# Patient Record
Sex: MALE | Race: WHITE | NOT HISPANIC OR LATINO | Employment: FULL TIME | URBAN - METROPOLITAN AREA
[De-identification: names, ages, dates, MRNs, and addresses within clinical notes are randomized per-mention and may not be internally consistent; named-entity substitution may affect disease eponyms.]

---

## 2023-02-14 PROBLEM — H93.19 TINNITUS: Status: ACTIVE | Noted: 2023-02-14

## 2023-02-14 PROBLEM — F32.A ANXIETY AND DEPRESSION: Status: ACTIVE | Noted: 2023-02-14

## 2023-02-14 PROBLEM — N41.0 ACUTE BACTERIAL PROSTATITIS: Status: ACTIVE | Noted: 2023-02-14

## 2023-02-14 PROBLEM — K57.30 DIVERTICULOSIS OF LARGE INTESTINE WITHOUT HEMORRHAGE: Status: ACTIVE | Noted: 2023-02-14

## 2023-02-14 PROBLEM — N40.0 ENLARGED PROSTATE: Status: ACTIVE | Noted: 2023-02-14

## 2023-02-14 PROBLEM — H53.2 DIPLOPIA: Status: ACTIVE | Noted: 2023-02-14

## 2023-02-14 PROBLEM — J06.9 VIRAL URI WITH COUGH: Status: ACTIVE | Noted: 2023-02-14

## 2023-02-14 PROBLEM — I49.9 IRREGULAR HEARTBEAT: Status: ACTIVE | Noted: 2023-02-14

## 2023-02-14 PROBLEM — F41.9 ANXIETY AND DEPRESSION: Status: ACTIVE | Noted: 2023-02-14

## 2023-02-14 PROBLEM — R33.9 URINARY RETENTION: Status: ACTIVE | Noted: 2023-02-14

## 2023-02-14 PROBLEM — F17.210 CIGARETTE NICOTINE DEPENDENCE WITHOUT COMPLICATION: Status: ACTIVE | Noted: 2023-02-14

## 2023-02-14 PROBLEM — M54.12 CERVICAL RADICULOPATHY: Status: ACTIVE | Noted: 2023-02-14

## 2023-02-14 PROBLEM — R11.2 NAUSEA AND/OR VOMITING: Status: ACTIVE | Noted: 2023-02-14

## 2023-02-14 PROBLEM — R39.15 BENIGN PROSTATIC HYPERPLASIA (BPH) WITH URINARY URGENCY: Status: ACTIVE | Noted: 2023-02-14

## 2023-02-14 PROBLEM — R73.03 PREDIABETES: Status: ACTIVE | Noted: 2023-02-14

## 2023-02-14 PROBLEM — R53.83 FATIGUE: Status: ACTIVE | Noted: 2023-02-14

## 2023-02-14 PROBLEM — M79.10 MYALGIA: Status: ACTIVE | Noted: 2023-02-14

## 2023-02-14 PROBLEM — R50.9 FEVER AND CHILLS: Status: ACTIVE | Noted: 2023-02-14

## 2023-02-14 PROBLEM — K64.9 HEMORRHOIDS: Status: ACTIVE | Noted: 2023-02-14

## 2023-02-14 PROBLEM — R51.9 HEADACHE: Status: ACTIVE | Noted: 2023-02-14

## 2023-02-14 PROBLEM — N40.1 BENIGN PROSTATIC HYPERPLASIA (BPH) WITH URINARY URGENCY: Status: ACTIVE | Noted: 2023-02-14

## 2023-02-14 PROBLEM — R79.89 ABNORMAL CBC MEASUREMENT: Status: ACTIVE | Noted: 2023-02-14

## 2023-02-14 PROBLEM — I10 BENIGN ESSENTIAL HTN: Status: ACTIVE | Noted: 2023-02-14

## 2023-02-14 PROBLEM — R05.9 COUGH: Status: ACTIVE | Noted: 2023-02-14

## 2023-02-14 PROBLEM — R39.9 UTI SYMPTOMS: Status: ACTIVE | Noted: 2023-02-14

## 2023-02-14 PROBLEM — R10.9 ABDOMINAL PAIN OF UNKNOWN ETIOLOGY: Status: ACTIVE | Noted: 2023-02-14

## 2023-02-14 PROBLEM — D12.6 ADENOMATOUS POLYP OF COLON: Status: ACTIVE | Noted: 2023-02-14

## 2023-02-14 RX ORDER — IBUPROFEN 200 MG
TABLET ORAL
COMMUNITY
Start: 2022-04-06 | End: 2024-01-03 | Stop reason: ALTCHOICE

## 2023-02-14 RX ORDER — AMLODIPINE BESYLATE 10 MG/1
1 TABLET ORAL DAILY
COMMUNITY
Start: 2022-04-06 | End: 2024-02-08 | Stop reason: WASHOUT

## 2023-09-14 PROBLEM — F41.9 ANXIETY: Status: ACTIVE | Noted: 2023-09-14

## 2023-09-14 PROBLEM — S20.219A CONTUSION OF CHEST: Status: ACTIVE | Noted: 2023-09-14

## 2023-10-02 ENCOUNTER — APPOINTMENT (OUTPATIENT)
Dept: PRIMARY CARE | Facility: CLINIC | Age: 58
End: 2023-10-02
Payer: COMMERCIAL

## 2024-01-03 ENCOUNTER — OFFICE VISIT (OUTPATIENT)
Dept: PRIMARY CARE | Facility: CLINIC | Age: 59
End: 2024-01-03
Payer: COMMERCIAL

## 2024-01-03 VITALS
DIASTOLIC BLOOD PRESSURE: 80 MMHG | WEIGHT: 197 LBS | HEART RATE: 86 BPM | OXYGEN SATURATION: 96 % | HEIGHT: 68 IN | BODY MASS INDEX: 29.86 KG/M2 | SYSTOLIC BLOOD PRESSURE: 142 MMHG

## 2024-01-03 DIAGNOSIS — R73.03 PREDIABETES: ICD-10-CM

## 2024-01-03 DIAGNOSIS — R91.8 MULTIPLE LUNG NODULES: ICD-10-CM

## 2024-01-03 DIAGNOSIS — Z13.220 SCREENING FOR LIPID DISORDERS: ICD-10-CM

## 2024-01-03 DIAGNOSIS — Z72.0 TOBACCO USE: ICD-10-CM

## 2024-01-03 DIAGNOSIS — Z76.89 ENCOUNTER TO ESTABLISH CARE: Primary | ICD-10-CM

## 2024-01-03 DIAGNOSIS — Z11.59 ENCOUNTER FOR HCV SCREENING TEST FOR LOW RISK PATIENT: ICD-10-CM

## 2024-01-03 DIAGNOSIS — E55.9 VITAMIN D DEFICIENCY: ICD-10-CM

## 2024-01-03 DIAGNOSIS — Z13.21 ENCOUNTER FOR VITAMIN DEFICIENCY SCREENING: ICD-10-CM

## 2024-01-03 DIAGNOSIS — F10.10 EXCESSIVE DRINKING OF ALCOHOL: ICD-10-CM

## 2024-01-03 DIAGNOSIS — Z13.6 SCREENING FOR HEART DISEASE: ICD-10-CM

## 2024-01-03 DIAGNOSIS — I10 PRIMARY HYPERTENSION: ICD-10-CM

## 2024-01-03 PROBLEM — R53.83 FATIGUE: Status: RESOLVED | Noted: 2023-02-14 | Resolved: 2024-01-03

## 2024-01-03 PROBLEM — M79.10 MYALGIA: Status: RESOLVED | Noted: 2023-02-14 | Resolved: 2024-01-03

## 2024-01-03 PROBLEM — H53.2 DIPLOPIA: Status: RESOLVED | Noted: 2023-02-14 | Resolved: 2024-01-03

## 2024-01-03 PROBLEM — F41.9 ANXIETY: Status: RESOLVED | Noted: 2023-09-14 | Resolved: 2024-01-03

## 2024-01-03 PROBLEM — R33.9 URINARY RETENTION: Status: RESOLVED | Noted: 2023-02-14 | Resolved: 2024-01-03

## 2024-01-03 PROBLEM — R51.9 HEADACHE: Status: RESOLVED | Noted: 2023-02-14 | Resolved: 2024-01-03

## 2024-01-03 PROBLEM — J06.9 VIRAL URI WITH COUGH: Status: RESOLVED | Noted: 2023-02-14 | Resolved: 2024-01-03

## 2024-01-03 PROBLEM — R50.9 FEVER AND CHILLS: Status: RESOLVED | Noted: 2023-02-14 | Resolved: 2024-01-03

## 2024-01-03 PROBLEM — R11.2 NAUSEA AND/OR VOMITING: Status: RESOLVED | Noted: 2023-02-14 | Resolved: 2024-01-03

## 2024-01-03 PROBLEM — R79.89 ABNORMAL CBC MEASUREMENT: Status: RESOLVED | Noted: 2023-02-14 | Resolved: 2024-01-03

## 2024-01-03 PROBLEM — I49.9 IRREGULAR HEARTBEAT: Status: RESOLVED | Noted: 2023-02-14 | Resolved: 2024-01-03

## 2024-01-03 PROBLEM — R39.9 UTI SYMPTOMS: Status: RESOLVED | Noted: 2023-02-14 | Resolved: 2024-01-03

## 2024-01-03 PROBLEM — N40.0 ENLARGED PROSTATE: Status: RESOLVED | Noted: 2023-02-14 | Resolved: 2024-01-03

## 2024-01-03 PROBLEM — N41.0 ACUTE BACTERIAL PROSTATITIS: Status: RESOLVED | Noted: 2023-02-14 | Resolved: 2024-01-03

## 2024-01-03 PROBLEM — R05.9 COUGH: Status: RESOLVED | Noted: 2023-02-14 | Resolved: 2024-01-03

## 2024-01-03 PROBLEM — S20.219A CONTUSION OF CHEST: Status: RESOLVED | Noted: 2023-09-14 | Resolved: 2024-01-03

## 2024-01-03 PROBLEM — R10.9 ABDOMINAL PAIN OF UNKNOWN ETIOLOGY: Status: RESOLVED | Noted: 2023-02-14 | Resolved: 2024-01-03

## 2024-01-03 PROCEDURE — 90715 TDAP VACCINE 7 YRS/> IM: CPT

## 2024-01-03 PROCEDURE — 99203 OFFICE O/P NEW LOW 30 MIN: CPT

## 2024-01-03 PROCEDURE — 3077F SYST BP >= 140 MM HG: CPT

## 2024-01-03 PROCEDURE — 99386 PREV VISIT NEW AGE 40-64: CPT

## 2024-01-03 PROCEDURE — 3079F DIAST BP 80-89 MM HG: CPT

## 2024-01-03 RX ORDER — LISINOPRIL 10 MG/1
10 TABLET ORAL DAILY
Qty: 30 TABLET | Refills: 1 | Status: SHIPPED | OUTPATIENT
Start: 2024-01-03 | End: 2024-03-04

## 2024-01-03 ASSESSMENT — ENCOUNTER SYMPTOMS
NAUSEA: 0
PALPITATIONS: 0
UNEXPECTED WEIGHT CHANGE: 1
DYSURIA: 0
COUGH: 0
LIGHT-HEADEDNESS: 0
DIARRHEA: 0
BLOOD IN STOOL: 0
NERVOUS/ANXIOUS: 0
ARTHRALGIAS: 0
DIAPHORESIS: 0
SORE THROAT: 0
FATIGUE: 1
SHORTNESS OF BREATH: 0
WHEEZING: 0
ADENOPATHY: 0
MYALGIAS: 0
FEVER: 0
VOMITING: 0
HEMATURIA: 0
DIFFICULTY URINATING: 0
DYSPHORIC MOOD: 0

## 2024-01-03 ASSESSMENT — PATIENT HEALTH QUESTIONNAIRE - PHQ9
SUM OF ALL RESPONSES TO PHQ9 QUESTIONS 1 AND 2: 0
1. LITTLE INTEREST OR PLEASURE IN DOING THINGS: NOT AT ALL
2. FEELING DOWN, DEPRESSED OR HOPELESS: NOT AT ALL

## 2024-01-03 ASSESSMENT — PAIN SCALES - GENERAL: PAINLEVEL: 0-NO PAIN

## 2024-01-03 NOTE — PROGRESS NOTES
Subjective   Patient ID: Bereket Em is a 58 y.o. male who presents for Establish Care (Pt is here to establish care no concerns and physical /la).    Hx of HTN, Pre-DM, tobacco use, hx of other substance abuse in remission.     Other providers: none    HTN: uncontrolled. On 10 mg Amlodipine. No medication side effects. Home BP were high and re-started his old prescription of amlodipine about 9 months, not 140/80's. Was smoking 1PPD, recently down to 1/2 PPD. Also drinks 3-4 beers a night, recently decreased from higher amount. Denies chest pain, heart palpitations, SOB, dizziness, headaches, changes of vision, syncope, leg swelling.      Pre-DM: no medication. Due for labs.     Working on quitting smoking himself. Has tried Chantix in th past. Does not what medication assistance right now.     *Extensive family history of heart attacks    HM: Colon screening 5/18/22, follow-up 5 years. PSA discussed and declined, no Fhx. Lung screening >30 pack year history. Vaccinations Tdap will be due in 9 months, wants today, flu UTD, Shingrix would like to hold off.          Review of Systems   Constitutional:  Positive for fatigue and unexpected weight change. Negative for diaphoresis and fever.   HENT:  Negative for congestion, hearing loss and sore throat.    Eyes:  Negative for visual disturbance.   Respiratory:  Negative for cough, shortness of breath and wheezing.    Cardiovascular:  Negative for chest pain, palpitations and leg swelling.   Gastrointestinal:  Negative for blood in stool, diarrhea, nausea and vomiting.   Genitourinary:  Negative for difficulty urinating, dysuria and hematuria.   Musculoskeletal:  Negative for arthralgias and myalgias.   Skin:  Negative for rash.   Allergic/Immunologic: Negative for immunocompromised state.   Neurological:  Negative for syncope and light-headedness.   Hematological:  Negative for adenopathy.   Psychiatric/Behavioral:  Negative for dysphoric mood and suicidal ideas.  "The patient is not nervous/anxious.        Objective   /80   Pulse 86   Ht 1.727 m (5' 8\")   Wt 89.4 kg (197 lb)   SpO2 96%   BMI 29.95 kg/m²     Physical Exam  Constitutional:       General: He is not in acute distress.     Appearance: Normal appearance.   HENT:      Right Ear: Tympanic membrane and ear canal normal.      Left Ear: Tympanic membrane and ear canal normal.      Nose: Nose normal.      Mouth/Throat:      Mouth: Mucous membranes are moist.      Pharynx: Oropharynx is clear. No oropharyngeal exudate or posterior oropharyngeal erythema.   Eyes:      Extraocular Movements: Extraocular movements intact.      Conjunctiva/sclera: Conjunctivae normal.      Pupils: Pupils are equal, round, and reactive to light.   Cardiovascular:      Rate and Rhythm: Normal rate and regular rhythm.      Pulses: Normal pulses.           Posterior tibial pulses are 2+ on the right side and 2+ on the left side.      Heart sounds: No murmur heard.     No gallop.   Pulmonary:      Effort: Pulmonary effort is normal.      Breath sounds: No wheezing, rhonchi or rales.   Abdominal:      General: Bowel sounds are normal. There is distension.      Palpations: Abdomen is soft. There is no hepatomegaly, splenomegaly or mass.      Tenderness: There is no abdominal tenderness. There is no guarding.   Musculoskeletal:         General: Normal range of motion.   Lymphadenopathy:      Cervical: No cervical adenopathy.   Skin:     General: Skin is warm.      Findings: No rash.   Neurological:      General: No focal deficit present.      Mental Status: He is alert.   Psychiatric:         Mood and Affect: Mood normal.         Thought Content: Thought content normal.         Assessment/Plan   Diagnoses and all orders for this visit:  Encounter to establish care  Primary hypertension  -     Comprehensive Metabolic Panel; Future  -     lisinopril 10 mg tablet; Take 1 tablet (10 mg) by mouth once daily.  Prediabetes  -     Comprehensive " Metabolic Panel; Future  -     CBC and Auto Differential; Future  -     Insulin, random; Future  -     Hemoglobin A1c; Future  Multiple lung nodules  Screening for heart disease  -     CT cardiac scoring wo IV contrast; Future  Excessive drinking of alcohol  -     Vitamin B12; Future  -     Folate; Future  -     Vitamin B1, Whole Blood; Future  -     CBC and Auto Differential; Future  Encounter for HCV screening test for low risk patient  -     Hepatitis C Antibody; Future  Encounter for vitamin deficiency screening  -     Vitamin B12; Future  -     Folate; Future  -     Vitamin B1, Whole Blood; Future  Screening for lipid disorders  -     Lipid Panel; Future  Vitamin D deficiency  -     Vitamin D 25-Hydroxy,Total (for eval of Vitamin D levels); Future  Tobacco use  Other orders  -     Tdap vaccine, age 7 years and older  (BOOSTRIX)

## 2024-01-26 ENCOUNTER — LAB (OUTPATIENT)
Dept: LAB | Facility: LAB | Age: 59
End: 2024-01-26
Payer: COMMERCIAL

## 2024-01-26 DIAGNOSIS — Z13.220 SCREENING FOR LIPID DISORDERS: ICD-10-CM

## 2024-01-26 DIAGNOSIS — Z11.59 ENCOUNTER FOR HCV SCREENING TEST FOR LOW RISK PATIENT: ICD-10-CM

## 2024-01-26 DIAGNOSIS — E55.9 VITAMIN D DEFICIENCY: ICD-10-CM

## 2024-01-26 DIAGNOSIS — F10.10 EXCESSIVE DRINKING OF ALCOHOL: ICD-10-CM

## 2024-01-26 DIAGNOSIS — I10 PRIMARY HYPERTENSION: ICD-10-CM

## 2024-01-26 DIAGNOSIS — R73.03 PREDIABETES: ICD-10-CM

## 2024-01-26 DIAGNOSIS — Z13.21 ENCOUNTER FOR VITAMIN DEFICIENCY SCREENING: ICD-10-CM

## 2024-01-26 LAB
25(OH)D3 SERPL-MCNC: 47 NG/ML (ref 31–100)
ALBUMIN SERPL-MCNC: 4.5 G/DL (ref 3.5–5)
ALP BLD-CCNC: 69 U/L (ref 35–125)
ALT SERPL-CCNC: 21 U/L (ref 5–40)
ANION GAP SERPL CALC-SCNC: 15 MMOL/L
AST SERPL-CCNC: 16 U/L (ref 5–40)
BASOPHILS # BLD AUTO: 0.05 X10*3/UL (ref 0–0.1)
BASOPHILS NFR BLD AUTO: 0.6 %
BILIRUB SERPL-MCNC: 0.3 MG/DL (ref 0.1–1.2)
BUN SERPL-MCNC: 17 MG/DL (ref 8–25)
CALCIUM SERPL-MCNC: 9.8 MG/DL (ref 8.5–10.4)
CHLORIDE SERPL-SCNC: 101 MMOL/L (ref 97–107)
CHOLEST SERPL-MCNC: 188 MG/DL (ref 133–200)
CHOLEST/HDLC SERPL: 2.6 {RATIO}
CO2 SERPL-SCNC: 25 MMOL/L (ref 24–31)
CREAT SERPL-MCNC: 0.8 MG/DL (ref 0.4–1.6)
EGFRCR SERPLBLD CKD-EPI 2021: >90 ML/MIN/1.73M*2
EOSINOPHIL # BLD AUTO: 0.22 X10*3/UL (ref 0–0.7)
EOSINOPHIL NFR BLD AUTO: 2.7 %
ERYTHROCYTE [DISTWIDTH] IN BLOOD BY AUTOMATED COUNT: 13.7 % (ref 11.5–14.5)
EST. AVERAGE GLUCOSE BLD GHB EST-MCNC: 111 MG/DL
FOLATE SERPL-MCNC: 12.8 NG/ML (ref 4.2–19.9)
GLUCOSE SERPL-MCNC: 113 MG/DL (ref 65–99)
HBA1C MFR BLD: 5.5 %
HCT VFR BLD AUTO: 46.6 % (ref 41–52)
HCV AB SER QL: NONREACTIVE
HDLC SERPL-MCNC: 71 MG/DL
HGB BLD-MCNC: 14.7 G/DL (ref 13.5–17.5)
IMM GRANULOCYTES # BLD AUTO: 0.02 X10*3/UL (ref 0–0.7)
IMM GRANULOCYTES NFR BLD AUTO: 0.2 % (ref 0–0.9)
INSULIN SERPL-ACNC: 12 UIU/ML (ref 3–25)
LDLC SERPL CALC-MCNC: 108 MG/DL (ref 65–130)
LYMPHOCYTES # BLD AUTO: 2.23 X10*3/UL (ref 1.2–4.8)
LYMPHOCYTES NFR BLD AUTO: 27.5 %
MCH RBC QN AUTO: 27.3 PG (ref 26–34)
MCHC RBC AUTO-ENTMCNC: 31.5 G/DL (ref 32–36)
MCV RBC AUTO: 87 FL (ref 80–100)
MONOCYTES # BLD AUTO: 0.63 X10*3/UL (ref 0.1–1)
MONOCYTES NFR BLD AUTO: 7.8 %
NEUTROPHILS # BLD AUTO: 4.95 X10*3/UL (ref 1.2–7.7)
NEUTROPHILS NFR BLD AUTO: 61.2 %
NRBC BLD-RTO: 0 /100 WBCS (ref 0–0)
PLATELET # BLD AUTO: 131 X10*3/UL (ref 150–450)
POTASSIUM SERPL-SCNC: 4.9 MMOL/L (ref 3.4–5.1)
PROT SERPL-MCNC: 6.9 G/DL (ref 5.9–7.9)
RBC # BLD AUTO: 5.39 X10*6/UL (ref 4.5–5.9)
SODIUM SERPL-SCNC: 141 MMOL/L (ref 133–145)
TRIGL SERPL-MCNC: 47 MG/DL (ref 40–150)
VIT B12 SERPL-MCNC: 871 PG/ML (ref 211–946)
WBC # BLD AUTO: 8.1 X10*3/UL (ref 4.4–11.3)

## 2024-01-26 PROCEDURE — 80061 LIPID PANEL: CPT

## 2024-01-26 PROCEDURE — 36415 COLL VENOUS BLD VENIPUNCTURE: CPT

## 2024-01-26 PROCEDURE — 85025 COMPLETE CBC W/AUTO DIFF WBC: CPT

## 2024-01-26 PROCEDURE — 82607 VITAMIN B-12: CPT

## 2024-01-26 PROCEDURE — 82306 VITAMIN D 25 HYDROXY: CPT

## 2024-01-26 PROCEDURE — 82746 ASSAY OF FOLIC ACID SERUM: CPT

## 2024-01-26 PROCEDURE — 80053 COMPREHEN METABOLIC PANEL: CPT

## 2024-01-26 PROCEDURE — 83525 ASSAY OF INSULIN: CPT

## 2024-01-26 PROCEDURE — 83036 HEMOGLOBIN GLYCOSYLATED A1C: CPT

## 2024-01-26 PROCEDURE — 86803 HEPATITIS C AB TEST: CPT

## 2024-01-26 PROCEDURE — 84425 ASSAY OF VITAMIN B-1: CPT

## 2024-01-31 LAB — VIT B1 PYROPHOSHATE BLD-SCNC: 139 NMOL/L (ref 70–180)

## 2024-02-08 ENCOUNTER — OFFICE VISIT (OUTPATIENT)
Dept: PRIMARY CARE | Facility: CLINIC | Age: 59
End: 2024-02-08
Payer: COMMERCIAL

## 2024-02-08 VITALS
HEART RATE: 58 BPM | BODY MASS INDEX: 29.7 KG/M2 | DIASTOLIC BLOOD PRESSURE: 82 MMHG | OXYGEN SATURATION: 96 % | HEIGHT: 68 IN | WEIGHT: 196 LBS | SYSTOLIC BLOOD PRESSURE: 130 MMHG

## 2024-02-08 DIAGNOSIS — I10 BENIGN ESSENTIAL HTN: Primary | ICD-10-CM

## 2024-02-08 PROBLEM — R73.03 PREDIABETES: Status: RESOLVED | Noted: 2023-02-14 | Resolved: 2024-02-08

## 2024-02-08 PROCEDURE — 99213 OFFICE O/P EST LOW 20 MIN: CPT

## 2024-02-08 PROCEDURE — 3075F SYST BP GE 130 - 139MM HG: CPT

## 2024-02-08 PROCEDURE — 3079F DIAST BP 80-89 MM HG: CPT

## 2024-02-08 ASSESSMENT — ENCOUNTER SYMPTOMS
LIGHT-HEADEDNESS: 0
DIZZINESS: 0
SHORTNESS OF BREATH: 0
PALPITATIONS: 0
FATIGUE: 0

## 2024-02-08 ASSESSMENT — PATIENT HEALTH QUESTIONNAIRE - PHQ9
SUM OF ALL RESPONSES TO PHQ9 QUESTIONS 1 AND 2: 0
2. FEELING DOWN, DEPRESSED OR HOPELESS: NOT AT ALL
1. LITTLE INTEREST OR PLEASURE IN DOING THINGS: NOT AT ALL

## 2024-02-08 ASSESSMENT — PAIN SCALES - GENERAL: PAINLEVEL: 0-NO PAIN

## 2024-02-08 NOTE — PROGRESS NOTES
"Subjective   Patient ID: Bereket Em is a 58 y.o. male who presents for Hypertension (Follow up /la).    Hx of HTN, tobacco use, hx of other substance abuse in remission, elevated glucose (last A1c 5.5 1/2024)    Other providers: none    HTN: controlled. Last visit started 10 mg Lisinopril. Also on 10 mg Amlodipine, but patient stopped taking when started on new medication. No medication side effects. Home BP not checking. Was smoking 1PPD, recently down to less than 1/2 PPD. Also drinks 3-4 beers a night, recently decreased from higher amount, still working on decreasing. Also has changed diet dramatically, eating more veggies and meat and low to no carbs. Per patient lost 4 lbs at his home scale in the last month. Denies chest pain, heart palpitations, SOB, dizziness, headaches, changes of vision, syncope, leg swelling.      Working on quitting smoking himself. Has tried Chantix in the past. Does not what medication assistance right now.     *Extensive family history of heart attacks-- CT calcium ordered         Review of Systems   Constitutional:  Negative for fatigue.   Eyes:  Negative for visual disturbance.   Respiratory:  Negative for shortness of breath.    Cardiovascular:  Negative for chest pain, palpitations and leg swelling.   Neurological:  Negative for dizziness, syncope and light-headedness.       Objective   /82   Pulse 58   Ht 1.727 m (5' 8\")   Wt 88.9 kg (196 lb)   SpO2 96%   BMI 29.80 kg/m²     Physical Exam  Constitutional:       General: He is not in acute distress.     Appearance: Normal appearance.   Cardiovascular:      Rate and Rhythm: Normal rate and regular rhythm.      Heart sounds: No murmur heard.  Pulmonary:      Effort: Pulmonary effort is normal.      Breath sounds: Normal breath sounds. No wheezing, rhonchi or rales.   Musculoskeletal:      Right lower leg: No edema.      Left lower leg: No edema.   Skin:     General: Skin is warm and dry.      Findings: No rash. "   Neurological:      Mental Status: He is alert.   Psychiatric:         Mood and Affect: Mood and affect normal.         Assessment/Plan   Diagnoses and all orders for this visit:  Benign essential HTN  -     Basic metabolic panel; Future  -Continue only lisinopril. Stop Amlodipine. Continue lifestyle changes and follow-up in 3 months.

## 2024-03-04 DIAGNOSIS — I10 PRIMARY HYPERTENSION: ICD-10-CM

## 2024-03-04 RX ORDER — LISINOPRIL 10 MG/1
10 TABLET ORAL DAILY
Qty: 90 TABLET | Refills: 1 | Status: SHIPPED | OUTPATIENT
Start: 2024-03-04 | End: 2024-05-09 | Stop reason: SDUPTHER

## 2024-04-01 ENCOUNTER — HOSPITAL ENCOUNTER (OUTPATIENT)
Dept: RADIOLOGY | Facility: CLINIC | Age: 59
End: 2024-04-01
Payer: COMMERCIAL

## 2024-04-29 ENCOUNTER — TELEPHONE (OUTPATIENT)
Dept: PRIMARY CARE | Facility: CLINIC | Age: 59
End: 2024-04-29
Payer: COMMERCIAL

## 2024-04-29 NOTE — TELEPHONE ENCOUNTER
Patient called the office stating he pinched a nerve in his back and has pain doing down leg/arm. Patient stated it has been going on for the last week but this past weekend pain was unbearable. Patient has tried ice and heat and otc tylenol and it is not better. Please advice patient. Bereket can be reached at [phone].

## 2024-04-30 ENCOUNTER — OFFICE VISIT (OUTPATIENT)
Dept: PRIMARY CARE | Facility: CLINIC | Age: 59
End: 2024-04-30
Payer: COMMERCIAL

## 2024-04-30 ENCOUNTER — HOSPITAL ENCOUNTER (OUTPATIENT)
Dept: RADIOLOGY | Facility: CLINIC | Age: 59
Discharge: HOME | End: 2024-04-30
Payer: COMMERCIAL

## 2024-04-30 VITALS
WEIGHT: 195 LBS | OXYGEN SATURATION: 97 % | BODY MASS INDEX: 29.55 KG/M2 | HEIGHT: 68 IN | SYSTOLIC BLOOD PRESSURE: 156 MMHG | HEART RATE: 70 BPM | DIASTOLIC BLOOD PRESSURE: 84 MMHG

## 2024-04-30 DIAGNOSIS — M54.12 CERVICAL RADICULOPATHY: ICD-10-CM

## 2024-04-30 DIAGNOSIS — M54.12 CERVICAL RADICULOPATHY: Primary | ICD-10-CM

## 2024-04-30 PROCEDURE — 72050 X-RAY EXAM NECK SPINE 4/5VWS: CPT

## 2024-04-30 PROCEDURE — 99214 OFFICE O/P EST MOD 30 MIN: CPT | Performed by: PHYSICIAN ASSISTANT

## 2024-04-30 PROCEDURE — 3079F DIAST BP 80-89 MM HG: CPT | Performed by: PHYSICIAN ASSISTANT

## 2024-04-30 PROCEDURE — 72050 X-RAY EXAM NECK SPINE 4/5VWS: CPT | Performed by: RADIOLOGY

## 2024-04-30 PROCEDURE — 3077F SYST BP >= 140 MM HG: CPT | Performed by: PHYSICIAN ASSISTANT

## 2024-04-30 RX ORDER — CYCLOBENZAPRINE HCL 10 MG
10 TABLET ORAL 3 TIMES DAILY PRN
Qty: 30 TABLET | Refills: 0 | Status: ON HOLD | OUTPATIENT
Start: 2024-04-30 | End: 2024-06-29

## 2024-04-30 RX ORDER — PREDNISONE 10 MG/1
TABLET ORAL DAILY
Qty: 45 TABLET | Refills: 0 | Status: SHIPPED | OUTPATIENT
Start: 2024-04-30 | End: 2024-05-15

## 2024-04-30 ASSESSMENT — ENCOUNTER SYMPTOMS
NUMBNESS: 1
WEAKNESS: 0
NECK STIFFNESS: 1
NECK PAIN: 1

## 2024-04-30 ASSESSMENT — PAIN SCALES - GENERAL: PAINLEVEL: 8

## 2024-04-30 NOTE — PATIENT INSTRUCTIONS
Recommend course of prednisone and muscle relaxer at bedtime.   PT eval in 1-2 weeks if not improving. MRI and consult with pain medicine if persistent or worse.   Stretching exercises advised as well.

## 2024-04-30 NOTE — PROGRESS NOTES
"Subjective   Patient ID: Bereket Em is a 58 y.o. male who presents for Shoulder Pain (Patient c/o right shoulder pain x1 week//bp ).    59 y/o R handed male presents for c/o R shoulder pain for about a week or so. Pain has increased over the last few days with c/o unbearable pain going down arm into fingers. Started after doing a day of significant yardwork - has backpack blower he was using and was building a trellis including overhead activity. Does have posterior neck pain and numbness, occ headaches as well.   Has a hx of rotator cuff tearing and surgery with Dr. Moreno in 2016 and does feel similar though today has increased pain in his neck.   Has trouble sleeping and getting comfortable in spite of NSAIDs, ice.   Numbness goes into 2nd and 3rd digits.          Review of Systems   Musculoskeletal:  Positive for neck pain and neck stiffness.   Neurological:  Positive for numbness. Negative for weakness.   All other systems reviewed and are negative.      Objective   /84 (BP Location: Left arm, Patient Position: Sitting)   Pulse 70   Ht 1.727 m (5' 8\")   Wt 88.5 kg (195 lb)   SpO2 97%   BMI 29.65 kg/m²     Physical Exam  Constitutional:       General: He is not in acute distress.     Appearance: Normal appearance.   Neck:        Comments: Point tenderness  Musculoskeletal:      Right shoulder: Normal. No swelling, deformity, effusion or tenderness. Normal range of motion. Normal strength. Normal pulse.      Cervical back: Tenderness present. Pain with movement and muscular tenderness present. Decreased range of motion.      Comments: Supraspinatus 5/5; infraspinatus 5/5 with sl discomfort; subscapularis 5/5   Neurological:      Mental Status: He is alert.      Sensory: Sensation is intact.      Deep Tendon Reflexes:      Reflex Scores:       Tricep reflexes are 2+ on the right side.       Bicep reflexes are 2+ on the right side.       Brachioradialis reflexes are 1+ on the right " side.        Assessment/Plan   Diagnoses and all orders for this visit:  Cervical radiculopathy  -     predniSONE (Deltasone) 10 mg tablet; Take 5 tablets (50 mg) by mouth once daily for 3 days, THEN 4 tablets (40 mg) once daily for 3 days, THEN 3 tablets (30 mg) once daily for 3 days, THEN 2 tablets (20 mg) once daily for 3 days, THEN 1 tablet (10 mg) once daily for 3 days.  -     XR cervical spine complete 4-5 views; Future  -     cyclobenzaprine (Flexeril) 10 mg tablet; Take 1 tablet (10 mg) by mouth 3 times a day as needed for muscle spasms.  -     Referral to Physical Therapy; Future

## 2024-05-01 ENCOUNTER — TELEPHONE (OUTPATIENT)
Dept: PRIMARY CARE | Facility: CLINIC | Age: 59
End: 2024-05-01
Payer: COMMERCIAL

## 2024-05-02 NOTE — TELEPHONE ENCOUNTER
The report is not back yet however there is straightening of the spine likely as a result of spasm; there are some moderate to severe degenerative changes as well a bit higher than the level of his current symptoms but there does appear to be narrowing at the level of his symptoms as well. I would recommend continuing the prednisone and muscle relaxer, along with a course of physical therapy. Is he having any relief of pain yet?

## 2024-05-02 NOTE — TELEPHONE ENCOUNTER
Spoke with patient who verbally understands. He is going to see [place] on 5/9 and go from there.

## 2024-05-09 ENCOUNTER — OFFICE VISIT (OUTPATIENT)
Dept: PRIMARY CARE | Facility: CLINIC | Age: 59
End: 2024-05-09
Payer: COMMERCIAL

## 2024-05-09 VITALS
HEIGHT: 68 IN | OXYGEN SATURATION: 99 % | DIASTOLIC BLOOD PRESSURE: 90 MMHG | HEART RATE: 106 BPM | WEIGHT: 198 LBS | BODY MASS INDEX: 30.01 KG/M2 | SYSTOLIC BLOOD PRESSURE: 166 MMHG

## 2024-05-09 DIAGNOSIS — M54.12 CERVICAL RADICULOPATHY: Primary | ICD-10-CM

## 2024-05-09 DIAGNOSIS — I10 PRIMARY HYPERTENSION: ICD-10-CM

## 2024-05-09 PROCEDURE — 3080F DIAST BP >= 90 MM HG: CPT

## 2024-05-09 PROCEDURE — 99213 OFFICE O/P EST LOW 20 MIN: CPT

## 2024-05-09 PROCEDURE — 3077F SYST BP >= 140 MM HG: CPT

## 2024-05-09 RX ORDER — LISINOPRIL 10 MG/1
10 TABLET ORAL DAILY
Qty: 90 TABLET | Refills: 1 | Status: ON HOLD | OUTPATIENT
Start: 2024-05-09

## 2024-05-09 RX ORDER — GABAPENTIN 100 MG/1
100 CAPSULE ORAL 2 TIMES DAILY
Qty: 60 CAPSULE | Refills: 0 | Status: ON HOLD | OUTPATIENT
Start: 2024-05-09 | End: 2024-06-08

## 2024-05-09 ASSESSMENT — PATIENT HEALTH QUESTIONNAIRE - PHQ9
1. LITTLE INTEREST OR PLEASURE IN DOING THINGS: NOT AT ALL
SUM OF ALL RESPONSES TO PHQ9 QUESTIONS 1 AND 2: 0
2. FEELING DOWN, DEPRESSED OR HOPELESS: NOT AT ALL

## 2024-05-09 ASSESSMENT — PAIN SCALES - GENERAL: PAINLEVEL: 8

## 2024-05-09 NOTE — PROGRESS NOTES
"Subjective   Patient ID: Bereket Em is a 58 y.o. male who presents for Follow-up (Following up on bp and pinched nerve on shoulder).    Hx of HTN, tobacco use, hx of other substance abuse in remission, elevated glucose (last A1c 5.5 1/2024)    Other providers: none    Cervical radiculopathy follow-Up. Seen by other provider and placed on Prednisone taper and muscle relaxer. Patient states steroid did not help at all, and muscle relaxer only helps him sleep. Was referred to PT but patient was unaware of referral and has not scheduled appointment. Pain is currently \"8\" and of right posterior shoulder neck and radiates down into right arm. Started after doing a day of significant yardwork - has backpack blower he was using and was building a trellis including overhead activity. Does have posterior neck pain and numbness, occ headaches as well. Has trouble sleeping and getting comfortable in spite of NSAIDs, ice. Numbness goes into 2nd and 3rd digits. Has a hx of rotator cuff tearing and surgery with Dr. Moreno in 2016 and does feel similar though today has increased pain in his neck.     HTN: elevated but patient is in great amount of pain from shoulder. Currently on 10 mg Lisinopril. Was on 10 mg Amlodipine, but patient stopped taking when started on new medication (and remained previously controlled on only lisinopril). No medication side effects. Home BP not checking. Was smoking 1PPD, recently down to less than 1/2 PPD. Also drinks 3-4 beers a night, recently decreased from higher amount, still working on decreasing. Also has changed diet dramatically, eating more veggies and meat and low to no carbs. Per patient lost 4 lbs at his home scale in the last month. Denies chest pain, heart palpitations, SOB, dizziness, headaches, changes of vision, syncope, leg swelling.      Working on quitting smoking himself. Has tried Chantix in the past. Does not what medication assistance right now.     *Extensive family " "history of heart attacks-- CT calcium ordered         Review of Systems    Objective   /90   Pulse 106   Ht 1.727 m (5' 8\")   Wt 89.8 kg (198 lb)   SpO2 99%   BMI 30.11 kg/m²   Repeat BP was 140/90    Physical Exam  Constitutional:       General: He is not in acute distress.     Appearance: Normal appearance.   Cardiovascular:      Rate and Rhythm: Normal rate and regular rhythm.      Heart sounds: No murmur heard.  Pulmonary:      Effort: Pulmonary effort is normal.      Breath sounds: Normal breath sounds. No wheezing, rhonchi or rales.   Musculoskeletal:      Right shoulder: Normal. No swelling, deformity, effusion or tenderness. Normal range of motion. Normal strength. Normal pulse.      Cervical back: Tenderness present. Pain with movement present. Decreased range of motion.      Right lower leg: No edema.      Left lower leg: No edema.   Skin:     General: Skin is warm and dry.      Findings: No rash.   Neurological:      Mental Status: He is alert.      Sensory: Sensation is intact.   Psychiatric:         Mood and Affect: Mood and affect normal.         Assessment/Plan   Diagnoses and all orders for this visit:  Cervical radiculopathy  -     gabapentin (Neurontin) 100 mg capsule; Take 1 capsule (100 mg) by mouth 2 times a day.  Primary hypertension  -     lisinopril 10 mg tablet; Take 1 tablet (10 mg) by mouth once daily.  Re-printed PT referral for patient, will hold of on HTN med changes due to pain today. Once shoulder resolves patient should schedule his 3 month HTN follow-up. If not resolving with PT, can consider MRI.        "

## 2024-05-22 NOTE — PROGRESS NOTES
Physical Therapy Evaluation/Treatment    Patient Name: Bereket Em  MRN: [id number]  Encounter Date: 6/10/2024       Visit Number:  1/*** (including evaluation)  Planned total visits: ***  Visit Authorized/insurance considerations:  ***  Progress Report due visit #***    Current Problem:  Problem List Items Addressed This Visit    None      Surgery:  {NA:72603}    Surgery Date:  {NA:40184}    Date:  *** *** weeks post surgery    Subjective:  Subjective     SUBJECTIVE:  Main complaint:  ***  Onset Date:   ***  Date of Injury:  ***    Patient reported hx of injury:   ***    Previous Medical Treatment:    ***    Relevant PMH:  ***    Red flags:  {YES wildcard/NO:60}  ***    Red flags:  {basRedFlags:20189}  ***    Imaging:  {BASDiagnostics:91015}  ***    XR cervical spine complete 4-5 views    Result Date: 5/2/2024  Interpreted By:  Volodymyr Mcneal, STUDY: XR CERVICAL SPINE COMPLETE 4-5 VIEWS   INDICATION: Signs/Symptoms:C7 radiculopathy.   COMPARISON: None   ACCESSION NUMBER(S): [id number]   ORDERING CLINICIAN: HUANG JOHNSON   FINDINGS: Mild-to-moderate cervical degenerative change greatest with disc disease at C4-5 where there is bilateral neural foraminal narrowing.   Alignment normal. Prevertebral soft tissues normal.       Multilevel cervical degenerative change greatest C4-5.   Signed by: Volodymyr Mcneal 5/2/2024 7:36 AM Dictation workstation:   [id number]      Previous Therapy Treatments:    {Previous PT services:35964}  Successful:  {yes/no:80419}  ***  ***    Pt stated Goal:  ***    General:       Precautions:       Pain:       Home Living:       Home type: {dkhousetype:92606}  Stairs: {yes,no:03867}  Lives with: {dkliveswith:86887}    Vocation:    {BASVocation:25282}  Job/Job tasks:  ***    Prior Function Per Pt/Caregiver Report:       OBJECTIVE:  ***    Posture:       {BASPOSTURE:10697}       ROM and Strength:    Cervical:    {BASstrengthROMwnlwflseebelow:33545}     AROM STRENGTH   Cervical     Flexion  ***  *** ***  ***   Extension     /////////////////////////////////////////////////////////////////////////////////////    R L R L   Rotation *** *** *** ***   Sidebend *** *** *** ***     Shoulder:    {BASstrengthROMwnlwflseebelow:30862}     AROM STRENGTH   Shoulder R L R L   Flexion *** *** ***/5 ***/5   Extension *** *** ***/5 ***/5   Abduction *** *** ***/5 ***/5   Adduction *** *** ***/5 ***/5   Internal  Rotation *** *** ***/5 ***/5   External Rotation *** *** ***/5 ***/5     Elbow:    {BASstrengthROMwnlwflseebelow:92115}     AROM STRENGTH   Elbow R L R L   Flexion *** *** ***/5 ***/5   Extension *** *** ***/5 ***/5   Supination *** *** ***/5 ***/5   Pronation *** *** ***/5 ***/5     Wrist:    {BASstrengthROMwnlwflseebelow:03453}     AROM STRENGTH   Wrist R L R L   Flexion *** *** ***/5 ***/5   Extension *** *** ***/5 ***/5   Ulnar deviation *** *** ***/5 ***/5   Radial deviation *** *** ***/5 ***/5   Pronation *** *** ***/5 ***/5   Supination *** *** ***/5 ***/5        Flexibility:     {BASstrengthROMwnlwflseebLarned State Hospital:64581}      R  L   Pectoralis *** ***        Special Tests:     Cervical    Compression {POSITIVE/NEGATIVE:90389}   Distraction  {POSITIVE/NEGATIVE:08459}   Repeated Flexion in Sitting {POSITIVE/NEGATIVE:94738}   Repeated Extension in Sitting {POSITIVE/NEGATIVE:84993}   Sharp-Terry Test {POSITIVE/NEGATIVE:43396}   Alar-Odontoid Integrity Test {POSITIVE/NEGATIVE:94802}       Shoulder    Speed's Right Left   Supraspinatus {POSITIVE/NEGATIVE:90935} {POSITIVE/NEGATIVE:80589}   Anterior Apprehension {POSITIVE/NEGATIVE:93016} {POSITIVE/NEGATIVE:65971}   Load and Shift {POSITIVE/NEGATIVE:31596} {POSITIVE/NEGATIVE:49954}   Drop Arm {POSITIVE/NEGATIVE:71904} {POSITIVE/NEGATIVE:97907}   Impingement {POSITIVE/NEGATIVE:68806} {POSITIVE/NEGATIVE:59856}   Apley Scratch {POSITIVE/NEGATIVE:93872} {POSITIVE/NEGATIVE:71339}   Sulcus Sign {POSITIVE/NEGATIVE:24509} {POSITIVE/NEGATIVE:49213}   Comment ***      Elbow     Right Left   Medial Epicondylitis {POSITIVE/NEGATIVE:87197} {POSITIVE/NEGATIVE:30359}   Lateral Epicondylitis {POSITIVE/NEGATIVE:41501} {POSITIVE/NEGATIVE:51622}          Palpation:     Palpation:       Other:       Outcome Measures:  {PT Outcome Measures:26482}     Assessment       Pt is a 58 y.o. male who presents with impairments of ***.  Pt would benefit from skilled physical therapy to improve flexibility, ROM, strength to reduce pain and improve the patient's current level of functioning including routine exercise program.  Pt would also benefit from proper body mechanics and ergonomic training to better manage the patient's symptoms and reduce exacerbation.      Pt's recovery time and progress/outcomes will likely be impacted by the patient;s history of *** and ***.    Plan       OP EDUCATION:         Pt ed:  anatomy of *** and how it impacts current symptoms, initiated [id number] (sit and or sleep with towel roll), sit tall and stand tall posturing for ADLs, IADLs and performing activities at home; rationale for PT POC with respect to exercises improved ROM, strength, posture and reducing pain; how each modality will address soft tissue restrictions/issues, pain and ROM.  Pt educated in how to determine pain patterns and worsening pain versus muscle soreness. Pt instructed to stop any exercises (or activity) that worsens her pain and we'll discuss next visit.    Transfer training to decrease back pain when standing:  sit tall, scoot forward, push upwards with arms while maintaining tall posture (decrease forward flexion when standing)    HEP to be completed daily, exercises include:  ***    Goals:      Treatments this date:  {PT Treatments:91179}    Perform HEP as instructed and according to handouts.  Monitor pain levels, stop any exercises that increases pain level and report to therapist next visit.       Billed Treatment Times:  {Treatement times:57418}      Therapeutic  modalities this date:       Billed Treatment Times:  {Treatement times:45347}    Therapeutic Activities:  {Activity:92036}    Billed Treatment Times:  {Treatement times:17795}    Manual:    ***  Billed Treatment Times:  {Treatement times:55576}    Balance/NMRE:     ***    Billed Treatment Times:  {Treatement times:29180}    Plan for next visit:  ***    1.  Improve Cx AROM by ** deg at deficits   2.  Improve shoulder AROM by ** deg at deficits  3.  Improve shoulder strength by ½ mm grade at deficits  4.  Improve sitting posture with correct alignment of Cx region and shoulders  5.  Pain:  ** to **  6.  Functional Outcome Measure:  ** (** %)

## 2024-06-03 ENCOUNTER — OFFICE VISIT (OUTPATIENT)
Dept: PRIMARY CARE | Facility: CLINIC | Age: 59
End: 2024-06-03
Payer: COMMERCIAL

## 2024-06-03 VITALS
HEIGHT: 68 IN | SYSTOLIC BLOOD PRESSURE: 130 MMHG | WEIGHT: 197 LBS | OXYGEN SATURATION: 99 % | DIASTOLIC BLOOD PRESSURE: 78 MMHG | BODY MASS INDEX: 29.86 KG/M2 | HEART RATE: 112 BPM

## 2024-06-03 DIAGNOSIS — K57.92 DIVERTICULITIS: Primary | ICD-10-CM

## 2024-06-03 PROCEDURE — 3078F DIAST BP <80 MM HG: CPT

## 2024-06-03 PROCEDURE — 99213 OFFICE O/P EST LOW 20 MIN: CPT

## 2024-06-03 PROCEDURE — 3075F SYST BP GE 130 - 139MM HG: CPT

## 2024-06-03 RX ORDER — AMOXICILLIN AND CLAVULANATE POTASSIUM 875; 125 MG/1; MG/1
875 TABLET, FILM COATED ORAL 2 TIMES DAILY
Qty: 10 TABLET | Refills: 0 | Status: ON HOLD | OUTPATIENT
Start: 2024-06-03 | End: 2024-06-08

## 2024-06-03 ASSESSMENT — ENCOUNTER SYMPTOMS
ABDOMINAL PAIN: 1
VOMITING: 0
NAUSEA: 0
CHILLS: 0
FATIGUE: 1
FEVER: 0
ABDOMINAL DISTENTION: 1
CONSTIPATION: 0
WEAKNESS: 1
DIARRHEA: 1
BLOOD IN STOOL: 0
APPETITE CHANGE: 1

## 2024-06-03 ASSESSMENT — PATIENT HEALTH QUESTIONNAIRE - PHQ9
2. FEELING DOWN, DEPRESSED OR HOPELESS: NOT AT ALL
SUM OF ALL RESPONSES TO PHQ9 QUESTIONS 1 AND 2: 0
1. LITTLE INTEREST OR PLEASURE IN DOING THINGS: NOT AT ALL

## 2024-06-03 ASSESSMENT — PAIN SCALES - GENERAL: PAINLEVEL: 7

## 2024-06-03 NOTE — LETTER
Jocy 3, 2024     Patient: Bereket Em   YOB: 1965   Date of Visit: 6/3/2024       To Whom It May Concern:    Bereket Em was seen in my clinic on 6/3/2024 at 10:30 am. Please excuse Bereket for his absence from work on 06/03/2024 - 06/05/2024 due to acute illness.     If you have any questions or concerns, please don't hesitate to call.        Sincerely,         Candy Howell PA-C

## 2024-06-03 NOTE — LETTER
Jocy 3, 2024     Patient: Bereket Em   YOB: 1965   Date of Visit: 6/3/2024       To Whom It May Concern:    Bereket Em was seen in my clinic on 6/3/2024 at 10:30 am for Diverticulitis. Please excuse Bereket for his absence from work on this day to make the appointment.    If you have any questions or concerns, please don't hesitate to call.         Sincerely,         Candy Howell PA-C        CC: No Recipients

## 2024-06-03 NOTE — PROGRESS NOTES
"Subjective   Patient ID: Bereket Em is a 58 y.o. male who presents for Abdominal Pain (Pt c/o abd pain since Saturday /la).    Hx of HTN, tobacco use, hx of other substance abuse in remission, elevated glucose (last A1c 5.5 1/2024)    Abdominal pain x 3 days. Pain located in LLQ and radiated across lower abdomen. Started after drinking coffee in the morning. +change in stool (more diarrhea), appetite decrease, abdominal distension. Patient had colonoscopy 5/2022 which showed diverticulosis. Patient has never been hospitlized for diverticulitis. Denies fever, chills, nausea, vomiting, blood in stool.          Review of Systems   Constitutional:  Positive for appetite change and fatigue. Negative for chills and fever.   Gastrointestinal:  Positive for abdominal distention, abdominal pain and diarrhea. Negative for blood in stool, constipation, nausea and vomiting.   Neurological:  Positive for weakness.       Objective   /78   Pulse (!) 112   Ht 1.727 m (5' 8\")   Wt 89.4 kg (197 lb)   SpO2 99%   BMI 29.95 kg/m²     Physical Exam  Constitutional:       Appearance: Normal appearance.   Cardiovascular:      Rate and Rhythm: Normal rate and regular rhythm.      Heart sounds: No murmur heard.  Pulmonary:      Effort: Pulmonary effort is normal.      Breath sounds: Normal breath sounds. No wheezing, rhonchi or rales.   Abdominal:      General: Abdomen is protuberant. Bowel sounds are normal.      Palpations: Abdomen is soft. There is no hepatomegaly, splenomegaly or mass.      Tenderness: There is abdominal tenderness in the right lower quadrant, suprapubic area and left lower quadrant. There is guarding. There is no rebound. Negative signs include Elizondo's sign.      Comments: TTP most significant of LLQ   Skin:     General: Skin is warm and dry.      Findings: No rash.   Neurological:      Mental Status: He is alert.   Psychiatric:         Mood and Affect: Mood and affect normal.         Assessment/Plan "   Diagnoses and all orders for this visit:  Diverticulitis  -     amoxicillin-pot clavulanate (Augmentin) 875-125 mg tablet; Take 1 tablet (875 mg) by mouth 2 times a day for 5 days.  Educated on bowel rest with liquid diet, rest from work. Call if not improving. Go to ER if fever or increase of abdominal pain occurs.

## 2024-06-03 NOTE — LETTER
Jocy 3, 2024     Patient: Bereket Em   YOB: 1965   Date of Visit: 6/3/2024       To Whom It May Concern:    Bereket mE was seen in my clinic on 6/3/2024 at 10:30 am. Please excuse Bereket for his absence from work on 06/03/2024- 06/05/2024 for acute diverticulitis     If you have any questions or concerns, please don't hesitate to call.         Sincerely,         Candy Howell PA-C        CC: No Recipients

## 2024-06-05 ENCOUNTER — TELEPHONE (OUTPATIENT)
Dept: PRIMARY CARE | Facility: CLINIC | Age: 59
End: 2024-06-05
Payer: COMMERCIAL

## 2024-06-05 ENCOUNTER — APPOINTMENT (OUTPATIENT)
Dept: RADIOLOGY | Facility: HOSPITAL | Age: 59
End: 2024-06-05
Payer: COMMERCIAL

## 2024-06-05 ENCOUNTER — HOSPITAL ENCOUNTER (EMERGENCY)
Facility: HOSPITAL | Age: 59
Discharge: OTHER INSTITUTION | End: 2024-06-05
Attending: EMERGENCY MEDICINE
Payer: COMMERCIAL

## 2024-06-05 ENCOUNTER — HOSPITAL ENCOUNTER (INPATIENT)
Facility: HOSPITAL | Age: 59
End: 2024-06-05
Attending: SURGERY | Admitting: SURGERY
Payer: COMMERCIAL

## 2024-06-05 VITALS
SYSTOLIC BLOOD PRESSURE: 149 MMHG | HEART RATE: 82 BPM | WEIGHT: 193.12 LBS | DIASTOLIC BLOOD PRESSURE: 89 MMHG | HEIGHT: 68 IN | TEMPERATURE: 98.1 F | BODY MASS INDEX: 29.27 KG/M2 | OXYGEN SATURATION: 95 % | RESPIRATION RATE: 16 BRPM

## 2024-06-05 DIAGNOSIS — Z98.890 POST-OPERATIVE STATE: ICD-10-CM

## 2024-06-05 DIAGNOSIS — K56.609 SMALL BOWEL OBSTRUCTION (MULTI): Primary | ICD-10-CM

## 2024-06-05 DIAGNOSIS — I10 BENIGN ESSENTIAL HTN: ICD-10-CM

## 2024-06-05 DIAGNOSIS — K57.20 DIVERTICULITIS OF LARGE INTESTINE WITH ABSCESS WITHOUT BLEEDING: ICD-10-CM

## 2024-06-05 DIAGNOSIS — E43 SEVERE MALNUTRITION (MULTI): ICD-10-CM

## 2024-06-05 LAB
ALBUMIN SERPL-MCNC: 3.9 G/DL (ref 3.5–5)
ALP BLD-CCNC: 112 U/L (ref 35–125)
ALT SERPL-CCNC: 25 U/L (ref 5–40)
ANION GAP SERPL CALC-SCNC: 14 MMOL/L
APPEARANCE UR: ABNORMAL
AST SERPL-CCNC: 19 U/L (ref 5–40)
BACTERIA #/AREA URNS AUTO: ABNORMAL /HPF
BASOPHILS # BLD AUTO: 0.04 X10*3/UL (ref 0–0.1)
BASOPHILS NFR BLD AUTO: 0.2 %
BILIRUB SERPL-MCNC: 0.4 MG/DL (ref 0.1–1.2)
BILIRUB UR STRIP.AUTO-MCNC: NEGATIVE MG/DL
BUN SERPL-MCNC: 17 MG/DL (ref 8–25)
CALCIUM SERPL-MCNC: 9.8 MG/DL (ref 8.5–10.4)
CHLORIDE SERPL-SCNC: 85 MMOL/L (ref 97–107)
CO2 SERPL-SCNC: 28 MMOL/L (ref 24–31)
COLOR UR: YELLOW
CREAT SERPL-MCNC: 0.7 MG/DL (ref 0.4–1.6)
EGFRCR SERPLBLD CKD-EPI 2021: >90 ML/MIN/1.73M*2
EOSINOPHIL # BLD AUTO: 0.03 X10*3/UL (ref 0–0.7)
EOSINOPHIL NFR BLD AUTO: 0.2 %
ERYTHROCYTE [DISTWIDTH] IN BLOOD BY AUTOMATED COUNT: 12.9 % (ref 11.5–14.5)
GLUCOSE SERPL-MCNC: 126 MG/DL (ref 65–99)
GLUCOSE UR STRIP.AUTO-MCNC: NORMAL MG/DL
HCT VFR BLD AUTO: 46.9 % (ref 41–52)
HGB BLD-MCNC: 15.7 G/DL (ref 13.5–17.5)
IMM GRANULOCYTES # BLD AUTO: 0.1 X10*3/UL (ref 0–0.7)
IMM GRANULOCYTES NFR BLD AUTO: 0.5 % (ref 0–0.9)
KETONES UR STRIP.AUTO-MCNC: ABNORMAL MG/DL
LEUKOCYTE ESTERASE UR QL STRIP.AUTO: ABNORMAL
LIPASE SERPL-CCNC: 36 U/L (ref 16–63)
LYMPHOCYTES # BLD AUTO: 1.18 X10*3/UL (ref 1.2–4.8)
LYMPHOCYTES NFR BLD AUTO: 6.2 %
MCH RBC QN AUTO: 27.3 PG (ref 26–34)
MCHC RBC AUTO-ENTMCNC: 33.5 G/DL (ref 32–36)
MCV RBC AUTO: 81 FL (ref 80–100)
MONOCYTES # BLD AUTO: 1.53 X10*3/UL (ref 0.1–1)
MONOCYTES NFR BLD AUTO: 8 %
MUCOUS THREADS #/AREA URNS AUTO: ABNORMAL /LPF
NEUTROPHILS # BLD AUTO: 16.15 X10*3/UL (ref 1.2–7.7)
NEUTROPHILS NFR BLD AUTO: 84.9 %
NITRITE UR QL STRIP.AUTO: NEGATIVE
NRBC BLD-RTO: 0 /100 WBCS (ref 0–0)
PH UR STRIP.AUTO: 6 [PH]
PLATELET # BLD AUTO: 418 X10*3/UL (ref 150–450)
POTASSIUM SERPL-SCNC: 4.3 MMOL/L (ref 3.4–5.1)
PROT SERPL-MCNC: 8.1 G/DL (ref 5.9–7.9)
PROT UR STRIP.AUTO-MCNC: ABNORMAL MG/DL
RBC # BLD AUTO: 5.76 X10*6/UL (ref 4.5–5.9)
RBC # UR STRIP.AUTO: ABNORMAL /UL
RBC #/AREA URNS AUTO: ABNORMAL /HPF
SODIUM SERPL-SCNC: 127 MMOL/L (ref 133–145)
SP GR UR STRIP.AUTO: 1.04
UROBILINOGEN UR STRIP.AUTO-MCNC: ABNORMAL MG/DL
WBC # BLD AUTO: 19 X10*3/UL (ref 4.4–11.3)
WBC #/AREA URNS AUTO: ABNORMAL /HPF

## 2024-06-05 PROCEDURE — 85610 PROTHROMBIN TIME: CPT

## 2024-06-05 PROCEDURE — 74177 CT ABD & PELVIS W/CONTRAST: CPT

## 2024-06-05 PROCEDURE — 85730 THROMBOPLASTIN TIME PARTIAL: CPT

## 2024-06-05 PROCEDURE — 96375 TX/PRO/DX INJ NEW DRUG ADDON: CPT

## 2024-06-05 PROCEDURE — 99285 EMERGENCY DEPT VISIT HI MDM: CPT

## 2024-06-05 PROCEDURE — 85025 COMPLETE CBC W/AUTO DIFF WBC: CPT | Performed by: PHYSICIAN ASSISTANT

## 2024-06-05 PROCEDURE — 81001 URINALYSIS AUTO W/SCOPE: CPT | Performed by: PHYSICIAN ASSISTANT

## 2024-06-05 PROCEDURE — 2500000004 HC RX 250 GENERAL PHARMACY W/ HCPCS (ALT 636 FOR OP/ED): Performed by: PHYSICIAN ASSISTANT

## 2024-06-05 PROCEDURE — 99291 CRITICAL CARE FIRST HOUR: CPT | Mod: 25

## 2024-06-05 PROCEDURE — 2500000004 HC RX 250 GENERAL PHARMACY W/ HCPCS (ALT 636 FOR OP/ED): Performed by: EMERGENCY MEDICINE

## 2024-06-05 PROCEDURE — 2550000001 HC RX 255 CONTRASTS: Performed by: EMERGENCY MEDICINE

## 2024-06-05 PROCEDURE — 86901 BLOOD TYPING SEROLOGIC RH(D): CPT

## 2024-06-05 PROCEDURE — 83690 ASSAY OF LIPASE: CPT | Performed by: PHYSICIAN ASSISTANT

## 2024-06-05 PROCEDURE — 1210000001 HC SEMI-PRIVATE ROOM DAILY

## 2024-06-05 PROCEDURE — 80053 COMPREHEN METABOLIC PANEL: CPT | Performed by: PHYSICIAN ASSISTANT

## 2024-06-05 PROCEDURE — 83735 ASSAY OF MAGNESIUM: CPT

## 2024-06-05 PROCEDURE — 80048 BASIC METABOLIC PNL TOTAL CA: CPT

## 2024-06-05 PROCEDURE — 96361 HYDRATE IV INFUSION ADD-ON: CPT

## 2024-06-05 PROCEDURE — 96365 THER/PROPH/DIAG IV INF INIT: CPT | Mod: 59

## 2024-06-05 PROCEDURE — 36415 COLL VENOUS BLD VENIPUNCTURE: CPT | Performed by: PHYSICIAN ASSISTANT

## 2024-06-05 PROCEDURE — 99285 EMERGENCY DEPT VISIT HI MDM: CPT | Performed by: STUDENT IN AN ORGANIZED HEALTH CARE EDUCATION/TRAINING PROGRAM

## 2024-06-05 PROCEDURE — 36415 COLL VENOUS BLD VENIPUNCTURE: CPT

## 2024-06-05 PROCEDURE — 85027 COMPLETE CBC AUTOMATED: CPT

## 2024-06-05 PROCEDURE — 74177 CT ABD & PELVIS W/CONTRAST: CPT | Performed by: STUDENT IN AN ORGANIZED HEALTH CARE EDUCATION/TRAINING PROGRAM

## 2024-06-05 PROCEDURE — 87086 URINE CULTURE/COLONY COUNT: CPT | Mod: TRILAB,WESLAB | Performed by: PHYSICIAN ASSISTANT

## 2024-06-05 PROCEDURE — 99222 1ST HOSP IP/OBS MODERATE 55: CPT | Performed by: SURGERY

## 2024-06-05 RX ORDER — HYDROMORPHONE HYDROCHLORIDE 1 MG/ML
0.4 INJECTION, SOLUTION INTRAMUSCULAR; INTRAVENOUS; SUBCUTANEOUS EVERY 4 HOURS PRN
Status: DISCONTINUED | OUTPATIENT
Start: 2024-06-05 | End: 2024-06-07

## 2024-06-05 RX ORDER — ONDANSETRON HYDROCHLORIDE 2 MG/ML
4 INJECTION, SOLUTION INTRAVENOUS EVERY 8 HOURS PRN
Status: ACTIVE | OUTPATIENT
Start: 2024-06-05

## 2024-06-05 RX ORDER — SODIUM CHLORIDE 9 MG/ML
100 INJECTION, SOLUTION INTRAVENOUS CONTINUOUS
Status: DISCONTINUED | OUTPATIENT
Start: 2024-06-05 | End: 2024-06-07

## 2024-06-05 RX ORDER — KETOROLAC TROMETHAMINE 30 MG/ML
15 INJECTION, SOLUTION INTRAMUSCULAR; INTRAVENOUS ONCE
Status: COMPLETED | OUTPATIENT
Start: 2024-06-05 | End: 2024-06-05

## 2024-06-05 RX ORDER — ONDANSETRON HYDROCHLORIDE 2 MG/ML
4 INJECTION, SOLUTION INTRAVENOUS ONCE
Status: COMPLETED | OUTPATIENT
Start: 2024-06-05 | End: 2024-06-05

## 2024-06-05 RX ORDER — ACETAMINOPHEN 10 MG/ML
1000 INJECTION, SOLUTION INTRAVENOUS EVERY 6 HOURS SCHEDULED
Status: COMPLETED | OUTPATIENT
Start: 2024-06-06 | End: 2024-06-06

## 2024-06-05 RX ORDER — ENOXAPARIN SODIUM 100 MG/ML
40 INJECTION SUBCUTANEOUS EVERY 24 HOURS
Status: DISCONTINUED | OUTPATIENT
Start: 2024-06-05 | End: 2024-06-07

## 2024-06-05 RX ORDER — MORPHINE SULFATE 4 MG/ML
4 INJECTION, SOLUTION INTRAMUSCULAR; INTRAVENOUS ONCE
Status: COMPLETED | OUTPATIENT
Start: 2024-06-05 | End: 2024-06-05

## 2024-06-05 RX ADMIN — IOHEXOL 75 ML: 350 INJECTION, SOLUTION INTRAVENOUS at 17:42

## 2024-06-05 RX ADMIN — PIPERACILLIN SODIUM AND TAZOBACTAM SODIUM 3.38 G: 3; .375 INJECTION, SOLUTION INTRAVENOUS at 18:56

## 2024-06-05 RX ADMIN — ONDANSETRON 4 MG: 2 INJECTION INTRAMUSCULAR; INTRAVENOUS at 16:36

## 2024-06-05 RX ADMIN — SODIUM CHLORIDE 1000 ML: 900 INJECTION, SOLUTION INTRAVENOUS at 16:35

## 2024-06-05 RX ADMIN — KETOROLAC TROMETHAMINE 15 MG: 30 INJECTION, SOLUTION INTRAMUSCULAR at 16:36

## 2024-06-05 RX ADMIN — MORPHINE SULFATE 4 MG: 4 INJECTION, SOLUTION INTRAMUSCULAR; INTRAVENOUS at 16:38

## 2024-06-05 SDOH — HEALTH STABILITY: MENTAL HEALTH: HAVE YOU ACTUALLY HAD ANY THOUGHTS OF KILLING YOURSELF?: NO

## 2024-06-05 SDOH — HEALTH STABILITY: MENTAL HEALTH: SUICIDE ASSESSMENT: ADULT (C-SSRS)

## 2024-06-05 SDOH — HEALTH STABILITY: MENTAL HEALTH: HAVE YOU WISHED YOU WERE DEAD OR WISHED YOU COULD GO TO SLEEP AND NOT WAKE UP?: NO

## 2024-06-05 SDOH — HEALTH STABILITY: MENTAL HEALTH: HAVE YOU EVER DONE ANYTHING, STARTED TO DO ANYTHING, OR PREPARED TO DO ANYTHING TO END YOUR LIFE?: NO

## 2024-06-05 ASSESSMENT — PAIN SCALES - GENERAL
PAINLEVEL_OUTOF10: 5 - MODERATE PAIN
PAINLEVEL_OUTOF10: 2
PAINLEVEL_OUTOF10: 5 - MODERATE PAIN
PAINLEVEL_OUTOF10: 6

## 2024-06-05 ASSESSMENT — COLUMBIA-SUICIDE SEVERITY RATING SCALE - C-SSRS
2. HAVE YOU ACTUALLY HAD ANY THOUGHTS OF KILLING YOURSELF?: NO
1. IN THE PAST MONTH, HAVE YOU WISHED YOU WERE DEAD OR WISHED YOU COULD GO TO SLEEP AND NOT WAKE UP?: NO
6. HAVE YOU EVER DONE ANYTHING, STARTED TO DO ANYTHING, OR PREPARED TO DO ANYTHING TO END YOUR LIFE?: NO
6. HAVE YOU EVER DONE ANYTHING, STARTED TO DO ANYTHING, OR PREPARED TO DO ANYTHING TO END YOUR LIFE?: NO
1. IN THE PAST MONTH, HAVE YOU WISHED YOU WERE DEAD OR WISHED YOU COULD GO TO SLEEP AND NOT WAKE UP?: NO
2. HAVE YOU ACTUALLY HAD ANY THOUGHTS OF KILLING YOURSELF?: NO

## 2024-06-05 ASSESSMENT — PAIN - FUNCTIONAL ASSESSMENT
PAIN_FUNCTIONAL_ASSESSMENT: 0-10

## 2024-06-05 ASSESSMENT — LIFESTYLE VARIABLES
TOTAL SCORE: 1
EVER FELT BAD OR GUILTY ABOUT YOUR DRINKING: NO
HAVE YOU EVER FELT YOU SHOULD CUT DOWN ON YOUR DRINKING: YES
HAVE PEOPLE ANNOYED YOU BY CRITICIZING YOUR DRINKING: NO
EVER HAD A DRINK FIRST THING IN THE MORNING TO STEADY YOUR NERVES TO GET RID OF A HANGOVER: NO

## 2024-06-05 NOTE — TELEPHONE ENCOUNTER
If abdominal pain worsening even with antibiotics, should go to ER so they can get a CT of abdomen today. If I order the CT scan outpatient, may be 1-2 weeks before he can have done.

## 2024-06-05 NOTE — PROGRESS NOTES
Attestation/Supervisory note for TEO Phillips      The patient is a 58-year-old male presenting to the emergency department for evaluation of left lower quadrant abdominal pain that has been worsening over the past several days.  He states that he does know that he has a history of diverticular disease from previous colonoscopies but he has never had diverticulitis or pain.  He states that the pain has been a dull aching pain.  It is gradually worsening.  He states that he did contact his doctor and he was started on Augmentin 2 days ago but he still has pain.  He denies any nausea, vomiting or diarrhea.  No urinary complaints.  No fever or chills.  No headache or visual changes.  No chest pain or shortness of breath.  All pertinent positives and negatives are recorded above.  All other systems reviewed and otherwise negative.  Vital signs with hypertension but otherwise within normal limits.  Physical exam with a well-nourished well-developed male in no acute distress.  HEENT exam with dry mucous membranes but otherwise within normal limits.  He has no evidence of airway compromise or respiratory distress.  Abdominal exam with left lower quadrant tenderness to palpation.  No rebound or guarding.  No palpable masses.  No flank pain with percussion or palpation.  He has no gross motor, neurologic or vascular deficits on exam.  He is able to walk and stand without difficulty.      IV fluids, IV Toradol, IV morphine and IV Zofran ordered      Diagnostic labs with leukocytosis, ketonuria, evidence of a possible urinary tract infection, and electrolyte imbalance but otherwise unremarkable.      CT abdomen pelvis w IV contrast   Final Result   High-grade mid small bowel obstruction with transition point in the   left lower quadrant associated with a loculated gas-containing fluid   collection measuring 8.5 x 4.5 x 5.3 cm consistent with micro   perforation. There is surrounding inflammatory change but no gross   free air  and no evidence of pneumatosis intestinalis. Recommend   surgical consultation.        Diffuse disproportionate thickening of the urinary bladder that may   be related to urinary retention or current or recent cystitis. Please   correlate clinically.        Colonic diverticulosis without evidence of acute diverticulitis.        MACRO:   Jose Guadalupe Mariscal discussed the significance and urgency of this   critical finding by FRANKIE WALKER with  FABIAN DUMONT on 6/5/2024 at   6:26 pm.  (**-RCF-**) Findings:  See findings.        Signed by: Jose Guadalupe Mariscal 6/5/2024 6:26 PM   Dictation workstation:   [id number]           The patient does not have any evidence of hemodynamic instability.  He does not have any evidence of sepsis.  Diagnostic labs with mild leukocytosis and evidence of urinary tract infection but otherwise unremarkable.  CT abdomen pelvis does show janine diverticulosis without evidence of acute diverticulitis.  There is evidence of a high-grade mid small bowel obstruction with transition point in the left lower quadrant associated with loculated gas containing fluid collection measuring 8.5 x 4.5 x 5.3 centimeters consistent with microperforation.  There is no surgeon available at this facility at this time.  IV antibiotics were ordered and general surgery at [facility] was contacted.  They agreed to set the patient for transfer and higher level of care with surgical consultation/possible intervention.      Impression/diagnosis:  1.  Left lower quadrant abdominal pain  2.  Leukocytosis, unspecified  3.  Small bowel obstruction  4.  Perforated viscus  5.  Diverticulosis without evidence of diverticulitis      Critical care time of 16 minutes billed for management of bowel perforation with initiation of IV antibiotics, monitoring the patient's telemetry, consultation with general surgery, consultation with the patient regarding his results and arrangement for transfer..  This time excludes time for  billable procedures.      critical care time billed for by me is non concurrent with time billed for by TEO Phillips      I personally saw the patient and made/approve the management plan and take responsibility for the patient management.      I independently interpreted the following study (S)diagnostic labs      I personally discussed the patient's management with the patient      I reviewed the results of the diagnostic labs and diagnostic imaging.  Formal radiology read was completed by the radiologist.      Citlaly Hansen MD

## 2024-06-05 NOTE — TELEPHONE ENCOUNTER
Pt called stating he is not feeling any better since Monday appt, sx worsening. Pt states he is having night sweats and acid reflux and abd pain but nothing severe. Pt wants to know if he should go to the ER or see a specialist or what he should do next

## 2024-06-05 NOTE — ED PROVIDER NOTES
HPI   Chief Complaint   Patient presents with    Abdominal Pain     Pt complains lower abd pain starting Saturday.  Was seen by his doctor on Monday.  Dx with diverticulitis.  Has been taking antibiotics.  States pain is getting worse.  Has n/v and diarrhea.        HPI     Patient is a 58-year-old male presenting for evaluation of abdominal pain since Saturday.  Patient states he has been diagnosed with diverticular disease as on colonoscopy but has never had diverticulitis.  Patient was seen by his doctor on Monday and was diagnosed with diverticulitis.  He was prescribed Augmentin that he has been taking twice per day.  Patient states the pain is getting worse.  He is admitting to nausea with vomiting and diarrhea.  He denies fevers or chills.  No dysuria or hematuria.  No flank pain.  No chest pain or shortness of breath.               Mamadou Coma Scale Score: 15                     Patient History   Past Medical History:   Diagnosis Date    Acute pharyngitis, unspecified     Sore throat    Acute upper respiratory infection, unspecified 02/13/2017    Acute URI    Alcohol abuse, in remission 09/12/2018    History of alcohol abuse    Elevated blood-pressure reading, without diagnosis of hypertension 02/23/2015    Elevated blood pressure reading without diagnosis of hypertension    Encounter for immunization 10/10/2014    Need for Tdap vaccination    Encounter for screening for malignant neoplasm of colon 01/04/2017    Encounter for colonoscopy due to history of adenomatous colonic polyps    Encounter for screening for malignant neoplasm of colon 11/02/2016    Encounter for screening colonoscopy    Encounter for screening for malignant neoplasm of colon 11/02/2016    Encounter for screening colonoscopy    Encounter for screening for malignant neoplasm of prostate 09/12/2018    Prostate cancer screening    Essential (primary) hypertension 03/14/2019    Elevated blood pressure reading with diagnosis of hypertension     Impingement syndrome of right shoulder 06/06/2016    Impingement syndrome of right shoulder    Incomplete rotator cuff tear or rupture of right shoulder, not specified as traumatic 09/12/2018    Partial nontraumatic tear of right rotator cuff    Noninfective gastroenteritis and colitis, unspecified 01/23/2018    Acute gastroenteritis    Other general symptoms and signs 11/02/2016    Flu-like symptoms    Other malaise 11/02/2016    Malaise and fatigue    Pain in left ankle and joints of left foot 10/10/2017    Left ankle pain    Pain in left foot 09/12/2017    Left foot pain    Pain in right shoulder 04/11/2016    Right shoulder pain    Pain in thoracic spine 09/12/2018    Thoracic back pain    Pain, unspecified 02/09/2017    Body aches    Personal history of colonic polyps 01/04/2017    History of colonic polyps    Personal history of other diseases of male genital organs 03/15/2019    History of acute prostatitis    Personal history of other diseases of the respiratory system 04/07/2020    History of acute sinusitis    Personal history of other specified conditions 03/14/2019    History of flank pain    Personal history of other specified conditions 10/10/2014    History of paresthesia    Strain of muscle, fascia and tendon of other parts of biceps, right arm, initial encounter 03/11/2016    Rupture of biceps tendon, right, initial encounter     Past Surgical History:   Procedure Laterality Date    SHOULDER SURGERY  06/27/2017    Shoulder Surgery     Family History   Problem Relation Name Age of Onset    Other (CARDIAC DISORDER) Mother      Hypertension Mother      Other (CARDIAC DISORDR) Father      Hypertension Father      Hypertension Sister      Heart attack Brother      Hypertension Brother       Social History     Tobacco Use    Smoking status: Every Day     Types: Cigarettes     Passive exposure: Current    Smokeless tobacco: Never   Vaping Use    Vaping status: Never Used   Substance Use Topics     Alcohol use: Yes    Drug use: Never       Physical Exam   ED Triage Vitals [06/05/24 1617]   Temperature Heart Rate Respirations BP   36.7 °C (98.1 °F) 100 16 (!) 172/96      Pulse Ox Temp src Heart Rate Source Patient Position   97 % -- -- --      BP Location FiO2 (%)     -- --       Physical Exam  Vitals and nursing note reviewed.   Constitutional:       Appearance: Normal appearance.   HENT:      Head: Normocephalic and atraumatic.   Eyes:      Extraocular Movements: Extraocular movements intact.      Conjunctiva/sclera: Conjunctivae normal.      Pupils: Pupils are equal, round, and reactive to light.   Cardiovascular:      Rate and Rhythm: Normal rate and regular rhythm.   Pulmonary:      Effort: Pulmonary effort is normal.      Breath sounds: Normal breath sounds.   Abdominal:      General: Abdomen is flat. Bowel sounds are normal.      Palpations: Abdomen is soft.      Comments: Tenderness to palpation of the left lower quadrant of the abdomen.   Musculoskeletal:      Cervical back: Normal range of motion and neck supple.   Neurological:      Mental Status: He is alert.         ED Course & MDM   Diagnoses as of 06/05/24 1953   Small bowel obstruction (Multi)       Medical Decision Making    Parts of this chart have been completed using voice recognition software. Please excuse any errors of transcription. Despite the medical decision making time stamp above-my medical decision making has taken place during the patient's entire visit. My thought process and reason for plan has been formulated from the time that I saw the patient until the time of disposition and is not specific to one specific moment during their visit and furthermore my MDM encompasses this entire chart and not only this text box.      HPI: Detailed above.    Exam: A medically appropriate exam performed, outlined above, given the known history and presentation.    History obtained from: Patient    Social Determinants of Health considered  during this visit: From home    EKG interpreted by my attending physician, reviewed by myself.    Labs Reviewed   CBC WITH AUTO DIFFERENTIAL - Abnormal       Result Value    WBC 19.0 (*)     nRBC 0.0      RBC 5.76      Hemoglobin 15.7      Hematocrit 46.9      MCV 81      MCH 27.3      MCHC 33.5      RDW 12.9      Platelets 418      Neutrophils % 84.9      Immature Granulocytes %, Automated 0.5      Lymphocytes % 6.2      Monocytes % 8.0      Eosinophils % 0.2      Basophils % 0.2      Neutrophils Absolute 16.15 (*)     Immature Granulocytes Absolute, Automated 0.10      Lymphocytes Absolute 1.18 (*)     Monocytes Absolute 1.53 (*)     Eosinophils Absolute 0.03      Basophils Absolute 0.04     COMPREHENSIVE METABOLIC PANEL - Abnormal    Glucose 126 (*)     Sodium 127 (*)     Potassium 4.3      Chloride 85 (*)     Bicarbonate 28      Urea Nitrogen 17      Creatinine 0.70      eGFR >90      Calcium 9.8      Albumin 3.9      Alkaline Phosphatase 112      Total Protein 8.1 (*)     AST 19      Bilirubin, Total 0.4      ALT 25      Anion Gap 14     URINALYSIS WITH REFLEX CULTURE AND MICROSCOPIC - Abnormal    Color, Urine Yellow      Appearance, Urine Turbid (*)     Specific Gravity, Urine 1.037 (*)     pH, Urine 6.0      Protein, Urine 300 (3+) (*)     Glucose, Urine Normal      Blood, Urine 0.1 (1+) (*)     Ketones, Urine OVER (4+) (*)     Bilirubin, Urine NEGATIVE      Urobilinogen, Urine 4 (2+) (*)     Nitrite, Urine NEGATIVE      Leukocyte Esterase, Urine 25 Marcelo/µL (*)    MICROSCOPIC ONLY, URINE - Abnormal    WBC, Urine 11-20 (*)     RBC, Urine 6-10 (*)     Bacteria, Urine 1+ (*)     Mucus, Urine 4+     LIPASE - Normal    Lipase 36     URINE CULTURE   URINALYSIS WITH REFLEX CULTURE AND MICROSCOPIC    Narrative:     The following orders were created for panel order Urinalysis with Reflex Culture and Microscopic.  Procedure                               Abnormality         Status                     ---------                                -----------         ------                     Urinalysis with Reflex C...[[id number]]  Abnormal            Final result               Extra Urine Gray Tube[[id number]]                                                         Please view results for these tests on the individual orders.   EXTRA URINE GRAY TUBE     CT abdomen pelvis w IV contrast   Final Result   High-grade mid small bowel obstruction with transition point in the   left lower quadrant associated with a loculated gas-containing fluid   collection measuring 8.5 x 4.5 x 5.3 cm consistent with micro   perforation. There is surrounding inflammatory change but no gross   free air and no evidence of pneumatosis intestinalis. Recommend   surgical consultation.        Diffuse disproportionate thickening of the urinary bladder that may   be related to urinary retention or current or recent cystitis. Please   correlate clinically.        Colonic diverticulosis without evidence of acute diverticulitis.        MACRO:   Jose Guadalupe Mariscal discussed the significance and urgency of this   critical finding by FRANKIE WALKER with  FABIAN DUMONT on 6/5/2024 at   6:26 pm.  (**-RCF-**) Findings:  See findings.        Signed by: Jose Guadalupe Mariscal 6/5/2024 6:26 PM   Dictation workstation:   [id number]        Medications   ondansetron (Zofran) injection 4 mg (4 mg intravenous Given 6/5/24 1636)   ketorolac (Toradol) injection 15 mg (15 mg intravenous Given 6/5/24 1636)   morphine injection 4 mg (4 mg intravenous Given 6/5/24 1638)   sodium chloride 0.9 % bolus 1,000 mL (0 mL intravenous Stopped 6/5/24 1901)   iohexol (OMNIPaque) 350 mg iodine/mL solution 75 mL (75 mL intravenous Given 6/5/24 1742)   piperacillin-tazobactam-dextrose (Zosyn) IV 3.375 g (3.375 g intravenous New Bag 6/5/24 1856)     Differential diagnoses considered for this visit include: Viral illness versus gastritis versus diverticulitis    Considerations/further MDM:    Patient is a  58-year-old male presenting for concerns of diverticulitis.  Vital signs appreciate hypertension with a pressure 172/96.  No hypoxia or tachycardia.  Physical examination demonstrated a well-appearing well-nourished gentleman who appears uncomfortable with tenderness to the left lower quadrant of the abdomen on palpation.  CBC appreciated leukocytosis with white blood cell count of 19.  Urinalysis was positive for bacteria.  CT of the abdomen pelvis with IV contrast identified High-grade mid small bowel obstruction with transition point in the left lower quadrant associated with a loculated gas-containing fluid collection measuring 8.5 x 4.5 x 5.3 cm consistent with micro perforation. There is surrounding inflammatory change but no gross free air and no evidence of pneumatosis intestinalis.  I spoke to Dr. Pretty and general surgery at [facility] the transfer center, who accepted the patient for transfer and wanted an ED to ED transfer.  I then spoke to Dr. Parikh in the emergency department, who accepted the patient for transfer.  I discussed this all with the patient, who is comfortable being transferred downtown.    30 minutes of critical care time independently billed by myself for this encounter.  This includes rapid evaluation of the patient on arrival, ordering of diagnostic testing, reevaluation of the patient, interpretation of diagnostic testing, discussion with consultants, and transfer of the patient downtown to a higher level of care.    Patient was seen in conjunction with attending physician Dr. Citlaly Hansen   Patient's history, physical exam, diagnostic studies, and treatment plan were discussed thoroughly.  Procedure  Procedures     Maren Phillips PA-C  06/05/24 1953

## 2024-06-06 ENCOUNTER — APPOINTMENT (OUTPATIENT)
Dept: CARDIOLOGY | Facility: HOSPITAL | Age: 59
DRG: 329 | End: 2024-06-06
Payer: COMMERCIAL

## 2024-06-06 ENCOUNTER — APPOINTMENT (OUTPATIENT)
Dept: RADIOLOGY | Facility: HOSPITAL | Age: 59
End: 2024-06-06
Payer: COMMERCIAL

## 2024-06-06 PROBLEM — K57.20 DIVERTICULITIS OF LARGE INTESTINE WITH ABSCESS WITHOUT BLEEDING: Status: ACTIVE | Noted: 2024-06-05

## 2024-06-06 LAB
ABO GROUP (TYPE) IN BLOOD: NORMAL
ANION GAP SERPL CALC-SCNC: 15 MMOL/L (ref 10–20)
ANION GAP SERPL CALC-SCNC: 18 MMOL/L (ref 10–20)
ANTIBODY SCREEN: NORMAL
AORTIC VALVE MEAN GRADIENT: 7 MMHG
AORTIC VALVE PEAK VELOCITY: 2.16 M/S
APTT PPP: 25 SECONDS (ref 27–38)
AV PEAK GRADIENT: 18.7 MMHG
AVA (PEAK VEL): 2.68 CM2
AVA (VTI): 2.85 CM2
BUN SERPL-MCNC: 12 MG/DL (ref 6–23)
BUN SERPL-MCNC: 13 MG/DL (ref 6–23)
CALCIUM SERPL-MCNC: 8.5 MG/DL (ref 8.6–10.6)
CALCIUM SERPL-MCNC: 9.3 MG/DL (ref 8.6–10.6)
CHLORIDE SERPL-SCNC: 91 MMOL/L (ref 98–107)
CHLORIDE SERPL-SCNC: 96 MMOL/L (ref 98–107)
CO2 SERPL-SCNC: 26 MMOL/L (ref 21–32)
CO2 SERPL-SCNC: 27 MMOL/L (ref 21–32)
CREAT SERPL-MCNC: 0.59 MG/DL (ref 0.5–1.3)
CREAT SERPL-MCNC: 0.65 MG/DL (ref 0.5–1.3)
EGFRCR SERPLBLD CKD-EPI 2021: >90 ML/MIN/1.73M*2
EGFRCR SERPLBLD CKD-EPI 2021: >90 ML/MIN/1.73M*2
EJECTION FRACTION APICAL 4 CHAMBER: 73.5
ERYTHROCYTE [DISTWIDTH] IN BLOOD BY AUTOMATED COUNT: 12.6 % (ref 11.5–14.5)
ERYTHROCYTE [DISTWIDTH] IN BLOOD BY AUTOMATED COUNT: 12.8 % (ref 11.5–14.5)
GLUCOSE SERPL-MCNC: 103 MG/DL (ref 74–99)
GLUCOSE SERPL-MCNC: 114 MG/DL (ref 74–99)
HCT VFR BLD AUTO: 37.8 % (ref 41–52)
HCT VFR BLD AUTO: 40.1 % (ref 41–52)
HGB BLD-MCNC: 13 G/DL (ref 13.5–17.5)
HGB BLD-MCNC: 14.2 G/DL (ref 13.5–17.5)
INR PPP: 1 (ref 0.9–1.1)
LEFT ATRIUM VOLUME AREA LENGTH INDEX BSA: 22.7 ML/M2
LEFT VENTRICLE INTERNAL DIMENSION DIASTOLE: 4.3 CM (ref 3.5–6)
LEFT VENTRICULAR OUTFLOW TRACT DIAMETER: 2.1 CM
LV EJECTION FRACTION BIPLANE: 70 %
MAGNESIUM SERPL-MCNC: 1.86 MG/DL (ref 1.6–2.4)
MAGNESIUM SERPL-MCNC: 1.92 MG/DL (ref 1.6–2.4)
MCH RBC QN AUTO: 27.7 PG (ref 26–34)
MCH RBC QN AUTO: 28.5 PG (ref 26–34)
MCHC RBC AUTO-ENTMCNC: 34.4 G/DL (ref 32–36)
MCHC RBC AUTO-ENTMCNC: 35.4 G/DL (ref 32–36)
MCV RBC AUTO: 80 FL (ref 80–100)
MCV RBC AUTO: 81 FL (ref 80–100)
NRBC BLD-RTO: 0 /100 WBCS (ref 0–0)
NRBC BLD-RTO: 0 /100 WBCS (ref 0–0)
PLATELET # BLD AUTO: 319 X10*3/UL (ref 150–450)
PLATELET # BLD AUTO: 341 X10*3/UL (ref 150–450)
POTASSIUM SERPL-SCNC: 3.5 MMOL/L (ref 3.5–5.3)
POTASSIUM SERPL-SCNC: 3.5 MMOL/L (ref 3.5–5.3)
PROTHROMBIN TIME: 11.6 SECONDS (ref 9.8–12.8)
RBC # BLD AUTO: 4.7 X10*6/UL (ref 4.5–5.9)
RBC # BLD AUTO: 4.98 X10*6/UL (ref 4.5–5.9)
RH FACTOR (ANTIGEN D): NORMAL
SODIUM SERPL-SCNC: 132 MMOL/L (ref 136–145)
SODIUM SERPL-SCNC: 133 MMOL/L (ref 136–145)
WBC # BLD AUTO: 15.7 X10*3/UL (ref 4.4–11.3)
WBC # BLD AUTO: 16.1 X10*3/UL (ref 4.4–11.3)

## 2024-06-06 PROCEDURE — 83735 ASSAY OF MAGNESIUM: CPT

## 2024-06-06 PROCEDURE — 71045 X-RAY EXAM CHEST 1 VIEW: CPT | Performed by: RADIOLOGY

## 2024-06-06 PROCEDURE — 93308 TTE F-UP OR LMTD: CPT | Performed by: INTERNAL MEDICINE

## 2024-06-06 PROCEDURE — 74018 RADEX ABDOMEN 1 VIEW: CPT | Performed by: RADIOLOGY

## 2024-06-06 PROCEDURE — 80048 BASIC METABOLIC PNL TOTAL CA: CPT

## 2024-06-06 PROCEDURE — 85027 COMPLETE CBC AUTOMATED: CPT

## 2024-06-06 PROCEDURE — 71045 X-RAY EXAM CHEST 1 VIEW: CPT

## 2024-06-06 PROCEDURE — 93308 TTE F-UP OR LMTD: CPT

## 2024-06-06 PROCEDURE — 93325 DOPPLER ECHO COLOR FLOW MAPG: CPT | Performed by: INTERNAL MEDICINE

## 2024-06-06 PROCEDURE — 74018 RADEX ABDOMEN 1 VIEW: CPT

## 2024-06-06 PROCEDURE — 93321 DOPPLER ECHO F-UP/LMTD STD: CPT | Performed by: INTERNAL MEDICINE

## 2024-06-06 PROCEDURE — 2500000004 HC RX 250 GENERAL PHARMACY W/ HCPCS (ALT 636 FOR OP/ED)

## 2024-06-06 PROCEDURE — 1100000001 HC PRIVATE ROOM DAILY

## 2024-06-06 PROCEDURE — 36415 COLL VENOUS BLD VENIPUNCTURE: CPT

## 2024-06-06 RX ORDER — PREDNISONE 10 MG/1
10 TABLET ORAL DAILY
COMMUNITY
End: 2024-06-06 | Stop reason: ALTCHOICE

## 2024-06-06 RX ADMIN — SODIUM CHLORIDE 100 ML/HR: 9 INJECTION, SOLUTION INTRAVENOUS at 12:54

## 2024-06-06 RX ADMIN — SODIUM CHLORIDE 1000 ML: 9 INJECTION, SOLUTION INTRAVENOUS at 02:14

## 2024-06-06 RX ADMIN — ENOXAPARIN SODIUM 40 MG: 40 INJECTION, SOLUTION SUBCUTANEOUS at 23:30

## 2024-06-06 RX ADMIN — SODIUM CHLORIDE 100 ML/HR: 9 INJECTION, SOLUTION INTRAVENOUS at 00:00

## 2024-06-06 RX ADMIN — ENOXAPARIN SODIUM 40 MG: 40 INJECTION, SOLUTION SUBCUTANEOUS at 00:00

## 2024-06-06 RX ADMIN — SODIUM CHLORIDE 1000 ML: 9 INJECTION, SOLUTION INTRAVENOUS at 00:35

## 2024-06-06 RX ADMIN — PERFLUTREN 1 ML OF DILUTION: 6.52 INJECTION, SUSPENSION INTRAVENOUS at 07:58

## 2024-06-06 RX ADMIN — PIPERACILLIN SODIUM AND TAZOBACTAM SODIUM 3.38 G: 3; .375 INJECTION, SOLUTION INTRAVENOUS at 00:01

## 2024-06-06 RX ADMIN — ACETAMINOPHEN 1000 MG: 10 INJECTION INTRAVENOUS at 19:12

## 2024-06-06 RX ADMIN — ACETAMINOPHEN 1000 MG: 10 INJECTION INTRAVENOUS at 05:08

## 2024-06-06 RX ADMIN — ACETAMINOPHEN 1000 MG: 10 INJECTION INTRAVENOUS at 11:54

## 2024-06-06 RX ADMIN — PIPERACILLIN SODIUM AND TAZOBACTAM SODIUM 3.38 G: 3; .375 INJECTION, SOLUTION INTRAVENOUS at 06:13

## 2024-06-06 RX ADMIN — PIPERACILLIN SODIUM AND TAZOBACTAM SODIUM 3.38 G: 3; .375 INJECTION, SOLUTION INTRAVENOUS at 12:54

## 2024-06-06 RX ADMIN — PIPERACILLIN SODIUM AND TAZOBACTAM SODIUM 3.38 G: 3; .375 INJECTION, SOLUTION INTRAVENOUS at 19:38

## 2024-06-06 RX ADMIN — ACETAMINOPHEN 1000 MG: 10 INJECTION INTRAVENOUS at 00:55

## 2024-06-06 SDOH — SOCIAL STABILITY: SOCIAL INSECURITY: ARE YOU OR HAVE YOU BEEN THREATENED OR ABUSED PHYSICALLY, EMOTIONALLY, OR SEXUALLY BY ANYONE?: NO

## 2024-06-06 SDOH — SOCIAL STABILITY: SOCIAL INSECURITY: ABUSE: ADULT

## 2024-06-06 SDOH — SOCIAL STABILITY: SOCIAL INSECURITY: HAS ANYONE EVER THREATENED TO HURT YOUR FAMILY OR YOUR PETS?: NO

## 2024-06-06 SDOH — SOCIAL STABILITY: SOCIAL INSECURITY: DOES ANYONE TRY TO KEEP YOU FROM HAVING/CONTACTING OTHER FRIENDS OR DOING THINGS OUTSIDE YOUR HOME?: NO

## 2024-06-06 SDOH — SOCIAL STABILITY: SOCIAL INSECURITY: DO YOU FEEL UNSAFE GOING BACK TO THE PLACE WHERE YOU ARE LIVING?: NO

## 2024-06-06 SDOH — SOCIAL STABILITY: SOCIAL INSECURITY: HAVE YOU HAD THOUGHTS OF HARMING ANYONE ELSE?: YES

## 2024-06-06 SDOH — SOCIAL STABILITY: SOCIAL INSECURITY: HAVE YOU HAD ANY THOUGHTS OF HARMING ANYONE ELSE?: NO

## 2024-06-06 SDOH — SOCIAL STABILITY: SOCIAL INSECURITY: ARE THERE ANY APPARENT SIGNS OF INJURIES/BEHAVIORS THAT COULD BE RELATED TO ABUSE/NEGLECT?: NO

## 2024-06-06 SDOH — SOCIAL STABILITY: SOCIAL INSECURITY: WERE YOU ABLE TO COMPLETE ALL THE BEHAVIORAL HEALTH SCREENINGS?: YES

## 2024-06-06 SDOH — SOCIAL STABILITY: SOCIAL INSECURITY: DO YOU FEEL ANYONE HAS EXPLOITED OR TAKEN ADVANTAGE OF YOU FINANCIALLY OR OF YOUR PERSONAL PROPERTY?: NO

## 2024-06-06 ASSESSMENT — COGNITIVE AND FUNCTIONAL STATUS - GENERAL
MOBILITY SCORE: 24
DAILY ACTIVITIY SCORE: 24
MOBILITY SCORE: 24
PATIENT BASELINE BEDBOUND: NO
DAILY ACTIVITIY SCORE: 24

## 2024-06-06 ASSESSMENT — ACTIVITIES OF DAILY LIVING (ADL)
JUDGMENT_ADEQUATE_SAFELY_COMPLETE_DAILY_ACTIVITIES: YES
GROOMING: INDEPENDENT
LACK_OF_TRANSPORTATION: PATIENT DECLINED
HEARING - RIGHT EAR: FUNCTIONAL
HEARING - LEFT EAR: FUNCTIONAL
ADEQUATE_TO_COMPLETE_ADL: YES
BATHING: INDEPENDENT
WALKS IN HOME: INDEPENDENT
TOILETING: INDEPENDENT
FEEDING YOURSELF: INDEPENDENT
DRESSING YOURSELF: INDEPENDENT
PATIENT'S MEMORY ADEQUATE TO SAFELY COMPLETE DAILY ACTIVITIES?: YES

## 2024-06-06 ASSESSMENT — PATIENT HEALTH QUESTIONNAIRE - PHQ9
1. LITTLE INTEREST OR PLEASURE IN DOING THINGS: NOT AT ALL
2. FEELING DOWN, DEPRESSED OR HOPELESS: NOT AT ALL
SUM OF ALL RESPONSES TO PHQ9 QUESTIONS 1 & 2: 0

## 2024-06-06 ASSESSMENT — PAIN SCALES - GENERAL
PAINLEVEL_OUTOF10: 0 - NO PAIN
PAINLEVEL_OUTOF10: 4
PAINLEVEL_OUTOF10: 5 - MODERATE PAIN
PAINLEVEL_OUTOF10: 4

## 2024-06-06 ASSESSMENT — LIFESTYLE VARIABLES
AUDIT-C TOTAL SCORE: 0
SKIP TO QUESTIONS 9-10: 1
HOW MANY STANDARD DRINKS CONTAINING ALCOHOL DO YOU HAVE ON A TYPICAL DAY: PATIENT DOES NOT DRINK
PRESCIPTION_ABUSE_PAST_12_MONTHS: NO
HOW OFTEN DO YOU HAVE 6 OR MORE DRINKS ON ONE OCCASION: NEVER
HOW OFTEN DO YOU HAVE A DRINK CONTAINING ALCOHOL: NEVER
SUBSTANCE_ABUSE_PAST_12_MONTHS: NO
AUDIT-C TOTAL SCORE: 0

## 2024-06-06 ASSESSMENT — PAIN - FUNCTIONAL ASSESSMENT
PAIN_FUNCTIONAL_ASSESSMENT: 0-10

## 2024-06-06 ASSESSMENT — PAIN DESCRIPTION - LOCATION: LOCATION: ABDOMEN

## 2024-06-06 NOTE — ED PROVIDER NOTES
HPI   Chief Complaint   Patient presents with    GI Problem       HPI  Patient is a 58-year-old male with history of diverticulosis and hypertension who was recently diagnosed with diverticulitis by his PCP transfer for further workup.  Patient states that he was seen by his PCP approximately 3 days prior to presentation and was diagnosed with diverticulitis.  He was started on Augmentin.  This diagnosis was made after he had lower abdominal pain, nausea, and decreased p.o. intake.  He decided to present to the emergency department after symptoms did not improve and a CAT scan was done showing  high-grade obstruction with microperforation.  [place] [facility] [place]  He states that his last bowel movement was yesterday and he has had decreased passing gas today.  He did have 2 episodes of vomiting the morning of presentation to the ED as well.                    Newell Coma Scale Score: 15                     Patient History   Past Medical History:   Diagnosis Date    Acute pharyngitis, unspecified     Sore throat    Acute upper respiratory infection, unspecified 02/13/2017    Acute URI    Alcohol abuse, in remission 09/12/2018    History of alcohol abuse    Elevated blood-pressure reading, without diagnosis of hypertension 02/23/2015    Elevated blood pressure reading without diagnosis of hypertension    Encounter for immunization 10/10/2014    Need for Tdap vaccination    Encounter for screening for malignant neoplasm of colon 01/04/2017    Encounter for colonoscopy due to history of adenomatous colonic polyps    Encounter for screening for malignant neoplasm of colon 11/02/2016    Encounter for screening colonoscopy    Encounter for screening for malignant neoplasm of colon 11/02/2016    Encounter for screening colonoscopy    Encounter for screening for malignant neoplasm of prostate 09/12/2018    Prostate cancer screening    Essential (primary) hypertension 03/14/2019    Elevated blood  pressure reading with diagnosis of hypertension    Impingement syndrome of right shoulder 06/06/2016    Impingement syndrome of right shoulder    Incomplete rotator cuff tear or rupture of right shoulder, not specified as traumatic 09/12/2018    Partial nontraumatic tear of right rotator cuff    Noninfective gastroenteritis and colitis, unspecified 01/23/2018    Acute gastroenteritis    Other general symptoms and signs 11/02/2016    Flu-like symptoms    Other malaise 11/02/2016    Malaise and fatigue    Pain in left ankle and joints of left foot 10/10/2017    Left ankle pain    Pain in left foot 09/12/2017    Left foot pain    Pain in right shoulder 04/11/2016    Right shoulder pain    Pain in thoracic spine 09/12/2018    Thoracic back pain    Pain, unspecified 02/09/2017    Body aches    Personal history of colonic polyps 01/04/2017    History of colonic polyps    Personal history of other diseases of male genital organs 03/15/2019    History of acute prostatitis    Personal history of other diseases of the respiratory system 04/07/2020    History of acute sinusitis    Personal history of other specified conditions 03/14/2019    History of flank pain    Personal history of other specified conditions 10/10/2014    History of paresthesia    Strain of muscle, fascia and tendon of other parts of biceps, right arm, initial encounter 03/11/2016    Rupture of biceps tendon, right, initial encounter     Past Surgical History:   Procedure Laterality Date    SHOULDER SURGERY  06/27/2017    Shoulder Surgery     Family History   Problem Relation Name Age of Onset    Other (CARDIAC DISORDER) Mother      Hypertension Mother      Other (CARDIAC DISORDR) Father      Hypertension Father      Hypertension Sister      Heart attack Brother      Hypertension Brother       Social History     Tobacco Use    Smoking status: Every Day     Types: Cigarettes     Passive exposure: Current    Smokeless tobacco: Never   Vaping Use    Vaping  status: Never Used   Substance Use Topics    Alcohol use: Yes    Drug use: Never       Physical Exam   ED Triage Vitals [06/05/24 2246]   Temperature Heart Rate Respirations BP   36.7 °C (98 °F) 91 16 136/78      Pulse Ox Temp src Heart Rate Source Patient Position   97 % -- -- --      BP Location FiO2 (%)     -- --       Physical Exam  Constitutional:       General: He is not in acute distress.     Appearance: He is not ill-appearing or toxic-appearing.   HENT:      Nose: No congestion or rhinorrhea.      Mouth/Throat:      Mouth: Mucous membranes are moist.   Eyes:      General: No scleral icterus.     Extraocular Movements: Extraocular movements intact.      Pupils: Pupils are equal, round, and reactive to light.   Cardiovascular:      Rate and Rhythm: Normal rate and regular rhythm.      Heart sounds: No murmur heard.     No friction rub. No gallop.   Pulmonary:      Effort: Pulmonary effort is normal. No respiratory distress.      Breath sounds: Normal breath sounds. No stridor. No wheezing, rhonchi or rales.   Abdominal:      General: Bowel sounds are normal. There is distension.      Palpations: Abdomen is soft. There is no mass.      Tenderness: There is abdominal tenderness. There is no guarding or rebound.   Musculoskeletal:         General: No swelling or tenderness. Normal range of motion.      Cervical back: Normal range of motion.      Right lower leg: No edema.      Left lower leg: No edema.   Skin:     General: Skin is warm.      Capillary Refill: Capillary refill takes less than 2 seconds.      Coloration: Skin is not jaundiced.      Findings: No bruising or rash.   Neurological:      General: No focal deficit present.      Mental Status: He is oriented to person, place, and time. Mental status is at baseline.      Cranial Nerves: No cranial nerve deficit.         ED Course & MDM   Diagnoses as of 06/06/24 0112   Small bowel obstruction (Multi)       Medical Decision Making  Patient is a 58-year-old  male with history of diverticulosis and hypertension who presents to the emergency department with complicated diverticulitis.  ACS was made aware of the patient on arrival to the emergency department.  Elected to start Zosyn as well as NG decompression.  Patient was admitted to the [facility] surgery for further workup and management.    Procedure  Procedures     Saida Borrero MD  Resident  06/06/24 0143

## 2024-06-06 NOTE — PROGRESS NOTES
Pharmacy Admission Order Reconciliation Review    Bereket Em is a 58 y.o. male admitted for Small bowel obstruction (Multi). Pharmacy reviewed the patient's unreconciled admission medications.    Prior to admission medications that were reviewed and acted on by the pharmacist include:  augmentin  These medications have been reconciled.     Any other unreconcilied medications have been addressed and will be ordered or held by the patient's medical team. Medications addressed by the pharmacist may be added or changed by the patient's medical team at any time.    Hilda Conway, PharmD  Transitions of Care Pharmacist  [facility]  Please reach out via Secure Chat for questions

## 2024-06-06 NOTE — H&P
[facility]  ACUTE CARE SURGERY - HISTORY AND PHYSICAL / CONSULT    Patient Name: Bereket Em  MRN: [id number]  Admit Date:   : 1965  AGE: 58 y.o.   GENDER: male  ==============================================================================  TODAY'S ASSESSMENT AND PLAN OF CARE:  This is a 57 y/o M with hx of diverticulosis who was transferred from [facility] for management of a high grade SBO with an associated perforation. The patient presented to his local ED today with LLQ abdominal pain that started 4 days prior. He was evaluated by his PCP 3 days ago and started on Augmentin for presumed diverticulitis, but states that pain has worsened since. Additionally, he c/o N/V/D. He also feels as though he has been voiding less frequently during this time. Denies fever, chills, chest pain, SOB, dysuria.     On arrival to [facility], vitals are WNL. Patient states that he last passed gas 3 days ago. Has not been able to tolerate a diet. Endorses several episodes of dark brown emesis. Still endorsing LLQ pain, stable from prior.    This patient's clinical presentation and imaging are concerning for perforated diverticulitis with resulting SBO. He is HDS and non-peritonitic on exam.     Plan:  - Admit to ACS  - NPO, mIVF with NS  - 2L NS  - IV Zosyn  - NGT decompression  - CBC, RFP, Mg, coags, T&S  - Echo, CXR  - Serial abd exams  - Eventual OR during admission    Seen and discussed with Dr. Pretty.    Sofie Kelsey MD  PGY-2 General Surgery  ACS [id number]  ==============================================================================  CHIEF COMPLAINT/REASON FOR CONSULT:  High grade SBO, perforated diverticulitis    PAST MEDICAL HISTORY:   PMH: HTN, diverticulosis, alcohol use disorder ?    PSH:   Past Surgical History:   Procedure Laterality Date    SHOULDER SURGERY  2017    Shoulder Surgery     FH:   Family History   Problem Relation Name Age of Onset    Other (CARDIAC DISORDER) Mother       Hypertension Mother      Other (CARDIAC DISORDR) Father      Hypertension Father      Hypertension Sister      Heart attack Brother      Hypertension Brother       SOCIAL HISTORY:    Smoking:    Social History     Tobacco Use   Smoking Status Every Day    Types: Cigarettes    Passive exposure: Current   Smokeless Tobacco Never       Alcohol:    Social History     Substance and Sexual Activity   Alcohol Use Yes       Drug use: Denies    MEDICATIONS:   Prior to Admission medications    Medication Sig Start Date End Date Taking? Authorizing Provider   amoxicillin-pot clavulanate (Augmentin) 875-125 mg tablet Take 1 tablet (875 mg) by mouth 2 times a day for 5 days. 6/3/24 6/8/24  Candy Howell PA-C   cyclobenzaprine (Flexeril) 10 mg tablet Take 1 tablet (10 mg) by mouth 3 times a day as needed for muscle spasms. 4/30/24 6/29/24  Benjamín Garnett PA-C   gabapentin (Neurontin) 100 mg capsule Take 1 capsule (100 mg) by mouth 2 times a day. 5/9/24 6/8/24  Candy Howell PA-C   lisinopril 10 mg tablet Take 1 tablet (10 mg) by mouth once daily. 5/9/24   Candy Howell PA-C     ALLERGIES:   No Known Allergies    REVIEW OF SYSTEMS: All systems reviewed and otherwise negative.    PHYSICAL EXAM:  General: NAD, resting comfortably  HEENT: NCAT  Resp: nonlabored breathing on RA  CV: RRR  Abd: soft, markedly distended, TTP in LLQ without rebound or guarding  MSK: moves all extremities  Ext: no LE edema  Psych: appropriate mood and behavior    IMAGING SUMMARY:   CT A/P 6/5:  High-grade mid small bowel obstruction with transition point in the  left lower quadrant associated with a loculated gas-containing fluid  collection measuring 8.5 x 4.5 x 5.3 cm consistent with micro  perforation. There is surrounding inflammatory change but no gross  free air and no evidence of pneumatosis intestinalis. Recommend  surgical consultation.      Diffuse disproportionate thickening of the urinary bladder that may  be related to urinary  retention or current or recent cystitis. Please  correlate clinically.      Colonic diverticulosis without evidence of acute diverticulitis.    Last colonoscopy: 2022, notable for diverticulosis    LABS:  Results from last 7 days   Lab Units 06/05/24  1631   WBC AUTO x10*3/uL 19.0*   HEMOGLOBIN g/dL 15.7   HEMATOCRIT % 46.9   PLATELETS AUTO x10*3/uL 418   NEUTROS PCT AUTO % 84.9   LYMPHS PCT AUTO % 6.2   MONOS PCT AUTO % 8.0   EOS PCT AUTO % 0.2         Results from last 7 days   Lab Units 06/05/24  1631   SODIUM mmol/L 127*   POTASSIUM mmol/L 4.3   CHLORIDE mmol/L 85*   CO2 mmol/L 28   BUN mg/dL 17   CREATININE mg/dL 0.70   CALCIUM mg/dL 9.8   PROTEIN TOTAL g/dL 8.1*   BILIRUBIN TOTAL mg/dL 0.4   ALK PHOS U/L 112   ALT U/L 25   AST U/L 19   GLUCOSE mg/dL 126*     Results from last 7 days   Lab Units 06/05/24  1631   BILIRUBIN TOTAL mg/dL 0.4         I have reviewed all laboratory and imaging results ordered/pertinent for this encounter.

## 2024-06-06 NOTE — SIGNIFICANT EVENT
Serial abdominal exams    0300  Patient evaluated, states he feels more comfortable with NGT in place. Abd more soft, relatively less distended, and remains nontender to palpation. NGT retracted from 68 to 60 cm at the nares. Brown output in cannister.    Vitals:    06/06/24 0039   BP: 151/83   Pulse: 87   Resp: 16   Temp:    SpO2: 95%     No changes to plan at this time.    Sofie Kelsey MD  PGY-2 General Surgery  ACS [id number]

## 2024-06-06 NOTE — PROGRESS NOTES
Pharmacy Medication History Review    Bereket Em is a 58 y.o. male admitted for Small bowel obstruction (Multi). Pharmacy reviewed the patient's [id number] medications and allergies for accuracy.    The list below reflects the updated PTA list. Comments regarding how patient may be taking medications differently can be found in the Admit Orders Activity  Prior to Admission Medications   Prescriptions Last Dose Informant   amoxicillin-pot clavulanate (Augmentin) 875-125 mg tablet 6/5/2024 at patient states that they are on day 3 of this therapy Self   Sig: Take 1 tablet (875 mg) by mouth 2 times a day for 5 days.   cyclobenzaprine (Flexeril) 10 mg tablet Not Taking Self   Sig: Take 1 tablet (10 mg) by mouth 3 times a day as needed for muscle spasms.   Patient not taking: Reported on 6/6/2024   gabapentin (Neurontin) 100 mg capsule Not Taking Self   Sig: Take 1 capsule (100 mg) by mouth 2 times a day.   Patient not taking: Reported on 6/6/2024   lisinopril 10 mg tablet 6/5/2024 Self   Sig: Take 1 tablet (10 mg) by mouth once daily.   predniSONE (Deltasone) 10 mg tablet Past Month at patient states that this therapy is complete Self   Sig: Take 1 tablet (10 mg) by mouth once daily.      Facility-Administered Medications: None        The list below reflects the updated allergy list. Please review each documented allergy for additional clarification and justification.  Allergies  Reviewed by Israel Conley on 6/6/2024   No Known Allergies         Patient declines M2B at discharge. Pharmacy has been updated to n/a.    Sources used to complete the med history include   Allergy list sure script ( none )  Allergy list epic ( none )  Oarrs ( yes )  Epic dispense history  Patient interview     Below are additional concerns with the patient's PTA list.  Patient is a good historian patient knows medication name dose and frequency   Patient states that he is on day 3 of 5 of the medication augmentin 875-125 mg    Patient recently completed the medication therapy prednisone 10 mg ds 15 qty 45    Oarrs   5/9/2024 gabapentin 100 mg qty 60 ds 30    Israel Conley [facility]  Transitions of Care Pharmacy Technician  [facility]  Please reach out via [employer] Secure Chat for questions, or if no response call [phone] or vocInfobionics “MedRec”

## 2024-06-06 NOTE — CONSULTS
"  Wound Care Consult     Visit Date: 6/6/2024      Patient Name: Bereket Em         MRN: [id number]           YOB: 1965     Reason for Consult: Preoperative education     Wound History: Patient scheduled for surgery tomorrow with ACS for a possible ostomy     Pertinent Labs:   Albumin   Date Value Ref Range Status   06/05/2024 3.9 3.5 - 5.0 g/dL Final     ALBUMIN (MG/L) IN URINE   Date Value Ref Range Status   05/06/2021 <7.0 Not Established mg/L Final       Marked L side of abdomen for possible colostomy.    Marked with black permanent marker on flat part of abd visible while standing, sitting and bending at the waist.  Reviewed basic GI function and basic ostomy care.    Showed sample pouch.  Gave and reviewed booklet \"Preparing for Ostomy Surgery\".  Patient reaction: Patient attentive and listened to the education.  He was concerned about how he would wear his pants and if the pouch would smell.  Reassured him that some goals of preoperative marking include placing a suresh in a place visible to patient, away from belt line and to have better pouching success to avoid leaks (and odors).  Patient asked many other appropriate questions and stated understanding of education.  Plan: will follow if stoma placed.     Brittaney Morgan, MSN, RN, CWCN, COCN  06/06/24 5:17 PM          "

## 2024-06-06 NOTE — ED TRIAGE NOTES
Pt a [facility] transfer for a small bowel obstruction with micro perforation. Lower abd pain started 6/1. Pt endorses 5/10 pain in lower abdomen. Pain gets worse when palpated.

## 2024-06-06 NOTE — PROGRESS NOTES
Pharmacy Medication History Review    Bereket Em is a 58 y.o. male admitted for Small bowel obstruction (Multi). Pharmacy reviewed the patient's [id number] medications and allergies for accuracy.    The list below reflects the updated PTA list. Comments regarding how patient may be taking medications differently can be found in the Admit Orders Activity  Prior to Admission Medications   Prescriptions Last Dose Informant   amoxicillin-pot clavulanate (Augmentin) 875-125 mg tablet 6/5/2024 at patient states that they are on day 3 of this therapy Self   Sig: Take 1 tablet (875 mg) by mouth 2 times a day for 5 days.   cyclobenzaprine (Flexeril) 10 mg tablet Not Taking Self   Sig: Take 1 tablet (10 mg) by mouth 3 times a day as needed for muscle spasms.   Patient not taking: Reported on 6/6/2024   gabapentin (Neurontin) 100 mg capsule Not Taking Self   Sig: Take 1 capsule (100 mg) by mouth 2 times a day.   Patient not taking: Reported on 6/6/2024   lisinopril 10 mg tablet 6/5/2024 Self   Sig: Take 1 tablet (10 mg) by mouth once daily.   predniSONE (Deltasone) 10 mg tablet Past Month at patient states that this therapy is complete Self   Sig: Take 1 tablet (10 mg) by mouth once daily.      Facility-Administered Medications: None        The list below reflects the updated allergy list. Please review each documented allergy for additional clarification and justification.  Allergies  Reviewed by Israel Conley on 6/6/2024   No Known Allergies         Patient declines M2B at discharge. Pharmacy has been updated to n/a.    Sources used to complete the med history include   Allergy list sure script ( none )  Allergy list epic ( none )  Oarrs ( yes )  Epic dispense history  Patient interview     Below are additional concerns with the patient's PTA list.  Patient is a good historian patient knows medication name dose and frequency   Patient states that he is on day 3 of 5 of the medication augmentin 875-125 mg    Patient recently completed the medication therapy prednisone 10 mg ds 15 qty 45    Oarrs   5/9/2024 gabapentin 100 mg qty 60 ds 30    Israel Conley [facility]  Transitions of Care Pharmacy Technician  [facility]  Please reach out via [employer] Secure Chat for questions, or if no response call [phone] or vocTeikon “MedRec”

## 2024-06-07 ENCOUNTER — ANESTHESIA (OUTPATIENT)
Dept: OPERATING ROOM | Facility: HOSPITAL | Age: 59
End: 2024-06-07
Payer: COMMERCIAL

## 2024-06-07 ENCOUNTER — ANESTHESIA EVENT (OUTPATIENT)
Dept: OPERATING ROOM | Facility: HOSPITAL | Age: 59
End: 2024-06-07
Payer: COMMERCIAL

## 2024-06-07 LAB
ABO GROUP (TYPE) IN BLOOD: NORMAL
ALBUMIN SERPL BCP-MCNC: 3.2 G/DL (ref 3.4–5)
ANION GAP SERPL CALC-SCNC: 17 MMOL/L (ref 10–20)
BACTERIA UR CULT: NO GROWTH
BUN SERPL-MCNC: 8 MG/DL (ref 6–23)
CALCIUM SERPL-MCNC: 8.8 MG/DL (ref 8.6–10.6)
CHLORIDE SERPL-SCNC: 97 MMOL/L (ref 98–107)
CO2 SERPL-SCNC: 29 MMOL/L (ref 21–32)
CREAT SERPL-MCNC: 0.57 MG/DL (ref 0.5–1.3)
EGFRCR SERPLBLD CKD-EPI 2021: >90 ML/MIN/1.73M*2
ERYTHROCYTE [DISTWIDTH] IN BLOOD BY AUTOMATED COUNT: 13.2 % (ref 11.5–14.5)
GLUCOSE SERPL-MCNC: 81 MG/DL (ref 74–99)
HCT VFR BLD AUTO: 39 % (ref 41–52)
HGB BLD-MCNC: 12.9 G/DL (ref 13.5–17.5)
MAGNESIUM SERPL-MCNC: 2.09 MG/DL (ref 1.6–2.4)
MCH RBC QN AUTO: 27.9 PG (ref 26–34)
MCHC RBC AUTO-ENTMCNC: 33.1 G/DL (ref 32–36)
MCV RBC AUTO: 84 FL (ref 80–100)
NRBC BLD-RTO: 0 /100 WBCS (ref 0–0)
PHOSPHATE SERPL-MCNC: 3.5 MG/DL (ref 2.5–4.9)
PLATELET # BLD AUTO: 335 X10*3/UL (ref 150–450)
POTASSIUM SERPL-SCNC: 3.6 MMOL/L (ref 3.5–5.3)
RBC # BLD AUTO: 4.63 X10*6/UL (ref 4.5–5.9)
RH FACTOR (ANTIGEN D): NORMAL
SODIUM SERPL-SCNC: 139 MMOL/L (ref 136–145)
WBC # BLD AUTO: 13.6 X10*3/UL (ref 4.4–11.3)

## 2024-06-07 PROCEDURE — 80069 RENAL FUNCTION PANEL: CPT

## 2024-06-07 PROCEDURE — 2720000007 HC OR 272 NO HCPCS: Performed by: SURGERY

## 2024-06-07 PROCEDURE — 3700000002 HC GENERAL ANESTHESIA TIME - EACH INCREMENTAL 1 MINUTE: Performed by: SURGERY

## 2024-06-07 PROCEDURE — 44141 PARTIAL REMOVAL OF COLON: CPT | Performed by: SURGERY

## 2024-06-07 PROCEDURE — 2500000004 HC RX 250 GENERAL PHARMACY W/ HCPCS (ALT 636 FOR OP/ED)

## 2024-06-07 PROCEDURE — 2500000005 HC RX 250 GENERAL PHARMACY W/O HCPCS

## 2024-06-07 PROCEDURE — 2500000005 HC RX 250 GENERAL PHARMACY W/O HCPCS: Performed by: ANESTHESIOLOGY

## 2024-06-07 PROCEDURE — 88307 TISSUE EXAM BY PATHOLOGIST: CPT | Performed by: STUDENT IN AN ORGANIZED HEALTH CARE EDUCATION/TRAINING PROGRAM

## 2024-06-07 PROCEDURE — 3600000003 HC OR TIME - INITIAL BASE CHARGE - PROCEDURE LEVEL THREE: Performed by: SURGERY

## 2024-06-07 PROCEDURE — 1100000001 HC PRIVATE ROOM DAILY

## 2024-06-07 PROCEDURE — 2500000004 HC RX 250 GENERAL PHARMACY W/ HCPCS (ALT 636 FOR OP/ED): Performed by: SURGERY

## 2024-06-07 PROCEDURE — 36415 COLL VENOUS BLD VENIPUNCTURE: CPT | Performed by: SURGERY

## 2024-06-07 PROCEDURE — A4217 STERILE WATER/SALINE, 500 ML: HCPCS | Performed by: SURGERY

## 2024-06-07 PROCEDURE — 83735 ASSAY OF MAGNESIUM: CPT

## 2024-06-07 PROCEDURE — 2500000004 HC RX 250 GENERAL PHARMACY W/ HCPCS (ALT 636 FOR OP/ED): Performed by: ANESTHESIOLOGY

## 2024-06-07 PROCEDURE — 3600000008 HC OR TIME - EACH INCREMENTAL 1 MINUTE - PROCEDURE LEVEL THREE: Performed by: SURGERY

## 2024-06-07 PROCEDURE — 0D1L0Z4 BYPASS TRANSVERSE COLON TO CUTANEOUS, OPEN APPROACH: ICD-10-PCS | Performed by: SURGERY

## 2024-06-07 PROCEDURE — 3700000001 HC GENERAL ANESTHESIA TIME - INITIAL BASE CHARGE: Performed by: SURGERY

## 2024-06-07 PROCEDURE — 7100000001 HC RECOVERY ROOM TIME - INITIAL BASE CHARGE: Performed by: SURGERY

## 2024-06-07 PROCEDURE — P9045 ALBUMIN (HUMAN), 5%, 250 ML: HCPCS | Mod: JZ

## 2024-06-07 PROCEDURE — 36415 COLL VENOUS BLD VENIPUNCTURE: CPT

## 2024-06-07 PROCEDURE — 88307 TISSUE EXAM BY PATHOLOGIST: CPT | Mod: TC,SUR,PARLAB | Performed by: SURGERY

## 2024-06-07 PROCEDURE — 0DTN0ZZ RESECTION OF SIGMOID COLON, OPEN APPROACH: ICD-10-PCS | Performed by: SURGERY

## 2024-06-07 PROCEDURE — 85027 COMPLETE CBC AUTOMATED: CPT

## 2024-06-07 PROCEDURE — 7100000002 HC RECOVERY ROOM TIME - EACH INCREMENTAL 1 MINUTE: Performed by: SURGERY

## 2024-06-07 PROCEDURE — C9113 INJ PANTOPRAZOLE SODIUM, VIA: HCPCS

## 2024-06-07 RX ORDER — LABETALOL HYDROCHLORIDE 5 MG/ML
5 INJECTION, SOLUTION INTRAVENOUS ONCE AS NEEDED
Status: DISCONTINUED | OUTPATIENT
Start: 2024-06-07 | End: 2024-06-07 | Stop reason: HOSPADM

## 2024-06-07 RX ORDER — PROPOFOL 10 MG/ML
INJECTION, EMULSION INTRAVENOUS AS NEEDED
Status: DISCONTINUED | OUTPATIENT
Start: 2024-06-07 | End: 2024-06-07

## 2024-06-07 RX ORDER — ACETAMINOPHEN 325 MG/1
650 TABLET ORAL ONCE
Status: DISCONTINUED | OUTPATIENT
Start: 2024-06-07 | End: 2024-06-07 | Stop reason: HOSPADM

## 2024-06-07 RX ORDER — HYDROMORPHONE HYDROCHLORIDE 1 MG/ML
0.2 INJECTION, SOLUTION INTRAMUSCULAR; INTRAVENOUS; SUBCUTANEOUS EVERY 5 MIN PRN
Status: DISCONTINUED | OUTPATIENT
Start: 2024-06-07 | End: 2024-06-07 | Stop reason: HOSPADM

## 2024-06-07 RX ORDER — NALOXONE HYDROCHLORIDE 0.4 MG/ML
0.2 INJECTION, SOLUTION INTRAMUSCULAR; INTRAVENOUS; SUBCUTANEOUS AS NEEDED
Status: DISCONTINUED | OUTPATIENT
Start: 2024-06-07 | End: 2024-06-18

## 2024-06-07 RX ORDER — SODIUM CHLORIDE, SODIUM LACTATE, POTASSIUM CHLORIDE, CALCIUM CHLORIDE 600; 310; 30; 20 MG/100ML; MG/100ML; MG/100ML; MG/100ML
50 INJECTION, SOLUTION INTRAVENOUS CONTINUOUS
Status: DISCONTINUED | OUTPATIENT
Start: 2024-06-07 | End: 2024-06-16

## 2024-06-07 RX ORDER — ROCURONIUM BROMIDE 10 MG/ML
INJECTION, SOLUTION INTRAVENOUS AS NEEDED
Status: DISCONTINUED | OUTPATIENT
Start: 2024-06-07 | End: 2024-06-07

## 2024-06-07 RX ORDER — ACETAMINOPHEN 10 MG/ML
1000 INJECTION, SOLUTION INTRAVENOUS EVERY 6 HOURS SCHEDULED
Status: COMPLETED | OUTPATIENT
Start: 2024-06-07 | End: 2024-06-08

## 2024-06-07 RX ORDER — ENOXAPARIN SODIUM 100 MG/ML
40 INJECTION SUBCUTANEOUS EVERY 24 HOURS
Status: DISPENSED | OUTPATIENT
Start: 2024-06-07

## 2024-06-07 RX ORDER — SODIUM CHLORIDE, SODIUM LACTATE, POTASSIUM CHLORIDE, CALCIUM CHLORIDE 600; 310; 30; 20 MG/100ML; MG/100ML; MG/100ML; MG/100ML
100 INJECTION, SOLUTION INTRAVENOUS CONTINUOUS
Status: DISCONTINUED | OUTPATIENT
Start: 2024-06-07 | End: 2024-06-07 | Stop reason: HOSPADM

## 2024-06-07 RX ORDER — LIDOCAINE HCL/PF 100 MG/5ML
SYRINGE (ML) INTRAVENOUS AS NEEDED
Status: DISCONTINUED | OUTPATIENT
Start: 2024-06-07 | End: 2024-06-07

## 2024-06-07 RX ORDER — FENTANYL CITRATE 50 UG/ML
INJECTION, SOLUTION INTRAMUSCULAR; INTRAVENOUS AS NEEDED
Status: DISCONTINUED | OUTPATIENT
Start: 2024-06-07 | End: 2024-06-07

## 2024-06-07 RX ORDER — ONDANSETRON HYDROCHLORIDE 2 MG/ML
INJECTION, SOLUTION INTRAVENOUS AS NEEDED
Status: DISCONTINUED | OUTPATIENT
Start: 2024-06-07 | End: 2024-06-07

## 2024-06-07 RX ORDER — MIDAZOLAM HYDROCHLORIDE 1 MG/ML
1 INJECTION INTRAMUSCULAR; INTRAVENOUS ONCE AS NEEDED
Status: DISCONTINUED | OUTPATIENT
Start: 2024-06-07 | End: 2024-06-07 | Stop reason: HOSPADM

## 2024-06-07 RX ORDER — ACETAMINOPHEN 10 MG/ML
1000 INJECTION, SOLUTION INTRAVENOUS ONCE
Status: CANCELLED | OUTPATIENT
Start: 2024-06-07

## 2024-06-07 RX ORDER — MEPERIDINE HYDROCHLORIDE 25 MG/ML
12.5 INJECTION INTRAMUSCULAR; INTRAVENOUS; SUBCUTANEOUS EVERY 10 MIN PRN
Status: DISCONTINUED | OUTPATIENT
Start: 2024-06-07 | End: 2024-06-07 | Stop reason: HOSPADM

## 2024-06-07 RX ORDER — MIDAZOLAM HYDROCHLORIDE 1 MG/ML
INJECTION INTRAMUSCULAR; INTRAVENOUS AS NEEDED
Status: DISCONTINUED | OUTPATIENT
Start: 2024-06-07 | End: 2024-06-07

## 2024-06-07 RX ORDER — OXYCODONE HYDROCHLORIDE 5 MG/1
5 TABLET ORAL EVERY 4 HOURS PRN
Status: DISCONTINUED | OUTPATIENT
Start: 2024-06-07 | End: 2024-06-07 | Stop reason: HOSPADM

## 2024-06-07 RX ORDER — HYDROMORPHONE HCL/0.9% NACL/PF 15 MG/30ML
PATIENT CONTROLLED ANALGESIA SYRINGE INTRAVENOUS CONTINUOUS
Status: DISCONTINUED | OUTPATIENT
Start: 2024-06-07 | End: 2024-06-11

## 2024-06-07 RX ORDER — HYDROMORPHONE HYDROCHLORIDE 1 MG/ML
INJECTION, SOLUTION INTRAMUSCULAR; INTRAVENOUS; SUBCUTANEOUS AS NEEDED
Status: DISCONTINUED | OUTPATIENT
Start: 2024-06-07 | End: 2024-06-07

## 2024-06-07 RX ORDER — ALBUTEROL SULFATE 0.83 MG/ML
2.5 SOLUTION RESPIRATORY (INHALATION) ONCE AS NEEDED
Status: DISCONTINUED | OUTPATIENT
Start: 2024-06-07 | End: 2024-06-07 | Stop reason: HOSPADM

## 2024-06-07 RX ORDER — LIDOCAINE HYDROCHLORIDE 10 MG/ML
0.1 INJECTION INFILTRATION; PERINEURAL ONCE
Status: DISCONTINUED | OUTPATIENT
Start: 2024-06-07 | End: 2024-06-07 | Stop reason: HOSPADM

## 2024-06-07 RX ORDER — HYDROMORPHONE HYDROCHLORIDE 1 MG/ML
0.5 INJECTION, SOLUTION INTRAMUSCULAR; INTRAVENOUS; SUBCUTANEOUS EVERY 5 MIN PRN
Status: DISCONTINUED | OUTPATIENT
Start: 2024-06-07 | End: 2024-06-07 | Stop reason: HOSPADM

## 2024-06-07 RX ORDER — SUCCINYLCHOLINE CHLORIDE 20 MG/ML
INJECTION INTRAMUSCULAR; INTRAVENOUS AS NEEDED
Status: DISCONTINUED | OUTPATIENT
Start: 2024-06-07 | End: 2024-06-07

## 2024-06-07 RX ORDER — HEPARIN SODIUM 5000 [USP'U]/ML
INJECTION, SOLUTION INTRAVENOUS; SUBCUTANEOUS AS NEEDED
Status: DISCONTINUED | OUTPATIENT
Start: 2024-06-07 | End: 2024-06-07

## 2024-06-07 RX ORDER — LABETALOL HYDROCHLORIDE 5 MG/ML
INJECTION, SOLUTION INTRAVENOUS AS NEEDED
Status: DISCONTINUED | OUTPATIENT
Start: 2024-06-07 | End: 2024-06-07

## 2024-06-07 RX ORDER — PANTOPRAZOLE SODIUM 40 MG/10ML
40 INJECTION, POWDER, LYOPHILIZED, FOR SOLUTION INTRAVENOUS 2 TIMES DAILY
Status: DISCONTINUED | OUTPATIENT
Start: 2024-06-07 | End: 2024-06-23

## 2024-06-07 RX ORDER — ALBUMIN HUMAN 50 G/1000ML
SOLUTION INTRAVENOUS AS NEEDED
Status: DISCONTINUED | OUTPATIENT
Start: 2024-06-07 | End: 2024-06-07

## 2024-06-07 RX ORDER — ONDANSETRON HYDROCHLORIDE 2 MG/ML
4 INJECTION, SOLUTION INTRAVENOUS ONCE AS NEEDED
Status: DISCONTINUED | OUTPATIENT
Start: 2024-06-07 | End: 2024-06-07 | Stop reason: HOSPADM

## 2024-06-07 RX ORDER — SODIUM CHLORIDE 0.9 G/100ML
IRRIGANT IRRIGATION AS NEEDED
Status: DISCONTINUED | OUTPATIENT
Start: 2024-06-07 | End: 2024-06-07 | Stop reason: HOSPADM

## 2024-06-07 RX ADMIN — HYDROMORPHONE HYDROCHLORIDE 0.5 MG: 1 INJECTION, SOLUTION INTRAMUSCULAR; INTRAVENOUS; SUBCUTANEOUS at 13:01

## 2024-06-07 RX ADMIN — ONDANSETRON 4 MG: 2 INJECTION INTRAMUSCULAR; INTRAVENOUS at 12:20

## 2024-06-07 RX ADMIN — PIPERACILLIN SODIUM AND TAZOBACTAM SODIUM 3.38 G: 3; .375 INJECTION, SOLUTION INTRAVENOUS at 18:44

## 2024-06-07 RX ADMIN — SODIUM CHLORIDE, SODIUM LACTATE, POTASSIUM CHLORIDE, AND CALCIUM CHLORIDE: 600; 310; 30; 20 INJECTION, SOLUTION INTRAVENOUS at 07:45

## 2024-06-07 RX ADMIN — ROCURONIUM BROMIDE 15 MG: 10 INJECTION INTRAVENOUS at 10:32

## 2024-06-07 RX ADMIN — ROCURONIUM BROMIDE 20 MG: 10 INJECTION INTRAVENOUS at 09:41

## 2024-06-07 RX ADMIN — ALBUMIN (HUMAN) 250 ML: 12.5 SOLUTION INTRAVENOUS at 12:02

## 2024-06-07 RX ADMIN — HYDROMORPHONE HYDROCHLORIDE 0.5 MG: 1 INJECTION, SOLUTION INTRAMUSCULAR; INTRAVENOUS; SUBCUTANEOUS at 12:53

## 2024-06-07 RX ADMIN — PROPOFOL 50 MG: 10 INJECTION, EMULSION INTRAVENOUS at 10:00

## 2024-06-07 RX ADMIN — HYDROMORPHONE HYDROCHLORIDE 0.5 MG: 1 INJECTION, SOLUTION INTRAMUSCULAR; INTRAVENOUS; SUBCUTANEOUS at 13:07

## 2024-06-07 RX ADMIN — LIDOCAINE HYDROCHLORIDE 50 MG: 20 INJECTION INTRAVENOUS at 09:09

## 2024-06-07 RX ADMIN — HYDROMORPHONE HYDROCHLORIDE 0.4 MG: 1 INJECTION, SOLUTION INTRAMUSCULAR; INTRAVENOUS; SUBCUTANEOUS at 01:52

## 2024-06-07 RX ADMIN — PANTOPRAZOLE SODIUM 40 MG: 40 INJECTION, POWDER, FOR SOLUTION INTRAVENOUS at 20:23

## 2024-06-07 RX ADMIN — FENTANYL CITRATE 50 MCG: 50 INJECTION, SOLUTION INTRAMUSCULAR; INTRAVENOUS at 09:09

## 2024-06-07 RX ADMIN — ROCURONIUM BROMIDE 5 MG: 10 INJECTION INTRAVENOUS at 12:20

## 2024-06-07 RX ADMIN — SUCCINYLCHOLINE CHLORIDE 140 MG: 20 INJECTION, SOLUTION INTRAMUSCULAR; INTRAVENOUS at 09:09

## 2024-06-07 RX ADMIN — LABETALOL HYDROCHLORIDE 5 MG: 5 INJECTION INTRAVENOUS at 11:34

## 2024-06-07 RX ADMIN — HYDROMORPHONE HYDROCHLORIDE 1 MG: 1 INJECTION, SOLUTION INTRAMUSCULAR; INTRAVENOUS; SUBCUTANEOUS at 09:45

## 2024-06-07 RX ADMIN — PROPOFOL 150 MG: 10 INJECTION, EMULSION INTRAVENOUS at 09:09

## 2024-06-07 RX ADMIN — FENTANYL CITRATE 50 MCG: 50 INJECTION, SOLUTION INTRAMUSCULAR; INTRAVENOUS at 09:35

## 2024-06-07 RX ADMIN — HYDROMORPHONE HYDROCHLORIDE 0.5 MG: 1 INJECTION, SOLUTION INTRAMUSCULAR; INTRAVENOUS; SUBCUTANEOUS at 13:20

## 2024-06-07 RX ADMIN — HYDROMORPHONE HYDROCHLORIDE: 10 INJECTION, SOLUTION INTRAMUSCULAR; INTRAVENOUS; SUBCUTANEOUS at 17:06

## 2024-06-07 RX ADMIN — HEPARIN SODIUM 5000 UNITS: 5000 INJECTION INTRAVENOUS; SUBCUTANEOUS at 09:18

## 2024-06-07 RX ADMIN — ROCURONIUM BROMIDE 40 MG: 10 INJECTION INTRAVENOUS at 09:15

## 2024-06-07 RX ADMIN — MIDAZOLAM HYDROCHLORIDE 2 MG: 1 INJECTION, SOLUTION INTRAMUSCULAR; INTRAVENOUS at 08:59

## 2024-06-07 RX ADMIN — ENOXAPARIN SODIUM 40 MG: 100 INJECTION SUBCUTANEOUS at 20:22

## 2024-06-07 RX ADMIN — DEXAMETHASONE SODIUM PHOSPHATE 4 MG: 4 INJECTION INTRA-ARTICULAR; INTRALESIONAL; INTRAMUSCULAR; INTRAVENOUS; SOFT TISSUE at 09:30

## 2024-06-07 RX ADMIN — PIPERACILLIN SODIUM AND TAZOBACTAM SODIUM 3.38 G: 3; .375 INJECTION, SOLUTION INTRAVENOUS at 08:50

## 2024-06-07 RX ADMIN — ROCURONIUM BROMIDE 5 MG: 10 INJECTION INTRAVENOUS at 09:09

## 2024-06-07 RX ADMIN — SODIUM CHLORIDE, POTASSIUM CHLORIDE, SODIUM LACTATE AND CALCIUM CHLORIDE 75 ML/HR: 600; 310; 30; 20 INJECTION, SOLUTION INTRAVENOUS at 17:05

## 2024-06-07 RX ADMIN — ACETAMINOPHEN 1000 MG: 10 INJECTION INTRAVENOUS at 18:18

## 2024-06-07 RX ADMIN — PIPERACILLIN SODIUM AND TAZOBACTAM SODIUM 3.38 G: 3; .375 INJECTION, SOLUTION INTRAVENOUS at 02:07

## 2024-06-07 RX ADMIN — SUGAMMADEX 200 MG: 100 INJECTION, SOLUTION INTRAVENOUS at 12:37

## 2024-06-07 RX ADMIN — SODIUM CHLORIDE, SODIUM LACTATE, POTASSIUM CHLORIDE, AND CALCIUM CHLORIDE: 600; 310; 30; 20 INJECTION, SOLUTION INTRAVENOUS at 12:15

## 2024-06-07 RX ADMIN — Medication 4 L/MIN: at 13:00

## 2024-06-07 RX ADMIN — HYDROMORPHONE HYDROCHLORIDE 1 MG: 1 INJECTION, SOLUTION INTRAMUSCULAR; INTRAVENOUS; SUBCUTANEOUS at 09:30

## 2024-06-07 RX ADMIN — HYDROMORPHONE HYDROCHLORIDE 0.5 MG: 1 INJECTION, SOLUTION INTRAMUSCULAR; INTRAVENOUS; SUBCUTANEOUS at 13:32

## 2024-06-07 RX ADMIN — ROCURONIUM BROMIDE 10 MG: 10 INJECTION INTRAVENOUS at 11:05

## 2024-06-07 RX ADMIN — HYDROMORPHONE HYDROCHLORIDE 0.5 MG: 1 INJECTION, SOLUTION INTRAMUSCULAR; INTRAVENOUS; SUBCUTANEOUS at 12:48

## 2024-06-07 SDOH — HEALTH STABILITY: MENTAL HEALTH: CURRENT SMOKER: 1

## 2024-06-07 ASSESSMENT — COGNITIVE AND FUNCTIONAL STATUS - GENERAL
HELP NEEDED FOR BATHING: A LITTLE
CLIMB 3 TO 5 STEPS WITH RAILING: A LITTLE
DAILY ACTIVITIY SCORE: 22
MOBILITY SCORE: 23
DRESSING REGULAR LOWER BODY CLOTHING: A LITTLE

## 2024-06-07 ASSESSMENT — PAIN SCALES - GENERAL
PAINLEVEL_OUTOF10: 5 - MODERATE PAIN
PAINLEVEL_OUTOF10: 7
PAINLEVEL_OUTOF10: 0 - NO PAIN
PAINLEVEL_OUTOF10: 7
PAINLEVEL_OUTOF10: 4
PAINLEVEL_OUTOF10: 6
PAINLEVEL_OUTOF10: 2
PAINLEVEL_OUTOF10: 7
PAINLEVEL_OUTOF10: 7
PAINLEVEL_OUTOF10: 5 - MODERATE PAIN

## 2024-06-07 ASSESSMENT — PAIN - FUNCTIONAL ASSESSMENT
PAIN_FUNCTIONAL_ASSESSMENT: 0-10

## 2024-06-07 ASSESSMENT — COLUMBIA-SUICIDE SEVERITY RATING SCALE - C-SSRS
2. HAVE YOU ACTUALLY HAD ANY THOUGHTS OF KILLING YOURSELF?: NO
1. IN THE PAST MONTH, HAVE YOU WISHED YOU WERE DEAD OR WISHED YOU COULD GO TO SLEEP AND NOT WAKE UP?: NO
6. HAVE YOU EVER DONE ANYTHING, STARTED TO DO ANYTHING, OR PREPARED TO DO ANYTHING TO END YOUR LIFE?: NO

## 2024-06-07 NOTE — ANESTHESIA PROCEDURE NOTES
Airway  Date/Time: 6/7/2024 9:11 AM  Urgency: elective    Airway not difficult    Staffing  Performed: CRNA   Authorized by: Bijan Caldwell MD    Performed by: MINERVA Medellin-ALEISHA  Patient location during procedure: OR    Indications and Patient Condition  Indications for airway management: anesthesia  Spontaneous Ventilation: absent  Sedation level: deep  Preoxygenated: yes  Patient position: sniffing  MILS not maintained throughout  Mask difficulty assessment: 0 - not attempted  Planned trial extubation    Final Airway Details  Final airway type: endotracheal airway      Successful airway: ETT  Cuffed: yes   Successful intubation technique: direct laryngoscopy  Facilitating devices/methods: intubating stylet and cricoid pressure  Endotracheal tube insertion site: oral  Blade: Jenni  Blade size: #4  ETT size (mm): 7.5  Cormack-Lehane Classification: grade I - full view of glottis  Placement verified by: chest auscultation and capnometry   Number of attempts at approach: 1  Ventilation between attempts: none    Additional Comments  Rapid sequence induction  No emesis

## 2024-06-07 NOTE — ANESTHESIA POSTPROCEDURE EVALUATION
Patient: Bereket Em    Procedure Summary       Date: 06/07/24 Room / Location: [facility] OR 11 / [facility] OR    Anesthesia Start: 0900 Anesthesia Stop: 1256    Procedure: Resection Sigmoid Colon AND COLOSTOMY, EXPLORATION LAPAROTOMY (Abdomen) Diagnosis:       Diverticulitis of large intestine with abscess without bleeding      (Diverticulitis of large intestine with abscess without bleeding [K57.20])    Surgeons: Jose Guadalupe Gomes MD Responsible Provider: MINERVA Medellin-ALEISHA    Anesthesia Type: general ASA Status: 3 - Emergent            Anesthesia Type: general    Vitals Value Taken Time   /91 06/07/24 1431   Temp 36 °C (96.8 °F) 06/07/24 1430   Pulse 92 06/07/24 1435   Resp 16 06/07/24 1255   SpO2 97 % 06/07/24 1434   Vitals shown include unfiled device data.    Anesthesia Post Evaluation    Patient participation: complete - patient participated  Level of consciousness: awake and alert  Pain management: adequate  Airway patency: patent  Cardiovascular status: acceptable  Respiratory status: acceptable  Hydration status: acceptable  Postoperative Nausea and Vomiting: none    No notable events documented.

## 2024-06-07 NOTE — ANESTHESIA PROCEDURE NOTES
Peripheral IV  Date/Time: 6/7/2024 9:23 AM  Inserted by: MINERVA Medellin-ALEISHA    Placement  Needle size: 16 G  Laterality: right  Location: hand  Local anesthetic: none  Site prep: chlorhexidine  Technique: anatomical landmarks  Attempts: 1

## 2024-06-07 NOTE — ANESTHESIA PREPROCEDURE EVALUATION
Patient: Bereket Em    Procedure Information       Anesthesia Start Date/Time: 06/07/24 0900    Procedure: Resection Sigmoid Colon    Location: [facility] OR 11 / [facility]    Surgeons: Jose Guadalupe Gomes MD            Patient: Bereket Em    Procedure Information       Anesthesia Start Date/Time: 06/07/24 0900    Procedure: Resection Sigmoid Colon    [facility] / [facility]    Surgeons: Jose Guadalupe Gomes MD          Relevant Problems   Cardiac   (+) Benign essential HTN      Neuro   (+) Anxiety and depression   (+) Cervical radiculopathy  -pt denies neuropathies with neck extension     Patient Active Problem List   Diagnosis    Adenomatous polyp of colon    Anxiety and depression    Benign essential HTN    Benign prostatic hyperplasia (BPH) with urinary urgency    Cervical radiculopathy    Cigarette nicotine dependence without complication    Diverticulosis of large intestine without hemorrhage    Hemorrhoids    Tinnitus    Small bowel obstruction (Multi)    Diverticulitis of large intestine with abscess without bleeding        Clinical information reviewed:   Tobacco  Allergies  Meds   Med Hx  Surg Hx   Fam Hx  Soc Hx        NPO/Void Status  Date of Last Solid: 06/07/24  Time of Last Solid: 0000           Past Medical History:   Diagnosis Date    Acute pharyngitis, unspecified     Sore throat    Acute upper respiratory infection, unspecified 02/13/2017    Acute URI    Alcohol abuse, in remission 09/12/2018    History of alcohol abuse    Elevated blood-pressure reading, without diagnosis of hypertension 02/23/2015    Elevated blood pressure reading without diagnosis of hypertension    Encounter for immunization 10/10/2014    Need for Tdap vaccination    Encounter for screening for malignant neoplasm of colon 01/04/2017    Encounter for colonoscopy due to history of adenomatous colonic polyps    Encounter for screening for malignant neoplasm of colon 11/02/2016     Encounter for screening colonoscopy    Encounter for screening for malignant neoplasm of colon 11/02/2016    Encounter for screening colonoscopy    Encounter for screening for malignant neoplasm of prostate 09/12/2018    Prostate cancer screening    Essential (primary) hypertension 03/14/2019    Elevated blood pressure reading with diagnosis of hypertension    Impingement syndrome of right shoulder 06/06/2016    Impingement syndrome of right shoulder    Incomplete rotator cuff tear or rupture of right shoulder, not specified as traumatic 09/12/2018    Partial nontraumatic tear of right rotator cuff    Noninfective gastroenteritis and colitis, unspecified 01/23/2018    Acute gastroenteritis    Other general symptoms and signs 11/02/2016    Flu-like symptoms    Other malaise 11/02/2016    Malaise and fatigue    Pain in left ankle and joints of left foot 10/10/2017    Left ankle pain    Pain in left foot 09/12/2017    Left foot pain    Pain in right shoulder 04/11/2016    Right shoulder pain    Pain in thoracic spine 09/12/2018    Thoracic back pain    Pain, unspecified 02/09/2017    Body aches    Personal history of colonic polyps 01/04/2017    History of colonic polyps    Personal history of other diseases of male genital organs 03/15/2019    History of acute prostatitis    Personal history of other diseases of the respiratory system 04/07/2020    History of acute sinusitis    Personal history of other specified conditions 03/14/2019    History of flank pain    Personal history of other specified conditions 10/10/2014    History of paresthesia    Strain of muscle, fascia and tendon of other parts of biceps, right arm, initial encounter 03/11/2016    Rupture of biceps tendon, right, initial encounter      Past Surgical History:   Procedure Laterality Date    SHOULDER SURGERY  06/27/2017    Shoulder Surgery     Social History     Tobacco Use    Smoking status: Every Day     Types: Cigarettes     Passive exposure:  "Current    Smokeless tobacco: Never   Vaping Use    Vaping status: Never Used   Substance Use Topics    Alcohol use: Yes    Drug use: Never      Current Outpatient Medications   Medication Instructions    amoxicillin-pot clavulanate (Augmentin) 875-125 mg tablet 875 mg, oral, 2 times daily    cyclobenzaprine (FLEXERIL) 10 mg, oral, 3 times daily PRN    gabapentin (NEURONTIN) 100 mg, oral, 2 times daily    lisinopril 10 mg, oral, Daily      No Known Allergies     Chemistry    Lab Results   Component Value Date/Time     06/07/2024 0548    K 3.6 06/07/2024 0548    CL 97 (L) 06/07/2024 0548    CO2 29 06/07/2024 0548    BUN 8 06/07/2024 0548    CREATININE 0.57 06/07/2024 0548    Lab Results   Component Value Date/Time    CALCIUM 8.8 06/07/2024 0548    ALKPHOS 112 06/05/2024 1631    AST 19 06/05/2024 1631    ALT 25 06/05/2024 1631    BILITOT 0.4 06/05/2024 1631          Lab Results   Component Value Date/Time    WBC 13.6 (H) 06/07/2024 0548    HGB 12.9 (L) 06/07/2024 0548    HCT 39.0 (L) 06/07/2024 0548     06/07/2024 0548     Lab Results   Component Value Date/Time    PROTIME 11.6 06/05/2024 2339    INR 1.0 06/05/2024 2339     No results found for this or any previous visit (from the past 4464 hour(s)).  No results found for this or any previous visit from the past 1095 days.       Visit Vitals  /79   Pulse 93   Temp 36 °C (96.8 °F) (Temporal)   Resp 16   Ht 1.727 m (5' 8\")   Wt 88.5 kg (195 lb)   SpO2 93%   BMI 29.65 kg/m²   Smoking Status Every Day   BSA 2.06 m²        Anesthesia Evaluation      Airway   Mallampati: II  TM distance: >3 FB  Neck ROM: full  Dental      Pulmonary     breath sounds clear to auscultation  Cardiovascular     Rhythm: regular  Rate: normal    Neuro/Psych      GI/Hepatic/Renal      Endo/Other    Abdominal   (+) obese    Abdomen: tender.                      Physical Exam    Airway  Mallampati: II  TM distance: >3 FB  Neck ROM: full     Cardiovascular   Rhythm: regular  Rate: " normal     Dental    Pulmonary   Breath sounds clear to auscultation     Abdominal   (+) obese  Abdomen: tender              Anesthesia Plan    History of general anesthesia?: yes  History of complications of general anesthesia?: no    ASA 3 - emergent     general     The patient is a current smoker.  Patient did not smoke on day of procedure.    intravenous induction   Postoperative administration of opioids is intended.  Trial extubation is planned.  Anesthetic plan and risks discussed with patient.  Use of blood products discussed with patient who consented to blood products.    Plan discussed with attending and CRNA.       Plan general endotracheal anesthesia with peripheral IV placement and ASA standard noninvasive monitors.  The possibility of blood product transfusion was also described in detail.  Risks, benefits, alternatives of this plan were described in detail to the patient, who indicated understanding and agreed to proceed.    Bijan Caldwell MD

## 2024-06-07 NOTE — OP NOTE
[facility]  ACUTE CARE SURGERY - OPERATIVE REPORT    Patient Name: Bereket Em  MRN: [id number]  : 1965  AGE: 58 y.o.   GENDER: male      ==============================================================================  OR Dictation    DATE OF SURGERY: 2024, 5:02 PM    Preoperative diagnosis: Perforated diverticulitis    Postoperative diagnosis: Perforated sigmoid diverticulitis    Procedure: Exploratory laparotomy, sigmoidectomy, end colostomy    Surgeon: Jose Guadalupe Gomes MD    Assistant: Amanda Zamora    Anesthesia: General    Estimated Blood Loss: 50mL    Blood Replacement: 0           Urine output: Please see anesthesia record           Specimens: #1 sigmoid colon    Wound Class: 4    Brief History: 58-year-old male presents with perforated diverticulitis.  The patient has a 5 x 5 cm mesenteric abscess with an air-fluid level.  The patient has been consented for sigmoid resection and colostomy.    Procedure Details:   The patient was brought to the operating room and placed supine. After induction of general anesthesia, the patient was prepped and draped in the usual fashion.  Timeout was performed to confirm correct surgical site and patient.      Midline laparotomy was performed anterior fascia was entered with electrocautery.  Finger sweep was performed and the incision was opened in its entirety.  Bookwalter retractor was set up.  Small intestine was run from the ligament of Treitz to the ileocecal valve.  There were 2 loops of small intestine which were densely adhered to the sigmoid colon had an area of perforation.  After freeing these loops we developed feculent peritonitis of the peritoneal cavity in the pelvis.  This was cleared mechanically and with irrigation.  The cecum, appendix, ascending, transverse colon, descending colon were grossly normal.  Sigmoid colon had diffuse diverticular disease with a segment of diverticulitis with perforation and stool  contamination.    We mobilized the descending colon off of the retroperitoneum.  We mobilized the sigmoid colon from the pelvic sidewall.  We dissected the sigmoid and rectum from the pelvis.  We identified the left ureter and the retroperitoneum.  We mobilized the splenic flexure and transverse colon.  We identified a site of transection of the descending colon where diverticular disease ended.  We transected the descending colon with a single 75 PEDRO blue load.  Mesocolon was transected with LigaSure device.  We skeletonized the rectal mesentery and transected the rectum with a single 60 mm TA blue load.  Specimen was passed off the field as sigmoid colon.  Hemostasis was achieved.  The staple line was tagged with 2-0 Prolene sutures at the corners of the staple line.  The abdomen was copiously irrigated.  Hemostasis was achieved.    Due to feculent peritonitis, alcohol use, concern for cirrhosis we elected to perform an end colostomy.  We further mobilized the descending colon splenic flexure transverse colon to facilitate an end colostomy in the left upper quadrant.  We identified a site to matured our ostomy grasping the skin and dermis with an Allis and transecting a 1 inch [place].  Subcutaneous fat was cored to the anterior abdominal fascia.  Fascia was incised in a cruciate fashion.  Rectus was split along its fibers.  Posterior fascia was incised with a cruciate incision.  This was dilated to 4 fingers.  The end colostomy was delivered at this location with little tension on the mesentery.    We closed the abdomen with #1 looped PDS x 2 skin was left intentionally open and packed with Kerlix due to the contaminated nature of the case.  The ostomy was matured with 3-0 Vicryl sutures in a Milagros fashion.    Condition: stable    Attending Attestation: I was present and scrubbed for the entire procedure.    Jose Guadalupe Gomes MD

## 2024-06-07 NOTE — ANESTHESIA PROCEDURE NOTES
Peripheral IV  Date/Time: 6/7/2024 7:45 AM  Inserted by: MINERVA Medellin-CRNA    Placement  Needle size: 18 G  Laterality: left  Location: hand  Local anesthetic: none  Site prep: alcohol  Technique: anatomical landmarks  Attempts: 1

## 2024-06-07 NOTE — PROGRESS NOTES
[facility]  ACUTE CARE SURGERY - PROGRESS NOTE    Patient Name: Bereket Em  MRN: [id number]  Admit Date: 605  : 1965  AGE: 58 y.o.   GENDER: male  ==============================================================================  TODAY'S ASSESSMENT AND PLAN OF CARE:  This is a 59 y/o M with hx of diverticulosis who was transferred from [facility] for management of a high grade SBO with an associated perforation. The patient presented to his local ED today with LLQ abdominal pain that started 4 days prior. He was evaluated by his PCP 3 days ago and started on Augmentin for presumed diverticulitis, but states that pain has worsened since.  Upon presentation here to the ED, he was found to have contained perforation of the sigmoid from diverticulitis.  He has a abscess that is currently walled off by small bowel adjacent to the sigmoid colon.  He is nontoxic and clinically has a benign abdomen, however appropriately tender as expected.  We discussed with him that he would require surgical operation with removal of his colon and likely an ostomy, whether that be diverting loop ileostomy after anastomosis, or simply giving him a Soliman's procedure with end colostomy.  He is understanding of the need for operation and is agreeable.  We are to tentatively plan for operation tomorrow, to give him a day with further NG decompression to improve his distention, as well as continue resuscitation and follow-up an echo to make sure he is well-suited for operation.    -N.p.o., NG tube to low intermittent suction  -Maintenance fluids at 75  -Posted for the OR tomorrow for open sigmoid resection  -Ostomy team for ostomy marking  -Continue IV antibiotics  -Regular floor  -Subcu heparin prophylaxis    ==============================================================================  CHIEF COMPLAINT / EVENTS LAST 24HRS / HPI:  No issues overnigh     MEDICAL HISTORY / ROS:   Admission history and ROS  "reviewed. Pertinent changes as follows:      PHYSICAL EXAM:  Heart Rate:  [72-91]   Temp:  [36.3 °C (97.3 °F)-36.7 °C (98 °F)]   Resp:  [16-22]   BP: (136-159)/(78-99)   Height:  [172.7 cm (5' 8\")]   Weight:  [88.5 kg (195 lb)]   SpO2:  [92 %-97 %]     Neurological: Awake, alert, conversive  CV: regular rate  Respiratory/Thorax: even, unlabored  Genitourinary: voiding  Gastrointestinal: soft, NT,  distended, NGT with bilious output   Skin: warm, dry  Musculoskeletal: TORRES  Eyes: non-icteric  Extremities: no edema   Psychological: appropriate mood/affect      IMAGING SUMMARY:  (summary of new imaging findings, not a copy of dictation)      LABS:  Results from last 7 days   Lab Units 06/06/24 0430 06/05/24 2339 06/05/24  1631   WBC AUTO x10*3/uL 15.7* 16.1* 19.0*   HEMOGLOBIN g/dL 13.0* 14.2 15.7   HEMATOCRIT % 37.8* 40.1* 46.9   PLATELETS AUTO x10*3/uL 341 319 418   NEUTROS PCT AUTO %  --   --  84.9   LYMPHS PCT AUTO %  --   --  6.2   MONOS PCT AUTO %  --   --  8.0   EOS PCT AUTO %  --   --  0.2     Results from last 7 days   Lab Units 06/05/24  2339   APTT seconds 25*   INR  1.0     Results from last 7 days   Lab Units 06/06/24 0430 06/05/24 2339 06/05/24  1631   SODIUM mmol/L 133* 132* 127*   POTASSIUM mmol/L 3.5 3.5 4.3   CHLORIDE mmol/L 96* 91* 85*   CO2 mmol/L 26 27 28   BUN mg/dL 12 13 17   CREATININE mg/dL 0.59 0.65 0.70   CALCIUM mg/dL 8.5* 9.3 9.8   PROTEIN TOTAL g/dL  --   --  8.1*   BILIRUBIN TOTAL mg/dL  --   --  0.4   ALK PHOS U/L  --   --  112   ALT U/L  --   --  25   AST U/L  --   --  19   GLUCOSE mg/dL 114* 103* 126*     Results from last 7 days   Lab Units 06/05/24  1631   BILIRUBIN TOTAL mg/dL 0.4           I have reviewed all medications, laboratory results, and imaging pertinent for today's encounter.    "

## 2024-06-07 NOTE — BRIEF OP NOTE
Date: 2024  OR Location: [facility] OR    Name: Bereket Em, : 1965, Age: 58 y.o., MRN: [id number], Sex: male    Diagnosis  Pre-op Diagnosis     * Diverticulitis of large intestine with abscess without bleeding [K57.20] Post-op Diagnosis     * Diverticulitis of large intestine with abscess without bleeding [K57.20]     Procedures  Exploratory laparotomy, sigmoid resection, creation of end colostomy      Surgeons      * Jose Guadalupe Gomes - Primary    Resident/Fellow/Other Assistant:  Surgeons and Role:     * Yon Zamora MD - Resident - Assisting  Rika Patel MD - Resident - Assisting    Procedure Summary  Anesthesia: General  ASA: III  Anesthesia Staff: Anesthesiologist: Mahnaz Ye MD; Bijan Caldwell MD  CRNA: MINERVA [place]-CRNA  Estimated Blood Loss: 50mL  Intra-op Medications:   Administrations occurring from 0815 to 1115 on 24:   Medication Name Total Dose   sodium chloride 0.9 % irrigation solution 3,000 mL   piperacillin-tazobactam-dextrose (Zosyn) IV 3.375 g 50 mL              Anesthesia Record               Intraprocedure I/O Totals          Intake    LR bolus 1400.00 mL    albumin human bottle 5% 250.00 mL    Total Intake 1650 mL       Output    Urine 175 mL    Est. Blood Loss 50 mL    NG/OG Tube Output 550 mL    Total Output 775 mL       Net    Net Volume 875 mL          Specimen:   ID Type Source Tests Collected by Time   1 : SIGMOID COLON Tissue COLON - SIGMOID RESECTION SURGICAL PATHOLOGY EXAM Jose Guadalupe Gomes MD 2024 1112        Staff:   Circulator: Kurt Kaye Person: Ashley Abbott Circulator: Jeremiah      Findings: feculent peritonitis; thickened, adherent sigmoid colon with area of small small bowel x2 with serosal erosion    Complications:  None; patient tolerated the procedure well.     Disposition: PACU - hemodynamically stable.  Condition: stable  Specimens Collected:   ID Type Source Tests Collected by Time   1 : SIGMOID COLON Tissue  COLON - SIGMOID RESECTION SURGICAL PATHOLOGY EXAM Jose Guadalupe Gomes MD 6/7/2024 1112       Rika Patel MD  [facility]  [id number]      Attending Attestation:

## 2024-06-08 LAB
ALBUMIN SERPL BCP-MCNC: 3.1 G/DL (ref 3.4–5)
ANION GAP SERPL CALC-SCNC: 15 MMOL/L (ref 10–20)
BUN SERPL-MCNC: 11 MG/DL (ref 6–23)
CALCIUM SERPL-MCNC: 8.5 MG/DL (ref 8.6–10.6)
CHLORIDE SERPL-SCNC: 100 MMOL/L (ref 98–107)
CO2 SERPL-SCNC: 29 MMOL/L (ref 21–32)
CREAT SERPL-MCNC: 0.76 MG/DL (ref 0.5–1.3)
EGFRCR SERPLBLD CKD-EPI 2021: >90 ML/MIN/1.73M*2
ERYTHROCYTE [DISTWIDTH] IN BLOOD BY AUTOMATED COUNT: 13.5 % (ref 11.5–14.5)
GLUCOSE SERPL-MCNC: 133 MG/DL (ref 74–99)
HCT VFR BLD AUTO: 40.1 % (ref 41–52)
HGB BLD-MCNC: 13.2 G/DL (ref 13.5–17.5)
MAGNESIUM SERPL-MCNC: 2.04 MG/DL (ref 1.6–2.4)
MCH RBC QN AUTO: 28.3 PG (ref 26–34)
MCHC RBC AUTO-ENTMCNC: 32.9 G/DL (ref 32–36)
MCV RBC AUTO: 86 FL (ref 80–100)
NRBC BLD-RTO: 0 /100 WBCS (ref 0–0)
PHOSPHATE SERPL-MCNC: 4 MG/DL (ref 2.5–4.9)
PLATELET # BLD AUTO: 427 X10*3/UL (ref 150–450)
POTASSIUM SERPL-SCNC: 3.7 MMOL/L (ref 3.5–5.3)
RBC # BLD AUTO: 4.67 X10*6/UL (ref 4.5–5.9)
SODIUM SERPL-SCNC: 140 MMOL/L (ref 136–145)
WBC # BLD AUTO: 14.1 X10*3/UL (ref 4.4–11.3)

## 2024-06-08 PROCEDURE — 85027 COMPLETE CBC AUTOMATED: CPT | Performed by: PHYSICIAN ASSISTANT

## 2024-06-08 PROCEDURE — 2500000004 HC RX 250 GENERAL PHARMACY W/ HCPCS (ALT 636 FOR OP/ED)

## 2024-06-08 PROCEDURE — 1100000001 HC PRIVATE ROOM DAILY

## 2024-06-08 PROCEDURE — 80069 RENAL FUNCTION PANEL: CPT | Performed by: PHYSICIAN ASSISTANT

## 2024-06-08 PROCEDURE — 36415 COLL VENOUS BLD VENIPUNCTURE: CPT | Performed by: PHYSICIAN ASSISTANT

## 2024-06-08 PROCEDURE — 83735 ASSAY OF MAGNESIUM: CPT | Performed by: PHYSICIAN ASSISTANT

## 2024-06-08 PROCEDURE — C9113 INJ PANTOPRAZOLE SODIUM, VIA: HCPCS

## 2024-06-08 RX ORDER — ACETAMINOPHEN 10 MG/ML
1000 INJECTION, SOLUTION INTRAVENOUS EVERY 6 HOURS SCHEDULED
Status: CANCELLED | OUTPATIENT
Start: 2024-06-08 | End: 2024-06-09

## 2024-06-08 RX ORDER — POTASSIUM CHLORIDE 14.9 MG/ML
20 INJECTION INTRAVENOUS ONCE
Status: COMPLETED | OUTPATIENT
Start: 2024-06-08 | End: 2024-06-08

## 2024-06-08 RX ORDER — ACETAMINOPHEN 10 MG/ML
1000 INJECTION, SOLUTION INTRAVENOUS EVERY 6 HOURS SCHEDULED
Status: DISPENSED | OUTPATIENT
Start: 2024-06-09 | End: 2024-06-09

## 2024-06-08 RX ADMIN — ENOXAPARIN SODIUM 40 MG: 100 INJECTION SUBCUTANEOUS at 21:30

## 2024-06-08 RX ADMIN — HYDROMORPHONE HYDROCHLORIDE: 10 INJECTION, SOLUTION INTRAMUSCULAR; INTRAVENOUS; SUBCUTANEOUS at 23:06

## 2024-06-08 RX ADMIN — ACETAMINOPHEN 1000 MG: 10 INJECTION INTRAVENOUS at 06:47

## 2024-06-08 RX ADMIN — PANTOPRAZOLE SODIUM 40 MG: 40 INJECTION, POWDER, FOR SOLUTION INTRAVENOUS at 08:24

## 2024-06-08 RX ADMIN — PANTOPRAZOLE SODIUM 40 MG: 40 INJECTION, POWDER, FOR SOLUTION INTRAVENOUS at 21:30

## 2024-06-08 RX ADMIN — ACETAMINOPHEN 1000 MG: 10 INJECTION INTRAVENOUS at 01:09

## 2024-06-08 RX ADMIN — PIPERACILLIN SODIUM AND TAZOBACTAM SODIUM 3.38 G: 3; .375 INJECTION, SOLUTION INTRAVENOUS at 21:32

## 2024-06-08 RX ADMIN — PIPERACILLIN SODIUM AND TAZOBACTAM SODIUM 3.38 G: 3; .375 INJECTION, SOLUTION INTRAVENOUS at 13:44

## 2024-06-08 RX ADMIN — POTASSIUM CHLORIDE 20 MEQ: 14.9 INJECTION, SOLUTION INTRAVENOUS at 12:43

## 2024-06-08 RX ADMIN — PIPERACILLIN SODIUM AND TAZOBACTAM SODIUM 3.38 G: 3; .375 INJECTION, SOLUTION INTRAVENOUS at 08:24

## 2024-06-08 RX ADMIN — ACETAMINOPHEN 1000 MG: 10 INJECTION INTRAVENOUS at 12:42

## 2024-06-08 ASSESSMENT — PAIN - FUNCTIONAL ASSESSMENT
PAIN_FUNCTIONAL_ASSESSMENT: 0-10

## 2024-06-08 ASSESSMENT — PAIN SCALES - GENERAL
PAINLEVEL_OUTOF10: 6
PAINLEVEL_OUTOF10: 5 - MODERATE PAIN
PAINLEVEL_OUTOF10: 4
PAINLEVEL_OUTOF10: 0 - NO PAIN
PAINLEVEL_OUTOF10: 5 - MODERATE PAIN
PAINLEVEL_OUTOF10: 5 - MODERATE PAIN

## 2024-06-08 ASSESSMENT — COGNITIVE AND FUNCTIONAL STATUS - GENERAL
MOBILITY SCORE: 24
DAILY ACTIVITIY SCORE: 24

## 2024-06-08 ASSESSMENT — PAIN DESCRIPTION - LOCATION: LOCATION: ABDOMEN

## 2024-06-08 NOTE — CARE PLAN
The patient's goals for the shift include      The clinical goals for the shift include PT TO REMAIN HDS

## 2024-06-08 NOTE — PROGRESS NOTES
[facility]  ACUTE CARE SURGERY - PROGRESS NOTE    Patient Name: Bereket Em  MRN: [id number]  Admit Date: 605  : 1965  AGE: 58 y.o.   GENDER: male  ==============================================================================  TODAY'S ASSESSMENT AND PLAN OF CARE:  59yo M who presented with perforated diverticulitis. 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 He underwent ex lap, sigmoidectomy, and end colostomy on . Recovering appropriately postoperatively.    Plan:    Neuro:  - PCA, scheduled tylenol for postop pain  - holding home gabapentin and flexeril while NPO    CV:  - monitor vitals  - holding home lisinopril    Pulm:  - encourage IS    GI:  - NPO  - maintain NGT to LIWS  - PPI    :  - maintain colvin today, patient requesting to keep until NGT removed  - mIVF    ID:  - continue zosyn, stop date     Ppx: SCDs, LVX, OOB    Dispo: continue care on RNF    Seen and discussed with Dr. Eliseo Chaparro MD  Acute Care Surgery [id number]    ==============================================================================  CHIEF COMPLAINT / EVENTS LAST 24HRS / HPI:  NAEON. Pain controlled with PCA. Not yet having function from stoma.    MEDICAL HISTORY / ROS:   Admission history and ROS reviewed. Pertinent changes as follows:  None    PHYSICAL EXAM:  Heart Rate:  []   Temp:  [35.6 °C (96.1 °F)-36.7 °C (98.1 °F)]   Resp:  [16-18]   BP: (114-145)/(63-83)   SpO2:  [92 %-99 %]     Physical Exam  Gen: resting comfortably, NAD  HEENT: normocephalic, NGT in nare with bilious output  CV: RR  Pulm: nonlabored on RA  Abd: soft, distended, appropriately tender, skin left open and midline packed with WTD kerlix, stoma pink and edematous with bowel sweat  : colvin draining clear yellow urine  MSK: TORRES  Skin: warm and dry    LABS:  Results for orders placed or performed during the hospital encounter of 24 (from the past 24  hour(s))   Magnesium   Result Value Ref Range    Magnesium 2.04 1.60 - 2.40 mg/dL   Renal Function Panel   Result Value Ref Range    Glucose 133 (H) 74 - 99 mg/dL    Sodium 140 136 - 145 mmol/L    Potassium 3.7 3.5 - 5.3 mmol/L    Chloride 100 98 - 107 mmol/L    Bicarbonate 29 21 - 32 mmol/L    Anion Gap 15 10 - 20 mmol/L    Urea Nitrogen 11 6 - 23 mg/dL    Creatinine 0.76 0.50 - 1.30 mg/dL    eGFR >90 >60 mL/min/1.73m*2    Calcium 8.5 (L) 8.6 - 10.6 mg/dL    Phosphorus 4.0 2.5 - 4.9 mg/dL    Albumin 3.1 (L) 3.4 - 5.0 g/dL   CBC   Result Value Ref Range    WBC 14.1 (H) 4.4 - 11.3 x10*3/uL    nRBC 0.0 0.0 - 0.0 /100 WBCs    RBC 4.67 4.50 - 5.90 x10*6/uL    Hemoglobin 13.2 (L) 13.5 - 17.5 g/dL    Hematocrit 40.1 (L) 41.0 - 52.0 %    MCV 86 80 - 100 fL    MCH 28.3 26.0 - 34.0 pg    MCHC 32.9 32.0 - 36.0 g/dL    RDW 13.5 11.5 - 14.5 %    Platelets 427 150 - 450 x10*3/uL         IMAGING SUMMARY:  No new imaging      I have reviewed all laboratory and imaging results ordered/pertinent for today's encounter.

## 2024-06-09 LAB
ALBUMIN SERPL BCP-MCNC: 3 G/DL (ref 3.4–5)
ANION GAP SERPL CALC-SCNC: 14 MMOL/L (ref 10–20)
BUN SERPL-MCNC: 9 MG/DL (ref 6–23)
CALCIUM SERPL-MCNC: 8.7 MG/DL (ref 8.6–10.6)
CHLORIDE SERPL-SCNC: 101 MMOL/L (ref 98–107)
CO2 SERPL-SCNC: 30 MMOL/L (ref 21–32)
CREAT SERPL-MCNC: 0.63 MG/DL (ref 0.5–1.3)
EGFRCR SERPLBLD CKD-EPI 2021: >90 ML/MIN/1.73M*2
ERYTHROCYTE [DISTWIDTH] IN BLOOD BY AUTOMATED COUNT: 13.6 % (ref 11.5–14.5)
GLUCOSE SERPL-MCNC: 94 MG/DL (ref 74–99)
HCT VFR BLD AUTO: 37 % (ref 41–52)
HGB BLD-MCNC: 11.6 G/DL (ref 13.5–17.5)
MAGNESIUM SERPL-MCNC: 2.02 MG/DL (ref 1.6–2.4)
MCH RBC QN AUTO: 27.6 PG (ref 26–34)
MCHC RBC AUTO-ENTMCNC: 31.4 G/DL (ref 32–36)
MCV RBC AUTO: 88 FL (ref 80–100)
NRBC BLD-RTO: 0 /100 WBCS (ref 0–0)
PHOSPHATE SERPL-MCNC: 2.5 MG/DL (ref 2.5–4.9)
PLATELET # BLD AUTO: 431 X10*3/UL (ref 150–450)
POTASSIUM SERPL-SCNC: 3.7 MMOL/L (ref 3.5–5.3)
RBC # BLD AUTO: 4.2 X10*6/UL (ref 4.5–5.9)
SODIUM SERPL-SCNC: 141 MMOL/L (ref 136–145)
WBC # BLD AUTO: 16.6 X10*3/UL (ref 4.4–11.3)

## 2024-06-09 PROCEDURE — 2500000004 HC RX 250 GENERAL PHARMACY W/ HCPCS (ALT 636 FOR OP/ED)

## 2024-06-09 PROCEDURE — 1100000001 HC PRIVATE ROOM DAILY

## 2024-06-09 PROCEDURE — C9113 INJ PANTOPRAZOLE SODIUM, VIA: HCPCS

## 2024-06-09 PROCEDURE — 2500000004 HC RX 250 GENERAL PHARMACY W/ HCPCS (ALT 636 FOR OP/ED): Performed by: STUDENT IN AN ORGANIZED HEALTH CARE EDUCATION/TRAINING PROGRAM

## 2024-06-09 PROCEDURE — 36415 COLL VENOUS BLD VENIPUNCTURE: CPT | Mod: PARLAB

## 2024-06-09 PROCEDURE — 80069 RENAL FUNCTION PANEL: CPT | Mod: PARLAB

## 2024-06-09 PROCEDURE — 83735 ASSAY OF MAGNESIUM: CPT | Mod: PARLAB

## 2024-06-09 PROCEDURE — 84100 ASSAY OF PHOSPHORUS: CPT | Mod: PARLAB

## 2024-06-09 PROCEDURE — 85027 COMPLETE CBC AUTOMATED: CPT | Mod: PARLAB

## 2024-06-09 RX ORDER — POTASSIUM CHLORIDE 14.9 MG/ML
20 INJECTION INTRAVENOUS ONCE
Status: COMPLETED | OUTPATIENT
Start: 2024-06-09 | End: 2024-06-09

## 2024-06-09 RX ADMIN — ENOXAPARIN SODIUM 40 MG: 100 INJECTION SUBCUTANEOUS at 20:33

## 2024-06-09 RX ADMIN — PANTOPRAZOLE SODIUM 40 MG: 40 INJECTION, POWDER, FOR SOLUTION INTRAVENOUS at 20:43

## 2024-06-09 RX ADMIN — PIPERACILLIN SODIUM AND TAZOBACTAM SODIUM 3.38 G: 3; .375 INJECTION, SOLUTION INTRAVENOUS at 03:30

## 2024-06-09 RX ADMIN — PIPERACILLIN SODIUM AND TAZOBACTAM SODIUM 3.38 G: 3; .375 INJECTION, SOLUTION INTRAVENOUS at 20:32

## 2024-06-09 RX ADMIN — ACETAMINOPHEN 1000 MG: 1000 INJECTION INTRAVENOUS at 06:37

## 2024-06-09 RX ADMIN — POTASSIUM CHLORIDE 20 MEQ: 14.9 INJECTION, SOLUTION INTRAVENOUS at 15:02

## 2024-06-09 RX ADMIN — PIPERACILLIN SODIUM AND TAZOBACTAM SODIUM 3.38 G: 3; .375 INJECTION, SOLUTION INTRAVENOUS at 17:27

## 2024-06-09 RX ADMIN — PANTOPRAZOLE SODIUM 40 MG: 40 INJECTION, POWDER, FOR SOLUTION INTRAVENOUS at 09:13

## 2024-06-09 RX ADMIN — ACETAMINOPHEN 1000 MG: 1000 INJECTION INTRAVENOUS at 00:07

## 2024-06-09 RX ADMIN — ACETAMINOPHEN 1000 MG: 1000 INJECTION INTRAVENOUS at 17:28

## 2024-06-09 RX ADMIN — PIPERACILLIN SODIUM AND TAZOBACTAM SODIUM 3.38 G: 3; .375 INJECTION, SOLUTION INTRAVENOUS at 09:13

## 2024-06-09 ASSESSMENT — PAIN DESCRIPTION - DESCRIPTORS: DESCRIPTORS: ACHING;DISCOMFORT

## 2024-06-09 ASSESSMENT — COGNITIVE AND FUNCTIONAL STATUS - GENERAL
DRESSING REGULAR UPPER BODY CLOTHING: A LITTLE
DRESSING REGULAR LOWER BODY CLOTHING: A LITTLE
HELP NEEDED FOR BATHING: A LITTLE
DAILY ACTIVITIY SCORE: 21
HELP NEEDED FOR BATHING: A LITTLE
DAILY ACTIVITIY SCORE: 21
MOBILITY SCORE: 24
DRESSING REGULAR UPPER BODY CLOTHING: A LITTLE
MOBILITY SCORE: 24
DRESSING REGULAR LOWER BODY CLOTHING: A LITTLE

## 2024-06-09 ASSESSMENT — PAIN - FUNCTIONAL ASSESSMENT
PAIN_FUNCTIONAL_ASSESSMENT: 0-10

## 2024-06-09 ASSESSMENT — PAIN DESCRIPTION - LOCATION
LOCATION: ABDOMEN

## 2024-06-09 ASSESSMENT — PAIN SCALES - GENERAL
PAINLEVEL_OUTOF10: 3
PAINLEVEL_OUTOF10: 4
PAINLEVEL_OUTOF10: 5 - MODERATE PAIN
PAINLEVEL_OUTOF10: 5 - MODERATE PAIN
PAINLEVEL_OUTOF10: 3
PAINLEVEL_OUTOF10: 4
PAINLEVEL_OUTOF10: 0 - NO PAIN

## 2024-06-09 NOTE — CONSULTS
Wound/Ostomy Care Consult     Visit Date: 6/9/2024      Patient Name: Bereket Em         MRN: [id number]           YOB: 1965     Reason for Consult: new end colostomy     Ostomy History: Pt underwent ex lap, sigmoidectomy, and end colostomy on 6/7 for perforated sigmoid diverticulitis    Assessment/Intervention: Pt seen resting in bed with brother, sister-in-law, and live-in girlfriend at bedside. NGT and PCA present. LUQ stoma appears pink, moist and budded through intact 2-piece Melonie appliance from OR. ~20 minutes spent with family reviewing surgery had, reason for stoma creation, stoma A&P, basics of pouching, showering and obtaining supplies. Family very supportive and eager to learn. Folder of new ostomy literature and bag of starter supplies provided. Appliance left in place with plans made for pouch change and lesson tomorrow.      Wound/Ostomy Team Plan: Plan to follow closely for new ostomy care and teaching until discharge. Next lesson scheduled for tomorrow (Monday, 6/10) @ 1100.      Dalia Leos RN, CWON  6/9/2024  5:13 PM

## 2024-06-09 NOTE — PROGRESS NOTES
[facility]  ACUTE CARE SURGERY - PROGRESS NOTE    Patient Name: Bereket Em  MRN: [id number]  Admit Date: 605  : 1965  AGE: 58 y.o.   GENDER: male  ==============================================================================  TODAY'S ASSESSMENT AND PLAN OF CARE:  57yo M who presented with perforated diverticulitis. 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 He underwent ex lap, sigmoidectomy, and end colostomy on . Recovering appropriately postoperatively. Contniues to have bilious output and no bowel function in colostomy.     Plan:    Neuro:  - PCA, scheduled tylenol for postop pain  - holding home gabapentin and flexeril while NPO    CV:  - monitor vitals  - holding home lisinopril    Pulm:  - encourage IS    GI:  - NPO  - maintain NGT to LIWS  - PPI    :  - Colvin removed   - mIVF    ID:  - continue zosyn, stop date     Ppx: SCDs, LVX, OOB    Dispo: continue care on RNF    Seen and discussed with Dr. Eliseo Zamora MD  Acute Care Surgery [id number]    ==============================================================================  CHIEF COMPLAINT / EVENTS LAST 24HRS / HPI:  NAEON. Pain controlled with PCA. Not yet having function from stoma.    MEDICAL HISTORY / ROS:   Admission history and ROS reviewed. Pertinent changes as follows:  None    PHYSICAL EXAM:  Heart Rate:  []   Temp:  [36 °C (96.8 °F)-37.2 °C (99 °F)]   Resp:  [16-18]   BP: (112-172)/(71-85)   SpO2:  [90 %-93 %]     Physical Exam  Gen: resting comfortably, NAD  HEENT: normocephalic, NGT in nare with bilious output  CV: RR  Pulm: nonlabored on RA  Abd: soft, distended, appropriately tender, skin left open and midline packed with WTD kerlix, stoma pink and edematous with bowel sweat  : colvin draining clear yellow urine  MSK: TORRES  Skin: warm and dry    LABS:  Results for orders placed or performed during the hospital encounter of 24 (from the past  24 hour(s))   Renal Function Panel   Result Value Ref Range    Glucose 94 74 - 99 mg/dL    Sodium 141 136 - 145 mmol/L    Potassium 3.7 3.5 - 5.3 mmol/L    Chloride 101 98 - 107 mmol/L    Bicarbonate 30 21 - 32 mmol/L    Anion Gap 14 10 - 20 mmol/L    Urea Nitrogen 9 6 - 23 mg/dL    Creatinine 0.63 0.50 - 1.30 mg/dL    eGFR >90 >60 mL/min/1.73m*2    Calcium 8.7 8.6 - 10.6 mg/dL    Phosphorus 2.5 2.5 - 4.9 mg/dL    Albumin 3.0 (L) 3.4 - 5.0 g/dL   CBC   Result Value Ref Range    WBC 16.6 (H) 4.4 - 11.3 x10*3/uL    nRBC 0.0 0.0 - 0.0 /100 WBCs    RBC 4.20 (L) 4.50 - 5.90 x10*6/uL    Hemoglobin 11.6 (L) 13.5 - 17.5 g/dL    Hematocrit 37.0 (L) 41.0 - 52.0 %    MCV 88 80 - 100 fL    MCH 27.6 26.0 - 34.0 pg    MCHC 31.4 (L) 32.0 - 36.0 g/dL    RDW 13.6 11.5 - 14.5 %    Platelets 431 150 - 450 x10*3/uL   Magnesium   Result Value Ref Range    Magnesium 2.02 1.60 - 2.40 mg/dL         IMAGING SUMMARY:  No new imaging      I have reviewed all laboratory and imaging results ordered/pertinent for today's encounter.

## 2024-06-09 NOTE — CARE PLAN
The patient's goals for the shift include      The clinical goals for the shift include pt pain will be well managed by PCA use

## 2024-06-10 ENCOUNTER — APPOINTMENT (OUTPATIENT)
Dept: PHYSICAL THERAPY | Facility: CLINIC | Age: 59
End: 2024-06-10

## 2024-06-10 LAB
ALBUMIN SERPL BCP-MCNC: 2.8 G/DL (ref 3.4–5)
ANION GAP SERPL CALC-SCNC: 18 MMOL/L (ref 10–20)
BUN SERPL-MCNC: 8 MG/DL (ref 6–23)
CALCIUM SERPL-MCNC: 8.5 MG/DL (ref 8.6–10.6)
CHLORIDE SERPL-SCNC: 99 MMOL/L (ref 98–107)
CO2 SERPL-SCNC: 26 MMOL/L (ref 21–32)
CREAT SERPL-MCNC: 0.56 MG/DL (ref 0.5–1.3)
EGFRCR SERPLBLD CKD-EPI 2021: >90 ML/MIN/1.73M*2
ERYTHROCYTE [DISTWIDTH] IN BLOOD BY AUTOMATED COUNT: 13.2 % (ref 11.5–14.5)
GLUCOSE SERPL-MCNC: 90 MG/DL (ref 74–99)
HCT VFR BLD AUTO: 34.2 % (ref 41–52)
HGB BLD-MCNC: 11 G/DL (ref 13.5–17.5)
MAGNESIUM SERPL-MCNC: 1.94 MG/DL (ref 1.6–2.4)
MCH RBC QN AUTO: 26.9 PG (ref 26–34)
MCHC RBC AUTO-ENTMCNC: 32.2 G/DL (ref 32–36)
MCV RBC AUTO: 84 FL (ref 80–100)
NRBC BLD-RTO: 0 /100 WBCS (ref 0–0)
PHOSPHATE SERPL-MCNC: 3.1 MG/DL (ref 2.5–4.9)
PLATELET # BLD AUTO: 488 X10*3/UL (ref 150–450)
POTASSIUM SERPL-SCNC: 3.6 MMOL/L (ref 3.5–5.3)
RBC # BLD AUTO: 4.09 X10*6/UL (ref 4.5–5.9)
SODIUM SERPL-SCNC: 139 MMOL/L (ref 136–145)
WBC # BLD AUTO: 16.2 X10*3/UL (ref 4.4–11.3)

## 2024-06-10 PROCEDURE — 36415 COLL VENOUS BLD VENIPUNCTURE: CPT

## 2024-06-10 PROCEDURE — 97165 OT EVAL LOW COMPLEX 30 MIN: CPT | Mod: GO

## 2024-06-10 PROCEDURE — 85027 COMPLETE CBC AUTOMATED: CPT

## 2024-06-10 PROCEDURE — 83735 ASSAY OF MAGNESIUM: CPT

## 2024-06-10 PROCEDURE — 2500000004 HC RX 250 GENERAL PHARMACY W/ HCPCS (ALT 636 FOR OP/ED)

## 2024-06-10 PROCEDURE — 1100000001 HC PRIVATE ROOM DAILY

## 2024-06-10 PROCEDURE — 84100 ASSAY OF PHOSPHORUS: CPT

## 2024-06-10 PROCEDURE — C9113 INJ PANTOPRAZOLE SODIUM, VIA: HCPCS

## 2024-06-10 PROCEDURE — 97161 PT EVAL LOW COMPLEX 20 MIN: CPT | Mod: GP

## 2024-06-10 RX ORDER — DIPHENHYDRAMINE HYDROCHLORIDE 50 MG/ML
10 INJECTION INTRAMUSCULAR; INTRAVENOUS ONCE
Status: COMPLETED | OUTPATIENT
Start: 2024-06-10 | End: 2024-06-10

## 2024-06-10 RX ORDER — POTASSIUM CHLORIDE 14.9 MG/ML
20 INJECTION INTRAVENOUS
Status: DISPENSED | OUTPATIENT
Start: 2024-06-10 | End: 2024-06-10

## 2024-06-10 RX ADMIN — PIPERACILLIN SODIUM AND TAZOBACTAM SODIUM 3.38 G: 3; .375 INJECTION, SOLUTION INTRAVENOUS at 09:19

## 2024-06-10 RX ADMIN — ENOXAPARIN SODIUM 40 MG: 100 INJECTION SUBCUTANEOUS at 21:48

## 2024-06-10 RX ADMIN — PANTOPRAZOLE SODIUM 40 MG: 40 INJECTION, POWDER, FOR SOLUTION INTRAVENOUS at 09:19

## 2024-06-10 RX ADMIN — DIPHENHYDRAMINE HYDROCHLORIDE 10 MG: 50 INJECTION, SOLUTION INTRAMUSCULAR; INTRAVENOUS at 22:13

## 2024-06-10 RX ADMIN — PIPERACILLIN SODIUM AND TAZOBACTAM SODIUM 3.38 G: 3; .375 INJECTION, SOLUTION INTRAVENOUS at 15:43

## 2024-06-10 RX ADMIN — PIPERACILLIN SODIUM AND TAZOBACTAM SODIUM 3.38 G: 3; .375 INJECTION, SOLUTION INTRAVENOUS at 02:31

## 2024-06-10 RX ADMIN — PIPERACILLIN SODIUM AND TAZOBACTAM SODIUM 3.38 G: 3; .375 INJECTION, SOLUTION INTRAVENOUS at 21:48

## 2024-06-10 RX ADMIN — PANTOPRAZOLE SODIUM 40 MG: 40 INJECTION, POWDER, FOR SOLUTION INTRAVENOUS at 21:49

## 2024-06-10 RX ADMIN — SODIUM CHLORIDE, POTASSIUM CHLORIDE, SODIUM LACTATE AND CALCIUM CHLORIDE 75 ML/HR: 600; 310; 30; 20 INJECTION, SOLUTION INTRAVENOUS at 21:48

## 2024-06-10 RX ADMIN — POTASSIUM CHLORIDE 20 MEQ: 14.9 INJECTION, SOLUTION INTRAVENOUS at 14:04

## 2024-06-10 ASSESSMENT — COGNITIVE AND FUNCTIONAL STATUS - GENERAL
HELP NEEDED FOR BATHING: A LITTLE
HELP NEEDED FOR BATHING: A LITTLE
EATING MEALS: A LITTLE
MOVING TO AND FROM BED TO CHAIR: A LITTLE
MOBILITY SCORE: 18
EATING MEALS: A LITTLE
PERSONAL GROOMING: A LITTLE
STANDING UP FROM CHAIR USING ARMS: A LITTLE
TOILETING: A LITTLE
DAILY ACTIVITIY SCORE: 20
DAILY ACTIVITIY SCORE: 18
CLIMB 3 TO 5 STEPS WITH RAILING: A LITTLE
TURNING FROM BACK TO SIDE WHILE IN FLAT BAD: A LITTLE
DRESSING REGULAR UPPER BODY CLOTHING: A LITTLE
DRESSING REGULAR LOWER BODY CLOTHING: A LITTLE
STANDING UP FROM CHAIR USING ARMS: A LITTLE
MOBILITY SCORE: 19
DRESSING REGULAR UPPER BODY CLOTHING: A LITTLE
MOVING FROM LYING ON BACK TO SITTING ON SIDE OF FLAT BED WITH BEDRAILS: A LITTLE
PERSONAL GROOMING: A LITTLE
PERSONAL GROOMING: A LITTLE
MOVING TO AND FROM BED TO CHAIR: A LITTLE
STANDING UP FROM CHAIR USING ARMS: A LITTLE
MOBILITY SCORE: 19
TOILETING: A LITTLE
MOVING TO AND FROM BED TO CHAIR: A LITTLE
WALKING IN HOSPITAL ROOM: A LITTLE
DRESSING REGULAR LOWER BODY CLOTHING: A LITTLE
DRESSING REGULAR UPPER BODY CLOTHING: A LITTLE
WALKING IN HOSPITAL ROOM: A LITTLE
CLIMB 3 TO 5 STEPS WITH RAILING: A LOT
CLIMB 3 TO 5 STEPS WITH RAILING: A LOT
WALKING IN HOSPITAL ROOM: A LITTLE
DAILY ACTIVITIY SCORE: 18
DRESSING REGULAR LOWER BODY CLOTHING: A LITTLE
HELP NEEDED FOR BATHING: A LITTLE

## 2024-06-10 ASSESSMENT — PAIN SCALES - PAIN ASSESSMENT IN ADVANCED DEMENTIA (PAINAD)
CONSOLABILITY: NO NEED TO CONSOLE
FACIALEXPRESSION: SMILING OR INEXPRESSIVE
BODYLANGUAGE: RELAXED
BREATHING: NORMAL
TOTALSCORE: 0
CONSOLABILITY: NO NEED TO CONSOLE
TOTALSCORE: 0
FACIALEXPRESSION: SMILING OR INEXPRESSIVE
BREATHING: NORMAL
BODYLANGUAGE: RELAXED

## 2024-06-10 ASSESSMENT — PAIN SCALES - WONG BAKER
WONGBAKER_NUMERICALRESPONSE: NO HURT

## 2024-06-10 ASSESSMENT — PAIN SCALES - GENERAL
PAINLEVEL_OUTOF10: 0 - NO PAIN
PAINLEVEL_OUTOF10: 5 - MODERATE PAIN
PAINLEVEL_OUTOF10: 4
PAINLEVEL_OUTOF10: 0 - NO PAIN

## 2024-06-10 ASSESSMENT — ACTIVITIES OF DAILY LIVING (ADL): ADL_ASSISTANCE: INDEPENDENT

## 2024-06-10 ASSESSMENT — PAIN INTENSITY VAS: VAS_PAIN_BASICVITALS_IP: 0

## 2024-06-10 ASSESSMENT — PAIN - FUNCTIONAL ASSESSMENT
PAIN_FUNCTIONAL_ASSESSMENT: 0-10

## 2024-06-10 NOTE — PROGRESS NOTES
Physical Therapy    Physical Therapy Evaluation    Patient Name: Bereket Em  MRN: [id number]  Today's Date: 6/10/2024   Time Calculation  Start Time: 1130  Stop Time: 1142  Time Calculation (min): 12 min    Assessment/Plan   PT Assessment  PT Assessment Results: Decreased strength, Decreased endurance, Decreased mobility, Impaired balance, Pain  Rehab Prognosis: Good  Evaluation/Treatment Tolerance: Patient tolerated treatment well  Strengths: Ability to acquire knowledge, Attitude of self, Capable of completing ADLs semi/independent, Housing layout, Premorbid level of function, Support of Caregivers, Support of pets  Assessment Comment: Pt demonstrates deficits in strength, balance, functional mobility and endurance secndary to bedrest and surgical procedure. Today's session performance indicates positive prognosis to return home. Pt also has strong support system if needed. Will benefit from continued therapy during and after hospital course.  End of Session Patient Position: Bed, 2 rail up  IP OR SWING BED PT PLAN  Inpatient or Swing Bed: Inpatient  PT Plan  Treatment/Interventions: Bed mobility, Transfer training, Gait training, Stair training, Balance training, Strengthening, Endurance training, Therapeutic exercise, Therapeutic activity  PT Plan: Skilled PT  PT Frequency: 3 times per week  PT Discharge Recommendations: Low intensity level of continued care  Equipment Recommended upon Discharge: Wheeled walker  PT Recommended Transfer Status: Stand by assist, Contact guard  PT - OK to Discharge: Yes      Subjective   General Visit Information:  General  Reason for Referral: 57yo M who presented with perforated diverticulitis. 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 He underwent ex lap, sigmoidectomy, and end colostomy on 6/7.  Past Medical History Relevant to Rehab: diverticulitis dianosed 6/3. No other known medical hx to report.  Family/Caregiver Present: Yes (wife + 3 other  adults present)  Patient Position Received: Bed, 2 rail up  General Comment: Pt presents with pleasant disposition and willingness to participate in PT. Reports getting out of bed once today to use restroom.  Home Living:  Home Living  Type of Home: House  Lives With: Spouse (wife + dog (piña retriever))  Home Adaptive Equipment: None  Home Layout: Multi-level (3 level. pt reports they only use 1st floor and it contains their bedroom and bathroom.)  Home Access: Stairs to enter with rails (2-3 steps to enter home in all 3 home entrance locations. 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.)  Precautions:  Precautions  Medical Precautions: Abdominal precautions, Fall precautions  Post-Surgical Precautions: Abdominal surgery precautions    Objective   Pain:  Pain Assessment  Pain Assessment: 0-10  Pain Score: 4  Pain Type: Surgical pain  Pain Location: Abdomen  Cognition:  Cognition  Orientation Level: Oriented X4    General Assessments:    Activity Tolerance  Endurance: Tolerates 10 - 20 min exercise with multiple rests    Strength  Strength Comments: WFL  Strength  Strength Comments: WFL    Coordination  Movements are Fluid and Coordinated: Yes    Postural Control  Postural Control: Impaired  Posture Comment: flexed posture    Static Sitting Balance  Static Sitting-Balance Support: Feet supported, Bilateral upper extremity supported  Static Sitting-Level of Assistance: Contact guard  Static Sitting-Comment/Number of Minutes: 2 minutes. Pt dizzy initially upon sitting EOB, but improved.    Static Standing Balance  Static Standing-Balance Support:  Bilateral upper extremity supported  Static Standing-Level of Assistance: Contact guard  Static Standing-Comment/Number of Minutes: <1 minute  Functional Assessments:    Bed Mobility  Bed Mobility: Yes  Bed Mobility 1  Bed Mobility 1: Log roll (to right)  Level of Assistance 1: Close supervision  Bed Mobility Comments 1: pt did very well adhering to abdominal precautions and log roll    Transfers  Transfer: Yes  Transfer 1  Transfer From 1: Sit to, Stand to  Transfer to 1: Stand, Sit  Transfer Device 1: Walker  Transfer Level of Assistance 1: Contact guard  Trials/Comments 1: x1    Ambulation/Gait Training  Ambulation/Gait Training Performed: Yes  Ambulation/Gait Training 1  Surface 1: Level tile  Device 1: Rolling walker  Assistance 1: Contact guard  Quality of Gait 1: Narrow base of support, Diminished heel strike, Decreased step length, Antalgic, Forward flexed posture  Comments/Distance (ft) 1: 40 ft out into hallway and back to bedside    Stairs  Stairs: No    Outcome Measures:  [facility] Basic Mobility  Turning from your back to your side while in a flat bed without using bedrails: None  Moving from lying on your back to sitting on the side of a flat bed without using bedrails: None  Moving to and from bed to chair (including a wheelchair): A little  Standing up from a chair using your arms (e.g. wheelchair or bedside chair): A little  To walk in hospital room: A little  Climbing 3-5 steps with railing: A lot  Basic Mobility - Total Score: 19    Encounter Problems       Encounter Problems (Active)       Mobility       STG - Patient will ambulate       Start:  06/10/24            STG - Patient will ascend and descend four to six stairs       Start:  06/10/24               PT Transfers       STG - Patient will transfer sit to and from stand       Start:  06/10/24               Pain - Adult              Education Documentation  Precautions, taught by MARIELA BellPT at 6/10/2024 12:10 PM.  Learner:  Patient  Readiness: Acceptance  Method: Explanation  Response: Verbalizes Understanding    Body Mechanics, taught by RAKEL Bell at 6/10/2024 12:10 PM.  Learner: Patient  Readiness: Acceptance  Method: Explanation  Response: Verbalizes Understanding    Mobility Training, taught by RAKEL Bell at 6/10/2024 12:10 PM.  Learner: Patient  Readiness: Acceptance  Method: Explanation  Response: Verbalizes Understanding    Education Comments  No comments found.

## 2024-06-10 NOTE — PROGRESS NOTES
Bereket Em is a 58 y.o. male on day 5 of admission presenting with Small bowel obstruction (Multi).      DC Plannin/10:   Went in and met with the pt, confirmed demographics.   Pt lives at home with significant other and dog. No DME.    [facility] Care choice for home going needs. [facility].   Pt needs: PT/OT/RN.   Pt will DC with Wound Vac.     :  Ordered Vac through [employer].     :   Pt not medically ready.   Trying PO diet.     7/3:   Pt not medically ready.     :  Pt not medically ready.     :   Pt not medically ready.     7/10:  [facility] can not accept pt, they are at capacity for TPN pts.   [facility] referrals sent for home care.   TPN: [facility]  HC: [facility]    :   Pt ready for DC.  [facility] confirmed they will be delivering TPN later today for tonight's dose.   [facility] was updated.     PCP: Candy Howell    ADOD:     This TCC will continue to follow for home going needs and safe DC plan.         RUSH ANDERSON

## 2024-06-10 NOTE — PROGRESS NOTES
[facility]  ACUTE CARE SURGERY - PROGRESS NOTE    Patient Name: Bereket Em  MRN: [id number]  Admit Date: 605  : 1965  AGE: 58 y.o.   GENDER: male  ==============================================================================  TODAY'S ASSESSMENT AND PLAN OF CARE:  57yo M who presented with perforated diverticulitis. 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 He underwent ex lap, sigmoidectomy, and end colostomy on . Recovering appropriately postoperatively. Contniues to have bilious output and no bowel function in colostomy.     Plan:  Neuro:  - PCA, scheduled tylenol for postop pain  - holding home gabapentin and flexeril while NPO    CV:  - monitor vitals  - holding home lisinopril    Pulm:  - encourage IS    GI:  - NPO  - maintain NGT to LIWS  - PPI    :  - Basilio removed, passed TOV  - mIVF    ID:  - continue zosyn, stop date     Ppx: SCDs, LVX, OOB    Dispo: continue care on RNF      Rika Patel MD  Acute Care Surgery [id number]    ==============================================================================  CHIEF COMPLAINT / EVENTS LAST 24HRS / HPI:  NAEON. Pain controlled with PCA. Not yet having function from stoma.     MEDICAL HISTORY / ROS:   Admission history and ROS reviewed. Pertinent changes as follows:  None    PHYSICAL EXAM:  Heart Rate:  [78-89]   Temp:  [36.1 °C (97 °F)-36.5 °C (97.7 °F)]   Resp:  [16-18]   BP: (118-146)/(64-82)   SpO2:  [93 %-95 %]     Physical Exam  Gen: resting comfortably, NAD  HEENT: normocephalic, NGT in nare with bilious output  CV: RR  Pulm: nonlabored on RA  Abd: soft, distended, appropriately tender, skin left open and midline packed with WTD kerlix, stoma pink and edematous with bowel sweat  : voiding  MSK: TORRES  Skin: warm and dry    LABS:  Results for orders placed or performed during the hospital encounter of 24 (from the past 24 hour(s))   Renal Function Panel   Result Value  Ref Range    Glucose 90 74 - 99 mg/dL    Sodium 139 136 - 145 mmol/L    Potassium 3.6 3.5 - 5.3 mmol/L    Chloride 99 98 - 107 mmol/L    Bicarbonate 26 21 - 32 mmol/L    Anion Gap 18 10 - 20 mmol/L    Urea Nitrogen 8 6 - 23 mg/dL    Creatinine 0.56 0.50 - 1.30 mg/dL    eGFR >90 >60 mL/min/1.73m*2    Calcium 8.5 (L) 8.6 - 10.6 mg/dL    Phosphorus 3.1 2.5 - 4.9 mg/dL    Albumin 2.8 (L) 3.4 - 5.0 g/dL   CBC   Result Value Ref Range    WBC 16.2 (H) 4.4 - 11.3 x10*3/uL    nRBC 0.0 0.0 - 0.0 /100 WBCs    RBC 4.09 (L) 4.50 - 5.90 x10*6/uL    Hemoglobin 11.0 (L) 13.5 - 17.5 g/dL    Hematocrit 34.2 (L) 41.0 - 52.0 %    MCV 84 80 - 100 fL    MCH 26.9 26.0 - 34.0 pg    MCHC 32.2 32.0 - 36.0 g/dL    RDW 13.2 11.5 - 14.5 %    Platelets 488 (H) 150 - 450 x10*3/uL   Magnesium   Result Value Ref Range    Magnesium 1.94 1.60 - 2.40 mg/dL         IMAGING SUMMARY:  No new imaging      I have reviewed all laboratory and imaging results ordered/pertinent for today's encounter.

## 2024-06-10 NOTE — PROGRESS NOTES
Occupational Therapy    Evaluation    Patient Name: Bereket Em  MRN: [id number]  Today's Date: 6/10/2024  Room: [facility]  Time Calculation  Start Time: 1223  Stop Time: 1236  Time Calculation (min): 13 min    Assessment  IP OT Assessment  OT Assessment: Pt presents with decreased balance and activity tolerance impairing occupational performance of ADLs and functional mobility  End of Session Communication: Bedside nurse  End of Session Patient Position: Up in chair, Alarm off, not on at start of session  Plan:  Treatment Interventions: ADL retraining, Functional transfer training  OT Frequency: 2 times per week  OT Discharge Recommendations: Low intensity level of continued care  Equipment Recommended upon Discharge: Wheeled walker (tub bench, grab bars by shower and toilet)  OT Recommended Transfer Status: Assistive equipment (Comment), Assist of 1  OT - OK to Discharge: Yes (indicates completed eval and discharge recommendation)    Subjective   Current Problem:  1. Small bowel obstruction (Multi)        2. Benign essential HTN  Transthoracic Echo (TTE) Limited    Transthoracic Echo (TTE) Limited      3. Diverticulitis of large intestine with abscess without bleeding  Case Request Operating Room: Resection Sigmoid Colon    Case Request Operating Room: Resection Sigmoid Colon    Surgical Pathology Exam    Surgical Pathology Exam        General:  Reason for Referral: 57yo M who presented with perforated diverticulitis. 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 He underwent ex lap, sigmoidectomy, and end colostomy on 6/7.  Past Medical History Relevant to Rehab: diverticulitis dianosed 6/3. No other known medical hx to report.  Prior to Session Communication: Bedside nurse  Patient Position Received: Bed, 2 rail up, Alarm off, not on at start of session  Family/Caregiver Present: Yes  Caregiver Feedback: spouse, sister, and brother present  General Comment: pleasant and cooperative,  agreeable to OT   Precautions:  Hearing/Visual Limitations: WFL  Medical Precautions: Fall precautions  Post-Surgical Precautions: Abdominal surgery precautions    Pain:  Pain Assessment  Pain Assessment: 0-10  Pain Score: 5 - Moderate pain  Pain Type: Surgical pain  Pain Location: Abdomen  Lines/Tubes/Drains:  NG/OG/Feeding Tube Right nostril (Active)   Number of days: 4       Colostomy LLQ (Active)   Number of days: 3         Objective   Cognition:  Overall Cognitive Status: Within Functional Limits  Orientation Level: Oriented X4  Safety/Judgement: Within Functional Limits           Home Living:  Type of Home: House  Lives With: Spouse  Home Adaptive Equipment: None  Home Layout: Multi-level, Able to live on main level with bedroom/bathroom, Full bath main level  Home Access: Stairs to enter with rails  Entrance Stairs-Rails: Right  Entrance Stairs-Number of Steps: 3  Bathroom Shower/Tub: Tub/shower unit  Bathroom Toilet: Standard  Bathroom Equipment: None  Home Living Comments: +dog   Prior Function:  Level of [place]: Independent with ADLs and functional transfers  ADL Assistance: Independent  Homemaking Assistance: Independent  Ambulatory Assistance: Independent  Hand Dominance: Right  IADL History:  Current License: Yes  Mode of Transportation: Car  Occupation: Full time employment  Type of Occupation: computers  ADL:  Eating Deficit:  (setup only anticipated)  Grooming Deficit:  (SBA standing anticipated 2/2 ROM/balance)  Bathing Deficit:  (CGA at least seated 2/2 clothing management and functional mobiltiy)  UE Dressing Deficit:  (Pt doffed/donned gown SBA  with setup, skilled assist with line management seated in chair)  LE Dressing Deficit:  (CGA at least seated 2/2 ROM/balance)  Toileting Deficit:  (CGA at least 2/2 clothing management and functional mobiltiy)  Activity Tolerance:  Endurance: Tolerates 30 min exercise with multiple rests    Bed Mobility/Transfers: Bed Mobility  Bed Mobility:  Yes  Bed Mobility 1  Bed Mobility 1: Supine to sitting, Log roll  Level of Assistance 1: Close supervision  Bed Mobility Comments 1: HOB slightly elevated    Functional Mobility  Functional Mobility Performed: Yes  Functional Mobility 1  Comments 1: Pt performed functional mobility from bed to chair SBA with no AE, no LOB     Transfers  Transfer: Yes  Transfer 1  Transfer From 1: Bed to  Transfer to 1: Stand  Technique 1: Sit to stand  Transfer Level of Assistance 1: Contact guard  Trials/Comments 1: 1x  Transfers 2  Transfer From 2: Stand to  Transfer to 2: Chair with arms  Technique 2: Stand to sit  Transfer Level of Assistance 2: Close supervision  Trials/Comments 2: 1x    Vision: Vision - Basic Assessment  Current Vision: No visual deficits    Sensation:  Light Touch: No apparent deficits    Hand Function:  Hand Function  Gross Grasp: Functional  Coordination: Functional  Extremities: RUE   RUE : Within Functional Limits, LUE   LUE: Within Functional Limits, RLE   RLE : Within Functional Limits, and LLE   LLE : Within Functional Limits    Outcome Measures: [facility] Daily Activity  Putting on and taking off regular lower body clothing: A little  Bathing (including washing, rinsing, drying): A little  Putting on and taking off regular upper body clothing: A little  Toileting, which includes using toilet, bedpan or urinal: A little  Taking care of personal grooming such as brushing teeth: A little  Eating Meals: A little  Daily Activity - Total Score: 18         ,     OT Adult Other Outcome Measures  4AT: 4AT-    Education Documentation  Precautions, taught by Lydia Santacruz OT at 6/10/2024  3:05 PM.  Learner: Patient  Readiness: Acceptance  Method: Explanation  Response: Verbalizes Understanding    ADL Training, taught by Lydia Santacruz OT at 6/10/2024  3:05 PM.  Learner: Patient  Readiness: Acceptance  Method: Explanation  Response: Verbalizes Understanding    Goals:     Encounter Problems       Encounter Problems  (Active)       ADLs       Patient with complete lower body dressing with modified independent level of assistance donning and doffing all LE clothes  with PRN adaptive equipment  (Progressing)       Start:  06/10/24    Expected End:  06/24/24            Patient will complete toileting including hygiene clothing management/hygiene with modified independent level of assistance and DME prn. (Progressing)       Start:  06/10/24    Expected End:  06/24/24               BALANCE       Pt will maintain dynamic standing balance >8 minutes during ADL task with modified independent level of assistance in order to demonstrate decreased risk of falling and improved postural control. (Progressing)       Start:  06/10/24    Expected End:  06/24/24               MOBILITY       Patient will perform Functional mobility mod  Household distances/Community Distances with modified independent level of assistance and least restrictive device in order to improve safety and functional mobility. (Progressing)       Start:  06/10/24    Expected End:  06/24/24               TRANSFERS       Patient will perform bed mobility independent level of assistance in order to improve safety and independence with mobility (Progressing)       Start:  06/10/24    Expected End:  06/24/24            Patient will complete functional transfers with least restrictive device with modified independent level of assistance. (Progressing)       Start:  06/10/24    Expected End:  06/24/24                 06/10/24 at 3:06 PM   Lydia Santacruz OT   Rehab Office: [phone]

## 2024-06-10 NOTE — PROGRESS NOTES
Pt seen this AM for colostomy appliance change and comprehensive lesson with family. Stoma pink and budded. Bowel sweat only in pouch. Note to follow.    Dalia Leos RN, CWON  Wound/Ostomy Nurse

## 2024-06-11 ENCOUNTER — APPOINTMENT (OUTPATIENT)
Dept: RADIOLOGY | Facility: HOSPITAL | Age: 59
DRG: 329 | End: 2024-06-11
Payer: COMMERCIAL

## 2024-06-11 ENCOUNTER — APPOINTMENT (OUTPATIENT)
Dept: RADIOLOGY | Facility: HOSPITAL | Age: 59
End: 2024-06-11
Payer: COMMERCIAL

## 2024-06-11 LAB
ALBUMIN SERPL BCP-MCNC: 3.1 G/DL (ref 3.4–5)
ANION GAP SERPL CALC-SCNC: 17 MMOL/L (ref 10–20)
BUN SERPL-MCNC: 9 MG/DL (ref 6–23)
CALCIUM SERPL-MCNC: 8.4 MG/DL (ref 8.6–10.6)
CHLORIDE SERPL-SCNC: 97 MMOL/L (ref 98–107)
CO2 SERPL-SCNC: 26 MMOL/L (ref 21–32)
CREAT SERPL-MCNC: 0.57 MG/DL (ref 0.5–1.3)
EGFRCR SERPLBLD CKD-EPI 2021: >90 ML/MIN/1.73M*2
ERYTHROCYTE [DISTWIDTH] IN BLOOD BY AUTOMATED COUNT: 13.7 % (ref 11.5–14.5)
GLUCOSE SERPL-MCNC: 94 MG/DL (ref 74–99)
HCT VFR BLD AUTO: 33.2 % (ref 41–52)
HGB BLD-MCNC: 10.9 G/DL (ref 13.5–17.5)
MAGNESIUM SERPL-MCNC: 1.92 MG/DL (ref 1.6–2.4)
MCH RBC QN AUTO: 27.5 PG (ref 26–34)
MCHC RBC AUTO-ENTMCNC: 32.8 G/DL (ref 32–36)
MCV RBC AUTO: 84 FL (ref 80–100)
NRBC BLD-RTO: 0 /100 WBCS (ref 0–0)
PHOSPHATE SERPL-MCNC: 3.3 MG/DL (ref 2.5–4.9)
PLATELET # BLD AUTO: 510 X10*3/UL (ref 150–450)
POTASSIUM SERPL-SCNC: 3.7 MMOL/L (ref 3.5–5.3)
RBC # BLD AUTO: 3.96 X10*6/UL (ref 4.5–5.9)
SODIUM SERPL-SCNC: 136 MMOL/L (ref 136–145)
WBC # BLD AUTO: 15.6 X10*3/UL (ref 4.4–11.3)

## 2024-06-11 PROCEDURE — 3E0436Z INTRODUCTION OF NUTRITIONAL SUBSTANCE INTO CENTRAL VEIN, PERCUTANEOUS APPROACH: ICD-10-PCS | Performed by: SURGERY

## 2024-06-11 PROCEDURE — C9113 INJ PANTOPRAZOLE SODIUM, VIA: HCPCS

## 2024-06-11 PROCEDURE — 2500000004 HC RX 250 GENERAL PHARMACY W/ HCPCS (ALT 636 FOR OP/ED)

## 2024-06-11 PROCEDURE — 2500000001 HC RX 250 WO HCPCS SELF ADMINISTERED DRUGS (ALT 637 FOR MEDICARE OP)

## 2024-06-11 PROCEDURE — 74018 RADEX ABDOMEN 1 VIEW: CPT

## 2024-06-11 PROCEDURE — 02HV33Z INSERTION OF INFUSION DEVICE INTO SUPERIOR VENA CAVA, PERCUTANEOUS APPROACH: ICD-10-PCS | Performed by: SURGERY

## 2024-06-11 PROCEDURE — 74018 RADEX ABDOMEN 1 VIEW: CPT | Performed by: RADIOLOGY

## 2024-06-11 PROCEDURE — 80069 RENAL FUNCTION PANEL: CPT

## 2024-06-11 PROCEDURE — 83735 ASSAY OF MAGNESIUM: CPT

## 2024-06-11 PROCEDURE — 36415 COLL VENOUS BLD VENIPUNCTURE: CPT

## 2024-06-11 PROCEDURE — 85027 COMPLETE CBC AUTOMATED: CPT

## 2024-06-11 PROCEDURE — 1100000001 HC PRIVATE ROOM DAILY

## 2024-06-11 RX ORDER — HYDROMORPHONE HYDROCHLORIDE 1 MG/ML
0.2 INJECTION, SOLUTION INTRAMUSCULAR; INTRAVENOUS; SUBCUTANEOUS EVERY 4 HOURS PRN
Status: DISCONTINUED | OUTPATIENT
Start: 2024-06-11 | End: 2024-06-12

## 2024-06-11 RX ORDER — POTASSIUM CHLORIDE 14.9 MG/ML
20 INJECTION INTRAVENOUS ONCE
Status: COMPLETED | OUTPATIENT
Start: 2024-06-11 | End: 2024-06-11

## 2024-06-11 RX ORDER — OXYCODONE HYDROCHLORIDE 5 MG/1
5 TABLET ORAL EVERY 4 HOURS PRN
Status: DISCONTINUED | OUTPATIENT
Start: 2024-06-11 | End: 2024-06-12

## 2024-06-11 RX ORDER — OXYCODONE HYDROCHLORIDE 5 MG/1
10 TABLET ORAL EVERY 4 HOURS PRN
Status: DISCONTINUED | OUTPATIENT
Start: 2024-06-11 | End: 2024-06-12

## 2024-06-11 RX ORDER — ACETAMINOPHEN 325 MG/1
650 TABLET ORAL EVERY 6 HOURS
Status: DISCONTINUED | OUTPATIENT
Start: 2024-06-11 | End: 2024-06-12

## 2024-06-11 RX ADMIN — ACETAMINOPHEN 650 MG: 325 TABLET ORAL at 09:23

## 2024-06-11 RX ADMIN — PIPERACILLIN SODIUM AND TAZOBACTAM SODIUM 3.38 G: 3; .375 INJECTION, SOLUTION INTRAVENOUS at 03:58

## 2024-06-11 RX ADMIN — OXYCODONE HYDROCHLORIDE 10 MG: 5 TABLET ORAL at 21:08

## 2024-06-11 RX ADMIN — PANTOPRAZOLE SODIUM 40 MG: 40 INJECTION, POWDER, FOR SOLUTION INTRAVENOUS at 21:09

## 2024-06-11 RX ADMIN — ENOXAPARIN SODIUM 40 MG: 100 INJECTION SUBCUTANEOUS at 21:09

## 2024-06-11 RX ADMIN — PANTOPRAZOLE SODIUM 40 MG: 40 INJECTION, POWDER, FOR SOLUTION INTRAVENOUS at 09:23

## 2024-06-11 RX ADMIN — ACETAMINOPHEN 650 MG: 325 TABLET ORAL at 13:33

## 2024-06-11 RX ADMIN — POTASSIUM CHLORIDE 20 MEQ: 14.9 INJECTION, SOLUTION INTRAVENOUS at 09:23

## 2024-06-11 RX ADMIN — ACETAMINOPHEN 650 MG: 325 TABLET ORAL at 21:09

## 2024-06-11 ASSESSMENT — PAIN - FUNCTIONAL ASSESSMENT
PAIN_FUNCTIONAL_ASSESSMENT: 0-10
PAIN_FUNCTIONAL_ASSESSMENT: 0-10

## 2024-06-11 ASSESSMENT — PAIN SCALES - GENERAL
PAINLEVEL_OUTOF10: 8
PAINLEVEL_OUTOF10: 4
PAINLEVEL_OUTOF10: 4

## 2024-06-11 ASSESSMENT — PAIN SCALES - PAIN ASSESSMENT IN ADVANCED DEMENTIA (PAINAD): TOTALSCORE: MEDICATION (SEE MAR)

## 2024-06-11 NOTE — CONSULTS
Wound Care Consult     Visit Date: 6/11/2024      Patient Name: Bereket Em         MRN: [id number]           YOB: 1965     Reason for Consult: VAC placement to midline ABD     Wound History: s/p ex lap and end colostomy creation for perforated sigmoid diverticulitis     Wound Assessment:  Wound 06/07/24 Incision Abdomen Medial;Upper (Active)   Wound Image   06/11/24 1033   Site Assessment Pink;Yellow;Granulation 06/11/24 1033   Usha-Wound Assessment Edema 06/10/24 2200   Wound Length (cm) 26 cm 06/11/24 1033   Wound Width (cm) 5 cm 06/11/24 1033   Wound Surface Area (cm^2) 130 cm^2 06/11/24 1033   Wound Depth (cm) 4 cm 06/11/24 1033   Wound Volume (cm^3) 520 cm^3 06/11/24 1033   State of Healing Early/partial granulation 06/11/24 1033   Margins Attached edges;Well-defined edges 06/11/24 1033   Closure None 06/11/24 1033   Drainage Description Serosanguineous 06/11/24 1033   Drainage Amount Small 06/11/24 1033   Dressing Vacuum dressing 06/11/24 1033   Dressing Changed New 06/11/24 1033   Dressing Status Clean;Dry 06/11/24 1033     Wound Team Summary Assessment: Pt resting in bed with no visitors present. MD intubated colostomy stoma. Midline wound clean, pink, granulating; intact fascia. Cleaned, prepped and filled with black granufoam. Dressing sealed with transparent film, and therapy resumed at -125mmHg. Pt tolerated well. New 57mm (red) 2-piece flat applianced placed to pink, edematous, intubated end colostomy stoma (putting out bowel sweat and mucous only).      Wound Team Plan: Wound Care to return on Friday, 6/14 for VAC dressing change. Will also continue to follow closely for new colostomy care and teaching.      Dalia Leos RN, CWON  6/11/2024  10:35 AM

## 2024-06-11 NOTE — PROGRESS NOTES
[facility]  ACUTE CARE SURGERY - PROGRESS NOTE    Patient Name: Bereket Em  MRN: [id number]  Admit Date: 605  : 1965  AGE: 58 y.o.   GENDER: male  ==============================================================================  TODAY'S ASSESSMENT AND PLAN OF CARE:  57yo M who presented with perforated diverticulitis. 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 He underwent ex lap, sigmoidectomy, and end colostomy on . Recovering appropriately postoperatively. Continues to have bilious output and no bowel function in colostomy.    Plan:  - dc PCA, switch to PO  - confirm NGT placement, KUB ordered  - half fluids  - wound vac application today  - wound team consult    Neuro:  - dc PCA, switch to stephan tylenol, PRN oxy 5/10, dilaudid for breakthrough  - holding home gabapentin and flexeril while NPO    CV:  - monitor vitals  - holding home lisinopril    Pulm:  - encourage IS    GI:  - NPO  - maintain NGT to LIWS  - PPI  - KUB this morning to confirm NGT placement    :  - Basilio removed, passed TOV  - mIVF reduce to 50    ID:  - dc zosyn today  - leukocytosis 15.5 from 16.2    Ppx: SCDs, LVX, OOB    Dispo: continue care on RNF. ADOD end of week    Discussed with Dr. Case.     Bridget Vitale MD  Acute Care Surgery [id number]    ==============================================================================  CHIEF COMPLAINT / EVENTS LAST 24HRS / HPI:  NAEON. Pain well controlled. NGT not in place, refastened. Abdominal wound well appearing    MEDICAL HISTORY / ROS:   Admission history and ROS reviewed. Pertinent changes as follows:  None    PHYSICAL EXAM:  Heart Rate:  [56-74]   Temp:  [36.1 °C (97 °F)-36.5 °C (97.7 °F)]   Resp:  [16-18]   BP: (128-158)/(73-90)   SpO2:  [92 %-97 %]     Physical Exam  Gen: resting comfortably, NAD  HEENT: normocephalic, NGT not appropriately fastened  CV: RR  Pulm: nonlabored on RA  Abd: soft, distended, appropriately  tender, skin left open and midline packed with WTD kerlix, stoma pink and edematous with bowel sweat  : voiding, no colvin  MSK: BRIAN  Skin: warm and dry    LABS:  Results for orders placed or performed during the hospital encounter of 06/05/24 (from the past 24 hour(s))   Renal Function Panel   Result Value Ref Range    Glucose 94 74 - 99 mg/dL    Sodium 136 136 - 145 mmol/L    Potassium 3.7 3.5 - 5.3 mmol/L    Chloride 97 (L) 98 - 107 mmol/L    Bicarbonate 26 21 - 32 mmol/L    Anion Gap 17 10 - 20 mmol/L    Urea Nitrogen 9 6 - 23 mg/dL    Creatinine 0.57 0.50 - 1.30 mg/dL    eGFR >90 >60 mL/min/1.73m*2    Calcium 8.4 (L) 8.6 - 10.6 mg/dL    Phosphorus 3.3 2.5 - 4.9 mg/dL    Albumin 3.1 (L) 3.4 - 5.0 g/dL   CBC   Result Value Ref Range    WBC 15.6 (H) 4.4 - 11.3 x10*3/uL    nRBC 0.0 0.0 - 0.0 /100 WBCs    RBC 3.96 (L) 4.50 - 5.90 x10*6/uL    Hemoglobin 10.9 (L) 13.5 - 17.5 g/dL    Hematocrit 33.2 (L) 41.0 - 52.0 %    MCV 84 80 - 100 fL    MCH 27.5 26.0 - 34.0 pg    MCHC 32.8 32.0 - 36.0 g/dL    RDW 13.7 11.5 - 14.5 %    Platelets 510 (H) 150 - 450 x10*3/uL   Magnesium   Result Value Ref Range    Magnesium 1.92 1.60 - 2.40 mg/dL         IMAGING SUMMARY:  No new imaging      I have reviewed all laboratory and imaging results ordered/pertinent for today's encounter.

## 2024-06-11 NOTE — CARE PLAN
The patient's goals for the shift include      The clinical goals for the shift include patient will sit in chair during shift

## 2024-06-11 NOTE — CARE PLAN
The patient's goals for the shift include      The clinical goals for the shift include patient will remain Hemodynamically stable    Over the shift, the patient did make progress toward the following goals.  Problem: Pain  Goal: Takes deep breaths with improved pain control throughout the shift  Outcome: Progressing  Goal: Turns in bed with improved pain control throughout the shift  Outcome: Progressing  Goal: Walks with improved pain control throughout the shift  Outcome: Progressing  Goal: Performs ADL's with improved pain control throughout shift  Outcome: Progressing  Goal: Participates in PT with improved pain control throughout the shift  Outcome: Progressing  Goal: Free from opioid side effects throughout the shift  Outcome: Progressing  Goal: Free from acute confusion related to pain meds throughout the shift  Outcome: Progressing     Problem: Safety - Adult  Goal: Free from fall injury  Outcome: Progressing

## 2024-06-12 VITALS
SYSTOLIC BLOOD PRESSURE: 149 MMHG | WEIGHT: 195 LBS | HEART RATE: 69 BPM | BODY MASS INDEX: 29.55 KG/M2 | HEIGHT: 68 IN | OXYGEN SATURATION: 96 % | RESPIRATION RATE: 18 BRPM | DIASTOLIC BLOOD PRESSURE: 79 MMHG | TEMPERATURE: 98.1 F

## 2024-06-12 LAB
ALBUMIN SERPL BCP-MCNC: 2.8 G/DL (ref 3.4–5)
ANION GAP SERPL CALC-SCNC: 20 MMOL/L (ref 10–20)
BUN SERPL-MCNC: 7 MG/DL (ref 6–23)
CALCIUM SERPL-MCNC: 8.3 MG/DL (ref 8.6–10.6)
CHLORIDE SERPL-SCNC: 99 MMOL/L (ref 98–107)
CO2 SERPL-SCNC: 25 MMOL/L (ref 21–32)
CREAT SERPL-MCNC: 0.46 MG/DL (ref 0.5–1.3)
EGFRCR SERPLBLD CKD-EPI 2021: >90 ML/MIN/1.73M*2
ERYTHROCYTE [DISTWIDTH] IN BLOOD BY AUTOMATED COUNT: 13.4 % (ref 11.5–14.5)
GLUCOSE SERPL-MCNC: 95 MG/DL (ref 74–99)
HCT VFR BLD AUTO: 35.7 % (ref 41–52)
HGB BLD-MCNC: 11.4 G/DL (ref 13.5–17.5)
MAGNESIUM SERPL-MCNC: 1.92 MG/DL (ref 1.6–2.4)
MCH RBC QN AUTO: 27.3 PG (ref 26–34)
MCHC RBC AUTO-ENTMCNC: 31.9 G/DL (ref 32–36)
MCV RBC AUTO: 86 FL (ref 80–100)
NRBC BLD-RTO: 0 /100 WBCS (ref 0–0)
PHOSPHATE SERPL-MCNC: 3.3 MG/DL (ref 2.5–4.9)
PLATELET # BLD AUTO: 529 X10*3/UL (ref 150–450)
POTASSIUM SERPL-SCNC: 4 MMOL/L (ref 3.5–5.3)
PREALB SERPL-MCNC: 13.1 MG/DL (ref 18–40)
RBC # BLD AUTO: 4.17 X10*6/UL (ref 4.5–5.9)
SODIUM SERPL-SCNC: 140 MMOL/L (ref 136–145)
WBC # BLD AUTO: 17 X10*3/UL (ref 4.4–11.3)

## 2024-06-12 PROCEDURE — 1100000001 HC PRIVATE ROOM DAILY

## 2024-06-12 PROCEDURE — 36415 COLL VENOUS BLD VENIPUNCTURE: CPT

## 2024-06-12 PROCEDURE — 84134 ASSAY OF PREALBUMIN: CPT

## 2024-06-12 PROCEDURE — 2500000004 HC RX 250 GENERAL PHARMACY W/ HCPCS (ALT 636 FOR OP/ED)

## 2024-06-12 PROCEDURE — 2500000001 HC RX 250 WO HCPCS SELF ADMINISTERED DRUGS (ALT 637 FOR MEDICARE OP)

## 2024-06-12 PROCEDURE — C9113 INJ PANTOPRAZOLE SODIUM, VIA: HCPCS

## 2024-06-12 PROCEDURE — 80069 RENAL FUNCTION PANEL: CPT

## 2024-06-12 PROCEDURE — 85027 COMPLETE CBC AUTOMATED: CPT

## 2024-06-12 PROCEDURE — 83735 ASSAY OF MAGNESIUM: CPT

## 2024-06-12 RX ORDER — ACETAMINOPHEN 500 MG
5 TABLET ORAL NIGHTLY PRN
Status: DISCONTINUED | OUTPATIENT
Start: 2024-06-12 | End: 2024-06-18

## 2024-06-12 RX ORDER — ACETAMINOPHEN 10 MG/ML
1000 INJECTION, SOLUTION INTRAVENOUS EVERY 6 HOURS SCHEDULED
Status: DISCONTINUED | OUTPATIENT
Start: 2024-06-12 | End: 2024-06-12

## 2024-06-12 RX ORDER — HYDROMORPHONE HYDROCHLORIDE 1 MG/ML
0.4 INJECTION, SOLUTION INTRAMUSCULAR; INTRAVENOUS; SUBCUTANEOUS
Status: DISCONTINUED | OUTPATIENT
Start: 2024-06-12 | End: 2024-06-18

## 2024-06-12 RX ORDER — HYDROMORPHONE HYDROCHLORIDE 1 MG/ML
0.2 INJECTION, SOLUTION INTRAMUSCULAR; INTRAVENOUS; SUBCUTANEOUS
Status: DISCONTINUED | OUTPATIENT
Start: 2024-06-12 | End: 2024-06-18

## 2024-06-12 RX ORDER — ACETAMINOPHEN 10 MG/ML
1000 INJECTION, SOLUTION INTRAVENOUS EVERY 6 HOURS SCHEDULED
Status: DISCONTINUED | OUTPATIENT
Start: 2024-06-12 | End: 2024-06-15

## 2024-06-12 RX ADMIN — PANTOPRAZOLE SODIUM 40 MG: 40 INJECTION, POWDER, FOR SOLUTION INTRAVENOUS at 20:02

## 2024-06-12 RX ADMIN — ENOXAPARIN SODIUM 40 MG: 100 INJECTION SUBCUTANEOUS at 20:02

## 2024-06-12 RX ADMIN — HYDROMORPHONE HYDROCHLORIDE 0.2 MG: 1 INJECTION, SOLUTION INTRAMUSCULAR; INTRAVENOUS; SUBCUTANEOUS at 23:27

## 2024-06-12 RX ADMIN — ACETAMINOPHEN 1000 MG: 10 INJECTION INTRAVENOUS at 20:02

## 2024-06-12 RX ADMIN — ACETAMINOPHEN 1000 MG: 10 INJECTION INTRAVENOUS at 15:52

## 2024-06-12 RX ADMIN — PANTOPRAZOLE SODIUM 40 MG: 40 INJECTION, POWDER, FOR SOLUTION INTRAVENOUS at 08:22

## 2024-06-12 RX ADMIN — ACETAMINOPHEN 650 MG: 325 TABLET ORAL at 08:22

## 2024-06-12 RX ADMIN — Medication 5 MG: at 00:48

## 2024-06-12 RX ADMIN — SODIUM CHLORIDE, POTASSIUM CHLORIDE, SODIUM LACTATE AND CALCIUM CHLORIDE 50 ML/HR: 600; 310; 30; 20 INJECTION, SOLUTION INTRAVENOUS at 02:58

## 2024-06-12 RX ADMIN — HYDROMORPHONE HYDROCHLORIDE 0.2 MG: 1 INJECTION, SOLUTION INTRAMUSCULAR; INTRAVENOUS; SUBCUTANEOUS at 12:45

## 2024-06-12 ASSESSMENT — PAIN DESCRIPTION - ORIENTATION
ORIENTATION: MID
ORIENTATION: MID

## 2024-06-12 ASSESSMENT — COGNITIVE AND FUNCTIONAL STATUS - GENERAL
DAILY ACTIVITIY SCORE: 18
STANDING UP FROM CHAIR USING ARMS: A LITTLE
CLIMB 3 TO 5 STEPS WITH RAILING: A LITTLE
HELP NEEDED FOR BATHING: A LITTLE
CLIMB 3 TO 5 STEPS WITH RAILING: A LOT
MOVING TO AND FROM BED TO CHAIR: A LITTLE
EATING MEALS: A LITTLE
PERSONAL GROOMING: A LITTLE
WALKING IN HOSPITAL ROOM: A LITTLE
DAILY ACTIVITIY SCORE: 24
TOILETING: A LITTLE
STANDING UP FROM CHAIR USING ARMS: A LITTLE
WALKING IN HOSPITAL ROOM: A LITTLE
MOBILITY SCORE: 19
MOBILITY SCORE: 23
HELP NEEDED FOR BATHING: A LITTLE
MOVING TO AND FROM BED TO CHAIR: A LITTLE
CLIMB 3 TO 5 STEPS WITH RAILING: A LITTLE
DRESSING REGULAR LOWER BODY CLOTHING: A LITTLE
MOBILITY SCORE: 20
DRESSING REGULAR UPPER BODY CLOTHING: A LITTLE
TOILETING: A LITTLE
CLIMB 3 TO 5 STEPS WITH RAILING: A LITTLE
DAILY ACTIVITIY SCORE: 18
DRESSING REGULAR UPPER BODY CLOTHING: A LITTLE
PERSONAL GROOMING: A LITTLE
DRESSING REGULAR LOWER BODY CLOTHING: A LITTLE
EATING MEALS: A LITTLE

## 2024-06-12 ASSESSMENT — PAIN - FUNCTIONAL ASSESSMENT
PAIN_FUNCTIONAL_ASSESSMENT: 0-10

## 2024-06-12 ASSESSMENT — PAIN SCALES - PAIN ASSESSMENT IN ADVANCED DEMENTIA (PAINAD): TOTALSCORE: MEDICATION (SEE MAR)

## 2024-06-12 ASSESSMENT — PAIN SCALES - GENERAL
PAINLEVEL_OUTOF10: 7
PAINLEVEL_OUTOF10: 7
PAINLEVEL_OUTOF10: 6
PAINLEVEL_OUTOF10: 4
PAINLEVEL_OUTOF10: 4
PAINLEVEL_OUTOF10: 0 - NO PAIN

## 2024-06-12 ASSESSMENT — PAIN DESCRIPTION - LOCATION
LOCATION: ABDOMEN
LOCATION: ABDOMEN

## 2024-06-12 ASSESSMENT — PAIN DESCRIPTION - DESCRIPTORS: DESCRIPTORS: ACHING

## 2024-06-12 NOTE — CONSULTS
Nutrition Initial Assessment:   Nutrition Assessment    Reason for Assessment: TPN recommendations    Patient is a 58 y.o. male presenting with perforated diverticulitis.     6/7: ex lap, sigmoidectomy, and end colostomy    Small bowel obstruction: NGT in place ; plan for PICC and TPN today pt NPO x7 days    Past Medical History:   Diagnosis Date    Acute pharyngitis, unspecified     Sore throat    Acute upper respiratory infection, unspecified 02/13/2017    Acute URI    Alcohol abuse, in remission 09/12/2018    History of alcohol abuse    Elevated blood-pressure reading, without diagnosis of hypertension 02/23/2015    Elevated blood pressure reading without diagnosis of hypertension    Encounter for immunization 10/10/2014    Need for Tdap vaccination    Encounter for screening for malignant neoplasm of colon 01/04/2017    Encounter for colonoscopy due to history of adenomatous colonic polyps    Encounter for screening for malignant neoplasm of colon 11/02/2016    Encounter for screening colonoscopy    Encounter for screening for malignant neoplasm of colon 11/02/2016    Encounter for screening colonoscopy    Encounter for screening for malignant neoplasm of prostate 09/12/2018    Prostate cancer screening    Essential (primary) hypertension 03/14/2019    Elevated blood pressure reading with diagnosis of hypertension    Impingement syndrome of right shoulder 06/06/2016    Impingement syndrome of right shoulder    Incomplete rotator cuff tear or rupture of right shoulder, not specified as traumatic 09/12/2018    Partial nontraumatic tear of right rotator cuff    Noninfective gastroenteritis and colitis, unspecified 01/23/2018    Acute gastroenteritis    Other general symptoms and signs 11/02/2016    Flu-like symptoms    Other malaise 11/02/2016    Malaise and fatigue    Pain in left ankle and joints of left foot 10/10/2017    Left ankle pain    Pain in left foot 09/12/2017    Left foot pain    Pain in right shoulder  "04/11/2016    Right shoulder pain    Pain in thoracic spine 09/12/2018    Thoracic back pain    Pain, unspecified 02/09/2017    Body aches    Personal history of colonic polyps 01/04/2017    History of colonic polyps    Personal history of other diseases of male genital organs 03/15/2019    History of acute prostatitis    Personal history of other diseases of the respiratory system 04/07/2020    History of acute sinusitis    Personal history of other specified conditions 03/14/2019    History of flank pain    Personal history of other specified conditions 10/10/2014    History of paresthesia    Strain of muscle, fascia and tendon of other parts of biceps, right arm, initial encounter 03/11/2016    Rupture of biceps tendon, right, initial encounter     Past Surgical History:   Procedure Laterality Date    SHOULDER SURGERY  06/27/2017    Shoulder Surgery           Nutrition History:  Energy Intake: Poor < 50 % (x 7 days)  Food and Nutrient History: Met with patient this morning. Pt reports that PTA pt was eating well 3x a day (chicken, rice veggies). 2-3 days PTA pt was unable to eat. Pt reports that since admission has been NPO x 7 days. Discussed the pt with TPN. Pt denies questions  Vitamin/Herbal Supplement Use: Vitamin D, C, Zinc  Food Allergies/Intolerances:  None  GI Symptoms: None  Oral Problems:  Reports sore throat d/t NGT       Anthropometrics:  Height: 172.7 cm (5' 8\")   Weight: 88.5 kg (195 lb)   BMI (Calculated): 29.66  IBW/kg (Dietitian Calculated): 69.9 kg  Percent of IBW: 126 %       Weight History:   Wt Readings from Last 7 Encounters:   06/05/24 88.5 kg (195 lb)   06/03/24 89.4 kg (197 lb)   05/09/24 89.8 kg (198 lb)   04/30/24 88.5 kg (195 lb)   02/08/24 88.9 kg (196 lb)   01/03/24 89.4 kg (197 lb)     Weight Change %:  Weight History / % Weight Change: Pt reports he is unsure of wt loss but suspects wt loss since admission. Per chart review pt has been around 88-89kg for the past 6 " months  Significant Weight Loss: No    Nutrition Focused Physical Exam Findings:  defer: per pt request, pt wrapped in blanket and requested to wait till follow up      Nutrition Significant Labs:  TPN/PPN Labs:   Results from last 7 days   Lab Units 06/12/24  0556 06/11/24  0556 06/10/24  0537 06/09/24  0504 06/05/24  2339 06/05/24  1631   GLUCOSE mg/dL 95 94 90 94   < > 126*   POTASSIUM mmol/L 4.0 3.7 3.6 3.7   < > 4.3   PHOSPHORUS mg/dL 3.3 3.3 3.1 2.5   < >  --    MAGNESIUM mg/dL 1.92 1.92 1.94 2.02   < >  --    SODIUM mmol/L 140 136 139 141   < > 127*   CHLORIDE mmol/L 99 97* 99 101   < > 85*   ALT U/L  --   --   --   --   --  25   AST U/L  --   --   --   --   --  19   ALK PHOS U/L  --   --   --   --   --  112   BILIRUBIN TOTAL mg/dL  --   --   --   --   --  0.4    < > = values in this interval not displayed.        Nutrition Specific Medications:  Scheduled medications  acetaminophen, 1,000 mg, intravenous, q6h CIERRA  enoxaparin, 40 mg, subcutaneous, q24h  pantoprazole, 40 mg, intravenous, BID      I/O:    ; Stool Appearance: Unable to assess (06/12/24 0900)    Dietary Orders (From admission, onward)       Start     Ordered    06/07/24 1631  NPO Diet Except: Ice chips; Effective now  Diet effective now        Question:  Except:  Answer:  Ice chips    06/07/24 1630                     Estimated Needs:   Total Energy Estimated Needs (kCal):  (2548-7677)  Method for Estimating Needs: IBW x 32-35  Total Protein Estimated Needs (g):  (90+)  Method for Estimating Needs: IBW x 1.3+  Total Fluid Estimated Needs (mL):  (per team)           Nutrition Diagnosis   Malnutrition Diagnosis  Patient has Malnutrition Diagnosis: No    Nutrition Diagnosis  Patient has Nutrition Diagnosis: Yes  Diagnosis Status (1): New  Nutrition Diagnosis 1: Increased nutrient needs  Related to (1): increased metabolic demand  As Evidenced by (1): s/p ex lap, sigmoidectomy and end colostomy  Additional Nutrition Diagnosis: Diagnosis 2  Diagnosis  Status (2): New  Nutrition Diagnosis 2: Altered GI function  Related to (2): small bowel obstruction  As Evidenced by (2): NPO x 7 days need for TPN       Nutrition Interventions/Recommendations         Nutrition Prescription:    Please obtain updated weight    Prior to starting TPN, check baseline TG, RFP, & Mg daily - replete electrolytes as needed.    Recommend 100 mg IV/PO thiamin x 7-10 days d/t patient being at risk for refeeding syndrome.    When central access is obtained, recommend the following continuous TPN regimen:  Day 1: Initiate Standard TPN 5% Amino Acids, 15% Dextrose @ 40mL/hr  Day 2: If BS <180 mg/dL and no major shifts in lytes occur, increase to 65mL/hr  Day 3: If BS <180 mg/dL and no major shifts in lytes occur, increase to GOAL of 83mL/hr    Include MVI + trace elements    Provide 20% SMOF lipids @ 21 mL/hr x 12 hours overnight (250 mL total)         TPN monitoring:      - daily weights      - RFP + Mg daily; replete lytes PRN      - LFTs + TGs weekly      - accuchecks q6h    TPN + lipids to provide daily: 1992mL volume (just AA/Dex), 2242 mL volume (AA/Dex & lipids) 1916 kcals, 100 g protein, 299 g dextrose, 26% kcal from fat (meeting 87% kcal & 100% protein needs)        Nutrition Interventions:   Interventions: Parenteral nutrition/ IV fluids    Nutrition Education:   Discussed with pt about TPN d/t NPO x 7 days. Pt agreeable and denies questions.       Nutrition Monitoring and Evaluation   Food/Nutrient Related History Monitoring  Monitoring and Evaluation Plan: Energy intake, Enteral and parenteral nutrition intake  Energy Intake: Estimated energy intake  Criteria: >75% of estimated energy needs  Enteral and Parenteral Nutrition Intake: Parenteral nutrition intake  Criteria: initiate TPN    Body Composition/Growth/Weight History  Monitoring and Evaluation Plan: Weight    Biochemical Data, Medical Tests and Procedures  Monitoring and Evaluation Plan: Electrolyte/renal panel,  Glucose/endocrine profile  Electrolyte and Renal Panel: Sodium, Potassium, Phosphorus, Magnesium  Criteria: WNL  Glucose/Endocrine Profile: Glucose, casual  Criteria: WNL    Time Spent/Follow-up Reminder:   Time Spent (min): 60 minutes  Last Date of Nutrition Visit: 06/12/24  Nutrition Follow-Up Needed?: Dietitian to reassess per policy  Follow up Comment: TPN cont

## 2024-06-12 NOTE — CARE PLAN
The patient's goals for the shift include      The clinical goals for the shift include patient will ambulate in lugo with nurse Q4 today,

## 2024-06-12 NOTE — PROGRESS NOTES
[facility]  ACUTE CARE SURGERY - PROGRESS NOTE    Patient Name: Bereket Em  MRN: [id number]  Admit Date: 605  : 1965  AGE: 58 y.o.   GENDER: male  ==============================================================================  TODAY'S ASSESSMENT AND PLAN OF CARE:  59yo M who presented with perforated diverticulitis. 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 He underwent ex lap, sigmoidectomy, and end colostomy on .     Vac applied yesterday to MLI. Continues to have bilious output from NGT, though lighter in color today, and no bowel function in colostomy.    Plan:  Neuro:  - IV Tylenol, Dilaudid 0.2/0.4 mg q3h prn  - holding home gabapentin and flexeril while NPO    CV:  - monitor vitals  - holding home lisinopril    Pulm:  - encourage IS    GI:  - NPO  - maintain NGT to LIWS  - PPI    :  - Colvin removed, passed TOV  - LR 50 mL/hr    ID:  - Zosyn until     Ppx: SCDs, LVX, OOB    Dispo: continue care on RNF. ADOD early next week    Discussed with Dr. Case.     Rika Patel MD  Acute Care Surgery [id number]    ==============================================================================  CHIEF COMPLAINT / EVENTS LAST 24HRS / HPI:  NAEON. Pain well controlled. NGT output high but lighter bilious in color. No nausea or vomiting. Abdominal wound vac in place    MEDICAL HISTORY / ROS:   Admission history and ROS reviewed. Pertinent changes as follows:  None    PHYSICAL EXAM:  Heart Rate:  [54-69]   Temp:  [35.8 °C (96.4 °F)-36.7 °C (98.1 °F)]   Resp:  [18]   BP: (127-186)/(62-92)   SpO2:  [94 %-97 %]     Physical Exam  Gen: resting comfortably, NAD  HEENT: normocephalic, NGT not appropriately fastened  CV: RR  Pulm: nonlabored on RA  Abd: soft, distended, appropriately tender, skin left open and midline with wound vac in place stoma pink and edematous with bowel sweat  : voiding, no colvin  MSK: TORRES  Skin: warm and  dry    LABS:  Results for orders placed or performed during the hospital encounter of 06/05/24 (from the past 24 hour(s))   Renal Function Panel   Result Value Ref Range    Glucose 95 74 - 99 mg/dL    Sodium 140 136 - 145 mmol/L    Potassium 4.0 3.5 - 5.3 mmol/L    Chloride 99 98 - 107 mmol/L    Bicarbonate 25 21 - 32 mmol/L    Anion Gap 20 10 - 20 mmol/L    Urea Nitrogen 7 6 - 23 mg/dL    Creatinine 0.46 (L) 0.50 - 1.30 mg/dL    eGFR >90 >60 mL/min/1.73m*2    Calcium 8.3 (L) 8.6 - 10.6 mg/dL    Phosphorus 3.3 2.5 - 4.9 mg/dL    Albumin 2.8 (L) 3.4 - 5.0 g/dL   CBC   Result Value Ref Range    WBC 17.0 (H) 4.4 - 11.3 x10*3/uL    nRBC 0.0 0.0 - 0.0 /100 WBCs    RBC 4.17 (L) 4.50 - 5.90 x10*6/uL    Hemoglobin 11.4 (L) 13.5 - 17.5 g/dL    Hematocrit 35.7 (L) 41.0 - 52.0 %    MCV 86 80 - 100 fL    MCH 27.3 26.0 - 34.0 pg    MCHC 31.9 (L) 32.0 - 36.0 g/dL    RDW 13.4 11.5 - 14.5 %    Platelets 529 (H) 150 - 450 x10*3/uL   Magnesium   Result Value Ref Range    Magnesium 1.92 1.60 - 2.40 mg/dL   Prealbumin   Result Value Ref Range    Prealbumin 13.1 (L) 18.0 - 40.0 mg/dL         IMAGING SUMMARY:  No new imaging      I have reviewed all laboratory and imaging results ordered/pertinent for today's encounter.

## 2024-06-13 ENCOUNTER — APPOINTMENT (OUTPATIENT)
Dept: RADIOLOGY | Facility: HOSPITAL | Age: 59
DRG: 329 | End: 2024-06-13
Payer: COMMERCIAL

## 2024-06-13 LAB
ALBUMIN SERPL BCP-MCNC: 2.9 G/DL (ref 3.4–5)
ANION GAP SERPL CALC-SCNC: 17 MMOL/L (ref 10–20)
BUN SERPL-MCNC: 7 MG/DL (ref 6–23)
CALCIUM SERPL-MCNC: 8.5 MG/DL (ref 8.6–10.6)
CHLORIDE SERPL-SCNC: 98 MMOL/L (ref 98–107)
CO2 SERPL-SCNC: 28 MMOL/L (ref 21–32)
CREAT SERPL-MCNC: 0.55 MG/DL (ref 0.5–1.3)
EGFRCR SERPLBLD CKD-EPI 2021: >90 ML/MIN/1.73M*2
ERYTHROCYTE [DISTWIDTH] IN BLOOD BY AUTOMATED COUNT: 13.4 % (ref 11.5–14.5)
GLUCOSE SERPL-MCNC: 76 MG/DL (ref 74–99)
HCT VFR BLD AUTO: 35 % (ref 41–52)
HGB BLD-MCNC: 11.4 G/DL (ref 13.5–17.5)
MAGNESIUM SERPL-MCNC: 1.88 MG/DL (ref 1.6–2.4)
MCH RBC QN AUTO: 27.3 PG (ref 26–34)
MCHC RBC AUTO-ENTMCNC: 32.6 G/DL (ref 32–36)
MCV RBC AUTO: 84 FL (ref 80–100)
NRBC BLD-RTO: 0 /100 WBCS (ref 0–0)
PHOSPHATE SERPL-MCNC: 3.2 MG/DL (ref 2.5–4.9)
PLATELET # BLD AUTO: 490 X10*3/UL (ref 150–450)
POTASSIUM SERPL-SCNC: 3.3 MMOL/L (ref 3.5–5.3)
RBC # BLD AUTO: 4.18 X10*6/UL (ref 4.5–5.9)
SODIUM SERPL-SCNC: 140 MMOL/L (ref 136–145)
WBC # BLD AUTO: 14.5 X10*3/UL (ref 4.4–11.3)

## 2024-06-13 PROCEDURE — 1100000001 HC PRIVATE ROOM DAILY

## 2024-06-13 PROCEDURE — 87081 CULTURE SCREEN ONLY: CPT

## 2024-06-13 PROCEDURE — 83735 ASSAY OF MAGNESIUM: CPT

## 2024-06-13 PROCEDURE — 85027 COMPLETE CBC AUTOMATED: CPT

## 2024-06-13 PROCEDURE — 2500000001 HC RX 250 WO HCPCS SELF ADMINISTERED DRUGS (ALT 637 FOR MEDICARE OP)

## 2024-06-13 PROCEDURE — 74177 CT ABD & PELVIS W/CONTRAST: CPT | Performed by: RADIOLOGY

## 2024-06-13 PROCEDURE — 36569 INSJ PICC 5 YR+ W/O IMAGING: CPT

## 2024-06-13 PROCEDURE — C1751 CATH, INF, PER/CENT/MIDLINE: HCPCS

## 2024-06-13 PROCEDURE — 2780000003 HC OR 278 NO HCPCS

## 2024-06-13 PROCEDURE — 2550000001 HC RX 255 CONTRASTS: Performed by: SURGERY

## 2024-06-13 PROCEDURE — 36415 COLL VENOUS BLD VENIPUNCTURE: CPT

## 2024-06-13 PROCEDURE — 2500000004 HC RX 250 GENERAL PHARMACY W/ HCPCS (ALT 636 FOR OP/ED)

## 2024-06-13 PROCEDURE — 74177 CT ABD & PELVIS W/CONTRAST: CPT

## 2024-06-13 PROCEDURE — 84100 ASSAY OF PHOSPHORUS: CPT

## 2024-06-13 PROCEDURE — C9113 INJ PANTOPRAZOLE SODIUM, VIA: HCPCS

## 2024-06-13 PROCEDURE — A9698 NON-RAD CONTRAST MATERIALNOC: HCPCS

## 2024-06-13 PROCEDURE — 2550000001 HC RX 255 CONTRASTS

## 2024-06-13 RX ORDER — POTASSIUM CHLORIDE 14.9 MG/ML
20 INJECTION INTRAVENOUS
Status: COMPLETED | OUTPATIENT
Start: 2024-06-13 | End: 2024-06-13

## 2024-06-13 RX ORDER — LIDOCAINE HYDROCHLORIDE 10 MG/ML
5 INJECTION INFILTRATION; PERINEURAL ONCE
Status: DISCONTINUED | OUTPATIENT
Start: 2024-06-13 | End: 2024-06-15

## 2024-06-13 RX ADMIN — ACETAMINOPHEN 1000 MG: 10 INJECTION INTRAVENOUS at 08:14

## 2024-06-13 RX ADMIN — POTASSIUM CHLORIDE 20 MEQ: 14.9 INJECTION, SOLUTION INTRAVENOUS at 11:12

## 2024-06-13 RX ADMIN — IOHEXOL 600 ML: 12 SOLUTION ORAL at 11:11

## 2024-06-13 RX ADMIN — ACETAMINOPHEN 1000 MG: 10 INJECTION INTRAVENOUS at 01:30

## 2024-06-13 RX ADMIN — Medication 5 MG: at 22:20

## 2024-06-13 RX ADMIN — ACETAMINOPHEN 1000 MG: 10 INJECTION INTRAVENOUS at 20:40

## 2024-06-13 RX ADMIN — POTASSIUM CHLORIDE 20 MEQ: 14.9 INJECTION, SOLUTION INTRAVENOUS at 09:40

## 2024-06-13 RX ADMIN — ACETAMINOPHEN 1000 MG: 10 INJECTION INTRAVENOUS at 15:01

## 2024-06-13 RX ADMIN — IOHEXOL 90 ML: 350 INJECTION, SOLUTION INTRAVENOUS at 12:19

## 2024-06-13 RX ADMIN — PANTOPRAZOLE SODIUM 40 MG: 40 INJECTION, POWDER, FOR SOLUTION INTRAVENOUS at 20:41

## 2024-06-13 RX ADMIN — ENOXAPARIN SODIUM 40 MG: 100 INJECTION SUBCUTANEOUS at 20:41

## 2024-06-13 RX ADMIN — PANTOPRAZOLE SODIUM 40 MG: 40 INJECTION, POWDER, FOR SOLUTION INTRAVENOUS at 08:14

## 2024-06-13 ASSESSMENT — COGNITIVE AND FUNCTIONAL STATUS - GENERAL
CLIMB 3 TO 5 STEPS WITH RAILING: A LITTLE
STANDING UP FROM CHAIR USING ARMS: A LITTLE
DRESSING REGULAR LOWER BODY CLOTHING: A LITTLE
CLIMB 3 TO 5 STEPS WITH RAILING: A LITTLE
MOBILITY SCORE: 19
CLIMB 3 TO 5 STEPS WITH RAILING: A LITTLE
DAILY ACTIVITIY SCORE: 24
CLIMB 3 TO 5 STEPS WITH RAILING: A LITTLE
MOVING TO AND FROM BED TO CHAIR: A LITTLE
HELP NEEDED FOR BATHING: A LITTLE
CLIMB 3 TO 5 STEPS WITH RAILING: A LITTLE
CLIMB 3 TO 5 STEPS WITH RAILING: A LOT
CLIMB 3 TO 5 STEPS WITH RAILING: A LITTLE
MOBILITY SCORE: 24
DAILY ACTIVITIY SCORE: 22
WALKING IN HOSPITAL ROOM: A LITTLE
CLIMB 3 TO 5 STEPS WITH RAILING: A LITTLE

## 2024-06-13 ASSESSMENT — PAIN SCALES - GENERAL
PAINLEVEL_OUTOF10: 4
PAINLEVEL_OUTOF10: 2
PAINLEVEL_OUTOF10: 4
PAINLEVEL_OUTOF10: 4

## 2024-06-13 ASSESSMENT — PAIN - FUNCTIONAL ASSESSMENT
PAIN_FUNCTIONAL_ASSESSMENT: 0-10
PAIN_FUNCTIONAL_ASSESSMENT: 0-10

## 2024-06-13 ASSESSMENT — PAIN DESCRIPTION - DESCRIPTORS: DESCRIPTORS: ACHING

## 2024-06-13 ASSESSMENT — PAIN DESCRIPTION - LOCATION: LOCATION: ABDOMEN

## 2024-06-13 NOTE — PROGRESS NOTES
[facility]  ACUTE CARE SURGERY - PROGRESS NOTE    Patient Name: Bereket Em  MRN: [id number]  Admit Date: 605  : 1965  AGE: 58 y.o.   GENDER: male  ==============================================================================  TODAY'S ASSESSMENT AND PLAN OF CARE:  59yo M who presented with perforated diverticulitis. 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 He underwent ex lap, sigmoidectomy, and end colostomy on .     Vac applied  to MLI. NGT output more gastric this morning, reassuring quantity and quality. No bowel function yet in colostomy. WBC 14.5 from 17.    Plan:  - CT A/P with IV and PO contrast ordered  - PICC line  - TPN order  - potassium repletion    Neuro:  - IV Tylenol, Dilaudid 0.2/0.4 mg q3h prn  - holding home gabapentin and flexeril while NPO    CV:  - monitor vitals  - holding home lisinopril    Pulm:  - encourage IS    GI:  - NPO  - maintain NGT to LIWS  - PPI  - PICC line for TPN starting today, nutrition consulted. Appreciate their recs.   - prealbumin 13.1    :  - Basilio removed, passed TOV  - LR 50 mL/hr    ID:  - Zosyn until  (dc'd)  - WBC 14 from 17    Ppx: SCDs, LVX, OOB    Dispo: continue care on RNF. Not medically ready    Discussed with Dr. Case.     Bridget Vitale MD  Acute Care Surgery [id number]    ==============================================================================  CHIEF COMPLAINT / EVENTS LAST 24HRS / HPI:  NAEON. Pain well controlled. NGT output decreasing and lighter in color. No abdominal bloating or pain out of proportion. No nausea. Bowel sweat in ostomy bag.     MEDICAL HISTORY / ROS:   Admission history and ROS reviewed. Pertinent changes as follows:  None    PHYSICAL EXAM:  Heart Rate:  [56-72]   Temp:  [35.9 °C (96.6 °F)-36.2 °C (97.2 °F)]   Resp:  [18]   BP: (133-160)/(76-94)   SpO2:  [93 %-96 %]     Gen: resting comfortably, NAD  HEENT: normocephalic, NGT in place with  gastric thin output  CV: RR  Pulm: nonlabored on RA  Abd: soft, distended, appropriately tender, skin left open and midline with wound vac in place stoma pink with bowel sweat  : voiding, no colvin  MSK: TORRES  Skin: warm and dry    LABS:  Results for orders placed or performed during the hospital encounter of 06/05/24 (from the past 24 hour(s))   Renal Function Panel   Result Value Ref Range    Glucose 76 74 - 99 mg/dL    Sodium 140 136 - 145 mmol/L    Potassium 3.3 (L) 3.5 - 5.3 mmol/L    Chloride 98 98 - 107 mmol/L    Bicarbonate 28 21 - 32 mmol/L    Anion Gap 17 10 - 20 mmol/L    Urea Nitrogen 7 6 - 23 mg/dL    Creatinine 0.55 0.50 - 1.30 mg/dL    eGFR >90 >60 mL/min/1.73m*2    Calcium 8.5 (L) 8.6 - 10.6 mg/dL    Phosphorus 3.2 2.5 - 4.9 mg/dL    Albumin 2.9 (L) 3.4 - 5.0 g/dL   CBC   Result Value Ref Range    WBC 14.5 (H) 4.4 - 11.3 x10*3/uL    nRBC 0.0 0.0 - 0.0 /100 WBCs    RBC 4.18 (L) 4.50 - 5.90 x10*6/uL    Hemoglobin 11.4 (L) 13.5 - 17.5 g/dL    Hematocrit 35.0 (L) 41.0 - 52.0 %    MCV 84 80 - 100 fL    MCH 27.3 26.0 - 34.0 pg    MCHC 32.6 32.0 - 36.0 g/dL    RDW 13.4 11.5 - 14.5 %    Platelets 490 (H) 150 - 450 x10*3/uL   Magnesium   Result Value Ref Range    Magnesium 1.88 1.60 - 2.40 mg/dL       IMAGING SUMMARY:  No new imaging      I have reviewed all laboratory and imaging results ordered/pertinent for today's encounter.

## 2024-06-13 NOTE — CARE PLAN
Problem: Pain  Goal: Takes deep breaths with improved pain control throughout the shift  Outcome: Progressing  Goal: Turns in bed with improved pain control throughout the shift  Outcome: Progressing  Goal: Walks with improved pain control throughout the shift  Outcome: Progressing  Goal: Performs ADL's with improved pain control throughout shift  Outcome: Progressing  Goal: Participates in PT with improved pain control throughout the shift  Outcome: Progressing  Goal: Free from opioid side effects throughout the shift  Outcome: Progressing  Goal: Free from acute confusion related to pain meds throughout the shift  Outcome: Progressing     Problem: Pain - Adult  Goal: Verbalizes/displays adequate comfort level or baseline comfort level  Outcome: Progressing     Problem: Safety - Adult  Goal: Free from fall injury  Outcome: Progressing     Problem: Discharge Planning  Goal: Discharge to home or other facility with appropriate resources  Outcome: Progressing     Problem: Chronic Conditions and Co-morbidities  Goal: Patient's chronic conditions and co-morbidity symptoms are monitored and maintained or improved  Outcome: Progressing   The patient's goals for the shift include      The clinical goals for the shift include patient will ambulate in lugo with nurse Q4 today,

## 2024-06-13 NOTE — CARE PLAN
The patient's goals for the shift include  safety    The clinical goals for the shift include Patient will ambulate the halls

## 2024-06-13 NOTE — CARE PLAN
The patient's goals for the shift include  to ambulate    The clinical goals for the shift include safety. Pt will remain free of falls        Problem: Pain  Goal: Takes deep breaths with improved pain control throughout the shift  Outcome: Progressing  Goal: Turns in bed with improved pain control throughout the shift  Outcome: Progressing  Goal: Walks with improved pain control throughout the shift  Outcome: Progressing  Goal: Performs ADL's with improved pain control throughout shift  Outcome: Progressing  Goal: Participates in PT with improved pain control throughout the shift  Outcome: Progressing  Goal: Free from opioid side effects throughout the shift  Outcome: Progressing  Goal: Free from acute confusion related to pain meds throughout the shift  Outcome: Progressing     Problem: Pain - Adult  Goal: Verbalizes/displays adequate comfort level or baseline comfort level  Outcome: Progressing     Problem: Safety - Adult  Goal: Free from fall injury  Outcome: Progressing     Problem: Discharge Planning  Goal: Discharge to home or other facility with appropriate resources  Outcome: Progressing     Problem: Chronic Conditions and Co-morbidities  Goal: Patient's chronic conditions and co-morbidity symptoms are monitored and maintained or improved  Outcome: Progressing

## 2024-06-14 LAB
ALBUMIN SERPL BCP-MCNC: 2.9 G/DL (ref 3.4–5)
ANION GAP SERPL CALC-SCNC: 17 MMOL/L (ref 10–20)
BUN SERPL-MCNC: 5 MG/DL (ref 6–23)
CALCIUM SERPL-MCNC: 8.4 MG/DL (ref 8.6–10.6)
CHLORIDE SERPL-SCNC: 100 MMOL/L (ref 98–107)
CO2 SERPL-SCNC: 26 MMOL/L (ref 21–32)
CREAT SERPL-MCNC: 0.5 MG/DL (ref 0.5–1.3)
EGFRCR SERPLBLD CKD-EPI 2021: >90 ML/MIN/1.73M*2
ERYTHROCYTE [DISTWIDTH] IN BLOOD BY AUTOMATED COUNT: 13.4 % (ref 11.5–14.5)
GLUCOSE SERPL-MCNC: 73 MG/DL (ref 74–99)
HCT VFR BLD AUTO: 35.2 % (ref 41–52)
HGB BLD-MCNC: 11.3 G/DL (ref 13.5–17.5)
LABORATORY COMMENT REPORT: NORMAL
MAGNESIUM SERPL-MCNC: 1.92 MG/DL (ref 1.6–2.4)
MCH RBC QN AUTO: 27.4 PG (ref 26–34)
MCHC RBC AUTO-ENTMCNC: 32.1 G/DL (ref 32–36)
MCV RBC AUTO: 85 FL (ref 80–100)
NRBC BLD-RTO: 0 /100 WBCS (ref 0–0)
PATH REPORT.FINAL DX SPEC: NORMAL
PATH REPORT.GROSS SPEC: NORMAL
PATH REPORT.TOTAL CANCER: NORMAL
PHOSPHATE SERPL-MCNC: 3.4 MG/DL (ref 2.5–4.9)
PLATELET # BLD AUTO: 533 X10*3/UL (ref 150–450)
POTASSIUM SERPL-SCNC: 4 MMOL/L (ref 3.5–5.3)
RBC # BLD AUTO: 4.12 X10*6/UL (ref 4.5–5.9)
SODIUM SERPL-SCNC: 139 MMOL/L (ref 136–145)
STAPHYLOCOCCUS SPEC CULT: NORMAL
WBC # BLD AUTO: 18.4 X10*3/UL (ref 4.4–11.3)

## 2024-06-14 PROCEDURE — 2580000001 HC RX 258 IV SOLUTIONS

## 2024-06-14 PROCEDURE — 0JB80ZZ EXCISION OF ABDOMEN SUBCUTANEOUS TISSUE AND FASCIA, OPEN APPROACH: ICD-10-PCS | Performed by: SURGERY

## 2024-06-14 PROCEDURE — 83735 ASSAY OF MAGNESIUM: CPT

## 2024-06-14 PROCEDURE — 85027 COMPLETE CBC AUTOMATED: CPT

## 2024-06-14 PROCEDURE — 2500000004 HC RX 250 GENERAL PHARMACY W/ HCPCS (ALT 636 FOR OP/ED)

## 2024-06-14 PROCEDURE — C9113 INJ PANTOPRAZOLE SODIUM, VIA: HCPCS

## 2024-06-14 PROCEDURE — 1100000001 HC PRIVATE ROOM DAILY

## 2024-06-14 PROCEDURE — 2500000005 HC RX 250 GENERAL PHARMACY W/O HCPCS

## 2024-06-14 PROCEDURE — 80069 RENAL FUNCTION PANEL: CPT

## 2024-06-14 PROCEDURE — 36415 COLL VENOUS BLD VENIPUNCTURE: CPT

## 2024-06-14 PROCEDURE — 0WUF0JZ SUPPLEMENT ABDOMINAL WALL WITH SYNTHETIC SUBSTITUTE, OPEN APPROACH: ICD-10-PCS | Performed by: SURGERY

## 2024-06-14 RX ADMIN — ACETAMINOPHEN 1000 MG: 10 INJECTION INTRAVENOUS at 02:36

## 2024-06-14 RX ADMIN — SMOFLIPID 50 G: 6; 6; 5; 3 INJECTION, EMULSION INTRAVENOUS at 20:01

## 2024-06-14 RX ADMIN — PANTOPRAZOLE SODIUM 40 MG: 40 INJECTION, POWDER, FOR SOLUTION INTRAVENOUS at 20:07

## 2024-06-14 RX ADMIN — PANTOPRAZOLE SODIUM 40 MG: 40 INJECTION, POWDER, FOR SOLUTION INTRAVENOUS at 09:18

## 2024-06-14 RX ADMIN — ACETAMINOPHEN 1000 MG: 10 INJECTION INTRAVENOUS at 08:30

## 2024-06-14 RX ADMIN — ACETAMINOPHEN 1000 MG: 10 INJECTION INTRAVENOUS at 15:44

## 2024-06-14 RX ADMIN — SODIUM CHLORIDE, POTASSIUM CHLORIDE, SODIUM LACTATE AND CALCIUM CHLORIDE 50 ML/HR: 600; 310; 30; 20 INJECTION, SOLUTION INTRAVENOUS at 06:49

## 2024-06-14 RX ADMIN — HYDROMORPHONE HYDROCHLORIDE 0.4 MG: 1 INJECTION, SOLUTION INTRAMUSCULAR; INTRAVENOUS; SUBCUTANEOUS at 12:32

## 2024-06-14 RX ADMIN — ASCORBIC ACID, VITAMIN A PALMITATE, CHOLECALCIFEROL, THIAMINE HYDROCHLORIDE, RIBOFLAVIN-5 PHOSPHATE SODIUM, PYRIDOXINE HYDROCHLORIDE, NIACINAMIDE, DEXPANTHENOL, ALPHA-TOCOPHEROL ACETATE, VITAMIN K1, FOLIC ACID, BIOTIN, CYANOCOBALAMIN: 200; 3300; 200; 6; 3.6; 6; 40; 15; 10; 150; 600; 60; 5 INJECTION, SOLUTION INTRAVENOUS at 19:51

## 2024-06-14 RX ADMIN — ENOXAPARIN SODIUM 40 MG: 100 INJECTION SUBCUTANEOUS at 20:07

## 2024-06-14 ASSESSMENT — COGNITIVE AND FUNCTIONAL STATUS - GENERAL
WALKING IN HOSPITAL ROOM: A LITTLE
DRESSING REGULAR UPPER BODY CLOTHING: A LITTLE
DAILY ACTIVITIY SCORE: 24
CLIMB 3 TO 5 STEPS WITH RAILING: A LOT
TOILETING: A LITTLE
STANDING UP FROM CHAIR USING ARMS: A LITTLE
MOBILITY SCORE: 24
DAILY ACTIVITIY SCORE: 19
DRESSING REGULAR LOWER BODY CLOTHING: A LITTLE
HELP NEEDED FOR BATHING: A LITTLE
MOBILITY SCORE: 20
PERSONAL GROOMING: A LITTLE

## 2024-06-14 ASSESSMENT — PAIN - FUNCTIONAL ASSESSMENT: PAIN_FUNCTIONAL_ASSESSMENT: 0-10

## 2024-06-14 ASSESSMENT — PAIN SCALES - GENERAL
PAINLEVEL_OUTOF10: 0 - NO PAIN
PAINLEVEL_OUTOF10: 6
PAINLEVEL_OUTOF10: 3
PAINLEVEL_OUTOF10: 4

## 2024-06-14 NOTE — CONSULTS
Wound Care Consult     Visit Date: 6/14/2024      Patient Name: Bereket Em         MRN: [id number]           YOB: 1965     Reason for Consult: Wound vac dressing change         Wound History:  57yo M who presented with perforated diverticulitis. 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 He underwent ex lap, sigmoidectomy, and end colostomy on 6/7. Vac applied 6/11 to I.      Wound Assessment:  Wound 06/07/24 Incision Abdomen Medial;Upper (Active)   Wound Image   06/14/24 1239   Site Assessment Bleeding;Granulation;Red;Pink 06/14/24 1239   Usha-Wound Assessment Clean;Dry;Intact 06/14/24 1239   Shape Linear 06/14/24 1239   Wound Length (cm) 26 cm 06/14/24 1239   Wound Width (cm) 5 cm 06/14/24 1239   Wound Surface Area (cm^2) 130 cm^2 06/14/24 1239   Wound Depth (cm) 5 cm 06/14/24 1239   Wound Volume (cm^3) 650 cm^3 06/14/24 1239   Wound Healing % -25 06/14/24 1239   State of Healing Early/partial granulation 06/14/24 1239   Margins Well-defined edges 06/14/24 1239   Closure Sutures 06/14/24 1239   Sutures/Staple Line Approximated 06/14/24 1239   Drainage Description Serosanguineous 06/14/24 1239   Drainage Amount Small 06/14/24 1239   Dressing Kerlix/rolled gauze;Moist to moist;ABD 06/14/24 1239   Dressing Changed New 06/14/24 1239   Dressing Status Clean;Dry 06/14/24 1239      06/14/24 1239   Colostomy LLQ   Placement Date/Time: 06/07/24 1329   Location: LLQ   Stomal Appliance 2 piece;Clean;Changed   Site/Stoma Assessment Clean;Intact;Red   Stoma Size (cm)   (4.4)   Peristomal Assessment Clean;Intact   Treatment Pouch change;Site care   Drainage Characteristics Brown       Wound Team Summary Assessment: The wound care team came to the bedside to replace the wound vac dressing.  After removing the dressing, the wound has red granulating tissue however the base of the wound was weaken with fascial suture visible. The ACS team was notified and picture sent via secure  chat.  Advise to keep the wound vac off and pack the wound with moist to moist kerlix and ABD pads.  The patient colostomy pouch was also changed at this time.  The patient observed the pouch change and knows that he will need to be hands on during the next visit.     Ostomy type: colostomy       size: 1 3/4 oval      color: red and moist       protruding: budded   Umer: none  Functioning: loose brown stool   Mucocutaneous junction: intact   Peristomal skin: clean and intact   Pouchin piece [place] RED flat wafer and lock n roll pouch   Plan: assess stoma/pouching     Wound Team Plan:   Recommendations: BID  Cleanse the wound with normal saline   Lightly pack the wound with normal saline moisten kerlix   Cover with an ABD pad and paper tape      Luis A Gonzalez RN CWON  2024  6:46 PM

## 2024-06-14 NOTE — CARE PLAN
The patient's goals for the shift include  safety    The clinical goals for the shift include patient will remain HDS during shift        Problem: Pain  Goal: Takes deep breaths with improved pain control throughout the shift  Outcome: Progressing  Goal: Turns in bed with improved pain control throughout the shift  Outcome: Progressing  Goal: Walks with improved pain control throughout the shift  Outcome: Progressing  Goal: Performs ADL's with improved pain control throughout shift  Outcome: Progressing  Goal: Participates in PT with improved pain control throughout the shift  Outcome: Progressing  Goal: Free from opioid side effects throughout the shift  Outcome: Progressing  Goal: Free from acute confusion related to pain meds throughout the shift  Outcome: Progressing     Problem: Pain - Adult  Goal: Verbalizes/displays adequate comfort level or baseline comfort level  Outcome: Progressing     Problem: Safety - Adult  Goal: Free from fall injury  Outcome: Progressing     Problem: Discharge Planning  Goal: Discharge to home or other facility with appropriate resources  Outcome: Progressing

## 2024-06-14 NOTE — PROGRESS NOTES
Transitional Care Coordination Progress Note:  PLAN PER MEDICAL TEAM: Not medically ready. PICC line placed for TPN though goal is to not have TPN at discharge.     PAYOR: [employer] Alycia    DISPO: Home with home care. Will have wound vac. Patient needs updated wound vac order- medical team notified. ADOD next week.    SUPPORT/CONTACT: Significant other, Chaya, [phone]    Melissa Huitron RN, TCC

## 2024-06-14 NOTE — PROGRESS NOTES
[facility]  ACUTE CARE SURGERY - PROGRESS NOTE    Patient Name: Bereket Em  MRN: [id number]  Admit Date: 605  : 1965  AGE: 58 y.o.   GENDER: male  ==============================================================================  TODAY'S ASSESSMENT AND PLAN OF CARE:  59yo M who presented with perforated diverticulitis. 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 He underwent ex lap, sigmoidectomy, and end colostomy on . Vac applied  to MLI.     NGT with gastric contents, 1400 over 24h. No nausea, no emesis. Some gas in ostomy bag and small amount output, liquid. WBC 18 from 14 today.     Plan:  - PICC line  - TPN     Neuro:  - IV Tylenol, Dilaudid 0.2/0.4 mg q3h prn  - holding home gabapentin and flexeril while NPO    CV:  - monitor vitals  - holding home lisinopril    Pulm:  - encourage IS    GI:  - NPO  - maintain NGT to LIWS  - PPI  - PICC line for TPN starting today, nutrition consulted. Appreciate their recs.   - prealbumin 13.1    :  - Basilio removed, passed TOV  - LR 50 mL/hr    ID:  - Zosyn until  (dc'd)  - WBC 18 from 14    Ppx: SCDs, LVX, OOB    Dispo: continue care on RNF. Not medically ready    Discussed with Dr. Case.     Bridget Vitale MD  Acute Care Surgery [id number]    ==============================================================================  CHIEF COMPLAINT / EVENTS LAST 24HRS / HPI:  NAEON. No new pain. No nausea or vomiting. Gastric contents in NGT. Air in ostomy bag.     MEDICAL HISTORY / ROS:   Admission history and ROS reviewed. Pertinent changes as follows:  None    PHYSICAL EXAM:  Heart Rate:  [53-79]   Temp:  [35.7 °C (96.3 °F)-36.3 °C (97.3 °F)]   Resp:  [18]   BP: (137-154)/(77-98)   SpO2:  [95 %-96 %]     Gen: resting comfortably, NAD  HEENT: normocephalic, NGT in place with gastric thin output  CV: RR  Pulm: nonlabored on RA  Abd: soft, distended, appropriately tender, skin left open and midline with  wound vac in place stoma pink with bowel sweat, small amount of liquid output and air in bag.  : voiding, no colvin  MSK: BRIAN  Skin: warm and dry    LABS:  Results for orders placed or performed during the hospital encounter of 06/05/24 (from the past 24 hour(s))   Renal Function Panel   Result Value Ref Range    Glucose 73 (L) 74 - 99 mg/dL    Sodium 139 136 - 145 mmol/L    Potassium 4.0 3.5 - 5.3 mmol/L    Chloride 100 98 - 107 mmol/L    Bicarbonate 26 21 - 32 mmol/L    Anion Gap 17 10 - 20 mmol/L    Urea Nitrogen 5 (L) 6 - 23 mg/dL    Creatinine 0.50 0.50 - 1.30 mg/dL    eGFR >90 >60 mL/min/1.73m*2    Calcium 8.4 (L) 8.6 - 10.6 mg/dL    Phosphorus 3.4 2.5 - 4.9 mg/dL    Albumin 2.9 (L) 3.4 - 5.0 g/dL   CBC   Result Value Ref Range    WBC 18.4 (H) 4.4 - 11.3 x10*3/uL    nRBC 0.0 0.0 - 0.0 /100 WBCs    RBC 4.12 (L) 4.50 - 5.90 x10*6/uL    Hemoglobin 11.3 (L) 13.5 - 17.5 g/dL    Hematocrit 35.2 (L) 41.0 - 52.0 %    MCV 85 80 - 100 fL    MCH 27.4 26.0 - 34.0 pg    MCHC 32.1 32.0 - 36.0 g/dL    RDW 13.4 11.5 - 14.5 %    Platelets 533 (H) 150 - 450 x10*3/uL   Magnesium   Result Value Ref Range    Magnesium 1.92 1.60 - 2.40 mg/dL       IMAGING SUMMARY:  No new imaging      I have reviewed all laboratory and imaging results ordered/pertinent for today's encounter.

## 2024-06-14 NOTE — POST-PROCEDURE NOTE
Pre-Procedure Checklist:  Emergent Line Insertion: No  Type of Line to be Placed: PICC  Consent Obtained: Yes  Emergency Medication Necessary: No  Patient Identified with 2 Independent Identifiers: Yes  Review of Allergies, Anticoagulation, Relevant Labs, ECG/Telemetry: Yes  Risks/Benefits/Alternatives Discussed with Patient/POA/Legal Representative: Yes  Stop Sign on Door: Yes  Time Out Performed: Yes  Catheter Exchange: No    Positioning Checklist:  All People, Including Patient, in the Room with Cap and Mask: Yes  Fluoroscopy Used to Identify Vessel and Guide Insertion: No   Sterile Cover Used: Yes  Full Barrier Precautions Followed (Mask, Cap, Gown, Gloves): Yes  Hands Washed: Yes  Monitors Attached with Sound Alarms On: No  Full Body Sterile Drape (Head-to-Toe) Used to Cover Patient: Yes  Trendelenburg Position (For IJ and Subclavian): No  CHG Skin Prep Used and Allowed to Air Dry to Skin Procedure: Yes    Procedure Checklist:  Blood Aspirated From All Lumens, All Ports Subsequently Flushed: Yes  Catheter Caps Placed on All Lumens; Lumens Clamped: Yes  Maintain Guidewire Control Throughout, Ensuring Guidewire Removal: Yes  Maintain Sterile Field Throughout Insertion: Yes  Catheter Secured: Yes  Confirmatory Test of Venous Placement: Non-Pulsatile Blood    Post Procedure Checklist:  Date and Time Written on Dressing: Yes  Sharp and Wire Count and Safe Disposal of all Sharps/Wires: Yes  Sterile Dressing Applied Per Protocol: Yes  X-ray Ordered or ECG Image: Yes    PICC Insertion Details:  Size (Fr): 4  Lumen Type: Dual   Catheter to Vein Ratio Less Than 50%: Yes  Total Length (cm): 45  External Length (cm): 0  Orientation: Right  Location: cephalic  Site Prep: Chlorohexidine; Usual sterile procedure followed  Local Anesthetic: Injectable/Subcutaneous  Indication: TPN, Hemodynamic Monitoring   Insertion Team Members in the Room: NurseKeiko LPN  Initial Extremity Circumference (cm): 31  Insertion Attempts:  1  Patient Tolerance: Tolerated Well, Age Appropriate  Comfort Measures: Subcutaneous anesthetic; Verbal  Procedure Location: Bedside  Safety Measures: Patient specific safety measures addressed with bedsideRN  Estimated Blood Loss (mL): 0  Vessel Fully Compressible Proximally and Distally to Insertion Site: Yes  Brisk Blood Return Obtained and Line Draws Easily: Yes  Tip Location: Cavo Atrial Junction  Line Confirmation: ECG  Lot #: [id number]  : Bard  PICC Line Exp Date: 08/31/2025  Securement: Stat Lock  Post Procedure Checklist: Handoff with RN; Obtain all new IV tubing prior to use; Bed at lowest level and wheels locked; Line discharge information at bedside.  Additional Details: Line was inserted using Modified Seldinger's Technique.   Placed by: Kasandra Claros RN

## 2024-06-15 ENCOUNTER — ANESTHESIA (OUTPATIENT)
Dept: OPERATING ROOM | Facility: HOSPITAL | Age: 59
End: 2024-06-15
Payer: COMMERCIAL

## 2024-06-15 ENCOUNTER — ANESTHESIA EVENT (OUTPATIENT)
Dept: OPERATING ROOM | Facility: HOSPITAL | Age: 59
End: 2024-06-15
Payer: COMMERCIAL

## 2024-06-15 PROBLEM — D64.9 ANEMIA: Status: ACTIVE | Noted: 2024-06-15

## 2024-06-15 LAB
ABO GROUP (TYPE) IN BLOOD: NORMAL
ALBUMIN SERPL BCP-MCNC: 2.8 G/DL (ref 3.4–5)
ANION GAP SERPL CALC-SCNC: 18 MMOL/L (ref 10–20)
ANTIBODY SCREEN: NORMAL
BASOPHILS # BLD AUTO: 0.04 X10*3/UL (ref 0–0.1)
BASOPHILS NFR BLD AUTO: 0.2 %
BUN SERPL-MCNC: 5 MG/DL (ref 6–23)
CALCIUM SERPL-MCNC: 8.1 MG/DL (ref 8.6–10.6)
CHLORIDE SERPL-SCNC: 98 MMOL/L (ref 98–107)
CO2 SERPL-SCNC: 24 MMOL/L (ref 21–32)
CREAT SERPL-MCNC: 0.39 MG/DL (ref 0.5–1.3)
EGFRCR SERPLBLD CKD-EPI 2021: >90 ML/MIN/1.73M*2
EOSINOPHIL # BLD AUTO: 0.17 X10*3/UL (ref 0–0.7)
EOSINOPHIL NFR BLD AUTO: 1 %
ERYTHROCYTE [DISTWIDTH] IN BLOOD BY AUTOMATED COUNT: 13.4 % (ref 11.5–14.5)
GLUCOSE SERPL-MCNC: 133 MG/DL (ref 74–99)
HCT VFR BLD AUTO: 36.7 % (ref 41–52)
HGB BLD-MCNC: 12.2 G/DL (ref 13.5–17.5)
IMM GRANULOCYTES # BLD AUTO: 0.27 X10*3/UL (ref 0–0.7)
IMM GRANULOCYTES NFR BLD AUTO: 1.6 % (ref 0–0.9)
LYMPHOCYTES # BLD AUTO: 1.29 X10*3/UL (ref 1.2–4.8)
LYMPHOCYTES NFR BLD AUTO: 7.6 %
MAGNESIUM SERPL-MCNC: 1.75 MG/DL (ref 1.6–2.4)
MCH RBC QN AUTO: 27.3 PG (ref 26–34)
MCHC RBC AUTO-ENTMCNC: 33.2 G/DL (ref 32–36)
MCV RBC AUTO: 82 FL (ref 80–100)
MONOCYTES # BLD AUTO: 1.06 X10*3/UL (ref 0.1–1)
MONOCYTES NFR BLD AUTO: 6.2 %
NEUTROPHILS # BLD AUTO: 14.16 X10*3/UL (ref 1.2–7.7)
NEUTROPHILS NFR BLD AUTO: 83.4 %
NRBC BLD-RTO: 0 /100 WBCS (ref 0–0)
PHOSPHATE SERPL-MCNC: 3.4 MG/DL (ref 2.5–4.9)
PLATELET # BLD AUTO: 548 X10*3/UL (ref 150–450)
POTASSIUM SERPL-SCNC: 3.4 MMOL/L (ref 3.5–5.3)
RBC # BLD AUTO: 4.47 X10*6/UL (ref 4.5–5.9)
RH FACTOR (ANTIGEN D): NORMAL
SODIUM SERPL-SCNC: 137 MMOL/L (ref 136–145)
WBC # BLD AUTO: 17 X10*3/UL (ref 4.4–11.3)

## 2024-06-15 PROCEDURE — 2580000001 HC RX 258 IV SOLUTIONS: Performed by: STUDENT IN AN ORGANIZED HEALTH CARE EDUCATION/TRAINING PROGRAM

## 2024-06-15 PROCEDURE — C9113 INJ PANTOPRAZOLE SODIUM, VIA: HCPCS | Performed by: STUDENT IN AN ORGANIZED HEALTH CARE EDUCATION/TRAINING PROGRAM

## 2024-06-15 PROCEDURE — 2720000007 HC OR 272 NO HCPCS: Performed by: SURGERY

## 2024-06-15 PROCEDURE — 85025 COMPLETE CBC W/AUTO DIFF WBC: CPT

## 2024-06-15 PROCEDURE — 3700000002 HC GENERAL ANESTHESIA TIME - EACH INCREMENTAL 1 MINUTE: Performed by: SURGERY

## 2024-06-15 PROCEDURE — 2500000004 HC RX 250 GENERAL PHARMACY W/ HCPCS (ALT 636 FOR OP/ED)

## 2024-06-15 PROCEDURE — 2500000005 HC RX 250 GENERAL PHARMACY W/O HCPCS: Performed by: STUDENT IN AN ORGANIZED HEALTH CARE EDUCATION/TRAINING PROGRAM

## 2024-06-15 PROCEDURE — 2500000004 HC RX 250 GENERAL PHARMACY W/ HCPCS (ALT 636 FOR OP/ED): Performed by: STUDENT IN AN ORGANIZED HEALTH CARE EDUCATION/TRAINING PROGRAM

## 2024-06-15 PROCEDURE — 3600000003 HC OR TIME - INITIAL BASE CHARGE - PROCEDURE LEVEL THREE: Performed by: SURGERY

## 2024-06-15 PROCEDURE — 3600000008 HC OR TIME - EACH INCREMENTAL 1 MINUTE - PROCEDURE LEVEL THREE: Performed by: SURGERY

## 2024-06-15 PROCEDURE — 1100000001 HC PRIVATE ROOM DAILY

## 2024-06-15 PROCEDURE — 2500000005 HC RX 250 GENERAL PHARMACY W/O HCPCS

## 2024-06-15 PROCEDURE — 7100000002 HC RECOVERY ROOM TIME - EACH INCREMENTAL 1 MINUTE: Performed by: SURGERY

## 2024-06-15 PROCEDURE — 7100000001 HC RECOVERY ROOM TIME - INITIAL BASE CHARGE: Performed by: SURGERY

## 2024-06-15 PROCEDURE — 86901 BLOOD TYPING SEROLOGIC RH(D): CPT | Performed by: ANESTHESIOLOGY

## 2024-06-15 PROCEDURE — 83735 ASSAY OF MAGNESIUM: CPT

## 2024-06-15 PROCEDURE — A4217 STERILE WATER/SALINE, 500 ML: HCPCS | Performed by: SURGERY

## 2024-06-15 PROCEDURE — 3700000001 HC GENERAL ANESTHESIA TIME - INITIAL BASE CHARGE: Performed by: SURGERY

## 2024-06-15 PROCEDURE — 2500000004 HC RX 250 GENERAL PHARMACY W/ HCPCS (ALT 636 FOR OP/ED): Performed by: SURGERY

## 2024-06-15 PROCEDURE — 80069 RENAL FUNCTION PANEL: CPT

## 2024-06-15 RX ORDER — PHENYLEPHRINE HYDROCHLORIDE 10 MG/ML
INJECTION INTRAVENOUS AS NEEDED
Status: DISCONTINUED | OUTPATIENT
Start: 2024-06-15 | End: 2024-06-15

## 2024-06-15 RX ORDER — SODIUM CHLORIDE, SODIUM LACTATE, POTASSIUM CHLORIDE, CALCIUM CHLORIDE 600; 310; 30; 20 MG/100ML; MG/100ML; MG/100ML; MG/100ML
100 INJECTION, SOLUTION INTRAVENOUS CONTINUOUS
Status: DISCONTINUED | OUTPATIENT
Start: 2024-06-15 | End: 2024-06-15 | Stop reason: HOSPADM

## 2024-06-15 RX ORDER — SUCCINYLCHOLINE CHLORIDE 20 MG/ML
INJECTION INTRAMUSCULAR; INTRAVENOUS AS NEEDED
Status: DISCONTINUED | OUTPATIENT
Start: 2024-06-15 | End: 2024-06-15

## 2024-06-15 RX ORDER — ONDANSETRON HYDROCHLORIDE 2 MG/ML
4 INJECTION, SOLUTION INTRAVENOUS ONCE AS NEEDED
Status: DISCONTINUED | OUTPATIENT
Start: 2024-06-15 | End: 2024-06-15

## 2024-06-15 RX ORDER — PROPOFOL 10 MG/ML
INJECTION, EMULSION INTRAVENOUS AS NEEDED
Status: DISCONTINUED | OUTPATIENT
Start: 2024-06-15 | End: 2024-06-15

## 2024-06-15 RX ORDER — SODIUM CHLORIDE, SODIUM LACTATE, POTASSIUM CHLORIDE, CALCIUM CHLORIDE 600; 310; 30; 20 MG/100ML; MG/100ML; MG/100ML; MG/100ML
INJECTION, SOLUTION INTRAVENOUS CONTINUOUS PRN
Status: DISCONTINUED | OUTPATIENT
Start: 2024-06-15 | End: 2024-06-15

## 2024-06-15 RX ORDER — ROCURONIUM BROMIDE 10 MG/ML
INJECTION, SOLUTION INTRAVENOUS AS NEEDED
Status: DISCONTINUED | OUTPATIENT
Start: 2024-06-15 | End: 2024-06-15

## 2024-06-15 RX ORDER — ACETAMINOPHEN 10 MG/ML
1000 INJECTION, SOLUTION INTRAVENOUS EVERY 6 HOURS SCHEDULED
Status: COMPLETED | OUTPATIENT
Start: 2024-06-15 | End: 2024-06-16

## 2024-06-15 RX ORDER — LABETALOL HYDROCHLORIDE 5 MG/ML
5 INJECTION, SOLUTION INTRAVENOUS ONCE AS NEEDED
Status: DISCONTINUED | OUTPATIENT
Start: 2024-06-15 | End: 2024-06-15

## 2024-06-15 RX ORDER — MAGNESIUM SULFATE HEPTAHYDRATE 40 MG/ML
2 INJECTION, SOLUTION INTRAVENOUS ONCE
OUTPATIENT
Start: 2024-06-15 | End: 2024-06-15

## 2024-06-15 RX ORDER — HYDROMORPHONE HYDROCHLORIDE 1 MG/ML
0.2 INJECTION, SOLUTION INTRAMUSCULAR; INTRAVENOUS; SUBCUTANEOUS EVERY 5 MIN PRN
Status: DISCONTINUED | OUTPATIENT
Start: 2024-06-15 | End: 2024-06-15

## 2024-06-15 RX ORDER — FENTANYL CITRATE 50 UG/ML
INJECTION, SOLUTION INTRAMUSCULAR; INTRAVENOUS AS NEEDED
Status: DISCONTINUED | OUTPATIENT
Start: 2024-06-15 | End: 2024-06-15

## 2024-06-15 RX ORDER — HEPARIN SODIUM 5000 [USP'U]/ML
INJECTION, SOLUTION INTRAVENOUS; SUBCUTANEOUS AS NEEDED
Status: DISCONTINUED | OUTPATIENT
Start: 2024-06-15 | End: 2024-06-15

## 2024-06-15 RX ORDER — MIDAZOLAM HYDROCHLORIDE 1 MG/ML
INJECTION, SOLUTION INTRAMUSCULAR; INTRAVENOUS AS NEEDED
Status: DISCONTINUED | OUTPATIENT
Start: 2024-06-15 | End: 2024-06-15

## 2024-06-15 RX ORDER — OXYCODONE HYDROCHLORIDE 5 MG/1
10 TABLET ORAL EVERY 4 HOURS PRN
Status: DISCONTINUED | OUTPATIENT
Start: 2024-06-15 | End: 2024-06-15

## 2024-06-15 RX ORDER — ACETAMINOPHEN 325 MG/1
650 TABLET ORAL EVERY 4 HOURS PRN
Status: DISCONTINUED | OUTPATIENT
Start: 2024-06-15 | End: 2024-06-15

## 2024-06-15 RX ORDER — LIDOCAINE HYDROCHLORIDE 10 MG/ML
0.1 INJECTION INFILTRATION; PERINEURAL ONCE
Status: DISCONTINUED | OUTPATIENT
Start: 2024-06-15 | End: 2024-06-15

## 2024-06-15 RX ORDER — HYDRALAZINE HYDROCHLORIDE 20 MG/ML
5 INJECTION INTRAMUSCULAR; INTRAVENOUS EVERY 30 MIN PRN
Status: DISCONTINUED | OUTPATIENT
Start: 2024-06-15 | End: 2024-06-15

## 2024-06-15 RX ORDER — HYDROMORPHONE HYDROCHLORIDE 1 MG/ML
0.4 INJECTION, SOLUTION INTRAMUSCULAR; INTRAVENOUS; SUBCUTANEOUS EVERY 5 MIN PRN
Status: DISCONTINUED | OUTPATIENT
Start: 2024-06-15 | End: 2024-06-15

## 2024-06-15 RX ORDER — ONDANSETRON HYDROCHLORIDE 2 MG/ML
INJECTION, SOLUTION INTRAVENOUS AS NEEDED
Status: DISCONTINUED | OUTPATIENT
Start: 2024-06-15 | End: 2024-06-15

## 2024-06-15 RX ORDER — HYDROMORPHONE HYDROCHLORIDE 1 MG/ML
INJECTION, SOLUTION INTRAMUSCULAR; INTRAVENOUS; SUBCUTANEOUS AS NEEDED
Status: DISCONTINUED | OUTPATIENT
Start: 2024-06-15 | End: 2024-06-15

## 2024-06-15 RX ORDER — ALBUTEROL SULFATE 0.83 MG/ML
2.5 SOLUTION RESPIRATORY (INHALATION) ONCE AS NEEDED
Status: DISCONTINUED | OUTPATIENT
Start: 2024-06-15 | End: 2024-06-15

## 2024-06-15 RX ORDER — POTASSIUM CHLORIDE 14.9 MG/ML
20 INJECTION INTRAVENOUS
OUTPATIENT
Start: 2024-06-15 | End: 2024-06-15

## 2024-06-15 RX ORDER — OXYCODONE HYDROCHLORIDE 5 MG/1
5 TABLET ORAL EVERY 4 HOURS PRN
Status: DISCONTINUED | OUTPATIENT
Start: 2024-06-15 | End: 2024-06-15

## 2024-06-15 RX ORDER — SODIUM CHLORIDE 0.9 G/100ML
IRRIGANT IRRIGATION AS NEEDED
Status: DISCONTINUED | OUTPATIENT
Start: 2024-06-15 | End: 2024-06-15 | Stop reason: HOSPADM

## 2024-06-15 RX ORDER — LIDOCAINE HCL/PF 100 MG/5ML
SYRINGE (ML) INTRAVENOUS AS NEEDED
Status: DISCONTINUED | OUTPATIENT
Start: 2024-06-15 | End: 2024-06-15

## 2024-06-15 RX ORDER — NALOXONE HYDROCHLORIDE 0.4 MG/ML
0.2 INJECTION, SOLUTION INTRAMUSCULAR; INTRAVENOUS; SUBCUTANEOUS AS NEEDED
Status: ACTIVE | OUTPATIENT
Start: 2024-06-15

## 2024-06-15 RX ORDER — HYDROMORPHONE HCL/0.9% NACL/PF 15 MG/30ML
PATIENT CONTROLLED ANALGESIA SYRINGE INTRAVENOUS CONTINUOUS
Status: DISCONTINUED | OUTPATIENT
Start: 2024-06-15 | End: 2024-06-20

## 2024-06-15 RX ADMIN — ACETAMINOPHEN 1000 MG: 10 INJECTION INTRAVENOUS at 17:46

## 2024-06-15 RX ADMIN — HYDROMORPHONE HYDROCHLORIDE: 10 INJECTION, SOLUTION INTRAMUSCULAR; INTRAVENOUS; SUBCUTANEOUS at 11:47

## 2024-06-15 RX ADMIN — ACETAMINOPHEN 1000 MG: 10 INJECTION INTRAVENOUS at 03:19

## 2024-06-15 RX ADMIN — HYDROMORPHONE HYDROCHLORIDE 0.4 MG: 1 INJECTION, SOLUTION INTRAMUSCULAR; INTRAVENOUS; SUBCUTANEOUS at 09:59

## 2024-06-15 RX ADMIN — HYDROMORPHONE HYDROCHLORIDE 0.4 MG: 1 INJECTION, SOLUTION INTRAMUSCULAR; INTRAVENOUS; SUBCUTANEOUS at 10:15

## 2024-06-15 RX ADMIN — PANTOPRAZOLE SODIUM 40 MG: 40 INJECTION, POWDER, FOR SOLUTION INTRAVENOUS at 11:51

## 2024-06-15 RX ADMIN — HYDROMORPHONE HYDROCHLORIDE 0.2 MG: 1 INJECTION, SOLUTION INTRAMUSCULAR; INTRAVENOUS; SUBCUTANEOUS at 10:27

## 2024-06-15 RX ADMIN — HYDROMORPHONE HYDROCHLORIDE 0.4 MG: 1 INJECTION, SOLUTION INTRAMUSCULAR; INTRAVENOUS; SUBCUTANEOUS at 17:46

## 2024-06-15 RX ADMIN — HYDROMORPHONE HYDROCHLORIDE 0.4 MG: 1 INJECTION, SOLUTION INTRAMUSCULAR; INTRAVENOUS; SUBCUTANEOUS at 10:22

## 2024-06-15 RX ADMIN — HYDROMORPHONE HYDROCHLORIDE 0.4 MG: 1 INJECTION, SOLUTION INTRAMUSCULAR; INTRAVENOUS; SUBCUTANEOUS at 21:32

## 2024-06-15 RX ADMIN — HYDROMORPHONE HYDROCHLORIDE 0.4 MG: 1 INJECTION, SOLUTION INTRAMUSCULAR; INTRAVENOUS; SUBCUTANEOUS at 09:51

## 2024-06-15 RX ADMIN — HYDROMORPHONE HYDROCHLORIDE 0.4 MG: 1 INJECTION, SOLUTION INTRAMUSCULAR; INTRAVENOUS; SUBCUTANEOUS at 10:09

## 2024-06-15 RX ADMIN — HYDROMORPHONE HYDROCHLORIDE 0.4 MG: 1 INJECTION, SOLUTION INTRAMUSCULAR; INTRAVENOUS; SUBCUTANEOUS at 09:36

## 2024-06-15 RX ADMIN — ASCORBIC ACID, VITAMIN A PALMITATE, CHOLECALCIFEROL, THIAMINE HYDROCHLORIDE, RIBOFLAVIN-5 PHOSPHATE SODIUM, PYRIDOXINE HYDROCHLORIDE, NIACINAMIDE, DEXPANTHENOL, ALPHA-TOCOPHEROL ACETATE, VITAMIN K1, FOLIC ACID, BIOTIN, CYANOCOBALAMIN: 200; 3300; 200; 6; 3.6; 6; 40; 15; 10; 150; 600; 60; 5 INJECTION, SOLUTION INTRAVENOUS at 20:40

## 2024-06-15 RX ADMIN — HYDROMORPHONE HYDROCHLORIDE 0.4 MG: 1 INJECTION, SOLUTION INTRAMUSCULAR; INTRAVENOUS; SUBCUTANEOUS at 09:42

## 2024-06-15 RX ADMIN — ENOXAPARIN SODIUM 40 MG: 100 INJECTION SUBCUTANEOUS at 21:32

## 2024-06-15 RX ADMIN — PANTOPRAZOLE SODIUM 40 MG: 40 INJECTION, POWDER, FOR SOLUTION INTRAVENOUS at 20:40

## 2024-06-15 RX ADMIN — ACETAMINOPHEN 1000 MG: 10 INJECTION INTRAVENOUS at 11:56

## 2024-06-15 RX ADMIN — SMOFLIPID 50 G: 6; 6; 5; 3 INJECTION, EMULSION INTRAVENOUS at 20:40

## 2024-06-15 SDOH — HEALTH STABILITY: MENTAL HEALTH: CURRENT SMOKER: 1

## 2024-06-15 ASSESSMENT — PAIN SCALES - GENERAL
PAINLEVEL_OUTOF10: 7
PAINLEVEL_OUTOF10: 8
PAINLEVEL_OUTOF10: 10 - WORST POSSIBLE PAIN
PAINLEVEL_OUTOF10: 8
PAIN_LEVEL: 3
PAINLEVEL_OUTOF10: 4
PAINLEVEL_OUTOF10: 5 - MODERATE PAIN
PAINLEVEL_OUTOF10: 8
PAINLEVEL_OUTOF10: 7
PAINLEVEL_OUTOF10: 10 - WORST POSSIBLE PAIN
PAINLEVEL_OUTOF10: 7
PAINLEVEL_OUTOF10: 7
PAINLEVEL_OUTOF10: 10 - WORST POSSIBLE PAIN
PAINLEVEL_OUTOF10: 8
PAINLEVEL_OUTOF10: 0 - NO PAIN
PAINLEVEL_OUTOF10: 10 - WORST POSSIBLE PAIN

## 2024-06-15 ASSESSMENT — COGNITIVE AND FUNCTIONAL STATUS - GENERAL
WALKING IN HOSPITAL ROOM: A LITTLE
MOVING FROM LYING ON BACK TO SITTING ON SIDE OF FLAT BED WITH BEDRAILS: A LITTLE
DAILY ACTIVITIY SCORE: 19
MOBILITY SCORE: 18
HELP NEEDED FOR BATHING: A LITTLE
STANDING UP FROM CHAIR USING ARMS: A LITTLE
WALKING IN HOSPITAL ROOM: A LITTLE
DRESSING REGULAR LOWER BODY CLOTHING: A LITTLE
TURNING FROM BACK TO SIDE WHILE IN FLAT BAD: A LITTLE
STANDING UP FROM CHAIR USING ARMS: A LITTLE
DAILY ACTIVITIY SCORE: 21
DRESSING REGULAR UPPER BODY CLOTHING: A LITTLE
MOVING TO AND FROM BED TO CHAIR: A LITTLE
PERSONAL GROOMING: A LITTLE
CLIMB 3 TO 5 STEPS WITH RAILING: A LOT
DRESSING REGULAR LOWER BODY CLOTHING: A LITTLE
CLIMB 3 TO 5 STEPS WITH RAILING: A LITTLE
TOILETING: A LITTLE
HELP NEEDED FOR BATHING: A LITTLE
DRESSING REGULAR UPPER BODY CLOTHING: A LITTLE
MOBILITY SCORE: 20

## 2024-06-15 ASSESSMENT — PAIN DESCRIPTION - DESCRIPTORS: DESCRIPTORS: SORE;SHARP

## 2024-06-15 ASSESSMENT — PAIN DESCRIPTION - LOCATION
LOCATION: ABDOMEN

## 2024-06-15 ASSESSMENT — PAIN DESCRIPTION - ORIENTATION
ORIENTATION: MID

## 2024-06-15 NOTE — NURSING NOTE
Patient transferred into room 9062 from PACU in stable condition. Vital signs obtained and stable.

## 2024-06-15 NOTE — ANESTHESIA PREPROCEDURE EVALUATION
Patient: Bereket Em    Procedure Information       Date/Time: 06/15/24 0745    Procedure: Exploration Laparotomy, abdominal washout, and fascial closure (Abdomen)    Location: [facility] OR 11 / [facility]    Surgeons: Sly Case MD            Relevant Problems   Anesthesia  Past Surgical History:  06/27/2017: SHOULDER SURGERY      Comment:  Shoulder Surgery    S/P Exploratory laparotomy, sigmoid resection, creation of end colostomy 6.7.24 secondary to perforated diverticulitis             Cardiac  TTE 6.5.24:    CONCLUSIONS:   1. Left ventricular systolic function is normal with a 70% estimated ejection fraction.   2. Poorly visualized anatomical structures due to suboptimal image quality.     EKG 2.11.23:    Sinus bradycardia  Septal infarct , age undetermined  Abnormal ECG  No previous ECGs available  Confirmed by DINH WESTBROOK, JARRELL CASTRO ([id number]) on 11/4/2014 6:48:17 AM        (+) Benign essential HTN      Pulmonary  Social History    Tobacco Use      Smoking status: Every Day        Types: Cigarettes        Passive exposure: Current      Smokeless tobacco: Never          Neuro   (+) Anxiety and depression   (+) Cervical radiculopathy      Hematology   (+) Anemia       Clinical information reviewed:   Tobacco  Allergies  Meds   Med Hx  Surg Hx   Fam Hx  Soc Hx      Past Medical History:   Diagnosis Date    Acute pharyngitis, unspecified     Sore throat    Acute upper respiratory infection, unspecified 02/13/2017    Acute URI    Alcohol abuse, in remission 09/12/2018    History of alcohol abuse    Elevated blood-pressure reading, without diagnosis of hypertension 02/23/2015    Elevated blood pressure reading without diagnosis of hypertension    Encounter for immunization 10/10/2014    Need for Tdap vaccination    Encounter for screening for malignant neoplasm of colon 01/04/2017    Encounter for colonoscopy due to history of adenomatous colonic polyps    Encounter for screening for  malignant neoplasm of colon 11/02/2016    Encounter for screening colonoscopy    Encounter for screening for malignant neoplasm of colon 11/02/2016    Encounter for screening colonoscopy    Encounter for screening for malignant neoplasm of prostate 09/12/2018    Prostate cancer screening    Essential (primary) hypertension 03/14/2019    Elevated blood pressure reading with diagnosis of hypertension    Impingement syndrome of right shoulder 06/06/2016    Impingement syndrome of right shoulder    Incomplete rotator cuff tear or rupture of right shoulder, not specified as traumatic 09/12/2018    Partial nontraumatic tear of right rotator cuff    Noninfective gastroenteritis and colitis, unspecified 01/23/2018    Acute gastroenteritis    Other general symptoms and signs 11/02/2016    Flu-like symptoms    Other malaise 11/02/2016    Malaise and fatigue    Pain in left ankle and joints of left foot 10/10/2017    Left ankle pain    Pain in left foot 09/12/2017    Left foot pain    Pain in right shoulder 04/11/2016    Right shoulder pain    Pain in thoracic spine 09/12/2018    Thoracic back pain    Pain, unspecified 02/09/2017    Body aches    Personal history of colonic polyps 01/04/2017    History of colonic polyps    Personal history of other diseases of male genital organs 03/15/2019    History of acute prostatitis    Personal history of other diseases of the respiratory system 04/07/2020    History of acute sinusitis    Personal history of other specified conditions 03/14/2019    History of flank pain    Personal history of other specified conditions 10/10/2014    History of paresthesia    Strain of muscle, fascia and tendon of other parts of biceps, right arm, initial encounter 03/11/2016    Rupture of biceps tendon, right, initial encounter       NPO Detail:  NPO/Void Status  Date of Last Liquid: 06/14/24  Time of Last Liquid: 0000  Date of Last Solid: 06/14/24  Time of Last Solid: 0000  Time of Last Void: 0430          Physical Exam    Airway  Mallampati: I  TM distance: >3 FB  Neck ROM: full     Cardiovascular   Rhythm: regular  Rate: normal     Dental    Pulmonary - normal exam  Breath sounds clear to auscultation     Abdominal            Anesthesia Plan    History of general anesthesia?: yes  History of complications of general anesthesia?: no    ASA 4     general     The patient is a current smoker.    Anesthetic plan and risks discussed with patient.  Use of blood products discussed with patient who consented to blood products.    Plan discussed with resident.

## 2024-06-15 NOTE — NURSING NOTE
Pt is off unit to OR. NAD noted at present. Report is given to OR nurse. All questions are answered. All care is relinquished at this time.

## 2024-06-15 NOTE — BRIEF OP NOTE
Date: 2024 - 6/15/2024  OR Location: [facility] OR    Name: Bereket Em, : 1965, Age: 58 y.o., MRN: [id number], Sex: male    Diagnosis  Pre-op Diagnosis     * Diverticulitis of large intestine with abscess without bleeding [K57.20] Post-op Diagnosis     * Diverticulitis of large intestine with abscess without bleeding [K57.20]     Procedures  Exploration Laparotomy, abdominal washout, fascial debridement, enmas closure  48199 - SC EXPLORATORY LAPAROTOMY CELIOTOMY W/WO BIOPSY SPX  Abdominal wound fascial dehiscence    Surgeons      * Sly Case - Primary    Resident/Fellow/Other Assistant:  Surgeons and Role:     * Yon Zamora MD - Resident - Assisting    Procedure Summary  Anesthesia: General  ASA: IV  Anesthesia Staff: Anesthesiologist: Preston Ferrer MD  Anesthesia Resident: Quinten Whitley MD  Estimated Blood Loss: 10mL  Intra-op Medications:   Administrations occurring from 0745 to 1030 on 06/15/24:   Medication Name Total Dose   sodium chloride 0.9 % irrigation solution 6,000 mL   acetaminophen (Ofirmev) injection 1,000 mg Cannot be calculated   pantoprazole (ProtoNix) injection 40 mg Cannot be calculated              Anesthesia Record               Intraprocedure I/O Totals          Intake    Propofol Drip 0.00 mL    The total shown is the total volume documented since Anesthesia Start was filed.    Total Intake 0 mL          Specimen: No specimens collected     Staff:   Scrub Person: Britney  Scrub Person: Nick  Circulator: Yady          Findings: necrotic fascia wound edges, no intraabdominal abscess or concerning fluid. Fascial retention sutures brought together.     Complications:  None; patient tolerated the procedure well.     Disposition: PACU - hemodynamically stable.  Condition: stable  Specimens Collected: No specimens collected  Attending Attestation:     Sly Case  Phone Number: [phone]

## 2024-06-15 NOTE — SIGNIFICANT EVENT
Postoperative Check    S:   POD 0 from Exploration Laparotomy, abdominal washout, fascial debridement, enmas closure. Patient reports having abdominal pain but believes it is well controlled. Denies nausea or vomiting. No output of gas or stool from stoma. Denies fevers, chills, chest pain, or SOB. Has urinated since OR.    O:   Visit Vitals  BP (!) 165/106 (BP Location: Right arm, Patient Position: Lying)   Pulse 81   Temp 35.3 °C (95.5 °F) (Temporal)   Resp 18      Constitutional: no acute distress  Cardiac: RRR  Pulmonary: Unlabored respirations    Abdomen: distended but compressible, appropriately tender to palpation, midline incision covered with island dressing with moderate strikethrough mostly on inferior portion, end colostomy pink and well perfused with bowel sweat in bag but no gas or stool; NGT in place to LIWS with scant lightly sanguinous output   GI: Voiding    A/P:  Patient deferred additional pain meds at this time. Keep abdominal binder in place. AROBF. Keep NGT to LIWS.     Rika Patel MD  [facility]  [id number]

## 2024-06-15 NOTE — CARE PLAN
Problem: Pain  Goal: Takes deep breaths with improved pain control throughout the shift  Outcome: Progressing  Goal: Turns in bed with improved pain control throughout the shift  Outcome: Progressing  Goal: Walks with improved pain control throughout the shift  Outcome: Progressing  Goal: Performs ADL's with improved pain control throughout shift  Outcome: Progressing  Goal: Participates in PT with improved pain control throughout the shift  Outcome: Progressing  Goal: Free from opioid side effects throughout the shift  Outcome: Progressing  Goal: Free from acute confusion related to pain meds throughout the shift  Outcome: Progressing     Problem: Pain - Adult  Goal: Verbalizes/displays adequate comfort level or baseline comfort level  Outcome: Progressing     Problem: Safety - Adult  Goal: Free from fall injury  Outcome: Progressing     Problem: Discharge Planning  Goal: Discharge to home or other facility with appropriate resources  Outcome: Progressing     Problem: Chronic Conditions and Co-morbidities  Goal: Patient's chronic conditions and co-morbidity symptoms are monitored and maintained or improved  Outcome: Progressing   The patient's goals for the shift include      The clinical goals for the shift include safety

## 2024-06-15 NOTE — ANESTHESIA POSTPROCEDURE EVALUATION
Patient: Bereket Em    Procedure Summary       Date: 06/15/24 Room / Location: [facility] OR 11 / [facility] OR    Anesthesia Start: 0744 Anesthesia Stop:     Procedure: Exploration Laparotomy, abdominal washout, fascial debridement, enmas closure (Abdomen) Diagnosis:       Diverticulitis of large intestine with abscess without bleeding      (Diverticulitis of large intestine with abscess without bleeding [K57.20])    Surgeons: Sly Case MD Responsible Provider: Preston Ferrer MD    Anesthesia Type: general ASA Status: 4            Anesthesia Type: general    Vitals Value Taken Time   /87 06/15/24 0940   Temp - 06/15/24 0940   Pulse 86 06/15/24 0935   Resp 10 06/15/24 0935   SpO2 100 % 06/15/24 0935   Vitals shown include unfiled device data.    Anesthesia Post Evaluation    Patient location during evaluation: PACU  Patient participation: complete - patient participated  Level of consciousness: awake and alert  Pain score: 3  Pain management: adequate  Airway patency: patent  Cardiovascular status: acceptable and hemodynamically stable  Respiratory status: acceptable  Hydration status: acceptable  Postoperative Nausea and Vomiting: none        No notable events documented.

## 2024-06-15 NOTE — CARE PLAN
The patient's goals for the shift include      The clinical goals for the shift include pain control      Problem: Pain  Goal: Takes deep breaths with improved pain control throughout the shift  Outcome: Progressing  Goal: Turns in bed with improved pain control throughout the shift  Outcome: Progressing  Goal: Walks with improved pain control throughout the shift  Outcome: Progressing  Goal: Performs ADL's with improved pain control throughout shift  Outcome: Progressing  Goal: Participates in PT with improved pain control throughout the shift  Outcome: Progressing  Goal: Free from opioid side effects throughout the shift  Outcome: Progressing  Goal: Free from acute confusion related to pain meds throughout the shift  Outcome: Progressing     Problem: Pain - Adult  Goal: Verbalizes/displays adequate comfort level or baseline comfort level  Outcome: Progressing     Problem: Safety - Adult  Goal: Free from fall injury  Outcome: Progressing     Problem: Discharge Planning  Goal: Discharge to home or other facility with appropriate resources  Outcome: Progressing     Problem: Chronic Conditions and Co-morbidities  Goal: Patient's chronic conditions and co-morbidity symptoms are monitored and maintained or improved  Outcome: Progressing

## 2024-06-15 NOTE — ANESTHESIA PROCEDURE NOTES
Airway  Date/Time: 6/15/2024 8:07 AM  Urgency: elective    Airway not difficult    Staffing  Performed: resident   Authorized by: Preston Ferrer MD    Performed by: Quinten Whitley MD  Patient location during procedure: OR    Indications and Patient Condition  Indications for airway management: anesthesia and airway protection  Spontaneous Ventilation: absent  Sedation level: deep  Preoxygenated: yes  Patient position: sniffing  Mask difficulty assessment: 0 - not attempted (RSI)  Planned trial extubation    Final Airway Details  Final airway type: endotracheal airway      Successful airway: ETT  Cuffed: yes   Successful intubation technique: direct laryngoscopy  Facilitating devices/methods: cricoid pressure and intubating stylet  Endotracheal tube insertion site: oral  Blade: Jenni  Blade size: #4  ETT size (mm): 7.5  Cormack-Lehane Classification: grade IIb - view of arytenoids or posterior of glottis only  Placement verified by: chest auscultation and capnometry   Measured from: lips  ETT to lips (cm): 24  Number of attempts at approach: 1

## 2024-06-16 LAB
ALBUMIN SERPL BCP-MCNC: 2.8 G/DL (ref 3.4–5)
ANION GAP SERPL CALC-SCNC: 11 MMOL/L (ref 10–20)
BASOPHILS # BLD AUTO: 0.05 X10*3/UL (ref 0–0.1)
BASOPHILS NFR BLD AUTO: 0.2 %
BUN SERPL-MCNC: 7 MG/DL (ref 6–23)
CALCIUM SERPL-MCNC: 8.4 MG/DL (ref 8.6–10.6)
CHLORIDE SERPL-SCNC: 98 MMOL/L (ref 98–107)
CO2 SERPL-SCNC: 32 MMOL/L (ref 21–32)
CREAT SERPL-MCNC: 0.41 MG/DL (ref 0.5–1.3)
EGFRCR SERPLBLD CKD-EPI 2021: >90 ML/MIN/1.73M*2
EOSINOPHIL # BLD AUTO: 0.09 X10*3/UL (ref 0–0.7)
EOSINOPHIL NFR BLD AUTO: 0.4 %
ERYTHROCYTE [DISTWIDTH] IN BLOOD BY AUTOMATED COUNT: 13.5 % (ref 11.5–14.5)
GLUCOSE SERPL-MCNC: 175 MG/DL (ref 74–99)
HCT VFR BLD AUTO: 38.2 % (ref 41–52)
HGB BLD-MCNC: 12.4 G/DL (ref 13.5–17.5)
IMM GRANULOCYTES # BLD AUTO: 0.13 X10*3/UL (ref 0–0.7)
IMM GRANULOCYTES NFR BLD AUTO: 0.6 % (ref 0–0.9)
LYMPHOCYTES # BLD AUTO: 1.17 X10*3/UL (ref 1.2–4.8)
LYMPHOCYTES NFR BLD AUTO: 5.8 %
MAGNESIUM SERPL-MCNC: 1.62 MG/DL (ref 1.6–2.4)
MCH RBC QN AUTO: 27.1 PG (ref 26–34)
MCHC RBC AUTO-ENTMCNC: 32.5 G/DL (ref 32–36)
MCV RBC AUTO: 84 FL (ref 80–100)
MONOCYTES # BLD AUTO: 1.43 X10*3/UL (ref 0.1–1)
MONOCYTES NFR BLD AUTO: 7 %
NEUTROPHILS # BLD AUTO: 17.42 X10*3/UL (ref 1.2–7.7)
NEUTROPHILS NFR BLD AUTO: 86 %
NRBC BLD-RTO: 0 /100 WBCS (ref 0–0)
PHOSPHATE SERPL-MCNC: 2.6 MG/DL (ref 2.5–4.9)
PLATELET # BLD AUTO: 653 X10*3/UL (ref 150–450)
POTASSIUM SERPL-SCNC: 3.3 MMOL/L (ref 3.5–5.3)
RBC # BLD AUTO: 4.57 X10*6/UL (ref 4.5–5.9)
SODIUM SERPL-SCNC: 138 MMOL/L (ref 136–145)
WBC # BLD AUTO: 20.3 X10*3/UL (ref 4.4–11.3)

## 2024-06-16 PROCEDURE — 1100000001 HC PRIVATE ROOM DAILY

## 2024-06-16 PROCEDURE — 2500000001 HC RX 250 WO HCPCS SELF ADMINISTERED DRUGS (ALT 637 FOR MEDICARE OP): Performed by: STUDENT IN AN ORGANIZED HEALTH CARE EDUCATION/TRAINING PROGRAM

## 2024-06-16 PROCEDURE — 85025 COMPLETE CBC W/AUTO DIFF WBC: CPT

## 2024-06-16 PROCEDURE — 83735 ASSAY OF MAGNESIUM: CPT

## 2024-06-16 PROCEDURE — 2580000001 HC RX 258 IV SOLUTIONS

## 2024-06-16 PROCEDURE — 2500000004 HC RX 250 GENERAL PHARMACY W/ HCPCS (ALT 636 FOR OP/ED)

## 2024-06-16 PROCEDURE — 80069 RENAL FUNCTION PANEL: CPT | Performed by: STUDENT IN AN ORGANIZED HEALTH CARE EDUCATION/TRAINING PROGRAM

## 2024-06-16 PROCEDURE — 2500000004 HC RX 250 GENERAL PHARMACY W/ HCPCS (ALT 636 FOR OP/ED): Performed by: STUDENT IN AN ORGANIZED HEALTH CARE EDUCATION/TRAINING PROGRAM

## 2024-06-16 PROCEDURE — C9113 INJ PANTOPRAZOLE SODIUM, VIA: HCPCS | Performed by: STUDENT IN AN ORGANIZED HEALTH CARE EDUCATION/TRAINING PROGRAM

## 2024-06-16 PROCEDURE — 2500000005 HC RX 250 GENERAL PHARMACY W/O HCPCS

## 2024-06-16 RX ORDER — POTASSIUM CHLORIDE 14.9 MG/ML
20 INJECTION INTRAVENOUS ONCE
Status: COMPLETED | OUTPATIENT
Start: 2024-06-16 | End: 2024-06-16

## 2024-06-16 RX ORDER — ACETAMINOPHEN 10 MG/ML
1000 INJECTION, SOLUTION INTRAVENOUS EVERY 6 HOURS SCHEDULED
Status: DISCONTINUED | OUTPATIENT
Start: 2024-06-16 | End: 2024-06-18

## 2024-06-16 RX ORDER — POTASSIUM CHLORIDE 29.8 MG/ML
40 INJECTION INTRAVENOUS ONCE
Status: COMPLETED | OUTPATIENT
Start: 2024-06-16 | End: 2024-06-16

## 2024-06-16 RX ORDER — MAGNESIUM SULFATE HEPTAHYDRATE 40 MG/ML
4 INJECTION, SOLUTION INTRAVENOUS ONCE
Status: COMPLETED | OUTPATIENT
Start: 2024-06-16 | End: 2024-06-16

## 2024-06-16 RX ADMIN — MAGNESIUM SULFATE HEPTAHYDRATE 4 G: 40 INJECTION, SOLUTION INTRAVENOUS at 09:27

## 2024-06-16 RX ADMIN — PANTOPRAZOLE SODIUM 40 MG: 40 INJECTION, POWDER, FOR SOLUTION INTRAVENOUS at 09:26

## 2024-06-16 RX ADMIN — ASCORBIC ACID, VITAMIN A PALMITATE, CHOLECALCIFEROL, THIAMINE HYDROCHLORIDE, RIBOFLAVIN-5 PHOSPHATE SODIUM, PYRIDOXINE HYDROCHLORIDE, NIACINAMIDE, DEXPANTHENOL, ALPHA-TOCOPHEROL ACETATE, VITAMIN K1, FOLIC ACID, BIOTIN, CYANOCOBALAMIN: 200; 3300; 200; 6; 3.6; 6; 40; 15; 10; 150; 600; 60; 5 INJECTION, SOLUTION INTRAVENOUS at 21:04

## 2024-06-16 RX ADMIN — POTASSIUM CHLORIDE 20 MEQ: 14.9 INJECTION, SOLUTION INTRAVENOUS at 15:58

## 2024-06-16 RX ADMIN — HYDROMORPHONE HYDROCHLORIDE 0.4 MG: 1 INJECTION, SOLUTION INTRAMUSCULAR; INTRAVENOUS; SUBCUTANEOUS at 04:24

## 2024-06-16 RX ADMIN — SMOFLIPID 50 G: 6; 6; 5; 3 INJECTION, EMULSION INTRAVENOUS at 20:59

## 2024-06-16 RX ADMIN — ACETAMINOPHEN 1000 MG: 10 INJECTION INTRAVENOUS at 17:31

## 2024-06-16 RX ADMIN — POTASSIUM CHLORIDE 40 MEQ: 29.8 INJECTION, SOLUTION INTRAVENOUS at 09:27

## 2024-06-16 RX ADMIN — ENOXAPARIN SODIUM 40 MG: 100 INJECTION SUBCUTANEOUS at 20:58

## 2024-06-16 RX ADMIN — ACETAMINOPHEN 1000 MG: 10 INJECTION INTRAVENOUS at 00:13

## 2024-06-16 RX ADMIN — ACETAMINOPHEN 1000 MG: 10 INJECTION INTRAVENOUS at 11:42

## 2024-06-16 RX ADMIN — ACETAMINOPHEN 1000 MG: 10 INJECTION INTRAVENOUS at 06:13

## 2024-06-16 RX ADMIN — Medication 5 MG: at 20:58

## 2024-06-16 RX ADMIN — PANTOPRAZOLE SODIUM 40 MG: 40 INJECTION, POWDER, FOR SOLUTION INTRAVENOUS at 20:59

## 2024-06-16 ASSESSMENT — PAIN SCALES - GENERAL
PAINLEVEL_OUTOF10: 8
PAINLEVEL_OUTOF10: 8
PAINLEVEL_OUTOF10: 6
PAINLEVEL_OUTOF10: 7
PAINLEVEL_OUTOF10: 8

## 2024-06-16 ASSESSMENT — PAIN - FUNCTIONAL ASSESSMENT: PAIN_FUNCTIONAL_ASSESSMENT: 0-10

## 2024-06-16 ASSESSMENT — COGNITIVE AND FUNCTIONAL STATUS - GENERAL
DAILY ACTIVITIY SCORE: 21
MOVING TO AND FROM BED TO CHAIR: A LITTLE
STANDING UP FROM CHAIR USING ARMS: A LITTLE
HELP NEEDED FOR BATHING: A LITTLE
MOBILITY SCORE: 18
DRESSING REGULAR UPPER BODY CLOTHING: A LITTLE
WALKING IN HOSPITAL ROOM: A LITTLE
MOVING FROM LYING ON BACK TO SITTING ON SIDE OF FLAT BED WITH BEDRAILS: A LITTLE
CLIMB 3 TO 5 STEPS WITH RAILING: A LITTLE
DRESSING REGULAR LOWER BODY CLOTHING: A LITTLE
TURNING FROM BACK TO SIDE WHILE IN FLAT BAD: A LITTLE

## 2024-06-16 ASSESSMENT — PAIN DESCRIPTION - LOCATION
LOCATION: ABDOMEN
LOCATION: ABDOMEN

## 2024-06-16 ASSESSMENT — PAIN DESCRIPTION - ORIENTATION: ORIENTATION: MID

## 2024-06-16 ASSESSMENT — PAIN DESCRIPTION - DESCRIPTORS: DESCRIPTORS: DISCOMFORT;SHARP

## 2024-06-16 NOTE — SIGNIFICANT EVENT
06/16/24 0840   Onset Documentation   Rapid Response Initiated By Radar auto page   [place] [facility] [id number]   Pager Time [phone]   Arrival Time 0840   Event End Time 0845   Level II Called No   Primary Reason for Call Radar auto page     Rapid Response Note    Radar auto-page received for a radar score of 6 with the following vital signs: 35.9, 99, 17, 123/75, 92%.  Vital signs were confirmed and reviewed with primary RN.  He is at his current baseline.  There are no indications for interventions by Rapid Response at this time.  RN to contact Rapid Response with any future concerns or signs of clinical decompensation.

## 2024-06-17 ENCOUNTER — ANESTHESIA EVENT (OUTPATIENT)
Dept: OPERATING ROOM | Facility: HOSPITAL | Age: 59
End: 2024-06-17
Payer: COMMERCIAL

## 2024-06-17 LAB
ALBUMIN SERPL BCP-MCNC: 3 G/DL (ref 3.4–5)
ANION GAP SERPL CALC-SCNC: 12 MMOL/L (ref 10–20)
BASOPHILS # BLD AUTO: 0.04 X10*3/UL (ref 0–0.1)
BASOPHILS NFR BLD AUTO: 0.3 %
BUN SERPL-MCNC: 9 MG/DL (ref 6–23)
CALCIUM SERPL-MCNC: 8.5 MG/DL (ref 8.6–10.6)
CHLORIDE SERPL-SCNC: 94 MMOL/L (ref 98–107)
CO2 SERPL-SCNC: 32 MMOL/L (ref 21–32)
CREAT SERPL-MCNC: 0.41 MG/DL (ref 0.5–1.3)
EGFRCR SERPLBLD CKD-EPI 2021: >90 ML/MIN/1.73M*2
EOSINOPHIL # BLD AUTO: 0.11 X10*3/UL (ref 0–0.7)
EOSINOPHIL NFR BLD AUTO: 0.8 %
ERYTHROCYTE [DISTWIDTH] IN BLOOD BY AUTOMATED COUNT: 13.3 % (ref 11.5–14.5)
GLUCOSE BLD MANUAL STRIP-MCNC: 135 MG/DL (ref 74–99)
GLUCOSE BLD MANUAL STRIP-MCNC: 146 MG/DL (ref 74–99)
GLUCOSE BLD MANUAL STRIP-MCNC: 152 MG/DL (ref 74–99)
GLUCOSE SERPL-MCNC: 137 MG/DL (ref 74–99)
HCT VFR BLD AUTO: 34.3 % (ref 41–52)
HGB BLD-MCNC: 11.5 G/DL (ref 13.5–17.5)
IMM GRANULOCYTES # BLD AUTO: 0.06 X10*3/UL (ref 0–0.7)
IMM GRANULOCYTES NFR BLD AUTO: 0.4 % (ref 0–0.9)
LYMPHOCYTES # BLD AUTO: 1.33 X10*3/UL (ref 1.2–4.8)
LYMPHOCYTES NFR BLD AUTO: 9.1 %
MAGNESIUM SERPL-MCNC: 2.03 MG/DL (ref 1.6–2.4)
MCH RBC QN AUTO: 27.5 PG (ref 26–34)
MCHC RBC AUTO-ENTMCNC: 33.5 G/DL (ref 32–36)
MCV RBC AUTO: 82 FL (ref 80–100)
MONOCYTES # BLD AUTO: 1.38 X10*3/UL (ref 0.1–1)
MONOCYTES NFR BLD AUTO: 9.5 %
NEUTROPHILS # BLD AUTO: 11.66 X10*3/UL (ref 1.2–7.7)
NEUTROPHILS NFR BLD AUTO: 79.9 %
NRBC BLD-RTO: 0 /100 WBCS (ref 0–0)
PHOSPHATE SERPL-MCNC: 3.2 MG/DL (ref 2.5–4.9)
PLATELET # BLD AUTO: 580 X10*3/UL (ref 150–450)
POTASSIUM SERPL-SCNC: 3.6 MMOL/L (ref 3.5–5.3)
RBC # BLD AUTO: 4.18 X10*6/UL (ref 4.5–5.9)
SODIUM SERPL-SCNC: 134 MMOL/L (ref 136–145)
WBC # BLD AUTO: 14.6 X10*3/UL (ref 4.4–11.3)

## 2024-06-17 PROCEDURE — 2500000004 HC RX 250 GENERAL PHARMACY W/ HCPCS (ALT 636 FOR OP/ED): Performed by: STUDENT IN AN ORGANIZED HEALTH CARE EDUCATION/TRAINING PROGRAM

## 2024-06-17 PROCEDURE — 1100000001 HC PRIVATE ROOM DAILY

## 2024-06-17 PROCEDURE — 2580000001 HC RX 258 IV SOLUTIONS

## 2024-06-17 PROCEDURE — 2500000004 HC RX 250 GENERAL PHARMACY W/ HCPCS (ALT 636 FOR OP/ED)

## 2024-06-17 PROCEDURE — 82947 ASSAY GLUCOSE BLOOD QUANT: CPT

## 2024-06-17 PROCEDURE — C9113 INJ PANTOPRAZOLE SODIUM, VIA: HCPCS | Performed by: STUDENT IN AN ORGANIZED HEALTH CARE EDUCATION/TRAINING PROGRAM

## 2024-06-17 PROCEDURE — 2500000005 HC RX 250 GENERAL PHARMACY W/O HCPCS

## 2024-06-17 PROCEDURE — 85025 COMPLETE CBC W/AUTO DIFF WBC: CPT

## 2024-06-17 PROCEDURE — 80069 RENAL FUNCTION PANEL: CPT | Performed by: STUDENT IN AN ORGANIZED HEALTH CARE EDUCATION/TRAINING PROGRAM

## 2024-06-17 PROCEDURE — 99024 POSTOP FOLLOW-UP VISIT: CPT | Performed by: SURGERY

## 2024-06-17 PROCEDURE — 83735 ASSAY OF MAGNESIUM: CPT

## 2024-06-17 RX ORDER — POTASSIUM CHLORIDE 14.9 MG/ML
20 INJECTION INTRAVENOUS
Status: COMPLETED | OUTPATIENT
Start: 2024-06-17 | End: 2024-06-17

## 2024-06-17 RX ORDER — DEXTROSE 50 % IN WATER (D50W) INTRAVENOUS SYRINGE
25
Status: DISCONTINUED | OUTPATIENT
Start: 2024-06-17 | End: 2024-06-23

## 2024-06-17 RX ORDER — THIAMINE HYDROCHLORIDE 100 MG/ML
100 INJECTION, SOLUTION INTRAMUSCULAR; INTRAVENOUS DAILY
Status: DISPENSED | OUTPATIENT
Start: 2024-06-17

## 2024-06-17 RX ORDER — INSULIN LISPRO 100 [IU]/ML
0-5 INJECTION, SOLUTION INTRAVENOUS; SUBCUTANEOUS
Status: DISCONTINUED | OUTPATIENT
Start: 2024-06-17 | End: 2024-06-23

## 2024-06-17 RX ORDER — DEXTROSE 50 % IN WATER (D50W) INTRAVENOUS SYRINGE
12.5
Status: DISCONTINUED | OUTPATIENT
Start: 2024-06-17 | End: 2024-06-23

## 2024-06-17 RX ADMIN — ACETAMINOPHEN 1000 MG: 10 INJECTION INTRAVENOUS at 00:40

## 2024-06-17 RX ADMIN — ENOXAPARIN SODIUM 40 MG: 100 INJECTION SUBCUTANEOUS at 20:03

## 2024-06-17 RX ADMIN — SMOFLIPID 50 G: 6; 6; 5; 3 INJECTION, EMULSION INTRAVENOUS at 20:03

## 2024-06-17 RX ADMIN — PANTOPRAZOLE SODIUM 40 MG: 40 INJECTION, POWDER, FOR SOLUTION INTRAVENOUS at 20:03

## 2024-06-17 RX ADMIN — ASCORBIC ACID, VITAMIN A PALMITATE, CHOLECALCIFEROL, THIAMINE HYDROCHLORIDE, RIBOFLAVIN-5 PHOSPHATE SODIUM, PYRIDOXINE HYDROCHLORIDE, NIACINAMIDE, DEXPANTHENOL, ALPHA-TOCOPHEROL ACETATE, VITAMIN K1, FOLIC ACID, BIOTIN, CYANOCOBALAMIN: 200; 3300; 200; 6; 3.6; 6; 40; 15; 10; 150; 600; 60; 5 INJECTION, SOLUTION INTRAVENOUS at 20:03

## 2024-06-17 RX ADMIN — THIAMINE HYDROCHLORIDE 100 MG: 100 INJECTION, SOLUTION INTRAMUSCULAR; INTRAVENOUS at 11:09

## 2024-06-17 RX ADMIN — ACETAMINOPHEN 1000 MG: 10 INJECTION INTRAVENOUS at 06:36

## 2024-06-17 RX ADMIN — PIPERACILLIN SODIUM AND TAZOBACTAM SODIUM 3.38 G: 3; .375 INJECTION, SOLUTION INTRAVENOUS at 09:32

## 2024-06-17 RX ADMIN — POTASSIUM CHLORIDE 20 MEQ: 14.9 INJECTION, SOLUTION INTRAVENOUS at 09:33

## 2024-06-17 RX ADMIN — POTASSIUM CHLORIDE 20 MEQ: 14.9 INJECTION, SOLUTION INTRAVENOUS at 11:13

## 2024-06-17 RX ADMIN — ACETAMINOPHEN 1000 MG: 10 INJECTION INTRAVENOUS at 17:21

## 2024-06-17 RX ADMIN — PANTOPRAZOLE SODIUM 40 MG: 40 INJECTION, POWDER, FOR SOLUTION INTRAVENOUS at 09:04

## 2024-06-17 RX ADMIN — ACETAMINOPHEN 1000 MG: 10 INJECTION INTRAVENOUS at 11:09

## 2024-06-17 RX ADMIN — PIPERACILLIN SODIUM AND TAZOBACTAM SODIUM 3.38 G: 3; .375 INJECTION, SOLUTION INTRAVENOUS at 21:07

## 2024-06-17 RX ADMIN — PIPERACILLIN SODIUM AND TAZOBACTAM SODIUM 3.38 G: 3; .375 INJECTION, SOLUTION INTRAVENOUS at 15:26

## 2024-06-17 ASSESSMENT — COGNITIVE AND FUNCTIONAL STATUS - GENERAL
WALKING IN HOSPITAL ROOM: A LITTLE
CLIMB 3 TO 5 STEPS WITH RAILING: A LITTLE
MOBILITY SCORE: 18
DRESSING REGULAR UPPER BODY CLOTHING: A LITTLE
DRESSING REGULAR LOWER BODY CLOTHING: A LITTLE
PERSONAL GROOMING: A LITTLE
TOILETING: A LITTLE
HELP NEEDED FOR BATHING: A LITTLE
DAILY ACTIVITIY SCORE: 19
TURNING FROM BACK TO SIDE WHILE IN FLAT BAD: A LITTLE
DAILY ACTIVITIY SCORE: 21
MOVING FROM LYING ON BACK TO SITTING ON SIDE OF FLAT BED WITH BEDRAILS: A LITTLE
WALKING IN HOSPITAL ROOM: A LITTLE
DRESSING REGULAR LOWER BODY CLOTHING: A LITTLE
MOBILITY SCORE: 22
MOVING TO AND FROM BED TO CHAIR: A LITTLE
CLIMB 3 TO 5 STEPS WITH RAILING: A LITTLE
HELP NEEDED FOR BATHING: A LITTLE
STANDING UP FROM CHAIR USING ARMS: A LITTLE
DRESSING REGULAR UPPER BODY CLOTHING: A LITTLE

## 2024-06-17 ASSESSMENT — PAIN DESCRIPTION - DESCRIPTORS: DESCRIPTORS: SHARP;SORE

## 2024-06-17 ASSESSMENT — PAIN SCALES - GENERAL
PAINLEVEL_OUTOF10: 5 - MODERATE PAIN
PAINLEVEL_OUTOF10: 6
PAINLEVEL_OUTOF10: 5 - MODERATE PAIN
PAINLEVEL_OUTOF10: 6

## 2024-06-17 ASSESSMENT — PAIN DESCRIPTION - LOCATION
LOCATION: ABDOMEN
LOCATION: ABDOMEN

## 2024-06-17 ASSESSMENT — PAIN - FUNCTIONAL ASSESSMENT: PAIN_FUNCTIONAL_ASSESSMENT: 0-10

## 2024-06-17 ASSESSMENT — PAIN DESCRIPTION - ORIENTATION
ORIENTATION: MID
ORIENTATION: MID

## 2024-06-17 NOTE — CARE PLAN
The patient's goals for the shift include      The clinical goals for the shift include pain control increase ambulation      Problem: Pain  Goal: Takes deep breaths with improved pain control throughout the shift  Outcome: Progressing  Goal: Turns in bed with improved pain control throughout the shift  Outcome: Progressing  Goal: Walks with improved pain control throughout the shift  Outcome: Progressing  Goal: Performs ADL's with improved pain control throughout shift  Outcome: Progressing  Goal: Participates in PT with improved pain control throughout the shift  Outcome: Progressing  Goal: Free from opioid side effects throughout the shift  Outcome: Progressing  Goal: Free from acute confusion related to pain meds throughout the shift  Outcome: Progressing     Problem: Pain - Adult  Goal: Verbalizes/displays adequate comfort level or baseline comfort level  Outcome: Progressing     Problem: Safety - Adult  Goal: Free from fall injury  Outcome: Progressing     Problem: Discharge Planning  Goal: Discharge to home or other facility with appropriate resources  Outcome: Progressing     Problem: Chronic Conditions and Co-morbidities  Goal: Patient's chronic conditions and co-morbidity symptoms are monitored and maintained or improved  Outcome: Progressing

## 2024-06-17 NOTE — PROGRESS NOTES
[facility]  ACUTE CARE SURGERY - PROGRESS NOTE    Patient Name: Bereket Em  MRN: [id number]  Admit Date: 605  : 1965  AGE: 58 y.o.   GENDER: male  ==============================================================================  TODAY'S ASSESSMENT AND PLAN OF CARE:  59yo M who presented with perforated diverticulitis. 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 He underwent ex lap, sigmoidectomy, and end colostomy on . Vac applied  to MLI. TPN initiated. On  vac was taken down which showed necrotic fascia at midline incision. He was taken to the OR for a exploration laparotomy, abdominal washout, fascial debridement, enmas closure on  with Dr. Case.    Pain is controlled. AROBF. Will advance TPN today to goal. Midline incision closed with retention sutures, however is foul smelling with obvious purulence today.     Plan:    Neuro:  - IV Tylenol, PCA  - holding home gabapentin and flexeril while NPO    CV:  - monitor vitals  - holding home lisinopril    Pulm:  - encourage IS    GI:  - NPO  - maintain NGT to LIWS  - PPI  - PICC line for TPN, nutrition consulted. Appreciate their recs.    - advanced TPN to goal 83 mL/hr today PM, with thiamine 100mg IV x7 days    :  - voiding  - kvo    ID:  - Zosyn until  (dc'd)  - restarted zosyn today for concern of abdominal infection (purulent wound)    Ppx: SCDs, LVX, OOB    Dispo: continue care on RNF. Not medically ready.     Discussed with Dr. Rios.     Bridget Vitale MD  Acute Care Surgery [id number]    ==============================================================================  CHIEF COMPLAINT / EVENTS LAST 24HRS / HPI:  NAEON. Endorsed pain that is currently controlled. Denies nausea or vomiting. Minimal output from ostomy. New foul smelling drainage from midline incision today.     MEDICAL HISTORY / ROS:   Admission history and ROS reviewed. Pertinent changes as  follows:  None    PHYSICAL EXAM:  Heart Rate:  []   Temp:  [35.7 °C (96.3 °F)-36.4 °C (97.5 °F)]   Resp:  [16-18]   BP: (112-135)/(71-84)   Weight:  [83.9 kg (184 lb 15.5 oz)]   SpO2:  [93 %-96 %]     Constitutional: no acute distress, conversational  Cardiac: RRR  Pulmonary: Unlabored respirations    Abdomen: distended but compressible, appropriately tender to palpation, midline incision with tan/yellow purulent drainage through incision. No erythema, retention sutures in place. end colostomy pink and well perfused with bowel sweat in bag but no gas or stool; NGT in place to LIWS with scant lightly sanguinous output   GI: Voiding  MSK: DEACON  Extremities: no pitting edema  Skin: warm, dry  Neuro: alert, conversive    LABS:  Results for orders placed or performed during the hospital encounter of 06/05/24 (from the past 24 hour(s))   Renal Function Panel   Result Value Ref Range    Glucose 137 (H) 74 - 99 mg/dL    Sodium 134 (L) 136 - 145 mmol/L    Potassium 3.6 3.5 - 5.3 mmol/L    Chloride 94 (L) 98 - 107 mmol/L    Bicarbonate 32 21 - 32 mmol/L    Anion Gap 12 10 - 20 mmol/L    Urea Nitrogen 9 6 - 23 mg/dL    Creatinine 0.41 (L) 0.50 - 1.30 mg/dL    eGFR >90 >60 mL/min/1.73m*2    Calcium 8.5 (L) 8.6 - 10.6 mg/dL    Phosphorus 3.2 2.5 - 4.9 mg/dL    Albumin 3.0 (L) 3.4 - 5.0 g/dL   CBC and Auto Differential   Result Value Ref Range    WBC 14.6 (H) 4.4 - 11.3 x10*3/uL    nRBC 0.0 0.0 - 0.0 /100 WBCs    RBC 4.18 (L) 4.50 - 5.90 x10*6/uL    Hemoglobin 11.5 (L) 13.5 - 17.5 g/dL    Hematocrit 34.3 (L) 41.0 - 52.0 %    MCV 82 80 - 100 fL    MCH 27.5 26.0 - 34.0 pg    MCHC 33.5 32.0 - 36.0 g/dL    RDW 13.3 11.5 - 14.5 %    Platelets 580 (H) 150 - 450 x10*3/uL    Neutrophils % 79.9 40.0 - 80.0 %    Immature Granulocytes %, Automated 0.4 0.0 - 0.9 %    Lymphocytes % 9.1 13.0 - 44.0 %    Monocytes % 9.5 2.0 - 10.0 %    Eosinophils % 0.8 0.0 - 6.0 %    Basophils % 0.3 0.0 - 2.0 %    Neutrophils Absolute 11.66 (H) 1.20 -  7.70 x10*3/uL    Immature Granulocytes Absolute, Automated 0.06 0.00 - 0.70 x10*3/uL    Lymphocytes Absolute 1.33 1.20 - 4.80 x10*3/uL    Monocytes Absolute 1.38 (H) 0.10 - 1.00 x10*3/uL    Eosinophils Absolute 0.11 0.00 - 0.70 x10*3/uL    Basophils Absolute 0.04 0.00 - 0.10 x10*3/uL   Magnesium   Result Value Ref Range    Magnesium 2.03 1.60 - 2.40 mg/dL   POCT GLUCOSE   Result Value Ref Range    POCT Glucose 146 (H) 74 - 99 mg/dL       IMAGING SUMMARY:  No new imaging      I have reviewed all laboratory and imaging results ordered/pertinent for today's encounter.

## 2024-06-17 NOTE — CONSULTS
Wound Care Consult     Visit Date: 6/17/2024      Patient Name: Bereket Em         MRN: [id number]           YOB: 1965     Reason for Consult: Ostomy pouch change         Wound History: 57yo M who presented with perforated diverticulitis. 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 He underwent ex lap, sigmoidectomy, and end colostomy on 6/7. Vac applied 6/11 to MLI. TPN initiated. On 6/14 vac was taken down which showed necrotic fascia at midline incision. He was taken to the OR for a exploration laparotomy, abdominal washout, fascial debridement, enmas closure on 6/16 with Dr. Case.     Wound Assessment:    The wound care team came to bedside to assess the patient's ostomy pouch and possibly provide a lesson.  The patient stated that he pouch was intact and was changed on 6/16 during surgery.  The patient is set to go back to the OR on 6/18 for additional washout. The patient and RN endorse foul smelling drainage coming from the midline incision.  The patient stated the surgical team changed the dressing right before the visit.     Wound Team Plan: Follow the patient after surgery for wound and ostomy care      ISABELLE Ramos  6/17/2024  6:13 PM

## 2024-06-18 ENCOUNTER — ANESTHESIA (OUTPATIENT)
Dept: OPERATING ROOM | Facility: HOSPITAL | Age: 59
End: 2024-06-18
Payer: COMMERCIAL

## 2024-06-18 LAB
ABO GROUP (TYPE) IN BLOOD: NORMAL
ALBUMIN SERPL BCP-MCNC: 2.7 G/DL (ref 3.4–5)
ANION GAP SERPL CALC-SCNC: 15 MMOL/L (ref 10–20)
ANTIBODY SCREEN: NORMAL
BASOPHILS # BLD AUTO: 0.06 X10*3/UL (ref 0–0.1)
BASOPHILS NFR BLD AUTO: 0.4 %
BUN SERPL-MCNC: 10 MG/DL (ref 6–23)
CALCIUM SERPL-MCNC: 8.6 MG/DL (ref 8.6–10.6)
CHLORIDE SERPL-SCNC: 98 MMOL/L (ref 98–107)
CO2 SERPL-SCNC: 28 MMOL/L (ref 21–32)
CREAT SERPL-MCNC: 0.45 MG/DL (ref 0.5–1.3)
EGFRCR SERPLBLD CKD-EPI 2021: >90 ML/MIN/1.73M*2
EOSINOPHIL # BLD AUTO: 0.23 X10*3/UL (ref 0–0.7)
EOSINOPHIL NFR BLD AUTO: 1.6 %
ERYTHROCYTE [DISTWIDTH] IN BLOOD BY AUTOMATED COUNT: 13.5 % (ref 11.5–14.5)
GLUCOSE BLD MANUAL STRIP-MCNC: 132 MG/DL (ref 74–99)
GLUCOSE BLD MANUAL STRIP-MCNC: 133 MG/DL (ref 74–99)
GLUCOSE BLD MANUAL STRIP-MCNC: 147 MG/DL (ref 74–99)
GLUCOSE BLD MANUAL STRIP-MCNC: 148 MG/DL (ref 74–99)
GLUCOSE BLD MANUAL STRIP-MCNC: 155 MG/DL (ref 74–99)
GLUCOSE BLD MANUAL STRIP-MCNC: 157 MG/DL (ref 74–99)
GLUCOSE SERPL-MCNC: 138 MG/DL (ref 74–99)
HCT VFR BLD AUTO: 31.9 % (ref 41–52)
HGB BLD-MCNC: 10.4 G/DL (ref 13.5–17.5)
IMM GRANULOCYTES # BLD AUTO: 0.08 X10*3/UL (ref 0–0.7)
IMM GRANULOCYTES NFR BLD AUTO: 0.6 % (ref 0–0.9)
LYMPHOCYTES # BLD AUTO: 1.38 X10*3/UL (ref 1.2–4.8)
LYMPHOCYTES NFR BLD AUTO: 9.8 %
MAGNESIUM SERPL-MCNC: 1.94 MG/DL (ref 1.6–2.4)
MCH RBC QN AUTO: 27 PG (ref 26–34)
MCHC RBC AUTO-ENTMCNC: 32.6 G/DL (ref 32–36)
MCV RBC AUTO: 83 FL (ref 80–100)
MONOCYTES # BLD AUTO: 1.14 X10*3/UL (ref 0.1–1)
MONOCYTES NFR BLD AUTO: 8.1 %
NEUTROPHILS # BLD AUTO: 11.22 X10*3/UL (ref 1.2–7.7)
NEUTROPHILS NFR BLD AUTO: 79.5 %
NRBC BLD-RTO: 0 /100 WBCS (ref 0–0)
PHOSPHATE SERPL-MCNC: 3.7 MG/DL (ref 2.5–4.9)
PLATELET # BLD AUTO: 601 X10*3/UL (ref 150–450)
POTASSIUM SERPL-SCNC: 3.4 MMOL/L (ref 3.5–5.3)
RBC # BLD AUTO: 3.85 X10*6/UL (ref 4.5–5.9)
RH FACTOR (ANTIGEN D): NORMAL
SODIUM SERPL-SCNC: 138 MMOL/L (ref 136–145)
WBC # BLD AUTO: 14.1 X10*3/UL (ref 4.4–11.3)

## 2024-06-18 PROCEDURE — 2500000005 HC RX 250 GENERAL PHARMACY W/O HCPCS

## 2024-06-18 PROCEDURE — A4217 STERILE WATER/SALINE, 500 ML: HCPCS | Performed by: PHARMACIST

## 2024-06-18 PROCEDURE — 82947 ASSAY GLUCOSE BLOOD QUANT: CPT

## 2024-06-18 PROCEDURE — 0JB80ZZ EXCISION OF ABDOMEN SUBCUTANEOUS TISSUE AND FASCIA, OPEN APPROACH: ICD-10-PCS | Performed by: SURGERY

## 2024-06-18 PROCEDURE — 87185 SC STD ENZYME DETCJ PER NZM: CPT | Performed by: SURGERY

## 2024-06-18 PROCEDURE — 7100000001 HC RECOVERY ROOM TIME - INITIAL BASE CHARGE: Performed by: SURGERY

## 2024-06-18 PROCEDURE — 86850 RBC ANTIBODY SCREEN: CPT

## 2024-06-18 PROCEDURE — 15778 IMPL ABSRB MSH/PRSTH DLY CLS: CPT | Performed by: SURGERY

## 2024-06-18 PROCEDURE — 2580000001 HC RX 258 IV SOLUTIONS

## 2024-06-18 PROCEDURE — 2780000003 HC OR 278 NO HCPCS: Performed by: SURGERY

## 2024-06-18 PROCEDURE — 2500000004 HC RX 250 GENERAL PHARMACY W/ HCPCS (ALT 636 FOR OP/ED): Mod: JZ | Performed by: PHARMACIST

## 2024-06-18 PROCEDURE — 2500000004 HC RX 250 GENERAL PHARMACY W/ HCPCS (ALT 636 FOR OP/ED)

## 2024-06-18 PROCEDURE — 3700000002 HC GENERAL ANESTHESIA TIME - EACH INCREMENTAL 1 MINUTE: Performed by: SURGERY

## 2024-06-18 PROCEDURE — 3700000001 HC GENERAL ANESTHESIA TIME - INITIAL BASE CHARGE: Performed by: SURGERY

## 2024-06-18 PROCEDURE — C9113 INJ PANTOPRAZOLE SODIUM, VIA: HCPCS

## 2024-06-18 PROCEDURE — A4217 STERILE WATER/SALINE, 500 ML: HCPCS | Performed by: SURGERY

## 2024-06-18 PROCEDURE — 1100000001 HC PRIVATE ROOM DAILY

## 2024-06-18 PROCEDURE — C1781 MESH (IMPLANTABLE): HCPCS | Performed by: SURGERY

## 2024-06-18 PROCEDURE — 85025 COMPLETE CBC W/AUTO DIFF WBC: CPT

## 2024-06-18 PROCEDURE — 2500000004 HC RX 250 GENERAL PHARMACY W/ HCPCS (ALT 636 FOR OP/ED): Performed by: SURGERY

## 2024-06-18 PROCEDURE — 3600000003 HC OR TIME - INITIAL BASE CHARGE - PROCEDURE LEVEL THREE: Performed by: SURGERY

## 2024-06-18 PROCEDURE — 84100 ASSAY OF PHOSPHORUS: CPT | Performed by: STUDENT IN AN ORGANIZED HEALTH CARE EDUCATION/TRAINING PROGRAM

## 2024-06-18 PROCEDURE — 83735 ASSAY OF MAGNESIUM: CPT

## 2024-06-18 PROCEDURE — 49002 REOPENING OF ABDOMEN: CPT | Performed by: SURGERY

## 2024-06-18 PROCEDURE — 3600000008 HC OR TIME - EACH INCREMENTAL 1 MINUTE - PROCEDURE LEVEL THREE: Performed by: SURGERY

## 2024-06-18 PROCEDURE — 2720000007 HC OR 272 NO HCPCS: Performed by: SURGERY

## 2024-06-18 PROCEDURE — 7100000002 HC RECOVERY ROOM TIME - EACH INCREMENTAL 1 MINUTE: Performed by: SURGERY

## 2024-06-18 PROCEDURE — 86901 BLOOD TYPING SEROLOGIC RH(D): CPT

## 2024-06-18 PROCEDURE — 10180 I&D COMPLEX PO WOUND INFCTJ: CPT | Performed by: SURGERY

## 2024-06-18 PROCEDURE — 87205 SMEAR GRAM STAIN: CPT | Performed by: SURGERY

## 2024-06-18 DEVICE — PATCH, MESH, VICRYL, 12 X 12 IN: Type: IMPLANTABLE DEVICE | Site: ABDOMEN | Status: FUNCTIONAL

## 2024-06-18 RX ORDER — DIPHENHYDRAMINE HYDROCHLORIDE 50 MG/ML
12.5 INJECTION INTRAMUSCULAR; INTRAVENOUS ONCE AS NEEDED
Status: DISCONTINUED | OUTPATIENT
Start: 2024-06-18 | End: 2024-06-18 | Stop reason: HOSPADM

## 2024-06-18 RX ORDER — POTASSIUM CHLORIDE 14.9 MG/ML
20 INJECTION INTRAVENOUS
Status: DISCONTINUED | OUTPATIENT
Start: 2024-06-18 | End: 2024-06-18

## 2024-06-18 RX ORDER — ACETAMINOPHEN 10 MG/ML
1000 INJECTION, SOLUTION INTRAVENOUS EVERY 6 HOURS SCHEDULED
Status: DISCONTINUED | OUTPATIENT
Start: 2024-06-18 | End: 2024-06-20

## 2024-06-18 RX ORDER — HYDROMORPHONE HYDROCHLORIDE 1 MG/ML
INJECTION, SOLUTION INTRAMUSCULAR; INTRAVENOUS; SUBCUTANEOUS AS NEEDED
Status: DISCONTINUED | OUTPATIENT
Start: 2024-06-18 | End: 2024-06-18

## 2024-06-18 RX ORDER — DROPERIDOL 2.5 MG/ML
0.62 INJECTION, SOLUTION INTRAMUSCULAR; INTRAVENOUS ONCE AS NEEDED
Status: DISCONTINUED | OUTPATIENT
Start: 2024-06-18 | End: 2024-06-18 | Stop reason: HOSPADM

## 2024-06-18 RX ORDER — PHENYLEPHRINE HCL IN 0.9% NACL 0.4MG/10ML
SYRINGE (ML) INTRAVENOUS AS NEEDED
Status: DISCONTINUED | OUTPATIENT
Start: 2024-06-18 | End: 2024-06-18

## 2024-06-18 RX ORDER — LIDOCAINE HYDROCHLORIDE 20 MG/ML
INJECTION, SOLUTION INFILTRATION; PERINEURAL AS NEEDED
Status: DISCONTINUED | OUTPATIENT
Start: 2024-06-18 | End: 2024-06-18

## 2024-06-18 RX ORDER — HYDROMORPHONE HYDROCHLORIDE 1 MG/ML
0.2 INJECTION, SOLUTION INTRAMUSCULAR; INTRAVENOUS; SUBCUTANEOUS EVERY 5 MIN PRN
Status: DISCONTINUED | OUTPATIENT
Start: 2024-06-18 | End: 2024-06-18 | Stop reason: HOSPADM

## 2024-06-18 RX ORDER — HYDROMORPHONE HYDROCHLORIDE 1 MG/ML
0.5 INJECTION, SOLUTION INTRAMUSCULAR; INTRAVENOUS; SUBCUTANEOUS EVERY 5 MIN PRN
Status: DISCONTINUED | OUTPATIENT
Start: 2024-06-18 | End: 2024-06-18 | Stop reason: HOSPADM

## 2024-06-18 RX ORDER — ONDANSETRON HYDROCHLORIDE 2 MG/ML
4 INJECTION, SOLUTION INTRAVENOUS ONCE AS NEEDED
Status: DISCONTINUED | OUTPATIENT
Start: 2024-06-18 | End: 2024-06-18 | Stop reason: HOSPADM

## 2024-06-18 RX ORDER — SODIUM CHLORIDE, SODIUM LACTATE, POTASSIUM CHLORIDE, CALCIUM CHLORIDE 600; 310; 30; 20 MG/100ML; MG/100ML; MG/100ML; MG/100ML
INJECTION, SOLUTION INTRAVENOUS CONTINUOUS PRN
Status: DISCONTINUED | OUTPATIENT
Start: 2024-06-18 | End: 2024-06-18

## 2024-06-18 RX ORDER — ONDANSETRON HYDROCHLORIDE 2 MG/ML
INJECTION, SOLUTION INTRAVENOUS AS NEEDED
Status: DISCONTINUED | OUTPATIENT
Start: 2024-06-18 | End: 2024-06-18

## 2024-06-18 RX ORDER — OXYCODONE HYDROCHLORIDE 5 MG/1
5 TABLET ORAL EVERY 4 HOURS PRN
Status: DISCONTINUED | OUTPATIENT
Start: 2024-06-18 | End: 2024-06-18 | Stop reason: HOSPADM

## 2024-06-18 RX ORDER — FENTANYL CITRATE 50 UG/ML
INJECTION, SOLUTION INTRAMUSCULAR; INTRAVENOUS AS NEEDED
Status: DISCONTINUED | OUTPATIENT
Start: 2024-06-18 | End: 2024-06-18

## 2024-06-18 RX ORDER — MEPERIDINE HYDROCHLORIDE 25 MG/ML
12.5 INJECTION INTRAMUSCULAR; INTRAVENOUS; SUBCUTANEOUS EVERY 10 MIN PRN
Status: DISCONTINUED | OUTPATIENT
Start: 2024-06-18 | End: 2024-06-18 | Stop reason: HOSPADM

## 2024-06-18 RX ORDER — SODIUM CHLORIDE 0.9 G/100ML
IRRIGANT IRRIGATION AS NEEDED
Status: DISCONTINUED | OUTPATIENT
Start: 2024-06-18 | End: 2024-06-18 | Stop reason: HOSPADM

## 2024-06-18 RX ORDER — VANCOMYCIN HYDROCHLORIDE 1 G/20ML
INJECTION, POWDER, LYOPHILIZED, FOR SOLUTION INTRAVENOUS DAILY PRN
Status: DISCONTINUED | OUTPATIENT
Start: 2024-06-18 | End: 2024-06-21

## 2024-06-18 RX ORDER — MIDAZOLAM HYDROCHLORIDE 1 MG/ML
INJECTION INTRAMUSCULAR; INTRAVENOUS AS NEEDED
Status: DISCONTINUED | OUTPATIENT
Start: 2024-06-18 | End: 2024-06-18

## 2024-06-18 RX ORDER — LABETALOL HYDROCHLORIDE 5 MG/ML
5 INJECTION, SOLUTION INTRAVENOUS ONCE AS NEEDED
Status: DISCONTINUED | OUTPATIENT
Start: 2024-06-18 | End: 2024-06-18 | Stop reason: HOSPADM

## 2024-06-18 RX ORDER — PROPOFOL 10 MG/ML
INJECTION, EMULSION INTRAVENOUS AS NEEDED
Status: DISCONTINUED | OUTPATIENT
Start: 2024-06-18 | End: 2024-06-18

## 2024-06-18 RX ORDER — SODIUM CHLORIDE, SODIUM LACTATE, POTASSIUM CHLORIDE, CALCIUM CHLORIDE 600; 310; 30; 20 MG/100ML; MG/100ML; MG/100ML; MG/100ML
100 INJECTION, SOLUTION INTRAVENOUS CONTINUOUS
Status: DISCONTINUED | OUTPATIENT
Start: 2024-06-18 | End: 2024-06-18 | Stop reason: HOSPADM

## 2024-06-18 RX ORDER — SUCCINYLCHOLINE CHLORIDE 20 MG/ML
INJECTION INTRAMUSCULAR; INTRAVENOUS AS NEEDED
Status: DISCONTINUED | OUTPATIENT
Start: 2024-06-18 | End: 2024-06-18

## 2024-06-18 RX ORDER — ALBUTEROL SULFATE 0.83 MG/ML
2.5 SOLUTION RESPIRATORY (INHALATION) ONCE AS NEEDED
Status: DISCONTINUED | OUTPATIENT
Start: 2024-06-18 | End: 2024-06-18 | Stop reason: HOSPADM

## 2024-06-18 RX ORDER — POTASSIUM CHLORIDE 14.9 MG/ML
20 INJECTION INTRAVENOUS ONCE
Status: DISCONTINUED | OUTPATIENT
Start: 2024-06-18 | End: 2024-06-18

## 2024-06-18 RX ORDER — MIDAZOLAM HYDROCHLORIDE 1 MG/ML
1 INJECTION INTRAMUSCULAR; INTRAVENOUS ONCE AS NEEDED
Status: DISCONTINUED | OUTPATIENT
Start: 2024-06-18 | End: 2024-06-18 | Stop reason: HOSPADM

## 2024-06-18 RX ORDER — ROCURONIUM BROMIDE 10 MG/ML
INJECTION, SOLUTION INTRAVENOUS AS NEEDED
Status: DISCONTINUED | OUTPATIENT
Start: 2024-06-18 | End: 2024-06-18

## 2024-06-18 RX ADMIN — ENOXAPARIN SODIUM 40 MG: 100 INJECTION SUBCUTANEOUS at 21:22

## 2024-06-18 RX ADMIN — PIPERACILLIN SODIUM AND TAZOBACTAM SODIUM 3.38 G: 3; .375 INJECTION, SOLUTION INTRAVENOUS at 14:34

## 2024-06-18 RX ADMIN — ACETAMINOPHEN 1000 MG: 10 INJECTION INTRAVENOUS at 00:41

## 2024-06-18 RX ADMIN — POTASSIUM CHLORIDE 20 MEQ: 14.9 INJECTION, SOLUTION INTRAVENOUS at 05:39

## 2024-06-18 RX ADMIN — ACETAMINOPHEN 1000 MG: 10 INJECTION INTRAVENOUS at 21:33

## 2024-06-18 RX ADMIN — THIAMINE HYDROCHLORIDE 100 MG: 100 INJECTION, SOLUTION INTRAMUSCULAR; INTRAVENOUS at 11:27

## 2024-06-18 RX ADMIN — PANTOPRAZOLE SODIUM 40 MG: 40 INJECTION, POWDER, FOR SOLUTION INTRAVENOUS at 21:47

## 2024-06-18 RX ADMIN — ACETAMINOPHEN 1000 MG: 10 INJECTION INTRAVENOUS at 11:18

## 2024-06-18 RX ADMIN — WATER 1250 MG: 1 INJECTION INTRAMUSCULAR; INTRAVENOUS; SUBCUTANEOUS at 12:47

## 2024-06-18 RX ADMIN — HYDROMORPHONE HYDROCHLORIDE: 10 INJECTION, SOLUTION INTRAMUSCULAR; INTRAVENOUS; SUBCUTANEOUS at 11:16

## 2024-06-18 RX ADMIN — PIPERACILLIN SODIUM AND TAZOBACTAM SODIUM 3.38 G: 3; .375 INJECTION, SOLUTION INTRAVENOUS at 03:18

## 2024-06-18 RX ADMIN — WATER 1250 MG: 1 INJECTION INTRAMUSCULAR; INTRAVENOUS; SUBCUTANEOUS at 23:56

## 2024-06-18 RX ADMIN — PIPERACILLIN SODIUM AND TAZOBACTAM SODIUM 3.38 G: 3; .375 INJECTION, SOLUTION INTRAVENOUS at 21:22

## 2024-06-18 RX ADMIN — ASCORBIC ACID, VITAMIN A PALMITATE, CHOLECALCIFEROL, THIAMINE HYDROCHLORIDE, RIBOFLAVIN-5 PHOSPHATE SODIUM, PYRIDOXINE HYDROCHLORIDE, NIACINAMIDE, DEXPANTHENOL, ALPHA-TOCOPHEROL ACETATE, VITAMIN K1, FOLIC ACID, BIOTIN, CYANOCOBALAMIN: 200; 3300; 200; 6; 3.6; 6; 40; 15; 10; 150; 600; 60; 5 INJECTION, SOLUTION INTRAVENOUS at 21:27

## 2024-06-18 RX ADMIN — SMOFLIPID 50 G: 6; 6; 5; 3 INJECTION, EMULSION INTRAVENOUS at 21:28

## 2024-06-18 ASSESSMENT — COGNITIVE AND FUNCTIONAL STATUS - GENERAL
DAILY ACTIVITIY SCORE: 21
CLIMB 3 TO 5 STEPS WITH RAILING: A LOT
STANDING UP FROM CHAIR USING ARMS: A LOT
DRESSING REGULAR UPPER BODY CLOTHING: A LITTLE
MOBILITY SCORE: 19
MOBILITY SCORE: 13
HELP NEEDED FOR BATHING: A LITTLE
DRESSING REGULAR UPPER BODY CLOTHING: A LITTLE
WALKING IN HOSPITAL ROOM: A LITTLE
DRESSING REGULAR LOWER BODY CLOTHING: A LITTLE
PERSONAL GROOMING: A LITTLE
TURNING FROM BACK TO SIDE WHILE IN FLAT BAD: A LOT
MOVING FROM LYING ON BACK TO SITTING ON SIDE OF FLAT BED WITH BEDRAILS: A LITTLE
WALKING IN HOSPITAL ROOM: A LOT
CLIMB 3 TO 5 STEPS WITH RAILING: A LITTLE
MOVING FROM LYING ON BACK TO SITTING ON SIDE OF FLAT BED WITH BEDRAILS: A LITTLE
EATING MEALS: A LITTLE
DRESSING REGULAR LOWER BODY CLOTHING: A LITTLE
HELP NEEDED FOR BATHING: A LITTLE
TOILETING: A LITTLE
TURNING FROM BACK TO SIDE WHILE IN FLAT BAD: A LITTLE
MOVING TO AND FROM BED TO CHAIR: A LOT
DAILY ACTIVITIY SCORE: 18
MOVING TO AND FROM BED TO CHAIR: A LITTLE

## 2024-06-18 ASSESSMENT — PAIN - FUNCTIONAL ASSESSMENT
PAIN_FUNCTIONAL_ASSESSMENT: 0-10
PAIN_FUNCTIONAL_ASSESSMENT: 0-10

## 2024-06-18 ASSESSMENT — PAIN SCALES - GENERAL
PAINLEVEL_OUTOF10: 0 - NO PAIN
PAINLEVEL_OUTOF10: 7
PAINLEVEL_OUTOF10: 0 - NO PAIN
PAINLEVEL_OUTOF10: 0 - NO PAIN
PAIN_LEVEL: 3

## 2024-06-18 NOTE — CARE PLAN
The patient's goals for the shift include      The clinical goals for the shift include maintain pt safety and control pain

## 2024-06-18 NOTE — ANESTHESIA PROCEDURE NOTES
Airway  Date/Time: 6/18/2024 7:30 AM  Urgency: elective    Airway not difficult    Staffing  Performed: RICARDO   Authorized by: Casa Plata MD    Performed by: Malinda Farmer  Patient location during procedure: OR    Indications and Patient Condition  Indications for airway management: anesthesia and airway protection  Spontaneous Ventilation: absent  Sedation level: deep  Preoxygenated: yes  Patient position: sniffing  Mask difficulty assessment: 0 - not attempted  Planned trial extubation    Final Airway Details  Final airway type: endotracheal airway      Successful airway: ETT  Cuffed: yes   Successful intubation technique: direct laryngoscopy  Facilitating devices/methods: intubating stylet and anterior pressure/BURP  Endotracheal tube insertion site: oral  Blade: Jenni  Blade size: #4  ETT size (mm): 7.0  Cormack-Lehane Classification: grade IIb - view of arytenoids or posterior of glottis only  Placement verified by: chest auscultation and capnometry   Measured from: gums  ETT to gums (cm): 23  Number of attempts at approach: 1

## 2024-06-18 NOTE — ANESTHESIA POSTPROCEDURE EVALUATION
Patient: Bereket Em    Procedure Summary       Date: 06/18/24 Room / Location: [facility] OR 11 / [facility] OR    Anesthesia Start: 0720 Anesthesia Stop: 0948    Procedure: Re Exploration Laparotomy, mesh placement. (Abdomen) Diagnosis:       Diverticulitis of large intestine with abscess without bleeding      (Diverticulitis of large intestine with abscess without bleeding [K57.20])    Surgeons: Jareth Rios DO Responsible Provider: Casa Plata MD    Anesthesia Type: general ASA Status: 2            Anesthesia Type: general    Vitals Value Taken Time   /89 06/18/24 1000   Temp 36.7 °C (98.1 °F) 06/18/24 1000   Pulse 93 06/18/24 1012   Resp 18 06/18/24 1012   SpO2 94 % 06/18/24 1012   Vitals shown include unfiled device data.    Anesthesia Post Evaluation    Patient location during evaluation: PACU  Patient participation: complete - patient participated  Level of consciousness: awake and alert  Pain score: 3  Pain management: adequate  Airway patency: patent  Cardiovascular status: acceptable  Respiratory status: acceptable  Hydration status: acceptable  Postoperative Nausea and Vomiting: none        There were no known notable events for this encounter.

## 2024-06-18 NOTE — BRIEF OP NOTE
Date: 2024  OR Location: [facility] OR    Name: Bereket Em, : 1965, Age: 58 y.o., MRN: [id number], Sex: male    Diagnosis  Pre-op Diagnosis     * Diverticulitis of large intestine with abscess without bleeding [K57.20] Post-op Diagnosis     * Diverticulitis of large intestine with abscess without bleeding [K57.20]     Procedures  Re Exploration Laparotomy, mesh placement.  51007 - PA EXPLORATORY LAPAROTOMY CELIOTOMY W/WO BIOPSY SPX      Surgeons      * Jareth Rios - Primary    Resident/Fellow/Other Assistant:  Surgeons and Role:     * Tamia Chaparro MD - Resident - Assisting     * Yon Zamora MD - Resident - Assisting    Procedure Summary  Anesthesia: General  ASA: II  Anesthesia Staff: Anesthesiologist: Casa Plata MD  CRNA: MINERVA Nuñez-CRNA  SRNA: Malindabrooke Farmer  Estimated Blood Loss: 10mL  Intra-op Medications:   Administrations occurring from 0715 to 0955 on 24:   Medication Name Total Dose   insulin lispro (HumaLOG) injection 0-5 Units Cannot be calculated   pantoprazole (ProtoNix) injection 40 mg Cannot be calculated   piperacillin-tazobactam-dextrose (Zosyn) IV 3.375 g Cannot be calculated   thiamine (Vitamin B1) injection 100 mg Cannot be calculated   potassium chloride 20 mEq in 100 mL IV premix Cannot be calculated              Anesthesia Record               Intraprocedure I/O Totals          Intake    lactated Ringer's 1000.00 mL    Total Intake 1000 mL       Output    Urine 115 mL    Est. Blood Loss 20 mL    Total Output 135 mL       Net    Net Volume 865 mL          Specimen:   ID Type Source Tests Collected by Time   A : Necrotic Abdominal Fascia Swab ABSCESS TISSUE/WOUND CULTURE/SMEAR Jareth Rios,  2024 0808        Staff:   Circulator: Pia  Scrub Person: Britney  Circulator: Juan José  Scrub Person: Karely          Findings: necrotic abdominal wall tissue debrided, Phasix mesh placed in defect measuring 24x8cm    Complications:  None;  patient tolerated the procedure well.     Disposition: PACU - hemodynamically stable.  Condition: stable  Specimens Collected:   ID Type Source Tests Collected by Time   A : Necrotic Abdominal Fascia Swab ABSCESS TISSUE/WOUND CULTURE/SMEAR Jareth Rios DO 6/18/2024 0808     Attending Attestation:     Jareth Rios  Phone Number: [phone]

## 2024-06-18 NOTE — CONSULTS
"Vancomycin Dosing by Pharmacy  INITIAL CONSULT      Bereket Em is a 58 y.o. male who Pharmacy is consulted to dose vancomycin for surgical wound infection.     Based on the patient's indication and renal status, this patient will be dosed based on a goal vancomycin AUC of 400-600.     Renal function is currently stable.    Estimated Creatinine Clearance: 125 mL/min (A) (by C-G formula based on SCr of 0.45 mg/dL (L)).    No results found for: \"VANCORANDOM\", \"VANCOTROUGH\"    Results from last 7 days   Lab Units 06/18/24  0318 06/17/24  0540 06/16/24  0420 06/15/24  0536   CREATININE mg/dL 0.45* 0.41* 0.41* 0.39*   BUN mg/dL 10 9 7 5*   WBC AUTO x10*3/uL 14.1* 14.6* 20.3* 17.0*        Visit Vitals  /89   Pulse 91   Temp 36.7 °C (98.1 °F) (Temporal)   Resp 19       Staph/MRSA Screen Culture   Date/Time Value Ref Range Status   06/13/2024 08:23 AM No Staphylococcus aureus isolated  Final     Urine Culture   Date/Time Value Ref Range Status   06/05/2024 04:31 PM No growth  Final        Assessment/Plan     Will order maintenance dose of 1250 mg every 12 hours. This dosing regimen is predicted by [employer] to result in the following pharmacokinetic parameters:   Regimen: 1250 mg IV every 12 hours.  Start time: 11:23 on 06/18/2024  Exposure target: AUC24 (range)400-600 mg/L.hr   AUC24,ss: 459 mg/L.hr  Probability of AUC24 > 400: 64 %  Ctrough,ss: 13.2 mg/L  Probability of Ctrough,ss > 20: 20 %  Probability of nephrotoxicity (Lodise WILLIAM 2009): 8 %    Vancomycin follow-up level will be ordered for 6/19 at AM , unless clinically indicated sooner.  Will continue to monitor renal function daily while on vancomycin and order serum creatinine at least every 48 hours if not already ordered.  Will follow for continued vancomycin needs, clinical response, and signs/symptoms of toxicity.       Bijan Contreras, PharmD     "

## 2024-06-18 NOTE — ANESTHESIA PREPROCEDURE EVALUATION
Patient: Bereket Em    Procedure Information       Date/Time: 06/18/24 0715    Procedure: Exploration Laparotomy, possible mesh placement, possible resection (Abdomen)    Location: [facility] OR 11 / [facility] OR    Surgeons: Jareth Rios, DO            Relevant Problems   Cardiac   (+) Benign essential HTN      Neuro   (+) Anxiety and depression   (+) Cervical radiculopathy      Hematology   (+) Anemia       Clinical information reviewed:   Tobacco  Allergies  Meds   Med Hx  Surg Hx   Fam Hx  Soc Hx        NPO Detail:  No data recorded     Physical Exam    Airway  Mallampati: II  TM distance: >3 FB  Neck ROM: full     Cardiovascular   Rhythm: regular  Rate: normal     Dental    Pulmonary   Breath sounds clear to auscultation     Abdominal   Abdomen: soft  Bowel sounds: normal           Anesthesia Plan    History of general anesthesia?: yes  History of complications of general anesthesia?: no    ASA 2     general     intravenous induction   Postoperative administration of opioids is intended.  Trial extubation is planned.  Anesthetic plan and risks discussed with patient.  Use of blood products discussed with patient who.    Plan discussed with CAA and CRNA.       Infectious Disease

## 2024-06-19 ENCOUNTER — APPOINTMENT (OUTPATIENT)
Dept: RADIOLOGY | Facility: HOSPITAL | Age: 59
DRG: 329 | End: 2024-06-19
Payer: COMMERCIAL

## 2024-06-19 LAB
ALBUMIN SERPL BCP-MCNC: 2.9 G/DL (ref 3.4–5)
ANION GAP SERPL CALC-SCNC: 15 MMOL/L (ref 10–20)
BASOPHILS # BLD AUTO: 0.05 X10*3/UL (ref 0–0.1)
BASOPHILS NFR BLD AUTO: 0.3 %
BUN SERPL-MCNC: 10 MG/DL (ref 6–23)
CALCIUM SERPL-MCNC: 8.7 MG/DL (ref 8.6–10.6)
CHLORIDE SERPL-SCNC: 95 MMOL/L (ref 98–107)
CO2 SERPL-SCNC: 29 MMOL/L (ref 21–32)
CREAT SERPL-MCNC: 0.52 MG/DL (ref 0.5–1.3)
EGFRCR SERPLBLD CKD-EPI 2021: >90 ML/MIN/1.73M*2
EOSINOPHIL # BLD AUTO: 0.2 X10*3/UL (ref 0–0.7)
EOSINOPHIL NFR BLD AUTO: 1.4 %
ERYTHROCYTE [DISTWIDTH] IN BLOOD BY AUTOMATED COUNT: 13.8 % (ref 11.5–14.5)
GLUCOSE BLD MANUAL STRIP-MCNC: 124 MG/DL (ref 74–99)
GLUCOSE BLD MANUAL STRIP-MCNC: 128 MG/DL (ref 74–99)
GLUCOSE BLD MANUAL STRIP-MCNC: 142 MG/DL (ref 74–99)
GLUCOSE SERPL-MCNC: 119 MG/DL (ref 74–99)
HCT VFR BLD AUTO: 32.4 % (ref 41–52)
HGB BLD-MCNC: 10.3 G/DL (ref 13.5–17.5)
IMM GRANULOCYTES # BLD AUTO: 0.16 X10*3/UL (ref 0–0.7)
IMM GRANULOCYTES NFR BLD AUTO: 1.1 % (ref 0–0.9)
LYMPHOCYTES # BLD AUTO: 2.07 X10*3/UL (ref 1.2–4.8)
LYMPHOCYTES NFR BLD AUTO: 14.3 %
MAGNESIUM SERPL-MCNC: 2.13 MG/DL (ref 1.6–2.4)
MCH RBC QN AUTO: 26.9 PG (ref 26–34)
MCHC RBC AUTO-ENTMCNC: 31.8 G/DL (ref 32–36)
MCV RBC AUTO: 85 FL (ref 80–100)
MONOCYTES # BLD AUTO: 1.32 X10*3/UL (ref 0.1–1)
MONOCYTES NFR BLD AUTO: 9.1 %
NEUTROPHILS # BLD AUTO: 10.67 X10*3/UL (ref 1.2–7.7)
NEUTROPHILS NFR BLD AUTO: 73.8 %
NRBC BLD-RTO: 0 /100 WBCS (ref 0–0)
PHOSPHATE SERPL-MCNC: 3.9 MG/DL (ref 2.5–4.9)
PLATELET # BLD AUTO: 692 X10*3/UL (ref 150–450)
POTASSIUM SERPL-SCNC: 3.7 MMOL/L (ref 3.5–5.3)
RBC # BLD AUTO: 3.83 X10*6/UL (ref 4.5–5.9)
SODIUM SERPL-SCNC: 135 MMOL/L (ref 136–145)
VANCOMYCIN SERPL-MCNC: 9.2 UG/ML (ref 5–20)
WBC # BLD AUTO: 14.5 X10*3/UL (ref 4.4–11.3)

## 2024-06-19 PROCEDURE — 83735 ASSAY OF MAGNESIUM: CPT

## 2024-06-19 PROCEDURE — 2500000004 HC RX 250 GENERAL PHARMACY W/ HCPCS (ALT 636 FOR OP/ED)

## 2024-06-19 PROCEDURE — 71045 X-RAY EXAM CHEST 1 VIEW: CPT

## 2024-06-19 PROCEDURE — 1100000001 HC PRIVATE ROOM DAILY

## 2024-06-19 PROCEDURE — 71045 X-RAY EXAM CHEST 1 VIEW: CPT | Performed by: RADIOLOGY

## 2024-06-19 PROCEDURE — 2580000001 HC RX 258 IV SOLUTIONS

## 2024-06-19 PROCEDURE — C9113 INJ PANTOPRAZOLE SODIUM, VIA: HCPCS

## 2024-06-19 PROCEDURE — 80069 RENAL FUNCTION PANEL: CPT

## 2024-06-19 PROCEDURE — 85025 COMPLETE CBC W/AUTO DIFF WBC: CPT

## 2024-06-19 PROCEDURE — 97116 GAIT TRAINING THERAPY: CPT | Mod: GP,CQ

## 2024-06-19 PROCEDURE — 97530 THERAPEUTIC ACTIVITIES: CPT | Mod: GP,CQ

## 2024-06-19 PROCEDURE — 82947 ASSAY GLUCOSE BLOOD QUANT: CPT

## 2024-06-19 PROCEDURE — 80202 ASSAY OF VANCOMYCIN: CPT | Performed by: PHARMACIST

## 2024-06-19 PROCEDURE — 2500000005 HC RX 250 GENERAL PHARMACY W/O HCPCS

## 2024-06-19 PROCEDURE — A4217 STERILE WATER/SALINE, 500 ML: HCPCS

## 2024-06-19 RX ADMIN — ENOXAPARIN SODIUM 40 MG: 100 INJECTION SUBCUTANEOUS at 21:10

## 2024-06-19 RX ADMIN — ACETAMINOPHEN 1000 MG: 10 INJECTION INTRAVENOUS at 21:10

## 2024-06-19 RX ADMIN — ACETAMINOPHEN 1000 MG: 10 INJECTION INTRAVENOUS at 09:11

## 2024-06-19 RX ADMIN — ACETAMINOPHEN 1000 MG: 10 INJECTION INTRAVENOUS at 14:41

## 2024-06-19 RX ADMIN — PANTOPRAZOLE SODIUM 40 MG: 40 INJECTION, POWDER, FOR SOLUTION INTRAVENOUS at 09:26

## 2024-06-19 RX ADMIN — SMOFLIPID 50 G: 6; 6; 5; 3 INJECTION, EMULSION INTRAVENOUS at 21:10

## 2024-06-19 RX ADMIN — ACETAMINOPHEN 1000 MG: 10 INJECTION INTRAVENOUS at 02:08

## 2024-06-19 RX ADMIN — PIPERACILLIN SODIUM AND TAZOBACTAM SODIUM 3.38 G: 3; .375 INJECTION, SOLUTION INTRAVENOUS at 15:12

## 2024-06-19 RX ADMIN — PIPERACILLIN SODIUM AND TAZOBACTAM SODIUM 3.38 G: 3; .375 INJECTION, SOLUTION INTRAVENOUS at 02:12

## 2024-06-19 RX ADMIN — PIPERACILLIN SODIUM AND TAZOBACTAM SODIUM 3.38 G: 3; .375 INJECTION, SOLUTION INTRAVENOUS at 09:22

## 2024-06-19 RX ADMIN — ASCORBIC ACID, VITAMIN A PALMITATE, CHOLECALCIFEROL, THIAMINE HYDROCHLORIDE, RIBOFLAVIN-5 PHOSPHATE SODIUM, PYRIDOXINE HYDROCHLORIDE, NIACINAMIDE, DEXPANTHENOL, ALPHA-TOCOPHEROL ACETATE, VITAMIN K1, FOLIC ACID, BIOTIN, CYANOCOBALAMIN: 200; 3300; 200; 6; 3.6; 6; 40; 15; 10; 150; 600; 60; 5 INJECTION, SOLUTION INTRAVENOUS at 22:54

## 2024-06-19 RX ADMIN — PANTOPRAZOLE SODIUM 40 MG: 40 INJECTION, POWDER, FOR SOLUTION INTRAVENOUS at 21:20

## 2024-06-19 RX ADMIN — VANCOMYCIN HYDROCHLORIDE 1500 MG: 5 INJECTION, POWDER, LYOPHILIZED, FOR SOLUTION INTRAVENOUS at 12:44

## 2024-06-19 RX ADMIN — THIAMINE HYDROCHLORIDE 100 MG: 100 INJECTION, SOLUTION INTRAMUSCULAR; INTRAVENOUS at 09:17

## 2024-06-19 RX ADMIN — PIPERACILLIN SODIUM AND TAZOBACTAM SODIUM 3.38 G: 3; .375 INJECTION, SOLUTION INTRAVENOUS at 21:10

## 2024-06-19 ASSESSMENT — COGNITIVE AND FUNCTIONAL STATUS - GENERAL
DRESSING REGULAR LOWER BODY CLOTHING: A LITTLE
MOVING FROM LYING ON BACK TO SITTING ON SIDE OF FLAT BED WITH BEDRAILS: A LITTLE
HELP NEEDED FOR BATHING: A LITTLE
MOVING TO AND FROM BED TO CHAIR: A LITTLE
STANDING UP FROM CHAIR USING ARMS: A LITTLE
WALKING IN HOSPITAL ROOM: A LITTLE
DRESSING REGULAR UPPER BODY CLOTHING: A LITTLE
CLIMB 3 TO 5 STEPS WITH RAILING: A LITTLE
MOBILITY SCORE: 18
CLIMB 3 TO 5 STEPS WITH RAILING: A LOT
TURNING FROM BACK TO SIDE WHILE IN FLAT BAD: A LITTLE
MOVING FROM LYING ON BACK TO SITTING ON SIDE OF FLAT BED WITH BEDRAILS: A LITTLE
WALKING IN HOSPITAL ROOM: A LITTLE
MOVING TO AND FROM BED TO CHAIR: A LITTLE
DAILY ACTIVITIY SCORE: 21
TURNING FROM BACK TO SIDE WHILE IN FLAT BAD: A LITTLE
STANDING UP FROM CHAIR USING ARMS: A LITTLE
MOBILITY SCORE: 17

## 2024-06-19 ASSESSMENT — PAIN SCALES - GENERAL
PAINLEVEL_OUTOF10: 4
PAINLEVEL_OUTOF10: 0 - NO PAIN
PAINLEVEL_OUTOF10: 5 - MODERATE PAIN
PAINLEVEL_OUTOF10: 0 - NO PAIN
PAINLEVEL_OUTOF10: 5 - MODERATE PAIN

## 2024-06-19 ASSESSMENT — PAIN DESCRIPTION - ORIENTATION: ORIENTATION: ANTERIOR;MID

## 2024-06-19 ASSESSMENT — PAIN DESCRIPTION - LOCATION
LOCATION: ABDOMEN
LOCATION: ABDOMEN

## 2024-06-19 ASSESSMENT — PAIN - FUNCTIONAL ASSESSMENT: PAIN_FUNCTIONAL_ASSESSMENT: 0-10

## 2024-06-19 ASSESSMENT — PAIN SCALES - WONG BAKER: WONGBAKER_NUMERICALRESPONSE: HURTS LITTLE MORE

## 2024-06-19 NOTE — PROGRESS NOTES
Vancomycin Dosing by Pharmacy- FOLLOW UP    Bereket Em is a 58 y.o. year old male who Pharmacy has been consulted for vancomycin dosing for surgical wound infection. Based on the patient's indication and renal status this patient is being dosed based on a goal AUC of 400-600.     Renal function is currently stable.    Current vancomycin dose: 1250 mg given every 12 hours    Estimated vancomycin AUC on current dose: 387 mg/L.hr     Visit Vitals  /82 (BP Location: Left arm, Patient Position: Lying)   Pulse 65   Temp 35.6 °C (96.1 °F) (Temporal)   Resp 18        Lab Results   Component Value Date    CREATININE 0.52 2024    CREATININE 0.45 (L) 2024    CREATININE 0.41 (L) 2024    CREATININE 0.41 (L) 2024        Patient weight is as follows:   Vitals:    24 0527   Weight: 83.5 kg (184 lb 1.4 oz)       Cultures:  No results found for the encounter in last 14 days.       I/O last 3 completed shifts:  In: 3769.1 (45.1 mL/kg) [I.V.:2000 (24 mL/kg); IV Piggyback:500]  Out: 5175 (62 mL/kg) [Urine:4055 (1.3 mL/kg/hr); Emesis/NG output:1100; Blood:20]  Weight: 83.5 kg   I/O during current shift:  I/O this shift:  In: -   Out: 250 [Urine:250]    Temp (24hrs), Av.1 °C (97 °F), Min:35.6 °C (96.1 °F), Max:36.7 °C (98.1 °F)      Assessment/Plan    Below goal AUC. Orders placed for new vancomcyin regimen of 1500 every 12 hours to begin at 1300.     This dosing regimen is predicted by [employer] to result in the following pharmacokinetic parameters:  Loading dose: N/A  Regimen: 1500 mg IV every 12 hours.  Start time: 11:56 on 2024  Exposure target: AUC24 (range)400-600 mg/L.hr   AUC24,ss: 463 mg/L.hr  Probability of AUC24 > 400: 68 %  Ctrough,ss: 14.4 mg/L  Probability of Ctrough,ss > 20: 27 %  Probability of nephrotoxicity (Lodise WILLIAM ): 10 %      The next level will be obtained on  at AM draw. May be obtained sooner if clinically indicated.   Will continue to monitor renal  function daily while on vancomycin and order serum creatinine at least every 48 hours if not already ordered.  Follow for continued vancomycin needs, clinical response, and signs/symptoms of toxicity.       Roula Flores, PharmD

## 2024-06-19 NOTE — PROGRESS NOTES
Physical Therapy    Physical Therapy Treatment    Patient Name: Bereket Em  MRN: [id number]  Today's Date: 6/19/2024  Time Calculation  Start Time: 1123  Stop Time: 1147  Time Calculation (min): 24 min    Assessment/Plan   PT Assessment  PT Assessment Results: Decreased strength, Decreased endurance, Impaired balance, Decreased mobility, Decreased range of motion  Rehab Prognosis: Good  Evaluation/Treatment Tolerance: Patient tolerated treatment well  Medical Staff Made Aware: Yes  End of Session Communication: Bedside nurse  Assessment Comment: Pt with exploratory laparotomy 6/18, no new precautions, supervising PT aware. Pt reports fatigue since most recent surgery but offers good effort and mobility. Remains appropriate for low intensity therapy as per PT eval.  End of Session Patient Position: Bed, 3 rail up, Alarm off, not on at start of session     PT Plan  Treatment/Interventions: Bed mobility, Transfer training, Gait training, Stair training, Balance training, Strengthening, Endurance training, Therapeutic exercise, Therapeutic activity  PT Plan: Skilled PT  PT Frequency: 3 times per week  PT Discharge Recommendations: Low intensity level of continued care  Equipment Recommended upon Discharge: Wheeled walker  PT Recommended Transfer Status: Stand by assist, Contact guard  PT - OK to Discharge: Yes      General Visit Information:   PT  Visit  PT Received On: 06/19/24  Response to Previous Treatment: Patient with no complaints from previous session.  General  Caregiver Feedback: Family members present when approached for therapy, stepping out of room for session.  Prior to Session Communication: Bedside nurse  Patient Position Received: Bed, 2 rail up, Alarm off, not on at start of session  General Comment: Pt is very pleasant and motivated    Subjective   Precautions:  Precautions  Medical Precautions: Fall precautions  Post-Surgical Precautions: Abdominal surgery precautions      Objective   Pain:  Pain  Assessment  Pain Assessment: 0-10  0-10 (Numeric) Pain Score: 0 - No pain  Cognition:  Cognition  Overall Cognitive Status: Within Functional Limits  Orientation Level: Oriented X4    Treatments:     Balance/Neuromuscular Re-Education  Balance/Neuromuscular Re-Education Activity Performed: Yes  Balance/Neuromuscular Re-Education Activity 1: Standing with single UE support ~5min while addressing various hygiene needs, with SBA and no LOB    Bed Mobility  Bed Mobility: Yes  Bed Mobility 1  Bed Mobility 1: Supine to sitting  Level of Assistance 1: Close supervision  Bed Mobility Comments 1: HOB elevated  Bed Mobility 2  Bed Mobility  2: Sitting to supine  Level of Assistance 2: Close supervision, Moderate verbal cues  Bed Mobility Comments 2: HOB flat, cues for logroll technique    Ambulation/Gait Training  Ambulation/Gait Training Performed: Yes  Ambulation/Gait Training 1  Surface 1: Level tile  Device 1: Rolling walker  Assistance 1: Contact guard  Comments/Distance (ft) 1: 30'x1, including multiple direction changes and navigating small spaces  Transfers  Transfer: Yes  Transfer 1  Transfer From 1: Sit to, Stand to  Transfer to 1: Sit  Technique 1: Sit to stand, Stand to sit  Transfer Device 1: Walker  Transfer Level of Assistance 1: Contact guard       Outcome Measures:     [facility] Basic Mobility  Turning from your back to your side while in a flat bed without using bedrails: A little  Moving from lying on your back to sitting on the side of a flat bed without using bedrails: A little  Moving to and from bed to chair (including a wheelchair): A little  Standing up from a chair using your arms (e.g. wheelchair or bedside chair): A little  To walk in hospital room: A little  Climbing 3-5 steps with railing: A little  Basic Mobility - Total Score: 18    Education Documentation  Precautions, taught by Christianne oNvoa PTA at 6/19/2024 12:03 PM.  Learner: Patient  Readiness: Acceptance  Method: Explanation,  Demonstration  Response: Verbalizes Understanding, Demonstrated Understanding    Body Mechanics, taught by Christianne Novoa PTA at 6/19/2024 12:03 PM.  Learner: Patient  Readiness: Acceptance  Method: Explanation, Demonstration  Response: Verbalizes Understanding, Demonstrated Understanding    Mobility Training, taught by Christianne Novoa PTA at 6/19/2024 12:03 PM.  Learner: Patient  Readiness: Acceptance  Method: Explanation, Demonstration  Response: Verbalizes Understanding, Demonstrated Understanding       Encounter Problems       Encounter Problems (Active)       Mobility       STG - Patient will ambulate 50 feet with supervision assist while using LRAD. (Progressing)       Start:  06/10/24    Expected End:  06/24/24            STG - Patient will ascend and descend four to six stairs with use of handrail(s) and CGA. (Progressing)       Start:  06/10/24    Expected End:  06/24/24               PT Transfers       STG - Patient will transfer sit to and from stand with supervision assist while using LRAD. (Progressing)       Start:  06/10/24    Expected End:  06/24/24               Pain - Adult            YASMIN Salmeron

## 2024-06-19 NOTE — PROGRESS NOTES
[facility]  ACUTE CARE SURGERY - PROGRESS NOTE    Patient Name: Bereket Em  MRN: [id number]  Admit Date: 605  : 1965  AGE: 58 y.o.   GENDER: male  ==============================================================================  TODAY'S ASSESSMENT AND PLAN OF CARE:  57yo M who presented with perforated diverticulitis. 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 He underwent ex lap, sigmoidectomy, and end colostomy on . Vac applied  to MLI. TPN initiated. On  vac was taken down which showed necrotic fascia at midline incision. He was taken to the OR for a exploration laparotomy, abdominal washout, fascial debridement, enmas closure on  with Dr. Case.    Midline incision closed with retention sutures, however developed foul smelling with obvious purulence. Taken back to the OR on  for re-exploration laparotomy, mesh placement     Plan:    Neuro:  - IV Tylenol, PCA  - holding home gabapentin and flexeril while NPO    CV:  - monitor vitals  - holding home lisinopril    Pulm:  - encourage IS  - Follow up AM CXR     GI: ostomy now functioning with liquid brown stool  - Remove NGT today  - OK for sips/chips  - PPI BID   - PICC line for TPN, nutrition consulted. Appreciate their recs.    - continue TPN to goal              - thiamine 100mg IV x7 days  - BID dressing changes to midline wound   - Monitor colostomy output, appreciate Enterostomal therapy following    :  - Strict I&Os  - Replete lytes as indicated     Endo: no hx of DM  - Continue blood glucose checks and SSI while on TPN     ID:  - Zosyn until  (dc'd)  - Started back on Zosyn and Vanc , continue for approximately 5-7 days     Ppx: SCDs, LVX, OOB    Dispo: continue care on RNF. Follow up PT/OT recs    Patient seen and discussed with Attending Dr. Estella PALMA-CNP  Acute Care Surgery  Pager  [phone]        ==============================================================================  CHIEF COMPLAINT / EVENTS LAST 24HRS / HPI:  NAEON. Pain controlled. Denies nausea. Has a cough with oxygen requirement, will obtain CXR today. Ostomy now functioning brown liquid stool.     MEDICAL HISTORY / ROS:   Admission history and ROS reviewed. Pertinent changes as follows:  None    PHYSICAL EXAM:  Heart Rate:  [52-91]   Temp:  [35.6 °C (96.1 °F)-36.7 °C (98.1 °F)]   Resp:  [18-26]   BP: (125-143)/(72-89)   SpO2:  [93 %-100 %]     Constitutional: no acute distress, conversational  Cardiac: palpable RR  Pulmonary: Unlabored respirations    Abdomen: soft, non distended, appropriately tender to palpitation, Midline wound open and packed with wet to moist kerlix. Colostomy pink and viable with brown liquid stool in pouch. Binder in place. NGT to LIWS draining scant effluent-removed.  MSK: DEACON  Extremities: no pitting edema  Skin: warm, dry  Neuro: alert, conversive    LABS:  Results for orders placed or performed during the hospital encounter of 06/05/24 (from the past 24 hour(s))   POCT GLUCOSE   Result Value Ref Range    POCT Glucose 132 (H) 74 - 99 mg/dL   POCT GLUCOSE   Result Value Ref Range    POCT Glucose 147 (H) 74 - 99 mg/dL   POCT GLUCOSE   Result Value Ref Range    POCT Glucose 133 (H) 74 - 99 mg/dL   POCT GLUCOSE   Result Value Ref Range    POCT Glucose 157 (H) 74 - 99 mg/dL   POCT GLUCOSE   Result Value Ref Range    POCT Glucose 142 (H) 74 - 99 mg/dL   Renal Function Panel   Result Value Ref Range    Glucose 119 (H) 74 - 99 mg/dL    Sodium 135 (L) 136 - 145 mmol/L    Potassium 3.7 3.5 - 5.3 mmol/L    Chloride 95 (L) 98 - 107 mmol/L    Bicarbonate 29 21 - 32 mmol/L    Anion Gap 15 10 - 20 mmol/L    Urea Nitrogen 10 6 - 23 mg/dL    Creatinine 0.52 0.50 - 1.30 mg/dL    eGFR >90 >60 mL/min/1.73m*2    Calcium 8.7 8.6 - 10.6 mg/dL    Phosphorus 3.9 2.5 - 4.9 mg/dL    Albumin 2.9 (L) 3.4 - 5.0 g/dL   CBC and Auto  Differential   Result Value Ref Range    WBC 14.5 (H) 4.4 - 11.3 x10*3/uL    nRBC 0.0 0.0 - 0.0 /100 WBCs    RBC 3.83 (L) 4.50 - 5.90 x10*6/uL    Hemoglobin 10.3 (L) 13.5 - 17.5 g/dL    Hematocrit 32.4 (L) 41.0 - 52.0 %    MCV 85 80 - 100 fL    MCH 26.9 26.0 - 34.0 pg    MCHC 31.8 (L) 32.0 - 36.0 g/dL    RDW 13.8 11.5 - 14.5 %    Platelets 692 (H) 150 - 450 x10*3/uL    Neutrophils % 73.8 40.0 - 80.0 %    Immature Granulocytes %, Automated 1.1 (H) 0.0 - 0.9 %    Lymphocytes % 14.3 13.0 - 44.0 %    Monocytes % 9.1 2.0 - 10.0 %    Eosinophils % 1.4 0.0 - 6.0 %    Basophils % 0.3 0.0 - 2.0 %    Neutrophils Absolute 10.67 (H) 1.20 - 7.70 x10*3/uL    Immature Granulocytes Absolute, Automated 0.16 0.00 - 0.70 x10*3/uL    Lymphocytes Absolute 2.07 1.20 - 4.80 x10*3/uL    Monocytes Absolute 1.32 (H) 0.10 - 1.00 x10*3/uL    Eosinophils Absolute 0.20 0.00 - 0.70 x10*3/uL    Basophils Absolute 0.05 0.00 - 0.10 x10*3/uL   Magnesium   Result Value Ref Range    Magnesium 2.13 1.60 - 2.40 mg/dL   Vancomycin   Result Value Ref Range    Vancomycin 9.2 5.0 - 20.0 ug/mL       IMAGING SUMMARY:  No new imaging      I have reviewed all laboratory and imaging results ordered/pertinent for today's encounter.

## 2024-06-19 NOTE — CARE PLAN
The patient's goals for the shift include  Pt pain will be manageable    The clinical goals for the shift include pt will be free from falls      Problem: Pain  Goal: Takes deep breaths with improved pain control throughout the shift  Outcome: Progressing  Goal: Turns in bed with improved pain control throughout the shift  Outcome: Progressing  Goal: Walks with improved pain control throughout the shift  Outcome: Progressing  Goal: Performs ADL's with improved pain control throughout shift  Outcome: Progressing  Goal: Participates in PT with improved pain control throughout the shift  Outcome: Progressing  Goal: Free from opioid side effects throughout the shift  Outcome: Progressing  Goal: Free from acute confusion related to pain meds throughout the shift  Outcome: Progressing     Problem: Pain - Adult  Goal: Verbalizes/displays adequate comfort level or baseline comfort level  Outcome: Progressing     Problem: Safety - Adult  Goal: Free from fall injury  Outcome: Progressing     Problem: Discharge Planning  Goal: Discharge to home or other facility with appropriate resources  Outcome: Progressing     Problem: Chronic Conditions and Co-morbidities  Goal: Patient's chronic conditions and co-morbidity symptoms are monitored and maintained or improved  Outcome: Progressing

## 2024-06-19 NOTE — CONSULTS
"Nutrition Follow Up Assessment:   Nutrition Assessment    Reason for Assessment: Dietitian discretion (Follow up)    Patient is a 58 y.o. male presenting with perforated diverticulitis    6/12: Last RDN note   6/14: vac taken down -- showed necrotic fascia at midline incision  6/15: ex lap abdominal washout, fascial debridement  6/18: Ex Lap, mesh placement    Nutrition History:  Food and Nutrient History: Attempted to meet with pt x 2 working with other services. TPN 5/15 @ 83ml/hr running    Anthropometrics:  Height: 172.7 cm (5' 8\")   Weight: 83.5 kg (184 lb 1.4 oz)   BMI (Calculated): 28  IBW/kg (Dietitian Calculated): 69.9 kg  Percent of IBW: 126 %       Weight History:   Date/Time Weight   06/18/24 0527 83.5 kg (184 lb 1.4 oz)   06/17/24 0914 83.9 kg (184 lb 15.5 oz)   06/05/24 2302 88.5 kg (195 lb) (5.6% wt loss x ~ 2 weeks)      Weight Change %:  Significant Weight Loss: Yes  Interpretation of Weight Loss: 5% in 1 month (5.6% wt loss x ~ 2 weeks)    Nutrition Focused Physical Exam Findings:  defer: Pt working with other services    Nutrition Significant Labs:  TPN/PPN Labs:   Results from last 7 days   Lab Units 06/19/24  0635 06/18/24  0318 06/17/24  0540 06/16/24  0420   GLUCOSE mg/dL 119* 138* 137* 175*   POTASSIUM mmol/L 3.7 3.4* 3.6 3.3*   PHOSPHORUS mg/dL 3.9 3.7 3.2 2.6   MAGNESIUM mg/dL 2.13 1.94 2.03 1.62   SODIUM mmol/L 135* 138 134* 138   CHLORIDE mmol/L 95* 98 94* 98        Nutrition Specific Medications:  Scheduled medications  acetaminophen, 1,000 mg, intravenous, q6h CIERRA  enoxaparin, 40 mg, subcutaneous, q24h  fat emulsion fish oil/plant based, 250 mL, intravenous, Daily  insulin lispro, 0-5 Units, subcutaneous, TID  pantoprazole, 40 mg, intravenous, BID  piperacillin-tazobactam, 3.375 g, intravenous, q6h  thiamine, 100 mg, intravenous, Daily  vancomycin, 1,500 mg, intravenous, q12h      Continuous medications  Adult Clinimix Parenteral Nutrition, 83 mL/hr, Last Rate: 83 mL/hr (06/18/24 " 2127)  HYDROmorphone,       I/O:    ;      Dietary Orders (From admission, onward)       Start     Ordered    06/19/24 1043  NPO Diet Except: Ice chips, Sips of clear liquids; Effective now  Diet effective now        Question Answer Comment   Except: Ice chips    Except: Sips of clear liquids        06/19/24 1042                     Estimated Needs:   Total Energy Estimated Needs (kCal):  (8676-0695)  Method for Estimating Needs: IBW x 32-35  Total Protein Estimated Needs (g):  (90+)  Method for Estimating Needs: IBW x 1.3+  Total Fluid Estimated Needs (mL):  (per team)           Nutrition Diagnosis   Malnutrition Diagnosis  Patient has Malnutrition Diagnosis: No    Nutrition Diagnosis  Patient has Nutrition Diagnosis: Yes  Diagnosis Status (1): Ongoing  Nutrition Diagnosis 1: Increased nutrient needs  Related to (1): increased metabolic demand  As Evidenced by (1): s/p ex lap, sigmoidectomy and end colostomy  Additional Nutrition Diagnosis: Diagnosis 2  Diagnosis Status (2): Ongoing  Nutrition Diagnosis 2: Altered GI function  Related to (2): small bowel obstruction  As Evidenced by (2): hx of NPO x 7 days need for TPN       Nutrition Interventions/Recommendations         Nutrition Prescription:    Continue Standard TPN 5% AA, 15% Dextrose @ 83ml/hr   Include MVI + trace elements   Provide 20% SMOF lipids @ 21 mL/hr x 12 hours overnight (250 mL total)         TPN monitoring:      - daily weights      - RFP + Mg daily; replete lytes PRN      - LFTs + TGs weekly      - accuchecks q6h     TPN + lipids to provide daily: 1992mL volume (just AA/Dex), 2242 mL volume (AA/Dex & lipids) 1916 kcals, 100 g protein, 299 g dextrose, 26% kcal from fat (meeting 87% kcal & 100% protein needs)     2. When ready to cycle pts TPN, recommend Clinimix 5% AA, 15% Dextrose x 12 hrs      - For the first hour: Run @ 95mL/hr      - For x 10hrs in between: Titrate up to 190mL/hr      - For the final hour: Decrease rate to 95mL/hr      -  Include MVI + trace elements        - Provide SMOF lipids @ 21mL/hr x 12 hours overnight (mL total)         TPN monitoring:      - Daily weights      - RFP + Mg daily; replete lytes PRN      - LFTs + TGs weekly      - Accuchecks q 6hrs     TPN + lipids provide: 1986kcals, 105g protein, 314dextrose, 25% from fat (meeting 90% kcal & 100% protein needs)    3. Advance diet per team discretion        Nutrition Interventions:   Interventions: Parenteral nutrition/ IV fluids  Nutrition Monitoring and Evaluation   Food/Nutrient Related History Monitoring  Monitoring and Evaluation Plan: Energy intake, Enteral and parenteral nutrition intake  Energy Intake: Estimated energy intake  Criteria: >75% of estimated energy needs  Enteral and Parenteral Nutrition Intake: Parenteral nutrition intake    Body Composition/Growth/Weight History  Monitoring and Evaluation Plan: Weight    Biochemical Data, Medical Tests and Procedures  Monitoring and Evaluation Plan: Electrolyte/renal panel, Glucose/endocrine profile  Electrolyte and Renal Panel: Sodium, Potassium, Phosphorus, Magnesium  Criteria: WNL  Glucose/Endocrine Profile: Glucose, casual  Criteria: WNL    Time Spent (min): 45 minutes

## 2024-06-20 LAB
ALBUMIN SERPL BCP-MCNC: 3 G/DL (ref 3.4–5)
ANION GAP SERPL CALC-SCNC: 14 MMOL/L (ref 10–20)
B-LACTAMASE ORGANISM ISLT: POSITIVE
BACTERIA SPEC CULT: ABNORMAL
BACTERIA SPEC CULT: ABNORMAL
BASOPHILS # BLD AUTO: 0.12 X10*3/UL (ref 0–0.1)
BASOPHILS NFR BLD AUTO: 1 %
BUN SERPL-MCNC: 10 MG/DL (ref 6–23)
CALCIUM SERPL-MCNC: 9.1 MG/DL (ref 8.6–10.6)
CHLORIDE SERPL-SCNC: 95 MMOL/L (ref 98–107)
CO2 SERPL-SCNC: 30 MMOL/L (ref 21–32)
CREAT SERPL-MCNC: 0.57 MG/DL (ref 0.5–1.3)
EGFRCR SERPLBLD CKD-EPI 2021: >90 ML/MIN/1.73M*2
EOSINOPHIL # BLD AUTO: 0.37 X10*3/UL (ref 0–0.7)
EOSINOPHIL NFR BLD AUTO: 3 %
ERYTHROCYTE [DISTWIDTH] IN BLOOD BY AUTOMATED COUNT: 13.3 % (ref 11.5–14.5)
GLUCOSE BLD MANUAL STRIP-MCNC: 100 MG/DL (ref 74–99)
GLUCOSE BLD MANUAL STRIP-MCNC: 120 MG/DL (ref 74–99)
GLUCOSE BLD MANUAL STRIP-MCNC: 129 MG/DL (ref 74–99)
GLUCOSE SERPL-MCNC: 111 MG/DL (ref 74–99)
GRAM STN SPEC: ABNORMAL
GRAM STN SPEC: ABNORMAL
HCT VFR BLD AUTO: 33.9 % (ref 41–52)
HGB BLD-MCNC: 10.9 G/DL (ref 13.5–17.5)
IMM GRANULOCYTES # BLD AUTO: 0.35 X10*3/UL (ref 0–0.7)
IMM GRANULOCYTES NFR BLD AUTO: 2.8 % (ref 0–0.9)
LYMPHOCYTES # BLD AUTO: 2.27 X10*3/UL (ref 1.2–4.8)
LYMPHOCYTES NFR BLD AUTO: 18.3 %
MAGNESIUM SERPL-MCNC: 2.06 MG/DL (ref 1.6–2.4)
MCH RBC QN AUTO: 26.7 PG (ref 26–34)
MCHC RBC AUTO-ENTMCNC: 32.2 G/DL (ref 32–36)
MCV RBC AUTO: 83 FL (ref 80–100)
MONOCYTES # BLD AUTO: 1.41 X10*3/UL (ref 0.1–1)
MONOCYTES NFR BLD AUTO: 11.4 %
NEUTROPHILS # BLD AUTO: 7.9 X10*3/UL (ref 1.2–7.7)
NEUTROPHILS NFR BLD AUTO: 63.5 %
NRBC BLD-RTO: 0 /100 WBCS (ref 0–0)
PHOSPHATE SERPL-MCNC: 4.1 MG/DL (ref 2.5–4.9)
PLATELET # BLD AUTO: 772 X10*3/UL (ref 150–450)
POTASSIUM SERPL-SCNC: 4.6 MMOL/L (ref 3.5–5.3)
RBC # BLD AUTO: 4.09 X10*6/UL (ref 4.5–5.9)
SODIUM SERPL-SCNC: 134 MMOL/L (ref 136–145)
VANCOMYCIN SERPL-MCNC: 16 UG/ML (ref 5–20)
WBC # BLD AUTO: 12.4 X10*3/UL (ref 4.4–11.3)

## 2024-06-20 PROCEDURE — 85025 COMPLETE CBC W/AUTO DIFF WBC: CPT

## 2024-06-20 PROCEDURE — 84100 ASSAY OF PHOSPHORUS: CPT

## 2024-06-20 PROCEDURE — 2500000004 HC RX 250 GENERAL PHARMACY W/ HCPCS (ALT 636 FOR OP/ED)

## 2024-06-20 PROCEDURE — 80202 ASSAY OF VANCOMYCIN: CPT

## 2024-06-20 PROCEDURE — C9113 INJ PANTOPRAZOLE SODIUM, VIA: HCPCS

## 2024-06-20 PROCEDURE — A4217 STERILE WATER/SALINE, 500 ML: HCPCS

## 2024-06-20 PROCEDURE — 1100000001 HC PRIVATE ROOM DAILY

## 2024-06-20 PROCEDURE — 82947 ASSAY GLUCOSE BLOOD QUANT: CPT

## 2024-06-20 PROCEDURE — 36415 COLL VENOUS BLD VENIPUNCTURE: CPT

## 2024-06-20 PROCEDURE — 2500000001 HC RX 250 WO HCPCS SELF ADMINISTERED DRUGS (ALT 637 FOR MEDICARE OP)

## 2024-06-20 PROCEDURE — 83735 ASSAY OF MAGNESIUM: CPT

## 2024-06-20 RX ORDER — ACETAMINOPHEN 325 MG/1
650 TABLET ORAL EVERY 6 HOURS
Status: DISPENSED | OUTPATIENT
Start: 2024-06-20

## 2024-06-20 RX ORDER — OXYCODONE HYDROCHLORIDE 5 MG/1
5 TABLET ORAL EVERY 4 HOURS PRN
Status: DISPENSED | OUTPATIENT
Start: 2024-06-20

## 2024-06-20 RX ORDER — HYDROMORPHONE HYDROCHLORIDE 1 MG/ML
0.2 INJECTION, SOLUTION INTRAMUSCULAR; INTRAVENOUS; SUBCUTANEOUS EVERY 4 HOURS PRN
Status: DISPENSED | OUTPATIENT
Start: 2024-06-20

## 2024-06-20 RX ORDER — OXYCODONE HYDROCHLORIDE 5 MG/1
10 TABLET ORAL EVERY 4 HOURS PRN
Status: DISPENSED | OUTPATIENT
Start: 2024-06-20

## 2024-06-20 RX ADMIN — ACETAMINOPHEN 650 MG: 325 TABLET ORAL at 20:41

## 2024-06-20 RX ADMIN — PANTOPRAZOLE SODIUM 40 MG: 40 INJECTION, POWDER, FOR SOLUTION INTRAVENOUS at 09:10

## 2024-06-20 RX ADMIN — THIAMINE HYDROCHLORIDE 100 MG: 100 INJECTION, SOLUTION INTRAMUSCULAR; INTRAVENOUS at 09:10

## 2024-06-20 RX ADMIN — OXYCODONE HYDROCHLORIDE 5 MG: 5 TABLET ORAL at 11:33

## 2024-06-20 RX ADMIN — OXYCODONE HYDROCHLORIDE 10 MG: 5 TABLET ORAL at 20:45

## 2024-06-20 RX ADMIN — VANCOMYCIN HYDROCHLORIDE 1500 MG: 5 INJECTION, POWDER, LYOPHILIZED, FOR SOLUTION INTRAVENOUS at 01:07

## 2024-06-20 RX ADMIN — ACETAMINOPHEN 1000 MG: 10 INJECTION INTRAVENOUS at 03:09

## 2024-06-20 RX ADMIN — PIPERACILLIN SODIUM AND TAZOBACTAM SODIUM 3.38 G: 3; .375 INJECTION, SOLUTION INTRAVENOUS at 09:10

## 2024-06-20 RX ADMIN — PANTOPRAZOLE SODIUM 40 MG: 40 INJECTION, POWDER, FOR SOLUTION INTRAVENOUS at 20:40

## 2024-06-20 RX ADMIN — VANCOMYCIN HYDROCHLORIDE 1500 MG: 5 INJECTION, POWDER, LYOPHILIZED, FOR SOLUTION INTRAVENOUS at 12:09

## 2024-06-20 RX ADMIN — PIPERACILLIN SODIUM AND TAZOBACTAM SODIUM 3.38 G: 3; .375 INJECTION, SOLUTION INTRAVENOUS at 14:22

## 2024-06-20 RX ADMIN — PIPERACILLIN SODIUM AND TAZOBACTAM SODIUM 3.38 G: 3; .375 INJECTION, SOLUTION INTRAVENOUS at 03:09

## 2024-06-20 RX ADMIN — ENOXAPARIN SODIUM 40 MG: 100 INJECTION SUBCUTANEOUS at 20:41

## 2024-06-20 RX ADMIN — ACETAMINOPHEN 650 MG: 325 TABLET ORAL at 11:33

## 2024-06-20 RX ADMIN — PIPERACILLIN SODIUM AND TAZOBACTAM SODIUM 3.38 G: 3; .375 INJECTION, SOLUTION INTRAVENOUS at 20:41

## 2024-06-20 ASSESSMENT — COGNITIVE AND FUNCTIONAL STATUS - GENERAL
STANDING UP FROM CHAIR USING ARMS: A LITTLE
MOVING FROM LYING ON BACK TO SITTING ON SIDE OF FLAT BED WITH BEDRAILS: A LITTLE
WALKING IN HOSPITAL ROOM: A LITTLE
TOILETING: A LITTLE
MOVING TO AND FROM BED TO CHAIR: A LITTLE
CLIMB 3 TO 5 STEPS WITH RAILING: A LITTLE
CLIMB 3 TO 5 STEPS WITH RAILING: A LITTLE
MOBILITY SCORE: 18
TOILETING: A LITTLE
MOBILITY SCORE: 18
MOVING TO AND FROM BED TO CHAIR: A LITTLE
WALKING IN HOSPITAL ROOM: A LITTLE
TURNING FROM BACK TO SIDE WHILE IN FLAT BAD: A LITTLE
DAILY ACTIVITIY SCORE: 23
MOVING FROM LYING ON BACK TO SITTING ON SIDE OF FLAT BED WITH BEDRAILS: A LITTLE
TURNING FROM BACK TO SIDE WHILE IN FLAT BAD: A LITTLE
STANDING UP FROM CHAIR USING ARMS: A LITTLE

## 2024-06-20 ASSESSMENT — PAIN SCALES - WONG BAKER
WONGBAKER_NUMERICALRESPONSE: HURTS WHOLE LOT
WONGBAKER_NUMERICALRESPONSE: HURTS EVEN MORE

## 2024-06-20 ASSESSMENT — PAIN - FUNCTIONAL ASSESSMENT
PAIN_FUNCTIONAL_ASSESSMENT: 0-10

## 2024-06-20 ASSESSMENT — PAIN SCALES - GENERAL
PAINLEVEL_OUTOF10: 0 - NO PAIN
PAINLEVEL_OUTOF10: 0 - NO PAIN
PAINLEVEL_OUTOF10: 6
PAINLEVEL_OUTOF10: 6
PAINLEVEL_OUTOF10: 7

## 2024-06-20 ASSESSMENT — PAIN DESCRIPTION - ORIENTATION
ORIENTATION: ANTERIOR;MID;LOWER
ORIENTATION: ANTERIOR;MID

## 2024-06-20 ASSESSMENT — PAIN DESCRIPTION - LOCATION
LOCATION: ABDOMEN
LOCATION: ABDOMEN

## 2024-06-20 NOTE — CARE PLAN
The patient's goals for the shift include      The clinical goals for the shift include Patient will remain free from any nausea or vomiting.    Over the shift, the patient did not make progress toward the following goals. Barriers to progression include patient having decreased appetite at this time. Recommendations to address these barriers include patient currently on TPN and diet is currently NPO with sips of clears and ice chips at this time.

## 2024-06-20 NOTE — CARE PLAN
The patient's goals for the shift include being able to ambulate in the room.     The clinical goals for the shift include Pt iwll rate his pain a 4/10 or less by the end of this shift.    Over the shift, the patient did make progress toward the following goals. He was able to ambulate with assistance to the chair. Pt c/o pain after getting out of the bed and was medicated with PRN oxycodone. Pt reported decreased pain after receiving his PRN pain medication.

## 2024-06-20 NOTE — PROGRESS NOTES
[facility]  ACUTE CARE SURGERY - PROGRESS NOTE    Patient Name: Bereket Em  MRN: [id number]  Admit Date: 605  : 1965  AGE: 58 y.o.   GENDER: male  ==============================================================================  TODAY'S ASSESSMENT AND PLAN OF CARE:  57yo M who presented with perforated diverticulitis. 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 He underwent ex lap, sigmoidectomy, and end colostomy on . Vac applied  to MLI. TPN initiated. On  vac was taken down which showed necrotic fascia at midline incision. He was taken to the OR for a exploration laparotomy, abdominal washout, fascial debridement, enmas closure on  with Dr. Case.    Midline incision closed with retention sutures, however developed foul smelling with obvious purulence. Taken back to the OR on  for re-exploration laparotomy, mesh placement.     Plan:  - wound is healthy appearing this morning, dressing changed  - dc PCA, transition to PO pain meds   - advance to clears  - wean TPN tomorrow    Neuro:  - dc IV Tylenol, PCA.  - stephan tylenol, stephan oxy 5/10, dilaudid for breakthrough  - holding home gabapentin and flexeril while NPO    CV:  - monitor vitals  - holding home lisinopril    Pulm:  - encourage IS  - Follow up AM CXR     GI: ostomy now functioning with liquid brown stool  - OK for clears  - PPI BID   - PICC line for TPN, nutrition consulted. Appreciate their recs.    - continue TPN to goal              - thiamine 100mg IV x7 days   - anticipate TPN wean tomorrow   - BID dressing changes to midline wound   - Monitor colostomy output, appreciate Enterostomal therapy following    :  - Strict I&Os  - Replete lytes as indicated     Endo: no hx of DM  - Continue blood glucose checks and SSI while on TPN     ID:  - Zosyn until  (dc'd)  - Started back on Zosyn and Vanc , continue for approximately 5-7 days     Ppx: SCDs, LVX,  OOB  PT/OT: home with home therapy    Dispo: continue care on RNF. Anticipate home with [facility] (PT, OT, wound care). ADOD weekend at earliest.     Patient seen and discussed with Attending Dr. Gabriel Vitale, PGY-1  ACS Service  [phone]    ==============================================================================  CHIEF COMPLAINT / EVENTS LAST 24HRS / HPI:  NAEON. Pain controlled. Denies nausea after NGT removal. Ostomy continues to have output and some air in bag. No fevers or chills. Tolerating diet without issues.     MEDICAL HISTORY / ROS:   Admission history and ROS reviewed. Pertinent changes as follows:  None    PHYSICAL EXAM:  Heart Rate:  [57-93]   Temp:  [35.8 °C (96.4 °F)-36.4 °C (97.5 °F)]   Resp:  [18-19]   BP: (107-155)/(64-90)   SpO2:  [94 %-97 %]     Constitutional: no acute distress, conversational  Cardiac: RR  Pulmonary: Unlabored respirations  Abdomen: soft, non distended, minimally tender to palpitation, Midline wound open and packed with wet to moist kerlix. Colostomy pink and viable with brown liquid stool in pouch. Binder in place.   MSK: DEACON  Extremities: no pitting edema  Skin: warm, dry  Neuro: alert, conversive    LABS:  Results for orders placed or performed during the hospital encounter of 06/05/24 (from the past 24 hour(s))   POCT GLUCOSE   Result Value Ref Range    POCT Glucose 128 (H) 74 - 99 mg/dL   POCT GLUCOSE   Result Value Ref Range    POCT Glucose 124 (H) 74 - 99 mg/dL   POCT GLUCOSE   Result Value Ref Range    POCT Glucose 129 (H) 74 - 99 mg/dL   Renal Function Panel   Result Value Ref Range    Glucose 111 (H) 74 - 99 mg/dL    Sodium 134 (L) 136 - 145 mmol/L    Potassium 4.6 3.5 - 5.3 mmol/L    Chloride 95 (L) 98 - 107 mmol/L    Bicarbonate 30 21 - 32 mmol/L    Anion Gap 14 10 - 20 mmol/L    Urea Nitrogen 10 6 - 23 mg/dL    Creatinine 0.57 0.50 - 1.30 mg/dL    eGFR >90 >60 mL/min/1.73m*2    Calcium 9.1 8.6 - 10.6 mg/dL    Phosphorus 4.1 2.5 - 4.9 mg/dL    Albumin 3.0 (L)  3.4 - 5.0 g/dL   CBC and Auto Differential   Result Value Ref Range    WBC 12.4 (H) 4.4 - 11.3 x10*3/uL    nRBC 0.0 0.0 - 0.0 /100 WBCs    RBC 4.09 (L) 4.50 - 5.90 x10*6/uL    Hemoglobin 10.9 (L) 13.5 - 17.5 g/dL    Hematocrit 33.9 (L) 41.0 - 52.0 %    MCV 83 80 - 100 fL    MCH 26.7 26.0 - 34.0 pg    MCHC 32.2 32.0 - 36.0 g/dL    RDW 13.3 11.5 - 14.5 %    Platelets 772 (H) 150 - 450 x10*3/uL    Neutrophils % 63.5 40.0 - 80.0 %    Immature Granulocytes %, Automated 2.8 (H) 0.0 - 0.9 %    Lymphocytes % 18.3 13.0 - 44.0 %    Monocytes % 11.4 2.0 - 10.0 %    Eosinophils % 3.0 0.0 - 6.0 %    Basophils % 1.0 0.0 - 2.0 %    Neutrophils Absolute 7.90 (H) 1.20 - 7.70 x10*3/uL    Immature Granulocytes Absolute, Automated 0.35 0.00 - 0.70 x10*3/uL    Lymphocytes Absolute 2.27 1.20 - 4.80 x10*3/uL    Monocytes Absolute 1.41 (H) 0.10 - 1.00 x10*3/uL    Eosinophils Absolute 0.37 0.00 - 0.70 x10*3/uL    Basophils Absolute 0.12 (H) 0.00 - 0.10 x10*3/uL   Magnesium   Result Value Ref Range    Magnesium 2.06 1.60 - 2.40 mg/dL   Vancomycin   Result Value Ref Range    Vancomycin 16.0 5.0 - 20.0 ug/mL       IMAGING SUMMARY:  No new imaging      I have reviewed all laboratory and imaging results ordered/pertinent for today's encounter.

## 2024-06-20 NOTE — PROGRESS NOTES
24 1700   Colostomy LLQ   Placement Date/Time: 24 1329   Location: LLQ   Stomal Appliance 1 piece;Changed   Site/Stoma Assessment Clean;Intact;Red   Stoma Size (cm)   (1 1/2 round)   Peristomal Assessment Wound;Separation   Treatment Pouch change;Site care   Drainage Characteristics Brown       Ostomy type: colostomy        size: 1 1/2 round      color: red and moist      protruding: budded   Umer: none  Functioning: brown loose stool   Mucocutaneous junction: wound separation 3-6 o'clock. Wound was packed with Aquacel   Peristomal skin: clean and intact   Pouchin piece Melonie flat oval with a filter and barrier ring   Ostomy Education: Patient participated with the ostomy education today.  The patient asisisted with removing the patient and prepping the periwound skin.  The patient plan to be more hands-on with a lesson tomorrow   Plan: assess stoma/pouching

## 2024-06-20 NOTE — OP NOTE
Re Exploration Laparotomy, mesh placement. Operative Note     Date: 2024  OR Location: [facility] OR    Name: Bereket Em, : 1965, Age: 58 y.o., MRN: [id number], Sex: male    Diagnosis  Pre-op Diagnosis     * Diverticulitis of large intestine with abscess without bleeding [K57.20] Post-op Diagnosis     * Diverticulitis of large intestine with abscess without bleeding [K57.20]     Procedures  Re Exploration Laparotomy, mesh placement.  31778 - [place] EXPLORATORY LAPAROTOMY CELIOTOMY W/WO BIOPSY SPX      Surgeons      * Jareth Rios - Primary    Resident/Fellow/Other Assistant:  Surgeons and Role:     * Tamia Chaparro MD - Resident - Assisting     * Yon Zamora MD - Resident - Assisting    Procedure Summary  Anesthesia: General  ASA: II  Anesthesia Staff: Anesthesiologist: Casa Plata MD  CRNA: MINERVA Nuñez-CRNA  SRNA: Malinda Farmer  Estimated Blood Loss: 10mL  Intra-op Medications:   Administrations occurring from 0715 to 0955 on 24:   Medication Name Total Dose   insulin lispro (HumaLOG) injection 0-5 Units Cannot be calculated   fat emulsion fish oil/plant based (SMOFlipid) 20 % IV infusion 50 g Cannot be calculated   potassium chloride 20 mEq in 100 mL IV premix Cannot be calculated              Anesthesia Record               Intraprocedure I/O Totals          Intake    lactated Ringer's 1000.00 mL    Total Intake 1000 mL       Output    Urine 115 mL    Est. Blood Loss 20 mL    Total Output 135 mL       Net    Net Volume 865 mL          Specimen:   ID Type Source Tests Collected by Time   A : Necrotic Abdominal Fascia Swab ABSCESS TISSUE/WOUND CULTURE/SMEAR Jareth Rios DO 2024 0808        Staff:   Circulator: Pia  Scrub Person: Britney  Circulator: Juan José  Scrub Person: Karely         Drains and/or Catheters:   Colostomy LLQ (Active)   Stomal Appliance Clean;Dry 24 1244   Site/Stoma Assessment Clean;Intact 24 1324   Stoma Size (cm) 4.4 cm  06/10/24 1656   Peristomal Assessment Clean;Intact 06/20/24 1324   Treatment Pouch change;Site care 06/14/24 1239   Drainage Characteristics Brown 06/15/24 0639   Output (mL) 150 mL 06/19/24 1154       [REMOVED] Open Drain LLQ (Removed)       [REMOVED] NG/OG/Feeding Tube Right nostril (Removed)   Tube Status Low intermittent suction 06/18/24 0945   Placement Verification Other (Comment) 06/15/24 0931   Gastric Aspirate Grassy green (gastric) 06/12/24 2000   Distal Tube Measurement 60 cm 06/18/24 0945   Site Assessment Clean;Dry;Intact 06/19/24 0537   Irrigant Tap water 06/11/24 2100   Response To Intervention No resistance met 06/12/24 2000   Tube Securement Taped to nostril center 06/17/24 0904   Intake - Flush (mL) 30 mL 06/11/24 2100   Output (mL) 400 mL 06/19/24 0616       [REMOVED] Urethral Catheter Coude 16 Fr. (Removed)   Site Assessment Skin intact;Clean 06/08/24 2040   Collection Container Standard drainage bag 06/08/24 2040   Securement Method Securing device (Describe) 06/08/24 2040   Output (mL) 200 mL 06/09/24 0104       [REMOVED] Urethral Catheter Non-latex 16 Fr. (Removed)       Tourniquet Times:         Implants:  Implants       Type Name Action Serial No.      Surgical Mesh Sling Implant PATCH, MESH, VICRYL, 12 X 12 IN - [id number] Implanted               Findings: Around 1cm of necrotic fascia bilaterally with some infected subcutaneous fat as well. No signs of infection in peritoneal cavity or involvement with the stoma.     Indications: Bereket Em is an 58 y.o. male who is having surgery for infection of his recent midline incision.    The patient was seen in the preoperative area. The risks, benefits, complications, treatment options, non-operative alternatives, expected recovery and outcomes were discussed with the patient. The possibilities of reaction to medication, pulmonary aspiration, injury to surrounding structures, bleeding, recurrent infection, the need for additional procedures,  "failure to diagnose a condition, and creating a complication requiring transfusion or operation were discussed with the patient. The patient concurred with the proposed plan, giving informed consent.  The site of surgery was properly noted/marked if necessary per policy. The patient has been actively warmed in preoperative area. Preoperative antibiotics are not indicated. Venous thrombosis prophylaxis have been ordered including bilateral sequential compression devices    Procedure Details: Appropriate consent was obtained.  The patient was taken into the operating room and a \"timeout\" was done verifying the patient's name, MRN, date of birth, procedure, position, allergies, antibiotics, need for special equipment, amongst other information.  After we did this the anesthesia team administered general anesthesia to the patient and intubated him without issue.  The patient was prepped and draped in the standard sterile fashion.  Before beginning the procedure another \"timeout\" was done and verified as correct.    We began the procedure by taking down the patient's retention sutures and evaluating the fascia.  We discovered that the fascia inferiorly around 8 cm had dehisced and there was necrotic fascia around 1 cm bilaterally on either side as well as some necrotic and infected subcutaneous fat.  We entered into the abdominal cavity and found a little bit of murky peritoneal fluid but then suctioned and washed this all out.  The small bowel was completely matted and adherent and could not be divided.  We looked around the abdominal cavity however and found no retained abscesses or infections and irrigated until clear.  Laterally the small bowel was adherent to the peritoneal wall and so the colon cannot be well-visualized.  We were able to trace in the subcutaneous space the colostomy and there was no communication between the colostomy and any of the midline infection.  We began to debride the midline fascia and " subcutaneous tissue using curettage, electrocautery, and sharp dissection.  After this was accomplished we surveilled everything again and found no other pathology and hemostasis.    There is determined that the fascia could not come together without undue tension.  We measured the defect and was approximately 24 cm vertically and 12 cm horizontally.  The first count was correct.  A Vicryl mesh was cut to size and using PDS suture was sewn to the fascia.  The final count was correct.  The patient tolerated procedure well without complication.  I was present and scrubbed for the entirety of the Procedure.  EBL was around 50 cc.  Patient was taken back to the PACU after extubation in stable condition.          Complications:  None; patient tolerated the procedure well.    Disposition: PACU - hemodynamically stable.  Condition: stable         Additional Details: none.     Attending Attestation: I was present and scrubbed for the entire procedure.    Jareth Rios  Phone Number: [phone]

## 2024-06-20 NOTE — PROGRESS NOTES
Vancomycin Dosing by Pharmacy- FOLLOW UP    Bereket Em is a 58 y.o. year old male who Pharmacy has been consulted for vancomycin dosing for surgical wound infection. Based on the patient's indication and renal status this patient is being dosed based on a goal AUC of 400-600.     Renal function is currently stable.    Current vancomycin dose: 1500 mg given every 12 hours    Estimated vancomycin AUC on current dose: 438 mg/L.hr     Visit Vitals  /64 (BP Location: Left arm, Patient Position: Lying)   Pulse 71   Temp 36.2 °C (97.2 °F) (Temporal)   Resp 19        Lab Results   Component Value Date    CREATININE 0.57 2024    CREATININE 0.52 2024    CREATININE 0.45 (L) 2024    CREATININE 0.41 (L) 2024        Patient weight is as follows:   Vitals:    24 0527   Weight: 83.5 kg (184 lb 1.4 oz)       Cultures:  No results found for the encounter in last 14 days.       I/O last 3 completed shifts:  In: 3832.1 (45.9 mL/kg) [P.O.:240; IV Piggyback:250]  Out: 5690 (68.1 mL/kg) [Urine:4540 (1.5 mL/kg/hr); Emesis/NG output:1000; Stool:150]  Weight: 83.5 kg   I/O during current shift:  No intake/output data recorded.    Temp (24hrs), Av °C (96.8 °F), Min:35.6 °C (96.1 °F), Max:36.3 °C (97.3 °F)      Assessment/Plan    Within goal AUC range. Continue current vancomycin regimen.    This dosing regimen is predicted by [employer] to result in the following pharmacokinetic parameters:  Loading dose: N/A  Regimen: 1500 mg IV every 12 hours.  Start time: 13:07 on 2024  Exposure target: AUC24 (range)400-600 mg/L.hr   AUC24,ss: 438 mg/L.hr  Probability of AUC24 > 400: 61 %  Ctrough,ss: 13.3 mg/L  Probability of Ctrough,ss > 20: 26 %  Probability of nephrotoxicity (Lodise WILLIAM ): 8 %    The next level will be obtained on  at AM draw. May be obtained sooner if clinically indicated.   Will continue to monitor renal function daily while on vancomycin and order serum creatinine at least  every 48 hours if not already ordered.  Follow for continued vancomycin needs, clinical response, and signs/symptoms of toxicity.       Roula Flores, PharmD

## 2024-06-21 ENCOUNTER — HOME HEALTH ADMISSION (OUTPATIENT)
Dept: HOME HEALTH SERVICES | Facility: HOME HEALTH | Age: 59
End: 2024-06-21
Payer: COMMERCIAL

## 2024-06-21 LAB
ALBUMIN SERPL BCP-MCNC: 2.9 G/DL (ref 3.4–5)
ANION GAP SERPL CALC-SCNC: 15 MMOL/L (ref 10–20)
BASOPHILS # BLD AUTO: 0.14 X10*3/UL (ref 0–0.1)
BASOPHILS NFR BLD AUTO: 1.1 %
BUN SERPL-MCNC: 8 MG/DL (ref 6–23)
CALCIUM SERPL-MCNC: 9.1 MG/DL (ref 8.6–10.6)
CHLORIDE SERPL-SCNC: 95 MMOL/L (ref 98–107)
CO2 SERPL-SCNC: 30 MMOL/L (ref 21–32)
CREAT SERPL-MCNC: 0.6 MG/DL (ref 0.5–1.3)
EGFRCR SERPLBLD CKD-EPI 2021: >90 ML/MIN/1.73M*2
EOSINOPHIL # BLD AUTO: 0.37 X10*3/UL (ref 0–0.7)
EOSINOPHIL NFR BLD AUTO: 2.9 %
ERYTHROCYTE [DISTWIDTH] IN BLOOD BY AUTOMATED COUNT: 13.5 % (ref 11.5–14.5)
GLUCOSE BLD MANUAL STRIP-MCNC: 103 MG/DL (ref 74–99)
GLUCOSE BLD MANUAL STRIP-MCNC: 111 MG/DL (ref 74–99)
GLUCOSE BLD MANUAL STRIP-MCNC: 115 MG/DL (ref 74–99)
GLUCOSE SERPL-MCNC: 100 MG/DL (ref 74–99)
HCT VFR BLD AUTO: 34.5 % (ref 41–52)
HGB BLD-MCNC: 11 G/DL (ref 13.5–17.5)
IMM GRANULOCYTES # BLD AUTO: 0.31 X10*3/UL (ref 0–0.7)
IMM GRANULOCYTES NFR BLD AUTO: 2.4 % (ref 0–0.9)
LYMPHOCYTES # BLD AUTO: 2.69 X10*3/UL (ref 1.2–4.8)
LYMPHOCYTES NFR BLD AUTO: 21.1 %
MAGNESIUM SERPL-MCNC: 2.13 MG/DL (ref 1.6–2.4)
MCH RBC QN AUTO: 26.7 PG (ref 26–34)
MCHC RBC AUTO-ENTMCNC: 31.9 G/DL (ref 32–36)
MCV RBC AUTO: 84 FL (ref 80–100)
MONOCYTES # BLD AUTO: 1.73 X10*3/UL (ref 0.1–1)
MONOCYTES NFR BLD AUTO: 13.6 %
NEUTROPHILS # BLD AUTO: 7.49 X10*3/UL (ref 1.2–7.7)
NEUTROPHILS NFR BLD AUTO: 58.9 %
NRBC BLD-RTO: 0 /100 WBCS (ref 0–0)
PHOSPHATE SERPL-MCNC: 5 MG/DL (ref 2.5–4.9)
PLATELET # BLD AUTO: 651 X10*3/UL (ref 150–450)
POTASSIUM SERPL-SCNC: 4.9 MMOL/L (ref 3.5–5.3)
RBC # BLD AUTO: 4.12 X10*6/UL (ref 4.5–5.9)
SODIUM SERPL-SCNC: 135 MMOL/L (ref 136–145)
WBC # BLD AUTO: 12.7 X10*3/UL (ref 4.4–11.3)

## 2024-06-21 PROCEDURE — C9113 INJ PANTOPRAZOLE SODIUM, VIA: HCPCS

## 2024-06-21 PROCEDURE — 85025 COMPLETE CBC W/AUTO DIFF WBC: CPT

## 2024-06-21 PROCEDURE — 2500000001 HC RX 250 WO HCPCS SELF ADMINISTERED DRUGS (ALT 637 FOR MEDICARE OP)

## 2024-06-21 PROCEDURE — 80069 RENAL FUNCTION PANEL: CPT

## 2024-06-21 PROCEDURE — 2500000004 HC RX 250 GENERAL PHARMACY W/ HCPCS (ALT 636 FOR OP/ED)

## 2024-06-21 PROCEDURE — 83735 ASSAY OF MAGNESIUM: CPT

## 2024-06-21 PROCEDURE — 99024 POSTOP FOLLOW-UP VISIT: CPT | Performed by: SURGERY

## 2024-06-21 PROCEDURE — 1100000001 HC PRIVATE ROOM DAILY

## 2024-06-21 PROCEDURE — 82947 ASSAY GLUCOSE BLOOD QUANT: CPT

## 2024-06-21 PROCEDURE — A4217 STERILE WATER/SALINE, 500 ML: HCPCS

## 2024-06-21 RX ADMIN — THIAMINE HYDROCHLORIDE 100 MG: 100 INJECTION, SOLUTION INTRAMUSCULAR; INTRAVENOUS at 08:59

## 2024-06-21 RX ADMIN — PIPERACILLIN SODIUM AND TAZOBACTAM SODIUM 3.38 G: 3; .375 INJECTION, SOLUTION INTRAVENOUS at 09:00

## 2024-06-21 RX ADMIN — OXYCODONE HYDROCHLORIDE 10 MG: 5 TABLET ORAL at 20:20

## 2024-06-21 RX ADMIN — ACETAMINOPHEN 650 MG: 325 TABLET ORAL at 08:59

## 2024-06-21 RX ADMIN — OXYCODONE HYDROCHLORIDE 10 MG: 5 TABLET ORAL at 01:14

## 2024-06-21 RX ADMIN — PANTOPRAZOLE SODIUM 40 MG: 40 INJECTION, POWDER, FOR SOLUTION INTRAVENOUS at 20:22

## 2024-06-21 RX ADMIN — ENOXAPARIN SODIUM 40 MG: 100 INJECTION SUBCUTANEOUS at 20:21

## 2024-06-21 RX ADMIN — OXYCODONE HYDROCHLORIDE 5 MG: 5 TABLET ORAL at 09:13

## 2024-06-21 RX ADMIN — PANTOPRAZOLE SODIUM 40 MG: 40 INJECTION, POWDER, FOR SOLUTION INTRAVENOUS at 08:59

## 2024-06-21 RX ADMIN — VANCOMYCIN HYDROCHLORIDE 1500 MG: 5 INJECTION, POWDER, LYOPHILIZED, FOR SOLUTION INTRAVENOUS at 01:00

## 2024-06-21 RX ADMIN — HYDROMORPHONE HYDROCHLORIDE 0.2 MG: 1 INJECTION, SOLUTION INTRAMUSCULAR; INTRAVENOUS; SUBCUTANEOUS at 13:26

## 2024-06-21 RX ADMIN — ACETAMINOPHEN 650 MG: 325 TABLET ORAL at 03:06

## 2024-06-21 RX ADMIN — PIPERACILLIN SODIUM AND TAZOBACTAM SODIUM 3.38 G: 3; .375 INJECTION, SOLUTION INTRAVENOUS at 20:23

## 2024-06-21 RX ADMIN — ACETAMINOPHEN 650 MG: 325 TABLET ORAL at 20:21

## 2024-06-21 RX ADMIN — VANCOMYCIN HYDROCHLORIDE 1500 MG: 5 INJECTION, POWDER, LYOPHILIZED, FOR SOLUTION INTRAVENOUS at 13:17

## 2024-06-21 RX ADMIN — PIPERACILLIN SODIUM AND TAZOBACTAM SODIUM 3.38 G: 3; .375 INJECTION, SOLUTION INTRAVENOUS at 14:48

## 2024-06-21 RX ADMIN — PIPERACILLIN SODIUM AND TAZOBACTAM SODIUM 3.38 G: 3; .375 INJECTION, SOLUTION INTRAVENOUS at 03:06

## 2024-06-21 RX ADMIN — ACETAMINOPHEN 650 MG: 325 TABLET ORAL at 14:38

## 2024-06-21 ASSESSMENT — COGNITIVE AND FUNCTIONAL STATUS - GENERAL
DAILY ACTIVITIY SCORE: 24
WALKING IN HOSPITAL ROOM: A LITTLE
WALKING IN HOSPITAL ROOM: A LITTLE
MOBILITY SCORE: 22
CLIMB 3 TO 5 STEPS WITH RAILING: A LITTLE
DAILY ACTIVITIY SCORE: 24
MOBILITY SCORE: 22
CLIMB 3 TO 5 STEPS WITH RAILING: A LITTLE

## 2024-06-21 ASSESSMENT — PAIN SCALES - GENERAL
PAINLEVEL_OUTOF10: 4
PAINLEVEL_OUTOF10: 0 - NO PAIN
PAINLEVEL_OUTOF10: 5 - MODERATE PAIN
PAINLEVEL_OUTOF10: 6
PAINLEVEL_OUTOF10: 7
PAINLEVEL_OUTOF10: 7

## 2024-06-21 ASSESSMENT — PAIN DESCRIPTION - ORIENTATION
ORIENTATION: ANTERIOR;MID;LOWER

## 2024-06-21 ASSESSMENT — PAIN DESCRIPTION - LOCATION
LOCATION: ABDOMEN

## 2024-06-21 ASSESSMENT — PAIN SCALES - WONG BAKER
WONGBAKER_NUMERICALRESPONSE: HURTS LITTLE MORE
WONGBAKER_NUMERICALRESPONSE: HURTS LITTLE MORE
WONGBAKER_NUMERICALRESPONSE: HURTS WHOLE LOT

## 2024-06-21 ASSESSMENT — PAIN - FUNCTIONAL ASSESSMENT: PAIN_FUNCTIONAL_ASSESSMENT: 0-10

## 2024-06-21 NOTE — CONSULTS
Vancomycin Dosing by Pharmacy- Cessation of Therapy    Consult to pharmacy for vancomycin dosing has been discontinued by the prescriber, pharmacy will sign off at this time.    Please call pharmacy if there are further questions or re-enter a consult if vancomycin is resumed.     HATTIE DE LUNA

## 2024-06-21 NOTE — CARE PLAN
The patient's goals for the shift include      The clinical goals for the shift include maintain patient safety and control pain

## 2024-06-21 NOTE — CARE PLAN
The patient's goals for the shift include      The clinical goals for the shift include Patient will remain kelton from any nausea or vomiting overnight.    Over the shift, the patient did not make progress toward the following goals. Barriers to progression include patient diet was advanced during day shift. Recommendations to address these barriers include frequent observation for any signs or symptoms of nausea or vomiting. Patient currently has no active signs at this time, I will continue to assess the patient for any changes overnight.

## 2024-06-21 NOTE — OP NOTE
Re Exploration Laparotomy, mesh placement. Operative Note     Date: 2024  OR Location: [facility] OR    Name: Bereket Em, : 1965, Age: 58 y.o., MRN: [id number], Sex: male    Diagnosis  Pre-op Diagnosis     * Diverticulitis of large intestine with abscess without bleeding [K57.20] Post-op Diagnosis     * Diverticulitis of large intestine with abscess without bleeding [K57.20]     Procedures  Re Exploration Laparotomy, mesh placement.  70064 - [place] EXPLORATORY LAPAROTOMY CELIOTOMY W/WO BIOPSY SPX      Surgeons      * Jareth Rios - Primary    Resident/Fellow/Other Assistant:  Surgeons and Role:     * Tamia Chaparro MD - Resident - Assisting     * Yon Zamora MD - Resident - Assisting    Procedure Summary  Anesthesia: General  ASA: II  Anesthesia Staff: Anesthesiologist: Casa Plata MD  CRNA: MINERVA Nuñez-CRNA  SRNA: Malinda Farmer  Estimated Blood Loss: 10 mL  Intra-op Medications:   Administrations occurring from 0715 to 0955 on 24:   Medication Name Total Dose   insulin lispro (HumaLOG) injection 0-5 Units Cannot be calculated   fat emulsion fish oil/plant based (SMOFlipid) 20 % IV infusion 50 g Cannot be calculated   potassium chloride 20 mEq in 100 mL IV premix Cannot be calculated              Anesthesia Record               Intraprocedure I/O Totals          Intake    lactated Ringer's 1000.00 mL    Total Intake 1000 mL       Output    Urine 115 mL    Est. Blood Loss 20 mL    Total Output 135 mL       Net    Net Volume 865 mL          Specimen:   ID Type Source Tests Collected by Time   A : Necrotic Abdominal Fascia Swab ABSCESS TISSUE/WOUND CULTURE/SMEAR Jareth Rios DO 2024 0808        Staff:   Circulator: Pia  Scrub Person: Britney  Circulator: Juan José  Scrub Person: Karely         Drains and/or Catheters:   Colostomy LLQ (Active)   Stomal Appliance 1 piece;Changed 24 1700   Site/Stoma Assessment Clean;Intact;Dry;Brown;Pink 24 0447   Stoma  Size (cm) 4.4 cm 06/10/24 1656   Peristomal Assessment Clean;Intact 06/21/24 0447   Treatment Pouch change;Site care 06/20/24 1700   Drainage Characteristics Brown 06/20/24 1700   Output (mL) 100 mL 06/21/24 0840       [REMOVED] Open Drain LLQ (Removed)       [REMOVED] NG/OG/Feeding Tube Right nostril (Removed)   Tube Status Low intermittent suction 06/18/24 0945   Placement Verification Other (Comment) 06/15/24 0931   Gastric Aspirate Grassy green (gastric) 06/12/24 2000   Distal Tube Measurement 60 cm 06/18/24 0945   Site Assessment Clean;Dry;Intact 06/19/24 0537   Irrigant Tap water 06/11/24 2100   Response To Intervention No resistance met 06/12/24 2000   Tube Securement Taped to nostril center 06/17/24 0904   Intake - Flush (mL) 30 mL 06/11/24 2100   Output (mL) 400 mL 06/19/24 0616       [REMOVED] Urethral Catheter Coude 16 Fr. (Removed)   Site Assessment Skin intact;Clean 06/08/24 2040   Collection Container Standard drainage bag 06/08/24 2040   Securement Method Securing device (Describe) 06/08/24 2040   Output (mL) 200 mL 06/09/24 0104       [REMOVED] Urethral Catheter Non-latex 16 Fr. (Removed)       Tourniquet Times:         Implants:  Implants       Type Name Action Serial No.      Surgical Mesh Sling Implant PATCH, MESH, VICRYL, 12 X 12 IN - [id number] Implanted               Findings: Fascial necrosis, purulent infection midline abdominal wound    Indications: Bereket Em is an 58 y.o. male who is having surgery for Diverticulitis of large intestine with abscess without bleeding [K57.20].     The patient was seen in the preoperative area. The risks, benefits, complications, treatment options, non-operative alternatives, expected recovery and outcomes were discussed with the patient. The possibilities of reaction to medication, pulmonary aspiration, injury to surrounding structures, bleeding, recurrent infection, the need for additional procedures, failure to diagnose a condition, and creating a  complication requiring transfusion or operation were discussed with the patient. The patient concurred with the proposed plan, giving informed consent.  The site of surgery was properly noted/marked if necessary per policy. The patient has been actively warmed in preoperative area. Preoperative antibiotics have been ordered and given within 1 hours of incision. Venous thrombosis prophylaxis have been ordered including bilateral sequential compression devices    Procedure Details: Patient was properly identified preop and taken to the operating room and placed on operating table in the supine position.  After adequate general endotracheal anesthesia and placement of bilateral lower extremity pneumatic compression devices the abdomen was then prepped and draped in the standard surgical fashion.  The remaining staples of the skin incision were then removed followed by removal of the fascial sutures in their entirety.  Again noted was purulence along the fascial edges of the wound with gross evidence of fascial necrosis.  The grossly infected and necrotic fascia was sharply debrided with a combination of electro Bovie cautery and Rdz scissors back to the level of healthy fascia.  At the completion of excisional debridement the wound was then irrigated with 3 L of warm normal saline via the suction  device.  Hemostasis was achieved via electro Bovie cautery.  Due to the fascial debridement back to the level of the abdominal wall musculature it was decided that the wound would be managed by placement of retention sutures.  Retention sutures consisting of #2 nylon, totaling 8, were placed equidistant along the midline abdominal wound in the standard fashion.  After completion of approximation of the edges of the midline wound the skin of the abdomen was cleaned and dried followed by placement of sterile 4 x 4 gauze dressings and tape.  Next the previously removed left lower quadrant colostomy appliance was then  replaced.  All needle sponge and instrument counts were correct at the end of the procedure.  Patient tolerated the above procedure well and was transported to the recovery room in stable condition and extubated.                                                                  Complications:  None; patient tolerated the procedure well.    Disposition: PACU - hemodynamically stable.  Condition: stable         Additional Details:     Attending Attestation: I was present and scrubbed for the entire procedure.    Jareth Rios  Phone Number: [phone]

## 2024-06-21 NOTE — PROGRESS NOTES
[facility]  ACUTE CARE SURGERY - PROGRESS NOTE    Patient Name: Bereket Em  MRN: [id number]  Admit Date: 605  : 1965  AGE: 58 y.o.   GENDER: male  ==============================================================================  TODAY'S ASSESSMENT AND PLAN OF CARE:  57yo M who presented with perforated diverticulitis. 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 He underwent ex lap, sigmoidectomy, and end colostomy on . Vac applied  to MLI. TPN initiated. On  vac was taken down which showed necrotic fascia at midline incision. He was taken to the OR for a exploration laparotomy, abdominal washout, fascial debridement, enmas closure on  with Dr. Case.    Midline incision closed with retention sutures, however developed foul smelling with obvious purulence. Taken back to the OR on  for re-exploration laparotomy, mesh placement.     Doing well this morning, wound is well appearing, no evidence of ongoing infection. Dressing changed. Tolerating clears yesterday, can advance diet today and dc TPN.     Plan:  - dc TPN  - regular diet  - continue BID dressing changes    Neuro:  - stephan tylenol, stephan oxy 5/10, dilaudid for breakthrough    CV:  - monitor vitals  - holding home lisinopril    Pulm:  - encourage IS    GI: ostomy now functioning with brown stool  - OK for regular diet  - PPI BID   - PICC line for TPN, nutrition consulted. Appreciate their recs.    - dc TPN  - BID dressing changes to midline wound   - Monitor colostomy output, appreciate Enterostomal therapy following    :  - Strict I&Os  - Replete lytes as indicated     Endo: no hx of DM  - dc BG checks    ID:  - Zosyn until  (dc'd)  - Started back on Zosyn and Vanc , continue for approximately 5-7 days     Ppx: SCDs, LVX, OOB  PT/OT: home with home therapy    Dispo: continue care on RNF. Anticipate home with [facility] (PT, OT, wound care). ADOD this weekend.      Patient seen and discussed with Attending Dr. Gabriel Vitale, PGY-1  ACS Service  [id number]    ==============================================================================  CHIEF COMPLAINT / EVENTS LAST 24HRS / HPI:  NAEON. Pain controlled. Tolerated clears yesterday, would like to try regular diet. Ostomy is functioning.     MEDICAL HISTORY / ROS:   Admission history and ROS reviewed. Pertinent changes as follows:  None    PHYSICAL EXAM:  Heart Rate:  [60-89]   Temp:  [36.1 °C (97 °F)-36.5 °C (97.7 °F)]   Resp:  [16-19]   BP: (108-133)/(69-86)   SpO2:  [95 %-96 %]     Constitutional: no acute distress, conversational  Cardiac: RR  Pulmonary: Unlabored respirations  Abdomen: soft, non distended, minimally tender to palpitation, Midline wound open and packed with wet to moist kerlix. Colostomy pink and viable with brown liquid stool in pouch. Binder in place.   MSK: DEACON  Extremities: no pitting edema  Skin: warm, dry  Neuro: alert, conversive    LABS:  Results for orders placed or performed during the hospital encounter of 06/05/24 (from the past 24 hour(s))   POCT GLUCOSE   Result Value Ref Range    POCT Glucose 120 (H) 74 - 99 mg/dL   POCT GLUCOSE   Result Value Ref Range    POCT Glucose 100 (H) 74 - 99 mg/dL   POCT GLUCOSE   Result Value Ref Range    POCT Glucose 115 (H) 74 - 99 mg/dL   Renal Function Panel   Result Value Ref Range    Glucose 100 (H) 74 - 99 mg/dL    Sodium 135 (L) 136 - 145 mmol/L    Potassium 4.9 3.5 - 5.3 mmol/L    Chloride 95 (L) 98 - 107 mmol/L    Bicarbonate 30 21 - 32 mmol/L    Anion Gap 15 10 - 20 mmol/L    Urea Nitrogen 8 6 - 23 mg/dL    Creatinine 0.60 0.50 - 1.30 mg/dL    eGFR >90 >60 mL/min/1.73m*2    Calcium 9.1 8.6 - 10.6 mg/dL    Phosphorus 5.0 (H) 2.5 - 4.9 mg/dL    Albumin 2.9 (L) 3.4 - 5.0 g/dL   CBC and Auto Differential   Result Value Ref Range    WBC 12.7 (H) 4.4 - 11.3 x10*3/uL    nRBC 0.0 0.0 - 0.0 /100 WBCs    RBC 4.12 (L) 4.50 - 5.90 x10*6/uL    Hemoglobin 11.0 (L)  13.5 - 17.5 g/dL    Hematocrit 34.5 (L) 41.0 - 52.0 %    MCV 84 80 - 100 fL    MCH 26.7 26.0 - 34.0 pg    MCHC 31.9 (L) 32.0 - 36.0 g/dL    RDW 13.5 11.5 - 14.5 %    Platelets 651 (H) 150 - 450 x10*3/uL    Neutrophils % 58.9 40.0 - 80.0 %    Immature Granulocytes %, Automated 2.4 (H) 0.0 - 0.9 %    Lymphocytes % 21.1 13.0 - 44.0 %    Monocytes % 13.6 2.0 - 10.0 %    Eosinophils % 2.9 0.0 - 6.0 %    Basophils % 1.1 0.0 - 2.0 %    Neutrophils Absolute 7.49 1.20 - 7.70 x10*3/uL    Immature Granulocytes Absolute, Automated 0.31 0.00 - 0.70 x10*3/uL    Lymphocytes Absolute 2.69 1.20 - 4.80 x10*3/uL    Monocytes Absolute 1.73 (H) 0.10 - 1.00 x10*3/uL    Eosinophils Absolute 0.37 0.00 - 0.70 x10*3/uL    Basophils Absolute 0.14 (H) 0.00 - 0.10 x10*3/uL   Magnesium   Result Value Ref Range    Magnesium 2.13 1.60 - 2.40 mg/dL       IMAGING SUMMARY:  No new imaging      I have reviewed all laboratory and imaging results ordered/pertinent for today's encounter.

## 2024-06-22 ENCOUNTER — PHARMACY VISIT (OUTPATIENT)
Dept: PHARMACY | Facility: CLINIC | Age: 59
End: 2024-06-22
Payer: MEDICARE

## 2024-06-22 LAB
ALBUMIN SERPL BCP-MCNC: 3.2 G/DL (ref 3.4–5)
ANION GAP SERPL CALC-SCNC: 10 MMOL/L (ref 10–20)
BASOPHILS # BLD AUTO: 0.12 X10*3/UL (ref 0–0.1)
BASOPHILS NFR BLD AUTO: 0.9 %
BUN SERPL-MCNC: 8 MG/DL (ref 6–23)
CALCIUM SERPL-MCNC: 9.2 MG/DL (ref 8.6–10.6)
CHLORIDE SERPL-SCNC: 98 MMOL/L (ref 98–107)
CO2 SERPL-SCNC: 30 MMOL/L (ref 21–32)
CREAT SERPL-MCNC: 0.64 MG/DL (ref 0.5–1.3)
EGFRCR SERPLBLD CKD-EPI 2021: >90 ML/MIN/1.73M*2
EOSINOPHIL # BLD AUTO: 0.37 X10*3/UL (ref 0–0.7)
EOSINOPHIL NFR BLD AUTO: 2.7 %
ERYTHROCYTE [DISTWIDTH] IN BLOOD BY AUTOMATED COUNT: 13.3 % (ref 11.5–14.5)
GLUCOSE BLD MANUAL STRIP-MCNC: 110 MG/DL (ref 74–99)
GLUCOSE BLD MANUAL STRIP-MCNC: 140 MG/DL (ref 74–99)
GLUCOSE BLD MANUAL STRIP-MCNC: 147 MG/DL (ref 74–99)
GLUCOSE SERPL-MCNC: 114 MG/DL (ref 74–99)
HCT VFR BLD AUTO: 32.3 % (ref 41–52)
HGB BLD-MCNC: 10.5 G/DL (ref 13.5–17.5)
IMM GRANULOCYTES # BLD AUTO: 0.3 X10*3/UL (ref 0–0.7)
IMM GRANULOCYTES NFR BLD AUTO: 2.2 % (ref 0–0.9)
LYMPHOCYTES # BLD AUTO: 2.38 X10*3/UL (ref 1.2–4.8)
LYMPHOCYTES NFR BLD AUTO: 17.4 %
MAGNESIUM SERPL-MCNC: 2.09 MG/DL (ref 1.6–2.4)
MCH RBC QN AUTO: 26.7 PG (ref 26–34)
MCHC RBC AUTO-ENTMCNC: 32.5 G/DL (ref 32–36)
MCV RBC AUTO: 82 FL (ref 80–100)
MONOCYTES # BLD AUTO: 1.56 X10*3/UL (ref 0.1–1)
MONOCYTES NFR BLD AUTO: 11.4 %
NEUTROPHILS # BLD AUTO: 8.92 X10*3/UL (ref 1.2–7.7)
NEUTROPHILS NFR BLD AUTO: 65.4 %
NRBC BLD-RTO: 0 /100 WBCS (ref 0–0)
PHOSPHATE SERPL-MCNC: 3.7 MG/DL (ref 2.5–4.9)
PLATELET # BLD AUTO: 712 X10*3/UL (ref 150–450)
POTASSIUM SERPL-SCNC: 4.4 MMOL/L (ref 3.5–5.3)
RBC # BLD AUTO: 3.93 X10*6/UL (ref 4.5–5.9)
SODIUM SERPL-SCNC: 134 MMOL/L (ref 136–145)
WBC # BLD AUTO: 13.7 X10*3/UL (ref 4.4–11.3)

## 2024-06-22 PROCEDURE — C9113 INJ PANTOPRAZOLE SODIUM, VIA: HCPCS

## 2024-06-22 PROCEDURE — 2500000004 HC RX 250 GENERAL PHARMACY W/ HCPCS (ALT 636 FOR OP/ED)

## 2024-06-22 PROCEDURE — 1100000001 HC PRIVATE ROOM DAILY

## 2024-06-22 PROCEDURE — 2500000001 HC RX 250 WO HCPCS SELF ADMINISTERED DRUGS (ALT 637 FOR MEDICARE OP)

## 2024-06-22 PROCEDURE — 82947 ASSAY GLUCOSE BLOOD QUANT: CPT

## 2024-06-22 PROCEDURE — 84100 ASSAY OF PHOSPHORUS: CPT

## 2024-06-22 PROCEDURE — 83735 ASSAY OF MAGNESIUM: CPT

## 2024-06-22 PROCEDURE — 99024 POSTOP FOLLOW-UP VISIT: CPT | Performed by: SURGERY

## 2024-06-22 PROCEDURE — RXMED WILLOW AMBULATORY MEDICATION CHARGE

## 2024-06-22 PROCEDURE — 85025 COMPLETE CBC W/AUTO DIFF WBC: CPT

## 2024-06-22 RX ORDER — OXYCODONE HYDROCHLORIDE 5 MG/1
5 TABLET ORAL EVERY 4 HOURS PRN
Qty: 15 TABLET | Refills: 0 | Status: SHIPPED | OUTPATIENT
Start: 2024-06-22 | End: 2024-06-27

## 2024-06-22 RX ORDER — ACETAMINOPHEN 325 MG/1
650 TABLET ORAL EVERY 6 HOURS PRN
Start: 2024-06-22 | End: 2024-07-22

## 2024-06-22 RX ADMIN — PIPERACILLIN SODIUM AND TAZOBACTAM SODIUM 3.38 G: 3; .375 INJECTION, SOLUTION INTRAVENOUS at 03:41

## 2024-06-22 RX ADMIN — OXYCODONE HYDROCHLORIDE 5 MG: 5 TABLET ORAL at 21:52

## 2024-06-22 RX ADMIN — ACETAMINOPHEN 650 MG: 325 TABLET ORAL at 14:37

## 2024-06-22 RX ADMIN — PANTOPRAZOLE SODIUM 40 MG: 40 INJECTION, POWDER, FOR SOLUTION INTRAVENOUS at 21:49

## 2024-06-22 RX ADMIN — ACETAMINOPHEN 650 MG: 325 TABLET ORAL at 21:49

## 2024-06-22 RX ADMIN — ENOXAPARIN SODIUM 40 MG: 100 INJECTION SUBCUTANEOUS at 21:49

## 2024-06-22 RX ADMIN — ACETAMINOPHEN 650 MG: 325 TABLET ORAL at 08:55

## 2024-06-22 RX ADMIN — OXYCODONE HYDROCHLORIDE 5 MG: 5 TABLET ORAL at 14:37

## 2024-06-22 RX ADMIN — ACETAMINOPHEN 650 MG: 325 TABLET ORAL at 03:40

## 2024-06-22 RX ADMIN — PIPERACILLIN SODIUM AND TAZOBACTAM SODIUM 3.38 G: 3; .375 INJECTION, SOLUTION INTRAVENOUS at 14:37

## 2024-06-22 RX ADMIN — THIAMINE HYDROCHLORIDE 100 MG: 100 INJECTION, SOLUTION INTRAMUSCULAR; INTRAVENOUS at 12:23

## 2024-06-22 RX ADMIN — PANTOPRAZOLE SODIUM 40 MG: 40 INJECTION, POWDER, FOR SOLUTION INTRAVENOUS at 08:55

## 2024-06-22 RX ADMIN — PIPERACILLIN SODIUM AND TAZOBACTAM SODIUM 3.38 G: 3; .375 INJECTION, SOLUTION INTRAVENOUS at 08:56

## 2024-06-22 ASSESSMENT — PAIN SCALES - GENERAL
PAINLEVEL_OUTOF10: 4
PAINLEVEL_OUTOF10: 5 - MODERATE PAIN
PAINLEVEL_OUTOF10: 2
PAINLEVEL_OUTOF10: 2
PAINLEVEL_OUTOF10: 0 - NO PAIN
PAINLEVEL_OUTOF10: 4
PAINLEVEL_OUTOF10: 4

## 2024-06-22 ASSESSMENT — PAIN - FUNCTIONAL ASSESSMENT
PAIN_FUNCTIONAL_ASSESSMENT: 0-10

## 2024-06-22 NOTE — PROGRESS NOTES
[facility]  ACUTE CARE SURGERY - PROGRESS NOTE    Patient Name: Bereket Em  MRN: [id number]  Admit Date: 605  : 1965  AGE: 58 y.o.   GENDER: male  ==============================================================================  TODAY'S ASSESSMENT AND PLAN OF CARE:  59yo M who presented with perforated diverticulitis. 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 He underwent ex lap, sigmoidectomy, and end colostomy on . Vac applied  to MLI. TPN initiated. On  vac was taken down which showed necrotic fascia at midline incision. He was taken to the OR for a exploration laparotomy, abdominal washout, fascial debridement, enmas closure on  with Dr. Case.    Midline incision closed with retention sutures, however developed foul smelling with obvious purulence. Taken back to the OR on  for re-exploration laparotomy, mesh placement.     Doing well this morning, wound is well appearing, no evidence of ongoing infection. Dressing changed. Tolerating regular. Discussed home going, patient would like to have more teaching. ADOD Monday. Will set up for discharge. IV antibiotics end today.     Plan:  - continue reg diet  - ambulation  - [facility] for ADOD monday    Neuro:  - stephan tylenol, stephan oxy /10, dilaudid for breakthrough    CV:  - monitor vitals  - holding home lisinopril    Pulm:  - encourage IS    GI: ostomy now functioning with brown stool  - OK for regular diet  - PPI BID   - PICC line for TPN, nutrition consulted. Appreciate their recs.    - dc TPN  - BID dressing changes to midline wound   - Monitor colostomy output, appreciate Enterostomal therapy following    :  - Strict I&Os  - Replete lytes as indicated     Endo: no hx of DM  - dc BG checks    ID:  - Zosyn until  (dc'd)  - Started back on Zosyn and Vanc , continue for approximately 5-7 days     Ppx: SCDs, LVX, OOB  PT/OT: home with home therapy    Dispo: continue care  on RNF. Anticipate home with [facility] (PT, OT, wound care). ADOD Monday.     Patient seen and discussed with Attending Dr. Gabriel Vitale, PGY-1  ACS Service  [id number]    ==============================================================================  CHIEF COMPLAINT / EVENTS LAST 24HRS / HPI:  NAEON. Pain controlled. Tolerating regular diet, no issues. No new symptoms. Would like to do more teaching and discharge Monday.    MEDICAL HISTORY / ROS:   Admission history and ROS reviewed. Pertinent changes as follows:  None    PHYSICAL EXAM:  Heart Rate:  [57-80]   Temp:  [36.3 °C (97.3 °F)-36.8 °C (98.2 °F)]   Resp:  [17-19]   BP: (111-132)/(64-81)   SpO2:  [93 %-97 %]     Constitutional: no acute distress, conversational  Cardiac: RR  Pulmonary: Unlabored respirations  Abdomen: soft, non distended, minimally tender to palpitation, Midline wound open and packed with wet to moist kerlix. Colostomy pink and viable with brown liquid stool in pouch. Binder in place.   MSK: DEACON  Extremities: no pitting edema  Skin: warm, dry  Neuro: alert, conversive    LABS:  Results for orders placed or performed during the hospital encounter of 06/05/24 (from the past 24 hour(s))   POCT GLUCOSE   Result Value Ref Range    POCT Glucose 111 (H) 74 - 99 mg/dL   POCT GLUCOSE   Result Value Ref Range    POCT Glucose 103 (H) 74 - 99 mg/dL   Renal Function Panel   Result Value Ref Range    Glucose 114 (H) 74 - 99 mg/dL    Sodium 134 (L) 136 - 145 mmol/L    Potassium 4.4 3.5 - 5.3 mmol/L    Chloride 98 98 - 107 mmol/L    Bicarbonate 30 21 - 32 mmol/L    Anion Gap 10 10 - 20 mmol/L    Urea Nitrogen 8 6 - 23 mg/dL    Creatinine 0.64 0.50 - 1.30 mg/dL    eGFR >90 >60 mL/min/1.73m*2    Calcium 9.2 8.6 - 10.6 mg/dL    Phosphorus 3.7 2.5 - 4.9 mg/dL    Albumin 3.2 (L) 3.4 - 5.0 g/dL   CBC and Auto Differential   Result Value Ref Range    WBC 13.7 (H) 4.4 - 11.3 x10*3/uL    nRBC 0.0 0.0 - 0.0 /100 WBCs    RBC 3.93 (L) 4.50 - 5.90 x10*6/uL    Hemoglobin  10.5 (L) 13.5 - 17.5 g/dL    Hematocrit 32.3 (L) 41.0 - 52.0 %    MCV 82 80 - 100 fL    MCH 26.7 26.0 - 34.0 pg    MCHC 32.5 32.0 - 36.0 g/dL    RDW 13.3 11.5 - 14.5 %    Platelets 712 (H) 150 - 450 x10*3/uL    Neutrophils % 65.4 40.0 - 80.0 %    Immature Granulocytes %, Automated 2.2 (H) 0.0 - 0.9 %    Lymphocytes % 17.4 13.0 - 44.0 %    Monocytes % 11.4 2.0 - 10.0 %    Eosinophils % 2.7 0.0 - 6.0 %    Basophils % 0.9 0.0 - 2.0 %    Neutrophils Absolute 8.92 (H) 1.20 - 7.70 x10*3/uL    Immature Granulocytes Absolute, Automated 0.30 0.00 - 0.70 x10*3/uL    Lymphocytes Absolute 2.38 1.20 - 4.80 x10*3/uL    Monocytes Absolute 1.56 (H) 0.10 - 1.00 x10*3/uL    Eosinophils Absolute 0.37 0.00 - 0.70 x10*3/uL    Basophils Absolute 0.12 (H) 0.00 - 0.10 x10*3/uL   Magnesium   Result Value Ref Range    Magnesium 2.09 1.60 - 2.40 mg/dL   POCT GLUCOSE   Result Value Ref Range    POCT Glucose 140 (H) 74 - 99 mg/dL       IMAGING SUMMARY:  No new imaging      I have reviewed all laboratory and imaging results ordered/pertinent for today's encounter.

## 2024-06-22 NOTE — CARE PLAN
The patient's goals for the shift include      The clinical goals for the shift include maintain pt safety and control pain    Over the shift, the patient did make progress toward the following goals.   Problem: Pain  Goal: Takes deep breaths with improved pain control throughout the shift  Outcome: Progressing  Goal: Turns in bed with improved pain control throughout the shift  Outcome: Progressing  Goal: Walks with improved pain control throughout the shift  Outcome: Progressing  Goal: Performs ADL's with improved pain control throughout shift  Outcome: Progressing  Goal: Participates in PT with improved pain control throughout the shift  Outcome: Progressing  Goal: Free from opioid side effects throughout the shift  Outcome: Progressing  Goal: Free from acute confusion related to pain meds throughout the shift  Outcome: Progressing

## 2024-06-22 NOTE — CARE PLAN
The patient's goals for the shift include  ostomy education.    The clinical goals for the shift include patient will remain HDS throughout this shift.    Over the shift, the patient did  make progress towards his goals, remained stable throughout the shift and is anticipating ostomy education from the wound care team before discharge.

## 2024-06-23 VITALS
BODY MASS INDEX: 27.9 KG/M2 | OXYGEN SATURATION: 96 % | DIASTOLIC BLOOD PRESSURE: 83 MMHG | HEIGHT: 68 IN | RESPIRATION RATE: 20 BRPM | SYSTOLIC BLOOD PRESSURE: 132 MMHG | TEMPERATURE: 97.2 F | WEIGHT: 184.08 LBS | HEART RATE: 60 BPM

## 2024-06-23 LAB
ALBUMIN SERPL BCP-MCNC: 3.3 G/DL (ref 3.4–5)
ANION GAP SERPL CALC-SCNC: 16 MMOL/L (ref 10–20)
BASOPHILS # BLD AUTO: 0.11 X10*3/UL (ref 0–0.1)
BASOPHILS NFR BLD AUTO: 0.8 %
BUN SERPL-MCNC: 10 MG/DL (ref 6–23)
CALCIUM SERPL-MCNC: 9.2 MG/DL (ref 8.6–10.6)
CHLORIDE SERPL-SCNC: 96 MMOL/L (ref 98–107)
CO2 SERPL-SCNC: 28 MMOL/L (ref 21–32)
CREAT SERPL-MCNC: 0.58 MG/DL (ref 0.5–1.3)
EGFRCR SERPLBLD CKD-EPI 2021: >90 ML/MIN/1.73M*2
EOSINOPHIL # BLD AUTO: 0.37 X10*3/UL (ref 0–0.7)
EOSINOPHIL NFR BLD AUTO: 2.8 %
ERYTHROCYTE [DISTWIDTH] IN BLOOD BY AUTOMATED COUNT: 13.4 % (ref 11.5–14.5)
GLUCOSE BLD MANUAL STRIP-MCNC: 140 MG/DL (ref 74–99)
GLUCOSE SERPL-MCNC: 118 MG/DL (ref 74–99)
HCT VFR BLD AUTO: 35.9 % (ref 41–52)
HGB BLD-MCNC: 11.2 G/DL (ref 13.5–17.5)
IMM GRANULOCYTES # BLD AUTO: 0.29 X10*3/UL (ref 0–0.7)
IMM GRANULOCYTES NFR BLD AUTO: 2.2 % (ref 0–0.9)
LYMPHOCYTES # BLD AUTO: 2.42 X10*3/UL (ref 1.2–4.8)
LYMPHOCYTES NFR BLD AUTO: 18.3 %
MAGNESIUM SERPL-MCNC: 2.2 MG/DL (ref 1.6–2.4)
MCH RBC QN AUTO: 26.4 PG (ref 26–34)
MCHC RBC AUTO-ENTMCNC: 31.2 G/DL (ref 32–36)
MCV RBC AUTO: 85 FL (ref 80–100)
MONOCYTES # BLD AUTO: 1.37 X10*3/UL (ref 0.1–1)
MONOCYTES NFR BLD AUTO: 10.4 %
NEUTROPHILS # BLD AUTO: 8.63 X10*3/UL (ref 1.2–7.7)
NEUTROPHILS NFR BLD AUTO: 65.5 %
NRBC BLD-RTO: 0 /100 WBCS (ref 0–0)
PHOSPHATE SERPL-MCNC: 3.2 MG/DL (ref 2.5–4.9)
PLATELET # BLD AUTO: 770 X10*3/UL (ref 150–450)
POTASSIUM SERPL-SCNC: 4.5 MMOL/L (ref 3.5–5.3)
RBC # BLD AUTO: 4.25 X10*6/UL (ref 4.5–5.9)
SODIUM SERPL-SCNC: 135 MMOL/L (ref 136–145)
WBC # BLD AUTO: 13.2 X10*3/UL (ref 4.4–11.3)

## 2024-06-23 PROCEDURE — 82947 ASSAY GLUCOSE BLOOD QUANT: CPT

## 2024-06-23 PROCEDURE — 85025 COMPLETE CBC W/AUTO DIFF WBC: CPT

## 2024-06-23 PROCEDURE — 83735 ASSAY OF MAGNESIUM: CPT

## 2024-06-23 PROCEDURE — 2500000001 HC RX 250 WO HCPCS SELF ADMINISTERED DRUGS (ALT 637 FOR MEDICARE OP)

## 2024-06-23 PROCEDURE — 2500000004 HC RX 250 GENERAL PHARMACY W/ HCPCS (ALT 636 FOR OP/ED)

## 2024-06-23 PROCEDURE — 84100 ASSAY OF PHOSPHORUS: CPT

## 2024-06-23 PROCEDURE — C9113 INJ PANTOPRAZOLE SODIUM, VIA: HCPCS

## 2024-06-23 PROCEDURE — 1100000001 HC PRIVATE ROOM DAILY

## 2024-06-23 PROCEDURE — 99024 POSTOP FOLLOW-UP VISIT: CPT | Performed by: SURGERY

## 2024-06-23 RX ADMIN — THIAMINE HYDROCHLORIDE 100 MG: 100 INJECTION, SOLUTION INTRAMUSCULAR; INTRAVENOUS at 08:32

## 2024-06-23 RX ADMIN — OXYCODONE HYDROCHLORIDE 5 MG: 5 TABLET ORAL at 08:38

## 2024-06-23 RX ADMIN — ACETAMINOPHEN 650 MG: 325 TABLET ORAL at 15:30

## 2024-06-23 RX ADMIN — ACETAMINOPHEN 650 MG: 325 TABLET ORAL at 21:16

## 2024-06-23 RX ADMIN — PANTOPRAZOLE SODIUM 40 MG: 40 INJECTION, POWDER, FOR SOLUTION INTRAVENOUS at 08:31

## 2024-06-23 RX ADMIN — ENOXAPARIN SODIUM 40 MG: 100 INJECTION SUBCUTANEOUS at 21:16

## 2024-06-23 RX ADMIN — ACETAMINOPHEN 650 MG: 325 TABLET ORAL at 07:06

## 2024-06-23 RX ADMIN — OXYCODONE HYDROCHLORIDE 5 MG: 5 TABLET ORAL at 15:32

## 2024-06-23 RX ADMIN — OXYCODONE HYDROCHLORIDE 5 MG: 5 TABLET ORAL at 21:19

## 2024-06-23 ASSESSMENT — PAIN SCALES - GENERAL
PAINLEVEL_OUTOF10: 5 - MODERATE PAIN
PAINLEVEL_OUTOF10: 2
PAINLEVEL_OUTOF10: 5 - MODERATE PAIN
PAINLEVEL_OUTOF10: 4
PAINLEVEL_OUTOF10: 6

## 2024-06-23 ASSESSMENT — PAIN DESCRIPTION - ORIENTATION: ORIENTATION: MID;ANTERIOR

## 2024-06-23 ASSESSMENT — PAIN SCALES - WONG BAKER: WONGBAKER_NUMERICALRESPONSE: HURTS LITTLE BIT

## 2024-06-23 ASSESSMENT — PAIN - FUNCTIONAL ASSESSMENT
PAIN_FUNCTIONAL_ASSESSMENT: 0-10

## 2024-06-23 ASSESSMENT — PAIN DESCRIPTION - LOCATION: LOCATION: ABDOMEN

## 2024-06-23 NOTE — PROGRESS NOTES
[facility]  ACUTE CARE SURGERY - PROGRESS NOTE    Patient Name: Bereket Em  MRN: [id number]  Admit Date: 605  : 1965  AGE: 58 y.o.   GENDER: male  ==============================================================================  TODAY'S ASSESSMENT AND PLAN OF CARE:  59yo M who presented with perforated diverticulitis. 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 He underwent ex lap, sigmoidectomy, and end colostomy on . Vac applied  to MLI. TPN initiated. On  vac was taken down which showed necrotic fascia at midline incision. He was taken to the OR for a exploration laparotomy, abdominal washout, fascial debridement, enmas closure on  with Dr. Case.    Midline incision closed with retention sutures, however developed foul smelling with obvious purulence. Taken back to the OR on  for re-exploration laparotomy, mesh placement.     Doing well this morning, wound is well appearing, no evidence of ongoing infection. Small area of white/yellow at left border of mesh. Serous drainage thus far from that spot. Will continue to monitor closely for possible fistula. Dressing changed. Tolerating regular diet. Discussed home going, patient would like to have more teaching. ADOD Monday. Will set up for discharge. IV antibiotics end     Plan:  - continue reg diet  - ambulation  - C for ADOD monday    Neuro:  - stephan tylenol, stephan oxy /10, dilaudid for breakthrough    CV:  - monitor vitals  - holding home lisinopril    Pulm:  - encourage IS    GI: ostomy functioning with brown stool  - OK for regular diet  - PPI BID   - PICC line for TPN, nutrition consulted. Appreciate their recs.    - dc TPN  - BID dressing changes to midline wound   - Monitor colostomy output, appreciate Enterostomal therapy following    :  - Strict I&Os  - Replete lytes as indicated     Endo: no hx of DM  - dc BG checks    ID:  - Zosyn until  (dc'd)  -  Started back on Zosyn and Vanc 6/17, discontinued    Ppx: SCDs, LVX, OOB  PT/OT: home with home therapy    Dispo: continue care on RNF. Anticipate home with [facility] (PT, OT, wound care, ostomy care). ADOD Monday.     Patient seen and discussed with Attending Dr. Gabriel Vitale, PGY-1  ACS Service  [id number]    ==============================================================================  CHIEF COMPLAINT / EVENTS LAST 24HRS / HPI:  NAEON. Pain controlled. Tolerating regular diet, no issues. No new symptoms. Would like to do more teaching and discharge Monday.    MEDICAL HISTORY / ROS:   Admission history and ROS reviewed. Pertinent changes as follows:  None    PHYSICAL EXAM:  Heart Rate:  [57-77]   Temp:  [35.2 °C (95.4 °F)-36.7 °C (98.1 °F)]   Resp:  [17-18]   BP: (113-130)/(65-79)   SpO2:  [94 %-97 %]     Constitutional: no acute distress, conversational  Cardiac: RR  Pulmonary: Unlabored respirations  Abdomen: soft, non distended, minimally tender to palpitation, Midline wound open and packed with wet to moist kerlix. Small area of yellow/white tissue at left lateral edge of mesh with some serous/yellow mild drainage. Colostomy pink and viable with brown liquid stool in pouch. Binder in place.   MSK: DEACON  Extremities: no pitting edema  Skin: warm, dry  Neuro: alert, conversive    LABS:  Results for orders placed or performed during the hospital encounter of 06/05/24 (from the past 24 hour(s))   POCT GLUCOSE   Result Value Ref Range    POCT Glucose 110 (H) 74 - 99 mg/dL   POCT GLUCOSE   Result Value Ref Range    POCT Glucose 147 (H) 74 - 99 mg/dL   Renal Function Panel   Result Value Ref Range    Glucose 118 (H) 74 - 99 mg/dL    Sodium 135 (L) 136 - 145 mmol/L    Potassium 4.5 3.5 - 5.3 mmol/L    Chloride 96 (L) 98 - 107 mmol/L    Bicarbonate 28 21 - 32 mmol/L    Anion Gap 16 10 - 20 mmol/L    Urea Nitrogen 10 6 - 23 mg/dL    Creatinine 0.58 0.50 - 1.30 mg/dL    eGFR >90 >60 mL/min/1.73m*2    Calcium 9.2 8.6 - 10.6  mg/dL    Phosphorus 3.2 2.5 - 4.9 mg/dL    Albumin 3.3 (L) 3.4 - 5.0 g/dL   CBC and Auto Differential   Result Value Ref Range    WBC 13.2 (H) 4.4 - 11.3 x10*3/uL    nRBC 0.0 0.0 - 0.0 /100 WBCs    RBC 4.25 (L) 4.50 - 5.90 x10*6/uL    Hemoglobin 11.2 (L) 13.5 - 17.5 g/dL    Hematocrit 35.9 (L) 41.0 - 52.0 %    MCV 85 80 - 100 fL    MCH 26.4 26.0 - 34.0 pg    MCHC 31.2 (L) 32.0 - 36.0 g/dL    RDW 13.4 11.5 - 14.5 %    Platelets 770 (H) 150 - 450 x10*3/uL    Neutrophils % 65.5 40.0 - 80.0 %    Immature Granulocytes %, Automated 2.2 (H) 0.0 - 0.9 %    Lymphocytes % 18.3 13.0 - 44.0 %    Monocytes % 10.4 2.0 - 10.0 %    Eosinophils % 2.8 0.0 - 6.0 %    Basophils % 0.8 0.0 - 2.0 %    Neutrophils Absolute 8.63 (H) 1.20 - 7.70 x10*3/uL    Immature Granulocytes Absolute, Automated 0.29 0.00 - 0.70 x10*3/uL    Lymphocytes Absolute 2.42 1.20 - 4.80 x10*3/uL    Monocytes Absolute 1.37 (H) 0.10 - 1.00 x10*3/uL    Eosinophils Absolute 0.37 0.00 - 0.70 x10*3/uL    Basophils Absolute 0.11 (H) 0.00 - 0.10 x10*3/uL   Magnesium   Result Value Ref Range    Magnesium 2.20 1.60 - 2.40 mg/dL   POCT GLUCOSE   Result Value Ref Range    POCT Glucose 140 (H) 74 - 99 mg/dL       IMAGING SUMMARY:  No new imaging      I have reviewed all laboratory and imaging results ordered/pertinent for today's encounter.

## 2024-06-23 NOTE — PROGRESS NOTES
Patient with colostomy, family at the bedside for ostomy education.    Type: colostomy  Size: 1 1/2 inches  Color: red  Protruding: budded  Peristomal skin: intact, no sting barrier applied. Close proximity to large open abdominal wound.  Pouching: pouch changed today for education. 2 piece Melonie RED flat wafer, barrier ring, and drainable pouch applied.  Output: brown, loose    Education: patient able to assist and recall steps of pouch change when prompted by CWON. Will need additional hands on lessons before he is discharged. Supportive Family at the bedside, asked appropriate questions. Main concern was large abdominal wound care. Will address need for this education with next pouch change and lesson.    Mara Farias RN, CWON

## 2024-06-23 NOTE — CARE PLAN
The patient's goals for the shift include  pain control.     The clinical goals for the shift include patient will remain HDS throughout this shift.    Over the shift, the patient did  make progress towards his goals, remained stable throughout the shift and his pain was controlled with the scheduled pain  regime.

## 2024-06-24 ENCOUNTER — APPOINTMENT (OUTPATIENT)
Dept: RADIOLOGY | Facility: HOSPITAL | Age: 59
End: 2024-06-24
Payer: COMMERCIAL

## 2024-06-24 LAB
ALBUMIN SERPL BCP-MCNC: 3.1 G/DL (ref 3.4–5)
ANION GAP SERPL CALC-SCNC: 15 MMOL/L (ref 10–20)
BASOPHILS # BLD AUTO: 0.09 X10*3/UL (ref 0–0.1)
BASOPHILS NFR BLD AUTO: 0.8 %
BUN SERPL-MCNC: 13 MG/DL (ref 6–23)
CALCIUM SERPL-MCNC: 8.9 MG/DL (ref 8.6–10.6)
CHLORIDE SERPL-SCNC: 98 MMOL/L (ref 98–107)
CO2 SERPL-SCNC: 28 MMOL/L (ref 21–32)
CREAT SERPL-MCNC: 0.61 MG/DL (ref 0.5–1.3)
EGFRCR SERPLBLD CKD-EPI 2021: >90 ML/MIN/1.73M*2
EOSINOPHIL # BLD AUTO: 0.39 X10*3/UL (ref 0–0.7)
EOSINOPHIL NFR BLD AUTO: 3.4 %
ERYTHROCYTE [DISTWIDTH] IN BLOOD BY AUTOMATED COUNT: 13.2 % (ref 11.5–14.5)
GLUCOSE SERPL-MCNC: 114 MG/DL (ref 74–99)
HCT VFR BLD AUTO: 32.8 % (ref 41–52)
HGB BLD-MCNC: 10.4 G/DL (ref 13.5–17.5)
IMM GRANULOCYTES # BLD AUTO: 0.23 X10*3/UL (ref 0–0.7)
IMM GRANULOCYTES NFR BLD AUTO: 2 % (ref 0–0.9)
LYMPHOCYTES # BLD AUTO: 2.62 X10*3/UL (ref 1.2–4.8)
LYMPHOCYTES NFR BLD AUTO: 22.6 %
MAGNESIUM SERPL-MCNC: 2.04 MG/DL (ref 1.6–2.4)
MCH RBC QN AUTO: 26.3 PG (ref 26–34)
MCHC RBC AUTO-ENTMCNC: 31.7 G/DL (ref 32–36)
MCV RBC AUTO: 83 FL (ref 80–100)
MONOCYTES # BLD AUTO: 1.28 X10*3/UL (ref 0.1–1)
MONOCYTES NFR BLD AUTO: 11.1 %
NEUTROPHILS # BLD AUTO: 6.96 X10*3/UL (ref 1.2–7.7)
NEUTROPHILS NFR BLD AUTO: 60.1 %
NRBC BLD-RTO: 0 /100 WBCS (ref 0–0)
PHOSPHATE SERPL-MCNC: 3.8 MG/DL (ref 2.5–4.9)
PLATELET # BLD AUTO: 620 X10*3/UL (ref 150–450)
POTASSIUM SERPL-SCNC: 4.7 MMOL/L (ref 3.5–5.3)
RBC # BLD AUTO: 3.96 X10*6/UL (ref 4.5–5.9)
SODIUM SERPL-SCNC: 136 MMOL/L (ref 136–145)
WBC # BLD AUTO: 11.6 X10*3/UL (ref 4.4–11.3)

## 2024-06-24 PROCEDURE — 2550000001 HC RX 255 CONTRASTS

## 2024-06-24 PROCEDURE — 2500000004 HC RX 250 GENERAL PHARMACY W/ HCPCS (ALT 636 FOR OP/ED)

## 2024-06-24 PROCEDURE — 83735 ASSAY OF MAGNESIUM: CPT

## 2024-06-24 PROCEDURE — 74177 CT ABD & PELVIS W/CONTRAST: CPT

## 2024-06-24 PROCEDURE — 85025 COMPLETE CBC W/AUTO DIFF WBC: CPT

## 2024-06-24 PROCEDURE — 1100000001 HC PRIVATE ROOM DAILY

## 2024-06-24 PROCEDURE — 2500000004 HC RX 250 GENERAL PHARMACY W/ HCPCS (ALT 636 FOR OP/ED): Performed by: NURSE PRACTITIONER

## 2024-06-24 PROCEDURE — 74177 CT ABD & PELVIS W/CONTRAST: CPT | Performed by: RADIOLOGY

## 2024-06-24 PROCEDURE — 80069 RENAL FUNCTION PANEL: CPT

## 2024-06-24 PROCEDURE — 2550000001 HC RX 255 CONTRASTS: Performed by: SURGERY

## 2024-06-24 PROCEDURE — 2500000001 HC RX 250 WO HCPCS SELF ADMINISTERED DRUGS (ALT 637 FOR MEDICARE OP)

## 2024-06-24 RX ORDER — DEXTROSE MONOHYDRATE AND SODIUM CHLORIDE 5; .45 G/100ML; G/100ML
100 INJECTION, SOLUTION INTRAVENOUS CONTINUOUS
Status: DISCONTINUED | OUTPATIENT
Start: 2024-06-24 | End: 2024-06-25

## 2024-06-24 RX ORDER — HYDROXYZINE HYDROCHLORIDE 10 MG/1
10 TABLET, FILM COATED ORAL EVERY 6 HOURS PRN
Status: DISCONTINUED | OUTPATIENT
Start: 2024-06-24 | End: 2024-07-11 | Stop reason: HOSPADM

## 2024-06-24 RX ORDER — SODIUM CHLORIDE, SODIUM LACTATE, POTASSIUM CHLORIDE, CALCIUM CHLORIDE 600; 310; 30; 20 MG/100ML; MG/100ML; MG/100ML; MG/100ML
100 INJECTION, SOLUTION INTRAVENOUS CONTINUOUS
Status: DISCONTINUED | OUTPATIENT
Start: 2024-06-24 | End: 2024-06-24

## 2024-06-24 RX ADMIN — DIATRIZOATE MEGLUMINE AND DIATRIZOATE SODIUM 30 ML: 660; 100 LIQUID ORAL; RECTAL at 08:03

## 2024-06-24 RX ADMIN — ACETAMINOPHEN 650 MG: 325 TABLET ORAL at 03:24

## 2024-06-24 RX ADMIN — ACETAMINOPHEN 650 MG: 325 TABLET ORAL at 15:16

## 2024-06-24 RX ADMIN — IOHEXOL 80 ML: 350 INJECTION, SOLUTION INTRAVENOUS at 10:47

## 2024-06-24 RX ADMIN — HYDROMORPHONE HYDROCHLORIDE 0.2 MG: 1 INJECTION, SOLUTION INTRAMUSCULAR; INTRAVENOUS; SUBCUTANEOUS at 20:45

## 2024-06-24 RX ADMIN — HYDROXYZINE HYDROCHLORIDE 10 MG: 10 TABLET ORAL at 13:39

## 2024-06-24 RX ADMIN — ENOXAPARIN SODIUM 40 MG: 100 INJECTION SUBCUTANEOUS at 20:45

## 2024-06-24 RX ADMIN — DEXTROSE AND SODIUM CHLORIDE 100 ML/HR: 5; 450 INJECTION, SOLUTION INTRAVENOUS at 16:06

## 2024-06-24 RX ADMIN — HYDROXYZINE HYDROCHLORIDE 10 MG: 10 TABLET ORAL at 20:45

## 2024-06-24 RX ADMIN — ACETAMINOPHEN 650 MG: 325 TABLET ORAL at 09:57

## 2024-06-24 RX ADMIN — ACETAMINOPHEN 650 MG: 325 TABLET ORAL at 20:45

## 2024-06-24 RX ADMIN — SODIUM CHLORIDE, POTASSIUM CHLORIDE, SODIUM LACTATE AND CALCIUM CHLORIDE 100 ML/HR: 600; 310; 30; 20 INJECTION, SOLUTION INTRAVENOUS at 08:02

## 2024-06-24 ASSESSMENT — PAIN SCALES - GENERAL
PAINLEVEL_OUTOF10: 0 - NO PAIN
PAINLEVEL_OUTOF10: 4
PAINLEVEL_OUTOF10: 3
PAINLEVEL_OUTOF10: 0 - NO PAIN
PAINLEVEL_OUTOF10: 3

## 2024-06-24 ASSESSMENT — COGNITIVE AND FUNCTIONAL STATUS - GENERAL
DAILY ACTIVITIY SCORE: 24
WALKING IN HOSPITAL ROOM: A LITTLE
MOBILITY SCORE: 22
CLIMB 3 TO 5 STEPS WITH RAILING: A LITTLE
CLIMB 3 TO 5 STEPS WITH RAILING: A LITTLE
MOBILITY SCORE: 22
WALKING IN HOSPITAL ROOM: A LITTLE

## 2024-06-24 ASSESSMENT — PAIN - FUNCTIONAL ASSESSMENT: PAIN_FUNCTIONAL_ASSESSMENT: 0-10

## 2024-06-24 ASSESSMENT — PAIN DESCRIPTION - LOCATION: LOCATION: ABDOMEN

## 2024-06-24 ASSESSMENT — PAIN DESCRIPTION - ORIENTATION: ORIENTATION: MID

## 2024-06-24 ASSESSMENT — PAIN SCALES - WONG BAKER: WONGBAKER_NUMERICALRESPONSE: HURTS LITTLE BIT

## 2024-06-24 NOTE — PROGRESS NOTES
[facility]  ACUTE CARE SURGERY - PROGRESS NOTE    Patient Name: Bereket Em  MRN: [id number]  Admit Date: 605  : 1965  AGE: 58 y.o.   GENDER: male  ==============================================================================  TODAY'S ASSESSMENT AND PLAN OF CARE:  59yo M who presented with perforated diverticulitis. 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 He underwent ex lap, sigmoidectomy, and end colostomy on . Vac applied  to MLI. TPN initiated. On  vac was taken down which showed necrotic fascia at midline incision. He was taken to the OR for a exploration laparotomy, abdominal washout, fascial debridement, enmas closure on  with Dr. Case.    Midline incision closed with retention sutures, however developed foul smelling with obvious purulence. Taken back to the OR on  for re-exploration laparotomy, mesh placement.     Doing well this morning, wound is well appearing, no evidence of ongoing infection. However small area of fibrinous, yellow/bilious drainage on edge of wound at 5 o'clock concerning for fistula.  Will obtain CT A/P with PO/IV contrast to rule out fistula today.     Plan:  -  NPO today, start IVFs  - CT A/P today to r/o fistula     Neuro:  - stephan tylenol, stephan oxy 5/10, dilaudid for breakthrough    CV:  - monitor vitals  - holding home lisinopril    Pulm:  - encourage IS    GI: ostomy functioning with brown stool  - NPO today to c/f fistula   - PPI BID   - s/p TPN now discontinued   - BID dressing changes to midline wound with wet to wet kerlix. Monitor drainage. If becomes high, will discuss a wound manager  - Plan for CT A/P today   - Monitor colostomy output, appreciate Enterostomal therapy following    :  - Strict I&Os  - Start IVFs while NPO   - Replete lytes as indicated     Endo: no hx of DM  - dc BG checks    ID:  - Zosyn until  (olena'd)  - Started back on Zosyn and Vanc ,  discontinued  - WBC remains stable, no indication for further antbx at this time    Ppx: SCDs, LVX, OOB  PT/OT: home with home therapy    Dispo: continue care on RNF. Follow up CT A/P.     Patient seen and discussed with Attending Dr. Vicky Lubin APRN-[facility]  Acute Care Surgery  Pager [phone]      ==============================================================================  CHIEF COMPLAINT / EVENTS LAST 24HRS / HPI:  NAEON. Pain controlled. Tolerating regular diet, no issues. No new symptoms. Discussed with patient this AM about the need for CT A/P to rule out fistula and to keep NPO for now. Patient verbalized understanding.     MEDICAL HISTORY / ROS:   Admission history and ROS reviewed. Pertinent changes as follows:  None    PHYSICAL EXAM:  Heart Rate:  [57-75]   Temp:  [36.1 °C (97 °F)-36.7 °C (98.1 °F)]   Resp:  [16-20]   BP: (113-144)/(68-83)   SpO2:  [94 %-96 %]     Constitutional: no acute distress, conversational  Cardiac: palpable RR  Pulmonary: equal chest expansion, non labored, on RA  Abdomen: soft, non distended, minimally tender to palpitation, Midline wound open and packed with wet to wet kerlix. Small area of yellow/white tissue at left lateral edge of mesh with some serous/yellow mild drainage. Colostomy pink and viable with brown liquid stool in pouch. Binder in place.   MSK: DEACON  Extremities: no pitting edema  Skin: warm, dry  Neuro: alert, conversive    LABS:  Results for orders placed or performed during the hospital encounter of 06/05/24 (from the past 24 hour(s))   Renal Function Panel   Result Value Ref Range    Glucose 114 (H) 74 - 99 mg/dL    Sodium 136 136 - 145 mmol/L    Potassium 4.7 3.5 - 5.3 mmol/L    Chloride 98 98 - 107 mmol/L    Bicarbonate 28 21 - 32 mmol/L    Anion Gap 15 10 - 20 mmol/L    Urea Nitrogen 13 6 - 23 mg/dL    Creatinine 0.61 0.50 - 1.30 mg/dL    eGFR >90 >60 mL/min/1.73m*2    Calcium 8.9 8.6 - 10.6 mg/dL    Phosphorus 3.8 2.5 - 4.9 mg/dL    Albumin 3.1  (L) 3.4 - 5.0 g/dL   CBC and Auto Differential   Result Value Ref Range    WBC 11.6 (H) 4.4 - 11.3 x10*3/uL    nRBC 0.0 0.0 - 0.0 /100 WBCs    RBC 3.96 (L) 4.50 - 5.90 x10*6/uL    Hemoglobin 10.4 (L) 13.5 - 17.5 g/dL    Hematocrit 32.8 (L) 41.0 - 52.0 %    MCV 83 80 - 100 fL    MCH 26.3 26.0 - 34.0 pg    MCHC 31.7 (L) 32.0 - 36.0 g/dL    RDW 13.2 11.5 - 14.5 %    Platelets 620 (H) 150 - 450 x10*3/uL    Neutrophils % 60.1 40.0 - 80.0 %    Immature Granulocytes %, Automated 2.0 (H) 0.0 - 0.9 %    Lymphocytes % 22.6 13.0 - 44.0 %    Monocytes % 11.1 2.0 - 10.0 %    Eosinophils % 3.4 0.0 - 6.0 %    Basophils % 0.8 0.0 - 2.0 %    Neutrophils Absolute 6.96 1.20 - 7.70 x10*3/uL    Immature Granulocytes Absolute, Automated 0.23 0.00 - 0.70 x10*3/uL    Lymphocytes Absolute 2.62 1.20 - 4.80 x10*3/uL    Monocytes Absolute 1.28 (H) 0.10 - 1.00 x10*3/uL    Eosinophils Absolute 0.39 0.00 - 0.70 x10*3/uL    Basophils Absolute 0.09 0.00 - 0.10 x10*3/uL   Magnesium   Result Value Ref Range    Magnesium 2.04 1.60 - 2.40 mg/dL       IMAGING SUMMARY:  No new imaging      I have reviewed all laboratory and imaging results ordered/pertinent for today's encounter.

## 2024-06-24 NOTE — PROGRESS NOTES
Physical Therapy                 Therapy Communication Note    Patient Name: Bereket Em  MRN: [id number]  Today's Date: 6/24/2024     Discipline: Physical Therapy    Missed Visit Reason: Missed Visit Reason: Patient refused (Pt politely declined, reports another potential surgery, not feeling well incr pain)    Missed Time: Attempt    Comment:

## 2024-06-24 NOTE — CARE PLAN
The patient's goals for the shift include      The clinical goals for the shift include Patient will remain HDS throughout night shift.    Over the shift, the patient did not make progress toward the following goals. Barriers to progression include patient having low ostomy output at this time . Recommendations to address these barriers include encourage ambulation and pain management at this time.

## 2024-06-25 LAB
ALBUMIN SERPL BCP-MCNC: 3.2 G/DL (ref 3.4–5)
ANION GAP SERPL CALC-SCNC: 13 MMOL/L (ref 10–20)
BASOPHILS # BLD AUTO: 0.09 X10*3/UL (ref 0–0.1)
BASOPHILS NFR BLD AUTO: 0.9 %
BUN SERPL-MCNC: 8 MG/DL (ref 6–23)
CALCIUM SERPL-MCNC: 9.3 MG/DL (ref 8.6–10.6)
CHLORIDE SERPL-SCNC: 98 MMOL/L (ref 98–107)
CO2 SERPL-SCNC: 29 MMOL/L (ref 21–32)
CREAT SERPL-MCNC: 0.58 MG/DL (ref 0.5–1.3)
EGFRCR SERPLBLD CKD-EPI 2021: >90 ML/MIN/1.73M*2
EOSINOPHIL # BLD AUTO: 0.36 X10*3/UL (ref 0–0.7)
EOSINOPHIL NFR BLD AUTO: 3.7 %
ERYTHROCYTE [DISTWIDTH] IN BLOOD BY AUTOMATED COUNT: 13.3 % (ref 11.5–14.5)
GLUCOSE BLD MANUAL STRIP-MCNC: 115 MG/DL (ref 74–99)
GLUCOSE BLD MANUAL STRIP-MCNC: 118 MG/DL (ref 74–99)
GLUCOSE BLD MANUAL STRIP-MCNC: 93 MG/DL (ref 74–99)
GLUCOSE SERPL-MCNC: 115 MG/DL (ref 74–99)
HCT VFR BLD AUTO: 33.8 % (ref 41–52)
HGB BLD-MCNC: 10.8 G/DL (ref 13.5–17.5)
IMM GRANULOCYTES # BLD AUTO: 0.12 X10*3/UL (ref 0–0.7)
IMM GRANULOCYTES NFR BLD AUTO: 1.2 % (ref 0–0.9)
LYMPHOCYTES # BLD AUTO: 2.42 X10*3/UL (ref 1.2–4.8)
LYMPHOCYTES NFR BLD AUTO: 25.2 %
MAGNESIUM SERPL-MCNC: 2.01 MG/DL (ref 1.6–2.4)
MCH RBC QN AUTO: 26.9 PG (ref 26–34)
MCHC RBC AUTO-ENTMCNC: 32 G/DL (ref 32–36)
MCV RBC AUTO: 84 FL (ref 80–100)
MONOCYTES # BLD AUTO: 0.97 X10*3/UL (ref 0.1–1)
MONOCYTES NFR BLD AUTO: 10.1 %
NEUTROPHILS # BLD AUTO: 5.66 X10*3/UL (ref 1.2–7.7)
NEUTROPHILS NFR BLD AUTO: 58.9 %
NRBC BLD-RTO: 0 /100 WBCS (ref 0–0)
PHOSPHATE SERPL-MCNC: 4.7 MG/DL (ref 2.5–4.9)
PLATELET # BLD AUTO: 553 X10*3/UL (ref 150–450)
POTASSIUM SERPL-SCNC: 4.1 MMOL/L (ref 3.5–5.3)
RBC # BLD AUTO: 4.02 X10*6/UL (ref 4.5–5.9)
SODIUM SERPL-SCNC: 136 MMOL/L (ref 136–145)
WBC # BLD AUTO: 9.6 X10*3/UL (ref 4.4–11.3)

## 2024-06-25 PROCEDURE — 85025 COMPLETE CBC W/AUTO DIFF WBC: CPT

## 2024-06-25 PROCEDURE — 82947 ASSAY GLUCOSE BLOOD QUANT: CPT

## 2024-06-25 PROCEDURE — 2500000004 HC RX 250 GENERAL PHARMACY W/ HCPCS (ALT 636 FOR OP/ED): Performed by: NURSE PRACTITIONER

## 2024-06-25 PROCEDURE — 1100000001 HC PRIVATE ROOM DAILY

## 2024-06-25 PROCEDURE — 99024 POSTOP FOLLOW-UP VISIT: CPT | Performed by: SURGERY

## 2024-06-25 PROCEDURE — 80069 RENAL FUNCTION PANEL: CPT

## 2024-06-25 PROCEDURE — 2580000001 HC RX 258 IV SOLUTIONS: Performed by: NURSE PRACTITIONER

## 2024-06-25 PROCEDURE — 2500000004 HC RX 250 GENERAL PHARMACY W/ HCPCS (ALT 636 FOR OP/ED)

## 2024-06-25 PROCEDURE — 2500000001 HC RX 250 WO HCPCS SELF ADMINISTERED DRUGS (ALT 637 FOR MEDICARE OP)

## 2024-06-25 PROCEDURE — 83735 ASSAY OF MAGNESIUM: CPT

## 2024-06-25 PROCEDURE — 2500000005 HC RX 250 GENERAL PHARMACY W/O HCPCS: Performed by: NURSE PRACTITIONER

## 2024-06-25 RX ORDER — INSULIN LISPRO 100 [IU]/ML
0-5 INJECTION, SOLUTION INTRAVENOUS; SUBCUTANEOUS EVERY 6 HOURS
Status: DISCONTINUED | OUTPATIENT
Start: 2024-06-25 | End: 2024-07-11 | Stop reason: HOSPADM

## 2024-06-25 RX ORDER — DEXTROSE 50 % IN WATER (D50W) INTRAVENOUS SYRINGE
25
Status: DISCONTINUED | OUTPATIENT
Start: 2024-06-25 | End: 2024-07-11 | Stop reason: HOSPADM

## 2024-06-25 RX ORDER — SODIUM CHLORIDE, SODIUM LACTATE, POTASSIUM CHLORIDE, CALCIUM CHLORIDE 600; 310; 30; 20 MG/100ML; MG/100ML; MG/100ML; MG/100ML
60 INJECTION, SOLUTION INTRAVENOUS CONTINUOUS
Status: DISCONTINUED | OUTPATIENT
Start: 2024-06-25 | End: 2024-06-26

## 2024-06-25 RX ADMIN — ACETAMINOPHEN 650 MG: 325 TABLET ORAL at 02:58

## 2024-06-25 RX ADMIN — DEXTROSE AND SODIUM CHLORIDE 100 ML/HR: 5; 450 INJECTION, SOLUTION INTRAVENOUS at 12:00

## 2024-06-25 RX ADMIN — ASCORBIC ACID, VITAMIN A PALMITATE, CHOLECALCIFEROL, THIAMINE HYDROCHLORIDE, RIBOFLAVIN-5 PHOSPHATE SODIUM, PYRIDOXINE HYDROCHLORIDE, NIACINAMIDE, DEXPANTHENOL, ALPHA-TOCOPHEROL ACETATE, VITAMIN K1, FOLIC ACID, BIOTIN, CYANOCOBALAMIN: 200; 3300; 200; 6; 3.6; 6; 40; 15; 10; 150; 600; 60; 5 INJECTION, SOLUTION INTRAVENOUS at 20:26

## 2024-06-25 RX ADMIN — HYDROXYZINE HYDROCHLORIDE 10 MG: 10 TABLET ORAL at 12:00

## 2024-06-25 RX ADMIN — OXYCODONE HYDROCHLORIDE 5 MG: 5 TABLET ORAL at 20:19

## 2024-06-25 RX ADMIN — SMOFLIPID 50 G: 6; 6; 5; 3 INJECTION, EMULSION INTRAVENOUS at 20:27

## 2024-06-25 RX ADMIN — HYDROXYZINE HYDROCHLORIDE 10 MG: 10 TABLET ORAL at 20:19

## 2024-06-25 RX ADMIN — ENOXAPARIN SODIUM 40 MG: 100 INJECTION SUBCUTANEOUS at 20:20

## 2024-06-25 RX ADMIN — HYDROMORPHONE HYDROCHLORIDE 0.2 MG: 1 INJECTION, SOLUTION INTRAMUSCULAR; INTRAVENOUS; SUBCUTANEOUS at 02:58

## 2024-06-25 RX ADMIN — ACETAMINOPHEN 650 MG: 325 TABLET ORAL at 20:19

## 2024-06-25 RX ADMIN — ACETAMINOPHEN 650 MG: 325 TABLET ORAL at 14:15

## 2024-06-25 RX ADMIN — SODIUM CHLORIDE, POTASSIUM CHLORIDE, SODIUM LACTATE AND CALCIUM CHLORIDE 60 ML/HR: 600; 310; 30; 20 INJECTION, SOLUTION INTRAVENOUS at 16:54

## 2024-06-25 RX ADMIN — HYDROMORPHONE HYDROCHLORIDE 0.2 MG: 1 INJECTION, SOLUTION INTRAMUSCULAR; INTRAVENOUS; SUBCUTANEOUS at 23:20

## 2024-06-25 RX ADMIN — HYDROXYZINE HYDROCHLORIDE 10 MG: 10 TABLET ORAL at 02:58

## 2024-06-25 RX ADMIN — OXYCODONE HYDROCHLORIDE 10 MG: 5 TABLET ORAL at 11:59

## 2024-06-25 RX ADMIN — ACETAMINOPHEN 650 MG: 325 TABLET ORAL at 08:31

## 2024-06-25 RX ADMIN — DEXTROSE AND SODIUM CHLORIDE 100 ML/HR: 5; 450 INJECTION, SOLUTION INTRAVENOUS at 02:58

## 2024-06-25 RX ADMIN — OXYCODONE HYDROCHLORIDE 10 MG: 5 TABLET ORAL at 01:13

## 2024-06-25 ASSESSMENT — PAIN - FUNCTIONAL ASSESSMENT
PAIN_FUNCTIONAL_ASSESSMENT: 0-10

## 2024-06-25 ASSESSMENT — PAIN DESCRIPTION - DESCRIPTORS: DESCRIPTORS: SHARP

## 2024-06-25 ASSESSMENT — COGNITIVE AND FUNCTIONAL STATUS - GENERAL
DAILY ACTIVITIY SCORE: 24
MOBILITY SCORE: 22
WALKING IN HOSPITAL ROOM: A LITTLE
CLIMB 3 TO 5 STEPS WITH RAILING: A LITTLE

## 2024-06-25 ASSESSMENT — PAIN SCALES - GENERAL
PAINLEVEL_OUTOF10: 7
PAINLEVEL_OUTOF10: 3
PAINLEVEL_OUTOF10: 3
PAINLEVEL_OUTOF10: 4
PAINLEVEL_OUTOF10: 6
PAINLEVEL_OUTOF10: 4
PAINLEVEL_OUTOF10: 3
PAINLEVEL_OUTOF10: 6
PAINLEVEL_OUTOF10: 4
PAINLEVEL_OUTOF10: 2
PAINLEVEL_OUTOF10: 7
PAINLEVEL_OUTOF10: 3
PAINLEVEL_OUTOF10: 4

## 2024-06-25 ASSESSMENT — PAIN DESCRIPTION - ORIENTATION
ORIENTATION: ANTERIOR
ORIENTATION: MID

## 2024-06-25 ASSESSMENT — PAIN DESCRIPTION - LOCATION
LOCATION: ABDOMEN
LOCATION: OTHER (COMMENT)

## 2024-06-25 NOTE — PROGRESS NOTES
[facility]  ACUTE CARE SURGERY - PROGRESS NOTE    Patient Name: Bereket Em  MRN: [id number]  Admit Date: 605  : 1965  AGE: 58 y.o.   GENDER: male  ==============================================================================  TODAY'S ASSESSMENT AND PLAN OF CARE:  57yo M who presented with perforated diverticulitis. 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 He underwent ex lap, sigmoidectomy, and end colostomy on . Vac applied  to MLI. TPN initiated. On  vac was taken down which showed necrotic fascia at midline incision. He was taken to the OR for a exploration laparotomy, abdominal washout, fascial debridement, enmas closure on  with Dr. aCse.    Midline incision closed with retention sutures, however developed foul smelling with obvious purulence. Taken back to the OR on  for re-exploration laparotomy, mesh placement.     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 Required 4 dressing changes in last 24 hours, not high output fistula as of now. No signs of infection in wound bed as of now.     Plan:  - continue NPO  - restart TPN  - monitor fistula output    Neuro:  - stephan tylenol, stephan oxy 5/10, dilaudid for breakthrough    CV:  - monitor vitals  - holding home lisinopril    Pulm:  - encourage IS    GI: ostomy functioning with brown stool  - NPO due to c/f fistula   - PPI BID   - restart TPN today  - BID dressing changes to midline wound with wet to wet kerlix. Monitor drainage. If becomes high, will discuss a wound manager  - Monitor colostomy output, appreciate Enterostomal therapy following    :  - Strict I&Os  - Start IVFs while NPO   - Replete lytes as indicated     Endo: no hx of DM  - dc BG checks    ID:  - Zosyn until  (dc'd)  - Started back on Zosyn and Vanc , discontinued  - WBC remains stable, no indication for  further antbx at this time    Ppx: SCDs, LVX, OOB  PT/OT: home with home therapy    Dispo: continue care on RNF. Continue to monitor fistula output    Patient seen and discussed with Attending Dr. Vicky Vitale, PGY-1  ACS Service  [id number]    ==============================================================================  CHIEF COMPLAINT / EVENTS LAST 24HRS / HPI:  NAEON. Pain controlled. No fevers or chills overnight. Wound with green strikethrough on kerlix but not through ABD.   Ostomy continues to put out appropriate amounts and quality of stool. Patient unhappy that family was not able to speak with entire provider team, however I was able to speak with a family member regarding the CT scan findings and the plan to continue to monitor and quantify the fistula output yesterday. Discussed another meeting with family today to re explain the plan. Patient amenable.      PHYSICAL EXAM:  Heart Rate:  [57-73]   Temp:  [5 °C (41 °F)-36.4 °C (97.5 °F)]   Resp:  [16-18]   BP: (112-135)/(72-81)   SpO2:  [94 %-97 %]     Constitutional: no acute distress, conversational  Cardiac: palpable RR  Pulmonary: equal chest expansion, non labored, on RA  Abdomen: soft, non distended, minimally tender to palpitation, Midline wound open and packed with wet to wet kerlix. Green strikethrough at kerlix but not saturating abd. Localized area of green output at left lateral edge of mesh. No evidence of infection at this time. Colostomy pink and viable with brown liquid stool in pouch. Binder in place.   MSK: DEACON  Extremities: no pitting edema  Skin: warm, dry  Neuro: alert, conversive    LABS:  Results for orders placed or performed during the hospital encounter of 06/05/24 (from the past 24 hour(s))   Renal Function Panel   Result Value Ref Range    Glucose 115 (H) 74 - 99 mg/dL    Sodium 136 136 - 145 mmol/L    Potassium 4.1 3.5 - 5.3 mmol/L    Chloride 98 98 - 107 mmol/L    Bicarbonate 29 21 - 32 mmol/L    Anion Gap 13 10 - 20  mmol/L    Urea Nitrogen 8 6 - 23 mg/dL    Creatinine 0.58 0.50 - 1.30 mg/dL    eGFR >90 >60 mL/min/1.73m*2    Calcium 9.3 8.6 - 10.6 mg/dL    Phosphorus 4.7 2.5 - 4.9 mg/dL    Albumin 3.2 (L) 3.4 - 5.0 g/dL   CBC and Auto Differential   Result Value Ref Range    WBC 9.6 4.4 - 11.3 x10*3/uL    nRBC 0.0 0.0 - 0.0 /100 WBCs    RBC 4.02 (L) 4.50 - 5.90 x10*6/uL    Hemoglobin 10.8 (L) 13.5 - 17.5 g/dL    Hematocrit 33.8 (L) 41.0 - 52.0 %    MCV 84 80 - 100 fL    MCH 26.9 26.0 - 34.0 pg    MCHC 32.0 32.0 - 36.0 g/dL    RDW 13.3 11.5 - 14.5 %    Platelets 553 (H) 150 - 450 x10*3/uL    Neutrophils % 58.9 40.0 - 80.0 %    Immature Granulocytes %, Automated 1.2 (H) 0.0 - 0.9 %    Lymphocytes % 25.2 13.0 - 44.0 %    Monocytes % 10.1 2.0 - 10.0 %    Eosinophils % 3.7 0.0 - 6.0 %    Basophils % 0.9 0.0 - 2.0 %    Neutrophils Absolute 5.66 1.20 - 7.70 x10*3/uL    Immature Granulocytes Absolute, Automated 0.12 0.00 - 0.70 x10*3/uL    Lymphocytes Absolute 2.42 1.20 - 4.80 x10*3/uL    Monocytes Absolute 0.97 0.10 - 1.00 x10*3/uL    Eosinophils Absolute 0.36 0.00 - 0.70 x10*3/uL    Basophils Absolute 0.09 0.00 - 0.10 x10*3/uL   Magnesium   Result Value Ref Range    Magnesium 2.01 1.60 - 2.40 mg/dL       IMAGING SUMMARY:  No new imaging      I have reviewed all laboratory and imaging results ordered/pertinent for today's encounter.

## 2024-06-25 NOTE — CARE PLAN
The patient's goals for the shift include      The clinical goals for the shift include Maintain patient safety and control pain      Problem: Pain  Goal: Takes deep breaths with improved pain control throughout the shift  Outcome: Progressing  Goal: Turns in bed with improved pain control throughout the shift  Outcome: Progressing  Goal: Walks with improved pain control throughout the shift  Outcome: Progressing  Goal: Performs ADL's with improved pain control throughout shift  Outcome: Progressing  Goal: Participates in PT with improved pain control throughout the shift  Outcome: Progressing  Goal: Free from opioid side effects throughout the shift  Outcome: Progressing  Goal: Free from acute confusion related to pain meds throughout the shift  Outcome: Progressing     Problem: Pain - Adult  Goal: Verbalizes/displays adequate comfort level or baseline comfort level  Outcome: Progressing     Problem: Safety - Adult  Goal: Free from fall injury  Outcome: Progressing     Problem: Discharge Planning  Goal: Discharge to home or other facility with appropriate resources  Outcome: Progressing     Problem: Chronic Conditions and Co-morbidities  Goal: Patient's chronic conditions and co-morbidity symptoms are monitored and maintained or improved  Outcome: Progressing     Problem: Skin  Goal: Decreased wound size/increased tissue granulation at next dressing change  Outcome: Progressing  Goal: Participates in plan/prevention/treatment measures  Outcome: Progressing  Goal: Prevent/manage excess moisture  Outcome: Progressing  Goal: Prevent/minimize sheer/friction injuries  Outcome: Progressing  Goal: Promote/optimize nutrition  Outcome: Progressing  Goal: Promote skin healing  Outcome: Progressing     Problem: Fall/Injury  Goal: Not fall by end of shift  Outcome: Progressing  Goal: Be free from injury by end of the shift  Outcome: Progressing  Goal: Verbalize understanding of personal risk factors for fall in the  hospital  Outcome: Progressing  Goal: Verbalize understanding of risk factor reduction measures to prevent injury from fall in the home  Outcome: Progressing  Goal: Use assistive devices by end of the shift  Outcome: Progressing  Goal: Pace activities to prevent fatigue by end of the shift  Outcome: Progressing

## 2024-06-25 NOTE — CARE PLAN
The patient's goals for the shift include      The clinical goals for the shift include pain control    Over the shift, the patient is making progress toward the following goals.

## 2024-06-26 LAB
ALBUMIN SERPL BCP-MCNC: 3.4 G/DL (ref 3.4–5)
ANION GAP SERPL CALC-SCNC: 12 MMOL/L (ref 10–20)
BASOPHILS # BLD AUTO: 0.08 X10*3/UL (ref 0–0.1)
BASOPHILS NFR BLD AUTO: 0.9 %
BUN SERPL-MCNC: 8 MG/DL (ref 6–23)
CALCIUM SERPL-MCNC: 9.4 MG/DL (ref 8.6–10.6)
CHLORIDE SERPL-SCNC: 98 MMOL/L (ref 98–107)
CO2 SERPL-SCNC: 30 MMOL/L (ref 21–32)
CREAT SERPL-MCNC: 0.61 MG/DL (ref 0.5–1.3)
EGFRCR SERPLBLD CKD-EPI 2021: >90 ML/MIN/1.73M*2
EOSINOPHIL # BLD AUTO: 0.46 X10*3/UL (ref 0–0.7)
EOSINOPHIL NFR BLD AUTO: 5.2 %
ERYTHROCYTE [DISTWIDTH] IN BLOOD BY AUTOMATED COUNT: 13 % (ref 11.5–14.5)
GLUCOSE BLD MANUAL STRIP-MCNC: 104 MG/DL (ref 74–99)
GLUCOSE BLD MANUAL STRIP-MCNC: 112 MG/DL (ref 74–99)
GLUCOSE BLD MANUAL STRIP-MCNC: 125 MG/DL (ref 74–99)
GLUCOSE BLD MANUAL STRIP-MCNC: 142 MG/DL (ref 74–99)
GLUCOSE SERPL-MCNC: 115 MG/DL (ref 74–99)
HCT VFR BLD AUTO: 33.8 % (ref 41–52)
HGB BLD-MCNC: 11.2 G/DL (ref 13.5–17.5)
IMM GRANULOCYTES # BLD AUTO: 0.08 X10*3/UL (ref 0–0.7)
IMM GRANULOCYTES NFR BLD AUTO: 0.9 % (ref 0–0.9)
LYMPHOCYTES # BLD AUTO: 2.3 X10*3/UL (ref 1.2–4.8)
LYMPHOCYTES NFR BLD AUTO: 25.8 %
MAGNESIUM SERPL-MCNC: 2.07 MG/DL (ref 1.6–2.4)
MCH RBC QN AUTO: 26.9 PG (ref 26–34)
MCHC RBC AUTO-ENTMCNC: 33.1 G/DL (ref 32–36)
MCV RBC AUTO: 81 FL (ref 80–100)
MONOCYTES # BLD AUTO: 0.94 X10*3/UL (ref 0.1–1)
MONOCYTES NFR BLD AUTO: 10.5 %
NEUTROPHILS # BLD AUTO: 5.05 X10*3/UL (ref 1.2–7.7)
NEUTROPHILS NFR BLD AUTO: 56.7 %
NRBC BLD-RTO: 0 /100 WBCS (ref 0–0)
PHOSPHATE SERPL-MCNC: 4.9 MG/DL (ref 2.5–4.9)
PLATELET # BLD AUTO: 580 X10*3/UL (ref 150–450)
POTASSIUM SERPL-SCNC: 3.8 MMOL/L (ref 3.5–5.3)
RBC # BLD AUTO: 4.16 X10*6/UL (ref 4.5–5.9)
SODIUM SERPL-SCNC: 136 MMOL/L (ref 136–145)
WBC # BLD AUTO: 8.9 X10*3/UL (ref 4.4–11.3)

## 2024-06-26 PROCEDURE — 2500000004 HC RX 250 GENERAL PHARMACY W/ HCPCS (ALT 636 FOR OP/ED)

## 2024-06-26 PROCEDURE — 2500000004 HC RX 250 GENERAL PHARMACY W/ HCPCS (ALT 636 FOR OP/ED): Performed by: NURSE PRACTITIONER

## 2024-06-26 PROCEDURE — 2500000001 HC RX 250 WO HCPCS SELF ADMINISTERED DRUGS (ALT 637 FOR MEDICARE OP)

## 2024-06-26 PROCEDURE — 80069 RENAL FUNCTION PANEL: CPT

## 2024-06-26 PROCEDURE — 82947 ASSAY GLUCOSE BLOOD QUANT: CPT

## 2024-06-26 PROCEDURE — 1100000001 HC PRIVATE ROOM DAILY

## 2024-06-26 PROCEDURE — 97110 THERAPEUTIC EXERCISES: CPT | Mod: GO

## 2024-06-26 PROCEDURE — 2580000001 HC RX 258 IV SOLUTIONS: Performed by: NURSE PRACTITIONER

## 2024-06-26 PROCEDURE — 83735 ASSAY OF MAGNESIUM: CPT

## 2024-06-26 PROCEDURE — 2500000005 HC RX 250 GENERAL PHARMACY W/O HCPCS: Performed by: NURSE PRACTITIONER

## 2024-06-26 PROCEDURE — 99024 POSTOP FOLLOW-UP VISIT: CPT | Performed by: SURGERY

## 2024-06-26 PROCEDURE — 85025 COMPLETE CBC W/AUTO DIFF WBC: CPT

## 2024-06-26 RX ORDER — PANTOPRAZOLE SODIUM 40 MG/10ML
40 INJECTION, POWDER, LYOPHILIZED, FOR SOLUTION INTRAVENOUS
Status: DISCONTINUED | OUTPATIENT
Start: 2024-06-27 | End: 2024-06-27

## 2024-06-26 RX ORDER — LOPERAMIDE HYDROCHLORIDE 2 MG/1
2 CAPSULE ORAL EVERY 6 HOURS
Status: DISCONTINUED | OUTPATIENT
Start: 2024-06-26 | End: 2024-06-28

## 2024-06-26 RX ORDER — SODIUM CHLORIDE, SODIUM LACTATE, POTASSIUM CHLORIDE, CALCIUM CHLORIDE 600; 310; 30; 20 MG/100ML; MG/100ML; MG/100ML; MG/100ML
17 INJECTION, SOLUTION INTRAVENOUS CONTINUOUS
Status: ACTIVE | OUTPATIENT
Start: 2024-06-26 | End: 2024-06-29

## 2024-06-26 RX ADMIN — OXYCODONE HYDROCHLORIDE 5 MG: 5 TABLET ORAL at 15:05

## 2024-06-26 RX ADMIN — OXYCODONE HYDROCHLORIDE 5 MG: 5 TABLET ORAL at 19:57

## 2024-06-26 RX ADMIN — OXYCODONE HYDROCHLORIDE 5 MG: 5 TABLET ORAL at 02:42

## 2024-06-26 RX ADMIN — ASCORBIC ACID, VITAMIN A PALMITATE, CHOLECALCIFEROL, THIAMINE HYDROCHLORIDE, RIBOFLAVIN-5 PHOSPHATE SODIUM, PYRIDOXINE HYDROCHLORIDE, NIACINAMIDE, DEXPANTHENOL, ALPHA-TOCOPHEROL ACETATE, VITAMIN K1, FOLIC ACID, BIOTIN, CYANOCOBALAMIN: 200; 3300; 200; 6; 3.6; 6; 40; 15; 10; 150; 600; 60; 5 INJECTION, SOLUTION INTRAVENOUS at 19:58

## 2024-06-26 RX ADMIN — SMOFLIPID 50 G: 6; 6; 5; 3 INJECTION, EMULSION INTRAVENOUS at 19:58

## 2024-06-26 RX ADMIN — ACETAMINOPHEN 650 MG: 325 TABLET ORAL at 15:03

## 2024-06-26 RX ADMIN — LOPERAMIDE HYDROCHLORIDE 2 MG: 2 CAPSULE ORAL at 13:12

## 2024-06-26 RX ADMIN — HYDROMORPHONE HYDROCHLORIDE 0.2 MG: 1 INJECTION, SOLUTION INTRAMUSCULAR; INTRAVENOUS; SUBCUTANEOUS at 18:23

## 2024-06-26 RX ADMIN — SODIUM CHLORIDE, POTASSIUM CHLORIDE, SODIUM LACTATE AND CALCIUM CHLORIDE 17 ML/HR: 600; 310; 30; 20 INJECTION, SOLUTION INTRAVENOUS at 11:36

## 2024-06-26 RX ADMIN — LOPERAMIDE HYDROCHLORIDE 2 MG: 2 CAPSULE ORAL at 19:58

## 2024-06-26 RX ADMIN — ENOXAPARIN SODIUM 40 MG: 100 INJECTION SUBCUTANEOUS at 21:13

## 2024-06-26 RX ADMIN — ACETAMINOPHEN 650 MG: 325 TABLET ORAL at 21:13

## 2024-06-26 RX ADMIN — SODIUM CHLORIDE, POTASSIUM CHLORIDE, SODIUM LACTATE AND CALCIUM CHLORIDE 60 ML/HR: 600; 310; 30; 20 INJECTION, SOLUTION INTRAVENOUS at 08:10

## 2024-06-26 RX ADMIN — ACETAMINOPHEN 650 MG: 325 TABLET ORAL at 02:42

## 2024-06-26 ASSESSMENT — COGNITIVE AND FUNCTIONAL STATUS - GENERAL
DAILY ACTIVITIY SCORE: 24
CLIMB 3 TO 5 STEPS WITH RAILING: A LITTLE
MOBILITY SCORE: 19
WALKING IN HOSPITAL ROOM: A LITTLE
DAILY ACTIVITIY SCORE: 22
MOBILITY SCORE: 22
CLIMB 3 TO 5 STEPS WITH RAILING: A LITTLE
DAILY ACTIVITIY SCORE: 24
DRESSING REGULAR LOWER BODY CLOTHING: A LITTLE
DRESSING REGULAR LOWER BODY CLOTHING: A LITTLE
CLIMB 3 TO 5 STEPS WITH RAILING: A LITTLE
DRESSING REGULAR UPPER BODY CLOTHING: A LITTLE
WALKING IN HOSPITAL ROOM: A LITTLE
TURNING FROM BACK TO SIDE WHILE IN FLAT BAD: A LITTLE
WALKING IN HOSPITAL ROOM: A LITTLE
HELP NEEDED FOR BATHING: A LITTLE
STANDING UP FROM CHAIR USING ARMS: A LITTLE
CLIMB 3 TO 5 STEPS WITH RAILING: A LITTLE
WALKING IN HOSPITAL ROOM: A LITTLE
WALKING IN HOSPITAL ROOM: A LITTLE
TOILETING: A LITTLE
MOVING TO AND FROM BED TO CHAIR: A LITTLE
DAILY ACTIVITIY SCORE: 20
WALKING IN HOSPITAL ROOM: A LITTLE
MOBILITY SCORE: 22
TOILETING: A LITTLE

## 2024-06-26 ASSESSMENT — PAIN SCALES - GENERAL
PAINLEVEL_OUTOF10: 4
PAINLEVEL_OUTOF10: 5 - MODERATE PAIN
PAINLEVEL_OUTOF10: 5 - MODERATE PAIN
PAINLEVEL_OUTOF10: 4
PAINLEVEL_OUTOF10: 3
PAINLEVEL_OUTOF10: 5 - MODERATE PAIN
PAINLEVEL_OUTOF10: 3
PAINLEVEL_OUTOF10: 3
PAINLEVEL_OUTOF10: 5 - MODERATE PAIN
PAINLEVEL_OUTOF10: 3

## 2024-06-26 ASSESSMENT — PAIN DESCRIPTION - DESCRIPTORS
DESCRIPTORS: ACHING
DESCRIPTORS: SHARP

## 2024-06-26 ASSESSMENT — PAIN SCALES - WONG BAKER: WONGBAKER_NUMERICALRESPONSE: HURTS LITTLE MORE

## 2024-06-26 ASSESSMENT — PAIN - FUNCTIONAL ASSESSMENT
PAIN_FUNCTIONAL_ASSESSMENT: 0-10

## 2024-06-26 ASSESSMENT — PAIN DESCRIPTION - ORIENTATION: ORIENTATION: MID

## 2024-06-26 ASSESSMENT — PAIN DESCRIPTION - LOCATION: LOCATION: ABDOMEN

## 2024-06-26 NOTE — PROGRESS NOTES
06/26/24 1334   Colostomy LLQ   Placement Date/Time: 06/07/24 1329   Location: LLQ   Stomal Appliance Changed;2 piece   Site/Stoma Assessment Red;Swelling;Budded   Stoma Size (cm) 3.8 cm   Peristomal Assessment Blanchable erythema;Wound;Separation   Treatment Pouch change;Topical treatment;Site care   Drainage Characteristics Brown   Output (mL) 20 mL

## 2024-06-26 NOTE — CARE PLAN
The patient's goals for the shift include      The clinical goals for the shift include maintain patient safety by hourly rounding and control pain with medication and repositioning to maintain pain rating of 3/10 or less.

## 2024-06-26 NOTE — PROGRESS NOTES
"Music Therapy Note    Bereket Em was referred by     Therapy Session  Referral Type: New referral this admission  Visit Type: New visit  Session Start Time: 1500  Session End Time: 1503  Intervention Delivery: In-person  Conflict of Service: None  Number of family members present: 0  Number of staff members present: 0     Pre-assessment  Pain Score: 0 - No pain  Stress Level (0-10): 0  Anxiety Level (0-10): 0  Mood/Affect: Appropriate  Verbalized Emotional State: Acceptance (\"I'm a pretty laid back person\")         Treatment/Interventions  Music Therapy Interventions: Assessment  Interruption: No  Patient Fell Asleep at End of Session: No    Post-assessment  Unable to Assess Reason: Did not provide expressive therapy intervention  Continue Visiting: Yes  Total Session Time (min): 3 minutes    Narrative  Assessment Detail: Patient found lying in bed. Receptive to education and benefits of music therapy services. Enjoys music \"sometimes;\" Rock. Does not currently use music to cope or relax. Reports no needs at this time. No pain, anxiety or stress. Welcomes follow up at another time.  Follow-up: MT will follow up with patient accordingly    Education Documentation  No documentation found.          "

## 2024-06-26 NOTE — CARE PLAN
The patient's goals for the shift include      The clinical goals for the shift include Patient will be HDS    Over the shift, the patient did make progress toward the following goals.     Problem: Pain  Goal: Takes deep breaths with improved pain control throughout the shift  Outcome: Progressing  Goal: Turns in bed with improved pain control throughout the shift  Outcome: Progressing  Goal: Walks with improved pain control throughout the shift  Outcome: Progressing  Goal: Performs ADL's with improved pain control throughout shift  Outcome: Progressing  Goal: Participates in PT with improved pain control throughout the shift  Outcome: Progressing  Goal: Free from opioid side effects throughout the shift  Outcome: Progressing  Goal: Free from acute confusion related to pain meds throughout the shift  Outcome: Progressing     Problem: Skin  Goal: Decreased wound size/increased tissue granulation at next dressing change  Outcome: Progressing  Goal: Participates in plan/prevention/treatment measures  Outcome: Progressing  Goal: Prevent/manage excess moisture  Outcome: Progressing  Goal: Prevent/minimize sheer/friction injuries  Outcome: Progressing  Goal: Promote/optimize nutrition  Outcome: Progressing  Goal: Promote skin healing  Outcome: Progressing

## 2024-06-26 NOTE — PROGRESS NOTES
Occupational Therapy    Occupational Therapy Treatment    Name: Bereket Em  MRN: [id number]  : 1965  Date: 24  Room: [address] [place] [address]    Time Calculation  Start Time: 1054  Stop Time: 1108  Time Calculation (min): 14 min    Assessment:      24 1054   IP OT Assessment   OT Assessment Pt tolerated treatment well this day demonstrating understanding of all therapeutic exercises and education. Pt made progress towards therapeutic goals this session.   Prognosis Good   Barriers to Discharge None   Evaluation/Treatment Tolerance Patient tolerated treatment well   Medical Staff Made Aware Yes   End of Session Communication Bedside nurse   End of Session Patient Position Bed, 3 rail up;Alarm off, not on at start of session;Alarm off, caregiver present      24 1054   OT Assessment   OT Assessment Results Decreased ADL status;Decreased functional mobility   Strengths Ability to acquire knowledge;Attitude of self;Coping skills   Barriers to Participation Comorbidities     Plan:  Treatment Interventions: ADL retraining, Functional transfer training  OT Frequency: 2 times per week  OT Discharge Recommendations: Low intensity level of continued care  Equipment Recommended upon Discharge: Wheeled walker, Other (comment) (tub bench, grab bars by shower and toilet)  OT Recommended Transfer Status: Assist of 1  OT - OK to Discharge: Yes    Subjective   General:      24 1054   General   Reason for Referral 57yo M who presented with perforated diverticulitis. 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 He underwent ex lap, sigmoidectomy, and end colostomy on .   Past Medical History Relevant to Rehab diverticulitis dianosed 6/3. No other known medical hx to report.   Family/Caregiver Present Yes   Caregiver Feedback Family members present for part of tx   Prior to Session Communication Bedside nurse   Patient Position Received Bed, 3 rail up;Alarm off, not on at start of  session   General Comment Pt supine in bed upon arrival. Pt reporting improvement in function but pleasant and agreeable to OT tx         Precautions:  Medical Precautions: Fall precautions  Post-Surgical Precautions: Abdominal surgery precautions    Lines/Tubes/Drains:  PICC - Adult 06/14/24 Double lumen Right Cephalic vein (Active)   Number of days: 12       Colostomy LLQ (Active)   Number of days: 19     Cognition:   WFL  Oriented x4    Pain Assessment:       06/26/24 1054   Pain Assessment   Pain Assessment 0-10   0-10 (Numeric) Pain Score 3   Pain Type Acute pain   Pain Location Abdomen   Pain Interventions Repositioned;Ambulation/increased activity;Rest;Relaxation technique   Response to Interventions Pt resting comfortably in bed at end of session       Objective     Functional Standing Tolerance:     Bed Mobility/Transfers:      06/26/24 1054   Bed Mobility 1   Bed Mobility 1 Supine to sitting;Sitting to supine   Level of Assistance 1 Close supervision   Bed Mobility Comments 1 HOB elevated, demonstrated understanding of log roll technique without verbal cues or reminders     Functional mobility:  Pt ambulated less than household distance in room from bedside to chair nearby without AD and with SBA     Transfers: Yes     06/26/24 1054   Transfer 1   Transfer From 1 Bed to   Transfer to 1 Stand   Technique 1 Sit to stand   Transfer Level of Assistance 1 Distant supervision   Trials/Comments 1 SBA only for safety        06/26/24 1054   Transfers 2   Transfer From 2 Stand to;Chair with arms to   Transfer to 2 Chair with arms;Stand   Technique 2 Stand to sit;Sit to stand   Transfer Level of Assistance 2 Distant supervision   Trials/Comments 2 SBA only for safety        06/26/24 1054   Transfers 3   Transfer From 3 Stand to   Transfer to 3 Bed   Technique 3 Stand to sit   Transfer Level of Assistance 3 Distant supervision       Balance:    Static sitting balance: IND  Dynamic sitting balance: SBA  Static Standing  balance: SBA  Dynamic standing balance: SBA     Therapy/Activity:       06/26/24 1054   Therapeutic Exercise   Therapeutic Exercise Performed Yes   Therapeutic Exercise Activity 1 Shoulder flexion: Green T-band (self-assisted) 10 reps x 1 set BUE   Therapeutic Exercise Activity 2 Shoulder extension: Green T-band (self-assisted) 10 reps x 1 set BUE   Therapeutic Exercise Activity 3 Elbow extension: Green T-band (self-assisted) 10 reps x 1 set BUE   Therapeutic Exercise Activity 4 Elbow flexion: Green T-band (self-assisted) 10 reps x 1 set BUE   Therapeutic Exercise Activity 5 External rotation: Yellow T-band with pillow between elbow and trunk 10 reps x 1 set   Therapeutic Exercise Activity 6 Education on targeted muscles, muscle fatigue and therapeutic benefits of ther ex in relation to transfers and functional mobility     Outcome Measures:       06/26/24 1054   [facility] Daily Activity   Putting on and taking off regular lower body clothing 3   Bathing (including washing, rinsing, drying) 4   Putting on and taking off regular upper body clothing 4   Toileting, which includes using toilet, bedpan or urinal 3   Taking care of personal grooming such as brushing teeth 4   Eating Meals 4   Daily Activity - Total Score 22       Education Documentation  Handouts, taught by Sean Taylor OT at 6/26/2024  3:22 PM.  Learner: Patient  Readiness: Acceptance  Method: Explanation  Response: Verbalizes Understanding, Demonstrated Understanding  Comment: UB ther ex handout provided    Precautions, taught by Sean Taylor OT at 6/26/2024  3:22 PM.  Learner: Patient  Readiness: Acceptance  Method: Explanation  Response: Verbalizes Understanding, Demonstrated Understanding  Comment: UB ther ex handout provided    Education Comments  No comments found.      Goals:  Encounter Problems       Encounter Problems (Active)       ADLs       Patient with complete lower body dressing with modified independent level of assistance  donning and doffing all LE clothes  with PRN adaptive equipment  (Progressing)       Start:  06/10/24    Expected End:  07/03/24            Patient will complete toileting including hygiene clothing management/hygiene with modified independent level of assistance and DME prn. (Progressing)       Start:  06/10/24    Expected End:  07/03/24               BALANCE       Pt will maintain dynamic standing balance >8 minutes during ADL task with modified independent level of assistance in order to demonstrate decreased risk of falling and improved postural control. (Progressing)       Start:  06/10/24    Expected End:  07/03/24               MOBILITY       Patient will perform Functional mobility mod  Household distances/Community Distances with modified independent level of assistance and least restrictive device in order to improve safety and functional mobility. (Progressing)       Start:  06/10/24    Expected End:  07/03/24                  Encounter Problems (Resolved)       TRANSFERS       Patient will perform bed mobility independent level of assistance in order to improve safety and independence with mobility (Met)       Start:  06/10/24    Expected End:  06/24/24    Resolved:  06/26/24         Patient will complete functional transfers with least restrictive device with modified independent level of assistance. (Met)       Start:  06/10/24    Expected End:  06/24/24    Resolved:  06/26/24 06/26/24 at 3:23 PM   Sean Taylor, OT   [phone]

## 2024-06-26 NOTE — PROGRESS NOTES
[facility]  ACUTE CARE SURGERY - PROGRESS NOTE    Patient Name: Bereket Em  MRN: [id number]  Admit Date: 605  : 1965  AGE: 58 y.o.   GENDER: male  ==============================================================================  TODAY'S ASSESSMENT AND PLAN OF CARE:  57yo M who presented with perforated diverticulitis. 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 He underwent ex lap, sigmoidectomy, and end colostomy on . Vac applied  to MLI. TPN initiated. On  vac was taken down which showed necrotic fascia at midline incision. He was taken to the OR for a exploration laparotomy, abdominal washout, fascial debridement, enmas closure on  with Dr. Case.    Midline incision closed with retention sutures, however developed foul smelling with obvious purulence. Taken back to the OR on  for re-exploration laparotomy, mesh placement. 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 Required 3-4 dressing changes in last 24 hours, not high output fistula as of now. No signs of infection in wound bed as of now. Patient HDS, no fevers. Plan     Plan:  - continue NPO  - TPN to goal tonight  - monitor fistula output  - pet, art, music therapy  - PRN atarax for anxiety    Neuro:  - stephan tylenol, stephan oxy 5/10, dilaudid for breakthrough    CV:  - monitor vitals  - holding home lisinopril    Pulm:  - encourage IS    GI: ostomy functioning with brown stool  - NPO to accurately measure fistula output  - PPI BID   - TPN to goal tonight  - BID to TID dressing changes to midline wound with wet to wet kerlix. Monitor drainage. If becomes high, will discuss a wound manager  - Monitor colostomy output, appreciate Enterostomal therapy following    :  - Strict I&Os  - Start IVFs while NPO   - Replete lytes as indicated     Endo: no hx of DM  - dc BG checks    ID:  - Zosyn  until 6/11 (dc'd)  - Started back on Zosyn and Vanc 6/17, discontinued  - WBC remains stable, no indication for further antbx at this time    Ppx: SCDs, LVX, OOB  PT/OT: home with home therapy    Dispo: continue care on RNF. Continue to monitor fistula output. Not medically ready    Patient seen and discussed with Attending Dr. Vicky Vitale, PGY-1  ACS Service  [id number]    ==============================================================================  CHIEF COMPLAINT / EVENTS LAST 24HRS / HPI:  NAEON. Pain controlled. No fevers or chills overnight. Abdominal dressing changes 3 times yesterday during day and once overnight.     PHYSICAL EXAM:  Heart Rate:  [57-72]   Temp:  [35.3 °C (95.5 °F)-36.2 °C (97.2 °F)]   Resp:  [17-18]   BP: (110-137)/(70-84)   SpO2:  [93 %-96 %]     Constitutional: no acute distress, conversational  Cardiac: palpable RR  Pulmonary: equal chest expansion, non labored, on RA  Abdomen: soft, non distended, minimally tender to palpitation, Midline wound open and packed with wet to wet kerlix. Green strikethrough at kerlix but not saturating abd. Localized area of green output at left lateral edge of mesh. No evidence of infection at this time. Colostomy pink and viable with brown liquid stool in pouch. Binder in place.   MSK: DEACON  Extremities: no pitting edema  Skin: warm, dry  Neuro: alert, conversive    LABS:  Results for orders placed or performed during the hospital encounter of 06/05/24 (from the past 24 hour(s))   POCT GLUCOSE   Result Value Ref Range    POCT Glucose 115 (H) 74 - 99 mg/dL   POCT GLUCOSE   Result Value Ref Range    POCT Glucose 93 74 - 99 mg/dL   POCT GLUCOSE   Result Value Ref Range    POCT Glucose 118 (H) 74 - 99 mg/dL   POCT GLUCOSE   Result Value Ref Range    POCT Glucose 125 (H) 74 - 99 mg/dL   Renal Function Panel   Result Value Ref Range    Glucose 115 (H) 74 - 99 mg/dL    Sodium 136 136 - 145 mmol/L    Potassium 3.8 3.5 - 5.3 mmol/L    Chloride 98 98 -  107 mmol/L    Bicarbonate 30 21 - 32 mmol/L    Anion Gap 12 10 - 20 mmol/L    Urea Nitrogen 8 6 - 23 mg/dL    Creatinine 0.61 0.50 - 1.30 mg/dL    eGFR >90 >60 mL/min/1.73m*2    Calcium 9.4 8.6 - 10.6 mg/dL    Phosphorus 4.9 2.5 - 4.9 mg/dL    Albumin 3.4 3.4 - 5.0 g/dL   CBC and Auto Differential   Result Value Ref Range    WBC 8.9 4.4 - 11.3 x10*3/uL    nRBC 0.0 0.0 - 0.0 /100 WBCs    RBC 4.16 (L) 4.50 - 5.90 x10*6/uL    Hemoglobin 11.2 (L) 13.5 - 17.5 g/dL    Hematocrit 33.8 (L) 41.0 - 52.0 %    MCV 81 80 - 100 fL    MCH 26.9 26.0 - 34.0 pg    MCHC 33.1 32.0 - 36.0 g/dL    RDW 13.0 11.5 - 14.5 %    Platelets 580 (H) 150 - 450 x10*3/uL    Neutrophils % 56.7 40.0 - 80.0 %    Immature Granulocytes %, Automated 0.9 0.0 - 0.9 %    Lymphocytes % 25.8 13.0 - 44.0 %    Monocytes % 10.5 2.0 - 10.0 %    Eosinophils % 5.2 0.0 - 6.0 %    Basophils % 0.9 0.0 - 2.0 %    Neutrophils Absolute 5.05 1.20 - 7.70 x10*3/uL    Immature Granulocytes Absolute, Automated 0.08 0.00 - 0.70 x10*3/uL    Lymphocytes Absolute 2.30 1.20 - 4.80 x10*3/uL    Monocytes Absolute 0.94 0.10 - 1.00 x10*3/uL    Eosinophils Absolute 0.46 0.00 - 0.70 x10*3/uL    Basophils Absolute 0.08 0.00 - 0.10 x10*3/uL   Magnesium   Result Value Ref Range    Magnesium 2.07 1.60 - 2.40 mg/dL       IMAGING SUMMARY:  No new imaging      I have reviewed all laboratory and imaging results ordered/pertinent for today's encounter.

## 2024-06-26 NOTE — CONSULTS
Wound Care Consult     Visit Date: 6/26/2024      Patient Name: Bereket Em         MRN: [id number]           YOB: 1965     Reason for Consult: Midline wound      Wound History: Wound care consulted by ACS to assess for wound manage and assistance in care/education of ostomy     Pertinent Labs:   Albumin   Date Value Ref Range Status   06/26/2024 3.4 3.4 - 5.0 g/dL Final     ALBUMIN (MG/L) IN URINE   Date Value Ref Range Status   05/06/2021 <7.0 Not Established mg/L Final       Wound Assessment:     06/26/24 1334   Colostomy LLQ   Placement Date/Time: 06/07/24 1329   Location: LLQ   Stomal Appliance Changed;2 piece   Site/Stoma Assessment Red;Swelling;Budded   Stoma Size (cm) 3.8 cm   Peristomal Assessment Blanchable erythema;Wound;Separation   Treatment Pouch change;Topical treatment;Site care   Drainage Characteristics Brown   Output (mL) 20 mL       Wound 06/07/24 Incision Abdomen Medial;Upper (Active)   Wound Image     Site Assessment Pink;Red 06/26/24 1722   Usha-Wound Assessment Pink 06/26/24 1722   Shape Linear 06/14/24 1239   Wound Length (cm) 19 cm 06/26/24 1334   Wound Width (cm) 12 cm 06/26/24 1334   Wound Surface Area (cm^2) 228 cm^2 06/26/24 1334   Wound Depth (cm) 2 cm 06/26/24 1334   Wound Volume (cm^3) 456 cm^3 06/26/24 1334   Wound Healing % 12 06/26/24 1334   State of Healing Early/partial granulation 06/14/24 1239   Margins Well-defined edges 06/26/24 1722   Closure Open to air 06/26/24 1722   Sutures/Staple Line Approximated 06/26/24 0236   Drainage Description Brown;Yellow 06/26/24 1722   Drainage Amount Large 06/26/24 1722   Dressing ABD;Kerlix/rolled gauze;Moist to dry 06/26/24 1722   Dressing Changed Changed 06/26/24 1722   Dressing Status Clean;Dry 06/26/24 1722       Wound Team Summary Assessment:   Midline wound assessed by Dr. Mcelroy at bedside.  At this time, Dr. Mcelroy wants to continue with the frequent topical dressing changes with kerlix, ABDs and paper tape.   No wound manager.  Please refer to ACS team regarding recommendations/frequency of dressing changes and care.    ype: colostomy  Size: 1 1/2 inches  Color: red  Protruding: budded  Peristomal skin: separation from 5-9 o clock, stomahesive powder and no sting barrier applied. Close proximity to large open abdominal wound.  Pouching: pouch changed today for education. 2 piece [place] RED flat wafer, barrier ring, and drainable pouch applied.  Output: brown, loose     Education: Patient removed old pouch and cleaned and dried peristomal skin.  Stomahesive powder and cavilon no sting skin prep applied.  Patient applied barrier ring, cut and applied pouch with author assistance.  Patient open to additional hands on lessons      Wound Team Plan: Wound care to continue to follow for education with colostomy.  Please reconsult wound care if further assistance is requested with midline wound.     Brittaney Morgan, MSN, RN, CWCN, COCN  06/26/24 6:13 PM

## 2024-06-27 LAB
ALBUMIN SERPL BCP-MCNC: 3.2 G/DL (ref 3.4–5)
ANION GAP SERPL CALC-SCNC: 18 MMOL/L (ref 10–20)
BASOPHILS # BLD AUTO: 0.07 X10*3/UL (ref 0–0.1)
BASOPHILS NFR BLD AUTO: 0.8 %
BUN SERPL-MCNC: 11 MG/DL (ref 6–23)
CALCIUM SERPL-MCNC: 9 MG/DL (ref 8.6–10.6)
CHLORIDE SERPL-SCNC: 100 MMOL/L (ref 98–107)
CO2 SERPL-SCNC: 26 MMOL/L (ref 21–32)
CREAT SERPL-MCNC: 0.58 MG/DL (ref 0.5–1.3)
EGFRCR SERPLBLD CKD-EPI 2021: >90 ML/MIN/1.73M*2
EOSINOPHIL # BLD AUTO: 0.43 X10*3/UL (ref 0–0.7)
EOSINOPHIL NFR BLD AUTO: 5.1 %
ERYTHROCYTE [DISTWIDTH] IN BLOOD BY AUTOMATED COUNT: 13.1 % (ref 11.5–14.5)
GLUCOSE BLD MANUAL STRIP-MCNC: 122 MG/DL (ref 74–99)
GLUCOSE BLD MANUAL STRIP-MCNC: 124 MG/DL (ref 74–99)
GLUCOSE BLD MANUAL STRIP-MCNC: 139 MG/DL (ref 74–99)
GLUCOSE SERPL-MCNC: 111 MG/DL (ref 74–99)
HCT VFR BLD AUTO: 34.3 % (ref 41–52)
HGB BLD-MCNC: 10.6 G/DL (ref 13.5–17.5)
IMM GRANULOCYTES # BLD AUTO: 0.06 X10*3/UL (ref 0–0.7)
IMM GRANULOCYTES NFR BLD AUTO: 0.7 % (ref 0–0.9)
LYMPHOCYTES # BLD AUTO: 1.53 X10*3/UL (ref 1.2–4.8)
LYMPHOCYTES NFR BLD AUTO: 18.3 %
MAGNESIUM SERPL-MCNC: 2.07 MG/DL (ref 1.6–2.4)
MCH RBC QN AUTO: 26 PG (ref 26–34)
MCHC RBC AUTO-ENTMCNC: 30.9 G/DL (ref 32–36)
MCV RBC AUTO: 84 FL (ref 80–100)
MONOCYTES # BLD AUTO: 1.05 X10*3/UL (ref 0.1–1)
MONOCYTES NFR BLD AUTO: 12.6 %
NEUTROPHILS # BLD AUTO: 5.21 X10*3/UL (ref 1.2–7.7)
NEUTROPHILS NFR BLD AUTO: 62.5 %
NRBC BLD-RTO: 0 /100 WBCS (ref 0–0)
PHOSPHATE SERPL-MCNC: 5 MG/DL (ref 2.5–4.9)
PLATELET # BLD AUTO: 496 X10*3/UL (ref 150–450)
POTASSIUM SERPL-SCNC: 4.5 MMOL/L (ref 3.5–5.3)
RBC # BLD AUTO: 4.07 X10*6/UL (ref 4.5–5.9)
SODIUM SERPL-SCNC: 139 MMOL/L (ref 136–145)
WBC # BLD AUTO: 8.4 X10*3/UL (ref 4.4–11.3)

## 2024-06-27 PROCEDURE — 2500000004 HC RX 250 GENERAL PHARMACY W/ HCPCS (ALT 636 FOR OP/ED)

## 2024-06-27 PROCEDURE — 99024 POSTOP FOLLOW-UP VISIT: CPT | Performed by: SURGERY

## 2024-06-27 PROCEDURE — 80069 RENAL FUNCTION PANEL: CPT

## 2024-06-27 PROCEDURE — 2500000001 HC RX 250 WO HCPCS SELF ADMINISTERED DRUGS (ALT 637 FOR MEDICARE OP)

## 2024-06-27 PROCEDURE — 2580000001 HC RX 258 IV SOLUTIONS: Performed by: NURSE PRACTITIONER

## 2024-06-27 PROCEDURE — 85025 COMPLETE CBC W/AUTO DIFF WBC: CPT

## 2024-06-27 PROCEDURE — 83735 ASSAY OF MAGNESIUM: CPT

## 2024-06-27 PROCEDURE — 1100000001 HC PRIVATE ROOM DAILY

## 2024-06-27 PROCEDURE — 2500000005 HC RX 250 GENERAL PHARMACY W/O HCPCS: Performed by: NURSE PRACTITIONER

## 2024-06-27 PROCEDURE — C9113 INJ PANTOPRAZOLE SODIUM, VIA: HCPCS

## 2024-06-27 PROCEDURE — 82947 ASSAY GLUCOSE BLOOD QUANT: CPT

## 2024-06-27 RX ORDER — PANTOPRAZOLE SODIUM 40 MG/10ML
40 INJECTION, POWDER, LYOPHILIZED, FOR SOLUTION INTRAVENOUS 2 TIMES DAILY
Status: DISCONTINUED | OUTPATIENT
Start: 2024-06-27 | End: 2024-07-11 | Stop reason: HOSPADM

## 2024-06-27 RX ADMIN — OXYCODONE HYDROCHLORIDE 5 MG: 5 TABLET ORAL at 08:31

## 2024-06-27 RX ADMIN — SODIUM CHLORIDE, POTASSIUM CHLORIDE, SODIUM LACTATE AND CALCIUM CHLORIDE 17 ML/HR: 600; 310; 30; 20 INJECTION, SOLUTION INTRAVENOUS at 21:22

## 2024-06-27 RX ADMIN — PANTOPRAZOLE SODIUM 40 MG: 40 INJECTION, POWDER, FOR SOLUTION INTRAVENOUS at 21:25

## 2024-06-27 RX ADMIN — ACETAMINOPHEN 650 MG: 325 TABLET ORAL at 08:24

## 2024-06-27 RX ADMIN — PANTOPRAZOLE SODIUM 40 MG: 40 INJECTION, POWDER, FOR SOLUTION INTRAVENOUS at 06:20

## 2024-06-27 RX ADMIN — ACETAMINOPHEN 650 MG: 325 TABLET ORAL at 15:32

## 2024-06-27 RX ADMIN — LOPERAMIDE HYDROCHLORIDE 2 MG: 2 CAPSULE ORAL at 13:09

## 2024-06-27 RX ADMIN — OXYCODONE HYDROCHLORIDE 5 MG: 5 TABLET ORAL at 17:12

## 2024-06-27 RX ADMIN — ACETAMINOPHEN 650 MG: 325 TABLET ORAL at 03:25

## 2024-06-27 RX ADMIN — LOPERAMIDE HYDROCHLORIDE 2 MG: 2 CAPSULE ORAL at 19:09

## 2024-06-27 RX ADMIN — LOPERAMIDE HYDROCHLORIDE 2 MG: 2 CAPSULE ORAL at 08:24

## 2024-06-27 RX ADMIN — SMOFLIPID 50 G: 6; 6; 5; 3 INJECTION, EMULSION INTRAVENOUS at 21:28

## 2024-06-27 RX ADMIN — HYDROXYZINE HYDROCHLORIDE 10 MG: 10 TABLET ORAL at 21:23

## 2024-06-27 RX ADMIN — OXYCODONE HYDROCHLORIDE 5 MG: 5 TABLET ORAL at 00:02

## 2024-06-27 RX ADMIN — ACETAMINOPHEN 650 MG: 325 TABLET ORAL at 21:23

## 2024-06-27 RX ADMIN — LOPERAMIDE HYDROCHLORIDE 2 MG: 2 CAPSULE ORAL at 00:02

## 2024-06-27 RX ADMIN — ENOXAPARIN SODIUM 40 MG: 100 INJECTION SUBCUTANEOUS at 21:23

## 2024-06-27 RX ADMIN — ASCORBIC ACID, VITAMIN A PALMITATE, CHOLECALCIFEROL, THIAMINE HYDROCHLORIDE, RIBOFLAVIN-5 PHOSPHATE SODIUM, PYRIDOXINE HYDROCHLORIDE, NIACINAMIDE, DEXPANTHENOL, ALPHA-TOCOPHEROL ACETATE, VITAMIN K1, FOLIC ACID, BIOTIN, CYANOCOBALAMIN: 200; 3300; 200; 6; 3.6; 6; 40; 15; 10; 150; 600; 60; 5 INJECTION, SOLUTION INTRAVENOUS at 21:39

## 2024-06-27 ASSESSMENT — PAIN DESCRIPTION - ORIENTATION
ORIENTATION: MID
ORIENTATION: MID

## 2024-06-27 ASSESSMENT — PAIN SCALES - GENERAL
PAINLEVEL_OUTOF10: 0 - NO PAIN
PAINLEVEL_OUTOF10: 3
PAINLEVEL_OUTOF10: 4
PAINLEVEL_OUTOF10: 5 - MODERATE PAIN
PAINLEVEL_OUTOF10: 4
PAINLEVEL_OUTOF10: 0 - NO PAIN

## 2024-06-27 ASSESSMENT — PAIN DESCRIPTION - LOCATION
LOCATION: ABDOMEN

## 2024-06-27 ASSESSMENT — PAIN - FUNCTIONAL ASSESSMENT
PAIN_FUNCTIONAL_ASSESSMENT: 0-10

## 2024-06-27 ASSESSMENT — PAIN DESCRIPTION - DESCRIPTORS
DESCRIPTORS: ACHING

## 2024-06-27 NOTE — CARE PLAN
Problem: Pain  Goal: Walks with improved pain control throughout the shift  Outcome: Progressing     The patient's goals for the shift include  pain control.    The clinical goals for the shift include Pain control.    Over the shift, the patient did make progress toward the following goals.

## 2024-06-27 NOTE — CARE PLAN
The patient's goals for the shift include      The clinical goals for the shift include pain control to acceptable level of 4/10 or less throughout shift ending 6/27/24 @ 0700 hrs    Goal met. Moderate pain at 4 to 5 out of 10 throughout shift. Oxycodone 5 mg administered twice overnight. No acute events.

## 2024-06-27 NOTE — PROGRESS NOTES
[facility]  ACUTE CARE SURGERY - PROGRESS NOTE    Patient Name: Bereket Em  MRN: [id number]  Admit Date: 605  : 1965  AGE: 58 y.o.   GENDER: male  ==============================================================================  TODAY'S ASSESSMENT AND PLAN OF CARE:  59yo M who presented with perforated diverticulitis. 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 He underwent ex lap, sigmoidectomy, and end colostomy on . Vac applied  to MLI. TPN initiated. On  vac was taken down which showed necrotic fascia at midline incision. He was taken to the OR for a exploration laparotomy, abdominal washout, fascial debridement, enmas closure on  with Dr. Case.    Midline incision closed with retention sutures, however developed foul smelling with obvious purulence and RTOR on  for re-exploration laparotomy, mesh placement. 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 Started on loperamide yesterday, output is slightly thicker and more slow moving. No signs of infection in wound bed as of now. Patient HDS, no fevers. Plan is to monitor remainder of this week, will aim for dispo early next week.     Plan:  - PPI BID  - continue NPO  - TPN at goal  - monitor fistula output  - pet, art, music therapy  - PRN atarax for anxiety    Neuro:  - stephan tylenol, stephan oxy 5/10, dilaudid for breakthrough    CV:  - monitor vitals  - holding home lisinopril    Pulm:  - encourage IS    GI: ostomy functioning with brown stool  - NPO to accurately measure fistula output  - PPI BID   - loperamide 2mg q6h  - TPN at goal  - TID dressing changes to midline wound with wet to wet kerlix. Monitor drainage. If becomes high, will discuss a wound manager  - Monitor colostomy output, appreciate Enterostomal therapy following    :  - Strict I&Os  - Start IVFs while NPO   - Replete elmira  as indicated     Endo: no hx of DM  - dc BG checks    ID:  - Zosyn until 6/11 (dc'd)  - Started back on Zosyn and Vanc 6/17, discontinued  - WBC remains stable, no indication for further antbx at this time    Ppx: SCDs, LVX, OOB  PT/OT: home with home therapy    Dispo: continue care on RNF. Continue to monitor fistula output. Not medically ready    Patient seen and discussed with Attending Dr. Vicky Vitale, PGY-1  ACS Service  [id number]    ==============================================================================  CHIEF COMPLAINT / EVENTS LAST 24HRS / HPI:  NAEON. Minimal pain, received one dose of breakthrough pain meds last night. No fevers or chills.     PHYSICAL EXAM:  Heart Rate:  [54-64]   Temp:  [34.9 °C (94.8 °F)-36.3 °C (97.3 °F)]   Resp:  [16-18]   BP: (112-132)/(71-77)   SpO2:  [93 %-96 %]     Constitutional: no acute distress, conversational  Cardiac: palpable RR  Pulmonary: equal chest expansion, non labored, on RA  Abdomen: soft, non distended, minimally tender to palpitation, Midline wound open and packed with wet to wet kerlix. Green strikethrough at kerlix but not saturating abd. Localized area of green output at left lateral edge of mesh. No evidence of infection at this time. Colostomy pink and viable with brown liquid stool in pouch. Binder in place.   MSK: DEACON  Extremities: no pitting edema  Skin: warm, dry  Neuro: alert, conversive    LABS:  Results for orders placed or performed during the hospital encounter of 06/05/24 (from the past 24 hour(s))   POCT GLUCOSE   Result Value Ref Range    POCT Glucose 112 (H) 74 - 99 mg/dL   POCT GLUCOSE   Result Value Ref Range    POCT Glucose 104 (H) 74 - 99 mg/dL   POCT GLUCOSE   Result Value Ref Range    POCT Glucose 142 (H) 74 - 99 mg/dL   Renal Function Panel   Result Value Ref Range    Glucose 111 (H) 74 - 99 mg/dL    Sodium 139 136 - 145 mmol/L    Potassium 4.5 3.5 - 5.3 mmol/L    Chloride 100 98 - 107 mmol/L    Bicarbonate 26 21 - 32  mmol/L    Anion Gap 18 10 - 20 mmol/L    Urea Nitrogen 11 6 - 23 mg/dL    Creatinine 0.58 0.50 - 1.30 mg/dL    eGFR >90 >60 mL/min/1.73m*2    Calcium 9.0 8.6 - 10.6 mg/dL    Phosphorus 5.0 (H) 2.5 - 4.9 mg/dL    Albumin 3.2 (L) 3.4 - 5.0 g/dL   CBC and Auto Differential   Result Value Ref Range    WBC 8.4 4.4 - 11.3 x10*3/uL    nRBC 0.0 0.0 - 0.0 /100 WBCs    RBC 4.07 (L) 4.50 - 5.90 x10*6/uL    Hemoglobin 10.6 (L) 13.5 - 17.5 g/dL    Hematocrit 34.3 (L) 41.0 - 52.0 %    MCV 84 80 - 100 fL    MCH 26.0 26.0 - 34.0 pg    MCHC 30.9 (L) 32.0 - 36.0 g/dL    RDW 13.1 11.5 - 14.5 %    Platelets 496 (H) 150 - 450 x10*3/uL    Neutrophils % 62.5 40.0 - 80.0 %    Immature Granulocytes %, Automated 0.7 0.0 - 0.9 %    Lymphocytes % 18.3 13.0 - 44.0 %    Monocytes % 12.6 2.0 - 10.0 %    Eosinophils % 5.1 0.0 - 6.0 %    Basophils % 0.8 0.0 - 2.0 %    Neutrophils Absolute 5.21 1.20 - 7.70 x10*3/uL    Immature Granulocytes Absolute, Automated 0.06 0.00 - 0.70 x10*3/uL    Lymphocytes Absolute 1.53 1.20 - 4.80 x10*3/uL    Monocytes Absolute 1.05 (H) 0.10 - 1.00 x10*3/uL    Eosinophils Absolute 0.43 0.00 - 0.70 x10*3/uL    Basophils Absolute 0.07 0.00 - 0.10 x10*3/uL   Magnesium   Result Value Ref Range    Magnesium 2.07 1.60 - 2.40 mg/dL   POCT GLUCOSE   Result Value Ref Range    POCT Glucose 139 (H) 74 - 99 mg/dL       IMAGING SUMMARY:  No new imaging      I have reviewed all laboratory and imaging results ordered/pertinent for today's encounter.

## 2024-06-28 LAB
ALBUMIN SERPL BCP-MCNC: 3.3 G/DL (ref 3.4–5)
ANION GAP SERPL CALC-SCNC: 13 MMOL/L (ref 10–20)
BASOPHILS # BLD AUTO: 0.06 X10*3/UL (ref 0–0.1)
BASOPHILS NFR BLD AUTO: 0.8 %
BUN SERPL-MCNC: 14 MG/DL (ref 6–23)
CALCIUM SERPL-MCNC: 8.9 MG/DL (ref 8.6–10.6)
CHLORIDE SERPL-SCNC: 98 MMOL/L (ref 98–107)
CO2 SERPL-SCNC: 28 MMOL/L (ref 21–32)
CREAT SERPL-MCNC: 0.55 MG/DL (ref 0.5–1.3)
EGFRCR SERPLBLD CKD-EPI 2021: >90 ML/MIN/1.73M*2
EOSINOPHIL # BLD AUTO: 0.51 X10*3/UL (ref 0–0.7)
EOSINOPHIL NFR BLD AUTO: 6.6 %
ERYTHROCYTE [DISTWIDTH] IN BLOOD BY AUTOMATED COUNT: 13.2 % (ref 11.5–14.5)
GLUCOSE BLD MANUAL STRIP-MCNC: 100 MG/DL (ref 74–99)
GLUCOSE BLD MANUAL STRIP-MCNC: 101 MG/DL (ref 74–99)
GLUCOSE BLD MANUAL STRIP-MCNC: 113 MG/DL (ref 74–99)
GLUCOSE BLD MANUAL STRIP-MCNC: 125 MG/DL (ref 74–99)
GLUCOSE SERPL-MCNC: 118 MG/DL (ref 74–99)
HCT VFR BLD AUTO: 32.5 % (ref 41–52)
HGB BLD-MCNC: 10.4 G/DL (ref 13.5–17.5)
IMM GRANULOCYTES # BLD AUTO: 0.05 X10*3/UL (ref 0–0.7)
IMM GRANULOCYTES NFR BLD AUTO: 0.6 % (ref 0–0.9)
LYMPHOCYTES # BLD AUTO: 1.8 X10*3/UL (ref 1.2–4.8)
LYMPHOCYTES NFR BLD AUTO: 23.3 %
MAGNESIUM SERPL-MCNC: 2.11 MG/DL (ref 1.6–2.4)
MCH RBC QN AUTO: 26.5 PG (ref 26–34)
MCHC RBC AUTO-ENTMCNC: 32 G/DL (ref 32–36)
MCV RBC AUTO: 83 FL (ref 80–100)
MONOCYTES # BLD AUTO: 0.84 X10*3/UL (ref 0.1–1)
MONOCYTES NFR BLD AUTO: 10.9 %
NEUTROPHILS # BLD AUTO: 4.45 X10*3/UL (ref 1.2–7.7)
NEUTROPHILS NFR BLD AUTO: 57.8 %
NRBC BLD-RTO: 0 /100 WBCS (ref 0–0)
PHOSPHATE SERPL-MCNC: 4.3 MG/DL (ref 2.5–4.9)
PLATELET # BLD AUTO: 470 X10*3/UL (ref 150–450)
POTASSIUM SERPL-SCNC: 4 MMOL/L (ref 3.5–5.3)
RBC # BLD AUTO: 3.92 X10*6/UL (ref 4.5–5.9)
SODIUM SERPL-SCNC: 135 MMOL/L (ref 136–145)
WBC # BLD AUTO: 7.7 X10*3/UL (ref 4.4–11.3)

## 2024-06-28 PROCEDURE — 2500000004 HC RX 250 GENERAL PHARMACY W/ HCPCS (ALT 636 FOR OP/ED)

## 2024-06-28 PROCEDURE — 80069 RENAL FUNCTION PANEL: CPT

## 2024-06-28 PROCEDURE — 82947 ASSAY GLUCOSE BLOOD QUANT: CPT

## 2024-06-28 PROCEDURE — 83735 ASSAY OF MAGNESIUM: CPT

## 2024-06-28 PROCEDURE — 2500000005 HC RX 250 GENERAL PHARMACY W/O HCPCS: Performed by: NURSE PRACTITIONER

## 2024-06-28 PROCEDURE — 1100000001 HC PRIVATE ROOM DAILY

## 2024-06-28 PROCEDURE — 99024 POSTOP FOLLOW-UP VISIT: CPT | Performed by: SURGERY

## 2024-06-28 PROCEDURE — 2500000004 HC RX 250 GENERAL PHARMACY W/ HCPCS (ALT 636 FOR OP/ED): Mod: JZ

## 2024-06-28 PROCEDURE — 2500000001 HC RX 250 WO HCPCS SELF ADMINISTERED DRUGS (ALT 637 FOR MEDICARE OP)

## 2024-06-28 PROCEDURE — 2580000001 HC RX 258 IV SOLUTIONS: Performed by: NURSE PRACTITIONER

## 2024-06-28 PROCEDURE — C9113 INJ PANTOPRAZOLE SODIUM, VIA: HCPCS

## 2024-06-28 PROCEDURE — 2500000001 HC RX 250 WO HCPCS SELF ADMINISTERED DRUGS (ALT 637 FOR MEDICARE OP): Performed by: STUDENT IN AN ORGANIZED HEALTH CARE EDUCATION/TRAINING PROGRAM

## 2024-06-28 PROCEDURE — 85025 COMPLETE CBC W/AUTO DIFF WBC: CPT

## 2024-06-28 RX ORDER — HYDROMORPHONE HYDROCHLORIDE 1 MG/ML
0.5 INJECTION, SOLUTION INTRAMUSCULAR; INTRAVENOUS; SUBCUTANEOUS ONCE
Status: COMPLETED | OUTPATIENT
Start: 2024-06-28 | End: 2024-06-28

## 2024-06-28 RX ORDER — LOPERAMIDE HYDROCHLORIDE 2 MG/1
4 CAPSULE ORAL EVERY 6 HOURS
Status: DISCONTINUED | OUTPATIENT
Start: 2024-06-28 | End: 2024-07-04

## 2024-06-28 RX ORDER — SODIUM CHLORIDE, SODIUM LACTATE, POTASSIUM CHLORIDE, CALCIUM CHLORIDE 600; 310; 30; 20 MG/100ML; MG/100ML; MG/100ML; MG/100ML
17 INJECTION, SOLUTION INTRAVENOUS CONTINUOUS
Status: ACTIVE | OUTPATIENT
Start: 2024-06-28 | End: 2024-07-01

## 2024-06-28 RX ADMIN — ENOXAPARIN SODIUM 40 MG: 100 INJECTION SUBCUTANEOUS at 20:40

## 2024-06-28 RX ADMIN — LOPERAMIDE HYDROCHLORIDE 4 MG: 2 CAPSULE ORAL at 18:41

## 2024-06-28 RX ADMIN — HYDROXYZINE HYDROCHLORIDE 10 MG: 10 TABLET ORAL at 11:58

## 2024-06-28 RX ADMIN — LOPERAMIDE HYDROCHLORIDE 2 MG: 2 CAPSULE ORAL at 01:34

## 2024-06-28 RX ADMIN — LOPERAMIDE HYDROCHLORIDE 2 MG: 2 CAPSULE ORAL at 06:34

## 2024-06-28 RX ADMIN — SMOFLIPID 50 G: 6; 6; 5; 3 INJECTION, EMULSION INTRAVENOUS at 20:53

## 2024-06-28 RX ADMIN — ACETAMINOPHEN 650 MG: 325 TABLET ORAL at 06:34

## 2024-06-28 RX ADMIN — HYDROMORPHONE HYDROCHLORIDE 0.5 MG: 1 INJECTION, SOLUTION INTRAMUSCULAR; INTRAVENOUS; SUBCUTANEOUS at 13:49

## 2024-06-28 RX ADMIN — HYDROMORPHONE HYDROCHLORIDE 0.2 MG: 1 INJECTION, SOLUTION INTRAMUSCULAR; INTRAVENOUS; SUBCUTANEOUS at 20:39

## 2024-06-28 RX ADMIN — HYDROMORPHONE HYDROCHLORIDE 0.2 MG: 1 INJECTION, SOLUTION INTRAMUSCULAR; INTRAVENOUS; SUBCUTANEOUS at 01:33

## 2024-06-28 RX ADMIN — ASCORBIC ACID, VITAMIN A PALMITATE, CHOLECALCIFEROL, THIAMINE HYDROCHLORIDE, RIBOFLAVIN-5 PHOSPHATE SODIUM, PYRIDOXINE HYDROCHLORIDE, NIACINAMIDE, DEXPANTHENOL, ALPHA-TOCOPHEROL ACETATE, VITAMIN K1, FOLIC ACID, BIOTIN, CYANOCOBALAMIN: 200; 3300; 200; 6; 3.6; 6; 40; 15; 10; 150; 600; 60; 5 INJECTION, SOLUTION INTRAVENOUS at 21:12

## 2024-06-28 RX ADMIN — LOPERAMIDE HYDROCHLORIDE 2 MG: 2 CAPSULE ORAL at 12:51

## 2024-06-28 RX ADMIN — ALTEPLASE 2 MG: 2.2 INJECTION, POWDER, LYOPHILIZED, FOR SOLUTION INTRAVENOUS at 00:07

## 2024-06-28 RX ADMIN — ACETAMINOPHEN 650 MG: 325 TABLET ORAL at 11:58

## 2024-06-28 RX ADMIN — HYDROXYZINE HYDROCHLORIDE 10 MG: 10 TABLET ORAL at 20:40

## 2024-06-28 RX ADMIN — OXYCODONE HYDROCHLORIDE 5 MG: 5 TABLET ORAL at 21:35

## 2024-06-28 RX ADMIN — PANTOPRAZOLE SODIUM 40 MG: 40 INJECTION, POWDER, FOR SOLUTION INTRAVENOUS at 20:39

## 2024-06-28 RX ADMIN — SODIUM CHLORIDE, POTASSIUM CHLORIDE, SODIUM LACTATE AND CALCIUM CHLORIDE 17 ML/HR: 600; 310; 30; 20 INJECTION, SOLUTION INTRAVENOUS at 17:45

## 2024-06-28 RX ADMIN — ACETAMINOPHEN 650 MG: 325 TABLET ORAL at 17:45

## 2024-06-28 RX ADMIN — PANTOPRAZOLE SODIUM 40 MG: 40 INJECTION, POWDER, FOR SOLUTION INTRAVENOUS at 09:25

## 2024-06-28 ASSESSMENT — COGNITIVE AND FUNCTIONAL STATUS - GENERAL
DAILY ACTIVITIY SCORE: 20
MOBILITY SCORE: 24
DRESSING REGULAR UPPER BODY CLOTHING: A LITTLE
HELP NEEDED FOR BATHING: A LITTLE
DRESSING REGULAR LOWER BODY CLOTHING: A LITTLE
MOBILITY SCORE: 24
TOILETING: A LITTLE

## 2024-06-28 ASSESSMENT — PAIN - FUNCTIONAL ASSESSMENT
PAIN_FUNCTIONAL_ASSESSMENT: 0-10

## 2024-06-28 ASSESSMENT — PAIN SCALES - GENERAL
PAINLEVEL_OUTOF10: 4
PAINLEVEL_OUTOF10: 6
PAINLEVEL_OUTOF10: 5 - MODERATE PAIN
PAINLEVEL_OUTOF10: 4
PAINLEVEL_OUTOF10: 3
PAINLEVEL_OUTOF10: 6
PAINLEVEL_OUTOF10: 0 - NO PAIN

## 2024-06-28 ASSESSMENT — PAIN DESCRIPTION - DESCRIPTORS: DESCRIPTORS: ACHING

## 2024-06-28 NOTE — PROGRESS NOTES
[facility]  ACUTE CARE SURGERY - PROGRESS NOTE    Patient Name: Bereket Em  MRN: [id number]  Admit Date: 605  : 1965  AGE: 58 y.o.   GENDER: male  ==============================================================================  TODAY'S ASSESSMENT AND PLAN OF CARE:  59yo M who presented with perforated diverticulitis. 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 He underwent ex lap, sigmoidectomy, and end colostomy on . Vac applied  to MLI. TPN initiated. On  vac was taken down which showed necrotic fascia at midline incision. He was taken to the OR for a exploration laparotomy, abdominal washout, fascial debridement, enmas closure on  with Dr. Case.    Midline incision closed with retention sutures, however developed foul smelling with obvious purulence and RTOR on  for re-exploration laparotomy, mesh placement. 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     Wound washout today at bedside. Afebrile, HDS. Pain controlled.    Plan:  - PPI BID  - continue NPO  - TPN at goal  - monitor fistula output  - pet, art, music therapy  - PRN atarax for anxiety    Neuro:  - stephan tylenol, stephan oxy 5/10, dilaudid for breakthrough    CV:  - monitor vitals  - holding home lisinopril    Pulm:  - encourage IS    GI: ostomy functioning with brown stool  - NPO to accurately measure fistula output  - PPI BID   - loperamide 4mg q6h  - TPN at goal  - TID dressing changes to midline wound with wet to wet kerlix. Monitor drainage. If becomes high, will discuss a wound manager  - Monitor colostomy output, appreciate Enterostomal therapy following    :  - Strict I&Os  - Start IVFs while NPO   - Replete lytes as indicated     Endo: no hx of DM  - dc BG checks    ID:  - Zosyn until  (dc'd)  - Started back on Zosyn and Vanc , discontinued  - WBC remains stable, no  indication for further antbx at this time    Ppx: SCDs, LVX, OOB  PT/OT: home with home therapy    Dispo: continue care on RNF. Continue to monitor fistula output. Not medically ready    Patient seen and discussed with Attending Dr. Vicky Coronado  General Surgery PGY-1  Acute Care Surgery [id number]      ==============================================================================  CHIEF COMPLAINT / EVENTS LAST 24HRS / HPI:  NAEON. Minimal pain, received one dose of breakthrough pain meds last night. No fevers or chills.     PHYSICAL EXAM:  Heart Rate:  [54-68]   Temp:  [36 °C (96.8 °F)-37.6 °C (99.7 °F)]   Resp:  [16-18]   BP: (112-135)/(69-80)   Weight:  [77.9 kg (171 lb 11.8 oz)-80 kg (176 lb 6.4 oz)]   SpO2:  [95 %-98 %]     Constitutional: no acute distress, conversational  Cardiac: palpable RR  Pulmonary: equal chest expansion, non labored, on RA  Abdomen: soft, non distended, minimally tender to palpitation, Midline wound open and packed with wet to wet kerlix. Colostomy pink and viable with brown liquid stool in pouch. Binder in place.   MSK: DEACON  Extremities: no pitting edema  Skin: warm, dry  Neuro: alert, conversive    LABS:  Results for orders placed or performed during the hospital encounter of 06/05/24 (from the past 24 hour(s))   POCT GLUCOSE   Result Value Ref Range    POCT Glucose 122 (H) 74 - 99 mg/dL   POCT GLUCOSE   Result Value Ref Range    POCT Glucose 101 (H) 74 - 99 mg/dL   POCT GLUCOSE   Result Value Ref Range    POCT Glucose 125 (H) 74 - 99 mg/dL   Renal Function Panel   Result Value Ref Range    Glucose 118 (H) 74 - 99 mg/dL    Sodium 135 (L) 136 - 145 mmol/L    Potassium 4.0 3.5 - 5.3 mmol/L    Chloride 98 98 - 107 mmol/L    Bicarbonate 28 21 - 32 mmol/L    Anion Gap 13 10 - 20 mmol/L    Urea Nitrogen 14 6 - 23 mg/dL    Creatinine 0.55 0.50 - 1.30 mg/dL    eGFR >90 >60 mL/min/1.73m*2    Calcium 8.9 8.6 - 10.6 mg/dL    Phosphorus 4.3 2.5 - 4.9 mg/dL    Albumin 3.3 (L) 3.4  - 5.0 g/dL   CBC and Auto Differential   Result Value Ref Range    WBC 7.7 4.4 - 11.3 x10*3/uL    nRBC 0.0 0.0 - 0.0 /100 WBCs    RBC 3.92 (L) 4.50 - 5.90 x10*6/uL    Hemoglobin 10.4 (L) 13.5 - 17.5 g/dL    Hematocrit 32.5 (L) 41.0 - 52.0 %    MCV 83 80 - 100 fL    MCH 26.5 26.0 - 34.0 pg    MCHC 32.0 32.0 - 36.0 g/dL    RDW 13.2 11.5 - 14.5 %    Platelets 470 (H) 150 - 450 x10*3/uL    Neutrophils % 57.8 40.0 - 80.0 %    Immature Granulocytes %, Automated 0.6 0.0 - 0.9 %    Lymphocytes % 23.3 13.0 - 44.0 %    Monocytes % 10.9 2.0 - 10.0 %    Eosinophils % 6.6 0.0 - 6.0 %    Basophils % 0.8 0.0 - 2.0 %    Neutrophils Absolute 4.45 1.20 - 7.70 x10*3/uL    Immature Granulocytes Absolute, Automated 0.05 0.00 - 0.70 x10*3/uL    Lymphocytes Absolute 1.80 1.20 - 4.80 x10*3/uL    Monocytes Absolute 0.84 0.10 - 1.00 x10*3/uL    Eosinophils Absolute 0.51 0.00 - 0.70 x10*3/uL    Basophils Absolute 0.06 0.00 - 0.10 x10*3/uL   Magnesium   Result Value Ref Range    Magnesium 2.11 1.60 - 2.40 mg/dL   POCT GLUCOSE   Result Value Ref Range    POCT Glucose 100 (H) 74 - 99 mg/dL       IMAGING SUMMARY:  No new imaging      I have reviewed all laboratory and imaging results ordered/pertinent for today's encounter.

## 2024-06-28 NOTE — CONSULTS
"Nutrition Follow Up Assessment:   Nutrition Assessment    Reason for Assessment: Dietitian discretion (follow up)    Patient is a 58 y.o. male presenting with diverticulitis s/p sigmoid colectomy with end colostomy, formation, fascial necrosis, en mass closure, necrotic abdominal wall s/p laparotomy and vicryl mesh placement and now with enteroatmospheric fistula     6/19: Last RDN note  Wound team following   TPN @ goal, no plans to cycle at this time d/t team   Team reports possible trial of PO in a few days    Nutrition History:  Food and Nutrient History: Pt reports he has an appetite but still unabe to eat per team. Pt has no other complaints or reports.         Anthropometrics:  Height: 172.7 cm (5' 8\")   Weight: 80 kg (176 lb 6.4 oz)   BMI (Calculated): 26.83  IBW/kg (Dietitian Calculated): 69.9 kg  Percent of IBW: 126 %       Weight History:   Date/Time Weight   06/28/24 0500 80 kg (176 lb 6.4 oz)   06/27/24 1840 77.9 kg (171 lb 11.8 oz)   06/18/24 0527 83.5 kg (184 lb 1.4 oz)   06/17/24 0914 83.9 kg (184 lb 15.5 oz)     Weight Change %:       Nutrition Focused Physical Exam Findings:  defer: pt refused  visually pt has moderate wasting at temporals    Nutrition Significant Labs:  TPN/PPN Labs:   Results from last 7 days   Lab Units 06/28/24  0633 06/27/24  0545 06/26/24  0235 06/25/24  0602   GLUCOSE mg/dL 118* 111* 115* 115*   POTASSIUM mmol/L 4.0 4.5 3.8 4.1   PHOSPHORUS mg/dL 4.3 5.0* 4.9 4.7   MAGNESIUM mg/dL 2.11 2.07 2.07 2.01   SODIUM mmol/L 135* 139 136 136   CHLORIDE mmol/L 98 100 98 98        Nutrition Specific Medications:  Scheduled medications  acetaminophen, 650 mg, oral, q6h  enoxaparin, 40 mg, subcutaneous, q24h  fat emulsion fish oil/plant based, 250 mL, intravenous, Daily Lipids  insulin lispro, 0-5 Units, subcutaneous, q6h  loperamide, 4 mg, oral, q6h  pantoprazole, 40 mg, intravenous, BID      Continuous medications  Adult Clinimix Parenteral Nutrition, 83 mL/hr, Last Rate: 83 mL/hr " (06/28/24 0431)  lactated Ringer's, 17 mL/hr, Last Rate: 17 mL/hr (06/28/24 0431)    I/O:   Last BM Date: 06/25/24; Stool Appearance: Soft (06/26/24 0236)    Dietary Orders (From admission, onward)       Start     Ordered    06/24/24 0720  NPO Diet; Effective now  Diet effective now         06/24/24 0724                     Estimated Needs:   Total Energy Estimated Needs (kCal):  (3504-7644)  Method for Estimating Needs: IBW x 32-35  Total Protein Estimated Needs (g):  (90+)  Method for Estimating Needs: IBW x 1.3+  Total Fluid Estimated Needs (mL):  (per team)           Nutrition Diagnosis   Malnutrition Diagnosis  Patient has Malnutrition Diagnosis: No    Nutrition Diagnosis  Patient has Nutrition Diagnosis: Yes  Diagnosis Status (1): Ongoing  Nutrition Diagnosis 1: Increased nutrient needs  Related to (1): increased metabolic demand  As Evidenced by (1): s/p ex lap, sigmoidectomy and end colostomy  Additional Nutrition Diagnosis: Diagnosis 2  Diagnosis Status (2): Ongoing  Nutrition Diagnosis 2: Altered GI function  Related to (2): small bowel obstruction  As Evidenced by (2): hx of NPO x 7 days need for TPN       Nutrition Interventions/Recommendations         Nutrition Prescription:     Continue NPO and advance per team  2. Continue Standard TPN 5% AA, 15% Dextrose @ 83ml/hr   Include MVI + trace elements   Provide 20% SMOF lipids @ 21 mL/hr x 12 hours overnight (250 mL total)         TPN monitoring:      - daily weights      - RFP + Mg daily; replete lytes PRN      - LFTs + TGs weekly      - accuchecks q6h     TPN + lipids to provide daily: 1992mL volume (just AA/Dex), 2242 mL volume (AA/Dex & lipids) 1916 kcals, 100 g protein, 299 g dextrose, 26% kcal from fat (meeting 87% kcal & 100% protein needs)     3. When ready to cycle pts TPN, recommend Clinimix 5% AA, 15% Dextrose x 12 hrs      - For the first hour: Run @ 95mL/hr      - For x 10hrs in between: Titrate up to 190mL/hr      - For the final hour:  Decrease rate to 95mL/hr      - Include MVI + trace elements        - Provide SMOF lipids @ 21mL/hr x 12 hours overnight (mL total)         TPN monitoring:      - Daily weights      - RFP + Mg daily; replete lytes PRN      - LFTs + TGs weekly      - Accuchecks q 6hrs     TPN + lipids provide: 1986kcals, 105g protein, 314dextrose, 25% from fat (meeting 90% kcal & 100% protein needs)      Nutrition Interventions:   Interventions: Parenteral nutrition/ IV fluids    Nutrition Monitoring and Evaluation   Food/Nutrient Related History Monitoring  Monitoring and Evaluation Plan: Energy intake, Enteral and parenteral nutrition intake  Energy Intake: Estimated energy intake  Criteria: >75% of estimated energy needs  Enteral and Parenteral Nutrition Intake: Parenteral nutrition intake    Body Composition/Growth/Weight History  Monitoring and Evaluation Plan: Weight    Biochemical Data, Medical Tests and Procedures  Monitoring and Evaluation Plan: Electrolyte/renal panel, Glucose/endocrine profile  Electrolyte and Renal Panel: Sodium, Potassium, Phosphorus, Magnesium  Criteria: WNL  Glucose/Endocrine Profile: Glucose, casual  Criteria: WNL    Time Spent (min): 30 minutes

## 2024-06-28 NOTE — CARE PLAN
The patient's goals for the shift include      The clinical goals for the shift include pain control    Over the shift, the patient did make progress toward the following goals.   Problem: Pain  Goal: Takes deep breaths with improved pain control throughout the shift  Outcome: Progressing  Goal: Turns in bed with improved pain control throughout the shift  Outcome: Progressing  Goal: Walks with improved pain control throughout the shift  Outcome: Progressing  Goal: Performs ADL's with improved pain control throughout shift  Outcome: Progressing  Goal: Participates in PT with improved pain control throughout the shift  Outcome: Progressing  Goal: Free from opioid side effects throughout the shift  Outcome: Progressing  Goal: Free from acute confusion related to pain meds throughout the shift  Outcome: Progressing     Problem: Skin  Goal: Decreased wound size/increased tissue granulation at next dressing change  Outcome: Progressing  Goal: Participates in plan/prevention/treatment measures  Outcome: Progressing  Goal: Prevent/manage excess moisture  Outcome: Progressing  Goal: Prevent/minimize sheer/friction injuries  Outcome: Progressing  Goal: Promote/optimize nutrition  Outcome: Progressing  Goal: Promote skin healing  Outcome: Progressing

## 2024-06-28 NOTE — PROGRESS NOTES
Expressive Therapies Note    Patient found sitting up in bed with family/friends around his room. Stated he was doing okay today. No needs at this time. Expressed gratitude for the check in. Agreeable to follow up next week.    MT will continue to follow

## 2024-06-29 LAB
ALBUMIN SERPL BCP-MCNC: 3.2 G/DL (ref 3.4–5)
ANION GAP SERPL CALC-SCNC: 12 MMOL/L (ref 10–20)
BASOPHILS # BLD AUTO: 0.08 X10*3/UL (ref 0–0.1)
BASOPHILS NFR BLD AUTO: 1.1 %
BUN SERPL-MCNC: 15 MG/DL (ref 6–23)
CALCIUM SERPL-MCNC: 9.3 MG/DL (ref 8.6–10.6)
CHLORIDE SERPL-SCNC: 101 MMOL/L (ref 98–107)
CO2 SERPL-SCNC: 29 MMOL/L (ref 21–32)
CREAT SERPL-MCNC: 0.63 MG/DL (ref 0.5–1.3)
EGFRCR SERPLBLD CKD-EPI 2021: >90 ML/MIN/1.73M*2
EOSINOPHIL # BLD AUTO: 0.6 X10*3/UL (ref 0–0.7)
EOSINOPHIL NFR BLD AUTO: 7.9 %
ERYTHROCYTE [DISTWIDTH] IN BLOOD BY AUTOMATED COUNT: 13.2 % (ref 11.5–14.5)
GLUCOSE BLD MANUAL STRIP-MCNC: 109 MG/DL (ref 74–99)
GLUCOSE BLD MANUAL STRIP-MCNC: 117 MG/DL (ref 74–99)
GLUCOSE BLD MANUAL STRIP-MCNC: 126 MG/DL (ref 74–99)
GLUCOSE BLD MANUAL STRIP-MCNC: 128 MG/DL (ref 74–99)
GLUCOSE SERPL-MCNC: 123 MG/DL (ref 74–99)
HCT VFR BLD AUTO: 33 % (ref 41–52)
HGB BLD-MCNC: 10.4 G/DL (ref 13.5–17.5)
IMM GRANULOCYTES # BLD AUTO: 0.03 X10*3/UL (ref 0–0.7)
IMM GRANULOCYTES NFR BLD AUTO: 0.4 % (ref 0–0.9)
LYMPHOCYTES # BLD AUTO: 1.71 X10*3/UL (ref 1.2–4.8)
LYMPHOCYTES NFR BLD AUTO: 22.6 %
MAGNESIUM SERPL-MCNC: 2.13 MG/DL (ref 1.6–2.4)
MCH RBC QN AUTO: 26.8 PG (ref 26–34)
MCHC RBC AUTO-ENTMCNC: 31.5 G/DL (ref 32–36)
MCV RBC AUTO: 85 FL (ref 80–100)
MONOCYTES # BLD AUTO: 0.82 X10*3/UL (ref 0.1–1)
MONOCYTES NFR BLD AUTO: 10.8 %
NEUTROPHILS # BLD AUTO: 4.34 X10*3/UL (ref 1.2–7.7)
NEUTROPHILS NFR BLD AUTO: 57.2 %
NRBC BLD-RTO: 0 /100 WBCS (ref 0–0)
PHOSPHATE SERPL-MCNC: 4.7 MG/DL (ref 2.5–4.9)
PLATELET # BLD AUTO: 429 X10*3/UL (ref 150–450)
POTASSIUM SERPL-SCNC: 4.4 MMOL/L (ref 3.5–5.3)
RBC # BLD AUTO: 3.88 X10*6/UL (ref 4.5–5.9)
SODIUM SERPL-SCNC: 138 MMOL/L (ref 136–145)
WBC # BLD AUTO: 7.6 X10*3/UL (ref 4.4–11.3)

## 2024-06-29 PROCEDURE — 2500000004 HC RX 250 GENERAL PHARMACY W/ HCPCS (ALT 636 FOR OP/ED)

## 2024-06-29 PROCEDURE — 83735 ASSAY OF MAGNESIUM: CPT

## 2024-06-29 PROCEDURE — 99024 POSTOP FOLLOW-UP VISIT: CPT | Performed by: SURGERY

## 2024-06-29 PROCEDURE — 2500000001 HC RX 250 WO HCPCS SELF ADMINISTERED DRUGS (ALT 637 FOR MEDICARE OP): Performed by: STUDENT IN AN ORGANIZED HEALTH CARE EDUCATION/TRAINING PROGRAM

## 2024-06-29 PROCEDURE — 2580000001 HC RX 258 IV SOLUTIONS: Performed by: NURSE PRACTITIONER

## 2024-06-29 PROCEDURE — 2500000001 HC RX 250 WO HCPCS SELF ADMINISTERED DRUGS (ALT 637 FOR MEDICARE OP)

## 2024-06-29 PROCEDURE — 85025 COMPLETE CBC W/AUTO DIFF WBC: CPT

## 2024-06-29 PROCEDURE — C9113 INJ PANTOPRAZOLE SODIUM, VIA: HCPCS

## 2024-06-29 PROCEDURE — 82947 ASSAY GLUCOSE BLOOD QUANT: CPT

## 2024-06-29 PROCEDURE — 80069 RENAL FUNCTION PANEL: CPT

## 2024-06-29 PROCEDURE — 2500000005 HC RX 250 GENERAL PHARMACY W/O HCPCS: Performed by: NURSE PRACTITIONER

## 2024-06-29 PROCEDURE — 1100000001 HC PRIVATE ROOM DAILY

## 2024-06-29 RX ADMIN — ACETAMINOPHEN 650 MG: 325 TABLET ORAL at 20:22

## 2024-06-29 RX ADMIN — LOPERAMIDE HYDROCHLORIDE 4 MG: 2 CAPSULE ORAL at 20:25

## 2024-06-29 RX ADMIN — OXYCODONE HYDROCHLORIDE 10 MG: 5 TABLET ORAL at 18:23

## 2024-06-29 RX ADMIN — ASCORBIC ACID, VITAMIN A PALMITATE, CHOLECALCIFEROL, THIAMINE HYDROCHLORIDE, RIBOFLAVIN-5 PHOSPHATE SODIUM, PYRIDOXINE HYDROCHLORIDE, NIACINAMIDE, DEXPANTHENOL, ALPHA-TOCOPHEROL ACETATE, VITAMIN K1, FOLIC ACID, BIOTIN, CYANOCOBALAMIN: 200; 3300; 200; 6; 3.6; 6; 40; 15; 10; 150; 600; 60; 5 INJECTION, SOLUTION INTRAVENOUS at 20:28

## 2024-06-29 RX ADMIN — ENOXAPARIN SODIUM 40 MG: 100 INJECTION SUBCUTANEOUS at 20:22

## 2024-06-29 RX ADMIN — HYDROXYZINE HYDROCHLORIDE 10 MG: 10 TABLET ORAL at 20:26

## 2024-06-29 RX ADMIN — HYDROXYZINE HYDROCHLORIDE 10 MG: 10 TABLET ORAL at 13:36

## 2024-06-29 RX ADMIN — LOPERAMIDE HYDROCHLORIDE 4 MG: 2 CAPSULE ORAL at 12:52

## 2024-06-29 RX ADMIN — LOPERAMIDE HYDROCHLORIDE 4 MG: 2 CAPSULE ORAL at 06:17

## 2024-06-29 RX ADMIN — ACETAMINOPHEN 650 MG: 325 TABLET ORAL at 00:18

## 2024-06-29 RX ADMIN — ACETAMINOPHEN 650 MG: 325 TABLET ORAL at 16:03

## 2024-06-29 RX ADMIN — ACETAMINOPHEN 650 MG: 325 TABLET ORAL at 08:10

## 2024-06-29 RX ADMIN — PANTOPRAZOLE SODIUM 40 MG: 40 INJECTION, POWDER, FOR SOLUTION INTRAVENOUS at 08:10

## 2024-06-29 RX ADMIN — LOPERAMIDE HYDROCHLORIDE 4 MG: 2 CAPSULE ORAL at 00:18

## 2024-06-29 RX ADMIN — SMOFLIPID 50 G: 6; 6; 5; 3 INJECTION, EMULSION INTRAVENOUS at 20:22

## 2024-06-29 RX ADMIN — PANTOPRAZOLE SODIUM 40 MG: 40 INJECTION, POWDER, FOR SOLUTION INTRAVENOUS at 21:00

## 2024-06-29 ASSESSMENT — PAIN SCALES - GENERAL
PAINLEVEL_OUTOF10: 0 - NO PAIN
PAINLEVEL_OUTOF10: 1
PAINLEVEL_OUTOF10: 4
PAINLEVEL_OUTOF10: 7

## 2024-06-29 ASSESSMENT — COGNITIVE AND FUNCTIONAL STATUS - GENERAL
MOBILITY SCORE: 24
DAILY ACTIVITIY SCORE: 24

## 2024-06-29 ASSESSMENT — PAIN - FUNCTIONAL ASSESSMENT
PAIN_FUNCTIONAL_ASSESSMENT: 0-10
PAIN_FUNCTIONAL_ASSESSMENT: 0-10

## 2024-06-29 NOTE — CARE PLAN
The patient's goals for the shift include      The clinical goals for the shift include wound management and pain control    Over the shift, the patient did make progress toward the following goals.     Problem: Pain  Goal: Takes deep breaths with improved pain control throughout the shift  Outcome: Progressing  Goal: Turns in bed with improved pain control throughout the shift  Outcome: Progressing  Goal: Walks with improved pain control throughout the shift  Outcome: Progressing  Goal: Performs ADL's with improved pain control throughout shift  Outcome: Progressing  Goal: Participates in PT with improved pain control throughout the shift  Outcome: Progressing  Goal: Free from opioid side effects throughout the shift  Outcome: Progressing  Goal: Free from acute confusion related to pain meds throughout the shift  Outcome: Progressing     Problem: Pain - Adult  Goal: Verbalizes/displays adequate comfort level or baseline comfort level  Outcome: Progressing     Problem: Skin  Goal: Decreased wound size/increased tissue granulation at next dressing change  Outcome: Progressing  Goal: Participates in plan/prevention/treatment measures  Outcome: Progressing  Goal: Prevent/manage excess moisture  Outcome: Progressing  Goal: Prevent/minimize sheer/friction injuries  Outcome: Progressing  Goal: Promote/optimize nutrition  Outcome: Progressing  Goal: Promote skin healing  Outcome: Progressing

## 2024-06-29 NOTE — PROGRESS NOTES
[facility]  ACUTE CARE SURGERY - PROGRESS NOTE    Patient Name: Bereket Em  MRN: [id number]  Admit Date: 605  : 1965  AGE: 58 y.o.   GENDER: male  ==============================================================================  TODAY'S ASSESSMENT AND PLAN OF CARE:  57yo M who presented with perforated diverticulitis. 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 He underwent ex lap, sigmoidectomy, and end colostomy on . Vac applied  to MLI. TPN initiated. On  vac was taken down which showed necrotic fascia at midline incision. He was taken to the OR for a exploration laparotomy, abdominal washout, fascial debridement, enmas closure on  with Dr. Case.    Midline incision closed with retention sutures, however developed foul smelling with obvious purulence and RTOR on  for re-exploration laparotomy, mesh placement. 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     Wound with reasonable output since dressing change late last night. No evidence of high output fistula, will have patient try few crackers and see how fistula responds. Otherwise no other concerning lab or clinical symptoms.     Plan:  - PPI BID  - okay for few crackers every 4-6 hours  - TPN at goal  - monitor fistula output  - pet, art, music therapy  - PRN atarax for anxiety    Neuro:  - stephan tylenol, stephan oxy 5/10, dilaudid for breakthrough    CV:  - monitor vitals  - holding home lisinopril    Pulm:  - encourage IS    GI: ostomy functioning with brown stool  - okay to try few crackers every 4-6 hours to watch ostomy output  - PPI BID   - loperamide 4mg q6h  - TPN at goal  - TID dressing changes to midline wound with wet to wet kerlix. Monitor drainage. If becomes high, will discuss a wound manager  - Monitor colostomy output, appreciate Enterostomal therapy following    :  - Strict  I&Os  - Start IVFs while NPO   - Replete lytes as indicated     Endo: no hx of DM  - dc BG checks    ID:  - Zosyn until 6/11 (dc'd)  - Started back on Zosyn and Vanc 6/17, discontinued  - WBC remains stable, no indication for further antbx at this time    Ppx: SCDs, LVX, OOB  PT/OT: home with home therapy    Dispo: continue care on RNF. Continue to monitor fistula output. Not medically ready    Patient seen and discussed with Attending Dr. Vicky Vitale, PGY-1  ACS Service  [id number]    ==============================================================================  CHIEF COMPLAINT / EVENTS LAST 24HRS / HPI:  NAEON. Last dressing change late last night, no further changes required until this morning. Patient in reasonable spirits. No fevers or chills.     PHYSICAL EXAM:  Heart Rate:  [51-68]   Temp:  [35.7 °C (96.3 °F)-37.6 °C (99.7 °F)]   Resp:  [16-18]   BP: (114-119)/(65-75)   SpO2:  [95 %-97 %]     Constitutional: no acute distress, conversational  Cardiac: palpable RR  Pulmonary: equal chest expansion, non labored, on RA  Abdomen: soft, non distended, minimally tender to palpitation, Midline wound open and packed with wet to wet kerlix. Green strikethrough at kerlix but not saturating abd. Localized area of green output at left lateral edge of mesh. No evidence of infection at this time. Colostomy pink and viable with brown liquid stool in pouch. Binder in place.   MSK: DEACON  Extremities: no pitting edema  Skin: warm, dry  Neuro: alert, conversive    LABS:  Results for orders placed or performed during the hospital encounter of 06/05/24 (from the past 24 hour(s))   POCT GLUCOSE   Result Value Ref Range    POCT Glucose 113 (H) 74 - 99 mg/dL   POCT GLUCOSE   Result Value Ref Range    POCT Glucose 126 (H) 74 - 99 mg/dL   POCT GLUCOSE   Result Value Ref Range    POCT Glucose 117 (H) 74 - 99 mg/dL   Renal Function Panel   Result Value Ref Range    Glucose 123 (H) 74 - 99 mg/dL    Sodium 138 136 - 145 mmol/L     Potassium 4.4 3.5 - 5.3 mmol/L    Chloride 101 98 - 107 mmol/L    Bicarbonate 29 21 - 32 mmol/L    Anion Gap 12 10 - 20 mmol/L    Urea Nitrogen 15 6 - 23 mg/dL    Creatinine 0.63 0.50 - 1.30 mg/dL    eGFR >90 >60 mL/min/1.73m*2    Calcium 9.3 8.6 - 10.6 mg/dL    Phosphorus 4.7 2.5 - 4.9 mg/dL    Albumin 3.2 (L) 3.4 - 5.0 g/dL   CBC and Auto Differential   Result Value Ref Range    WBC 7.6 4.4 - 11.3 x10*3/uL    nRBC 0.0 0.0 - 0.0 /100 WBCs    RBC 3.88 (L) 4.50 - 5.90 x10*6/uL    Hemoglobin 10.4 (L) 13.5 - 17.5 g/dL    Hematocrit 33.0 (L) 41.0 - 52.0 %    MCV 85 80 - 100 fL    MCH 26.8 26.0 - 34.0 pg    MCHC 31.5 (L) 32.0 - 36.0 g/dL    RDW 13.2 11.5 - 14.5 %    Platelets 429 150 - 450 x10*3/uL    Neutrophils % 57.2 40.0 - 80.0 %    Immature Granulocytes %, Automated 0.4 0.0 - 0.9 %    Lymphocytes % 22.6 13.0 - 44.0 %    Monocytes % 10.8 2.0 - 10.0 %    Eosinophils % 7.9 0.0 - 6.0 %    Basophils % 1.1 0.0 - 2.0 %    Neutrophils Absolute 4.34 1.20 - 7.70 x10*3/uL    Immature Granulocytes Absolute, Automated 0.03 0.00 - 0.70 x10*3/uL    Lymphocytes Absolute 1.71 1.20 - 4.80 x10*3/uL    Monocytes Absolute 0.82 0.10 - 1.00 x10*3/uL    Eosinophils Absolute 0.60 0.00 - 0.70 x10*3/uL    Basophils Absolute 0.08 0.00 - 0.10 x10*3/uL   Magnesium   Result Value Ref Range    Magnesium 2.13 1.60 - 2.40 mg/dL       IMAGING SUMMARY:  No new imaging      I have reviewed all laboratory and imaging results ordered/pertinent for today's encounter.

## 2024-06-29 NOTE — CARE PLAN
The patient's goals for the shift include patient will remain comfortable throughout shift     The clinical goals for the shift include wound management

## 2024-06-30 LAB
ALBUMIN SERPL BCP-MCNC: 3.2 G/DL (ref 3.4–5)
ANION GAP SERPL CALC-SCNC: 11 MMOL/L (ref 10–20)
BASOPHILS # BLD AUTO: 0.07 X10*3/UL (ref 0–0.1)
BASOPHILS NFR BLD AUTO: 0.8 %
BUN SERPL-MCNC: 16 MG/DL (ref 6–23)
CALCIUM SERPL-MCNC: 9.2 MG/DL (ref 8.6–10.6)
CHLORIDE SERPL-SCNC: 99 MMOL/L (ref 98–107)
CO2 SERPL-SCNC: 32 MMOL/L (ref 21–32)
CREAT SERPL-MCNC: 0.61 MG/DL (ref 0.5–1.3)
EGFRCR SERPLBLD CKD-EPI 2021: >90 ML/MIN/1.73M*2
EOSINOPHIL # BLD AUTO: 0.67 X10*3/UL (ref 0–0.7)
EOSINOPHIL NFR BLD AUTO: 7.7 %
ERYTHROCYTE [DISTWIDTH] IN BLOOD BY AUTOMATED COUNT: 13.2 % (ref 11.5–14.5)
GLUCOSE BLD MANUAL STRIP-MCNC: 114 MG/DL (ref 74–99)
GLUCOSE BLD MANUAL STRIP-MCNC: 125 MG/DL (ref 74–99)
GLUCOSE BLD MANUAL STRIP-MCNC: 135 MG/DL (ref 74–99)
GLUCOSE BLD MANUAL STRIP-MCNC: 139 MG/DL (ref 74–99)
GLUCOSE SERPL-MCNC: 120 MG/DL (ref 74–99)
HCT VFR BLD AUTO: 33.8 % (ref 41–52)
HGB BLD-MCNC: 10.6 G/DL (ref 13.5–17.5)
IMM GRANULOCYTES # BLD AUTO: 0.03 X10*3/UL (ref 0–0.7)
IMM GRANULOCYTES NFR BLD AUTO: 0.3 % (ref 0–0.9)
LYMPHOCYTES # BLD AUTO: 1.75 X10*3/UL (ref 1.2–4.8)
LYMPHOCYTES NFR BLD AUTO: 20.1 %
MAGNESIUM SERPL-MCNC: 2.32 MG/DL (ref 1.6–2.4)
MCH RBC QN AUTO: 26.4 PG (ref 26–34)
MCHC RBC AUTO-ENTMCNC: 31.4 G/DL (ref 32–36)
MCV RBC AUTO: 84 FL (ref 80–100)
MONOCYTES # BLD AUTO: 0.94 X10*3/UL (ref 0.1–1)
MONOCYTES NFR BLD AUTO: 10.8 %
NEUTROPHILS # BLD AUTO: 5.25 X10*3/UL (ref 1.2–7.7)
NEUTROPHILS NFR BLD AUTO: 60.3 %
NRBC BLD-RTO: 0 /100 WBCS (ref 0–0)
PHOSPHATE SERPL-MCNC: 4.7 MG/DL (ref 2.5–4.9)
PLATELET # BLD AUTO: 459 X10*3/UL (ref 150–450)
POTASSIUM SERPL-SCNC: 4.2 MMOL/L (ref 3.5–5.3)
RBC # BLD AUTO: 4.01 X10*6/UL (ref 4.5–5.9)
SODIUM SERPL-SCNC: 138 MMOL/L (ref 136–145)
WBC # BLD AUTO: 8.7 X10*3/UL (ref 4.4–11.3)

## 2024-06-30 PROCEDURE — 2500000004 HC RX 250 GENERAL PHARMACY W/ HCPCS (ALT 636 FOR OP/ED): Mod: JZ

## 2024-06-30 PROCEDURE — 99024 POSTOP FOLLOW-UP VISIT: CPT | Performed by: SURGERY

## 2024-06-30 PROCEDURE — 82947 ASSAY GLUCOSE BLOOD QUANT: CPT

## 2024-06-30 PROCEDURE — C9113 INJ PANTOPRAZOLE SODIUM, VIA: HCPCS

## 2024-06-30 PROCEDURE — 83735 ASSAY OF MAGNESIUM: CPT

## 2024-06-30 PROCEDURE — 84100 ASSAY OF PHOSPHORUS: CPT

## 2024-06-30 PROCEDURE — 2580000001 HC RX 258 IV SOLUTIONS: Performed by: NURSE PRACTITIONER

## 2024-06-30 PROCEDURE — 85025 COMPLETE CBC W/AUTO DIFF WBC: CPT

## 2024-06-30 PROCEDURE — 1100000001 HC PRIVATE ROOM DAILY

## 2024-06-30 PROCEDURE — 2500000001 HC RX 250 WO HCPCS SELF ADMINISTERED DRUGS (ALT 637 FOR MEDICARE OP): Performed by: STUDENT IN AN ORGANIZED HEALTH CARE EDUCATION/TRAINING PROGRAM

## 2024-06-30 PROCEDURE — 2500000001 HC RX 250 WO HCPCS SELF ADMINISTERED DRUGS (ALT 637 FOR MEDICARE OP)

## 2024-06-30 PROCEDURE — 2500000004 HC RX 250 GENERAL PHARMACY W/ HCPCS (ALT 636 FOR OP/ED)

## 2024-06-30 PROCEDURE — 2500000005 HC RX 250 GENERAL PHARMACY W/O HCPCS: Performed by: NURSE PRACTITIONER

## 2024-06-30 RX ADMIN — LOPERAMIDE HYDROCHLORIDE 4 MG: 2 CAPSULE ORAL at 19:00

## 2024-06-30 RX ADMIN — PANTOPRAZOLE SODIUM 40 MG: 40 INJECTION, POWDER, FOR SOLUTION INTRAVENOUS at 20:27

## 2024-06-30 RX ADMIN — LOPERAMIDE HYDROCHLORIDE 4 MG: 2 CAPSULE ORAL at 10:08

## 2024-06-30 RX ADMIN — ALTEPLASE 2 MG: 2.2 INJECTION, POWDER, LYOPHILIZED, FOR SOLUTION INTRAVENOUS at 12:44

## 2024-06-30 RX ADMIN — HYDROXYZINE HYDROCHLORIDE 10 MG: 10 TABLET ORAL at 12:52

## 2024-06-30 RX ADMIN — SMOFLIPID 50 G: 6; 6; 5; 3 INJECTION, EMULSION INTRAVENOUS at 20:21

## 2024-06-30 RX ADMIN — ACETAMINOPHEN 650 MG: 325 TABLET ORAL at 10:08

## 2024-06-30 RX ADMIN — PANTOPRAZOLE SODIUM 40 MG: 40 INJECTION, POWDER, FOR SOLUTION INTRAVENOUS at 10:08

## 2024-06-30 RX ADMIN — ACETAMINOPHEN 650 MG: 325 TABLET ORAL at 05:10

## 2024-06-30 RX ADMIN — ACETAMINOPHEN 650 MG: 325 TABLET ORAL at 15:52

## 2024-06-30 RX ADMIN — OXYCODONE HYDROCHLORIDE 5 MG: 5 TABLET ORAL at 05:12

## 2024-06-30 RX ADMIN — ASCORBIC ACID, VITAMIN A PALMITATE, CHOLECALCIFEROL, THIAMINE HYDROCHLORIDE, RIBOFLAVIN-5 PHOSPHATE SODIUM, PYRIDOXINE HYDROCHLORIDE, NIACINAMIDE, DEXPANTHENOL, ALPHA-TOCOPHEROL ACETATE, VITAMIN K1, FOLIC ACID, BIOTIN, CYANOCOBALAMIN: 200; 3300; 200; 6; 3.6; 6; 40; 15; 10; 150; 600; 60; 5 INJECTION, SOLUTION INTRAVENOUS at 20:21

## 2024-06-30 RX ADMIN — HYDROXYZINE HYDROCHLORIDE 10 MG: 10 TABLET ORAL at 22:18

## 2024-06-30 RX ADMIN — ENOXAPARIN SODIUM 40 MG: 100 INJECTION SUBCUTANEOUS at 20:20

## 2024-06-30 RX ADMIN — ALTEPLASE 2 MG: 2.2 INJECTION, POWDER, LYOPHILIZED, FOR SOLUTION INTRAVENOUS at 14:28

## 2024-06-30 RX ADMIN — LOPERAMIDE HYDROCHLORIDE 4 MG: 2 CAPSULE ORAL at 05:10

## 2024-06-30 RX ADMIN — HYDROXYZINE HYDROCHLORIDE 10 MG: 10 TABLET ORAL at 05:10

## 2024-06-30 RX ADMIN — LOPERAMIDE HYDROCHLORIDE 4 MG: 2 CAPSULE ORAL at 13:00

## 2024-06-30 RX ADMIN — ACETAMINOPHEN 650 MG: 325 TABLET ORAL at 22:18

## 2024-06-30 RX ADMIN — OXYCODONE HYDROCHLORIDE 10 MG: 5 TABLET ORAL at 15:50

## 2024-06-30 ASSESSMENT — COGNITIVE AND FUNCTIONAL STATUS - GENERAL
MOBILITY SCORE: 24
DAILY ACTIVITIY SCORE: 24
DAILY ACTIVITIY SCORE: 24
MOBILITY SCORE: 24
MOBILITY SCORE: 24

## 2024-06-30 ASSESSMENT — PAIN SCALES - GENERAL
PAINLEVEL_OUTOF10: 0 - NO PAIN
PAINLEVEL_OUTOF10: 7
PAINLEVEL_OUTOF10: 6
PAINLEVEL_OUTOF10: 7

## 2024-06-30 ASSESSMENT — PAIN - FUNCTIONAL ASSESSMENT: PAIN_FUNCTIONAL_ASSESSMENT: 0-10

## 2024-06-30 ASSESSMENT — PAIN SCALES - WONG BAKER: WONGBAKER_NUMERICALRESPONSE: HURTS LITTLE MORE

## 2024-06-30 NOTE — PROGRESS NOTES
I saw and examined the patient.  I discussed the patient's care with the resident/MELA team.       59 yo male with diverticulitis s/p sigmoid colectomy with end colostomy formation, fascial necrosis, en mass closure, necrotic abdominal wall s/p laparotomy and vicryl mesh placement and now with enteroatmospheric fistula.   Tolerated crackers without increased EAF output. No pain. Dressing changes 12 hours apart yesterday and did OK.   On exam, NAD. LLQ ostomy is pink and viable with no stool in the bag. Midline dressing removed and replaced. Dressing with thicker succus. Vicryl mesh in place. EAF at 3 o'clock position of wound. Wound is getting smaller and now requires only 1/2 Kerlex.   Plan for:  Nutrition with TPN and increase PO today from crackers to crackers+dry cereal/Lissett Dunnes ever 8 hours, monitor EAF output for increase with increased PO, continue dressing changes, continue Imodium, and PPI, Lovenox. Discussed short and long term plans.

## 2024-06-30 NOTE — PROGRESS NOTES
Ostomy Care intervention     Visit Date: 6/30/2024       Patient Name: Bereket Em         MRN: [id number]           YOB: 1965     Reason for Consult: Assessment of ostomy     Wound History:  57yo M who presented with perforated diverticulitis. 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 He underwent ex lap, sigmoidectomy, and end colostomy on 6/7.     Wound Team Summary Assessment: Patient states his pouch had been changed on Friday 06/28/24. He also states that the RN decided to change his pouch this AM. No leaking noted.    Wound Team Plan:   Follow up this week    Suzanne Gerhardt RN, BSN, CWOCN  06/30/2024 6:00 PM

## 2024-06-30 NOTE — CARE PLAN
The patient's goals for the shift include      The clinical goals for the shift include wound management    Over the shift, the patient did make progress toward the following goals.     Problem: Pain  Goal: Takes deep breaths with improved pain control throughout the shift  Outcome: Progressing  Goal: Turns in bed with improved pain control throughout the shift  Outcome: Progressing  Goal: Walks with improved pain control throughout the shift  Outcome: Progressing  Goal: Performs ADL's with improved pain control throughout shift  Outcome: Progressing  Goal: Participates in PT with improved pain control throughout the shift  Outcome: Progressing  Goal: Free from opioid side effects throughout the shift  Outcome: Progressing  Goal: Free from acute confusion related to pain meds throughout the shift  Outcome: Progressing

## 2024-07-01 LAB
ALBUMIN SERPL BCP-MCNC: 3.3 G/DL (ref 3.4–5)
ALBUMIN SERPL BCP-MCNC: 3.3 G/DL (ref 3.4–5)
ALP SERPL-CCNC: 167 U/L (ref 33–120)
ALP SERPL-CCNC: 172 U/L (ref 33–120)
ALT SERPL W P-5'-P-CCNC: 42 U/L (ref 10–52)
ALT SERPL W P-5'-P-CCNC: 43 U/L (ref 10–52)
ANION GAP SERPL CALC-SCNC: 14 MMOL/L (ref 10–20)
AST SERPL W P-5'-P-CCNC: 25 U/L (ref 9–39)
AST SERPL W P-5'-P-CCNC: 26 U/L (ref 9–39)
BASOPHILS # BLD AUTO: 0.1 X10*3/UL (ref 0–0.1)
BASOPHILS NFR BLD AUTO: 1.4 %
BILIRUB DIRECT SERPL-MCNC: 0.1 MG/DL (ref 0–0.3)
BILIRUB DIRECT SERPL-MCNC: 0.1 MG/DL (ref 0–0.3)
BILIRUB SERPL-MCNC: 0.3 MG/DL (ref 0–1.2)
BILIRUB SERPL-MCNC: 0.3 MG/DL (ref 0–1.2)
BUN SERPL-MCNC: 17 MG/DL (ref 6–23)
CALCIUM SERPL-MCNC: 9.3 MG/DL (ref 8.6–10.6)
CHLORIDE SERPL-SCNC: 98 MMOL/L (ref 98–107)
CO2 SERPL-SCNC: 30 MMOL/L (ref 21–32)
CREAT SERPL-MCNC: 0.59 MG/DL (ref 0.5–1.3)
EGFRCR SERPLBLD CKD-EPI 2021: >90 ML/MIN/1.73M*2
EOSINOPHIL # BLD AUTO: 0.74 X10*3/UL (ref 0–0.7)
EOSINOPHIL NFR BLD AUTO: 10.4 %
ERYTHROCYTE [DISTWIDTH] IN BLOOD BY AUTOMATED COUNT: 13.2 % (ref 11.5–14.5)
GLUCOSE BLD MANUAL STRIP-MCNC: 104 MG/DL (ref 74–99)
GLUCOSE BLD MANUAL STRIP-MCNC: 118 MG/DL (ref 74–99)
GLUCOSE BLD MANUAL STRIP-MCNC: 123 MG/DL (ref 74–99)
GLUCOSE BLD MANUAL STRIP-MCNC: 128 MG/DL (ref 74–99)
GLUCOSE BLD MANUAL STRIP-MCNC: 134 MG/DL (ref 74–99)
GLUCOSE BLD MANUAL STRIP-MCNC: 144 MG/DL (ref 74–99)
GLUCOSE SERPL-MCNC: 121 MG/DL (ref 74–99)
HCT VFR BLD AUTO: 35 % (ref 41–52)
HGB BLD-MCNC: 10.8 G/DL (ref 13.5–17.5)
IMM GRANULOCYTES # BLD AUTO: 0.03 X10*3/UL (ref 0–0.7)
IMM GRANULOCYTES NFR BLD AUTO: 0.4 % (ref 0–0.9)
LYMPHOCYTES # BLD AUTO: 1.84 X10*3/UL (ref 1.2–4.8)
LYMPHOCYTES NFR BLD AUTO: 25.8 %
MAGNESIUM SERPL-MCNC: 2.07 MG/DL (ref 1.6–2.4)
MCH RBC QN AUTO: 26.4 PG (ref 26–34)
MCHC RBC AUTO-ENTMCNC: 30.9 G/DL (ref 32–36)
MCV RBC AUTO: 86 FL (ref 80–100)
MONOCYTES # BLD AUTO: 0.88 X10*3/UL (ref 0.1–1)
MONOCYTES NFR BLD AUTO: 12.3 %
NEUTROPHILS # BLD AUTO: 3.54 X10*3/UL (ref 1.2–7.7)
NEUTROPHILS NFR BLD AUTO: 49.7 %
NRBC BLD-RTO: 0 /100 WBCS (ref 0–0)
PHOSPHATE SERPL-MCNC: 5.5 MG/DL (ref 2.5–4.9)
PLATELET # BLD AUTO: 444 X10*3/UL (ref 150–450)
POTASSIUM SERPL-SCNC: 4.9 MMOL/L (ref 3.5–5.3)
PROT SERPL-MCNC: 6.7 G/DL (ref 6.4–8.2)
PROT SERPL-MCNC: 6.8 G/DL (ref 6.4–8.2)
RBC # BLD AUTO: 4.09 X10*6/UL (ref 4.5–5.9)
SODIUM SERPL-SCNC: 137 MMOL/L (ref 136–145)
WBC # BLD AUTO: 7.1 X10*3/UL (ref 4.4–11.3)

## 2024-07-01 PROCEDURE — 80053 COMPREHEN METABOLIC PANEL: CPT

## 2024-07-01 PROCEDURE — 1100000001 HC PRIVATE ROOM DAILY

## 2024-07-01 PROCEDURE — 2500000005 HC RX 250 GENERAL PHARMACY W/O HCPCS

## 2024-07-01 PROCEDURE — 36415 COLL VENOUS BLD VENIPUNCTURE: CPT

## 2024-07-01 PROCEDURE — 2500000001 HC RX 250 WO HCPCS SELF ADMINISTERED DRUGS (ALT 637 FOR MEDICARE OP)

## 2024-07-01 PROCEDURE — 2500000004 HC RX 250 GENERAL PHARMACY W/ HCPCS (ALT 636 FOR OP/ED)

## 2024-07-01 PROCEDURE — 2580000001 HC RX 258 IV SOLUTIONS

## 2024-07-01 PROCEDURE — C9113 INJ PANTOPRAZOLE SODIUM, VIA: HCPCS

## 2024-07-01 PROCEDURE — 2500000001 HC RX 250 WO HCPCS SELF ADMINISTERED DRUGS (ALT 637 FOR MEDICARE OP): Performed by: STUDENT IN AN ORGANIZED HEALTH CARE EDUCATION/TRAINING PROGRAM

## 2024-07-01 PROCEDURE — 84100 ASSAY OF PHOSPHORUS: CPT

## 2024-07-01 PROCEDURE — 83735 ASSAY OF MAGNESIUM: CPT

## 2024-07-01 PROCEDURE — 84075 ASSAY ALKALINE PHOSPHATASE: CPT

## 2024-07-01 PROCEDURE — 82947 ASSAY GLUCOSE BLOOD QUANT: CPT

## 2024-07-01 PROCEDURE — 85025 COMPLETE CBC W/AUTO DIFF WBC: CPT

## 2024-07-01 RX ADMIN — LOPERAMIDE HYDROCHLORIDE 4 MG: 2 CAPSULE ORAL at 14:30

## 2024-07-01 RX ADMIN — ENOXAPARIN SODIUM 40 MG: 100 INJECTION SUBCUTANEOUS at 20:14

## 2024-07-01 RX ADMIN — ACETAMINOPHEN 650 MG: 325 TABLET ORAL at 03:42

## 2024-07-01 RX ADMIN — ACETAMINOPHEN 650 MG: 325 TABLET ORAL at 08:48

## 2024-07-01 RX ADMIN — ASCORBIC ACID, VITAMIN A PALMITATE, CHOLECALCIFEROL, THIAMINE HYDROCHLORIDE, RIBOFLAVIN-5 PHOSPHATE SODIUM, PYRIDOXINE HYDROCHLORIDE, NIACINAMIDE, DEXPANTHENOL, ALPHA-TOCOPHEROL ACETATE, VITAMIN K1, FOLIC ACID, BIOTIN, CYANOCOBALAMIN: 200; 3300; 200; 6; 3.6; 6; 40; 15; 10; 150; 600; 60; 5 INJECTION, SOLUTION INTRAVENOUS at 20:19

## 2024-07-01 RX ADMIN — OXYCODONE HYDROCHLORIDE 10 MG: 5 TABLET ORAL at 20:14

## 2024-07-01 RX ADMIN — PANTOPRAZOLE SODIUM 40 MG: 40 INJECTION, POWDER, FOR SOLUTION INTRAVENOUS at 08:47

## 2024-07-01 RX ADMIN — ACETAMINOPHEN 650 MG: 325 TABLET ORAL at 21:30

## 2024-07-01 RX ADMIN — HYDROXYZINE HYDROCHLORIDE 10 MG: 10 TABLET ORAL at 12:54

## 2024-07-01 RX ADMIN — LOPERAMIDE HYDROCHLORIDE 4 MG: 2 CAPSULE ORAL at 20:14

## 2024-07-01 RX ADMIN — HYDROXYZINE HYDROCHLORIDE 10 MG: 10 TABLET ORAL at 20:17

## 2024-07-01 RX ADMIN — PANTOPRAZOLE SODIUM 40 MG: 40 INJECTION, POWDER, FOR SOLUTION INTRAVENOUS at 20:14

## 2024-07-01 RX ADMIN — ACETAMINOPHEN 650 MG: 325 TABLET ORAL at 15:41

## 2024-07-01 RX ADMIN — SMOFLIPID 50 G: 6; 6; 5; 3 INJECTION, EMULSION INTRAVENOUS at 20:18

## 2024-07-01 RX ADMIN — LOPERAMIDE HYDROCHLORIDE 4 MG: 2 CAPSULE ORAL at 06:16

## 2024-07-01 RX ADMIN — LOPERAMIDE HYDROCHLORIDE 4 MG: 2 CAPSULE ORAL at 01:26

## 2024-07-01 ASSESSMENT — PAIN - FUNCTIONAL ASSESSMENT
PAIN_FUNCTIONAL_ASSESSMENT: 0-10

## 2024-07-01 ASSESSMENT — PAIN DESCRIPTION - DESCRIPTORS
DESCRIPTORS: ACHING
DESCRIPTORS: ACHING

## 2024-07-01 ASSESSMENT — COGNITIVE AND FUNCTIONAL STATUS - GENERAL
DAILY ACTIVITIY SCORE: 24
MOBILITY SCORE: 22
CLIMB 3 TO 5 STEPS WITH RAILING: A LOT

## 2024-07-01 ASSESSMENT — PAIN SCALES - GENERAL
PAINLEVEL_OUTOF10: 7
PAINLEVEL_OUTOF10: 0 - NO PAIN
PAINLEVEL_OUTOF10: 1
PAINLEVEL_OUTOF10: 5 - MODERATE PAIN
PAINLEVEL_OUTOF10: 4
PAINLEVEL_OUTOF10: 3
PAINLEVEL_OUTOF10: 0 - NO PAIN

## 2024-07-01 NOTE — PROGRESS NOTES
[facility]  ACUTE CARE SURGERY - PROGRESS NOTE    Patient Name: Bereket Em  MRN: [id number]  Admit Date: 605  : 1965  AGE: 58 y.o.   GENDER: male  ==============================================================================  TODAY'S ASSESSMENT AND PLAN OF CARE:  59yo M who presented with perforated diverticulitis. 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 He underwent ex lap, sigmoidectomy, and end colostomy on . Vac applied  to MLI. TPN initiated. On  vac was taken down which showed necrotic fascia at midline incision. He was taken to the OR for a exploration laparotomy, abdominal washout, fascial debridement, enmas closure on  with Dr. Case.    Midline incision closed with retention sutures, however developed foul smelling with obvious purulence and RTOR on  for re-exploration laparotomy, mesh placement. 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     Wound with reasonable output. No evidence of high output fistula at this time. Will attempt regular diet advancement and monitor output from fistula.     Plan:  - PPI BID  - Advance to regular diet   - TPN transition to cyclic this evening   - monitor fistula output  - pet, art, music therapy  - PRN atarax for anxiety    Neuro:  - stephan tylenol, stephan oxy 5/10, dilaudid for breakthrough    CV:  - monitor vitals  - holding home lisinopril    Pulm:  - encourage IS    GI: ostomy functioning with brown stool  - okay for regular diet as tolerated  - PPI BID   - loperamide 4mg q6h  - TPN cyclic this evening   - TID dressing changes to midline wound with wet to wet kerlix. Monitor drainage. If becomes high, will discuss a wound manager vs transitioning back to NPO  - Monitor colostomy output, appreciate Enterostomal therapy following    :  - Strict I&Os  - Replete lytes as indicated     Endo: no  hx of DM  - dc BG checks    ID:  - Zosyn until 6/11 (dc'd)  - Started back on Zosyn and Vanc 6/17, discontinued  - WBC remains stable, no indication for further antbx at this time    Ppx: SCDs, LVX, OOB  PT/OT: home with home therapy    Dispo: continue care on RNF. Continue to monitor fistula output. Not medically ready    Patient discussed with Attending Dr. Ash Lubin APRN-[facility]  Acute Care Surgery  Pager [phone]    ==============================================================================  CHIEF COMPLAINT / EVENTS LAST 24HRS / HPI:  NAEON. Last dressing change late last night. Dressing changed this AM with moderate amount of enteric contents covering kerlix and mesh underwear   PHYSICAL EXAM:  Heart Rate:  [57-65]   Temp:  [36.3 °C (97.3 °F)-36.8 °C (98.2 °F)]   Resp:  [18]   BP: ()/(64-78)   SpO2:  [94 %-98 %]     Constitutional: no acute distress, conversational  Cardiac: palpable RR  Pulmonary: equal chest expansion, non labored, on RA  Abdomen: soft, non distended, minimally tender to palpitation, Midline wound open and packed with wet to wet kerlix. Green strikethrough at kerlix but not saturating abd. Localized area of green output at left lateral edge of mesh. No evidence of infection at this time. Colostomy pink and viable with scant brown liquid stool in pouch.   MSK: DEACON  Extremities: no pitting edema  Skin: warm, dry  Neuro: alert, conversive    LABS:  Results for orders placed or performed during the hospital encounter of 06/05/24 (from the past 24 hour(s))   POCT GLUCOSE   Result Value Ref Range    POCT Glucose 114 (H) 74 - 99 mg/dL   POCT GLUCOSE   Result Value Ref Range    POCT Glucose 118 (H) 74 - 99 mg/dL   POCT GLUCOSE   Result Value Ref Range    POCT Glucose 123 (H) 74 - 99 mg/dL   CBC and Auto Differential   Result Value Ref Range    WBC 7.1 4.4 - 11.3 x10*3/uL    nRBC 0.0 0.0 - 0.0 /100 WBCs    RBC 4.09 (L) 4.50 - 5.90 x10*6/uL    Hemoglobin 10.8 (L) 13.5 - 17.5 g/dL     Hematocrit 35.0 (L) 41.0 - 52.0 %    MCV 86 80 - 100 fL    MCH 26.4 26.0 - 34.0 pg    MCHC 30.9 (L) 32.0 - 36.0 g/dL    RDW 13.2 11.5 - 14.5 %    Platelets 444 150 - 450 x10*3/uL    Neutrophils % 49.7 40.0 - 80.0 %    Immature Granulocytes %, Automated 0.4 0.0 - 0.9 %    Lymphocytes % 25.8 13.0 - 44.0 %    Monocytes % 12.3 2.0 - 10.0 %    Eosinophils % 10.4 0.0 - 6.0 %    Basophils % 1.4 0.0 - 2.0 %    Neutrophils Absolute 3.54 1.20 - 7.70 x10*3/uL    Immature Granulocytes Absolute, Automated 0.03 0.00 - 0.70 x10*3/uL    Lymphocytes Absolute 1.84 1.20 - 4.80 x10*3/uL    Monocytes Absolute 0.88 0.10 - 1.00 x10*3/uL    Eosinophils Absolute 0.74 (H) 0.00 - 0.70 x10*3/uL    Basophils Absolute 0.10 0.00 - 0.10 x10*3/uL   Magnesium   Result Value Ref Range    Magnesium 2.07 1.60 - 2.40 mg/dL   Hepatic function panel   Result Value Ref Range    Albumin 3.3 (L) 3.4 - 5.0 g/dL    Bilirubin, Total 0.3 0.0 - 1.2 mg/dL    Bilirubin, Direct 0.1 0.0 - 0.3 mg/dL    Alkaline Phosphatase 172 (H) 33 - 120 U/L    ALT 42 10 - 52 U/L    AST 25 9 - 39 U/L    Total Protein 6.7 6.4 - 8.2 g/dL   Phosphorus   Result Value Ref Range    Phosphorus 5.5 (H) 2.5 - 4.9 mg/dL   Basic Metabolic Panel   Result Value Ref Range    Glucose 121 (H) 74 - 99 mg/dL    Sodium 137 136 - 145 mmol/L    Potassium 4.9 3.5 - 5.3 mmol/L    Chloride 98 98 - 107 mmol/L    Bicarbonate 30 21 - 32 mmol/L    Anion Gap 14 10 - 20 mmol/L    Urea Nitrogen 17 6 - 23 mg/dL    Creatinine 0.59 0.50 - 1.30 mg/dL    eGFR >90 >60 mL/min/1.73m*2    Calcium 9.3 8.6 - 10.6 mg/dL   POCT GLUCOSE   Result Value Ref Range    POCT Glucose 144 (H) 74 - 99 mg/dL   Hepatic function panel   Result Value Ref Range    Albumin 3.3 (L) 3.4 - 5.0 g/dL    Bilirubin, Total 0.3 0.0 - 1.2 mg/dL    Bilirubin, Direct 0.1 0.0 - 0.3 mg/dL    Alkaline Phosphatase 167 (H) 33 - 120 U/L    ALT 43 10 - 52 U/L    AST 26 9 - 39 U/L    Total Protein 6.8 6.4 - 8.2 g/dL   POCT GLUCOSE   Result Value Ref Range     POCT Glucose 128 (H) 74 - 99 mg/dL       IMAGING SUMMARY:  No new imaging      I have reviewed all laboratory and imaging results ordered/pertinent for today's encounter.

## 2024-07-01 NOTE — CARE PLAN
The patient's goals for the shift include rest     The clinical goals for the shift include wound management      Problem: Pain  Goal: Takes deep breaths with improved pain control throughout the shift  Outcome: Progressing  Goal: Turns in bed with improved pain control throughout the shift  Outcome: Progressing  Goal: Walks with improved pain control throughout the shift  Outcome: Progressing  Goal: Performs ADL's with improved pain control throughout shift  Outcome: Progressing  Goal: Participates in PT with improved pain control throughout the shift  Outcome: Progressing  Goal: Free from opioid side effects throughout the shift  Outcome: Progressing  Goal: Free from acute confusion related to pain meds throughout the shift  Outcome: Progressing     Problem: Pain - Adult  Goal: Verbalizes/displays adequate comfort level or baseline comfort level  Outcome: Progressing     Problem: Safety - Adult  Goal: Free from fall injury  Outcome: Progressing     Problem: Discharge Planning  Goal: Discharge to home or other facility with appropriate resources  Outcome: Progressing     Problem: Chronic Conditions and Co-morbidities  Goal: Patient's chronic conditions and co-morbidity symptoms are monitored and maintained or improved  Outcome: Progressing     Problem: Skin  Goal: Decreased wound size/increased tissue granulation at next dressing change  Outcome: Progressing  Goal: Participates in plan/prevention/treatment measures  Outcome: Progressing  Goal: Prevent/manage excess moisture  Outcome: Progressing  Goal: Prevent/minimize sheer/friction injuries  Outcome: Progressing  Goal: Promote/optimize nutrition  Outcome: Progressing  Goal: Promote skin healing  Outcome: Progressing     Problem: Fall/Injury  Goal: Not fall by end of shift  Outcome: Progressing  Goal: Be free from injury by end of the shift  Outcome: Progressing  Goal: Verbalize understanding of personal risk factors for fall in the hospital  Outcome:  Progressing  Goal: Verbalize understanding of risk factor reduction measures to prevent injury from fall in the home  Outcome: Progressing  Goal: Use assistive devices by end of the shift  Outcome: Progressing  Goal: Pace activities to prevent fatigue by end of the shift  Outcome: Progressing

## 2024-07-02 LAB
ALBUMIN SERPL BCP-MCNC: 3.2 G/DL (ref 3.4–5)
ANION GAP SERPL CALC-SCNC: 13 MMOL/L (ref 10–20)
BASOPHILS # BLD AUTO: 0.09 X10*3/UL (ref 0–0.1)
BASOPHILS NFR BLD AUTO: 1.4 %
BUN SERPL-MCNC: 22 MG/DL (ref 6–23)
CALCIUM SERPL-MCNC: 8.9 MG/DL (ref 8.6–10.6)
CHLORIDE SERPL-SCNC: 98 MMOL/L (ref 98–107)
CO2 SERPL-SCNC: 27 MMOL/L (ref 21–32)
CREAT SERPL-MCNC: 0.5 MG/DL (ref 0.5–1.3)
EGFRCR SERPLBLD CKD-EPI 2021: >90 ML/MIN/1.73M*2
EOSINOPHIL # BLD AUTO: 0.68 X10*3/UL (ref 0–0.7)
EOSINOPHIL NFR BLD AUTO: 10.9 %
ERYTHROCYTE [DISTWIDTH] IN BLOOD BY AUTOMATED COUNT: 13.1 % (ref 11.5–14.5)
GLUCOSE BLD MANUAL STRIP-MCNC: 111 MG/DL (ref 74–99)
GLUCOSE BLD MANUAL STRIP-MCNC: 121 MG/DL (ref 74–99)
GLUCOSE BLD MANUAL STRIP-MCNC: 125 MG/DL (ref 74–99)
GLUCOSE BLD MANUAL STRIP-MCNC: 130 MG/DL (ref 74–99)
GLUCOSE SERPL-MCNC: 117 MG/DL (ref 74–99)
HCT VFR BLD AUTO: 33.9 % (ref 41–52)
HGB BLD-MCNC: 10.6 G/DL (ref 13.5–17.5)
IMM GRANULOCYTES # BLD AUTO: 0.02 X10*3/UL (ref 0–0.7)
IMM GRANULOCYTES NFR BLD AUTO: 0.3 % (ref 0–0.9)
LYMPHOCYTES # BLD AUTO: 1.86 X10*3/UL (ref 1.2–4.8)
LYMPHOCYTES NFR BLD AUTO: 29.9 %
MAGNESIUM SERPL-MCNC: 2 MG/DL (ref 1.6–2.4)
MCH RBC QN AUTO: 26.4 PG (ref 26–34)
MCHC RBC AUTO-ENTMCNC: 31.3 G/DL (ref 32–36)
MCV RBC AUTO: 84 FL (ref 80–100)
MONOCYTES # BLD AUTO: 1.06 X10*3/UL (ref 0.1–1)
MONOCYTES NFR BLD AUTO: 17 %
NEUTROPHILS # BLD AUTO: 2.51 X10*3/UL (ref 1.2–7.7)
NEUTROPHILS NFR BLD AUTO: 40.5 %
NRBC BLD-RTO: 0 /100 WBCS (ref 0–0)
PHOSPHATE SERPL-MCNC: 4.4 MG/DL (ref 2.5–4.9)
PLATELET # BLD AUTO: 405 X10*3/UL (ref 150–450)
POTASSIUM SERPL-SCNC: 4.5 MMOL/L (ref 3.5–5.3)
RBC # BLD AUTO: 4.02 X10*6/UL (ref 4.5–5.9)
SODIUM SERPL-SCNC: 133 MMOL/L (ref 136–145)
WBC # BLD AUTO: 6.2 X10*3/UL (ref 4.4–11.3)

## 2024-07-02 PROCEDURE — 2500000005 HC RX 250 GENERAL PHARMACY W/O HCPCS

## 2024-07-02 PROCEDURE — 2500000004 HC RX 250 GENERAL PHARMACY W/ HCPCS (ALT 636 FOR OP/ED)

## 2024-07-02 PROCEDURE — C9113 INJ PANTOPRAZOLE SODIUM, VIA: HCPCS

## 2024-07-02 PROCEDURE — 2500000001 HC RX 250 WO HCPCS SELF ADMINISTERED DRUGS (ALT 637 FOR MEDICARE OP): Performed by: STUDENT IN AN ORGANIZED HEALTH CARE EDUCATION/TRAINING PROGRAM

## 2024-07-02 PROCEDURE — 2500000001 HC RX 250 WO HCPCS SELF ADMINISTERED DRUGS (ALT 637 FOR MEDICARE OP)

## 2024-07-02 PROCEDURE — 83735 ASSAY OF MAGNESIUM: CPT

## 2024-07-02 PROCEDURE — 80069 RENAL FUNCTION PANEL: CPT

## 2024-07-02 PROCEDURE — 82947 ASSAY GLUCOSE BLOOD QUANT: CPT

## 2024-07-02 PROCEDURE — 2580000001 HC RX 258 IV SOLUTIONS

## 2024-07-02 PROCEDURE — 85025 COMPLETE CBC W/AUTO DIFF WBC: CPT

## 2024-07-02 PROCEDURE — 1100000001 HC PRIVATE ROOM DAILY

## 2024-07-02 PROCEDURE — 97116 GAIT TRAINING THERAPY: CPT | Mod: GP

## 2024-07-02 RX ADMIN — LOPERAMIDE HYDROCHLORIDE 4 MG: 2 CAPSULE ORAL at 20:47

## 2024-07-02 RX ADMIN — PANTOPRAZOLE SODIUM 40 MG: 40 INJECTION, POWDER, FOR SOLUTION INTRAVENOUS at 08:33

## 2024-07-02 RX ADMIN — HYDROMORPHONE HYDROCHLORIDE 0.2 MG: 1 INJECTION, SOLUTION INTRAMUSCULAR; INTRAVENOUS; SUBCUTANEOUS at 22:45

## 2024-07-02 RX ADMIN — ASCORBIC ACID, VITAMIN A PALMITATE, CHOLECALCIFEROL, THIAMINE HYDROCHLORIDE, RIBOFLAVIN-5 PHOSPHATE SODIUM, PYRIDOXINE HYDROCHLORIDE, NIACINAMIDE, DEXPANTHENOL, ALPHA-TOCOPHEROL ACETATE, VITAMIN K1, FOLIC ACID, BIOTIN, CYANOCOBALAMIN: 200; 3300; 200; 6; 3.6; 6; 40; 15; 10; 150; 600; 60; 5 INJECTION, SOLUTION INTRAVENOUS at 20:48

## 2024-07-02 RX ADMIN — SMOFLIPID 50 G: 6; 6; 5; 3 INJECTION, EMULSION INTRAVENOUS at 20:48

## 2024-07-02 RX ADMIN — LOPERAMIDE HYDROCHLORIDE 4 MG: 2 CAPSULE ORAL at 14:49

## 2024-07-02 RX ADMIN — LOPERAMIDE HYDROCHLORIDE 4 MG: 2 CAPSULE ORAL at 06:54

## 2024-07-02 RX ADMIN — OXYCODONE HYDROCHLORIDE 10 MG: 5 TABLET ORAL at 00:57

## 2024-07-02 RX ADMIN — ACETAMINOPHEN 650 MG: 325 TABLET ORAL at 03:21

## 2024-07-02 RX ADMIN — ACETAMINOPHEN 650 MG: 325 TABLET ORAL at 20:47

## 2024-07-02 RX ADMIN — ACETAMINOPHEN 650 MG: 325 TABLET ORAL at 13:57

## 2024-07-02 RX ADMIN — LOPERAMIDE HYDROCHLORIDE 4 MG: 2 CAPSULE ORAL at 01:59

## 2024-07-02 RX ADMIN — ENOXAPARIN SODIUM 40 MG: 100 INJECTION SUBCUTANEOUS at 20:47

## 2024-07-02 RX ADMIN — PANTOPRAZOLE SODIUM 40 MG: 40 INJECTION, POWDER, FOR SOLUTION INTRAVENOUS at 20:50

## 2024-07-02 RX ADMIN — OXYCODONE HYDROCHLORIDE 10 MG: 5 TABLET ORAL at 17:56

## 2024-07-02 ASSESSMENT — COGNITIVE AND FUNCTIONAL STATUS - GENERAL
DAILY ACTIVITIY SCORE: 24
MOBILITY SCORE: 23
MOBILITY SCORE: 22
CLIMB 3 TO 5 STEPS WITH RAILING: A LITTLE
CLIMB 3 TO 5 STEPS WITH RAILING: A LOT

## 2024-07-02 ASSESSMENT — PAIN SCALES - GENERAL
PAINLEVEL_OUTOF10: 7
PAINLEVEL_OUTOF10: 0 - NO PAIN
PAINLEVEL_OUTOF10: 1
PAINLEVEL_OUTOF10: 0 - NO PAIN
PAINLEVEL_OUTOF10: 7
PAINLEVEL_OUTOF10: 4

## 2024-07-02 ASSESSMENT — PAIN - FUNCTIONAL ASSESSMENT
PAIN_FUNCTIONAL_ASSESSMENT: 0-10

## 2024-07-02 NOTE — PROGRESS NOTES
Music Therapy Note    Bereket Em was referred by     Therapy Session  Referral Type: New referral this admission  Visit Type: Follow-up visit  Session Start Time: 1026  Intervention Delivery: In-person  Conflict of Service: Declined treatment         Narrative  Assessment Detail: Patient found sitting on edge of the bed. Stated he was doing fine; said he'd been here longer than expected and will be here for a bit yet. Stated he had no needs and that he is coping by using adult coloring books. Requested MT discontinue service.  Plan: To engage patient in  Intervention: MT provided  Follow-up: MT will sign off    Education Documentation  No documentation found.

## 2024-07-02 NOTE — PROGRESS NOTES
[facility]  ACUTE CARE SURGERY - PROGRESS NOTE    Patient Name: Bereket Em  MRN: [id number]  Admit Date: 605  : 1965  AGE: 58 y.o.   GENDER: male  ==============================================================================  TODAY'S ASSESSMENT AND PLAN OF CARE:  59yo M who presented with perforated diverticulitis. 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 He underwent ex lap, sigmoidectomy, and end colostomy on . Vac applied  to MLI. TPN initiated. On  vac was taken down which showed necrotic fascia at midline incision. He was taken to the OR for a exploration laparotomy, abdominal washout, fascial debridement, enmas closure on  with Dr. Case.    Midline incision closed with retention sutures, however developed foul smelling with obvious purulence and RTOR on  for re-exploration laparotomy, mesh placement. 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     Neuro:  - stephan tylenol, stephan oxy 5/10, dilaudid for breakthrough  - PRN Atarax for anxiety  - Pet, art, music therapy     CV:  - monitor vitals  - holding home lisinopril    Pulm:  - encourage IS    GI: ostomy functioning with small amount of brown stool   - Increase in fistula output with regular diet. Patient agreeable with limiting to crackers and peanut butter  - PPI BID   - loperamide 4mg q6h  - Cyclic TPN   - TID dressing changes to midline wound with wet to wet kerlix. Monitor drainage. Skin excoriation around wound from increasing drainage. Wound Care to evaluate   - Monitor colostomy output, appreciate Enterostomal therapy following    :  - Strict I&Os  - Replete lytes as indicated     Endo: no hx of DM  - dc BG checks    ID:  - Zosyn until  (dc'd)  - Started back on Zosyn and Vanc , discontinued  - WBC remains stable, no indication for further antbx at this time    Ppx:  SCDs, LVX, OOB  PT/OT: home with home therapy    Dispo: continue care on RNF. Continue to monitor fistula output. Not medically ready    Patient discussed with Attending Dr. Ash PALMA-[facility]  Acute Care Surgery  Pager [phone]    ==============================================================================  CHIEF COMPLAINT / EVENTS LAST 24HRS / HPI:  NAEON. Increasing output from fistula when patient took in juice and a quesadilla. Agreed to limit PO intake to crackers and peanut butter. Patient stating did have some output from colostomy with regular food.     PHYSICAL EXAM:  Heart Rate:  [58-64]   Temp:  [36 °C (96.8 °F)-36.9 °C (98.4 °F)]   Resp:  [17-18]   BP: (106-124)/(65-77)   Weight:  [79.7 kg (175 lb 11.2 oz)]   SpO2:  [94 %-97 %]     Constitutional: no acute distress, conversational  Cardiac: palpable RR  Pulmonary: equal chest expansion, non labored, on RA  Abdomen: soft, non distended, minimally tender to palpitation, Midline wound open and packed with wet to wet kerlix. Green strikethrough at kerlix but not saturating abd. Localized area of green output at left lateral edge of mesh. No evidence of infection at this time. Colostomy pink and viable with scant brown liquid stool in pouch.   MSK: DEACON  Extremities: no pitting edema  Skin: warm, dry  Neuro: alert, conversive    LABS:  Results for orders placed or performed during the hospital encounter of 06/05/24 (from the past 24 hour(s))   POCT GLUCOSE   Result Value Ref Range    POCT Glucose 128 (H) 74 - 99 mg/dL   POCT GLUCOSE   Result Value Ref Range    POCT Glucose 104 (H) 74 - 99 mg/dL   POCT GLUCOSE   Result Value Ref Range    POCT Glucose 134 (H) 74 - 99 mg/dL   POCT GLUCOSE   Result Value Ref Range    POCT Glucose 111 (H) 74 - 99 mg/dL   Renal Function Panel   Result Value Ref Range    Glucose 117 (H) 74 - 99 mg/dL    Sodium 133 (L) 136 - 145 mmol/L    Potassium 4.5 3.5 - 5.3 mmol/L    Chloride 98 98 - 107 mmol/L    Bicarbonate 27  21 - 32 mmol/L    Anion Gap 13 10 - 20 mmol/L    Urea Nitrogen 22 6 - 23 mg/dL    Creatinine 0.50 0.50 - 1.30 mg/dL    eGFR >90 >60 mL/min/1.73m*2    Calcium 8.9 8.6 - 10.6 mg/dL    Phosphorus 4.4 2.5 - 4.9 mg/dL    Albumin 3.2 (L) 3.4 - 5.0 g/dL   CBC and Auto Differential   Result Value Ref Range    WBC 6.2 4.4 - 11.3 x10*3/uL    nRBC 0.0 0.0 - 0.0 /100 WBCs    RBC 4.02 (L) 4.50 - 5.90 x10*6/uL    Hemoglobin 10.6 (L) 13.5 - 17.5 g/dL    Hematocrit 33.9 (L) 41.0 - 52.0 %    MCV 84 80 - 100 fL    MCH 26.4 26.0 - 34.0 pg    MCHC 31.3 (L) 32.0 - 36.0 g/dL    RDW 13.1 11.5 - 14.5 %    Platelets 405 150 - 450 x10*3/uL    Neutrophils % 40.5 40.0 - 80.0 %    Immature Granulocytes %, Automated 0.3 0.0 - 0.9 %    Lymphocytes % 29.9 13.0 - 44.0 %    Monocytes % 17.0 2.0 - 10.0 %    Eosinophils % 10.9 0.0 - 6.0 %    Basophils % 1.4 0.0 - 2.0 %    Neutrophils Absolute 2.51 1.20 - 7.70 x10*3/uL    Immature Granulocytes Absolute, Automated 0.02 0.00 - 0.70 x10*3/uL    Lymphocytes Absolute 1.86 1.20 - 4.80 x10*3/uL    Monocytes Absolute 1.06 (H) 0.10 - 1.00 x10*3/uL    Eosinophils Absolute 0.68 0.00 - 0.70 x10*3/uL    Basophils Absolute 0.09 0.00 - 0.10 x10*3/uL   Magnesium   Result Value Ref Range    Magnesium 2.00 1.60 - 2.40 mg/dL   POCT GLUCOSE   Result Value Ref Range    POCT Glucose 121 (H) 74 - 99 mg/dL   POCT GLUCOSE   Result Value Ref Range    POCT Glucose 125 (H) 74 - 99 mg/dL       IMAGING SUMMARY:  No new imaging      I have reviewed all laboratory and imaging results ordered/pertinent for today's encounter.

## 2024-07-02 NOTE — CARE PLAN
The patient's goals for the shift include      The clinical goals for the shift include abdiminal wound management    Goal met. Surgical wound changed once overnight. Small amount of billious output. No other concerns voiced.

## 2024-07-02 NOTE — PROGRESS NOTES
Physical Therapy    Physical Therapy Treatment    Patient Name: Bereket Em  MRN: [id number]  Today's Date: 7/2/2024  Time Calculation  Start Time: 1536  Stop Time: 1548  Time Calculation (min): 12 min    Assessment/Plan   PT Assessment  Evaluation/Treatment Tolerance: Patient tolerated treatment well  Medical Staff Made Aware: Yes  End of Session Communication: Bedside nurse  Assessment Comment: Pt was able to ambulate 250ft with no AD and supervision steadily and with 0 LOB. Patient continues to benefit from skilled physical therapy to maximize functional mobility and safety. Pt remains appropriate for low intensity therapy when medically appropriate for DC.  End of Session Patient Position: Bed, 3 rail up, Alarm off, not on at start of session, Alarm off, caregiver present  PT Plan  Treatment/Interventions: Bed mobility, Transfer training, Gait training, Stair training, Balance training, Strengthening, Endurance training, Therapeutic exercise, Therapeutic activity  PT Plan: Skilled PT  PT Frequency: 3 times per week  PT Discharge Recommendations: Low intensity level of continued care  Equipment Recommended upon Discharge: Wheeled walker  PT Recommended Transfer Status: Stand by assist, Contact guard  PT - OK to Discharge: Yes  RN cleared prior to session    General Visit Information:   PT  Visit  PT Received On: 07/02/24  Response to Previous Treatment: Patient with no complaints from previous session.  General  Reason for Referral: 59yo M who presented with perforated diverticulitis. 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 He underwent ex lap, sigmoidectomy, and end colostomy on 6/7.  Past Medical History Relevant to Rehab: diverticulitis dianosed 6/3. No other known medical hx to report.  Prior to Session Communication: Bedside nurse  Patient Position Received: Bed, 3 rail up, Alarm off, not on at start of session  Preferred Learning Style: auditory, verbal  General Comment: Pt  pleasant and agreeabe to therapy    Subjective   Precautions:  Precautions  Medical Precautions: Fall precautions  Post-Surgical Precautions: Abdominal surgery precautions    Objective   Pain:  Pain Assessment  Pain Assessment: 0-10  0-10 (Numeric) Pain Score: 0 - No pain  Cognition:  Cognition  Overall Cognitive Status: Within Functional Limits  Arousal/Alertness: Appropriate responses to stimuli  Orientation Level: Oriented X4  Following Commands: Follows all commands and directions without difficulty  Coordination:  Movements are Fluid and Coordinated: Yes  Postural Control:  Postural Control  Postural Control: Within Functional Limits    Activity Tolerance:  Activity Tolerance  Endurance: Endurance does not limit participation in activity  Treatments:    Bed Mobility  Bed Mobility: Yes  Bed Mobility 1  Bed Mobility 1: Supine to sitting, Sitting to supine  Level of Assistance 1: Close supervision  Bed Mobility Comments 1: HOB elevated; pt demonstrated log roll    Ambulation/Gait Training  Ambulation/Gait Training Performed: Yes  Ambulation/Gait Training 1  Surface 1: Level tile  Device 1: Rolling walker  Assistance 1: Close supervision  Quality of Gait 1: Narrow base of support, Diminished heel strike, Decreased step length, Antalgic, Forward flexed posture  Comments/Distance (ft) 1: 250ft x 2; min vc for safety AD use  Ambulation/Gait Training 2  Surface 2: Level tile  Device 2: No device  Assistance 2: Close supervision  Quality of Gait 2: Narrow base of support, Diminished heel strike, Antalgic, Forward flexed posture  Comments/Distance (ft) 2: 250ft; min vc for safety  Transfers  Transfer: Yes  Transfer 1  Transfer From 1: Sit to, Stand to  Transfer to 1: Stand, Sit  Technique 1: Sit to stand, Stand to sit  Transfer Device 1: Walker  Transfer Level of Assistance 1: Distant supervision  Trials/Comments 1: 2 trials    Stairs  Stairs: Yes  Stairs  Rails 1: Left  Curb Step 1: No  Device 1: Railing  Assistance 1:  Contact guard  Comment/Number of Steps 1: 12 x 2; min vc for safety, sequencing, and technique    Outcome Measures:  [facility] Basic Mobility  Turning from your back to your side while in a flat bed without using bedrails: None  Moving from lying on your back to sitting on the side of a flat bed without using bedrails: None  Moving to and from bed to chair (including a wheelchair): None  Standing up from a chair using your arms (e.g. wheelchair or bedside chair): None  To walk in hospital room: None  Climbing 3-5 steps with railing: A little  Basic Mobility - Total Score: 23    Education Documentation  Precautions, taught by Diana Buchanan PT at 7/2/2024  4:28 PM.  Learner: Patient  Readiness: Acceptance  Method: Explanation, Demonstration  Response: Verbalizes Understanding, Needs Reinforcement    Body Mechanics, taught by Diana Buchanan PT at 7/2/2024  4:28 PM.  Learner: Patient  Readiness: Acceptance  Method: Explanation, Demonstration  Response: Verbalizes Understanding, Needs Reinforcement    Mobility Training, taught by Diana Buchanan PT at 7/2/2024  4:28 PM.  Learner: Patient  Readiness: Acceptance  Method: Explanation, Demonstration  Response: Verbalizes Understanding, Needs Reinforcement    Education Comments  No comments found.      Encounter Problems       Encounter Problems (Active)       Mobility       STG - Patient will ambulate 50 feet with supervision assist while using LRAD. (Met)       Start:  06/10/24    Expected End:  06/24/24    Resolved:  07/02/24    Updated to: STG - Patient will ambulate >250 feet IND while using LRAD.    Update reason: met         STG - Patient will ascend and descend four to six stairs with use of handrail(s) and CGA. (Met)       Start:  06/10/24    Expected End:  06/24/24    Resolved:  07/02/24    Updated to: STG - Patient will ascend and descend four to six stairs with use of handrail(s) and MOD I    Update reason: met         STG - Patient will ambulate >250 feet IND  while using LRAD. (Progressing)       Start:  07/02/24    Expected End:  07/09/24                STG - Patient will ascend and descend four to six stairs with use of handrail(s) and MOD I (Progressing)       Start:  07/02/24    Expected End:  07/09/24                   PT Transfers       STG - Patient will transfer sit to and from stand with supervision assist while using LRAD. (Met)       Start:  06/10/24    Expected End:  06/24/24    Resolved:  07/02/24    Updated to: STG - Patient will transfer sit to and from stand MOD I while using LRAD.    Update reason: met         STG - Patient will transfer sit to and from stand MOD I while using LRAD. (Progressing)       Start:  07/02/24    Expected End:  07/09/24                   Pain - Adult

## 2024-07-02 NOTE — CONSULTS
Wound Care Consult     Visit Date: 7/2/2024      Patient Name: Bereket Em         MRN: [id number]           YOB: 1965     Reason for Consult: midline wound; ostomy care        Wound Team Summary Assessment:   Ostomy type: colostomy    size: 1 1 1/2 inches        color: red      protruding: budded  Umer: none  Functioning: thin brown stool  Mucocutaneous junction: intact  Peristomal skin: intact, no sting barrier applied  Pouching: pouch changed today; 2 piece flat wafer, barrier ring, and drainable pouch applied  Ostomy Education: patient observed pouch change today but did not participate in care.     Wound Team Plan:   Midline abdominal wound also assessed at time of ostomy care.  Wound thoroughly washed out with NS. Periwound skin treated with nystatin powder and cavilon advanced skin barrier. Wound repacked with kerlix and covered with ABD pads.   Will attempt application of wound manager tomorrow (ok per surgery team).     Mara Farias RN  7/2/2024  4:32 PM

## 2024-07-03 LAB
GLUCOSE BLD MANUAL STRIP-MCNC: 112 MG/DL (ref 74–99)
GLUCOSE BLD MANUAL STRIP-MCNC: 120 MG/DL (ref 74–99)
GLUCOSE BLD MANUAL STRIP-MCNC: 128 MG/DL (ref 74–99)
GLUCOSE BLD MANUAL STRIP-MCNC: 143 MG/DL (ref 74–99)

## 2024-07-03 PROCEDURE — 2500000001 HC RX 250 WO HCPCS SELF ADMINISTERED DRUGS (ALT 637 FOR MEDICARE OP): Performed by: STUDENT IN AN ORGANIZED HEALTH CARE EDUCATION/TRAINING PROGRAM

## 2024-07-03 PROCEDURE — 2500000004 HC RX 250 GENERAL PHARMACY W/ HCPCS (ALT 636 FOR OP/ED)

## 2024-07-03 PROCEDURE — 2500000005 HC RX 250 GENERAL PHARMACY W/O HCPCS

## 2024-07-03 PROCEDURE — 2580000001 HC RX 258 IV SOLUTIONS

## 2024-07-03 PROCEDURE — 2500000001 HC RX 250 WO HCPCS SELF ADMINISTERED DRUGS (ALT 637 FOR MEDICARE OP)

## 2024-07-03 PROCEDURE — C9113 INJ PANTOPRAZOLE SODIUM, VIA: HCPCS

## 2024-07-03 PROCEDURE — 1100000001 HC PRIVATE ROOM DAILY

## 2024-07-03 PROCEDURE — 82947 ASSAY GLUCOSE BLOOD QUANT: CPT

## 2024-07-03 RX ADMIN — ASCORBIC ACID, VITAMIN A PALMITATE, CHOLECALCIFEROL, THIAMINE HYDROCHLORIDE, RIBOFLAVIN-5 PHOSPHATE SODIUM, PYRIDOXINE HYDROCHLORIDE, NIACINAMIDE, DEXPANTHENOL, ALPHA-TOCOPHEROL ACETATE, VITAMIN K1, FOLIC ACID, BIOTIN, CYANOCOBALAMIN: 200; 3300; 200; 6; 3.6; 6; 40; 15; 10; 150; 600; 60; 5 INJECTION, SOLUTION INTRAVENOUS at 20:09

## 2024-07-03 RX ADMIN — HYDROXYZINE HYDROCHLORIDE 10 MG: 10 TABLET ORAL at 10:27

## 2024-07-03 RX ADMIN — HYDROMORPHONE HYDROCHLORIDE 0.2 MG: 1 INJECTION, SOLUTION INTRAMUSCULAR; INTRAVENOUS; SUBCUTANEOUS at 22:01

## 2024-07-03 RX ADMIN — LOPERAMIDE HYDROCHLORIDE 4 MG: 2 CAPSULE ORAL at 12:59

## 2024-07-03 RX ADMIN — ACETAMINOPHEN 650 MG: 325 TABLET ORAL at 12:59

## 2024-07-03 RX ADMIN — ACETAMINOPHEN 650 MG: 325 TABLET ORAL at 05:15

## 2024-07-03 RX ADMIN — PANTOPRAZOLE SODIUM 40 MG: 40 INJECTION, POWDER, FOR SOLUTION INTRAVENOUS at 20:14

## 2024-07-03 RX ADMIN — OXYCODONE HYDROCHLORIDE 5 MG: 5 TABLET ORAL at 15:01

## 2024-07-03 RX ADMIN — SMOFLIPID 50 G: 6; 6; 5; 3 INJECTION, EMULSION INTRAVENOUS at 20:09

## 2024-07-03 RX ADMIN — HYDROXYZINE HYDROCHLORIDE 10 MG: 10 TABLET ORAL at 20:14

## 2024-07-03 RX ADMIN — ENOXAPARIN SODIUM 40 MG: 100 INJECTION SUBCUTANEOUS at 20:14

## 2024-07-03 RX ADMIN — LOPERAMIDE HYDROCHLORIDE 4 MG: 2 CAPSULE ORAL at 20:14

## 2024-07-03 RX ADMIN — PANTOPRAZOLE SODIUM 40 MG: 40 INJECTION, POWDER, FOR SOLUTION INTRAVENOUS at 10:28

## 2024-07-03 RX ADMIN — ACETAMINOPHEN 650 MG: 325 TABLET ORAL at 20:15

## 2024-07-03 RX ADMIN — LOPERAMIDE HYDROCHLORIDE 4 MG: 2 CAPSULE ORAL at 05:15

## 2024-07-03 ASSESSMENT — COGNITIVE AND FUNCTIONAL STATUS - GENERAL
DAILY ACTIVITIY SCORE: 24
MOBILITY SCORE: 24
DAILY ACTIVITIY SCORE: 24
DAILY ACTIVITIY SCORE: 24

## 2024-07-03 ASSESSMENT — PAIN SCALES - GENERAL
PAINLEVEL_OUTOF10: 6
PAINLEVEL_OUTOF10: 0 - NO PAIN
PAINLEVEL_OUTOF10: 5 - MODERATE PAIN
PAINLEVEL_OUTOF10: 8
PAINLEVEL_OUTOF10: 6

## 2024-07-03 ASSESSMENT — PAIN DESCRIPTION - LOCATION: LOCATION: ABDOMEN

## 2024-07-03 ASSESSMENT — PAIN - FUNCTIONAL ASSESSMENT: PAIN_FUNCTIONAL_ASSESSMENT: 0-10

## 2024-07-03 NOTE — CARE PLAN
The patient's goals for the shift include      The clinical goals for the shift include Wound management and dressing change      Problem: Pain  Goal: Takes deep breaths with improved pain control throughout the shift  Outcome: Progressing  Goal: Turns in bed with improved pain control throughout the shift  Outcome: Progressing  Goal: Walks with improved pain control throughout the shift  Outcome: Progressing  Goal: Performs ADL's with improved pain control throughout shift  Outcome: Progressing  Goal: Participates in PT with improved pain control throughout the shift  Outcome: Progressing  Goal: Free from opioid side effects throughout the shift  Outcome: Progressing  Goal: Free from acute confusion related to pain meds throughout the shift  Outcome: Progressing     Problem: Pain  Goal: Takes deep breaths with improved pain control throughout the shift  Outcome: Progressing  Goal: Turns in bed with improved pain control throughout the shift  Outcome: Progressing  Goal: Walks with improved pain control throughout the shift  Outcome: Progressing  Goal: Performs ADL's with improved pain control throughout shift  Outcome: Progressing  Goal: Participates in PT with improved pain control throughout the shift  Outcome: Progressing  Goal: Free from opioid side effects throughout the shift  Outcome: Progressing  Goal: Free from acute confusion related to pain meds throughout the shift  Outcome: Progressing     Problem: Pain - Adult  Goal: Verbalizes/displays adequate comfort level or baseline comfort level  Outcome: Progressing     Problem: Safety - Adult  Goal: Free from fall injury  Outcome: Progressing     Problem: Discharge Planning  Goal: Discharge to home or other facility with appropriate resources  Outcome: Progressing     Problem: Chronic Conditions and Co-morbidities  Goal: Patient's chronic conditions and co-morbidity symptoms are monitored and maintained or improved  Outcome: Progressing     Problem: Skin  Goal:  Decreased wound size/increased tissue granulation at next dressing change  Outcome: Progressing  Goal: Participates in plan/prevention/treatment measures  Outcome: Progressing  Goal: Prevent/manage excess moisture  Outcome: Progressing  Goal: Prevent/minimize sheer/friction injuries  Outcome: Progressing  Goal: Promote/optimize nutrition  Outcome: Progressing  Goal: Promote skin healing  Outcome: Progressing     Problem: Fall/Injury  Goal: Not fall by end of shift  Outcome: Progressing  Goal: Be free from injury by end of the shift  Outcome: Progressing  Goal: Verbalize understanding of personal risk factors for fall in the hospital  Outcome: Progressing  Goal: Verbalize understanding of risk factor reduction measures to prevent injury from fall in the home  Outcome: Progressing  Goal: Use assistive devices by end of the shift  Outcome: Progressing  Goal: Pace activities to prevent fatigue by end of the shift  Outcome: Progressing

## 2024-07-03 NOTE — PROGRESS NOTES
[facility]  ACUTE CARE SURGERY - PROGRESS NOTE    Patient Name: Bereket Em  MRN: [id number]  Admit Date: 605  : 1965  AGE: 58 y.o.   GENDER: male  ==============================================================================  TODAY'S ASSESSMENT AND PLAN OF CARE:  57 yo M who presented with perforated diverticulitis. 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 He underwent ex lap, sigmoidectomy, and end colostomy on . Vac applied  to MLI. TPN initiated. On  vac was taken down which showed necrotic fascia at midline incision. He was taken to the OR for a exploration laparotomy, abdominal washout, fascial debridement, enmas closure on  with Dr. Case.     Midline incision closed with retention sutures, however developed foul smelling with obvious purulence and RTOR on  for re-exploration laparotomy, mesh placement. 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      Neuro:  - stephan tylenol, stephan oxy 5/10, dilaudid for breakthrough  - PRN Atarax for anxiety  - Pet, art, music therapy      CV:  - monitor vitals  - holding home lisinopril     Pulm:  - encourage IS     GI: ostomy functioning with small amount of brown stool   - Increase in fistula output with regular diet. Patient agreeable with limiting to crackers and peanut butter  - PPI BID   - loperamide 4mg q6h  - Cyclic TPN   - TID dressing changes to midline wound with wet to wet kerlix. Monitor drainage. Skin excoriation around wound from increasing drainage.   - denied wound care manager, possible pouching of ostomy  - Monitor colostomy output, appreciate Enterostomal therapy following     :  - Strict I&Os  - Replete lytes as indicated      Endo: no hx of DM  - dc BG checks  - SS1     ID:  - WBC remains stable, no indication for further antbx at this time     Ppx: SCDs, LVX, OOB  PT/OT: home  with home therapy     Dispo: continue care on RNF. Continue to monitor fistula output. Not medically ready    ==============================================================================  CHIEF COMPLAINT / EVENTS LAST 24HRS / HPI:  SHER Continues to have colostomy output (20 mL). He has been self regulating diet with small portions of crackers and peanut butter.     MEDICAL HISTORY / ROS:   Admission history and ROS reviewed. Pertinent changes as follows:  NA    PHYSICAL EXAM:  Heart Rate:  [56-66]   Temp:  [36.6 °C (97.9 °F)-37 °C (98.6 °F)]   Resp:  [16-18]   BP: (108-121)/(61-78)   SpO2:  [94 %-97 %]   Physical Exam    A&Ox3  NAD  RRR  Non labored breathing, chest rise equal bilateral  Abdomen soft, nondistended, nontender, ostomy with dark brown output  Normal ROM  Skin dry and warm      IMAGING SUMMARY:  (summary of new imaging findings, not a copy of dictation)      LABS:  Results from last 7 days   Lab Units 07/02/24  0610 07/01/24  0601 06/30/24  0535   WBC AUTO x10*3/uL 6.2 7.1 8.7   HEMOGLOBIN g/dL 10.6* 10.8* 10.6*   HEMATOCRIT % 33.9* 35.0* 33.8*   PLATELETS AUTO x10*3/uL 405 444 459*   NEUTROS PCT AUTO % 40.5 49.7 60.3   LYMPHS PCT AUTO % 29.9 25.8 20.1   MONOS PCT AUTO % 17.0 12.3 10.8   EOS PCT AUTO % 10.9 10.4 7.7         Results from last 7 days   Lab Units 07/02/24  0610 07/01/24  1122 07/01/24  0601 06/30/24  0535   SODIUM mmol/L 133*  --  137 138   POTASSIUM mmol/L 4.5  --  4.9 4.2   CHLORIDE mmol/L 98  --  98 99   CO2 mmol/L 27  --  30 32   BUN mg/dL 22  --  17 16   CREATININE mg/dL 0.50  --  0.59 0.61   CALCIUM mg/dL 8.9  --  9.3 9.2   PROTEIN TOTAL g/dL  --  6.8 6.7  --    BILIRUBIN TOTAL mg/dL  --  0.3 0.3  --    ALK PHOS U/L  --  167* 172*  --    ALT U/L  --  43 42  --    AST U/L  --  26 25  --    GLUCOSE mg/dL 117*  --  121* 120*     Results from last 7 days   Lab Units 07/01/24  1122 07/01/24  0601   BILIRUBIN TOTAL mg/dL 0.3 0.3   BILIRUBIN DIRECT mg/dL 0.1 0.1           I have  reviewed all medications, laboratory results, and imaging pertinent for today's encounter.    negative...

## 2024-07-03 NOTE — CARE PLAN
The patient's goals for the shift include  pt will remain HDS throughout the shift    The clinical goals for the shift include wound management

## 2024-07-04 LAB
GLUCOSE BLD MANUAL STRIP-MCNC: 110 MG/DL (ref 74–99)
GLUCOSE BLD MANUAL STRIP-MCNC: 118 MG/DL (ref 74–99)
GLUCOSE BLD MANUAL STRIP-MCNC: 128 MG/DL (ref 74–99)
GLUCOSE BLD MANUAL STRIP-MCNC: 135 MG/DL (ref 74–99)

## 2024-07-04 PROCEDURE — 2500000004 HC RX 250 GENERAL PHARMACY W/ HCPCS (ALT 636 FOR OP/ED)

## 2024-07-04 PROCEDURE — C9113 INJ PANTOPRAZOLE SODIUM, VIA: HCPCS

## 2024-07-04 PROCEDURE — 2500000001 HC RX 250 WO HCPCS SELF ADMINISTERED DRUGS (ALT 637 FOR MEDICARE OP): Performed by: STUDENT IN AN ORGANIZED HEALTH CARE EDUCATION/TRAINING PROGRAM

## 2024-07-04 PROCEDURE — 2500000001 HC RX 250 WO HCPCS SELF ADMINISTERED DRUGS (ALT 637 FOR MEDICARE OP)

## 2024-07-04 PROCEDURE — 82947 ASSAY GLUCOSE BLOOD QUANT: CPT

## 2024-07-04 PROCEDURE — 2580000001 HC RX 258 IV SOLUTIONS

## 2024-07-04 PROCEDURE — 2500000005 HC RX 250 GENERAL PHARMACY W/O HCPCS

## 2024-07-04 PROCEDURE — 1100000001 HC PRIVATE ROOM DAILY

## 2024-07-04 RX ORDER — LOPERAMIDE HYDROCHLORIDE 2 MG/1
2 CAPSULE ORAL EVERY 6 HOURS
Status: DISCONTINUED | OUTPATIENT
Start: 2024-07-04 | End: 2024-07-06

## 2024-07-04 RX ADMIN — SMOFLIPID 50 G: 6; 6; 5; 3 INJECTION, EMULSION INTRAVENOUS at 20:54

## 2024-07-04 RX ADMIN — ACETAMINOPHEN 650 MG: 325 TABLET ORAL at 08:21

## 2024-07-04 RX ADMIN — ACETAMINOPHEN 650 MG: 325 TABLET ORAL at 14:49

## 2024-07-04 RX ADMIN — LOPERAMIDE HYDROCHLORIDE 4 MG: 2 CAPSULE ORAL at 03:06

## 2024-07-04 RX ADMIN — PANTOPRAZOLE SODIUM 40 MG: 40 INJECTION, POWDER, FOR SOLUTION INTRAVENOUS at 08:02

## 2024-07-04 RX ADMIN — LOPERAMIDE HYDROCHLORIDE 2 MG: 2 CAPSULE ORAL at 12:09

## 2024-07-04 RX ADMIN — LOPERAMIDE HYDROCHLORIDE 2 MG: 2 CAPSULE ORAL at 18:18

## 2024-07-04 RX ADMIN — ASCORBIC ACID, VITAMIN A PALMITATE, CHOLECALCIFEROL, THIAMINE HYDROCHLORIDE, RIBOFLAVIN-5 PHOSPHATE SODIUM, PYRIDOXINE HYDROCHLORIDE, NIACINAMIDE, DEXPANTHENOL, ALPHA-TOCOPHEROL ACETATE, VITAMIN K1, FOLIC ACID, BIOTIN, CYANOCOBALAMIN: 200; 3300; 200; 6; 3.6; 6; 40; 15; 10; 150; 600; 60; 5 INJECTION, SOLUTION INTRAVENOUS at 20:52

## 2024-07-04 RX ADMIN — ACETAMINOPHEN 650 MG: 325 TABLET ORAL at 03:06

## 2024-07-04 RX ADMIN — ACETAMINOPHEN 650 MG: 325 TABLET ORAL at 20:51

## 2024-07-04 RX ADMIN — OXYCODONE HYDROCHLORIDE 5 MG: 5 TABLET ORAL at 14:49

## 2024-07-04 RX ADMIN — HYDROMORPHONE HYDROCHLORIDE 0.2 MG: 1 INJECTION, SOLUTION INTRAMUSCULAR; INTRAVENOUS; SUBCUTANEOUS at 23:10

## 2024-07-04 RX ADMIN — PANTOPRAZOLE SODIUM 40 MG: 40 INJECTION, POWDER, FOR SOLUTION INTRAVENOUS at 21:46

## 2024-07-04 RX ADMIN — ENOXAPARIN SODIUM 40 MG: 100 INJECTION SUBCUTANEOUS at 20:51

## 2024-07-04 RX ADMIN — OXYCODONE HYDROCHLORIDE 10 MG: 5 TABLET ORAL at 20:51

## 2024-07-04 ASSESSMENT — COGNITIVE AND FUNCTIONAL STATUS - GENERAL
MOBILITY SCORE: 24
MOBILITY SCORE: 24
DAILY ACTIVITIY SCORE: 24
DAILY ACTIVITIY SCORE: 24

## 2024-07-04 ASSESSMENT — PAIN DESCRIPTION - LOCATION: LOCATION: INCISION

## 2024-07-04 ASSESSMENT — PAIN SCALES - GENERAL
PAINLEVEL_OUTOF10: 0 - NO PAIN
PAINLEVEL_OUTOF10: 5 - MODERATE PAIN
PAINLEVEL_OUTOF10: 2
PAINLEVEL_OUTOF10: 8
PAINLEVEL_OUTOF10: 0 - NO PAIN
PAINLEVEL_OUTOF10: 9
PAINLEVEL_OUTOF10: 0 - NO PAIN
PAINLEVEL_OUTOF10: 5 - MODERATE PAIN

## 2024-07-04 ASSESSMENT — PAIN DESCRIPTION - DESCRIPTORS
DESCRIPTORS: ACHING
DESCRIPTORS: ACHING
DESCRIPTORS: ACHING;SORE

## 2024-07-04 ASSESSMENT — PAIN - FUNCTIONAL ASSESSMENT
PAIN_FUNCTIONAL_ASSESSMENT: 0-10

## 2024-07-04 ASSESSMENT — PAIN DESCRIPTION - ORIENTATION: ORIENTATION: MID

## 2024-07-04 NOTE — PROGRESS NOTES
[facility]  ACUTE CARE SURGERY - PROGRESS NOTE    Patient Name: Bereket Em  MRN: [id number]  Admit Date: 605  : 1965  AGE: 58 y.o.   GENDER: male  ==============================================================================  TODAY'S ASSESSMENT AND PLAN OF CARE:  57 yo M who presented with perforated diverticulitis. 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 He underwent ex lap, sigmoidectomy, and end colostomy on . Vac applied  to MLI. TPN initiated. On  vac was taken down which showed necrotic fascia at midline incision. He was taken to the OR for a exploration laparotomy, abdominal washout, fascial debridement, enmas closure on  with Dr. Case.     Midline incision closed with retention sutures, however developed foul smelling with obvious purulence and RTOR on  for re-exploration laparotomy, mesh placement. 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 Ostomy output is thick, plan to decrease immodium to 2 mg q6. Wound is healing well.      Neuro:  - stephan tylenol, stephan oxy 5/10, dilaudid for breakthrough  - PRN Atarax for anxiety  - Pet, art, music therapy      CV:  - monitor vitals  - holding home lisinopril     Pulm:  - encourage IS     GI: ostomy functioning with small amount of brown stool   - Increase in fistula output with regular diet. Patient agreeable with limiting to crackers and peanut butter  - PPI BID   - loperamide 2mg q6h  - Cyclic TPN   - TID dressing changes to midline wound with wet to wet kerlix. Monitor drainage. Skin excoriation around wound from increasing drainage.   - denied wound care manager, possible pouching of ostomy  - Monitor colostomy output, appreciate Enterostomal therapy following     :  - Strict I&Os  - Replete lytes as indicated      Endo: no hx of DM  - dc BG checks  - SS1     ID:  - WBC remains  stable, no indication for further antbx at this time     Ppx: SCDs, LVX, OOB  PT/OT: home with home therapy     Dispo: continue care on RNF. Continue to monitor fistula output. Not medically ready    Patient seen and plans discussed with Chief Resident Dr. BLANCA Delacruz, PGY-5 and Attending Physician Dr. Aleman.    Michelle Rosario MD PGY-1  Acute Care Surgery [phone]   ==============================================================================  CHIEF COMPLAINT / EVENTS LAST 24HRS / HPI:  NAEON. Midline wound is healing well. Ostomy output is very thick.     MEDICAL HISTORY / ROS:   Admission history and ROS reviewed. Pertinent changes as follows:  NA    PHYSICAL EXAM:  Heart Rate:  [57-81]   Temp:  [36.5 °C (97.7 °F)-36.9 °C (98.4 °F)]   Resp:  [18-20]   BP: (101-132)/(65-82)   SpO2:  [94 %-96 %]   Physical Exam    NAD  A&Ox3  Nonlabored breathing, chest rise equal bilaterally  Soft and nondistended, well healing midline wound, ostomy with thick output  Normal ROM  Skin warm and dry    IMAGING SUMMARY:  (summary of new imaging findings, not a copy of dictation)      LABS:  Results from last 7 days   Lab Units 07/02/24  0610 07/01/24  0601 06/30/24  0535   WBC AUTO x10*3/uL 6.2 7.1 8.7   HEMOGLOBIN g/dL 10.6* 10.8* 10.6*   HEMATOCRIT % 33.9* 35.0* 33.8*   PLATELETS AUTO x10*3/uL 405 444 459*   NEUTROS PCT AUTO % 40.5 49.7 60.3   LYMPHS PCT AUTO % 29.9 25.8 20.1   MONOS PCT AUTO % 17.0 12.3 10.8   EOS PCT AUTO % 10.9 10.4 7.7         Results from last 7 days   Lab Units 07/02/24  0610 07/01/24  1122 07/01/24  0601 06/30/24  0535   SODIUM mmol/L 133*  --  137 138   POTASSIUM mmol/L 4.5  --  4.9 4.2   CHLORIDE mmol/L 98  --  98 99   CO2 mmol/L 27  --  30 32   BUN mg/dL 22  --  17 16   CREATININE mg/dL 0.50  --  0.59 0.61   CALCIUM mg/dL 8.9  --  9.3 9.2   PROTEIN TOTAL g/dL  --  6.8 6.7  --    BILIRUBIN TOTAL mg/dL  --  0.3 0.3  --    ALK PHOS U/L  --  167* 172*  --    ALT U/L  --  43 42  --    AST U/L  --  26 25  --     GLUCOSE mg/dL 117*  --  121* 120*     Results from last 7 days   Lab Units 07/01/24  1122 07/01/24  0601   BILIRUBIN TOTAL mg/dL 0.3 0.3   BILIRUBIN DIRECT mg/dL 0.1 0.1           I have reviewed all medications, laboratory results, and imaging pertinent for today's encounter.

## 2024-07-05 LAB
ALBUMIN SERPL BCP-MCNC: 3 G/DL (ref 3.4–5)
ANION GAP SERPL CALC-SCNC: 10 MMOL/L (ref 10–20)
BASOPHILS # BLD AUTO: 0.07 X10*3/UL (ref 0–0.1)
BASOPHILS NFR BLD AUTO: 1.2 %
BUN SERPL-MCNC: 17 MG/DL (ref 6–23)
CALCIUM SERPL-MCNC: 8.8 MG/DL (ref 8.6–10.6)
CHLORIDE SERPL-SCNC: 98 MMOL/L (ref 98–107)
CO2 SERPL-SCNC: 30 MMOL/L (ref 21–32)
CREAT SERPL-MCNC: 0.5 MG/DL (ref 0.5–1.3)
EGFRCR SERPLBLD CKD-EPI 2021: >90 ML/MIN/1.73M*2
EOSINOPHIL # BLD AUTO: 0.69 X10*3/UL (ref 0–0.7)
EOSINOPHIL NFR BLD AUTO: 12.1 %
ERYTHROCYTE [DISTWIDTH] IN BLOOD BY AUTOMATED COUNT: 13.2 % (ref 11.5–14.5)
GLUCOSE BLD MANUAL STRIP-MCNC: 114 MG/DL (ref 74–99)
GLUCOSE BLD MANUAL STRIP-MCNC: 129 MG/DL (ref 74–99)
GLUCOSE BLD MANUAL STRIP-MCNC: 130 MG/DL (ref 74–99)
GLUCOSE BLD MANUAL STRIP-MCNC: 131 MG/DL (ref 74–99)
GLUCOSE BLD MANUAL STRIP-MCNC: 140 MG/DL (ref 74–99)
GLUCOSE BLD MANUAL STRIP-MCNC: 161 MG/DL (ref 74–99)
GLUCOSE SERPL-MCNC: 122 MG/DL (ref 74–99)
HCT VFR BLD AUTO: 31.8 % (ref 41–52)
HGB BLD-MCNC: 9.7 G/DL (ref 13.5–17.5)
IMM GRANULOCYTES # BLD AUTO: 0.02 X10*3/UL (ref 0–0.7)
IMM GRANULOCYTES NFR BLD AUTO: 0.4 % (ref 0–0.9)
LYMPHOCYTES # BLD AUTO: 1.74 X10*3/UL (ref 1.2–4.8)
LYMPHOCYTES NFR BLD AUTO: 30.5 %
MAGNESIUM SERPL-MCNC: 1.96 MG/DL (ref 1.6–2.4)
MCH RBC QN AUTO: 26.1 PG (ref 26–34)
MCHC RBC AUTO-ENTMCNC: 30.5 G/DL (ref 32–36)
MCV RBC AUTO: 86 FL (ref 80–100)
MONOCYTES # BLD AUTO: 1.09 X10*3/UL (ref 0.1–1)
MONOCYTES NFR BLD AUTO: 19.1 %
NEUTROPHILS # BLD AUTO: 2.1 X10*3/UL (ref 1.2–7.7)
NEUTROPHILS NFR BLD AUTO: 36.7 %
NRBC BLD-RTO: 0 /100 WBCS (ref 0–0)
PHOSPHATE SERPL-MCNC: 4.5 MG/DL (ref 2.5–4.9)
PLATELET # BLD AUTO: 364 X10*3/UL (ref 150–450)
POTASSIUM SERPL-SCNC: 4.5 MMOL/L (ref 3.5–5.3)
RBC # BLD AUTO: 3.71 X10*6/UL (ref 4.5–5.9)
SODIUM SERPL-SCNC: 133 MMOL/L (ref 136–145)
WBC # BLD AUTO: 5.7 X10*3/UL (ref 4.4–11.3)

## 2024-07-05 PROCEDURE — 83735 ASSAY OF MAGNESIUM: CPT

## 2024-07-05 PROCEDURE — 36415 COLL VENOUS BLD VENIPUNCTURE: CPT

## 2024-07-05 PROCEDURE — 2500000004 HC RX 250 GENERAL PHARMACY W/ HCPCS (ALT 636 FOR OP/ED)

## 2024-07-05 PROCEDURE — 80069 RENAL FUNCTION PANEL: CPT

## 2024-07-05 PROCEDURE — 82947 ASSAY GLUCOSE BLOOD QUANT: CPT

## 2024-07-05 PROCEDURE — 2500000005 HC RX 250 GENERAL PHARMACY W/O HCPCS

## 2024-07-05 PROCEDURE — 85025 COMPLETE CBC W/AUTO DIFF WBC: CPT

## 2024-07-05 PROCEDURE — 1100000001 HC PRIVATE ROOM DAILY

## 2024-07-05 PROCEDURE — 2580000001 HC RX 258 IV SOLUTIONS

## 2024-07-05 PROCEDURE — C9113 INJ PANTOPRAZOLE SODIUM, VIA: HCPCS

## 2024-07-05 PROCEDURE — 2500000001 HC RX 250 WO HCPCS SELF ADMINISTERED DRUGS (ALT 637 FOR MEDICARE OP)

## 2024-07-05 RX ADMIN — ACETAMINOPHEN 650 MG: 325 TABLET ORAL at 18:13

## 2024-07-05 RX ADMIN — LOPERAMIDE HYDROCHLORIDE 2 MG: 2 CAPSULE ORAL at 13:12

## 2024-07-05 RX ADMIN — ENOXAPARIN SODIUM 40 MG: 100 INJECTION SUBCUTANEOUS at 21:37

## 2024-07-05 RX ADMIN — PANTOPRAZOLE SODIUM 40 MG: 40 INJECTION, POWDER, FOR SOLUTION INTRAVENOUS at 21:37

## 2024-07-05 RX ADMIN — LOPERAMIDE HYDROCHLORIDE 2 MG: 2 CAPSULE ORAL at 06:29

## 2024-07-05 RX ADMIN — LOPERAMIDE HYDROCHLORIDE 2 MG: 2 CAPSULE ORAL at 18:11

## 2024-07-05 RX ADMIN — ACETAMINOPHEN 650 MG: 325 TABLET ORAL at 06:29

## 2024-07-05 RX ADMIN — SMOFLIPID 50 G: 6; 6; 5; 3 INJECTION, EMULSION INTRAVENOUS at 21:41

## 2024-07-05 RX ADMIN — PANTOPRAZOLE SODIUM 40 MG: 40 INJECTION, POWDER, FOR SOLUTION INTRAVENOUS at 09:18

## 2024-07-05 RX ADMIN — ACETAMINOPHEN 650 MG: 325 TABLET ORAL at 13:13

## 2024-07-05 RX ADMIN — ASCORBIC ACID, VITAMIN A PALMITATE, CHOLECALCIFEROL, THIAMINE HYDROCHLORIDE, RIBOFLAVIN-5 PHOSPHATE SODIUM, PYRIDOXINE HYDROCHLORIDE, NIACINAMIDE, DEXPANTHENOL, ALPHA-TOCOPHEROL ACETATE, VITAMIN K1, FOLIC ACID, BIOTIN, CYANOCOBALAMIN: 200; 3300; 200; 6; 3.6; 6; 40; 15; 10; 150; 600; 60; 5 INJECTION, SOLUTION INTRAVENOUS at 21:40

## 2024-07-05 RX ADMIN — OXYCODONE HYDROCHLORIDE 5 MG: 5 TABLET ORAL at 18:11

## 2024-07-05 RX ADMIN — ACETAMINOPHEN 650 MG: 325 TABLET ORAL at 23:29

## 2024-07-05 RX ADMIN — HYDROMORPHONE HYDROCHLORIDE 0.2 MG: 1 INJECTION, SOLUTION INTRAMUSCULAR; INTRAVENOUS; SUBCUTANEOUS at 21:36

## 2024-07-05 ASSESSMENT — COGNITIVE AND FUNCTIONAL STATUS - GENERAL
MOBILITY SCORE: 24
DAILY ACTIVITIY SCORE: 24
MOBILITY SCORE: 24
DAILY ACTIVITIY SCORE: 24

## 2024-07-05 ASSESSMENT — PAIN SCALES - GENERAL
PAINLEVEL_OUTOF10: 4
PAINLEVEL_OUTOF10: 4
PAINLEVEL_OUTOF10: 6
PAINLEVEL_OUTOF10: 4
PAINLEVEL_OUTOF10: 5 - MODERATE PAIN

## 2024-07-05 NOTE — PROGRESS NOTES
07/05/24 1000   Colostomy LLQ   Placement Date/Time: 06/07/24 1329   Location: LLQ   Stomal Appliance 2 piece   Site/Stoma Assessment Clean;Intact   Peristomal Assessment Clean;Intact   Treatment Placement checked   Drainage Characteristics Brown       The wound care team came to bedside to assess the patient's ostomy pouch and provide a lesson. The patient stated that he and the night shift nurse change the pouch overnight and didn't want to change it at this time. The patient's pouch was clean, dry and intact.     The wound care team will come back tomorrow for a potential pouch change and lesson.

## 2024-07-05 NOTE — PROGRESS NOTES
[facility]  ACUTE CARE SURGERY - PROGRESS NOTE    Patient Name: Bereket Em  MRN: [id number]  Admit Date: 605  : 1965  AGE: 58 y.o.   GENDER: male  ==============================================================================  TODAY'S ASSESSMENT AND PLAN OF CARE:  57 yo M who presented with perforated diverticulitis. 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 He underwent ex lap, sigmoidectomy, and end colostomy on . Vac applied  to MLI. TPN initiated. On  vac was taken down which showed necrotic fascia at midline incision. He was taken to the OR for a exploration laparotomy, abdominal washout, fascial debridement, enmas closure on  with Dr. Case. RTOR on  for re-exploration laparotomy, mesh placement for suspected infection. 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    Ostomy output thick, changed immodium to 2 mg q6 yesterday. Wound is healing well.      Neuro:  - stephan tylenol, stephan oxy 5/10, dilaudid for breakthrough  - PRN Atarax for anxiety  - Pet, art, music therapy      CV:  - monitor vitals  - holding home lisinopril     Pulm:  - encourage IS     GI: ostomy functioning with small amount of brown stool  - Calorie count to evaluate whether TPN can be DC  - Full diet as tolerated  - PPI BID   - loperamide 2mg q6h  - Cyclic TPN   - TID dressing changes to midline wound with wet to wet kerlix. Monitor drainage. Skin excoriation around wound from increasing drainage.   - denied wound care manager, possible pouching of ostomy  - Monitor colostomy output, appreciate Enterostomal therapy following     :  - Strict I&Os  - Replete lytes as indicated      Endo: no hx of DM  - dc BG checks  - SS1     ID:  - WBC remains stable, no indication for further antbx at this time     Ppx: SCDs, LVX, OOB  PT/OT: home with home therapy     Dispo:  continue care on RNF. Continue to monitor fistula output. Possible DC next week.    Patient seen and plans discussed with Chief Resident Dr. BLANCA Delacruz, PGY-5 and Attending Physician Dr. Aleman.    Marguerite Coronado MD PGY-1  Acute Care Surgery [id number]   ==============================================================================  CHIEF COMPLAINT / EVENTS LAST 24HRS / HPI:  NAEON. Midline wound is healing well. Ostomy output is still thick.     MEDICAL HISTORY / ROS:   Admission history and ROS reviewed. Pertinent changes as follows:  NA    PHYSICAL EXAM:  Heart Rate:  [58-81]   Temp:  [36.4 °C (97.5 °F)-36.7 °C (98.1 °F)]   Resp:  [17-18]   BP: (108-132)/(62-82)   Weight:  [80.1 kg (176 lb 9.4 oz)]   SpO2:  [94 %-96 %]   Physical Exam    NAD  A&Ox3  Nonlabored breathing, chest rise equal bilaterally  Soft and nondistended, well healing midline wound, ostomy with thick output  Normal ROM  Skin warm and dry    IMAGING SUMMARY:  (summary of new imaging findings, not a copy of dictation)      LABS:  Results from last 7 days   Lab Units 07/05/24  0600 07/02/24  0610 07/01/24  0601   WBC AUTO x10*3/uL 5.7 6.2 7.1   HEMOGLOBIN g/dL 9.7* 10.6* 10.8*   HEMATOCRIT % 31.8* 33.9* 35.0*   PLATELETS AUTO x10*3/uL 364 405 444   NEUTROS PCT AUTO % 36.7 40.5 49.7   LYMPHS PCT AUTO % 30.5 29.9 25.8   MONOS PCT AUTO % 19.1 17.0 12.3   EOS PCT AUTO % 12.1 10.9 10.4         Results from last 7 days   Lab Units 07/05/24  0600 07/02/24  0610 07/01/24  1122 07/01/24  0601   SODIUM mmol/L 133* 133*  --  137   POTASSIUM mmol/L 4.5 4.5  --  4.9   CHLORIDE mmol/L 98 98  --  98   CO2 mmol/L 30 27  --  30   BUN mg/dL 17 22  --  17   CREATININE mg/dL 0.50 0.50  --  0.59   CALCIUM mg/dL 8.8 8.9  --  9.3   PROTEIN TOTAL g/dL  --   --  6.8 6.7   BILIRUBIN TOTAL mg/dL  --   --  0.3 0.3   ALK PHOS U/L  --   --  167* 172*   ALT U/L  --   --  43 42   AST U/L  --   --  26 25   GLUCOSE mg/dL 122* 117*  --  121*     Results from last 7 days   Lab  Units 07/01/24  1122 07/01/24  0601   BILIRUBIN TOTAL mg/dL 0.3 0.3   BILIRUBIN DIRECT mg/dL 0.1 0.1           I have reviewed all medications, laboratory results, and imaging pertinent for today's encounter.

## 2024-07-05 NOTE — CARE PLAN
The patient's goals for the shift include  patient pain will manageable    The clinical goals for the shift include patient pain will manageable

## 2024-07-05 NOTE — CARE PLAN
Problem: Pain  Goal: Takes deep breaths with improved pain control throughout the shift  Outcome: Progressing  Goal: Turns in bed with improved pain control throughout the shift  Outcome: Progressing  Goal: Walks with improved pain control throughout the shift  Outcome: Progressing  Goal: Performs ADL's with improved pain control throughout shift  Outcome: Progressing  Goal: Participates in PT with improved pain control throughout the shift  Outcome: Progressing  Goal: Free from opioid side effects throughout the shift  Outcome: Progressing  Goal: Free from acute confusion related to pain meds throughout the shift  Outcome: Progressing     Problem: Skin  Goal: Decreased wound size/increased tissue granulation at next dressing change  Outcome: Progressing  Goal: Participates in plan/prevention/treatment measures  Outcome: Progressing  Goal: Prevent/manage excess moisture  Outcome: Progressing  Goal: Prevent/minimize sheer/friction injuries  Outcome: Progressing   The patient's goals for the shift include      The clinical goals for the shift include patient pain will manageable    Over the shift, the patient did make progress toward the following goals. Barriers to progression include wound care.  Recommendations to address these barriers include promote nutrition

## 2024-07-06 LAB
ALBUMIN SERPL BCP-MCNC: 3 G/DL (ref 3.4–5)
ANION GAP SERPL CALC-SCNC: 11 MMOL/L (ref 10–20)
BASOPHILS # BLD AUTO: 0.07 X10*3/UL (ref 0–0.1)
BASOPHILS NFR BLD AUTO: 1.2 %
BUN SERPL-MCNC: 18 MG/DL (ref 6–23)
CALCIUM SERPL-MCNC: 8.7 MG/DL (ref 8.6–10.6)
CHLORIDE SERPL-SCNC: 101 MMOL/L (ref 98–107)
CO2 SERPL-SCNC: 29 MMOL/L (ref 21–32)
CREAT SERPL-MCNC: 0.48 MG/DL (ref 0.5–1.3)
EGFRCR SERPLBLD CKD-EPI 2021: >90 ML/MIN/1.73M*2
EOSINOPHIL # BLD AUTO: 0.79 X10*3/UL (ref 0–0.7)
EOSINOPHIL NFR BLD AUTO: 13.8 %
ERYTHROCYTE [DISTWIDTH] IN BLOOD BY AUTOMATED COUNT: 13.1 % (ref 11.5–14.5)
GLUCOSE BLD MANUAL STRIP-MCNC: 118 MG/DL (ref 74–99)
GLUCOSE BLD MANUAL STRIP-MCNC: 135 MG/DL (ref 74–99)
GLUCOSE SERPL-MCNC: 128 MG/DL (ref 74–99)
HCT VFR BLD AUTO: 30.1 % (ref 41–52)
HGB BLD-MCNC: 9.2 G/DL (ref 13.5–17.5)
IMM GRANULOCYTES # BLD AUTO: 0.02 X10*3/UL (ref 0–0.7)
IMM GRANULOCYTES NFR BLD AUTO: 0.3 % (ref 0–0.9)
LYMPHOCYTES # BLD AUTO: 1.81 X10*3/UL (ref 1.2–4.8)
LYMPHOCYTES NFR BLD AUTO: 31.6 %
MAGNESIUM SERPL-MCNC: 1.92 MG/DL (ref 1.6–2.4)
MCH RBC QN AUTO: 26 PG (ref 26–34)
MCHC RBC AUTO-ENTMCNC: 30.6 G/DL (ref 32–36)
MCV RBC AUTO: 85 FL (ref 80–100)
MONOCYTES # BLD AUTO: 1.09 X10*3/UL (ref 0.1–1)
MONOCYTES NFR BLD AUTO: 19.1 %
NEUTROPHILS # BLD AUTO: 1.94 X10*3/UL (ref 1.2–7.7)
NEUTROPHILS NFR BLD AUTO: 34 %
NRBC BLD-RTO: 0 /100 WBCS (ref 0–0)
PHOSPHATE SERPL-MCNC: 4.5 MG/DL (ref 2.5–4.9)
PLATELET # BLD AUTO: 352 X10*3/UL (ref 150–450)
POTASSIUM SERPL-SCNC: 4.4 MMOL/L (ref 3.5–5.3)
RBC # BLD AUTO: 3.54 X10*6/UL (ref 4.5–5.9)
SODIUM SERPL-SCNC: 137 MMOL/L (ref 136–145)
WBC # BLD AUTO: 5.7 X10*3/UL (ref 4.4–11.3)

## 2024-07-06 PROCEDURE — 2500000001 HC RX 250 WO HCPCS SELF ADMINISTERED DRUGS (ALT 637 FOR MEDICARE OP): Performed by: STUDENT IN AN ORGANIZED HEALTH CARE EDUCATION/TRAINING PROGRAM

## 2024-07-06 PROCEDURE — 1100000001 HC PRIVATE ROOM DAILY

## 2024-07-06 PROCEDURE — C9113 INJ PANTOPRAZOLE SODIUM, VIA: HCPCS

## 2024-07-06 PROCEDURE — 2500000004 HC RX 250 GENERAL PHARMACY W/ HCPCS (ALT 636 FOR OP/ED)

## 2024-07-06 PROCEDURE — 36415 COLL VENOUS BLD VENIPUNCTURE: CPT

## 2024-07-06 PROCEDURE — 2500000005 HC RX 250 GENERAL PHARMACY W/O HCPCS

## 2024-07-06 PROCEDURE — 2500000001 HC RX 250 WO HCPCS SELF ADMINISTERED DRUGS (ALT 637 FOR MEDICARE OP)

## 2024-07-06 PROCEDURE — 83735 ASSAY OF MAGNESIUM: CPT

## 2024-07-06 PROCEDURE — 2580000001 HC RX 258 IV SOLUTIONS

## 2024-07-06 PROCEDURE — 84100 ASSAY OF PHOSPHORUS: CPT

## 2024-07-06 PROCEDURE — 82947 ASSAY GLUCOSE BLOOD QUANT: CPT

## 2024-07-06 PROCEDURE — 85025 COMPLETE CBC W/AUTO DIFF WBC: CPT

## 2024-07-06 RX ORDER — LOPERAMIDE HYDROCHLORIDE 2 MG/1
2 CAPSULE ORAL
Status: DISCONTINUED | OUTPATIENT
Start: 2024-07-06 | End: 2024-07-07

## 2024-07-06 RX ADMIN — PANTOPRAZOLE SODIUM 40 MG: 40 INJECTION, POWDER, FOR SOLUTION INTRAVENOUS at 20:08

## 2024-07-06 RX ADMIN — OXYCODONE HYDROCHLORIDE 10 MG: 5 TABLET ORAL at 17:33

## 2024-07-06 RX ADMIN — LOPERAMIDE HYDROCHLORIDE 2 MG: 2 CAPSULE ORAL at 00:20

## 2024-07-06 RX ADMIN — ACETAMINOPHEN 650 MG: 325 TABLET ORAL at 11:09

## 2024-07-06 RX ADMIN — ACETAMINOPHEN 650 MG: 325 TABLET ORAL at 17:15

## 2024-07-06 RX ADMIN — HYDROMORPHONE HYDROCHLORIDE 0.2 MG: 1 INJECTION, SOLUTION INTRAMUSCULAR; INTRAVENOUS; SUBCUTANEOUS at 22:01

## 2024-07-06 RX ADMIN — LOPERAMIDE HYDROCHLORIDE 2 MG: 2 CAPSULE ORAL at 17:15

## 2024-07-06 RX ADMIN — ASCORBIC ACID, VITAMIN A PALMITATE, CHOLECALCIFEROL, THIAMINE HYDROCHLORIDE, RIBOFLAVIN-5 PHOSPHATE SODIUM, PYRIDOXINE HYDROCHLORIDE, NIACINAMIDE, DEXPANTHENOL, ALPHA-TOCOPHEROL ACETATE, VITAMIN K1, FOLIC ACID, BIOTIN, CYANOCOBALAMIN: 200; 3300; 200; 6; 3.6; 6; 40; 15; 10; 150; 600; 60; 5 INJECTION, SOLUTION INTRAVENOUS at 20:07

## 2024-07-06 RX ADMIN — SMOFLIPID 50 G: 6; 6; 5; 3 INJECTION, EMULSION INTRAVENOUS at 20:09

## 2024-07-06 RX ADMIN — PANTOPRAZOLE SODIUM 40 MG: 40 INJECTION, POWDER, FOR SOLUTION INTRAVENOUS at 08:49

## 2024-07-06 RX ADMIN — ENOXAPARIN SODIUM 40 MG: 100 INJECTION SUBCUTANEOUS at 20:08

## 2024-07-06 RX ADMIN — LOPERAMIDE HYDROCHLORIDE 2 MG: 2 CAPSULE ORAL at 05:35

## 2024-07-06 RX ADMIN — ACETAMINOPHEN 650 MG: 325 TABLET ORAL at 05:34

## 2024-07-06 ASSESSMENT — COGNITIVE AND FUNCTIONAL STATUS - GENERAL
CLIMB 3 TO 5 STEPS WITH RAILING: A LITTLE
MOBILITY SCORE: 23
DAILY ACTIVITIY SCORE: 24
DAILY ACTIVITIY SCORE: 24
MOBILITY SCORE: 24

## 2024-07-06 ASSESSMENT — PAIN SCALES - GENERAL
PAINLEVEL_OUTOF10: 4
PAINLEVEL_OUTOF10: 5 - MODERATE PAIN
PAINLEVEL_OUTOF10: 9
PAINLEVEL_OUTOF10: 4
PAINLEVEL_OUTOF10: 7

## 2024-07-06 ASSESSMENT — PAIN - FUNCTIONAL ASSESSMENT
PAIN_FUNCTIONAL_ASSESSMENT: 0-10

## 2024-07-06 ASSESSMENT — PAIN DESCRIPTION - DESCRIPTORS: DESCRIPTORS: ACHING;DISCOMFORT;DULL

## 2024-07-06 ASSESSMENT — PAIN DESCRIPTION - LOCATION: LOCATION: ABDOMEN

## 2024-07-06 ASSESSMENT — PAIN DESCRIPTION - ORIENTATION: ORIENTATION: MID

## 2024-07-06 NOTE — CARE PLAN
The patient's goals for the shift include      The clinical goals for the shift include patient will be HDS    Over the shift, the patient did make progress toward the following goals.   Problem: Skin  Goal: Decreased wound size/increased tissue granulation at next dressing change  Outcome: Progressing  Goal: Participates in plan/prevention/treatment measures  Outcome: Progressing  Goal: Prevent/manage excess moisture  Outcome: Progressing  Goal: Prevent/minimize sheer/friction injuries  Outcome: Progressing  Goal: Promote/optimize nutrition  Outcome: Progressing  Goal: Promote skin healing  Outcome: Progressing     Problem: Pain  Goal: Takes deep breaths with improved pain control throughout the shift  Outcome: Progressing  Goal: Turns in bed with improved pain control throughout the shift  Outcome: Progressing  Goal: Walks with improved pain control throughout the shift  Outcome: Progressing  Goal: Performs ADL's with improved pain control throughout shift  Outcome: Progressing  Goal: Participates in PT with improved pain control throughout the shift  Outcome: Progressing  Goal: Free from opioid side effects throughout the shift  Outcome: Progressing  Goal: Free from acute confusion related to pain meds throughout the shift  Outcome: Progressing   .

## 2024-07-06 NOTE — PROGRESS NOTES
[facility]  ACUTE CARE SURGERY - PROGRESS NOTE    Patient Name: Bereket Em  MRN: [id number]  Admit Date: 605  : 1965  AGE: 58 y.o.   GENDER: male  ==============================================================================  TODAY'S ASSESSMENT AND PLAN OF CARE:  59 yo M who presented with perforated diverticulitis. 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 He underwent ex lap, sigmoidectomy, and end colostomy on . Vac applied  to MLI. TPN initiated. On  vac was taken down which showed necrotic fascia at midline incision. He was taken to the OR for a exploration laparotomy, abdominal washout, fascial debridement, enmas closure on  with Dr. Case. RTOR on  for re-exploration laparotomy, mesh placement for suspected infection. 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    Continues to improve, diet increasing, having more ostomy output     Neuro:  - stephan tylenol, prn oxy 5/10, dilaudid for breakthrough  - PRN Atarax for anxiety  - Pet, art, music therapy      CV:  - monitor vitals  - holding home lisinopril     Pulm:  - encourage IS     GI: ostomy functioning with small amount of brown stool  - Calorie count to evaluate whether TPN can be weaned  - Regular diet as tolerated  - PPI BID   - loperamide decreased to 2mg q12H  - Cyclic TPN   - TID dressing changes to midline wound with wet to wet kerlix. Monitor drainage. Skin excoriation around wound from increasing drainage.   - denied wound care manager, possible pouching of ostomy  - Monitor colostomy output, appreciate Enterostomal therapy following     :  - Strict I&Os  - Replete lytes as indicated      Endo: no hx of DM  - dc BG checks  - SS1     ID:  - WBC remains stable, no indication for further antbx at this time     Ppx: SCDs, LVX, OOB  PT/OT: home with home therapy     Dispo:  continue care on RNF. Continue to monitor fistula output. Possible DC next week.      BLANCA Delacruz  Acute Care Surgery [id number]   ==============================================================================  CHIEF COMPLAINT / EVENTS LAST 24HRS / HPI:  NAEON. Eating chicken fingers, pancakes, etc. No notable change in fistula output    MEDICAL HISTORY / ROS:   Admission history and ROS reviewed. Pertinent changes as follows:  NA    PHYSICAL EXAM:  Heart Rate:  [59-74]   Temp:  [36.5 °C (97.7 °F)-36.9 °C (98.4 °F)]   Resp:  [17-19]   BP: (111-136)/(67-79)   Weight:  [80.4 kg (177 lb 5.8 oz)]   SpO2:  [94 %-96 %]   Physical Exam    NAD  Nonlabored breathing, chest rise equal bilaterally  Soft and nondistended, well healing midline wound, ongoing leakage from 3-o-clock position of mesh, granulation tissue on right lateral aspect of mesh, ostomy with thick output      IMAGING SUMMARY:   None new      LABS:  Results from last 7 days   Lab Units 07/06/24 0536 07/05/24  0600 07/02/24  0610   WBC AUTO x10*3/uL 5.7 5.7 6.2   HEMOGLOBIN g/dL 9.2* 9.7* 10.6*   HEMATOCRIT % 30.1* 31.8* 33.9*   PLATELETS AUTO x10*3/uL 352 364 405   NEUTROS PCT AUTO % 34.0 36.7 40.5   LYMPHS PCT AUTO % 31.6 30.5 29.9   MONOS PCT AUTO % 19.1 19.1 17.0   EOS PCT AUTO % 13.8 12.1 10.9         Results from last 7 days   Lab Units 07/06/24 0536 07/05/24  0600 07/02/24  0610 07/01/24  1122 07/01/24  0601   SODIUM mmol/L 137 133* 133*  --  137   POTASSIUM mmol/L 4.4 4.5 4.5  --  4.9   CHLORIDE mmol/L 101 98 98  --  98   CO2 mmol/L 29 30 27  --  30   BUN mg/dL 18 17 22  --  17   CREATININE mg/dL 0.48* 0.50 0.50  --  0.59   CALCIUM mg/dL 8.7 8.8 8.9  --  9.3   PROTEIN TOTAL g/dL  --   --   --  6.8 6.7   BILIRUBIN TOTAL mg/dL  --   --   --  0.3 0.3   ALK PHOS U/L  --   --   --  167* 172*   ALT U/L  --   --   --  43 42   AST U/L  --   --   --  26 25   GLUCOSE mg/dL 128* 122* 117*  --  121*     Results from last 7 days   Lab Units 07/01/24  1122 07/01/24  0601    BILIRUBIN TOTAL mg/dL 0.3 0.3   BILIRUBIN DIRECT mg/dL 0.1 0.1           I have reviewed all medications, laboratory results, and imaging pertinent for today's encounter.

## 2024-07-07 LAB
GLUCOSE BLD MANUAL STRIP-MCNC: 114 MG/DL (ref 74–99)
GLUCOSE BLD MANUAL STRIP-MCNC: 138 MG/DL (ref 74–99)
GLUCOSE BLD MANUAL STRIP-MCNC: 145 MG/DL (ref 74–99)
GLUCOSE BLD MANUAL STRIP-MCNC: 146 MG/DL (ref 74–99)

## 2024-07-07 PROCEDURE — 2500000004 HC RX 250 GENERAL PHARMACY W/ HCPCS (ALT 636 FOR OP/ED)

## 2024-07-07 PROCEDURE — 2580000001 HC RX 258 IV SOLUTIONS

## 2024-07-07 PROCEDURE — 2500000005 HC RX 250 GENERAL PHARMACY W/O HCPCS

## 2024-07-07 PROCEDURE — 82947 ASSAY GLUCOSE BLOOD QUANT: CPT

## 2024-07-07 PROCEDURE — 1100000001 HC PRIVATE ROOM DAILY

## 2024-07-07 PROCEDURE — 2500000001 HC RX 250 WO HCPCS SELF ADMINISTERED DRUGS (ALT 637 FOR MEDICARE OP): Performed by: STUDENT IN AN ORGANIZED HEALTH CARE EDUCATION/TRAINING PROGRAM

## 2024-07-07 PROCEDURE — C9113 INJ PANTOPRAZOLE SODIUM, VIA: HCPCS

## 2024-07-07 PROCEDURE — 2500000001 HC RX 250 WO HCPCS SELF ADMINISTERED DRUGS (ALT 637 FOR MEDICARE OP)

## 2024-07-07 RX ORDER — LOPERAMIDE HYDROCHLORIDE 2 MG/1
2 CAPSULE ORAL
Status: DISCONTINUED | OUTPATIENT
Start: 2024-07-07 | End: 2024-07-10

## 2024-07-07 RX ADMIN — ACETAMINOPHEN 650 MG: 325 TABLET ORAL at 10:31

## 2024-07-07 RX ADMIN — ACETAMINOPHEN 650 MG: 325 TABLET ORAL at 17:08

## 2024-07-07 RX ADMIN — PANTOPRAZOLE SODIUM 40 MG: 40 INJECTION, POWDER, FOR SOLUTION INTRAVENOUS at 20:09

## 2024-07-07 RX ADMIN — LOPERAMIDE HYDROCHLORIDE 2 MG: 2 CAPSULE ORAL at 08:27

## 2024-07-07 RX ADMIN — OXYCODONE HYDROCHLORIDE 10 MG: 5 TABLET ORAL at 07:14

## 2024-07-07 RX ADMIN — ASCORBIC ACID, VITAMIN A PALMITATE, CHOLECALCIFEROL, THIAMINE HYDROCHLORIDE, RIBOFLAVIN-5 PHOSPHATE SODIUM, PYRIDOXINE HYDROCHLORIDE, NIACINAMIDE, DEXPANTHENOL, ALPHA-TOCOPHEROL ACETATE, VITAMIN K1, FOLIC ACID, BIOTIN, CYANOCOBALAMIN: 200; 3300; 200; 6; 3.6; 6; 40; 15; 10; 150; 600; 60; 5 INJECTION, SOLUTION INTRAVENOUS at 19:25

## 2024-07-07 RX ADMIN — ACETAMINOPHEN 650 MG: 325 TABLET ORAL at 23:01

## 2024-07-07 RX ADMIN — OXYCODONE HYDROCHLORIDE 5 MG: 5 TABLET ORAL at 12:12

## 2024-07-07 RX ADMIN — ENOXAPARIN SODIUM 40 MG: 100 INJECTION SUBCUTANEOUS at 20:08

## 2024-07-07 RX ADMIN — LOPERAMIDE HYDROCHLORIDE 2 MG: 2 CAPSULE ORAL at 15:33

## 2024-07-07 RX ADMIN — PANTOPRAZOLE SODIUM 40 MG: 40 INJECTION, POWDER, FOR SOLUTION INTRAVENOUS at 08:27

## 2024-07-07 RX ADMIN — SMOFLIPID 50 G: 6; 6; 5; 3 INJECTION, EMULSION INTRAVENOUS at 19:25

## 2024-07-07 RX ADMIN — OXYCODONE HYDROCHLORIDE 10 MG: 5 TABLET ORAL at 23:01

## 2024-07-07 ASSESSMENT — COGNITIVE AND FUNCTIONAL STATUS - GENERAL
MOBILITY SCORE: 23
DAILY ACTIVITIY SCORE: 24
CLIMB 3 TO 5 STEPS WITH RAILING: A LITTLE
DAILY ACTIVITIY SCORE: 24

## 2024-07-07 ASSESSMENT — PAIN - FUNCTIONAL ASSESSMENT: PAIN_FUNCTIONAL_ASSESSMENT: 0-10

## 2024-07-07 ASSESSMENT — PAIN SCALES - GENERAL
PAINLEVEL_OUTOF10: 8
PAINLEVEL_OUTOF10: 3
PAINLEVEL_OUTOF10: 9
PAINLEVEL_OUTOF10: 6

## 2024-07-07 ASSESSMENT — PAIN DESCRIPTION - LOCATION
LOCATION: ABDOMEN
LOCATION: ABDOMEN

## 2024-07-07 ASSESSMENT — PAIN DESCRIPTION - DESCRIPTORS: DESCRIPTORS: ACHING;DISCOMFORT

## 2024-07-07 ASSESSMENT — PAIN DESCRIPTION - ORIENTATION
ORIENTATION: MID;LOWER
ORIENTATION: MID;INNER

## 2024-07-07 NOTE — PROGRESS NOTES
[facility]  ACUTE CARE SURGERY - PROGRESS NOTE    Patient Name: Bereket Em  MRN: [id number]  Admit Date: 605  : 1965  AGE: 58 y.o.   GENDER: male  ==============================================================================  TODAY'S ASSESSMENT AND PLAN OF CARE:  57 yo M who presented with perforated diverticulitis. 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 He underwent ex lap, sigmoidectomy, and end colostomy on . Vac applied  to MLI. TPN initiated. On  vac was taken down which showed necrotic fascia at midline incision. He was taken to the OR for a exploration laparotomy, abdominal washout, fascial debridement, enmas closure on  with Dr. Case. RTOR on  for re-exploration laparotomy, mesh placement for suspected infection. 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    Continues to improve, diet increasing, having more ostomy output     Neuro:  - stephan tylenol, prn oxy 5/10, dilaudid for breakthrough  - PRN Atarax for anxiety  - Pet, art, music therapy      CV:  - monitor vitals  - holding home lisinopril     Pulm:  - encourage IS     GI: ostomy functioning with small amount of brown stool  - Calorie count to evaluate whether TPN can be weaned  - Regular diet as tolerated  - PPI BID   - loperamide increased to 2mg TID today  - Cyclic TPN   - TID dressing changes to midline wound with wet to wet kerlix. Monitor drainage. Skin excoriation around wound from increasing drainage.   - denied wound care manager, possible pouching of ostomy  - Monitor colostomy output, appreciate Enterostomal therapy following     :  - Strict I&Os  - Replete lytes as indicated      Endo: no hx of DM  - dc BG checks  - SS1     ID:  - WBC remains stable, no indication for further antbx at this time     Ppx: SCDs, LVX, OOB  PT/OT: home with home therapy     Dispo:  continue care on RNF. Continue to monitor fistula output. Possible DC early this week.      BLANCA Delacruz  Acute Care Surgery [id number]   ==============================================================================  CHIEF COMPLAINT / EVENTS LAST 24HRS / HPI:  NAEON. Feels that the decrease to BID imodium increased fistula output    MEDICAL HISTORY / ROS:   Admission history and ROS reviewed. Pertinent changes as follows:  NA    PHYSICAL EXAM:  Heart Rate:  [61-89]   Temp:  [36.1 °C (97 °F)-36.6 °C (97.9 °F)]   Resp:  [17-19]   BP: ()/(60-84)   SpO2:  [96 %-98 %]   Physical Exam    NAD  Nonlabored breathing  Soft and nondistended, well healing midline wound, ongoing leakage from 3-o-clock position of mesh, granulation tissue on right lateral aspect of mesh, ostomy with thick output (exam stable from yesterday)      IMAGING SUMMARY:   None new      LABS:  Results from last 7 days   Lab Units 07/06/24  0536 07/05/24  0600 07/02/24  0610   WBC AUTO x10*3/uL 5.7 5.7 6.2   HEMOGLOBIN g/dL 9.2* 9.7* 10.6*   HEMATOCRIT % 30.1* 31.8* 33.9*   PLATELETS AUTO x10*3/uL 352 364 405   NEUTROS PCT AUTO % 34.0 36.7 40.5   LYMPHS PCT AUTO % 31.6 30.5 29.9   MONOS PCT AUTO % 19.1 19.1 17.0   EOS PCT AUTO % 13.8 12.1 10.9         Results from last 7 days   Lab Units 07/06/24  0536 07/05/24  0600 07/02/24  0610 07/01/24  1122 07/01/24  0601   SODIUM mmol/L 137 133* 133*  --  137   POTASSIUM mmol/L 4.4 4.5 4.5  --  4.9   CHLORIDE mmol/L 101 98 98  --  98   CO2 mmol/L 29 30 27  --  30   BUN mg/dL 18 17 22  --  17   CREATININE mg/dL 0.48* 0.50 0.50  --  0.59   CALCIUM mg/dL 8.7 8.8 8.9  --  9.3   PROTEIN TOTAL g/dL  --   --   --  6.8 6.7   BILIRUBIN TOTAL mg/dL  --   --   --  0.3 0.3   ALK PHOS U/L  --   --   --  167* 172*   ALT U/L  --   --   --  43 42   AST U/L  --   --   --  26 25   GLUCOSE mg/dL 128* 122* 117*  --  121*     Results from last 7 days   Lab Units 07/01/24  1122 07/01/24  0601   BILIRUBIN TOTAL mg/dL 0.3 0.3   BILIRUBIN  DIRECT mg/dL 0.1 0.1           I have reviewed all medications, laboratory results, and imaging pertinent for today's encounter.

## 2024-07-07 NOTE — PROGRESS NOTES
[facility]  ACUTE CARE SURGERY - PROGRESS NOTE    Patient Name: Bereket Em  MRN: [id number]  Admit Date: 605  : 1965  AGE: 58 y.o.   GENDER: male  ==============================================================================  TODAY'S ASSESSMENT AND PLAN OF CARE:  57 yo M who presented with perforated diverticulitis. 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 He underwent ex lap, sigmoidectomy, and end colostomy on . Vac applied  to MLI. TPN initiated. On  vac was taken down which showed necrotic fascia at midline incision. He was taken to the OR for a exploration laparotomy, abdominal washout, fascial debridement, enmas closure on  with Dr. Case. RTOR on  for re-exploration laparotomy, mesh placement for suspected infection. 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     Continues to improve, diet increasing, having more ostomy output     Neuro:  - stephan tylenol, prn oxy 5/10, dilaudid for breakthrough  - PRN Atarax for anxiety  - Pet, art, music therapy      CV:  - monitor vitals  - holding home lisinopril     Pulm:  - encourage IS     GI: ostomy functioning with small amount of brown stool  - Calorie count to evaluate whether TPN can be weaned  - Regular diet as tolerated  - PPI BID   - loperamide decreased to 2mg q12H  - Cyclic TPN   - TID dressing changes to midline wound with wet to wet kerlix. Monitor drainage. Skin excoriation around wound from increasing drainage.   - denied wound care manager, possible pouching of ostomy  - Monitor colostomy output, appreciate Enterostomal therapy following     :  - Strict I&Os  - Replete lytes as indicated      Endo: no hx of DM  - dc BG checks  - SS1     ID:  - WBC remains stable, no indication for further antbx at this time     Ppx: SCDs, LVX, OOB  PT/OT: home with home therapy     Dispo:  continue care on RNF. Continue to monitor fistula output. Possible DC next week.    ==============================================================================  CHIEF COMPLAINT / EVENTS LAST 24HRS / HPI:  ***    MEDICAL HISTORY / ROS:   Admission history and ROS reviewed. Pertinent changes as follows:  ***    PHYSICAL EXAM:  Heart Rate:  [61-89]   Temp:  [36.1 °C (97 °F)-36.6 °C (97.9 °F)]   Resp:  [17-19]   BP: ()/(60-84)   SpO2:  [96 %-98 %]   Physical Exam    IMAGING SUMMARY:  (summary of new imaging findings, not a copy of dictation)  ***    LABS:  Results from last 7 days   Lab Units 07/06/24 0536 07/05/24  0600 07/02/24  0610   WBC AUTO x10*3/uL 5.7 5.7 6.2   HEMOGLOBIN g/dL 9.2* 9.7* 10.6*   HEMATOCRIT % 30.1* 31.8* 33.9*   PLATELETS AUTO x10*3/uL 352 364 405   NEUTROS PCT AUTO % 34.0 36.7 40.5   LYMPHS PCT AUTO % 31.6 30.5 29.9   MONOS PCT AUTO % 19.1 19.1 17.0   EOS PCT AUTO % 13.8 12.1 10.9         Results from last 7 days   Lab Units 07/06/24 0536 07/05/24  0600 07/02/24  0610 07/01/24  1122 07/01/24  0601   SODIUM mmol/L 137 133* 133*  --  137   POTASSIUM mmol/L 4.4 4.5 4.5  --  4.9   CHLORIDE mmol/L 101 98 98  --  98   CO2 mmol/L 29 30 27  --  30   BUN mg/dL 18 17 22  --  17   CREATININE mg/dL 0.48* 0.50 0.50  --  0.59   CALCIUM mg/dL 8.7 8.8 8.9  --  9.3   PROTEIN TOTAL g/dL  --   --   --  6.8 6.7   BILIRUBIN TOTAL mg/dL  --   --   --  0.3 0.3   ALK PHOS U/L  --   --   --  167* 172*   ALT U/L  --   --   --  43 42   AST U/L  --   --   --  26 25   GLUCOSE mg/dL 128* 122* 117*  --  121*     Results from last 7 days   Lab Units 07/01/24  1122 07/01/24  0601   BILIRUBIN TOTAL mg/dL 0.3 0.3   BILIRUBIN DIRECT mg/dL 0.1 0.1           I have reviewed all medications, laboratory results, and imaging pertinent for today's encounter.

## 2024-07-08 LAB
ALBUMIN SERPL BCP-MCNC: 3.1 G/DL (ref 3.4–5)
ANION GAP SERPL CALC-SCNC: 13 MMOL/L (ref 10–20)
BUN SERPL-MCNC: 18 MG/DL (ref 6–23)
CALCIUM SERPL-MCNC: 8.6 MG/DL (ref 8.6–10.6)
CHLORIDE SERPL-SCNC: 99 MMOL/L (ref 98–107)
CO2 SERPL-SCNC: 31 MMOL/L (ref 21–32)
CREAT SERPL-MCNC: 0.5 MG/DL (ref 0.5–1.3)
EGFRCR SERPLBLD CKD-EPI 2021: >90 ML/MIN/1.73M*2
GLUCOSE BLD MANUAL STRIP-MCNC: 124 MG/DL (ref 74–99)
GLUCOSE BLD MANUAL STRIP-MCNC: 126 MG/DL (ref 74–99)
GLUCOSE BLD MANUAL STRIP-MCNC: 131 MG/DL (ref 74–99)
GLUCOSE SERPL-MCNC: 120 MG/DL (ref 74–99)
MAGNESIUM SERPL-MCNC: 2.11 MG/DL (ref 1.6–2.4)
PHOSPHATE SERPL-MCNC: 4.5 MG/DL (ref 2.5–4.9)
POTASSIUM SERPL-SCNC: 5.1 MMOL/L (ref 3.5–5.3)
PREALB SERPL-MCNC: 26.6 MG/DL (ref 18–40)
SODIUM SERPL-SCNC: 138 MMOL/L (ref 136–145)

## 2024-07-08 PROCEDURE — 80069 RENAL FUNCTION PANEL: CPT | Performed by: NURSE PRACTITIONER

## 2024-07-08 PROCEDURE — C9113 INJ PANTOPRAZOLE SODIUM, VIA: HCPCS

## 2024-07-08 PROCEDURE — 2580000001 HC RX 258 IV SOLUTIONS

## 2024-07-08 PROCEDURE — 2500000001 HC RX 250 WO HCPCS SELF ADMINISTERED DRUGS (ALT 637 FOR MEDICARE OP): Performed by: STUDENT IN AN ORGANIZED HEALTH CARE EDUCATION/TRAINING PROGRAM

## 2024-07-08 PROCEDURE — 2500000004 HC RX 250 GENERAL PHARMACY W/ HCPCS (ALT 636 FOR OP/ED)

## 2024-07-08 PROCEDURE — 82947 ASSAY GLUCOSE BLOOD QUANT: CPT

## 2024-07-08 PROCEDURE — 83735 ASSAY OF MAGNESIUM: CPT | Performed by: NURSE PRACTITIONER

## 2024-07-08 PROCEDURE — 84134 ASSAY OF PREALBUMIN: CPT | Performed by: NURSE PRACTITIONER

## 2024-07-08 PROCEDURE — 1100000001 HC PRIVATE ROOM DAILY

## 2024-07-08 PROCEDURE — 2500000001 HC RX 250 WO HCPCS SELF ADMINISTERED DRUGS (ALT 637 FOR MEDICARE OP)

## 2024-07-08 PROCEDURE — 2500000005 HC RX 250 GENERAL PHARMACY W/O HCPCS

## 2024-07-08 RX ADMIN — ACETAMINOPHEN 650 MG: 325 TABLET ORAL at 17:33

## 2024-07-08 RX ADMIN — ENOXAPARIN SODIUM 40 MG: 100 INJECTION SUBCUTANEOUS at 22:17

## 2024-07-08 RX ADMIN — ASCORBIC ACID, VITAMIN A PALMITATE, CHOLECALCIFEROL, THIAMINE HYDROCHLORIDE, RIBOFLAVIN-5 PHOSPHATE SODIUM, PYRIDOXINE HYDROCHLORIDE, NIACINAMIDE, DEXPANTHENOL, ALPHA-TOCOPHEROL ACETATE, VITAMIN K1, FOLIC ACID, BIOTIN, CYANOCOBALAMIN: 200; 3300; 200; 6; 3.6; 6; 40; 15; 10; 150; 600; 60; 5 INJECTION, SOLUTION INTRAVENOUS at 22:08

## 2024-07-08 RX ADMIN — PANTOPRAZOLE SODIUM 40 MG: 40 INJECTION, POWDER, FOR SOLUTION INTRAVENOUS at 09:26

## 2024-07-08 RX ADMIN — LOPERAMIDE HYDROCHLORIDE 2 MG: 2 CAPSULE ORAL at 11:39

## 2024-07-08 RX ADMIN — ACETAMINOPHEN 650 MG: 325 TABLET ORAL at 11:39

## 2024-07-08 RX ADMIN — ACETAMINOPHEN 650 MG: 325 TABLET ORAL at 06:14

## 2024-07-08 RX ADMIN — LOPERAMIDE HYDROCHLORIDE 2 MG: 2 CAPSULE ORAL at 06:14

## 2024-07-08 RX ADMIN — OXYCODONE HYDROCHLORIDE 5 MG: 5 TABLET ORAL at 12:59

## 2024-07-08 RX ADMIN — PANTOPRAZOLE SODIUM 40 MG: 40 INJECTION, POWDER, FOR SOLUTION INTRAVENOUS at 21:00

## 2024-07-08 RX ADMIN — LOPERAMIDE HYDROCHLORIDE 2 MG: 2 CAPSULE ORAL at 16:29

## 2024-07-08 RX ADMIN — OXYCODONE HYDROCHLORIDE 10 MG: 5 TABLET ORAL at 22:17

## 2024-07-08 RX ADMIN — SMOFLIPID 50 G: 6; 6; 5; 3 INJECTION, EMULSION INTRAVENOUS at 22:08

## 2024-07-08 ASSESSMENT — COGNITIVE AND FUNCTIONAL STATUS - GENERAL
DAILY ACTIVITIY SCORE: 24
DAILY ACTIVITIY SCORE: 24
MOBILITY SCORE: 24
MOBILITY SCORE: 24

## 2024-07-08 ASSESSMENT — PAIN DESCRIPTION - DESCRIPTORS: DESCRIPTORS: ACHING;DULL;DISCOMFORT

## 2024-07-08 ASSESSMENT — PAIN SCALES - GENERAL
PAINLEVEL_OUTOF10: 4
PAINLEVEL_OUTOF10: 6
PAINLEVEL_OUTOF10: 3
PAINLEVEL_OUTOF10: 5 - MODERATE PAIN
PAINLEVEL_OUTOF10: 3
PAINLEVEL_OUTOF10: 7
PAINLEVEL_OUTOF10: 6

## 2024-07-08 ASSESSMENT — PAIN DESCRIPTION - LOCATION
LOCATION: ABDOMEN
LOCATION: ABDOMEN

## 2024-07-08 ASSESSMENT — PAIN - FUNCTIONAL ASSESSMENT
PAIN_FUNCTIONAL_ASSESSMENT: 0-10
PAIN_FUNCTIONAL_ASSESSMENT: 0-10

## 2024-07-08 ASSESSMENT — PAIN DESCRIPTION - ORIENTATION: ORIENTATION: MID

## 2024-07-08 NOTE — PROGRESS NOTES
[facility]  ACUTE CARE SURGERY - PROGRESS NOTE    Patient Name: Bereket Em  MRN: [id number]  Admit Date: 605  : 1965  AGE: 58 y.o.   GENDER: male  ==============================================================================  TODAY'S ASSESSMENT AND PLAN OF CARE:  59 yo M who presented with perforated diverticulitis. 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 He underwent ex lap, sigmoidectomy, and end colostomy on . Vac applied  to MLI. TPN initiated. On  vac was taken down which showed necrotic fascia at midline incision. He was taken to the OR for a exploration laparotomy, abdominal washout, fascial debridement, enmas closure on  with Dr. Case. RTOR on  for re-exploration laparotomy, mesh placement for suspected infection. 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     Continues to improve, diet increasing, having more ostomy output. Some skin excoriation around midline incision.      Neuro:  - stephan tylenol, prn oxy 5/10, dilaudid for breakthrough  - PRN Atarax for anxiety  - Pet, art, music therapy      CV:  - monitor vitals  - holding home lisinopril     Pulm:  - encourage IS     GI: ostomy functioning with small amount of brown stool  - Calorie count to evaluate whether TPN can be weaned  - Regular diet as tolerated  - PPI BID   - loperamide increased to 2mg TID today  - Cyclic TPN   - TID dressing changes to midline wound with wet to wet kerlix. Monitor drainage. Skin excoriation around wound from increasing drainage.   - denied wound care manager, possible pouching of ostomy  - Monitor colostomy output, appreciate Enterostomal therapy following     :  - Strict I&Os  - Replete lytes as indicated      Endo: no hx of DM  - dc BG checks  - SS1     ID:  - WBC remains stable, no indication for further antbx at this time     Ppx: SCDs,  LVX, OOB  PT/OT: home with home therapy     Dispo: continue care on RNF. Continue to monitor fistula output. Possible DC early this week.     Patient seen and plans discussed with Chief Resident Dr. BLNACA Delacruz, PGY-5 and Attending Physician Dr. Case.    Michelle Rosario MD PGY-1  Acute Care Surgery [id number]   ==============================================================================  CHIEF COMPLAINT / EVENTS LAST 24HRS / HPI:  NAEON. Skin excoriation around leaking mesh.      MEDICAL HISTORY / ROS:   Admission history and ROS reviewed. Pertinent changes as follows:  NA    PHYSICAL EXAM:  Heart Rate:  [58-68]   Temp:  [36 °C (96.8 °F)-36.6 °C (97.9 °F)]   Resp:  [16-18]   BP: (105-115)/(56-74)   SpO2:  [96 %-97 %]   Physical Exam    NAD  Nonlabored breathing  Nontachy  Abdomen soft and nondistended, well healing midline wound with leakage from 3oclock postion of mesh, granulation tissue on right lateral aspect of mesh, some excoriation of surrounding skin, ostomy with thick output    IMAGING SUMMARY:  (summary of new imaging findings, not a copy of dictation)  None new    LABS:  Results from last 7 days   Lab Units 07/06/24 0536 07/05/24  0600 07/02/24  0610   WBC AUTO x10*3/uL 5.7 5.7 6.2   HEMOGLOBIN g/dL 9.2* 9.7* 10.6*   HEMATOCRIT % 30.1* 31.8* 33.9*   PLATELETS AUTO x10*3/uL 352 364 405   NEUTROS PCT AUTO % 34.0 36.7 40.5   LYMPHS PCT AUTO % 31.6 30.5 29.9   MONOS PCT AUTO % 19.1 19.1 17.0   EOS PCT AUTO % 13.8 12.1 10.9         Results from last 7 days   Lab Units 07/06/24 0536 07/05/24  0600 07/02/24  0610 07/01/24  1122   SODIUM mmol/L 137 133* 133*  --    POTASSIUM mmol/L 4.4 4.5 4.5  --    CHLORIDE mmol/L 101 98 98  --    CO2 mmol/L 29 30 27  --    BUN mg/dL 18 17 22  --    CREATININE mg/dL 0.48* 0.50 0.50  --    CALCIUM mg/dL 8.7 8.8 8.9  --    PROTEIN TOTAL g/dL  --   --   --  6.8   BILIRUBIN TOTAL mg/dL  --   --   --  0.3   ALK PHOS U/L  --   --   --  167*   ALT U/L  --   --   --  43   AST U/L  --   --    --  26   GLUCOSE mg/dL 128* 122* 117*  --      Results from last 7 days   Lab Units 07/01/24  1122   BILIRUBIN TOTAL mg/dL 0.3   BILIRUBIN DIRECT mg/dL 0.1           I have reviewed all medications, laboratory results, and imaging pertinent for today's encounter.

## 2024-07-08 NOTE — PROGRESS NOTES
24 1728   Colostomy LLQ   Placement Date/Time: 24 1329   Location: LLQ   Stomal Appliance 2 piece;Changed   Site/Stoma Assessment Clean;Intact;Red   Stoma Size (cm)   (1 5/8 width and 1 1/4 length)   Peristomal Assessment Clean;Intact   Treatment Pouch change;Site care   Drainage Characteristics Brown       Ostomy type: colostomy        size: 1 5/8 oval      color: red and moist       protruding: budded   Umer: none  Functioning: soft brown stool   Mucocutaneous junction: intact   Peristomal skin: clean and intact   Pouchin piece Melonie RED wafer and lock n roll pouch with a barrier ring   Ostomy Education: Patient performed basic ostomy skills with minimal assistance from the RN. The patient feels comfortable changing is pouch but would like assistance while inpatient.   Plan: assess stoma/pouching    The patient midline periwound skin was red and moist due to increased drainage from the wound and ECF.  The distal periwound skin was cleansed with normal saline and prepped with stoma powder and 3M cavilon advanced.  Two 4x4 cover sponges were applied to the distal wound and the entire wound was covered with 2 ABD pads.  The moist to moist kerlix rolled gauze was left in place since it had been change 30 minutes prior to the visit. Spoke with the patient regarding the possibly of pouch the wound with a wound manage to help manage output when ambulating. The patient stated that he would think about it.

## 2024-07-08 NOTE — PROGRESS NOTES
Art Therapy Note    Bereket Em     Therapy Session  Referral Type: New referral this admission  Visit Type: New visit  Session Start Time: 1335  Session End Time: 1336  Intervention Delivery: In-person  Conflict of Service: Declined treatment  Number of family members present: 2  Family Present for Session: Spouse/Significant Other, Other (Comment)              Treatment/Interventions       Post-assessment  Total Session Time (min): 1 minutes    Narrative  Assessment Detail: Pt was sititng up in bed, with family on either side fo the bed when ATR introduced self, services and benefits of AT. Pt declined treatment and stated he was not interested.  Follow-up: ATR will f/u another time to see if pt is more open to trying AT.    Education Documentation  No documentation found.

## 2024-07-08 NOTE — CARE PLAN
The patient's goals for the shift include  to have colostomy bag changed today.    The clinical goals for the shift include patient will remain safe and free from injury throughout shift.

## 2024-07-09 ENCOUNTER — APPOINTMENT (OUTPATIENT)
Dept: RADIOLOGY | Facility: HOSPITAL | Age: 59
DRG: 329 | End: 2024-07-09
Payer: COMMERCIAL

## 2024-07-09 LAB
ALBUMIN SERPL BCP-MCNC: 3 G/DL (ref 3.4–5)
ANION GAP SERPL CALC-SCNC: 11 MMOL/L (ref 10–20)
BUN SERPL-MCNC: 16 MG/DL (ref 6–23)
CALCIUM SERPL-MCNC: 8.7 MG/DL (ref 8.6–10.6)
CHLORIDE SERPL-SCNC: 100 MMOL/L (ref 98–107)
CO2 SERPL-SCNC: 29 MMOL/L (ref 21–32)
CREAT SERPL-MCNC: 0.54 MG/DL (ref 0.5–1.3)
EGFRCR SERPLBLD CKD-EPI 2021: >90 ML/MIN/1.73M*2
ERYTHROCYTE [DISTWIDTH] IN BLOOD BY AUTOMATED COUNT: 13.2 % (ref 11.5–14.5)
GLUCOSE BLD MANUAL STRIP-MCNC: 104 MG/DL (ref 74–99)
GLUCOSE BLD MANUAL STRIP-MCNC: 123 MG/DL (ref 74–99)
GLUCOSE BLD MANUAL STRIP-MCNC: 135 MG/DL (ref 74–99)
GLUCOSE SERPL-MCNC: 125 MG/DL (ref 74–99)
HCT VFR BLD AUTO: 29.8 % (ref 41–52)
HGB BLD-MCNC: 9.2 G/DL (ref 13.5–17.5)
MAGNESIUM SERPL-MCNC: 2.09 MG/DL (ref 1.6–2.4)
MCH RBC QN AUTO: 25.3 PG (ref 26–34)
MCHC RBC AUTO-ENTMCNC: 30.9 G/DL (ref 32–36)
MCV RBC AUTO: 82 FL (ref 80–100)
NRBC BLD-RTO: 0 /100 WBCS (ref 0–0)
PHOSPHATE SERPL-MCNC: 4.5 MG/DL (ref 2.5–4.9)
PLATELET # BLD AUTO: 378 X10*3/UL (ref 150–450)
POTASSIUM SERPL-SCNC: 4.4 MMOL/L (ref 3.5–5.3)
RBC # BLD AUTO: 3.63 X10*6/UL (ref 4.5–5.9)
SODIUM SERPL-SCNC: 136 MMOL/L (ref 136–145)
WBC # BLD AUTO: 6.6 X10*3/UL (ref 4.4–11.3)

## 2024-07-09 PROCEDURE — C9113 INJ PANTOPRAZOLE SODIUM, VIA: HCPCS

## 2024-07-09 PROCEDURE — 2500000004 HC RX 250 GENERAL PHARMACY W/ HCPCS (ALT 636 FOR OP/ED)

## 2024-07-09 PROCEDURE — 2720000007 HC OR 272 NO HCPCS

## 2024-07-09 PROCEDURE — 84100 ASSAY OF PHOSPHORUS: CPT | Performed by: NURSE PRACTITIONER

## 2024-07-09 PROCEDURE — 2580000001 HC RX 258 IV SOLUTIONS

## 2024-07-09 PROCEDURE — 1100000001 HC PRIVATE ROOM DAILY

## 2024-07-09 PROCEDURE — 2500000001 HC RX 250 WO HCPCS SELF ADMINISTERED DRUGS (ALT 637 FOR MEDICARE OP): Performed by: STUDENT IN AN ORGANIZED HEALTH CARE EDUCATION/TRAINING PROGRAM

## 2024-07-09 PROCEDURE — 36415 COLL VENOUS BLD VENIPUNCTURE: CPT

## 2024-07-09 PROCEDURE — 2500000001 HC RX 250 WO HCPCS SELF ADMINISTERED DRUGS (ALT 637 FOR MEDICARE OP)

## 2024-07-09 PROCEDURE — 36569 INSJ PICC 5 YR+ W/O IMAGING: CPT

## 2024-07-09 PROCEDURE — 71045 X-RAY EXAM CHEST 1 VIEW: CPT

## 2024-07-09 PROCEDURE — 83735 ASSAY OF MAGNESIUM: CPT | Performed by: NURSE PRACTITIONER

## 2024-07-09 PROCEDURE — 71045 X-RAY EXAM CHEST 1 VIEW: CPT | Performed by: RADIOLOGY

## 2024-07-09 PROCEDURE — C1751 CATH, INF, PER/CENT/MIDLINE: HCPCS

## 2024-07-09 PROCEDURE — 36415 COLL VENOUS BLD VENIPUNCTURE: CPT | Performed by: NURSE PRACTITIONER

## 2024-07-09 PROCEDURE — 2500000005 HC RX 250 GENERAL PHARMACY W/O HCPCS

## 2024-07-09 PROCEDURE — 82947 ASSAY GLUCOSE BLOOD QUANT: CPT

## 2024-07-09 PROCEDURE — 85027 COMPLETE CBC AUTOMATED: CPT

## 2024-07-09 RX ORDER — LIDOCAINE HYDROCHLORIDE 10 MG/ML
5 INJECTION INFILTRATION; PERINEURAL ONCE
Status: DISCONTINUED | OUTPATIENT
Start: 2024-07-09 | End: 2024-07-11 | Stop reason: HOSPADM

## 2024-07-09 RX ADMIN — LOPERAMIDE HYDROCHLORIDE 2 MG: 2 CAPSULE ORAL at 05:32

## 2024-07-09 RX ADMIN — LOPERAMIDE HYDROCHLORIDE 2 MG: 2 CAPSULE ORAL at 11:02

## 2024-07-09 RX ADMIN — LOPERAMIDE HYDROCHLORIDE 2 MG: 2 CAPSULE ORAL at 15:07

## 2024-07-09 RX ADMIN — PANTOPRAZOLE SODIUM 40 MG: 40 INJECTION, POWDER, FOR SOLUTION INTRAVENOUS at 09:38

## 2024-07-09 RX ADMIN — ACETAMINOPHEN 650 MG: 325 TABLET ORAL at 11:01

## 2024-07-09 RX ADMIN — ACETAMINOPHEN 650 MG: 325 TABLET ORAL at 23:39

## 2024-07-09 RX ADMIN — ASCORBIC ACID, VITAMIN A PALMITATE, CHOLECALCIFEROL, THIAMINE HYDROCHLORIDE, RIBOFLAVIN-5 PHOSPHATE SODIUM, PYRIDOXINE HYDROCHLORIDE, NIACINAMIDE, DEXPANTHENOL, ALPHA-TOCOPHEROL ACETATE, VITAMIN K1, FOLIC ACID, BIOTIN, CYANOCOBALAMIN: 200; 3300; 200; 6; 3.6; 6; 40; 15; 10; 150; 600; 60; 5 INJECTION, SOLUTION INTRAVENOUS at 20:27

## 2024-07-09 RX ADMIN — ENOXAPARIN SODIUM 40 MG: 100 INJECTION SUBCUTANEOUS at 20:57

## 2024-07-09 RX ADMIN — PANTOPRAZOLE SODIUM 40 MG: 40 INJECTION, POWDER, FOR SOLUTION INTRAVENOUS at 20:57

## 2024-07-09 RX ADMIN — ACETAMINOPHEN 650 MG: 325 TABLET ORAL at 00:02

## 2024-07-09 RX ADMIN — OXYCODONE HYDROCHLORIDE 10 MG: 5 TABLET ORAL at 22:10

## 2024-07-09 RX ADMIN — ACETAMINOPHEN 650 MG: 325 TABLET ORAL at 05:30

## 2024-07-09 RX ADMIN — HYDROXYZINE HYDROCHLORIDE 10 MG: 10 TABLET ORAL at 20:57

## 2024-07-09 RX ADMIN — ACETAMINOPHEN 650 MG: 325 TABLET ORAL at 18:19

## 2024-07-09 RX ADMIN — OXYCODONE HYDROCHLORIDE 5 MG: 5 TABLET ORAL at 15:08

## 2024-07-09 RX ADMIN — SMOFLIPID 50 G: 6; 6; 5; 3 INJECTION, EMULSION INTRAVENOUS at 20:27

## 2024-07-09 ASSESSMENT — COGNITIVE AND FUNCTIONAL STATUS - GENERAL
DAILY ACTIVITIY SCORE: 24
MOBILITY SCORE: 24

## 2024-07-09 ASSESSMENT — PAIN SCALES - GENERAL
PAINLEVEL_OUTOF10: 8
PAINLEVEL_OUTOF10: 5 - MODERATE PAIN
PAINLEVEL_OUTOF10: 3
PAINLEVEL_OUTOF10: 4
PAINLEVEL_OUTOF10: 5 - MODERATE PAIN
PAINLEVEL_OUTOF10: 2

## 2024-07-09 ASSESSMENT — PAIN - FUNCTIONAL ASSESSMENT
PAIN_FUNCTIONAL_ASSESSMENT: 0-10

## 2024-07-09 ASSESSMENT — PAIN DESCRIPTION - DESCRIPTORS: DESCRIPTORS: DISCOMFORT;SHARP;SORE;TENDER

## 2024-07-09 NOTE — PROGRESS NOTES
[facility]  ACUTE CARE SURGERY - PROGRESS NOTE    Patient Name: Bereket Em  MRN: [id number]  Admit Date: 605  : 1965  AGE: 58 y.o.   GENDER: male  ==============================================================================  TODAY'S ASSESSMENT AND PLAN OF CARE:  59 yo M who presented with perforated diverticulitis. 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 He underwent ex lap, sigmoidectomy, and end colostomy on . Vac applied  to MLI. TPN initiated. On  vac was taken down which showed necrotic fascia at midline incision. He was taken to the OR for a exploration laparotomy, abdominal washout, fascial debridement, enmas closure on  with Dr. Case. RTOR on  for re-exploration laparotomy, mesh placement for suspected infection. 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       Neuro:  - stephan tylenol, prn oxy 5/10, dilaudid for breakthrough  - PRN Atarax for anxiety  - Pet, art, music therapy      CV:  - monitor vitals  - holding home lisinopril     Pulm:  - encourage IS     GI: ostomy functioning with small amount of brown stool  - Calorie count to evaluate whether TPN can be weaned  - Regular diet as tolerated  - Pre-albumin 26  - PPI BID   - Continue Loperamide 2mg TID   - Cyclic TPN   - TID dressing changes to midline wound with wet to wet kerlix. Monitor drainage.   - Monitor colostomy output, appreciate Enterostomal therapy following     :  - Strict I&Os  - Replete lytes as indicated      Endo: no hx of DM  - continue blood glucose checks and SSI while on TPN     ID:  - WBC remains stable, no indication for further antbx at this time     Ppx: SCDs, LVX, OOB  PT/OT: home with home therapy     Dispo: continue care on RNF. Continue to monitor fistula output. Possible discharge home end of week      Patient discussed with Attending   Evie Lubin, APRN-[facility]   Acute Care Surgery [id number]   ==============================================================================  CHIEF COMPLAINT / EVENTS LAST 24HRS / HPI:  NAEON. Patient states had increasing output from fistula overnight but thickened up this AM. Tolerating diet without n/v. Pain controlled     MEDICAL HISTORY / ROS:   Admission history and ROS reviewed. Pertinent changes as follows:  NA    PHYSICAL EXAM:  Heart Rate:  [58-83]   Temp:  [36 °C (96.8 °F)-36.7 °C (98.1 °F)]   Resp:  [16-18]   BP: (110-139)/(60-69)   Weight:  [81.1 kg (178 lb 11.2 oz)]   SpO2:  [95 %-97 %]   Physical Exam  Constitutional:       Appearance: Normal appearance.   Eyes:      Extraocular Movements: Extraocular movements intact.   Cardiovascular:      Rate and Rhythm: Normal rate.   Pulmonary:      Effort: Pulmonary effort is normal.   Abdominal:      Comments: Soft, non distended, midline wound- wound bed pink with granulation tissue, base of wound mesh exposed and discolored from fistula. Fistula at 3 o'clock draining thick stool output. Wound packed with wet to moist kerlix and covered with ABDs. Colostomy- stoma pink and viable with scant soft brown stool in pouch.    Musculoskeletal:         General: Normal range of motion.   Skin:     General: Skin is warm and dry.   Neurological:      Mental Status: He is alert and oriented to person, place, and time.   Psychiatric:         Behavior: Behavior normal.           IMAGING SUMMARY:  (summary of new imaging findings, not a copy of dictation)  None new    LABS:  Results from last 7 days   Lab Units 07/09/24  0603 07/06/24  0536 07/05/24  0600   WBC AUTO x10*3/uL 6.6 5.7 5.7   HEMOGLOBIN g/dL 9.2* 9.2* 9.7*   HEMATOCRIT % 29.8* 30.1* 31.8*   PLATELETS AUTO x10*3/uL 378 352 364   NEUTROS PCT AUTO %  --  34.0 36.7   LYMPHS PCT AUTO %  --  31.6 30.5   MONOS PCT AUTO %  --  19.1 19.1   EOS PCT AUTO %  --  13.8 12.1         Results from last 7 days   Lab Units  07/09/24  0527 07/08/24  1044 07/06/24  0536   SODIUM mmol/L 136 138 137   POTASSIUM mmol/L 4.4 5.1 4.4   CHLORIDE mmol/L 100 99 101   CO2 mmol/L 29 31 29   BUN mg/dL 16 18 18   CREATININE mg/dL 0.54 0.50 0.48*   CALCIUM mg/dL 8.7 8.6 8.7   GLUCOSE mg/dL 125* 120* 128*                 I have reviewed all medications, laboratory results, and imaging pertinent for today's encounter.

## 2024-07-09 NOTE — NURSING NOTE
Tried to reinforce patients's dressing change for the second patient refused at this present time. Patient stated he wants to wait for the team.

## 2024-07-09 NOTE — CARE PLAN
The patient's goals for the shift include      The clinical goals for the shift include patient wound will be clean    Over the shift, the patient did make progress toward the following goals.   Problem: Skin  Goal: Decreased wound size/increased tissue granulation at next dressing change  Outcome: Not Progressing  Goal: Promote skin healing  Outcome: Not Progressing      Problem: Pain  Goal: Takes deep breaths with improved pain control throughout the shift  Outcome: Progressing  Goal: Turns in bed with improved pain control throughout the shift  Outcome: Progressing  Goal: Walks with improved pain control throughout the shift  Outcome: Progressing  Goal: Performs ADL's with improved pain control throughout shift  Outcome: Progressing  Goal: Participates in PT with improved pain control throughout the shift  Outcome: Progressing  Goal: Free from opioid side effects throughout the shift  Outcome: Progressing  Goal: Free from acute confusion related to pain meds throughout the shift  Outcome: Progressing

## 2024-07-09 NOTE — POST-PROCEDURE NOTE
Pre-Procedure Checklist:  Emergent Line Insertion: No  Type of Line to be Placed: PICC  Consent Obtained: Yes  Emergency Medication Necessary: No  Patient Identified with 2 Independent Identifiers: Yes  Review of Allergies, Anticoagulation, Relevant Labs, ECG/Telemetry: Yes  Risks/Benefits/Alternatives Discussed with Patient/POA/Legal Representative: Yes  Stop Sign on Door: Yes  Time Out Performed: Yes  Catheter Exchange: No    Positioning Checklist:  All People, Including Patient, in the Room with Cap and Mask: Yes  Fluoroscopy Used to Identify Vessel and Guide Insertion: No   Sterile Cover Used: Yes  Full Barrier Precautions Followed (Mask, Cap, Gown, Gloves): Yes  Hands Washed: Yes  Monitors Attached with Sound Alarms On: No  Full Body Sterile Drape (Head-to-Toe) Used to Cover Patient: Yes  Trendelenburg Position (For IJ and Subclavian): No  CHG Skin Prep Used and Allowed to Air Dry to Skin Procedure: Yes    Procedure Checklist:  Blood Aspirated From All Lumens, All Ports Subsequently Flushed: Yes  Catheter Caps Placed on All Lumens; Lumens Clamped: Yes  Maintain Guidewire Control Throughout, Ensuring Guidewire Removal: Yes  Maintain Sterile Field Throughout Insertion: Yes  Catheter Secured: Yes  Confirmatory Test of Venous Placement: Non-Pulsatile Blood    Post Procedure Checklist:  Date and Time Written on Dressing: Yes  Sharp and Wire Count and Safe Disposal of all Sharps/Wires: Yes  Sterile Dressing Applied Per Protocol: Yes  X-ray Ordered or ECG Image: Yes    PICC Insertion Details:  Size (Fr): 4  Lumen Type: double lumen  Catheter to Vein Ratio Less Than 50%: Yes  Total Length (cm): 42  External Length (cm): 1   Orientation: left upper arm   Location: brachial vein  Site Prep: Chlorohexidine; Usual sterile procedure followed  Local Anesthetic: Injectable/Subcutaneous  Indication: TPN  Insertion Team Members in the Room: Nurse, LPN  Initial Extremity Circumference (cm): 34   Insertion Attempts: 1  Patient  Tolerance: Tolerated Well, Age Appropriate  Comfort Measures: Subcutaneous anesthetic; Verbal  Procedure Location: Bedside  Safety Measures: Patient specific safety measures addressed with RN  Estimated Blood Loss (mL): 0   Vessel Fully Compressible Proximally and Distally to Insertion Site: Yes  Brisk Blood Return Obtained and Line Draws Easily: Yes  Tip Location: SVC  Line Confirmation: ECG  Lot #: [id number]  : BD  PICC Line Exp Date: 06/30/2025  Securement: Stat Lock  Post Procedure Checklist: Handoff with RN; Obtain all new IV tubing prior to use; Bed at lowest level and wheels locked; Line discharge information at bedside.  Additional Details: Line was inserted using Modified Seldinger's Technique.   Placed by: Basia Trevino RN-[facility]

## 2024-07-10 ENCOUNTER — DOCUMENTATION (OUTPATIENT)
Dept: HOME HEALTH SERVICES | Facility: HOME HEALTH | Age: 59
End: 2024-07-10
Payer: COMMERCIAL

## 2024-07-10 LAB
ALBUMIN SERPL BCP-MCNC: 3.2 G/DL (ref 3.4–5)
ANION GAP SERPL CALC-SCNC: 11 MMOL/L (ref 10–20)
BUN SERPL-MCNC: 17 MG/DL (ref 6–23)
CALCIUM SERPL-MCNC: 9.1 MG/DL (ref 8.6–10.6)
CHLORIDE SERPL-SCNC: 100 MMOL/L (ref 98–107)
CO2 SERPL-SCNC: 28 MMOL/L (ref 21–32)
CREAT SERPL-MCNC: 0.44 MG/DL (ref 0.5–1.3)
EGFRCR SERPLBLD CKD-EPI 2021: >90 ML/MIN/1.73M*2
GLUCOSE BLD MANUAL STRIP-MCNC: 114 MG/DL (ref 74–99)
GLUCOSE BLD MANUAL STRIP-MCNC: 126 MG/DL (ref 74–99)
GLUCOSE BLD MANUAL STRIP-MCNC: 131 MG/DL (ref 74–99)
GLUCOSE BLD MANUAL STRIP-MCNC: 131 MG/DL (ref 74–99)
GLUCOSE SERPL-MCNC: 130 MG/DL (ref 74–99)
MAGNESIUM SERPL-MCNC: 2.01 MG/DL (ref 1.6–2.4)
PHOSPHATE SERPL-MCNC: 4.4 MG/DL (ref 2.5–4.9)
POTASSIUM SERPL-SCNC: 4.4 MMOL/L (ref 3.5–5.3)
SODIUM SERPL-SCNC: 135 MMOL/L (ref 136–145)

## 2024-07-10 PROCEDURE — 2500000001 HC RX 250 WO HCPCS SELF ADMINISTERED DRUGS (ALT 637 FOR MEDICARE OP): Performed by: STUDENT IN AN ORGANIZED HEALTH CARE EDUCATION/TRAINING PROGRAM

## 2024-07-10 PROCEDURE — 83735 ASSAY OF MAGNESIUM: CPT | Performed by: NURSE PRACTITIONER

## 2024-07-10 PROCEDURE — 2500000001 HC RX 250 WO HCPCS SELF ADMINISTERED DRUGS (ALT 637 FOR MEDICARE OP): Performed by: NURSE PRACTITIONER

## 2024-07-10 PROCEDURE — 82947 ASSAY GLUCOSE BLOOD QUANT: CPT

## 2024-07-10 PROCEDURE — 2500000005 HC RX 250 GENERAL PHARMACY W/O HCPCS

## 2024-07-10 PROCEDURE — C9113 INJ PANTOPRAZOLE SODIUM, VIA: HCPCS

## 2024-07-10 PROCEDURE — 2500000004 HC RX 250 GENERAL PHARMACY W/ HCPCS (ALT 636 FOR OP/ED)

## 2024-07-10 PROCEDURE — 2500000001 HC RX 250 WO HCPCS SELF ADMINISTERED DRUGS (ALT 637 FOR MEDICARE OP)

## 2024-07-10 PROCEDURE — 1100000001 HC PRIVATE ROOM DAILY

## 2024-07-10 PROCEDURE — 80069 RENAL FUNCTION PANEL: CPT | Performed by: NURSE PRACTITIONER

## 2024-07-10 PROCEDURE — 2580000001 HC RX 258 IV SOLUTIONS

## 2024-07-10 RX ORDER — LOPERAMIDE HYDROCHLORIDE 2 MG/1
2 CAPSULE ORAL
Status: DISCONTINUED | OUTPATIENT
Start: 2024-07-10 | End: 2024-07-11 | Stop reason: HOSPADM

## 2024-07-10 RX ORDER — LOPERAMIDE HYDROCHLORIDE 2 MG/1
2 CAPSULE ORAL 4 TIMES DAILY PRN
Status: DISCONTINUED | OUTPATIENT
Start: 2024-07-10 | End: 2024-07-10

## 2024-07-10 RX ADMIN — LOPERAMIDE HYDROCHLORIDE 2 MG: 2 CAPSULE ORAL at 06:06

## 2024-07-10 RX ADMIN — PANTOPRAZOLE SODIUM 40 MG: 40 INJECTION, POWDER, FOR SOLUTION INTRAVENOUS at 08:49

## 2024-07-10 RX ADMIN — ACETAMINOPHEN 650 MG: 325 TABLET ORAL at 22:30

## 2024-07-10 RX ADMIN — ACETAMINOPHEN 650 MG: 325 TABLET ORAL at 17:15

## 2024-07-10 RX ADMIN — PANTOPRAZOLE SODIUM 40 MG: 40 INJECTION, POWDER, FOR SOLUTION INTRAVENOUS at 20:50

## 2024-07-10 RX ADMIN — HYDROXYZINE HYDROCHLORIDE 10 MG: 10 TABLET ORAL at 11:18

## 2024-07-10 RX ADMIN — ENOXAPARIN SODIUM 40 MG: 100 INJECTION SUBCUTANEOUS at 20:49

## 2024-07-10 RX ADMIN — OXYCODONE HYDROCHLORIDE 5 MG: 5 TABLET ORAL at 21:01

## 2024-07-10 RX ADMIN — LOPERAMIDE HYDROCHLORIDE 2 MG: 2 CAPSULE ORAL at 17:16

## 2024-07-10 RX ADMIN — LOPERAMIDE HYDROCHLORIDE 2 MG: 2 CAPSULE ORAL at 11:19

## 2024-07-10 RX ADMIN — ACETAMINOPHEN 650 MG: 325 TABLET ORAL at 05:17

## 2024-07-10 RX ADMIN — LOPERAMIDE HYDROCHLORIDE 2 MG: 2 CAPSULE ORAL at 20:50

## 2024-07-10 RX ADMIN — ACETAMINOPHEN 650 MG: 325 TABLET ORAL at 11:19

## 2024-07-10 RX ADMIN — LOPERAMIDE HYDROCHLORIDE 2 MG: 2 CAPSULE ORAL at 07:22

## 2024-07-10 RX ADMIN — ASCORBIC ACID, VITAMIN A PALMITATE, CHOLECALCIFEROL, THIAMINE HYDROCHLORIDE, RIBOFLAVIN-5 PHOSPHATE SODIUM, PYRIDOXINE HYDROCHLORIDE, NIACINAMIDE, DEXPANTHENOL, ALPHA-TOCOPHEROL ACETATE, VITAMIN K1, FOLIC ACID, BIOTIN, CYANOCOBALAMIN: 200; 3300; 200; 6; 3.6; 6; 40; 15; 10; 150; 600; 60; 5 INJECTION, SOLUTION INTRAVENOUS at 22:19

## 2024-07-10 RX ADMIN — SMOFLIPID 50 G: 6; 6; 5; 3 INJECTION, EMULSION INTRAVENOUS at 22:19

## 2024-07-10 ASSESSMENT — PAIN SCALES - GENERAL
PAINLEVEL_OUTOF10: 6
PAINLEVEL_OUTOF10: 0 - NO PAIN
PAINLEVEL_OUTOF10: 3

## 2024-07-10 ASSESSMENT — COGNITIVE AND FUNCTIONAL STATUS - GENERAL
MOBILITY SCORE: 24
DAILY ACTIVITIY SCORE: 24

## 2024-07-10 ASSESSMENT — PAIN - FUNCTIONAL ASSESSMENT
PAIN_FUNCTIONAL_ASSESSMENT: 0-10

## 2024-07-10 ASSESSMENT — PAIN DESCRIPTION - DESCRIPTORS: DESCRIPTORS: DISCOMFORT;PRESSURE;SORE;TENDER

## 2024-07-10 NOTE — HH CARE COORDINATION
Home Care received a referral for Infusion, Nursing, Physical Therapy, and Occupational Therapy. Unfortunately, we are unable to accept and process the referral at this time.    Patients, please reach out to the referring provider or your PCP to assist in obtaining an alternative home care agency and/or guidance to meet your needs.    Providers, please reach out to  Home Care with any questions regarding the declined referral.

## 2024-07-10 NOTE — CARE PLAN
The patient's goals for the shift include Labs WNL    The clinical goals for the shift include Labs WNL      Problem: Pain  Goal: Takes deep breaths with improved pain control throughout the shift  Outcome: Progressing  Goal: Turns in bed with improved pain control throughout the shift  Outcome: Progressing  Goal: Walks with improved pain control throughout the shift  Outcome: Progressing  Goal: Performs ADL's with improved pain control throughout shift  Outcome: Progressing  Goal: Participates in PT with improved pain control throughout the shift  Outcome: Progressing  Goal: Free from opioid side effects throughout the shift  Outcome: Progressing  Goal: Free from acute confusion related to pain meds throughout the shift  Outcome: Progressing     Problem: Pain - Adult  Goal: Verbalizes/displays adequate comfort level or baseline comfort level  Outcome: Progressing     Problem: Safety - Adult  Goal: Free from fall injury  Outcome: Progressing     Problem: Discharge Planning  Goal: Discharge to home or other facility with appropriate resources  Outcome: Progressing     Problem: Chronic Conditions and Co-morbidities  Goal: Patient's chronic conditions and co-morbidity symptoms are monitored and maintained or improved  Outcome: Progressing     Problem: Skin  Goal: Decreased wound size/increased tissue granulation at next dressing change  7/10/2024 0623 by Urmila Moyer RN  Outcome: Progressing  7/9/2024 2207 by Urmila Moyer RN  Flowsheets (Taken 7/9/2024 2207)  Decreased wound size/increased tissue granulation at next dressing change:   Utilize specialty bed per algorithm   Promote sleep for wound healing   Protective dressings over bony prominences  Goal: Participates in plan/prevention/treatment measures  7/10/2024 0623 by Urmila Moyer RN  Outcome: Progressing  7/9/2024 2207 by Urmila Moyer RN  Flowsheets (Taken 7/9/2024 2207)  Participates in plan/prevention/treatment measures:   Increase  activity/out of bed for meals   Discuss with provider PT/OT consult   Elevate heels  Goal: Prevent/manage excess moisture  7/10/2024 0623 by Urmila Moyer RN  Outcome: Progressing  7/9/2024 2207 by Urmila Moyer RN  Flowsheets (Taken 7/9/2024 2207)  Prevent/manage excess moisture:   Use wicking fabric (obtain order)   Moisturize dry skin   Cleanse incontinence/protect with barrier cream   Monitor for/manage infection if present   Follow provider orders for dressing changes  Goal: Prevent/minimize sheer/friction injuries  7/10/2024 0623 by Urmila Moyer RN  Outcome: Progressing  7/9/2024 2207 by Urmila Moyer RN  Flowsheets (Taken 7/9/2024 2207)  Prevent/minimize sheer/friction injuries:   Utilize specialty bed per algorithm   Use pull sheet   Turn/reposition every 2 hours/use positioning/transfer devices   Increase activity/out of bed for meals   HOB 30 degrees or less   Complete micro-shifts as needed if patient unable. Adjust patient position to relieve pressure points, not a full turn  Goal: Promote/optimize nutrition  7/10/2024 0623 by Urmila Moyer RN  Outcome: Progressing  7/9/2024 2207 by Urmila Moyer RN  Flowsheets (Taken 7/9/2024 2207)  Promote/optimize nutrition:   Offer water/supplements/favorite foods   Discuss with provider if NPO > 2 days   Assist with feeding   Reassess MST if dietician not consulted   Monitor/record intake including meals   Consume > 50% meals/supplements  Goal: Promote skin healing  7/10/2024 0623 by Urmila Moyer RN  Outcome: Progressing  7/9/2024 2207 by Urmila Moyer RN  Flowsheets (Taken 7/9/2024 2207)  Promote skin healing:   Turn/reposition every 2 hours/use positioning/transfer devices   Rotate device position/do not position patient on device   Protective dressings over bony prominences   Ensure correct size (line/device) and apply per  instructions   Assess skin/pad under line(s)/device(s)     Problem: Fall/Injury  Goal:  Not fall by end of shift  Outcome: Progressing  Goal: Be free from injury by end of the shift  Outcome: Progressing  Goal: Verbalize understanding of personal risk factors for fall in the hospital  Outcome: Progressing  Goal: Verbalize understanding of risk factor reduction measures to prevent injury from fall in the home  Outcome: Progressing  Goal: Use assistive devices by end of the shift  Outcome: Progressing  Goal: Pace activities to prevent fatigue by end of the shift  Outcome: Progressing

## 2024-07-10 NOTE — CONSULTS
"Nutrition Follow Up Assessment:   Nutrition Assessment    Reason for Assessment: Dietitian discretion (follow up)    Patient is a 58 y.o. male presenting with diverticulitis s/p sigmoid colectomy with end colostomy, formation, fascial necrosis, en mass closure, necrotic abdominal wall s/p laparotomy and vicryl mesh placement and now with enteroatmospheric fistula     6/28: Last RDN note  7/10: Wound care today to pouch wound    Calorie count initiated    Only PO recorded via chart   100%dinner 7/8    Nutrition History:  Food and Nutrient History: Unable to meet with pt x 2 attempts pt with wound care       Anthropometrics:  Height: 172.7 cm (5' 8\")   Weight: 80.7 kg (178 lb 0.3 oz)   BMI (Calculated): 26.86  IBW/kg (Dietitian Calculated): 69.9 kg  Percent of IBW: 126 %       Weight History:   Date/Time Weight   07/10/24 0528 80.7 kg (178 lb 0.3 oz)   07/10/24 0520 83.7 kg (184 lb 8.4 oz)   07/09/24 0535 81.1 kg (178 lb 11.2 oz)   07/09/24 0100 81.1 kg (178 lb 11.2 oz)   07/06/24 0538 80.4 kg (177 lb 5.8 oz)   07/06/24 0500 80.4 kg (177 lb 5.8 oz)   07/05/24 0600 80.1 kg (176 lb 9.4 oz)   07/02/24 0617 79.7 kg (175 lb 11.2 oz)   06/28/24 0500 80 kg (176 lb 6.4 oz)   06/27/24 1840 77.9 kg (171 lb 11.8 oz)     Weight Change %:  Weight History / % Weight Change: Pt's weight has been stable around 80kg    Nutrition Focused Physical Exam Findings:  defer: Unable to meet with patient x 2 attempts      Nutrition Significant Labs:  TPN/PPN Labs:   Results from last 7 days   Lab Units 07/10/24  0528 07/09/24  0527 07/08/24  1044 07/06/24  0536   GLUCOSE mg/dL 130* 125* 120* 128*   POTASSIUM mmol/L 4.4 4.4 5.1 4.4   PHOSPHORUS mg/dL 4.4 4.5 4.5 4.5   MAGNESIUM mg/dL 2.01 2.09 2.11 1.92   SODIUM mmol/L 135* 136 138 137   CHLORIDE mmol/L 100 100 99 101        Nutrition Specific Medications:  Scheduled medications  acetaminophen, 650 mg, oral, q6h  enoxaparin, 40 mg, subcutaneous, q24h  fat emulsion fish oil/plant based, 250 mL, " intravenous, Daily Lipids  insulin lispro, 0-5 Units, subcutaneous, q6h  lidocaine, 5 mL, infiltration, Once  loperamide, 2 mg, oral, Before meals & nightly  pantoprazole, 40 mg, intravenous, BID      Continuous medications  Adult Clinimix TPN Cyclic, , Last Rate: Stopped (07/10/24 0827)    I/O:   Last BM Date: 07/08/24; Stool Appearance: Formed (07/09/24 0805)    Dietary Orders (From admission, onward)       Start     Ordered    07/05/24 0612  Calorie count  Once         07/05/24 0611    07/01/24 0727  Adult diet Regular  Diet effective now        Question:  Diet type  Answer:  Regular    07/01/24 0727                     Estimated Needs:   Total Energy Estimated Needs (kCal):  (6876-5785)  Method for Estimating Needs: IBW x 32-35  Total Protein Estimated Needs (g):  (90+)  Method for Estimating Needs: IBW x 1.3+  Total Fluid Estimated Needs (mL):  (per team)           Nutrition Diagnosis   Malnutrition Diagnosis  Patient has Malnutrition Diagnosis: No    Nutrition Diagnosis  Patient has Nutrition Diagnosis: Yes  Diagnosis Status (1): Ongoing  Nutrition Diagnosis 1: Increased nutrient needs  Related to (1): increased metabolic demand  As Evidenced by (1): s/p ex lap, sigmoidectomy and end colostomy  Additional Nutrition Diagnosis: Diagnosis 2  Diagnosis Status (2): Ongoing  Nutrition Diagnosis 2: Altered GI function  Related to (2): small bowel obstruction  As Evidenced by (2): hx of NPO x 7 days need for TPN       Nutrition Interventions/Recommendations         Nutrition Prescription:    Unable to determine PO intake amount d/t limited PO recorded  Continue regular diet as tolerated  Will order Ensure High Protein BID (160 kcal, 16g PRO) each   Continue Cycled TPN , RDN will follow up with pt's calorie count.   recommend Clinimix 5% AA, 15% Dextrose x 12 hrs      - For the first hour: Run @ 95mL/hr      - For x 10hrs in between: Titrate up to 190mL/hr      - For the final hour: Decrease rate to 95mL/hr      -  Include MVI + trace elements        - Provide SMOF lipids @ 21mL/hr x 12 hours overnight (mL total)         TPN monitoring:      - Daily weights      - RFP + Mg daily; replete lytes PRN      - LFTs + TGs weekly      - Accuchecks q 6hrs     TPN + lipids provide: 1986kcals, 105g protein, 314dextrose, 25% from fat (meeting 90% kcal & 100% protein needs)    Nutrition Monitoring and Evaluation   Food/Nutrient Related History Monitoring  Monitoring and Evaluation Plan: Energy intake, Enteral and parenteral nutrition intake  Energy Intake: Estimated energy intake  Criteria: >75% of EEN  Enteral and Parenteral Nutrition Intake: Parenteral nutrition intake    Body Composition/Growth/Weight History  Monitoring and Evaluation Plan: Weight    Biochemical Data, Medical Tests and Procedures  Monitoring and Evaluation Plan: Glucose/endocrine profile, Electrolyte/renal panel  Electrolyte and Renal Panel: Sodium, Potassium, Phosphorus, Magnesium  Criteria: WNL  Glucose/Endocrine Profile: Glucose, casual  Criteria: WNL    Time Spent (min): 30 minutes

## 2024-07-10 NOTE — PROGRESS NOTES
[facility]  ACUTE CARE SURGERY - PROGRESS NOTE    Patient Name: Bereket Em  MRN: [id number]  Admit Date: 605  : 1965  AGE: 58 y.o.   GENDER: male  ==============================================================================  TODAY'S ASSESSMENT AND PLAN OF CARE:  57 yo M who presented with perforated diverticulitis. 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 He underwent ex lap, sigmoidectomy, and end colostomy on . Vac applied  to MLI. TPN initiated. On  vac was taken down which showed necrotic fascia at midline incision. He was taken to the OR for a exploration laparotomy, abdominal washout, fascial debridement, enmas closure on  with Dr. Case. RTOR on  for re-exploration laparotomy, mesh placement for suspected infection. 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       Neuro:  - stephan tylenol, prn oxy 5/10, dilaudid for breakthrough  - PRN Atarax for anxiety  - Pet, art, music therapy      CV:  - monitor vitals  - holding home lisinopril     Pulm:  - encourage IS     GI: ostomy functioning with small amount of brown stool  - Regular diet as tolerated with supplements   - Pre-albumin 26  - PPI BID   - Increase Imodium to 2mg QID   - Cyclic TPN for protein-calorie malnutrition   - New ECF opening in midline wound with increase output- will pouch wound today;  quantify output and protect skin  - Monitor colostomy output, appreciate Enterostomal therapy following     :  - Strict I&Os  - Replete lytes as indicated      Endo: no hx of DM  - continue blood glucose checks and SSI while on TPN     ID:  - WBC remains stable, no indication for further antbx at this time     Ppx: SCDs, LVX, OOB  PT/OT: home with home care once HC agency established      Dispo: continue care on RNF. Continue to monitor fistula output. Possible discharge home end  of week      Patient seen and discussed with Attending Dr. Estella Lubin, APRN-CNP   Acute Care Surgery [id number]   ==============================================================================  CHIEF COMPLAINT / EVENTS LAST 24HRS / HPI:  Increasing output from ECF. Patient states dressing had to be changed at least 4x overnight. New opening in mid abdominal bed draining stool.     MEDICAL HISTORY / ROS:   Admission history and ROS reviewed. Pertinent changes as follows:  NA    PHYSICAL EXAM:  Heart Rate:  [63-71]   Temp:  [36.5 °C (97.7 °F)-37.2 °C (99 °F)]   Resp:  [16-18]   BP: (109-127)/(61-74)   Weight:  [80.7 kg (178 lb 0.3 oz)-83.7 kg (184 lb 8.4 oz)]   SpO2:  [95 %-97 %]   Physical Exam  Constitutional:       Appearance: Normal appearance.   Eyes:      Extraocular Movements: Extraocular movements intact.   Cardiovascular:      Rate and Rhythm: Normal rate.   Pulmonary:      Effort: Pulmonary effort is normal.   Abdominal:      Comments: Soft, non distended, midline wound- wound bed pink with granulation tissue, base of wound mesh exposed and discolored from fistula. New Fistula in center of wound under mesh draining liquid stool output. Wound packed with wet to moist kerlix and covered with ABDs. Colostomy- stoma pink and viable with scant soft brown stool in pouch.    Musculoskeletal:         General: Normal range of motion.   Skin:     General: Skin is warm and dry.   Neurological:      Mental Status: He is alert and oriented to person, place, and time.   Psychiatric:         Behavior: Behavior normal.           IMAGING SUMMARY:  (summary of new imaging findings, not a copy of dictation)  None new    LABS:  Results from last 7 days   Lab Units 07/09/24  0603 07/06/24  0536 07/05/24  0600   WBC AUTO x10*3/uL 6.6 5.7 5.7   HEMOGLOBIN g/dL 9.2* 9.2* 9.7*   HEMATOCRIT % 29.8* 30.1* 31.8*   PLATELETS AUTO x10*3/uL 378 352 364   NEUTROS PCT AUTO %  --  34.0 36.7   LYMPHS PCT AUTO %  --  31.6 30.5    MONOS PCT AUTO %  --  19.1 19.1   EOS PCT AUTO %  --  13.8 12.1         Results from last 7 days   Lab Units 07/10/24  0528 07/09/24  0527 07/08/24  1044   SODIUM mmol/L 135* 136 138   POTASSIUM mmol/L 4.4 4.4 5.1   CHLORIDE mmol/L 100 100 99   CO2 mmol/L 28 29 31   BUN mg/dL 17 16 18   CREATININE mg/dL 0.44* 0.54 0.50   CALCIUM mg/dL 9.1 8.7 8.6   GLUCOSE mg/dL 130* 125* 120*                 I have reviewed all medications, laboratory results, and imaging pertinent for today's encounter.

## 2024-07-10 NOTE — CONSULTS
Wound/Ostomy Care Consult     Visit Date: 7/10/2024      Patient Name: Bereket Em         MRN: [id number]           YOB: 1965     Reason for Consult: wound manager placed to midline ECF     Wound History: 57 yo M who presented with perforated diverticulitis. 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 He underwent ex lap, sigmoidectomy, and end colostomy on 6/7. Vac applied 6/11 to MLI. TPN initiated. On 6/14 vac was taken down which showed necrotic fascia at midline incision. He was taken to the OR for a exploration laparotomy, abdominal washout, fascial debridement, enmas closure on 6/16 with Dr. Case. RTOR on 6/18 for re-exploration laparotomy, mesh placement for suspected infection. 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      Wound Assessment:  Wound 06/07/24 Incision Abdomen Medial;Upper (Active)   Wound Image   06/14/24 1239   Site Assessment Red;Pink;Granulation;Other (Comment) 07/10/24 1403   Usha-Wound Assessment Pink;Red;Denuded 07/10/24 1403   Shape midline 07/09/24 2157   Wound Length (cm) 18 cm 07/10/24 1403   Wound Width (cm) 11 cm 07/10/24 1403   Wound Surface Area (cm^2) 198 cm^2 07/10/24 1403   Wound Depth (cm) 3 cm 07/10/24 1403   Wound Volume (cm^3) 594 cm^3 07/10/24 1403   Wound Healing % -14 07/10/24 1403   State of Healing Early/partial granulation 06/28/24 0140   Margins Well-defined edges;Not attached 07/10/24 0500   Closure None 07/10/24 0500   Sutures/Staple Line Non approximated 07/05/24 2145   Drainage Description Foul odor;Serosanguineous 07/10/24 0500   Drainage Amount Large 07/10/24 1403   Dressing Other (Comment) 07/10/24 1403   Dressing Changed New 07/10/24 1403   Dressing Status Clean;Dry 07/10/24 1403     Wound Team Summary Assessment: Pt resting in bed with RN and Gen Surg NP present. 18cm x 11cm x ~3cm open midline wound with tattered mesh,  granulation tissue and several (?) enterocutaneous fistulas present. Per pt and provider, output has increased significantly leading to 8+ daily dressing changes and periwound skin breakdown. Skin cleaned and treated with Cavilon Advanced. Wound lined with paste ring, and [facility] wound manager (rectangle with downward facing spout and access window) placed to contain and help quantify effluent. Edges protected with Hypafix tape, warm compresses placed. New 57mm 2-piece flat colostomy appliance placed to L-sided stoma.      Wound Team Plan: Will continue to follow closely for weekly and PRN wound manager changes.      Dalia Leos RN, CWON  7/10/2024  2:05 PM

## 2024-07-11 ENCOUNTER — PHARMACY VISIT (OUTPATIENT)
Dept: PHARMACY | Facility: CLINIC | Age: 59
End: 2024-07-11
Payer: MEDICARE

## 2024-07-11 VITALS
WEIGHT: 179.45 LBS | HEIGHT: 68 IN | OXYGEN SATURATION: 95 % | DIASTOLIC BLOOD PRESSURE: 72 MMHG | SYSTOLIC BLOOD PRESSURE: 111 MMHG | HEART RATE: 70 BPM | TEMPERATURE: 98.2 F | BODY MASS INDEX: 27.2 KG/M2 | RESPIRATION RATE: 18 BRPM

## 2024-07-11 PROBLEM — K56.609 SMALL BOWEL OBSTRUCTION (MULTI): Status: RESOLVED | Noted: 2024-06-05 | Resolved: 2024-07-11

## 2024-07-11 PROBLEM — D64.9 ANEMIA: Status: RESOLVED | Noted: 2024-06-15 | Resolved: 2024-07-11

## 2024-07-11 PROBLEM — K57.20 DIVERTICULITIS OF LARGE INTESTINE WITH ABSCESS WITHOUT BLEEDING: Status: RESOLVED | Noted: 2024-06-05 | Resolved: 2024-07-11

## 2024-07-11 LAB
ALBUMIN SERPL BCP-MCNC: 3.2 G/DL (ref 3.4–5)
ANION GAP SERPL CALC-SCNC: 14 MMOL/L (ref 10–20)
BUN SERPL-MCNC: 18 MG/DL (ref 6–23)
CALCIUM SERPL-MCNC: 8.9 MG/DL (ref 8.6–10.6)
CHLORIDE SERPL-SCNC: 98 MMOL/L (ref 98–107)
CO2 SERPL-SCNC: 29 MMOL/L (ref 21–32)
CREAT SERPL-MCNC: 0.48 MG/DL (ref 0.5–1.3)
EGFRCR SERPLBLD CKD-EPI 2021: >90 ML/MIN/1.73M*2
GLUCOSE BLD MANUAL STRIP-MCNC: 121 MG/DL (ref 74–99)
GLUCOSE BLD MANUAL STRIP-MCNC: 143 MG/DL (ref 74–99)
GLUCOSE SERPL-MCNC: 172 MG/DL (ref 74–99)
MAGNESIUM SERPL-MCNC: 2.05 MG/DL (ref 1.6–2.4)
PHOSPHATE SERPL-MCNC: 4.8 MG/DL (ref 2.5–4.9)
POTASSIUM SERPL-SCNC: 4.5 MMOL/L (ref 3.5–5.3)
SODIUM SERPL-SCNC: 136 MMOL/L (ref 136–145)

## 2024-07-11 PROCEDURE — 83735 ASSAY OF MAGNESIUM: CPT | Performed by: NURSE PRACTITIONER

## 2024-07-11 PROCEDURE — 2500000001 HC RX 250 WO HCPCS SELF ADMINISTERED DRUGS (ALT 637 FOR MEDICARE OP): Performed by: NURSE PRACTITIONER

## 2024-07-11 PROCEDURE — 80069 RENAL FUNCTION PANEL: CPT | Performed by: NURSE PRACTITIONER

## 2024-07-11 PROCEDURE — 82947 ASSAY GLUCOSE BLOOD QUANT: CPT

## 2024-07-11 PROCEDURE — C9113 INJ PANTOPRAZOLE SODIUM, VIA: HCPCS

## 2024-07-11 PROCEDURE — 2500000004 HC RX 250 GENERAL PHARMACY W/ HCPCS (ALT 636 FOR OP/ED)

## 2024-07-11 PROCEDURE — RXMED WILLOW AMBULATORY MEDICATION CHARGE

## 2024-07-11 RX ORDER — LOPERAMIDE HYDROCHLORIDE 2 MG/1
2 CAPSULE ORAL
Qty: 28 CAPSULE | Refills: 0 | Status: SHIPPED | OUTPATIENT
Start: 2024-07-11 | End: 2024-07-18

## 2024-07-11 RX ORDER — LOPERAMIDE HYDROCHLORIDE 2 MG/1
2 CAPSULE ORAL
Qty: 40 CAPSULE | Refills: 0 | OUTPATIENT
Start: 2024-07-11 | End: 2024-07-21

## 2024-07-11 RX ORDER — OXYCODONE HYDROCHLORIDE 5 MG/1
5 TABLET ORAL EVERY 4 HOURS PRN
Qty: 8 TABLET | Refills: 0 | Status: SHIPPED | OUTPATIENT
Start: 2024-07-11 | End: 2024-07-19 | Stop reason: WASHOUT

## 2024-07-11 RX ADMIN — ACETAMINOPHEN 650 MG: 325 TABLET ORAL at 05:00

## 2024-07-11 RX ADMIN — ACETAMINOPHEN 650 MG: 325 TABLET ORAL at 11:09

## 2024-07-11 RX ADMIN — LOPERAMIDE HYDROCHLORIDE 2 MG: 2 CAPSULE ORAL at 05:56

## 2024-07-11 RX ADMIN — PANTOPRAZOLE SODIUM 40 MG: 40 INJECTION, POWDER, FOR SOLUTION INTRAVENOUS at 08:03

## 2024-07-11 RX ADMIN — LOPERAMIDE HYDROCHLORIDE 2 MG: 2 CAPSULE ORAL at 11:09

## 2024-07-11 SDOH — SOCIAL STABILITY: SOCIAL NETWORK: IN A TYPICAL WEEK, HOW MANY TIMES DO YOU TALK ON THE PHONE WITH FAMILY, FRIENDS, OR NEIGHBORS?: PATIENT UNABLE TO ANSWER

## 2024-07-11 SDOH — SOCIAL STABILITY: SOCIAL NETWORK: ARE YOU MARRIED, WIDOWED, DIVORCED, SEPARATED, NEVER MARRIED, OR LIVING WITH A PARTNER?: PATIENT DECLINED

## 2024-07-11 SDOH — HEALTH STABILITY: PHYSICAL HEALTH: ON AVERAGE, HOW MANY MINUTES DO YOU ENGAGE IN EXERCISE AT THIS LEVEL?: 0 MIN

## 2024-07-11 SDOH — ECONOMIC STABILITY: HOUSING INSECURITY: IN THE PAST 12 MONTHS, HOW MANY TIMES HAVE YOU MOVED WHERE YOU WERE LIVING?: 1

## 2024-07-11 SDOH — ECONOMIC STABILITY: INCOME INSECURITY
IN THE PAST 12 MONTHS, HAS THE ELECTRIC, GAS, OIL, OR WATER COMPANY THREATENED TO SHUT OFF SERVICE IN YOUR HOME?: PATIENT DECLINED

## 2024-07-11 SDOH — ECONOMIC STABILITY: FOOD INSECURITY: WITHIN THE PAST 12 MONTHS, YOU WORRIED THAT YOUR FOOD WOULD RUN OUT BEFORE YOU GOT MONEY TO BUY MORE.: PATIENT DECLINED

## 2024-07-11 SDOH — SOCIAL STABILITY: SOCIAL INSECURITY: WITHIN THE LAST YEAR, HAVE YOU BEEN AFRAID OF YOUR PARTNER OR EX-PARTNER?: NO

## 2024-07-11 SDOH — SOCIAL STABILITY: SOCIAL NETWORK: HOW OFTEN DO YOU GET TOGETHER WITH FRIENDS OR RELATIVES?: PATIENT DECLINED

## 2024-07-11 SDOH — ECONOMIC STABILITY: INCOME INSECURITY: IN THE LAST 12 MONTHS, WAS THERE A TIME WHEN YOU WERE NOT ABLE TO PAY THE MORTGAGE OR RENT ON TIME?: PATIENT DECLINED

## 2024-07-11 SDOH — SOCIAL STABILITY: SOCIAL INSECURITY
WITHIN THE LAST YEAR, HAVE TO BEEN RAPED OR FORCED TO HAVE ANY KIND OF SEXUAL ACTIVITY BY YOUR PARTNER OR EX-PARTNER?: NO

## 2024-07-11 SDOH — SOCIAL STABILITY: SOCIAL NETWORK: HOW OFTEN DO YOU ATTEND CHURCH OR RELIGIOUS SERVICES?: PATIENT DECLINED

## 2024-07-11 SDOH — ECONOMIC STABILITY: TRANSPORTATION INSECURITY
IN THE PAST 12 MONTHS, HAS LACK OF TRANSPORTATION KEPT YOU FROM MEETINGS, WORK, OR FROM GETTING THINGS NEEDED FOR DAILY LIVING?: PATIENT DECLINED

## 2024-07-11 SDOH — HEALTH STABILITY: MENTAL HEALTH
HOW OFTEN DO YOU NEED TO HAVE SOMEONE HELP YOU WHEN YOU READ INSTRUCTIONS, PAMPHLETS, OR OTHER WRITTEN MATERIAL FROM YOUR DOCTOR OR PHARMACY?: NEVER

## 2024-07-11 SDOH — SOCIAL STABILITY: SOCIAL INSECURITY: WITHIN THE LAST YEAR, HAVE YOU BEEN HUMILIATED OR EMOTIONALLY ABUSED IN OTHER WAYS BY YOUR PARTNER OR EX-PARTNER?: NO

## 2024-07-11 SDOH — ECONOMIC STABILITY: HOUSING INSECURITY: AT ANY TIME IN THE PAST 12 MONTHS, WERE YOU HOMELESS OR LIVING IN A SHELTER (INCLUDING NOW)?: PATIENT DECLINED

## 2024-07-11 SDOH — SOCIAL STABILITY: SOCIAL NETWORK: HOW OFTEN DO YOU ATTENT MEETINGS OF THE CLUB OR ORGANIZATION YOU BELONG TO?: PATIENT DECLINED

## 2024-07-11 SDOH — HEALTH STABILITY: PHYSICAL HEALTH: ON AVERAGE, HOW MANY DAYS PER WEEK DO YOU ENGAGE IN MODERATE TO STRENUOUS EXERCISE (LIKE A BRISK WALK)?: 0 DAYS

## 2024-07-11 SDOH — ECONOMIC STABILITY: FOOD INSECURITY: WITHIN THE PAST 12 MONTHS, THE FOOD YOU BOUGHT JUST DIDN'T LAST AND YOU DIDN'T HAVE MONEY TO GET MORE.: PATIENT DECLINED

## 2024-07-11 SDOH — SOCIAL STABILITY: SOCIAL INSECURITY
WITHIN THE LAST YEAR, HAVE YOU BEEN KICKED, HIT, SLAPPED, OR OTHERWISE PHYSICALLY HURT BY YOUR PARTNER OR EX-PARTNER?: NO

## 2024-07-11 SDOH — ECONOMIC STABILITY: INCOME INSECURITY: HOW HARD IS IT FOR YOU TO PAY FOR THE VERY BASICS LIKE FOOD, HOUSING, MEDICAL CARE, AND HEATING?: PATIENT DECLINED

## 2024-07-11 SDOH — HEALTH STABILITY: MENTAL HEALTH
STRESS IS WHEN SOMEONE FEELS TENSE, NERVOUS, ANXIOUS, OR CAN'T SLEEP AT NIGHT BECAUSE THEIR MIND IS TROUBLED. HOW STRESSED ARE YOU?: PATIENT DECLINED

## 2024-07-11 SDOH — ECONOMIC STABILITY: TRANSPORTATION INSECURITY
IN THE PAST 12 MONTHS, HAS THE LACK OF TRANSPORTATION KEPT YOU FROM MEDICAL APPOINTMENTS OR FROM GETTING MEDICATIONS?: PATIENT DECLINED

## 2024-07-11 ASSESSMENT — COGNITIVE AND FUNCTIONAL STATUS - GENERAL: MOBILITY SCORE: 24

## 2024-07-11 ASSESSMENT — PAIN SCALES - GENERAL: PAINLEVEL_OUTOF10: 0 - NO PAIN

## 2024-07-11 ASSESSMENT — PAIN - FUNCTIONAL ASSESSMENT: PAIN_FUNCTIONAL_ASSESSMENT: 0-10

## 2024-07-11 NOTE — NURSING NOTE
RN spoke with Maci in pharmacy regarding TPN. Per pharmacy new bag is currently being made and will be to the unit on 10pm delivery.

## 2024-07-11 NOTE — DISCHARGE SUMMARY
Discharge Diagnosis  Small bowel obstruction (Multi)    Issues Requiring Follow-Up  PICC/ TPN. Home wound care, wound manager, colostomy bag change.     Test Results Pending At Discharge  Pending Labs       No current pending labs.            Hospital Course  59 yo M who presented with perforated diverticulitis. 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 He underwent ex lap, sigmoidectomy, and end colostomy on 6/7. Vac applied 6/11 to MLI. TPN initiated.    On 6/14 vac was taken down which showed necrotic fascia at midline incision. He was taken to the OR for a exploration laparotomy, abdominal washout, fascial debridement, enmas closure on 6/16 with Dr. Case.    RTOR on 6/18 for re-exploration laparotomy, mesh placement for suspected infection. 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    Post operative goals for care directed at managing wound, EC fistula as well as assuring adequate ostomy output. Wound managed with WTD packing. Titrated immodium to soft, oatmeal stool from ostomy. TPN on to improve nutrition status.    Discharge home today in part because pt has familial obligations. Will leave on TPN, with wound manager. Home Health Care to manage TPN, wound manager, ostomy changes.    Pertinent Physical Exam At Time of Discharge  Physical Exam    Gen: NAD  Resp: on RA, nonlabored breathing, chest rise equal bilaterally  GI: soft, nondistended, wound manager in place, colostomy bag with output  MSK: TORRES, no LE edema  Eyes: non-icteric  Skin: warm and dry    Home Medications     Medication List      START taking these medications     acetaminophen 325 mg tablet; Commonly known as: Tylenol; Take 2 tablets   (650 mg) by mouth every 6 hours if needed for mild pain (1 - 3).   Adult Clinimix Parenteral Nutrition Cyclic; Infuse 2,090 mL over 12   hours into a venous catheter (central line) continuously.  Taper up for 1   Hours. Taper down for 1 Hours.   fat emulsion fish oil/plant based 20 % emulsion; Commonly known as:   SMOFlipid; Infuse 250 mL (50 g) at 20.8 mL/hr over 12 hours into a venous   catheter Daily Lipids.   loperamide 2 mg capsule; Commonly known as: Imodium; Take 1 capsule (2   mg) by mouth 4 times a day before meals for 7 days.   oxyCODONE 5 mg immediate release tablet; Commonly known as: Roxicodone;   Take 1 tablet (5 mg) by mouth every 4 hours if needed for moderate pain (4   - 6) or severe pain (7 - 10) for up to 3 days.     CONTINUE taking these medications     lisinopril 10 mg tablet; Take 1 tablet (10 mg) by mouth once daily.     STOP taking these medications     amoxicillin-pot clavulanate 875-125 mg tablet; Commonly known as:   Augmentin   cyclobenzaprine 10 mg tablet; Commonly known as: Flexeril   gabapentin 100 mg capsule; Commonly known as: Neurontin       Outpatient Follow-Up  Future Appointments   Date Time Provider Department [place]   8/13/2024  1:45 PM Candy Howell PA-C [id number]       Michelle Rosario MD

## 2024-07-11 NOTE — CARE PLAN
The patient's goals for the shift include Labs WNL    The clinical goals for the shift include Labs WNL      Problem: Pain  Goal: Takes deep breaths with improved pain control throughout the shift  Outcome: Progressing  Goal: Turns in bed with improved pain control throughout the shift  Outcome: Progressing  Goal: Walks with improved pain control throughout the shift  Outcome: Progressing  Goal: Performs ADL's with improved pain control throughout shift  Outcome: Progressing  Goal: Participates in PT with improved pain control throughout the shift  Outcome: Progressing  Goal: Free from opioid side effects throughout the shift  Outcome: Progressing  Goal: Free from acute confusion related to pain meds throughout the shift  Outcome: Progressing     Problem: Pain - Adult  Goal: Verbalizes/displays adequate comfort level or baseline comfort level  Outcome: Progressing     Problem: Safety - Adult  Goal: Free from fall injury  Outcome: Progressing     Problem: Discharge Planning  Goal: Discharge to home or other facility with appropriate resources  Outcome: Progressing     Problem: Chronic Conditions and Co-morbidities  Goal: Patient's chronic conditions and co-morbidity symptoms are monitored and maintained or improved  Outcome: Progressing     Problem: Skin  Goal: Decreased wound size/increased tissue granulation at next dressing change  7/11/2024 0603 by Urmila Moyer RN  Outcome: Progressing  7/10/2024 1919 by Urmila Moyer RN  Flowsheets (Taken 7/10/2024 1919)  Decreased wound size/increased tissue granulation at next dressing change:   Utilize specialty bed per algorithm   Promote sleep for wound healing   Protective dressings over bony prominences  Goal: Participates in plan/prevention/treatment measures  7/11/2024 0603 by Urmila Moyer RN  Outcome: Progressing  7/10/2024 1919 by Urmila Moyer RN  Flowsheets (Taken 7/10/2024 1919)  Participates in plan/prevention/treatment measures:   Increase  activity/out of bed for meals   Discuss with provider PT/OT consult   Elevate heels  Goal: Prevent/manage excess moisture  7/11/2024 0603 by Urmila Moyer RN  Outcome: Progressing  7/10/2024 1919 by Urmila Moyer RN  Flowsheets (Taken 7/10/2024 1919)  Prevent/manage excess moisture:   Use wicking fabric (obtain order)   Moisturize dry skin   Cleanse incontinence/protect with barrier cream   Follow provider orders for dressing changes   Monitor for/manage infection if present  Goal: Prevent/minimize sheer/friction injuries  7/11/2024 0603 by Urmila Moyer RN  Outcome: Progressing  7/10/2024 1919 by Urmila Moyer RN  Flowsheets (Taken 7/10/2024 1919)  Prevent/minimize sheer/friction injuries:   Utilize specialty bed per algorithm   Use pull sheet   Turn/reposition every 2 hours/use positioning/transfer devices   Increase activity/out of bed for meals   HOB 30 degrees or less   Complete micro-shifts as needed if patient unable. Adjust patient position to relieve pressure points, not a full turn  Goal: Promote/optimize nutrition  7/11/2024 0603 by Urmila Moyer RN  Outcome: Progressing  7/10/2024 1919 by Urmila Moyer RN  Flowsheets (Taken 7/10/2024 1919)  Promote/optimize nutrition:   Offer water/supplements/favorite foods   Discuss with provider if NPO > 2 days   Assist with feeding   Reassess MST if dietician not consulted   Monitor/record intake including meals   Consume > 50% meals/supplements  Goal: Promote skin healing  7/11/2024 0603 by Urmila Moyer RN  Outcome: Progressing  7/10/2024 1919 by Urmila Moyer RN  Flowsheets (Taken 7/10/2024 1919)  Promote skin healing:   Turn/reposition every 2 hours/use positioning/transfer devices   Rotate device position/do not position patient on device   Ensure correct size (line/device) and apply per  instructions   Protective dressings over bony prominences   Assess skin/pad under line(s)/device(s)     Problem:  Fall/Injury  Goal: Not fall by end of shift  Outcome: Progressing  Goal: Be free from injury by end of the shift  Outcome: Progressing  Goal: Verbalize understanding of personal risk factors for fall in the hospital  Outcome: Progressing  Goal: Verbalize understanding of risk factor reduction measures to prevent injury from fall in the home  Outcome: Progressing  Goal: Use assistive devices by end of the shift  Outcome: Progressing  Goal: Pace activities to prevent fatigue by end of the shift  Outcome: Progressing

## 2024-07-11 NOTE — CARE PLAN
The patient's goals for the shift include Labs WNL    The clinical goals for the shift include Discharge home with home health care    Over the shift, the patient is making adequate progress towards stated care plan goals

## 2024-07-11 NOTE — HOSPITAL COURSE
59 yo M who presented with perforated diverticulitis. 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 He underwent ex lap, sigmoidectomy, and end colostomy on 6/7. Vac applied 6/11 to MLI. TPN initiated.    On 6/14 vac was taken down which showed necrotic fascia at midline incision. He was taken to the OR for a exploration laparotomy, abdominal washout, fascial debridement, enmas closure on 6/16 with Dr. Case.    RTOR on 6/18 for re-exploration laparotomy, mesh placement for suspected infection. 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    Post operative goals for care directed at managing wound, EC fistula as well as assuring adequate ostomy output. Wound managed with WTD packing. Titrated immodium to soft, oatmeal stool from ostomy. TPN on to improve nutrition status.    Discharge home today in part because pt has familial obligations. Will leave on TPN, with wound manager. Home Health Care to manage TPN, wound manager, ostomy changes.

## 2024-07-12 ENCOUNTER — DOCUMENTATION (OUTPATIENT)
Dept: PRIMARY CARE | Facility: CLINIC | Age: 59
End: 2024-07-12
Payer: COMMERCIAL

## 2024-07-12 ENCOUNTER — PATIENT OUTREACH (OUTPATIENT)
Dept: PRIMARY CARE | Facility: CLINIC | Age: 59
End: 2024-07-12
Payer: COMMERCIAL

## 2024-07-12 DIAGNOSIS — L98.8 FISTULA: Primary | ICD-10-CM

## 2024-07-12 NOTE — PROGRESS NOTES
Patient has declined PCP follow up and TCM services at this time. He states that he is working closely with care coordinator from his hospital discharge and will be following with specialists at this time. He was thankful for this call.

## 2024-07-12 NOTE — PROGRESS NOTES
Art Therapy Note    Bereket Em   Therapy Session  Referral Type: New referral this admission  Visit Type: Follow-up visit  Session Start Time: 1438  Intervention Delivery: In-person  Conflict of Service: Asleep              Treatment/Interventions            Narrative  Assessment Detail: Pt was asleep when ATR attempted session. ATR will f/u another time.    Education Documentation  No documentation found.

## 2024-07-13 ENCOUNTER — HOSPITAL ENCOUNTER (EMERGENCY)
Facility: HOSPITAL | Age: 59
Discharge: HOME | End: 2024-07-14
Attending: EMERGENCY MEDICINE
Payer: COMMERCIAL

## 2024-07-13 VITALS
BODY MASS INDEX: 26.37 KG/M2 | SYSTOLIC BLOOD PRESSURE: 135 MMHG | RESPIRATION RATE: 18 BRPM | WEIGHT: 174 LBS | HEIGHT: 68 IN | HEART RATE: 84 BPM | DIASTOLIC BLOOD PRESSURE: 88 MMHG | TEMPERATURE: 98.4 F | OXYGEN SATURATION: 98 %

## 2024-07-13 DIAGNOSIS — Z48.89 ENCOUNTER FOR POSTOPERATIVE WOUND CARE: Primary | ICD-10-CM

## 2024-07-13 DIAGNOSIS — Z48.00 DRESSING CHANGE: ICD-10-CM

## 2024-07-13 PROCEDURE — 99281 EMR DPT VST MAYX REQ PHY/QHP: CPT

## 2024-07-13 ASSESSMENT — LIFESTYLE VARIABLES
EVER HAD A DRINK FIRST THING IN THE MORNING TO STEADY YOUR NERVES TO GET RID OF A HANGOVER: NO
EVER FELT BAD OR GUILTY ABOUT YOUR DRINKING: NO
TOTAL SCORE: 0
HAVE YOU EVER FELT YOU SHOULD CUT DOWN ON YOUR DRINKING: NO
HAVE PEOPLE ANNOYED YOU BY CRITICIZING YOUR DRINKING: NO

## 2024-07-13 ASSESSMENT — COLUMBIA-SUICIDE SEVERITY RATING SCALE - C-SSRS
6. HAVE YOU EVER DONE ANYTHING, STARTED TO DO ANYTHING, OR PREPARED TO DO ANYTHING TO END YOUR LIFE?: NO
2. HAVE YOU ACTUALLY HAD ANY THOUGHTS OF KILLING YOURSELF?: NO
1. IN THE PAST MONTH, HAVE YOU WISHED YOU WERE DEAD OR WISHED YOU COULD GO TO SLEEP AND NOT WAKE UP?: NO

## 2024-07-14 ASSESSMENT — PAIN - FUNCTIONAL ASSESSMENT: PAIN_FUNCTIONAL_ASSESSMENT: 0-10

## 2024-07-14 ASSESSMENT — PAIN DESCRIPTION - PROGRESSION: CLINICAL_PROGRESSION: NOT CHANGED

## 2024-07-14 ASSESSMENT — PAIN SCALES - GENERAL: PAINLEVEL_OUTOF10: 0 - NO PAIN

## 2024-07-14 NOTE — ED PROVIDER NOTES
EMERGENCY DEPARTMENT ENCOUNTER      Pt Name: Bereket Em  MRN: [id number]  Birthdate 1965  Date of evaluation: 7/13/2024  ED Provider: Shante Rivera DO     CHIEF COMPLAINT       Chief Complaint   Patient presents with    Post-op Problem     Pt has wound pouch over fistula from surgery 15-20 days ago. It is leaking and he doesn't know how to get it to stop.       HISTORY OF PRESENT ILLNESS    Bereket Em is a 58 y.o. who presents to the emergency department with complaint of leaking from the wound pouch on his anterior abdomen.  He was recently discharged from [facility] where he had a complicated surgical course including exploratory laparotomy with sigmoidectomy and end colostomy on 6/7, wound VAC placement with subsequent ex lap abdominal washout as well as mesh placement for an anterior anastomotic fistula.  He was discharged on 7/11 with both an ostomy to the left abdomen as well as a large wound pouch covering the open wound in the central abdomen at the location of the enteric fistula.  He has a follow-up on Monday with home health care however starting earlier today he has been having significant leaking from the inferior aspect of the wound pouch.  He attempted to reinforce it with tape and was unsuccessful.  He presents now for assistance with this.    REVIEW OF SYSTEMS     Focused ROS performed and negative other than as listed in HPI    PAST MEDICAL HISTORY     Past Medical History:   Diagnosis Date    Acute pharyngitis, unspecified     Sore throat    Acute upper respiratory infection, unspecified 02/13/2017    Acute URI    Alcohol abuse, in remission 09/12/2018    History of alcohol abuse    Elevated blood-pressure reading, without diagnosis of hypertension 02/23/2015    Elevated blood pressure reading without diagnosis of hypertension    Encounter for immunization 10/10/2014    Need for Tdap vaccination    Encounter for screening for malignant neoplasm of colon 01/04/2017    Encounter for  colonoscopy due to history of adenomatous colonic polyps    Encounter for screening for malignant neoplasm of colon 11/02/2016    Encounter for screening colonoscopy    Encounter for screening for malignant neoplasm of colon 11/02/2016    Encounter for screening colonoscopy    Encounter for screening for malignant neoplasm of prostate 09/12/2018    Prostate cancer screening    Essential (primary) hypertension 03/14/2019    Elevated blood pressure reading with diagnosis of hypertension    Impingement syndrome of right shoulder 06/06/2016    Impingement syndrome of right shoulder    Incomplete rotator cuff tear or rupture of right shoulder, not specified as traumatic 09/12/2018    Partial nontraumatic tear of right rotator cuff    Noninfective gastroenteritis and colitis, unspecified 01/23/2018    Acute gastroenteritis    Other general symptoms and signs 11/02/2016    Flu-like symptoms    Other malaise 11/02/2016    Malaise and fatigue    Pain in left ankle and joints of left foot 10/10/2017    Left ankle pain    Pain in left foot 09/12/2017    Left foot pain    Pain in right shoulder 04/11/2016    Right shoulder pain    Pain in thoracic spine 09/12/2018    Thoracic back pain    Pain, unspecified 02/09/2017    Body aches    Personal history of colonic polyps 01/04/2017    History of colonic polyps    Personal history of other diseases of male genital organs 03/15/2019    History of acute prostatitis    Personal history of other diseases of the respiratory system 04/07/2020    History of acute sinusitis    Personal history of other specified conditions 03/14/2019    History of flank pain    Personal history of other specified conditions 10/10/2014    History of paresthesia    Strain of muscle, fascia and tendon of other parts of biceps, right arm, initial encounter 03/11/2016    Rupture of biceps tendon, right, initial encounter       SURGICAL HISTORY       Past Surgical History:   Procedure Laterality Date    SHOULDER  "SURGERY  06/27/2017    Shoulder Surgery       CURRENT MEDICATIONS       Discharge Medication List as of 7/14/2024 12:36 AM        CONTINUE these medications which have NOT CHANGED    Details   acetaminophen (Tylenol) 325 mg tablet Take 2 tablets (650 mg) by mouth every 6 hours if needed for mild pain (1 - 3)., Starting Sat 6/22/2024, Until Mon 7/22/2024 at 2359, No Print      Adult Clinimix Parenteral Nutrition Cyclic Infuse 2,090 mL over 12 hours into a venous catheter (central line) continuously. Taper up for 1 Hours. Taper down for 1 Hours., Starting Wed 7/10/2024, Until Fri 8/9/2024 at 2359, Print      fat emulsion fish oil/plant based (SMOFlipid) 20 % emulsion Infuse 250 mL (50 g) at 20.8 mL/hr over 12 hours into a venous catheter Daily Lipids., Starting Wed 7/10/2024, Until Fri 8/9/2024, Print      lisinopril 10 mg tablet Take 1 tablet (10 mg) by mouth once daily., Starting Thu 5/9/2024, Normal      loperamide (Imodium) 2 mg capsule Take 1 capsule (2 mg) by mouth 4 times a day before meals for 7 days., Starting Thu 7/11/2024, Until Thu 7/18/2024, Normal      ostomy supplies 12 \" misc 1 each every 7 days., Starting Fri 7/12/2024, Until Tue 9/10/2024, No Print      oxyCODONE (Roxicodone) 5 mg immediate release tablet Take 1 tablet (5 mg) by mouth every 4 hours if needed for moderate pain (4 - 6) or severe pain (7 - 10) for up to 3 days., Starting u 7/11/2024, Until Sun 7/14/2024 at 2359, Normal             ALLERGIES     Patient has no known allergies.    FAMILY HISTORY       Family History   Problem Relation Name Age of Onset    Other (CARDIAC DISORDER) Mother      Hypertension Mother      Other (CARDIAC DISORDR) Father      Hypertension Father      Hypertension Sister      Heart attack Brother      Hypertension Brother          SOCIAL HISTORY       Social History     Socioeconomic History    Marital status: Single   Tobacco Use    Smoking status: Every Day     Types: Cigarettes     Passive exposure: Current " "   Smokeless tobacco: Never   Vaping Use    Vaping status: Never Used   Substance and Sexual Activity    Alcohol use: Yes     Alcohol/week: 21.0 standard drinks of alcohol     Types: 21 Cans of beer per week     Comment: \"3 to 4 beers a day\"    Drug use: Never    Sexual activity: Defer     Social Determinants of Health     Financial Resource Strain: Patient Declined (7/11/2024)    Overall Financial Resource Strain (CARDIA)     Difficulty of Paying Living Expenses: Patient declined   Food Insecurity: Patient Declined (7/11/2024)    Hunger Vital Sign     Worried About Running Out of Food in the Last Year: Patient declined     Ran Out of Food in the Last Year: Patient declined   Transportation Needs: Patient Declined (7/11/2024)    PRAPARE - Transportation     Lack of Transportation (Medical): Patient declined     Lack of Transportation (Non-Medical): Patient declined   Physical Activity: Inactive (7/11/2024)    Exercise Vital Sign     Days of Exercise per Week: 0 days     Minutes of Exercise per Session: 0 min   Stress: Patient Declined (7/11/2024)    [employer] - Occupational Stress Questionnaire     Feeling of Stress : Patient declined   Social Connections: Unknown (7/11/2024)    Social Connection and Isolation Panel [NHANES]     Frequency of Communication with Friends and Family: Patient unable to answer     Frequency of Social Gatherings with Friends and Family: Patient declined     Attends Anabaptism Services: Patient declined     Attends Club or Organization Meetings: Patient declined     Marital Status: Patient declined   Intimate Partner Violence: Not At Risk (7/11/2024)    Humiliation, Afraid, Rape, and Kick questionnaire     Fear of Current or Ex-Partner: No     Emotionally Abused: No     Physically Abused: No     Sexually Abused: No   Housing Stability: Patient Declined (7/11/2024)    Housing Stability Vital Sign     Unable to Pay for Housing in the Last Year: Patient declined "     Number of Times Moved in the Last Year: 1     Homeless in the Last Year: Patient declined       PHYSICAL EXAM       ED Triage Vitals [07/13/24 2203]   Temperature Heart Rate Respirations BP   36.9 °C (98.4 °F) 84 18 135/88      Pulse Ox Temp Source Heart Rate Source Patient Position   98 % Tympanic -- Sitting      BP Location FiO2 (%)     Left arm --        General: Appears well, no acute distress, alert  Head: Head atraumatic; normocephalic  Eyes: normal inspection; no icterus  ENT: mucosa moist without lesion  Neck: Normal inspection, no meningeal signs  Resp: Normal breath sounds, no wheeze or crackles; No respiratory distress  Chest Wall: no tenderness or deformity  Heart: Heart rate and rhythm regular; No Murmurs  Abdomen: Soft, Non-tender; No distention, guarding, rigidity, or rebound; midline abdominal wound covered in wound pouch, draining liquid stool with decreased adhesive on inferior wound pouch resulting in leaking stool; ostomy L abdominal wall with appropriate output  MSK: Normal appearance; Moves all extremities; No Pedal edema  Neuro: Alert; no focal deficits, moves all extremities  Psych: Mood and Affect normal  Skin: Color appropriate; warm; Dry    EMERGENCY DEPARTMENT COURSE/MDM   Patient presents with issues with his wound pouch at the site of the enteric fistula.  It appears that the adhesive on the inferior aspect of the pouch is adhering to the skin as well as previous resulting in significant leakage.  Will attempt to see if there is any similar wound care products here in order to replace if not we will attempt to address the area with Tegaderms and further adhesive.  Patient is agreeable with this plan of care.    ED Course as of 07/14/24 0244   Sun Jul 14, 2024   0042 Inferior aspect of the wound pouch is reinforced by ICU nurse.  There is no further leakage.  Patient is comfortable with this as well as plan of discharge. [EF]      ED Course User Index  [EF] Shante Rivera DO          Diagnoses as of 07/14/24 0244   Encounter for postoperative wound care   Dressing change       Meds Administered:  Medications - No data to display    PROCEDURES   Unless otherwise noted below, none  Procedures      FINAL IMPRESSION      1. Encounter for postoperative wound care    2. Dressing change          DISPOSITION    Discharge 07/14/2024 12:35:56 AM    DISCHARGE   PATIENT REFERRED TO:  Candy Howell PA-C  [address]  [place] [address]  [phone]            DISCHARGE MEDICATIONS:  Discharge Medication List as of 7/14/2024 12:36 AM           The patient is discharged back to their place of residence.  Discharge diagnosis, instructions and plan were discussed and understood. At the time of discharge the patient was comfortable and was in no apparent distress. Patient is aware of diagnostic uncertainty and was notified though testing is negative here, there is a very small chance that pathology may be missed.  The patient understands these risks and the patient /family understood to return immediately to the emergency department if the symptoms worsen or if they have any additional concerns.      (Comment: Please note this report has been produced using speech recognition software and may contain errors related to that system including errors in grammar, punctuation, and spelling, as well as words and phrases that may be inappropriate.  If there are any questions or concerns please feel free to contact the dictating provider for clarification.)    Shante Rivera, DO (electronically signed)  Emergency Medicine Physician    History Limited by: None  Independent history obtained from: None  External records reviewed: Inpatient Notes/Discharge Summary from 7/11/24  Diagnostics interpreted by me: None  Discussions with other clinicians: None  Chronic conditions impacting care: None  Social determinants of health affecting care: None  Diagnostic tests considered but not performed: n/a  ED Medications  managed:  Medications - No data to display    Prescription drugs considered: None             Shante Rivera,   07/14/24 0245

## 2024-07-18 DIAGNOSIS — G89.18 POST-OPERATIVE PAIN: Primary | ICD-10-CM

## 2024-07-18 RX ORDER — OXYCODONE HYDROCHLORIDE 5 MG/1
5 TABLET ORAL EVERY 6 HOURS PRN
Qty: 15 TABLET | Refills: 0 | Status: SHIPPED | OUTPATIENT
Start: 2024-07-18 | End: 2024-07-25

## 2024-07-19 ENCOUNTER — OFFICE VISIT (OUTPATIENT)
Dept: PRIMARY CARE | Facility: CLINIC | Age: 59
End: 2024-07-19
Payer: COMMERCIAL

## 2024-07-19 VITALS
OXYGEN SATURATION: 97 % | DIASTOLIC BLOOD PRESSURE: 80 MMHG | BODY MASS INDEX: 26.52 KG/M2 | HEART RATE: 86 BPM | HEIGHT: 68 IN | WEIGHT: 175 LBS | SYSTOLIC BLOOD PRESSURE: 128 MMHG

## 2024-07-19 DIAGNOSIS — T81.30XA WOUND DEHISCENCE: Primary | ICD-10-CM

## 2024-07-19 DIAGNOSIS — K57.20 DIVERTICULITIS OF LARGE INTESTINE WITH PERFORATION, UNSPECIFIED BLEEDING STATUS: ICD-10-CM

## 2024-07-19 PROCEDURE — 3079F DIAST BP 80-89 MM HG: CPT

## 2024-07-19 PROCEDURE — 3008F BODY MASS INDEX DOCD: CPT

## 2024-07-19 PROCEDURE — 3074F SYST BP LT 130 MM HG: CPT

## 2024-07-19 PROCEDURE — 99495 TRANSJ CARE MGMT MOD F2F 14D: CPT

## 2024-07-19 ASSESSMENT — ENCOUNTER SYMPTOMS
VOMITING: 0
CHILLS: 0
FEVER: 0
NAUSEA: 0

## 2024-07-19 ASSESSMENT — PAIN SCALES - GENERAL: PAINLEVEL: 0-NO PAIN

## 2024-07-19 NOTE — PROGRESS NOTES
"Subjective   Patient ID: Bereket Em is a 58 y.o. male who presents for Follow-up (Following from ed for diverticulitis /la).    Hx of HTN, tobacco use, hx of other substance abuse in remission, elevated glucose (last A1c 5.5 1/2024)    Hospital follow-up 6/5/24 - 7/11/254    Patient was seen in office on 6/2/2024 for LLQ pain and treated for possible diverticulitis and started on antibiotic. Pain worsened and patient was instructed to go to ED where bowel perforation and abscess found and underwent emergent surgery with ostomy placed. Patient had additional 2 more surgeries due to complication, including wound dehiscence. Patient was discharged due to his sister passing away and wanted to heal at home. Is having issues getting supplies for home and home care through [facility]. Has wound bag over abdomen dehiscence with fistula linking, and ostomy is non-functional. Also PICC line in place for TPN.  Prior hospitalization was smoking 1PPD and drinking 3-6 drinks a day which correlates with pour wound healing, since hospital patient has not had single drink or cigarette. Patient knows protein intake important for wound healing. Per patient abdominal wound is slowing improving. Goal is full incisions wound heal before they can reverse ostomy. Due to hospitalization patient had different general surgeon for all through surgeries./ States he only recalls talking to Dr. Rios in the hospital, and has been corresponding with PA in the group [facility] who is in-charge of his case, has appointment next Thursday with general surgery.     Due to significant weight loss, patient at this time does not need to re-start Lisinopril, stay off medication.        Review of Systems   Constitutional:  Negative for chills and fever.   Gastrointestinal:  Negative for nausea and vomiting.       Objective   /80   Pulse 86   Ht 1.727 m (5' 8\")   Wt 79.4 kg (175 lb)   SpO2 97%   BMI 26.61 kg/m² "     Physical Exam  Cardiovascular:      Rate and Rhythm: Normal rate and regular rhythm.      Heart sounds: No murmur heard.  Pulmonary:      Effort: Pulmonary effort is normal.      Breath sounds: Normal breath sounds. No wheezing, rhonchi or rales.   Abdominal:          Comments: Large open abdominal wound with wound bag pouch attached with bowl drainage, ostomy present on left side   Neurological:      Mental Status: He is alert.   Psychiatric:         Mood and Affect: Mood and affect normal.         Behavior: Behavior normal.         Assessment/Plan   Diagnoses and all orders for this visit:  Wound dehiscence  Diverticulitis of large intestine with perforation, unspecified bleeding status  Disability forms filled out. Patient will contact me after Gen Surg visit next week, and then I will be able to contact Gen Surg for additional information and future plan.

## 2024-07-22 DIAGNOSIS — R52 ACUTE PAIN: Primary | ICD-10-CM

## 2024-07-22 RX ORDER — OXYCODONE HYDROCHLORIDE 5 MG/1
5 TABLET ORAL EVERY 6 HOURS PRN
Qty: 15 TABLET | Refills: 0 | Status: ON HOLD | OUTPATIENT
Start: 2024-07-22 | End: 2024-07-29

## 2024-07-22 NOTE — PROGRESS NOTES
[facility]  TRAUMA CLINIC PROGRESS NOTE    Patient Name: Bereket Em  MRN: [id number]  Admit Date:   : 1965  AGE: 58 y.o.   GENDER: male  ==============================================================================  CHIEF COMPLAINT:   perforated diverticulitis     OTHER MEDICAL PROBLEMS:  NA    INCIDENTAL FINDINGS:  NA    PROCEDURES/COURSE:  2024 (Eliseo): ex lap, sigmoidectomy, and end colostomy   2024: wound vac applied  2024:  vac was taken down which showed necrotic fascia at midline incision.  2024(Evie): exploration laparotomy, abdominal washout, fascial debridement, enmas closure    2024 (Gabriel): re-exploration laparotomy, mesh placement for suspected infection. 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    PATHOLOGY:  2024:   FINAL DIAGNOSIS   SIGMOID COLON, RESECTION:  - Segment of colon with diverticulosis and acute serositis.     ==============================================================================  TODAY'S ASSESSMENT AND PLAN OF CARE:  DEHYDRATION/INCREASED WBC   Patients WBC has increased from  to 8.1 to 12.7  from 7/15-. Patient states he is having chills and feels weak/dehydrated  FISTULA MANAGEMENT   Continue with TPN at this time   Patient to be NPO to help decrease output   Patient to be admitted to LT9 - direct admit    FOLLOW UP/CALL  - No trauma/ACS follow up at this time   - Return to clinic or ER sooner if pt. has any development of erythema, drainage, swelling, pain, fevers, or chills  - If you have questions or concerns that are not urgent, please feel free to call  [phone].  - Call [phone] to make additional appointment(s) as needed if unable to reschedule in office today    ==============================================================================  HISTORY OF PRESENT ILLNESS  Mr. Em is a 59 y/o  M following up after hospitalization from 6/5 - 7/11 s/p perforated diverticulitis with abscess. Patient underwent ex- lap, sigmoidectomy, and end colostomy on 6/7. On 6/11 a vac was applied to his midline wound and TPN was imitated. On 6/14 vac was taken down due to concern foe necrotic tissue over midline. On 6/16 patient was taken back to the OR for ex lap, abdominal washout, fascial debridement, and enmas closure. He had another return to OR on 6/18 for re-exlap, mesh placement. CT scan revealed ECF at the left lateral edge of vicryl mesh ( 3 o'clock position).   Since patient has been home he has had numerous complications with his wound manager. He has been back to main campus for wound care. Today patient feels dehydrated and weak. He states that he is continuing with his nocturnal TPN at this time and allowing himself to eat one protein shake and another 8 oz glass of water a day. He also will pick at food throughout the day. Since leaving the hospital he states that the wound output has increased. His wound manager has failed today and is leaking all other the patient. He also states that his ostomy has had very little output.     MEDICAL HISTORY / ROS:  Admission history and ROS reviewed.   Patient denies:  fevers;  headache;  dizziness; chest pain; shortness of breath; nausea/vomiting/diarrhea/constipation; new/worsening abdominal pain or numbness/tingling/weakness of extremities.   Pertinent changes as follows:  CHILLS     PHYSICAL EXAM:  GCS 15, A+OX3, RRR, S1, S2, CTA=, no increased WOB. Abd soft, nt, nd. MAEx4, MONICA 5/5 x4, no extremity edema noted. 2+pp.   Wound location: Large midline abdominal wound   Wound description: Fistula noted through wound manager with gastric contents in wound manager     LABS:  No results found for this or any previous visit (from the past 24 hour(s)).  MEDICATIONS:  Current Outpatient Medications   Medication Sig Dispense Refill    acetaminophen (Tylenol) 325 mg tablet  "Take 2 tablets (650 mg) by mouth every 6 hours if needed for mild pain (1 - 3).      Adult Clinimix Parenteral Nutrition Cyclic Infuse 2,090 mL over 12 hours into a venous catheter (central line) continuously. Taper up for 1 Hours. Taper down for 1 Hours. 30 Bag 0    fat emulsion fish oil/plant based (SMOFlipid) 20 % emulsion Infuse 250 mL (50 g) at 20.8 mL/hr over 12 hours into a venous catheter Daily Lipids. 7500 mL 0    ostomy supplies 12 \" misc 1 each every 7 days.      oxyCODONE (Roxicodone) 5 mg immediate release tablet Take 1 tablet (5 mg) by mouth every 6 hours if needed for severe pain (7 - 10) for up to 7 days. 15 tablet 0     No current facility-administered medications for this visit.       IMAGING SUMMARY:  (summary of new imaging findings, not a copy of dictation)  NA    I have reviewed all laboratory and imaging results ordered/pertinent for today's encounter.   "

## 2024-07-25 ENCOUNTER — APPOINTMENT (OUTPATIENT)
Dept: SURGERY | Facility: CLINIC | Age: 59
End: 2024-07-25
Payer: COMMERCIAL

## 2024-07-25 ENCOUNTER — HOSPITAL ENCOUNTER (INPATIENT)
Facility: HOSPITAL | Age: 59
DRG: 394 | End: 2024-07-25
Attending: SURGERY | Admitting: SURGERY
Payer: COMMERCIAL

## 2024-07-25 VITALS
OXYGEN SATURATION: 97 % | WEIGHT: 174.5 LBS | SYSTOLIC BLOOD PRESSURE: 130 MMHG | TEMPERATURE: 97.4 F | HEART RATE: 87 BPM | BODY MASS INDEX: 26.45 KG/M2 | DIASTOLIC BLOOD PRESSURE: 81 MMHG | HEIGHT: 68 IN

## 2024-07-25 DIAGNOSIS — K56.609 SMALL BOWEL OBSTRUCTION (MULTI): ICD-10-CM

## 2024-07-25 DIAGNOSIS — Z48.89 ENCOUNTER FOR POSTOPERATIVE WOUND CARE: ICD-10-CM

## 2024-07-25 DIAGNOSIS — K63.2 ENTEROCUTANEOUS FISTULA: Primary | ICD-10-CM

## 2024-07-25 DIAGNOSIS — E87.6 HYPOKALEMIA, ECF TO ICF SHIFTS: ICD-10-CM

## 2024-07-25 DIAGNOSIS — E86.0 DEHYDRATION: Primary | ICD-10-CM

## 2024-07-25 LAB
ALBUMIN SERPL BCP-MCNC: 4 G/DL (ref 3.4–5)
ANION GAP SERPL CALC-SCNC: 14 MMOL/L (ref 10–20)
BUN SERPL-MCNC: 40 MG/DL (ref 6–23)
CALCIUM SERPL-MCNC: 10 MG/DL (ref 8.6–10.6)
CHLORIDE SERPL-SCNC: 88 MMOL/L (ref 98–107)
CO2 SERPL-SCNC: 33 MMOL/L (ref 21–32)
CREAT SERPL-MCNC: 0.69 MG/DL (ref 0.5–1.3)
EGFRCR SERPLBLD CKD-EPI 2021: >90 ML/MIN/1.73M*2
ERYTHROCYTE [DISTWIDTH] IN BLOOD BY AUTOMATED COUNT: 14.6 % (ref 11.5–14.5)
GLUCOSE SERPL-MCNC: 101 MG/DL (ref 74–99)
HCT VFR BLD AUTO: 37.7 % (ref 41–52)
HGB BLD-MCNC: 12.1 G/DL (ref 13.5–17.5)
MAGNESIUM SERPL-MCNC: 2.01 MG/DL (ref 1.6–2.4)
MCH RBC QN AUTO: 25.2 PG (ref 26–34)
MCHC RBC AUTO-ENTMCNC: 32.1 G/DL (ref 32–36)
MCV RBC AUTO: 78 FL (ref 80–100)
NRBC BLD-RTO: 0 /100 WBCS (ref 0–0)
PHOSPHATE SERPL-MCNC: 4.2 MG/DL (ref 2.5–4.9)
PLATELET # BLD AUTO: 460 X10*3/UL (ref 150–450)
POTASSIUM SERPL-SCNC: 3.4 MMOL/L (ref 3.5–5.3)
RBC # BLD AUTO: 4.81 X10*6/UL (ref 4.5–5.9)
SODIUM SERPL-SCNC: 132 MMOL/L (ref 136–145)
WBC # BLD AUTO: 13.5 X10*3/UL (ref 4.4–11.3)

## 2024-07-25 PROCEDURE — 1100000001 HC PRIVATE ROOM DAILY

## 2024-07-25 PROCEDURE — 99024 POSTOP FOLLOW-UP VISIT: CPT | Performed by: PHYSICIAN ASSISTANT

## 2024-07-25 PROCEDURE — 2500000001 HC RX 250 WO HCPCS SELF ADMINISTERED DRUGS (ALT 637 FOR MEDICARE OP)

## 2024-07-25 PROCEDURE — 99222 1ST HOSP IP/OBS MODERATE 55: CPT | Performed by: SURGERY

## 2024-07-25 PROCEDURE — 3E0436Z INTRODUCTION OF NUTRITIONAL SUBSTANCE INTO CENTRAL VEIN, PERCUTANEOUS APPROACH: ICD-10-PCS

## 2024-07-25 PROCEDURE — 2500000005 HC RX 250 GENERAL PHARMACY W/O HCPCS

## 2024-07-25 PROCEDURE — 85027 COMPLETE CBC AUTOMATED: CPT

## 2024-07-25 PROCEDURE — 80069 RENAL FUNCTION PANEL: CPT

## 2024-07-25 PROCEDURE — 83735 ASSAY OF MAGNESIUM: CPT

## 2024-07-25 PROCEDURE — 2580000001 HC RX 258 IV SOLUTIONS

## 2024-07-25 PROCEDURE — 2500000004 HC RX 250 GENERAL PHARMACY W/ HCPCS (ALT 636 FOR OP/ED)

## 2024-07-25 RX ORDER — LOPERAMIDE HYDROCHLORIDE 2 MG/1
2 CAPSULE ORAL
Status: DISCONTINUED | OUTPATIENT
Start: 2024-07-25 | End: 2024-07-26

## 2024-07-25 RX ORDER — HEPARIN SODIUM 5000 [USP'U]/ML
5000 INJECTION, SOLUTION INTRAVENOUS; SUBCUTANEOUS EVERY 8 HOURS SCHEDULED
Status: DISCONTINUED | OUTPATIENT
Start: 2024-07-25 | End: 2024-08-05 | Stop reason: HOSPADM

## 2024-07-25 RX ORDER — OXYCODONE HYDROCHLORIDE 5 MG/1
5 TABLET ORAL EVERY 6 HOURS PRN
Status: DISCONTINUED | OUTPATIENT
Start: 2024-07-25 | End: 2024-07-29

## 2024-07-25 RX ORDER — ACETAMINOPHEN 325 MG/1
650 TABLET ORAL EVERY 6 HOURS PRN
Status: DISCONTINUED | OUTPATIENT
Start: 2024-07-25 | End: 2024-07-29

## 2024-07-25 RX ORDER — OXYCODONE HYDROCHLORIDE 5 MG/1
10 TABLET ORAL EVERY 6 HOURS PRN
Status: DISCONTINUED | OUTPATIENT
Start: 2024-07-25 | End: 2024-07-27

## 2024-07-25 RX ORDER — POLYETHYLENE GLYCOL 3350 17 G/17G
17 POWDER, FOR SOLUTION ORAL DAILY
Status: DISCONTINUED | OUTPATIENT
Start: 2024-07-25 | End: 2024-07-25

## 2024-07-25 RX ORDER — SODIUM CHLORIDE, SODIUM LACTATE, POTASSIUM CHLORIDE, CALCIUM CHLORIDE 600; 310; 30; 20 MG/100ML; MG/100ML; MG/100ML; MG/100ML
100 INJECTION, SOLUTION INTRAVENOUS CONTINUOUS
Status: DISCONTINUED | OUTPATIENT
Start: 2024-07-25 | End: 2024-07-30

## 2024-07-25 RX ORDER — OXYCODONE HYDROCHLORIDE 5 MG/1
5 TABLET ORAL EVERY 6 HOURS PRN
Status: DISCONTINUED | OUTPATIENT
Start: 2024-07-25 | End: 2024-07-25

## 2024-07-25 RX ORDER — TALC
3 POWDER (GRAM) TOPICAL NIGHTLY PRN
Status: DISCONTINUED | OUTPATIENT
Start: 2024-07-25 | End: 2024-08-05 | Stop reason: HOSPADM

## 2024-07-25 RX ADMIN — LOPERAMIDE HYDROCHLORIDE 2 MG: 2 CAPSULE ORAL at 15:35

## 2024-07-25 RX ADMIN — OXYCODONE HYDROCHLORIDE 5 MG: 5 TABLET ORAL at 19:47

## 2024-07-25 RX ADMIN — ACETAMINOPHEN 650 MG: 325 TABLET ORAL at 15:35

## 2024-07-25 RX ADMIN — ASCORBIC ACID, VITAMIN A PALMITATE, CHOLECALCIFEROL, THIAMINE HYDROCHLORIDE, RIBOFLAVIN-5 PHOSPHATE SODIUM, PYRIDOXINE HYDROCHLORIDE, NIACINAMIDE, DEXPANTHENOL, ALPHA-TOCOPHEROL ACETATE, VITAMIN K1, FOLIC ACID, BIOTIN, CYANOCOBALAMIN: 200; 3300; 200; 6; 3.6; 6; 40; 15; 10; 150; 600; 60; 5 INJECTION, SOLUTION INTRAVENOUS at 19:47

## 2024-07-25 RX ADMIN — SMOFLIPID 50 G: 6; 6; 5; 3 INJECTION, EMULSION INTRAVENOUS at 20:05

## 2024-07-25 RX ADMIN — Medication 3 MG: at 21:55

## 2024-07-25 RX ADMIN — LOPERAMIDE HYDROCHLORIDE 2 MG: 2 CAPSULE ORAL at 20:06

## 2024-07-25 RX ADMIN — HEPARIN SODIUM 5000 UNITS: 5000 INJECTION INTRAVENOUS; SUBCUTANEOUS at 21:56

## 2024-07-25 RX ADMIN — SODIUM CHLORIDE, POTASSIUM CHLORIDE, SODIUM LACTATE AND CALCIUM CHLORIDE 100 ML/HR: 600; 310; 30; 20 INJECTION, SOLUTION INTRAVENOUS at 13:12

## 2024-07-25 SDOH — SOCIAL STABILITY: SOCIAL INSECURITY: DO YOU FEEL ANYONE HAS EXPLOITED OR TAKEN ADVANTAGE OF YOU FINANCIALLY OR OF YOUR PERSONAL PROPERTY?: NO

## 2024-07-25 SDOH — SOCIAL STABILITY: SOCIAL INSECURITY: WERE YOU ABLE TO COMPLETE ALL THE BEHAVIORAL HEALTH SCREENINGS?: YES

## 2024-07-25 SDOH — SOCIAL STABILITY: SOCIAL INSECURITY: HAVE YOU HAD THOUGHTS OF HARMING ANYONE ELSE?: NO

## 2024-07-25 SDOH — ECONOMIC STABILITY: HOUSING INSECURITY: AT ANY TIME IN THE PAST 12 MONTHS, WERE YOU HOMELESS OR LIVING IN A SHELTER (INCLUDING NOW)?: PATIENT DECLINED

## 2024-07-25 SDOH — SOCIAL STABILITY: SOCIAL INSECURITY: HAS ANYONE EVER THREATENED TO HURT YOUR FAMILY OR YOUR PETS?: NO

## 2024-07-25 SDOH — ECONOMIC STABILITY: HOUSING INSECURITY: IN THE PAST 12 MONTHS HAS THE ELECTRIC, GAS, OIL, OR WATER COMPANY THREATENED TO SHUT OFF SERVICES IN YOUR HOME?: NO

## 2024-07-25 SDOH — SOCIAL STABILITY: SOCIAL INSECURITY: DOES ANYONE TRY TO KEEP YOU FROM HAVING/CONTACTING OTHER FRIENDS OR DOING THINGS OUTSIDE YOUR HOME?: NO

## 2024-07-25 SDOH — ECONOMIC STABILITY: FOOD INSECURITY: WITHIN THE PAST 12 MONTHS, YOU WORRIED THAT YOUR FOOD WOULD RUN OUT BEFORE YOU GOT MONEY TO BUY MORE.: NEVER TRUE

## 2024-07-25 SDOH — ECONOMIC STABILITY: INCOME INSECURITY: IN THE LAST 12 MONTHS, WAS THERE A TIME WHEN YOU WERE NOT ABLE TO PAY THE MORTGAGE OR RENT ON TIME?: NO

## 2024-07-25 SDOH — SOCIAL STABILITY: SOCIAL INSECURITY: DO YOU FEEL UNSAFE GOING BACK TO THE PLACE WHERE YOU ARE LIVING?: NO

## 2024-07-25 SDOH — SOCIAL STABILITY: SOCIAL INSECURITY: ARE YOU OR HAVE YOU BEEN THREATENED OR ABUSED PHYSICALLY, EMOTIONALLY, OR SEXUALLY BY ANYONE?: NO

## 2024-07-25 SDOH — ECONOMIC STABILITY: FOOD INSECURITY: WITHIN THE PAST 12 MONTHS, YOU WORRIED THAT YOUR FOOD WOULD RUN OUT BEFORE YOU GOT THE MONEY TO BUY MORE.: NEVER TRUE

## 2024-07-25 SDOH — SOCIAL STABILITY: SOCIAL INSECURITY: ABUSE: ADULT

## 2024-07-25 SDOH — ECONOMIC STABILITY: INCOME INSECURITY: HOW HARD IS IT FOR YOU TO PAY FOR THE VERY BASICS LIKE FOOD, HOUSING, MEDICAL CARE, AND HEATING?: PATIENT DECLINED

## 2024-07-25 SDOH — ECONOMIC STABILITY: TRANSPORTATION INSECURITY: IN THE PAST 12 MONTHS, HAS LACK OF TRANSPORTATION KEPT YOU FROM MEDICAL APPOINTMENTS OR FROM GETTING MEDICATIONS?: NO

## 2024-07-25 SDOH — ECONOMIC STABILITY: FOOD INSECURITY: WITHIN THE PAST 12 MONTHS, THE FOOD YOU BOUGHT JUST DIDN'T LAST AND YOU DIDN'T HAVE MONEY TO GET MORE.: NEVER TRUE

## 2024-07-25 SDOH — ECONOMIC STABILITY: HOUSING INSECURITY: IN THE PAST 12 MONTHS, HOW MANY TIMES HAVE YOU MOVED WHERE YOU WERE LIVING?: 0

## 2024-07-25 SDOH — ECONOMIC STABILITY: HOUSING INSECURITY

## 2024-07-25 SDOH — ECONOMIC STABILITY: TRANSPORTATION INSECURITY
IN THE PAST 12 MONTHS, HAS THE LACK OF TRANSPORTATION KEPT YOU FROM MEDICAL APPOINTMENTS OR FROM GETTING MEDICATIONS?: NO

## 2024-07-25 SDOH — ECONOMIC STABILITY: FOOD INSECURITY

## 2024-07-25 SDOH — ECONOMIC STABILITY: HOUSING INSECURITY
IN THE LAST 12 MONTHS, WAS THERE A TIME WHEN YOU DID NOT HAVE A STEADY PLACE TO SLEEP OR SLEPT IN A SHELTER (INCLUDING NOW)?: NO

## 2024-07-25 SDOH — ECONOMIC STABILITY: HOUSING INSECURITY: IN THE LAST 12 MONTHS, HOW MANY PLACES HAVE YOU LIVED?: 1

## 2024-07-25 SDOH — ECONOMIC STABILITY: GENERAL

## 2024-07-25 SDOH — ECONOMIC STABILITY: TRANSPORTATION INSECURITY
IN THE PAST 12 MONTHS, HAS LACK OF TRANSPORTATION KEPT YOU FROM MEETINGS, WORK, OR FROM GETTING THINGS NEEDED FOR DAILY LIVING?: NO

## 2024-07-25 SDOH — SOCIAL STABILITY: SOCIAL INSECURITY: ARE THERE ANY APPARENT SIGNS OF INJURIES/BEHAVIORS THAT COULD BE RELATED TO ABUSE/NEGLECT?: NO

## 2024-07-25 SDOH — ECONOMIC STABILITY: HOUSING INSECURITY: IN THE LAST 12 MONTHS, WAS THERE A TIME WHEN YOU WERE NOT ABLE TO PAY THE MORTGAGE OR RENT ON TIME?: NO

## 2024-07-25 SDOH — ECONOMIC STABILITY: TRANSPORTATION INSECURITY

## 2024-07-25 SDOH — ECONOMIC STABILITY: INCOME INSECURITY: IN THE LAST 12 MONTHS, WAS THERE A TIME WHEN YOU WERE NOT ABLE TO PAY THE MORTGAGE OR RENT ON TIME?: PATIENT DECLINED

## 2024-07-25 ASSESSMENT — COGNITIVE AND FUNCTIONAL STATUS - GENERAL
PATIENT BASELINE BEDBOUND: NO
MOBILITY SCORE: 22
MOBILITY SCORE: 24
CLIMB 3 TO 5 STEPS WITH RAILING: A LOT
DAILY ACTIVITIY SCORE: 24
DAILY ACTIVITIY SCORE: 24

## 2024-07-25 ASSESSMENT — PAIN SCALES - GENERAL
PAINLEVEL: 0-NO PAIN
PAINLEVEL_OUTOF10: 6
PAINLEVEL_OUTOF10: 4

## 2024-07-25 ASSESSMENT — LIFESTYLE VARIABLES
AUDIT-C TOTAL SCORE: 0
HOW OFTEN DO YOU HAVE 6 OR MORE DRINKS ON ONE OCCASION: NEVER
HOW MANY STANDARD DRINKS CONTAINING ALCOHOL DO YOU HAVE ON A TYPICAL DAY: PATIENT DOES NOT DRINK
HOW OFTEN DO YOU HAVE A DRINK CONTAINING ALCOHOL: NEVER
AUDIT-C TOTAL SCORE: 0
SKIP TO QUESTIONS 9-10: 1

## 2024-07-25 ASSESSMENT — ACTIVITIES OF DAILY LIVING (ADL)
TOILETING: INDEPENDENT
HEARING - RIGHT EAR: FUNCTIONAL
GROOMING: INDEPENDENT
BATHING: INDEPENDENT
ASSISTIVE_DEVICE: WALKER
LACK_OF_TRANSPORTATION: PATIENT DECLINED
ADEQUATE_TO_COMPLETE_ADL: YES
DRESSING YOURSELF: INDEPENDENT
HEARING - LEFT EAR: FUNCTIONAL
FEEDING YOURSELF: INDEPENDENT
WALKS IN HOME: INDEPENDENT
PATIENT'S MEMORY ADEQUATE TO SAFELY COMPLETE DAILY ACTIVITIES?: YES
LACK_OF_TRANSPORTATION: NO
JUDGMENT_ADEQUATE_SAFELY_COMPLETE_DAILY_ACTIVITIES: YES

## 2024-07-25 ASSESSMENT — COLUMBIA-SUICIDE SEVERITY RATING SCALE - C-SSRS
6. HAVE YOU EVER DONE ANYTHING, STARTED TO DO ANYTHING, OR PREPARED TO DO ANYTHING TO END YOUR LIFE?: NO
1. IN THE PAST MONTH, HAVE YOU WISHED YOU WERE DEAD OR WISHED YOU COULD GO TO SLEEP AND NOT WAKE UP?: NO
2. HAVE YOU ACTUALLY HAD ANY THOUGHTS OF KILLING YOURSELF?: NO

## 2024-07-25 ASSESSMENT — PATIENT HEALTH QUESTIONNAIRE - PHQ9
SUM OF ALL RESPONSES TO PHQ9 QUESTIONS 1 & 2: 0
1. LITTLE INTEREST OR PLEASURE IN DOING THINGS: NOT AT ALL
2. FEELING DOWN, DEPRESSED OR HOPELESS: NOT AT ALL

## 2024-07-25 ASSESSMENT — PAIN DESCRIPTION - DESCRIPTORS
DESCRIPTORS: ACHING;DISCOMFORT;DULL
DESCRIPTORS: ACHING;DISCOMFORT;THROBBING

## 2024-07-25 ASSESSMENT — SOCIAL DETERMINANTS OF HEALTH (SDOH): IN THE PAST 12 MONTHS, HAS THE ELECTRIC, GAS, OIL, OR WATER COMPANY THREATENED TO SHUT OFF SERVICE IN YOUR HOME?: NO

## 2024-07-25 ASSESSMENT — PAIN - FUNCTIONAL ASSESSMENT
PAIN_FUNCTIONAL_ASSESSMENT: 0-10
PAIN_FUNCTIONAL_ASSESSMENT: 0-10

## 2024-07-25 NOTE — PROGRESS NOTES
"Pharmacy Medication History Review    Bereket Em is a 58 y.o. male admitted for Enterocutaneous fistula. Pharmacy reviewed the patient's [id number] medications and allergies for accuracy.    The list below reflects the updated PTA list. Comments regarding how patient may be taking medications differently can be found in the Admit Orders Activity  Prior to Admission Medications   Prescriptions Last Dose Informant Taking?   Adult Clinimix Parenteral Nutrition Cyclic  Self    Sig: Infuse 2,090 mL over 12 hours into a venous catheter (central line) continuously. Taper up for 1 Hours. Taper down for 1 Hours.   acetaminophen (Tylenol) 325 mg tablet 2024 at 0400 Self Yes   Sig: Take 2 tablets (650 mg) by mouth every 6 hours if needed for mild pain (1 - 3).   fat emulsion fish oil/plant based (SMOFlipid) 20 % emulsion  Self    Sig: Infuse 250 mL (50 g) at 20.8 mL/hr over 12 hours into a venous catheter Daily Lipids.   loperamide (Imodium) 2 mg capsule 2024 Self No   Sig: Take 1 capsule (2 mg) by mouth 4 times a day before meals for 7 days.   ostomy supplies 12 \" misc  Self No   Si each every 7 days.   oxyCODONE (Roxicodone) 5 mg immediate release tablet 2024 at 1900 Self Yes   Sig: Take 1 tablet (5 mg) by mouth every 6 hours if needed for severe pain (7 - 10) for up to 7 days.   oxyCODONE (Roxicodone) 5 mg immediate release tablet  at duplicate Self No   Sig: Take 1 tablet (5 mg) by mouth every 6 hours if needed for severe pain (7 - 10) for up to 7 days.      Facility-Administered Medications: None        The list below reflects the updated allergy list. Please review each documented allergy for additional clarification and justification.  Allergies  Reviewed by Antoinette Lowry, PharmD on 2024   No Known Allergies         Patient declines M2B at discharge. Pharmacy has been updated to [id number].    Sources used to complete the med history include   [place]ARRS, Surescripts fill " "history, care everywhere  Patient interview, patient is an excellent historian    Below are additional concerns with the patient's PTA list.  None     Medications ADDED:  None   Medications CHANGED:  None   Medications REMOVED:   None     Antoinette Lowry, PharmD, [facility] PGY1 Pharmacy Resident   Meds Ambulatory and Retail Services  Please reach out via [employer] Secure Chat for questions, or if no response call [phone] or [employer] \"MedRec\"    "

## 2024-07-25 NOTE — CONSULTS
Wound Care Consult     Visit Date: 7/25/2024      Patient Name: Bereket Em         MRN: [id number]           YOB: 1965     Reason for Consult: Wound manager pouch of midline wound and ECF.        Wound History:   06/07/2024 ([place]): ex lap, sigmoidectomy, and end colostomy   06/11/2024: wound vac applied  06/14/2024:  vac was taken down which showed necrotic fascia at midline incision.  06/16/2024([place]): exploration laparotomy, abdominal washout, fascial debridement, enmas closure    06/18/2024 ([place]riel): re-exploration laparotomy, mesh placement for suspected infection. 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     Pertinent Labs:   Albumin   Date Value Ref Range Status   07/11/2024 3.2 (L) 3.4 - 5.0 g/dL Final     ALBUMIN (MG/L) IN URINE   Date Value Ref Range Status   05/06/2021 <7.0 Not Established mg/L Final       Wound Assessment:  Wound 06/07/24 Incision Abdomen Medial;Upper (Active)   Wound Image   07/25/24 1416   Site Assessment Granulation;Red;Clean 07/25/24 1416   Usha-Wound Assessment Denuded;Painful;Red 07/25/24 1416   Shape Oval 07/25/24 1416   Wound Length (cm) 15 cm 07/25/24 1416   Wound Width (cm) 8.5 cm 07/25/24 1416   Wound Surface Area (cm^2) 127.5 cm^2 07/25/24 1416   Wound Depth (cm) 1.5 cm 07/25/24 1416   Wound Volume (cm^3) 191.25 cm^3 07/25/24 1416   Wound Healing % 63 07/25/24 1416   State of Healing Healing ridge;Early/partial granulation 07/25/24 1416   Margins Well-defined edges 07/25/24 1416   Closure Open to air 07/25/24 1416   Sutures/Staple Line Non approximated 07/25/24 1416   Drainage Description Yellow;Foul odor 07/25/24 1416   Drainage Amount Moderate 07/25/24 1416   Dressing Changed New 07/25/24 1416   Dressing Status Clean;Dry 07/25/24 1416      07/25/24 1433   Colostomy LLQ   Placement Date/Time: 06/07/24 1329   Location: LLQ   Stomal Appliance 2 piece   Site/Stoma  Assessment Clean;Intact   Stoma Size (cm)   (1 1/2 oval)   Peristomal Assessment Clean;Intact   Treatment Pouch change;Site care     Ostomy type: colostomy        size: 1 1/2 oval     color: pink and moist      protruding: budded   Umer: none  Functioning: loose brown stool   Mucocutaneous junction: intact   Peristomal skin: intact   Pouchin piece [place] wafer and lock n roll pouch   Ostomy Education: Patient is independent with his ostomy care   Plan: assess stoma/pouching    Wound Team Summary Assessment: The wound care team came to bedside to assess the patient's midline abdomen wound and ECF.  The patient's wound is red, clean, moist with granulation and 2 ECF. The wound was cleansed with vashe wound cleanser and the periwound skin was prepped with stoma powder and 3M cavilon Advanced.  At the distal edge of the wound, a 4 in adaptic ring was applied to protect the periwound skin.  A Coloplast mini wound manager pouch was cut to size and applied over the entire wound. Stoma paste and powder was used to create an additional seal.      Wound Team Plan: The wound manager pouch can remain in place for 5-7 days if not leaking occurs. Wound care will continue to follow.      ISABELLE Ramos  2024  2:34 PM

## 2024-07-25 NOTE — H&P
[facility]  ACUTE CARE SURGERY - HISTORY AND PHYSICAL / CONSULT    Patient Name: Bereket Em  MRN: [id number]  Admit Date: 725  : 1965  AGE: 58 y.o.   GENDER: male  ==============================================================================  TODAY'S ASSESSMENT AND PLAN OF CARE:  Bereket Em is a 58 y.o. year old male patient with a history of perforated diverticulitis s/p ex lap and sigmoidectomy c/b necrotic fascia with takeback to OR to repeat ex lap and placement of bridging mesh. Developed EC fistula x 2, returning today with high fistula output and dehydration.     Plan:  - admit to ACS  - NPO, mIVF  - continue cycled TPN  - repeat labs: CBC, RFP, mag  - strict I/Os  - WOCN consult  - continue home meds  - DVT PPX: SQH    Plans discussed with Attending Physician Dr. Case.    Rayna Davidson MD PGY-2  Acute Care Surgery [id number]      ==============================================================================  CHIEF COMPLAINT/REASON FOR CONSULT:  Bereket Em is a 58 y.o. year old male patient with a history of perforated diverticulitis now s/p procedures listed below, HTN, BPH presenting from clinic with concern for dehydration secondary to high output fistula. Patient states that he overall feels well. Has has a wound manager in place over the fistula, which he does have some issues with keeping in place. He notes that when he drinks, he very quickly has high output from the fistulas, left greater than right. He limits himself to a protein shake and a glass of water daily. He does state that if he eats without consuming liquids his fistulas have lower output. He has stopped taking his imodium as the script ran out and he was unsure if it was helping. Has has minimal output from the ostomy. He is on nocturnal TPN. Outpatient labs with Cr increased from 0.4 to 0.6 and leukocytosis to Admitted directly to the ACS service for management of dehydration  and wound care.     06/07/2024 ([place]): ex lap, sigmoidectomy, and end colostomy   06/11/2024: wound vac applied  06/14/2024:  vac was taken down which showed necrotic fascia at midline incision.  06/16/2024([place]): exploration laparotomy, abdominal washout, fascial debridement, enmas closure    06/18/2024 ([place]): re-exploration laparotomy, mesh placement for suspected infection. 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    PAST MEDICAL HISTORY:   PMH:   Past Medical History:   Diagnosis Date    Acute pharyngitis, unspecified     Sore throat    Acute upper respiratory infection, unspecified 02/13/2017    Acute URI    Alcohol abuse, in remission 09/12/2018    History of alcohol abuse    Elevated blood-pressure reading, without diagnosis of hypertension 02/23/2015    Elevated blood pressure reading without diagnosis of hypertension    Encounter for immunization 10/10/2014    Need for Tdap vaccination    Encounter for screening for malignant neoplasm of colon 01/04/2017    Encounter for colonoscopy due to history of adenomatous colonic polyps    Encounter for screening for malignant neoplasm of colon 11/02/2016    Encounter for screening colonoscopy    Encounter for screening for malignant neoplasm of colon 11/02/2016    Encounter for screening colonoscopy    Encounter for screening for malignant neoplasm of prostate 09/12/2018    Prostate cancer screening    Essential (primary) hypertension 03/14/2019    Elevated blood pressure reading with diagnosis of hypertension    Impingement syndrome of right shoulder 06/06/2016    Impingement syndrome of right shoulder    Incomplete rotator cuff tear or rupture of right shoulder, not specified as traumatic 09/12/2018    Partial nontraumatic tear of right rotator cuff    Noninfective gastroenteritis and colitis, unspecified 01/23/2018    Acute gastroenteritis    Other general symptoms and signs  "11/02/2016    Flu-like symptoms    Other malaise 11/02/2016    Malaise and fatigue    Pain in left ankle and joints of left foot 10/10/2017    Left ankle pain    Pain in left foot 09/12/2017    Left foot pain    Pain in right shoulder 04/11/2016    Right shoulder pain    Pain in thoracic spine 09/12/2018    Thoracic back pain    Pain, unspecified 02/09/2017    Body aches    Personal history of colonic polyps 01/04/2017    History of colonic polyps    Personal history of other diseases of male genital organs 03/15/2019    History of acute prostatitis    Personal history of other diseases of the respiratory system 04/07/2020    History of acute sinusitis    Personal history of other specified conditions 03/14/2019    History of flank pain    Personal history of other specified conditions 10/10/2014    History of paresthesia    Strain of muscle, fascia and tendon of other parts of biceps, right arm, initial encounter 03/11/2016    Rupture of biceps tendon, right, initial encounter       PSH:   Past Surgical History:   Procedure Laterality Date    SHOULDER SURGERY  06/27/2017    Shoulder Surgery     FH:   Family History   Problem Relation Name Age of Onset    Other (CARDIAC DISORDER) Mother      Hypertension Mother      Other (CARDIAC DISORDR) Father      Hypertension Father      Hypertension Sister      Heart attack Brother      Hypertension Brother       SOCIAL HISTORY:    Smoking:    Social History     Tobacco Use   Smoking Status Every Day    Types: Cigarettes    Passive exposure: Current   Smokeless Tobacco Never       Alcohol:    Social History     Substance and Sexual Activity   Alcohol Use Yes    Alcohol/week: 21.0 standard drinks of alcohol    Types: 21 Cans of beer per week    Comment: \"3 to 4 beers a day\"       MEDICATIONS:   Prior to Admission medications    Medication Sig Start Date End Date Taking? Authorizing Provider   acetaminophen (Tylenol) 325 mg tablet Take 2 tablets (650 mg) by mouth every 6 hours " "if needed for mild pain (1 - 3). 6/22/24 7/22/24  Bridget Vitale MD   Adult Clinimix Parenteral Nutrition Cyclic Infuse 2,090 mL over 12 hours into a venous catheter (central line) continuously. Taper up for 1 Hours. Taper down for 1 Hours. 7/10/24 8/9/24  ASHLY Hawk   fat emulsion fish oil/plant based (SMOFlipid) 20 % emulsion Infuse 250 mL (50 g) at 20.8 mL/hr over 12 hours into a venous catheter Daily Lipids. 7/10/24 8/9/24  ASHLY Hawk   loperamide (Imodium) 2 mg capsule Take 1 capsule (2 mg) by mouth 4 times a day before meals for 7 days. 7/11/24 7/18/24  Michelle Rosario MD   ostomy supplies 12 \" misc 1 each every 7 days. 7/12/24 9/10/24  Kinjal Dye PA-C   oxyCODONE (Roxicodone) 5 mg immediate release tablet Take 1 tablet (5 mg) by mouth every 6 hours if needed for severe pain (7 - 10) for up to 7 days. 7/18/24 7/25/24  Kinjal Dye PA-C   oxyCODONE (Roxicodone) 5 mg immediate release tablet Take 1 tablet (5 mg) by mouth every 6 hours if needed for severe pain (7 - 10) for up to 7 days. 7/22/24 7/29/24  ASHLY Morris   lisinopril 10 mg tablet Take 1 tablet (10 mg) by mouth once daily.  Patient not taking: Reported on 7/19/2024 5/9/24 7/19/24  Candy Howell PA-C   oxyCODONE (Roxicodone) 5 mg immediate release tablet Take 1 tablet (5 mg) by mouth every 4 hours if needed for moderate pain (4 - 6) or severe pain (7 - 10) for up to 3 days. 7/11/24 7/19/24  Michelle Rosario MD     ALLERGIES:   No Known Allergies    REVIEW OF SYSTEMS:  Review of Systems  A 10 point ROS was conducted, negative except as per HPI  PHYSICAL EXAM:  Physical Exam  Neurological: Awake, alert, conversive  Respiratory/Thorax: even, unlabored  Genitourinary: voiding  Gastrointestinal: soft, NT, ND.  Healing midline incision with pink granulation tissue at the base with EC fistula x 2 with thin brown output L>R, LLQ ostomy pink and well perfused with minimal output in appliance  Skin: warm, " dry  Musculoskeletal: TORRES  Eyes: non-icteric  Extremities: no edema   Psychological: appropriate mood/affect    IMAGING SUMMARY:    No new imaging    LABS:  CBC, RFP, Mag pending      I have reviewed all laboratory and imaging results ordered/pertinent for this encounter.

## 2024-07-26 LAB
ALBUMIN SERPL BCP-MCNC: 3.5 G/DL (ref 3.4–5)
ANION GAP SERPL CALC-SCNC: 15 MMOL/L (ref 10–20)
BUN SERPL-MCNC: 29 MG/DL (ref 6–23)
CALCIUM SERPL-MCNC: 9.1 MG/DL (ref 8.6–10.6)
CHLORIDE SERPL-SCNC: 94 MMOL/L (ref 98–107)
CO2 SERPL-SCNC: 30 MMOL/L (ref 21–32)
CREAT SERPL-MCNC: 0.57 MG/DL (ref 0.5–1.3)
EGFRCR SERPLBLD CKD-EPI 2021: >90 ML/MIN/1.73M*2
ERYTHROCYTE [DISTWIDTH] IN BLOOD BY AUTOMATED COUNT: 14.6 % (ref 11.5–14.5)
GLUCOSE SERPL-MCNC: 126 MG/DL (ref 74–99)
HCT VFR BLD AUTO: 32.4 % (ref 41–52)
HGB BLD-MCNC: 10.3 G/DL (ref 13.5–17.5)
MAGNESIUM SERPL-MCNC: 2.19 MG/DL (ref 1.6–2.4)
MCH RBC QN AUTO: 25.1 PG (ref 26–34)
MCHC RBC AUTO-ENTMCNC: 31.8 G/DL (ref 32–36)
MCV RBC AUTO: 79 FL (ref 80–100)
NRBC BLD-RTO: 0 /100 WBCS (ref 0–0)
PHOSPHATE SERPL-MCNC: 3.9 MG/DL (ref 2.5–4.9)
PLATELET # BLD AUTO: 412 X10*3/UL (ref 150–450)
POTASSIUM SERPL-SCNC: 3.5 MMOL/L (ref 3.5–5.3)
RBC # BLD AUTO: 4.1 X10*6/UL (ref 4.5–5.9)
SODIUM SERPL-SCNC: 135 MMOL/L (ref 136–145)
WBC # BLD AUTO: 8.4 X10*3/UL (ref 4.4–11.3)

## 2024-07-26 PROCEDURE — 85027 COMPLETE CBC AUTOMATED: CPT

## 2024-07-26 PROCEDURE — 2500000005 HC RX 250 GENERAL PHARMACY W/O HCPCS

## 2024-07-26 PROCEDURE — 2500000001 HC RX 250 WO HCPCS SELF ADMINISTERED DRUGS (ALT 637 FOR MEDICARE OP)

## 2024-07-26 PROCEDURE — 2500000002 HC RX 250 W HCPCS SELF ADMINISTERED DRUGS (ALT 637 FOR MEDICARE OP, ALT 636 FOR OP/ED)

## 2024-07-26 PROCEDURE — 99232 SBSQ HOSP IP/OBS MODERATE 35: CPT | Performed by: SURGERY

## 2024-07-26 PROCEDURE — 2580000001 HC RX 258 IV SOLUTIONS

## 2024-07-26 PROCEDURE — 83735 ASSAY OF MAGNESIUM: CPT

## 2024-07-26 PROCEDURE — 2500000004 HC RX 250 GENERAL PHARMACY W/ HCPCS (ALT 636 FOR OP/ED)

## 2024-07-26 PROCEDURE — 1100000001 HC PRIVATE ROOM DAILY

## 2024-07-26 PROCEDURE — 80069 RENAL FUNCTION PANEL: CPT

## 2024-07-26 RX ORDER — DIPHENOXYLATE HYDROCHLORIDE AND ATROPINE SULFATE 2.5; .025 MG/1; MG/1
1 TABLET ORAL 4 TIMES DAILY
Status: DISCONTINUED | OUTPATIENT
Start: 2024-07-26 | End: 2024-07-28

## 2024-07-26 RX ORDER — POTASSIUM CHLORIDE 20 MEQ/1
40 TABLET, EXTENDED RELEASE ORAL ONCE
Status: COMPLETED | OUTPATIENT
Start: 2024-07-26 | End: 2024-07-26

## 2024-07-26 RX ORDER — LOPERAMIDE HYDROCHLORIDE 2 MG/1
4 CAPSULE ORAL
Status: DISCONTINUED | OUTPATIENT
Start: 2024-07-26 | End: 2024-07-29

## 2024-07-26 RX ADMIN — HEPARIN SODIUM 5000 UNITS: 5000 INJECTION INTRAVENOUS; SUBCUTANEOUS at 13:09

## 2024-07-26 RX ADMIN — LOPERAMIDE HYDROCHLORIDE 4 MG: 2 CAPSULE ORAL at 16:30

## 2024-07-26 RX ADMIN — ACETAMINOPHEN 650 MG: 325 TABLET ORAL at 20:51

## 2024-07-26 RX ADMIN — ASCORBIC ACID, VITAMIN A PALMITATE, CHOLECALCIFEROL, THIAMINE HYDROCHLORIDE, RIBOFLAVIN-5 PHOSPHATE SODIUM, PYRIDOXINE HYDROCHLORIDE, NIACINAMIDE, DEXPANTHENOL, ALPHA-TOCOPHEROL ACETATE, VITAMIN K1, FOLIC ACID, BIOTIN, CYANOCOBALAMIN: 200; 3300; 200; 6; 3.6; 6; 40; 15; 10; 150; 600; 60; 5 INJECTION, SOLUTION INTRAVENOUS at 20:53

## 2024-07-26 RX ADMIN — DIPHENOXYLATE HYDROCHLORIDE AND ATROPINE SULFATE 1 TABLET: .025; 2.5 TABLET ORAL at 16:30

## 2024-07-26 RX ADMIN — POTASSIUM CHLORIDE 40 MEQ: 1500 TABLET, EXTENDED RELEASE ORAL at 11:34

## 2024-07-26 RX ADMIN — LOPERAMIDE HYDROCHLORIDE 4 MG: 2 CAPSULE ORAL at 20:51

## 2024-07-26 RX ADMIN — DIPHENOXYLATE HYDROCHLORIDE AND ATROPINE SULFATE 1 TABLET: .025; 2.5 TABLET ORAL at 08:30

## 2024-07-26 RX ADMIN — HEPARIN SODIUM 5000 UNITS: 5000 INJECTION INTRAVENOUS; SUBCUTANEOUS at 05:37

## 2024-07-26 RX ADMIN — SODIUM CHLORIDE, POTASSIUM CHLORIDE, SODIUM LACTATE AND CALCIUM CHLORIDE 100 ML/HR: 600; 310; 30; 20 INJECTION, SOLUTION INTRAVENOUS at 08:03

## 2024-07-26 RX ADMIN — HEPARIN SODIUM 5000 UNITS: 5000 INJECTION INTRAVENOUS; SUBCUTANEOUS at 22:16

## 2024-07-26 RX ADMIN — ACETAMINOPHEN 650 MG: 325 TABLET ORAL at 08:03

## 2024-07-26 RX ADMIN — SMOFLIPID 50 G: 6; 6; 5; 3 INJECTION, EMULSION INTRAVENOUS at 20:53

## 2024-07-26 RX ADMIN — ACETAMINOPHEN 650 MG: 325 TABLET ORAL at 14:12

## 2024-07-26 RX ADMIN — DIPHENOXYLATE HYDROCHLORIDE AND ATROPINE SULFATE 1 TABLET: .025; 2.5 TABLET ORAL at 13:10

## 2024-07-26 RX ADMIN — DIPHENOXYLATE HYDROCHLORIDE AND ATROPINE SULFATE 1 TABLET: .025; 2.5 TABLET ORAL at 20:51

## 2024-07-26 RX ADMIN — OXYCODONE HYDROCHLORIDE 10 MG: 5 TABLET ORAL at 20:51

## 2024-07-26 RX ADMIN — LOPERAMIDE HYDROCHLORIDE 4 MG: 2 CAPSULE ORAL at 11:32

## 2024-07-26 RX ADMIN — LOPERAMIDE HYDROCHLORIDE 2 MG: 2 CAPSULE ORAL at 08:03

## 2024-07-26 ASSESSMENT — COGNITIVE AND FUNCTIONAL STATUS - GENERAL
MOBILITY SCORE: 24
DAILY ACTIVITIY SCORE: 24
MOBILITY SCORE: 24
DAILY ACTIVITIY SCORE: 24

## 2024-07-26 ASSESSMENT — PAIN DESCRIPTION - LOCATION: LOCATION: ABDOMEN

## 2024-07-26 ASSESSMENT — PAIN SCALES - GENERAL
PAINLEVEL_OUTOF10: 3
PAINLEVEL_OUTOF10: 0 - NO PAIN
PAINLEVEL_OUTOF10: 7

## 2024-07-26 NOTE — PROGRESS NOTES
Met w/ pt to introduce myself, discuss role & discharge planning. Pt recently admitted for perforated diverticulitis s/p ex lap and sigmoidectomy c/b necrotic fascia. Readmitted for high output from fistula/dehydration, will be returning to OR for repeat ex lap and placement of bridging mesh.     Denies recent falls. Is independent in ADL's. No mobility assistance.  (cane, walker, ect.). Previous admission required TPN/infusions/wound care ---> [facility] at capacity and referred/accepted by [facility] (TPN) and [facility] (home care). Pt unhappy with home care agency, is ok with using [facility] if needed.     Referral placed to [facility]. At this time, they are reviewing TPN cases on a day-to day basis. EDOD : mid next week. We are awaiting sensitivities from blood cultures to determine IV antibiotic choice.   Will keep [facility] updated. In the event [facility] unable to service pt, will utilize [facility] and find alternative home care company.     EDOD: mid next week    Plan: awaiting sensitivities from blood cultures to determine IV antibiotic choice      This TCC will continue to follow for home going needs and safe DC plan.     Yue Ramachandran RN

## 2024-07-26 NOTE — PROGRESS NOTES
[facility]  ACUTE CARE SURGERY - PROGRESS NOTE    Patient Name: Bereket Em  MRN: [id number]  Admit Date: 725  : 1965  AGE: 58 y.o.   GENDER: male  ==============================================================================  TODAY'S ASSESSMENT AND PLAN OF CARE:  Bereket Em is a 58 y.o. year old male patient with a history of perforated diverticulitis s/p ex lap and sigmoidectomy c/b necrotic fascia with takeback to OR to repeat ex lap and placement of bridging mesh. Developed EC fistula x 2, returning today with high fistula output and dehydration.    Today reporting no pain. Wound bed looks pink and granulated. Pt endorsing increased fistula output. Endorses issues getting wound vac supplies.    Plan:  Neuro - prn tylenol, oxy 5/10 prn for moderate/severe pain  Resp - encourage IS  CV - continue home meds  GI - regular diet, increased imodium 4mg QID, added lomotil QID, strict I/Os   - replete lytes prn, LR 100ml/hr  ID - continue to monitor CBC, if significant elevation in WBC count, consider additional imaging  MSK - OOB as tolerated  DVT ppx - heparin subQ, SCDs    Dispo: RNF    Seen and discussed with attending physician Dr. Case, and senior resident BLANCA Coronado MD PGY-1  Acute Care Surgery [id number]          ==============================================================================  CHIEF COMPLAINT / EVENTS LAST 24HRS / HPI:  Bereket Em is a 58 y.o. year old male patient with a history of perforated diverticulitis s/p ex lap and sigmoidectomy c/b necrotic fascia with takeback to OR to repeat ex lap and placement of bridging mesh. Developed EC fistula x 2, returning today with high fistula output and dehydration.     CALVIN.    MEDICAL HISTORY / ROS:   Admission history and ROS reviewed. Pertinent changes as follows:  None new    PHYSICAL EXAM:  Heart Rate:  [58-84]   Temp:  [36 °C (96.8 °F)-36.6 °C (97.9 °F)]   Resp:   "[16-18]   BP: (106-125)/(65-85)   Height:  [172.7 cm (5' 8\")]   Weight:  [78.2 kg (172 lb 6.4 oz)]   SpO2:  [94 %-97 %]   Physical Exam  Constitutional:       Appearance: Normal appearance.   Pulmonary:      Effort: Pulmonary effort is normal.   Abdominal:      General: There is no distension.      Palpations: Abdomen is soft. There is no mass.      Tenderness: There is no abdominal tenderness.      Hernia: No hernia is present.      Comments: Midline wound with fistula and red granulation tissue present. Spontaneously draining fecal contents. Wound manager in place.   Musculoskeletal:         General: Normal range of motion.   Skin:     General: Skin is warm and dry.   Neurological:      General: No focal deficit present.      Mental Status: He is alert.   Psychiatric:         Mood and Affect: Mood normal.         IMAGING SUMMARY:  (summary of new imaging findings, not a copy of dictation)  No new    LABS:  Results from last 7 days   Lab Units 07/26/24  0547 07/25/24  1602   WBC AUTO x10*3/uL 8.4 13.5*   HEMOGLOBIN g/dL 10.3* 12.1*   HEMATOCRIT % 32.4* 37.7*   PLATELETS AUTO x10*3/uL 412 460*         Results from last 7 days   Lab Units 07/26/24  0547 07/25/24  1602   SODIUM mmol/L 135* 132*   POTASSIUM mmol/L 3.5 3.4*   CHLORIDE mmol/L 94* 88*   CO2 mmol/L 30 33*   BUN mg/dL 29* 40*   CREATININE mg/dL 0.57 0.69   CALCIUM mg/dL 9.1 10.0   GLUCOSE mg/dL 126* 101*               I have reviewed all medications, laboratory results, and imaging pertinent for today's encounter.  "

## 2024-07-26 NOTE — DISCHARGE INSTRUCTIONS
Please follow up with your primary care provider at next available appointment following your hospital stay.     To schedule a follow up appointment with the Acute Care Surgery/ Trauma clinic, please call [phone] to schedule follow up appointment. This is not an emergency number, so if you have an emergency, please go to the Emergency Room. However, if you have questions regarding your care, upcoming appointments, etc, please do not hesitate to call the above number. Thank you!     Please go to the nearest hospital emergency room if you are experiencing: worsening abdominal pain, increasing abdominal distention, fevers, inability to tolerate food and drink (vomit every time you eat), nausea/vomiting, loss of bowel function (unable to pass gas or have bowel movements).     If you notice increased redness, tenderness or drainage around your surgical sites/wounds, or if you notice foul smelling drainage from your wounds please call the trauma clinic or go to the nearest to the emergency room.

## 2024-07-26 NOTE — CONSULTS
Nutrition Initial Assessment:   Nutrition Assessment    Reason for Assessment: TPN recommendations    Patient is a 58 y.o. male with Hx of perforated diverticulitis s/p ex lap and sigmoidectomy c/b necrotic fascia with takeback to OR to repeat ex lap and placement of bridging mesh.     Developed EC fistula.   Presenting with high fistula output and dehydration     Past Medical History:   Diagnosis Date    Acute pharyngitis, unspecified     Sore throat    Acute upper respiratory infection, unspecified 02/13/2017    Acute URI    Alcohol abuse, in remission 09/12/2018    History of alcohol abuse    Elevated blood-pressure reading, without diagnosis of hypertension 02/23/2015    Elevated blood pressure reading without diagnosis of hypertension    Encounter for immunization 10/10/2014    Need for Tdap vaccination    Encounter for screening for malignant neoplasm of colon 01/04/2017    Encounter for colonoscopy due to history of adenomatous colonic polyps    Encounter for screening for malignant neoplasm of colon 11/02/2016    Encounter for screening colonoscopy    Encounter for screening for malignant neoplasm of colon 11/02/2016    Encounter for screening colonoscopy    Encounter for screening for malignant neoplasm of prostate 09/12/2018    Prostate cancer screening    Essential (primary) hypertension 03/14/2019    Elevated blood pressure reading with diagnosis of hypertension    Impingement syndrome of right shoulder 06/06/2016    Impingement syndrome of right shoulder    Incomplete rotator cuff tear or rupture of right shoulder, not specified as traumatic 09/12/2018    Partial nontraumatic tear of right rotator cuff    Noninfective gastroenteritis and colitis, unspecified 01/23/2018    Acute gastroenteritis    Other general symptoms and signs 11/02/2016    Flu-like symptoms    Other malaise 11/02/2016    Malaise and fatigue    Pain in left ankle and joints of left foot 10/10/2017    Left ankle pain    Pain in left  "foot 09/12/2017    Left foot pain    Pain in right shoulder 04/11/2016    Right shoulder pain    Pain in thoracic spine 09/12/2018    Thoracic back pain    Pain, unspecified 02/09/2017    Body aches    Personal history of colonic polyps 01/04/2017    History of colonic polyps    Personal history of other diseases of male genital organs 03/15/2019    History of acute prostatitis    Personal history of other diseases of the respiratory system 04/07/2020    History of acute sinusitis    Personal history of other specified conditions 03/14/2019    History of flank pain    Personal history of other specified conditions 10/10/2014    History of paresthesia    Strain of muscle, fascia and tendon of other parts of biceps, right arm, initial encounter 03/11/2016    Rupture of biceps tendon, right, initial encounter     Past Surgical History:   Procedure Laterality Date    SHOULDER SURGERY  06/27/2017    Shoulder Surgery         Nutrition History:  Energy Intake: Good > 75 %  Food and Nutrient History: Met with patient this morning. Pt reports that PTA pt was eating 3-4 x a day + ONS BID (boost or ensure) + protein water. Pt reports that this morning was able to eat potatoes and toast but it doubled the output since finished consuming the meal. Pt was on TPN overnight (12 hours) while home.  Vitamin/Herbal Supplement Use: boost or ensure; protein water;  Food Allergies/Intolerances:  None  GI Symptoms: None  Oral Problems: None       Anthropometrics:  Height: 172.7 cm (5' 8\")   Weight: 78.2 kg (172 lb 6.4 oz)   BMI (Calculated): 26.22  IBW/kg (Dietitian Calculated): 69.9 kg          Weight History:   Wt Readings from Last 15 Encounters:   07/25/24 78.2 kg (172 lb 6.4 oz)   07/25/24 79.2 kg (174 lb 8 oz)   07/19/24 79.4 kg (175 lb)   07/13/24 78.9 kg (174 lb)   07/11/24 81.4 kg (179 lb 7.3 oz)   06/05/24 87.6 kg (193 lb 2 oz) (10% wt loss x ~ 1 month) - significant    06/03/24 89.4 kg (197 lb)   05/09/24 89.8 kg (198 lb) "   04/30/24 88.5 kg (195 lb)   02/08/24 88.9 kg (196 lb)   01/03/24 89.4 kg (197 lb)   04/06/22 82.8 kg (182 lb 9.6 oz)   03/23/22 82.6 kg (182 lb 3.2 oz)   05/06/21 78.6 kg (173 lb 3.2 oz)   06/04/20 74.7 kg (164 lb 9.6 oz)       Weight Change %:  Significant Weight Loss: Yes  Interpretation of Weight Loss: >5% in 1 month    Nutrition Focused Physical Exam Findings:    Subcutaneous Fat Loss:   Orbital Fat Pads: Mild-Moderate (slight dark circles and slight hollowing)  Buccal Fat Pads: Well nourished (full, rounded cheeks)  Triceps: Mild-Moderate (less than ample fat tissue)  Muscle Wasting:  Temporalis: Mild-Moderate (slight depression)  Pectoralis (Clavicular Region): Mild-Moderate (some protrusion of clavicle)  Deltoid/Trapezius: Well nourished (rounded appearance at arm, shoulder, neck)  Interosseous: Well nourished (muscle bulges)  Quadriceps: Well nourished (well developed, well rounded)  Gastrocnemius: Well nourished (well developed bulbous muscle)  Edema:  Edema: none  Physical Findings:  Skin: Negative    Nutrition Significant Labs:  CBC Trend:   Results from last 7 days   Lab Units 07/26/24  0547 07/25/24  1602   WBC AUTO x10*3/uL 8.4 13.5*   RBC AUTO x10*6/uL 4.10* 4.81   HEMOGLOBIN g/dL 10.3* 12.1*   HEMATOCRIT % 32.4* 37.7*   MCV fL 79* 78*   PLATELETS AUTO x10*3/uL 412 460*    , TPN/PPN Labs:   Results from last 7 days   Lab Units 07/26/24  0547 07/25/24  1602   GLUCOSE mg/dL 126* 101*   POTASSIUM mmol/L 3.5 3.4*   PHOSPHORUS mg/dL 3.9 4.2   MAGNESIUM mg/dL 2.19 2.01   SODIUM mmol/L 135* 132*   CHLORIDE mmol/L 94* 88*        Nutrition Specific Medications:  Scheduled medications  diphenoxylate-atropine, 1 tablet, oral, 4x daily  fat emulsion fish oil/plant based, 250 mL, intravenous, Daily Lipids  heparin (porcine), 5,000 Units, subcutaneous, q8h CIERRA  loperamide, 4 mg, oral, Before meals & nightly  potassium chloride CR, 40 mEq, oral, Once      Continuous medications  Adult Clinimix Parenteral Nutrition  Cyclic, , Last Rate: Stopped (07/26/24 0747)  lactated Ringer's, 100 mL/hr, Last Rate: 100 mL/hr (07/26/24 0803)    I/O:    ;      Dietary Orders (From admission, onward)       Start     Ordered    07/26/24 0825  Adult diet Regular  Diet effective now        Question:  Diet type  Answer:  Regular    07/26/24 0836                     Estimated Needs:   Total Energy Estimated Needs (kCal):  (2200)  Method for Estimating Needs: ABW x 28  Total Protein Estimated Needs (g):  (90)  Method for Estimating Needs: ABW x1.2  Total Fluid Estimated Needs (mL):  (per team)           Nutrition Diagnosis   Malnutrition Diagnosis  Patient has Malnutrition Diagnosis: No    Nutrition Diagnosis  Patient has Nutrition Diagnosis: Yes  Diagnosis Status (1): New  Nutrition Diagnosis 1: Altered GI function  Related to (1): EC fistula  As Evidenced by (1): need for TPN and elevated fistula output       Nutrition Interventions/Recommendations         Nutrition Prescription:    Continue regular diet as tolerated pending output per team discretion   At this time will hold off from ordering ONS d/t elevated output but per team discretion to restart as needed   3. Prior to starting TPN, check baseline TG, RFP, & Mg daily - replete electrolytes as needed.  Adjust Cycled TPN recommend Clinimix 5% AA, 15% Dextrose x 12 hrs      - For the first hour: Run @ 90mL/hr      - For x 10hrs in between: Titrate up to 180mL/hr      - For the final hour: Decrease rate to 90mL/hr      - Include MVI + trace elements        - Provide SMOF lipids @ 21mL/hr x 12 hours overnight (250mL total)         TPN monitoring:      - Daily weights      - RFP + Mg daily; replete lytes PRN      - LFTs + TGs weekly      - Accuchecks q 6hrs     TPN + lipids provide: 1980 ml total volume, 1908 kcals, 99 g  protein, 297 dextrose, 26% from fat (meeting 86% kcal & 100% protein needs)      Nutrition Interventions:   Interventions: Parenteral nutrition/ IV fluids, Meals and  snacks    Nutrition Monitoring and Evaluation   Food/Nutrient Related History Monitoring  Monitoring and Evaluation Plan: Energy intake, Enteral and parenteral nutrition intake  Energy Intake: Estimated energy intake  Criteria: >75% of EEN  Enteral and Parenteral Nutrition Intake: Parenteral nutrition intake    Body Composition/Growth/Weight History  Monitoring and Evaluation Plan: Weight    Biochemical Data, Medical Tests and Procedures  Monitoring and Evaluation Plan: Electrolyte/renal panel, Glucose/endocrine profile  Electrolyte and Renal Panel: Sodium, Phosphorus, Potassium, Magnesium  Criteria: WNL  Glucose/Endocrine Profile: Glucose, casual  Criteria: WNL    Time Spent (min): 45 minutes

## 2024-07-26 NOTE — CARE PLAN
Problem: Skin  Goal: Prevent/minimize sheer/friction injuries  Flowsheets (Taken 7/26/2024 3848)  Prevent/minimize sheer/friction injuries:   Turn/reposition every 2 hours/use positioning/transfer devices   Increase activity/out of bed for meals   The patient's goals for the shift include      The clinical goals for the shift include patient will have pain controlled during shift    Over the shift, the patient did not make progress toward the following goals.

## 2024-07-27 LAB
ALBUMIN SERPL BCP-MCNC: 3.3 G/DL (ref 3.4–5)
ANION GAP SERPL CALC-SCNC: 11 MMOL/L (ref 10–20)
BUN SERPL-MCNC: 26 MG/DL (ref 6–23)
CALCIUM SERPL-MCNC: 9.1 MG/DL (ref 8.6–10.6)
CHLORIDE SERPL-SCNC: 96 MMOL/L (ref 98–107)
CO2 SERPL-SCNC: 32 MMOL/L (ref 21–32)
CREAT SERPL-MCNC: 0.56 MG/DL (ref 0.5–1.3)
EGFRCR SERPLBLD CKD-EPI 2021: >90 ML/MIN/1.73M*2
ERYTHROCYTE [DISTWIDTH] IN BLOOD BY AUTOMATED COUNT: 14.6 % (ref 11.5–14.5)
GLUCOSE SERPL-MCNC: 103 MG/DL (ref 74–99)
HCT VFR BLD AUTO: 32 % (ref 41–52)
HGB BLD-MCNC: 9.8 G/DL (ref 13.5–17.5)
MAGNESIUM SERPL-MCNC: 2.01 MG/DL (ref 1.6–2.4)
MCH RBC QN AUTO: 25.3 PG (ref 26–34)
MCHC RBC AUTO-ENTMCNC: 30.6 G/DL (ref 32–36)
MCV RBC AUTO: 83 FL (ref 80–100)
NRBC BLD-RTO: 0 /100 WBCS (ref 0–0)
PHOSPHATE SERPL-MCNC: 4 MG/DL (ref 2.5–4.9)
PLATELET # BLD AUTO: 384 X10*3/UL (ref 150–450)
POTASSIUM SERPL-SCNC: 3.6 MMOL/L (ref 3.5–5.3)
RBC # BLD AUTO: 3.88 X10*6/UL (ref 4.5–5.9)
SODIUM SERPL-SCNC: 135 MMOL/L (ref 136–145)
WBC # BLD AUTO: 7.9 X10*3/UL (ref 4.4–11.3)

## 2024-07-27 PROCEDURE — 2500000005 HC RX 250 GENERAL PHARMACY W/O HCPCS

## 2024-07-27 PROCEDURE — 1100000001 HC PRIVATE ROOM DAILY

## 2024-07-27 PROCEDURE — 2500000001 HC RX 250 WO HCPCS SELF ADMINISTERED DRUGS (ALT 637 FOR MEDICARE OP)

## 2024-07-27 PROCEDURE — 2580000001 HC RX 258 IV SOLUTIONS

## 2024-07-27 PROCEDURE — 83735 ASSAY OF MAGNESIUM: CPT

## 2024-07-27 PROCEDURE — 85027 COMPLETE CBC AUTOMATED: CPT

## 2024-07-27 PROCEDURE — 2500000002 HC RX 250 W HCPCS SELF ADMINISTERED DRUGS (ALT 637 FOR MEDICARE OP, ALT 636 FOR OP/ED)

## 2024-07-27 PROCEDURE — 2500000004 HC RX 250 GENERAL PHARMACY W/ HCPCS (ALT 636 FOR OP/ED): Mod: JZ

## 2024-07-27 PROCEDURE — 80069 RENAL FUNCTION PANEL: CPT

## 2024-07-27 RX ADMIN — ASCORBIC ACID, VITAMIN A PALMITATE, CHOLECALCIFEROL, THIAMINE HYDROCHLORIDE, RIBOFLAVIN-5 PHOSPHATE SODIUM, PYRIDOXINE HYDROCHLORIDE, NIACINAMIDE, DEXPANTHENOL, ALPHA-TOCOPHEROL ACETATE, VITAMIN K1, FOLIC ACID, BIOTIN, CYANOCOBALAMIN: 200; 3300; 200; 6; 3.6; 6; 40; 15; 10; 150; 600; 60; 5 INJECTION, SOLUTION INTRAVENOUS at 20:21

## 2024-07-27 RX ADMIN — LOPERAMIDE HYDROCHLORIDE 4 MG: 2 CAPSULE ORAL at 11:10

## 2024-07-27 RX ADMIN — DIPHENOXYLATE HYDROCHLORIDE AND ATROPINE SULFATE 1 TABLET: .025; 2.5 TABLET ORAL at 06:12

## 2024-07-27 RX ADMIN — HEPARIN SODIUM 5000 UNITS: 5000 INJECTION INTRAVENOUS; SUBCUTANEOUS at 06:12

## 2024-07-27 RX ADMIN — Medication 2 MG: at 17:46

## 2024-07-27 RX ADMIN — SODIUM CHLORIDE, POTASSIUM CHLORIDE, SODIUM LACTATE AND CALCIUM CHLORIDE 100 ML/HR: 600; 310; 30; 20 INJECTION, SOLUTION INTRAVENOUS at 21:24

## 2024-07-27 RX ADMIN — OXYCODONE HYDROCHLORIDE 5 MG: 5 TABLET ORAL at 20:29

## 2024-07-27 RX ADMIN — Medication 3 MG: at 20:29

## 2024-07-27 RX ADMIN — SMOFLIPID 50 G: 6; 6; 5; 3 INJECTION, EMULSION INTRAVENOUS at 20:14

## 2024-07-27 RX ADMIN — DIPHENOXYLATE HYDROCHLORIDE AND ATROPINE SULFATE 1 TABLET: .025; 2.5 TABLET ORAL at 13:11

## 2024-07-27 RX ADMIN — ACETAMINOPHEN 650 MG: 325 TABLET ORAL at 04:16

## 2024-07-27 RX ADMIN — OXYCODONE HYDROCHLORIDE 5 MG: 5 TABLET ORAL at 04:16

## 2024-07-27 RX ADMIN — SODIUM CHLORIDE, POTASSIUM CHLORIDE, SODIUM LACTATE AND CALCIUM CHLORIDE 100 ML/HR: 600; 310; 30; 20 INJECTION, SOLUTION INTRAVENOUS at 04:17

## 2024-07-27 RX ADMIN — HEPARIN SODIUM 5000 UNITS: 5000 INJECTION INTRAVENOUS; SUBCUTANEOUS at 13:11

## 2024-07-27 RX ADMIN — DIPHENOXYLATE HYDROCHLORIDE AND ATROPINE SULFATE 1 TABLET: .025; 2.5 TABLET ORAL at 16:45

## 2024-07-27 RX ADMIN — DIPHENOXYLATE HYDROCHLORIDE AND ATROPINE SULFATE 1 TABLET: .025; 2.5 TABLET ORAL at 20:14

## 2024-07-27 RX ADMIN — LOPERAMIDE HYDROCHLORIDE 4 MG: 2 CAPSULE ORAL at 20:15

## 2024-07-27 RX ADMIN — HEPARIN SODIUM 5000 UNITS: 5000 INJECTION INTRAVENOUS; SUBCUTANEOUS at 21:20

## 2024-07-27 RX ADMIN — LOPERAMIDE HYDROCHLORIDE 4 MG: 2 CAPSULE ORAL at 15:47

## 2024-07-27 ASSESSMENT — PAIN SCALES - GENERAL
PAINLEVEL_OUTOF10: 0 - NO PAIN
PAINLEVEL_OUTOF10: 5 - MODERATE PAIN
PAINLEVEL_OUTOF10: 4
PAINLEVEL_OUTOF10: 1
PAINLEVEL_OUTOF10: 2
PAINLEVEL_OUTOF10: 5 - MODERATE PAIN

## 2024-07-27 ASSESSMENT — COGNITIVE AND FUNCTIONAL STATUS - GENERAL
MOBILITY SCORE: 23
DAILY ACTIVITIY SCORE: 24
CLIMB 3 TO 5 STEPS WITH RAILING: A LITTLE

## 2024-07-27 ASSESSMENT — PAIN DESCRIPTION - LOCATION: LOCATION: ABDOMEN

## 2024-07-27 ASSESSMENT — PAIN - FUNCTIONAL ASSESSMENT
PAIN_FUNCTIONAL_ASSESSMENT: 0-10

## 2024-07-27 NOTE — CARE PLAN
Problem: Skin  Goal: Participates in plan/prevention/treatment measures  Flowsheets (Taken 7/27/2024 4024)  Participates in plan/prevention/treatment measures: Increase activity/out of bed for meals   The patient's goals for the shift include      The clinical goals for the shift include Pain Control    Over the shift, the patient did  make progress toward the following goals.

## 2024-07-27 NOTE — PROGRESS NOTES
[facility]  ACUTE CARE SURGERY - PROGRESS NOTE    Patient Name: Bereket Em  MRN: [id number]  Admit Date: 725  : 1965  AGE: 58 y.o.   GENDER: male  ==============================================================================  TODAY'S ASSESSMENT AND PLAN OF CARE:  Bereket Em is a 58 y.o. year old male patient with a history of perforated diverticulitis s/p ex lap and sigmoidectomy c/b necrotic fascia with takeback to OR to repeat ex lap and placement of bridging mesh. Developed EC fistula x 2, returning with high fistula output and dehydration.    Patient feels that ostomy output has slowed down with lomotil    Plan:  Neuro - prn tylenol, oxy 5 prn   Resp - encourage IS  CV - continue home meds  GI - regular diet, imodium 4mg QID, lomotil QID, strict I/Os, ongoing wound care   - replete lytes prn, LR 100ml/hr  ID - no issues  MSK - OOB as tolerated  DVT ppx - heparin subQ, SCDs    Dispo: [facility]      [facility]  Acute Care Surgery [id number]      ==============================================================================  CHIEF COMPLAINT / EVENTS LAST 24HRS / HPI:  Bereket Em is a 58 y.o. year old male patient with a history of perforated diverticulitis s/p ex lap and sigmoidectomy c/b necrotic fascia with takeback to OR to repeat ex lap and placement of bridging mesh. Developed EC fistula x 2, returning today with high fistula output and dehydration.     NAEON.    MEDICAL HISTORY / ROS:   Admission history and ROS reviewed. Pertinent changes as follows:  None new    PHYSICAL EXAM:  Heart Rate:  [52-67]   Temp:  [36 °C (96.8 °F)-36.9 °C (98.4 °F)]   Resp:  [18]   BP: ()/(53-70)   SpO2:  [93 %-96 %]     NAD  Midline wound with 2 EA fistulas, remainder with healthy granulation tissue; output thicker than prior; formal colostomy pink and well appearing    IMAGING SUMMARY:    No new    LABS:  Results from last 7 days   Lab Units 24  0640 24  0547  07/25/24  1602   WBC AUTO x10*3/uL 7.9 8.4 13.5*   HEMOGLOBIN g/dL 9.8* 10.3* 12.1*   HEMATOCRIT % 32.0* 32.4* 37.7*   PLATELETS AUTO x10*3/uL 384 412 460*         Results from last 7 days   Lab Units 07/27/24  0640 07/26/24  0547 07/25/24  1602   SODIUM mmol/L 135* 135* 132*   POTASSIUM mmol/L 3.6 3.5 3.4*   CHLORIDE mmol/L 96* 94* 88*   CO2 mmol/L 32 30 33*   BUN mg/dL 26* 29* 40*   CREATININE mg/dL 0.56 0.57 0.69   CALCIUM mg/dL 9.1 9.1 10.0   GLUCOSE mg/dL 103* 126* 101*               I have reviewed all medications, laboratory results, and imaging pertinent for today's encounter.

## 2024-07-27 NOTE — CARE PLAN
The patient's goals for the shift include  Pain Control    The clinical goals for the shift include Pain Control      Problem: Skin  Goal: Decreased wound size/increased tissue granulation at next dressing change  Outcome: Progressing  Flowsheets (Taken 7/27/2024 0606)  Decreased wound size/increased tissue granulation at next dressing change: Promote sleep for wound healing  Goal: Participates in plan/prevention/treatment measures  Outcome: Progressing  Flowsheets (Taken 7/27/2024 0606)  Participates in plan/prevention/treatment measures:   Elevate heels   Increase activity/out of bed for meals  Goal: Prevent/manage excess moisture  Outcome: Progressing  Flowsheets (Taken 7/27/2024 0606)  Prevent/manage excess moisture: Moisturize dry skin  Goal: Prevent/minimize sheer/friction injuries  Outcome: Progressing  Flowsheets (Taken 7/27/2024 0606)  Prevent/minimize sheer/friction injuries: Turn/reposition every 2 hours/use positioning/transfer devices  Goal: Promote/optimize nutrition  Outcome: Progressing  Goal: Promote skin healing  Outcome: Progressing     Problem: Pain - Adult  Goal: Verbalizes/displays adequate comfort level or baseline comfort level  Outcome: Progressing     Problem: Safety - Adult  Goal: Free from fall injury  Outcome: Progressing     Problem: Discharge Planning  Goal: Discharge to home or other facility with appropriate resources  Outcome: Progressing     Problem: Chronic Conditions and Co-morbidities  Goal: Patient's chronic conditions and co-morbidity symptoms are monitored and maintained or improved  Outcome: Progressing

## 2024-07-28 VITALS
OXYGEN SATURATION: 96 % | HEIGHT: 68 IN | HEART RATE: 58 BPM | TEMPERATURE: 97.7 F | RESPIRATION RATE: 16 BRPM | WEIGHT: 172.4 LBS | SYSTOLIC BLOOD PRESSURE: 99 MMHG | DIASTOLIC BLOOD PRESSURE: 58 MMHG | BODY MASS INDEX: 26.13 KG/M2

## 2024-07-28 PROCEDURE — 1100000001 HC PRIVATE ROOM DAILY

## 2024-07-28 PROCEDURE — 2500000005 HC RX 250 GENERAL PHARMACY W/O HCPCS

## 2024-07-28 PROCEDURE — 2500000001 HC RX 250 WO HCPCS SELF ADMINISTERED DRUGS (ALT 637 FOR MEDICARE OP): Performed by: STUDENT IN AN ORGANIZED HEALTH CARE EDUCATION/TRAINING PROGRAM

## 2024-07-28 PROCEDURE — 2500000001 HC RX 250 WO HCPCS SELF ADMINISTERED DRUGS (ALT 637 FOR MEDICARE OP)

## 2024-07-28 PROCEDURE — 2500000002 HC RX 250 W HCPCS SELF ADMINISTERED DRUGS (ALT 637 FOR MEDICARE OP, ALT 636 FOR OP/ED)

## 2024-07-28 PROCEDURE — 99232 SBSQ HOSP IP/OBS MODERATE 35: CPT | Performed by: SURGERY

## 2024-07-28 PROCEDURE — 2580000001 HC RX 258 IV SOLUTIONS

## 2024-07-28 PROCEDURE — 2500000004 HC RX 250 GENERAL PHARMACY W/ HCPCS (ALT 636 FOR OP/ED)

## 2024-07-28 RX ORDER — DIPHENOXYLATE HYDROCHLORIDE AND ATROPINE SULFATE 2.5; .025 MG/1; MG/1
2 TABLET ORAL 4 TIMES DAILY
Status: DISCONTINUED | OUTPATIENT
Start: 2024-07-28 | End: 2024-07-29

## 2024-07-28 RX ADMIN — CALCIUM POLYCARBOPHIL 625 MG: 625 TABLET, FILM COATED ORAL at 20:21

## 2024-07-28 RX ADMIN — ACETAMINOPHEN 650 MG: 325 TABLET ORAL at 20:25

## 2024-07-28 RX ADMIN — DIPHENOXYLATE HYDROCHLORIDE AND ATROPINE SULFATE 2 TABLET: .025; 2.5 TABLET ORAL at 20:21

## 2024-07-28 RX ADMIN — SMOFLIPID 50 G: 6; 6; 5; 3 INJECTION, EMULSION INTRAVENOUS at 20:22

## 2024-07-28 RX ADMIN — LOPERAMIDE HYDROCHLORIDE 4 MG: 2 CAPSULE ORAL at 06:40

## 2024-07-28 RX ADMIN — Medication 3 MG: at 22:46

## 2024-07-28 RX ADMIN — LOPERAMIDE HYDROCHLORIDE 4 MG: 2 CAPSULE ORAL at 16:00

## 2024-07-28 RX ADMIN — HEPARIN SODIUM 5000 UNITS: 5000 INJECTION INTRAVENOUS; SUBCUTANEOUS at 06:40

## 2024-07-28 RX ADMIN — LOPERAMIDE HYDROCHLORIDE 4 MG: 2 CAPSULE ORAL at 20:21

## 2024-07-28 RX ADMIN — CALCIUM POLYCARBOPHIL 625 MG: 625 TABLET, FILM COATED ORAL at 14:08

## 2024-07-28 RX ADMIN — SODIUM CHLORIDE, POTASSIUM CHLORIDE, SODIUM LACTATE AND CALCIUM CHLORIDE 100 ML/HR: 600; 310; 30; 20 INJECTION, SOLUTION INTRAVENOUS at 06:41

## 2024-07-28 RX ADMIN — HEPARIN SODIUM 5000 UNITS: 5000 INJECTION INTRAVENOUS; SUBCUTANEOUS at 22:42

## 2024-07-28 RX ADMIN — ASCORBIC ACID, VITAMIN A PALMITATE, CHOLECALCIFEROL, THIAMINE HYDROCHLORIDE, RIBOFLAVIN-5 PHOSPHATE SODIUM, PYRIDOXINE HYDROCHLORIDE, NIACINAMIDE, DEXPANTHENOL, ALPHA-TOCOPHEROL ACETATE, VITAMIN K1, FOLIC ACID, BIOTIN, CYANOCOBALAMIN: 200; 3300; 200; 6; 3.6; 6; 40; 15; 10; 150; 600; 60; 5 INJECTION, SOLUTION INTRAVENOUS at 20:22

## 2024-07-28 RX ADMIN — HEPARIN SODIUM 5000 UNITS: 5000 INJECTION INTRAVENOUS; SUBCUTANEOUS at 14:08

## 2024-07-28 RX ADMIN — CALCIUM POLYCARBOPHIL 625 MG: 625 TABLET, FILM COATED ORAL at 10:18

## 2024-07-28 RX ADMIN — OXYCODONE HYDROCHLORIDE 5 MG: 5 TABLET ORAL at 20:25

## 2024-07-28 RX ADMIN — DIPHENOXYLATE HYDROCHLORIDE AND ATROPINE SULFATE 2 TABLET: .025; 2.5 TABLET ORAL at 12:24

## 2024-07-28 RX ADMIN — DIPHENOXYLATE HYDROCHLORIDE AND ATROPINE SULFATE 1 TABLET: .025; 2.5 TABLET ORAL at 06:40

## 2024-07-28 RX ADMIN — SODIUM CHLORIDE, POTASSIUM CHLORIDE, SODIUM LACTATE AND CALCIUM CHLORIDE 100 ML/HR: 600; 310; 30; 20 INJECTION, SOLUTION INTRAVENOUS at 16:02

## 2024-07-28 RX ADMIN — LOPERAMIDE HYDROCHLORIDE 4 MG: 2 CAPSULE ORAL at 10:18

## 2024-07-28 RX ADMIN — DIPHENOXYLATE HYDROCHLORIDE AND ATROPINE SULFATE 2 TABLET: .025; 2.5 TABLET ORAL at 16:00

## 2024-07-28 RX ADMIN — ACETAMINOPHEN 650 MG: 325 TABLET ORAL at 02:30

## 2024-07-28 RX ADMIN — OXYCODONE HYDROCHLORIDE 5 MG: 5 TABLET ORAL at 02:30

## 2024-07-28 ASSESSMENT — COGNITIVE AND FUNCTIONAL STATUS - GENERAL
MOBILITY SCORE: 24
DAILY ACTIVITIY SCORE: 24
MOBILITY SCORE: 24
DAILY ACTIVITIY SCORE: 24

## 2024-07-28 ASSESSMENT — PAIN DESCRIPTION - LOCATION: LOCATION: ABDOMEN

## 2024-07-28 ASSESSMENT — PAIN SCALES - GENERAL
PAINLEVEL_OUTOF10: 5 - MODERATE PAIN
PAINLEVEL_OUTOF10: 4
PAINLEVEL_OUTOF10: 2
PAINLEVEL_OUTOF10: 0 - NO PAIN
PAINLEVEL_OUTOF10: 2

## 2024-07-28 ASSESSMENT — PAIN - FUNCTIONAL ASSESSMENT: PAIN_FUNCTIONAL_ASSESSMENT: 0-10

## 2024-07-28 NOTE — CARE PLAN
The patient's goals for the shift include      The clinical goals for the shift include Pt will remain HDS throughout the shift    Over the shift, the patient did make progress toward the following goals.

## 2024-07-28 NOTE — CARE PLAN
Problem: Skin  Goal: Decreased wound size/increased tissue granulation at next dressing change  7/28/2024 1036 by Rika Cage RN  Outcome: Progressing  7/28/2024 1034 by Rika Cage RN  Outcome: Progressing  Goal: Participates in plan/prevention/treatment measures  7/28/2024 1036 by Rika Cage RN  Outcome: Progressing  7/28/2024 1034 by Rika Cage RN  Outcome: Progressing  Goal: Prevent/manage excess moisture  7/28/2024 1036 by Rika Cage RN  Outcome: Progressing  7/28/2024 1034 by Rika Cage RN  Outcome: Progressing  Goal: Prevent/minimize sheer/friction injuries  7/28/2024 1036 by Rika Cage RN  Outcome: Progressing  7/28/2024 1034 by Rika Cage RN  Outcome: Progressing  Goal: Promote/optimize nutrition  7/28/2024 1036 by Rika Cage RN  Outcome: Progressing  7/28/2024 1034 by Rika Cage RN  Outcome: Progressing  Flowsheets (Taken 7/28/2024 1034)  Promote/optimize nutrition:   Consume > 50% meals/supplements   Monitor/record intake including meals  Goal: Promote skin healing  7/28/2024 1036 by Rika Cage RN  Outcome: Progressing  7/28/2024 1034 by Rika Cage RN  Outcome: Progressing     Problem: Pain - Adult  Goal: Verbalizes/displays adequate comfort level or baseline comfort level  7/28/2024 1036 by Rika Cage RN  Outcome: Progressing  7/28/2024 1034 by Rika Cage RN  Outcome: Progressing     Problem: Safety - Adult  Goal: Free from fall injury  7/28/2024 1036 by Rika Cage RN  Outcome: Progressing  7/28/2024 1034 by Rika Cage RN  Outcome: Progressing     Problem: Discharge Planning  Goal: Discharge to home or other facility with appropriate resources  7/28/2024 1036 by Rika Cage RN  Outcome: Progressing  7/28/2024 1034 by Rika Cage RN  Outcome: Progressing     Problem: Chronic Conditions and Co-morbidities  Goal: Patient's chronic conditions and co-morbidity symptoms are monitored and maintained or improved  7/28/2024 1036 by Rika  ISABELLE Cage  Outcome: Progressing  7/28/2024 1034 by Rika Cage RN  Outcome: Progressing   The patient's goals for the shift include      The clinical goals for the shift include Pt will remain HDS throughout the shift    Over the shift, the patient did  make progress toward the following goals.

## 2024-07-28 NOTE — CONSULTS
"  Wound Care Consult     Visit Date: 2024      Patient Name: Bereket Em         MRN: [id number]           YOB: 1965     Reason for Consult: Wound and ostomy care              Pertinent Labs:   Albumin   Date Value Ref Range Status   2024 3.3 (L) 3.4 - 5.0 g/dL Final     ALBUMIN (MG/L) IN URINE   Date Value Ref Range Status   2021 <7.0 Not Established mg/L Final       Wound Assessment:  Wound 24 Incision Abdomen Medial;Upper (Active)   Wound Image   24 1104   Site Assessment Granulation;Red 24 1104   Usha-Wound Assessment Red;Painful 24   Shape oval 24 2245   Wound Length (cm) 15 cm 24 1416   Wound Width (cm) 8.5 cm 24 1416   Wound Surface Area (cm^2) 127.5 cm^2 24 1416   Wound Depth (cm) 1.5 cm 24 1416   Wound Volume (cm^3) 191.25 cm^3 24 1416   Wound Healing % 63 24 1416   State of Healing Healing ridge;Early/partial granulation 24 2100   Margins Well-defined edges 24 2245   Closure Open to air 24 2245   Sutures/Staple Line Non approximated 24 2245   Drainage Description Effulent/Bilious 24 1104   Drainage Amount Large 24 1104   Dressing Other (Comment) 24 1104   Dressing Changed Changed 24 1104   Dressing Status Clean 24 1104       Wound Team Summary Assessment:   Ostomy type: colostomy        size: 1 1/2\" oval     color: pink and moist      protruding: budded   Umer: none  Functioning: scant mucous   Mucocutaneous junction: intact   Peristomal skin: intact   Pouchin piece Melonie Flat wafer and lock n roll pouch   Ostomy Education: Patient is independent with his ostomy care   Plan: assess stoma/pouching     Wound Team Summary Assessment: The wound care team came to bedside to assess the patient's midline abdomen wound and ECF.  The patient's wound is red, clean, moist with granulation and 2 ECF. The wound was cleansed with vashe wound cleanser and " the periwound skin was prepped with 3M cavilon Advanced.  At the circumfricial outline of the wound with a 4 in adaptic ring was applied to protect the periwound skin.  A Coloplast mini wound manager pouch was cut to size and applied over the entire wound. Stoma paste and powder was used to create an additional seal.         Wound Team Plan: Wound/ ostomy team will follow. Please notify wound care is patient is leaking    Will change wound manager every 5-7 days     Virgie GAINES   7/28/2024  5:53 PM

## 2024-07-28 NOTE — PROGRESS NOTES
[facility]  ACUTE CARE SURGERY - PROGRESS NOTE    Patient Name: Bereket Em  MRN: [id number]  Admit Date: 725  : 1965  AGE: 58 y.o.   GENDER: male  ==============================================================================  TODAY'S ASSESSMENT AND PLAN OF CARE:  Bereket Em is a 58 y.o. year old male patient with a history of perforated diverticulitis s/p ex lap and sigmoidectomy c/b necrotic fascia with takeback to OR to repeat ex lap and placement of bridging mesh. Developed EC fistula x 2, returning with high fistula output and dehydration.     Continues to have large volume output when drinking liquids. Had lemonade yesterday- suggested only water and food. Plan to increase lomotil and add dietary fiber.      Plan:  Neuro - prn tylenol, oxy 5 prn   Resp - encourage IS  CV - continue home meds  GI - regular diet, imodium 4mg QID, lomotil 4mg QID, dietary fiber, strict I/Os, ongoing wound care   - replete lytes prn, LR 100ml/hr  ID - no issues  MSK - OOB as tolerated  DVT ppx - heparin subQ, SCDs     Dispo: RNF        Patient seen and plans discussed with Chief Resident Dr. BLANCA Delacruz, PGY-5 and Attending Physician Dr. Case.    Michelle Rosario MD PGY-1  Acute Care Surgery [id number]     ==============================================================================  CHIEF COMPLAINT / EVENTS LAST 24HRS / HPI:  NAEON. Continues to have large volume output. 2.5 L out.     MEDICAL HISTORY / ROS:   Admission history and ROS reviewed. Pertinent changes as follows:  NA    PHYSICAL EXAM:  Heart Rate:  [50-67]   Temp:  [36 °C (96.8 °F)-36.7 °C (98.1 °F)]   Resp:  [18]   BP: ()/(53-73)   SpO2:  [93 %-96 %]   Physical Exam    NAD  Midline wound with 2 EA fistulas, remainder with healthy granulation tissue; output thicker than prior; formal colostomy pink and well appearing    IMAGING SUMMARY:  (summary of new imaging findings, not a copy of dictation)  None  new    LABS:  Results from last 7 days   Lab Units 07/27/24  0640 07/26/24  0547 07/25/24  1602   WBC AUTO x10*3/uL 7.9 8.4 13.5*   HEMOGLOBIN g/dL 9.8* 10.3* 12.1*   HEMATOCRIT % 32.0* 32.4* 37.7*   PLATELETS AUTO x10*3/uL 384 412 460*         Results from last 7 days   Lab Units 07/27/24  0640 07/26/24  0547 07/25/24  1602   SODIUM mmol/L 135* 135* 132*   POTASSIUM mmol/L 3.6 3.5 3.4*   CHLORIDE mmol/L 96* 94* 88*   CO2 mmol/L 32 30 33*   BUN mg/dL 26* 29* 40*   CREATININE mg/dL 0.56 0.57 0.69   CALCIUM mg/dL 9.1 9.1 10.0   GLUCOSE mg/dL 103* 126* 101*               I have reviewed all medications, laboratory results, and imaging pertinent for today's encounter.

## 2024-07-29 LAB
ALBUMIN SERPL BCP-MCNC: 3.1 G/DL (ref 3.4–5)
ALP SERPL-CCNC: 123 U/L (ref 33–120)
ALT SERPL W P-5'-P-CCNC: 64 U/L (ref 10–52)
ANION GAP SERPL CALC-SCNC: 10 MMOL/L (ref 10–20)
AST SERPL W P-5'-P-CCNC: 23 U/L (ref 9–39)
BILIRUB DIRECT SERPL-MCNC: 0.1 MG/DL (ref 0–0.3)
BILIRUB SERPL-MCNC: 0.3 MG/DL (ref 0–1.2)
BUN SERPL-MCNC: 22 MG/DL (ref 6–23)
CALCIUM SERPL-MCNC: 8.9 MG/DL (ref 8.6–10.6)
CHLORIDE SERPL-SCNC: 102 MMOL/L (ref 98–107)
CO2 SERPL-SCNC: 30 MMOL/L (ref 21–32)
CREAT SERPL-MCNC: 0.5 MG/DL (ref 0.5–1.3)
EGFRCR SERPLBLD CKD-EPI 2021: >90 ML/MIN/1.73M*2
ERYTHROCYTE [DISTWIDTH] IN BLOOD BY AUTOMATED COUNT: 14.9 % (ref 11.5–14.5)
GLUCOSE BLD MANUAL STRIP-MCNC: 93 MG/DL (ref 74–99)
GLUCOSE SERPL-MCNC: 116 MG/DL (ref 74–99)
HCT VFR BLD AUTO: 30.3 % (ref 41–52)
HGB BLD-MCNC: 9.7 G/DL (ref 13.5–17.5)
MAGNESIUM SERPL-MCNC: 2.04 MG/DL (ref 1.6–2.4)
MCH RBC QN AUTO: 25.7 PG (ref 26–34)
MCHC RBC AUTO-ENTMCNC: 32 G/DL (ref 32–36)
MCV RBC AUTO: 80 FL (ref 80–100)
NRBC BLD-RTO: 0 /100 WBCS (ref 0–0)
PHOSPHATE SERPL-MCNC: 4.5 MG/DL (ref 2.5–4.9)
PLATELET # BLD AUTO: 333 X10*3/UL (ref 150–450)
POTASSIUM SERPL-SCNC: 4.3 MMOL/L (ref 3.5–5.3)
PROT SERPL-MCNC: 5.7 G/DL (ref 6.4–8.2)
RBC # BLD AUTO: 3.77 X10*6/UL (ref 4.5–5.9)
SODIUM SERPL-SCNC: 138 MMOL/L (ref 136–145)
TRIGL SERPL-MCNC: 184 MG/DL (ref 0–149)
WBC # BLD AUTO: 7.6 X10*3/UL (ref 4.4–11.3)

## 2024-07-29 PROCEDURE — 2500000001 HC RX 250 WO HCPCS SELF ADMINISTERED DRUGS (ALT 637 FOR MEDICARE OP): Performed by: STUDENT IN AN ORGANIZED HEALTH CARE EDUCATION/TRAINING PROGRAM

## 2024-07-29 PROCEDURE — 2500000001 HC RX 250 WO HCPCS SELF ADMINISTERED DRUGS (ALT 637 FOR MEDICARE OP): Performed by: NURSE PRACTITIONER

## 2024-07-29 PROCEDURE — 80069 RENAL FUNCTION PANEL: CPT | Performed by: STUDENT IN AN ORGANIZED HEALTH CARE EDUCATION/TRAINING PROGRAM

## 2024-07-29 PROCEDURE — 1100000001 HC PRIVATE ROOM DAILY

## 2024-07-29 PROCEDURE — 2500000005 HC RX 250 GENERAL PHARMACY W/O HCPCS

## 2024-07-29 PROCEDURE — 84450 TRANSFERASE (AST) (SGOT): CPT | Performed by: STUDENT IN AN ORGANIZED HEALTH CARE EDUCATION/TRAINING PROGRAM

## 2024-07-29 PROCEDURE — 83735 ASSAY OF MAGNESIUM: CPT | Performed by: STUDENT IN AN ORGANIZED HEALTH CARE EDUCATION/TRAINING PROGRAM

## 2024-07-29 PROCEDURE — 2500000004 HC RX 250 GENERAL PHARMACY W/ HCPCS (ALT 636 FOR OP/ED)

## 2024-07-29 PROCEDURE — 84478 ASSAY OF TRIGLYCERIDES: CPT | Performed by: STUDENT IN AN ORGANIZED HEALTH CARE EDUCATION/TRAINING PROGRAM

## 2024-07-29 PROCEDURE — 2500000004 HC RX 250 GENERAL PHARMACY W/ HCPCS (ALT 636 FOR OP/ED): Performed by: NURSE PRACTITIONER

## 2024-07-29 PROCEDURE — 2500000002 HC RX 250 W HCPCS SELF ADMINISTERED DRUGS (ALT 637 FOR MEDICARE OP, ALT 636 FOR OP/ED)

## 2024-07-29 PROCEDURE — 84100 ASSAY OF PHOSPHORUS: CPT | Performed by: STUDENT IN AN ORGANIZED HEALTH CARE EDUCATION/TRAINING PROGRAM

## 2024-07-29 PROCEDURE — 2500000001 HC RX 250 WO HCPCS SELF ADMINISTERED DRUGS (ALT 637 FOR MEDICARE OP)

## 2024-07-29 PROCEDURE — 85027 COMPLETE CBC AUTOMATED: CPT

## 2024-07-29 PROCEDURE — 2580000001 HC RX 258 IV SOLUTIONS

## 2024-07-29 PROCEDURE — C9113 INJ PANTOPRAZOLE SODIUM, VIA: HCPCS | Performed by: NURSE PRACTITIONER

## 2024-07-29 PROCEDURE — 82947 ASSAY GLUCOSE BLOOD QUANT: CPT

## 2024-07-29 RX ORDER — DIPHENOXYLATE HYDROCHLORIDE AND ATROPINE SULFATE 2.5; .025 MG/5ML; MG/5ML
5 SOLUTION ORAL
Status: DISCONTINUED | OUTPATIENT
Start: 2024-07-29 | End: 2024-07-29

## 2024-07-29 RX ORDER — ACETAMINOPHEN 160 MG/5ML
650 SOLUTION ORAL EVERY 4 HOURS PRN
Status: DISCONTINUED | OUTPATIENT
Start: 2024-07-29 | End: 2024-08-05 | Stop reason: HOSPADM

## 2024-07-29 RX ORDER — OXYCODONE HCL 5 MG/5 ML
5 SOLUTION, ORAL ORAL EVERY 6 HOURS PRN
Status: DISCONTINUED | OUTPATIENT
Start: 2024-07-29 | End: 2024-08-05 | Stop reason: HOSPADM

## 2024-07-29 RX ORDER — DIPHENOXYLATE HYDROCHLORIDE AND ATROPINE SULFATE 2.5; .025 MG/1; MG/1
1 TABLET ORAL 4 TIMES DAILY
Status: DISCONTINUED | OUTPATIENT
Start: 2024-07-29 | End: 2024-07-30

## 2024-07-29 RX ORDER — PANTOPRAZOLE SODIUM 40 MG/1
40 TABLET, DELAYED RELEASE ORAL 2 TIMES DAILY
Status: DISCONTINUED | OUTPATIENT
Start: 2024-07-29 | End: 2024-07-29

## 2024-07-29 RX ORDER — PANTOPRAZOLE SODIUM 40 MG/10ML
40 INJECTION, POWDER, LYOPHILIZED, FOR SOLUTION INTRAVENOUS DAILY
Status: DISCONTINUED | OUTPATIENT
Start: 2024-07-29 | End: 2024-08-03

## 2024-07-29 RX ORDER — LOPERAMIDE HCL 1MG/7.5ML
4 LIQUID (ML) ORAL
Status: DISCONTINUED | OUTPATIENT
Start: 2024-07-29 | End: 2024-08-05 | Stop reason: HOSPADM

## 2024-07-29 RX ADMIN — LOPERAMIDE HYDROCHLORIDE 4 MG: 2 SOLUTION ORAL at 16:30

## 2024-07-29 RX ADMIN — LOPERAMIDE HYDROCHLORIDE 4 MG: 2 SOLUTION ORAL at 21:36

## 2024-07-29 RX ADMIN — SMOFLIPID 50 G: 6; 6; 5; 3 INJECTION, EMULSION INTRAVENOUS at 22:14

## 2024-07-29 RX ADMIN — HEPARIN SODIUM 5000 UNITS: 5000 INJECTION INTRAVENOUS; SUBCUTANEOUS at 14:04

## 2024-07-29 RX ADMIN — SODIUM CHLORIDE, POTASSIUM CHLORIDE, SODIUM LACTATE AND CALCIUM CHLORIDE 100 ML/HR: 600; 310; 30; 20 INJECTION, SOLUTION INTRAVENOUS at 22:21

## 2024-07-29 RX ADMIN — CALCIUM POLYCARBOPHIL 625 MG: 625 TABLET, FILM COATED ORAL at 08:29

## 2024-07-29 RX ADMIN — PANTOPRAZOLE SODIUM 40 MG: 40 INJECTION, POWDER, FOR SOLUTION INTRAVENOUS at 12:06

## 2024-07-29 RX ADMIN — ASCORBIC ACID, VITAMIN A PALMITATE, CHOLECALCIFEROL, THIAMINE HYDROCHLORIDE, RIBOFLAVIN-5 PHOSPHATE SODIUM, PYRIDOXINE HYDROCHLORIDE, NIACINAMIDE, DEXPANTHENOL, ALPHA-TOCOPHEROL ACETATE, VITAMIN K1, FOLIC ACID, BIOTIN, CYANOCOBALAMIN: 200; 3300; 200; 6; 3.6; 6; 40; 15; 10; 150; 600; 60; 5 INJECTION, SOLUTION INTRAVENOUS at 22:14

## 2024-07-29 RX ADMIN — ACETAMINOPHEN 650 MG: 160 SOLUTION ORAL at 16:29

## 2024-07-29 RX ADMIN — SODIUM CHLORIDE, POTASSIUM CHLORIDE, SODIUM LACTATE AND CALCIUM CHLORIDE 1000 ML: 600; 310; 30; 20 INJECTION, SOLUTION INTRAVENOUS at 12:06

## 2024-07-29 RX ADMIN — LOPERAMIDE HYDROCHLORIDE 4 MG: 2 CAPSULE ORAL at 06:42

## 2024-07-29 RX ADMIN — OXYCODONE HYDROCHLORIDE 5 MG: 5 SOLUTION ORAL at 16:30

## 2024-07-29 RX ADMIN — LOPERAMIDE HYDROCHLORIDE 4 MG: 2 SOLUTION ORAL at 12:06

## 2024-07-29 RX ADMIN — DIPHENOXYLATE HYDROCHLORIDE AND ATROPINE SULFATE 2 TABLET: .025; 2.5 TABLET ORAL at 06:10

## 2024-07-29 RX ADMIN — HEPARIN SODIUM 5000 UNITS: 5000 INJECTION INTRAVENOUS; SUBCUTANEOUS at 21:36

## 2024-07-29 RX ADMIN — SODIUM CHLORIDE, POTASSIUM CHLORIDE, SODIUM LACTATE AND CALCIUM CHLORIDE 100 ML/HR: 600; 310; 30; 20 INJECTION, SOLUTION INTRAVENOUS at 01:06

## 2024-07-29 RX ADMIN — Medication 3 MG: at 23:32

## 2024-07-29 RX ADMIN — HEPARIN SODIUM 5000 UNITS: 5000 INJECTION INTRAVENOUS; SUBCUTANEOUS at 06:10

## 2024-07-29 RX ADMIN — SODIUM CHLORIDE, POTASSIUM CHLORIDE, SODIUM LACTATE AND CALCIUM CHLORIDE 100 ML/HR: 600; 310; 30; 20 INJECTION, SOLUTION INTRAVENOUS at 12:06

## 2024-07-29 RX ADMIN — OXYCODONE HYDROCHLORIDE 5 MG: 5 SOLUTION ORAL at 22:41

## 2024-07-29 ASSESSMENT — COGNITIVE AND FUNCTIONAL STATUS - GENERAL
DAILY ACTIVITIY SCORE: 24
MOBILITY SCORE: 24

## 2024-07-29 ASSESSMENT — PAIN SCALES - GENERAL
PAINLEVEL_OUTOF10: 5 - MODERATE PAIN
PAINLEVEL_OUTOF10: 5 - MODERATE PAIN
PAINLEVEL_OUTOF10: 2
PAINLEVEL_OUTOF10: 0 - NO PAIN
PAINLEVEL_OUTOF10: 0 - NO PAIN

## 2024-07-29 ASSESSMENT — PAIN SCALES - PAIN ASSESSMENT IN ADVANCED DEMENTIA (PAINAD): TOTALSCORE: MEDICATION (SEE MAR)

## 2024-07-29 ASSESSMENT — PAIN DESCRIPTION - LOCATION: LOCATION: ABDOMEN

## 2024-07-29 ASSESSMENT — PAIN DESCRIPTION - DESCRIPTORS: DESCRIPTORS: ACHING

## 2024-07-29 ASSESSMENT — PAIN DESCRIPTION - ORIENTATION: ORIENTATION: MID

## 2024-07-29 ASSESSMENT — PAIN - FUNCTIONAL ASSESSMENT
PAIN_FUNCTIONAL_ASSESSMENT: 0-10

## 2024-07-29 NOTE — PROGRESS NOTES
[facility]  ACUTE CARE SURGERY - PROGRESS NOTE    Patient Name: Bereket Em  MRN: [id number]  Admit Date: 725  : 1965  AGE: 58 y.o.   GENDER: male  ==============================================================================  TODAY'S ASSESSMENT AND PLAN OF CARE:  Bereket Em is a 58 y.o. year old male patient with a history of perforated diverticulitis s/p ex lap and sigmoidectomy c/b necrotic fascia with takeback to OR to repeat ex lap and placement of bridging mesh. Developed EC fistula x 2, returning with high fistula output and dehydration.     Plan:  Neuro:  - prn tylenol, oxy 5 prn   - Melatonin as needed for sleep     Resp:  - encourage IS  - OOB, ambulate as tolerated    CV : no active issues  - vital signs [id number]    GI: High output Enterocutaneous Fistula  - continue cyclic TPN/Lipids  - Imodium/Lomotil transitioned to elixir. Take 30 minutes before meals and at bedtime  - PPI IV BID  - OK for low carb, soft diet, 6 small meals a day, eat half meal and rest of meal 90 minutes later. Minimize liquids  - Discontinue Fiber  - Appreciate Wound Care following for wound manager  - Monitor colostomy and output    :   - Strict I&Os  - Fistula output with >2L output/24hours- given 1L LR bolus this AM  - continue mIVFs at 100ml/hr  - Replete lytes as indicated, daily RFP/Mg    ID - no issues  - Trend CBC daily    Endo: no hx of DM  - Glucose checks [id number] while on TPN     DVT ppx:  - heparin subQ, SCDs     Dispo: RNF    Patient discussed with Attending Dr. Ora Lubin APRN-[facility]  Acute Care Surgery  Pager [phone]  ==============================================================================  CHIEF COMPLAINT / EVENTS LAST 24HRS / HPI:  No acute events overnight. Continues to have high output from fistula.     MEDICAL HISTORY / ROS:   Admission history and ROS reviewed. Pertinent changes as follows:  NA    PHYSICAL EXAM:  Heart Rate:  [52-60]    Temp:  [35.5 °C (95.9 °F)-36.6 °C (97.9 °F)]   Resp:  [16-18]   BP: ()/(58-75)   SpO2:  [95 %-98 %]   Physical Exam  Constitutional:       Appearance: Normal appearance.   Eyes:      Extraocular Movements: Extraocular movements intact.   Cardiovascular:      Rate and Rhythm: Normal rate.   Pulmonary:      Effort: Pulmonary effort is normal.   Abdominal:      Comments: Soft, non distended, non tender,  midline wound pink with granulation tissue, stomasize fistula in center of wound bed draining yellow/brown watery effluent. Fistula pouch in place without leakage. Colostomy-stoma pink and viable with no output, pouch in place   Musculoskeletal:         General: Normal range of motion.   Skin:     General: Skin is warm and dry.   Neurological:      Mental Status: He is alert and oriented to person, place, and time.   Psychiatric:         Behavior: Behavior normal.         IMAGING SUMMARY:  (summary of new imaging findings, not a copy of dictation)  None new    LABS:  Results from last 7 days   Lab Units 07/27/24  0640 07/26/24  0547 07/25/24  1602   WBC AUTO x10*3/uL 7.9 8.4 13.5*   HEMOGLOBIN g/dL 9.8* 10.3* 12.1*   HEMATOCRIT % 32.0* 32.4* 37.7*   PLATELETS AUTO x10*3/uL 384 412 460*         Results from last 7 days   Lab Units 07/29/24  0426 07/27/24  0640 07/26/24  0547   SODIUM mmol/L 138 135* 135*   POTASSIUM mmol/L 4.3 3.6 3.5   CHLORIDE mmol/L 102 96* 94*   CO2 mmol/L 30 32 30   BUN mg/dL 22 26* 29*   CREATININE mg/dL 0.50 0.56 0.57   CALCIUM mg/dL 8.9 9.1 9.1   PROTEIN TOTAL g/dL 5.7*  --   --    BILIRUBIN TOTAL mg/dL 0.3  --   --    ALK PHOS U/L 123*  --   --    ALT U/L 64*  --   --    AST U/L 23  --   --    GLUCOSE mg/dL 116* 103* 126*     Results from last 7 days   Lab Units 07/29/24  0426   BILIRUBIN TOTAL mg/dL 0.3   BILIRUBIN DIRECT mg/dL 0.1           I have reviewed all medications, laboratory results, and imaging pertinent for today's encounter.

## 2024-07-29 NOTE — PROGRESS NOTES
Transitional Care Coordination Progress Note    Team members: TCC, ACS NP    Discharge disposition: Home with HC (HC vs [facility]Outside HC Agency)    Status: IP    Payer: [employer]    Potential Barriers: increased fistula output, dehydration    ADOD: 8/2    This TCC will continue to follow for home going needs and safe DC plan.    Yue Ramachandran RN

## 2024-07-29 NOTE — CARE PLAN
Problem: Skin  Goal: Participates in plan/prevention/treatment measures  Outcome: Progressing  Goal: Prevent/manage excess moisture  Outcome: Progressing  Goal: Promote skin healing  Outcome: Progressing     The patient's goals for the shift include  decrease output.    The clinical goals for the shift include Decrease output.    Over the shift, the patient did make progress toward the following goals.

## 2024-07-30 LAB
ALBUMIN SERPL BCP-MCNC: 3 G/DL (ref 3.4–5)
ANION GAP SERPL CALC-SCNC: 10 MMOL/L (ref 10–20)
BUN SERPL-MCNC: 20 MG/DL (ref 6–23)
CALCIUM SERPL-MCNC: 8.8 MG/DL (ref 8.6–10.6)
CHLORIDE SERPL-SCNC: 105 MMOL/L (ref 98–107)
CO2 SERPL-SCNC: 28 MMOL/L (ref 21–32)
CREAT SERPL-MCNC: 0.51 MG/DL (ref 0.5–1.3)
EGFRCR SERPLBLD CKD-EPI 2021: >90 ML/MIN/1.73M*2
ERYTHROCYTE [DISTWIDTH] IN BLOOD BY AUTOMATED COUNT: 15 % (ref 11.5–14.5)
GLUCOSE BLD MANUAL STRIP-MCNC: 107 MG/DL (ref 74–99)
GLUCOSE BLD MANUAL STRIP-MCNC: 151 MG/DL (ref 74–99)
GLUCOSE BLD MANUAL STRIP-MCNC: 91 MG/DL (ref 74–99)
GLUCOSE SERPL-MCNC: 115 MG/DL (ref 74–99)
HCT VFR BLD AUTO: 29.4 % (ref 41–52)
HGB BLD-MCNC: 9 G/DL (ref 13.5–17.5)
MAGNESIUM SERPL-MCNC: 1.95 MG/DL (ref 1.6–2.4)
MCH RBC QN AUTO: 25.4 PG (ref 26–34)
MCHC RBC AUTO-ENTMCNC: 30.6 G/DL (ref 32–36)
MCV RBC AUTO: 83 FL (ref 80–100)
NRBC BLD-RTO: 0 /100 WBCS (ref 0–0)
PHOSPHATE SERPL-MCNC: 4.7 MG/DL (ref 2.5–4.9)
PLATELET # BLD AUTO: 308 X10*3/UL (ref 150–450)
POTASSIUM SERPL-SCNC: 4.5 MMOL/L (ref 3.5–5.3)
RBC # BLD AUTO: 3.55 X10*6/UL (ref 4.5–5.9)
SODIUM SERPL-SCNC: 138 MMOL/L (ref 136–145)
WBC # BLD AUTO: 6.5 X10*3/UL (ref 4.4–11.3)

## 2024-07-30 PROCEDURE — 83735 ASSAY OF MAGNESIUM: CPT

## 2024-07-30 PROCEDURE — C9113 INJ PANTOPRAZOLE SODIUM, VIA: HCPCS | Performed by: NURSE PRACTITIONER

## 2024-07-30 PROCEDURE — 2500000004 HC RX 250 GENERAL PHARMACY W/ HCPCS (ALT 636 FOR OP/ED): Performed by: NURSE PRACTITIONER

## 2024-07-30 PROCEDURE — 85027 COMPLETE CBC AUTOMATED: CPT

## 2024-07-30 PROCEDURE — 2500000001 HC RX 250 WO HCPCS SELF ADMINISTERED DRUGS (ALT 637 FOR MEDICARE OP): Performed by: NURSE PRACTITIONER

## 2024-07-30 PROCEDURE — 2580000001 HC RX 258 IV SOLUTIONS

## 2024-07-30 PROCEDURE — 1100000001 HC PRIVATE ROOM DAILY

## 2024-07-30 PROCEDURE — 2500000004 HC RX 250 GENERAL PHARMACY W/ HCPCS (ALT 636 FOR OP/ED)

## 2024-07-30 PROCEDURE — 2500000005 HC RX 250 GENERAL PHARMACY W/O HCPCS

## 2024-07-30 PROCEDURE — 80069 RENAL FUNCTION PANEL: CPT

## 2024-07-30 PROCEDURE — 82947 ASSAY GLUCOSE BLOOD QUANT: CPT

## 2024-07-30 RX ADMIN — PANTOPRAZOLE SODIUM 40 MG: 40 INJECTION, POWDER, FOR SOLUTION INTRAVENOUS at 08:06

## 2024-07-30 RX ADMIN — SODIUM CHLORIDE, POTASSIUM CHLORIDE, SODIUM LACTATE AND CALCIUM CHLORIDE 100 ML/HR: 600; 310; 30; 20 INJECTION, SOLUTION INTRAVENOUS at 08:09

## 2024-07-30 RX ADMIN — SMOFLIPID 50 G: 6; 6; 5; 3 INJECTION, EMULSION INTRAVENOUS at 20:43

## 2024-07-30 RX ADMIN — HEPARIN SODIUM 5000 UNITS: 5000 INJECTION INTRAVENOUS; SUBCUTANEOUS at 21:51

## 2024-07-30 RX ADMIN — ACETAMINOPHEN 650 MG: 160 SOLUTION ORAL at 21:51

## 2024-07-30 RX ADMIN — OXYCODONE HYDROCHLORIDE 5 MG: 5 SOLUTION ORAL at 21:51

## 2024-07-30 RX ADMIN — LOPERAMIDE HYDROCHLORIDE 4 MG: 2 SOLUTION ORAL at 08:06

## 2024-07-30 RX ADMIN — HEPARIN SODIUM 5000 UNITS: 5000 INJECTION INTRAVENOUS; SUBCUTANEOUS at 06:25

## 2024-07-30 RX ADMIN — LOPERAMIDE HYDROCHLORIDE 4 MG: 2 SOLUTION ORAL at 16:18

## 2024-07-30 RX ADMIN — ACETAMINOPHEN 650 MG: 160 SOLUTION ORAL at 08:14

## 2024-07-30 RX ADMIN — ASCORBIC ACID, VITAMIN A PALMITATE, CHOLECALCIFEROL, THIAMINE HYDROCHLORIDE, RIBOFLAVIN-5 PHOSPHATE SODIUM, PYRIDOXINE HYDROCHLORIDE, NIACINAMIDE, DEXPANTHENOL, ALPHA-TOCOPHEROL ACETATE, VITAMIN K1, FOLIC ACID, BIOTIN, CYANOCOBALAMIN: 200; 3300; 200; 6; 3.6; 6; 40; 15; 10; 150; 600; 60; 5 INJECTION, SOLUTION INTRAVENOUS at 20:43

## 2024-07-30 RX ADMIN — LOPERAMIDE HYDROCHLORIDE 4 MG: 2 SOLUTION ORAL at 21:47

## 2024-07-30 RX ADMIN — HEPARIN SODIUM 5000 UNITS: 5000 INJECTION INTRAVENOUS; SUBCUTANEOUS at 16:17

## 2024-07-30 RX ADMIN — LOPERAMIDE HYDROCHLORIDE 4 MG: 2 SOLUTION ORAL at 12:33

## 2024-07-30 ASSESSMENT — PAIN SCALES - GENERAL
PAINLEVEL_OUTOF10: 3
PAINLEVEL_OUTOF10: 5 - MODERATE PAIN
PAINLEVEL_OUTOF10: 0 - NO PAIN
PAINLEVEL_OUTOF10: 2

## 2024-07-30 ASSESSMENT — PAIN DESCRIPTION - DESCRIPTORS: DESCRIPTORS: ACHING

## 2024-07-30 ASSESSMENT — COGNITIVE AND FUNCTIONAL STATUS - GENERAL
MOBILITY SCORE: 24
DAILY ACTIVITIY SCORE: 24

## 2024-07-30 ASSESSMENT — PAIN SCALES - PAIN ASSESSMENT IN ADVANCED DEMENTIA (PAINAD): TOTALSCORE: MEDICATION (SEE MAR)

## 2024-07-30 ASSESSMENT — PAIN - FUNCTIONAL ASSESSMENT
PAIN_FUNCTIONAL_ASSESSMENT: 0-10
PAIN_FUNCTIONAL_ASSESSMENT: 0-10

## 2024-07-30 ASSESSMENT — PAIN DESCRIPTION - ORIENTATION: ORIENTATION: MID

## 2024-07-30 ASSESSMENT — PAIN DESCRIPTION - LOCATION: LOCATION: ABDOMEN

## 2024-07-30 NOTE — CONSULTS
"Nutrition Follow Up Assessment: /Consult   Nutrition Assessment    Reason for Assessment: Provider consult order, TPN recommendations (Nutrition assessment/recommendations)    Patient is a 58 y.o. male presenting with high fistula output and dehydration.  PMH/PSH: HTN, BPH, TPN initiated ~1 week after initial surgery, history of perforated diverticulitis now s/p procedures listed below:   06/07/2024 (Maury): ex lap, sigmoidectomy, and end colostomy   06/11/2024: wound vac applied  06/14/2024:  vac was taken down which showed necrotic fascia at midline incision.  06/16/2024(McQuay): exploration laparotomy, abdominal washout, fascial debridement, enmas closure    06/18/2024 (Rios): re-exploration laparotomy, mesh placement for suspected infection. 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    Nutrition History:  Food and Nutrient History: Patient was up about to go for a walk upon visit this morning.  He is familiar with plan for small frequent meals and limited liquids as tolerated as to not induce high fistula output.  He had questions regarding limited carbohydrates at meals.  Otherwise feels weight has about stabilized.  Clinimix 5/15 @ 91 x 1 hr, 182/hr x 10hrs and 91 x 1hr = 1420kcal and 100g protein/d.  SMOF 250ml/d = 500kcal/d.    Anthropometrics:  Height: 172.7 cm (5' 8\")   Weight: 78.2 kg (172 lb 6.4 oz)   BMI (Calculated): 26.22  IBW/kg (Dietitian Calculated): 70 kg  Percent of IBW: 111 %       Weight History:   Wt Readings from Last 21 Encounters:   07/25/24 78.2 kg (172 lb 6.4 oz)   07/25/24 79.2 kg (174 lb 8 oz)   07/19/24 79.4 kg (175 lb)   07/13/24 78.9 kg (174 lb)   07/11/24 81.4 kg (179 lb 7.3 oz)   06/05/24 87.6 kg (193 lb 2 oz)   06/03/24 89.4 kg (197 lb)   05/09/24 89.8 kg (198 lb)   04/30/24 88.5 kg (195 lb)   02/08/24 88.9 kg (196 lb)   01/03/24 89.4 kg (197 lb)   04/06/22 82.8 kg (182 lb 9.6 oz)   03/23/22 82.6 " kg (182 lb 3.2 oz)   05/06/21 78.6 kg (173 lb 3.2 oz)   06/04/20 74.7 kg (164 lb 9.6 oz)       Weight Change %:  Weight History / % Weight Change: No new weight over hospital stay    Nutrition Focused Physical Exam Findings:    Subcutaneous Fat Loss:   Orbital Fat Pads: Mild-Moderate (slight dark circles and slight hollowing)  Buccal Fat Pads: Well nourished (full, rounded cheeks)  Triceps: Well nourished (ample fat tissue)  Muscle Wasting:  Temporalis: Mild-Moderate (slight depression)  Pectoralis (Clavicular Region): Mild-Moderate (some protrusion of clavicle)  Deltoid/Trapezius: Well nourished (rounded appearance at arm, shoulder, neck)  Interosseous: Well nourished (muscle bulges)  Trapezius/Infraspinatus/Supraspinatus (Scapular Region): Well nourished (bones not prominent, muscle taut)  Quadriceps: Well nourished (well developed, well rounded)  Gastrocnemius: Well nourished (well developed bulbous muscle)  Edema:     Physical Findings:  Hair: Negative  Eyes: Negative  Nails: Negative  Skin: Positive (abd drain, abd inc/wound manager)    Nutrition Significant Labs:  CBC Trend:   Results from last 7 days   Lab Units 07/30/24  0541 07/29/24  1832 07/27/24  0640 07/26/24  0547   WBC AUTO x10*3/uL 6.5 7.6 7.9 8.4   RBC AUTO x10*6/uL 3.55* 3.77* 3.88* 4.10*   HEMOGLOBIN g/dL 9.0* 9.7* 9.8* 10.3*   HEMATOCRIT % 29.4* 30.3* 32.0* 32.4*   MCV fL 83 80 83 79*   PLATELETS AUTO x10*3/uL 308 333 384 412    , TPN/PPN Labs:   Results from last 7 days   Lab Units 07/30/24  0541 07/29/24  0426 07/27/24  0640 07/26/24  0547   GLUCOSE mg/dL 115* 116* 103* 126*   POTASSIUM mmol/L 4.5 4.3 3.6 3.5   PHOSPHORUS mg/dL 4.7 4.5 4.0 3.9   MAGNESIUM mg/dL 1.95 2.04 2.01 2.19   SODIUM mmol/L 138 138 135* 135*   CHLORIDE mmol/L 105 102 96* 94*   ALT U/L  --  64*  --   --    AST U/L  --  23  --   --    ALK PHOS U/L  --  123*  --   --    BILIRUBIN TOTAL mg/dL  --  0.3  --   --    TRIGLYCERIDES mg/dL  --  184*  --   --     , Lipid Panel:   Lab  Results   Component Value Date    CHOL 188 01/26/2024    HDL 71.0 01/26/2024    CHHDL 2.6 01/26/2024    LDLF 83 04/06/2022    VLDL 10 04/06/2022    TRIG 184 (H) 07/29/2024        Nutrition Specific Medications:  Scheduled medications  diphenoxylate-atropine, 1 tablet, oral, 4x daily  fat emulsion fish oil/plant based, 250 mL, intravenous, Daily Lipids  heparin (porcine), 5,000 Units, subcutaneous, q8h CIERRA  loperamide, 4 mg, oral, With meals & nightly  pantoprazole, 40 mg, intravenous, Daily      Continuous medications  Adult Clinimix Parenteral Nutrition Cyclic, , Last Rate: Stopped (07/30/24 1014)  lactated Ringer's, 100 mL/hr, Last Rate: 100 mL/hr (07/30/24 0809)      I/O:    ;      Dietary Orders (From admission, onward)       Start     Ordered    07/29/24 1006  Adult diet Consistent Carb; CCD 45 gm/meal  Diet effective now        Comments: 6 small meals a day  Eat half meal with minimal liquid intake and other half of meal 90 minutes later   Limited liquids during meals   Question Answer Comment   Diet type Consistent Carb    Carb diet selection: CCD 45 gm/meal        07/29/24 1009                     Estimated Needs:   Total Energy Estimated Needs (kCal): 2200 kCal  Method for Estimating Needs: 28kcal/kg  Total Protein Estimated Needs (g): 105 g  Method for Estimating Needs: 1.35g/kg  Total Fluid Estimated Needs (mL):  (per team)           Nutrition Diagnosis     Nutrition Diagnosis  Patient has Nutrition Diagnosis: Yes  Diagnosis Status (1): Ongoing  Nutrition Diagnosis 1: Altered GI function  Related to (1): EC fistula  As Evidenced by (1): need for TPN and elevated fistula output       Nutrition Interventions/Recommendations         Nutrition Prescription:  Individualized Nutrition Prescription Provided for :     Recommend:   1) Continue diet as tolerated.      2) Can consider Ensure high protein supplement 1-2 x day as tolerated (8oz= 160kcal, 16g protein, 19g CHO, <1g fiber).      3) Continue nocturnal TPN  (Clinimix 5/15) and lipids as ordered to provide 100g protein, 1920kcal and 2250ml total volume/d.      4) Consider additional IVF as needed during the day for episodes of increased output at discharge.     5) Continue to provide and adjust antidiarrheal regimen as needed to help prevent excess fistula output.     6) Obtain weekly weight.  If weight trends down would consider Clinimix 5/20 formula at same cycled rates to provide an additional 340kcal/d or 2260kcal/d including lipids.        Nutrition Interventions:             Nutrition Education:      Answered questions regarding current diet order, reviewed low fiber diet and provided handout for reference. Patient verbalized understanding of information provided.     Nutrition Monitoring and Evaluation   Food/Nutrient Related History Monitoring  Monitoring and Evaluation Plan: Enteral and parenteral nutrition intake  Criteria: Tolerance    Body Composition/Growth/Weight History  Monitoring and Evaluation Plan: Weight  Criteria: Promote stable weight    Biochemical Data, Medical Tests and Procedures  Monitoring and Evaluation Plan: Electrolyte/renal panel  Criteria: wnl  Glucose/Endocrine Profile: Glucose, casual  Criteria: WNL    Nutrition Focused Physical Findings  Monitoring and Evaluation Plan: Skin  Criteria: Promote healing         Time Spent (min): 90 minutes

## 2024-07-30 NOTE — PROGRESS NOTES
[facility]  ACUTE CARE SURGERY - PROGRESS NOTE    Patient Name: Bereket Em  MRN: [id number]  Admit Date: 725  : 1965  AGE: 58 y.o.   GENDER: male  ==============================================================================  TODAY'S ASSESSMENT AND PLAN OF CARE:  Bereket Em is a 58 y.o. year old male patient with a history of perforated diverticulitis s/p ex lap and sigmoidectomy c/b necrotic fascia with takeback to OR to repeat ex lap and placement of bridging mesh. Developed EC fistula x 2, returning with high fistula output and dehydration.     Plan:  Neuro:  - prn tylenol, oxy 5 prn   - Melatonin as needed for sleep     Resp:  - encourage IS  - OOB, ambulate as tolerated    CV : no active issues  - vital signs [id number]    GI: High output Enterocutaneous Fistula  - continue cyclic TPN/Lipids  - Imodium/Lomotil transitioned to elixir. Take 30 minutes before meals and at bedtime  -Lomotil elixir currently out of stock in hospital  - PPI IV BID  - OK for low carb, soft diet, 6 small meals a day, eat half meal and rest of meal 90 minutes later. Minimize liquids  - Calorie count   - Discontinue Fiber  - Appreciate Wound Care following for wound manager  - Monitor colostomy and output    :   - Strict I&Os  - Fistula output decreasing to 1600ml over past 24 hours   - HL IVFs  - Replete lytes as indicated, daily RFP/Mg    ID - no issues  - Trend CBC daily    Endo: no hx of DM  - Glucose checks [id number] while on TPN     DVT ppx:  - heparin subQ, SCDs     Dispo: RNF    Patient discussed with Attending Dr. Ora Lubin APRN-[facility]  Acute Care Surgery  Pager [phone]  ==============================================================================  CHIEF COMPLAINT / EVENTS LAST 24HRS / HPI:  No acute events overnight. Patient states output improved and slowed with change in diet. Denies n/v. Pain controlled.     MEDICAL HISTORY / ROS:   Admission history and ROS  reviewed. Pertinent changes as follows:  NA    PHYSICAL EXAM:  Heart Rate:  [52-62]   Temp:  [36.3 °C (97.3 °F)-36.6 °C (97.9 °F)]   Resp:  [17-18]   BP: (100-116)/(56-73)   SpO2:  [96 %-97 %]   Physical Exam  Constitutional:       Appearance: Normal appearance.   Eyes:      Extraocular Movements: Extraocular movements intact.   Cardiovascular:      Rate and Rhythm: Normal rate.   Pulmonary:      Effort: Pulmonary effort is normal.   Abdominal:      Comments: Soft, non distended, non tender,  midline wound pink with granulation tissue, stomasize fistula in center of wound bed draining yellow/brown effluent. Fistula pouch in place without leakage. Colostomy-stoma pink and viable with no output, pouch in place   Musculoskeletal:         General: Normal range of motion.   Skin:     General: Skin is warm and dry.   Neurological:      Mental Status: He is alert and oriented to person, place, and time.   Psychiatric:         Behavior: Behavior normal.         IMAGING SUMMARY:  (summary of new imaging findings, not a copy of dictation)  None new    LABS:  Results from last 7 days   Lab Units 07/30/24  0541 07/29/24  1832 07/27/24  0640   WBC AUTO x10*3/uL 6.5 7.6 7.9   HEMOGLOBIN g/dL 9.0* 9.7* 9.8*   HEMATOCRIT % 29.4* 30.3* 32.0*   PLATELETS AUTO x10*3/uL 308 333 384         Results from last 7 days   Lab Units 07/30/24  0541 07/29/24  0426 07/27/24  0640   SODIUM mmol/L 138 138 135*   POTASSIUM mmol/L 4.5 4.3 3.6   CHLORIDE mmol/L 105 102 96*   CO2 mmol/L 28 30 32   BUN mg/dL 20 22 26*   CREATININE mg/dL 0.51 0.50 0.56   CALCIUM mg/dL 8.8 8.9 9.1   PROTEIN TOTAL g/dL  --  5.7*  --    BILIRUBIN TOTAL mg/dL  --  0.3  --    ALK PHOS U/L  --  123*  --    ALT U/L  --  64*  --    AST U/L  --  23  --    GLUCOSE mg/dL 115* 116* 103*     Results from last 7 days   Lab Units 07/29/24  0426   BILIRUBIN TOTAL mg/dL 0.3   BILIRUBIN DIRECT mg/dL 0.1           I have reviewed all medications, laboratory results, and imaging pertinent  for today's encounter.

## 2024-07-30 NOTE — NURSING NOTE
4 Eyes Skin Assessment    Reason for assessment? Weekly skin assessment  Does the patient have a wound? no  Wound RN consult placed? N/A  Preventative foam dressing applied? Yes - sacrum      Four eyes skin check performed with Eduard Fernandez.

## 2024-07-30 NOTE — CARE PLAN
Problem: Skin  Goal: Participates in plan/prevention/treatment measures  Outcome: Progressing  Goal: Promote/optimize nutrition  Outcome: Progressing  Goal: Promote skin healing  Outcome: Progressing     The patient's goals for the shift include  decrease fistula output.    The clinical goals for the shift include Decrease fistula output.    Over the shift, the patient did make progress toward the following goals.

## 2024-07-31 LAB
ALBUMIN SERPL BCP-MCNC: 3.2 G/DL (ref 3.4–5)
ANION GAP SERPL CALC-SCNC: 10 MMOL/L (ref 10–20)
BUN SERPL-MCNC: 21 MG/DL (ref 6–23)
CALCIUM SERPL-MCNC: 8.8 MG/DL (ref 8.6–10.6)
CHLORIDE SERPL-SCNC: 105 MMOL/L (ref 98–107)
CO2 SERPL-SCNC: 28 MMOL/L (ref 21–32)
CREAT SERPL-MCNC: 0.49 MG/DL (ref 0.5–1.3)
EGFRCR SERPLBLD CKD-EPI 2021: >90 ML/MIN/1.73M*2
ERYTHROCYTE [DISTWIDTH] IN BLOOD BY AUTOMATED COUNT: 15.2 % (ref 11.5–14.5)
GLUCOSE BLD MANUAL STRIP-MCNC: 90 MG/DL (ref 74–99)
GLUCOSE SERPL-MCNC: 108 MG/DL (ref 74–99)
HCT VFR BLD AUTO: 30.4 % (ref 41–52)
HGB BLD-MCNC: 9.2 G/DL (ref 13.5–17.5)
MAGNESIUM SERPL-MCNC: 2.12 MG/DL (ref 1.6–2.4)
MCH RBC QN AUTO: 25.3 PG (ref 26–34)
MCHC RBC AUTO-ENTMCNC: 30.3 G/DL (ref 32–36)
MCV RBC AUTO: 84 FL (ref 80–100)
NRBC BLD-RTO: 0 /100 WBCS (ref 0–0)
PHOSPHATE SERPL-MCNC: 4.9 MG/DL (ref 2.5–4.9)
PLATELET # BLD AUTO: 321 X10*3/UL (ref 150–450)
POTASSIUM SERPL-SCNC: 4.5 MMOL/L (ref 3.5–5.3)
RBC # BLD AUTO: 3.63 X10*6/UL (ref 4.5–5.9)
SODIUM SERPL-SCNC: 138 MMOL/L (ref 136–145)
WBC # BLD AUTO: 5.7 X10*3/UL (ref 4.4–11.3)

## 2024-07-31 PROCEDURE — 2500000001 HC RX 250 WO HCPCS SELF ADMINISTERED DRUGS (ALT 637 FOR MEDICARE OP): Performed by: NURSE PRACTITIONER

## 2024-07-31 PROCEDURE — 2500000004 HC RX 250 GENERAL PHARMACY W/ HCPCS (ALT 636 FOR OP/ED): Performed by: NURSE PRACTITIONER

## 2024-07-31 PROCEDURE — 85027 COMPLETE CBC AUTOMATED: CPT | Performed by: NURSE PRACTITIONER

## 2024-07-31 PROCEDURE — 1100000001 HC PRIVATE ROOM DAILY

## 2024-07-31 PROCEDURE — 80069 RENAL FUNCTION PANEL: CPT | Performed by: NURSE PRACTITIONER

## 2024-07-31 PROCEDURE — 2500000001 HC RX 250 WO HCPCS SELF ADMINISTERED DRUGS (ALT 637 FOR MEDICARE OP)

## 2024-07-31 PROCEDURE — 2500000004 HC RX 250 GENERAL PHARMACY W/ HCPCS (ALT 636 FOR OP/ED)

## 2024-07-31 PROCEDURE — 83735 ASSAY OF MAGNESIUM: CPT | Performed by: NURSE PRACTITIONER

## 2024-07-31 PROCEDURE — 2500000005 HC RX 250 GENERAL PHARMACY W/O HCPCS

## 2024-07-31 PROCEDURE — 82947 ASSAY GLUCOSE BLOOD QUANT: CPT

## 2024-07-31 PROCEDURE — 2580000001 HC RX 258 IV SOLUTIONS

## 2024-07-31 PROCEDURE — C9113 INJ PANTOPRAZOLE SODIUM, VIA: HCPCS | Performed by: NURSE PRACTITIONER

## 2024-07-31 RX ADMIN — ACETAMINOPHEN 650 MG: 160 SOLUTION ORAL at 21:12

## 2024-07-31 RX ADMIN — LOPERAMIDE HYDROCHLORIDE 4 MG: 2 SOLUTION ORAL at 12:06

## 2024-07-31 RX ADMIN — OXYCODONE HYDROCHLORIDE 5 MG: 5 SOLUTION ORAL at 21:13

## 2024-07-31 RX ADMIN — HEPARIN SODIUM 5000 UNITS: 5000 INJECTION INTRAVENOUS; SUBCUTANEOUS at 14:07

## 2024-07-31 RX ADMIN — LOPERAMIDE HYDROCHLORIDE 4 MG: 2 SOLUTION ORAL at 21:14

## 2024-07-31 RX ADMIN — ACETAMINOPHEN 650 MG: 160 SOLUTION ORAL at 14:20

## 2024-07-31 RX ADMIN — SMOFLIPID 50 G: 6; 6; 5; 3 INJECTION, EMULSION INTRAVENOUS at 21:13

## 2024-07-31 RX ADMIN — PANTOPRAZOLE SODIUM 40 MG: 40 INJECTION, POWDER, FOR SOLUTION INTRAVENOUS at 09:01

## 2024-07-31 RX ADMIN — Medication 3 MG: at 00:35

## 2024-07-31 RX ADMIN — LOPERAMIDE HYDROCHLORIDE 4 MG: 2 SOLUTION ORAL at 09:01

## 2024-07-31 RX ADMIN — HEPARIN SODIUM 5000 UNITS: 5000 INJECTION INTRAVENOUS; SUBCUTANEOUS at 21:12

## 2024-07-31 RX ADMIN — HEPARIN SODIUM 5000 UNITS: 5000 INJECTION INTRAVENOUS; SUBCUTANEOUS at 06:34

## 2024-07-31 RX ADMIN — ASCORBIC ACID, VITAMIN A PALMITATE, CHOLECALCIFEROL, THIAMINE HYDROCHLORIDE, RIBOFLAVIN-5 PHOSPHATE SODIUM, PYRIDOXINE HYDROCHLORIDE, NIACINAMIDE, DEXPANTHENOL, ALPHA-TOCOPHEROL ACETATE, VITAMIN K1, FOLIC ACID, BIOTIN, CYANOCOBALAMIN: 200; 3300; 200; 6; 3.6; 6; 40; 15; 10; 150; 600; 60; 5 INJECTION, SOLUTION INTRAVENOUS at 21:18

## 2024-07-31 RX ADMIN — LOPERAMIDE HYDROCHLORIDE 4 MG: 2 SOLUTION ORAL at 16:26

## 2024-07-31 ASSESSMENT — COGNITIVE AND FUNCTIONAL STATUS - GENERAL
DAILY ACTIVITIY SCORE: 24
MOBILITY SCORE: 24

## 2024-07-31 ASSESSMENT — PAIN SCALES - GENERAL
PAINLEVEL_OUTOF10: 3
PAINLEVEL_OUTOF10: 0 - NO PAIN
PAINLEVEL_OUTOF10: 0 - NO PAIN

## 2024-07-31 ASSESSMENT — PAIN DESCRIPTION - LOCATION: LOCATION: ABDOMEN

## 2024-07-31 ASSESSMENT — PAIN - FUNCTIONAL ASSESSMENT
PAIN_FUNCTIONAL_ASSESSMENT: 0-10

## 2024-07-31 ASSESSMENT — PAIN DESCRIPTION - DESCRIPTORS: DESCRIPTORS: ACHING

## 2024-07-31 ASSESSMENT — PAIN SCALES - PAIN ASSESSMENT IN ADVANCED DEMENTIA (PAINAD): TOTALSCORE: MEDICATION (SEE MAR)

## 2024-07-31 ASSESSMENT — PAIN DESCRIPTION - ORIENTATION: ORIENTATION: MID

## 2024-07-31 NOTE — CONSULTS
Wound Care Follow-Up     Visit Date: 7/31/2024      Patient Name: Bereket Em         MRN: [id number]           YOB: 1965     Reason for Consult: leaking wound manager     Wound History: chronic midline wound with known ECFs, colostomy     Wound Assessment:  Wound 06/07/24 Incision Abdomen Medial;Upper (Active)   Wound Image   07/28/24 1104   Site Assessment Pink;Red;Granulation 07/31/24 1645   Usha-Wound Assessment Red;Painful 07/27/24 2014   Shape oval 07/26/24 2245   Wound Length (cm) 15 cm 07/25/24 1416   Wound Width (cm) 8.5 cm 07/25/24 1416   Wound Surface Area (cm^2) 127.5 cm^2 07/25/24 1416   Wound Depth (cm) 1.5 cm 07/25/24 1416   Wound Volume (cm^3) 191.25 cm^3 07/25/24 1416   Wound Healing % 63 07/25/24 1416   State of Healing Healing ridge;Early/partial granulation 07/25/24 2100   Margins Well-defined edges 07/26/24 2245   Closure Open to air 07/26/24 2245   Sutures/Staple Line Non approximated 07/26/24 2245   Drainage Description Yellow;Tan 07/31/24 1645   Drainage Amount Large 07/31/24 1645   Dressing Other (Comment) 07/31/24 1645   Dressing Changed Changed 07/31/24 1645   Dressing Status Clean;Dry 07/31/24 0900     Wound Team Summary Assessment: Pt seen this AM for wound . Old pouch just starting to leak at 6:00. Appliances removed and ABD cleaned. Wound appears very clean and granular with several stomatized fistulas putting out moderate-large amounts of loose yellow/tan stool. Skin prepped with Cavilon Advanced, and paste rings used to line wound perimeter and even out problematic areas. New Coloplast mini wound manager placed, as well as new colostomy appliance. Pressure and warm compresses applied to establish good seal.      Wound Team Plan: Wound Care to follow closely.      Dalia Leos RN, CWON  7/31/2024  4:46 PM

## 2024-07-31 NOTE — PROGRESS NOTES
Calorie Count    7/30 per nursing flowsheets:  Breakfast - 100% egg and cheese omelette 297kcal, 23g protein.   Lunch - 100% grilled chicken breast and mashed potatoes 284kcal, 24g protein.  Dinner - 100% herb grilled salmon, 100% of 4oz mac n cheese order, 100% bag of baked chips, nutrisource fiber packet 535kcal, 31.5g protein.  Total for day 1116kcal, 78.5g protein     7/31 per nursing flowsheets:  Breakfast - 100% egg and cheese omelette 297kcal, 23g protein.   Lunch - 1 box of cherrios, slice of turkey, 4oz of vanilla chobani yogurt 202.5kcal, 18.5g protein.     Date/Time Started:  7/30/24    Date/Time Completed: calorie count to continue.

## 2024-07-31 NOTE — PROGRESS NOTES
Body mass index is 52.01 kg/m². Morbid obesity complicates all aspects of disease management from diagnostic modalities to treatment.     [facility]  ACUTE CARE SURGERY - PROGRESS NOTE    Patient Name: Bereket Em  MRN: [id number]  Admit Date: 725  : 1965  AGE: 58 y.o.   GENDER: male  ==============================================================================  TODAY'S ASSESSMENT AND PLAN OF CARE:  Bereket Em is a 58 y.o. year old male patient with a history of perforated diverticulitis s/p ex lap and sigmoidectomy c/b necrotic fascia with takeback to OR to repeat ex lap and placement of bridging mesh. Developed EC fistula x 2, returning with high fistula output and dehydration.     Plan:  Neuro:  - prn tylenol, oxy 5 prn   - Melatonin as needed for sleep     Resp:  - encourage IS  - OOB, ambulate as tolerated    CV : no active issues  - vital signs qshift    GI: High output Enterocutaneous Fistula  - continue cyclic TPN/Lipids- consider weaning if patient able to take in adequate PO for nutritional support  - Imodium/Lomotil transitioned to elixir. Take 30 minutes before meals and at bedtime  -Lomotil elixir currently out of stock in hospital  - PPI IV BID  - low carb, soft diet, 6 small meals a day, eat half meal and rest of meal 90 minutes later. Minimize liquids  - Calorie count   - Appreciate Wound Care following for wound manager  - Monitor colostomy and output    :   - Strict I&Os  - Fistula output continuing to decrease -1160 over past 24hours  - Replete lytes as indicated, daily RFP/Mg    ID - no issues  - Trend CBC daily    Endo: no hx of DM  - Glucose checks [id number] while on TPN     DVT ppx:  - heparin subQ, SCDs     Dispo: RNF    Patient discussed with Attending Dr. Ora Lubin APRN-[facility]  Acute Care Surgery  Pager [phone]  ==============================================================================  CHIEF COMPLAINT / EVENTS LAST 24HRS / HPI:  No acute events overnight. Patient feeling well this AM. Output continues to decrease. Patient states he had 3 meals yesterday.       MEDICAL HISTORY / ROS:   Admission history and ROS reviewed. Pertinent changes as follows:  NA    PHYSICAL EXAM:  Heart Rate:  [58-65]   Temp:  [35.9 °C (96.6 °F)-36.7 °C (98.1 °F)]   Resp:  [18-19]   BP: (101-110)/(58-69)   SpO2:  [93 %-99 %]   Physical Exam  Constitutional:       Appearance: Normal appearance.   Eyes:      Extraocular Movements: Extraocular movements intact.   Cardiovascular:      Rate and Rhythm: Normal rate.   Pulmonary:      Effort: Pulmonary effort is normal.   Abdominal:      Comments: Soft, non distended, non tender,  midline wound pink with granulation tissue, stomasize fistula in center of wound bed draining yellow/brown effluent. Fistula pouch in place without leakage. Colostomy-stoma pink and viable with no output, pouch in place   Musculoskeletal:         General: Normal range of motion.   Skin:     General: Skin is warm and dry.   Neurological:      Mental Status: He is alert and oriented to person, place, and time.   Psychiatric:         Behavior: Behavior normal.         IMAGING SUMMARY:  (summary of new imaging findings, not a copy of dictation)  None new    LABS:  Results from last 7 days   Lab Units 07/31/24  0457 07/30/24  0541 07/29/24  1832   WBC AUTO x10*3/uL 5.7 6.5 7.6   HEMOGLOBIN g/dL 9.2* 9.0* 9.7*   HEMATOCRIT % 30.4* 29.4* 30.3*   PLATELETS AUTO x10*3/uL 321 308 333         Results from last 7 days   Lab Units 07/31/24  0457 07/30/24  0541 07/29/24  0426   SODIUM mmol/L 138 138 138   POTASSIUM mmol/L 4.5 4.5 4.3   CHLORIDE mmol/L 105 105 102   CO2 mmol/L 28 28 30   BUN mg/dL 21 20 22   CREATININE mg/dL 0.49* 0.51 0.50   CALCIUM mg/dL 8.8 8.8 8.9   PROTEIN TOTAL g/dL  --   --  5.7*   BILIRUBIN TOTAL mg/dL  --   --  0.3   ALK PHOS U/L  --   --  123*   ALT U/L  --   --  64*   AST U/L  --   --  23   GLUCOSE mg/dL 108* 115* 116*     Results from last 7 days   Lab Units 07/29/24  0426   BILIRUBIN TOTAL mg/dL 0.3   BILIRUBIN DIRECT mg/dL 0.1           I have reviewed all  medications, laboratory results, and imaging pertinent for today's encounter.

## 2024-08-01 LAB
ALBUMIN SERPL BCP-MCNC: 3.5 G/DL (ref 3.4–5)
ANION GAP SERPL CALC-SCNC: 10 MMOL/L (ref 10–20)
BUN SERPL-MCNC: 23 MG/DL (ref 6–23)
CALCIUM SERPL-MCNC: 9.2 MG/DL (ref 8.6–10.6)
CHLORIDE SERPL-SCNC: 103 MMOL/L (ref 98–107)
CO2 SERPL-SCNC: 29 MMOL/L (ref 21–32)
CREAT SERPL-MCNC: 0.55 MG/DL (ref 0.5–1.3)
EGFRCR SERPLBLD CKD-EPI 2021: >90 ML/MIN/1.73M*2
ERYTHROCYTE [DISTWIDTH] IN BLOOD BY AUTOMATED COUNT: 16.4 % (ref 11.5–14.5)
GLUCOSE BLD MANUAL STRIP-MCNC: 98 MG/DL (ref 74–99)
GLUCOSE SERPL-MCNC: 111 MG/DL (ref 74–99)
HCT VFR BLD AUTO: 32.2 % (ref 41–52)
HGB BLD-MCNC: 9 G/DL (ref 13.5–17.5)
MAGNESIUM SERPL-MCNC: 2.2 MG/DL (ref 1.6–2.4)
MCH RBC QN AUTO: 26.5 PG (ref 26–34)
MCHC RBC AUTO-ENTMCNC: 28 G/DL (ref 32–36)
MCV RBC AUTO: 95 FL (ref 80–100)
NRBC BLD-RTO: 0 /100 WBCS (ref 0–0)
PHOSPHATE SERPL-MCNC: 5 MG/DL (ref 2.5–4.9)
PLATELET # BLD AUTO: 203 X10*3/UL (ref 150–450)
POTASSIUM SERPL-SCNC: 4.8 MMOL/L (ref 3.5–5.3)
RBC # BLD AUTO: 3.39 X10*6/UL (ref 4.5–5.9)
SODIUM SERPL-SCNC: 137 MMOL/L (ref 136–145)
WBC # BLD AUTO: 5.2 X10*3/UL (ref 4.4–11.3)

## 2024-08-01 PROCEDURE — 2500000005 HC RX 250 GENERAL PHARMACY W/O HCPCS

## 2024-08-01 PROCEDURE — 82947 ASSAY GLUCOSE BLOOD QUANT: CPT

## 2024-08-01 PROCEDURE — 1100000001 HC PRIVATE ROOM DAILY

## 2024-08-01 PROCEDURE — 2500000004 HC RX 250 GENERAL PHARMACY W/ HCPCS (ALT 636 FOR OP/ED): Performed by: NURSE PRACTITIONER

## 2024-08-01 PROCEDURE — 2500000001 HC RX 250 WO HCPCS SELF ADMINISTERED DRUGS (ALT 637 FOR MEDICARE OP): Performed by: NURSE PRACTITIONER

## 2024-08-01 PROCEDURE — 2580000001 HC RX 258 IV SOLUTIONS

## 2024-08-01 PROCEDURE — 80069 RENAL FUNCTION PANEL: CPT | Performed by: NURSE PRACTITIONER

## 2024-08-01 PROCEDURE — C9113 INJ PANTOPRAZOLE SODIUM, VIA: HCPCS | Performed by: NURSE PRACTITIONER

## 2024-08-01 PROCEDURE — 2500000001 HC RX 250 WO HCPCS SELF ADMINISTERED DRUGS (ALT 637 FOR MEDICARE OP)

## 2024-08-01 PROCEDURE — 2500000004 HC RX 250 GENERAL PHARMACY W/ HCPCS (ALT 636 FOR OP/ED)

## 2024-08-01 PROCEDURE — 83735 ASSAY OF MAGNESIUM: CPT | Performed by: NURSE PRACTITIONER

## 2024-08-01 PROCEDURE — 85027 COMPLETE CBC AUTOMATED: CPT | Performed by: NURSE PRACTITIONER

## 2024-08-01 RX ADMIN — HEPARIN SODIUM 5000 UNITS: 5000 INJECTION INTRAVENOUS; SUBCUTANEOUS at 21:49

## 2024-08-01 RX ADMIN — ACETAMINOPHEN 650 MG: 160 SOLUTION ORAL at 21:48

## 2024-08-01 RX ADMIN — Medication 3 MG: at 20:27

## 2024-08-01 RX ADMIN — HEPARIN SODIUM 5000 UNITS: 5000 INJECTION INTRAVENOUS; SUBCUTANEOUS at 06:37

## 2024-08-01 RX ADMIN — LOPERAMIDE HYDROCHLORIDE 4 MG: 2 SOLUTION ORAL at 13:09

## 2024-08-01 RX ADMIN — LOPERAMIDE HYDROCHLORIDE 4 MG: 2 SOLUTION ORAL at 20:28

## 2024-08-01 RX ADMIN — HEPARIN SODIUM 5000 UNITS: 5000 INJECTION INTRAVENOUS; SUBCUTANEOUS at 14:40

## 2024-08-01 RX ADMIN — PANTOPRAZOLE SODIUM 40 MG: 40 INJECTION, POWDER, FOR SOLUTION INTRAVENOUS at 09:06

## 2024-08-01 RX ADMIN — LOPERAMIDE HYDROCHLORIDE 4 MG: 2 SOLUTION ORAL at 17:05

## 2024-08-01 RX ADMIN — SMOFLIPID 50 G: 6; 6; 5; 3 INJECTION, EMULSION INTRAVENOUS at 20:27

## 2024-08-01 RX ADMIN — ACETAMINOPHEN 650 MG: 160 SOLUTION ORAL at 17:05

## 2024-08-01 RX ADMIN — ASCORBIC ACID, VITAMIN A PALMITATE, CHOLECALCIFEROL, THIAMINE HYDROCHLORIDE, RIBOFLAVIN-5 PHOSPHATE SODIUM, PYRIDOXINE HYDROCHLORIDE, NIACINAMIDE, DEXPANTHENOL, ALPHA-TOCOPHEROL ACETATE, VITAMIN K1, FOLIC ACID, BIOTIN, CYANOCOBALAMIN: 200; 3300; 200; 6; 3.6; 6; 40; 15; 10; 150; 600; 60; 5 INJECTION, SOLUTION INTRAVENOUS at 20:36

## 2024-08-01 RX ADMIN — LOPERAMIDE HYDROCHLORIDE 4 MG: 2 SOLUTION ORAL at 09:06

## 2024-08-01 RX ADMIN — OXYCODONE HYDROCHLORIDE 5 MG: 5 SOLUTION ORAL at 21:48

## 2024-08-01 RX ADMIN — Medication 3 MG: at 00:49

## 2024-08-01 ASSESSMENT — COGNITIVE AND FUNCTIONAL STATUS - GENERAL
DAILY ACTIVITIY SCORE: 24
MOBILITY SCORE: 24
MOBILITY SCORE: 24
DAILY ACTIVITIY SCORE: 24

## 2024-08-01 ASSESSMENT — PAIN DESCRIPTION - DESCRIPTORS: DESCRIPTORS: ACHING

## 2024-08-01 ASSESSMENT — PAIN SCALES - GENERAL
PAINLEVEL_OUTOF10: 0 - NO PAIN
PAINLEVEL_OUTOF10: 4
PAINLEVEL_OUTOF10: 5 - MODERATE PAIN
PAINLEVEL_OUTOF10: 3
PAINLEVEL_OUTOF10: 0 - NO PAIN
PAINLEVEL_OUTOF10: 0 - NO PAIN

## 2024-08-01 ASSESSMENT — PAIN - FUNCTIONAL ASSESSMENT
PAIN_FUNCTIONAL_ASSESSMENT: 0-10

## 2024-08-01 NOTE — CARE PLAN
The patient's goals for the shift include      The clinical goals for the shift include pt will be HDS through shift      Problem: Skin  Goal: Decreased wound size/increased tissue granulation at next dressing change  Outcome: Progressing  Goal: Participates in plan/prevention/treatment measures  Outcome: Progressing  Goal: Prevent/manage excess moisture  Outcome: Progressing  Goal: Prevent/minimize sheer/friction injuries  Outcome: Progressing  Goal: Promote/optimize nutrition  Outcome: Progressing  Goal: Promote skin healing  Outcome: Progressing     Problem: Pain - Adult  Goal: Verbalizes/displays adequate comfort level or baseline comfort level  Outcome: Progressing     Problem: Safety - Adult  Goal: Free from fall injury  Outcome: Progressing     Problem: Discharge Planning  Goal: Discharge to home or other facility with appropriate resources  Outcome: Progressing     Problem: Chronic Conditions and Co-morbidities  Goal: Patient's chronic conditions and co-morbidity symptoms are monitored and maintained or improved  Outcome: Progressing

## 2024-08-01 NOTE — PROGRESS NOTES
[facility]  ACUTE CARE SURGERY - PROGRESS NOTE    Patient Name: Bereket Em  MRN: [id number]  Admit Date: 725  : 1965  AGE: 58 y.o.   GENDER: male  ==============================================================================  TODAY'S ASSESSMENT AND PLAN OF CARE:  Bereket Em is a 58 y.o. year old male patient with a history of perforated diverticulitis s/p ex lap and sigmoidectomy c/b necrotic fascia with takeback to OR to repeat ex lap and placement of bridging mesh. Developed EC fistula x 2, returning with high fistula output and dehydration.     Plan:  Neuro:  - prn tylenol, oxy 5 prn   - Melatonin as needed for sleep     Resp:  - encourage IS  - OOB, ambulate as tolerated    CV : no active issues  - vital signs qshift    GI: High output Enterocutaneous Fistula  - continue cyclic TPN/Lipids- consider weaning if patient able to take in adequate PO for nutritional support  - Imodium/Lomotil transitioned to elixir. Take 30 minutes before meals and at bedtime  -Lomotil elixir currently out of stock in hospital  - PPI IV BID  - low carb, soft diet, 6 small meals a day, eat half meal and rest of meal 90 minutes later. Minimize liquids  - Calorie count   - Appreciate Wound Care following for wound manager  - Monitor colostomy and output    :   - Strict I&Os  - Fistula output continuing to decrease -1160 over past 24hours  - Replete lytes as indicated, daily RFP/Mg    ID - no issues  - Trend CBC daily    Endo: no hx of DM  - Glucose checks [id number] while on TPN     DVT ppx:  - heparin subQ, SCDs     Dispo: RNF      Patient discussed with Attending Dr. Ora Almaguer PALIU   Acute Care Surgery  Pager [phone]  ==============================================================================  CHIEF COMPLAINT / EVENTS LAST 24HRS / HPI:  No acute events overnight. Patient is feeling well this AM, hopeful for a possible discharge home soon. Output continues to  decrease. Tolerating PO    MEDICAL HISTORY / ROS:   Admission history and ROS reviewed. Pertinent changes as follows:  NA    PHYSICAL EXAM:  Heart Rate:  [53-63]   Temp:  [36.1 °C (97 °F)-36.4 °C (97.5 °F)]   Resp:  [16-18]   BP: (94-99)/(53-64)   SpO2:  [94 %-96 %]   Physical Exam  Constitutional:       Appearance: Normal appearance.   Eyes:      Extraocular Movements: Extraocular movements intact.   Cardiovascular:      Rate and Rhythm: Normal rate.   Pulmonary:      Effort: Pulmonary effort is normal.   Abdominal:      Comments: Soft, non distended, non tender,  midline wound pink with granulation tissue, stomasize fistula in center of wound bed draining yellow/brown effluent. Fistula pouch in place without leakage. Colostomy-stoma pink and viable with no output, pouch in place   Musculoskeletal:         General: Normal range of motion.   Skin:     General: Skin is warm and dry.   Neurological:      Mental Status: He is alert and oriented to person, place, and time.   Psychiatric:         Behavior: Behavior normal.         IMAGING SUMMARY:  (summary of new imaging findings, not a copy of dictation)  None new    LABS:  Results from last 7 days   Lab Units 08/01/24  0645 07/31/24  0457 07/30/24  0541   WBC AUTO x10*3/uL 5.2 5.7 6.5   HEMOGLOBIN g/dL 9.0* 9.2* 9.0*   HEMATOCRIT % 32.2* 30.4* 29.4*   PLATELETS AUTO x10*3/uL 203 321 308         Results from last 7 days   Lab Units 08/01/24  0917 07/31/24  0457 07/30/24  0541 07/29/24  0426   SODIUM mmol/L 137 138 138 138   POTASSIUM mmol/L 4.8 4.5 4.5 4.3   CHLORIDE mmol/L 103 105 105 102   CO2 mmol/L 29 28 28 30   BUN mg/dL 23 21 20 22   CREATININE mg/dL 0.55 0.49* 0.51 0.50   CALCIUM mg/dL 9.2 8.8 8.8 8.9   PROTEIN TOTAL g/dL  --   --   --  5.7*   BILIRUBIN TOTAL mg/dL  --   --   --  0.3   ALK PHOS U/L  --   --   --  123*   ALT U/L  --   --   --  64*   AST U/L  --   --   --  23   GLUCOSE mg/dL 111* 108* 115* 116*     Results from last 7 days   Lab Units  07/29/24  0426   BILIRUBIN TOTAL mg/dL 0.3   BILIRUBIN DIRECT mg/dL 0.1           I have reviewed all medications, laboratory results, and imaging pertinent for today's encounter.    General

## 2024-08-01 NOTE — PROGRESS NOTES
Team members: TCC, ACS NP     Discharge disposition: Home w/ [facility] & External HC (sent out referrals 8/1)    0800  [facility] unable to take TPN case, currently at capacity. Referrals sent and awaiting acceptance     1015  Denials received from 8-10 home care agencies at this time, most stated [facility], reached out to [facility] and will not make an exception to split services with [facility], notified Transitional Care Supervisor    1100  Called [facility] at [phone] to inquire about in-network HC agencies, TCC on hold for 45 min for the [facility] rep to instruct further inquiries to go through [employer] platform, notified TCC supervisor to inquire about access to this platform    1200  30 referrals sent out in [facility]    1330  No acceptance. Updated TCC supervisor. [facility] management aware. Shantel Whitlock looking into [facility] flexibility with possibly assuming case.    1500  [facility] responded they are not able to directly bill infusion services with insurance plan. Not willing to split services. Might be able to contract with [facility], will need approved by supervisor, will let TCC know tomorrow.     1615  Pt would prefer to stay as an inpatient until this situation is sorted out, seeking PICC line care/ostomy as an outpatient is not an option he's agreeable to at this time.    Status: IP     Payer: [employer]     Potential Barriers: increased fistula output, accepting HC agency     ADOD: 8/2 to 8/3     This TCC will continue to follow for home going needs and safe DC plan.     Yue Ramachandran RN

## 2024-08-01 NOTE — PROGRESS NOTES
"Nutrition Calorie Count:       7/30 per nursing flowsheets:  Breakfast - 100% egg and cheese omelette 297kcal, 23g protein.   Lunch - 100% grilled chicken breast and mashed potatoes 284kcal, 24g protein.  Dinner - 100% herb grilled salmon, 100% of 4oz mac n cheese order, 100% bag of baked chips, nutrisource fiber packet 535kcal, 31.5g protein.  Total for day 1116kcal, 78.5g protein     7/31 per nursing flowsheets:  Breakfast - 100% egg and cheese omelette 297kcal, 23g protein.   Lunch - 1 box of cherrios, slice of turkey, 4oz of vanilla chobani yogurt 202.5kcal, 18.5g protein.  Total for day 499.5kcal, 41.5g protein    8/1 note:  No new calorie count documentation to assess per flowsheets. Visited with pt who reports appetite is improving. Over past two days pt consumed on average 808kcal, 60g protein. Pt is meeting 37% of daily kcal needs and 57% protein needs.     Date/Time Started:  7/30/24     Date/Time Completed: 8/1/2024      Anthropometrics:  Height: 172.7 cm (5' 8\")   Weight: 78.2 kg (172 lb 6.4 oz)   BMI (Calculated): 26.22  IBW/kg (Dietitian Calculated): 70 kg  Percent of IBW: 111 %         Dietary Orders (From admission, onward)       Start     Ordered    07/30/24 1600  Calorie count  2 times daily and at bedtime       07/30/24 1222    07/30/24 1238  Adult diet Consistent Carb; CCD 45 gm/meal  Diet effective now        Comments: 6 small meals a day  Eat half meal with minimal liquid intake and other half of meal 90 minutes later   Limited liquids during meals  Gatorade with meals   Question Answer Comment   Diet type Consistent Carb    Carb diet selection: CCD 45 gm/meal        07/30/24 1237                     Estimated Needs:   Total Energy Estimated Needs (kCal): 2200 kCal  Method for Estimating Needs: 28kcal/kg  Total Protein Estimated Needs (g): 105 g  Method for Estimating Needs: 1.35g/kg  Total Fluid Estimated Needs (mL):  (per team)             Nutrition Interventions/Recommendations         " Nutrition Prescription:  Calorie count completed.  Continue with current TPN regimen.   Can consider Ensure high protein supplement 1-2 per day as tolerated (8oz= 160kcal, 16g protein, 19g CHO, <1g fiber).          Time Spent (min): 30 minutes

## 2024-08-01 NOTE — CARE PLAN
The patient's goals for the shift include resting.     The clinical goals for the shift include patient will remain HDS throughout this shift.    Over the shift, the patient did make progress towards his goals, remained stable throughout the shift and was able to obtain some rest.

## 2024-08-02 LAB
ALBUMIN SERPL BCP-MCNC: 3.4 G/DL (ref 3.4–5)
ANION GAP SERPL CALC-SCNC: 13 MMOL/L (ref 10–20)
BUN SERPL-MCNC: 24 MG/DL (ref 6–23)
CALCIUM SERPL-MCNC: 9.3 MG/DL (ref 8.6–10.6)
CHLORIDE SERPL-SCNC: 102 MMOL/L (ref 98–107)
CO2 SERPL-SCNC: 27 MMOL/L (ref 21–32)
CREAT SERPL-MCNC: 0.52 MG/DL (ref 0.5–1.3)
EGFRCR SERPLBLD CKD-EPI 2021: >90 ML/MIN/1.73M*2
ERYTHROCYTE [DISTWIDTH] IN BLOOD BY AUTOMATED COUNT: 15.5 % (ref 11.5–14.5)
GLUCOSE BLD MANUAL STRIP-MCNC: 108 MG/DL (ref 74–99)
GLUCOSE BLD MANUAL STRIP-MCNC: 111 MG/DL (ref 74–99)
GLUCOSE BLD MANUAL STRIP-MCNC: 123 MG/DL (ref 74–99)
GLUCOSE BLD MANUAL STRIP-MCNC: 128 MG/DL (ref 74–99)
GLUCOSE BLD MANUAL STRIP-MCNC: 142 MG/DL (ref 74–99)
GLUCOSE SERPL-MCNC: 103 MG/DL (ref 74–99)
HCT VFR BLD AUTO: 31.4 % (ref 41–52)
HGB BLD-MCNC: 9.8 G/DL (ref 13.5–17.5)
MAGNESIUM SERPL-MCNC: 2.08 MG/DL (ref 1.6–2.4)
MCH RBC QN AUTO: 25.5 PG (ref 26–34)
MCHC RBC AUTO-ENTMCNC: 31.2 G/DL (ref 32–36)
MCV RBC AUTO: 82 FL (ref 80–100)
NRBC BLD-RTO: 0 /100 WBCS (ref 0–0)
PHOSPHATE SERPL-MCNC: 4.6 MG/DL (ref 2.5–4.9)
PLATELET # BLD AUTO: 335 X10*3/UL (ref 150–450)
POTASSIUM SERPL-SCNC: 4.5 MMOL/L (ref 3.5–5.3)
RBC # BLD AUTO: 3.85 X10*6/UL (ref 4.5–5.9)
SODIUM SERPL-SCNC: 137 MMOL/L (ref 136–145)
WBC # BLD AUTO: 5.9 X10*3/UL (ref 4.4–11.3)

## 2024-08-02 PROCEDURE — 2580000001 HC RX 258 IV SOLUTIONS

## 2024-08-02 PROCEDURE — 82947 ASSAY GLUCOSE BLOOD QUANT: CPT

## 2024-08-02 PROCEDURE — 84100 ASSAY OF PHOSPHORUS: CPT | Performed by: NURSE PRACTITIONER

## 2024-08-02 PROCEDURE — 2500000004 HC RX 250 GENERAL PHARMACY W/ HCPCS (ALT 636 FOR OP/ED): Performed by: NURSE PRACTITIONER

## 2024-08-02 PROCEDURE — 99231 SBSQ HOSP IP/OBS SF/LOW 25: CPT | Performed by: PHYSICIAN ASSISTANT

## 2024-08-02 PROCEDURE — 2500000005 HC RX 250 GENERAL PHARMACY W/O HCPCS

## 2024-08-02 PROCEDURE — 1100000001 HC PRIVATE ROOM DAILY

## 2024-08-02 PROCEDURE — 85027 COMPLETE CBC AUTOMATED: CPT | Performed by: NURSE PRACTITIONER

## 2024-08-02 PROCEDURE — 83735 ASSAY OF MAGNESIUM: CPT | Performed by: NURSE PRACTITIONER

## 2024-08-02 PROCEDURE — 2500000001 HC RX 250 WO HCPCS SELF ADMINISTERED DRUGS (ALT 637 FOR MEDICARE OP): Performed by: NURSE PRACTITIONER

## 2024-08-02 PROCEDURE — 2500000001 HC RX 250 WO HCPCS SELF ADMINISTERED DRUGS (ALT 637 FOR MEDICARE OP)

## 2024-08-02 PROCEDURE — 2500000004 HC RX 250 GENERAL PHARMACY W/ HCPCS (ALT 636 FOR OP/ED)

## 2024-08-02 PROCEDURE — C9113 INJ PANTOPRAZOLE SODIUM, VIA: HCPCS | Performed by: NURSE PRACTITIONER

## 2024-08-02 RX ORDER — LOPERAMIDE HCL 1MG/7.5ML
4 LIQUID (ML) ORAL
Qty: 3600 ML | Refills: 2 | OUTPATIENT
Start: 2024-08-02 | End: 2024-10-31

## 2024-08-02 RX ORDER — TALC
3 POWDER (GRAM) TOPICAL NIGHTLY PRN
Start: 2024-08-02

## 2024-08-02 RX ORDER — ACETAMINOPHEN 160 MG/5ML
650 SOLUTION ORAL EVERY 4 HOURS PRN
Qty: 36400 ML | Refills: 0 | OUTPATIENT
Start: 2024-08-02 | End: 2024-08-16

## 2024-08-02 RX ADMIN — OXYCODONE HYDROCHLORIDE 5 MG: 5 SOLUTION ORAL at 21:48

## 2024-08-02 RX ADMIN — LOPERAMIDE HYDROCHLORIDE 4 MG: 2 SOLUTION ORAL at 12:00

## 2024-08-02 RX ADMIN — Medication 3 MG: at 21:48

## 2024-08-02 RX ADMIN — ASCORBIC ACID, VITAMIN A PALMITATE, CHOLECALCIFEROL, THIAMINE HYDROCHLORIDE, RIBOFLAVIN-5 PHOSPHATE SODIUM, PYRIDOXINE HYDROCHLORIDE, NIACINAMIDE, DEXPANTHENOL, ALPHA-TOCOPHEROL ACETATE, VITAMIN K1, FOLIC ACID, BIOTIN, CYANOCOBALAMIN: 200; 3300; 200; 6; 3.6; 6; 40; 15; 10; 150; 600; 60; 5 INJECTION, SOLUTION INTRAVENOUS at 21:56

## 2024-08-02 RX ADMIN — HEPARIN SODIUM 5000 UNITS: 5000 INJECTION INTRAVENOUS; SUBCUTANEOUS at 17:12

## 2024-08-02 RX ADMIN — LOPERAMIDE HYDROCHLORIDE 4 MG: 2 SOLUTION ORAL at 09:39

## 2024-08-02 RX ADMIN — ACETAMINOPHEN 650 MG: 160 SOLUTION ORAL at 21:48

## 2024-08-02 RX ADMIN — LOPERAMIDE HYDROCHLORIDE 4 MG: 2 SOLUTION ORAL at 17:13

## 2024-08-02 RX ADMIN — LOPERAMIDE HYDROCHLORIDE 4 MG: 2 SOLUTION ORAL at 21:48

## 2024-08-02 RX ADMIN — HEPARIN SODIUM 5000 UNITS: 5000 INJECTION INTRAVENOUS; SUBCUTANEOUS at 21:50

## 2024-08-02 RX ADMIN — PANTOPRAZOLE SODIUM 40 MG: 40 INJECTION, POWDER, FOR SOLUTION INTRAVENOUS at 09:40

## 2024-08-02 RX ADMIN — HEPARIN SODIUM 5000 UNITS: 5000 INJECTION INTRAVENOUS; SUBCUTANEOUS at 06:30

## 2024-08-02 RX ADMIN — SMOFLIPID 50 G: 6; 6; 5; 3 INJECTION, EMULSION INTRAVENOUS at 21:48

## 2024-08-02 ASSESSMENT — COGNITIVE AND FUNCTIONAL STATUS - GENERAL
DAILY ACTIVITIY SCORE: 24
MOBILITY SCORE: 24
DAILY ACTIVITIY SCORE: 24
MOBILITY SCORE: 24

## 2024-08-02 ASSESSMENT — PAIN SCALES - GENERAL
PAINLEVEL_OUTOF10: 5 - MODERATE PAIN
PAINLEVEL_OUTOF10: 0 - NO PAIN

## 2024-08-02 ASSESSMENT — PAIN SCALES - WONG BAKER: WONGBAKER_NUMERICALRESPONSE: NO HURT

## 2024-08-02 ASSESSMENT — PAIN SCALES - PAIN ASSESSMENT IN ADVANCED DEMENTIA (PAINAD): BREATHING: NORMAL

## 2024-08-02 NOTE — CARE PLAN
Problem: Skin  Goal: Decreased wound size/increased tissue granulation at next dressing change  Outcome: Progressing  Goal: Participates in plan/prevention/treatment measures  Outcome: Progressing  Goal: Prevent/manage excess moisture  Outcome: Progressing  Goal: Prevent/minimize sheer/friction injuries  Outcome: Progressing  Goal: Promote/optimize nutrition  Outcome: Progressing  Goal: Promote skin healing  Outcome: Progressing     Problem: Pain - Adult  Goal: Verbalizes/displays adequate comfort level or baseline comfort level  Outcome: Progressing     Problem: Safety - Adult  Goal: Free from fall injury  Outcome: Progressing     Problem: Discharge Planning  Goal: Discharge to home or other facility with appropriate resources  Outcome: Progressing     Problem: Chronic Conditions and Co-morbidities  Goal: Patient's chronic conditions and co-morbidity symptoms are monitored and maintained or improved  Outcome: Progressing   The patient's goals for the shift include      The clinical goals for the shift include patient will remain HDS

## 2024-08-02 NOTE — CARE PLAN
The patient's goals for the shift include      The clinical goals for the shift include patient will remain HDS    Over the shift, the patient did not make progress toward the following goals.

## 2024-08-02 NOTE — PROGRESS NOTES
[facility]  ACUTE CARE SURGERY - PROGRESS NOTE    Patient Name: Bereket Em  MRN: [id number]  Admit Date: 725  : 1965  AGE: 58 y.o.   GENDER: male  ==============================================================================  TODAY'S ASSESSMENT AND PLAN OF CARE:  Bereket Em is a 58 y.o. year old male patient with a history of perforated diverticulitis s/p ex lap and sigmoidectomy c/b necrotic fascia with takeback to OR to repeat ex lap and placement of bridging mesh. Developed EC fistula x 2, returning with high fistula output and dehydration.  Patient is doing well this morning.      Plan:  Neuro:  - prn tylenol, oxy 5 prn   - Melatonin as needed for sleep     Resp:  - encourage IS  - OOB, ambulate as tolerated    CV : no active issues  - vital signs qshift    GI: High output Enterocutaneous Fistula  - continue cyclic TPN/Lipids- consider weaning if patient able to take in adequate PO for nutritional support  - Imodium/Lomotil transitioned to elixir. Take 30 minutes before meals and at bedtime  -Lomotil elixir currently out of stock in hospital  - PPI IV BID  - low carb, soft diet, 6 small meals a day, eat half meal and rest of meal 90 minutes later. Minimize liquids  - Calorie count   - Appreciate Wound Care following for wound manager  - Monitor colostomy and output    :   - Strict I&Os  - Fistula output continuing to decrease -1160 over past 24hours  - Replete lytes as indicated, daily RFP/Mg    ID - no issues  - Trend CBC daily    Endo: no hx of DM  - Glucose checks [id number] while on TPN     DVT ppx:  - heparin subQ, SCDs     Dispo: RNF. Dispo pending for        Patient discussed with Attending Dr. Ora Dye, PAPaolaC   Acute Care Surgery  Pager [phone]  ==============================================================================  CHIEF COMPLAINT / EVENTS LAST 24HRS / HPI:  No acute events overnight. Patient is feeling well this AM,  hopeful for a possible discharge home soon. Output continues to decrease. Tolerating PO    MEDICAL HISTORY / ROS:   Admission history and ROS reviewed. Pertinent changes as follows:  NA    PHYSICAL EXAM:  Heart Rate:  [52-71]   Temp:  [36.3 °C (97.3 °F)-37.1 °C (98.8 °F)]   Resp:  [18]   BP: ()/(63-73)   SpO2:  [94 %-96 %]   Physical Exam  Constitutional:       Appearance: Normal appearance.   Eyes:      Extraocular Movements: Extraocular movements intact.   Cardiovascular:      Rate and Rhythm: Normal rate.   Pulmonary:      Effort: Pulmonary effort is normal.   Abdominal:      Comments: Soft, non distended, non tender,  midline wound pink with granulation tissue, stomasize fistula in center of wound bed draining yellow/brown effluent. Fistula pouch in place without leakage. Colostomy-stoma pink and viable with minimal stool in pouch    Musculoskeletal:         General: Normal range of motion.   Skin:     General: Skin is warm and dry.   Neurological:      Mental Status: He is alert and oriented to person, place, and time.   Psychiatric:         Behavior: Behavior normal.         IMAGING SUMMARY:  (summary of new imaging findings, not a copy of dictation)  None new    LABS:  Results from last 7 days   Lab Units 08/02/24  0636 08/01/24  0645 07/31/24  0457   WBC AUTO x10*3/uL 5.9 5.2 5.7   HEMOGLOBIN g/dL 9.8* 9.0* 9.2*   HEMATOCRIT % 31.4* 32.2* 30.4*   PLATELETS AUTO x10*3/uL 335 203 321         Results from last 7 days   Lab Units 08/02/24  0636 08/01/24  0917 07/31/24  0457 07/30/24  0541 07/29/24  0426   SODIUM mmol/L 137 137 138   < > 138   POTASSIUM mmol/L 4.5 4.8 4.5   < > 4.3   CHLORIDE mmol/L 102 103 105   < > 102   CO2 mmol/L 27 29 28   < > 30   BUN mg/dL 24* 23 21   < > 22   CREATININE mg/dL 0.52 0.55 0.49*   < > 0.50   CALCIUM mg/dL 9.3 9.2 8.8   < > 8.9   PROTEIN TOTAL g/dL  --   --   --   --  5.7*   BILIRUBIN TOTAL mg/dL  --   --   --   --  0.3   ALK PHOS U/L  --   --   --   --  123*   ALT U/L   --   --   --   --  64*   AST U/L  --   --   --   --  23   GLUCOSE mg/dL 103* 111* 108*   < > 116*    < > = values in this interval not displayed.     Results from last 7 days   Lab Units 07/29/24  0426   BILIRUBIN TOTAL mg/dL 0.3   BILIRUBIN DIRECT mg/dL 0.1           I have reviewed all medications, laboratory results, and imaging pertinent for today's encounter.

## 2024-08-02 NOTE — PROGRESS NOTES
Bereket Em is a 58 y.o. male on day 8 of admission presenting with Enterocutaneous fistula.      DC Plannin/2:  Pt demographic confirmed by previous TCC. Please see note.     Pt going home on TPN with home care.  [facility] can not accept pt because they are at capacity for TPN pts.   Previous TCC contacted many agencies to try and find HCA. I have called many HCA today and sent out more referrals. No acepting agencies at this time.   [facility] and [facility] are trying to come up with a solution to work together on this case. Waiting for responses from both parties.      Spoke with Nury from [facility]. [facility] can accept pt while using [facility] for TPN as long as pt cancel [facility] when [facility] can provide TPN. Pt is agreeable to this plan.     ADOD:     This TCC will continue to follow for home going needs and safe DC plan.      RUSH ANDERSON       [facility] phone call message- patient requests medication or medication refill:    If this is a refill request, has the caller requested the refill from the pharmacy already? No  Name of the pharmacy and phone number for the current request:  [facility] [place]    Name of the medication requested: Sertraline    Other request: patient was suppose to have a phone visit yesterday. We were to call [phone] but we called her boyfriends number, so we never got a hold of her. She is now out of medication.     OK to leave the result message on voice mail or with a family member? NO    Call taken on 11/7/2017 at 7:17 AM by Sima Golden

## 2024-08-02 NOTE — PROGRESS NOTES
08/02/24 1226   Wound 08/01/24 Abdomen Left;Lower;Medial   Date First Assessed/Time First Assessed: 08/01/24 0652   Location: Abdomen  Wound Location Orientation: Left;Lower;Medial   Wound Image Images linked   Wound Length (cm) 14 cm   Wound Width (cm) 7.5 cm   Wound Surface Area (cm^2) 105 cm^2   Wound Depth (cm) 1 cm   Wound Volume (cm^3) 105 cm^3   State of Healing Early/partial granulation   Wound Bed Granulation (%) 80 %   Drainage Description Effulent/Bilious   Drainage Amount Moderate   Dressing Changed Changed          Patient with ECF x2 to abdominal wound; colostomy to left side abdomen.   Both wound manager and ostomy pouch changed today.  For wound: Coloplast Mini wound manager #[id number] applied with 4inch ring/barrier rings and stoma paste/powder.  For ostomy: 1 piece activelife drainable pouch applied.    Supplies at bedside, but patient will need additional pouches prior to discharge.    Mara Farias RN, CWON

## 2024-08-03 LAB
ALBUMIN SERPL BCP-MCNC: 3.3 G/DL (ref 3.4–5)
ANION GAP SERPL CALC-SCNC: 13 MMOL/L (ref 10–20)
BUN SERPL-MCNC: 23 MG/DL (ref 6–23)
CALCIUM SERPL-MCNC: 9.1 MG/DL (ref 8.6–10.6)
CHLORIDE SERPL-SCNC: 102 MMOL/L (ref 98–107)
CO2 SERPL-SCNC: 27 MMOL/L (ref 21–32)
CREAT SERPL-MCNC: 0.58 MG/DL (ref 0.5–1.3)
EGFRCR SERPLBLD CKD-EPI 2021: >90 ML/MIN/1.73M*2
ERYTHROCYTE [DISTWIDTH] IN BLOOD BY AUTOMATED COUNT: 16.9 % (ref 11.5–14.5)
GLUCOSE BLD MANUAL STRIP-MCNC: 106 MG/DL (ref 74–99)
GLUCOSE BLD MANUAL STRIP-MCNC: 90 MG/DL (ref 74–99)
GLUCOSE SERPL-MCNC: 112 MG/DL (ref 74–99)
HCT VFR BLD AUTO: 32.7 % (ref 41–52)
HGB BLD-MCNC: 9.2 G/DL (ref 13.5–17.5)
HOLD SPECIMEN: NORMAL
MAGNESIUM SERPL-MCNC: 1.98 MG/DL (ref 1.6–2.4)
MCH RBC QN AUTO: 27.1 PG (ref 26–34)
MCHC RBC AUTO-ENTMCNC: 28.1 G/DL (ref 32–36)
MCV RBC AUTO: 96 FL (ref 80–100)
NRBC BLD-RTO: 0 /100 WBCS (ref 0–0)
PHOSPHATE SERPL-MCNC: 4.7 MG/DL (ref 2.5–4.9)
PLATELET # BLD AUTO: 153 X10*3/UL (ref 150–450)
POTASSIUM SERPL-SCNC: 4.6 MMOL/L (ref 3.5–5.3)
RBC # BLD AUTO: 3.4 X10*6/UL (ref 4.5–5.9)
SODIUM SERPL-SCNC: 137 MMOL/L (ref 136–145)
WBC # BLD AUTO: 5.5 X10*3/UL (ref 4.4–11.3)

## 2024-08-03 PROCEDURE — 2580000001 HC RX 258 IV SOLUTIONS

## 2024-08-03 PROCEDURE — 85027 COMPLETE CBC AUTOMATED: CPT | Performed by: NURSE PRACTITIONER

## 2024-08-03 PROCEDURE — 99231 SBSQ HOSP IP/OBS SF/LOW 25: CPT | Performed by: SURGERY

## 2024-08-03 PROCEDURE — 2500000001 HC RX 250 WO HCPCS SELF ADMINISTERED DRUGS (ALT 637 FOR MEDICARE OP): Performed by: NURSE PRACTITIONER

## 2024-08-03 PROCEDURE — 2500000001 HC RX 250 WO HCPCS SELF ADMINISTERED DRUGS (ALT 637 FOR MEDICARE OP)

## 2024-08-03 PROCEDURE — 2500000005 HC RX 250 GENERAL PHARMACY W/O HCPCS

## 2024-08-03 PROCEDURE — C9113 INJ PANTOPRAZOLE SODIUM, VIA: HCPCS | Performed by: NURSE PRACTITIONER

## 2024-08-03 PROCEDURE — 83735 ASSAY OF MAGNESIUM: CPT | Performed by: NURSE PRACTITIONER

## 2024-08-03 PROCEDURE — 2500000004 HC RX 250 GENERAL PHARMACY W/ HCPCS (ALT 636 FOR OP/ED)

## 2024-08-03 PROCEDURE — 82947 ASSAY GLUCOSE BLOOD QUANT: CPT

## 2024-08-03 PROCEDURE — 2500000004 HC RX 250 GENERAL PHARMACY W/ HCPCS (ALT 636 FOR OP/ED): Performed by: NURSE PRACTITIONER

## 2024-08-03 PROCEDURE — 80069 RENAL FUNCTION PANEL: CPT | Performed by: NURSE PRACTITIONER

## 2024-08-03 PROCEDURE — 2500000004 HC RX 250 GENERAL PHARMACY W/ HCPCS (ALT 636 FOR OP/ED): Performed by: STUDENT IN AN ORGANIZED HEALTH CARE EDUCATION/TRAINING PROGRAM

## 2024-08-03 PROCEDURE — 1100000001 HC PRIVATE ROOM DAILY

## 2024-08-03 RX ORDER — ESOMEPRAZOLE MAGNESIUM 40 MG/1
40 GRANULE, DELAYED RELEASE ORAL
Status: DISCONTINUED | OUTPATIENT
Start: 2024-08-04 | End: 2024-08-05 | Stop reason: HOSPADM

## 2024-08-03 RX ADMIN — ACETAMINOPHEN 650 MG: 160 SOLUTION ORAL at 15:02

## 2024-08-03 RX ADMIN — ACETAMINOPHEN 650 MG: 160 SOLUTION ORAL at 20:58

## 2024-08-03 RX ADMIN — LOPERAMIDE HYDROCHLORIDE 4 MG: 2 SOLUTION ORAL at 12:01

## 2024-08-03 RX ADMIN — SMOFLIPID 50 G: 6; 6; 5; 3 INJECTION, EMULSION INTRAVENOUS at 20:48

## 2024-08-03 RX ADMIN — HEPARIN SODIUM 5000 UNITS: 5000 INJECTION INTRAVENOUS; SUBCUTANEOUS at 22:03

## 2024-08-03 RX ADMIN — PANTOPRAZOLE SODIUM 40 MG: 40 INJECTION, POWDER, FOR SOLUTION INTRAVENOUS at 09:27

## 2024-08-03 RX ADMIN — ASCORBIC ACID, VITAMIN A PALMITATE, CHOLECALCIFEROL, THIAMINE HYDROCHLORIDE, RIBOFLAVIN-5 PHOSPHATE SODIUM, PYRIDOXINE HYDROCHLORIDE, NIACINAMIDE, DEXPANTHENOL, ALPHA-TOCOPHEROL ACETATE, VITAMIN K1, FOLIC ACID, BIOTIN, CYANOCOBALAMIN: 200; 3300; 200; 6; 3.6; 6; 40; 15; 10; 150; 600; 60; 5 INJECTION, SOLUTION INTRAVENOUS at 20:49

## 2024-08-03 RX ADMIN — HEPARIN SODIUM 5000 UNITS: 5000 INJECTION INTRAVENOUS; SUBCUTANEOUS at 15:02

## 2024-08-03 RX ADMIN — SODIUM CHLORIDE, POTASSIUM CHLORIDE, SODIUM LACTATE AND CALCIUM CHLORIDE 1000 ML: 600; 310; 30; 20 INJECTION, SOLUTION INTRAVENOUS at 12:02

## 2024-08-03 RX ADMIN — LOPERAMIDE HYDROCHLORIDE 4 MG: 2 SOLUTION ORAL at 18:05

## 2024-08-03 RX ADMIN — OXYCODONE HYDROCHLORIDE 5 MG: 5 SOLUTION ORAL at 20:58

## 2024-08-03 RX ADMIN — HEPARIN SODIUM 5000 UNITS: 5000 INJECTION INTRAVENOUS; SUBCUTANEOUS at 06:26

## 2024-08-03 RX ADMIN — LOPERAMIDE HYDROCHLORIDE 4 MG: 2 SOLUTION ORAL at 07:51

## 2024-08-03 RX ADMIN — LOPERAMIDE HYDROCHLORIDE 4 MG: 2 SOLUTION ORAL at 20:54

## 2024-08-03 RX ADMIN — Medication 3 MG: at 20:58

## 2024-08-03 ASSESSMENT — PAIN SCALES - GENERAL
PAINLEVEL_OUTOF10: 3
PAINLEVEL_OUTOF10: 0 - NO PAIN
PAINLEVEL_OUTOF10: 3
PAINLEVEL_OUTOF10: 3
PAINLEVEL_OUTOF10: 4

## 2024-08-03 ASSESSMENT — COGNITIVE AND FUNCTIONAL STATUS - GENERAL
DAILY ACTIVITIY SCORE: 24
MOBILITY SCORE: 24
MOBILITY SCORE: 24
DAILY ACTIVITIY SCORE: 24

## 2024-08-03 ASSESSMENT — PAIN - FUNCTIONAL ASSESSMENT
PAIN_FUNCTIONAL_ASSESSMENT: 0-10

## 2024-08-03 NOTE — CARE PLAN
Problem: Skin  Goal: Decreased wound size/increased tissue granulation at next dressing change  Outcome: Progressing  Goal: Prevent/manage excess moisture  Outcome: Progressing  Goal: Promote/optimize nutrition  Outcome: Progressing  Goal: Promote skin healing  Outcome: Progressing     Problem: Discharge Planning  Goal: Discharge to home or other facility with appropriate resources  Outcome: Progressing     Problem: Chronic Conditions and Co-morbidities  Goal: Patient's chronic conditions and co-morbidity symptoms are monitored and maintained or improved  Outcome: Progressing   The patient's goals for the shift include      The clinical goals for the shift include pt wanted to go home

## 2024-08-03 NOTE — PROGRESS NOTES
59 yo male with enteroatmospheric fistulas x 3 readmitted with dehydration.   Remains hydrated off of IVFs for the most part and with thick ostomy output. Total ~580 yesterday. Cr 0.58  On exam, NAD. LLQ colostomy is pink and viable. Older fistula remains present in the wound at the 3 o'clock position and appears unchanged with thicker output.  There is a larger loop of exposed bowel in the midline with the proximal and distal ends both exposed and the backwall of this loop partially intact.   I do not think that the new fistula will close without surgery based on the amount of exposed mucosa and that both the proximal and distal ends of this loop are exposed. Plan to continue with current diet and meds given improvement, hold on liquid Lomotil as not available, PPI BID, continue wound care, heparin subcutaneous, TPN, continue Gatorade 0 or electrolyte solution, continue calorie count.  At his request, I will follow him as an outpatient. Disposition planning. 1L fluid bolus today for slightly darker urine.

## 2024-08-03 NOTE — CARE PLAN
Problem: Skin  Goal: Decreased wound size/increased tissue granulation at next dressing change  8/3/2024 1841 by Tammy Zhong RN  Outcome: Progressing  8/3/2024 1841 by Tammy Zhong RN  Outcome: Progressing  Goal: Prevent/manage excess moisture  8/3/2024 1841 by Tammy Zhong RN  Outcome: Progressing  8/3/2024 1841 by Tammy Zhong RN  Outcome: Progressing  Goal: Promote/optimize nutrition  8/3/2024 1841 by Tammy Zhong RN  Outcome: Progressing  8/3/2024 1841 by Tammy Zhong RN  Outcome: Progressing  Goal: Promote skin healing  8/3/2024 1841 by Tammy Zhong RN  Outcome: Progressing  8/3/2024 1841 by Tammy Zhong RN  Outcome: Progressing     Problem: Discharge Planning  Goal: Discharge to home or other facility with appropriate resources  8/3/2024 1841 by Tammy Zhong RN  Outcome: Progressing  8/3/2024 1841 by Tammy Zhong RN  Outcome: Progressing     Problem: Chronic Conditions and Co-morbidities  Goal: Patient's chronic conditions and co-morbidity symptoms are monitored and maintained or improved  8/3/2024 1841 by Tammy Zhong RN  Outcome: Progressing  8/3/2024 1841 by Tammy Zhong RN  Outcome: Progressing   The patient's goals for the shift include  walking around the unit frequently     The clinical goals for the shift include Patient will remain safe and free from injury throughout shift

## 2024-08-04 VITALS
HEART RATE: 58 BPM | SYSTOLIC BLOOD PRESSURE: 100 MMHG | HEIGHT: 68 IN | WEIGHT: 172.4 LBS | OXYGEN SATURATION: 95 % | RESPIRATION RATE: 18 BRPM | DIASTOLIC BLOOD PRESSURE: 59 MMHG | BODY MASS INDEX: 26.13 KG/M2 | TEMPERATURE: 98.2 F

## 2024-08-04 LAB
ALBUMIN SERPL BCP-MCNC: 3.2 G/DL (ref 3.4–5)
ANION GAP SERPL CALC-SCNC: 11 MMOL/L (ref 10–20)
BUN SERPL-MCNC: 23 MG/DL (ref 6–23)
CALCIUM SERPL-MCNC: 8.8 MG/DL (ref 8.6–10.6)
CHLORIDE SERPL-SCNC: 103 MMOL/L (ref 98–107)
CO2 SERPL-SCNC: 29 MMOL/L (ref 21–32)
CREAT SERPL-MCNC: 0.55 MG/DL (ref 0.5–1.3)
EGFRCR SERPLBLD CKD-EPI 2021: >90 ML/MIN/1.73M*2
ERYTHROCYTE [DISTWIDTH] IN BLOOD BY AUTOMATED COUNT: 15.8 % (ref 11.5–14.5)
GLUCOSE SERPL-MCNC: 102 MG/DL (ref 74–99)
HCT VFR BLD AUTO: 29.6 % (ref 41–52)
HGB BLD-MCNC: 9.4 G/DL (ref 13.5–17.5)
MAGNESIUM SERPL-MCNC: 2.02 MG/DL (ref 1.6–2.4)
MCH RBC QN AUTO: 25.6 PG (ref 26–34)
MCHC RBC AUTO-ENTMCNC: 31.8 G/DL (ref 32–36)
MCV RBC AUTO: 81 FL (ref 80–100)
NRBC BLD-RTO: 0 /100 WBCS (ref 0–0)
PHOSPHATE SERPL-MCNC: 4.8 MG/DL (ref 2.5–4.9)
PLATELET # BLD AUTO: 283 X10*3/UL (ref 150–450)
POTASSIUM SERPL-SCNC: 4.7 MMOL/L (ref 3.5–5.3)
RBC # BLD AUTO: 3.67 X10*6/UL (ref 4.5–5.9)
SODIUM SERPL-SCNC: 138 MMOL/L (ref 136–145)
WBC # BLD AUTO: 6.3 X10*3/UL (ref 4.4–11.3)

## 2024-08-04 PROCEDURE — 85027 COMPLETE CBC AUTOMATED: CPT | Performed by: NURSE PRACTITIONER

## 2024-08-04 PROCEDURE — 2500000001 HC RX 250 WO HCPCS SELF ADMINISTERED DRUGS (ALT 637 FOR MEDICARE OP)

## 2024-08-04 PROCEDURE — 2500000004 HC RX 250 GENERAL PHARMACY W/ HCPCS (ALT 636 FOR OP/ED)

## 2024-08-04 PROCEDURE — 83735 ASSAY OF MAGNESIUM: CPT | Performed by: NURSE PRACTITIONER

## 2024-08-04 PROCEDURE — 2580000001 HC RX 258 IV SOLUTIONS

## 2024-08-04 PROCEDURE — 2500000005 HC RX 250 GENERAL PHARMACY W/O HCPCS

## 2024-08-04 PROCEDURE — 1100000001 HC PRIVATE ROOM DAILY

## 2024-08-04 PROCEDURE — 2500000001 HC RX 250 WO HCPCS SELF ADMINISTERED DRUGS (ALT 637 FOR MEDICARE OP): Performed by: NURSE PRACTITIONER

## 2024-08-04 PROCEDURE — 84100 ASSAY OF PHOSPHORUS: CPT | Performed by: NURSE PRACTITIONER

## 2024-08-04 RX ADMIN — ESOMEPRAZOLE MAGNESIUM 40 MG: 40 FOR SUSPENSION ORAL at 06:06

## 2024-08-04 RX ADMIN — LOPERAMIDE HYDROCHLORIDE 4 MG: 2 SOLUTION ORAL at 13:06

## 2024-08-04 RX ADMIN — HEPARIN SODIUM 5000 UNITS: 5000 INJECTION INTRAVENOUS; SUBCUTANEOUS at 05:55

## 2024-08-04 RX ADMIN — LOPERAMIDE HYDROCHLORIDE 4 MG: 2 SOLUTION ORAL at 17:24

## 2024-08-04 RX ADMIN — ACETAMINOPHEN 650 MG: 160 SOLUTION ORAL at 20:29

## 2024-08-04 RX ADMIN — LOPERAMIDE HYDROCHLORIDE 4 MG: 2 SOLUTION ORAL at 20:29

## 2024-08-04 RX ADMIN — HEPARIN SODIUM 5000 UNITS: 5000 INJECTION INTRAVENOUS; SUBCUTANEOUS at 14:40

## 2024-08-04 RX ADMIN — ASCORBIC ACID, VITAMIN A PALMITATE, CHOLECALCIFEROL, THIAMINE HYDROCHLORIDE, RIBOFLAVIN-5 PHOSPHATE SODIUM, PYRIDOXINE HYDROCHLORIDE, NIACINAMIDE, DEXPANTHENOL, ALPHA-TOCOPHEROL ACETATE, VITAMIN K1, FOLIC ACID, BIOTIN, CYANOCOBALAMIN: 200; 3300; 200; 6; 3.6; 6; 40; 15; 10; 150; 600; 60; 5 INJECTION, SOLUTION INTRAVENOUS at 20:29

## 2024-08-04 RX ADMIN — SMOFLIPID 50 G: 6; 6; 5; 3 INJECTION, EMULSION INTRAVENOUS at 20:29

## 2024-08-04 RX ADMIN — HEPARIN SODIUM 5000 UNITS: 5000 INJECTION INTRAVENOUS; SUBCUTANEOUS at 22:13

## 2024-08-04 RX ADMIN — ACETAMINOPHEN 650 MG: 160 SOLUTION ORAL at 14:40

## 2024-08-04 RX ADMIN — OXYCODONE HYDROCHLORIDE 5 MG: 5 SOLUTION ORAL at 20:29

## 2024-08-04 RX ADMIN — LOPERAMIDE HYDROCHLORIDE 4 MG: 2 SOLUTION ORAL at 08:52

## 2024-08-04 RX ADMIN — Medication 3 MG: at 20:29

## 2024-08-04 ASSESSMENT — PAIN - FUNCTIONAL ASSESSMENT
PAIN_FUNCTIONAL_ASSESSMENT: 0-10

## 2024-08-04 ASSESSMENT — COGNITIVE AND FUNCTIONAL STATUS - GENERAL
MOBILITY SCORE: 24
DAILY ACTIVITIY SCORE: 24
MOBILITY SCORE: 24
DAILY ACTIVITIY SCORE: 24

## 2024-08-04 ASSESSMENT — PAIN SCALES - GENERAL
PAINLEVEL_OUTOF10: 0 - NO PAIN
PAINLEVEL_OUTOF10: 3
PAINLEVEL_OUTOF10: 3
PAINLEVEL_OUTOF10: 2
PAINLEVEL_OUTOF10: 1

## 2024-08-04 ASSESSMENT — PAIN SCALES - WONG BAKER: WONGBAKER_NUMERICALRESPONSE: HURTS LITTLE BIT

## 2024-08-04 NOTE — CARE PLAN
The patient's goals for the shift include      The clinical goals for the shift include Patient will continue to manage wound manager and outputs throughout the night.    Over the shift, the patient did not make progress toward the following goals. Barriers to progression include ongoing wound management. Recommendations to address these barriers include frequent monitoring of wound characteristics and pain management.

## 2024-08-04 NOTE — CARE PLAN
The patient's goals for the shift include  managing pain levels.    The clinical goals for the shift include Patient will remain safe and free from falls throughout shift      Problem: Skin  Goal: Decreased wound size/increased tissue granulation at next dressing change  Outcome: Progressing  Goal: Prevent/manage excess moisture  Outcome: Progressing  Goal: Promote/optimize nutrition  Outcome: Progressing  Goal: Promote skin healing  Outcome: Progressing     Problem: Discharge Planning  Goal: Discharge to home or other facility with appropriate resources  Outcome: Progressing     Problem: Chronic Conditions and Co-morbidities  Goal: Patient's chronic conditions and co-morbidity symptoms are monitored and maintained or improved  Outcome: Progressing

## 2024-08-04 NOTE — PROGRESS NOTES
[facility]  ACUTE CARE SURGERY - PROGRESS NOTE    Patient Name: Bereket Em  MRN: [id number]  Admit Date: 725  : 1965  AGE: 58 y.o.   GENDER: male  ==============================================================================  TODAY'S ASSESSMENT AND PLAN OF CARE:  Bereket Em is a 58 y.o. year old male patient with a history of perforated diverticulitis s/p ex lap and sigmoidectomy c/b necrotic fascia with takeback to OR to repeat ex lap and placement of bridging mesh. Developed EC fistula x 2, returning with high fistula output and dehydration.  Patient is doing well awaiting likely discharge tomorrow.     Plan:  Neuro:  - prn tylenol, oxy 5 prn   - Melatonin as needed for sleep     Resp:  - encourage IS  - OOB, ambulate as tolerated    CV : no active issues  - vital signs qshift    GI: High output Enterocutaneous Fistula  - continue cyclic TPN/Lipids- consider weaning if patient able to take in adequate PO for nutritional support  - Imodium/Lomotil transitioned to elixir. Take 30 minutes before meals and at bedtime  -Lomotil elixir currently out of stock in hospital  - PPI IV BID  - low carb, soft diet, 6 small meals a day, eat half meal and rest of meal 90 minutes later. Minimize liquids  - Calorie count   - Appreciate Wound Care following for wound manager  - Monitor colostomy and output    :   - Strict I&Os  - Fistula output continuing to decrease -1160 over past 24hours  - Replete lytes as indicated, daily RFP/Mg    ID - no issues  - Trend CBC daily    Endo: no hx of DM  - Glucose checks s1xaymy while on TPN     DVT ppx:  - heparin subQ, SCDs     Dispo: RNF. Dispo pending for        Patient discussed with Attending Dr. Ora Ardon MD   Acute Care Surgery  Pager [phone]  ==============================================================================  CHIEF COMPLAINT / EVENTS LAST 24HRS / HPI:  No acute events overnight. 1100 out of  wound manager.    MEDICAL HISTORY / ROS:   Admission history and ROS reviewed. Pertinent changes as follows:  NA    PHYSICAL EXAM:  Heart Rate:  [52-67]   Temp:  [36.2 °C (97.2 °F)-37 °C (98.6 °F)]   Resp:  [16-18]   BP: (100-120)/(58-72)   SpO2:  [95 %-98 %]   Physical Exam  Constitutional:       Appearance: Normal appearance.   Eyes:      Extraocular Movements: Extraocular movements intact.   Cardiovascular:      Rate and Rhythm: Normal rate.   Pulmonary:      Effort: Pulmonary effort is normal.   Abdominal:      Comments: Soft, non distended, non tender,  midline wound pink with granulation tissue, stomasize fistula in center of wound bed draining yellow/brown effluent. Fistula pouch in place without leakage. Colostomy-stoma pink and viable with minimal stool in pouch    Musculoskeletal:         General: Normal range of motion.   Skin:     General: Skin is warm and dry.   Neurological:      Mental Status: He is alert and oriented to person, place, and time.   Psychiatric:         Behavior: Behavior normal.         IMAGING SUMMARY:  (summary of new imaging findings, not a copy of dictation)  None new    LABS:  Results from last 7 days   Lab Units 08/04/24  0601 08/03/24  0638 08/02/24  0636   WBC AUTO x10*3/uL 6.3 5.5 5.9   HEMOGLOBIN g/dL 9.4* 9.2* 9.8*   HEMATOCRIT % 29.6* 32.7* 31.4*   PLATELETS AUTO x10*3/uL 283 153 335         Results from last 7 days   Lab Units 08/04/24  0601 08/03/24  0804 08/02/24  0636 07/30/24  0541 07/29/24  0426   SODIUM mmol/L 138 137 137   < > 138   POTASSIUM mmol/L 4.7 4.6 4.5   < > 4.3   CHLORIDE mmol/L 103 102 102   < > 102   CO2 mmol/L 29 27 27   < > 30   BUN mg/dL 23 23 24*   < > 22   CREATININE mg/dL 0.55 0.58 0.52   < > 0.50   CALCIUM mg/dL 8.8 9.1 9.3   < > 8.9   PROTEIN TOTAL g/dL  --   --   --   --  5.7*   BILIRUBIN TOTAL mg/dL  --   --   --   --  0.3   ALK PHOS U/L  --   --   --   --  123*   ALT U/L  --   --   --   --  64*   AST U/L  --   --   --   --  23   GLUCOSE mg/dL  102* 112* 103*   < > 116*    < > = values in this interval not displayed.     Results from last 7 days   Lab Units 07/29/24  0426   BILIRUBIN TOTAL mg/dL 0.3   BILIRUBIN DIRECT mg/dL 0.1           I have reviewed all medications, laboratory results, and imaging pertinent for today's encounter.

## 2024-08-05 ENCOUNTER — HOME CARE VISIT (OUTPATIENT)
Dept: HOME HEALTH SERVICES | Facility: HOME HEALTH | Age: 59
End: 2024-08-05
Payer: COMMERCIAL

## 2024-08-05 ENCOUNTER — DOCUMENTATION (OUTPATIENT)
Dept: HOME HEALTH SERVICES | Facility: HOME HEALTH | Age: 59
End: 2024-08-05
Payer: COMMERCIAL

## 2024-08-05 ENCOUNTER — PHARMACY VISIT (OUTPATIENT)
Dept: PHARMACY | Facility: CLINIC | Age: 59
End: 2024-08-05
Payer: MEDICARE

## 2024-08-05 ENCOUNTER — HOME HEALTH ADMISSION (OUTPATIENT)
Dept: HOME HEALTH SERVICES | Facility: HOME HEALTH | Age: 59
End: 2024-08-05
Payer: COMMERCIAL

## 2024-08-05 ENCOUNTER — HOME INFUSION (OUTPATIENT)
Dept: INFUSION THERAPY | Age: 59
End: 2024-08-05
Payer: COMMERCIAL

## 2024-08-05 VITALS
SYSTOLIC BLOOD PRESSURE: 101 MMHG | BODY MASS INDEX: 26.13 KG/M2 | OXYGEN SATURATION: 94 % | HEART RATE: 64 BPM | HEIGHT: 68 IN | WEIGHT: 172.4 LBS | TEMPERATURE: 96.8 F | DIASTOLIC BLOOD PRESSURE: 68 MMHG | RESPIRATION RATE: 18 BRPM

## 2024-08-05 VITALS
HEART RATE: 65 BPM | SYSTOLIC BLOOD PRESSURE: 138 MMHG | OXYGEN SATURATION: 99 % | TEMPERATURE: 97.5 F | RESPIRATION RATE: 18 BRPM | DIASTOLIC BLOOD PRESSURE: 72 MMHG

## 2024-08-05 DIAGNOSIS — K57.30 DIVERTICULOSIS OF LARGE INTESTINE WITHOUT HEMORRHAGE: Primary | ICD-10-CM

## 2024-08-05 LAB
ALBUMIN SERPL BCP-MCNC: 3.1 G/DL (ref 3.4–5)
ANION GAP SERPL CALC-SCNC: 11 MMOL/L (ref 10–20)
BUN SERPL-MCNC: 24 MG/DL (ref 6–23)
CALCIUM SERPL-MCNC: 9 MG/DL (ref 8.6–10.6)
CHLORIDE SERPL-SCNC: 103 MMOL/L (ref 98–107)
CO2 SERPL-SCNC: 29 MMOL/L (ref 21–32)
CREAT SERPL-MCNC: 0.58 MG/DL (ref 0.5–1.3)
EGFRCR SERPLBLD CKD-EPI 2021: >90 ML/MIN/1.73M*2
ERYTHROCYTE [DISTWIDTH] IN BLOOD BY AUTOMATED COUNT: 16.1 % (ref 11.5–14.5)
GLUCOSE SERPL-MCNC: 117 MG/DL (ref 74–99)
HCT VFR BLD AUTO: 32 % (ref 41–52)
HGB BLD-MCNC: 9.7 G/DL (ref 13.5–17.5)
MAGNESIUM SERPL-MCNC: 2 MG/DL (ref 1.6–2.4)
MCH RBC QN AUTO: 25.3 PG (ref 26–34)
MCHC RBC AUTO-ENTMCNC: 30.3 G/DL (ref 32–36)
MCV RBC AUTO: 83 FL (ref 80–100)
NRBC BLD-RTO: 0 /100 WBCS (ref 0–0)
PHOSPHATE SERPL-MCNC: 5 MG/DL (ref 2.5–4.9)
PLATELET # BLD AUTO: 269 X10*3/UL (ref 150–450)
POTASSIUM SERPL-SCNC: 4.5 MMOL/L (ref 3.5–5.3)
RBC # BLD AUTO: 3.84 X10*6/UL (ref 4.5–5.9)
SODIUM SERPL-SCNC: 138 MMOL/L (ref 136–145)
WBC # BLD AUTO: 6.3 X10*3/UL (ref 4.4–11.3)

## 2024-08-05 PROCEDURE — 2500000001 HC RX 250 WO HCPCS SELF ADMINISTERED DRUGS (ALT 637 FOR MEDICARE OP)

## 2024-08-05 PROCEDURE — 2500000004 HC RX 250 GENERAL PHARMACY W/ HCPCS (ALT 636 FOR OP/ED)

## 2024-08-05 PROCEDURE — 83735 ASSAY OF MAGNESIUM: CPT | Performed by: NURSE PRACTITIONER

## 2024-08-05 PROCEDURE — G0299 HHS/HOSPICE OF RN EA 15 MIN: HCPCS

## 2024-08-05 PROCEDURE — 85027 COMPLETE CBC AUTOMATED: CPT | Performed by: NURSE PRACTITIONER

## 2024-08-05 PROCEDURE — 2500000001 HC RX 250 WO HCPCS SELF ADMINISTERED DRUGS (ALT 637 FOR MEDICARE OP): Performed by: NURSE PRACTITIONER

## 2024-08-05 PROCEDURE — 82374 ASSAY BLOOD CARBON DIOXIDE: CPT | Performed by: NURSE PRACTITIONER

## 2024-08-05 PROCEDURE — RXMED WILLOW AMBULATORY MEDICATION CHARGE

## 2024-08-05 RX ORDER — OXYCODONE HCL 5 MG/5 ML
5 SOLUTION, ORAL ORAL EVERY 6 HOURS PRN
Qty: 90 ML | Refills: 0 | Status: SHIPPED | OUTPATIENT
Start: 2024-08-05 | End: 2024-08-10

## 2024-08-05 RX ORDER — ESOMEPRAZOLE MAGNESIUM 40 MG/1
40 GRANULE, DELAYED RELEASE ORAL
Qty: 30 PACKET | Refills: 0 | Status: SHIPPED | OUTPATIENT
Start: 2024-08-06 | End: 2024-09-05

## 2024-08-05 RX ORDER — SODIUM CHLORIDE, SODIUM LACTATE, POTASSIUM CHLORIDE, CALCIUM CHLORIDE 600; 310; 30; 20 MG/100ML; MG/100ML; MG/100ML; MG/100ML
1000 INJECTION, SOLUTION INTRAVENOUS NIGHTLY
Start: 2024-08-05 | End: 2024-09-04

## 2024-08-05 RX ORDER — ACETAMINOPHEN 325 MG/1
650 TABLET ORAL EVERY 6 HOURS PRN
Qty: 30 TABLET | Refills: 0 | Status: SHIPPED | OUTPATIENT
Start: 2024-08-05

## 2024-08-05 RX ORDER — LOPERAMIDE HCL 1MG/7.5ML
4 LIQUID (ML) ORAL
Qty: 3600 ML | Refills: 0 | Status: SHIPPED | OUTPATIENT
Start: 2024-08-05 | End: 2024-09-04

## 2024-08-05 ASSESSMENT — PAIN SCALES - GENERAL
PAINLEVEL_OUTOF10: 2
PAINLEVEL_OUTOF10: 4

## 2024-08-05 ASSESSMENT — ACTIVITIES OF DAILY LIVING (ADL)
HOW_WELL_CAN_YOU_DRESS_YOURSELF: INDEPENDENTLY
WALKS_IN_HOME: INDEPENDENTLY
HEARING_RIGHT_EAR: NO PROBLEMS
ADEQUATE_TO_COMPLETE_ADL: YES
FEEDING: INDEPENDENT
BATHING: NEEDS ASSISTANCE
HOW_WELL_CAN_YOU_BATHE_YOURSELF: NEED SOME HELP
HOW_WELL_CAN_YOU_USE_BATHROOM_BY_YOURSELF: INDEPENDENTLY
HEARING_LEFT_EAR: NO PROBLEMS
ADEQUATE_TO_COMPLETE_ADL: YES
ADL_BEFORE_ADMISSION: INDEPENDENTLY
ADL_BEFORE_ADMISSION: RIGHT
TOILETING: INDEPENDENT
HOW_WELL_CAN_YOU_COMPLETE_GROOMING_TASKS: INDEPENDENTLY
HOW_WELL_CAN_YOU_FEED_YOURSELF: INDEPENDENTLY
DRESSING: INDEPENDENT

## 2024-08-05 ASSESSMENT — COGNITIVE AND FUNCTIONAL STATUS - GENERAL: DAILY ACTIVITIY SCORE: 24

## 2024-08-05 ASSESSMENT — PAIN SCALES - PAIN ASSESSMENT IN ADVANCED DEMENTIA (PAINAD)
CONSOLABILITY: NO NEED TO CONSOLE
TOTALSCORE: 0
TOTALSCORE: MEDICATION (SEE MAR);REPOSITIONED
FACIALEXPRESSION: SMILING OR INEXPRESSIVE
BODYLANGUAGE: RELAXED
BREATHING: NORMAL

## 2024-08-05 ASSESSMENT — PAIN DESCRIPTION - LOCATION: LOCATION: ABDOMEN

## 2024-08-05 ASSESSMENT — PAIN DESCRIPTION - ORIENTATION: ORIENTATION: ANTERIOR

## 2024-08-05 ASSESSMENT — PAIN SCALES - WONG BAKER: WONGBAKER_NUMERICALRESPONSE: HURTS LITTLE MORE

## 2024-08-05 NOTE — DISCHARGE SUMMARY
Discharge Diagnosis  Enterocutaneous fistula    Issues Requiring Follow-Up  Close follow up in Clinic for fistula management.     Test Results Pending At Discharge  Pending Labs       No current pending labs.            Hospital Course  Bereket Em is a 58 y.o. year old male patient with a history of perforated diverticulitis s/p ex lap and sigmoidectomy c/b necrotic fascia with takeback to OR to repeat ex lap and placement of bridging mesh. Developed EC fistula x 2, who returned on 7/25 with high fistula output and dehydration. Patient fluid resuscitated, Imodium dose increased and started on Lomotil. Output noted to be higher with high sugar fluids. Patient transitioned to low carb, soft diet, 6 small meals a day, eat half meal and rest of meal 90 minutes later and to minimize fluids. With adjustment in diet, patient's output continued to thicken and decrease. Of note, hospital on shortage of Lomotil elixir and there unable to get dose majority of his hospital stay. Patient remained in the hospital until home care was re-established for TPN and wound care at home.  On the day of discharge, patient remained hemodynamically stable, tolerating a diet and ECF fistula output remained stable. Patient is being discharged home with home care for TPN, Labs, wound care and will discharged home on 1L of fluids a day. He will have close follow up in ACS/Trauma clinic.       Pertinent Physical Exam At Time of Discharge  Physical Exam  Constitutional:       Appearance: Normal appearance.   Eyes:      Extraocular Movements: Extraocular movements intact.   Cardiovascular:      Comments: Non cyanotic  Pulmonary:      Effort: Pulmonary effort is normal.   Abdominal:      Comments: Soft, non distended, non tender to palpitation. Enterocutaneous fistula- stomasize fistula draining brown/green effluent- pouch in place. Colostomy- stoma pink and viable with scant amount of brown stool in pouch.    Musculoskeletal:         General:  "Normal range of motion.   Skin:     General: Skin is warm and dry.   Neurological:      Mental Status: He is alert and oriented to person, place, and time.   Psychiatric:         Mood and Affect: Mood normal.         Behavior: Behavior normal.         Home Medications     Medication List      START taking these medications     alteplase 2 mg injection; Commonly known as: Cathflo Activase; 2 mL (2   mg) by intra-catheter route 1 time for 1 dose.   esomeprazole 40 mg packet; Commonly known as: NexIUM; Take 40 mg by   mouth once daily in the morning. Take before meals.; Start taking on:   August 6, 2024   lactated Ringer's infusion; Infuse 1,000 mL into a venous catheter once   daily at bedtime. Administer 1L of IVFs nightly   loperamide 1 mg/7.5 mL liquid; Commonly known as: Imodium A-D; Take 30   mL (4 mg) by mouth 4 times a day with meals.; Replaces: loperamide 2 mg   capsule   oxyCODONE 5 mg/5 mL solution; Commonly known as: Roxicodone; Take 5 mL   (5 mg) by mouth every 6 hours if needed for moderate pain (4 - 6) for up   to 5 days.; Replaces: oxyCODONE 5 mg immediate release tablet     CHANGE how you take these medications     Adult Clinimix Parenteral Nutrition Cyclic; Infuse 2,000 mL over 12   hours into a venous catheter (central line) continuously. Taper up for 1   Hours. Taper down for 1 Hours.; What changed: how much to take     CONTINUE taking these medications     acetaminophen 325 mg tablet; Commonly known as: Tylenol; Take 2 tablets   (650 mg) by mouth every 6 hours if needed for mild pain (1 - 3).   fat emulsion fish oil/plant based 20 % emulsion; Commonly known as:   SMOFlipid; Infuse 250 mL (50 g) at 20.8 mL/hr over 12 hours into a venous   catheter Daily Lipids.   ostomy supplies 12 \" misc; 1 each every 7 days.     STOP taking these medications     loperamide 2 mg capsule; Commonly known as: Imodium; Replaced by:   loperamide 1 mg/7.5 mL liquid   oxyCODONE 5 mg immediate release tablet; Commonly " known as: Roxicodone;   Replaced by: oxyCODONE 5 mg/5 mL solution     ASK your doctor about these medications     Custom Cyclic TPN; Infuse 2,250 mL over 12 hours into a venous catheter   (central line) continuously. Taper up for 1 Hours. Taper down for 1 Hours.       Outpatient Follow-Up  Future Appointments   Date Time Provider Department Center   8/15/2024 10:00 AM [facility] [id number] TRAUMA CLINIC [id number] Academic       Michael Lubin, APRN-CNP

## 2024-08-05 NOTE — HOSPITAL COURSE
Bereket Em is a 58 y.o. year old male patient with a history of perforated diverticulitis s/p ex lap and sigmoidectomy c/b necrotic fascia with takeback to OR to repeat ex lap and placement of bridging mesh. Developed EC fistula x 2, who returned on 7/25 with high fistula output and dehydration. Patient fluid resuscitated, Imodium dose increased and started on Lomotil. Output noted to be higher with high sugar fluids. Patient transitioned to low carb, soft diet, 6 small meals a day, eat half meal and rest of meal 90 minutes later and to minimize fluids. With adjustment in diet, patient's output continued to thicken and decrease. Of note, hospital on shortage of Lomotil elixir and there unable to get dose majority of his hospital stay. Patient remained in the hospital until home care was re-established for TPN and wound care at home.  On the day of discharge, patient remained hemodynamically stable, tolerating a diet and ECF fistula output remained stable. Patient is being discharged home with home care for TPN, Labs, wound care and will discharged home on 1L of fluids a day. He will have close follow up in ACS/Trauma clinic.

## 2024-08-05 NOTE — PROGRESS NOTES
DIAGNOSIS dehydration 2/2 enteroatmospheric fisutlas  No Known Allergies   REVIEW OF LABS AT DISCHARGE  LINE INFO: PT HAS A DL PICC TO BE MANAGED PER [facility]OC[facility]  PT ORDERED daily TPN indefinitely  LABS ORDERED INCLUDE CBC/diff, CMP, mag, phos, TG, prealbumin  SYSTEM CADD pump  CARE PLAN DONE    Bereket Em IS A 58 y.o. male ORDERED TPN  FOLLOWED BY Dr Perry    [facility] spoke with pt, informed him of medication name, delivery, dosing, etc. He verbalized understanding of instruction regarding medication and receipt/storage of package. Verified delivery address as [address], [place] [address].  to call on way, delivery needed by 6 pm for same-day SOC.    RX DISPENSED THE FOLLOWING WITH SUPPLIES TO MATCH WITH DELIVERY BY 6 pm 8/5  3x TPN  3x MVI  DOS 8/5-8/7    FOLLOW UP 8/7 PROGRESS, DELIVERY STRAIGHT x7      Addendum 8/6: Confirmed Dr Perry to be following TPN who stated pt requested PCP Dr Razieh Mohseni be involved. Labs verified and entered into Epic. Goal is to follow healing of wound and output of fistula and wean after a few weeks.

## 2024-08-05 NOTE — CONSULTS
Wound Care Consult     Visit Date: 8/5/2024      Patient Name: Bereket Em         MRN: [id number]           YOB: 1965     Reason for Consult: Wound manager replacement         Pertinent Labs:   Albumin   Date Value Ref Range Status   08/05/2024 3.1 (L) 3.4 - 5.0 g/dL Final     ALBUMIN (MG/L) IN URINE   Date Value Ref Range Status   05/06/2021 <7.0 Not Established mg/L Final       Wound Assessment:  Patient with ECF x2 to abdominal wound; colostomy to left side abdomen.   Both wound manager and ostomy pouch changed today.  For wound: Coloplast Mini wound manager #[id number] applied with 4inch ring/barrier rings and stoma paste/powder.  For ostomy: 1 piece activelife drainable pouch applied.     Supplies given to the patient for discharge at this time     Wound Team Plan: Patient is being discharge 8/5     ISABELLE Ramos  8/5/2024  5:24 PM

## 2024-08-05 NOTE — HH CARE COORDINATION
Home Care received a Referral for Infusion and Nursing. We have processed the referral for a Start of Care on 8/5/24.     If you have any questions or concerns, please feel free to contact us at [phone]. Follow the prompts, enter your five digit zip code, and you will be directed to your care team on EAST 1.

## 2024-08-05 NOTE — CARE PLAN
The patient's goals for the shift include      The clinical goals for the shift include patient will ambulate the halls x2 this shift    Problem: Skin  Goal: Decreased wound size/increased tissue granulation at next dressing change  Outcome: Progressing  Goal: Prevent/manage excess moisture  Outcome: Progressing  Goal: Promote/optimize nutrition  Outcome: Progressing  Goal: Promote skin healing  Outcome: Progressing     Problem: Discharge Planning  Goal: Discharge to home or other facility with appropriate resources  Outcome: Progressing     Problem: Chronic Conditions and Co-morbidities  Goal: Patient's chronic conditions and co-morbidity symptoms are monitored and maintained or improved  Outcome: Progressing

## 2024-08-06 ENCOUNTER — PATIENT OUTREACH (OUTPATIENT)
Dept: PRIMARY CARE | Facility: CLINIC | Age: 59
End: 2024-08-06
Payer: COMMERCIAL

## 2024-08-06 ENCOUNTER — DOCUMENTATION (OUTPATIENT)
Dept: PRIMARY CARE | Facility: CLINIC | Age: 59
End: 2024-08-06
Payer: COMMERCIAL

## 2024-08-06 ENCOUNTER — HOME CARE VISIT (OUTPATIENT)
Dept: HOME HEALTH SERVICES | Facility: HOME HEALTH | Age: 59
End: 2024-08-06
Payer: COMMERCIAL

## 2024-08-06 ENCOUNTER — HOME INFUSION (OUTPATIENT)
Dept: INFUSION THERAPY | Age: 59
End: 2024-08-06
Payer: COMMERCIAL

## 2024-08-06 ENCOUNTER — TELEPHONE (OUTPATIENT)
Dept: PRIMARY CARE | Facility: CLINIC | Age: 59
End: 2024-08-06
Payer: COMMERCIAL

## 2024-08-06 DIAGNOSIS — K57.30 DIVERTICULOSIS OF LARGE INTESTINE WITHOUT HEMORRHAGE: Primary | ICD-10-CM

## 2024-08-06 PROCEDURE — G0299 HHS/HOSPICE OF RN EA 15 MIN: HCPCS

## 2024-08-06 ASSESSMENT — ENCOUNTER SYMPTOMS
APPETITE LEVEL: FAIR
CHANGE IN APPETITE: DECREASED
DENIES PAIN: 1

## 2024-08-06 ASSESSMENT — PAIN SCALES - PAIN ASSESSMENT IN ADVANCED DEMENTIA (PAINAD)
BODYLANGUAGE: 0
CONSOLABILITY: 0 - NO NEED TO CONSOLE.
TOTALSCORE: 0
NEGVOCALIZATION: 0
FACIALEXPRESSION: 0
CONSOLABILITY: 0
NEGVOCALIZATION: 0 - NONE.
FACIALEXPRESSION: 0 - SMILING OR INEXPRESSIVE.
BREATHING: 0
BODYLANGUAGE: 0 - RELAXED.

## 2024-08-06 ASSESSMENT — ACTIVITIES OF DAILY LIVING (ADL)
ENTERING_EXITING_HOME: NEEDS ASSISTANCE
OASIS_M1830: 03

## 2024-08-06 NOTE — PROGRESS NOTES
"TCM outreach completed- patient declined TCM services at this time. Patient states he is doing \"really good\" since discharge with no acute changes or concerns to report. Patient has a PCP follow up already scheduled for 8/13 and was encouraged to keep this appt. Patient denied any questions or needs from TCM. TCM program closed.  "

## 2024-08-07 ENCOUNTER — HOME CARE VISIT (OUTPATIENT)
Dept: HOME HEALTH SERVICES | Facility: HOME HEALTH | Age: 59
End: 2024-08-07
Payer: COMMERCIAL

## 2024-08-07 ENCOUNTER — HOME INFUSION (OUTPATIENT)
Dept: INFUSION THERAPY | Age: 59
End: 2024-08-07
Payer: COMMERCIAL

## 2024-08-07 VITALS
SYSTOLIC BLOOD PRESSURE: 130 MMHG | TEMPERATURE: 98.3 F | HEART RATE: 76 BPM | RESPIRATION RATE: 16 BRPM | DIASTOLIC BLOOD PRESSURE: 80 MMHG

## 2024-08-07 PROCEDURE — G0299 HHS/HOSPICE OF RN EA 15 MIN: HCPCS

## 2024-08-07 ASSESSMENT — ENCOUNTER SYMPTOMS: ABDOMINAL PAIN: 1

## 2024-08-07 NOTE — CASE COMMUNICATION
saw patient for you .   cancelled the order you placed . those items did not fit together. [facility] .  we did not change the fistula pouch today as it was still intact.   i can return .    would ideally like to try to treat the wound with silver alginate cover with protective sheet and then pouch the fistulas. but not sure if we would be albe to maintain a seal that way.   will send samples so we ca try.

## 2024-08-07 NOTE — HOME HEALTH
patient with large mid abd open wound with 2 fistula in it. that are draining .     left abd colostomy with no output.       call to [facility]  cancelled current order and ordered the following.     74257 coloplast  3 boxes  for 18   3m cavilon 5050 advanced  they do not carry.   3m 3342  1 box   81047  coloplast 1 boxes.   gauze 4 x 4 non sterile.  725294  1 brick   darnell 7806    2 boxes   paste 81681  2 tubes   coloplast  28620 strip paste 2 boxes   darnell  remover spray. 7731  darnell remover wipes 7760 1 box.    opted not to change the fistula pouch today as it was intact. he stated he gets usually 3 days wear time.    however looking at most recent picture it is denuded and painful.     would like to try to  treat wound with some silver alginate and cover with protective sheet and then pouch the fistulas.   will order samples to see if we can get that to work.   jackelinnet with picc line left brachial  no concerns.     left 14 packs of juveen suppliment for him to try.     [id number]

## 2024-08-07 NOTE — PROGRESS NOTES
Chart review - Bereket Em is a 58 y.o. patient on home care for dehydration 2/2 enteroatmospheric fistulas receiving daily TPN with MVI and LR.  Dr. Perry following    No new labs to review    [facility] call to pt, tolerating infusions well, no issues infusing TPN. Discussed weekly delivery schedule and patient agreeable to deliveries on Wednesday's going forward.    Pharmacy to mix 8/7 and deliver straight with supplies to match:  7x TPN  7x MVI  7x LR  DOS: 8/8 - 8/14    Follow up 8/13 - check labs, get orders, mix Wed

## 2024-08-09 ENCOUNTER — HOME CARE VISIT (OUTPATIENT)
Dept: HOME HEALTH SERVICES | Facility: HOME HEALTH | Age: 59
End: 2024-08-09
Payer: COMMERCIAL

## 2024-08-09 VITALS
SYSTOLIC BLOOD PRESSURE: 110 MMHG | RESPIRATION RATE: 16 BRPM | TEMPERATURE: 98.7 F | HEART RATE: 86 BPM | DIASTOLIC BLOOD PRESSURE: 70 MMHG

## 2024-08-09 PROCEDURE — G0299 HHS/HOSPICE OF RN EA 15 MIN: HCPCS

## 2024-08-09 ASSESSMENT — ENCOUNTER SYMPTOMS
PAIN: 1
APPETITE LEVEL: FAIR
LOWEST PAIN SEVERITY IN PAST 24 HOURS: 1/10
PAIN LOCATION: ABDOMEN
HIGHEST PAIN SEVERITY IN PAST 24 HOURS: 3/10
PERSON REPORTING PAIN: PATIENT

## 2024-08-09 NOTE — HOME HEALTH
WOC  home nursing visit   known to sn.     stoma type: colostomy left abd    size: 1.5 x 1  location: left sided   protrustion: yes   mucocutaneous junction: intact   mucosal condition and color: red and moist   Peristomal Skin: intact   Peristomal contour: flat  however noted small fistula at about 7 oclock about 1.5 inches away from stoma   character of output: none     pouching system used :2 piece red darnell 72870 and 10156   has no output thorugh the stoma.     accessories used: skin barrier film      mid abd dehisced wound wtih 2 large fistula in the middle.     pouching with coloplast wound manager.   paste all the way around.   paste in umbilicus and in groves towards stoma.     at 6 oclock used strip paste and foled over 4 in ring to help mainatin a seal.   manager placed and then paste to fill in gaps powder to help solidify     all after powder and skin barrier film   to the skin   as it was denuded from the leak. powder from 4 to 7 and barrier film all around.   finally used  large barrier exender at 6 oclock.     templates in home.     picc line  instert site without concern

## 2024-08-11 ASSESSMENT — ENCOUNTER SYMPTOMS
ABDOMINAL PAIN: 1
PERSON REPORTING PAIN: PATIENT
PAIN: 1
SKIN LESIONS: 1
CHANGE IN APPETITE: UNCHANGED
PAIN LOCATION: ABDOMEN
MUSCLE WEAKNESS: 1
PAIN LOCATION - PAIN SEVERITY: 4/10
APPETITE LEVEL: GOOD

## 2024-08-12 ENCOUNTER — HOME CARE VISIT (OUTPATIENT)
Dept: HOME HEALTH SERVICES | Facility: HOME HEALTH | Age: 59
End: 2024-08-12
Payer: COMMERCIAL

## 2024-08-12 VITALS
HEART RATE: 78 BPM | OXYGEN SATURATION: 98 % | TEMPERATURE: 98.8 F | RESPIRATION RATE: 16 BRPM | SYSTOLIC BLOOD PRESSURE: 124 MMHG | DIASTOLIC BLOOD PRESSURE: 72 MMHG

## 2024-08-12 PROCEDURE — 80053 COMPREHEN METABOLIC PANEL: CPT

## 2024-08-12 PROCEDURE — 84134 ASSAY OF PREALBUMIN: CPT

## 2024-08-12 PROCEDURE — 84100 ASSAY OF PHOSPHORUS: CPT

## 2024-08-12 PROCEDURE — 83735 ASSAY OF MAGNESIUM: CPT

## 2024-08-12 PROCEDURE — G0299 HHS/HOSPICE OF RN EA 15 MIN: HCPCS

## 2024-08-12 PROCEDURE — 84478 ASSAY OF TRIGLYCERIDES: CPT

## 2024-08-12 PROCEDURE — RXMED WILLOW AMBULATORY MEDICATION CHARGE

## 2024-08-12 PROCEDURE — 85025 COMPLETE CBC W/AUTO DIFF WBC: CPT

## 2024-08-13 ENCOUNTER — APPOINTMENT (OUTPATIENT)
Dept: PRIMARY CARE | Facility: CLINIC | Age: 59
End: 2024-08-13
Payer: COMMERCIAL

## 2024-08-13 ENCOUNTER — HOME INFUSION (OUTPATIENT)
Dept: INFUSION THERAPY | Age: 59
End: 2024-08-13
Payer: COMMERCIAL

## 2024-08-13 NOTE — PROGRESS NOTES
PATIENT CONTINUES DAILY TPN INFUSIONS FO RDEHYDRATION 2/2 ENTEROATMOSPHERIC FISTULAS.  HE ALSO RECEIVES LR. WITH DR. UREÑA FOLLOWING.  LABS REVEWED WITH MD AND HE GAVE VO TO CONTINUE SAME TPN FORMULA X 2 WEEKS.  PROCESSED FILL FOR THE FOLLOWING FOR MIX AND DEL. ON WEDNESDAY.  7 TPN, MVI  7 LR  DOS 8/15 THRU 8/21.  NF FOR 8/20 TO FILL AND MIX ON 8/21. DSL

## 2024-08-14 ENCOUNTER — HOME CARE VISIT (OUTPATIENT)
Dept: HOME HEALTH SERVICES | Facility: HOME HEALTH | Age: 59
End: 2024-08-14
Payer: COMMERCIAL

## 2024-08-14 ENCOUNTER — APPOINTMENT (OUTPATIENT)
Dept: SURGERY | Facility: CLINIC | Age: 59
End: 2024-08-14
Payer: COMMERCIAL

## 2024-08-14 VITALS
TEMPERATURE: 98.1 F | DIASTOLIC BLOOD PRESSURE: 80 MMHG | RESPIRATION RATE: 12 BRPM | HEART RATE: 68 BPM | SYSTOLIC BLOOD PRESSURE: 130 MMHG

## 2024-08-14 PROCEDURE — G0299 HHS/HOSPICE OF RN EA 15 MIN: HCPCS

## 2024-08-14 ASSESSMENT — ENCOUNTER SYMPTOMS
PAIN LOCATION: ABDOMEN
PERSON REPORTING PAIN: PATIENT
PAIN: 1
HIGHEST PAIN SEVERITY IN PAST 24 HOURS: 5/10
LOWEST PAIN SEVERITY IN PAST 24 HOURS: 1/10
APPETITE LEVEL: FAIR

## 2024-08-14 NOTE — HOME HEALTH
[facility] home nursing visit   known to sn.   stoma type: colostomy left abd   size: 1.5 x 1   location: left sided   protrustion: yes   mucocutaneous junction: intact   mucosal condition and color: red and moist   Peristomal Skin: intact   Peristomal contour: flat however noted small fistula at about 7 oclock about 1.5 inches away from stoma   character of output: none   pouching system used :2 piece red darnell 21740 and 46700  had minimal brown stool in stoma pouch.   accessories used: skin barrier film   mid abd dehisced wound wtih 2 large fistula in the middle.   pouching with coloplast wound manager.   paste all the way around.   paste in umbilicus and in groves towards stoma.   at 6 oclock used strip paste and foled over 4 in ring to help mainatin a seal.   manager placed and then paste to fill in gaps powder to help solidify   all after powder and skin barrier film to the skin as it was denuded from the leak. powder from 4 to 7 and barrier film all around.   finally used large barrier exender at 6 oclock.     umbilicus very tender so tried to protect area bit with  barrier extender. and paste.    heating pad to pouching x approx 15 minutes.     templates in home.   picc line instert site without concern    colosotmy site repouched  with 2 piece flat red darnell   skin barreir film .

## 2024-08-15 ENCOUNTER — HOME CARE VISIT (OUTPATIENT)
Dept: HOME HEALTH SERVICES | Facility: HOME HEALTH | Age: 59
End: 2024-08-15
Payer: COMMERCIAL

## 2024-08-15 ENCOUNTER — APPOINTMENT (OUTPATIENT)
Dept: SURGERY | Facility: CLINIC | Age: 59
End: 2024-08-15
Payer: COMMERCIAL

## 2024-08-15 ENCOUNTER — PHARMACY VISIT (OUTPATIENT)
Dept: PHARMACY | Facility: CLINIC | Age: 59
End: 2024-08-15
Payer: MEDICARE

## 2024-08-15 VITALS
DIASTOLIC BLOOD PRESSURE: 73 MMHG | SYSTOLIC BLOOD PRESSURE: 119 MMHG | BODY MASS INDEX: 28.04 KG/M2 | HEART RATE: 61 BPM | OXYGEN SATURATION: 98 % | TEMPERATURE: 97.2 F | HEIGHT: 68 IN | WEIGHT: 185 LBS

## 2024-08-15 DIAGNOSIS — K63.2 ENTEROCUTANEOUS FISTULA: ICD-10-CM

## 2024-08-15 DIAGNOSIS — R52 PAIN: Primary | ICD-10-CM

## 2024-08-15 PROCEDURE — 3008F BODY MASS INDEX DOCD: CPT | Performed by: SURGERY

## 2024-08-15 PROCEDURE — 99024 POSTOP FOLLOW-UP VISIT: CPT | Performed by: SURGERY

## 2024-08-15 RX ORDER — OXYCODONE HCL 5 MG/5 ML
5 SOLUTION, ORAL ORAL EVERY 6 HOURS PRN
Qty: 90 ML | Refills: 0 | Status: CANCELLED | OUTPATIENT
Start: 2024-08-15 | End: 2024-08-22

## 2024-08-15 RX ORDER — OXYCODONE HCL 5 MG/5 ML
5 SOLUTION, ORAL ORAL EVERY 6 HOURS PRN
Qty: 90 ML | Refills: 0 | Status: SHIPPED | OUTPATIENT
Start: 2024-08-15 | End: 2024-08-22

## 2024-08-15 ASSESSMENT — PAIN SCALES - GENERAL: PAINLEVEL: 0-NO PAIN

## 2024-08-15 ASSESSMENT — PATIENT HEALTH QUESTIONNAIRE - PHQ9
2. FEELING DOWN, DEPRESSED OR HOPELESS: NOT AT ALL
SUM OF ALL RESPONSES TO PHQ9 QUESTIONS 1 & 2: 0
1. LITTLE INTEREST OR PLEASURE IN DOING THINGS: NOT AT ALL

## 2024-08-15 ASSESSMENT — ENCOUNTER SYMPTOMS
DEPRESSION: 0
LOSS OF SENSATION IN FEET: 0
OCCASIONAL FEELINGS OF UNSTEADINESS: 0

## 2024-08-15 NOTE — HOME HEALTH
darnell samples reqeusted   83854  64286  7805  paste  7906  7731  8801    coloplast samples reqeusted  SenSura® Darek Flex Barrier    SenSura® Russell Flex Barrier  New bodyfit baseplate with secure, flexible adhesive coupling.  SenSura® Russell Flex Closed Pouch    SenSura® Darek Flex Closed Pouch  New discreet 2-piece colostomy pouch with secure, flexible adhesive coupling.  Brava® Strip Paste    Brava® Strip Paste  Fills deeper folds, creating an even skin surface for the barrier to stick securely.  Brava® Paste    Brava® Paste  Brava® Paste – alcohol free.  Brava® Adhesive Remover Spray    Brava® Adhesive Remover Spray  Sting-free and easy removal of your appliance.  Brava® Protective Sheet    Brava® Protective Sheet  The Brava Protective Sheet is a skin-friendly product to be used under the ostomy barrier.      convatec samples requested  [id number]  [id number]  [id number]  [id number]  [id number]

## 2024-08-16 ENCOUNTER — OFFICE VISIT (OUTPATIENT)
Dept: PRIMARY CARE | Facility: CLINIC | Age: 59
End: 2024-08-16
Payer: COMMERCIAL

## 2024-08-16 VITALS
BODY MASS INDEX: 28.13 KG/M2 | SYSTOLIC BLOOD PRESSURE: 120 MMHG | OXYGEN SATURATION: 97 % | HEART RATE: 61 BPM | DIASTOLIC BLOOD PRESSURE: 78 MMHG | WEIGHT: 185 LBS

## 2024-08-16 DIAGNOSIS — T81.30XA WOUND DEHISCENCE: Primary | ICD-10-CM

## 2024-08-16 DIAGNOSIS — K63.2 ENTEROCUTANEOUS FISTULA: ICD-10-CM

## 2024-08-16 DIAGNOSIS — Z87.19 HISTORY OF DIVERTICULITIS: ICD-10-CM

## 2024-08-16 PROBLEM — I10 BENIGN ESSENTIAL HTN: Status: RESOLVED | Noted: 2023-02-14 | Resolved: 2024-08-16

## 2024-08-16 PROCEDURE — 99495 TRANSJ CARE MGMT MOD F2F 14D: CPT

## 2024-08-16 ASSESSMENT — PATIENT HEALTH QUESTIONNAIRE - PHQ9
2. FEELING DOWN, DEPRESSED OR HOPELESS: NOT AT ALL
1. LITTLE INTEREST OR PLEASURE IN DOING THINGS: NOT AT ALL
SUM OF ALL RESPONSES TO PHQ9 QUESTIONS 1 AND 2: 0

## 2024-08-16 ASSESSMENT — ENCOUNTER SYMPTOMS
PERSON REPORTING PAIN: PATIENT
SHORTNESS OF BREATH: 0
NAUSEA: 0
VOMITING: 0
CHILLS: 0
PALPITATIONS: 0
LOSS OF SENSATION IN FEET: 0
FEVER: 0
OCCASIONAL FEELINGS OF UNSTEADINESS: 0
DEPRESSION: 0
PAIN: 1

## 2024-08-16 ASSESSMENT — PAIN SCALES - GENERAL: PAINLEVEL: 0-NO PAIN

## 2024-08-16 NOTE — PROGRESS NOTES
Subjective   Patient ID: Bereket Em is a 58 y.o. male who presents for Hospital Follow-up (No  FLMA paper work, [employer] paper to sign).    Hx of tobacco use, hx of other substance abuse in remission, elevated glucose (last A1c 5.5 1/2024)    History of perforated diverticulitis s/p ex lap and sigmoidectomy c/b necrotic fascia with takeback to OR to repeat ex lap and placement of bridging mesh. Developed EC fistula x 2 (6/5/24 - 7/11/254). Working with Dr. Perry (General Surgeon) and Aviva Costa, ISABELLE in charge of Critical Care and Acute Care surgery.     Hospital follow-up 7/25/2024 - 8/5/2024. Patient re-admitted after out-patient follow-up with general surgery where labs showed dehydration. Fluid resuscitated and patient was discharged with increase of 1L of fluids a day. Also  home Health Care has been set up, which he states he has been reciving much better care then with previous [facility]. Patient still on TPN and Dr. Perry is weaning him of large amounts of fluids (saw patient yesterday, next appt is virtual 8/29/2024). Patient overall doing much better, wound is continue to shrink in size. Prior initial hospitalization (6/5/24) was smoking 1PPD and drinking 3-6 drinks a day which correlates with pour wound healing, since hospital patient has not had single drink or cigarette. Patient knows protein intake important for wound healing. Goal is full wound healing before they can reverse ostomy, timeline is several months, possibly a year until this can be considered. Patient states general surgery is filling out his FMLA but he needs his extension on disability forms signed today. Patient feels he would like to try to go back to work, and work from home. Given intense care needed for wound, still being on TPN, and healing from emergent sugery/infection, agreed patient could try for partial work day, 3 days a week, working from home.              Review of Systems   Constitutional:   Negative for chills and fever.   Respiratory:  Negative for shortness of breath.    Cardiovascular:  Negative for chest pain, palpitations and leg swelling.   Gastrointestinal:  Negative for nausea and vomiting.       Objective   /78   Pulse 61   Wt 83.9 kg (185 lb)   SpO2 97%   BMI 28.13 kg/m²     Physical Exam  Cardiovascular:      Rate and Rhythm: Normal rate and regular rhythm.      Heart sounds: No murmur heard.  Pulmonary:      Effort: Pulmonary effort is normal.      Breath sounds: Normal breath sounds. No wheezing, rhonchi or rales.   Abdominal:          Comments: Abdominal wound is significantly smaller from previous exam, wound bag pouch attached with bowl drainage, ostomy present on left side covered with solid ostomy bag   Neurological:      Mental Status: He is alert.   Psychiatric:         Mood and Affect: Mood and affect normal.         Behavior: Behavior normal.         Assessment/Plan   Diagnoses and all orders for this visit:  Wound dehiscence  Enterocutaneous fistula  History of diverticulitis  Disability forms filled out. Will contact Aviva Costa RN regarding patient returning back to work.

## 2024-08-16 NOTE — PROGRESS NOTES
Late entry for 08/15/24.    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   I discussed his care at the bedside with his family with his permission.   He is doing well at home.  He is not driving on his own yet due to the wound manager. He is interested in returning to work in some form whether at home or at his workplace at some point once improved.  He is tolerating PO and reports having ~1L of output from the fistulas per day.  There is periodic, minimal stool output from the colostomy.  He reports his urine output is light yellow and that he can maintain his hydration but does require 1L of fluid in addition to his TPN.  He continues to take a PPI BID and Imodium 4 times a day.   On exam, NAD. Mucous membranes are not dry.  He can climb onto the exam table without difficulty. The LLQ colostsomy is pink and viable with some solid stool present. The abdominal wound manager is in place and was placed yesterday so it was not removed. See Media tab for most recent outpatient wound care photos which show the wound that has continued healing.  The same 3 fistulas remain present and are grossly unchanged except for some prolapse which he reports correlates with meals.  The prolapsed bowel is pink and viable.   Plan for:  -Fistula management: Continue with current outpatient wound care.  Goal is to have the wound heal and fill in enough to be about to pouch the fistulas which will takes several weeks to months. Discussed signs and symptoms of bowel ischemia with prolapse.  Discussed again that wound will first need to close and then soften prior to any surgery which may be in at least a year.   -Nutrition/hydration: He is eating well but would like to continue the TPN for now which is very reasonable.  I have reviewed his labs with pharmacy.  As his wound continues to heal and  his oral nutrition is adequate, weaning of the TPN in a few weeks is a goal.  In terms of hydration, plan for wean of supplemental crystalloid 1L at next visit as he seems to be able to maintain his hydration with PO intake and TPN at this time. Maintain current PPI and Imodium.   -Activity: Ad kathryn  -Work: Discussed options of a few hours at home or in the workplace to start when he is ready.  He has been contemplating this for a while and limitations at this time seem to be endurance given illness and hospitalization and bulkiness of wound manager. He will think about this further and will discuss at his next visit and with his PCP.    -Follow up: Discussed options of in person or virtual. He lives 1 hour away and will plan for virtual visit on 08/29.

## 2024-08-18 ENCOUNTER — HOME CARE VISIT (OUTPATIENT)
Dept: HOME HEALTH SERVICES | Facility: HOME HEALTH | Age: 59
End: 2024-08-18
Payer: COMMERCIAL

## 2024-08-19 ENCOUNTER — HOME CARE VISIT (OUTPATIENT)
Dept: HOME HEALTH SERVICES | Facility: HOME HEALTH | Age: 59
End: 2024-08-19
Payer: COMMERCIAL

## 2024-08-19 ENCOUNTER — TELEPHONE (OUTPATIENT)
Dept: INFUSION THERAPY | Age: 59
End: 2024-08-19

## 2024-08-19 ENCOUNTER — LAB REQUISITION (OUTPATIENT)
Dept: LAB | Facility: LAB | Age: 59
End: 2024-08-19
Payer: COMMERCIAL

## 2024-08-19 ENCOUNTER — HOME INFUSION (OUTPATIENT)
Dept: INFUSION THERAPY | Age: 59
End: 2024-08-19
Payer: COMMERCIAL

## 2024-08-19 ENCOUNTER — PATIENT MESSAGE (OUTPATIENT)
Dept: PRIMARY CARE | Facility: CLINIC | Age: 59
End: 2024-08-19

## 2024-08-19 DIAGNOSIS — T84.410A: ICD-10-CM

## 2024-08-19 DIAGNOSIS — T81.31XA DISRUPTION OF EXTERNAL OPERATION (SURGICAL) WOUND, NOT ELSEWHERE CLASSIFIED, INITIAL ENCOUNTER: ICD-10-CM

## 2024-08-19 DIAGNOSIS — B95.62 METHICILLIN RESISTANT STAPHYLOCOCCUS AUREUS INFECTION AS THE CAUSE OF DISEASES CLASSIFIED ELSEWHERE: ICD-10-CM

## 2024-08-19 LAB
ALBUMIN SERPL-MCNC: 3.6 G/DL (ref 3.5–5)
ALP BLD-CCNC: 195 U/L (ref 35–125)
ALT SERPL-CCNC: 86 U/L (ref 5–40)
ANION GAP SERPL CALC-SCNC: 13 MMOL/L
AST SERPL-CCNC: 49 U/L (ref 5–40)
BASOPHILS # BLD AUTO: 0.02 X10*3/UL (ref 0–0.1)
BASOPHILS NFR BLD AUTO: 0.3 %
BILIRUB SERPL-MCNC: 0.5 MG/DL (ref 0.1–1.2)
BUN SERPL-MCNC: 14 MG/DL (ref 8–25)
CALCIUM SERPL-MCNC: 8.9 MG/DL (ref 8.5–10.4)
CHLORIDE SERPL-SCNC: 97 MMOL/L (ref 97–107)
CO2 SERPL-SCNC: 25 MMOL/L (ref 24–31)
CREAT SERPL-MCNC: 0.6 MG/DL (ref 0.4–1.6)
EGFRCR SERPLBLD CKD-EPI 2021: >90 ML/MIN/1.73M*2
EOSINOPHIL # BLD AUTO: 0.05 X10*3/UL (ref 0–0.7)
EOSINOPHIL NFR BLD AUTO: 0.8 %
ERYTHROCYTE [DISTWIDTH] IN BLOOD BY AUTOMATED COUNT: 15.5 % (ref 11.5–14.5)
GLUCOSE SERPL-MCNC: 104 MG/DL (ref 65–99)
HCT VFR BLD AUTO: 34.4 % (ref 41–52)
HGB BLD-MCNC: 10.7 G/DL (ref 13.5–17.5)
IMM GRANULOCYTES # BLD AUTO: 0.03 X10*3/UL (ref 0–0.7)
IMM GRANULOCYTES NFR BLD AUTO: 0.5 % (ref 0–0.9)
LYMPHOCYTES # BLD AUTO: 1.42 X10*3/UL (ref 1.2–4.8)
LYMPHOCYTES NFR BLD AUTO: 23.9 %
MAGNESIUM SERPL-MCNC: 1.9 MG/DL (ref 1.6–3.1)
MCH RBC QN AUTO: 25.1 PG (ref 26–34)
MCHC RBC AUTO-ENTMCNC: 31.1 G/DL (ref 32–36)
MCV RBC AUTO: 81 FL (ref 80–100)
MONOCYTES # BLD AUTO: 0.67 X10*3/UL (ref 0.1–1)
MONOCYTES NFR BLD AUTO: 11.3 %
NEUTROPHILS # BLD AUTO: 3.75 X10*3/UL (ref 1.2–7.7)
NEUTROPHILS NFR BLD AUTO: 63.2 %
NRBC BLD-RTO: 0 /100 WBCS (ref 0–0)
PHOSPHATE SERPL-MCNC: 4 MG/DL (ref 2.5–4.5)
PLATELET # BLD AUTO: 261 X10*3/UL (ref 150–450)
POTASSIUM SERPL-SCNC: 4.2 MMOL/L (ref 3.4–5.1)
PROT SERPL-MCNC: 6.3 G/DL (ref 5.9–7.9)
RBC # BLD AUTO: 4.26 X10*6/UL (ref 4.5–5.9)
SODIUM SERPL-SCNC: 135 MMOL/L (ref 133–145)
TRIGL SERPL-MCNC: 70 MG/DL (ref 40–150)
WBC # BLD AUTO: 5.9 X10*3/UL (ref 4.4–11.3)

## 2024-08-19 PROCEDURE — 80053 COMPREHEN METABOLIC PANEL: CPT

## 2024-08-19 PROCEDURE — 84100 ASSAY OF PHOSPHORUS: CPT

## 2024-08-19 PROCEDURE — G0299 HHS/HOSPICE OF RN EA 15 MIN: HCPCS

## 2024-08-19 PROCEDURE — 85025 COMPLETE CBC W/AUTO DIFF WBC: CPT

## 2024-08-19 PROCEDURE — 84478 ASSAY OF TRIGLYCERIDES: CPT

## 2024-08-19 PROCEDURE — 84134 ASSAY OF PREALBUMIN: CPT

## 2024-08-19 PROCEDURE — 83735 ASSAY OF MAGNESIUM: CPT

## 2024-08-19 NOTE — PROGRESS NOTES
Chart review - Bereket Em is a 58 y.o. patient on home care for dehydration 2/2 enteroatmospheric fistulas receiving daily TPN with MVI and LR.  Dr. Perry following     Labs from 8/19 drawn but not resulted in Epic yet     Pharmacy to mix 8/21 and deliver straight with supplies to match:  7x TPN  7x MVI  7x LR  DOS: 8/22 - 8/28     Follow up 8/27 - check labs, get orders, mix Wed

## 2024-08-19 NOTE — TELEPHONE ENCOUNTER
Spoke w/ patient and scheduled delivery of TPN and supplies for Wednesday by 8 pm. Per patient  - no NS flushes w/ this delivery, but all other supplies stays the same. Patient confirmed he needs 4 NS flushes + 2 heparins per day. No other questions.

## 2024-08-20 LAB — PREALB SERPL-MCNC: 23.4 MG/DL (ref 18–40)

## 2024-08-22 ASSESSMENT — ENCOUNTER SYMPTOMS
ABDOMINAL PAIN: 1
LOWER EXTREMITY EDEMA: 1
MUSCLE WEAKNESS: 1
APPETITE LEVEL: FAIR
PERSON REPORTING PAIN: PATIENT
CHANGE IN APPETITE: UNCHANGED
PAIN: 1
PAIN LOCATION: ABDOMEN
PAIN LOCATION - PAIN SEVERITY: 4/10

## 2024-08-24 ENCOUNTER — HOME CARE VISIT (OUTPATIENT)
Dept: HOME HEALTH SERVICES | Facility: HOME HEALTH | Age: 59
End: 2024-08-24
Payer: COMMERCIAL

## 2024-08-24 VITALS
DIASTOLIC BLOOD PRESSURE: 70 MMHG | OXYGEN SATURATION: 96 % | RESPIRATION RATE: 18 BRPM | TEMPERATURE: 99.1 F | HEART RATE: 102 BPM | SYSTOLIC BLOOD PRESSURE: 148 MMHG

## 2024-08-24 PROCEDURE — G0300 HHS/HOSPICE OF LPN EA 15 MIN: HCPCS

## 2024-08-24 ASSESSMENT — ENCOUNTER SYMPTOMS
DENIES PAIN: 1
LAST BOWEL MOVEMENT: 67076
APPETITE LEVEL: GOOD

## 2024-08-24 NOTE — HOME HEALTH
patient reports fever upon arrivai . skin warm to touch and is clammy. sweating noted. temp, 101.1 . recommened  patient to go to be assessed at ED. patient stated he would think about it. educated patient on usuing tyl every 3 hous and pateint is getting IV fluid

## 2024-08-26 ENCOUNTER — LAB REQUISITION (OUTPATIENT)
Dept: LAB | Facility: LAB | Age: 59
End: 2024-08-26
Payer: COMMERCIAL

## 2024-08-26 ENCOUNTER — CLINICAL SUPPORT (OUTPATIENT)
Dept: EMERGENCY MEDICINE | Facility: HOSPITAL | Age: 59
End: 2024-08-26
Payer: COMMERCIAL

## 2024-08-26 ENCOUNTER — HOME CARE VISIT (OUTPATIENT)
Dept: HOME HEALTH SERVICES | Facility: HOME HEALTH | Age: 59
End: 2024-08-26
Payer: COMMERCIAL

## 2024-08-26 ENCOUNTER — HOSPITAL ENCOUNTER (INPATIENT)
Facility: HOSPITAL | Age: 59
LOS: 4 days | Discharge: HOME | End: 2024-08-30
Attending: EMERGENCY MEDICINE | Admitting: SURGERY
Payer: COMMERCIAL

## 2024-08-26 ENCOUNTER — APPOINTMENT (OUTPATIENT)
Dept: RADIOLOGY | Facility: HOSPITAL | Age: 59
End: 2024-08-26
Payer: COMMERCIAL

## 2024-08-26 VITALS
HEART RATE: 104 BPM | SYSTOLIC BLOOD PRESSURE: 120 MMHG | OXYGEN SATURATION: 94 % | DIASTOLIC BLOOD PRESSURE: 50 MMHG | RESPIRATION RATE: 20 BRPM | TEMPERATURE: 100.1 F

## 2024-08-26 DIAGNOSIS — B95.62 METHICILLIN RESISTANT STAPHYLOCOCCUS AUREUS INFECTION AS THE CAUSE OF DISEASES CLASSIFIED ELSEWHERE: ICD-10-CM

## 2024-08-26 DIAGNOSIS — T84.410A: ICD-10-CM

## 2024-08-26 DIAGNOSIS — R50.9 FEVER AND CHILLS: Primary | ICD-10-CM

## 2024-08-26 DIAGNOSIS — T81.31XA DISRUPTION OF EXTERNAL OPERATION (SURGICAL) WOUND, NOT ELSEWHERE CLASSIFIED, INITIAL ENCOUNTER: ICD-10-CM

## 2024-08-26 DIAGNOSIS — R52 PAIN: ICD-10-CM

## 2024-08-26 DIAGNOSIS — K63.2 ENTEROCUTANEOUS FISTULA: ICD-10-CM

## 2024-08-26 LAB
ALBUMIN SERPL BCP-MCNC: 3.7 G/DL (ref 3.4–5)
ALBUMIN SERPL-MCNC: 3.6 G/DL (ref 3.5–5)
ALP BLD-CCNC: 266 U/L (ref 35–125)
ALP SERPL-CCNC: 248 U/L (ref 33–120)
ALT SERPL W P-5'-P-CCNC: 61 U/L (ref 10–52)
ALT SERPL-CCNC: 64 U/L (ref 5–40)
ANION GAP BLDV CALCULATED.4IONS-SCNC: 12 MMOL/L (ref 10–25)
ANION GAP SERPL CALC-SCNC: 14 MMOL/L
ANION GAP SERPL CALC-SCNC: 16 MMOL/L (ref 10–20)
APPEARANCE UR: CLEAR
AST SERPL W P-5'-P-CCNC: 31 U/L (ref 9–39)
AST SERPL-CCNC: 34 U/L (ref 5–40)
ATRIAL RATE: 66 BPM
BACTERIA #/AREA URNS AUTO: ABNORMAL /HPF
BASE EXCESS BLDV CALC-SCNC: 2.2 MMOL/L (ref -2–3)
BASOPHILS # BLD AUTO: 0.02 X10*3/UL (ref 0–0.1)
BASOPHILS # BLD AUTO: 0.05 X10*3/UL (ref 0–0.1)
BASOPHILS NFR BLD AUTO: 0.2 %
BASOPHILS NFR BLD AUTO: 0.3 %
BILIRUB SERPL-MCNC: 0.9 MG/DL (ref 0.1–1.2)
BILIRUB SERPL-MCNC: 1.1 MG/DL (ref 0–1.2)
BILIRUB UR STRIP.AUTO-MCNC: NEGATIVE MG/DL
BODY TEMPERATURE: 37 DEGREES CELSIUS
BUN SERPL-MCNC: 18 MG/DL (ref 6–23)
BUN SERPL-MCNC: 18 MG/DL (ref 8–25)
CA-I BLDV-SCNC: 1.19 MMOL/L (ref 1.1–1.33)
CALCIUM SERPL-MCNC: 8.8 MG/DL (ref 8.5–10.4)
CALCIUM SERPL-MCNC: 9.4 MG/DL (ref 8.6–10.6)
CHLORIDE BLDV-SCNC: 99 MMOL/L (ref 98–107)
CHLORIDE SERPL-SCNC: 97 MMOL/L (ref 97–107)
CHLORIDE SERPL-SCNC: 97 MMOL/L (ref 98–107)
CO2 SERPL-SCNC: 24 MMOL/L (ref 21–32)
CO2 SERPL-SCNC: 25 MMOL/L (ref 24–31)
COLOR UR: YELLOW
CREAT SERPL-MCNC: 0.5 MG/DL (ref 0.4–1.6)
CREAT SERPL-MCNC: 0.58 MG/DL (ref 0.5–1.3)
EGFRCR SERPLBLD CKD-EPI 2021: >90 ML/MIN/1.73M*2
EGFRCR SERPLBLD CKD-EPI 2021: >90 ML/MIN/1.73M*2
EOSINOPHIL # BLD AUTO: 0.01 X10*3/UL (ref 0–0.7)
EOSINOPHIL # BLD AUTO: 0.05 X10*3/UL (ref 0–0.7)
EOSINOPHIL NFR BLD AUTO: 0.1 %
EOSINOPHIL NFR BLD AUTO: 0.6 %
ERYTHROCYTE [DISTWIDTH] IN BLOOD BY AUTOMATED COUNT: 15.3 % (ref 11.5–14.5)
ERYTHROCYTE [DISTWIDTH] IN BLOOD BY AUTOMATED COUNT: 15.7 % (ref 11.5–14.5)
FLUAV RNA RESP QL NAA+PROBE: NOT DETECTED
FLUBV RNA RESP QL NAA+PROBE: NOT DETECTED
GLUCOSE BLDV-MCNC: 146 MG/DL (ref 74–99)
GLUCOSE SERPL-MCNC: 108 MG/DL (ref 65–99)
GLUCOSE SERPL-MCNC: 132 MG/DL (ref 74–99)
GLUCOSE UR STRIP.AUTO-MCNC: NORMAL MG/DL
HCO3 BLDV-SCNC: 26.5 MMOL/L (ref 22–26)
HCT VFR BLD AUTO: 33 % (ref 41–52)
HCT VFR BLD AUTO: 33 % (ref 41–52)
HCT VFR BLD EST: 34 % (ref 41–52)
HGB BLD-MCNC: 10.2 G/DL (ref 13.5–17.5)
HGB BLD-MCNC: 10.7 G/DL (ref 13.5–17.5)
HGB BLDV-MCNC: 11.2 G/DL (ref 13.5–17.5)
IMM GRANULOCYTES # BLD AUTO: 0.08 X10*3/UL (ref 0–0.7)
IMM GRANULOCYTES # BLD AUTO: 0.27 X10*3/UL (ref 0–0.7)
IMM GRANULOCYTES NFR BLD AUTO: 1 % (ref 0–0.9)
IMM GRANULOCYTES NFR BLD AUTO: 1.7 % (ref 0–0.9)
INHALED O2 CONCENTRATION: 21 %
KETONES UR STRIP.AUTO-MCNC: NEGATIVE MG/DL
LACTATE BLDV-SCNC: 1.5 MMOL/L (ref 0.4–2)
LACTATE SERPL-SCNC: 1.5 MMOL/L (ref 0.4–2)
LEUKOCYTE ESTERASE UR QL STRIP.AUTO: NEGATIVE
LYMPHOCYTES # BLD AUTO: 0.72 X10*3/UL (ref 1.2–4.8)
LYMPHOCYTES # BLD AUTO: 1.57 X10*3/UL (ref 1.2–4.8)
LYMPHOCYTES NFR BLD AUTO: 8.9 %
LYMPHOCYTES NFR BLD AUTO: 9.7 %
MAGNESIUM SERPL-MCNC: 1.9 MG/DL (ref 1.6–3.1)
MCH RBC QN AUTO: 24 PG (ref 26–34)
MCH RBC QN AUTO: 24.8 PG (ref 26–34)
MCHC RBC AUTO-ENTMCNC: 30.9 G/DL (ref 32–36)
MCHC RBC AUTO-ENTMCNC: 32.4 G/DL (ref 32–36)
MCV RBC AUTO: 74 FL (ref 80–100)
MCV RBC AUTO: 80 FL (ref 80–100)
MONOCYTES # BLD AUTO: 0.24 X10*3/UL (ref 0.1–1)
MONOCYTES # BLD AUTO: 1.5 X10*3/UL (ref 0.1–1)
MONOCYTES NFR BLD AUTO: 3 %
MONOCYTES NFR BLD AUTO: 9.3 %
MUCOUS THREADS #/AREA URNS AUTO: ABNORMAL /LPF
NEUTROPHILS # BLD AUTO: 12.74 X10*3/UL (ref 1.2–7.7)
NEUTROPHILS # BLD AUTO: 7.02 X10*3/UL (ref 1.2–7.7)
NEUTROPHILS NFR BLD AUTO: 78.9 %
NEUTROPHILS NFR BLD AUTO: 86.3 %
NITRITE UR QL STRIP.AUTO: NEGATIVE
NRBC BLD-RTO: 0 /100 WBCS (ref 0–0)
NRBC BLD-RTO: 0 /100 WBCS (ref 0–0)
OXYHGB MFR BLDV: 70.2 % (ref 45–75)
P AXIS: 57 DEGREES
P OFFSET: 187 MS
P ONSET: 139 MS
PCO2 BLDV: 39 MM HG (ref 41–51)
PH BLDV: 7.44 PH (ref 7.33–7.43)
PH UR STRIP.AUTO: 6 [PH]
PHOSPHATE SERPL-MCNC: 3.3 MG/DL (ref 2.5–4.5)
PLATELET # BLD AUTO: 198 X10*3/UL (ref 150–450)
PLATELET # BLD AUTO: 200 X10*3/UL (ref 150–450)
PO2 BLDV: 48 MM HG (ref 35–45)
POTASSIUM BLDV-SCNC: 4.6 MMOL/L (ref 3.5–5.3)
POTASSIUM SERPL-SCNC: 4.3 MMOL/L (ref 3.5–5.3)
POTASSIUM SERPL-SCNC: 4.4 MMOL/L (ref 3.4–5.1)
PR INTERVAL: 146 MS
PREALB SERPL-MCNC: 22 MG/DL (ref 18–40)
PROT SERPL-MCNC: 6.6 G/DL (ref 5.9–7.9)
PROT SERPL-MCNC: 7.7 G/DL (ref 6.4–8.2)
PROT UR STRIP.AUTO-MCNC: ABNORMAL MG/DL
Q ONSET: 212 MS
QRS COUNT: 11 BEATS
QRS DURATION: 84 MS
QT INTERVAL: 418 MS
QTC CALCULATION(BAZETT): 438 MS
QTC FREDERICIA: 431 MS
R AXIS: -13 DEGREES
RBC # BLD AUTO: 4.11 X10*6/UL (ref 4.5–5.9)
RBC # BLD AUTO: 4.45 X10*6/UL (ref 4.5–5.9)
RBC # UR STRIP.AUTO: NEGATIVE /UL
RBC #/AREA URNS AUTO: ABNORMAL /HPF
RBC MORPH BLD: NORMAL
SAO2 % BLDV: 72 % (ref 45–75)
SARS-COV-2 RNA RESP QL NAA+PROBE: NOT DETECTED
SODIUM BLDV-SCNC: 133 MMOL/L (ref 136–145)
SODIUM SERPL-SCNC: 133 MMOL/L (ref 136–145)
SODIUM SERPL-SCNC: 136 MMOL/L (ref 133–145)
SP GR UR STRIP.AUTO: >1.05
T AXIS: 49 DEGREES
T OFFSET: 421 MS
TRIGL SERPL-MCNC: 128 MG/DL (ref 40–150)
UROBILINOGEN UR STRIP.AUTO-MCNC: NORMAL MG/DL
VENTRICULAR RATE: 66 BPM
WBC # BLD AUTO: 16.1 X10*3/UL (ref 4.4–11.3)
WBC # BLD AUTO: 8.1 X10*3/UL (ref 4.4–11.3)
WBC #/AREA URNS AUTO: ABNORMAL /HPF

## 2024-08-26 PROCEDURE — 87636 SARSCOV2 & INF A&B AMP PRB: CPT

## 2024-08-26 PROCEDURE — 36415 COLL VENOUS BLD VENIPUNCTURE: CPT | Performed by: EMERGENCY MEDICINE

## 2024-08-26 PROCEDURE — 1100000001 HC PRIVATE ROOM DAILY

## 2024-08-26 PROCEDURE — 87075 CULTR BACTERIA EXCEPT BLOOD: CPT | Performed by: EMERGENCY MEDICINE

## 2024-08-26 PROCEDURE — 87632 RESP VIRUS 6-11 TARGETS: CPT

## 2024-08-26 PROCEDURE — 85025 COMPLETE CBC W/AUTO DIFF WBC: CPT

## 2024-08-26 PROCEDURE — 85025 COMPLETE CBC W/AUTO DIFF WBC: CPT | Performed by: EMERGENCY MEDICINE

## 2024-08-26 PROCEDURE — 82330 ASSAY OF CALCIUM: CPT | Performed by: EMERGENCY MEDICINE

## 2024-08-26 PROCEDURE — 84132 ASSAY OF SERUM POTASSIUM: CPT | Performed by: EMERGENCY MEDICINE

## 2024-08-26 PROCEDURE — 71046 X-RAY EXAM CHEST 2 VIEWS: CPT

## 2024-08-26 PROCEDURE — 99223 1ST HOSP IP/OBS HIGH 75: CPT | Performed by: SURGERY

## 2024-08-26 PROCEDURE — 2500000004 HC RX 250 GENERAL PHARMACY W/ HCPCS (ALT 636 FOR OP/ED)

## 2024-08-26 PROCEDURE — 82435 ASSAY OF BLOOD CHLORIDE: CPT | Performed by: EMERGENCY MEDICINE

## 2024-08-26 PROCEDURE — 87077 CULTURE AEROBIC IDENTIFY: CPT | Performed by: EMERGENCY MEDICINE

## 2024-08-26 PROCEDURE — 83735 ASSAY OF MAGNESIUM: CPT

## 2024-08-26 PROCEDURE — 87040 BLOOD CULTURE FOR BACTERIA: CPT | Performed by: EMERGENCY MEDICINE

## 2024-08-26 PROCEDURE — 84100 ASSAY OF PHOSPHORUS: CPT

## 2024-08-26 PROCEDURE — 71046 X-RAY EXAM CHEST 2 VIEWS: CPT | Performed by: STUDENT IN AN ORGANIZED HEALTH CARE EDUCATION/TRAINING PROGRAM

## 2024-08-26 PROCEDURE — G0299 HHS/HOSPICE OF RN EA 15 MIN: HCPCS

## 2024-08-26 PROCEDURE — 83605 ASSAY OF LACTIC ACID: CPT | Performed by: EMERGENCY MEDICINE

## 2024-08-26 PROCEDURE — 99285 EMERGENCY DEPT VISIT HI MDM: CPT

## 2024-08-26 PROCEDURE — 99285 EMERGENCY DEPT VISIT HI MDM: CPT | Performed by: EMERGENCY MEDICINE

## 2024-08-26 PROCEDURE — 74177 CT ABD & PELVIS W/CONTRAST: CPT | Performed by: RADIOLOGY

## 2024-08-26 PROCEDURE — 93005 ELECTROCARDIOGRAM TRACING: CPT

## 2024-08-26 PROCEDURE — 81003 URINALYSIS AUTO W/O SCOPE: CPT | Performed by: EMERGENCY MEDICINE

## 2024-08-26 PROCEDURE — 80053 COMPREHEN METABOLIC PANEL: CPT

## 2024-08-26 PROCEDURE — 80053 COMPREHEN METABOLIC PANEL: CPT | Performed by: EMERGENCY MEDICINE

## 2024-08-26 PROCEDURE — 93010 ELECTROCARDIOGRAM REPORT: CPT | Performed by: EMERGENCY MEDICINE

## 2024-08-26 PROCEDURE — 2550000001 HC RX 255 CONTRASTS: Performed by: EMERGENCY MEDICINE

## 2024-08-26 PROCEDURE — 84478 ASSAY OF TRIGLYCERIDES: CPT

## 2024-08-26 PROCEDURE — 74177 CT ABD & PELVIS W/CONTRAST: CPT

## 2024-08-26 RX ORDER — MAGNESIUM SULFATE HEPTAHYDRATE 40 MG/ML
2 INJECTION, SOLUTION INTRAVENOUS ONCE
Status: COMPLETED | OUTPATIENT
Start: 2024-08-26 | End: 2024-08-26

## 2024-08-26 RX ORDER — ENOXAPARIN SODIUM 100 MG/ML
40 INJECTION SUBCUTANEOUS EVERY 24 HOURS
Status: DISCONTINUED | OUTPATIENT
Start: 2024-08-26 | End: 2024-08-30 | Stop reason: HOSPADM

## 2024-08-26 RX ORDER — DEXTROSE MONOHYDRATE 100 MG/ML
100 INJECTION, SOLUTION INTRAVENOUS CONTINUOUS
Status: DISCONTINUED | OUTPATIENT
Start: 2024-08-26 | End: 2024-08-27

## 2024-08-26 RX ORDER — POTASSIUM CHLORIDE 14.9 MG/ML
20 INJECTION INTRAVENOUS
Status: DISCONTINUED | OUTPATIENT
Start: 2024-08-26 | End: 2024-08-26

## 2024-08-26 RX ORDER — SODIUM CHLORIDE, SODIUM LACTATE, POTASSIUM CHLORIDE, CALCIUM CHLORIDE 600; 310; 30; 20 MG/100ML; MG/100ML; MG/100ML; MG/100ML
1000 INJECTION, SOLUTION INTRAVENOUS NIGHTLY
Status: DISCONTINUED | OUTPATIENT
Start: 2024-08-26 | End: 2024-08-30 | Stop reason: HOSPADM

## 2024-08-26 RX ORDER — PANTOPRAZOLE SODIUM 40 MG/1
40 TABLET, DELAYED RELEASE ORAL
Status: DISCONTINUED | OUTPATIENT
Start: 2024-08-27 | End: 2024-08-30 | Stop reason: HOSPADM

## 2024-08-26 RX ORDER — LOPERAMIDE HCL 1MG/7.5ML
4 LIQUID (ML) ORAL
Status: DISCONTINUED | OUTPATIENT
Start: 2024-08-27 | End: 2024-08-30 | Stop reason: HOSPADM

## 2024-08-26 RX ORDER — ACETAMINOPHEN 325 MG/1
650 TABLET ORAL ONCE
Status: COMPLETED | OUTPATIENT
Start: 2024-08-26 | End: 2024-08-26

## 2024-08-26 SDOH — SOCIAL STABILITY: SOCIAL INSECURITY: ABUSE: ADULT

## 2024-08-26 SDOH — SOCIAL STABILITY: SOCIAL INSECURITY: DO YOU FEEL ANYONE HAS EXPLOITED OR TAKEN ADVANTAGE OF YOU FINANCIALLY OR OF YOUR PERSONAL PROPERTY?: NO

## 2024-08-26 SDOH — SOCIAL STABILITY: SOCIAL INSECURITY: ARE THERE ANY APPARENT SIGNS OF INJURIES/BEHAVIORS THAT COULD BE RELATED TO ABUSE/NEGLECT?: NO

## 2024-08-26 SDOH — SOCIAL STABILITY: SOCIAL INSECURITY: DOES ANYONE TRY TO KEEP YOU FROM HAVING/CONTACTING OTHER FRIENDS OR DOING THINGS OUTSIDE YOUR HOME?: NO

## 2024-08-26 SDOH — SOCIAL STABILITY: SOCIAL INSECURITY: HAS ANYONE EVER THREATENED TO HURT YOUR FAMILY OR YOUR PETS?: NO

## 2024-08-26 SDOH — SOCIAL STABILITY: SOCIAL INSECURITY: HAVE YOU HAD THOUGHTS OF HARMING ANYONE ELSE?: NO

## 2024-08-26 SDOH — SOCIAL STABILITY: SOCIAL INSECURITY: HAVE YOU HAD ANY THOUGHTS OF HARMING ANYONE ELSE?: NO

## 2024-08-26 SDOH — SOCIAL STABILITY: SOCIAL INSECURITY: DO YOU FEEL UNSAFE GOING BACK TO THE PLACE WHERE YOU ARE LIVING?: NO

## 2024-08-26 SDOH — SOCIAL STABILITY: SOCIAL INSECURITY: ARE YOU OR HAVE YOU BEEN THREATENED OR ABUSED PHYSICALLY, EMOTIONALLY, OR SEXUALLY BY ANYONE?: NO

## 2024-08-26 SDOH — SOCIAL STABILITY: SOCIAL INSECURITY: WERE YOU ABLE TO COMPLETE ALL THE BEHAVIORAL HEALTH SCREENINGS?: YES

## 2024-08-26 ASSESSMENT — LIFESTYLE VARIABLES
HOW OFTEN DO YOU HAVE A DRINK CONTAINING ALCOHOL: NEVER
PRESCIPTION_ABUSE_PAST_12_MONTHS: NO
SKIP TO QUESTIONS 9-10: 1
AUDIT-C TOTAL SCORE: 0
AUDIT-C TOTAL SCORE: 0
HOW OFTEN DO YOU HAVE 6 OR MORE DRINKS ON ONE OCCASION: NEVER
HOW MANY STANDARD DRINKS CONTAINING ALCOHOL DO YOU HAVE ON A TYPICAL DAY: PATIENT DOES NOT DRINK
SUBSTANCE_ABUSE_PAST_12_MONTHS: NO

## 2024-08-26 ASSESSMENT — ACTIVITIES OF DAILY LIVING (ADL)
FEEDING YOURSELF: INDEPENDENT
HEARING - RIGHT EAR: FUNCTIONAL
PATIENT'S MEMORY ADEQUATE TO SAFELY COMPLETE DAILY ACTIVITIES?: YES
ADEQUATE_TO_COMPLETE_ADL: YES
TOILETING: INDEPENDENT
ASSISTIVE_DEVICE: WALKER
WALKS IN HOME: INDEPENDENT
HEARING - LEFT EAR: FUNCTIONAL
BATHING: INDEPENDENT
JUDGMENT_ADEQUATE_SAFELY_COMPLETE_DAILY_ACTIVITIES: YES
LACK_OF_TRANSPORTATION: PATIENT DECLINED
DRESSING YOURSELF: INDEPENDENT
GROOMING: INDEPENDENT

## 2024-08-26 ASSESSMENT — COGNITIVE AND FUNCTIONAL STATUS - GENERAL
DAILY ACTIVITIY SCORE: 24
DAILY ACTIVITIY SCORE: 24
MOBILITY SCORE: 24
MOBILITY SCORE: 24
PATIENT BASELINE BEDBOUND: NO

## 2024-08-26 ASSESSMENT — PAIN SCALES - GENERAL
PAINLEVEL_OUTOF10: 4
PAINLEVEL_OUTOF10: 4

## 2024-08-26 ASSESSMENT — PAIN DESCRIPTION - LOCATION: LOCATION: ABDOMEN

## 2024-08-26 ASSESSMENT — PATIENT HEALTH QUESTIONNAIRE - PHQ9
1. LITTLE INTEREST OR PLEASURE IN DOING THINGS: NOT AT ALL
SUM OF ALL RESPONSES TO PHQ9 QUESTIONS 1 & 2: 0
2. FEELING DOWN, DEPRESSED OR HOPELESS: NOT AT ALL

## 2024-08-26 ASSESSMENT — PAIN - FUNCTIONAL ASSESSMENT: PAIN_FUNCTIONAL_ASSESSMENT: 0-10

## 2024-08-26 ASSESSMENT — PAIN DESCRIPTION - PAIN TYPE: TYPE: ACUTE PAIN

## 2024-08-26 NOTE — ED TRIAGE NOTES
Patient presented the ed with complaints of fever/chills and possible infection.  Pt had ruptured colon from fistuals with colostomy placed at that time in June of this year.  Pt was send here for further evaluation of possible infection and work up.

## 2024-08-26 NOTE — CONSULTS
"[facility]  ACUTE CARE SURGERY - HISTORY AND PHYSICAL    Patient Name: Bereket Em MRN: [id number]  Admit Date: 826  : 1965  AGE: 58 y.o.   GENDER: male    ==============================================================================  ASSESSMENT AND PLAN:  This is a 58-year-old male with a recent history of multiple abdominal surgeries with a complicated post-operative course, most recently 3 enteroatmospheric fistulas, who presents today with acute episodic low-grade fevers/rigors and persistent liquid stool. Systemic symptoms and leukocytosis on presentation suggest viral illness. There is no evidence of ischemia, necrosis, or other acute abdominal pathology on exam; no indication for surgical intervention at this time. Will admit for further workup and monitoring.    - Admit to ACS  - Recommend CXR, blood cultures, UA, and full respiratory panel  - Start vancomycin and Zosyn  - C diff PCR  - CT tomorrow if today's workup is negative    Discussed with attending Dr. Lara.    Selene Billingsley MD  PGY1 Acute Care Surgery  Pager [phone]  Available via secure chat    ==============================================================================    CHIEF COMPLAINT:  Fevers and chills    HISTORY OF PRESENT ILLNESS:  Mr. Em reports episodes of fevers, chills, and shaking beginning 4 days ago. He reports a maximum temperature of 100.4 F, states fevers come and go and are accompanied by \"full body shaking\". Last occurred this morning. Denies nausea, vomiting, lightheadedness, sore throat, cough, shortness of breath, chest pain, or abdominal pain. No output from ostomy, only from fistula. Reports watery stool output via fistula despite continuing to take Imodium 4x/day, which he states previously helped maintain his goal consistency and 1L output. No sick contacts, no known exposure to any specific illness. Patient has a LUE PICC, dressing last changed this AM without any " reported concerns in appearance or function. He receives 1L LR + TPN x12 every night.    He is known to this service and was last seen in our clinic on 08/16/2024. See [facility] for surgical course; he underwent 3 abdominal surgeries in 06/2024 for perforated diverticulitis, and his port-operative course has been complicated by 3 enteroatmospheric fistulas.    PAST MEDICAL HISTORY:  Past Medical History:   Diagnosis Date    Acute pharyngitis, unspecified     Sore throat    Acute upper respiratory infection, unspecified 02/13/2017    Acute URI    Alcohol abuse, in remission 09/12/2018    History of alcohol abuse    Benign essential HTN 02/14/2023    Elevated blood-pressure reading, without diagnosis of hypertension 02/23/2015    Elevated blood pressure reading without diagnosis of hypertension    Encounter for immunization 10/10/2014    Need for Tdap vaccination    Encounter for screening for malignant neoplasm of colon 01/04/2017    Encounter for colonoscopy due to history of adenomatous colonic polyps    Encounter for screening for malignant neoplasm of colon 11/02/2016    Encounter for screening colonoscopy    Encounter for screening for malignant neoplasm of colon 11/02/2016    Encounter for screening colonoscopy    Encounter for screening for malignant neoplasm of prostate 09/12/2018    Prostate cancer screening    Essential (primary) hypertension 03/14/2019    Elevated blood pressure reading with diagnosis of hypertension    Impingement syndrome of right shoulder 06/06/2016    Impingement syndrome of right shoulder    Incomplete rotator cuff tear or rupture of right shoulder, not specified as traumatic 09/12/2018    Partial nontraumatic tear of right rotator cuff    Noninfective gastroenteritis and colitis, unspecified 01/23/2018    Acute gastroenteritis    Other general symptoms and signs 11/02/2016    Flu-like symptoms    Other malaise 11/02/2016    Malaise and fatigue    Pain in left ankle and joints of left foot  10/10/2017    Left ankle pain    Pain in left foot 09/12/2017    Left foot pain    Pain in right shoulder 04/11/2016    Right shoulder pain    Pain in thoracic spine 09/12/2018    Thoracic back pain    Pain, unspecified 02/09/2017    Body aches    Personal history of colonic polyps 01/04/2017    History of colonic polyps    Personal history of other diseases of male genital organs 03/15/2019    History of acute prostatitis    Personal history of other diseases of the respiratory system 04/07/2020    History of acute sinusitis    Personal history of other specified conditions 03/14/2019    History of flank pain    Personal history of other specified conditions 10/10/2014    History of paresthesia    Strain of muscle, fascia and tendon of other parts of biceps, right arm, initial encounter 03/11/2016    Rupture of biceps tendon, right, initial encounter       PAST SURGICAL HISTORY:  - Re ex lap, Necrotic abdominal wall tissue debrided, Phasix mesh placed in 24x8cm defect (06/18/2024)  - Re ex lap, Abdominal washout, Fascial debridement, enmas closure (06/15/2024)  - Ex lap, Sigmoidectomy, End colostomy (06/07/2024)  -  for perforated diverticulitis  - Shoulder surgery (06/27/2017)    PAST SOCIAL HISTORY:  Reports history of tobacco use, not currently  Denies EtOH use  Denies use of illicit or recreational drugs    PAST FAMILY HISTORY:  Family History   Problem Relation Name Age of Onset    Other (CARDIAC DISORDER) Mother      Hypertension Mother      Other (CARDIAC DISORDR) Father      Hypertension Father      Hypertension Sister      Heart attack Brother      Hypertension Brother         HOME MEDICATIONS:  Prior to Admission medications    Medication Sig Start Date End Date Taking? Authorizing Provider   0.9 % sodium chloride (sodium chloride, PF, 0.9%) injection Infuse 10 mL into a venous catheter 2 times a day. 10 ml NS twice daily SASH after TPN and LR  Indications: FLUSHING    Historical Provider, MD  "  acetaminophen (Tylenol) 325 mg tablet Take 2 tablets (650 mg) by mouth every 6 hours if needed for mild pain (1 - 3). 8/5/24   ASHLY Hawk   Adult Clinimix Parenteral Nutrition Cyclic Infuse 2,000 mL over 12 hours into a venous catheter (central line) continuously. Taper up for 1 Hours. Taper down for 1 Hours. 8/1/24 8/31/24  Ana Almaguer PA-C   esomeprazole (NexIUM) 40 mg packet Take 40 mg by mouth once daily in the morning. Take before meals. 8/6/24 9/5/24  ASHLY Hawk   heparin flush (heparin LockFlush,Porcine,,PF,) 10 unit/mL injection Infuse 5 mL into a venous catheter once daily. Heparin 5 ml daily [facility]  Indications: prevent clot from blocking an intravenous catheter    Historical Provider, MD   heparin flush 100 unit/mL syringe Infuse 5 mL (500 Units) into a venous catheter once daily. 5 ml heprain daily [facility]    Historical Provider, MD   loperamide (Imodium A-D) 1 mg/7.5 mL liquid Take 30 mL (4 mg) by mouth 4 times a day with meals. 8/5/24 9/4/24  ASHLY Hawk   ostomy supplies 12 \" misc 1 each every 7 days. 7/12/24 9/10/24  Kinjal Dye PA-C   oxyCODONE (Roxicodone) 5 mg/5 mL solution Take 5 mL (5 mg) by mouth every 6 hours if needed for severe pain (7 - 10) for up to 7 days. 8/15/24 8/22/24  Ana Almaguer PA-C   Ringer's solution,lactated (lactated Ringer's) infusion Infuse 1,000 mL into a venous catheter once daily at bedtime. Administer 1L of IVFs nightly 8/5/24 9/4/24  ASHLY Hawk       ALLERGIES  No Known Allergies    REVIEW OF SYSTEMS:  14-point ROS performed; negative unless otherwise indicated in HPI.    ==============================================================================    VITALS:  Vitals:    08/26/24 1402   BP: 118/80   Pulse: 92   Resp: 16   Temp: 36.8 °C (98.2 °F)   SpO2: 97%       PHYSICAL EXAM:  Gen:  NAD, non-toxic appearing  HEENT:  Normocephalic, moist mucous membranes.  Eyes:  No scleral " icterus  Card:  RRR  Resp:  Non-labored breathing on RA  Abd:  Soft, non-tender, non-distended. LLQ stoma is pink, no fecal matter in ostomy bag. Enteroatmospheric fistulas appear prolapsed and comminuted with one another; involved bowl is pink, surrounding bag with small amount of grey-white output.  Ext:  WWP, no swelling of BLE  MSK:  TORRES  Neuro:  AOx3, no gross neurological deficits  Psych:  Appropriate mood and affect    IMAGING SUMMARY:  CXR pending  CT AP pending    LABS:  Results for orders placed or performed during the hospital encounter of 08/26/24 (from the past 24 hour(s))   CBC with Differential   Result Value Ref Range    WBC 16.1 (H) 4.4 - 11.3 x10*3/uL    nRBC 0.0 0.0 - 0.0 /100 WBCs    RBC 4.45 (L) 4.50 - 5.90 x10*6/uL    Hemoglobin 10.7 (L) 13.5 - 17.5 g/dL    Hematocrit 33.0 (L) 41.0 - 52.0 %    MCV 74 (L) 80 - 100 fL    MCH 24.0 (L) 26.0 - 34.0 pg    MCHC 32.4 32.0 - 36.0 g/dL    RDW 15.3 (H) 11.5 - 14.5 %    Platelets 200 150 - 450 x10*3/uL    Neutrophils % 78.9 40.0 - 80.0 %    Immature Granulocytes %, Automated 1.7 (H) 0.0 - 0.9 %    Lymphocytes % 9.7 13.0 - 44.0 %    Monocytes % 9.3 2.0 - 10.0 %    Eosinophils % 0.1 0.0 - 6.0 %    Basophils % 0.3 0.0 - 2.0 %    Neutrophils Absolute 12.74 (H) 1.20 - 7.70 x10*3/uL    Immature Granulocytes Absolute, Automated 0.27 0.00 - 0.70 x10*3/uL    Lymphocytes Absolute 1.57 1.20 - 4.80 x10*3/uL    Monocytes Absolute 1.50 (H) 0.10 - 1.00 x10*3/uL    Eosinophils Absolute 0.01 0.00 - 0.70 x10*3/uL    Basophils Absolute 0.05 0.00 - 0.10 x10*3/uL   Comprehensive Metabolic Panel   Result Value Ref Range    Glucose 132 (H) 74 - 99 mg/dL    Sodium 133 (L) 136 - 145 mmol/L    Potassium 4.3 3.5 - 5.3 mmol/L    Chloride 97 (L) 98 - 107 mmol/L    Bicarbonate 24 21 - 32 mmol/L    Anion Gap 16 10 - 20 mmol/L    Urea Nitrogen 18 6 - 23 mg/dL    Creatinine 0.58 0.50 - 1.30 mg/dL    eGFR >90 >60 mL/min/1.73m*2    Calcium 9.4 8.6 - 10.6 mg/dL    Albumin 3.7 3.4 - 5.0 g/dL     Alkaline Phosphatase 248 (H) 33 - 120 U/L    Total Protein 7.7 6.4 - 8.2 g/dL    AST 31 9 - 39 U/L    Bilirubin, Total 1.1 0.0 - 1.2 mg/dL    ALT 61 (H) 10 - 52 U/L   Lactate   Result Value Ref Range    Lactate 1.5 0.4 - 2.0 mmol/L   BLOOD GAS VENOUS FULL PANEL   Result Value Ref Range    POCT pH, Venous 7.44 (H) 7.33 - 7.43 pH    POCT pCO2, Venous 39 (L) 41 - 51 mm Hg    POCT pO2, Venous 48 (H) 35 - 45 mm Hg    POCT SO2, Venous 72 45 - 75 %    POCT Oxy Hemoglobin, Venous 70.2 45.0 - 75.0 %    POCT Hematocrit Calculated, Venous 34.0 (L) 41.0 - 52.0 %    POCT Sodium, Venous 133 (L) 136 - 145 mmol/L    POCT Potassium, Venous 4.6 3.5 - 5.3 mmol/L    POCT Chloride, Venous 99 98 - 107 mmol/L    POCT Ionized Calicum, Venous 1.19 1.10 - 1.33 mmol/L    POCT Glucose, Venous 146 (H) 74 - 99 mg/dL    POCT Lactate, Venous 1.5 0.4 - 2.0 mmol/L    POCT Base Excess, Venous 2.2 -2.0 - 3.0 mmol/L    POCT HCO3 Calculated, Venous 26.5 (H) 22.0 - 26.0 mmol/L    POCT Hemoglobin, Venous 11.2 (L) 13.5 - 17.5 g/dL    POCT Anion Gap, Venous 12.0 10.0 - 25.0 mmol/L    Patient Temperature 37.0 degrees Celsius    FiO2 21 %   Blood Culture    Specimen: Peripheral Venipuncture; Blood culture   Result Value Ref Range    Blood Culture Loaded on Instrument - Culture in progress    Morphology   Result Value Ref Range    RBC Morphology No significant RBC morphology present        I have reviewed all laboratory and imaging results ordered/pertinent for this encounter.

## 2024-08-27 ENCOUNTER — HOME INFUSION (OUTPATIENT)
Dept: INFUSION THERAPY | Age: 59
End: 2024-08-27
Payer: COMMERCIAL

## 2024-08-27 LAB
ANION GAP SERPL CALC-SCNC: 14 MMOL/L (ref 10–20)
BUN SERPL-MCNC: 11 MG/DL (ref 6–23)
C DIF TOX TCDA+TCDB STL QL NAA+PROBE: NOT DETECTED
CALCIUM SERPL-MCNC: 9.2 MG/DL (ref 8.6–10.6)
CHLORIDE SERPL-SCNC: 99 MMOL/L (ref 98–107)
CO2 SERPL-SCNC: 28 MMOL/L (ref 21–32)
CREAT SERPL-MCNC: 0.56 MG/DL (ref 0.5–1.3)
EGFRCR SERPLBLD CKD-EPI 2021: >90 ML/MIN/1.73M*2
ERYTHROCYTE [DISTWIDTH] IN BLOOD BY AUTOMATED COUNT: 15.6 % (ref 11.5–14.5)
GLUCOSE SERPL-MCNC: 189 MG/DL (ref 74–99)
HCT VFR BLD AUTO: 30.8 % (ref 41–52)
HGB BLD-MCNC: 9.7 G/DL (ref 13.5–17.5)
MAGNESIUM SERPL-MCNC: 2.46 MG/DL (ref 1.6–2.4)
MCH RBC QN AUTO: 24 PG (ref 26–34)
MCHC RBC AUTO-ENTMCNC: 31.5 G/DL (ref 32–36)
MCV RBC AUTO: 76 FL (ref 80–100)
NRBC BLD-RTO: 0 /100 WBCS (ref 0–0)
PLATELET # BLD AUTO: 308 X10*3/UL (ref 150–450)
POTASSIUM SERPL-SCNC: 3.8 MMOL/L (ref 3.5–5.3)
RBC # BLD AUTO: 4.04 X10*6/UL (ref 4.5–5.9)
SODIUM SERPL-SCNC: 137 MMOL/L (ref 136–145)
WBC # BLD AUTO: 12.1 X10*3/UL (ref 4.4–11.3)

## 2024-08-27 PROCEDURE — 80048 BASIC METABOLIC PNL TOTAL CA: CPT

## 2024-08-27 PROCEDURE — 2500000004 HC RX 250 GENERAL PHARMACY W/ HCPCS (ALT 636 FOR OP/ED)

## 2024-08-27 PROCEDURE — 2500000001 HC RX 250 WO HCPCS SELF ADMINISTERED DRUGS (ALT 637 FOR MEDICARE OP)

## 2024-08-27 PROCEDURE — 99232 SBSQ HOSP IP/OBS MODERATE 35: CPT | Performed by: SURGERY

## 2024-08-27 PROCEDURE — 83735 ASSAY OF MAGNESIUM: CPT

## 2024-08-27 PROCEDURE — 85027 COMPLETE CBC AUTOMATED: CPT

## 2024-08-27 PROCEDURE — 2500000004 HC RX 250 GENERAL PHARMACY W/ HCPCS (ALT 636 FOR OP/ED): Performed by: STUDENT IN AN ORGANIZED HEALTH CARE EDUCATION/TRAINING PROGRAM

## 2024-08-27 PROCEDURE — 1100000001 HC PRIVATE ROOM DAILY

## 2024-08-27 PROCEDURE — 87493 C DIFF AMPLIFIED PROBE: CPT

## 2024-08-27 RX ORDER — ACETAMINOPHEN 325 MG/1
650 TABLET ORAL EVERY 6 HOURS PRN
Status: DISCONTINUED | OUTPATIENT
Start: 2024-08-27 | End: 2024-08-30

## 2024-08-27 RX ORDER — SODIUM CHLORIDE, SODIUM LACTATE, POTASSIUM CHLORIDE, CALCIUM CHLORIDE 600; 310; 30; 20 MG/100ML; MG/100ML; MG/100ML; MG/100ML
1000 INJECTION, SOLUTION INTRAVENOUS NIGHTLY
Qty: 30000 ML | Refills: 0 | Status: SHIPPED | OUTPATIENT
Start: 2024-08-27 | End: 2024-09-26

## 2024-08-27 RX ORDER — OXYCODONE HYDROCHLORIDE 5 MG/1
10 TABLET ORAL EVERY 6 HOURS PRN
Status: DISCONTINUED | OUTPATIENT
Start: 2024-08-27 | End: 2024-08-27

## 2024-08-27 RX ORDER — POTASSIUM CHLORIDE 14.9 MG/ML
20 INJECTION INTRAVENOUS ONCE
Status: COMPLETED | OUTPATIENT
Start: 2024-08-27 | End: 2024-08-27

## 2024-08-27 RX ORDER — TALC
3 POWDER (GRAM) TOPICAL ONCE
Status: COMPLETED | OUTPATIENT
Start: 2024-08-27 | End: 2024-08-27

## 2024-08-27 RX ORDER — OXYCODONE HYDROCHLORIDE 5 MG/1
5 TABLET ORAL EVERY 6 HOURS PRN
Status: DISCONTINUED | OUTPATIENT
Start: 2024-08-27 | End: 2024-08-30 | Stop reason: HOSPADM

## 2024-08-27 RX ORDER — DEXTROSE MONOHYDRATE AND SODIUM CHLORIDE 5; .45 G/100ML; G/100ML
50 INJECTION, SOLUTION INTRAVENOUS CONTINUOUS
Status: DISCONTINUED | OUTPATIENT
Start: 2024-08-27 | End: 2024-08-27

## 2024-08-27 ASSESSMENT — COGNITIVE AND FUNCTIONAL STATUS - GENERAL
DAILY ACTIVITIY SCORE: 24
MOBILITY SCORE: 24

## 2024-08-27 ASSESSMENT — PAIN SCALES - GENERAL
PAINLEVEL_OUTOF10: 4
PAINLEVEL_OUTOF10: 0 - NO PAIN
PAINLEVEL_OUTOF10: 5 - MODERATE PAIN

## 2024-08-27 ASSESSMENT — PAIN - FUNCTIONAL ASSESSMENT
PAIN_FUNCTIONAL_ASSESSMENT: 0-10
PAIN_FUNCTIONAL_ASSESSMENT: 0-10

## 2024-08-27 ASSESSMENT — PAIN DESCRIPTION - LOCATION: LOCATION: ABDOMEN

## 2024-08-27 NOTE — PROGRESS NOTES
Transitional Care Coordination Progress Note     Team members: TCC, ACS NP     Discharge disposition: Home with [facility] ([facility] [place] [facility])    - pt confirmed no demographics have changed, came to ER yesterday after suggestion from [facility] nurse      Status: IP     Payer: Laina Tong     Potential Barriers: admitted for c/o fever/chills, infection work up     - pt ready to discharge today, [facility] notified and checking on [facility] (Rosi Weaver LPN)  - ACS placing new orders per [facility] request    1040 discharge placed on hold ---> + bld cultures, [facility] updated       ADOD: 8/29     This TCC will continue to follow for home going needs and safe DC plan.     Yue Ramachandran RN

## 2024-08-27 NOTE — CARE PLAN
The patient's goals for the shift include      The clinical goals for the shift include patient will be HDS    Over the shift, the patient did not make progress toward the following goals. Barriers to progression include ***. Recommendations to address these barriers include ***.

## 2024-08-27 NOTE — PROGRESS NOTES
[facility]  ACUTE CARE SURGERY - PROGRESS NOTE    Patient Name: Bereket Em  MRN: [id number]  Admit Date: 826  : 1965  AGE: 58 y.o.   GENDER: male  ==============================================================================  TODAY'S ASSESSMENT AND PLAN OF CARE:  This is a 58-year-old male with a recent history of multiple abdominal surgeries complicated by enteroatmospheric fistulas post-operatively who presented with fevers, rigors, and watery fistula output (no stoma output) and was admitted for infectious workup. UA, CXR, and full respiratory panel were negative. Singular blood culture obtained on admission grew gram negative bacilli; possible enteritis on imaging, otherwise no obvious abdominal nidus. Since admission, patient has remained afebrile and without acute pain or stoma output; persistent but improved leukocytosis.    Start Zosyn (-)  Discontinue PICC  Repeat blood cultures x2 tomorrow ()  C.diff pending  Regular diet  D5 1/2 NS @ 50 mL/hr while no access for home TPN    Discussed with attending Dr. Lara.    Selene Billingslye MD  PGY1 Acute Care Surgery  Pager [phone]  Available via secure chat  ==============================================================================  CHIEF COMPLAINT / EVENTS LAST 24HRS / HPI:  No acute events overnight. This morning, patient reports feeling well; he denies fevers, chills, nausea, vomiting, shortness of breath, abdominal pain, or any other new/concerning symptoms.    MEDICAL HISTORY / ROS:   Admission history and ROS reviewed. Pertinent changes as follows: None.    PHYSICAL EXAM:  Heart Rate:  [54-92]   Temp:  [36 °C (96.8 °F)-36.9 °C (98.4 °F)]   Resp:  [16-18]   BP: (103-119)/(69-80)   SpO2:  [95 %-97 %]     Gen:                 NAD, non-toxic appearing  HEENT:             Normocephalic, moist mucous membranes.  Eyes:                 No scleral icterus  Card:                RRR  Resp:                 Non-labored breathing on RA  Abd:                 Soft, non-tender, non-distended. LLQ stoma is pink, no fecal matter in ostomy bag. Enteroatmospheric fistulas appear prolapsed and comminuted with one another; involved bowl is pink without evidence of ischemia or necrosis.  Ext:                   WWP, no swelling of BLE  MSK:                BRIAN  Neuro:             AOx3, no gross neurological deficits  Psych:              Appropriate mood and affect        IMAGING SUMMARY:    8/26 CXR: R mid-lung atelectasis  8/26 CT AP: possible enteritis; unchanged post-surgical changes      LABS:  Results from last 7 days   Lab Units 08/27/24  0640 08/26/24  1155 08/26/24  0800   WBC AUTO x10*3/uL 12.1* 16.1* 8.1   HEMOGLOBIN g/dL 9.7* 10.7* 10.2*   HEMATOCRIT % 30.8* 33.0* 33.0*   PLATELETS AUTO x10*3/uL 308 200 198   NEUTROS PCT AUTO %  --  78.9 86.3   LYMPHS PCT AUTO %  --  9.7 8.9   MONOS PCT AUTO %  --  9.3 3.0   EOS PCT AUTO %  --  0.1 0.6         Results from last 7 days   Lab Units 08/27/24  0640 08/26/24  1155 08/26/24  0800   SODIUM mmol/L 137 133* 136   POTASSIUM mmol/L 3.8 4.3 4.4   CHLORIDE mmol/L 99 97* 97   CO2 mmol/L 28 24 25   BUN mg/dL 11 18 18   CREATININE mg/dL 0.56 0.58 0.50   CALCIUM mg/dL 9.2 9.4 8.8   PROTEIN TOTAL g/dL  --  7.7 6.6   BILIRUBIN TOTAL mg/dL  --  1.1 0.9   ALK PHOS U/L  --  248* 266*   ALT U/L  --  61* 64*   AST U/L  --  31 34   GLUCOSE mg/dL 189* 132* 108*     Results from last 7 days   Lab Units 08/26/24  1155 08/26/24  0800   BILIRUBIN TOTAL mg/dL 1.1 0.9           I have reviewed all medications, laboratory results, and imaging pertinent for today's encounter.    I saw and evaluated the patient. I personally obtained the key and critical portions of the history and physical exam. I reviewed the resident’s documentation and discussed the patient with the resident. I agree with the resident’s medical decision making as documented in the resident’s note.    Prelim blood culture with gram neg  bacilli this morning concerning for line infection. Will remove PICC, continue Zosyn, and repeat cultures tomorrow.    Pablo Lara MD  Trauma, Critical Care, and Acute Care Surgery  Pager: [phone]

## 2024-08-27 NOTE — CONSULTS
Wound Care Consult     Visit Date: 2024      Patient Name: Bereket Em         MRN: [id number]           YOB: 1965     Reason for Consult: ECF wound manager         Wound History: 58-year-old male with a recent history of multiple abdominal surgeries complicated by enteroatmospheric fistulas post-operatively who presented with fevers, rigors, and watery fistula output (no stoma output) and was admitted for infectious workup      Pertinent Labs:   Albumin   Date Value Ref Range Status   2024 3.7 3.4 - 5.0 g/dL Final     ALBUMIN (MG/L) IN URINE   Date Value Ref Range Status   2021 <7.0 Not Established mg/L Final       Wound Assessment:  Wound 24 Incision Abdomen Medial;Upper (Active)   Wound Image   24 1002   Site Assessment Clean;Red;[place] 24 1002   Usha-Wound Assessment Clean;Dry 24 1002   Shape oval 24 1002   Wound Length (cm) 10 cm 24 1002   Wound Width (cm) 10 cm 24 1002   Wound Surface Area (cm^2) 100 cm^2 24 1002   Wound Depth (cm) 0.1 cm 24 1002   Wound Volume (cm^3) 10 cm^3 24 1002   Wound Healing % 98 24 1002   Drainage Description Effulent/Bilious 24 1002   Drainage Amount Large 24 1002   Dressing Changed New 24 1002   Dressing Status Clean;Dry 24 1002       Wound 24 Abdomen Left;Lower;Medial (Active)        24 1420   Colostomy LLQ   Placement Date/Time: 24 1329   Location: LLQ   Stomal Appliance 1 piece   Site/Stoma Assessment Clean;Intact;Pink   Stoma Size (cm)   (1 1/4 oval)   Peristomal Assessment Clean;Intact   Treatment Pouch change;Site care     Ostomy type: colostomy       size: 1 1/4 oval      color: red and moist       protruding: budded   Umer: none  Functioning: mucus   Mucocutaneous junction: intact   Peristomal skin: clean, dry and intact   Pouchin piece [place] premier pouch   Ostomy Education: Pt is independent with his care  Plan: assess  stoma/pouching    Wound Team Summary Assessment: The wound care team came to bedside today to reapply a wound manager pouch the patient's ECF.  The wound surrounding the ECF is pink and pink and appears to be healing with scar tissue formation.  The wound was cleansed with vashe wound cleanser and prepped with 3M cavilon advanced. 4 inch barrier rings and paste were used on the periwound skin to create a base. A 1 piece Coloplast mini wound manager pouch was applied with additional paste and stoma powder used to create a seal.       Wound Team Plan: See the patient as needed for pouching assistance      ISABELLE Ramos  8/27/2024  2:21 PM

## 2024-08-27 NOTE — CARE PLAN
Problem: Pain - Adult  Goal: Verbalizes/displays adequate comfort level or baseline comfort level  Outcome: Progressing     Problem: Safety - Adult  Goal: Free from fall injury  Outcome: Progressing     Problem: Discharge Planning  Goal: Discharge to home or other facility with appropriate resources  Outcome: Progressing     Problem: Chronic Conditions and Co-morbidities  Goal: Patient's chronic conditions and co-morbidity symptoms are monitored and maintained or improved  Outcome: Progressing     Problem: Infection related to problem list condition  Goal: Infection will resolve through treatment  Outcome: Progressing   The patient's goals for the shift include      The clinical goals for the shift include patient will be HDS

## 2024-08-27 NOTE — ED PROVIDER NOTES
Emergency Department Provider Note        History of Present Illness     CC: Abdominal Pain and Fever     HPI:  This is a 58-year-old male with medical history including perforated diverticulitis requiring colectomy and ostomy formation complicated by fistula formations and multiple surgery revisions including mesh placement who presents to the emergency department with fevers and rigors at home.  Patient states his symptoms have been going on almost a week now.  They started with morning fevers that would resolve however over the past few days have been worsening in which the patient has been getting fevers throughout the day.  He also complains of rigors and vigorous shaking chills which she states he has never experienced before.  He really denies other symptoms at this time including chest pain, shortness of breath or abdominal pain.  He does have less output from his ostomy as well as his stoma appears to be protruding more than usual however his abdomen is otherwise soft and nontender.  He denies any nausea or vomiting.  Denies any urinary symptoms.  Denies other symptoms at this time.  He does have a PICC line in place for TPN and IV fluid administration at home.    Limitations to history: None  Independent historian(s): Patient's wife at bedside  Records Reviewed: Recent available ED and inpatient notes reviewed in EMR.    PMHx/PSHx:  Per HPI.   - has a past medical history of Acute pharyngitis, unspecified, Acute upper respiratory infection, unspecified (02/13/2017), Alcohol abuse, in remission (09/12/2018), Benign essential HTN (02/14/2023), Elevated blood-pressure reading, without diagnosis of hypertension (02/23/2015), Encounter for immunization (10/10/2014), Encounter for screening for malignant neoplasm of colon (01/04/2017), Encounter for screening for malignant neoplasm of colon (11/02/2016), Encounter for screening for malignant neoplasm of colon (11/02/2016), Encounter for screening for malignant  neoplasm of prostate (09/12/2018), Essential (primary) hypertension (03/14/2019), Impingement syndrome of right shoulder (06/06/2016), Incomplete rotator cuff tear or rupture of right shoulder, not specified as traumatic (09/12/2018), Noninfective gastroenteritis and colitis, unspecified (01/23/2018), Other general symptoms and signs (11/02/2016), Other malaise (11/02/2016), Pain in left ankle and joints of left foot (10/10/2017), Pain in left foot (09/12/2017), Pain in right shoulder (04/11/2016), Pain in thoracic spine (09/12/2018), Pain, unspecified (02/09/2017), Personal history of colonic polyps (01/04/2017), Personal history of other diseases of male genital organs (03/15/2019), Personal history of other diseases of the respiratory system (04/07/2020), Personal history of other specified conditions (03/14/2019), Personal history of other specified conditions (10/10/2014), and Strain of muscle, fascia and tendon of other parts of biceps, right arm, initial encounter (03/11/2016).  - has a past surgical history that includes Shoulder surgery (06/27/2017) and Abdominal surgery.    Medications:  Reviewed in EMR. See EMR for complete list of medications and doses.    Allergies:  Patient has no known allergies.    Social History:  - Tobacco:  reports that he has quit smoking. His smoking use included cigarettes. He has been exposed to tobacco smoke. He has never used smokeless tobacco.   - Alcohol:  reports current alcohol use of about 21.0 standard drinks of alcohol per week.   - Illicit Drugs:  reports no history of drug use.     ROS:  Per HPI.       Physical Exam     Triage Vitals:  T 37.8 °C (100 °F)  HR 94  /74  RR 18  O2 97 %      General: Patient resting comfortably in bed, no acute distress, breathing easily, overall well appearing and nontoxic, and appropriately conversational without confusion or gross mental status changes.  Head: Normocephalic. Atraumatic.  Neck:  FROM. No gross masses.   Eyes:  EOMI. No scleral icterus or injection.  ENT: Dry mucous membranes, no apparent trauma or lesions.  CV: Regular rhythm. No murmurs, rubs, gallops appreciated. 2+ radial and DP pulses bilaterally.  Resp: Clear to auscultation bilaterally. No respiratory distress.   GI: Soft, mildly-distended.  No tenderness with palpation.  No rebound tenderness or guarding. No palpable masses.  Right-sided colostomy in place with pink stoma but does appear to be protruding.  Minimal output in ostomy bag.  Enteral atmospheric fistula with protrusion and small amount of white clumpy output.   EXT: No peripheral edema, contusions, or wounds.  Skin: Warm and dry, no rashes or lesions.  Neuro: Alert and oriented.  Cranial nerves II-XII grossly intact.  No focal neurological deficits.  Equal motor strength in bilateral upper and lower extremities.  Sensation intact throughout.  Speech fluent.  Psych: Appropriate mood and behavior, converses and responds appropriately.      Medical Decision Making & ED Course     Labs:   Labs Reviewed   BLOOD CULTURE - Abnormal       Result Value    Blood Culture        Value: Identification and susceptibility testing to follow    Gram Stain Gram negative bacilli (*)    CBC WITH AUTO DIFFERENTIAL - Abnormal    WBC 16.1 (*)     nRBC 0.0      RBC 4.45 (*)     Hemoglobin 10.7 (*)     Hematocrit 33.0 (*)     MCV 74 (*)     MCH 24.0 (*)     MCHC 32.4      RDW 15.3 (*)     Platelets 200      Neutrophils % 78.9      Immature Granulocytes %, Automated 1.7 (*)     Lymphocytes % 9.7      Monocytes % 9.3      Eosinophils % 0.1      Basophils % 0.3      Neutrophils Absolute 12.74 (*)     Immature Granulocytes Absolute, Automated 0.27      Lymphocytes Absolute 1.57      Monocytes Absolute 1.50 (*)     Eosinophils Absolute 0.01      Basophils Absolute 0.05     COMPREHENSIVE METABOLIC PANEL - Abnormal    Glucose 132 (*)     Sodium 133 (*)     Potassium 4.3      Chloride 97 (*)     Bicarbonate 24      Anion Gap 16       Urea Nitrogen 18      Creatinine 0.58      eGFR >90      Calcium 9.4      Albumin 3.7      Alkaline Phosphatase 248 (*)     Total Protein 7.7      AST 31      Bilirubin, Total 1.1      ALT 61 (*)    URINALYSIS WITH REFLEX MICROSCOPIC - Abnormal    Color, Urine Yellow      Appearance, Urine Clear      Specific Gravity, Urine >1.050 (*)     pH, Urine 6.0      Protein, Urine 30 (1+) (*)     Glucose, Urine Normal      Blood, Urine NEGATIVE      Ketones, Urine NEGATIVE      Bilirubin, Urine NEGATIVE      Urobilinogen, Urine Normal      Nitrite, Urine NEGATIVE      Leukocyte Esterase, Urine NEGATIVE     BLOOD GAS VENOUS FULL PANEL - Abnormal    POCT pH, Venous 7.44 (*)     POCT pCO2, Venous 39 (*)     POCT pO2, Venous 48 (*)     POCT SO2, Venous 72      POCT Oxy Hemoglobin, Venous 70.2      POCT Hematocrit Calculated, Venous 34.0 (*)     POCT Sodium, Venous 133 (*)     POCT Potassium, Venous 4.6      POCT Chloride, Venous 99      POCT Ionized Calicum, Venous 1.19      POCT Glucose, Venous 146 (*)     POCT Lactate, Venous 1.5      POCT Base Excess, Venous 2.2      POCT HCO3 Calculated, Venous 26.5 (*)     POCT Hemoglobin, Venous 11.2 (*)     POCT Anion Gap, Venous 12.0      Patient Temperature 37.0      FiO2 21     CBC - Abnormal    WBC 12.1 (*)     nRBC 0.0      RBC 4.04 (*)     Hemoglobin 9.7 (*)     Hematocrit 30.8 (*)     MCV 76 (*)     MCH 24.0 (*)     MCHC 31.5 (*)     RDW 15.6 (*)     Platelets 308     BASIC METABOLIC PANEL - Abnormal    Glucose 189 (*)     Sodium 137      Potassium 3.8      Chloride 99      Bicarbonate 28      Anion Gap 14      Urea Nitrogen 11      Creatinine 0.56      eGFR >90      Calcium 9.2     MAGNESIUM - Abnormal    Magnesium 2.46 (*)    MICROSCOPIC ONLY, URINE - Abnormal    WBC, Urine 1-5      RBC, Urine NONE      Bacteria, Urine 1+ (*)     Mucus, Urine FEW     LACTATE - Normal    Lactate 1.5      Narrative:     Venipuncture immediately after or during the administration of Metamizole may  lead to falsely low results. Testing should be performed immediately  prior to Metamizole dosing.   SARS-COV-2 PCR - Normal    Coronavirus 2019, PCR Not Detected      Narrative:     This assay has received FDA Emergency Use Authorization (EUA) and is only authorized for the duration of time that circumstances exist to justify the authorization of the emergency use of in vitro diagnostic tests for the detection of SARS-CoV-2 virus and/or diagnosis of COVID-19 infection under section 564(b)(1) of the Act, 21 U.S.C. 360bbb-3(b)(1). This assay is an in vitro diagnostic nucleic acid amplification test for the qualitative detection of SARS-CoV-2 from nasopharyngeal specimens and has been validated for use at [facility]. Negative results do not preclude COVID-19 infections and should not be used as the sole basis for diagnosis, treatment, or other management decisions.     INFLUENZA A AND B PCR - Normal    Flu A Result Not Detected      Flu B Result Not Detected      Narrative:     This assay is an in vitro diagnostic multiplex nucleic acid amplification test for the detection and discrimination of Influenza A & B from nasopharyngeal specimens, and has been validated for use at [facility]. Negative results do not preclude Influenza A/B infections, and should not be used as the sole basis for diagnosis, treatment, or other management decisions. If Influenza A/B and RSV PCR results are negative, testing for Parainfluenza virus, Adenovirus and Metapneumovirus is routinely performed for [facility] pediatric oncology and intensive care inpatients, and is available on other patients by placing an add-on request.   C. DIFFICILE, PCR   RESPIRATORY VIRAL PANEL   MORPHOLOGY    RBC Morphology No significant RBC morphology present          Imaging:   XR chest 2 views   Final Result   1. Right midlung atelectasis. Otherwise, no evidence of acute   cardiopulmonary process.             MACRO:    None        Signed by: Cali Loera 2024 4:26 PM   Dictation workstation:   [id number]      CT abdomen pelvis w IV contrast   Final Result   1.  Fluid-filled loops of small bowel in the lower left quadrant with   equivocal bowel wall thickening and associated hyperemia suggestive   of enteritis.   2. Interval resolution of peritoneal fat stranding related to prior   surgery. There are postsurgical changes from prior colostomy and   sigmoidectomy.   3. Additional chronic findings as described above.        I personally reviewed the images/study and I agree with the findings   as stated by Ivone Edwards MD, PGY-2 this study was interpreted at   [place], [place].        MACRO:   None        Signed by: Abrahan Marc 2024 5:04 PM   Dictation workstation:   [id number]           EK: 07: Rate of 66 bpm, sinus rhythm with PACs, normal axis, normal intervals, no evidence of ST segment elevations or depressions, no pattern T wave abnormalities.  PACs new when compared to prior EKG obtained November 3, 2014.    MDM:  This is a 58-year-old male who presents to the emergency department with fevers and rigors.  He is hemodynamically stable and in no distress upon arrival.  Vital signs are within normal limits.  Patient does have a low grade temperature of 100 °F.  On physical exam he is clinically well-appearing.  He has an ostomy and an enteroatmospheric fistula that appear overall well and viable with some protrusion however.  The symptoms the patient is experiencing sound most consistent with bacteremia.  Differentials considered includes intra-abdominal abscess, new fistula formation, other surgical complications.  Workup is initiated with labs and imaging.  Acute care surgery was consulted for evaluation as well.  There is a leukocytosis of 16.1 with left shift on CBC.  Hemoglobin is 10.7 and microcytic.  Anemia consistent with his prior labs over the past  few months.  Metabolic panel shows no significant electrolyte abnormalities.  Renal functions normal.  Alkaline phosphatase is significantly elevated indicating possible biliary tract abnormality.  A VBG was obtained that shows a mild alkalosis.  Lactate is normal.  Viral testing is negative.  Urinalysis obtained does not show signs consistent with a urinary tract infection.  CT abdomen and pelvis shows signs consistent with enteritis.  Chest x-ray shows no significant pathology.  Blood cultures were obtained.  Acute care surgery recommending further viral testing panel as well as admission to their service for following of cultures.  Given the patient's overall clinical appearance we will hold on antibiotics at this time until culture data is available.  Patient is agreeable to admission.  He remained hemodynamically stable and transferred to regular nursing floor.      ED Course:  Diagnoses as of 08/27/24 1157   Fever and chills       Independent Result Review and Interpretation: Relevant laboratory and radiographic results were reviewed and independently interpreted by myself.  As necessary, they are commented on in the ED Course.    Social Determinants Limiting Care:  None identified      Patient seen by and discussed with the attending emergency medicine physician.       Disposition    Admit-acute care surgery    Silvino Gonzalez DO   Emergency Medicine PGY-3  [facility]      Procedures      Procedures ? SmartLinks last updated 8/27/2024 11:57 AM        Silvino Gonzalez DO  Resident  08/27/24 1158

## 2024-08-27 NOTE — CONSULTS
"Nutrition Initial Assessment:   Nutrition Assessment    Reason for Assessment: Admission nursing screening (Nursing screen for patient on nutrition suppport PTA)    Patient is a 58 y.o. male presenting with fevers, rigors, and watery fistula output (no stoma output) and was admitted for infectious workup.     [facility] for surgical course; he underwent 3 abdominal surgeries in 06/2024 for perforated diverticulitis, and his post-operative course has been complicated by 3 enteroatmospheric fistulas.     Nutrition History:  Food and Nutrient History: TPN at prior admit was Clinimix 5/15 @ 91 x 1 hr, 182/hr x 10hrs and 91 x 1hr = 1420kcal and 100g protein/d.  SMOF 250ml/d = 500kcal/d.  Current diet is regular with 100% of meals consumed so far. Interview deferred upon visit nursing care in progress and per nurse plan is to pull PICC.  Per H&P: He does take some po but tries to minimize intake to reduce the output from the fistulas (currently 1L/day).    Anthropometrics:  Height: 172.7 cm (5' 8\")   Weight: 83.9 kg (185 lb)   BMI (Calculated): 28.14  IBW/kg (Dietitian Calculated): 70 kg  Percent of IBW: 120 %       Weight History:   Wt Readings from Last 21 Encounters:   08/26/24 83.9 kg (185 lb)   08/16/24 83.9 kg (185 lb)   08/15/24 83.9 kg (185 lb)   07/25/24 78.2 kg (172 lb 6.4 oz)   07/25/24 79.2 kg (174 lb 8 oz)   07/19/24 79.4 kg (175 lb)   07/13/24 78.9 kg (174 lb)   07/11/24 81.4 kg (179 lb 7.3 oz)   06/05/24 87.6 kg (193 lb 2 oz)   06/03/24 89.4 kg (197 lb)   05/09/24 89.8 kg (198 lb)   04/30/24 88.5 kg (195 lb)   02/08/24 88.9 kg (196 lb)   01/03/24 89.4 kg (197 lb)   04/06/22 82.8 kg (182 lb 9.6 oz)   03/23/22 82.6 kg (182 lb 3.2 oz)   05/06/21 78.6 kg (173 lb 3.2 oz)   06/04/20 74.7 kg (164 lb 9.6 oz)       Weight Change %:  Weight History / % Weight Change: 8/26/24) 83.9kg, 7/25/24) 79.2kg, 5/9/24) 89.8kg, 2/8/24) 88.9kg.  Hx of loss but trending up compared to 1 month ago.    Nutrition Focused Physical Exam " Findings:  defer: nursing care in progress    Physical Findings:  Skin: Positive (abdominal fistula and colostomy)    Nutrition Significant Labs:  CBC Trend:   Results from last 7 days   Lab Units 08/27/24  0640 08/26/24  1155 08/26/24  0800   WBC AUTO x10*3/uL 12.1* 16.1* 8.1   RBC AUTO x10*6/uL 4.04* 4.45* 4.11*   HEMOGLOBIN g/dL 9.7* 10.7* 10.2*   HEMATOCRIT % 30.8* 33.0* 33.0*   MCV fL 76* 74* 80   PLATELETS AUTO x10*3/uL 308 200 198    , TPN/PPN Labs:   Results from last 7 days   Lab Units 08/27/24  0640 08/26/24  1155 08/26/24  0800   GLUCOSE mg/dL 189* 132* 108*   POTASSIUM mmol/L 3.8 4.3 4.4   PHOSPHORUS mg/dL  --   --  3.3   MAGNESIUM mg/dL 2.46*  --  1.90   SODIUM mmol/L 137 133* 136   CHLORIDE mmol/L 99 97* 97   ALT U/L  --  61* 64*   AST U/L  --  31 34   ALK PHOS U/L  --  248* 266*   BILIRUBIN TOTAL mg/dL  --  1.1 0.9   TRIGLYCERIDES mg/dL  --   --  128        Nutrition Specific Medications:  Scheduled medications  enoxaparin, 40 mg, subcutaneous, q24h  lactated Ringer's, 1,000 mL, intravenous, Nightly  loperamide, 4 mg, oral, With meals & nightly  pantoprazole, 40 mg, oral, Daily before breakfast  piperacillin-tazobactam, 3.375 g, intravenous, q6h      Continuous medications  dextrose 5%-0.45 % sodium chloride, 50 mL/hr, Last Rate: 50 mL/hr (08/27/24 1125)      I/O:    ;      Dietary Orders (From admission, onward)       Start     Ordered    08/26/24 1815  Adult diet Regular  Diet effective now        Question:  Diet type  Answer:  Regular    08/26/24 1814                     Estimated Needs:   Total Energy Estimated Needs (kCal): 2200 kCal  Method for Estimating Needs: 30kcal/kg  Total Protein Estimated Needs (g): 100 g  Method for Estimating Needs: 1.35g/kg              Nutrition Diagnosis        Nutrition Diagnosis  Patient has Nutrition Diagnosis: Yes  Diagnosis Status (1): New  Nutrition Diagnosis 1: Altered GI function  Related to (1): medical/surgical hx  As Evidenced by (1): EC fistula        Nutrition Interventions/Recommendations         Nutrition Prescription:  Individualized Nutrition Prescription Provided for :     Recommend:   1)  PO diet per provider discretion.      2) Consider Ensure high protein TID.      3) If PPN (peripheral parenteral nutrition) is desired while awaiting PICC line replacement run peripheral formula 5% dextrose/42.5g protein per liter @ 83ml/hr continuous to provide 678kcal/d and 85g protein.      4) If fistula output can be maintained consistently <500ml/d on PO diet could consider holding off on parenteral nutrition support.         Nutrition Monitoring and Evaluation   Food/Nutrient Related History Monitoring  Monitoring and Evaluation Plan: Energy intake  Criteria: Adequate intake and <500 from fistual every 24hrs         Biochemical Data, Medical Tests and Procedures  Monitoring and Evaluation Plan: Electrolyte/renal panel  Criteria: wnl    Nutrition Focused Physical Findings  Monitoring and Evaluation Plan: Skin  Criteria: promote healing         Time Spent (min): 60 minutes

## 2024-08-27 NOTE — PROGRESS NOTES
Patient admitted to [facility] for ID workup. Stay will be over 24 hours and new orders needed upon discharge.  Email sent to intake Nurse and chart passed to Intake Pharmacist

## 2024-08-27 NOTE — H&P
"[facility]  ACUTE CARE SURGERY - HISTORY AND PHYSICAL    Patient Name: Bereket Em MRN: [id number]  Admit Date: 826  : 1965  AGE: 58 y.o.   GENDER: male    ==============================================================================  ASSESSMENT AND PLAN:  This is a 58-year-old male with a recent history of multiple abdominal surgeries with a complicated post-operative course, most recently 3 enteroatmospheric fistulas, who presents today with acute episodic low-grade fevers/rigors and persistent liquid stool. Systemic symptoms and leukocytosis on presentation suggest viral illness. There is no evidence of ischemia, necrosis, or other acute abdominal pathology on exam; no indication for surgical intervention at this time. Will admit for further workup and monitoring.    - Admit to ACS  - Recommend CXR, blood cultures, UA, and full respiratory panel  - Start vancomycin and Zosyn  - C diff PCR  - CT tomorrow if today's workup is negative    Discussed with attending Dr. Lara.    Selene Billingsley MD  PGY1 Acute Care Surgery  Pager [phone]  Available via secure chat    ==============================================================================    CHIEF COMPLAINT:  Fevers and chills    HISTORY OF PRESENT ILLNESS:  Mr. Em reports episodes of fevers, chills, and shaking beginning 4 days ago. He reports a maximum temperature of 100.4 F, states fevers come and go and are accompanied by \"full body shaking\". Last occurred this morning. Denies nausea, vomiting, lightheadedness, sore throat, cough, shortness of breath, chest pain, or abdominal pain. No output from ostomy, only from fistula. Reports watery stool output via fistula despite continuing to take Imodium 4x/day, which he states previously helped maintain his goal consistency and 1L output. No sick contacts, no known exposure to any specific illness. Patient has a LUE PICC, dressing last changed this AM without any " reported concerns in appearance or function. He receives 1L LR + TPN x12 every night.    He is known to this service and was last seen in our clinic on 08/16/2024. See [facility] for surgical course; he underwent 3 abdominal surgeries in 06/2024 for perforated diverticulitis, and his port-operative course has been complicated by 3 enteroatmospheric fistulas.    PAST MEDICAL HISTORY:  Past Medical History:   Diagnosis Date    Acute pharyngitis, unspecified     Sore throat    Acute upper respiratory infection, unspecified 02/13/2017    Acute URI    Alcohol abuse, in remission 09/12/2018    History of alcohol abuse    Benign essential HTN 02/14/2023    Elevated blood-pressure reading, without diagnosis of hypertension 02/23/2015    Elevated blood pressure reading without diagnosis of hypertension    Encounter for immunization 10/10/2014    Need for Tdap vaccination    Encounter for screening for malignant neoplasm of colon 01/04/2017    Encounter for colonoscopy due to history of adenomatous colonic polyps    Encounter for screening for malignant neoplasm of colon 11/02/2016    Encounter for screening colonoscopy    Encounter for screening for malignant neoplasm of colon 11/02/2016    Encounter for screening colonoscopy    Encounter for screening for malignant neoplasm of prostate 09/12/2018    Prostate cancer screening    Essential (primary) hypertension 03/14/2019    Elevated blood pressure reading with diagnosis of hypertension    Impingement syndrome of right shoulder 06/06/2016    Impingement syndrome of right shoulder    Incomplete rotator cuff tear or rupture of right shoulder, not specified as traumatic 09/12/2018    Partial nontraumatic tear of right rotator cuff    Noninfective gastroenteritis and colitis, unspecified 01/23/2018    Acute gastroenteritis    Other general symptoms and signs 11/02/2016    Flu-like symptoms    Other malaise 11/02/2016    Malaise and fatigue    Pain in left ankle and joints of left foot  10/10/2017    Left ankle pain    Pain in left foot 09/12/2017    Left foot pain    Pain in right shoulder 04/11/2016    Right shoulder pain    Pain in thoracic spine 09/12/2018    Thoracic back pain    Pain, unspecified 02/09/2017    Body aches    Personal history of colonic polyps 01/04/2017    History of colonic polyps    Personal history of other diseases of male genital organs 03/15/2019    History of acute prostatitis    Personal history of other diseases of the respiratory system 04/07/2020    History of acute sinusitis    Personal history of other specified conditions 03/14/2019    History of flank pain    Personal history of other specified conditions 10/10/2014    History of paresthesia    Strain of muscle, fascia and tendon of other parts of biceps, right arm, initial encounter 03/11/2016    Rupture of biceps tendon, right, initial encounter       PAST SURGICAL HISTORY:  - Re ex lap, Necrotic abdominal wall tissue debrided, Phasix mesh placed in 24x8cm defect (06/18/2024)  - Re ex lap, Abdominal washout, Fascial debridement, enmas closure (06/15/2024)  - Ex lap, Sigmoidectomy, End colostomy (06/07/2024)  -  for perforated diverticulitis  - Shoulder surgery (06/27/2017)    PAST SOCIAL HISTORY:  Reports history of tobacco use, not currently  Denies EtOH use  Denies use of illicit or recreational drugs    PAST FAMILY HISTORY:  Family History   Problem Relation Name Age of Onset    Other (CARDIAC DISORDER) Mother      Hypertension Mother      Other (CARDIAC DISORDR) Father      Hypertension Father      Hypertension Sister      Heart attack Brother      Hypertension Brother         HOME MEDICATIONS:  Prior to Admission medications    Medication Sig Start Date End Date Taking? Authorizing Provider   0.9 % sodium chloride (sodium chloride, PF, 0.9%) injection Infuse 10 mL into a venous catheter 2 times a day. 10 ml NS twice daily SASH after TPN and LR  Indications: FLUSHING    Historical Provider, MD  "  acetaminophen (Tylenol) 325 mg tablet Take 2 tablets (650 mg) by mouth every 6 hours if needed for mild pain (1 - 3). 8/5/24   ASHLY Hawk   Adult Clinimix Parenteral Nutrition Cyclic Infuse 2,000 mL over 12 hours into a venous catheter (central line) continuously. Taper up for 1 Hours. Taper down for 1 Hours. 8/1/24 8/31/24  Ana Almaguer PA-C   esomeprazole (NexIUM) 40 mg packet Take 40 mg by mouth once daily in the morning. Take before meals. 8/6/24 9/5/24  ASHLY Hawk   heparin flush (heparin LockFlush,Porcine,,PF,) 10 unit/mL injection Infuse 5 mL into a venous catheter once daily. Heparin 5 ml daily [facility]  Indications: prevent clot from blocking an intravenous catheter    Historical Provider, MD   heparin flush 100 unit/mL syringe Infuse 5 mL (500 Units) into a venous catheter once daily. 5 ml heprain daily [facility]    Historical Provider, MD   loperamide (Imodium A-D) 1 mg/7.5 mL liquid Take 30 mL (4 mg) by mouth 4 times a day with meals. 8/5/24 9/4/24  ASHLY Hawk   ostomy supplies 12 \" misc 1 each every 7 days. 7/12/24 9/10/24  Kinjal Dye PA-C   oxyCODONE (Roxicodone) 5 mg/5 mL solution Take 5 mL (5 mg) by mouth every 6 hours if needed for severe pain (7 - 10) for up to 7 days. 8/15/24 8/22/24  Ana Almaguer PA-C   Ringer's solution,lactated (lactated Ringer's) infusion Infuse 1,000 mL into a venous catheter once daily at bedtime. Administer 1L of IVFs nightly 8/5/24 9/4/24  ASHLY Hawk       ALLERGIES  No Known Allergies    REVIEW OF SYSTEMS:  14-point ROS performed; negative unless otherwise indicated in HPI.    ==============================================================================    VITALS:  Vitals:    08/27/24 0712   BP: 107/69   Pulse: 62   Resp: 18   Temp: 36 °C (96.8 °F)   SpO2: 96%       PHYSICAL EXAM:  Gen:  NAD, non-toxic appearing  HEENT:  Normocephalic, moist mucous membranes.  Eyes:  No scleral " icterus  Card:  RRR  Resp:  Non-labored breathing on RA  Abd:  Soft, non-tender, non-distended. LLQ stoma is pink, no fecal matter in ostomy bag. Enteroatmospheric fistulas appear prolapsed and comminuted with one another; involved bowl is pink, surrounding bag with small amount of grey-white output.  Ext:  WWP, no swelling of BLE  MSK:  TORRES  Neuro:  AOx3, no gross neurological deficits  Psych:  Appropriate mood and affect    IMAGING SUMMARY:  CXR pending  CT AP pending    LABS:  Results for orders placed or performed during the hospital encounter of 08/26/24 (from the past 24 hour(s))   CBC with Differential   Result Value Ref Range    WBC 16.1 (H) 4.4 - 11.3 x10*3/uL    nRBC 0.0 0.0 - 0.0 /100 WBCs    RBC 4.45 (L) 4.50 - 5.90 x10*6/uL    Hemoglobin 10.7 (L) 13.5 - 17.5 g/dL    Hematocrit 33.0 (L) 41.0 - 52.0 %    MCV 74 (L) 80 - 100 fL    MCH 24.0 (L) 26.0 - 34.0 pg    MCHC 32.4 32.0 - 36.0 g/dL    RDW 15.3 (H) 11.5 - 14.5 %    Platelets 200 150 - 450 x10*3/uL    Neutrophils % 78.9 40.0 - 80.0 %    Immature Granulocytes %, Automated 1.7 (H) 0.0 - 0.9 %    Lymphocytes % 9.7 13.0 - 44.0 %    Monocytes % 9.3 2.0 - 10.0 %    Eosinophils % 0.1 0.0 - 6.0 %    Basophils % 0.3 0.0 - 2.0 %    Neutrophils Absolute 12.74 (H) 1.20 - 7.70 x10*3/uL    Immature Granulocytes Absolute, Automated 0.27 0.00 - 0.70 x10*3/uL    Lymphocytes Absolute 1.57 1.20 - 4.80 x10*3/uL    Monocytes Absolute 1.50 (H) 0.10 - 1.00 x10*3/uL    Eosinophils Absolute 0.01 0.00 - 0.70 x10*3/uL    Basophils Absolute 0.05 0.00 - 0.10 x10*3/uL   Comprehensive Metabolic Panel   Result Value Ref Range    Glucose 132 (H) 74 - 99 mg/dL    Sodium 133 (L) 136 - 145 mmol/L    Potassium 4.3 3.5 - 5.3 mmol/L    Chloride 97 (L) 98 - 107 mmol/L    Bicarbonate 24 21 - 32 mmol/L    Anion Gap 16 10 - 20 mmol/L    Urea Nitrogen 18 6 - 23 mg/dL    Creatinine 0.58 0.50 - 1.30 mg/dL    eGFR >90 >60 mL/min/1.73m*2    Calcium 9.4 8.6 - 10.6 mg/dL    Albumin 3.7 3.4 - 5.0 g/dL     Alkaline Phosphatase 248 (H) 33 - 120 U/L    Total Protein 7.7 6.4 - 8.2 g/dL    AST 31 9 - 39 U/L    Bilirubin, Total 1.1 0.0 - 1.2 mg/dL    ALT 61 (H) 10 - 52 U/L   Lactate   Result Value Ref Range    Lactate 1.5 0.4 - 2.0 mmol/L   BLOOD GAS VENOUS FULL PANEL   Result Value Ref Range    POCT pH, Venous 7.44 (H) 7.33 - 7.43 pH    POCT pCO2, Venous 39 (L) 41 - 51 mm Hg    POCT pO2, Venous 48 (H) 35 - 45 mm Hg    POCT SO2, Venous 72 45 - 75 %    POCT Oxy Hemoglobin, Venous 70.2 45.0 - 75.0 %    POCT Hematocrit Calculated, Venous 34.0 (L) 41.0 - 52.0 %    POCT Sodium, Venous 133 (L) 136 - 145 mmol/L    POCT Potassium, Venous 4.6 3.5 - 5.3 mmol/L    POCT Chloride, Venous 99 98 - 107 mmol/L    POCT Ionized Calicum, Venous 1.19 1.10 - 1.33 mmol/L    POCT Glucose, Venous 146 (H) 74 - 99 mg/dL    POCT Lactate, Venous 1.5 0.4 - 2.0 mmol/L    POCT Base Excess, Venous 2.2 -2.0 - 3.0 mmol/L    POCT HCO3 Calculated, Venous 26.5 (H) 22.0 - 26.0 mmol/L    POCT Hemoglobin, Venous 11.2 (L) 13.5 - 17.5 g/dL    POCT Anion Gap, Venous 12.0 10.0 - 25.0 mmol/L    Patient Temperature 37.0 degrees Celsius    FiO2 21 %   Blood Culture    Specimen: Peripheral Venipuncture; Blood culture   Result Value Ref Range    Blood Culture       Identification and susceptibility testing to follow    Gram Stain Gram negative bacilli (AA)    Morphology   Result Value Ref Range    RBC Morphology No significant RBC morphology present    ECG 12 lead   Result Value Ref Range    Ventricular Rate 66 BPM    Atrial Rate 66 BPM    WI Interval 146 ms    QRS Duration 84 ms    QT Interval 418 ms    QTC Calculation(Bazett) 438 ms    P Axis 57 degrees    R Axis -13 degrees    T Axis 49 degrees    QRS Count 11 beats    Q Onset 212 ms    P Onset 139 ms    P Offset 187 ms    T Offset 421 ms    QTC Fredericia 431 ms   Sars-CoV-2 PCR   Result Value Ref Range    Coronavirus 2019, PCR Not Detected Not Detected   Influenza A, and B PCR   Result Value Ref Range    Flu A  Result Not Detected Not Detected    Flu B Result Not Detected Not Detected   Urinalysis with Reflex Microscopic   Result Value Ref Range    Color, Urine Yellow Light-Yellow, Yellow, Dark-Yellow    Appearance, Urine Clear Clear    Specific Gravity, Urine >1.050 (N) 1.005 - 1.035    pH, Urine 6.0 5.0, 5.5, 6.0, 6.5, 7.0, 7.5, 8.0    Protein, Urine 30 (1+) (A) NEGATIVE, 10 (TRACE), 20 (TRACE) mg/dL    Glucose, Urine Normal Normal mg/dL    Blood, Urine NEGATIVE NEGATIVE    Ketones, Urine NEGATIVE NEGATIVE mg/dL    Bilirubin, Urine NEGATIVE NEGATIVE    Urobilinogen, Urine Normal Normal mg/dL    Nitrite, Urine NEGATIVE NEGATIVE    Leukocyte Esterase, Urine NEGATIVE NEGATIVE   Microscopic Only, Urine   Result Value Ref Range    WBC, Urine 1-5 1-5, NONE /HPF    RBC, Urine NONE NONE, 1-2, 3-5 /HPF    Bacteria, Urine 1+ (A) NONE SEEN /HPF    Mucus, Urine FEW Reference range not established. /LPF   CBC   Result Value Ref Range    WBC 12.1 (H) 4.4 - 11.3 x10*3/uL    nRBC 0.0 0.0 - 0.0 /100 WBCs    RBC 4.04 (L) 4.50 - 5.90 x10*6/uL    Hemoglobin 9.7 (L) 13.5 - 17.5 g/dL    Hematocrit 30.8 (L) 41.0 - 52.0 %    MCV 76 (L) 80 - 100 fL    MCH 24.0 (L) 26.0 - 34.0 pg    MCHC 31.5 (L) 32.0 - 36.0 g/dL    RDW 15.6 (H) 11.5 - 14.5 %    Platelets 308 150 - 450 x10*3/uL   Basic metabolic panel   Result Value Ref Range    Glucose 189 (H) 74 - 99 mg/dL    Sodium 137 136 - 145 mmol/L    Potassium 3.8 3.5 - 5.3 mmol/L    Chloride 99 98 - 107 mmol/L    Bicarbonate 28 21 - 32 mmol/L    Anion Gap 14 10 - 20 mmol/L    Urea Nitrogen 11 6 - 23 mg/dL    Creatinine 0.56 0.50 - 1.30 mg/dL    eGFR >90 >60 mL/min/1.73m*2    Calcium 9.2 8.6 - 10.6 mg/dL   Magnesium   Result Value Ref Range    Magnesium 2.46 (H) 1.60 - 2.40 mg/dL       I have reviewed all laboratory and imaging results ordered/pertinent for this encounter.     I saw and evaluated the patient. I personally obtained the key and critical portions of the history and physical exam. I reviewed  the resident’s documentation and discussed the patient with the resident. I agree with the resident’s medical decision making as documented in the resident’s note.    58M with prolonged post-operative course after perforated diverticulitis and Margarita's procedure c/b fascial necrosis, closure with bridging vicryl mesh, and development of EC fistulas. Pt was called in to the ED yesterday after having fevers, chills, and myalgias at home. He has not had erythema, pain or drainage around the PICC, has not had cough, shortness or breath or other respiratory symptoms, and has not had dysuria, frequent urinary or other urinary symptoms. He has not had abdominal pain or a significant change in his bowel habits. No sick contacts. He does take some po but tries to minimize intake to reduce the output from the fistulas (currently 1L/day). On my exam in the ER yesterday afternoon, pt afebrile, non-tachycardic and normotensive. Comfortable and non-toxic appearing. LUE picc with no surrounding erythema or drainage and no palpable cord. Wound manager in place with thin bilious output from the known EC fistulae. End colostomy pink with no output. Abdomen soft and non-tender. Presentation labs notable for wbc 16, Cr 0.58. Plan for broad infectious workup including blood cultures, UA/Ucx, C. Diff, Covid, respiratory viral panel, and CT a/p. CT with some mild thickening of multiple small bowel loops suggestive of possible enteritis but no significant free fluid, abscess, or evidence of bowel compromise. Will admit for observation while we work up for potential infection.    Pablo Lara MD  Trauma, Critical Care, and Acute Care Surgery  Pager: [phone]

## 2024-08-28 LAB
ADENOVIRUS RVP, VIRC: NOT DETECTED
ALBUMIN SERPL BCP-MCNC: 3.6 G/DL (ref 3.4–5)
ANION GAP SERPL CALC-SCNC: 13 MMOL/L (ref 10–20)
BUN SERPL-MCNC: 12 MG/DL (ref 6–23)
CALCIUM SERPL-MCNC: 9.1 MG/DL (ref 8.6–10.6)
CHLORIDE SERPL-SCNC: 100 MMOL/L (ref 98–107)
CO2 SERPL-SCNC: 29 MMOL/L (ref 21–32)
CREAT SERPL-MCNC: 0.78 MG/DL (ref 0.5–1.3)
EGFRCR SERPLBLD CKD-EPI 2021: >90 ML/MIN/1.73M*2
ENTEROVIRUS/RHINOVIRUS RVP, VIRC: NOT DETECTED
ERYTHROCYTE [DISTWIDTH] IN BLOOD BY AUTOMATED COUNT: 15.9 % (ref 11.5–14.5)
GLUCOSE SERPL-MCNC: 131 MG/DL (ref 74–99)
HCT VFR BLD AUTO: 32.5 % (ref 41–52)
HGB BLD-MCNC: 10.3 G/DL (ref 13.5–17.5)
HUMAN BOCAVIRUS RVP, VIRC: NOT DETECTED
HUMAN CORONAVIRUS RVP, VIRC: NOT DETECTED
INFLUENZA A , VIRC: NOT DETECTED
INFLUENZA A H1N1-09 , VIRC: NOT DETECTED
INFLUENZA B PCR, VIRC: NOT DETECTED
MAGNESIUM SERPL-MCNC: 2.34 MG/DL (ref 1.6–2.4)
MCH RBC QN AUTO: 24.5 PG (ref 26–34)
MCHC RBC AUTO-ENTMCNC: 31.7 G/DL (ref 32–36)
MCV RBC AUTO: 77 FL (ref 80–100)
METAPNEUMOVIRUS , VIRC: NOT DETECTED
NRBC BLD-RTO: 0 /100 WBCS (ref 0–0)
PARAINFLUENZA PCR, VIRC: NOT DETECTED
PHOSPHATE SERPL-MCNC: 5.4 MG/DL (ref 2.5–4.9)
PLATELET # BLD AUTO: 340 X10*3/UL (ref 150–450)
POTASSIUM SERPL-SCNC: 4.2 MMOL/L (ref 3.5–5.3)
PREALB SERPL-MCNC: 25.6 MG/DL (ref 18–40)
RBC # BLD AUTO: 4.21 X10*6/UL (ref 4.5–5.9)
RSV PCR, RVP, VIRC: NOT DETECTED
SODIUM SERPL-SCNC: 138 MMOL/L (ref 136–145)
WBC # BLD AUTO: 8.6 X10*3/UL (ref 4.4–11.3)

## 2024-08-28 PROCEDURE — 2500000004 HC RX 250 GENERAL PHARMACY W/ HCPCS (ALT 636 FOR OP/ED)

## 2024-08-28 PROCEDURE — 84134 ASSAY OF PREALBUMIN: CPT

## 2024-08-28 PROCEDURE — 36415 COLL VENOUS BLD VENIPUNCTURE: CPT

## 2024-08-28 PROCEDURE — 85027 COMPLETE CBC AUTOMATED: CPT | Performed by: STUDENT IN AN ORGANIZED HEALTH CARE EDUCATION/TRAINING PROGRAM

## 2024-08-28 PROCEDURE — 84100 ASSAY OF PHOSPHORUS: CPT | Performed by: STUDENT IN AN ORGANIZED HEALTH CARE EDUCATION/TRAINING PROGRAM

## 2024-08-28 PROCEDURE — 2500000004 HC RX 250 GENERAL PHARMACY W/ HCPCS (ALT 636 FOR OP/ED): Performed by: STUDENT IN AN ORGANIZED HEALTH CARE EDUCATION/TRAINING PROGRAM

## 2024-08-28 PROCEDURE — 36415 COLL VENOUS BLD VENIPUNCTURE: CPT | Performed by: STUDENT IN AN ORGANIZED HEALTH CARE EDUCATION/TRAINING PROGRAM

## 2024-08-28 PROCEDURE — 87040 BLOOD CULTURE FOR BACTERIA: CPT

## 2024-08-28 PROCEDURE — 99232 SBSQ HOSP IP/OBS MODERATE 35: CPT | Performed by: NURSE PRACTITIONER

## 2024-08-28 PROCEDURE — 1100000001 HC PRIVATE ROOM DAILY

## 2024-08-28 PROCEDURE — 80069 RENAL FUNCTION PANEL: CPT | Performed by: STUDENT IN AN ORGANIZED HEALTH CARE EDUCATION/TRAINING PROGRAM

## 2024-08-28 PROCEDURE — 83735 ASSAY OF MAGNESIUM: CPT | Performed by: STUDENT IN AN ORGANIZED HEALTH CARE EDUCATION/TRAINING PROGRAM

## 2024-08-28 PROCEDURE — 2500000001 HC RX 250 WO HCPCS SELF ADMINISTERED DRUGS (ALT 637 FOR MEDICARE OP)

## 2024-08-28 ASSESSMENT — COGNITIVE AND FUNCTIONAL STATUS - GENERAL
MOBILITY SCORE: 24
DAILY ACTIVITIY SCORE: 24
MOBILITY SCORE: 24
DAILY ACTIVITIY SCORE: 24

## 2024-08-28 ASSESSMENT — PAIN SCALES - GENERAL
PAINLEVEL_OUTOF10: 4
PAINLEVEL_OUTOF10: 1
PAINLEVEL_OUTOF10: 3
PAINLEVEL_OUTOF10: 0 - NO PAIN

## 2024-08-28 ASSESSMENT — PAIN DESCRIPTION - LOCATION: LOCATION: BACK

## 2024-08-28 ASSESSMENT — PAIN - FUNCTIONAL ASSESSMENT
PAIN_FUNCTIONAL_ASSESSMENT: 0-10

## 2024-08-28 NOTE — CARE PLAN
The clinical goals for the shift include Pt will remain free from falls/injuries during this shift.      Problem: Pain - Adult  Goal: Verbalizes/displays adequate comfort level or baseline comfort level  Outcome: Progressing     Problem: Safety - Adult  Goal: Free from fall injury  Outcome: Progressing     Problem: Discharge Planning  Goal: Discharge to home or other facility with appropriate resources  Outcome: Progressing     Problem: Chronic Conditions and Co-morbidities  Goal: Patient's chronic conditions and co-morbidity symptoms are monitored and maintained or improved  Outcome: Progressing     Problem: Infection related to problem list condition  Goal: Infection will resolve through treatment  Outcome: Progressing

## 2024-08-28 NOTE — CARE PLAN
The patient's goals for the shift include      The clinical goals for the shift include Pt will remain free from falls/injuries during this shift.      Problem: Pain - Adult  Goal: Verbalizes/displays adequate comfort level or baseline comfort level  Outcome: Progressing     Problem: Safety - Adult  Goal: Free from fall injury  Outcome: Progressing     Problem: Discharge Planning  Goal: Discharge to home or other facility with appropriate resources  Outcome: Progressing     Problem: Chronic Conditions and Co-morbidities  Goal: Patient's chronic conditions and co-morbidity symptoms are monitored and maintained or improved  Outcome: Progressing     Problem: Infection related to problem list condition  Goal: Infection will resolve through treatment  Outcome: Progressing

## 2024-08-28 NOTE — PROGRESS NOTES
[facility]  ACUTE CARE SURGERY - PROGRESS NOTE    Patient Name: Bereket Em  MRN: [id number]  Admit Date: 826  : 1965  AGE: 58 y.o.   GENDER: male  ==============================================================================  TODAY'S ASSESSMENT AND PLAN OF CARE:  This is a 58-year-old male with a recent history of multiple abdominal surgeries complicated by enteroatmospheric fistulas post-operatively who presented with fevers, rigors, and watery fistula output (no stoma output) and was admitted for infectious workup. UA, CXR, and full respiratory panel were negative. Singular blood culture obtained on admission grew gram negative bacilli; possible enteritis on imaging, otherwise no obvious abdominal nidus. Since admission, patient has remained afebrile and without acute pain or stoma output; persistent but improved leukocytosis.    Neuro:   - Tylenol, Oxycodone PRN for pain    Card: no active issues  - vital signs qshift    Resp:   - Encourage IS  - OOB, ambulate as tolerated    GI: Enterocutaneous fistula  - Regular diet- patient titrate's own diet   - Continue Imodium 4mg QID with meals and nightly to help decrease fistula output  - Holding TPN for additional nutritional support in setting of bacteremia. Will hold off on PPN, continue mIVFs  - Continue home Protonix 40mg daily    :  - Strict I&Os  - Replete lytes as indicated  - Continue 1L LR a day to prevent dehydration     Heme: no active issues    Endo: no active issues    ID: Gram negative bacilli bacteremia  Blood cultures positive   - Follow up repeat Blood cultures today  - PICC removed on   - Continue Zosyn  - Initial cultures growing Klebsiella pneumoniae/varilicola  - Will replace PICC line one 48 hours of repeat blood cultures with no growth    DVT Proph:   - SCDs, Lovenox    Dispo: continue RNF, home with home care once PICC is able to be replaced.     Patient seen and discussed with  Attending Dr. Lara    Total time spent on assessment and plan of care approximately 30 minutes     Michael Lubin APRN-[facility]  Acute Care Surgery  Pager [phone]  ===========================================================  CHIEF COMPLAINT / EVENTS LAST 24HRS / HPI:  No acute events overnight. Denies abdominal pain, fevers or chills.     MEDICAL HISTORY / ROS:   Admission history and ROS reviewed. Pertinent changes as follows: None.    PHYSICAL EXAM:  Heart Rate:  [58-77]   Temp:  [36 °C (96.8 °F)-36.5 °C (97.7 °F)]   Resp:  [18]   BP: ()/(61-77)   SpO2:  [92 %-96 %]     Gen:                 NAD, non-toxic appearing  HEENT:             Normocephalic, moist mucous membranes.  Eyes:                 No scleral icterus  Card:                non cyanotic   Resp:                Non-labored breathing on RA  Abd:                 Soft, non-tender, non-distended. LLQ stoma is pink, small amount of fecal matter in ostomy bag. Enteroatmospheric fistulas appear prolapsed and comminuted with one another; involved bowl is pink without evidence of ischemia or necrosis. Draining light brown liquid stool.   Ext:                   WWP, no swelling of BLE  MSK:                TORRES  Neuro:             AOx3, no gross neurological deficits  Psych:              Appropriate mood and affect        IMAGING SUMMARY:    No new imaging       LABS:  Results from last 7 days   Lab Units 08/28/24  0554 08/27/24  0640 08/26/24  1155 08/26/24  0800   WBC AUTO x10*3/uL 8.6 12.1* 16.1* 8.1   HEMOGLOBIN g/dL 10.3* 9.7* 10.7* 10.2*   HEMATOCRIT % 32.5* 30.8* 33.0* 33.0*   PLATELETS AUTO x10*3/uL 340 308 200 198   NEUTROS PCT AUTO %  --   --  78.9 86.3   LYMPHS PCT AUTO %  --   --  9.7 8.9   MONOS PCT AUTO %  --   --  9.3 3.0   EOS PCT AUTO %  --   --  0.1 0.6         Results from last 7 days   Lab Units 08/28/24  0554 08/27/24  0640 08/26/24  1155 08/26/24  0800   SODIUM mmol/L 138 137 133* 136   POTASSIUM mmol/L 4.2 3.8 4.3 4.4   CHLORIDE mmol/L 100 99  97* 97   CO2 mmol/L 29 28 24 25   BUN mg/dL 12 11 18 18   CREATININE mg/dL 0.78 0.56 0.58 0.50   CALCIUM mg/dL 9.1 9.2 9.4 8.8   PROTEIN TOTAL g/dL  --   --  7.7 6.6   BILIRUBIN TOTAL mg/dL  --   --  1.1 0.9   ALK PHOS U/L  --   --  248* 266*   ALT U/L  --   --  61* 64*   AST U/L  --   --  31 34   GLUCOSE mg/dL 131* 189* 132* 108*     Results from last 7 days   Lab Units 08/26/24  1155 08/26/24  0800   BILIRUBIN TOTAL mg/dL 1.1 0.9

## 2024-08-28 NOTE — CARE PLAN
The patient's goals for the shift include  walk around unit with improved pain    The clinical goals for the shift include pt will remain free from falls throughout shift  Problem: Pain - Adult  Goal: Verbalizes/displays adequate comfort level or baseline comfort level  Outcome: Progressing     Problem: Safety - Adult  Goal: Free from fall injury  Outcome: Progressing     Problem: Discharge Planning  Goal: Discharge to home or other facility with appropriate resources  Outcome: Progressing     Problem: Chronic Conditions and Co-morbidities  Goal: Patient's chronic conditions and co-morbidity symptoms are monitored and maintained or improved  Outcome: Progressing     Problem: Infection related to problem list condition  Goal: Infection will resolve through treatment  Outcome: Progressing

## 2024-08-28 NOTE — PROGRESS NOTES
Transitional Care Coordination Progress Note     Team members: TCC, ACS NP     Discharge disposition: Home with [facility] ([facility])     Status: IP     Payer: [employer] [place]     Potential Barriers: needs negative bld cultures x48 hours       - [facility] notified of pending discharge over the weekend, confirmed financials, orders sent over yesterday by ACS NP (TPN formulation remains the same)      ADOD: 8/30 or 8/31     This TCC will continue to follow for home going needs and safe DC plan.     Yue Ramachandran RN

## 2024-08-29 ENCOUNTER — APPOINTMENT (OUTPATIENT)
Dept: SURGERY | Facility: CLINIC | Age: 59
End: 2024-08-29
Payer: COMMERCIAL

## 2024-08-29 ENCOUNTER — HOME INFUSION (OUTPATIENT)
Dept: INFUSION THERAPY | Age: 59
End: 2024-08-29

## 2024-08-29 DIAGNOSIS — K57.30 DIVERTICULOSIS OF LARGE INTESTINE WITHOUT HEMORRHAGE: ICD-10-CM

## 2024-08-29 LAB
ALBUMIN SERPL BCP-MCNC: 3.5 G/DL (ref 3.4–5)
ANION GAP SERPL CALC-SCNC: 14 MMOL/L (ref 10–20)
BUN SERPL-MCNC: 12 MG/DL (ref 6–23)
CALCIUM SERPL-MCNC: 9 MG/DL (ref 8.6–10.6)
CHLORIDE SERPL-SCNC: 101 MMOL/L (ref 98–107)
CO2 SERPL-SCNC: 27 MMOL/L (ref 21–32)
CREAT SERPL-MCNC: 0.74 MG/DL (ref 0.5–1.3)
EGFRCR SERPLBLD CKD-EPI 2021: >90 ML/MIN/1.73M*2
ERYTHROCYTE [DISTWIDTH] IN BLOOD BY AUTOMATED COUNT: 15.9 % (ref 11.5–14.5)
GLUCOSE SERPL-MCNC: 117 MG/DL (ref 74–99)
HCT VFR BLD AUTO: 31.7 % (ref 41–52)
HGB BLD-MCNC: 9.9 G/DL (ref 13.5–17.5)
MAGNESIUM SERPL-MCNC: 2.17 MG/DL (ref 1.6–2.4)
MCH RBC QN AUTO: 24.5 PG (ref 26–34)
MCHC RBC AUTO-ENTMCNC: 31.2 G/DL (ref 32–36)
MCV RBC AUTO: 79 FL (ref 80–100)
NRBC BLD-RTO: 0 /100 WBCS (ref 0–0)
PHOSPHATE SERPL-MCNC: 4.4 MG/DL (ref 2.5–4.9)
PLATELET # BLD AUTO: 431 X10*3/UL (ref 150–450)
POTASSIUM SERPL-SCNC: 4 MMOL/L (ref 3.5–5.3)
RBC # BLD AUTO: 4.04 X10*6/UL (ref 4.5–5.9)
SODIUM SERPL-SCNC: 138 MMOL/L (ref 136–145)
WBC # BLD AUTO: 8.4 X10*3/UL (ref 4.4–11.3)

## 2024-08-29 PROCEDURE — 85027 COMPLETE CBC AUTOMATED: CPT | Performed by: STUDENT IN AN ORGANIZED HEALTH CARE EDUCATION/TRAINING PROGRAM

## 2024-08-29 PROCEDURE — 80069 RENAL FUNCTION PANEL: CPT | Performed by: STUDENT IN AN ORGANIZED HEALTH CARE EDUCATION/TRAINING PROGRAM

## 2024-08-29 PROCEDURE — 83735 ASSAY OF MAGNESIUM: CPT | Performed by: STUDENT IN AN ORGANIZED HEALTH CARE EDUCATION/TRAINING PROGRAM

## 2024-08-29 PROCEDURE — 2500000001 HC RX 250 WO HCPCS SELF ADMINISTERED DRUGS (ALT 637 FOR MEDICARE OP)

## 2024-08-29 PROCEDURE — 2500000004 HC RX 250 GENERAL PHARMACY W/ HCPCS (ALT 636 FOR OP/ED): Performed by: STUDENT IN AN ORGANIZED HEALTH CARE EDUCATION/TRAINING PROGRAM

## 2024-08-29 PROCEDURE — 2500000004 HC RX 250 GENERAL PHARMACY W/ HCPCS (ALT 636 FOR OP/ED)

## 2024-08-29 PROCEDURE — 36415 COLL VENOUS BLD VENIPUNCTURE: CPT | Performed by: STUDENT IN AN ORGANIZED HEALTH CARE EDUCATION/TRAINING PROGRAM

## 2024-08-29 PROCEDURE — 99232 SBSQ HOSP IP/OBS MODERATE 35: CPT | Performed by: SURGERY

## 2024-08-29 PROCEDURE — 1100000001 HC PRIVATE ROOM DAILY

## 2024-08-29 RX ORDER — ACETAMINOPHEN 160 MG/5ML
650 SOLUTION ORAL EVERY 6 HOURS
Status: CANCELLED | OUTPATIENT
Start: 2024-08-29

## 2024-08-29 RX ORDER — DIPHENOXYLATE HYDROCHLORIDE AND ATROPINE SULFATE 2.5; .025 MG/5ML; MG/5ML
5 SOLUTION ORAL 4 TIMES DAILY
Status: CANCELLED | OUTPATIENT
Start: 2024-08-29

## 2024-08-29 RX ORDER — DIPHENOXYLATE HYDROCHLORIDE AND ATROPINE SULFATE 2.5; .025 MG/5ML; MG/5ML
5 SOLUTION ORAL 4 TIMES DAILY
Status: DISCONTINUED | OUTPATIENT
Start: 2024-08-29 | End: 2024-08-30 | Stop reason: HOSPADM

## 2024-08-29 RX ORDER — DIPHENOXYLATE HYDROCHLORIDE AND ATROPINE SULFATE 2.5; .025 MG/1; MG/1
1 TABLET ORAL ONCE
Status: COMPLETED | OUTPATIENT
Start: 2024-08-30 | End: 2024-08-30

## 2024-08-29 ASSESSMENT — ENCOUNTER SYMPTOMS
CHANGE IN APPETITE: DECREASED
PAIN LOCATION: ABDOMEN
ABDOMINAL PAIN: 1
PERSON REPORTING PAIN: PATIENT
MUSCLE WEAKNESS: 1
SKIN LESIONS: 1
APPETITE LEVEL: FAIR
PAIN LOCATION - PAIN SEVERITY: 4/10
PAIN: 1

## 2024-08-29 ASSESSMENT — PAIN DESCRIPTION - LOCATION: LOCATION: BACK

## 2024-08-29 ASSESSMENT — PAIN SCALES - PAIN ASSESSMENT IN ADVANCED DEMENTIA (PAINAD)
TOTALSCORE: 0
FACIALEXPRESSION: SMILING OR INEXPRESSIVE
TOTALSCORE: MEDICATION (SEE MAR);REPOSITIONED
BODYLANGUAGE: RELAXED
BREATHING: NORMAL
CONSOLABILITY: NO NEED TO CONSOLE

## 2024-08-29 ASSESSMENT — PAIN DESCRIPTION - ORIENTATION: ORIENTATION: POSTERIOR

## 2024-08-29 ASSESSMENT — PAIN SCALES - GENERAL: PAINLEVEL_OUTOF10: 4

## 2024-08-29 ASSESSMENT — PAIN SCALES - WONG BAKER: WONGBAKER_NUMERICALRESPONSE: HURTS LITTLE MORE

## 2024-08-29 NOTE — PROGRESS NOTES
[facility]  ACUTE CARE SURGERY - PROGRESS NOTE    Patient Name: Bereket Em  MRN: [id number]  Admit Date: 826  : 1965  AGE: 58 y.o.   GENDER: male  ==============================================================================  TODAY'S ASSESSMENT AND PLAN OF CARE:  This is a 58-year-old male with a recent history of multiple abdominal surgeries complicated by enteroatmospheric fistulas post-operatively who presented with fevers, rigors, and watery fistula output (no stoma output) and was admitted for infectious workup. UA, CXR, and full respiratory panel were negative. Singular blood culture obtained on admission grew gram negative bacilli; possible enteritis on imaging, otherwise no obvious abdominal nidus. Since admission, leukocytosis has resolved and patient has remained afebrile and without acute pain; stoma output has resumed.     Neuro:   - Tylenol and oxycodone PRN for pain     Card:  - Vital signs qshift     Resp:   - IS 10x/hour while awake  - OOB, ambulate as tolerated     GI: Enterocutaneous fistula  - Regular diet  - Continue home Imodium 4 mg PO QID with meals and nightly to help decrease fistula output  - Will consider adding Metamucil and/or Lomotil to further bulk ostomy output  - Holding TPN for additional nutritional support in setting of bacteremia; will hold off on PPN, continue mIVFs  - Continue home Protonix 40mg daily     :  - Strict I&Os  - Daily RFP, Mg; replace electrolytes as indicated  - Continue home 1L LR bolus daily to prevent dehydration      Heme: no active issues     Endo: no active issues     ID: K.pneumoniae bacteremia [ bcx]  PICC removed on   - Follow up repeat blood cultures obtained   - Continue Zosyn  - Will replace PICC line once 48 hours of no growth on repeat blood cultures (anticipate )     DVT Proph:   - SCDs, Lovenox     Dispo: Continue RNF, home with home care once PICC is able to be  replaced      Discussed with attending Dr. Aleman.    Selene Billingsley MD  PGY1 Acute Care Surgery  Pager [phone]  Available via secure chat  ==============================================================================  CHIEF COMPLAINT / EVENTS LAST 24HRS / HPI:  No acute events overnight. Patient reports feeling well this morning, denies fevers, chills, nausea, vomiting, shortness of breath, abdominal pain, or any other new/concerning symptoms. He has been ambulating and tolerating a diet.    MEDICAL HISTORY / ROS:   Admission history and ROS reviewed. Pertinent changes as follows: None.    PHYSICAL EXAM:  Heart Rate:  [52-65]   Temp:  [36 °C (96.8 °F)-36.3 °C (97.3 °F)]   Resp:  [16-18]   BP: (106-117)/(66-75)   SpO2:  [93 %-96 %]   Physical Exam  Gen:                 Sitting in bed in NAD, non-toxic appearing  HEENT:             Normocephalic, moist mucous membranes  Eyes:                 No scleral icterus, EOMs intact  Card:                RRR  Resp:                Normal respiratory effort on RA  Abd:                 Soft, non-tender, non-distended. LLQ stoma is pink, fecal matter in ostomy bag and bag around fistula. Enteroatmospheric fistulas appear prolapsed and comminuted with one another; involved bowl is pink without evidence of ischemia or necrosis.  Ext:                   WWP, no swelling of BLE  MSK:                TORRES  Neuro:             AOx3, no gross neurological deficits  Psych:              Appropriate mood and affect    IMAGING SUMMARY:  No new imaging.    LABS:  Results from last 7 days   Lab Units 08/29/24  0603 08/28/24  0554 08/27/24  0640 08/26/24  1155 08/26/24  0800   WBC AUTO x10*3/uL 8.4 8.6 12.1* 16.1* 8.1   HEMOGLOBIN g/dL 9.9* 10.3* 9.7* 10.7* 10.2*   HEMATOCRIT % 31.7* 32.5* 30.8* 33.0* 33.0*   PLATELETS AUTO x10*3/uL 431 340 308 200 198   NEUTROS PCT AUTO %  --   --   --  78.9 86.3   LYMPHS PCT AUTO %  --   --   --  9.7 8.9   MONOS PCT AUTO %  --   --   --  9.3 3.0   EOS PCT AUTO %   --   --   --  0.1 0.6         Results from last 7 days   Lab Units 08/29/24  0603 08/28/24  0554 08/27/24  0640 08/26/24  1155 08/26/24  0800   SODIUM mmol/L 138 138 137 133* 136   POTASSIUM mmol/L 4.0 4.2 3.8 4.3 4.4   CHLORIDE mmol/L 101 100 99 97* 97   CO2 mmol/L 27 29 28 24 25   BUN mg/dL 12 12 11 18 18   CREATININE mg/dL 0.74 0.78 0.56 0.58 0.50   CALCIUM mg/dL 9.0 9.1 9.2 9.4 8.8   PROTEIN TOTAL g/dL  --   --   --  7.7 6.6   BILIRUBIN TOTAL mg/dL  --   --   --  1.1 0.9   ALK PHOS U/L  --   --   --  248* 266*   ALT U/L  --   --   --  61* 64*   AST U/L  --   --   --  31 34   GLUCOSE mg/dL 117* 131* 189* 132* 108*     Results from last 7 days   Lab Units 08/26/24  1155 08/26/24  0800   BILIRUBIN TOTAL mg/dL 1.1 0.9           I have reviewed all medications, laboratory results, and imaging pertinent for today's encounter.

## 2024-08-29 NOTE — PROGRESS NOTES
Transitional Care Coordination Progress Note     Team members: TCC, ACS NP     Discharge disposition: Home with [facility] ([facility])     Status: IP     Payer: [employer]     Potential Barriers: PICC placement 8/30       - [facility] notified of plan to place PICC 8/30 after confirmed negative bld cultures and discharge in the afternoon        ADOD: 8/30      This TCC will continue to follow for home going needs and safe DC plan.     Yue Ramachandran RN

## 2024-08-30 ENCOUNTER — APPOINTMENT (OUTPATIENT)
Dept: RADIOLOGY | Facility: HOSPITAL | Age: 59
End: 2024-08-30
Payer: COMMERCIAL

## 2024-08-30 ENCOUNTER — PHARMACY VISIT (OUTPATIENT)
Dept: PHARMACY | Facility: CLINIC | Age: 59
End: 2024-08-30
Payer: MEDICARE

## 2024-08-30 ENCOUNTER — DOCUMENTATION (OUTPATIENT)
Dept: HOME HEALTH SERVICES | Facility: HOME HEALTH | Age: 59
End: 2024-08-30
Payer: COMMERCIAL

## 2024-08-30 VITALS
HEIGHT: 68 IN | WEIGHT: 185 LBS | TEMPERATURE: 97.7 F | SYSTOLIC BLOOD PRESSURE: 116 MMHG | OXYGEN SATURATION: 93 % | RESPIRATION RATE: 18 BRPM | HEART RATE: 59 BPM | DIASTOLIC BLOOD PRESSURE: 81 MMHG | BODY MASS INDEX: 28.04 KG/M2

## 2024-08-30 PROBLEM — R50.9 FEVER AND CHILLS: Status: RESOLVED | Noted: 2024-08-26 | Resolved: 2024-08-30

## 2024-08-30 LAB
ALBUMIN SERPL BCP-MCNC: 3.5 G/DL (ref 3.4–5)
ANION GAP SERPL CALC-SCNC: 15 MMOL/L (ref 10–20)
BACTERIA BLD AEROBE CULT: ABNORMAL
BACTERIA BLD CULT: ABNORMAL
BUN SERPL-MCNC: 12 MG/DL (ref 6–23)
CALCIUM SERPL-MCNC: 9 MG/DL (ref 8.6–10.6)
CHLORIDE SERPL-SCNC: 102 MMOL/L (ref 98–107)
CO2 SERPL-SCNC: 25 MMOL/L (ref 21–32)
CREAT SERPL-MCNC: 0.81 MG/DL (ref 0.5–1.3)
EGFRCR SERPLBLD CKD-EPI 2021: >90 ML/MIN/1.73M*2
ERYTHROCYTE [DISTWIDTH] IN BLOOD BY AUTOMATED COUNT: 16.1 % (ref 11.5–14.5)
GLUCOSE SERPL-MCNC: 98 MG/DL (ref 74–99)
GRAM STN SPEC: ABNORMAL
HCT VFR BLD AUTO: 31.5 % (ref 41–52)
HGB BLD-MCNC: 10.1 G/DL (ref 13.5–17.5)
MAGNESIUM SERPL-MCNC: 2.02 MG/DL (ref 1.6–2.4)
MCH RBC QN AUTO: 24.6 PG (ref 26–34)
MCHC RBC AUTO-ENTMCNC: 32.1 G/DL (ref 32–36)
MCV RBC AUTO: 77 FL (ref 80–100)
NRBC BLD-RTO: 0 /100 WBCS (ref 0–0)
PHOSPHATE SERPL-MCNC: 4 MG/DL (ref 2.5–4.9)
PLATELET # BLD AUTO: 356 X10*3/UL (ref 150–450)
POTASSIUM SERPL-SCNC: 4.1 MMOL/L (ref 3.5–5.3)
RBC # BLD AUTO: 4.1 X10*6/UL (ref 4.5–5.9)
SODIUM SERPL-SCNC: 138 MMOL/L (ref 136–145)
WBC # BLD AUTO: 8 X10*3/UL (ref 4.4–11.3)

## 2024-08-30 PROCEDURE — RXMED WILLOW AMBULATORY MEDICATION CHARGE

## 2024-08-30 PROCEDURE — 2500000001 HC RX 250 WO HCPCS SELF ADMINISTERED DRUGS (ALT 637 FOR MEDICARE OP)

## 2024-08-30 PROCEDURE — C1751 CATH, INF, PER/CENT/MIDLINE: HCPCS

## 2024-08-30 PROCEDURE — 2780000003 HC OR 278 NO HCPCS

## 2024-08-30 PROCEDURE — 83735 ASSAY OF MAGNESIUM: CPT | Performed by: STUDENT IN AN ORGANIZED HEALTH CARE EDUCATION/TRAINING PROGRAM

## 2024-08-30 PROCEDURE — 2500000002 HC RX 250 W HCPCS SELF ADMINISTERED DRUGS (ALT 637 FOR MEDICARE OP, ALT 636 FOR OP/ED)

## 2024-08-30 PROCEDURE — 36415 COLL VENOUS BLD VENIPUNCTURE: CPT | Performed by: STUDENT IN AN ORGANIZED HEALTH CARE EDUCATION/TRAINING PROGRAM

## 2024-08-30 PROCEDURE — 36569 INSJ PICC 5 YR+ W/O IMAGING: CPT

## 2024-08-30 PROCEDURE — 2500000004 HC RX 250 GENERAL PHARMACY W/ HCPCS (ALT 636 FOR OP/ED): Performed by: STUDENT IN AN ORGANIZED HEALTH CARE EDUCATION/TRAINING PROGRAM

## 2024-08-30 PROCEDURE — 02HV33Z INSERTION OF INFUSION DEVICE INTO SUPERIOR VENA CAVA, PERCUTANEOUS APPROACH: ICD-10-PCS | Performed by: SURGERY

## 2024-08-30 PROCEDURE — 80069 RENAL FUNCTION PANEL: CPT | Performed by: STUDENT IN AN ORGANIZED HEALTH CARE EDUCATION/TRAINING PROGRAM

## 2024-08-30 PROCEDURE — 99238 HOSP IP/OBS DSCHRG MGMT 30/<: CPT | Performed by: PHYSICIAN ASSISTANT

## 2024-08-30 PROCEDURE — 85027 COMPLETE CBC AUTOMATED: CPT | Performed by: STUDENT IN AN ORGANIZED HEALTH CARE EDUCATION/TRAINING PROGRAM

## 2024-08-30 RX ORDER — ACETAMINOPHEN 160 MG/5ML
650 SOLUTION ORAL EVERY 6 HOURS PRN
Status: DISCONTINUED | OUTPATIENT
Start: 2024-08-30 | End: 2024-08-30 | Stop reason: HOSPADM

## 2024-08-30 RX ORDER — DIPHENOXYLATE HYDROCHLORIDE AND ATROPINE SULFATE 2.5; .025 MG/1; MG/1
1 TABLET ORAL 4 TIMES DAILY PRN
Qty: 120 TABLET | Refills: 0 | Status: SHIPPED | OUTPATIENT
Start: 2024-08-30 | End: 2024-09-29

## 2024-08-30 RX ORDER — AMOXICILLIN AND CLAVULANATE POTASSIUM 875; 125 MG/1; MG/1
1 TABLET, FILM COATED ORAL EVERY 12 HOURS SCHEDULED
Status: DISCONTINUED | OUTPATIENT
Start: 2024-09-03 | End: 2024-08-30 | Stop reason: ENTERED-IN-ERROR

## 2024-08-30 RX ORDER — AMOXICILLIN AND CLAVULANATE POTASSIUM 875; 125 MG/1; MG/1
1 TABLET, FILM COATED ORAL EVERY 12 HOURS SCHEDULED
Qty: 8 TABLET | Refills: 0 | Status: SHIPPED | OUTPATIENT
Start: 2024-08-30 | End: 2024-08-30

## 2024-08-30 RX ORDER — AMOXICILLIN AND CLAVULANATE POTASSIUM 875; 125 MG/1; MG/1
1 TABLET, FILM COATED ORAL EVERY 12 HOURS SCHEDULED
Qty: 8 TABLET | Refills: 0 | Status: SHIPPED | OUTPATIENT
Start: 2024-08-30 | End: 2024-09-03

## 2024-08-30 RX ORDER — LIDOCAINE HYDROCHLORIDE 10 MG/ML
5 INJECTION INFILTRATION; PERINEURAL ONCE
Status: DISCONTINUED | OUTPATIENT
Start: 2024-08-30 | End: 2024-08-30 | Stop reason: HOSPADM

## 2024-08-30 ASSESSMENT — PAIN SCALES - GENERAL: PAINLEVEL_OUTOF10: 0 - NO PAIN

## 2024-08-30 ASSESSMENT — PAIN SCALES - WONG BAKER: WONGBAKER_NUMERICALRESPONSE: NO HURT

## 2024-08-30 NOTE — HOSPITAL COURSE
This is a 58-year-old male with a recent history of multiple abdominal surgeries complicated by enteroatmospheric fistulas post-operatively who presented with fevers, rigors, and watery fistula output (no stoma output) and was admitted for infectious workup. UA, CXR, and full respiratory panel were negative. Singular blood culture obtained on admission grew gram negative bacilli; possible enteritis on imaging, otherwise no obvious abdominal nidus. Since admission, patient has remained afebrile and without acute pain or stoma output; persistent but improved leukocytosis. Stoma output resumed.   Patient was treated with Zosyn while inpatient. Transitioned to Po Augmentin for discharge with a total of 7 day course.    Patient started on Lomtil while in hospital which has helped to thicken fistula output.   He was discharged from the hospital on a regular diet. He will follow up in ACS clinic.

## 2024-08-30 NOTE — PROGRESS NOTES
Transitional Care Coordination Progress Note     Team members: TCC, ACS NP     Discharge disposition: Home with [facility] ([facility])     Status: IP     Payer: [employer]     Potential Barriers: PICC placement 8/30 once cleared for negative bld cultures       0945 notified that Zosyn being added not just the TPN, [facility] updated, re-running financials  1005 transplant team now sending pt home on oral antibiotics, no need for Zosyn, [facility] updated  1100 med delivery this evening (8/30), pt should have a few bags from the previous delivery, [facility] for [facility] (9/1), transplant team aware of all above       ADOD: 8/30      This TCC will continue to follow for home going needs and safe DC plan.     Yue Ramachandran RN

## 2024-08-30 NOTE — POST-PROCEDURE NOTE
Pre-Procedure Checklist:  Emergent Line Insertion: No  Type of Line to be Placed: PICC  Consent Obtained: Yes  Emergency Medication Necessary: No  Patient Identified with 2 Independent Identifiers: Yes  Review of Allergies, Anticoagulation, Relevant Labs, ECG/Telemetry: Yes  Risks/Benefits/Alternatives Discussed with Patient/POA/Legal Representative: Yes  Stop Sign on Door: Yes  Time Out Performed: Yes  Catheter Exchange: No    Positioning Checklist:  All People, Including Patient, in the Room with Cap and Mask: Yes  Fluoroscopy Used to Identify Vessel and Guide Insertion: No   Sterile Cover Used: Yes  Full Barrier Precautions Followed (Mask, Cap, Gown, Gloves): Yes  Hands Washed: Yes  Monitors Attached with Sound Alarms On: No  Full Body Sterile Drape (Head-to-Toe) Used to Cover Patient: Yes  Trendelenburg Position (For IJ and Subclavian): No  CHG Skin Prep Used and Allowed to Air Dry to Skin Procedure: Yes    Procedure Checklist:  Blood Aspirated From All Lumens, All Ports Subsequently Flushed: Yes  Catheter Caps Placed on All Lumens; Lumens Clamped: Yes  Maintain Guidewire Control Throughout, Ensuring Guidewire Removal: Yes  Maintain Sterile Field Throughout Insertion: Yes  Catheter Secured: Yes  Confirmatory Test of Venous Placement: Non-Pulsatile Blood    Post Procedure Checklist:  Date and Time Written on Dressing: Yes  Sharp and Wire Count and Safe Disposal of all Sharps/Wires: Yes  Sterile Dressing Applied Per Protocol: Yes  X-ray Ordered or ECG Image: Yes    PICC Insertion Details:  Size (Fr): 4  Lumen Type: single  Catheter to Vein Ratio Less Than 50%: Yes  Total Length (cm): 42  External Length (cm): 0  Orientation: right upper arm   Location: brachial vein  Site Prep: Chlorohexidine; Usual sterile procedure followed  Local Anesthetic: Injectable/Subcutaneous  Indication: Discharge with TPN  Insertion Team Members in the Room: Nurse, LPN  Initial Extremity Circumference (cm): 32   Insertion Attempts:  2  Patient Tolerance: Tolerated Well, Age Appropriate  Comfort Measures: Subcutaneous anesthetic; Verbal  Procedure Location: Bedside  Safety Measures: Patient specific safety measures addressed with RN  Estimated Blood Loss (mL): 1   Vessel Fully Compressible Proximally and Distally to Insertion Site: Yes  Brisk Blood Return Obtained and Line Draws Easily: Yes  Tip Location: SVC  Line Confirmation: ECG  Lot #: [id number]  : BD  PICC Line Exp Date: 10/31/2025  Securement: Stat Lock  Post Procedure Checklist: Handoff with RN; Obtain all new IV tubing prior to use; Bed at lowest level and wheels locked; Line discharge information at bedside.  Additional Details: Line was inserted using Modified Seldinger's Technique.   Placed by: Guera Posadas RN

## 2024-08-30 NOTE — DISCHARGE SUMMARY
Discharge Diagnosis  Fever and chills    Issues Requiring Follow-Up  Fistula output follow up - ACS     Test Results Pending At Discharge  Pending Labs       Order Current Status    Blood Culture Preliminary result    Blood Culture Preliminary result    Blood Culture Preliminary result            Hospital Course  This is a 58-year-old male with a recent history of multiple abdominal surgeries complicated by enteroatmospheric fistulas post-operatively who presented with fevers, rigors, and watery fistula output (no stoma output) and was admitted for infectious workup. UA, CXR, and full respiratory panel were negative. Singular blood culture obtained on admission grew gram negative bacilli; possible enteritis on imaging, otherwise no obvious abdominal nidus. Since admission, patient has remained afebrile and without acute pain or stoma output; persistent but improved leukocytosis. Stoma output resumed.   Patient was treated with Zosyn while inpatient. Transitioned to Po Augmentin for discharge with a total of 7 day course.    Patient started on Lomtil while in hospital which has helped to thicken fistula output.   He was discharged from the hospital on a regular diet. He will follow up in ACS clinic.     Pertinent Physical Exam At Time of Discharge  Physical Exam    Gen: NAD   HEENT:MMM  Card: RRR  Resp: Normal respiratory effort on RA  Abd: Soft, non-tender, non-distended. LLQ stoma is pink, fecal matter in ostomy bag and bag around fistula. Enteroatmospheric fistulas appear prolapsed and comminuted with one another; involved bowl is pink without evidence of ischemia or necrosis.  Ext: WWP, no swelling of BLE  MSK: TORRES  Neuro: AOx3  Psych:  Appropriate mood and affect  Home Medications     Medication List      START taking these medications     amoxicillin-pot clavulanate 875-125 mg tablet; Commonly known as:   Augmentin; Take 1 tablet by mouth every 12 hours for 4 days.   Custom Cyclic TPN; Infuse 2,250 mL over 12  "hours into a venous catheter   (central line) continuously. Taper up for 1 Hours. Taper down for 1 Hours.   * diphenoxylate-atropine 2.5-0.025 mg tablet; Commonly known as:   Lomotil; Take 1 tablet by mouth 4 times a day as needed for diarrhea.   * diphenoxylate-atropine 2.5-0.025 mg tablet; Commonly known as:   Lomotil; Take 1 tablet by mouth 4 times a day as needed for diarrhea.  * This list has 2 medication(s) that are the same as other medications   prescribed for you. Read the directions carefully, and ask your doctor or   other care provider to review them with you.     CONTINUE taking these medications     acetaminophen 325 mg tablet; Commonly known as: Tylenol; Take 2 tablets   (650 mg) by mouth every 6 hours if needed for mild pain (1 - 3).   Adult Clinimix Parenteral Nutrition Cyclic; Infuse 2,000 mL over 12   hours into a venous catheter (central line) continuously. Taper up for 1   Hours. Taper down for 1 Hours.   esomeprazole 40 mg packet; Commonly known as: NexIUM; Take 40 mg by   mouth once daily in the morning. Take before meals.   * heparin flush 100 unit/mL syringe   * heparin LockFlush(Porcine)(PF) 10 unit/mL injection; Generic drug:   heparin flush   lactated Ringer's infusion; Infuse 1,000 mL into a venous catheter once   daily at bedtime. Administer 1L of IVFs nightly   loperamide 1 mg/7.5 mL liquid; Commonly known as: Imodium A-D; Take 30   mL (4 mg) by mouth 4 times a day with meals.   ostomy supplies 12 \" misc; 1 each every 7 days.   sodium chloride (PF) 0.9% injection  * This list has 2 medication(s) that are the same as other medications   prescribed for you. Read the directions carefully, and ask your doctor or   other care provider to review them with you.     STOP taking these medications     oxyCODONE 5 mg/5 mL solution; Commonly known as: Roxicodone     ASK your doctor about these medications     alteplase 2 mg injection; Commonly known as: Cathflo Activase; 2 mL (2   mg) by " intra-catheter route 1 time for 1 dose.; Ask about: Should I take   this medication?       Outpatient Follow-Up  September 13th at 11 AM - [facility] clinic     Kinjal Dye PA-C

## 2024-08-30 NOTE — CONSULTS
Wound Care Consult     Visit Date: 2024      Patient Name: Bereket Em         MRN: [id number]           YOB: 1965     Reason for Consult: Replacement of wound manager pouch over ECF        Wound History: 58-year-old male with a recent history of multiple abdominal surgeries complicated by enteroatmospheric fistulas post-operatively who presented with fevers, rigors, and watery fistula output (no stoma output) and was admitted for infectious workup         Pertinent Labs:   Albumin   Date Value Ref Range Status   2024 3.5 3.4 - 5.0 g/dL Final     ALBUMIN (MG/L) IN URINE   Date Value Ref Range Status   2021 <7.0 Not Established mg/L Final       Wound Assessment:  Wound 24 Incision Abdomen Medial;Upper (Active)   Wound Image   24 1002   Site Assessment Clean;Dry;Intact 24   Usha-Wound Assessment Clean;Dry;Intact 24   Shape oval 24 1002   Wound Length (cm) 10 cm 24 1002   Wound Width (cm) 10 cm 24 1002   Wound Surface Area (cm^2) 100 cm^2 24 1002   Wound Depth (cm) 0.1 cm 24 1002   Wound Volume (cm^3) 10 cm^3 24 1002   Wound Healing % 98 24 1002   Drainage Description Effulent/Bilious 24   Drainage Amount Moderate 24   Dressing Other (Comment) 24   Dressing Changed New 24 1002   Dressing Status Clean;Dry 24 1000   Colostomy LLQ   Placement Date/Time: 24 1329   Location: LLQ   Stomal Appliance 1 piece;Changed   Site/Stoma Assessment Clean;Intact;Red   Stoma Size (cm)   (1 1/4 oval)   Peristomal Assessment Clean;Intact   Treatment Pouch change;Site care   Output (mL) 0 mL     Ostomy type: colostomy       size: 1 1/4 oval      color: red and moist       protruding: budded   Umer: none  Functioning: mucus   Mucocutaneous junction: intact   Peristomal skin: clean, dry and intact   Pouchin piece Melonie [place] premier pouch   Ostomy  Education: Pt is independent with his care  Plan: assess stoma/pouching    Wound Team Summary Assessment: The wound care team came to bedside today to replace the coloplast mini wound manager pouch the patient's ECF.  The wound surrounding the ECF is pink and pink and appears to be healing with scar tissue formation.  The wound was cleansed with normal saline and prepped with 3M cavilon advanced. 4 inch barrier rings and paste were used on the periwound skin to create a base. A 1 piece Coloplast mini wound manager pouch was reapplied with additional paste and stoma powder used to create a seal.      Wound Team Plan: Change the pouch in 5-7 days or PRN     ISABELLE Ramos  8/30/2024  1:19 PM

## 2024-08-30 NOTE — CARE PLAN
The patient's goals for the shift include      The clinical goals for the shift include Patient will remain at low pain <5 levels throughout the night.    Over the shift, the patient did not make progress toward the following goals. Barriers to progression include patient complains of ongoing back pain and discomfort in legs. Recommendations to address these barriers include frequent pain monitoring and pain interventions if needed.

## 2024-08-30 NOTE — NURSING NOTE
Patient had picc line placed before he had discharge orders verified and . Pt had his Iv removed and it started back bleeding and pt had a pressure dressing applied and bleeding stopped. Nurse was given pt's meds to beds meds and they were given to patient. Pt waited on his ride to pick him up and left at 1:40p

## 2024-08-30 NOTE — HH CARE COORDINATION
Home Care received a Referral to Resume Care for Infusion and Nursing. We have processed the referral for a Resumption of Care on 8/31.     If you have any questions or concerns, please feel free to contact us at [phone]. Follow the prompts, enter your five digit zip code, and you will be directed to your care team on [facility].

## 2024-08-31 ENCOUNTER — HOME CARE VISIT (OUTPATIENT)
Dept: HOME HEALTH SERVICES | Facility: HOME HEALTH | Age: 59
End: 2024-08-31
Payer: COMMERCIAL

## 2024-08-31 VITALS
HEART RATE: 63 BPM | RESPIRATION RATE: 18 BRPM | SYSTOLIC BLOOD PRESSURE: 118 MMHG | DIASTOLIC BLOOD PRESSURE: 60 MMHG | OXYGEN SATURATION: 97 % | TEMPERATURE: 97.6 F

## 2024-08-31 PROCEDURE — G0299 HHS/HOSPICE OF RN EA 15 MIN: HCPCS

## 2024-08-31 ASSESSMENT — ACTIVITIES OF DAILY LIVING (ADL)
AMBULATION ASSISTANCE: STAND BY ASSIST
OASIS_M1830: 03
ENTERING_EXITING_HOME: STAND BY ASSIST
CURRENT_FUNCTION: STAND BY ASSIST

## 2024-08-31 ASSESSMENT — ENCOUNTER SYMPTOMS
APPETITE LEVEL: GOOD
DENIES PAIN: 1

## 2024-09-01 LAB
BACTERIA BLD CULT: NORMAL
BACTERIA BLD CULT: NORMAL

## 2024-09-02 ENCOUNTER — DOCUMENTATION (OUTPATIENT)
Dept: PRIMARY CARE | Facility: CLINIC | Age: 59
End: 2024-09-02
Payer: COMMERCIAL

## 2024-09-03 ENCOUNTER — PATIENT OUTREACH (OUTPATIENT)
Dept: PRIMARY CARE | Facility: CLINIC | Age: 59
End: 2024-09-03
Payer: COMMERCIAL

## 2024-09-03 ENCOUNTER — LAB REQUISITION (OUTPATIENT)
Dept: LAB | Facility: HOSPITAL | Age: 59
End: 2024-09-03
Payer: COMMERCIAL

## 2024-09-03 ENCOUNTER — HOME INFUSION (OUTPATIENT)
Dept: INFUSION THERAPY | Age: 59
End: 2024-09-03
Payer: COMMERCIAL

## 2024-09-03 ENCOUNTER — HOME CARE VISIT (OUTPATIENT)
Dept: HOME HEALTH SERVICES | Facility: HOME HEALTH | Age: 59
End: 2024-09-03
Payer: COMMERCIAL

## 2024-09-03 DIAGNOSIS — T84.410A: ICD-10-CM

## 2024-09-03 DIAGNOSIS — B95.62 METHICILLIN RESISTANT STAPHYLOCOCCUS AUREUS INFECTION AS THE CAUSE OF DISEASES CLASSIFIED ELSEWHERE: ICD-10-CM

## 2024-09-03 DIAGNOSIS — T81.31XA DISRUPTION OF EXTERNAL OPERATION (SURGICAL) WOUND, NOT ELSEWHERE CLASSIFIED, INITIAL ENCOUNTER: ICD-10-CM

## 2024-09-03 LAB
ALBUMIN SERPL-MCNC: 4 G/DL (ref 3.5–5)
ANION GAP SERPL CALC-SCNC: 10 MMOL/L
BASOPHILS # BLD AUTO: 0.05 X10*3/UL (ref 0–0.1)
BASOPHILS NFR BLD AUTO: 0.5 %
BUN SERPL-MCNC: 22 MG/DL (ref 8–25)
CALCIUM SERPL-MCNC: 9.5 MG/DL (ref 8.5–10.4)
CHLORIDE SERPL-SCNC: 101 MMOL/L (ref 97–107)
CO2 SERPL-SCNC: 29 MMOL/L (ref 24–31)
CREAT SERPL-MCNC: 0.7 MG/DL (ref 0.4–1.6)
EGFRCR SERPLBLD CKD-EPI 2021: >90 ML/MIN/1.73M*2
EOSINOPHIL # BLD AUTO: 0.24 X10*3/UL (ref 0–0.7)
EOSINOPHIL NFR BLD AUTO: 2.3 %
ERYTHROCYTE [DISTWIDTH] IN BLOOD BY AUTOMATED COUNT: 16.4 % (ref 11.5–14.5)
GLUCOSE SERPL-MCNC: 81 MG/DL (ref 65–99)
HCT VFR BLD AUTO: 36.4 % (ref 41–52)
HGB BLD-MCNC: 11 G/DL (ref 13.5–17.5)
IMM GRANULOCYTES # BLD AUTO: 0.04 X10*3/UL (ref 0–0.7)
IMM GRANULOCYTES NFR BLD AUTO: 0.4 % (ref 0–0.9)
LYMPHOCYTES # BLD AUTO: 2.95 X10*3/UL (ref 1.2–4.8)
LYMPHOCYTES NFR BLD AUTO: 27.7 %
MAGNESIUM SERPL-MCNC: 2.2 MG/DL (ref 1.6–3.1)
MCH RBC QN AUTO: 24.1 PG (ref 26–34)
MCHC RBC AUTO-ENTMCNC: 30.2 G/DL (ref 32–36)
MCV RBC AUTO: 80 FL (ref 80–100)
MONOCYTES # BLD AUTO: 0.71 X10*3/UL (ref 0.1–1)
MONOCYTES NFR BLD AUTO: 6.7 %
NEUTROPHILS # BLD AUTO: 6.67 X10*3/UL (ref 1.2–7.7)
NEUTROPHILS NFR BLD AUTO: 62.4 %
NRBC BLD-RTO: 0 /100 WBCS (ref 0–0)
PHOSPHATE SERPL-MCNC: 3.4 MG/DL (ref 2.5–4.5)
PHOSPHATE SERPL-MCNC: 3.4 MG/DL (ref 2.5–4.5)
PLATELET # BLD AUTO: 537 X10*3/UL (ref 150–450)
POTASSIUM SERPL-SCNC: 4.5 MMOL/L (ref 3.4–5.1)
RBC # BLD AUTO: 4.57 X10*6/UL (ref 4.5–5.9)
RBC MORPH BLD: NORMAL
SODIUM SERPL-SCNC: 140 MMOL/L (ref 133–145)
TRIGL SERPL-MCNC: 214 MG/DL (ref 40–150)
WBC # BLD AUTO: 10.7 X10*3/UL (ref 4.4–11.3)

## 2024-09-03 PROCEDURE — 85025 COMPLETE CBC W/AUTO DIFF WBC: CPT

## 2024-09-03 PROCEDURE — G0299 HHS/HOSPICE OF RN EA 15 MIN: HCPCS

## 2024-09-03 PROCEDURE — 84134 ASSAY OF PREALBUMIN: CPT

## 2024-09-03 PROCEDURE — 80069 RENAL FUNCTION PANEL: CPT

## 2024-09-03 PROCEDURE — 84100 ASSAY OF PHOSPHORUS: CPT

## 2024-09-03 PROCEDURE — 84478 ASSAY OF TRIGLYCERIDES: CPT

## 2024-09-03 PROCEDURE — 83735 ASSAY OF MAGNESIUM: CPT

## 2024-09-03 NOTE — PROGRESS NOTES
Discharge Facility:  [facility]     Discharge Diagnosis:    Fever and chills     Issues Requiring Follow-Up  Fistula output follow up - [facility]        Admission Date: 8/26/2024   Discharge Date:  8/30/2024     PCP Appointment Date: Tasked to office     Specialist Appointment Date:     9/13/2024 Gen Surg     [facility] involved     Labs 9/3/2024      He will follow up in ACS clinic.        Hospital Encounter and Summary Linked: Yes    Two attempts were made to reach patient within two business days after discharge. Voicemail left with contact information for patient to call back with any non-emergent questions or concerns.

## 2024-09-03 NOTE — PROGRESS NOTES
Telephone call with patient - confirmed TPN and LR through 9/4 in the home    Homecare RN to see patient today and draw labs. Asked for qMonday labs if possible    Have orders from the hospital to continue home TPN formula     Dispense x7 TPN with MVI and x7 1L LR for cmpd and delivery on 9/4   Infusions 9/5 through 9/11    NF 9/10 lab check and obtain orders

## 2024-09-04 LAB — PREALB SERPL-MCNC: 38.3 MG/DL (ref 18–40)

## 2024-09-05 ENCOUNTER — HOME CARE VISIT (OUTPATIENT)
Dept: HOME HEALTH SERVICES | Facility: HOME HEALTH | Age: 59
End: 2024-09-05
Payer: COMMERCIAL

## 2024-09-05 VITALS — HEART RATE: 86 BPM | TEMPERATURE: 98.4 F | RESPIRATION RATE: 16 BRPM

## 2024-09-05 PROCEDURE — G0299 HHS/HOSPICE OF RN EA 15 MIN: HCPCS

## 2024-09-05 ASSESSMENT — ENCOUNTER SYMPTOMS
SKIN LESIONS: 1
PAIN LOCATION - PAIN QUALITY: BURNING
PAIN: 1
PERSON REPORTING PAIN: PATIENT
PAIN LOCATION: ABDOMEN

## 2024-09-05 NOTE — HOME HEALTH
supplies ordered via [employer].  will not process until 9/7     requetsed to increase the remover spray and the tube paste.   has a deductable of 286.xx  they will reach out to patient.   62611 coloplast 3 boxes for 18    3m 3342   1 box 43169 coloplast   gauze 4 x 4 non sterile. 698270 1 brick   darnell 7806 2 boxes   paste 21863 6 tubes   coloplast 24659 strip paste 2 boxes   darnell remover spray. 7731    3 each   darnell remover wipes 7760 1 box.

## 2024-09-06 ENCOUNTER — HOME CARE VISIT (OUTPATIENT)
Dept: HOME HEALTH SERVICES | Facility: HOME HEALTH | Age: 59
End: 2024-09-06
Payer: COMMERCIAL

## 2024-09-06 NOTE — CASE COMMUNICATION
stopped by to drop off some stoma paste and spray remover.    he was leaking and requested assistance with pouch change.    assisted patient  and left some additional supplies for him.   ordered supplies this am per his request. they should process on 9 7 from [facility].     Quinten

## 2024-09-06 NOTE — HOME HEALTH
stopped by patient home to drop off some stoma paste and spray remover.  pateint fistula  pouch was leaking from the lid, . he had changed it 3 x yestereday and requested some assistance in changing today.     wound is healing nicely. howmadelyne the 2 fistulas are now one in the center of his open wound. using a wound manager pouch to contain the effluent.   vashe soak to open wound areas for approx 15 minutes however fistula was active and difficult to containe the output.     cut pouch to fit. paste all the way around. strip paste in crevice at 6 oclock and large ring folded in half over that.  paste in crevices at 3 and 9 all after powder and skin barrier film . umbilicus very sore.  so covered with brava protective sheet to try and protect it a bit more.  then he applied heating pad. for a few minutes.     left abd colostomy with no output.  repouched     patient tolerted well, needed to be reminded to hand wash after procedure    [email or web address]    he voiced some concerns about being down. and frustrated at current situation.   wanting to get back to work. but is being told they will not do any more surgery for some time. want him to heal more.       left 10 tubes stoma paste  left 2 spray remover

## 2024-09-07 ASSESSMENT — ENCOUNTER SYMPTOMS
PAIN LOCATION - PAIN SEVERITY: 4/10
PERSON REPORTING PAIN: PATIENT
CHANGE IN APPETITE: UNCHANGED
PAIN LOCATION: ABDOMEN
APPETITE LEVEL: FAIR
ABDOMINAL PAIN: 1
PAIN: 1
MUSCLE WEAKNESS: 1

## 2024-09-09 ENCOUNTER — HOME CARE VISIT (OUTPATIENT)
Dept: HOME HEALTH SERVICES | Facility: HOME HEALTH | Age: 59
End: 2024-09-09
Payer: COMMERCIAL

## 2024-09-09 PROCEDURE — 84134 ASSAY OF PREALBUMIN: CPT

## 2024-09-09 PROCEDURE — 80053 COMPREHEN METABOLIC PANEL: CPT

## 2024-09-09 PROCEDURE — 84100 ASSAY OF PHOSPHORUS: CPT

## 2024-09-09 PROCEDURE — 84478 ASSAY OF TRIGLYCERIDES: CPT

## 2024-09-09 PROCEDURE — 83735 ASSAY OF MAGNESIUM: CPT

## 2024-09-09 PROCEDURE — 85025 COMPLETE CBC W/AUTO DIFF WBC: CPT

## 2024-09-09 PROCEDURE — G0299 HHS/HOSPICE OF RN EA 15 MIN: HCPCS

## 2024-09-10 ENCOUNTER — HOME INFUSION (OUTPATIENT)
Dept: INFUSION THERAPY | Age: 59
End: 2024-09-10
Payer: COMMERCIAL

## 2024-09-10 DIAGNOSIS — K57.30 DIVERTICULOSIS OF LARGE INTESTINE WITHOUT HEMORRHAGE: ICD-10-CM

## 2024-09-10 NOTE — PROGRESS NOTES
Chart review - Bereket Em is a 58 y.o. patient on home care for dehydration 2/2 enteroatmospheric fistulas receiving daily TPN with MVI and LR.    Dr. Perry following     9/9 labs reviewed - TG, prealbumin, LFT's slightly elevated     Rec'd orders from Dr Perry to continue current orders x1 week. Orders updated in Epic, gold copy to intake.     Dispense 7x TPN with MVI and 7x 1L LR for cmpd and delivery on 9/11   Infusions 9/12 through 9/18     NF 9/17 lab check and obtain orders

## 2024-09-11 ASSESSMENT — ENCOUNTER SYMPTOMS
CHANGE IN APPETITE: UNCHANGED
SKIN LESIONS: 1
PAIN LOCATION - PAIN SEVERITY: 4/10
PAIN LOCATION: ABDOMEN
APPETITE LEVEL: GOOD
MUSCLE WEAKNESS: 1
PAIN: 1
PERSON REPORTING PAIN: PATIENT

## 2024-09-12 ENCOUNTER — PATIENT OUTREACH (OUTPATIENT)
Dept: PRIMARY CARE | Facility: CLINIC | Age: 59
End: 2024-09-12
Payer: COMMERCIAL

## 2024-09-12 ENCOUNTER — HOME CARE VISIT (OUTPATIENT)
Dept: HOME HEALTH SERVICES | Facility: HOME HEALTH | Age: 59
End: 2024-09-12
Payer: COMMERCIAL

## 2024-09-12 PROCEDURE — G0299 HHS/HOSPICE OF RN EA 15 MIN: HCPCS

## 2024-09-12 NOTE — PROGRESS NOTES
Call regarding F/U first connection with patient since DC home, states he is doing well, continues with [facility] , has completed the ATB upon DC home , has assistance if in need. Patient states he will await F/U with PCP , will connect with office soon to stephan F/U  Patient to see Gen surg 9/13/2024     At time of outreach call the patient feels as if their condition has improved  since last visit.    Patient encouraged to call providers for any questions concerns or change in condition

## 2024-09-13 ENCOUNTER — APPOINTMENT (OUTPATIENT)
Dept: SURGERY | Facility: CLINIC | Age: 59
End: 2024-09-13
Payer: COMMERCIAL

## 2024-09-13 VITALS
TEMPERATURE: 97.5 F | HEIGHT: 68 IN | OXYGEN SATURATION: 98 % | HEART RATE: 72 BPM | WEIGHT: 183 LBS | BODY MASS INDEX: 27.74 KG/M2 | SYSTOLIC BLOOD PRESSURE: 132 MMHG | DIASTOLIC BLOOD PRESSURE: 81 MMHG

## 2024-09-13 VITALS
SYSTOLIC BLOOD PRESSURE: 136 MMHG | HEART RATE: 76 BPM | DIASTOLIC BLOOD PRESSURE: 90 MMHG | TEMPERATURE: 97.8 F | RESPIRATION RATE: 16 BRPM

## 2024-09-13 DIAGNOSIS — G47.9 SLEEP DISORDER: Primary | ICD-10-CM

## 2024-09-13 DIAGNOSIS — K63.2 ENTEROCUTANEOUS FISTULA: ICD-10-CM

## 2024-09-13 PROCEDURE — 99024 POSTOP FOLLOW-UP VISIT: CPT | Performed by: SURGERY

## 2024-09-13 PROCEDURE — 3008F BODY MASS INDEX DOCD: CPT | Performed by: SURGERY

## 2024-09-13 RX ORDER — HYDROXYZINE HYDROCHLORIDE 25 MG/1
25 TABLET, FILM COATED ORAL NIGHTLY
Qty: 30 TABLET | Refills: 0 | Status: SHIPPED | OUTPATIENT
Start: 2024-09-13 | End: 2024-10-13

## 2024-09-13 ASSESSMENT — ENCOUNTER SYMPTOMS
DEPRESSION: 0
LOSS OF SENSATION IN FEET: 0
OCCASIONAL FEELINGS OF UNSTEADINESS: 0
PAIN LOCATION - PAIN QUALITY: BURNING
SKIN LESIONS: 1
PAIN: 1
PAIN LOCATION: ABDOMEN

## 2024-09-13 ASSESSMENT — PATIENT HEALTH QUESTIONNAIRE - PHQ9
2. FEELING DOWN, DEPRESSED OR HOPELESS: NOT AT ALL
1. LITTLE INTEREST OR PLEASURE IN DOING THINGS: NOT AT ALL
SUM OF ALL RESPONSES TO PHQ9 QUESTIONS 1 & 2: 0

## 2024-09-13 ASSESSMENT — PAIN SCALES - GENERAL: PAINLEVEL: 0-NO PAIN

## 2024-09-13 NOTE — HOME HEALTH
recieved ordered supplies.   reports changed pouch tuesday and wednesday. due to leaks.   sn visit made to assist.     cut pouch to fit. paste all the way around. . paste in crevices at 3 and 9 all after powder and skin barrier film . umbilicus very sore. so covered with brava protective sheet to try and protect it a bit more. prior to placing pouch.  then added some additional paste to fill in the gaps. the wound is almos healed.  however the large ECF continues to be putting out a great deal.   once pouch was applied.  then he applied heating pad. for a few minutes.   left abd colostomy with no output. repouched   patient tolerted well,  [email or web address]   he voiced some concerns about being down. and frustrated at current situation.   wanting to get back to work. but is being told they will not do any more surgery for some time. want him to heal more.  he has follow up tomorrow. and is considering getting a second opinion with a GI surgeon depending on appointment with trauma tomorrow.    reeducation provided on infection prevention with picc line.   he stated since his hospital stay he has changed a few things that he is doing.   including cleaning, hand hygeine and he reports he is now giving the lactated ringers at one time.     he had a cap on this picc line. call to [facility] to see if we can get some caps for him. however we do not provide them.     he recieved the [employer] for cleaning. instructed in cleaning procedure of picc line.     picc site without concerns.

## 2024-09-13 NOTE — PROGRESS NOTES
Please let pt know I sent him a medication to try at bedtime for sleep. It is called hydroxyzine - very similar to benadryl. We use it often to treat anxiety but side effect is drowsiness so sometimes it can help w/sleep. If no improvement, needs to schedule appt w/any provider to discuss alternative sleep meds.

## 2024-09-13 NOTE — PROGRESS NOTES
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   I discussed his care at the bedside with his family with his permission.   Since his last outpatient visit, he was admitted to the hospital for bacteremia.  His central line was removed, he was treated with ABX and the PICC line was replaced. He is now home, off of ABX and remains on TPN and IVFs daily.   He is maintaining his weight at 182 pounds.   He is tolerating PO.  He is eating 2-3 times a day.  He reports about 1 liter of ostomy output per day.  He remains on unchanged antimotility agents.  He can not correlate higher and thinner output with particular PO intake.   His urine remains \"lemonade\" in color.  His labs show good nutrition and renal function (alb 4.2, prealbumin 41, Cr 0.7).   He reported some pain along the left side of his skin/wound previously but that has resolved.   He reports some difficulty with sleep from both restless legs and increased urinary frequency while he is receiving TPN.   He had questions about surgical timing.    There has been minimal colostomy output.   His outpatient wound photos were reviewed.   On exam, NAD. Mucous membranes are not dry.  The LLQ colostsomy is pink and viable with an empty bag. The abdominal wound manager is in place and was placed yesterday so it was not removed at his request. There is now just the larger, more central fistula with both ends exposed in the wound; the original fistula at the 3 o'clock position is not visualized.   The prolapsed bowel is pink and viable.  There is semisoft stool and liquid in the wound manager pouch.  There is continued granulation and epithelialization of the wound.   Plan for:  -Fistula management: Continue with current outpatient wound care.  With further wound granulation, the wound may require less pouching in the future.  We " discussed future surgical options.  If he can remain outpatient and be transitioned off of TPN and fluids, then I would recommend allowing the wound to heal and soften further before surgery and that this may take a year.  I discussed that there are many options for surgery which would include a larger incision, adhesion management, small bowel resection(s) with anastomoses, possible takedown of this end colostomy with colorectal anastomosis, hernia repair and possible proximal diversion with later reversal if the anastomoses all heal.   -Nutrition/hydration: He is eating well, his weight is stable and nutrition labs are appropriate.  Plan to wean TPN and monitor weight and labs with TPN off to see if TPN/line risk can then be mitigated.  Will contact pharmacy team to help with weaning plan.  He is aware that if his nutrition declines after stopping TPN, then it may need to be restarted. Maintain current PPI and Imodium.   -Activity: Ad kathryn; no weight restrictions.   -Work: He plans to start working from home next week.   -Sleep and restless legs: Weaning the TPN may help with nocturia but since this may take some time, will contact his PCP for thoughts on preferred medications in this situation.  He reports that melatonin and marijuana gummies have not helped.   -Follow up: Discussed options of in person or virtual. He lives 1 hour away and will plan for virtual visit on 09/26

## 2024-09-14 NOTE — DOCUMENTATION CLARIFICATION NOTE
"    PATIENT:               NATHEN EVANGELISTA  ACCT #:                  [id number]  MRN:                       [id number]  :                       1965  ADMIT DATE:       2024 1:50 PM  DISCH DATE:        2024 3:23 PM  RESPONDING PROVIDER #:        [id number]          PROVIDER RESPONSE TEXT:    Leukocytosis due to bacteremia ruled in  for this admission Klebsiella pneumoniae blood infection    CDI QUERY TEXT:    Clarification        Instruction:    Based on your assessment of the patient and the clinical information, please provide the requested documentation by clicking on the appropriate radio button and enter any additional information if prompted.    Question: Please further clarify the diagnosis of leukocytosis as    When answering this query, please exercise your independent professional judgment. The fact that a question is being asked, does not imply that any particular answer is desired or expected.    The patient's clinical indicators include:  Clinical Information: 58 yr old male with a recent hx of multiple abdominal surgeries complicated by enteroatmospheric fistulas post-operatively who presented with fevers, rigors, and watery fistula output (no stoma output) and was admitted for infectious workup  VS on admit - 37.8 - 94 - 18 117/74 pox RA - 94    Clinical Indicators: WBC on admit  -16.1    Documentation shows ED note \" The symptoms the patient is experiencing sound most consistent with bacteremia. Differentials considered includes intra-abdominal abscess, new fistula formation, other surgical complications\"  Also  from progress note on  \" Holding TPN for additional nutritional support in setting of bacteremia\"    Treatment: IV Zosyn q 6hrs, transitioned to po Augmentin at discharge. Serial CBC    Risk Factors: current EC fistula, hx mult abdominal surgeries  Options provided:  -- Leukocytosis due to bacteremia ruled out after workup  -- Leukocytosis due to bacteremia ruled in  for this " admission, please add additional infection here  -- Unable to determine cause of leukocytosis on admit  -- Other - I will add my own diagnosis  -- Refer to Clinical Documentation Reviewer    Query created by: Rachel Ann on 9/10/2024 2:43 PM      Electronically signed by:  FEDERICO MISTRY PA-C 9/13/2024 9:45 PM

## 2024-09-16 ENCOUNTER — HOME CARE VISIT (OUTPATIENT)
Dept: HOME HEALTH SERVICES | Facility: HOME HEALTH | Age: 59
End: 2024-09-16
Payer: COMMERCIAL

## 2024-09-16 ENCOUNTER — LAB (OUTPATIENT)
Dept: LAB | Facility: LAB | Age: 59
End: 2024-09-16
Payer: COMMERCIAL

## 2024-09-16 VITALS
DIASTOLIC BLOOD PRESSURE: 74 MMHG | RESPIRATION RATE: 16 BRPM | SYSTOLIC BLOOD PRESSURE: 132 MMHG | TEMPERATURE: 98.3 F | HEART RATE: 63 BPM

## 2024-09-16 DIAGNOSIS — K57.30 DIVERTICULOSIS OF LARGE INTESTINE WITHOUT HEMORRHAGE: ICD-10-CM

## 2024-09-16 LAB
BASOPHILS # BLD AUTO: 0.07 X10*3/UL (ref 0–0.1)
BASOPHILS NFR BLD AUTO: 0.9 %
EOSINOPHIL # BLD AUTO: 0.25 X10*3/UL (ref 0–0.7)
EOSINOPHIL NFR BLD AUTO: 3.1 %
ERYTHROCYTE [DISTWIDTH] IN BLOOD BY AUTOMATED COUNT: 16.8 % (ref 11.5–14.5)
HCT VFR BLD AUTO: 38.4 % (ref 41–52)
HGB BLD-MCNC: 11.7 G/DL (ref 13.5–17.5)
IMM GRANULOCYTES # BLD AUTO: 0.03 X10*3/UL (ref 0–0.7)
IMM GRANULOCYTES NFR BLD AUTO: 0.4 % (ref 0–0.9)
LYMPHOCYTES # BLD AUTO: 1.87 X10*3/UL (ref 1.2–4.8)
LYMPHOCYTES NFR BLD AUTO: 23 %
MCH RBC QN AUTO: 24.3 PG (ref 26–34)
MCHC RBC AUTO-ENTMCNC: 30.5 G/DL (ref 32–36)
MCV RBC AUTO: 80 FL (ref 80–100)
MONOCYTES # BLD AUTO: 0.48 X10*3/UL (ref 0.1–1)
MONOCYTES NFR BLD AUTO: 5.9 %
NEUTROPHILS # BLD AUTO: 5.44 X10*3/UL (ref 1.2–7.7)
NEUTROPHILS NFR BLD AUTO: 66.7 %
NRBC BLD-RTO: 0 /100 WBCS (ref 0–0)
PLATELET # BLD AUTO: 238 X10*3/UL (ref 150–450)
RBC # BLD AUTO: 4.81 X10*6/UL (ref 4.5–5.9)
WBC # BLD AUTO: 8.1 X10*3/UL (ref 4.4–11.3)

## 2024-09-16 PROCEDURE — 84134 ASSAY OF PREALBUMIN: CPT

## 2024-09-16 PROCEDURE — 80053 COMPREHEN METABOLIC PANEL: CPT

## 2024-09-16 PROCEDURE — 84478 ASSAY OF TRIGLYCERIDES: CPT

## 2024-09-16 PROCEDURE — G0299 HHS/HOSPICE OF RN EA 15 MIN: HCPCS

## 2024-09-16 PROCEDURE — 83735 ASSAY OF MAGNESIUM: CPT

## 2024-09-16 PROCEDURE — 85025 COMPLETE CBC W/AUTO DIFF WBC: CPT

## 2024-09-16 PROCEDURE — 84100 ASSAY OF PHOSPHORUS: CPT

## 2024-09-16 ASSESSMENT — ENCOUNTER SYMPTOMS
PERSON REPORTING PAIN: PATIENT
CHANGE IN APPETITE: UNCHANGED
DENIES PAIN: 1
APPETITE LEVEL: GOOD

## 2024-09-17 LAB
ALBUMIN SERPL BCP-MCNC: 4 G/DL (ref 3.4–5)
ALP SERPL-CCNC: 146 U/L (ref 33–136)
ALT SERPL W P-5'-P-CCNC: 61 U/L (ref 10–52)
ANION GAP SERPL CALC-SCNC: 14 MMOL/L (ref 10–20)
AST SERPL W P-5'-P-CCNC: 31 U/L (ref 9–39)
BILIRUB SERPL-MCNC: 0.5 MG/DL (ref 0–1.2)
BUN SERPL-MCNC: 23 MG/DL (ref 6–23)
CALCIUM SERPL-MCNC: 9.5 MG/DL (ref 8.6–10.3)
CHLORIDE SERPL-SCNC: 99 MMOL/L (ref 98–107)
CO2 SERPL-SCNC: 29 MMOL/L (ref 21–32)
CREAT SERPL-MCNC: 0.67 MG/DL (ref 0.5–1.3)
EGFRCR SERPLBLD CKD-EPI 2021: >90 ML/MIN/1.73M*2
GLUCOSE SERPL-MCNC: 92 MG/DL (ref 74–99)
MAGNESIUM SERPL-MCNC: 2.06 MG/DL (ref 1.6–2.4)
PHOSPHATE SERPL-MCNC: 4.1 MG/DL (ref 2.5–4.9)
POTASSIUM SERPL-SCNC: 4.2 MMOL/L (ref 3.5–5.3)
PREALB SERPL-MCNC: 40.9 MG/DL (ref 18–40)
PROT SERPL-MCNC: 7.4 G/DL (ref 6.4–8.2)
SODIUM SERPL-SCNC: 138 MMOL/L (ref 136–145)
TRIGL SERPL-MCNC: 67 MG/DL (ref 0–149)

## 2024-09-18 ENCOUNTER — PATIENT MESSAGE (OUTPATIENT)
Dept: PRIMARY CARE | Facility: CLINIC | Age: 59
End: 2024-09-18
Payer: COMMERCIAL

## 2024-09-18 ENCOUNTER — HOME INFUSION (OUTPATIENT)
Dept: INFUSION THERAPY | Age: 59
End: 2024-09-18
Payer: COMMERCIAL

## 2024-09-18 DIAGNOSIS — K57.30 DIVERTICULOSIS OF LARGE INTESTINE WITHOUT HEMORRHAGE: ICD-10-CM

## 2024-09-18 NOTE — PROGRESS NOTES
Chart review - Bereket Em is a 58 y.o. patient on home care for dehydration 2/2 enteroatmospheric fistulas receiving daily TPN with MVI and LR.     Dr. Perry following     Labs reviewed and generally unremarkable.     Discussed TPN weaning strategy with MD. Rec'd orders from Dr Perry to decrease current formula by 50% and reduce infusion period to 8 hours. No changes in hydration. Will send x1 week and then check with MD. Orders updated in Epic, gold copy to intake.    [facility] spoke with patient and relayed orders. Understands plan and will start new infusions on Thursday. Agrees to delivery this evening with all supplies.    New CADD pump to be sent with this delivery.    Dispense 7x TPN with MVI and 7x 1L LR for cmpd and delivery on 9/158   Infusions 9/19 through 9/25    Follow up 09/24 with MD on plan of care for both TPN and LR...Mix for Wednesday delivery as appropriate. Patient has PICC.

## 2024-09-19 ENCOUNTER — HOME CARE VISIT (OUTPATIENT)
Dept: HOME HEALTH SERVICES | Facility: HOME HEALTH | Age: 59
End: 2024-09-19
Payer: COMMERCIAL

## 2024-09-19 VITALS
RESPIRATION RATE: 16 BRPM | TEMPERATURE: 97.7 F | SYSTOLIC BLOOD PRESSURE: 128 MMHG | HEART RATE: 86 BPM | DIASTOLIC BLOOD PRESSURE: 70 MMHG

## 2024-09-19 PROCEDURE — G0299 HHS/HOSPICE OF RN EA 15 MIN: HCPCS

## 2024-09-19 ASSESSMENT — ENCOUNTER SYMPTOMS
PERSON REPORTING PAIN: PATIENT
SKIN LESIONS: 1
PAIN: 1
PAIN LOCATION - PAIN QUALITY: BURNING
PAIN LOCATION: ABDOMEN

## 2024-09-19 NOTE — HOME HEALTH
recieved ordered vashe   reports changed pouch Monday and tuesday due to leaks.    cut pouch to fit. paste all the way around. .  after powder and skin barrier film . umbilicus very sore. so covered with brava protective sheet to try and protect it a bit more. folded over a large moldable ring and used along the bottom.  prior to placing pouch. then added some additional paste to fill in the gaps. the wound is almos healed. however the large ECF continues to be putting out a great deal.   once pouch was applied. then he applied heating pad. for a few minutes.   left abd colostomy with no output. repouched   patient tolerted well,   [email or web address]   he is now working from home a few hours a day.   picc site without concerns.

## 2024-09-22 ENCOUNTER — HOME CARE VISIT (OUTPATIENT)
Dept: HOME HEALTH SERVICES | Facility: HOME HEALTH | Age: 59
End: 2024-09-22
Payer: COMMERCIAL

## 2024-09-22 ENCOUNTER — HOME INFUSION (OUTPATIENT)
Dept: INFUSION THERAPY | Age: 59
End: 2024-09-22
Payer: COMMERCIAL

## 2024-09-22 NOTE — PROGRESS NOTES
[facility] rec'd secure chat from RN stating pt had issues with pump alarming multiple times every night. She is requesting another pump be sent out.    [facility] spoke with pt, confirmed need for new pump delivered by 5-6 pm tonight. He is agreeable to waiting until regular Wednesday delivery day to  current pumps (states he has 2).    FU for Wed mix/delivery as planned, arrange for pump pickups

## 2024-09-23 ENCOUNTER — HOME CARE VISIT (OUTPATIENT)
Dept: HOME HEALTH SERVICES | Facility: HOME HEALTH | Age: 59
End: 2024-09-23
Payer: COMMERCIAL

## 2024-09-23 ENCOUNTER — LAB (OUTPATIENT)
Dept: LAB | Facility: LAB | Age: 59
End: 2024-09-23
Payer: COMMERCIAL

## 2024-09-23 VITALS
SYSTOLIC BLOOD PRESSURE: 124 MMHG | DIASTOLIC BLOOD PRESSURE: 86 MMHG | RESPIRATION RATE: 18 BRPM | TEMPERATURE: 98.3 F | HEART RATE: 88 BPM

## 2024-09-23 DIAGNOSIS — K57.30 DIVERTICULOSIS OF LARGE INTESTINE WITHOUT HEMORRHAGE: ICD-10-CM

## 2024-09-23 LAB
ALBUMIN SERPL BCP-MCNC: 4.5 G/DL (ref 3.4–5)
ALP SERPL-CCNC: 196 U/L (ref 33–136)
ALT SERPL W P-5'-P-CCNC: 84 U/L (ref 10–52)
ANION GAP SERPL CALC-SCNC: 15 MMOL/L (ref 10–20)
AST SERPL W P-5'-P-CCNC: 41 U/L (ref 9–39)
BASOPHILS # BLD AUTO: 0.07 X10*3/UL (ref 0–0.1)
BASOPHILS NFR BLD AUTO: 0.8 %
BILIRUB SERPL-MCNC: 0.6 MG/DL (ref 0–1.2)
BUN SERPL-MCNC: 21 MG/DL (ref 6–23)
CALCIUM SERPL-MCNC: 10.1 MG/DL (ref 8.6–10.3)
CHLORIDE SERPL-SCNC: 97 MMOL/L (ref 98–107)
CO2 SERPL-SCNC: 28 MMOL/L (ref 21–32)
CREAT SERPL-MCNC: 0.73 MG/DL (ref 0.5–1.3)
EGFRCR SERPLBLD CKD-EPI 2021: >90 ML/MIN/1.73M*2
EOSINOPHIL # BLD AUTO: 0.29 X10*3/UL (ref 0–0.7)
EOSINOPHIL NFR BLD AUTO: 3.2 %
ERYTHROCYTE [DISTWIDTH] IN BLOOD BY AUTOMATED COUNT: 16.3 % (ref 11.5–14.5)
GLUCOSE SERPL-MCNC: 83 MG/DL (ref 74–99)
HCT VFR BLD AUTO: 42 % (ref 41–52)
HGB BLD-MCNC: 12.9 G/DL (ref 13.5–17.5)
IMM GRANULOCYTES # BLD AUTO: 0.02 X10*3/UL (ref 0–0.7)
IMM GRANULOCYTES NFR BLD AUTO: 0.2 % (ref 0–0.9)
LYMPHOCYTES # BLD AUTO: 2.91 X10*3/UL (ref 1.2–4.8)
LYMPHOCYTES NFR BLD AUTO: 32 %
MAGNESIUM SERPL-MCNC: 2.02 MG/DL (ref 1.6–2.4)
MCH RBC QN AUTO: 24.1 PG (ref 26–34)
MCHC RBC AUTO-ENTMCNC: 30.7 G/DL (ref 32–36)
MCV RBC AUTO: 79 FL (ref 80–100)
MONOCYTES # BLD AUTO: 0.69 X10*3/UL (ref 0.1–1)
MONOCYTES NFR BLD AUTO: 7.6 %
NEUTROPHILS # BLD AUTO: 5.1 X10*3/UL (ref 1.2–7.7)
NEUTROPHILS NFR BLD AUTO: 56.2 %
NRBC BLD-RTO: 0 /100 WBCS (ref 0–0)
PHOSPHATE SERPL-MCNC: 4.1 MG/DL (ref 2.5–4.9)
PLATELET # BLD AUTO: 189 X10*3/UL (ref 150–450)
POTASSIUM SERPL-SCNC: 4 MMOL/L (ref 3.5–5.3)
PROT SERPL-MCNC: 7.6 G/DL (ref 6.4–8.2)
RBC # BLD AUTO: 5.35 X10*6/UL (ref 4.5–5.9)
SODIUM SERPL-SCNC: 136 MMOL/L (ref 136–145)
TRIGL SERPL-MCNC: 126 MG/DL (ref 0–149)
WBC # BLD AUTO: 9.1 X10*3/UL (ref 4.4–11.3)

## 2024-09-23 PROCEDURE — 83735 ASSAY OF MAGNESIUM: CPT

## 2024-09-23 PROCEDURE — 80053 COMPREHEN METABOLIC PANEL: CPT

## 2024-09-23 PROCEDURE — 84100 ASSAY OF PHOSPHORUS: CPT

## 2024-09-23 PROCEDURE — 84478 ASSAY OF TRIGLYCERIDES: CPT

## 2024-09-23 PROCEDURE — 85025 COMPLETE CBC W/AUTO DIFF WBC: CPT

## 2024-09-23 PROCEDURE — G0299 HHS/HOSPICE OF RN EA 15 MIN: HCPCS

## 2024-09-23 PROCEDURE — 84134 ASSAY OF PREALBUMIN: CPT

## 2024-09-23 ASSESSMENT — ENCOUNTER SYMPTOMS
APPETITE LEVEL: GOOD
PERSON REPORTING PAIN: PATIENT
DENIES PAIN: 1
CHANGE IN APPETITE: UNCHANGED

## 2024-09-24 ENCOUNTER — HOME INFUSION (OUTPATIENT)
Dept: INFUSION THERAPY | Age: 59
End: 2024-09-24
Payer: COMMERCIAL

## 2024-09-24 LAB — PREALB SERPL-MCNC: 39.3 MG/DL (ref 18–40)

## 2024-09-24 NOTE — PROGRESS NOTES
SPOKE WITH DR. UREÑA TODAY AND THEY HAVE BEEN WEANING PATIENT OFF TPN, HE WOULD LIKE TO STOP IT NOW.  PATIENT HAS A VIRTUAL APPT. WITH HIM ON THURSDAY 9/26 AND THEY WILL DISCUSS THE LR AND IF HE WILL CONTINUE THIS OR NOT AND SEE HOW IS IS W/O TPN.   WILL CALL PHARMACY THURSDAY AND LET US KNOW WHAT THEY DECIDE ON WHAT TO DO WITH LR INFUSIONS.    RPH TO F/U ON THURSDAY. ESME

## 2024-09-25 ENCOUNTER — PATIENT OUTREACH (OUTPATIENT)
Dept: PRIMARY CARE | Facility: CLINIC | Age: 59
End: 2024-09-25
Payer: COMMERCIAL

## 2024-09-26 ENCOUNTER — APPOINTMENT (OUTPATIENT)
Dept: SURGERY | Facility: CLINIC | Age: 59
End: 2024-09-26
Payer: COMMERCIAL

## 2024-09-26 ENCOUNTER — HOME CARE VISIT (OUTPATIENT)
Dept: HOME HEALTH SERVICES | Facility: HOME HEALTH | Age: 59
End: 2024-09-26
Payer: COMMERCIAL

## 2024-09-26 ENCOUNTER — HOME INFUSION (OUTPATIENT)
Dept: INFUSION THERAPY | Age: 59
End: 2024-09-26

## 2024-09-26 VITALS
DIASTOLIC BLOOD PRESSURE: 70 MMHG | RESPIRATION RATE: 16 BRPM | HEART RATE: 60 BPM | TEMPERATURE: 97.8 F | SYSTOLIC BLOOD PRESSURE: 122 MMHG

## 2024-09-26 DIAGNOSIS — K63.2 ENTEROCUTANEOUS FISTULA: ICD-10-CM

## 2024-09-26 DIAGNOSIS — K63.2 ENTEROCUTANEOUS FISTULA: Primary | ICD-10-CM

## 2024-09-26 DIAGNOSIS — Z98.890 POST-OPERATIVE STATE: ICD-10-CM

## 2024-09-26 PROCEDURE — 99024 POSTOP FOLLOW-UP VISIT: CPT | Performed by: SURGERY

## 2024-09-26 PROCEDURE — G0299 HHS/HOSPICE OF RN EA 15 MIN: HCPCS

## 2024-09-26 RX ORDER — SODIUM CHLORIDE, SODIUM LACTATE, POTASSIUM CHLORIDE, CALCIUM CHLORIDE 600; 310; 30; 20 MG/100ML; MG/100ML; MG/100ML; MG/100ML
1000 INJECTION, SOLUTION INTRAVENOUS NIGHTLY
Qty: 30000 ML | Refills: 0 | Status: SHIPPED
Start: 2024-09-26 | End: 2024-10-26

## 2024-09-26 ASSESSMENT — ENCOUNTER SYMPTOMS
APPETITE LEVEL: FAIR
SKIN LESIONS: 1
PERSON REPORTING PAIN: PATIENT
PAIN LOCATION: ABDOMEN
PAIN: 1
PAIN LOCATION - PAIN QUALITY: BURNING

## 2024-09-26 NOTE — PROGRESS NOTES
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   This was a telephone visit. ([phone])  He is doing well. He has returned to working 3-6 hours a day at home. He may return for some in person work soon.    He is maintaining his weight at 181 pounds based on his home scale.  He reports a good appetite and is eating 2-3 times a day.  He has not tried smaller, more frequent meals in a while.  He reports about 1 liter of ostomy output per day and that this increases if he eats more  He remains on unchanged antimotility agents taking over the counter liquid Imodium, Lomotil and a PPI.   His urine is yellow and he reports no dizziness or dry tongue.  His labs show good nutrition and renal function (alb 4.5, prealbumin 39, Cr 0.7).   His TPN has been weaned to off but he continues with supplemental IVFs.   His nocturia has improved during the TPN wean.  He still has some difficulty with sleep.  He was started on Atarax by his PCP and follows up with her tomorrow.   His outpatient wound photos were reviewed and the wound continues to heal.    Plan for:  -Fistula management: Continue with current outpatient wound care.  With further wound granulation, the wound may require less pouching in the future.  I discussed the involvement of Dr. Morfin with colorectal surgery to assist with his colostomy takedown and will help to coordinate that appointment.   -Nutrition/hydration: Will follow the trial of discontinuation of TPN with labs and weight monitoring for the next 2 weeks.  He is aware that if his nutrition and weight decline that TPN may need to be restarted.  During this trial, will maintain supplemental IVFs but if successful, then will decrease frequency of IVFs after the next visit.  Will discuss with pharmacy ([phone]).  Smaller, more frequent meals (6 small per  day) was encouraged to decrease fistula output.  Maintain current PPI, Imodium and Lomotil.   -Activity: Ad kathryn; no weight restrictions.   -Work: Now working from home and may start going in to work.   -Sleep and restless legs: Per PCP. TPN off may help.   -Follow up: Plan for telephone visit on 10/10.

## 2024-09-26 NOTE — PROGRESS NOTES
Patient had virtual appt with Dr Perry - TPN to stop - 1LR IV daily to continue through follow up appt on 10/10  Weekly labs to continue    Telephone call with patient - aware ov above - has x1 bag of LR in the home for infusion on 9/26  Patient states he tolerates PO intake but has a lot of output through colostomy.  Advise to monitor weight and po intake with goal to prevent dehydration or weight loss - patient verbalized understanding    Dispense x14 1LR for delivery on 9/27  Infusions 9/28 through 10/10  With supplies to match  Please  3 pumps in the home - patient states he will be home all day on 9/27 - have  call when on the way    POC 10/10 after appt

## 2024-09-26 NOTE — HOME HEALTH
reports changed pouch  tuesday middle of the night due to burning. .   cut pouch to fit. paste all the way around. . after powder and skin barrier film . umbilicus very sore. so covered with brava protective sheet to try and protect it a bit more. folded over a large moldable ring and used along the bottom. prior to placing pouch. then added some additional paste to fill in the gaps. the wound is almost healed. however the large ECF continues to be putting out a great deal.   once pouch was applied. then he applied heating pad. for a few minutes.   left abd colostomy with no output. repouched   patient tolerted well,   he is off TPN eating food. so consequently more coming out of ECF. him and his wife have been changing.   he spoke with  today and Dr. Morfin will be part of the operating team.   [email or web address]   he is now working from home a few hours a day.   picc site without concerns. rt brachial.

## 2024-09-27 ENCOUNTER — APPOINTMENT (OUTPATIENT)
Dept: PRIMARY CARE | Facility: CLINIC | Age: 59
End: 2024-09-27
Payer: COMMERCIAL

## 2024-09-27 DIAGNOSIS — G47.9 SLEEP DISORDER: ICD-10-CM

## 2024-09-27 RX ORDER — HYDROXYZINE HYDROCHLORIDE 25 MG/1
25 TABLET, FILM COATED ORAL NIGHTLY
Qty: 90 TABLET | Refills: 0 | Status: SHIPPED | OUTPATIENT
Start: 2024-09-27 | End: 2024-12-26

## 2024-09-30 ENCOUNTER — HOME CARE VISIT (OUTPATIENT)
Dept: HOME HEALTH SERVICES | Facility: HOME HEALTH | Age: 59
End: 2024-09-30
Payer: COMMERCIAL

## 2024-09-30 VITALS
OXYGEN SATURATION: 98 % | HEART RATE: 64 BPM | RESPIRATION RATE: 16 BRPM | DIASTOLIC BLOOD PRESSURE: 70 MMHG | SYSTOLIC BLOOD PRESSURE: 120 MMHG | TEMPERATURE: 98.2 F

## 2024-09-30 PROCEDURE — 84478 ASSAY OF TRIGLYCERIDES: CPT

## 2024-09-30 PROCEDURE — 85025 COMPLETE CBC W/AUTO DIFF WBC: CPT

## 2024-09-30 PROCEDURE — 84134 ASSAY OF PREALBUMIN: CPT

## 2024-09-30 PROCEDURE — 80053 COMPREHEN METABOLIC PANEL: CPT

## 2024-09-30 PROCEDURE — 83735 ASSAY OF MAGNESIUM: CPT

## 2024-09-30 PROCEDURE — 84100 ASSAY OF PHOSPHORUS: CPT

## 2024-09-30 PROCEDURE — G0299 HHS/HOSPICE OF RN EA 15 MIN: HCPCS

## 2024-10-02 ASSESSMENT — ENCOUNTER SYMPTOMS
PERSON REPORTING PAIN: PATIENT
PAIN: 1
PAIN LOCATION: ABDOMEN
PAIN LOCATION - PAIN SEVERITY: 3/10
CHANGE IN APPETITE: UNCHANGED
APPETITE LEVEL: GOOD
MUSCLE WEAKNESS: 1

## 2024-10-02 ASSESSMENT — ACTIVITIES OF DAILY LIVING (ADL)
OASIS_M1830: 05
ENTERING_EXITING_HOME: SUPERVISION

## 2024-10-03 ENCOUNTER — HOME CARE VISIT (OUTPATIENT)
Dept: HOME HEALTH SERVICES | Facility: HOME HEALTH | Age: 59
End: 2024-10-03
Payer: COMMERCIAL

## 2024-10-03 VITALS
RESPIRATION RATE: 16 BRPM | HEART RATE: 98 BPM | TEMPERATURE: 97.4 F | SYSTOLIC BLOOD PRESSURE: 120 MMHG | DIASTOLIC BLOOD PRESSURE: 70 MMHG

## 2024-10-03 PROCEDURE — G0299 HHS/HOSPICE OF RN EA 15 MIN: HCPCS

## 2024-10-03 ASSESSMENT — ENCOUNTER SYMPTOMS
APPETITE LEVEL: GOOD
PAIN LOCATION - PAIN QUALITY: BURNING
PAIN LOCATION: ABDOMEN
HIGHEST PAIN SEVERITY IN PAST 24 HOURS: 9/10
LOWEST PAIN SEVERITY IN PAST 24 HOURS: 6/10
PAIN: 1
PAIN SEVERITY GOAL: 1/10
SKIN LESIONS: 1

## 2024-10-03 NOTE — CASE COMMUNICATION
Saw patient for you .   doing ok. supplies ordered. he has follow up with GI surgeon coming up. hopefully he will get some good news about ECF repair.     Quinten

## 2024-10-03 NOTE — HOME HEALTH
reports changed pouch  saturday and monday  was buring yesterday was going to do it but waited until today. due to supply concern.     cut pouch to fit. paste all the way around.  let sit.    umbilicus  and perifistuala very sore related to leakage  so covered with brava protective sheet  after powder and skin barrier film  to try and protect it and the area that is so  tender.  placed pouch  then added some additional paste to fill in the gaps of the pouch and the skin . the wound is almost healed. however the large ECF continues to be putting out a great deal.  patient is no longer on TPN. hydration daily 1000ml.   once pouch was applied. then he applied heating pad. for a few minutes.   left abd colostomy with no output. repouched   patient tolerted OK  denuded skin very painful today.   supplies ordered but will not ship until the 12 .   [email or web address]   he is now working from home a few hours a day.   picc site without concerns. rt brachial.

## 2024-10-03 NOTE — CASE COMMUNICATION
Dr. Morfin,     I understand you’ll be seeing Mr. Em soon. He has a significant ECF that we've been managing with  wound manager pouches that last 1 to 2 days, as he is now eating. Unfortunately, the drainage is undermining the pouch and causing severe burning. There are photos in the media for reference.  Back in August, there seemed to be two fistulas, which appeared to combine or emerge from under the wound in September; [facility] photos are included as well. We’re doing everything possible to maintain a good seal and protect his skin. The wound itself is nearly healed, so we're hopeful he will receive some positive news during your consultation.       Simply FYI.   Quinten Navarro, GOKULN, RN, CWOCN  [phone]

## 2024-10-03 NOTE — HOME HEALTH
supplies ordere via [employer]   will process on 10 12 2024  he has a copay of 181.xx   3 boxes of pouches.  72867   1  bottle vashe  3 9127 remover   1 box 1777

## 2024-10-07 ENCOUNTER — LAB REQUISITION (OUTPATIENT)
Dept: LAB | Facility: LAB | Age: 59
End: 2024-10-07
Payer: COMMERCIAL

## 2024-10-07 ENCOUNTER — HOME CARE VISIT (OUTPATIENT)
Dept: HOME HEALTH SERVICES | Facility: HOME HEALTH | Age: 59
End: 2024-10-07
Payer: COMMERCIAL

## 2024-10-07 VITALS
RESPIRATION RATE: 16 BRPM | OXYGEN SATURATION: 98 % | TEMPERATURE: 98.6 F | SYSTOLIC BLOOD PRESSURE: 118 MMHG | HEART RATE: 88 BPM | DIASTOLIC BLOOD PRESSURE: 70 MMHG

## 2024-10-07 DIAGNOSIS — T81.31XA DISRUPTION OF EXTERNAL OPERATION (SURGICAL) WOUND, NOT ELSEWHERE CLASSIFIED, INITIAL ENCOUNTER: ICD-10-CM

## 2024-10-07 DIAGNOSIS — T84.410A: ICD-10-CM

## 2024-10-07 DIAGNOSIS — B95.62 METHICILLIN RESISTANT STAPHYLOCOCCUS AUREUS INFECTION AS THE CAUSE OF DISEASES CLASSIFIED ELSEWHERE: ICD-10-CM

## 2024-10-07 LAB
ALBUMIN SERPL BCP-MCNC: 4.3 G/DL (ref 3.4–5)
ALP SERPL-CCNC: 229 U/L (ref 33–136)
ALT SERPL W P-5'-P-CCNC: 142 U/L (ref 10–52)
ANION GAP SERPL CALCULATED.3IONS-SCNC: 13 MMOL/L (ref 10–20)
AST SERPL W P-5'-P-CCNC: 61 U/L (ref 9–39)
BASOPHILS # BLD AUTO: 0.04 X10*3/UL (ref 0–0.1)
BASOPHILS NFR BLD AUTO: 0.5 %
BILIRUB SERPL-MCNC: 0.6 MG/DL (ref 0–1.2)
BUN SERPL-MCNC: 17 MG/DL (ref 6–23)
CALCIUM SERPL-MCNC: 9.7 MG/DL (ref 8.6–10.3)
CHLORIDE SERPL-SCNC: 97 MMOL/L (ref 98–107)
CO2 SERPL-SCNC: 32 MMOL/L (ref 21–32)
CREAT SERPL-MCNC: 0.84 MG/DL (ref 0.5–1.3)
EGFRCR SERPLBLD CKD-EPI 2021: >90 ML/MIN/1.73M*2
EOSINOPHIL # BLD AUTO: 0.24 X10*3/UL (ref 0–0.7)
EOSINOPHIL NFR BLD AUTO: 3.3 %
ERYTHROCYTE [DISTWIDTH] IN BLOOD BY AUTOMATED COUNT: 15.8 % (ref 11.5–14.5)
GLUCOSE SERPL-MCNC: 101 MG/DL (ref 74–99)
HCT VFR BLD AUTO: 42.4 % (ref 41–52)
HGB BLD-MCNC: 13.2 G/DL (ref 13.5–17.5)
IMM GRANULOCYTES # BLD AUTO: 0.02 X10*3/UL (ref 0–0.7)
IMM GRANULOCYTES NFR BLD AUTO: 0.3 % (ref 0–0.9)
LYMPHOCYTES # BLD AUTO: 1.83 X10*3/UL (ref 1.2–4.8)
LYMPHOCYTES NFR BLD AUTO: 24.9 %
MAGNESIUM SERPL-MCNC: 1.78 MG/DL (ref 1.6–2.4)
MCH RBC QN AUTO: 24.4 PG (ref 26–34)
MCHC RBC AUTO-ENTMCNC: 31.1 G/DL (ref 32–36)
MCV RBC AUTO: 79 FL (ref 80–100)
MONOCYTES # BLD AUTO: 0.61 X10*3/UL (ref 0.1–1)
MONOCYTES NFR BLD AUTO: 8.3 %
NEUTROPHILS # BLD AUTO: 4.62 X10*3/UL (ref 1.2–7.7)
NEUTROPHILS NFR BLD AUTO: 62.7 %
NRBC BLD-RTO: 0 /100 WBCS (ref 0–0)
PHOSPHATE SERPL-MCNC: 3.8 MG/DL (ref 2.5–4.9)
PLATELET # BLD AUTO: 257 X10*3/UL (ref 150–450)
POTASSIUM SERPL-SCNC: 3.9 MMOL/L (ref 3.5–5.3)
PROT SERPL-MCNC: 7.4 G/DL (ref 6.4–8.2)
RBC # BLD AUTO: 5.4 X10*6/UL (ref 4.5–5.9)
SODIUM SERPL-SCNC: 138 MMOL/L (ref 136–145)
TRIGL SERPL-MCNC: 138 MG/DL (ref 0–149)
WBC # BLD AUTO: 7.4 X10*3/UL (ref 4.4–11.3)

## 2024-10-07 PROCEDURE — 85025 COMPLETE CBC W/AUTO DIFF WBC: CPT

## 2024-10-07 PROCEDURE — 83735 ASSAY OF MAGNESIUM: CPT

## 2024-10-07 PROCEDURE — 84478 ASSAY OF TRIGLYCERIDES: CPT

## 2024-10-07 PROCEDURE — 80053 COMPREHEN METABOLIC PANEL: CPT

## 2024-10-07 PROCEDURE — G0299 HHS/HOSPICE OF RN EA 15 MIN: HCPCS

## 2024-10-07 PROCEDURE — 84100 ASSAY OF PHOSPHORUS: CPT

## 2024-10-07 PROCEDURE — 84134 ASSAY OF PREALBUMIN: CPT

## 2024-10-08 LAB — PREALB SERPL-MCNC: 36.3 MG/DL (ref 18–40)

## 2024-10-10 ENCOUNTER — HOME CARE VISIT (OUTPATIENT)
Dept: HOME HEALTH SERVICES | Facility: HOME HEALTH | Age: 59
End: 2024-10-10
Payer: COMMERCIAL

## 2024-10-10 ENCOUNTER — APPOINTMENT (OUTPATIENT)
Dept: SURGERY | Facility: CLINIC | Age: 59
End: 2024-10-10
Payer: COMMERCIAL

## 2024-10-10 ENCOUNTER — HOME INFUSION (OUTPATIENT)
Dept: INFUSION THERAPY | Age: 59
End: 2024-10-10

## 2024-10-10 VITALS
HEART RATE: 55 BPM | TEMPERATURE: 98.2 F | SYSTOLIC BLOOD PRESSURE: 130 MMHG | RESPIRATION RATE: 12 BRPM | DIASTOLIC BLOOD PRESSURE: 70 MMHG

## 2024-10-10 DIAGNOSIS — K63.2 ENTEROCUTANEOUS FISTULA: Primary | ICD-10-CM

## 2024-10-10 DIAGNOSIS — K63.2 ENTEROCUTANEOUS FISTULA: ICD-10-CM

## 2024-10-10 PROCEDURE — 99024 POSTOP FOLLOW-UP VISIT: CPT | Performed by: SURGERY

## 2024-10-10 PROCEDURE — G0299 HHS/HOSPICE OF RN EA 15 MIN: HCPCS

## 2024-10-10 ASSESSMENT — ENCOUNTER SYMPTOMS
SKIN LESIONS: 1
APPETITE LEVEL: GOOD
PAIN LOCATION - PAIN SEVERITY: 3/10
PERSON REPORTING PAIN: PATIENT
PAIN LOCATION: ABDOMEN
PAIN: 1
ABDOMINAL PAIN: 1
DENIES PAIN: 1
APPETITE LEVEL: GOOD
MUSCLE WEAKNESS: 1
PERSON REPORTING PAIN: PATIENT
CHANGE IN APPETITE: UNCHANGED

## 2024-10-10 NOTE — CASE COMMUNICATION
Saw patient for you again. doing ok .spoke with  this am. hopeing to get something going with  the surgical and wound team.     I will follolw up next week again.

## 2024-10-10 NOTE — CASE COMMUNICATION
Dr. Perry,     I am the home care wound and osotmy nurse for .   I undertand you spoke with Mr. Em this morning and are working on a plan going forward for him.. I sent this message to Dr. Morfin  last week when I was here. I though I copied you .  they are managing bur continue to be frustrated at the pouching issues with the large fistula and wound manager.  we continue to do the best we can to keep his skin healthy, bu t since he is eating now and no more TPN it remains a challenge. he is not interested in returning to TPN.     I did request a samlpe of ostoform to try to make a sliding board in essence for the stool to come out from the fistula. we have tried many things and stick with what works the best.           : Quinten Navarro, RN (Registered Nurse)                Dr. Morfin,   I understand you’ll be seeing Mr. Em soon. He has a  significant ECF that we've been managing with wound manager pouches that last 1 to 2 days, as he is now eating. Unfortunately, the drainage is undermining the pouch and causing severe burning. There are photos in the media for reference.  Back in August, there seemed to be two fistulas, which appeared to combine or emerge from under the wound in September; [facility] photos are included as well. We’re doing everything possible to maintain a  good seal and protect his skin. The wound itself is nearly healed, so we're hopeful he will receive some positive news during your consultation.    Simply FYI.  Quinten Navarro, BSN, RN, CWOCN  [phone]

## 2024-10-10 NOTE — PROGRESS NOTES
Patient had virtual appt with Dr Perry - doing well off TPN - continue LR through 10/24. Weekly labs to continue. Orders already written through 10/26. MD will start seeing patient in clinic soon for wound care issues.    Telephone call with patient - aware of above - has x1 bag of LR in the home for infusion on 10/10  Agreeable to delivery any time today of two weeks of LR bags with all supplies to match.      Dispense x14 1LR for delivery on 10/10  Infusions 10/11 through 10/24  With supplies to match     POC 10/24 if appointment, otherwise check with patient

## 2024-10-10 NOTE — PROGRESS NOTES
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   This was a telephone visit. ([phone])  He is doing well. He has returned to working from home without problems.    His weight is 178 from 181 based on his home scale.  His ostomy output is grossly unchanged. He reports forceful output at times which may be related to what he eats. He remains on unchanged antimotility agents taking over the counter liquid Imodium, Lomotil and a PPI.   His urine is a bit dark.  He does not feel dehydrated. He reports no dizziness or orthostasis.  His labs show good nutrition and renal function (alb 4.3, prealbumin 36, Cr 0.8).   He continues with supplemental IVFs of 1L daily.   His sleep has improved.   His outpatient wound photos were reviewed, and there is increased redness/fungal changes around the wound edge.  He reports some irritation from the redness/fungal changes.   Plan for:  -Fistula management:  His wound care nurse comes to his home today to assess his wound.  Given the redness and irritation, I will coordinate him being seen in clinic for a wound assessment soon.  He plans to see Dr. Morfin next week.   -Nutrition/hydration: Will follow the trial of discontinuation of TPN as labs are stable but weight is a bit decreased.  He is aware that if his nutrition and weight decline that TPN may need to be restarted.  His Cr is stable, he does not feel dehydrated and has no orthostatic changes but his urine is dark.  Will hold on any further changes now as will prioritize wound assessment first.  Will discuss with pharmacy ([phone]).  Maintain current PPI, Imodium and Lomotil.   -Activity: Ad kathryn; no weight restrictions.   -Work: As tolerated.   -Sleep difficulty: Resolved.   -Follow up: Plan for clinic visit for wound assessment.

## 2024-10-10 NOTE — HOME HEALTH
reports changed pouch yesterday , and it felt good currently however he wanted to change it again today to enusre photos were in chart. skin much improved today. has 4 remaining pouches in the home.     cut pouch to fit. paste all the way around. let sit.     today 4 in ring folded in half an put along the bottom of the fistula. added strip paste from os to help fill in gap all after powder, skin barrier film and paste in crevices.    the wound is almost healed. however the large ECF continues to be putting out a great deal.    hydration daily 1000ml.   once pouch was applied. added some extra paste inside to fillin the gaps and then added a 1 in wide piece of brava protective sheet to try to lift the left side os to allow stool to go into the pouch.   steffany placed heating pad. for a few minutes.   left abd colostomy with no output. repouched  with a stoma cap since not putting any thing out.     patient tolerted OK   supplies ordered but will not ship until the 12 .  4 remiaing pouches in the home.   [email or web address]   picc site without concerns. rt brachial.  left 10 stoma caps.

## 2024-10-14 ENCOUNTER — LAB REQUISITION (OUTPATIENT)
Dept: LAB | Facility: HOSPITAL | Age: 59
End: 2024-10-14
Payer: COMMERCIAL

## 2024-10-14 ENCOUNTER — HOME CARE VISIT (OUTPATIENT)
Dept: HOME HEALTH SERVICES | Facility: HOME HEALTH | Age: 59
End: 2024-10-14
Payer: COMMERCIAL

## 2024-10-14 VITALS
SYSTOLIC BLOOD PRESSURE: 122 MMHG | RESPIRATION RATE: 16 BRPM | DIASTOLIC BLOOD PRESSURE: 70 MMHG | OXYGEN SATURATION: 96 % | TEMPERATURE: 97.1 F | HEART RATE: 68 BPM

## 2024-10-14 DIAGNOSIS — B95.62 METHICILLIN RESISTANT STAPHYLOCOCCUS AUREUS INFECTION AS THE CAUSE OF DISEASES CLASSIFIED ELSEWHERE: ICD-10-CM

## 2024-10-14 LAB
ALBUMIN SERPL BCP-MCNC: 4.5 G/DL (ref 3.4–5)
ALP SERPL-CCNC: 210 U/L (ref 33–136)
ALT SERPL W P-5'-P-CCNC: 148 U/L (ref 10–52)
ANION GAP SERPL CALCULATED.3IONS-SCNC: 14 MMOL/L (ref 10–20)
AST SERPL W P-5'-P-CCNC: 55 U/L (ref 9–39)
BASOPHILS # BLD AUTO: 0.06 X10*3/UL (ref 0–0.1)
BASOPHILS NFR BLD AUTO: 0.8 %
BILIRUB SERPL-MCNC: 0.5 MG/DL (ref 0–1.2)
BUN SERPL-MCNC: 17 MG/DL (ref 6–23)
CALCIUM SERPL-MCNC: 9.8 MG/DL (ref 8.6–10.3)
CHLORIDE SERPL-SCNC: 95 MMOL/L (ref 98–107)
CO2 SERPL-SCNC: 34 MMOL/L (ref 21–32)
CREAT SERPL-MCNC: 0.82 MG/DL (ref 0.5–1.3)
EGFRCR SERPLBLD CKD-EPI 2021: >90 ML/MIN/1.73M*2
EOSINOPHIL # BLD AUTO: 0.22 X10*3/UL (ref 0–0.7)
EOSINOPHIL NFR BLD AUTO: 3 %
ERYTHROCYTE [DISTWIDTH] IN BLOOD BY AUTOMATED COUNT: 15.3 % (ref 11.5–14.5)
GLUCOSE SERPL-MCNC: 117 MG/DL (ref 74–99)
HCT VFR BLD AUTO: 44 % (ref 41–52)
HGB BLD-MCNC: 13.6 G/DL (ref 13.5–17.5)
IMM GRANULOCYTES # BLD AUTO: 0.01 X10*3/UL (ref 0–0.7)
IMM GRANULOCYTES NFR BLD AUTO: 0.1 % (ref 0–0.9)
LYMPHOCYTES # BLD AUTO: 2.32 X10*3/UL (ref 1.2–4.8)
LYMPHOCYTES NFR BLD AUTO: 31.4 %
MAGNESIUM SERPL-MCNC: 1.72 MG/DL (ref 1.6–2.4)
MCH RBC QN AUTO: 23.9 PG (ref 26–34)
MCHC RBC AUTO-ENTMCNC: 30.9 G/DL (ref 32–36)
MCV RBC AUTO: 77 FL (ref 80–100)
MONOCYTES # BLD AUTO: 0.69 X10*3/UL (ref 0.1–1)
MONOCYTES NFR BLD AUTO: 9.3 %
NEUTROPHILS # BLD AUTO: 4.08 X10*3/UL (ref 1.2–7.7)
NEUTROPHILS NFR BLD AUTO: 55.4 %
NRBC BLD-RTO: 0 /100 WBCS (ref 0–0)
PHOSPHATE SERPL-MCNC: 3.4 MG/DL (ref 2.5–4.9)
PLATELET # BLD AUTO: 210 X10*3/UL (ref 150–450)
POTASSIUM SERPL-SCNC: 3.4 MMOL/L (ref 3.5–5.3)
PROT SERPL-MCNC: 7.6 G/DL (ref 6.4–8.2)
RBC # BLD AUTO: 5.7 X10*6/UL (ref 4.5–5.9)
SODIUM SERPL-SCNC: 140 MMOL/L (ref 136–145)
TRIGL SERPL-MCNC: 144 MG/DL (ref 0–149)
WBC # BLD AUTO: 7.4 X10*3/UL (ref 4.4–11.3)

## 2024-10-14 PROCEDURE — 80053 COMPREHEN METABOLIC PANEL: CPT

## 2024-10-14 PROCEDURE — 85025 COMPLETE CBC W/AUTO DIFF WBC: CPT

## 2024-10-14 PROCEDURE — 83735 ASSAY OF MAGNESIUM: CPT

## 2024-10-14 PROCEDURE — 84100 ASSAY OF PHOSPHORUS: CPT

## 2024-10-14 PROCEDURE — 84478 ASSAY OF TRIGLYCERIDES: CPT

## 2024-10-14 PROCEDURE — G0299 HHS/HOSPICE OF RN EA 15 MIN: HCPCS

## 2024-10-14 PROCEDURE — 84134 ASSAY OF PREALBUMIN: CPT

## 2024-10-15 ENCOUNTER — APPOINTMENT (OUTPATIENT)
Dept: SURGERY | Facility: CLINIC | Age: 59
End: 2024-10-15
Payer: COMMERCIAL

## 2024-10-15 LAB — PREALB SERPL-MCNC: 38 MG/DL (ref 18–40)

## 2024-10-17 ENCOUNTER — HOME CARE VISIT (OUTPATIENT)
Dept: HOME HEALTH SERVICES | Facility: HOME HEALTH | Age: 59
End: 2024-10-17
Payer: COMMERCIAL

## 2024-10-17 VITALS
DIASTOLIC BLOOD PRESSURE: 90 MMHG | HEART RATE: 60 BPM | RESPIRATION RATE: 16 BRPM | SYSTOLIC BLOOD PRESSURE: 140 MMHG | TEMPERATURE: 97.8 F

## 2024-10-17 DIAGNOSIS — R19.8 HIGH OUTPUT ILEOSTOMY (MULTI): Primary | ICD-10-CM

## 2024-10-17 DIAGNOSIS — Z93.2 HIGH OUTPUT ILEOSTOMY (MULTI): Primary | ICD-10-CM

## 2024-10-17 PROCEDURE — G0299 HHS/HOSPICE OF RN EA 15 MIN: HCPCS

## 2024-10-17 RX ORDER — DIPHENOXYLATE HYDROCHLORIDE AND ATROPINE SULFATE 2.5; .025 MG/1; MG/1
1 TABLET ORAL 4 TIMES DAILY PRN
Qty: 120 TABLET | Refills: 0 | Status: SHIPPED | OUTPATIENT
Start: 2024-10-17 | End: 2024-11-16

## 2024-10-17 ASSESSMENT — ENCOUNTER SYMPTOMS
SKIN LESIONS: 1
APPETITE LEVEL: FAIR

## 2024-10-17 NOTE — HOME HEALTH
SUPPLIES ORDERED VIA [employer]     10 TUBES OF ROSA PASTE  2 POWDER  1 BOX 2204  1 BOX 04678  2 BOX [id number]     HAS COPAY  OF $196.XX FOR ABOVE INFO.     STOMA CAPS.  NEEDS SIGNATURE FROM MD.   1 [address]OX [address] ROSA 30 EACH .   DEDUCTABLE OF $29.70 FOR THIS ONE.
6

## 2024-10-17 NOTE — HOME HEALTH
reports changing pouch every 2 to 3 days, recieved the ordered supplies.  however only 1 box of pouches. he called and was told the other box is coming from another warehouse.        cut wound manager  to fit. paste all the way around. let sit.       today 4 in ring folded in half an put along the bottom of the fistula. added strip paste from os to help fill in gap all after powder and skin barrier film and paste in crevices at 3 and 9 oclock     once pouch was applied. added some extra paste inside to fill in the gaps and then added a 1 in wide piece of brava protective sheet to try to lift the left side os to allow stool to go into the pouch.     patient placed heating pad. for a few minutes.     left abd colostomy with no output. repouched  with a stoma cap since not putting any thing out.    the wound is almost healed. however the large ECF continues to be putting out a great deal since he is eating. however he has lost 8 pounds.       hydration daily 1000ml      additional supplies ordered per patient request     [email or web address]     picc site without concerns. rt brachial.

## 2024-10-18 ENCOUNTER — OFFICE VISIT (OUTPATIENT)
Dept: PRIMARY CARE | Facility: CLINIC | Age: 59
End: 2024-10-18
Payer: COMMERCIAL

## 2024-10-18 VITALS
WEIGHT: 170 LBS | OXYGEN SATURATION: 95 % | HEIGHT: 68 IN | HEART RATE: 82 BPM | BODY MASS INDEX: 25.76 KG/M2 | SYSTOLIC BLOOD PRESSURE: 124 MMHG | DIASTOLIC BLOOD PRESSURE: 78 MMHG

## 2024-10-18 DIAGNOSIS — Z87.19 HISTORY OF DIVERTICULITIS: ICD-10-CM

## 2024-10-18 DIAGNOSIS — F51.01 PRIMARY INSOMNIA: Primary | ICD-10-CM

## 2024-10-18 DIAGNOSIS — K63.2 ENTEROCUTANEOUS FISTULA: ICD-10-CM

## 2024-10-18 PROCEDURE — 3008F BODY MASS INDEX DOCD: CPT

## 2024-10-18 PROCEDURE — 99214 OFFICE O/P EST MOD 30 MIN: CPT

## 2024-10-18 RX ORDER — TRAZODONE HYDROCHLORIDE 50 MG/1
50 TABLET ORAL NIGHTLY PRN
Qty: 90 TABLET | Refills: 0 | Status: SHIPPED | OUTPATIENT
Start: 2024-10-18 | End: 2025-10-18

## 2024-10-18 ASSESSMENT — ENCOUNTER SYMPTOMS
SLEEP DISTURBANCE: 1
FEVER: 0
CHILLS: 0

## 2024-10-18 ASSESSMENT — PAIN SCALES - GENERAL: PAINLEVEL_OUTOF10: 0-NO PAIN

## 2024-10-18 NOTE — PROGRESS NOTES
"Subjective   Patient ID: Bereket Em is a 59 y.o. male who presents for Follow-up (Sleep medication).    Hx of tobacco use, hx of other substance abuse in remission, elevated glucose (last A1c 5.5 1/2024)    History of perforated diverticulitis s/p ex lap and sigmoidectomy c/b necrotic fascia with takeback to OR to repeat ex lap and placement of bridging mesh. Developed EC fistula x 2 (6/5/24 - 7/11/254). Working with Dr. Perry (General Surgeon) and Aviva Costa RN in charge of Critical Care and Acute Care surgery. Prior initial hospitalization (6/5/24) was smoking 1PPD and drinking 3-6 drinks a day which correlates with pour wound healing, since hospital patient has not had single drink or cigarette. Patient wound healing well, and only fistula in central abdomen is left. Patient is finally off TPN and is maintaining Pre-albumin levels. Goal is full wound healing before they can reverse ostomy, timeline is several months, possibly a year until this can be considered, however due to positive progression patient has follow Dr. Morfin to potentially reverse surgery earlier than expected. Patient would also to increase working, patient currently working 4 hours 3 days a week at home only. Would like to start going to office.    Insomnia: Patient tried Hydroxyzine 25 and 50 mg which does not help with sleep. Also taking melatonin which helps some. Has trouble falling and staying asleep. Has taken his wife's trazodone before which works well for patient.            Review of Systems   Constitutional:  Negative for chills and fever.   Psychiatric/Behavioral:  Positive for sleep disturbance.        Objective   /78   Pulse 82   Ht 1.727 m (5' 8\")   Wt 77.1 kg (170 lb)   SpO2 95%   BMI 25.85 kg/m²     Physical Exam  Constitutional:       General: He is not in acute distress.     Appearance: Normal appearance.   Cardiovascular:      Rate and Rhythm: Normal rate and regular rhythm.      Heart sounds: " No murmur heard.  Pulmonary:      Effort: Pulmonary effort is normal.      Breath sounds: Normal breath sounds. No wheezing, rhonchi or rales.   Abdominal:          Comments: Fistula present central abdomen, wound closed around fistula    Skin:     General: Skin is warm and dry.      Findings: No rash.   Neurological:      Mental Status: He is alert.   Psychiatric:         Mood and Affect: Mood and affect normal.         Assessment/Plan   Diagnoses and all orders for this visit:  Primary insomnia  -     traZODone (Desyrel) 50 mg tablet; Take 1 tablet (50 mg) by mouth as needed at bedtime for sleep.  Enterocutaneous fistula  History of diverticulitis  Patient will message on [employer] after seen general surgery next with plan and if he wants to increase work schedule and I will write letter for work restriction for patient at the time after reviewing specialist note.

## 2024-10-20 ASSESSMENT — ENCOUNTER SYMPTOMS
APPETITE LEVEL: GOOD
PAIN LOCATION - PAIN SEVERITY: 2/10
PAIN: 1
CHANGE IN APPETITE: UNCHANGED
MUSCLE WEAKNESS: 1
PERSON REPORTING PAIN: PATIENT
PAIN LOCATION: ABDOMEN

## 2024-10-21 ENCOUNTER — LAB REQUISITION (OUTPATIENT)
Dept: LAB | Facility: LAB | Age: 59
End: 2024-10-21
Payer: COMMERCIAL

## 2024-10-21 ENCOUNTER — HOME CARE VISIT (OUTPATIENT)
Dept: HOME HEALTH SERVICES | Facility: HOME HEALTH | Age: 59
End: 2024-10-21
Payer: COMMERCIAL

## 2024-10-21 DIAGNOSIS — T81.31XA DISRUPTION OF EXTERNAL OPERATION (SURGICAL) WOUND, NOT ELSEWHERE CLASSIFIED, INITIAL ENCOUNTER: ICD-10-CM

## 2024-10-21 DIAGNOSIS — B95.62 METHICILLIN RESISTANT STAPHYLOCOCCUS AUREUS INFECTION AS THE CAUSE OF DISEASES CLASSIFIED ELSEWHERE: ICD-10-CM

## 2024-10-21 DIAGNOSIS — T84.410A: ICD-10-CM

## 2024-10-21 LAB
ALBUMIN SERPL BCP-MCNC: 4.2 G/DL (ref 3.4–5)
ALP SERPL-CCNC: 252 U/L (ref 33–136)
ALT SERPL W P-5'-P-CCNC: 286 U/L (ref 10–52)
ANION GAP SERPL CALCULATED.3IONS-SCNC: 14 MMOL/L (ref 10–20)
AST SERPL W P-5'-P-CCNC: 139 U/L (ref 9–39)
BASOPHILS # BLD AUTO: 0.05 X10*3/UL (ref 0–0.1)
BASOPHILS NFR BLD AUTO: 0.7 %
BILIRUB SERPL-MCNC: 0.8 MG/DL (ref 0–1.2)
BUN SERPL-MCNC: 23 MG/DL (ref 6–23)
CALCIUM SERPL-MCNC: 10 MG/DL (ref 8.6–10.3)
CHLORIDE SERPL-SCNC: 97 MMOL/L (ref 98–107)
CO2 SERPL-SCNC: 32 MMOL/L (ref 21–32)
CREAT SERPL-MCNC: 0.86 MG/DL (ref 0.5–1.3)
EGFRCR SERPLBLD CKD-EPI 2021: >90 ML/MIN/1.73M*2
EOSINOPHIL # BLD AUTO: 0.16 X10*3/UL (ref 0–0.7)
EOSINOPHIL NFR BLD AUTO: 2.2 %
ERYTHROCYTE [DISTWIDTH] IN BLOOD BY AUTOMATED COUNT: 15 % (ref 11.5–14.5)
GLUCOSE SERPL-MCNC: 98 MG/DL (ref 74–99)
HCT VFR BLD AUTO: 43.6 % (ref 41–52)
HGB BLD-MCNC: 13.8 G/DL (ref 13.5–17.5)
IMM GRANULOCYTES # BLD AUTO: 0.02 X10*3/UL (ref 0–0.7)
IMM GRANULOCYTES NFR BLD AUTO: 0.3 % (ref 0–0.9)
LYMPHOCYTES # BLD AUTO: 1.61 X10*3/UL (ref 1.2–4.8)
LYMPHOCYTES NFR BLD AUTO: 22.5 %
MAGNESIUM SERPL-MCNC: 1.47 MG/DL (ref 1.6–2.4)
MCH RBC QN AUTO: 24.1 PG (ref 26–34)
MCHC RBC AUTO-ENTMCNC: 31.7 G/DL (ref 32–36)
MCV RBC AUTO: 76 FL (ref 80–100)
MONOCYTES # BLD AUTO: 0.56 X10*3/UL (ref 0.1–1)
MONOCYTES NFR BLD AUTO: 7.8 %
NEUTROPHILS # BLD AUTO: 4.77 X10*3/UL (ref 1.2–7.7)
NEUTROPHILS NFR BLD AUTO: 66.5 %
NRBC BLD-RTO: 0 /100 WBCS (ref 0–0)
PHOSPHATE SERPL-MCNC: 3.3 MG/DL (ref 2.5–4.9)
PLATELET # BLD AUTO: 285 X10*3/UL (ref 150–450)
POTASSIUM SERPL-SCNC: 3.5 MMOL/L (ref 3.5–5.3)
PREALB SERPL-MCNC: 34.2 MG/DL (ref 18–40)
PROT SERPL-MCNC: 7.5 G/DL (ref 6.4–8.2)
RBC # BLD AUTO: 5.72 X10*6/UL (ref 4.5–5.9)
SODIUM SERPL-SCNC: 139 MMOL/L (ref 136–145)
TRIGL SERPL-MCNC: 128 MG/DL (ref 0–149)
WBC # BLD AUTO: 7.2 X10*3/UL (ref 4.4–11.3)

## 2024-10-21 PROCEDURE — 84100 ASSAY OF PHOSPHORUS: CPT

## 2024-10-21 PROCEDURE — 83735 ASSAY OF MAGNESIUM: CPT

## 2024-10-21 PROCEDURE — 80053 COMPREHEN METABOLIC PANEL: CPT

## 2024-10-21 PROCEDURE — G0299 HHS/HOSPICE OF RN EA 15 MIN: HCPCS

## 2024-10-21 PROCEDURE — 84478 ASSAY OF TRIGLYCERIDES: CPT

## 2024-10-21 PROCEDURE — 85025 COMPLETE CBC W/AUTO DIFF WBC: CPT

## 2024-10-21 PROCEDURE — 84134 ASSAY OF PREALBUMIN: CPT

## 2024-10-23 ENCOUNTER — TELEPHONE (OUTPATIENT)
Dept: SURGERY | Facility: HOSPITAL | Age: 59
End: 2024-10-23
Payer: COMMERCIAL

## 2024-10-23 ENCOUNTER — HOME INFUSION (OUTPATIENT)
Dept: INFUSION THERAPY | Age: 59
End: 2024-10-23
Payer: COMMERCIAL

## 2024-10-23 DIAGNOSIS — K63.2 ENTEROCUTANEOUS FISTULA: ICD-10-CM

## 2024-10-23 RX ORDER — SODIUM CHLORIDE, SODIUM LACTATE, POTASSIUM CHLORIDE, CALCIUM CHLORIDE 600; 310; 30; 20 MG/100ML; MG/100ML; MG/100ML; MG/100ML
1000 INJECTION, SOLUTION INTRAVENOUS NIGHTLY
Qty: 8000 ML | Refills: 0 | Status: SHIPPED
Start: 2024-10-23 | End: 2024-10-31

## 2024-10-23 NOTE — PROGRESS NOTES
BARBARA Em is a 59 y.o. male with a history of perforated diverticulitis s/p ex lap, sigmoidectomy, end colostomy and was left partially open with Dr. Gomes on 6/7/24. 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 Wound vac was applied to his midline incision 6/11 and he was started on TPN. On 6/14 wound vac was taken down and he was found to have necrotic fascia. He was taken back to OR for repeat ex lap, washout, fascial debridement and closure with retention sutures on 6/16 with Dr. Case. He was taken to OR again for re-exlap, and mesh placement with Dr. Rios on 6/18. 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    He was discharged home with TPN and a wound manager. He was readmitted 2 days later for high fistula output and dehydration. He was readmitted again with bactermia and PICC line was replaced. He is currently off of TPN and gets IVF as needed at home/daily. He presents today to discuss possible take down of fistulas in the future as a combo with Dr. Perry.     He presents today with his lady friend who he has been with for 15 years. He has minimal to no colostomy output and his fistula puts out about 1000ml per day. He takes Imodium and Lomotil daily and water is still watery. He has lost about 11 pounds since 9/13/24. He feels great. He is eating a regular diet. He is drinking 2 boost per day. He is avoiding drinking liquids with his meals. Eating 3 meals per day. He sees food in his fistula pouch within 30 minutes.     CT a/p 8/26/24:   1.  Fluid-filled loops of small bowel in the lower left quadrant with equivocal bowel wall thickening and associated hyperemia suggestive of enteritis.  2. Interval resolution of peritoneal fat stranding related to prior surgery. There are postsurgical changes from prior colostomy and sigmoidectomy.  3. Additional chronic findings as described above.    Colonoscopy 5/19/2022 ([facility]): Moderate  hemorrhoids. Diverticulosis of large intestines without complication, moderate. There was some spasm and patchy erythema but no active infection suspected. Biopsies were taken. Path unremarkable colonic mucosa. Repeat in 5 years.     Former smoker- quit in June/No ETOH- previously drank 6 beers per day/No Illicit drug use  PMH: HTN  PSH:  No family history of CRC or IBD  Employment: works from home for 4 hours per day 3 times per week      Past Medical History:   Diagnosis Date    Acute pharyngitis, unspecified     Sore throat    Acute upper respiratory infection, unspecified 02/13/2017    Acute URI    Alcohol abuse, in remission 09/12/2018    History of alcohol abuse    Benign essential HTN 02/14/2023    Elevated blood-pressure reading, without diagnosis of hypertension 02/23/2015    Elevated blood pressure reading without diagnosis of hypertension    Encounter for immunization 10/10/2014    Need for Tdap vaccination    Encounter for screening for malignant neoplasm of colon 01/04/2017    Encounter for colonoscopy due to history of adenomatous colonic polyps    Encounter for screening for malignant neoplasm of colon 11/02/2016    Encounter for screening colonoscopy    Encounter for screening for malignant neoplasm of colon 11/02/2016    Encounter for screening colonoscopy    Encounter for screening for malignant neoplasm of prostate 09/12/2018    Prostate cancer screening    Essential (primary) hypertension 03/14/2019    Elevated blood pressure reading with diagnosis of hypertension    Impingement syndrome of right shoulder 06/06/2016    Impingement syndrome of right shoulder    Incomplete rotator cuff tear or rupture of right shoulder, not specified as traumatic 09/12/2018    Partial nontraumatic tear of right rotator cuff    Noninfective gastroenteritis and colitis, unspecified 01/23/2018    Acute gastroenteritis    Other general symptoms and signs 11/02/2016    Flu-like symptoms    Other malaise 11/02/2016     Malaise and fatigue    Pain in left ankle and joints of left foot 10/10/2017    Left ankle pain    Pain in left foot 09/12/2017    Left foot pain    Pain in right shoulder 04/11/2016    Right shoulder pain    Pain in thoracic spine 09/12/2018    Thoracic back pain    Pain, unspecified 02/09/2017    Body aches    Personal history of colonic polyps 01/04/2017    History of colonic polyps    Personal history of other diseases of male genital organs 03/15/2019    History of acute prostatitis    Personal history of other diseases of the respiratory system 04/07/2020    History of acute sinusitis    Personal history of other specified conditions 03/14/2019    History of flank pain    Personal history of other specified conditions 10/10/2014    History of paresthesia    Strain of muscle, fascia and tendon of other parts of biceps, right arm, initial encounter 03/11/2016    Rupture of biceps tendon, right, initial encounter       Past Surgical History:   Procedure Laterality Date    ABDOMINAL SURGERY      SHOULDER SURGERY  06/27/2017    Shoulder Surgery       No Known Allergies    Review of Systems    Physical Exam  Stoma  LUQ  Enteroatmospheric fistula; two lumens clearly from same loop of bowel - can see posterior wall. Heaing well around it.   NAD    Assessment and Plan:   Enteroatmospheric fistula after difficult postop course related to emergency surgery for diverticular disease  We discussed optimizing his anti-diarrheals: Go up to 6mg of immodium (45mL) and 2 pills of immodium - all 4x per day. In addition, will trial fiber with minimal fluid.     We reviewed the criteria for reversal. I would like to wait at least another 6 months for inflammatory adhesions to soften. He will need to see a hernia specialist to discuss complex abdominal closure. It may be possible to close his colostomy (likely with DLI) at same time as initial surgery to avoid the need for anther operation -- but it would depend on intraoperative  course.     He has lost 9lbs since coming off TPN but feels well and labs look good. I think this needs to be re-visited in the short term to ensure that he does not become malnourished. He is eating well but has a lot of enteric output. No dehydration. He would like to stay off TPN but we may need to reconsider.

## 2024-10-23 NOTE — TELEPHONE ENCOUNTER
I briefly spoke to Mr. Em to touch base on his overall health, labs and fluids status as his appointments have been adjusted to optimize his traveling.   He is doing well.   The clinical photos show improvement in his skin around the wound.   His prealbumin is 34. Albumin is 4.2. Transaminases and alk phos continue to slowly increase. Hgb is WNLs but MCV is slowly decreasing.   He is unsure of his weight.    He feels as though he continues to need fluids.   He sees Dr. Morfin tomorrow.   Discussed continuing IVFs for now with the pharmacy team and will reassess next week.

## 2024-10-23 NOTE — PROGRESS NOTES
Patient had virtual appt with Dr Perry - doing well off TPN - continue LR. Rec'd order from Dr Perry to continue x1 week thru 10/31. Weekly labs to continue.   Order updated in epic, gold copy to intake     Telephone call with patient - aware of above - has x1 bag of LR in the home for infusion on 10/24  Agreeable to delivery any time today of one week of LR bags with all supplies to match.      Dispense x7 1LR for delivery on 10/24  Infusions 10/25 through 10/31  With supplies to match     POC 10/31 from Dr Perry

## 2024-10-24 ENCOUNTER — APPOINTMENT (OUTPATIENT)
Dept: SURGERY | Facility: CLINIC | Age: 59
End: 2024-10-24
Payer: COMMERCIAL

## 2024-10-24 ENCOUNTER — HOME CARE VISIT (OUTPATIENT)
Dept: HOME HEALTH SERVICES | Facility: HOME HEALTH | Age: 59
End: 2024-10-24
Payer: COMMERCIAL

## 2024-10-24 VITALS
BODY MASS INDEX: 26.07 KG/M2 | DIASTOLIC BLOOD PRESSURE: 68 MMHG | HEART RATE: 43 BPM | SYSTOLIC BLOOD PRESSURE: 114 MMHG | HEIGHT: 68 IN | WEIGHT: 172 LBS | RESPIRATION RATE: 16 BRPM

## 2024-10-24 DIAGNOSIS — M95.8 ABDOMINAL WALL DEFECT, ACQUIRED: ICD-10-CM

## 2024-10-24 DIAGNOSIS — K63.2 ENTEROCUTANEOUS FISTULA: ICD-10-CM

## 2024-10-24 PROCEDURE — 3008F BODY MASS INDEX DOCD: CPT | Performed by: SURGERY

## 2024-10-24 PROCEDURE — 99205 OFFICE O/P NEW HI 60 MIN: CPT | Performed by: SURGERY

## 2024-10-24 ASSESSMENT — PAIN SCALES - GENERAL: PAINLEVEL_OUTOF10: 0-NO PAIN

## 2024-10-24 NOTE — CASE COMMUNICATION
MIssed home care nurse vsiit week of 10 20 2024  due to appoinrt with surgeon. will follow up with patient next week .

## 2024-10-27 ASSESSMENT — ENCOUNTER SYMPTOMS
CHANGE IN APPETITE: UNCHANGED
MUSCLE WEAKNESS: 1
PERSON REPORTING PAIN: PATIENT
APPETITE LEVEL: GOOD
PAIN LOCATION: ABDOMEN
PAIN LOCATION - PAIN SEVERITY: 5/10
PAIN: 1

## 2024-10-28 ENCOUNTER — HOME CARE VISIT (OUTPATIENT)
Dept: HOME HEALTH SERVICES | Facility: HOME HEALTH | Age: 59
End: 2024-10-28
Payer: COMMERCIAL

## 2024-10-28 ENCOUNTER — APPOINTMENT (OUTPATIENT)
Dept: SURGERY | Facility: CLINIC | Age: 59
End: 2024-10-28
Payer: COMMERCIAL

## 2024-10-28 ENCOUNTER — LAB REQUISITION (OUTPATIENT)
Dept: LAB | Facility: LAB | Age: 59
End: 2024-10-28
Payer: COMMERCIAL

## 2024-10-28 VITALS
OXYGEN SATURATION: 97 % | HEART RATE: 62 BPM | DIASTOLIC BLOOD PRESSURE: 80 MMHG | TEMPERATURE: 98.2 F | SYSTOLIC BLOOD PRESSURE: 126 MMHG | RESPIRATION RATE: 16 BRPM

## 2024-10-28 VITALS
WEIGHT: 172.4 LBS | DIASTOLIC BLOOD PRESSURE: 87 MMHG | SYSTOLIC BLOOD PRESSURE: 131 MMHG | TEMPERATURE: 97.4 F | HEART RATE: 68 BPM | BODY MASS INDEX: 26.21 KG/M2

## 2024-10-28 DIAGNOSIS — T84.410A: ICD-10-CM

## 2024-10-28 DIAGNOSIS — B95.62 METHICILLIN RESISTANT STAPHYLOCOCCUS AUREUS INFECTION AS THE CAUSE OF DISEASES CLASSIFIED ELSEWHERE: ICD-10-CM

## 2024-10-28 DIAGNOSIS — K63.2 ENTEROCUTANEOUS FISTULA: ICD-10-CM

## 2024-10-28 DIAGNOSIS — T81.31XA DISRUPTION OF EXTERNAL OPERATION (SURGICAL) WOUND, NOT ELSEWHERE CLASSIFIED, INITIAL ENCOUNTER: ICD-10-CM

## 2024-10-28 DIAGNOSIS — M95.8 ABDOMINAL WALL DEFECT, ACQUIRED: ICD-10-CM

## 2024-10-28 LAB
ALBUMIN SERPL BCP-MCNC: 4 G/DL (ref 3.4–5)
ALP SERPL-CCNC: 277 U/L (ref 33–136)
ALT SERPL W P-5'-P-CCNC: 376 U/L (ref 10–52)
ANION GAP SERPL CALCULATED.3IONS-SCNC: 11 MMOL/L (ref 10–20)
AST SERPL W P-5'-P-CCNC: 141 U/L (ref 9–39)
BASOPHILS # BLD AUTO: 0.06 X10*3/UL (ref 0–0.1)
BASOPHILS NFR BLD AUTO: 1 %
BILIRUB SERPL-MCNC: 0.7 MG/DL (ref 0–1.2)
BUN SERPL-MCNC: 21 MG/DL (ref 6–23)
CALCIUM SERPL-MCNC: 9.6 MG/DL (ref 8.6–10.3)
CHLORIDE SERPL-SCNC: 100 MMOL/L (ref 98–107)
CO2 SERPL-SCNC: 31 MMOL/L (ref 21–32)
CREAT SERPL-MCNC: 0.83 MG/DL (ref 0.5–1.3)
EGFRCR SERPLBLD CKD-EPI 2021: >90 ML/MIN/1.73M*2
EOSINOPHIL # BLD AUTO: 0.25 X10*3/UL (ref 0–0.7)
EOSINOPHIL NFR BLD AUTO: 4 %
ERYTHROCYTE [DISTWIDTH] IN BLOOD BY AUTOMATED COUNT: 15.2 % (ref 11.5–14.5)
GLUCOSE SERPL-MCNC: 89 MG/DL (ref 74–99)
HCT VFR BLD AUTO: 40.9 % (ref 41–52)
HGB BLD-MCNC: 12.8 G/DL (ref 13.5–17.5)
IMM GRANULOCYTES # BLD AUTO: 0.02 X10*3/UL (ref 0–0.7)
IMM GRANULOCYTES NFR BLD AUTO: 0.3 % (ref 0–0.9)
LYMPHOCYTES # BLD AUTO: 2.1 X10*3/UL (ref 1.2–4.8)
LYMPHOCYTES NFR BLD AUTO: 33.3 %
MAGNESIUM SERPL-MCNC: 1.5 MG/DL (ref 1.6–2.4)
MCH RBC QN AUTO: 23.9 PG (ref 26–34)
MCHC RBC AUTO-ENTMCNC: 31.3 G/DL (ref 32–36)
MCV RBC AUTO: 76 FL (ref 80–100)
MONOCYTES # BLD AUTO: 0.53 X10*3/UL (ref 0.1–1)
MONOCYTES NFR BLD AUTO: 8.4 %
NEUTROPHILS # BLD AUTO: 3.34 X10*3/UL (ref 1.2–7.7)
NEUTROPHILS NFR BLD AUTO: 53 %
NRBC BLD-RTO: 0 /100 WBCS (ref 0–0)
PHOSPHATE SERPL-MCNC: 3.3 MG/DL (ref 2.5–4.9)
PLATELET # BLD AUTO: 269 X10*3/UL (ref 150–450)
POTASSIUM SERPL-SCNC: 4 MMOL/L (ref 3.5–5.3)
PROT SERPL-MCNC: 6.8 G/DL (ref 6.4–8.2)
RBC # BLD AUTO: 5.36 X10*6/UL (ref 4.5–5.9)
SODIUM SERPL-SCNC: 138 MMOL/L (ref 136–145)
TRIGL SERPL-MCNC: 164 MG/DL (ref 0–149)
WBC # BLD AUTO: 6.3 X10*3/UL (ref 4.4–11.3)

## 2024-10-28 PROCEDURE — 84100 ASSAY OF PHOSPHORUS: CPT

## 2024-10-28 PROCEDURE — 85025 COMPLETE CBC W/AUTO DIFF WBC: CPT

## 2024-10-28 PROCEDURE — 80053 COMPREHEN METABOLIC PANEL: CPT

## 2024-10-28 PROCEDURE — 84478 ASSAY OF TRIGLYCERIDES: CPT

## 2024-10-28 PROCEDURE — 84134 ASSAY OF PREALBUMIN: CPT

## 2024-10-28 PROCEDURE — 99215 OFFICE O/P EST HI 40 MIN: CPT | Performed by: SURGERY

## 2024-10-28 PROCEDURE — G0299 HHS/HOSPICE OF RN EA 15 MIN: HCPCS

## 2024-10-28 PROCEDURE — 83735 ASSAY OF MAGNESIUM: CPT

## 2024-10-28 RX ORDER — MELATONIN 10 MG
10 CAPSULE ORAL AS NEEDED
COMMUNITY

## 2024-10-28 ASSESSMENT — PAIN SCALES - GENERAL: PAINLEVEL_OUTOF10: 0-NO PAIN

## 2024-10-28 ASSESSMENT — ENCOUNTER SYMPTOMS
PAIN LOCATION: ABDOMEN
MUSCULOSKELETAL NEGATIVE: 1
CHANGE IN APPETITE: UNCHANGED
PAIN LOCATION - PAIN SEVERITY: 3/10
CARDIOVASCULAR NEGATIVE: 1
APPETITE LEVEL: GOOD
PAIN: 1
SUBJECTIVE PAIN PROGRESSION: UNCHANGED
GASTROINTESTINAL NEGATIVE: 1
RESPIRATORY NEGATIVE: 1
CONSTITUTIONAL NEGATIVE: 1
PERSON REPORTING PAIN: PATIENT

## 2024-10-29 LAB — PREALB SERPL-MCNC: 38 MG/DL (ref 18–40)

## 2024-10-29 NOTE — H&P (VIEW-ONLY)
Subjective   Patient ID: Bereket Em is a 59 y.o. male who presents for discussion of treatment for enterocutaneous fistula..  Patient is a 59 y.o. male with a history of perforated diverticulitis s/p ex lap, sigmoidectomy, end colostomy and was left partially open with Dr. Gomes on 6/7/24. 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 Wound vac was applied to his midline incision 6/11 and he was started on TPN. On 6/14 wound vac was taken down and he was found to have necrotic fascia. He was taken back to OR for repeat ex lap, washout, fascial debridement and closure with retention sutures on 6/16 with Dr. Case. He was taken to OR again for re-exlap, and mesh placement with Dr. Rios on 6/18. 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  Patient denies fever, chills, nausea, vomiting, hematemesis.  He states that there is about 1000 cc/day coming out of his wound manager at the site of the fistula.  He states nothing comes out of the colostomy.        Review of Systems   Constitutional: Negative.    HENT: Negative.     Respiratory: Negative.     Cardiovascular: Negative.    Gastrointestinal: Negative.    Genitourinary: Negative.    Musculoskeletal: Negative.        Objective   Physical Exam  Constitutional:       General: He is not in acute distress.     Appearance: Normal appearance. He is not toxic-appearing.   Eyes:      General: No scleral icterus.  Pulmonary:      Effort: Pulmonary effort is normal.   Abdominal:      General: There is no distension.      Palpations: Abdomen is soft. There is no mass.      Tenderness: There is no abdominal tenderness.       Patient has a enteral atmospheric fistula in the midline with a prolapse segment of small bowel.  The bowel was able to be reduced manually.  Both lumens were able to be digitalized.  He states the lumen to the left is where the fluid comes out and must represent the proximal segment.    Assessment/Plan   We  had a lengthy discussion regarding the endoscopic options available of stent placement.  I have explained that it is possible to place a fully covered stent into the proximal and distal ends of the fistula to possibly allow for continuity of the GI tract to prevent the majority of his fluid losses as well as to allow improved nutrition.  Risks of stent placement were explained including bleeding, infection, perforation, ulceration, migration, stent expulsion, and possibly for subsequent stent replacement.  It was also explained that the goals of the stent would be to minimize the overall volume of output but would probably not lead to cure of the situation nor complete elimination of need for wound manager.  Hopefully with decreased fluid output, his local skin issues could be better managed and he could have improved nutrition and possible ability to no longer do daily IV fluids.  He will be scheduled electively for stent placement in the GI lab with fluoroscopy.         Kraig Cage MD 10/28/24 9:37 PM

## 2024-10-31 ENCOUNTER — HOME CARE VISIT (OUTPATIENT)
Dept: HOME HEALTH SERVICES | Facility: HOME HEALTH | Age: 59
End: 2024-10-31
Payer: COMMERCIAL

## 2024-10-31 ENCOUNTER — HOME INFUSION (OUTPATIENT)
Dept: INFUSION THERAPY | Age: 59
End: 2024-10-31
Payer: COMMERCIAL

## 2024-10-31 ENCOUNTER — TELEPHONE (OUTPATIENT)
Dept: SURGERY | Facility: HOSPITAL | Age: 59
End: 2024-10-31
Payer: COMMERCIAL

## 2024-10-31 VITALS — SYSTOLIC BLOOD PRESSURE: 110 MMHG | HEART RATE: 56 BPM | DIASTOLIC BLOOD PRESSURE: 70 MMHG | TEMPERATURE: 98.5 F

## 2024-10-31 DIAGNOSIS — K63.2 ENTEROCUTANEOUS FISTULA: Primary | ICD-10-CM

## 2024-10-31 PROCEDURE — G0299 HHS/HOSPICE OF RN EA 15 MIN: HCPCS

## 2024-10-31 ASSESSMENT — ENCOUNTER SYMPTOMS
SKIN LESIONS: 1
APPETITE LEVEL: FAIR

## 2024-11-05 ENCOUNTER — ANESTHESIA EVENT (OUTPATIENT)
Dept: GASTROENTEROLOGY | Facility: HOSPITAL | Age: 59
End: 2024-11-05
Payer: COMMERCIAL

## 2024-11-05 ENCOUNTER — HOME CARE VISIT (OUTPATIENT)
Dept: HOME HEALTH SERVICES | Facility: HOME HEALTH | Age: 59
End: 2024-11-05
Payer: COMMERCIAL

## 2024-11-06 ENCOUNTER — ANESTHESIA (OUTPATIENT)
Dept: GASTROENTEROLOGY | Facility: HOSPITAL | Age: 59
End: 2024-11-06
Payer: COMMERCIAL

## 2024-11-06 ENCOUNTER — HOME CARE VISIT (OUTPATIENT)
Dept: HOME HEALTH SERVICES | Facility: HOME HEALTH | Age: 59
End: 2024-11-06
Payer: COMMERCIAL

## 2024-11-06 ENCOUNTER — HOSPITAL ENCOUNTER (INPATIENT)
Dept: GASTROENTEROLOGY | Facility: HOSPITAL | Age: 59
LOS: 2 days | Discharge: HOME | End: 2024-11-08
Attending: SURGERY | Admitting: SURGERY
Payer: COMMERCIAL

## 2024-11-06 DIAGNOSIS — K63.2 ENTEROCUTANEOUS FISTULA: Primary | ICD-10-CM

## 2024-11-06 PROCEDURE — 2500000001 HC RX 250 WO HCPCS SELF ADMINISTERED DRUGS (ALT 637 FOR MEDICARE OP): Performed by: STUDENT IN AN ORGANIZED HEALTH CARE EDUCATION/TRAINING PROGRAM

## 2024-11-06 PROCEDURE — 3700000002 HC GENERAL ANESTHESIA TIME - EACH INCREMENTAL 1 MINUTE

## 2024-11-06 PROCEDURE — 7100000002 HC RECOVERY ROOM TIME - EACH INCREMENTAL 1 MINUTE

## 2024-11-06 PROCEDURE — 7100000001 HC RECOVERY ROOM TIME - INITIAL BASE CHARGE

## 2024-11-06 PROCEDURE — 0D9A80Z DRAINAGE OF JEJUNUM WITH DRAINAGE DEVICE, VIA NATURAL OR ARTIFICIAL OPENING ENDOSCOPIC: ICD-10-PCS | Performed by: SURGERY

## 2024-11-06 PROCEDURE — 2720000007 HC OR 272 NO HCPCS

## 2024-11-06 PROCEDURE — 3700000001 HC GENERAL ANESTHESIA TIME - INITIAL BASE CHARGE

## 2024-11-06 PROCEDURE — 1100000001 HC PRIVATE ROOM DAILY

## 2024-11-06 PROCEDURE — 2500000004 HC RX 250 GENERAL PHARMACY W/ HCPCS (ALT 636 FOR OP/ED)

## 2024-11-06 PROCEDURE — 7100000009 HC PHASE TWO TIME - INITIAL BASE CHARGE

## 2024-11-06 PROCEDURE — C1874 STENT, COATED/COV W/DEL SYS: HCPCS

## 2024-11-06 PROCEDURE — 2500000004 HC RX 250 GENERAL PHARMACY W/ HCPCS (ALT 636 FOR OP/ED): Performed by: STUDENT IN AN ORGANIZED HEALTH CARE EDUCATION/TRAINING PROGRAM

## 2024-11-06 PROCEDURE — 7100000010 HC PHASE TWO TIME - EACH INCREMENTAL 1 MINUTE

## 2024-11-06 PROCEDURE — 2780000003 HC OR 278 NO HCPCS

## 2024-11-06 PROCEDURE — 44370 SMALL BOWEL ENDOSCOPY/STENT: CPT | Performed by: STUDENT IN AN ORGANIZED HEALTH CARE EDUCATION/TRAINING PROGRAM

## 2024-11-06 PROCEDURE — C1769 GUIDE WIRE: HCPCS

## 2024-11-06 PROCEDURE — 74360 X-RAY GUIDE GI DILATION: CPT | Performed by: STUDENT IN AN ORGANIZED HEALTH CARE EDUCATION/TRAINING PROGRAM

## 2024-11-06 RX ORDER — ACETAMINOPHEN 160 MG/5ML
650 SOLUTION ORAL EVERY 4 HOURS PRN
Status: DISCONTINUED | OUTPATIENT
Start: 2024-11-06 | End: 2024-11-08 | Stop reason: HOSPADM

## 2024-11-06 RX ORDER — LIDOCAINE HCL/PF 100 MG/5ML
SYRINGE (ML) INTRAVENOUS AS NEEDED
Status: DISCONTINUED | OUTPATIENT
Start: 2024-11-06 | End: 2024-11-06

## 2024-11-06 RX ORDER — FENTANYL CITRATE 50 UG/ML
INJECTION, SOLUTION INTRAMUSCULAR; INTRAVENOUS AS NEEDED
Status: DISCONTINUED | OUTPATIENT
Start: 2024-11-06 | End: 2024-11-06

## 2024-11-06 RX ORDER — ROCURONIUM BROMIDE 10 MG/ML
INJECTION, SOLUTION INTRAVENOUS AS NEEDED
Status: DISCONTINUED | OUTPATIENT
Start: 2024-11-06 | End: 2024-11-06

## 2024-11-06 RX ORDER — SODIUM CHLORIDE, SODIUM LACTATE, POTASSIUM CHLORIDE, CALCIUM CHLORIDE 600; 310; 30; 20 MG/100ML; MG/100ML; MG/100ML; MG/100ML
INJECTION, SOLUTION INTRAVENOUS CONTINUOUS PRN
Status: DISCONTINUED | OUTPATIENT
Start: 2024-11-06 | End: 2024-11-06

## 2024-11-06 RX ORDER — TRAZODONE HYDROCHLORIDE 50 MG/1
50 TABLET ORAL NIGHTLY PRN
Status: DISCONTINUED | OUTPATIENT
Start: 2024-11-06 | End: 2024-11-08 | Stop reason: HOSPADM

## 2024-11-06 RX ORDER — ONDANSETRON HYDROCHLORIDE 2 MG/ML
4 INJECTION, SOLUTION INTRAVENOUS EVERY 8 HOURS PRN
Status: DISCONTINUED | OUTPATIENT
Start: 2024-11-06 | End: 2024-11-08 | Stop reason: HOSPADM

## 2024-11-06 RX ORDER — PROCHLORPERAZINE EDISYLATE 5 MG/ML
10 INJECTION INTRAMUSCULAR; INTRAVENOUS EVERY 6 HOURS PRN
Status: DISCONTINUED | OUTPATIENT
Start: 2024-11-06 | End: 2024-11-08 | Stop reason: HOSPADM

## 2024-11-06 RX ORDER — ENOXAPARIN SODIUM 100 MG/ML
40 INJECTION SUBCUTANEOUS EVERY 24 HOURS
Status: DISCONTINUED | OUTPATIENT
Start: 2024-11-06 | End: 2024-11-08 | Stop reason: HOSPADM

## 2024-11-06 RX ORDER — SUCCINYLCHOLINE CHLORIDE 20 MG/ML
INJECTION INTRAMUSCULAR; INTRAVENOUS AS NEEDED
Status: DISCONTINUED | OUTPATIENT
Start: 2024-11-06 | End: 2024-11-06

## 2024-11-06 RX ORDER — ACETAMINOPHEN 650 MG/1
650 SUPPOSITORY RECTAL EVERY 4 HOURS PRN
Status: DISCONTINUED | OUTPATIENT
Start: 2024-11-06 | End: 2024-11-08 | Stop reason: HOSPADM

## 2024-11-06 RX ORDER — PROPOFOL 10 MG/ML
INJECTION, EMULSION INTRAVENOUS AS NEEDED
Status: DISCONTINUED | OUTPATIENT
Start: 2024-11-06 | End: 2024-11-06

## 2024-11-06 RX ORDER — PROCHLORPERAZINE 25 MG/1
25 SUPPOSITORY RECTAL EVERY 12 HOURS PRN
Status: DISCONTINUED | OUTPATIENT
Start: 2024-11-06 | End: 2024-11-08 | Stop reason: HOSPADM

## 2024-11-06 RX ORDER — MIDAZOLAM HYDROCHLORIDE 1 MG/ML
INJECTION INTRAMUSCULAR; INTRAVENOUS AS NEEDED
Status: DISCONTINUED | OUTPATIENT
Start: 2024-11-06 | End: 2024-11-06

## 2024-11-06 RX ORDER — ACETAMINOPHEN 325 MG/1
650 TABLET ORAL EVERY 4 HOURS PRN
Status: DISCONTINUED | OUTPATIENT
Start: 2024-11-06 | End: 2024-11-08 | Stop reason: HOSPADM

## 2024-11-06 RX ORDER — ONDANSETRON 4 MG/1
4 TABLET, ORALLY DISINTEGRATING ORAL EVERY 8 HOURS PRN
Status: DISCONTINUED | OUTPATIENT
Start: 2024-11-06 | End: 2024-11-08 | Stop reason: HOSPADM

## 2024-11-06 RX ORDER — PROCHLORPERAZINE MALEATE 10 MG
10 TABLET ORAL EVERY 6 HOURS PRN
Status: DISCONTINUED | OUTPATIENT
Start: 2024-11-06 | End: 2024-11-08 | Stop reason: HOSPADM

## 2024-11-06 SDOH — HEALTH STABILITY: MENTAL HEALTH: HOW OFTEN DO YOU HAVE A DRINK CONTAINING ALCOHOL?: NEVER

## 2024-11-06 SDOH — HEALTH STABILITY: MENTAL HEALTH: HOW OFTEN DO YOU HAVE SIX OR MORE DRINKS ON ONE OCCASION?: NEVER

## 2024-11-06 SDOH — SOCIAL STABILITY: SOCIAL INSECURITY: WERE YOU ABLE TO COMPLETE ALL THE BEHAVIORAL HEALTH SCREENINGS?: YES

## 2024-11-06 SDOH — ECONOMIC STABILITY: TRANSPORTATION INSECURITY: IN THE PAST 12 MONTHS, HAS LACK OF TRANSPORTATION KEPT YOU FROM MEDICAL APPOINTMENTS OR FROM GETTING MEDICATIONS?: NO

## 2024-11-06 SDOH — SOCIAL STABILITY: SOCIAL INSECURITY: HAVE YOU HAD THOUGHTS OF HARMING ANYONE ELSE?: NO

## 2024-11-06 SDOH — ECONOMIC STABILITY: HOUSING INSECURITY: IN THE LAST 12 MONTHS, WAS THERE A TIME WHEN YOU WERE NOT ABLE TO PAY THE MORTGAGE OR RENT ON TIME?: NO

## 2024-11-06 SDOH — SOCIAL STABILITY: SOCIAL INSECURITY: DOES ANYONE TRY TO KEEP YOU FROM HAVING/CONTACTING OTHER FRIENDS OR DOING THINGS OUTSIDE YOUR HOME?: NO

## 2024-11-06 SDOH — SOCIAL STABILITY: SOCIAL INSECURITY: DO YOU FEEL ANYONE HAS EXPLOITED OR TAKEN ADVANTAGE OF YOU FINANCIALLY OR OF YOUR PERSONAL PROPERTY?: NO

## 2024-11-06 SDOH — HEALTH STABILITY: PHYSICAL HEALTH: ON AVERAGE, HOW MANY MINUTES DO YOU ENGAGE IN EXERCISE AT THIS LEVEL?: 0 MIN

## 2024-11-06 SDOH — ECONOMIC STABILITY: FOOD INSECURITY: WITHIN THE PAST 12 MONTHS, YOU WORRIED THAT YOUR FOOD WOULD RUN OUT BEFORE YOU GOT THE MONEY TO BUY MORE.: NEVER TRUE

## 2024-11-06 SDOH — ECONOMIC STABILITY: FOOD INSECURITY: WITHIN THE PAST 12 MONTHS, THE FOOD YOU BOUGHT JUST DIDN'T LAST AND YOU DIDN'T HAVE MONEY TO GET MORE.: NEVER TRUE

## 2024-11-06 SDOH — ECONOMIC STABILITY: FOOD INSECURITY: HOW HARD IS IT FOR YOU TO PAY FOR THE VERY BASICS LIKE FOOD, HOUSING, MEDICAL CARE, AND HEATING?: NOT HARD AT ALL

## 2024-11-06 SDOH — SOCIAL STABILITY: SOCIAL INSECURITY: WITHIN THE LAST YEAR, HAVE YOU BEEN HUMILIATED OR EMOTIONALLY ABUSED IN OTHER WAYS BY YOUR PARTNER OR EX-PARTNER?: NO

## 2024-11-06 SDOH — SOCIAL STABILITY: SOCIAL INSECURITY
WITHIN THE LAST YEAR, HAVE YOU BEEN RAPED OR FORCED TO HAVE ANY KIND OF SEXUAL ACTIVITY BY YOUR PARTNER OR EX-PARTNER?: NO

## 2024-11-06 SDOH — ECONOMIC STABILITY: HOUSING INSECURITY: IN THE PAST 12 MONTHS, HOW MANY TIMES HAVE YOU MOVED WHERE YOU WERE LIVING?: 1

## 2024-11-06 SDOH — HEALTH STABILITY: MENTAL HEALTH: HOW MANY DRINKS CONTAINING ALCOHOL DO YOU HAVE ON A TYPICAL DAY WHEN YOU ARE DRINKING?: PATIENT DOES NOT DRINK

## 2024-11-06 SDOH — SOCIAL STABILITY: SOCIAL INSECURITY: WITHIN THE LAST YEAR, HAVE YOU BEEN AFRAID OF YOUR PARTNER OR EX-PARTNER?: NO

## 2024-11-06 SDOH — ECONOMIC STABILITY: INCOME INSECURITY: IN THE PAST 12 MONTHS HAS THE ELECTRIC, GAS, OIL, OR WATER COMPANY THREATENED TO SHUT OFF SERVICES IN YOUR HOME?: NO

## 2024-11-06 SDOH — SOCIAL STABILITY: SOCIAL INSECURITY: ARE YOU OR HAVE YOU BEEN THREATENED OR ABUSED PHYSICALLY, EMOTIONALLY, OR SEXUALLY BY ANYONE?: NO

## 2024-11-06 SDOH — SOCIAL STABILITY: SOCIAL INSECURITY: ARE THERE ANY APPARENT SIGNS OF INJURIES/BEHAVIORS THAT COULD BE RELATED TO ABUSE/NEGLECT?: NO

## 2024-11-06 SDOH — SOCIAL STABILITY: SOCIAL INSECURITY: HAVE YOU HAD ANY THOUGHTS OF HARMING ANYONE ELSE?: NO

## 2024-11-06 SDOH — HEALTH STABILITY: PHYSICAL HEALTH: ON AVERAGE, HOW MANY DAYS PER WEEK DO YOU ENGAGE IN MODERATE TO STRENUOUS EXERCISE (LIKE A BRISK WALK)?: 0 DAYS

## 2024-11-06 SDOH — SOCIAL STABILITY: SOCIAL INSECURITY: ABUSE: ADULT

## 2024-11-06 SDOH — SOCIAL STABILITY: SOCIAL INSECURITY: HAS ANYONE EVER THREATENED TO HURT YOUR FAMILY OR YOUR PETS?: NO

## 2024-11-06 SDOH — ECONOMIC STABILITY: HOUSING INSECURITY: AT ANY TIME IN THE PAST 12 MONTHS, WERE YOU HOMELESS OR LIVING IN A SHELTER (INCLUDING NOW)?: NO

## 2024-11-06 SDOH — SOCIAL STABILITY: SOCIAL INSECURITY: DO YOU FEEL UNSAFE GOING BACK TO THE PLACE WHERE YOU ARE LIVING?: NO

## 2024-11-06 ASSESSMENT — LIFESTYLE VARIABLES
AUDIT-C TOTAL SCORE: 0
PRESCIPTION_ABUSE_PAST_12_MONTHS: NO
HOW OFTEN DO YOU HAVE A DRINK CONTAINING ALCOHOL: NEVER
SKIP TO QUESTIONS 9-10: 1
SKIP TO QUESTIONS 9-10: 1
AUDIT-C TOTAL SCORE: 0
AUDIT-C TOTAL SCORE: 0
HOW OFTEN DO YOU HAVE 6 OR MORE DRINKS ON ONE OCCASION: NEVER
AUDIT-C TOTAL SCORE: 0
HOW MANY STANDARD DRINKS CONTAINING ALCOHOL DO YOU HAVE ON A TYPICAL DAY: PATIENT DOES NOT DRINK
SKIP TO QUESTIONS 9-10: 1
SUBSTANCE_ABUSE_PAST_12_MONTHS: NO

## 2024-11-06 ASSESSMENT — PAIN SCALES - GENERAL
PAINLEVEL_OUTOF10: 0 - NO PAIN

## 2024-11-06 ASSESSMENT — ACTIVITIES OF DAILY LIVING (ADL)
GROOMING: INDEPENDENT
JUDGMENT_ADEQUATE_SAFELY_COMPLETE_DAILY_ACTIVITIES: YES
TOILETING: INDEPENDENT
ADEQUATE_TO_COMPLETE_ADL: YES
HEARING - LEFT EAR: FUNCTIONAL
FEEDING YOURSELF: INDEPENDENT
PATIENT'S MEMORY ADEQUATE TO SAFELY COMPLETE DAILY ACTIVITIES?: YES
WALKS IN HOME: INDEPENDENT
LACK_OF_TRANSPORTATION: NO
HEARING - RIGHT EAR: FUNCTIONAL
BATHING: INDEPENDENT
DRESSING YOURSELF: INDEPENDENT

## 2024-11-06 ASSESSMENT — COGNITIVE AND FUNCTIONAL STATUS - GENERAL
DAILY ACTIVITIY SCORE: 24
PATIENT BASELINE BEDBOUND: NO
MOBILITY SCORE: 24

## 2024-11-06 ASSESSMENT — PATIENT HEALTH QUESTIONNAIRE - PHQ9
1. LITTLE INTEREST OR PLEASURE IN DOING THINGS: NOT AT ALL
1. LITTLE INTEREST OR PLEASURE IN DOING THINGS: NOT AT ALL
2. FEELING DOWN, DEPRESSED OR HOPELESS: NOT AT ALL
SUM OF ALL RESPONSES TO PHQ9 QUESTIONS 1 & 2: 0
SUM OF ALL RESPONSES TO PHQ9 QUESTIONS 1 & 2: 0
2. FEELING DOWN, DEPRESSED OR HOPELESS: NOT AT ALL

## 2024-11-06 ASSESSMENT — PAIN - FUNCTIONAL ASSESSMENT
PAIN_FUNCTIONAL_ASSESSMENT: 0-10

## 2024-11-06 NOTE — ANESTHESIA PROCEDURE NOTES
Airway  Date/Time: 11/6/2024 2:19 PM  Urgency: elective    Airway not difficult    Staffing  Performed: CAT   Authorized by: Marjan Ornelas MD    Performed by: CAT Alegre  Patient location during procedure: OR    Indications and Patient Condition  Indications for airway management: anesthesia  Spontaneous ventilation: present  Sedation level: deep  Preoxygenated: yes  Mask difficulty assessment: 1 - vent by mask    Final Airway Details  Final airway type: endotracheal airway      Successful airway: ETT  Cuffed: yes   Successful intubation technique: video laryngoscopy  Blade size: #4  ETT size (mm): 7.5  Cormack-Lehane Classification: grade I - full view of glottis  Placement verified by: chest auscultation and capnometry   Measured from: teeth  ETT to teeth (cm): 22  Number of attempts at approach: 1    Additional Comments  [place] 4

## 2024-11-06 NOTE — ANESTHESIA PREPROCEDURE EVALUATION
DATE OF CONSULTATION:  04/15/2019

 

Cardiology Consultation 

 

REASON FOR CONSULTATION:  Chest pain.

 

HISTORY OF PRESENT ILLNESS:  This is a 44-year-old male with history of hyperlipidemia,

who presents with complaints of chest pain.  The patient reports he had sudden onset of

chest pain that he described as sharp, 8/10 in severity with radiation to the back.  The

pain was worse with inspiration and lasted approximately 30 minutes.  He denies any

shortness of breath, nausea, or diaphoresis.  He denies any edema, orthopnea or PND. 

 

REVIEW OF SYSTEMS:

Negative as per HPI.

 

PAST MEDICAL HISTORY:  Hyperlipidemia.

 

PAST SURGICAL HISTORY:  Appendectomy.

 

ALLERGIES:  NO KNOWN DRUG ALLERGIES.

 

MEDICATIONS:  See medication list.

 

SOCIAL HISTORY:  Denies tobacco or illicit drugs.  He does drink alcohol socially.

 

FAMILY HISTORY:  Unknown.

 

PHYSICAL EXAMINATION:

VITAL SIGNS:  Temperature 97 degrees, pulse 60, respiratory rate 16, blood pressure

118/67, oxygen saturation 99% on room air. 

GENERAL:  Well-developed, well-nourished man, awake and alert, in no acute distress. 

HEENT:  Normocephalic, atraumatic.  Pupils equal.  No scleral icterus. 

NECK:  Supple.  No thyromegaly or cervical lymphadenopathy.  No carotid bruits. 

LUNGS:  Clear to auscultation bilaterally.  No wheezes or crackles. 

CARDIOVASCULAR:  Normal rate, regular rhythm.  No murmur.  Normal S1, S2. 

ABDOMEN:  Soft, nontender. 

EXTREMITIES:  No edema. 

NEUROLOGIC:  Nonfocal exam.

LABORATORY DATA:  WBC 6.89, hemoglobin 13.6, hematocrit 39.6, platelets 207.  Sodium

138, potassium 4.1, chloride 108, CO2 24, BUN 17, creatinine 0.89.  Troponin less than

0.001.  BNP less than 10.  Cholesterol 136, triglycerides 114, LDL 89, HDL 44.  CT

chest, no evidence of pulmonary embolism to the level of the segmental pulmonary

arteries.  Sequela of prior granulomatous disease type with tiny 2 mm noncalcified

nodules are likely benign.  A 4 mm angular shaped nodule along the right major fissure

likely represents an infra fissural lymph node in a low risk patient.  No further

followup is recommended.  If there is high respiratory illness, an option full CT chest

in 12 months may be considered. 

 

IMPRESSION:  

1. EKG sinus rhythm with short GA otherwise normal ECG.

2. Chest pain.

3. Hyperlipidemia.

 

RECOMMENDATIONS:  

1. The patient ruled out for myocardial infarction with serial cardiac biomarkers.

Obtain echocardiogram.  Given risk factors, we will proceed with nuclear stress test to

evaluate for ischemia. 

2. Monitor on telemetry while admitted. 

Thank you for this consult.  We will continue to follow.

 

 

 

 

______________________________

Izzy Capps MD

 

ABS/MODL

D:  04/15/2019 09:40:49

T:  04/15/2019 16:59:23

Job #:  [id number] Patient: Bereket Em    Procedure Information       Date/Time: 11/06/24 1300    Scheduled providers: Kraig Cage MD    Procedure: ENTEROSCOPY    Location: [facility]            Relevant Problems   Anesthesia (within normal limits)  No family hx MH      Cardiac (within normal limits)      Pulmonary (within normal limits)      Neuro   (+) Anxiety and depression   (+) Cervical radiculopathy      GI (within normal limits)      /Renal (within normal limits)      Liver (within normal limits)      Endocrine (within normal limits)      Hematology (within normal limits)      Musculoskeletal (within normal limits)      HEENT (within normal limits)      ID (within normal limits)      Skin (within normal limits)      GYN (within normal limits)     59 y.o. male with a history of perforated diverticulitis s/p ex lap, sigmoidectomy, end colostomy and was left partially open with Dr. Gomes on 6/7/24. 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 Wound vac was applied to his midline incision 6/11 and he was started on TPN. On 6/14 wound vac was taken down and he was found to have necrotic fascia. He was taken back to OR for repeat ex lap, washout, fascial debridement and closure with retention sutures on 6/16 with Dr. Case. He was taken to OR again for re-exlap, and mesh placement with Dr. Rios on 6/18. 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   Clinical information reviewed:                 CONCLUSIONS:   1. Left ventricular systolic function is normal with a 70% estimated ejection fraction.   2. Poorly visualized anatomical structures due to suboptimal image quality.6/6/2024 ECHO  NPO Detail:  No data recorded     Physical Exam    Airway  TM distance: >3 FB     Cardiovascular - normal exam     Dental   Comments: intact   Pulmonary - normal exam     Abdominal          Vitals:    11/06/24 1225   BP: 110/73   Pulse: 60   Resp: 18   Temp: 36 °C (96.8 °F)    SpO2: 94%       Past Surgical History:   Procedure Laterality Date    ABDOMINAL SURGERY      SHOULDER SURGERY  06/27/2017    Shoulder Surgery     Past Medical History:   Diagnosis Date    Acute pharyngitis, unspecified     Sore throat    Acute upper respiratory infection, unspecified 02/13/2017    Acute URI    Alcohol abuse, in remission 09/12/2018    History of alcohol abuse    Benign essential HTN 02/14/2023    Elevated blood-pressure reading, without diagnosis of hypertension 02/23/2015    Elevated blood pressure reading without diagnosis of hypertension    Encounter for immunization 10/10/2014    Need for Tdap vaccination    Encounter for screening for malignant neoplasm of colon 01/04/2017    Encounter for colonoscopy due to history of adenomatous colonic polyps    Encounter for screening for malignant neoplasm of colon 11/02/2016    Encounter for screening colonoscopy    Encounter for screening for malignant neoplasm of colon 11/02/2016    Encounter for screening colonoscopy    Encounter for screening for malignant neoplasm of prostate 09/12/2018    Prostate cancer screening    Essential (primary) hypertension 03/14/2019    Elevated blood pressure reading with diagnosis of hypertension    Impingement syndrome of right shoulder 06/06/2016    Impingement syndrome of right shoulder    Incomplete rotator cuff tear or rupture of right shoulder, not specified as traumatic 09/12/2018    Partial nontraumatic tear of right rotator cuff    Noninfective gastroenteritis and colitis, unspecified 01/23/2018    Acute gastroenteritis    Other general symptoms and signs 11/02/2016    Flu-like symptoms    Other malaise 11/02/2016    Malaise and fatigue    Pain in left ankle and joints of left foot 10/10/2017    Left ankle pain    Pain in left foot 09/12/2017    Left foot pain    Pain in right shoulder 04/11/2016    Right shoulder pain    Pain in thoracic spine 09/12/2018    Thoracic back pain    Pain, unspecified 02/09/2017     Body aches    Personal history of colonic polyps 01/04/2017    History of colonic polyps    Personal history of other diseases of male genital organs 03/15/2019    History of acute prostatitis    Personal history of other diseases of the respiratory system 04/07/2020    History of acute sinusitis    Personal history of other specified conditions 03/14/2019    History of flank pain    Personal history of other specified conditions 10/10/2014    History of paresthesia    Strain of muscle, fascia and tendon of other parts of biceps, right arm, initial encounter 03/11/2016    Rupture of biceps tendon, right, initial encounter       Current Outpatient Medications:     acetaminophen (Tylenol) 325 mg tablet, Take 2 tablets (650 mg) by mouth every 6 hours if needed for mild pain (1 - 3)., Disp: 30 tablet, Rfl: 0    heparin flush (heparin LockFlush,Porcine,,PF,) 10 unit/mL injection, Infuse 5 mL (50 Units) into a venous catheter once daily. Heparin 5 ml daily SASH, Disp: , Rfl:     heparin flush (heparin LockFlush,Porcine,,PF,) 10 unit/mL injection, Infuse 5 mL (50 Units) into a venous catheter 2 times a day., Disp: , Rfl:     melatonin 10 mg capsule, 1 capsule (10 mg) if needed for sleep., Disp: , Rfl:     NON FORMULARY, LOPERAMIDE Oral Liquid    as needed, Disp: , Rfl:     traZODone (Desyrel) 50 mg tablet, Take 1 tablet (50 mg) by mouth as needed at bedtime for sleep., Disp: 90 tablet, Rfl: 0    0.9 % sodium chloride (sodium chloride, PF, 0.9%) injection, Infuse 10 mL into a venous catheter 2 times a day. 10 ml NS twice daily SASH after TPN and LR (Patient not taking: Reported on 11/6/2024), Disp: , Rfl:     diphenoxylate-atropine (LomotiL) 2.5-0.025 mg tablet, Take 1 tablet by mouth 4 times a day as needed for diarrhea. (Patient not taking: Reported on 11/6/2024), Disp: 120 tablet, Rfl: 0    heparin flush 100 unit/mL syringe, Infuse 5 mL (500 Units) into a venous catheter once daily. 5 ml heprain daily SASH (Patient  not taking: Reported on 11/6/2024), Disp: , Rfl:   Prior to Admission medications    Medication Sig Start Date End Date Taking? Authorizing Provider   acetaminophen (Tylenol) 325 mg tablet Take 2 tablets (650 mg) by mouth every 6 hours if needed for mild pain (1 - 3). 8/5/24  Yes Michael Lubin APRN-CNP   heparin flush (heparin LockFlush,Porcine,,PF,) 10 unit/mL injection Infuse 5 mL (50 Units) into a venous catheter once daily. Heparin 5 ml daily SASH   Yes Historical Provider, MD   heparin flush (heparin LockFlush,Porcine,,PF,) 10 unit/mL injection Infuse 5 mL (50 Units) into a venous catheter 2 times a day.   Yes Historical Provider, MD   melatonin 10 mg capsule 1 capsule (10 mg) if needed for sleep.   Yes Historical Provider, MD   NON FORMULARY LOPERAMIDE Oral Liquid    as needed   Yes Historical Provider, MD   traZODone (Desyrel) 50 mg tablet Take 1 tablet (50 mg) by mouth as needed at bedtime for sleep. 10/18/24 10/18/25 Yes Candy Howell PA-C   0.9 % sodium chloride (sodium chloride, PF, 0.9%) injection Infuse 10 mL into a venous catheter 2 times a day. 10 ml NS twice daily SASH after TPN and LR  Patient not taking: Reported on 11/6/2024    Historical Provider, MD   diphenoxylate-atropine (LomotiL) 2.5-0.025 mg tablet Take 1 tablet by mouth 4 times a day as needed for diarrhea.  Patient not taking: Reported on 11/6/2024 10/17/24 11/16/24  Kinjal Dye PA-C   heparin flush 100 unit/mL syringe Infuse 5 mL (500 Units) into a venous catheter once daily. 5 ml heprain daily SASH  Patient not taking: Reported on 11/6/2024    Historical Provider, MD   Ringer's solution,lactated (lactated Ringer's) infusion Infuse 1,000 mL into a venous catheter once daily at bedtime for 8 days. Administer 1L of IVFs nightly through 10/31  Continue same weekly labs 10/23/24 10/31/24  Bereket Perry MD     No Known Allergies  Social History     Tobacco Use    Smoking status: Former     Types: Cigarettes     Passive exposure:  "Past    Smokeless tobacco: Never   Substance Use Topics    Alcohol use: Yes     Alcohol/week: 21.0 standard drinks of alcohol     Types: 21 Cans of beer per week     Comment: \"3 to 4 beers a day\"         Chemistry    Lab Results   Component Value Date/Time     10/28/2024 1015    K 4.0 10/28/2024 1015     10/28/2024 1015    CO2 31 10/28/2024 1015    BUN 21 10/28/2024 1015    CREATININE 0.83 10/28/2024 1015    Lab Results   Component Value Date/Time    CALCIUM 9.6 10/28/2024 1015    ALKPHOS 277 (H) 10/28/2024 1015     (H) 10/28/2024 1015     (H) 10/28/2024 1015    BILITOT 0.7 10/28/2024 1015          Lab Results   Component Value Date/Time    WBC 6.3 10/28/2024 1015    HGB 12.8 (L) 10/28/2024 1015    HCT 40.9 (L) 10/28/2024 1015     10/28/2024 1015     Lab Results   Component Value Date/Time    PROTIME 11.6 06/05/2024 2339    INR 1.0 06/05/2024 2339     Encounter Date: 08/26/24   ECG 12 lead   Result Value    Ventricular Rate 66    Atrial Rate 66    ND Interval 146    QRS Duration 84    QT Interval 418    QTC Calculation(Bazett) 438    P Axis 57    R Axis -13    T Axis 49    QRS Count 11    Q Onset 212    P Onset 139    P Offset 187    T Offset 421    QTC Fredericia 431    Narrative    Sinus rhythm with Premature atrial complexes  Otherwise normal ECG  No previous ECGs available    See ED provider note for full interpretation and clinical correlation  Confirmed by Syl Moralez ([id number]) on 8/26/2024 9:56:22 PM     No results found for this or any previous visit from the past 1095 days.       Anesthesia Plan    History of general anesthesia?: yes  History of complications of general anesthesia?: no    ASA 3     general     intravenous induction   Anesthetic plan and risks discussed with patient.  Use of blood products discussed with patient who consented to blood products.    Plan discussed with CAA and CRNA.      "

## 2024-11-06 NOTE — INTERVAL H&P NOTE
"Patient's History and Physical was reviewed and updated.    PE      10/17/2024     2:25 PM 10/18/2024    11:25 AM 10/24/2024    10:46 AM 10/28/2024     9:04 AM 10/28/2024    11:26 AM 10/31/2024     2:34 PM 11/6/2024    12:25 PM   Vitals   Systolic 140 124 114 126 131 110 110   Diastolic 90 78 68 80 87 70 73   Heart Rate 60 82 43 62 68 56 60   Temp 36.6 °C (97.8 °F)   36.8 °C (98.2 °F) 36.3 °C (97.4 °F) 36.9 °C (98.5 °F) 36 °C (96.8 °F)   Resp 16  16 16   18   Height (in)  1.727 m (5' 8\") 1.727 m (5' 8\")    1.727 m (5' 8\")   Weight (lb)  170 172  172.4  172   BMI  25.85 kg/m2 26.15 kg/m2  26.21 kg/m2  26.15 kg/m2   BSA (m2)  1.92 m2 1.93 m2  1.94 m2  1.93 m2   Visit Report  Report Report Report Report         General: NAD  Resp: non-labored breathing on RA  Cards: Regular rate  Abdomen: soft, non-tender, non-distended. EC fistula in place,   Extremities: symmetrical     Plan:    - The procedure was explained once again. Questions were sought and answered.   - The risks and benefit of the procedure were discussed. The patient is willing proceed.   - Consent was reviewed and signed.   - Further recommendations after the procedure.     Discussed with Kwadwo Clarke MD ([place])  General Surgeon  M[facility] Foregut / Flex Endo Fellow  Team Pager #[phone]     "

## 2024-11-06 NOTE — ANESTHESIA POSTPROCEDURE EVALUATION
Patient: Bereket Em    Procedure Summary       Date: 11/06/24 Room / Location: [facility]    Anesthesia Start: 1410 Anesthesia Stop: 1522    Procedure: ENTEROSCOPY Diagnosis:       Enterocutaneous fistula      Enterocutaneous fistula    Scheduled Providers: Kraig Cage MD Responsible Provider: Walker Matias MD    Anesthesia Type: general ASA Status: 3            Anesthesia Type: general    Vitals Value Taken Time   /83 11/06/24 1558   Temp  11/06/24 1558   Pulse 67 11/06/24 1558   Resp 16 11/06/24 1558   SpO2 97 11/06/24 1558       Anesthesia Post Evaluation    Patient location during evaluation: PACU  Patient participation: complete - patient participated  Level of consciousness: awake and alert  Pain management: adequate  Airway patency: patent  Cardiovascular status: acceptable  Respiratory status: acceptable  Hydration status: acceptable  Postoperative Nausea and Vomiting: none      There were no known notable events for this encounter.

## 2024-11-07 ENCOUNTER — APPOINTMENT (OUTPATIENT)
Dept: RADIOLOGY | Facility: HOSPITAL | Age: 59
End: 2024-11-07
Payer: COMMERCIAL

## 2024-11-07 PROCEDURE — 74240 X-RAY XM UPR GI TRC 1CNTRST: CPT | Performed by: RADIOLOGY

## 2024-11-07 PROCEDURE — 2500000001 HC RX 250 WO HCPCS SELF ADMINISTERED DRUGS (ALT 637 FOR MEDICARE OP)

## 2024-11-07 PROCEDURE — 2500000004 HC RX 250 GENERAL PHARMACY W/ HCPCS (ALT 636 FOR OP/ED): Performed by: STUDENT IN AN ORGANIZED HEALTH CARE EDUCATION/TRAINING PROGRAM

## 2024-11-07 PROCEDURE — 2500000001 HC RX 250 WO HCPCS SELF ADMINISTERED DRUGS (ALT 637 FOR MEDICARE OP): Performed by: STUDENT IN AN ORGANIZED HEALTH CARE EDUCATION/TRAINING PROGRAM

## 2024-11-07 PROCEDURE — 2550000001 HC RX 255 CONTRASTS: Performed by: SURGERY

## 2024-11-07 PROCEDURE — 2500000004 HC RX 250 GENERAL PHARMACY W/ HCPCS (ALT 636 FOR OP/ED)

## 2024-11-07 PROCEDURE — 74248 X-RAY SM INT F-THRU STD: CPT | Performed by: RADIOLOGY

## 2024-11-07 PROCEDURE — 74248 X-RAY SM INT F-THRU STD: CPT

## 2024-11-07 PROCEDURE — 1100000001 HC PRIVATE ROOM DAILY

## 2024-11-07 RX ORDER — LOPERAMIDE HCL 1MG/7.5ML
4 LIQUID (ML) ORAL 4 TIMES DAILY PRN
COMMUNITY

## 2024-11-07 RX ORDER — DIATRIZOATE MEGLUMINE AND DIATRIZOATE SODIUM 660; 100 MG/ML; MG/ML
240 SOLUTION ORAL; RECTAL ONCE
Status: COMPLETED | OUTPATIENT
Start: 2024-11-07 | End: 2024-11-07

## 2024-11-07 RX ORDER — IBUPROFEN 400 MG/1
400 TABLET ORAL ONCE
Status: COMPLETED | OUTPATIENT
Start: 2024-11-07 | End: 2024-11-07

## 2024-11-07 RX ORDER — HYDROMORPHONE HYDROCHLORIDE 1 MG/ML
0.4 INJECTION, SOLUTION INTRAMUSCULAR; INTRAVENOUS; SUBCUTANEOUS ONCE
Status: COMPLETED | OUTPATIENT
Start: 2024-11-07 | End: 2024-11-07

## 2024-11-07 ASSESSMENT — COGNITIVE AND FUNCTIONAL STATUS - GENERAL
MOBILITY SCORE: 24
DAILY ACTIVITIY SCORE: 24

## 2024-11-07 ASSESSMENT — PAIN DESCRIPTION - ORIENTATION
ORIENTATION: MID;ANTERIOR
ORIENTATION: MID;ANTERIOR

## 2024-11-07 ASSESSMENT — PAIN SCALES - PAIN ASSESSMENT IN ADVANCED DEMENTIA (PAINAD)
BODYLANGUAGE: RELAXED
CONSOLABILITY: NO NEED TO CONSOLE
BREATHING: NORMAL
FACIALEXPRESSION: SMILING OR INEXPRESSIVE
TOTALSCORE: 0
TOTALSCORE: MEDICATION (SEE MAR)
BODYLANGUAGE: RELAXED
BREATHING: NORMAL
FACIALEXPRESSION: SMILING OR INEXPRESSIVE
TOTALSCORE: 0
CONSOLABILITY: NO NEED TO CONSOLE
TOTALSCORE: MEDICATION (SEE MAR)

## 2024-11-07 ASSESSMENT — PAIN SCALES - WONG BAKER
WONGBAKER_NUMERICALRESPONSE: HURTS WHOLE LOT
WONGBAKER_NUMERICALRESPONSE: HURTS LITTLE MORE

## 2024-11-07 ASSESSMENT — PAIN DESCRIPTION - LOCATION
LOCATION: ABDOMEN

## 2024-11-07 ASSESSMENT — PAIN SCALES - GENERAL
PAINLEVEL_OUTOF10: 8
PAINLEVEL_OUTOF10: 4
PAINLEVEL_OUTOF10: 7
PAINLEVEL_OUTOF10: 0 - NO PAIN

## 2024-11-07 ASSESSMENT — PAIN - FUNCTIONAL ASSESSMENT: PAIN_FUNCTIONAL_ASSESSMENT: 0-10

## 2024-11-07 ASSESSMENT — ACTIVITIES OF DAILY LIVING (ADL): LACK_OF_TRANSPORTATION: NO

## 2024-11-07 NOTE — PROGRESS NOTES
TCC met with patient at bedside to discuss discharge planning. Pharmacy- [facility] ([place]), DME-0, family members drive patient to medical appointments. Patient had home care services through  home care prior to admission. Patient wishes to resume those services upon discharge. TCC will continue to follow for discharge planning.     11/07/24 7071   Discharge Planning   Living Arrangements Spouse/significant other   Support Systems Spouse/significant other   Assistance Needed Home Care   Type of Residence Private residence   Number of Stairs to Enter Residence 4   Number of Stairs Within Residence 0   Do you have animals or pets at home? Yes   Type of Animals or Pets 1-Dog   Who is requesting discharge planning? Provider   Home or Post Acute Services In home services   Type of Home Care Services Home nursing visits   Expected Discharge Disposition Home Heal   Does the patient need discharge transport arranged? No   Financial Resource Strain   How hard is it for you to pay for the very basics like food, housing, medical care, and heating? Not hard   Housing Stability   In the last 12 months, was there a time when you were not able to pay the mortgage or rent on time? N   At any time in the past 12 months, were you homeless or living in a shelter (including now)? N   Transportation Needs   In the past 12 months, has lack of transportation kept you from medical appointments or from getting medications? no   In the past 12 months, has lack of transportation kept you from meetings, work, or from getting things needed for daily living? No   Patient Choice   Patient / Family choosing to utilize agency / facility established prior to hospitalization Yes  ( Home Care)     VALENTINO Craft, RN  [facility]  Transitional Care Coordinator, Mon-Fri  Cell: [phone], Office: [phone]  Email: [email or web address]

## 2024-11-07 NOTE — CARE PLAN
Problem: Skin  Goal: Decreased wound size/increased tissue granulation at next dressing change  Outcome: Progressing  Goal: Participates in plan/prevention/treatment measures  Outcome: Progressing  Goal: Prevent/manage excess moisture  Outcome: Progressing  Goal: Prevent/minimize sheer/friction injuries  Outcome: Progressing  Goal: Promote/optimize nutrition  Outcome: Progressing  Goal: Promote skin healing  Outcome: Progressing     Problem: Pain  Goal: Takes deep breaths with improved pain control throughout the shift  Outcome: Progressing  Goal: Turns in bed with improved pain control throughout the shift  Outcome: Progressing  Goal: Walks with improved pain control throughout the shift  Outcome: Progressing  Goal: Performs ADL's with improved pain control throughout shift  Outcome: Progressing  Goal: Participates in PT with improved pain control throughout the shift  Outcome: Progressing  Goal: Free from opioid side effects throughout the shift  Outcome: Progressing  Goal: Free from acute confusion related to pain meds throughout the shift  Outcome: Progressing   The patient's goals for the shift include      The clinical goals for the shift include Patientg will remain HDS throughout the night.    Over the shift, the patient did not make progress toward the following goals. Barriers to progression include new abdominal surgery with wound management system intact . Recommendations to address these barriers include frequent wound assessment and management.

## 2024-11-07 NOTE — PROGRESS NOTES
Pharmacy Medication History Review    Bereket Em is a 59 y.o. male admitted for No Principal Problem: There is no principal problem currently on the Problem List. Please update the Problem List and refresh.. Pharmacy reviewed the patient's [id number] medications and allergies for accuracy.    Medications ADDED:  Metamucil  Medications CHANGED:  Diphenoxylate-Atropine  Loperamide  Medications REMOVED:   Melatonin     The list below reflects the updated PTA list.   Prior to Admission Medications   Prescriptions Last Dose Informant   0.9 % sodium chloride (sodium chloride, PF, 0.9%) injection  Self   Sig: Infuse 10 mL into a venous catheter 2 times a day. 10 ml NS twice daily SASH after TPN and LR   Ringer's solution,lactated (lactated Ringer's) infusion     Sig: Infuse 1,000 mL into a venous catheter once daily at bedtime for 8 days. Administer 1L of IVFs nightly through 10/31  Continue same weekly labs   acetaminophen (Tylenol) 325 mg tablet Past Month Self   Sig: Take 2 tablets (650 mg) by mouth every 6 hours if needed for mild pain (1 - 3).   diphenoxylate-atropine (LomotiL) 2.5-0.025 mg tablet 11/5/2024 Self   Sig: Take 1 tablet by mouth 4 times a day as needed for diarrhea.   Patient taking differently: Take 2 tablets by mouth 4 times a day as needed for diarrhea.   heparin flush (heparin LockFlush,Porcine,,PF,) 10 unit/mL injection 11/5/2024 Self   Sig: Infuse 5 mL (50 Units) into a venous catheter 2 times a day.   loperamide (Imodium A-D) 1 mg/7.5 mL liquid  Self   Sig: Take 30 mL (4 mg) by mouth 4 times a day as needed for diarrhea.   psyllium (Metamucil) 3.4 gram packet  Self   Sig: Take 4 packets by mouth once daily.   traZODone (Desyrel) 50 mg tablet 11/5/2024 Self   Sig: Take 1 tablet (50 mg) by mouth as needed at bedtime for sleep.      Facility-Administered Medications: None        The list below reflects the updated allergy list. Please review each documented allergy for additional  "clarification and justification.  Allergies  Reviewed by Kasandra Nicholson, RN on 11/6/2024   No Known Allergies         Patient declines M2B at discharge.     Sources:   Patient interview (good historian)  [place]ARRS  Care Everywhere  Medication fill history    Additional Comments:  None to note      Rebecca Davalos, [facility]  Transitions of Care Pharmacist  11/07/24     Secure Chat preferred   If no response call [phone] or Vocera \"Med Rec\"    "

## 2024-11-07 NOTE — CARE PLAN
The patient's goals for the shift include      The clinical goals for the shift include Patientg will remain HDS throughout the night.    Over the shift, the patient did not make progress toward the following goals. Barriers to progression include new surgery with stent placement . Recommendations to address these barriers include frequent assessment and pain management.

## 2024-11-07 NOTE — CARE PLAN
Problem: Skin  Goal: Decreased wound size/increased tissue granulation at next dressing change  Outcome: Progressing  Goal: Participates in plan/prevention/treatment measures  Outcome: Progressing  Goal: Prevent/manage excess moisture  Outcome: Progressing  Goal: Prevent/minimize sheer/friction injuries  Outcome: Progressing  Goal: Promote/optimize nutrition  Outcome: Progressing  Goal: Promote skin healing  Outcome: Progressing     Problem: Pain  Goal: Takes deep breaths with improved pain control throughout the shift  Outcome: Progressing  Goal: Turns in bed with improved pain control throughout the shift  Outcome: Progressing  Goal: Walks with improved pain control throughout the shift  Outcome: Progressing  Goal: Performs ADL's with improved pain control throughout shift  Outcome: Progressing  Goal: Participates in PT with improved pain control throughout the shift  Outcome: Progressing  Goal: Free from opioid side effects throughout the shift  Outcome: Progressing  Goal: Free from acute confusion related to pain meds throughout the shift  Outcome: Progressing     Problem: Fall/Injury  Goal: Not fall by end of shift  Outcome: Progressing  Goal: Be free from injury by end of the shift  Outcome: Progressing  Goal: Verbalize understanding of personal risk factors for fall in the hospital  Outcome: Progressing  Goal: Verbalize understanding of risk factor reduction measures to prevent injury from fall in the home  Outcome: Progressing  Goal: Use assistive devices by end of the shift  Outcome: Progressing  Goal: Pace activities to prevent fatigue by end of the shift  Outcome: Progressing   The patient's goals for the shift include  help when needed    The clinical goals for the shift include Patient will remain safe and free from injury throughout shift

## 2024-11-07 NOTE — SIGNIFICANT EVENT
"Post-Op Check    S: Patient seen at bedside s/p enteroscopy with placement of fully covered stent in double-barreled enteric fistula in the middle jejunum for post-operative evaluation. Denies f/c, n/v, SOB, CP. Pain is we-controlled.     O:  /74 (BP Location: Left arm, Patient Position: Lying)   Pulse 66   Temp 36.2 °C (97.2 °F) (Temporal)   Resp 20   Ht 1.727 m (5' 8\")   Wt 78 kg (172 lb)   SpO2 96%   BMI 26.15 kg/m²      - General: NAD  - CV: Regular rate  - Pulm: Normal respiratory effort on RA  - Abd: Soft, non-distended, non-tender, LUQ stoma pink, EC fistula visible through midline    A/P: Patient is doing well post-operatively. No changes in management at this time.    Selene Billingsley MD  PGY1 General Surgery  "

## 2024-11-07 NOTE — PROGRESS NOTES
"Bereket Em is a 59 y.o. 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     Subjective:      No acute events overnight. Patient denied N/V, fever/chills and endorses some abdominal pain which is controlled with pain meds. Patient did endorse burning pain around the wound site due to skin irritation     Objective:     Physical Exam  Constitutional:       General: He is not in acute distress.     Appearance: Normal appearance. He is not toxic-appearing.   HENT:      Head: Normocephalic.      Right Ear: External ear normal.      Left Ear: External ear normal.      Nose: Nose normal.      Mouth/Throat:      Mouth: Mucous membranes are moist.   Eyes:      Extraocular Movements: Extraocular movements intact.   Cardiovascular:      Rate and Rhythm: Normal rate and regular rhythm.   Pulmonary:      Effort: Pulmonary effort is normal.   Abdominal:      Palpations: Abdomen is soft.      Tenderness: There is abdominal tenderness.      Comments: Fully covered stent in afferent and efferent limb. Midline wound with surrounding skin irritation. Covered with a bag.   LLQ colostomy. Beefy red. Kareem in bag. Serous bowel sweat in bag.    Musculoskeletal:         General: Normal range of motion.      Cervical back: Normal range of motion.   Skin:     Capillary Refill: Capillary refill takes less than 2 seconds.   Neurological:      General: No focal deficit present.      Mental Status: He is alert and oriented to person, place, and time.   Psychiatric:         Behavior: Behavior normal.         Last Recorded Vitals  Blood pressure 116/68, pulse 50, temperature 36.4 °C (97.5 °F), temperature source Temporal, resp. rate 18, height 1.727 m (5' 8\"), weight 78 kg (172 lb), SpO2 94%.  Intake/Output last 3 Shifts:  I/O last 3 completed shifts:  In: 250 (3.2 mL/kg) [I.V.:250 (3.2 mL/kg)]  Out: 200 (2.6 mL/kg) [Urine:200 (0.1 mL/kg/hr)]  Weight: 78 " kg     Relevant Results  Enteroscopy w Fluoro; [facility]; Endoscopy; No    Result Date: 11/6/2024  Table formatting from the original result was not included. Impression Double-barreled enteric fistula in the middle jejunum; successfully placed 23 mm x 15 cm fully covered stent Findings Known enteric double-barreled fistula, likely in the middle jejunum; no bleeding was observed; successfully placed Endomaxx 23 mm x 15 cm fully covered stent using fluoroscopic guidance and guidewire across the fistula.  The fistula endomax was initially deployed in the distal aspect, then once fully deployed we with assistance of Jen clamps inserted into the left lateral fistula site.  Stent placement was confirmed via fluoroscopy, and no signs of obstructions were seen. Recommendation Follow up with me in clinic, due: 11/20/2024 - Pending Upper GI  Indication Enterocutaneous fistula Staff Staff Role Kraig Cage MD Proceduralist Medications See Anesthesia Record. Preprocedure A history and physical has been performed, and patient medication allergies have been reviewed. The patient's tolerance of previous anesthesia has been reviewed. The risks and benefits of the procedure and the sedation options and risks were discussed with the patient. All questions were answered and informed consent obtained. Details of the Procedure The patient underwent general anesthesia, which was administered by an anesthesia professional. The patient's blood pressure, heart rate, level of consciousness, oxygen, respirations, ECG and ETCO2 were monitored throughout the procedure. The scope was introduced through the stoma and advanced to the middle part of the jejunum. Fluoroscopy was used. The patient's estimated blood loss was minimal (<5 mL). The procedure was not difficult. The patient tolerated the procedure well. There were no apparent adverse events. Events Procedure Events Event Event Time ENDO SCOPE IN TIME 11/6/2024  2:25 PM ENDO SCOPE OUT TIME  11/6/2024  3:04 PM Specimens No specimens collected Procedure Location [facility] [address] [facility] [address] [phone] Referring Provider Kraig Cage MD Procedure Provider Kraig Cage MD       Scheduled medications  enoxaparin, 40 mg, subcutaneous, q24h      Continuous medications     PRN medications  PRN medications: acetaminophen **OR** acetaminophen **OR** acetaminophen, ondansetron ODT **OR** ondansetron, prochlorperazine **OR** prochlorperazine **OR** prochlorperazine, traZODone    Assessment:     Bereket Em is a 59 y.o. 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 UGI with small bowel follow through showed passage of contrast through the stent. Diet can be progressed to soft bite sized and if the patient tolerates well then likely discharge tomorrow.       Plan:    Neuro: acute post-op pain and skin burning  - pain control with prn tylenol  - home med: prn trazodone nightly to help with sleep     CV/pulm: no active issues     - IS  - OOB as tolerated    GI:  - soft diet  - no PPI indicated    :   - voiding independently   - daily labs, replete lytes PRN  - I&Os    Endo:  - no indication for sliding scale insulin    Heme:  - Hgb stable, no indications for transfusions    ID:  - no indications for abx    DVT ppx:  - lovenox   - SCDs in place    Dispo: continue management on RNF    Seen and discussed with chief Dr. Venegas and attending Dr. Niles Duran MD  Available via Secure Chat.  PGY-1 General Surgery   [id number]

## 2024-11-07 NOTE — CONSULTS
Wound Care Consult     Visit Date: 11/7/2024      Patient Name: Bereket Em         MRN: [id number]           YOB: 1965     Reason for Consult: s/p colostomy, abdominal ECF       Wound Assessment:  Wound 06/07/24 Incision Abdomen Medial;Upper (Active)       Wound 08/01/24 Abdomen Left;Lower;Medial (Active)   Wound Image   11/07/24 1441   Usha-Wound Assessment Excoriated 11/07/24 1441   Drainage Description Effulent/Bilious 11/07/24 1441   Drainage Amount Moderate 11/07/24 1441   Dressing Changed Changed 11/07/24 1441       Wound Team Summary Assessment:   Patient well known to wound care team.   Abdominal wound with chronic fistula, now with new stent placed by surgery.  Periwound skin very excoriated; treated today with stoma powder and Cavilon advanced. Wound edges lined with 4 inch rings and stoma paste. A small Melonie post op pouch then applied.   For the ostomy, a 2 piece flat wafer, barrier ring, and high output pouch was applied. Colostomy with very watery high output, which is different from previous (now that stent is in place).   Patient is independent with care at home, but will need assistance while in patient due to stent placement.      Mara Farias RN  11/7/2024  2:43 PM

## 2024-11-08 ENCOUNTER — DOCUMENTATION (OUTPATIENT)
Dept: HOME HEALTH SERVICES | Facility: HOME HEALTH | Age: 59
End: 2024-11-08
Payer: COMMERCIAL

## 2024-11-08 VITALS
WEIGHT: 172 LBS | DIASTOLIC BLOOD PRESSURE: 65 MMHG | OXYGEN SATURATION: 92 % | HEIGHT: 68 IN | HEART RATE: 57 BPM | RESPIRATION RATE: 16 BRPM | BODY MASS INDEX: 26.07 KG/M2 | SYSTOLIC BLOOD PRESSURE: 114 MMHG | TEMPERATURE: 96.4 F

## 2024-11-08 LAB
ALBUMIN SERPL BCP-MCNC: 4.1 G/DL (ref 3.4–5)
ANION GAP SERPL CALC-SCNC: 16 MMOL/L (ref 10–20)
BUN SERPL-MCNC: 23 MG/DL (ref 6–23)
CALCIUM SERPL-MCNC: 10.1 MG/DL (ref 8.6–10.6)
CHLORIDE SERPL-SCNC: 98 MMOL/L (ref 98–107)
CO2 SERPL-SCNC: 28 MMOL/L (ref 21–32)
CREAT SERPL-MCNC: 1.19 MG/DL (ref 0.5–1.3)
EGFRCR SERPLBLD CKD-EPI 2021: 70 ML/MIN/1.73M*2
ERYTHROCYTE [DISTWIDTH] IN BLOOD BY AUTOMATED COUNT: 16 % (ref 11.5–14.5)
GLUCOSE SERPL-MCNC: 95 MG/DL (ref 74–99)
HCT VFR BLD AUTO: 44.8 % (ref 41–52)
HGB BLD-MCNC: 14.2 G/DL (ref 13.5–17.5)
MAGNESIUM SERPL-MCNC: 1.99 MG/DL (ref 1.6–2.4)
MCH RBC QN AUTO: 24.4 PG (ref 26–34)
MCHC RBC AUTO-ENTMCNC: 31.7 G/DL (ref 32–36)
MCV RBC AUTO: 77 FL (ref 80–100)
NRBC BLD-RTO: 0 /100 WBCS (ref 0–0)
PHOSPHATE SERPL-MCNC: 4.4 MG/DL (ref 2.5–4.9)
PLATELET # BLD AUTO: 225 X10*3/UL (ref 150–450)
POTASSIUM SERPL-SCNC: 3.9 MMOL/L (ref 3.5–5.3)
RBC # BLD AUTO: 5.83 X10*6/UL (ref 4.5–5.9)
SODIUM SERPL-SCNC: 138 MMOL/L (ref 136–145)
WBC # BLD AUTO: 8.9 X10*3/UL (ref 4.4–11.3)

## 2024-11-08 PROCEDURE — 85027 COMPLETE CBC AUTOMATED: CPT

## 2024-11-08 PROCEDURE — 99239 HOSP IP/OBS DSCHRG MGMT >30: CPT | Performed by: NURSE PRACTITIONER

## 2024-11-08 PROCEDURE — 83735 ASSAY OF MAGNESIUM: CPT

## 2024-11-08 PROCEDURE — 80069 RENAL FUNCTION PANEL: CPT

## 2024-11-08 PROCEDURE — 2500000004 HC RX 250 GENERAL PHARMACY W/ HCPCS (ALT 636 FOR OP/ED): Performed by: NURSE PRACTITIONER

## 2024-11-08 RX ORDER — SODIUM CHLORIDE, SODIUM LACTATE, POTASSIUM CHLORIDE, CALCIUM CHLORIDE 600; 310; 30; 20 MG/100ML; MG/100ML; MG/100ML; MG/100ML
1000 INJECTION, SOLUTION INTRAVENOUS NIGHTLY
Start: 2024-11-08

## 2024-11-08 ASSESSMENT — COGNITIVE AND FUNCTIONAL STATUS - GENERAL
MOBILITY SCORE: 24
DAILY ACTIVITIY SCORE: 24

## 2024-11-08 ASSESSMENT — PAIN SCALES - GENERAL: PAINLEVEL_OUTOF10: 0 - NO PAIN

## 2024-11-08 ASSESSMENT — PAIN - FUNCTIONAL ASSESSMENT: PAIN_FUNCTIONAL_ASSESSMENT: 0-10

## 2024-11-08 NOTE — CONSULTS
Wound Care Consult     Visit Date: 11/8/2024      Patient Name: Bereket Em         MRN: [id number]           YOB: 1965     Reason for Consult: wound manager pouch replacement         Wound History: 59 year old male with h/o enterocuteneous fistula presented for elective enteroscopy with stent placement with Dr Cage on 11/6. Procedure was tolerated well. He was admitted overnight for observation with decreased output from ECF with return of bowel function via ostomy. UGI with SBFT was completed on 11/7      Pertinent Labs:   Albumin   Date Value Ref Range Status   11/08/2024 4.1 3.4 - 5.0 g/dL Final     ALBUMIN (MG/L) IN URINE   Date Value Ref Range Status   05/06/2021 <7.0 Not Established mg/L Final       Wound Assessment: The wound care team came to bedside to remove the patient's pouch and replace it prior to discharge. The primary team came to bedside to place additional sutures to hold the patient's stent in place.  The ECF and periwound skin was cleansed with vashe wound cleanser and gently pat dry. Stoma powder and 3M cavilon barrier film was applied to the periwound skin. After multiple attempts, the periwound skin was dressed with 4 inch and 2 inch adapt barrier rings were applied to build up a crease and a ConvaTec rui wound manager pouch was applied. Supplies were gathered and given to the patient prior to discharge      Wound Team Plan: Patient to be discharged 11/8     ISABELLE Ramos  11/8/2024  4:24 PM

## 2024-11-08 NOTE — CARE PLAN
Problem: Skin  Goal: Decreased wound size/increased tissue granulation at next dressing change  Outcome: Progressing  Goal: Participates in plan/prevention/treatment measures  Outcome: Progressing  Goal: Prevent/manage excess moisture  Outcome: Progressing  Goal: Prevent/minimize sheer/friction injuries  Outcome: Progressing  Goal: Promote/optimize nutrition  Outcome: Progressing  Goal: Promote skin healing  Outcome: Progressing     Problem: Pain  Goal: Takes deep breaths with improved pain control throughout the shift  Outcome: Progressing  Goal: Turns in bed with improved pain control throughout the shift  Outcome: Progressing  Goal: Walks with improved pain control throughout the shift  Outcome: Progressing  Goal: Performs ADL's with improved pain control throughout shift  Outcome: Progressing  Goal: Participates in PT with improved pain control throughout the shift  Outcome: Progressing  Goal: Free from opioid side effects throughout the shift  Outcome: Progressing  Goal: Free from acute confusion related to pain meds throughout the shift  Outcome: Progressing     Problem: Fall/Injury  Goal: Not fall by end of shift  Outcome: Progressing  Goal: Be free from injury by end of the shift  Outcome: Progressing  Goal: Verbalize understanding of personal risk factors for fall in the hospital  Outcome: Progressing  Goal: Verbalize understanding of risk factor reduction measures to prevent injury from fall in the home  Outcome: Progressing  Goal: Use assistive devices by end of the shift  Outcome: Progressing  Goal: Pace activities to prevent fatigue by end of the shift  Outcome: Progressing   The patient's goals for the shift include      The clinical goals for the shift include Patient will remain HDS throughout the night.    Over the shift, the patient did not make progress toward the following goals. Barriers to progression include patient had a recent surgical intervention to abdomen. Recommendations to address  these barriers include frequent monitoring of pain and drain sites.

## 2024-11-08 NOTE — DISCHARGE SUMMARY
Discharge Diagnosis  Enterocutaneous fistula    Issues Requiring Follow-Up  - Outpatient follow up   - Wound/ostomy care    Test Results Pending At Discharge  Pending Labs       No current pending labs.            Hospital Course  59 year old male with h/o enterocuteneous fistula presented for elective enteroscopy with stent placement with Dr Cage on 11/6. Procedure was tolerated well. He was admitted overnight for observation with decreased output from ECF with return of bowel function via ostomy. UGI with SBFT was completed on 11/7 and diet was advanced to soft. He was discharged home with resumption of home care for wound care support with outpatient follow up scheduled.     Pertinent Physical Exam At Time of Discharge  Physical Exam  Constitutional:       General: He is not in acute distress.  Cardiovascular:      Rate and Rhythm: Normal rate and regular rhythm.   Pulmonary:      Effort: Pulmonary effort is normal. No respiratory distress.      Comments: RA  Abdominal:      General: There is no distension.      Palpations: Abdomen is soft.      Tenderness: There is no abdominal tenderness.      Comments: ECF with wound manager appliance intact, colostomy with stoma pink/perfused, ostomy appliance intact with liquid stool output.    Skin:     General: Skin is warm and dry.   Neurological:      Mental Status: He is alert and oriented to person, place, and time.   Psychiatric:         Mood and Affect: Mood normal.         Behavior: Behavior normal.         Home Medications     Medication List      CONTINUE taking these medications     acetaminophen 325 mg tablet; Commonly known as: Tylenol; Take 2 tablets   (650 mg) by mouth every 6 hours if needed for mild pain (1 - 3).   diphenoxylate-atropine 2.5-0.025 mg tablet; Commonly known as: LomotiL;   Take 1 tablet by mouth 4 times a day as needed for diarrhea.   heparin LockFlush(Porcine)(PF) 10 unit/mL injection; Generic drug:   heparin flush   lactated Ringer's  infusion; Infuse 1,000 mL into a venous catheter once   daily at bedtime. Administer 1L of IVFs nightly through 10/31 Continue   same weekly labs   loperamide 1 mg/7.5 mL liquid; Commonly known as: Imodium A-D   psyllium 3.4 gram packet; Commonly known as: Metamucil   sodium chloride (PF) 0.9% injection   traZODone 50 mg tablet; Commonly known as: Desyrel; Take 1 tablet (50   mg) by mouth as needed at bedtime for sleep.       Outpatient Follow-Up  Future Appointments   Date Time Provider Department Center   11/21/2024 11:50 AM Kraig Cage MD [id number] [facility]   12/4/2024 10:30 AM Jerel Olson MD [id number] [facility]     Time spent with discharge was >30 minutes and included explanation of discharge instructions, prescriptions, creating discharge summary, and coordinating appropriate appointments for the patient     Charisse Castaneda, MINERVA-CNP

## 2024-11-08 NOTE — HH CARE COORDINATION
Home Care received a Referral to Resume Care for Infusion and Nursing. We have processed the referral for a Resumption of Care on 11/10/24.     If you have any questions or concerns, please feel free to contact us at [phone]. Follow the prompts, enter your five digit zip code, and you will be directed to your care team on E[facility] 1.

## 2024-11-08 NOTE — HOSPITAL COURSE
59 year old male with h/o enterocuteneous fistula presented for elective enteroscopy with stent placement with Dr Cage on 11/6. Procedure was tolerated well. He was admitted overnight for observation with decreased output from ECF with return of bowel function via ostomy. UGI with SBFT was completed on 11/7 and diet was advanced to soft. He was discharged home with resumption of home care for wound care support with outpatient follow up scheduled.

## 2024-11-09 ENCOUNTER — HOME INFUSION (OUTPATIENT)
Dept: INFUSION THERAPY | Age: 59
End: 2024-11-09
Payer: COMMERCIAL

## 2024-11-09 NOTE — PROGRESS NOTES
Bereket Em is receiving Lactated Ringer's for daily infusion     Followed by Dr Perry    Pt discharged from hospital 11/8 with orders to continue current regimen.    RX contacted pt, confirmed no doses left in home from previous deliveries so he is requesting a week of hydrations and regular supplies. Pt is aware national supply is low so pharmacy will provide going forward as able with current allocations.    Dispensing 11/9 with supplies to match   7x LR gravity  DOS 11/9-11/15    Follow up 11/15 check POC with Dr Perry, check needs with pt, deliver as appropriate

## 2024-11-11 ENCOUNTER — LAB REQUISITION (OUTPATIENT)
Dept: LAB | Facility: HOSPITAL | Age: 59
End: 2024-11-11
Payer: COMMERCIAL

## 2024-11-11 ENCOUNTER — PATIENT OUTREACH (OUTPATIENT)
Dept: PRIMARY CARE | Facility: CLINIC | Age: 59
End: 2024-11-11
Payer: COMMERCIAL

## 2024-11-11 ENCOUNTER — HOME CARE VISIT (OUTPATIENT)
Dept: HOME HEALTH SERVICES | Facility: HOME HEALTH | Age: 59
End: 2024-11-11
Payer: COMMERCIAL

## 2024-11-11 VITALS
OXYGEN SATURATION: 98 % | HEART RATE: 62 BPM | WEIGHT: 166.38 LBS | RESPIRATION RATE: 16 BRPM | SYSTOLIC BLOOD PRESSURE: 126 MMHG | TEMPERATURE: 98.2 F | BODY MASS INDEX: 25.3 KG/M2 | DIASTOLIC BLOOD PRESSURE: 72 MMHG

## 2024-11-11 DIAGNOSIS — T84.410A: ICD-10-CM

## 2024-11-11 DIAGNOSIS — B95.62 METHICILLIN RESISTANT STAPHYLOCOCCUS AUREUS INFECTION AS THE CAUSE OF DISEASES CLASSIFIED ELSEWHERE: ICD-10-CM

## 2024-11-11 DIAGNOSIS — T81.31XA DISRUPTION OF EXTERNAL OPERATION (SURGICAL) WOUND, NOT ELSEWHERE CLASSIFIED, INITIAL ENCOUNTER: ICD-10-CM

## 2024-11-11 LAB
ALBUMIN SERPL BCP-MCNC: 3.8 G/DL (ref 3.4–5)
ALP SERPL-CCNC: 175 U/L (ref 33–136)
ALT SERPL W P-5'-P-CCNC: 93 U/L (ref 10–52)
ANION GAP SERPL CALCULATED.3IONS-SCNC: 12 MMOL/L (ref 10–20)
AST SERPL W P-5'-P-CCNC: 22 U/L (ref 9–39)
BASOPHILS # BLD AUTO: 0.03 X10*3/UL (ref 0–0.1)
BASOPHILS NFR BLD AUTO: 0.3 %
BILIRUB SERPL-MCNC: 0.4 MG/DL (ref 0–1.2)
BUN SERPL-MCNC: 18 MG/DL (ref 6–23)
CALCIUM SERPL-MCNC: 8.9 MG/DL (ref 8.6–10.3)
CHLORIDE SERPL-SCNC: 99 MMOL/L (ref 98–107)
CO2 SERPL-SCNC: 30 MMOL/L (ref 21–32)
CREAT SERPL-MCNC: 0.72 MG/DL (ref 0.5–1.3)
EGFRCR SERPLBLD CKD-EPI 2021: >90 ML/MIN/1.73M*2
EOSINOPHIL # BLD AUTO: 0.28 X10*3/UL (ref 0–0.7)
EOSINOPHIL NFR BLD AUTO: 3.2 %
ERYTHROCYTE [DISTWIDTH] IN BLOOD BY AUTOMATED COUNT: 15.7 % (ref 11.5–14.5)
GLUCOSE SERPL-MCNC: 116 MG/DL (ref 74–99)
HCT VFR BLD AUTO: 42 % (ref 41–52)
HGB BLD-MCNC: 13 G/DL (ref 13.5–17.5)
IMM GRANULOCYTES # BLD AUTO: 0.02 X10*3/UL (ref 0–0.7)
IMM GRANULOCYTES NFR BLD AUTO: 0.2 % (ref 0–0.9)
LYMPHOCYTES # BLD AUTO: 2.38 X10*3/UL (ref 1.2–4.8)
LYMPHOCYTES NFR BLD AUTO: 27 %
MAGNESIUM SERPL-MCNC: 1.62 MG/DL (ref 1.6–2.4)
MCH RBC QN AUTO: 24.1 PG (ref 26–34)
MCHC RBC AUTO-ENTMCNC: 31 G/DL (ref 32–36)
MCV RBC AUTO: 78 FL (ref 80–100)
MONOCYTES # BLD AUTO: 0.62 X10*3/UL (ref 0.1–1)
MONOCYTES NFR BLD AUTO: 7 %
NEUTROPHILS # BLD AUTO: 5.5 X10*3/UL (ref 1.2–7.7)
NEUTROPHILS NFR BLD AUTO: 62.3 %
NRBC BLD-RTO: 0 /100 WBCS (ref 0–0)
PHOSPHATE SERPL-MCNC: 3.1 MG/DL (ref 2.5–4.9)
PLATELET # BLD AUTO: 306 X10*3/UL (ref 150–450)
POTASSIUM SERPL-SCNC: 3.8 MMOL/L (ref 3.5–5.3)
PROT SERPL-MCNC: 6.5 G/DL (ref 6.4–8.2)
RBC # BLD AUTO: 5.4 X10*6/UL (ref 4.5–5.9)
SODIUM SERPL-SCNC: 137 MMOL/L (ref 136–145)
TRIGL SERPL-MCNC: 263 MG/DL (ref 0–149)
WBC # BLD AUTO: 8.8 X10*3/UL (ref 4.4–11.3)

## 2024-11-11 PROCEDURE — 84100 ASSAY OF PHOSPHORUS: CPT

## 2024-11-11 PROCEDURE — 85025 COMPLETE CBC W/AUTO DIFF WBC: CPT

## 2024-11-11 PROCEDURE — 84478 ASSAY OF TRIGLYCERIDES: CPT

## 2024-11-11 PROCEDURE — 80053 COMPREHEN METABOLIC PANEL: CPT

## 2024-11-11 PROCEDURE — 83735 ASSAY OF MAGNESIUM: CPT

## 2024-11-11 PROCEDURE — 84134 ASSAY OF PREALBUMIN: CPT

## 2024-11-11 PROCEDURE — G0299 HHS/HOSPICE OF RN EA 15 MIN: HCPCS

## 2024-11-11 NOTE — PROGRESS NOTES
Discharge Facility: [facility]     Discharge Diagnosis:    Enterocutaneous fistula     Issues Requiring Follow-Up  - Outpatient follow up   - Wound/ostomy care    Admission Date: 11/6/2024   Discharge Date:  11/8/2024     PCP Appointment Date:  PCP Office tasked F/U     Specialist Appointment Date:      [facility] in process     11/21/2024  Follow up with Kraig Cage MD (General Surgery) in 2 weeks (11/22/2024)     Hospital Encounter and Summary Linked: Yes    See discharge assessment below for further details     Engagement  Call Start Time: 1133 (11/11/2024 11:33 AM)    Medications  Medications reviewed with patient/caregiver?: Yes (11/11/2024 11:33 AM)  Is the patient having any side effects they believe may be caused by any medication additions or changes?: No (11/11/2024 11:33 AM)  Does the patient have all medications ordered at discharge?: Yes (11/11/2024 11:33 AM)  Care Management Interventions: No intervention needed (11/11/2024 11:33 AM)  Prescription Comments: no questions or concerns , no change in meds (11/11/2024 11:33 AM)  Is the patient taking all medications as directed (includes completed medication regime)?: -- (Pt states has all meds) (11/11/2024 11:33 AM)  Medication Comments: no questions or concerns at this time (11/11/2024 11:33 AM)    Appointments  Does the patient have a primary care provider?: Yes (11/11/2024 11:33 AM)  Care Management Interventions: Educated patient on importance of making appointment (11/11/2024 11:33 AM)  Has the patient kept scheduled appointments due by today?: Yes (11/11/2024 11:33 AM)  Care Management Interventions: Advised patient to keep appointment (11/11/2024 11:33 AM)    Self Management  What is the home health agency?: [facility] in process (11/11/2024 11:33 AM)  What Durable Medical Equipment (DME) was ordered?: NA (11/11/2024 11:33 AM)    Patient Teaching  Does the patient have access to their discharge instructions?: Yes (11/11/2024 11:33 AM)  Care Management  Interventions: Reviewed instructions with patient (11/11/2024 11:33 AM)  What is the patient's perception of their health status since discharge?: Improving (11/11/2024 11:33 AM)  Is the patient/caregiver able to teach back the hierarchy of who to call/visit for symptoms/problems? PCP, Specialist, Home Health nurse, Urgent Care, ED, 911: Yes (11/11/2024 11:33 AM)  Patient/Caregiver Education Comments: Patient states doing fine ,aware of DC instructions , [facility] has been in contact . Patient is aware to call providers for any questions concerns or change in condition (11/11/2024 11:33 AM)

## 2024-11-12 LAB — PREALB SERPL-MCNC: 31.4 MG/DL (ref 18–40)

## 2024-11-14 ENCOUNTER — HOME CARE VISIT (OUTPATIENT)
Dept: HOME HEALTH SERVICES | Facility: HOME HEALTH | Age: 59
End: 2024-11-14
Payer: COMMERCIAL

## 2024-11-14 ENCOUNTER — HOME INFUSION (OUTPATIENT)
Dept: INFUSION THERAPY | Age: 59
End: 2024-11-14
Payer: COMMERCIAL

## 2024-11-14 VITALS
HEART RATE: 56 BPM | DIASTOLIC BLOOD PRESSURE: 70 MMHG | RESPIRATION RATE: 14 BRPM | SYSTOLIC BLOOD PRESSURE: 110 MMHG | TEMPERATURE: 98.3 F

## 2024-11-14 PROCEDURE — G0299 HHS/HOSPICE OF RN EA 15 MIN: HCPCS

## 2024-11-14 ASSESSMENT — ENCOUNTER SYMPTOMS
DENIES PAIN: 1
FATIGUE: 0
PERSON REPORTING PAIN: PATIENT
DIFFICULTY URINATING: 0
DIARRHEA: 0
DYSURIA: 0
VOMITING: 0
CHEST TIGHTNESS: 0
ABDOMINAL PAIN: 0
PALPITATIONS: 0
ACTIVITY CHANGE: 0
APPETITE LEVEL: GOOD
UNEXPECTED WEIGHT CHANGE: 0
SHORTNESS OF BREATH: 0
CONSTIPATION: 0
SKIN LESIONS: 1
CHILLS: 0
DIAPHORESIS: 0
WEAKNESS: 0
DIZZINESS: 0
BLOOD IN STOOL: 0
COUGH: 0
NAUSEA: 0
ANAL BLEEDING: 0
LIGHT-HEADEDNESS: 0
FEVER: 0
APPETITE CHANGE: 0
RECTAL PAIN: 0
HEMATURIA: 0

## 2024-11-14 NOTE — PROGRESS NOTES
Patient is ordered 1L LR infusions once daily through tentatively 11/15 for high output ostomy/ECF.    Staff message to Dr. Perry about new orders / duration of therapy.    Tel call to patient. He was told to do LR infusions every other day, and so he has supply through next week. No one has talked to him about length of therapy so he would appreciate a notification once we hear back from Dr. Perry. In the mean time he is agreeable to check back next Thursday 11/21 for another delivery.    Followup 11/21 check inventory, supply, deliver OVN as needed

## 2024-11-14 NOTE — PROGRESS NOTES
"Bereket Em  [id number]   11/14/24  11:36 AM    HPI/Subjective:  Bereket Em is a 59 y.o. male with history of perforated diverticulitis s/p ex lap, sigmoidectomy, end colostomy and was left partially open with Dr. Gomes on 6/7/2024.  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  Wound vac was applied to his midline incision on 6/11/24 and TPN was started.  Wound vac was taken down on 6/14/2024 and he was found to have necrotic fascia.  He was returned to the OR for repeat ex lap, washout, fascial debridement and closure with retention sutures on 6/16/2024.  He then returned to the OR for ex lap and mesh placement on 6/18/2024.  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  He was discharged home with TPN and a wound manager. He is now s/p enteroscopy with stents by Dr. Cage on 11/6/2024.    He is referred to clinic by Vitaly Watson*  for complex abdominal closure .    At his most recent visit with Dr. Morfin to discuss fistula takedown and colostomy closure he reported minimal to no colostomy output and approximately 1000ml / day fistula output.  He reported taking Immodium and Lomotil daily with continued watery stool.  He is eating and drinking well and stated he sees food in his fistula pouch within 30 minutes of eating. He was off TPN at that time and reported a 9 pound weight loss since discontinuing TPN approximately 6 weeks prior.    Symptomatically, this patient experiences ***, which is {COURSE IMPROVING/WORSENING SX HPI:80894} - first noticed *** and now ***.    They have had {SEVERAL/MANY/NO:30337::\"no\"} presentation(s) to the hospital for these symptoms***.    Workup has included labs notable for *** and imaging notable for ***.    Past surgical history is otherwise notable for:  Re Exploration Laparotomy, mesh placement (6/18/2024)  Exploration laparotomy, abdominal washout, fascial debridement, enmas closure (6/15/2024)  Resection " sigmoid colon and colostomy (6/7/2024)  Colonoscopy (5/19/2022)     Past medical history is significant for diverticulitis, enterocutaneous fistula, BPH, anxiety, depression, nicotine dependence, HTN, alcohol abuse, in remission, rotator cuff tear (right shoulder), rupture of biceps tendon (right)    Hepatic insufficiency or liver failure - No  Ascites - No  Hypertension - Yes  Diabetes mellitus - No   Dialysis (acute or chronic renal failure requiring dialysis within 2 weeks prior to surgery) - None  COPD - No  Dyspnea - No  Antiplatelet medications excluding aspirin - No  Anticoagulation medications - No  Aspirin - No  Immunosuppression - No  Nicotine use in relation to OR date - No (former smoker, Quit June 2024)  History of AAA - No  Collagen vascular disorder - No  Congestive heart failure - No  Active pregnancy - No    Review of Systems   Constitutional:  Negative for activity change, appetite change, chills, diaphoresis, fatigue, fever and unexpected weight change.   Respiratory:  Negative for cough, chest tightness and shortness of breath.    Cardiovascular:  Negative for palpitations and leg swelling.   Gastrointestinal:  Negative for abdominal pain, anal bleeding, blood in stool, constipation, diarrhea, nausea, rectal pain and vomiting.   Genitourinary:  Negative for difficulty urinating, dysuria and hematuria.   Neurological:  Negative for dizziness, weakness and light-headedness.          Current Outpatient Medications   Medication Instructions    0.9 % sodium chloride (sodium chloride, PF, 0.9%) injection 10 mL, intravenous, 2 times daily, 10 ml NS twice daily SASH after TPN and LR    acetaminophen (TYLENOL) 650 mg, oral, Every 6 hours PRN    diphenoxylate-atropine (LomotiL) 2.5-0.025 mg tablet 1 tablet, oral, 4 times daily PRN    heparin flush (heparin LockFlush,Porcine,,PF,) 10 unit/mL injection 5 mL, intravenous, 2 times daily    loperamide (IMODIUM A-D) 4 mg, oral, 4 times daily PRN    psyllium  (Metamucil) 3.4 gram packet 4 packets, oral, Daily    Ringer's solution,lactated (lactated Ringer's) infusion 1,000 mL, intravenous, Nightly, Administer 1L of IVFs nightly through 10/31  Continue same weekly labs    traZODone (DESYREL) 50 mg, oral, Nightly PRN       No Known Allergies    Objective:  There were no vitals filed for this visit.  There is no height or weight on file to calculate BMI.    Physical Exam  Vitals reviewed. Exam conducted with a chaperone present.   Constitutional:       General: He is not in acute distress.     Appearance: Normal appearance. He is not ill-appearing.   HENT:      Head: Normocephalic.      Mouth/Throat:      Mouth: Mucous membranes are moist.   Eyes:      Extraocular Movements: Extraocular movements intact.      Pupils: Pupils are equal, round, and reactive to light.   Cardiovascular:      Rate and Rhythm: Normal rate and regular rhythm.      Pulses: Normal pulses.      Heart sounds: Normal heart sounds. No murmur heard.  Pulmonary:      Effort: Pulmonary effort is normal. No respiratory distress.      Breath sounds: Normal breath sounds. No wheezing, rhonchi or rales.   Chest:      Chest wall: No tenderness.   Abdominal:      General: There is no distension.      Palpations: There is no mass.      Tenderness: There is no abdominal tenderness. There is no guarding or rebound.      Hernia: No hernia is present.      Comments: ECF with wound manager appliance intact, colostomy with ostomy appliance intact   Musculoskeletal:         General: No swelling or deformity.      Cervical back: Neck supple. No rigidity.      Right lower leg: No edema.      Left lower leg: No edema.   Skin:     General: Skin is warm.      Coloration: Skin is not jaundiced or pale.   Neurological:      General: No focal deficit present.      Mental Status: He is alert and oriented to person, place, and time. Mental status is at baseline.   Psychiatric:         Mood and Affect: Mood normal.          Behavior: Behavior normal.         Thought Content: Thought content normal.         Judgment: Judgment normal.           Imaging consisting of CT Abdomen / Pelvis (8/26/2024) was reviewed personally and was notable for Fluid-filled loops of small bowel in the lower left quadrant with equivocal bowel wall thickening and associated hyperemia suggestive of enteritis. Interval resolution of peritoneal fat stranding related to prior surgery. There are postsurgical changes from prior colostomy and sigmoidectomy    Assessment/Plan:  Bereket Em is a 59 y.o. male with history of perforated diverticulitis s/p ex lap, sigmoidectomy, end colostomy, and mesh placement who presents with symptoms and workup consistent with enterocutaneous fistula.    We will plan to {sjznextsteps:53962}. Plan will be for ***.    I will see the patient back in the office in {[facility]:65148} or sooner if needed.    Portions of medical record reviewed for pertinent issues including active problem list, medication list, allergies, social history, health maintenance, notes from previous encounters, lab results, and imaging.     Jerel Olson MD  Assistant Professor of Surgery  Division of General Surgery  Department of Surgery

## 2024-11-15 ENCOUNTER — TELEPHONE (OUTPATIENT)
Dept: SURGERY | Facility: HOSPITAL | Age: 59
End: 2024-11-15
Payer: COMMERCIAL

## 2024-11-15 NOTE — HOME HEALTH
WOC  home nursing visit   known to sn     stoma type:  colostomy  and mid abd fistula   size: 1 3/8 x 1 1/8  location: left side   protrustion: yes   mucocutaneous junction:intact  mucosal condition and color: red and moist   Peristomal Skin: intact  Peristomal contour: flat   character of output: pasty     pouching system used : 2 piece darnell disposable  accessories used: remover    left 14 [id number]   left 4 67400  left 5 66974    call to [employer]   0 copay   ordered   74361 4 boxes  1 box [id number]  10 each [id number] eackin pouches for mid abd fistula that now has a stent in place which seems to be working as he is now putting stool out the colosotmy    picc rt arm wihtout concern.

## 2024-11-15 NOTE — TELEPHONE ENCOUNTER
I called Mr. Em to discuss his nutrition, hydration, output, etc.   He reports having ostomy output since his stent placement and about 300 mL in the wound manager which is decreased from 1L previously.  His stent fell out this morning and he has called Dr. Cage' office to discuss next steps.  Otherwise, he is doing great.  His urine is more clear.  He remained on every other day 1L IVFs.  His prealbumin and albumin are decreased which can be expected following his recent procedure.  His LFTs have overall improved.    For now, plan for every other day 1L of fluid, regroup next week and then determine next steps with the enteral stent.

## 2024-11-15 NOTE — CASE COMMUNICATION
saw patient for you  he is doing better.  output in colostomy bag.   left some supplies and ordered  via [facility] was not interested in changing the fistula pouch.

## 2024-11-17 ASSESSMENT — ENCOUNTER SYMPTOMS
APPETITE LEVEL: FAIR
PAIN: 1
PAIN LOCATION - PAIN SEVERITY: 6/10
PERSON REPORTING PAIN: PATIENT
CHANGE IN APPETITE: UNCHANGED
PAIN LOCATION: ABDOMEN
FATIGUES EASILY: 1
MUSCLE WEAKNESS: 1

## 2024-11-17 ASSESSMENT — ACTIVITIES OF DAILY LIVING (ADL)
OASIS_M1830: 05
ENTERING_EXITING_HOME: ONE PERSON

## 2024-11-18 ENCOUNTER — HOME CARE VISIT (OUTPATIENT)
Dept: HOME HEALTH SERVICES | Facility: HOME HEALTH | Age: 59
End: 2024-11-18
Payer: COMMERCIAL

## 2024-11-18 ENCOUNTER — LAB REQUISITION (OUTPATIENT)
Dept: LAB | Facility: LAB | Age: 59
End: 2024-11-18
Payer: COMMERCIAL

## 2024-11-18 VITALS
OXYGEN SATURATION: 98 % | HEART RATE: 72 BPM | DIASTOLIC BLOOD PRESSURE: 68 MMHG | SYSTOLIC BLOOD PRESSURE: 110 MMHG | RESPIRATION RATE: 16 BRPM | TEMPERATURE: 97.8 F

## 2024-11-18 DIAGNOSIS — T84.410A: ICD-10-CM

## 2024-11-18 DIAGNOSIS — T81.31XA DISRUPTION OF EXTERNAL OPERATION (SURGICAL) WOUND, NOT ELSEWHERE CLASSIFIED, INITIAL ENCOUNTER: ICD-10-CM

## 2024-11-18 DIAGNOSIS — B95.62 METHICILLIN RESISTANT STAPHYLOCOCCUS AUREUS INFECTION AS THE CAUSE OF DISEASES CLASSIFIED ELSEWHERE: ICD-10-CM

## 2024-11-18 LAB
ALBUMIN SERPL BCP-MCNC: 4.4 G/DL (ref 3.4–5)
ALP SERPL-CCNC: 185 U/L (ref 33–136)
ALT SERPL W P-5'-P-CCNC: 64 U/L (ref 10–52)
ANION GAP SERPL CALCULATED.3IONS-SCNC: 16 MMOL/L (ref 10–20)
AST SERPL W P-5'-P-CCNC: 33 U/L (ref 9–39)
BASOPHILS # BLD AUTO: 0.06 X10*3/UL (ref 0–0.1)
BASOPHILS NFR BLD AUTO: 0.8 %
BILIRUB SERPL-MCNC: 0.6 MG/DL (ref 0–1.2)
BUN SERPL-MCNC: 20 MG/DL (ref 6–23)
CALCIUM SERPL-MCNC: 10 MG/DL (ref 8.6–10.3)
CHLORIDE SERPL-SCNC: 95 MMOL/L (ref 98–107)
CO2 SERPL-SCNC: 30 MMOL/L (ref 21–32)
CREAT SERPL-MCNC: 0.88 MG/DL (ref 0.5–1.3)
EGFRCR SERPLBLD CKD-EPI 2021: >90 ML/MIN/1.73M*2
EOSINOPHIL # BLD AUTO: 0.15 X10*3/UL (ref 0–0.7)
EOSINOPHIL NFR BLD AUTO: 2 %
ERYTHROCYTE [DISTWIDTH] IN BLOOD BY AUTOMATED COUNT: 15.4 % (ref 11.5–14.5)
GLUCOSE SERPL-MCNC: 87 MG/DL (ref 74–99)
HCT VFR BLD AUTO: 45.5 % (ref 41–52)
HGB BLD-MCNC: 14.2 G/DL (ref 13.5–17.5)
IMM GRANULOCYTES # BLD AUTO: 0.01 X10*3/UL (ref 0–0.7)
IMM GRANULOCYTES NFR BLD AUTO: 0.1 % (ref 0–0.9)
LYMPHOCYTES # BLD AUTO: 2.4 X10*3/UL (ref 1.2–4.8)
LYMPHOCYTES NFR BLD AUTO: 31.4 %
MAGNESIUM SERPL-MCNC: 1.98 MG/DL (ref 1.6–2.4)
MCH RBC QN AUTO: 24.1 PG (ref 26–34)
MCHC RBC AUTO-ENTMCNC: 31.2 G/DL (ref 32–36)
MCV RBC AUTO: 77 FL (ref 80–100)
MONOCYTES # BLD AUTO: 0.71 X10*3/UL (ref 0.1–1)
MONOCYTES NFR BLD AUTO: 9.3 %
NEUTROPHILS # BLD AUTO: 4.31 X10*3/UL (ref 1.2–7.7)
NEUTROPHILS NFR BLD AUTO: 56.4 %
NRBC BLD-RTO: 0 /100 WBCS (ref 0–0)
PHOSPHATE SERPL-MCNC: 3.9 MG/DL (ref 2.5–4.9)
PLATELET # BLD AUTO: 283 X10*3/UL (ref 150–450)
POTASSIUM SERPL-SCNC: 4 MMOL/L (ref 3.5–5.3)
PREALB SERPL-MCNC: NORMAL
PROT SERPL-MCNC: 7.4 G/DL (ref 6.4–8.2)
RBC # BLD AUTO: 5.89 X10*6/UL (ref 4.5–5.9)
SODIUM SERPL-SCNC: 137 MMOL/L (ref 136–145)
TRIGL SERPL-MCNC: 158 MG/DL (ref 0–149)
WBC # BLD AUTO: 7.6 X10*3/UL (ref 4.4–11.3)

## 2024-11-18 PROCEDURE — 84100 ASSAY OF PHOSPHORUS: CPT

## 2024-11-18 PROCEDURE — 84478 ASSAY OF TRIGLYCERIDES: CPT

## 2024-11-18 PROCEDURE — 85025 COMPLETE CBC W/AUTO DIFF WBC: CPT

## 2024-11-18 PROCEDURE — G0299 HHS/HOSPICE OF RN EA 15 MIN: HCPCS

## 2024-11-18 PROCEDURE — 80053 COMPREHEN METABOLIC PANEL: CPT

## 2024-11-18 PROCEDURE — 83735 ASSAY OF MAGNESIUM: CPT

## 2024-11-18 ASSESSMENT — ENCOUNTER SYMPTOMS
PERSON REPORTING PAIN: PATIENT
MUSCLE WEAKNESS: 1
FATIGUES EASILY: 1
PAIN LOCATION - PAIN SEVERITY: 6/10
APPETITE LEVEL: POOR
CHANGE IN APPETITE: DECREASED
PAIN: 1
PAIN LOCATION: ABDOMEN

## 2024-11-19 ENCOUNTER — HOME INFUSION (OUTPATIENT)
Dept: INFUSION THERAPY | Age: 59
End: 2024-11-19
Payer: COMMERCIAL

## 2024-11-19 NOTE — PROGRESS NOTES
Bereket Em is receiving Lactated Ringer's for daily infusion. Has been trying to infuse every other day.   Followed by Dr Perry     Received message from field RN that patient is very dehydrated. Discussed case with MD and Pharmacy can provide daily infusions for the time being but can't support twice daily infusions at this time. Recommendation accepted and patient will infuse once daily per orders. RN aware.     RX contacted pt, understands need to infuse daily and will be receiving one week of hydrations and regular supplies. Patient reports no Dressing Kits arrived with last delivery and none in home. Will replace and send backup. Pt is aware national supply of LR is low so pharmacy will provide going forward as able with current allocations.     Dispensing 11/19 with supplies to match   8x LR gravity  DOS 11/20-11/27     Follow up 11/26 check needs with pt, deliver as appropriate. Priority to be given for once daily infusions if possible.

## 2024-11-20 ENCOUNTER — ANESTHESIA EVENT (OUTPATIENT)
Dept: GASTROENTEROLOGY | Facility: HOSPITAL | Age: 59
End: 2024-11-20
Payer: COMMERCIAL

## 2024-11-20 ENCOUNTER — ANESTHESIA (OUTPATIENT)
Dept: GASTROENTEROLOGY | Facility: HOSPITAL | Age: 59
End: 2024-11-20
Payer: COMMERCIAL

## 2024-11-20 ENCOUNTER — HOSPITAL ENCOUNTER (OUTPATIENT)
Dept: GASTROENTEROLOGY | Facility: HOSPITAL | Age: 59
Discharge: HOME | End: 2024-11-20
Payer: COMMERCIAL

## 2024-11-20 VITALS
TEMPERATURE: 96.3 F | BODY MASS INDEX: 24.86 KG/M2 | SYSTOLIC BLOOD PRESSURE: 128 MMHG | DIASTOLIC BLOOD PRESSURE: 82 MMHG | RESPIRATION RATE: 12 BRPM | OXYGEN SATURATION: 96 % | HEIGHT: 68 IN | WEIGHT: 164 LBS | HEART RATE: 68 BPM

## 2024-11-20 DIAGNOSIS — K63.2 ENTEROCUTANEOUS FISTULA: ICD-10-CM

## 2024-11-20 DIAGNOSIS — R19.8 HIGH OUTPUT ILEOSTOMY (MULTI): ICD-10-CM

## 2024-11-20 DIAGNOSIS — Z93.2 HIGH OUTPUT ILEOSTOMY (MULTI): ICD-10-CM

## 2024-11-20 PROCEDURE — 44370 SMALL BOWEL ENDOSCOPY/STENT: CPT | Performed by: SURGERY

## 2024-11-20 PROCEDURE — 7100000002 HC RECOVERY ROOM TIME - EACH INCREMENTAL 1 MINUTE

## 2024-11-20 PROCEDURE — 2780000003 HC OR 278 NO HCPCS

## 2024-11-20 PROCEDURE — 7100000010 HC PHASE TWO TIME - EACH INCREMENTAL 1 MINUTE

## 2024-11-20 PROCEDURE — C1769 GUIDE WIRE: HCPCS

## 2024-11-20 PROCEDURE — 3700000001 HC GENERAL ANESTHESIA TIME - INITIAL BASE CHARGE

## 2024-11-20 PROCEDURE — 2720000007 HC OR 272 NO HCPCS

## 2024-11-20 PROCEDURE — 7100000009 HC PHASE TWO TIME - INITIAL BASE CHARGE

## 2024-11-20 PROCEDURE — 74360 X-RAY GUIDE GI DILATION: CPT | Performed by: STUDENT IN AN ORGANIZED HEALTH CARE EDUCATION/TRAINING PROGRAM

## 2024-11-20 PROCEDURE — 44370 SMALL BOWEL ENDOSCOPY/STENT: CPT | Performed by: STUDENT IN AN ORGANIZED HEALTH CARE EDUCATION/TRAINING PROGRAM

## 2024-11-20 PROCEDURE — 2500000004 HC RX 250 GENERAL PHARMACY W/ HCPCS (ALT 636 FOR OP/ED): Performed by: NURSE ANESTHETIST, CERTIFIED REGISTERED

## 2024-11-20 PROCEDURE — C1874 STENT, COATED/COV W/DEL SYS: HCPCS

## 2024-11-20 PROCEDURE — 2500000004 HC RX 250 GENERAL PHARMACY W/ HCPCS (ALT 636 FOR OP/ED)

## 2024-11-20 PROCEDURE — 3700000002 HC GENERAL ANESTHESIA TIME - EACH INCREMENTAL 1 MINUTE

## 2024-11-20 PROCEDURE — 7100000001 HC RECOVERY ROOM TIME - INITIAL BASE CHARGE

## 2024-11-20 RX ORDER — PROPOFOL 10 MG/ML
INJECTION, EMULSION INTRAVENOUS AS NEEDED
Status: DISCONTINUED | OUTPATIENT
Start: 2024-11-20 | End: 2024-11-20

## 2024-11-20 RX ORDER — HYDROMORPHONE HYDROCHLORIDE 1 MG/ML
0.2 INJECTION, SOLUTION INTRAMUSCULAR; INTRAVENOUS; SUBCUTANEOUS EVERY 5 MIN PRN
OUTPATIENT
Start: 2024-11-20

## 2024-11-20 RX ORDER — ONDANSETRON HYDROCHLORIDE 2 MG/ML
4 INJECTION, SOLUTION INTRAVENOUS ONCE AS NEEDED
OUTPATIENT
Start: 2024-11-20

## 2024-11-20 RX ORDER — PHENYLEPHRINE HCL IN 0.9% NACL 0.4MG/10ML
SYRINGE (ML) INTRAVENOUS AS NEEDED
Status: DISCONTINUED | OUTPATIENT
Start: 2024-11-20 | End: 2024-11-20

## 2024-11-20 RX ORDER — HYDROMORPHONE HYDROCHLORIDE 1 MG/ML
0.5 INJECTION, SOLUTION INTRAMUSCULAR; INTRAVENOUS; SUBCUTANEOUS EVERY 5 MIN PRN
OUTPATIENT
Start: 2024-11-20

## 2024-11-20 RX ORDER — FENTANYL CITRATE 50 UG/ML
INJECTION, SOLUTION INTRAMUSCULAR; INTRAVENOUS AS NEEDED
Status: DISCONTINUED | OUTPATIENT
Start: 2024-11-20 | End: 2024-11-20

## 2024-11-20 RX ORDER — DIPHENOXYLATE HYDROCHLORIDE AND ATROPINE SULFATE 2.5; .025 MG/1; MG/1
1 TABLET ORAL 4 TIMES DAILY PRN
Qty: 60 TABLET | Refills: 0 | Status: SHIPPED | OUTPATIENT
Start: 2024-11-20 | End: 2024-12-20

## 2024-11-20 RX ORDER — ACETAMINOPHEN 325 MG/1
975 TABLET ORAL ONCE
OUTPATIENT
Start: 2024-11-20 | End: 2024-11-20

## 2024-11-20 RX ORDER — OXYCODONE HYDROCHLORIDE 5 MG/1
10 TABLET ORAL EVERY 4 HOURS PRN
OUTPATIENT
Start: 2024-11-20

## 2024-11-20 RX ORDER — OXYCODONE HYDROCHLORIDE 5 MG/1
5 TABLET ORAL EVERY 4 HOURS PRN
OUTPATIENT
Start: 2024-11-20

## 2024-11-20 RX ORDER — SODIUM CHLORIDE, SODIUM LACTATE, POTASSIUM CHLORIDE, CALCIUM CHLORIDE 600; 310; 30; 20 MG/100ML; MG/100ML; MG/100ML; MG/100ML
100 INJECTION, SOLUTION INTRAVENOUS CONTINUOUS
OUTPATIENT
Start: 2024-11-20 | End: 2024-11-21

## 2024-11-20 RX ORDER — GLYCOPYRROLATE 0.2 MG/ML
INJECTION INTRAMUSCULAR; INTRAVENOUS AS NEEDED
Status: DISCONTINUED | OUTPATIENT
Start: 2024-11-20 | End: 2024-11-20

## 2024-11-20 RX ORDER — LABETALOL HYDROCHLORIDE 5 MG/ML
5 INJECTION, SOLUTION INTRAVENOUS ONCE AS NEEDED
OUTPATIENT
Start: 2024-11-20

## 2024-11-20 RX ORDER — LIDOCAINE HYDROCHLORIDE 10 MG/ML
0.1 INJECTION, SOLUTION EPIDURAL; INFILTRATION; INTRACAUDAL; PERINEURAL ONCE
OUTPATIENT
Start: 2024-11-20 | End: 2024-11-20

## 2024-11-20 RX ORDER — LIDOCAINE HYDROCHLORIDE 20 MG/ML
INJECTION, SOLUTION INFILTRATION; PERINEURAL AS NEEDED
Status: DISCONTINUED | OUTPATIENT
Start: 2024-11-20 | End: 2024-11-20

## 2024-11-20 RX ORDER — ALBUTEROL SULFATE 0.83 MG/ML
2.5 SOLUTION RESPIRATORY (INHALATION) ONCE AS NEEDED
OUTPATIENT
Start: 2024-11-20

## 2024-11-20 RX ORDER — ROCURONIUM BROMIDE 10 MG/ML
INJECTION, SOLUTION INTRAVENOUS AS NEEDED
Status: DISCONTINUED | OUTPATIENT
Start: 2024-11-20 | End: 2024-11-20

## 2024-11-20 RX ORDER — HYDRALAZINE HYDROCHLORIDE 20 MG/ML
5 INJECTION INTRAMUSCULAR; INTRAVENOUS EVERY 30 MIN PRN
OUTPATIENT
Start: 2024-11-20

## 2024-11-20 RX ORDER — ETOMIDATE 2 MG/ML
INJECTION INTRAVENOUS AS NEEDED
Status: DISCONTINUED | OUTPATIENT
Start: 2024-11-20 | End: 2024-11-20

## 2024-11-20 RX ORDER — OXYCODONE HYDROCHLORIDE 5 MG/1
5 TABLET ORAL EVERY 6 HOURS PRN
Qty: 5 TABLET | Refills: 0 | Status: SHIPPED | OUTPATIENT
Start: 2024-11-20

## 2024-11-20 ASSESSMENT — PAIN - FUNCTIONAL ASSESSMENT
PAIN_FUNCTIONAL_ASSESSMENT: 0-10

## 2024-11-20 ASSESSMENT — PAIN SCALES - GENERAL
PAINLEVEL_OUTOF10: 5 - MODERATE PAIN
PAINLEVEL_OUTOF10: 0 - NO PAIN
PAINLEVEL_OUTOF10: 5 - MODERATE PAIN
PAIN_LEVEL: 0
PAINLEVEL_OUTOF10: 0 - NO PAIN

## 2024-11-20 NOTE — INTERVAL H&P NOTE
"Patient's History and Physical was reviewed and updated.  Stent placed Previously, per the patient the output from the midline significantly decreased while the stent was in place, the stent was dislodged recently, and output from the midline increased once again.  Here for another stent placement.      PE      11/8/2024     3:53 AM 11/8/2024     8:13 AM 11/8/2024    12:11 PM 11/11/2024     5:00 PM 11/14/2024     3:23 PM 11/18/2024     9:32 AM 11/20/2024    12:54 PM   Vitals   Systolic 120 122 114 126 110 110 141   Diastolic 75 82 65 72 70 68 91   BP Location Left arm Left arm Left arm Left arm Left arm Left arm    Heart Rate 49 100 57 62 56 72 54   Temp 36.7 °C (98.1 °F) 35.3 °C (95.5 °F) 35.8 °C (96.4 °F) 36.8 °C (98.2 °F) 36.8 °C (98.3 °F) 36.6 °C (97.8 °F) 35.7 °C (96.3 °F)   Resp 16 16 16 16 14 16 16   Height       1.727 m (5' 8\")   Weight (lb)    166.38   164   BMI    25.3 kg/m2   24.94 kg/m2   BSA (m2)    1.9 m2   1.89 m2       General: NAD  Resp: non-labored breathing on RA  Cards: Regular rate  Abdomen: EC fistula present.  Extremities: symmetrical     Plan:    - The procedure was explained once again. Questions were sought and answered.   - The risks and benefit of the procedure were discussed. The patient is willing proceed.   - Consent was reviewed and signed.   - Further recommendations after the procedure.     Discussed with Kwadwo Clarke MD (Vas[facility])  General Surgeon  [facility] Foregut / Flex Endo Fellow  Team Pager #[phone]     "

## 2024-11-20 NOTE — ANESTHESIA PROCEDURE NOTES
Airway  Date/Time: 11/20/2024 2:33 PM  Urgency: elective    Airway not difficult    Staffing  Performed: RICARDO   Authorized by: Gabriel Espinosa MD    Performed by: Jayson Hsieh  Patient location during procedure: OR    Indications and Patient Condition  Indications for airway management: anesthesia and airway protection  Spontaneous Ventilation: absent  Sedation level: deep  Preoxygenated: yes  Patient position: sniffing  Mask difficulty assessment: 1 - vent by mask  Planned trial extubation    Final Airway Details  Final airway type: endotracheal airway      Successful airway: ETT  Cuffed: yes   Successful intubation technique: direct laryngoscopy  Facilitating devices/methods: intubating stylet and anterior pressure/BURP  Endotracheal tube insertion site: oral  Blade: Jenni  Blade size: #3  ETT size (mm): 7.0  Cormack-Lehane Classification: grade I - full view of glottis  Placement verified by: chest auscultation and capnometry   Measured from: lips  ETT to lips (cm): 21  Number of attempts at approach: 1  Number of other approaches attempted: 0

## 2024-11-20 NOTE — ANESTHESIA PREPROCEDURE EVALUATION
Patient: Bereket Em    Procedure Information       Date/Time: 11/20/24 1330    Scheduled providers: Kraig Cage MD    Procedure: ENTEROSCOPY    Location: [facility]          ALLERGIES:  No Known Allergies     MEDICAL HISTORY:  Past Medical History:   Diagnosis Date    Acute pharyngitis, unspecified     Sore throat    Acute upper respiratory infection, unspecified 02/13/2017    Acute URI    Alcohol abuse, in remission 09/12/2018    History of alcohol abuse    Benign essential HTN 02/14/2023    Elevated blood-pressure reading, without diagnosis of hypertension 02/23/2015    Elevated blood pressure reading without diagnosis of hypertension    Encounter for immunization 10/10/2014    Need for Tdap vaccination    Encounter for screening for malignant neoplasm of colon 01/04/2017    Encounter for colonoscopy due to history of adenomatous colonic polyps    Encounter for screening for malignant neoplasm of colon 11/02/2016    Encounter for screening colonoscopy    Encounter for screening for malignant neoplasm of colon 11/02/2016    Encounter for screening colonoscopy    Encounter for screening for malignant neoplasm of prostate 09/12/2018    Prostate cancer screening    Essential (primary) hypertension 03/14/2019    Elevated blood pressure reading with diagnosis of hypertension    Impingement syndrome of right shoulder 06/06/2016    Impingement syndrome of right shoulder    Incomplete rotator cuff tear or rupture of right shoulder, not specified as traumatic 09/12/2018    Partial nontraumatic tear of right rotator cuff    Noninfective gastroenteritis and colitis, unspecified 01/23/2018    Acute gastroenteritis    Other general symptoms and signs 11/02/2016    Flu-like symptoms    Other malaise 11/02/2016    Malaise and fatigue    Pain in left ankle and joints of left foot 10/10/2017    Left ankle pain    Pain in left foot 09/12/2017    Left foot pain    Pain in right shoulder 04/11/2016    Right  shoulder pain    Pain in thoracic spine 09/12/2018    Thoracic back pain    Pain, unspecified 02/09/2017    Body aches    Personal history of colonic polyps 01/04/2017    History of colonic polyps    Personal history of other diseases of male genital organs 03/15/2019    History of acute prostatitis    Personal history of other diseases of the respiratory system 04/07/2020    History of acute sinusitis    Personal history of other specified conditions 03/14/2019    History of flank pain    Personal history of other specified conditions 10/10/2014    History of paresthesia    Strain of muscle, fascia and tendon of other parts of biceps, right arm, initial encounter 03/11/2016    Rupture of biceps tendon, right, initial encounter        Relevant Problems   Neuro   (+) Anxiety and depression   (+) Cervical radiculopathy        SURGICAL HISTORY:  Past Surgical History:   Procedure Laterality Date    ABDOMINAL SURGERY      SHOULDER SURGERY  06/27/2017    Shoulder Surgery        MEDS:  Current Outpatient Medications   Medication Instructions    0.9 % sodium chloride (sodium chloride, PF, 0.9%) injection 10 mL, intravenous, 2 times daily, 10 ml NS twice daily SASH after TPN and LR    acetaminophen (TYLENOL) 650 mg, oral, Every 6 hours PRN    heparin flush (heparin LockFlush,Porcine,,PF,) 10 unit/mL injection 5 mL, intravenous, Daily    loperamide (IMODIUM A-D) 4 mg, 4 times daily PRN    psyllium (Metamucil) 3.4 gram packet 4 packets, Daily    Ringer's solution,lactated (lactated Ringer's) infusion 1,000 mL, intravenous, Nightly, Administer 1L of IVFs nightly through 10/31  Continue same weekly labs    traZODone (DESYREL) 50 mg, oral, Nightly PRN        VITALS:      11/20/2024    12:54 PM 11/18/2024     9:32 AM 11/14/2024     3:23 PM   Vitals   Systolic 141 110 110   Diastolic 91 68 70   BP Location  Left arm Left arm   Heart Rate 54 72 56   Temp 35.7 °C (96.3 °F) 36.6 °C (97.8 °F) 36.8 °C (98.3 °F)   Resp 16 16 14   Height  "1.727 m (5' 8\")     Weight (lb) 164     BMI 24.94 kg/m2     BSA (m2) 1.89 m2         LABS:   BMP   Lab Results   Component Value Date    GLUCOSE 87 11/18/2024    CALCIUM 10.0 11/18/2024     11/18/2024    K 4.0 11/18/2024    CO2 30 11/18/2024    CL 95 (L) 11/18/2024    BUN 20 11/18/2024    CREATININE 0.88 11/18/2024   , LFT   Lab Results   Component Value Date    ALT 64 (H) 11/18/2024    AST 33 11/18/2024    ALKPHOS 185 (H) 11/18/2024    BILITOT 0.6 11/18/2024   , CBC  Lab Results   Component Value Date    WBC 7.6 11/18/2024    HGB 14.2 11/18/2024    HCT 45.5 11/18/2024    MCV 77 (L) 11/18/2024     11/18/2024          , Coags   Lab Results   Component Value Date/Time    PROTIME 11.6 06/05/2024 2339    INR 1.0 06/05/2024 2339    APTT 25 (L) 06/05/2024 2339      , A1C   Lab Results   Component Value Date    HGBA1C 5.5 01/26/2024       IMAGES:  EKG          Encounter Date: 08/26/24   ECG 12 lead   Result Value    Ventricular Rate 66    Atrial Rate 66    VA Interval 146    QRS Duration 84    QT Interval 418    QTC Calculation(Bazett) 438    P Axis 57    R Axis -13    T Axis 49    QRS Count 11    Q Onset 212    P Onset 139    P Offset 187    T Offset 421    QTC Fredericia 431    Narrative    Sinus rhythm with Premature atrial complexes  Otherwise normal ECG  No previous ECGs available    See ED provider note for full interpretation and clinical correlation  Confirmed by Syl Moralez ([id number]) on 8/26/2024 9:56:22 PM      , ECHO         Transthoracic Echo (TTE) Limited With Doppler, Color And Contrast 06/06/2024  PHYSICIAN INTERPRETATION:  Left Ventricle: The left ventricular systolic function is normal, with an estimated ejection fraction of 70%. There are no regional wall motion abnormalities. The left ventricular cavity size is normal. Left ventricular diastolic filling was not assessed.  Left Atrium: The left atrium is normal in size.  Right Ventricle: The right ventricle is normal in size. There is normal " right ventricular global systolic function.  Right Atrium: The right atrium is normal in size.  Aortic Valve: The aortic valve is probably trileaflet. There are increased aortic valve velocities due to increased flow/dynamic ejection. There is no evidence of aortic valve regurgitation. The peak instantaneous gradient of the aortic valve is 18.7 mmHg. The mean gradient of the aortic valve is 7.0 mmHg.  Mitral Valve: The mitral valve is normal in structure. There is no evidence of mitral valve regurgitation.  Tricuspid Valve: The tricuspid valve is structurally normal. There is trace tricuspid regurgitation. The right ventricular systolic pressure is unable to be estimated.  Pulmonic Valve: The pulmonic valve is not well visualized. There is physiologic pulmonic valve regurgitation.  Pericardium: There is no pericardial effusion noted.  Aorta: The aortic root is normal. There is no dilatation of the ascending aorta. There is no dilatation of the aortic root.  Systemic Veins: The inferior vena cava appears to be of normal size. There is IVC inspiratory collapse greater than 50%.  In comparison to the previous echocardiogram(s): There are no prior studies on this patient for comparison purposes.      CONCLUSIONS:  1. Left ventricular systolic function is normal with a 70% estimated ejection fraction.  2. Poorly visualized anatomical structures due to suboptimal image quality.   XR chest 2 views 08/26/2024    Narrative  Interpreted By:  Cali Loera and Elsamaloty Mazzin  STUDY:  XR CHEST 2 VIEWS;  8/26/2024 3:44 pm    INDICATION:  Signs/Symptoms:fever, rigors.    COMPARISON:  CT abdomen and pelvis dated 08/26/2024. Radiograph the chest dated  07/09/2024.    ACCESSION NUMBER(S):  [id number]    ORDERING CLINICIAN:  BRENDAN SANCHEZ    FINDINGS:  There of the left-sided PICC with the tip projecting over the distal  SVC.      CARDIOMEDIASTINAL SILHOUETTE:  Cardiomediastinal silhouette is normal in size and  configuration.    LUNGS:  The lungs show no focal consolidation or effusion. There is a left  lower lobe calcific density which is stable when compared to prior  radiograph. Linear right midlung opacity is most in keeping with  atelectasis.    ABDOMEN:  Visualized portions of the abdomen are unremarkable.    BONES:  No acute fracture, dislocation, or malalignment.    Impression  1. Right midlung atelectasis. Otherwise, no evidence of acute  cardiopulmonary process.      MACRO:  None    Signed by: Cali Loera 8/26/2024 4:26 PM  Dictation workstation:   [id number]    , CT Head/Neck    [email or web address], CT Chest      No results found for this or any previous visit from the past 730 days.   , CT Abdomin       CT abdomen pelvis w IV contrast 08/26/2024    Narrative  Interpreted By:  Abrahan Marc and Dulla Kireeti  STUDY:  CT ABDOMEN PELVIS W IV CONTRAST;  8/26/2024 3:34 pm    INDICATION:  Signs/Symptoms:pain.    COMPARISON:  CT abdomen and pelvis from 06/24/2024.    ACCESSION NUMBER(S):  [id number]    ORDERING CLINICIAN:  BRENDAN SANCHEZ    TECHNIQUE:  CT of the abdomen and pelvis was performed.  Standard contiguous  axial images were obtained at 3 mm slice thickness through the  abdomen and pelvis. Coronal and sagittal reconstructions at 3 mm  slice thickness were performed.    75 ml of contrast Omnipaque were administered intravenously without  immediate complication.    FINDINGS:  LOWER CHEST:  There is similar-appearing ground-glass opacity of the right lower  lobe representing atelectasis which is improved from prior imaging.  There is no focal consolidation, effusion, or pneumothorax. There is  a stable 0.9 cm nodule within the left upper lobe (series 201, image  12). The heart is normal in size without pericardial effusion. There  is no pneumothorax, consolidation, or pleural effusion.    ABDOMEN:    LIVER:  There is a similar-appearing 1.2 cm cyst in the 2nd segment of the  liver which likely represents a  simple hepatic cyst. (Series 201,  Image 30) there is similar-appearing 0.6 cm cyst in the 2nd lobe of  the liver which is too small to characterize but statistically  represents a simple hepatic cyst. (Series 201, Image 23) an  additional too small to characterize 0.6 cm cyst is present in the  segment 8 of the liver bracket.(series 201, image 41)    BILE DUCTS:  The intrahepatic and extrahepatic ducts are not dilated.    GALLBLADDER:  The gallbladder is nondistended and without evidence of radiopaque  stones.    PANCREAS:  The pancreas appears unremarkable.    SPLEEN:  Spleen is nonenlarged with homogeneous attenuation. There are  multiple internal calcific densities likely related to prior  granulomatous process which remain similar to prior imaging.    ADRENAL GLANDS:  Bilateral adrenal glands appear normal.    KIDNEYS AND URETERS:  The kidneys are normal in size and enhance symmetrically.  No  hydroureteronephrosis or nephroureterolithiasis is identified.    PELVIS:    BLADDER:  There is bladder wall thickening which appears similar to prior  imaging..    REPRODUCTIVE ORGANS:  Prostate is enlarged..    BOWEL:  The stomach is unremarkable without wall thickening. There are  postsurgical changes of sigmoidectomy and creation of a left anterior  abdominal wall colostomy. There has been interval healing of the  previously seen enterocutaneous fistula. The ventral abdominal wall  contains a surgical wound with herniation of fat and fluid through a  4.7 cm by 8.1 cm abdominal wall defect. The small bowel in the  anterior abdomen containing submucosal fat deposition which is  similar to prior imaging. There are fluid-filled loops of small bowel  in the left hemiabdomen with equivocal thickening and associated  hyperemia (series 201, image 81) The small bowel is not abnormally  dilated. The large bowel demonstrates multiple diverticula without  inflammation.   The appendix appears normal.    VESSELS:  The aorta and IVC  "appear normal.    PERITONEUM/RETROPERITONEUM/LYMPH NODES:  No ascites or free air, no fluid collection.  No abdominopelvic  lymphadenopathy is present. Anterior peritoneal stranding has  resolved, was likely postoperative in nature    BONES AND ABDOMINAL WALL:  No suspicious osseous lesions are identified.    Impression  1.  Fluid-filled loops of small bowel in the lower left quadrant with  equivocal bowel wall thickening and associated hyperemia suggestive  of enteritis.  2. Interval resolution of peritoneal fat stranding related to prior  surgery. There are postsurgical changes from prior colostomy and  sigmoidectomy.  3. Additional chronic findings as described above.    I personally reviewed the images/study and I agree with the findings  as stated by Ivone Edwards MD, PGY-2 this study was interpreted at  [facility], [place], [place].    MACRO:  None    Signed by: Abrahan Marc 8/26/2024 5:04 PM  Dictation workstation:   [id number]    SOCIAL:  Social History     Tobacco Use   Smoking Status Former    Types: Cigarettes    Passive exposure: Past   Smokeless Tobacco Never      Social History     Substance and Sexual Activity   Alcohol Use Yes    Alcohol/week: 21.0 standard drinks of alcohol    Types: 21 Cans of beer per week    Comment: \"3 to 4 beers a day\"      Social History     Substance and Sexual Activity   Drug Use Never        NPO STATUS:  NPO/Void Status  Carbohydrate Drink Given Prior to Surgery? : N  Date of Last Liquid: 11/19/24  Time of Last Liquid: 2200  Date of Last Solid: 11/19/24  Time of Last Solid: 2200  Last Intake Type: Light meal  Time of Last Void: 1254        Clinical Areas Reviewed:   Tobacco  Allergies  Meds   Med Hx  Surg Hx   Fam Hx  Soc Hx      PHYSICAL EXAM    Anesthesia Plan    History of general anesthesia?: unknown/emergency  History of complications of general anesthesia?: unknown/emergency    ASA 3          "

## 2024-11-20 NOTE — ANESTHESIA POSTPROCEDURE EVALUATION
Patient: Bereket Em    Procedure Summary       Date: 11/20/24 Room / Location: [facility], [place]    Anesthesia Start: 1426 Anesthesia Stop: 1529    Procedure: ENTEROSCOPY Diagnosis: Enterocutaneous fistula    Scheduled Providers: Kraig Cage MD Responsible Provider: Gabriel Espinosa MD    Anesthesia Type: general ASA Status: 3            Anesthesia Type: general    Vitals Value Taken Time   /90 11/20/24 1555   Temp 36 11/20/24 1636   Pulse 65 11/20/24 1555   Resp 16 11/20/24 1555   SpO2 100 % 11/20/24 1555       Anesthesia Post Evaluation    Patient location during evaluation: PACU  Patient participation: complete - patient participated  Level of consciousness: awake and alert  Pain score: 0  Pain management: adequate  Multimodal analgesia pain management approach  Airway patency: patent  Cardiovascular status: acceptable  Respiratory status: acceptable  Hydration status: acceptable  Postoperative Nausea and Vomiting: none        No notable events documented.

## 2024-11-21 ENCOUNTER — PATIENT OUTREACH (OUTPATIENT)
Dept: PRIMARY CARE | Facility: CLINIC | Age: 59
End: 2024-11-21

## 2024-11-21 ENCOUNTER — APPOINTMENT (OUTPATIENT)
Dept: SURGERY | Facility: CLINIC | Age: 59
End: 2024-11-21
Payer: COMMERCIAL

## 2024-11-21 ENCOUNTER — HOME CARE VISIT (OUTPATIENT)
Dept: HOME HEALTH SERVICES | Facility: HOME HEALTH | Age: 59
End: 2024-11-21
Payer: COMMERCIAL

## 2024-11-21 NOTE — PROGRESS NOTES
Patient in contact with provider as end of stent fell out he said sent picture as well awaiting return call , will call this nurse if in need.  Appears Patient has not seen PCP since DC home or [facility] F/U at this time .

## 2024-11-22 ENCOUNTER — PATIENT MESSAGE (OUTPATIENT)
Dept: SURGERY | Facility: CLINIC | Age: 59
End: 2024-11-22

## 2024-11-22 ENCOUNTER — OFFICE VISIT (OUTPATIENT)
Dept: SURGERY | Facility: CLINIC | Age: 59
End: 2024-11-22
Payer: COMMERCIAL

## 2024-11-22 VITALS
WEIGHT: 161.9 LBS | BODY MASS INDEX: 24.54 KG/M2 | DIASTOLIC BLOOD PRESSURE: 87 MMHG | SYSTOLIC BLOOD PRESSURE: 116 MMHG | HEIGHT: 68 IN | RESPIRATION RATE: 16 BRPM

## 2024-11-22 DIAGNOSIS — K63.2 ENTEROCUTANEOUS FISTULA: Primary | ICD-10-CM

## 2024-11-22 PROCEDURE — 3008F BODY MASS INDEX DOCD: CPT | Performed by: SURGERY

## 2024-11-22 PROCEDURE — 99213 OFFICE O/P EST LOW 20 MIN: CPT | Performed by: SURGERY

## 2024-11-22 ASSESSMENT — ENCOUNTER SYMPTOMS
CARDIOVASCULAR NEGATIVE: 1
RESPIRATORY NEGATIVE: 1
ACTIVITY CHANGE: 0
GASTROINTESTINAL NEGATIVE: 1
MUSCULOSKELETAL NEGATIVE: 1
APPETITE CHANGE: 0

## 2024-11-22 ASSESSMENT — PAIN SCALES - GENERAL: PAINLEVEL_OUTOF10: 4

## 2024-11-22 NOTE — PROGRESS NOTES
Subjective   Patient ID: Bereket Em is a 59 y.o. male who presents for evaluation and treatment of enterocutaneous fistula.  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  The stent had fully migrated out.  He states that the stent had been working while in place.  Presently, he denies fever, chills, nausea, vomiting, nor any evidence of bleeding.        Review of Systems   Constitutional:  Negative for activity change and appetite change.   HENT: Negative.     Respiratory: Negative.     Cardiovascular: Negative.    Gastrointestinal: Negative.    Genitourinary: Negative.    Musculoskeletal: Negative.        Objective   Physical Exam  Constitutional:       General: He is not in acute distress.     Appearance: He is normal weight. He is not toxic-appearing.   Eyes:      General: No scleral icterus.  Pulmonary:      Effort: Pulmonary effort is normal.   Abdominal:      Palpations: Abdomen is soft. There is no mass.      Tenderness: There is no abdominal tenderness.      Hernia: No hernia is present.     Evaluation of midline enterocutaneous fistula showed the prolapsing small bowel.  The previously placed red rubber catheters for fixation of the stent were cut and removed.  The bowel appeared healthy.    Assessment/Plan   We had a discussion about attempting 1 further time with possibly 2 interposed stents.  He will be scheduled in several weeks in the GI lab for attempt at placement without the red rubber catheters as they were too painful.  It was recommended he contact the office for any worsening pain or any evidence of bleeding.  He was scheduled for a telehealth visit in 10 days to discuss subsequent endoscopic intervention.         Kraig Cage MD 11/22/24 12:05 PM

## 2024-11-22 NOTE — CASE COMMUNICATION
Missed home care nurse visit week of 11/18/2024 patient had stent replaced on 11/20/24 and visit was planned for 11 21  however patient stated new stent already came out and he was waiting to hear back from surgeon to see what the plans were. No need for visit today as pouches were changed yesterday and was not sure what was going to happen today.

## 2024-11-25 ENCOUNTER — HOME CARE VISIT (OUTPATIENT)
Dept: HOME HEALTH SERVICES | Facility: HOME HEALTH | Age: 59
End: 2024-11-25
Payer: COMMERCIAL

## 2024-11-25 ENCOUNTER — LAB REQUISITION (OUTPATIENT)
Dept: LAB | Facility: LAB | Age: 59
End: 2024-11-25
Payer: COMMERCIAL

## 2024-11-25 VITALS
TEMPERATURE: 97.6 F | SYSTOLIC BLOOD PRESSURE: 104 MMHG | HEART RATE: 78 BPM | OXYGEN SATURATION: 98 % | RESPIRATION RATE: 16 BRPM | DIASTOLIC BLOOD PRESSURE: 62 MMHG

## 2024-11-25 DIAGNOSIS — T84.410A: ICD-10-CM

## 2024-11-25 DIAGNOSIS — T81.31XA DISRUPTION OF EXTERNAL OPERATION (SURGICAL) WOUND, NOT ELSEWHERE CLASSIFIED, INITIAL ENCOUNTER: ICD-10-CM

## 2024-11-25 DIAGNOSIS — B95.62 METHICILLIN RESISTANT STAPHYLOCOCCUS AUREUS INFECTION AS THE CAUSE OF DISEASES CLASSIFIED ELSEWHERE: ICD-10-CM

## 2024-11-25 LAB
ALBUMIN SERPL BCP-MCNC: 4.6 G/DL (ref 3.4–5)
ALP SERPL-CCNC: 288 U/L (ref 33–136)
ALT SERPL W P-5'-P-CCNC: 128 U/L (ref 10–52)
ANION GAP SERPL CALCULATED.3IONS-SCNC: 17 MMOL/L (ref 10–20)
AST SERPL W P-5'-P-CCNC: 51 U/L (ref 9–39)
BASOPHILS # BLD AUTO: 0.05 X10*3/UL (ref 0–0.1)
BASOPHILS NFR BLD AUTO: 0.6 %
BILIRUB SERPL-MCNC: 0.8 MG/DL (ref 0–1.2)
BUN SERPL-MCNC: 26 MG/DL (ref 6–23)
CALCIUM SERPL-MCNC: 10.2 MG/DL (ref 8.6–10.3)
CHLORIDE SERPL-SCNC: 91 MMOL/L (ref 98–107)
CO2 SERPL-SCNC: 33 MMOL/L (ref 21–32)
CREAT SERPL-MCNC: 1.12 MG/DL (ref 0.5–1.3)
EGFRCR SERPLBLD CKD-EPI 2021: 75 ML/MIN/1.73M*2
EOSINOPHIL # BLD AUTO: 0.2 X10*3/UL (ref 0–0.7)
EOSINOPHIL NFR BLD AUTO: 2.5 %
ERYTHROCYTE [DISTWIDTH] IN BLOOD BY AUTOMATED COUNT: 16.2 % (ref 11.5–14.5)
GLUCOSE SERPL-MCNC: 88 MG/DL (ref 74–99)
HCT VFR BLD AUTO: 47 % (ref 41–52)
HGB BLD-MCNC: 15.2 G/DL (ref 13.5–17.5)
IMM GRANULOCYTES # BLD AUTO: 0.03 X10*3/UL (ref 0–0.7)
IMM GRANULOCYTES NFR BLD AUTO: 0.4 % (ref 0–0.9)
LYMPHOCYTES # BLD AUTO: 2.13 X10*3/UL (ref 1.2–4.8)
LYMPHOCYTES NFR BLD AUTO: 26.6 %
MAGNESIUM SERPL-MCNC: 1.77 MG/DL (ref 1.6–2.4)
MCH RBC QN AUTO: 25 PG (ref 26–34)
MCHC RBC AUTO-ENTMCNC: 32.3 G/DL (ref 32–36)
MCV RBC AUTO: 77 FL (ref 80–100)
MONOCYTES # BLD AUTO: 0.74 X10*3/UL (ref 0.1–1)
MONOCYTES NFR BLD AUTO: 9.3 %
NEUTROPHILS # BLD AUTO: 4.85 X10*3/UL (ref 1.2–7.7)
NEUTROPHILS NFR BLD AUTO: 60.6 %
NRBC BLD-RTO: 0 /100 WBCS (ref 0–0)
PHOSPHATE SERPL-MCNC: 3.5 MG/DL (ref 2.5–4.9)
PLATELET # BLD AUTO: 213 X10*3/UL (ref 150–450)
POTASSIUM SERPL-SCNC: 3.8 MMOL/L (ref 3.5–5.3)
PROT SERPL-MCNC: 7.9 G/DL (ref 6.4–8.2)
RBC # BLD AUTO: 6.09 X10*6/UL (ref 4.5–5.9)
SODIUM SERPL-SCNC: 137 MMOL/L (ref 136–145)
TRIGL SERPL-MCNC: 188 MG/DL (ref 0–149)
WBC # BLD AUTO: 8 X10*3/UL (ref 4.4–11.3)

## 2024-11-25 PROCEDURE — 84478 ASSAY OF TRIGLYCERIDES: CPT

## 2024-11-25 PROCEDURE — 84100 ASSAY OF PHOSPHORUS: CPT

## 2024-11-25 PROCEDURE — 80053 COMPREHEN METABOLIC PANEL: CPT

## 2024-11-25 PROCEDURE — G0299 HHS/HOSPICE OF RN EA 15 MIN: HCPCS

## 2024-11-25 PROCEDURE — 83735 ASSAY OF MAGNESIUM: CPT

## 2024-11-25 PROCEDURE — 84134 ASSAY OF PREALBUMIN: CPT

## 2024-11-25 PROCEDURE — 85025 COMPLETE CBC W/AUTO DIFF WBC: CPT

## 2024-11-26 ENCOUNTER — HOME CARE VISIT (OUTPATIENT)
Dept: HOME HEALTH SERVICES | Facility: HOME HEALTH | Age: 59
End: 2024-11-26
Payer: COMMERCIAL

## 2024-11-26 ENCOUNTER — HOME INFUSION (OUTPATIENT)
Dept: INFUSION THERAPY | Age: 59
End: 2024-11-26
Payer: COMMERCIAL

## 2024-11-26 ENCOUNTER — TELEPHONE (OUTPATIENT)
Dept: SURGERY | Facility: HOSPITAL | Age: 59
End: 2024-11-26
Payer: COMMERCIAL

## 2024-11-26 LAB — PREALB SERPL-MCNC: 41.2 MG/DL (ref 18–40)

## 2024-11-26 NOTE — PROGRESS NOTES
Bereket Em is receiving Lactated Ringer's for daily infusion.    Followed by Dr Perry     RX contacted pt, confirmed doses in home thru Thurs 11/28. He is agreeable to delivery 11/27 (early due to holiday) for another week of hydration and supplies to match. Pt is aware national supply of LR is low so pharmacy will provide going forward as able with current allocations.     Dispensing 11/27 with supplies to match   7x LR gravity  DOS 11/29-12/5     Follow up 12/3 check needs with pt, deliver as appropriate. Priority to be given for once daily infusions if possible.

## 2024-11-26 NOTE — CASE COMMUNICATION
Missed home care nurse visit week of 11/25/2024 . patient doing ok felt no need for follow up visit at end of week. asked some questions about ordering night bag for the rui pouch. he will order.   I will follow up next week for osotmy care if necessary.   steffany stated that there may be a new [facility]   An

## 2024-11-26 NOTE — TELEPHONE ENCOUNTER
I called Mr. Em for a clinical update.    He had a stent attempted last week again and it only lasted about 1 day.  Based on the photos though, the wound is getting much smaller.   He reports over 1L of output per day.  The supplemental fluids are helping, but he feels dehydrated at times and is trying to supplement that with oral fluids.  He continues to take Imodium and Lomotil.   His labs were reviewed and his bicarb is elevated at 33.  Prealb 41. Alb 4.6.   He will continue with an additional 1L of fluid per day and will check labs next week.    He sees Dr. Olson next week and is interested in another attempt at endoscopic stenting.   I completed his [facility] paperwork.

## 2024-11-27 ENCOUNTER — TELEPHONE (OUTPATIENT)
Dept: SURGERY | Facility: HOSPITAL | Age: 59
End: 2024-11-27
Payer: COMMERCIAL

## 2024-11-27 DIAGNOSIS — L98.8 FISTULA: Primary | ICD-10-CM

## 2024-11-27 DIAGNOSIS — K63.2 ENTEROCUTANEOUS FISTULA: Primary | ICD-10-CM

## 2024-11-27 RX ORDER — OXYCODONE HYDROCHLORIDE 5 MG/1
5 TABLET ORAL EVERY 6 HOURS PRN
Qty: 15 TABLET | Refills: 0 | Status: SHIPPED | OUTPATIENT
Start: 2024-11-27 | End: 2024-12-01 | Stop reason: WASHOUT

## 2024-11-28 ENCOUNTER — PATIENT MESSAGE (OUTPATIENT)
Dept: PRIMARY CARE | Facility: CLINIC | Age: 59
End: 2024-11-28
Payer: COMMERCIAL

## 2024-11-28 DIAGNOSIS — F51.01 PRIMARY INSOMNIA: ICD-10-CM

## 2024-11-28 ASSESSMENT — ENCOUNTER SYMPTOMS
APPETITE LEVEL: POOR
MUSCLE WEAKNESS: 1
PAIN LOCATION: ABDOMEN
CHANGE IN APPETITE: UNCHANGED
PAIN LOCATION - PAIN SEVERITY: 5/10
PERSON REPORTING PAIN: PATIENT
PAIN: 1
FATIGUES EASILY: 1
ABDOMINAL PAIN: 1

## 2024-11-28 NOTE — TELEPHONE ENCOUNTER
Mr. Em messaged that he is having more abdominal wall pain from fistula leakage and pouching and was requesting pain medication.   OARRS was reviewed.   He has had a lot of irritation here before and pain relief is needed.   He was prescribed oxycodone 5 mg tabs Q6 hours PRN pain 15 tabs.    I will touch base with him in the next few days about his pain control and he will share a photo of the wound in his chart.

## 2024-11-29 RX ORDER — TRAZODONE HYDROCHLORIDE 50 MG/1
50-100 TABLET ORAL NIGHTLY PRN
Qty: 180 TABLET | Refills: 1 | Status: SHIPPED | OUTPATIENT
Start: 2024-11-29 | End: 2025-11-29

## 2024-12-01 ENCOUNTER — DOCUMENTATION (OUTPATIENT)
Dept: SURGERY | Facility: HOSPITAL | Age: 59
End: 2024-12-01
Payer: COMMERCIAL

## 2024-12-01 DIAGNOSIS — L98.8 FISTULA: Primary | ICD-10-CM

## 2024-12-01 RX ORDER — OXYCODONE HYDROCHLORIDE 5 MG/1
5 TABLET ORAL EVERY 6 HOURS PRN
Qty: 15 TABLET | Refills: 0 | Status: SHIPPED | OUTPATIENT
Start: 2024-12-01 | End: 2024-12-01 | Stop reason: WASHOUT
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
## 2024-12-02 ENCOUNTER — LAB REQUISITION (OUTPATIENT)
Dept: LAB | Facility: LAB | Age: 59
End: 2024-12-02
Payer: COMMERCIAL

## 2024-12-02 ENCOUNTER — HOME CARE VISIT (OUTPATIENT)
Dept: HOME HEALTH SERVICES | Facility: HOME HEALTH | Age: 59
End: 2024-12-02
Payer: COMMERCIAL

## 2024-12-02 ENCOUNTER — HOME INFUSION (OUTPATIENT)
Dept: INFUSION THERAPY | Age: 59
End: 2024-12-02
Payer: COMMERCIAL

## 2024-12-02 ENCOUNTER — PATIENT OUTREACH (OUTPATIENT)
Dept: PRIMARY CARE | Facility: CLINIC | Age: 59
End: 2024-12-02
Payer: COMMERCIAL

## 2024-12-02 DIAGNOSIS — B95.62 METHICILLIN RESISTANT STAPHYLOCOCCUS AUREUS INFECTION AS THE CAUSE OF DISEASES CLASSIFIED ELSEWHERE: ICD-10-CM

## 2024-12-02 DIAGNOSIS — T81.31XA DISRUPTION OF EXTERNAL OPERATION (SURGICAL) WOUND, NOT ELSEWHERE CLASSIFIED, INITIAL ENCOUNTER: ICD-10-CM

## 2024-12-02 DIAGNOSIS — T84.410A: ICD-10-CM

## 2024-12-02 LAB
BASOPHILS # BLD AUTO: 0.07 X10*3/UL (ref 0–0.1)
BASOPHILS NFR BLD AUTO: 0.6 %
EOSINOPHIL # BLD AUTO: 0.17 X10*3/UL (ref 0–0.7)
EOSINOPHIL NFR BLD AUTO: 1.5 %
ERYTHROCYTE [DISTWIDTH] IN BLOOD BY AUTOMATED COUNT: 15.5 % (ref 11.5–14.5)
HCT VFR BLD AUTO: 47.7 % (ref 41–52)
HGB BLD-MCNC: 15.3 G/DL (ref 13.5–17.5)
IMM GRANULOCYTES # BLD AUTO: 0.02 X10*3/UL (ref 0–0.7)
IMM GRANULOCYTES NFR BLD AUTO: 0.2 % (ref 0–0.9)
LYMPHOCYTES # BLD AUTO: 3.13 X10*3/UL (ref 1.2–4.8)
LYMPHOCYTES NFR BLD AUTO: 27 %
MCH RBC QN AUTO: 24.4 PG (ref 26–34)
MCHC RBC AUTO-ENTMCNC: 32.1 G/DL (ref 32–36)
MCV RBC AUTO: 76 FL (ref 80–100)
MONOCYTES # BLD AUTO: 0.93 X10*3/UL (ref 0.1–1)
MONOCYTES NFR BLD AUTO: 8 %
NEUTROPHILS # BLD AUTO: 7.29 X10*3/UL (ref 1.2–7.7)
NEUTROPHILS NFR BLD AUTO: 62.7 %
NRBC BLD-RTO: 0 /100 WBCS (ref 0–0)
PLATELET # BLD AUTO: 148 X10*3/UL (ref 150–450)
RBC # BLD AUTO: 6.26 X10*6/UL (ref 4.5–5.9)
WBC # BLD AUTO: 11.6 X10*3/UL (ref 4.4–11.3)

## 2024-12-02 PROCEDURE — G0299 HHS/HOSPICE OF RN EA 15 MIN: HCPCS

## 2024-12-02 PROCEDURE — 85025 COMPLETE CBC W/AUTO DIFF WBC: CPT

## 2024-12-02 PROCEDURE — 84134 ASSAY OF PREALBUMIN: CPT

## 2024-12-02 PROCEDURE — 80053 COMPREHEN METABOLIC PANEL: CPT

## 2024-12-02 PROCEDURE — 84100 ASSAY OF PHOSPHORUS: CPT

## 2024-12-02 PROCEDURE — 83735 ASSAY OF MAGNESIUM: CPT

## 2024-12-02 PROCEDURE — 84478 ASSAY OF TRIGLYCERIDES: CPT

## 2024-12-02 NOTE — PROGRESS NOTES
Bereket Em is receiving Lactated Ringer's for daily infusion.     Followed by Dr Perry     RX contacted pt, confirmed doses in home thru Thurs 12/5. He is agreeable to delivery 12/5 for another week of hydration and supplies to match. Pt is aware national supply of LR is low so pharmacy will provide going forward as able with current allocations.     Dispensing 12/4 and deliver 12/5 with supplies to match:  7x LR gravity  DOS 12/6 - 12/12     Follow up 12/11 check needs with pt, deliver as appropriate.

## 2024-12-03 LAB
ALBUMIN SERPL BCP-MCNC: 4.6 G/DL (ref 3.4–5)
ALP SERPL-CCNC: 270 U/L (ref 33–136)
ALT SERPL W P-5'-P-CCNC: 113 U/L (ref 10–52)
ANION GAP SERPL CALC-SCNC: 19 MMOL/L (ref 10–20)
AST SERPL W P-5'-P-CCNC: 49 U/L (ref 9–39)
BILIRUB SERPL-MCNC: 1 MG/DL (ref 0–1.2)
BUN SERPL-MCNC: 31 MG/DL (ref 6–23)
CALCIUM SERPL-MCNC: 10.4 MG/DL (ref 8.6–10.6)
CHLORIDE SERPL-SCNC: 85 MMOL/L (ref 98–107)
CO2 SERPL-SCNC: 34 MMOL/L (ref 21–32)
CREAT SERPL-MCNC: 1.35 MG/DL (ref 0.5–1.3)
EGFRCR SERPLBLD CKD-EPI 2021: 60 ML/MIN/1.73M*2
GLUCOSE SERPL-MCNC: 104 MG/DL (ref 74–99)
MAGNESIUM SERPL-MCNC: 1.91 MG/DL (ref 1.6–2.4)
PHOSPHATE SERPL-MCNC: 4.1 MG/DL (ref 2.5–4.9)
POTASSIUM SERPL-SCNC: 3.6 MMOL/L (ref 3.5–5.3)
PREALB SERPL-MCNC: 43.9 MG/DL (ref 18–40)
PROT SERPL-MCNC: 8.1 G/DL (ref 6.4–8.2)
SODIUM SERPL-SCNC: 134 MMOL/L (ref 136–145)
TRIGL SERPL-MCNC: 224 MG/DL (ref 0–149)

## 2024-12-04 ENCOUNTER — APPOINTMENT (OUTPATIENT)
Dept: SURGERY | Facility: CLINIC | Age: 59
End: 2024-12-04
Payer: COMMERCIAL

## 2024-12-05 ENCOUNTER — TELEMEDICINE (OUTPATIENT)
Dept: SURGERY | Facility: CLINIC | Age: 59
End: 2024-12-05
Payer: COMMERCIAL

## 2024-12-05 DIAGNOSIS — M95.8 ABDOMINAL WALL DEFECT, ACQUIRED: ICD-10-CM

## 2024-12-05 DIAGNOSIS — K63.2 ENTEROCUTANEOUS FISTULA: ICD-10-CM

## 2024-12-05 PROCEDURE — 99213 OFFICE O/P EST LOW 20 MIN: CPT | Performed by: SURGERY

## 2024-12-05 NOTE — H&P (VIEW-ONLY)
Subjective   Patient ID: Bereket Em is a 59 y.o. male who presents for discussion of management of enterocutaneous fistula. A telephone visit (audio only) between the patient and the provider was utilized to provide this telehealth service.  Verbal consent was requested and obtained from the patient on this date, 12/05/24, for a telehealth visit. This communication lasted 15 minutes.  .  Patient is a 59-year-old male with a prolapsing atmospheric enterocutaneous fistula.  He had several attempts at stent placement through the fistula site which lasted only 12 days and 1 day respectively for the 2 procedures.  Overall, he is doing well and eating and managing his fistula.  He does report skin breakdown related to the inferior aspect of his midline fistula.  He denies fever, chills, nausea, vomiting, no abdominal pain.        Review of Systems n/c    Objective   Physical Exam n/a    Assessment/Plan   We had a lengthy discussion regarding attempting stent placement and he wishes us to try again.  He request not to use the bridging retention sutures.  We will try to place 2 stents interposed in the fistula site to try to gain more purchase along the small bowel and help minimize external migration.  We will also attempt to see how close the fistula site is to the terminal ileostomy.  He will undergo enteroscopy with stent placement in the GI lab in the near future.  Risks were explained including bleeding, infection, perforation, stent occlusion or migration, and possible need for further endoscopic and surgical intervention.         Kraig Cage MD 12/05/24 12:56 PM

## 2024-12-06 ENCOUNTER — TELEPHONE (OUTPATIENT)
Dept: SURGERY | Facility: HOSPITAL | Age: 59
End: 2024-12-06
Payer: COMMERCIAL

## 2024-12-06 ASSESSMENT — ENCOUNTER SYMPTOMS
APPETITE LEVEL: FAIR
FATIGUES EASILY: 1
PAIN LOCATION: ABDOMEN
MUSCLE WEAKNESS: 1
PAIN LOCATION - PAIN SEVERITY: 4/10
PERSON REPORTING PAIN: PATIENT
PAIN: 1
CHANGE IN APPETITE: UNCHANGED

## 2024-12-06 ASSESSMENT — ACTIVITIES OF DAILY LIVING (ADL)
ENTERING_EXITING_HOME: ONE PERSON
OASIS_M1830: 05
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
## 2024-12-07 ENCOUNTER — HOME INFUSION (OUTPATIENT)
Dept: INFUSION THERAPY | Age: 59
End: 2024-12-07
Payer: COMMERCIAL

## 2024-12-07 NOTE — PROGRESS NOTES
RECEIVED A VM FROM PATIENT SATURDAY THAT HE SPOKE WITH MD AND SAID MD WANTED HIM TO INFUSE 2L OF FLUID PER DAY.  TRIED CALLING PATIENT BACK BUT GOT VM.  EXPLAINED TO HIM THAT WE HAVE NOT RECEIVED ANY ORDERS FROM MD.  SAW NOTE IN EPIC FROM PHONE CONVERSATION BETWEEN MD AND PT. THAT HE INSTRUCTED PATIENT TO INFUSE 2L ONE DAY AND WOULD INQUIRE WITH PHARMACY.  HAVE NOT HEARD FROM MD.  ALSO EXPLAINED TO PATIENT AGAIN THAT WE MAY NOT BE ABLE TO SEND MORE FLUIDS DUE TO THE FLUID SHORTAGE.  WILL HAVE [facility] F/U ON MONDAY WHEN BACK IN OFFICE. DSL

## 2024-12-09 ENCOUNTER — HOME INFUSION (OUTPATIENT)
Dept: INFUSION THERAPY | Age: 59
End: 2024-12-09
Payer: COMMERCIAL

## 2024-12-09 ENCOUNTER — LAB (OUTPATIENT)
Dept: LAB | Facility: LAB | Age: 59
End: 2024-12-09
Payer: COMMERCIAL

## 2024-12-09 ENCOUNTER — HOME CARE VISIT (OUTPATIENT)
Dept: HOME HEALTH SERVICES | Facility: HOME HEALTH | Age: 59
End: 2024-12-09
Payer: COMMERCIAL

## 2024-12-09 ENCOUNTER — TELEPHONE (OUTPATIENT)
Dept: SURGERY | Facility: HOSPITAL | Age: 59
End: 2024-12-09
Payer: COMMERCIAL

## 2024-12-09 VITALS
OXYGEN SATURATION: 97 % | DIASTOLIC BLOOD PRESSURE: 60 MMHG | HEART RATE: 72 BPM | BODY MASS INDEX: 23.94 KG/M2 | WEIGHT: 157.44 LBS | SYSTOLIC BLOOD PRESSURE: 100 MMHG | TEMPERATURE: 97.8 F | RESPIRATION RATE: 16 BRPM

## 2024-12-09 DIAGNOSIS — K57.30 DIVERTICULOSIS OF LARGE INTESTINE WITHOUT HEMORRHAGE: ICD-10-CM

## 2024-12-09 LAB
ALBUMIN SERPL BCP-MCNC: 4.1 G/DL (ref 3.4–5)
ALP SERPL-CCNC: 220 U/L (ref 33–136)
ALT SERPL W P-5'-P-CCNC: 97 U/L (ref 10–52)
ANION GAP SERPL CALCULATED.3IONS-SCNC: 16 MMOL/L (ref 10–20)
AST SERPL W P-5'-P-CCNC: 40 U/L (ref 9–39)
BASOPHILS # BLD AUTO: 0.06 X10*3/UL (ref 0–0.1)
BASOPHILS NFR BLD AUTO: 0.7 %
BILIRUB SERPL-MCNC: 0.7 MG/DL (ref 0–1.2)
BUN SERPL-MCNC: 31 MG/DL (ref 6–23)
CALCIUM SERPL-MCNC: 9.5 MG/DL (ref 8.6–10.3)
CHLORIDE SERPL-SCNC: 92 MMOL/L (ref 98–107)
CO2 SERPL-SCNC: 32 MMOL/L (ref 21–32)
CREAT SERPL-MCNC: 1 MG/DL (ref 0.5–1.3)
EGFRCR SERPLBLD CKD-EPI 2021: 86 ML/MIN/1.73M*2
EOSINOPHIL # BLD AUTO: 0.2 X10*3/UL (ref 0–0.7)
EOSINOPHIL NFR BLD AUTO: 2.2 %
ERYTHROCYTE [DISTWIDTH] IN BLOOD BY AUTOMATED COUNT: 16 % (ref 11.5–14.5)
GLUCOSE SERPL-MCNC: 68 MG/DL (ref 74–99)
HCT VFR BLD AUTO: 43.9 % (ref 41–52)
HGB BLD-MCNC: 14 G/DL (ref 13.5–17.5)
IMM GRANULOCYTES # BLD AUTO: 0.02 X10*3/UL (ref 0–0.7)
IMM GRANULOCYTES NFR BLD AUTO: 0.2 % (ref 0–0.9)
LYMPHOCYTES # BLD AUTO: 3.09 X10*3/UL (ref 1.2–4.8)
LYMPHOCYTES NFR BLD AUTO: 34.4 %
MAGNESIUM SERPL-MCNC: 2 MG/DL (ref 1.6–2.4)
MCH RBC QN AUTO: 24.8 PG (ref 26–34)
MCHC RBC AUTO-ENTMCNC: 31.9 G/DL (ref 32–36)
MCV RBC AUTO: 78 FL (ref 80–100)
MONOCYTES # BLD AUTO: 0.73 X10*3/UL (ref 0.1–1)
MONOCYTES NFR BLD AUTO: 8.1 %
NEUTROPHILS # BLD AUTO: 4.87 X10*3/UL (ref 1.2–7.7)
NEUTROPHILS NFR BLD AUTO: 54.4 %
NRBC BLD-RTO: 0 /100 WBCS (ref 0–0)
PHOSPHATE SERPL-MCNC: 3.5 MG/DL (ref 2.5–4.9)
PLATELET # BLD AUTO: 144 X10*3/UL (ref 150–450)
POTASSIUM SERPL-SCNC: 3.7 MMOL/L (ref 3.5–5.3)
PROT SERPL-MCNC: 6.9 G/DL (ref 6.4–8.2)
RBC # BLD AUTO: 5.64 X10*6/UL (ref 4.5–5.9)
SODIUM SERPL-SCNC: 136 MMOL/L (ref 136–145)
TRIGL SERPL-MCNC: 243 MG/DL (ref 0–149)
WBC # BLD AUTO: 9 X10*3/UL (ref 4.4–11.3)

## 2024-12-09 PROCEDURE — 84478 ASSAY OF TRIGLYCERIDES: CPT

## 2024-12-09 PROCEDURE — 83735 ASSAY OF MAGNESIUM: CPT

## 2024-12-09 PROCEDURE — 84100 ASSAY OF PHOSPHORUS: CPT

## 2024-12-09 PROCEDURE — 85025 COMPLETE CBC W/AUTO DIFF WBC: CPT

## 2024-12-09 PROCEDURE — 84134 ASSAY OF PREALBUMIN: CPT

## 2024-12-09 PROCEDURE — 80053 COMPREHEN METABOLIC PANEL: CPT

## 2024-12-09 PROCEDURE — G0299 HHS/HOSPICE OF RN EA 15 MIN: HCPCS

## 2024-12-09 NOTE — TELEPHONE ENCOUNTER
I spoke to Mr. Em via telephone.     He is trying to keep adequately hydrated.  He used an extras 1L of fluid this weekend.    I messaged with his PCP office who are unable to administer fluids.   I messaged with pharmacy who are unable to provide additional fluids beyond 1L given the shortage.   He is awaiting labs today. Based on those labs, will determine next steps but he is coming in for stent replacement on 12/12 and will try to make it until then with hydration and will reevaluate.

## 2024-12-09 NOTE — PROGRESS NOTES
Bereket Em is receiving Lactated Ringer's for daily infusion.     Followed by Dr Perry. MD had patient give extra bag of LR over weekend as a prn dose. Will send as a replacement bag to patient since no orders sent to Pharmacy. [facility] set chat that Home Care is unable to send more than 1 L per day at this time. MD was hoping that patient would got to ED for bolus, but patient preferred home dose.     RX contacted pt, He is agreeable to delivery 12/11 for another week of hydration plus one replacement bag and supplies to match. Pt is aware national supply of LR is low so pharmacy will provide going forward as able with current allocations.     Dispensing 12/10 and deliver 12/11 with supplies to match:  7x LR gravity  DOS 12/13 - 12/19    1x LR replacement dose (prn) for dispense 12/10 and deliver 12/11 with supplies to match  DOS 12/12     Follow up 12/18 check needs with pt, deliver as appropriate.

## 2024-12-10 LAB — PREALB SERPL-MCNC: 39.1 MG/DL (ref 18–40)

## 2024-12-12 ENCOUNTER — HOSPITAL ENCOUNTER (INPATIENT)
Dept: GASTROENTEROLOGY | Facility: HOSPITAL | Age: 59
DRG: 394 | End: 2024-12-12
Attending: SURGERY | Admitting: SURGERY
Payer: COMMERCIAL

## 2024-12-12 ENCOUNTER — ANESTHESIA (OUTPATIENT)
Dept: GASTROENTEROLOGY | Facility: HOSPITAL | Age: 59
End: 2024-12-12
Payer: COMMERCIAL

## 2024-12-12 ENCOUNTER — ANESTHESIA EVENT (OUTPATIENT)
Dept: GASTROENTEROLOGY | Facility: HOSPITAL | Age: 59
End: 2024-12-12
Payer: COMMERCIAL

## 2024-12-12 ENCOUNTER — TELEPHONE (OUTPATIENT)
Dept: SURGERY | Facility: HOSPITAL | Age: 59
End: 2024-12-12

## 2024-12-12 ENCOUNTER — HOME CARE VISIT (OUTPATIENT)
Dept: HOME HEALTH SERVICES | Facility: HOME HEALTH | Age: 59
End: 2024-12-12
Payer: COMMERCIAL

## 2024-12-12 DIAGNOSIS — K63.2 ENTEROCUTANEOUS FISTULA: Primary | ICD-10-CM

## 2024-12-12 DIAGNOSIS — R19.8 HIGH OUTPUT ILEOSTOMY (MULTI): ICD-10-CM

## 2024-12-12 DIAGNOSIS — Z93.2 HIGH OUTPUT ILEOSTOMY (MULTI): ICD-10-CM

## 2024-12-12 PROCEDURE — 3700000001 HC GENERAL ANESTHESIA TIME - INITIAL BASE CHARGE

## 2024-12-12 PROCEDURE — 2500000004 HC RX 250 GENERAL PHARMACY W/ HCPCS (ALT 636 FOR OP/ED)

## 2024-12-12 PROCEDURE — 2780000003 HC OR 278 NO HCPCS

## 2024-12-12 PROCEDURE — 2720000007 HC OR 272 NO HCPCS

## 2024-12-12 PROCEDURE — 7100000002 HC RECOVERY ROOM TIME - EACH INCREMENTAL 1 MINUTE

## 2024-12-12 PROCEDURE — 1100000001 HC PRIVATE ROOM DAILY

## 2024-12-12 PROCEDURE — 0D7A8DZ DILATION OF JEJUNUM WITH INTRALUMINAL DEVICE, VIA NATURAL OR ARTIFICIAL OPENING ENDOSCOPIC: ICD-10-PCS | Performed by: SURGERY

## 2024-12-12 PROCEDURE — C1769 GUIDE WIRE: HCPCS

## 2024-12-12 PROCEDURE — 7100000009 HC PHASE TWO TIME - INITIAL BASE CHARGE

## 2024-12-12 PROCEDURE — 3700000002 HC GENERAL ANESTHESIA TIME - EACH INCREMENTAL 1 MINUTE

## 2024-12-12 PROCEDURE — 44370 SMALL BOWEL ENDOSCOPY/STENT: CPT | Performed by: SURGERY

## 2024-12-12 PROCEDURE — 2500000001 HC RX 250 WO HCPCS SELF ADMINISTERED DRUGS (ALT 637 FOR MEDICARE OP): Performed by: STUDENT IN AN ORGANIZED HEALTH CARE EDUCATION/TRAINING PROGRAM

## 2024-12-12 PROCEDURE — C1874 STENT, COATED/COV W/DEL SYS: HCPCS

## 2024-12-12 PROCEDURE — 7100000001 HC RECOVERY ROOM TIME - INITIAL BASE CHARGE

## 2024-12-12 PROCEDURE — 7100000010 HC PHASE TWO TIME - EACH INCREMENTAL 1 MINUTE

## 2024-12-12 PROCEDURE — 74360 X-RAY GUIDE GI DILATION: CPT | Performed by: SURGERY

## 2024-12-12 RX ORDER — OXYCODONE HYDROCHLORIDE 5 MG/1
5 TABLET ORAL EVERY 4 HOURS PRN
Status: CANCELLED | OUTPATIENT
Start: 2024-12-12

## 2024-12-12 RX ORDER — TRAZODONE HYDROCHLORIDE 100 MG/1
100 TABLET ORAL NIGHTLY PRN
Status: DISCONTINUED | OUTPATIENT
Start: 2024-12-12 | End: 2024-12-17 | Stop reason: HOSPADM

## 2024-12-12 RX ORDER — MIDAZOLAM HYDROCHLORIDE 1 MG/ML
INJECTION INTRAMUSCULAR; INTRAVENOUS AS NEEDED
Status: DISCONTINUED | OUTPATIENT
Start: 2024-12-12 | End: 2024-12-12

## 2024-12-12 RX ORDER — ONDANSETRON HYDROCHLORIDE 2 MG/ML
4 INJECTION, SOLUTION INTRAVENOUS EVERY 8 HOURS PRN
Status: DISCONTINUED | OUTPATIENT
Start: 2024-12-12 | End: 2024-12-14

## 2024-12-12 RX ORDER — NALOXONE HYDROCHLORIDE 0.4 MG/ML
0.2 INJECTION, SOLUTION INTRAMUSCULAR; INTRAVENOUS; SUBCUTANEOUS EVERY 5 MIN PRN
Status: DISCONTINUED | OUTPATIENT
Start: 2024-12-12 | End: 2024-12-17 | Stop reason: HOSPADM

## 2024-12-12 RX ORDER — PROPOFOL 10 MG/ML
INJECTION, EMULSION INTRAVENOUS AS NEEDED
Status: DISCONTINUED | OUTPATIENT
Start: 2024-12-12 | End: 2024-12-12

## 2024-12-12 RX ORDER — OXYCODONE HYDROCHLORIDE 5 MG/1
10 TABLET ORAL EVERY 4 HOURS PRN
Status: DISCONTINUED | OUTPATIENT
Start: 2024-12-12 | End: 2024-12-14

## 2024-12-12 RX ORDER — METOCLOPRAMIDE HYDROCHLORIDE 5 MG/ML
10 INJECTION INTRAMUSCULAR; INTRAVENOUS ONCE AS NEEDED
Status: CANCELLED | OUTPATIENT
Start: 2024-12-12

## 2024-12-12 RX ORDER — FENTANYL CITRATE 50 UG/ML
25 INJECTION, SOLUTION INTRAMUSCULAR; INTRAVENOUS EVERY 5 MIN PRN
Status: CANCELLED | OUTPATIENT
Start: 2024-12-12

## 2024-12-12 RX ORDER — FENTANYL CITRATE 50 UG/ML
INJECTION, SOLUTION INTRAMUSCULAR; INTRAVENOUS AS NEEDED
Status: DISCONTINUED | OUTPATIENT
Start: 2024-12-12 | End: 2024-12-12

## 2024-12-12 RX ORDER — OXYCODONE HYDROCHLORIDE 5 MG/1
5 TABLET ORAL EVERY 4 HOURS PRN
Status: DISCONTINUED | OUTPATIENT
Start: 2024-12-12 | End: 2024-12-14

## 2024-12-12 RX ORDER — ONDANSETRON HYDROCHLORIDE 2 MG/ML
INJECTION, SOLUTION INTRAVENOUS AS NEEDED
Status: DISCONTINUED | OUTPATIENT
Start: 2024-12-12 | End: 2024-12-12

## 2024-12-12 RX ORDER — ONDANSETRON 4 MG/1
4 TABLET, ORALLY DISINTEGRATING ORAL EVERY 8 HOURS PRN
Status: DISCONTINUED | OUTPATIENT
Start: 2024-12-12 | End: 2024-12-14

## 2024-12-12 RX ORDER — LIDOCAINE HCL/PF 100 MG/5ML
SYRINGE (ML) INTRAVENOUS AS NEEDED
Status: DISCONTINUED | OUTPATIENT
Start: 2024-12-12 | End: 2024-12-12

## 2024-12-12 RX ORDER — LOPERAMIDE HCL 1MG/7.5ML
4 LIQUID (ML) ORAL 4 TIMES DAILY PRN
Status: DISCONTINUED | OUTPATIENT
Start: 2024-12-12 | End: 2024-12-17 | Stop reason: HOSPADM

## 2024-12-12 RX ORDER — ONDANSETRON HYDROCHLORIDE 2 MG/ML
4 INJECTION, SOLUTION INTRAVENOUS ONCE AS NEEDED
Status: CANCELLED | OUTPATIENT
Start: 2024-12-12

## 2024-12-12 RX ORDER — KETOROLAC TROMETHAMINE 30 MG/ML
INJECTION, SOLUTION INTRAMUSCULAR; INTRAVENOUS AS NEEDED
Status: DISCONTINUED | OUTPATIENT
Start: 2024-12-12 | End: 2024-12-12

## 2024-12-12 RX ORDER — ROCURONIUM BROMIDE 10 MG/ML
INJECTION, SOLUTION INTRAVENOUS AS NEEDED
Status: DISCONTINUED | OUTPATIENT
Start: 2024-12-12 | End: 2024-12-12

## 2024-12-12 RX ORDER — LIDOCAINE HYDROCHLORIDE 10 MG/ML
0.1 INJECTION, SOLUTION EPIDURAL; INFILTRATION; INTRACAUDAL; PERINEURAL ONCE
Status: CANCELLED | OUTPATIENT
Start: 2024-12-12 | End: 2024-12-12

## 2024-12-12 RX ORDER — DIPHENOXYLATE HYDROCHLORIDE AND ATROPINE SULFATE 2.5; .025 MG/1; MG/1
1 TABLET ORAL 4 TIMES DAILY PRN
Status: DISCONTINUED | OUTPATIENT
Start: 2024-12-12 | End: 2024-12-17 | Stop reason: HOSPADM

## 2024-12-12 RX ORDER — ACETAMINOPHEN 325 MG/1
650 TABLET ORAL EVERY 4 HOURS PRN
Status: DISCONTINUED | OUTPATIENT
Start: 2024-12-12 | End: 2024-12-13

## 2024-12-12 RX ORDER — GLYCOPYRROLATE 0.2 MG/ML
INJECTION INTRAMUSCULAR; INTRAVENOUS AS NEEDED
Status: DISCONTINUED | OUTPATIENT
Start: 2024-12-12 | End: 2024-12-12

## 2024-12-12 RX ORDER — PHENYLEPHRINE HCL IN 0.9% NACL 0.4MG/10ML
SYRINGE (ML) INTRAVENOUS AS NEEDED
Status: DISCONTINUED | OUTPATIENT
Start: 2024-12-12 | End: 2024-12-12

## 2024-12-12 RX ORDER — OXYCODONE HYDROCHLORIDE 5 MG/1
10 TABLET ORAL EVERY 4 HOURS PRN
Status: CANCELLED | OUTPATIENT
Start: 2024-12-12

## 2024-12-12 SDOH — ECONOMIC STABILITY: FOOD INSECURITY: WITHIN THE PAST 12 MONTHS, YOU WORRIED THAT YOUR FOOD WOULD RUN OUT BEFORE YOU GOT THE MONEY TO BUY MORE.: NEVER TRUE

## 2024-12-12 SDOH — ECONOMIC STABILITY: INCOME INSECURITY: IN THE PAST 12 MONTHS HAS THE ELECTRIC, GAS, OIL, OR WATER COMPANY THREATENED TO SHUT OFF SERVICES IN YOUR HOME?: NO

## 2024-12-12 SDOH — ECONOMIC STABILITY: HOUSING INSECURITY: IN THE LAST 12 MONTHS, WAS THERE A TIME WHEN YOU WERE NOT ABLE TO PAY THE MORTGAGE OR RENT ON TIME?: NO

## 2024-12-12 SDOH — ECONOMIC STABILITY: TRANSPORTATION INSECURITY: IN THE PAST 12 MONTHS, HAS LACK OF TRANSPORTATION KEPT YOU FROM MEDICAL APPOINTMENTS OR FROM GETTING MEDICATIONS?: NO

## 2024-12-12 SDOH — SOCIAL STABILITY: SOCIAL INSECURITY: WITHIN THE LAST YEAR, HAVE YOU BEEN HUMILIATED OR EMOTIONALLY ABUSED IN OTHER WAYS BY YOUR PARTNER OR EX-PARTNER?: NO

## 2024-12-12 SDOH — SOCIAL STABILITY: SOCIAL INSECURITY: ARE YOU OR HAVE YOU BEEN THREATENED OR ABUSED PHYSICALLY, EMOTIONALLY, OR SEXUALLY BY ANYONE?: NO

## 2024-12-12 SDOH — SOCIAL STABILITY: SOCIAL INSECURITY: DOES ANYONE TRY TO KEEP YOU FROM HAVING/CONTACTING OTHER FRIENDS OR DOING THINGS OUTSIDE YOUR HOME?: NO

## 2024-12-12 SDOH — SOCIAL STABILITY: SOCIAL INSECURITY: HAVE YOU HAD ANY THOUGHTS OF HARMING ANYONE ELSE?: NO

## 2024-12-12 SDOH — ECONOMIC STABILITY: HOUSING INSECURITY: AT ANY TIME IN THE PAST 12 MONTHS, WERE YOU HOMELESS OR LIVING IN A SHELTER (INCLUDING NOW)?: NO

## 2024-12-12 SDOH — SOCIAL STABILITY: SOCIAL INSECURITY: DO YOU FEEL ANYONE HAS EXPLOITED OR TAKEN ADVANTAGE OF YOU FINANCIALLY OR OF YOUR PERSONAL PROPERTY?: NO

## 2024-12-12 SDOH — ECONOMIC STABILITY: FOOD INSECURITY: WITHIN THE PAST 12 MONTHS, THE FOOD YOU BOUGHT JUST DIDN'T LAST AND YOU DIDN'T HAVE MONEY TO GET MORE.: NEVER TRUE

## 2024-12-12 SDOH — SOCIAL STABILITY: SOCIAL INSECURITY: WERE YOU ABLE TO COMPLETE ALL THE BEHAVIORAL HEALTH SCREENINGS?: YES

## 2024-12-12 SDOH — SOCIAL STABILITY: SOCIAL INSECURITY: HAS ANYONE EVER THREATENED TO HURT YOUR FAMILY OR YOUR PETS?: NO

## 2024-12-12 SDOH — HEALTH STABILITY: MENTAL HEALTH: CURRENT SMOKER: 0

## 2024-12-12 SDOH — SOCIAL STABILITY: SOCIAL INSECURITY: WITHIN THE LAST YEAR, HAVE YOU BEEN AFRAID OF YOUR PARTNER OR EX-PARTNER?: NO

## 2024-12-12 SDOH — ECONOMIC STABILITY: HOUSING INSECURITY: IN THE PAST 12 MONTHS, HOW MANY TIMES HAVE YOU MOVED WHERE YOU WERE LIVING?: 0

## 2024-12-12 SDOH — SOCIAL STABILITY: SOCIAL INSECURITY: ARE THERE ANY APPARENT SIGNS OF INJURIES/BEHAVIORS THAT COULD BE RELATED TO ABUSE/NEGLECT?: NO

## 2024-12-12 SDOH — SOCIAL STABILITY: SOCIAL INSECURITY: DO YOU FEEL UNSAFE GOING BACK TO THE PLACE WHERE YOU ARE LIVING?: NO

## 2024-12-12 SDOH — SOCIAL STABILITY: SOCIAL INSECURITY: HAVE YOU HAD THOUGHTS OF HARMING ANYONE ELSE?: NO

## 2024-12-12 SDOH — ECONOMIC STABILITY: FOOD INSECURITY: HOW HARD IS IT FOR YOU TO PAY FOR THE VERY BASICS LIKE FOOD, HOUSING, MEDICAL CARE, AND HEATING?: NOT HARD AT ALL

## 2024-12-12 SDOH — SOCIAL STABILITY: SOCIAL INSECURITY: ABUSE: ADULT

## 2024-12-12 ASSESSMENT — LIFESTYLE VARIABLES
AUDIT-C TOTAL SCORE: 0
AUDIT-C TOTAL SCORE: 0
HOW OFTEN DO YOU HAVE A DRINK CONTAINING ALCOHOL: NEVER
HOW OFTEN DO YOU HAVE 6 OR MORE DRINKS ON ONE OCCASION: NEVER
SKIP TO QUESTIONS 9-10: 1
HOW MANY STANDARD DRINKS CONTAINING ALCOHOL DO YOU HAVE ON A TYPICAL DAY: PATIENT DOES NOT DRINK

## 2024-12-12 ASSESSMENT — COGNITIVE AND FUNCTIONAL STATUS - GENERAL
MOBILITY SCORE: 24
MOBILITY SCORE: 24
DAILY ACTIVITIY SCORE: 24
DAILY ACTIVITIY SCORE: 24
PATIENT BASELINE BEDBOUND: NO

## 2024-12-12 ASSESSMENT — ACTIVITIES OF DAILY LIVING (ADL)
FEEDING YOURSELF: INDEPENDENT
DRESSING YOURSELF: INDEPENDENT
HEARING - LEFT EAR: FUNCTIONAL
GROOMING: INDEPENDENT
HEARING - RIGHT EAR: FUNCTIONAL
PATIENT'S MEMORY ADEQUATE TO SAFELY COMPLETE DAILY ACTIVITIES?: YES
LACK_OF_TRANSPORTATION: NO
WALKS IN HOME: INDEPENDENT
TOILETING: INDEPENDENT
LACK_OF_TRANSPORTATION: NO
JUDGMENT_ADEQUATE_SAFELY_COMPLETE_DAILY_ACTIVITIES: YES
BATHING: INDEPENDENT
ADEQUATE_TO_COMPLETE_ADL: YES

## 2024-12-12 ASSESSMENT — PAIN - FUNCTIONAL ASSESSMENT
PAIN_FUNCTIONAL_ASSESSMENT: 0-10

## 2024-12-12 ASSESSMENT — PAIN SCALES - GENERAL
PAINLEVEL_OUTOF10: 0 - NO PAIN
PAINLEVEL_OUTOF10: 5 - MODERATE PAIN
PAINLEVEL_OUTOF10: 0 - NO PAIN
PAINLEVEL_OUTOF10: 2
PAINLEVEL_OUTOF10: 0 - NO PAIN

## 2024-12-12 NOTE — ANESTHESIA POSTPROCEDURE EVALUATION
Patient: Bereket Em    Procedure Summary       Date: 12/12/24 Room / Location: [facility]    Anesthesia Start: 1315 Anesthesia Stop: 1453    Procedure: ENTEROSCOPY Diagnosis:       Enterocutaneous fistula      Enterocutaneous fistula    Scheduled Providers: Kraig Cage MD Responsible Provider: Mahnaz Ye MD    Anesthesia Type: MAC ASA Status: 3            Anesthesia Type: MAC    Vitals Value Taken Time   /81 12/12/24 1449   Temp  12/12/24 1513   Pulse 70 12/12/24 1449   Resp 18 12/12/24 1449   SpO2 100 % 12/12/24 1449       Anesthesia Post Evaluation    Patient participation: complete - patient participated  Level of consciousness: awake and alert  Pain management: adequate  Airway patency: patent  Cardiovascular status: acceptable  Respiratory status: acceptable  Hydration status: acceptable  Postoperative Nausea and Vomiting: none      No notable events documented.

## 2024-12-12 NOTE — PROGRESS NOTES
Pharmacy Medication History Review    Bereket Em is a 59 y.o. male admitted for No Principal Problem: There is no principal problem currently on the Problem List. Please update the Problem List and refresh.. Pharmacy reviewed the patient's adesb-rk-xenwusvir medications and allergies for accuracy.    Medications ADDED:  None   Medications CHANGED:  None   Medications REMOVED:   None      The list below reflects the updated PTA list.   Prior to Admission Medications   Prescriptions Last Dose Informant   0.9 % sodium chloride (sodium chloride, PF, 0.9%) injection  Self   Sig: Infuse 10 mL into a venous catheter 2 times a day. 10 ml NS twice daily SASH after TPN and LR   Ringer's solution,lactated (lactated Ringer's) infusion  Self   Sig: Infuse 1,000 mL into a venous catheter once daily at bedtime. Administer 1L of IVFs nightly through 10/31  Continue same weekly labs   acetaminophen (Tylenol) 325 mg tablet  Self   Sig: Take 2 tablets (650 mg) by mouth every 6 hours if needed for mild pain (1 - 3).   diphenoxylate-atropine (LomotiL) 2.5-0.025 mg tablet  Self   Sig: Take 1 tablet by mouth 4 times a day as needed for diarrhea.   heparin flush (heparin LockFlush,Porcine,,PF,) 10 unit/mL injection  Self   Sig: Infuse 5 mL (50 Units) into a venous catheter once daily.   loperamide (Imodium A-D) 1 mg/7.5 mL liquid  Self   Sig: Take 30 mL (4 mg) by mouth 4 times a day as needed for diarrhea.   psyllium (Metamucil) 3.4 gram packet  Self   Sig: Take 4 packets by mouth once daily as needed.   traZODone (Desyrel) 100 mg tablet  Self   Sig: Take 1 tablet (100 mg) by mouth as needed at bedtime for sleep.      Facility-Administered Medications: None           The list below reflects the updated allergy list. Please review each documented allergy for additional clarification and justification.  Allergies  Reviewed by Shaina Johnston on 12/12/2024   No Known Allergies         Patient declines M2B at discharge.     Sources:  "  O[facility]RS  Pharmacy dispense history  Patient interview Good historian  Care Everywhere     Additional Comments:  None       Shaina Johnston  Pharmacy Technician  12/12/24     Secure Chat preferred   If no response call [phone] or Vocera \"Med Rec\"   "

## 2024-12-12 NOTE — CARE PLAN
The patient's goals for the shift include  eat     The clinical goals for the shift include pt will remain safe and free from falls throughout shift      Problem: Fall/Injury  Goal: Not fall by end of shift  Outcome: Progressing  Goal: Be free from injury by end of the shift  Outcome: Progressing  Goal: Verbalize understanding of personal risk factors for fall in the hospital  Outcome: Progressing  Goal: Verbalize understanding of risk factor reduction measures to prevent injury from fall in the home  Outcome: Progressing  Goal: Use assistive devices by end of the shift  Outcome: Progressing  Goal: Pace activities to prevent fatigue by end of the shift  Outcome: Progressing

## 2024-12-12 NOTE — INTERVAL H&P NOTE
H&P reviewed. The patient was examined and there are no changes to the H&P.    Plan for enteroscopy, stent placement, and other indicated procedures.    Mila Soto MD  General Surgery Resident PGY-3   [facility] [id number]

## 2024-12-12 NOTE — ANESTHESIA PREPROCEDURE EVALUATION
Patient: Bereket Em    Procedure Information       Date/Time: 12/12/24 1300    Scheduled providers: Kraig Cage MD    Procedure: ENTEROSCOPY    Location: [facility]          Vitals:    12/12/24 1141   BP: 122/84   Pulse: 77   Resp: 18   Temp: 36.1 °C (97 °F)   SpO2: 98%       Past Surgical History:   Procedure Laterality Date   • ABDOMINAL SURGERY     • SHOULDER SURGERY  06/27/2017    Shoulder Surgery     Past Medical History:   Diagnosis Date   • Acute pharyngitis, unspecified     Sore throat   • Acute upper respiratory infection, unspecified 02/13/2017    Acute URI   • Alcohol abuse, in remission 09/12/2018    History of alcohol abuse   • Benign essential HTN 02/14/2023   • Elevated blood-pressure reading, without diagnosis of hypertension 02/23/2015    Elevated blood pressure reading without diagnosis of hypertension   • Encounter for immunization 10/10/2014    Need for Tdap vaccination   • Encounter for screening for malignant neoplasm of colon 01/04/2017    Encounter for colonoscopy due to history of adenomatous colonic polyps   • Encounter for screening for malignant neoplasm of colon 11/02/2016    Encounter for screening colonoscopy   • Encounter for screening for malignant neoplasm of colon 11/02/2016    Encounter for screening colonoscopy   • Encounter for screening for malignant neoplasm of prostate 09/12/2018    Prostate cancer screening   • Essential (primary) hypertension 03/14/2019    Elevated blood pressure reading with diagnosis of hypertension   • Impingement syndrome of right shoulder 06/06/2016    Impingement syndrome of right shoulder   • Incomplete rotator cuff tear or rupture of right shoulder, not specified as traumatic 09/12/2018    Partial nontraumatic tear of right rotator cuff   • Noninfective gastroenteritis and colitis, unspecified 01/23/2018    Acute gastroenteritis   • Other general symptoms and signs 11/02/2016    Flu-like symptoms   • Other malaise  11/02/2016    Malaise and fatigue   • Pain in left ankle and joints of left foot 10/10/2017    Left ankle pain   • Pain in left foot 09/12/2017    Left foot pain   • Pain in right shoulder 04/11/2016    Right shoulder pain   • Pain in thoracic spine 09/12/2018    Thoracic back pain   • Pain, unspecified 02/09/2017    Body aches   • Personal history of colonic polyps 01/04/2017    History of colonic polyps   • Personal history of other diseases of male genital organs 03/15/2019    History of acute prostatitis   • Personal history of other diseases of the respiratory system 04/07/2020    History of acute sinusitis   • Personal history of other specified conditions 03/14/2019    History of flank pain   • Personal history of other specified conditions 10/10/2014    History of paresthesia   • Strain of muscle, fascia and tendon of other parts of biceps, right arm, initial encounter 03/11/2016    Rupture of biceps tendon, right, initial encounter       Current Outpatient Medications:   •  0.9 % sodium chloride (sodium chloride, PF, 0.9%) injection, Infuse 10 mL into a venous catheter 2 times a day. 10 ml NS twice daily SASH after TPN and LR, Disp: , Rfl:   •  acetaminophen (Tylenol) 325 mg tablet, Take 2 tablets (650 mg) by mouth every 6 hours if needed for mild pain (1 - 3)., Disp: 30 tablet, Rfl: 0  •  diphenoxylate-atropine (LomotiL) 2.5-0.025 mg tablet, Take 1 tablet by mouth 4 times a day as needed for diarrhea., Disp: 60 tablet, Rfl: 0  •  heparin flush (heparin LockFlush,Porcine,,PF,) 10 unit/mL injection, Infuse 5 mL (50 Units) into a venous catheter once daily., Disp: , Rfl:   •  psyllium (Metamucil) 3.4 gram packet, Take 4 packets by mouth once daily., Disp: , Rfl:   •  Ringer's solution,lactated (lactated Ringer's) infusion, Infuse 1,000 mL into a venous catheter once daily at bedtime. Administer 1L of IVFs nightly through 10/31 Continue same weekly labs, Disp: , Rfl:   •  traZODone (Desyrel) 100 mg tablet,  Take 1 tablet (100 mg) by mouth as needed at bedtime for sleep., Disp: 90 tablet, Rfl: 0  •  loperamide (Imodium A-D) 1 mg/7.5 mL liquid, Take 30 mL (4 mg) by mouth 4 times a day as needed for diarrhea., Disp: , Rfl:   •  oxyCODONE (Roxicodone) 5 mg immediate release tablet, Take 1 tablet (5 mg) by mouth every 6 hours if needed for severe pain (7 - 10) for up to 15 doses., Disp: 15 tablet, Rfl: 0  Prior to Admission medications    Medication Sig Start Date End Date Taking? Authorizing Provider   0.9 % sodium chloride (sodium chloride, PF, 0.9%) injection Infuse 10 mL into a venous catheter 2 times a day. 10 ml NS twice daily SASH after TPN and LR   Yes Historical Provider, MD   acetaminophen (Tylenol) 325 mg tablet Take 2 tablets (650 mg) by mouth every 6 hours if needed for mild pain (1 - 3). 8/5/24  Yes ASHLY Hawk   diphenoxylate-atropine (LomotiL) 2.5-0.025 mg tablet Take 1 tablet by mouth 4 times a day as needed for diarrhea. 11/20/24 12/20/24 Yes Kwadwo Galindo MD   heparin flush (heparin LockFlush,Porcine,,PF,) 10 unit/mL injection Infuse 5 mL (50 Units) into a venous catheter once daily.   Yes Historical Provider, MD   psyllium (Metamucil) 3.4 gram packet Take 4 packets by mouth once daily.   Yes Historical Provider, MD   Ringer's solution,lactated (lactated Ringer's) infusion Infuse 1,000 mL into a venous catheter once daily at bedtime. Administer 1L of IVFs nightly through 10/31  Continue same weekly labs 11/8/24  Yes ASHLY Yin   traZODone (Desyrel) 100 mg tablet Take 1 tablet (100 mg) by mouth as needed at bedtime for sleep. 12/2/24 12/2/25 Yes Candy Howell PA-C   loperamide (Imodium A-D) 1 mg/7.5 mL liquid Take 30 mL (4 mg) by mouth 4 times a day as needed for diarrhea.    Historical Provider, MD   oxyCODONE (Roxicodone) 5 mg immediate release tablet Take 1 tablet (5 mg) by mouth every 6 hours if needed for severe pain (7 - 10) for up to 15 doses. 12/1/24   Tasha  MD Shin     No Known Allergies  Social History     Tobacco Use   • Smoking status: Former     Types: Cigarettes     Passive exposure: Past   • Smokeless tobacco: Never   Substance Use Topics   • Alcohol use: Not Currently     Alcohol/week: 21.0 standard drinks of alcohol     Types: 21 Cans of beer per week     Comment: not since june         Chemistry    Lab Results   Component Value Date/Time     12/09/2024 1710    K 3.7 12/09/2024 1710    CL 92 (L) 12/09/2024 1710    CO2 32 12/09/2024 1710    BUN 31 (H) 12/09/2024 1710    CREATININE 1.00 12/09/2024 1710    Lab Results   Component Value Date/Time    CALCIUM 9.5 12/09/2024 1710    ALKPHOS 220 (H) 12/09/2024 1710    AST 40 (H) 12/09/2024 1710    ALT 97 (H) 12/09/2024 1710    BILITOT 0.7 12/09/2024 1710          Lab Results   Component Value Date/Time    WBC 9.0 12/09/2024 1710    HGB 14.0 12/09/2024 1710    HCT 43.9 12/09/2024 1710     (L) 12/09/2024 1710     Lab Results   Component Value Date/Time    PROTIME 11.6 06/05/2024 2339    INR 1.0 06/05/2024 2339     Encounter Date: 08/26/24   ECG 12 lead   Result Value    Ventricular Rate 66    Atrial Rate 66    DE Interval 146    QRS Duration 84    QT Interval 418    QTC Calculation(Bazett) 438    P Axis 57    R Axis -13    T Axis 49    QRS Count 11    Q Onset 212    P Onset 139    P Offset 187    T Offset 421    QTC Fredericia 431    Narrative    Sinus rhythm with Premature atrial complexes  Otherwise normal ECG  No previous ECGs available    See ED provider note for full interpretation and clinical correlation  Confirmed by Syl Moralez ([id number]) on 8/26/2024 9:56:22 PM     No results found for this or any previous visit from the past 1095 days.      Relevant Problems   Neuro   (+) Anxiety and depression   (+) Cervical radiculopathy       Clinical information reviewed:   Tobacco  Allergies  Meds   Med Hx  Surg Hx   Fam Hx  Soc Hx        NPO Detail:  NPO/Void Status  Carbohydrate Drink Given Prior to  Surgery? : N  Date of Last Liquid: 12/11/24  Time of Last Liquid: 2230  Date of Last Solid: 12/11/24  Time of Last Solid: 2230  Last Intake Type: Clear fluids  Time of Last Void: 0930         Physical Exam    Airway  Mallampati: II  TM distance: >3 FB  Neck ROM: full     Cardiovascular   Rhythm: regular  Rate: normal     Dental        Pulmonary   Breath sounds clear to auscultation     Abdominal        Anesthesia Plan    History of general anesthesia?: yes  History of complications of general anesthesia?: no    ASA 3     general   (Patient with PICC line for home IV fluid administration.  Usually receives 1L daily.  Feels somewhat dehydrated on DOS.  1L LR started in preop. )  The patient is not a current smoker.    intravenous induction   Anesthetic plan and risks discussed with patient.    Plan discussed with CRNA.

## 2024-12-12 NOTE — CARE PLAN
The patient's goals for the shift include  feel better    The clinical goals for the shift include  remain safe and free from falls throughout shift

## 2024-12-12 NOTE — ANESTHESIA PROCEDURE NOTES
Airway  Date/Time: 12/12/2024 1:21 PM  Urgency: elective    Airway not difficult    Staffing  Performed: CRNA   Authorized by: Mahnaz Ye MD    Performed by: MINERVA Maxwell-ALEISHA  Patient location during procedure: OR    Indications and Patient Condition  Indications for airway management: anesthesia  Spontaneous Ventilation: absent  Sedation level: deep  Preoxygenated: yes  Patient position: sniffing  Mask difficulty assessment: 1 - vent by mask  Planned trial extubation    Final Airway Details  Final airway type: endotracheal airway      Successful airway: ETT  Cuffed: yes   Successful intubation technique: direct laryngoscopy  Facilitating devices/methods: intubating stylet  Endotracheal tube insertion site: oral  Blade: Jenni  Blade size: #3  ETT size (mm): 7.0  Cormack-Lehane Classification: grade I - full view of glottis  Placement verified by: capnometry   Measured from: lips  ETT to lips (cm): 21  Number of attempts at approach: 1

## 2024-12-12 NOTE — CASE COMMUNICATION
Missed home care visit due to having procedue complted today with stent.   will follow up next week.

## 2024-12-12 NOTE — SIGNIFICANT EVENT
Post-op Check  S:    POD 0 from enteroscopy.  Endorsing some nausea, ate approximately 50% of dinner.    O:   Vital signs are stable, normotensive, afebrile, no new or worsening oxygen requirement, not tachycardic  Visit Vitals  /78 (BP Location: Left arm, Patient Position: Lying)   Pulse 62   Temp 35.6 °C (96.1 °F) (Temporal)   Resp 16        Constitutional: no acute distress  Skin: warm and dry overall   Neuro: A/O x4, no gross deficits   HEENT: Atraumatic, no scleral icterus  Cardiac: RRR  Pulmonary: Unlabored respirations   Abdomen: , non tender. Ostomy appliance. Wound vac in place, trace SS fluid in cannister.  : No voids since surgery      A/P:  Instructed patient to self regulate on diet given some improvement in nausea on re-assessment at 7:30 PM - will change diet if worsening nausea  Will continue to optimize pain control as needed.  Will continue to monitor clinical exam, vitals, I&O's, and labs when available.  Will follow up on the patient in the a.m. or sooner as needed.    Gold Moreno DO  PGY-1

## 2024-12-13 LAB
ALBUMIN SERPL BCP-MCNC: 3.8 G/DL (ref 3.4–5)
ANION GAP SERPL CALC-SCNC: 15 MMOL/L (ref 10–20)
BASOPHILS # BLD AUTO: 0.04 X10*3/UL (ref 0–0.1)
BASOPHILS NFR BLD AUTO: 0.3 %
BUN SERPL-MCNC: 38 MG/DL (ref 6–23)
CALCIUM SERPL-MCNC: 9.6 MG/DL (ref 8.6–10.6)
CHLORIDE SERPL-SCNC: 91 MMOL/L (ref 98–107)
CO2 SERPL-SCNC: 33 MMOL/L (ref 21–32)
CREAT SERPL-MCNC: 1.17 MG/DL (ref 0.5–1.3)
EGFRCR SERPLBLD CKD-EPI 2021: 71 ML/MIN/1.73M*2
EOSINOPHIL # BLD AUTO: 0.14 X10*3/UL (ref 0–0.7)
EOSINOPHIL NFR BLD AUTO: 1 %
ERYTHROCYTE [DISTWIDTH] IN BLOOD BY AUTOMATED COUNT: 16.2 % (ref 11.5–14.5)
GLUCOSE SERPL-MCNC: 97 MG/DL (ref 74–99)
HCT VFR BLD AUTO: 41.1 % (ref 41–52)
HGB BLD-MCNC: 13.5 G/DL (ref 13.5–17.5)
IMM GRANULOCYTES # BLD AUTO: 0.07 X10*3/UL (ref 0–0.7)
IMM GRANULOCYTES NFR BLD AUTO: 0.5 % (ref 0–0.9)
LYMPHOCYTES # BLD AUTO: 1.84 X10*3/UL (ref 1.2–4.8)
LYMPHOCYTES NFR BLD AUTO: 12.8 %
MAGNESIUM SERPL-MCNC: 1.7 MG/DL (ref 1.6–2.4)
MCH RBC QN AUTO: 25 PG (ref 26–34)
MCHC RBC AUTO-ENTMCNC: 32.8 G/DL (ref 32–36)
MCV RBC AUTO: 76 FL (ref 80–100)
MONOCYTES # BLD AUTO: 1.19 X10*3/UL (ref 0.1–1)
MONOCYTES NFR BLD AUTO: 8.2 %
NEUTROPHILS # BLD AUTO: 11.15 X10*3/UL (ref 1.2–7.7)
NEUTROPHILS NFR BLD AUTO: 77.2 %
NRBC BLD-RTO: 0 /100 WBCS (ref 0–0)
PHOSPHATE SERPL-MCNC: 5.1 MG/DL (ref 2.5–4.9)
PLATELET # BLD AUTO: 251 X10*3/UL (ref 150–450)
POTASSIUM SERPL-SCNC: 3.4 MMOL/L (ref 3.5–5.3)
RBC # BLD AUTO: 5.41 X10*6/UL (ref 4.5–5.9)
SODIUM SERPL-SCNC: 136 MMOL/L (ref 136–145)
WBC # BLD AUTO: 14.4 X10*3/UL (ref 4.4–11.3)

## 2024-12-13 PROCEDURE — 99232 SBSQ HOSP IP/OBS MODERATE 35: CPT | Performed by: NURSE PRACTITIONER

## 2024-12-13 PROCEDURE — 2500000004 HC RX 250 GENERAL PHARMACY W/ HCPCS (ALT 636 FOR OP/ED): Performed by: NURSE PRACTITIONER

## 2024-12-13 PROCEDURE — 2500000001 HC RX 250 WO HCPCS SELF ADMINISTERED DRUGS (ALT 637 FOR MEDICARE OP): Performed by: NURSE PRACTITIONER

## 2024-12-13 PROCEDURE — 80069 RENAL FUNCTION PANEL: CPT

## 2024-12-13 PROCEDURE — 85025 COMPLETE CBC W/AUTO DIFF WBC: CPT

## 2024-12-13 PROCEDURE — 2500000004 HC RX 250 GENERAL PHARMACY W/ HCPCS (ALT 636 FOR OP/ED)

## 2024-12-13 PROCEDURE — 2500000001 HC RX 250 WO HCPCS SELF ADMINISTERED DRUGS (ALT 637 FOR MEDICARE OP): Performed by: STUDENT IN AN ORGANIZED HEALTH CARE EDUCATION/TRAINING PROGRAM

## 2024-12-13 PROCEDURE — 83735 ASSAY OF MAGNESIUM: CPT

## 2024-12-13 PROCEDURE — 1100000001 HC PRIVATE ROOM DAILY

## 2024-12-13 PROCEDURE — 2500000001 HC RX 250 WO HCPCS SELF ADMINISTERED DRUGS (ALT 637 FOR MEDICARE OP)

## 2024-12-13 RX ORDER — METHOCARBAMOL 500 MG/1
500 TABLET, FILM COATED ORAL EVERY 6 HOURS PRN
Status: DISCONTINUED | OUTPATIENT
Start: 2024-12-13 | End: 2024-12-16

## 2024-12-13 RX ORDER — SODIUM CHLORIDE, SODIUM LACTATE, POTASSIUM CHLORIDE, CALCIUM CHLORIDE 600; 310; 30; 20 MG/100ML; MG/100ML; MG/100ML; MG/100ML
1000 INJECTION, SOLUTION INTRAVENOUS NIGHTLY
Qty: 1000 ML | Refills: 7 | Status: SHIPPED
Start: 2024-12-13

## 2024-12-13 RX ORDER — POTASSIUM CHLORIDE 14.9 MG/ML
20 INJECTION INTRAVENOUS
Status: COMPLETED | OUTPATIENT
Start: 2024-12-13 | End: 2024-12-13

## 2024-12-13 RX ORDER — CALCIUM CARBONATE 200(500)MG
500 TABLET,CHEWABLE ORAL DAILY
Status: DISCONTINUED | OUTPATIENT
Start: 2024-12-13 | End: 2024-12-16

## 2024-12-13 RX ORDER — ACETAMINOPHEN 325 MG/1
650 TABLET ORAL EVERY 6 HOURS
Status: DISCONTINUED | OUTPATIENT
Start: 2024-12-13 | End: 2024-12-14

## 2024-12-13 RX ORDER — POTASSIUM CHLORIDE 29.8 MG/ML
40 INJECTION INTRAVENOUS ONCE
Status: DISCONTINUED | OUTPATIENT
Start: 2024-12-13 | End: 2024-12-13

## 2024-12-13 RX ORDER — MAGNESIUM SULFATE HEPTAHYDRATE 40 MG/ML
2 INJECTION, SOLUTION INTRAVENOUS ONCE
Status: COMPLETED | OUTPATIENT
Start: 2024-12-13 | End: 2024-12-13

## 2024-12-13 RX ORDER — ACETAMINOPHEN 500 MG
5 TABLET ORAL NIGHTLY PRN
Status: DISCONTINUED | OUTPATIENT
Start: 2024-12-13 | End: 2024-12-17 | Stop reason: HOSPADM

## 2024-12-13 ASSESSMENT — COGNITIVE AND FUNCTIONAL STATUS - GENERAL
MOBILITY SCORE: 24
DAILY ACTIVITIY SCORE: 24
MOBILITY SCORE: 24
MOBILITY SCORE: 24

## 2024-12-13 ASSESSMENT — PAIN SCALES - GENERAL
PAINLEVEL_OUTOF10: 3
PAINLEVEL_OUTOF10: 3
PAINLEVEL_OUTOF10: 5 - MODERATE PAIN
PAINLEVEL_OUTOF10: 3
PAINLEVEL_OUTOF10: 2
PAINLEVEL_OUTOF10: 7
PAINLEVEL_OUTOF10: 7
PAINLEVEL_OUTOF10: 8
PAINLEVEL_OUTOF10: 2
PAINLEVEL_OUTOF10: 4
PAINLEVEL_OUTOF10: 4

## 2024-12-13 ASSESSMENT — PAIN DESCRIPTION - DESCRIPTORS
DESCRIPTORS: CRAMPING;DISCOMFORT
DESCRIPTORS: CRAMPING;DISCOMFORT
DESCRIPTORS: CRAMPING

## 2024-12-13 ASSESSMENT — PAIN - FUNCTIONAL ASSESSMENT
PAIN_FUNCTIONAL_ASSESSMENT: 0-10

## 2024-12-13 ASSESSMENT — PAIN DESCRIPTION - LOCATION
LOCATION: ABDOMEN
LOCATION: ABDOMEN

## 2024-12-13 NOTE — PROGRESS NOTES
"Bereket Em is a 59 y.o. male on day 1 of admission presenting with Enterocutaneous fistula.    Subjective   Reports cramping pain sensation from wound vac. + Nausea, no emesis. No flatus or stool via ostomy. Minimal PO intake due to nausea.     Objective     Physical Exam  Vitals reviewed.   Constitutional:       General: He is not in acute distress.  Cardiovascular:      Rate and Rhythm: Regular rhythm. Bradycardia present.   Pulmonary:      Effort: Pulmonary effort is normal. No respiratory distress.      Comments: RA  Abdominal:      General: There is no distension.      Palpations: Abdomen is soft.      Comments: Wound vac in place, holding -50mmHg suction with small amount of brown output in cannister, colostomy in place, stoma pink/well perfused. No stool or flatus in bag.    Skin:     General: Skin is warm and dry.   Neurological:      Mental Status: He is alert and oriented to person, place, and time.   Psychiatric:         Mood and Affect: Mood normal.         Behavior: Behavior normal.       Last Recorded Vitals  Blood pressure 129/78, pulse 51, temperature 36.3 °C (97.3 °F), temperature source Temporal, resp. rate 18, height 1.727 m (5' 8\"), weight 70.7 kg (155 lb 12.8 oz), SpO2 97%.  Intake/Output last 3 Shifts:  I/O last 3 completed shifts:  In: 1000 (14.2 mL/kg) [IV Piggyback:1000]  Out: 100 (1.4 mL/kg) [Urine:100 (0 mL/kg/hr)]  Weight: 70.7 kg     Relevant Results  Scheduled medications  magnesium sulfate, 2 g, intravenous, Once  potassium chloride, 20 mEq, intravenous, q2h  psyllium, 4 packet, oral, Daily      Continuous medications     PRN medications  PRN medications: acetaminophen, diphenoxylate-atropine, loperamide, melatonin, naloxone, ondansetron ODT **OR** ondansetron, oxyCODONE, oxyCODONE, traZODone    Results for orders placed or performed during the hospital encounter of 12/12/24 (from the past 24 hours)   Renal Function Panel   Result Value Ref Range    Glucose 97 74 - 99 mg/dL    " Sodium 136 136 - 145 mmol/L    Potassium 3.4 (L) 3.5 - 5.3 mmol/L    Chloride 91 (L) 98 - 107 mmol/L    Bicarbonate 33 (H) 21 - 32 mmol/L    Anion Gap 15 10 - 20 mmol/L    Urea Nitrogen 38 (H) 6 - 23 mg/dL    Creatinine 1.17 0.50 - 1.30 mg/dL    eGFR 71 >60 mL/min/1.73m*2    Calcium 9.6 8.6 - 10.6 mg/dL    Phosphorus 5.1 (H) 2.5 - 4.9 mg/dL    Albumin 3.8 3.4 - 5.0 g/dL   CBC and Auto Differential   Result Value Ref Range    WBC 14.4 (H) 4.4 - 11.3 x10*3/uL    nRBC 0.0 0.0 - 0.0 /100 WBCs    RBC 5.41 4.50 - 5.90 x10*6/uL    Hemoglobin 13.5 13.5 - 17.5 g/dL    Hematocrit 41.1 41.0 - 52.0 %    MCV 76 (L) 80 - 100 fL    MCH 25.0 (L) 26.0 - 34.0 pg    MCHC 32.8 32.0 - 36.0 g/dL    RDW 16.2 (H) 11.5 - 14.5 %    Platelets 251 150 - 450 x10*3/uL    Neutrophils % 77.2 40.0 - 80.0 %    Immature Granulocytes %, Automated 0.5 0.0 - 0.9 %    Lymphocytes % 12.8 13.0 - 44.0 %    Monocytes % 8.2 2.0 - 10.0 %    Eosinophils % 1.0 0.0 - 6.0 %    Basophils % 0.3 0.0 - 2.0 %    Neutrophils Absolute 11.15 (H) 1.20 - 7.70 x10*3/uL    Immature Granulocytes Absolute, Automated 0.07 0.00 - 0.70 x10*3/uL    Lymphocytes Absolute 1.84 1.20 - 4.80 x10*3/uL    Monocytes Absolute 1.19 (H) 0.10 - 1.00 x10*3/uL    Eosinophils Absolute 0.14 0.00 - 0.70 x10*3/uL    Basophils Absolute 0.04 0.00 - 0.10 x10*3/uL   Magnesium   Result Value Ref Range    Magnesium 1.70 1.60 - 2.40 mg/dL     *Note: Due to a large number of results and/or encounters for the requested time period, some results have not been displayed. A complete set of results can be found in Results Review.      Enteroscopy w Fluoro; [facility]; Endoscopy; No    Result Date: 12/12/2024  Table formatting from the original result was not included. Impression Large enteric fistula in the middle jejunum; successfully placed 23 mm x 120mm and 23 mm x 150 mm fully covered stent using fluoroscopic guidance and guidewire; the stents were fixated to each other and skin using interrupted 2-0 prolene  sutures. Xeroform placed on the exposed bowel and stents followed by wound vac in order to maintain stents in place. Wound vac placed on 75 mmhg suction. Location of stents confirmed by fluoroscopy. Findings Large enteric fistula in the middle jejunum; no bleeding was observed; enteroscopy of proximal and distal limb of enterocutaneous fistulas without evidence of bleeding, ulceration, or mucosal abnormalities. successfully placed 23 mm x 120mm and 23 mm x 150 mm fully covered stent using fluoroscopic guidance and guidewire; the stents were fixated to each other and skin using interrupted 2-0 prolene sutures. Xeroform placed on the exposed bowel and stents followed by wound vac. Wound vac placed on 75 mmhg suction. Location of stents confirmed by fluoroscopy. Recommendation Follow up with me in clinic, due: 12/26/2024 - admission for wound vac management and homegoing wound vac - plan for discharge home with wound vac when able.  Indication Enterocutaneous fistula Staff Staff Role Kraig Cage MD Proceduralist Medications See Anesthesia Record. Preprocedure A history and physical has been performed, and patient medication allergies have been reviewed. The patient's tolerance of previous anesthesia has been reviewed. The risks and benefits of the procedure and the sedation options and risks were discussed with the patient. All questions were answered and informed consent obtained. Details of the Procedure The patient underwent general anesthesia, which was administered by an anesthesia professional. The patient's blood pressure, heart rate, level of consciousness, oxygen, respirations, ECG and ETCO2 were monitored throughout the procedure. Enteroscopy was performed in the distal and proximal limbs of the large enterocutaneous fistula. X2 450 cm straight tip Jagwire were introduced in both limbs and confirmed with fluoroscopy. 23x120 fully covered esophageal stent deployed in the distal jejunal limb. 23x150 fully  covered esophageal stent was deployed in the proximal limb. The exposed end of the proximal stent was inserted in the exposed end of the distal stent. The stents were secured to each other and skin using interrupted 2-0 prolene sutures. Two xeroform guaze were placed on the exposed bowel and stents followed by the placement of wound vac. Wound vac was placed on 75 mmhg of suction. The final position of the stents was confirmed with fluoroscopy. The patient experienced no blood loss. The procedure was not difficult. The patient tolerated the procedure well. There were no apparent adverse events. Events Procedure Events Event Event Time ENDO SCOPE IN TIME 12/12/2024  1:32 PM ENDO SCOPE OUT TIME 12/12/2024  2:30 PM Specimens No specimens collected Procedure Location [facility] [address] Count includes the [facility] [address] [phone] Referring Provider Kraig Cage MD Procedure Provider Kraig Cage MD            Assessment/Plan   Assessment & Plan  Enterocutaneous fistula      59 year old male with h/o enterocutaneous fistula s/p enteroscopy with stent placement and wound vac placement on 12/12 with Dr Cage.     PLAN:   Neurology: pain   - scheduled tylenol   - oxycodone 5/10 PRN moderate-severe pain   - robaxin PRN   - continue home trazodone, melatonin PRN      Cardiovascular:  -Vital signs every 8 hours    Pulmonology:  -IS x10 every hour   -Maintain SpO2 >92 % RA     GI: enterocutaneous fistula s/p stent/wound vac   - Regular diet as tolerated  - Zofran PRN nausea   - wound care consulted for wound vac/ostomy supplies/support     :  - Strict I&O   - Trend RFP and Magnesium, replace electrolytes as needed to maintain K >4, Mag >2. 2 gm Mag, 40 K+ given today for electrolyte depletion   - received 500cc LR bolus overnight, resume home 1L LR bolus daily     ID: afebrile   -trend daily temperatures and WBC    Heme:  -Monitor for signs of acute blood loss  -Trend CBC    DVT  Prophylaxis:  -SCDs, ambulation/OOB     Disposition:  - RNF  - Referral placed to resume nursing home care with prescription for home wound vac system once medically ready.     Discussed with Dr Cage.     MINERVA Connors, CNP  [facility]  [id number]

## 2024-12-13 NOTE — PROGRESS NOTES
I saw Mr. Em.   I observed his procedure yesterday with Dr. Cage.     He reports some nausea overnight which has improved, and he is now hungry.    He is confident that this current stent and vac will work.   No ostomy output yet.   The wound vac cannister was changed once overnight.  He reports minimal output at that time.   He reports lower urine output that is concentrated.   On exam, NAD. The wound vac is in place with a good seal and serosanguinous output in the cannister. The LLQ colostomy is pink and viable without stool or gas in the bag.   At this point, the wound vac appears to be keeping the stents in place although he has yet to have colostomy output or much PO intake.  If he is discharged with the wound vac, then would recommend outpatient instructions to slowly remove the clear tape and black sponge to avoid displacing the stent and to then remove the folded Xeroform.  If the stents remain in place after removal, then would recommend replacement of the wound vac first with 2 large folded Xeroform sheets to cover and protect the stents and then a small football shaped piece of sponge over the Xeroform.  With PO intake, the sponge or cannister may clog but will have to monitor as that and other issues may occur.

## 2024-12-13 NOTE — CARE PLAN
The patient's goals for the shift include  pain relief    The clinical goals for the shift include pt will remain safe and free from falls throughout shift        Problem: Fall/Injury  Goal: Not fall by end of shift  Outcome: Progressing  Goal: Be free from injury by end of the shift  Outcome: Progressing  Goal: Verbalize understanding of personal risk factors for fall in the hospital  Outcome: Progressing  Goal: Verbalize understanding of risk factor reduction measures to prevent injury from fall in the home  Outcome: Progressing  Goal: Use assistive devices by end of the shift  Outcome: Progressing  Goal: Pace activities to prevent fatigue by end of the shift  Outcome: Progressing

## 2024-12-13 NOTE — PROGRESS NOTES
12/13/24 0814   Discharge Planning   Living Arrangements Spouse/significant other   Support Systems Spouse/significant other   Assistance Needed None   Type of Residence Private residence   Home or Post Acute Services In home services   Type of Home Care Services Home nursing visits;Home OT;Home PT   Expected Discharge Disposition Home H   Does the patient need discharge transport arranged? No   Financial Resource Strain   How hard is it for you to pay for the very basics like food, housing, medical care, and heating? Not hard   Housing Stability   In the last 12 months, was there a time when you were not able to pay the mortgage or rent on time? N   Transportation Needs   In the past 12 months, has lack of transportation kept you from medical appointments or from getting medications? no       DC Planning:  Went in and met with the pt, confirmed demographics.     Transitional Care Coordination Progress Note:  Patient discussed during interdisciplinary rounds.     Plan per Medical/Surgical team:   Pt previously used [facility]. PT will be going home with wound vac, picc, iv atb.    Home Care choice for home going needs, [facility].  Pt Needs: RN/PT/OT.   Wound Vac ordered through [facility]. Delivered Friday.     Payer: [employer]    Status: INP    Discharge disposition: [facility], [facility]    Potential Barriers: None    ADOD:  12/15    This TCC will continue to follow for home going needs and safe DC plan.      Jeny Will. ISABELLE. TCC.

## 2024-12-13 NOTE — CARE PLAN
Problem: Fall/Injury  Goal: Not fall by end of shift  Outcome: Progressing     Problem: Fall/Injury  Goal: Be free from injury by end of the shift  Outcome: Progressing       Over the shift, the patient did make progress toward the above goals.

## 2024-12-13 NOTE — CONSULTS
Wound Care Consult     Visit Date: 12/13/2024      Patient Name: Bereket Em         MRN: [id number]           YOB: 1965     Reason for Consult: Wound care support         Wound History: Hx of atmospheric enterocutaneous fistula and end ileostomy. Patient s/p enteroscopy, stent placement and wound vac placement      Pertinent Labs:   Albumin   Date Value Ref Range Status   12/13/2024 3.8 3.4 - 5.0 g/dL Final     ALBUMIN (MG/L) IN URINE   Date Value Ref Range Status   05/06/2021 <7.0 Not Established mg/L Final       Wound Assessment:  The wound care team came to bedside to assess the patient midline wound vac and ostomy pouch appliance. Patient is s/p enteroscopy, stent placement and wound vac dressing application by Dr. Cage. The patient currently has a wound vac dressing on abdomen that is clean, dry and intact with serosanguinous drainage noted in the cannister. Dr. Perry at bedside at the time of the visit and stated that the dressing didn't need to be changed at this time. The patient is currently wearing a 2 piece Melonie RED flat wafer and lock n roll pouch that is clean, dry and intact. The patient stoma is clean, red and moist. Patient does have ostomy supplies at home and is independent with his ostomy care.        Wound Team Plan: Continue to follow the patient for wound/ostomy needs while inpatient.      ISABELLE Ramos  12/13/2024  1:41 PM

## 2024-12-14 ENCOUNTER — APPOINTMENT (OUTPATIENT)
Dept: RADIOLOGY | Facility: HOSPITAL | Age: 59
DRG: 394 | End: 2024-12-14
Payer: COMMERCIAL

## 2024-12-14 LAB
ALBUMIN SERPL BCP-MCNC: 3.9 G/DL (ref 3.4–5)
ANION GAP SERPL CALC-SCNC: 18 MMOL/L (ref 10–20)
BUN SERPL-MCNC: 33 MG/DL (ref 6–23)
CALCIUM SERPL-MCNC: 10 MG/DL (ref 8.6–10.6)
CHLORIDE SERPL-SCNC: 87 MMOL/L (ref 98–107)
CO2 SERPL-SCNC: 35 MMOL/L (ref 21–32)
CREAT SERPL-MCNC: 1 MG/DL (ref 0.5–1.3)
EGFRCR SERPLBLD CKD-EPI 2021: 86 ML/MIN/1.73M*2
ERYTHROCYTE [DISTWIDTH] IN BLOOD BY AUTOMATED COUNT: 16.2 % (ref 11.5–14.5)
GLUCOSE SERPL-MCNC: 154 MG/DL (ref 74–99)
HCT VFR BLD AUTO: 43.4 % (ref 41–52)
HGB BLD-MCNC: 14.4 G/DL (ref 13.5–17.5)
MAGNESIUM SERPL-MCNC: 2.31 MG/DL (ref 1.6–2.4)
MCH RBC QN AUTO: 24.5 PG (ref 26–34)
MCHC RBC AUTO-ENTMCNC: 33.2 G/DL (ref 32–36)
MCV RBC AUTO: 74 FL (ref 80–100)
NRBC BLD-RTO: 0 /100 WBCS (ref 0–0)
PHOSPHATE SERPL-MCNC: 4.3 MG/DL (ref 2.5–4.9)
PLATELET # BLD AUTO: 245 X10*3/UL (ref 150–450)
POTASSIUM SERPL-SCNC: 3.7 MMOL/L (ref 3.5–5.3)
RBC # BLD AUTO: 5.87 X10*6/UL (ref 4.5–5.9)
SODIUM SERPL-SCNC: 136 MMOL/L (ref 136–145)
WBC # BLD AUTO: 9.8 X10*3/UL (ref 4.4–11.3)

## 2024-12-14 PROCEDURE — 2500000002 HC RX 250 W HCPCS SELF ADMINISTERED DRUGS (ALT 637 FOR MEDICARE OP, ALT 636 FOR OP/ED): Performed by: STUDENT IN AN ORGANIZED HEALTH CARE EDUCATION/TRAINING PROGRAM

## 2024-12-14 PROCEDURE — A9698 NON-RAD CONTRAST MATERIALNOC: HCPCS | Performed by: STUDENT IN AN ORGANIZED HEALTH CARE EDUCATION/TRAINING PROGRAM

## 2024-12-14 PROCEDURE — 2550000001 HC RX 255 CONTRASTS: Performed by: SURGERY

## 2024-12-14 PROCEDURE — 80069 RENAL FUNCTION PANEL: CPT | Performed by: NURSE PRACTITIONER

## 2024-12-14 PROCEDURE — 2500000004 HC RX 250 GENERAL PHARMACY W/ HCPCS (ALT 636 FOR OP/ED)

## 2024-12-14 PROCEDURE — 2500000001 HC RX 250 WO HCPCS SELF ADMINISTERED DRUGS (ALT 637 FOR MEDICARE OP)

## 2024-12-14 PROCEDURE — 74177 CT ABD & PELVIS W/CONTRAST: CPT

## 2024-12-14 PROCEDURE — 2500000004 HC RX 250 GENERAL PHARMACY W/ HCPCS (ALT 636 FOR OP/ED): Performed by: STUDENT IN AN ORGANIZED HEALTH CARE EDUCATION/TRAINING PROGRAM

## 2024-12-14 PROCEDURE — 1100000001 HC PRIVATE ROOM DAILY

## 2024-12-14 PROCEDURE — 74018 RADEX ABDOMEN 1 VIEW: CPT

## 2024-12-14 PROCEDURE — 74177 CT ABD & PELVIS W/CONTRAST: CPT | Performed by: STUDENT IN AN ORGANIZED HEALTH CARE EDUCATION/TRAINING PROGRAM

## 2024-12-14 PROCEDURE — 85027 COMPLETE CBC AUTOMATED: CPT | Performed by: NURSE PRACTITIONER

## 2024-12-14 PROCEDURE — 83735 ASSAY OF MAGNESIUM: CPT | Performed by: NURSE PRACTITIONER

## 2024-12-14 PROCEDURE — 2500000001 HC RX 250 WO HCPCS SELF ADMINISTERED DRUGS (ALT 637 FOR MEDICARE OP): Performed by: STUDENT IN AN ORGANIZED HEALTH CARE EDUCATION/TRAINING PROGRAM

## 2024-12-14 PROCEDURE — 2500000001 HC RX 250 WO HCPCS SELF ADMINISTERED DRUGS (ALT 637 FOR MEDICARE OP): Performed by: NURSE PRACTITIONER

## 2024-12-14 PROCEDURE — 2550000001 HC RX 255 CONTRASTS: Performed by: STUDENT IN AN ORGANIZED HEALTH CARE EDUCATION/TRAINING PROGRAM

## 2024-12-14 RX ORDER — LIDOCAINE HYDROCHLORIDE 10 MG/ML
INJECTION, SOLUTION INFILTRATION; PERINEURAL
Status: DISPENSED
Start: 2024-12-14 | End: 2024-12-15

## 2024-12-14 RX ORDER — ACETAMINOPHEN 10 MG/ML
1000 INJECTION, SOLUTION INTRAVENOUS EVERY 6 HOURS SCHEDULED
Status: COMPLETED | OUTPATIENT
Start: 2024-12-14 | End: 2024-12-15

## 2024-12-14 RX ORDER — POTASSIUM CHLORIDE 14.9 MG/ML
20 INJECTION INTRAVENOUS
Status: DISCONTINUED | OUTPATIENT
Start: 2024-12-14 | End: 2024-12-14

## 2024-12-14 RX ORDER — LIDOCAINE HYDROCHLORIDE 20 MG/ML
1 JELLY TOPICAL ONCE
Status: DISCONTINUED | OUTPATIENT
Start: 2024-12-14 | End: 2024-12-16

## 2024-12-14 RX ORDER — HYDROMORPHONE HYDROCHLORIDE 0.2 MG/ML
0.2 INJECTION INTRAMUSCULAR; INTRAVENOUS; SUBCUTANEOUS EVERY 4 HOURS PRN
Status: DISCONTINUED | OUTPATIENT
Start: 2024-12-14 | End: 2024-12-16

## 2024-12-14 RX ORDER — ONDANSETRON HYDROCHLORIDE 2 MG/ML
4 INJECTION, SOLUTION INTRAVENOUS EVERY 4 HOURS PRN
Status: DISCONTINUED | OUTPATIENT
Start: 2024-12-14 | End: 2024-12-17 | Stop reason: HOSPADM

## 2024-12-14 RX ORDER — HYDROMORPHONE HYDROCHLORIDE 1 MG/ML
0.4 INJECTION, SOLUTION INTRAMUSCULAR; INTRAVENOUS; SUBCUTANEOUS EVERY 4 HOURS PRN
Status: DISCONTINUED | OUTPATIENT
Start: 2024-12-14 | End: 2024-12-16

## 2024-12-14 RX ORDER — PANTOPRAZOLE SODIUM 40 MG/10ML
40 INJECTION, POWDER, LYOPHILIZED, FOR SOLUTION INTRAVENOUS DAILY
Status: DISCONTINUED | OUTPATIENT
Start: 2024-12-14 | End: 2024-12-14

## 2024-12-14 RX ORDER — MIDAZOLAM HYDROCHLORIDE 1 MG/ML
1 INJECTION INTRAMUSCULAR; INTRAVENOUS ONCE
Status: DISCONTINUED | OUTPATIENT
Start: 2024-12-14 | End: 2024-12-14

## 2024-12-14 RX ORDER — SODIUM CHLORIDE 9 MG/ML
100 INJECTION, SOLUTION INTRAVENOUS CONTINUOUS
Status: ACTIVE | OUTPATIENT
Start: 2024-12-14 | End: 2024-12-15

## 2024-12-14 RX ORDER — LORAZEPAM 2 MG/ML
0.25 INJECTION INTRAMUSCULAR ONCE
Status: COMPLETED | OUTPATIENT
Start: 2024-12-14 | End: 2024-12-14

## 2024-12-14 RX ORDER — POTASSIUM CHLORIDE 20 MEQ/1
40 TABLET, EXTENDED RELEASE ORAL ONCE
Status: COMPLETED | OUTPATIENT
Start: 2024-12-14 | End: 2024-12-14

## 2024-12-14 RX ORDER — PANTOPRAZOLE SODIUM 40 MG/10ML
40 INJECTION, POWDER, LYOPHILIZED, FOR SOLUTION INTRAVENOUS 2 TIMES DAILY
Status: DISCONTINUED | OUTPATIENT
Start: 2024-12-14 | End: 2024-12-17 | Stop reason: HOSPADM

## 2024-12-14 RX ORDER — PANTOPRAZOLE SODIUM 40 MG/1
40 TABLET, DELAYED RELEASE ORAL
Status: DISCONTINUED | OUTPATIENT
Start: 2024-12-14 | End: 2024-12-14

## 2024-12-14 RX ORDER — LORAZEPAM 2 MG/ML
0.25 INJECTION INTRAMUSCULAR ONCE
Status: DISCONTINUED | OUTPATIENT
Start: 2024-12-14 | End: 2024-12-14

## 2024-12-14 ASSESSMENT — PAIN DESCRIPTION - LOCATION
LOCATION: ABDOMEN

## 2024-12-14 ASSESSMENT — PAIN SCALES - GENERAL
PAINLEVEL_OUTOF10: 3
PAINLEVEL_OUTOF10: 7
PAINLEVEL_OUTOF10: 5 - MODERATE PAIN
PAINLEVEL_OUTOF10: 7
PAINLEVEL_OUTOF10: 8
PAINLEVEL_OUTOF10: 4
PAINLEVEL_OUTOF10: 4
PAINLEVEL_OUTOF10: 7
PAINLEVEL_OUTOF10: 7

## 2024-12-14 ASSESSMENT — COGNITIVE AND FUNCTIONAL STATUS - GENERAL
MOBILITY SCORE: 24
DAILY ACTIVITIY SCORE: 24
DAILY ACTIVITIY SCORE: 24
MOBILITY SCORE: 24

## 2024-12-14 ASSESSMENT — PAIN - FUNCTIONAL ASSESSMENT
PAIN_FUNCTIONAL_ASSESSMENT: 0-10

## 2024-12-14 ASSESSMENT — PAIN DESCRIPTION - ORIENTATION
ORIENTATION: MID
ORIENTATION: MID

## 2024-12-14 ASSESSMENT — PAIN DESCRIPTION - DESCRIPTORS
DESCRIPTORS: DISCOMFORT;SORE

## 2024-12-14 NOTE — PROGRESS NOTES
"Bereket Em is a 59 y.o. male on day 2 of admission presenting with Enterocutaneous fistula.    Subjective   Endorsing nausea, severe acid reflux and hiccupping. Had ~700 thin dark, feculent appearing output from VAC overnight. No gas or output from stoma. Denies emesis.    Objective     Physical Exam  Vitals reviewed.   Constitutional:       Comments: Uncomfortable   Cardiovascular:      Rate and Rhythm: Regular rhythm. Bradycardia present.   Pulmonary:      Effort: Pulmonary effort is normal. No respiratory distress.      Comments: RA  Abdominal:      General: There is no distension.      Comments: Abd soft, ND, NT. Wound vac in place, holding -25mmHg suction with dark brown output in cannister, colostomy pink/well perfused. No stool or flatus in bag.    Skin:     General: Skin is warm and dry.   Neurological:      Mental Status: He is alert and oriented to person, place, and time.   Psychiatric:         Mood and Affect: Mood normal.         Behavior: Behavior normal.       Last Recorded Vitals  Blood pressure 142/86, pulse 73, temperature 36.3 °C (97.3 °F), temperature source Temporal, resp. rate 18, height 1.727 m (5' 8\"), weight 70.7 kg (155 lb 12.8 oz), SpO2 92%.  Intake/Output last 3 Shifts:  I/O last 3 completed shifts:  In: 2451.7 (34.7 mL/kg) [P.O.:360; IV Piggyback:2091.7]  Out: 425 (6 mL/kg) [Urine:425 (0.2 mL/kg/hr)]  Weight: 70.7 kg     Relevant Results  Scheduled medications  acetaminophen, 650 mg, oral, q6h  calcium carbonate, 500 mg, oral, Daily  iohexol, 500 mL, oral, Once in imaging  pantoprazole, 40 mg, intravenous, BID  potassium chloride CR, 40 mEq, oral, Once  psyllium, 4 packet, oral, Daily  sodium chloride, 500 mL, intravenous, Once      Continuous medications  sodium chloride 0.9%, 100 mL/hr      PRN medications  PRN medications: diphenoxylate-atropine, loperamide, melatonin, methocarbamol, naloxone, [DISCONTINUED] ondansetron ODT **OR** ondansetron, oxyCODONE, oxyCODONE, " traZODone    Results for orders placed or performed during the hospital encounter of 12/12/24 (from the past 24 hours)   CBC   Result Value Ref Range    WBC 9.8 4.4 - 11.3 x10*3/uL    nRBC 0.0 0.0 - 0.0 /100 WBCs    RBC 5.87 4.50 - 5.90 x10*6/uL    Hemoglobin 14.4 13.5 - 17.5 g/dL    Hematocrit 43.4 41.0 - 52.0 %    MCV 74 (L) 80 - 100 fL    MCH 24.5 (L) 26.0 - 34.0 pg    MCHC 33.2 32.0 - 36.0 g/dL    RDW 16.2 (H) 11.5 - 14.5 %    Platelets 245 150 - 450 x10*3/uL   Renal Function Panel   Result Value Ref Range    Glucose 154 (H) 74 - 99 mg/dL    Sodium 136 136 - 145 mmol/L    Potassium 3.7 3.5 - 5.3 mmol/L    Chloride 87 (L) 98 - 107 mmol/L    Bicarbonate 35 (H) 21 - 32 mmol/L    Anion Gap 18 10 - 20 mmol/L    Urea Nitrogen 33 (H) 6 - 23 mg/dL    Creatinine 1.00 0.50 - 1.30 mg/dL    eGFR 86 >60 mL/min/1.73m*2    Calcium 10.0 8.6 - 10.6 mg/dL    Phosphorus 4.3 2.5 - 4.9 mg/dL    Albumin 3.9 3.4 - 5.0 g/dL   Magnesium   Result Value Ref Range    Magnesium 2.31 1.60 - 2.40 mg/dL      Enteroscopy w Fluoro; [facility]; Endoscopy; No    Result Date: 12/12/2024  Table formatting from the original result was not included. Impression Large enteric fistula in the middle jejunum; successfully placed 23 mm x 120mm and 23 mm x 150 mm fully covered stent using fluoroscopic guidance and guidewire; the stents were fixated to each other and skin using interrupted 2-0 prolene sutures. Xeroform placed on the exposed bowel and stents followed by wound vac in order to maintain stents in place. Wound vac placed on 75 mmhg suction. Location of stents confirmed by fluoroscopy. Findings Large enteric fistula in the middle jejunum; no bleeding was observed; enteroscopy of proximal and distal limb of enterocutaneous fistulas without evidence of bleeding, ulceration, or mucosal abnormalities. successfully placed 23 mm x 120mm and 23 mm x 150 mm fully covered stent using fluoroscopic guidance and guidewire; the stents were fixated to each other and  skin using interrupted 2-0 prolene sutures. Xeroform placed on the exposed bowel and stents followed by wound vac. Wound vac placed on 75 mmhg suction. Location of stents confirmed by fluoroscopy. Recommendation Follow up with me in clinic, due: 12/26/2024 - admission for wound vac management and homegoing wound vac - plan for discharge home with wound vac when able.  Indication Enterocutaneous fistula Staff Staff Role Kraig Cage MD Proceduralist Medications See Anesthesia Record. Preprocedure A history and physical has been performed, and patient medication allergies have been reviewed. The patient's tolerance of previous anesthesia has been reviewed. The risks and benefits of the procedure and the sedation options and risks were discussed with the patient. All questions were answered and informed consent obtained. Details of the Procedure The patient underwent general anesthesia, which was administered by an anesthesia professional. The patient's blood pressure, heart rate, level of consciousness, oxygen, respirations, ECG and ETCO2 were monitored throughout the procedure. Enteroscopy was performed in the distal and proximal limbs of the large enterocutaneous fistula. X2 450 cm straight tip Jagwire were introduced in both limbs and confirmed with fluoroscopy. 23x120 fully covered esophageal stent deployed in the distal jejunal limb. 23x150 fully covered esophageal stent was deployed in the proximal limb. The exposed end of the proximal stent was inserted in the exposed end of the distal stent. The stents were secured to each other and skin using interrupted 2-0 prolene sutures. Two xeroform guaze were placed on the exposed bowel and stents followed by the placement of wound vac. Wound vac was placed on 75 mmhg of suction. The final position of the stents was confirmed with fluoroscopy. The patient experienced no blood loss. The procedure was not difficult. The patient tolerated the procedure well. There were  no apparent adverse events. Events Procedure Events Event Event Time ENDO SCOPE IN TIME 12/12/2024  1:32 PM ENDO SCOPE OUT TIME 12/12/2024  2:30 PM Specimens No specimens collected Procedure Location [facility] [address] [address] [facility] [address] [phone] Referring Provider Kraig Cage MD Procedure Provider Kraig Cage MD            Assessment/Plan   Assessment & Plan  Enterocutaneous fistula    59M with PMH perforated diverticulitis s/p Margarita's c/b EAF. Patient now s/p enteroscopy with covered stent placement across fistula and wound vac placement on 12/12 with Dr Cage. Patient with feculent output from VAC, no stoma output and worsening nausea, reflux, and hiccupping. Will obtain CT A/P with PO contrast to assess for obstruction secondary to stent placement.    PLAN:   Neurology: pain   - Scheduled tylenol   - Oxycodone 5/10 PRN moderate-severe pain   - Robaxin PRN   - Continue home trazodone, melatonin PRN      Cardiovascular:  -Vital signs every 8 hours    Pulmonology:  -IS x10 every hour   -Maintain SpO2 >92 % RA     GI: enterocutaneous fistula s/p stent/wound vac   - Stat CT A/P with PO contrast to assess for obstruction from stent  - NPO, mIVFs @ 100  - IV zofran PRN nausea   - Wound care consulted for wound vac/ostomy supplies/support   - IV PPI BID, tums    :  - Strict I&O   - Trend RFP and Magnesium, replace electrolytes as needed to maintain K >4, Mag >2. 2 gm Mag, 40 K+ given today for electrolyte depletion       ID: afebrile   -Trend daily temperatures and WBC    Heme:  -Monitor for signs of acute blood loss  -Trend CBC    DVT Prophylaxis:  -SCDs, ambulation/OOB     Dispo:  - RNF  - Referral placed to resume nursing home care, home wound VAC at bedside    Discussed with attending, Dr. Cage.    Katheryn Mart MD  [place]  [phone]

## 2024-12-14 NOTE — CARE PLAN
The patient's goals for the shift include  sufficient sleep and adequate pain management.  Problem: Fall/Injury  Goal: Not fall by end of shift  Outcome: Progressing     Problem: Fall/Injury  Goal: Be free from injury by end of the shift  Outcome: Progressing       The clinical goals for the shift include pt will remain safe and free from falls throughout shift    Over the shift, the patient did make progress toward the above goals.

## 2024-12-14 NOTE — CARE PLAN
Transitional Care Coordinator Note: Patient discussed with medical team, per medical team patient is not medically ready. Discharge dispo: [facility]. SOC Pending. ADOD 12/15.  Jeanette Carpio RN  Transitional Care Coordinator

## 2024-12-14 NOTE — CARE PLAN
The patient's goals for the shift include      The clinical goals for the shift include safety maintained    Problem: Fall/Injury  Goal: Not fall by end of shift  Outcome: Progressing  Goal: Be free from injury by end of the shift  Outcome: Progressing  Goal: Verbalize understanding of personal risk factors for fall in the hospital  Outcome: Progressing  Goal: Verbalize understanding of risk factor reduction measures to prevent injury from fall in the home  Outcome: Progressing  Goal: Use assistive devices by end of the shift  Outcome: Progressing  Goal: Pace activities to prevent fatigue by end of the shift  Outcome: Progressing

## 2024-12-15 ENCOUNTER — HOME CARE VISIT (OUTPATIENT)
Dept: HOME HEALTH SERVICES | Facility: HOME HEALTH | Age: 59
End: 2024-12-15
Payer: COMMERCIAL

## 2024-12-15 VITALS
OXYGEN SATURATION: 96 % | DIASTOLIC BLOOD PRESSURE: 74 MMHG | TEMPERATURE: 97.7 F | SYSTOLIC BLOOD PRESSURE: 122 MMHG | HEART RATE: 57 BPM | BODY MASS INDEX: 23.61 KG/M2 | RESPIRATION RATE: 18 BRPM | HEIGHT: 68 IN | WEIGHT: 155.8 LBS

## 2024-12-15 LAB
ALBUMIN SERPL BCP-MCNC: 3.4 G/DL (ref 3.4–5)
ANION GAP SERPL CALC-SCNC: 16 MMOL/L (ref 10–20)
BUN SERPL-MCNC: 27 MG/DL (ref 6–23)
CALCIUM SERPL-MCNC: 9.1 MG/DL (ref 8.6–10.6)
CHLORIDE SERPL-SCNC: 92 MMOL/L (ref 98–107)
CO2 SERPL-SCNC: 34 MMOL/L (ref 21–32)
CREAT SERPL-MCNC: 0.98 MG/DL (ref 0.5–1.3)
EGFRCR SERPLBLD CKD-EPI 2021: 88 ML/MIN/1.73M*2
ERYTHROCYTE [DISTWIDTH] IN BLOOD BY AUTOMATED COUNT: 16.5 % (ref 11.5–14.5)
GLUCOSE SERPL-MCNC: 91 MG/DL (ref 74–99)
HCT VFR BLD AUTO: 39.6 % (ref 41–52)
HGB BLD-MCNC: 12.6 G/DL (ref 13.5–17.5)
MAGNESIUM SERPL-MCNC: 2.23 MG/DL (ref 1.6–2.4)
MCH RBC QN AUTO: 24.8 PG (ref 26–34)
MCHC RBC AUTO-ENTMCNC: 31.8 G/DL (ref 32–36)
MCV RBC AUTO: 78 FL (ref 80–100)
NRBC BLD-RTO: 0 /100 WBCS (ref 0–0)
PHOSPHATE SERPL-MCNC: 2.8 MG/DL (ref 2.5–4.9)
PLATELET # BLD AUTO: 201 X10*3/UL (ref 150–450)
POTASSIUM SERPL-SCNC: 3.5 MMOL/L (ref 3.5–5.3)
RBC # BLD AUTO: 5.09 X10*6/UL (ref 4.5–5.9)
SODIUM SERPL-SCNC: 138 MMOL/L (ref 136–145)
WBC # BLD AUTO: 5.6 X10*3/UL (ref 4.4–11.3)

## 2024-12-15 PROCEDURE — 85027 COMPLETE CBC AUTOMATED: CPT

## 2024-12-15 PROCEDURE — 2500000004 HC RX 250 GENERAL PHARMACY W/ HCPCS (ALT 636 FOR OP/ED): Performed by: STUDENT IN AN ORGANIZED HEALTH CARE EDUCATION/TRAINING PROGRAM

## 2024-12-15 PROCEDURE — 2500000001 HC RX 250 WO HCPCS SELF ADMINISTERED DRUGS (ALT 637 FOR MEDICARE OP)

## 2024-12-15 PROCEDURE — 1100000001 HC PRIVATE ROOM DAILY

## 2024-12-15 PROCEDURE — 0D9670Z DRAINAGE OF STOMACH WITH DRAINAGE DEVICE, VIA NATURAL OR ARTIFICIAL OPENING: ICD-10-PCS

## 2024-12-15 PROCEDURE — 80069 RENAL FUNCTION PANEL: CPT

## 2024-12-15 PROCEDURE — 2500000004 HC RX 250 GENERAL PHARMACY W/ HCPCS (ALT 636 FOR OP/ED)

## 2024-12-15 PROCEDURE — 83735 ASSAY OF MAGNESIUM: CPT

## 2024-12-15 RX ORDER — POTASSIUM CHLORIDE 14.9 MG/ML
20 INJECTION INTRAVENOUS
Status: COMPLETED | OUTPATIENT
Start: 2024-12-15 | End: 2024-12-15

## 2024-12-15 ASSESSMENT — ENCOUNTER SYMPTOMS
PAIN LOCATION: ABDOMEN
PERSON REPORTING PAIN: PATIENT
NAUSEA: 1
CHANGE IN APPETITE: DECREASED
PAIN: 1
MUSCLE WEAKNESS: 1
APPETITE LEVEL: FAIR
PAIN LOCATION - PAIN SEVERITY: 4/10
ABDOMINAL PAIN: 1

## 2024-12-15 ASSESSMENT — PAIN DESCRIPTION - ORIENTATION
ORIENTATION: MID

## 2024-12-15 ASSESSMENT — PAIN DESCRIPTION - DESCRIPTORS
DESCRIPTORS: DISCOMFORT
DESCRIPTORS: DISCOMFORT;SORE

## 2024-12-15 ASSESSMENT — PAIN SCALES - GENERAL
PAINLEVEL_OUTOF10: 6
PAINLEVEL_OUTOF10: 5 - MODERATE PAIN
PAINLEVEL_OUTOF10: 8
PAINLEVEL_OUTOF10: 4
PAINLEVEL_OUTOF10: 8
PAINLEVEL_OUTOF10: 5 - MODERATE PAIN
PAINLEVEL_OUTOF10: 7
PAINLEVEL_OUTOF10: 7
PAINLEVEL_OUTOF10: 5 - MODERATE PAIN
PAINLEVEL_OUTOF10: 7
PAINLEVEL_OUTOF10: 5 - MODERATE PAIN
PAINLEVEL_OUTOF10: 7

## 2024-12-15 ASSESSMENT — PAIN - FUNCTIONAL ASSESSMENT
PAIN_FUNCTIONAL_ASSESSMENT: 0-10

## 2024-12-15 ASSESSMENT — COGNITIVE AND FUNCTIONAL STATUS - GENERAL
DAILY ACTIVITIY SCORE: 24
MOBILITY SCORE: 24
MOBILITY SCORE: 24
DAILY ACTIVITIY SCORE: 24

## 2024-12-15 ASSESSMENT — PAIN DESCRIPTION - LOCATION
LOCATION: ABDOMEN

## 2024-12-15 ASSESSMENT — PAIN SCALES - PAIN ASSESSMENT IN ADVANCED DEMENTIA (PAINAD): BREATHING: NORMAL

## 2024-12-15 NOTE — CARE PLAN
The patient's goals for the shift include      The clinical goals for the shift include pain control      Problem: Fall/Injury  Goal: Not fall by end of shift  Outcome: Progressing  Goal: Be free from injury by end of the shift  Outcome: Progressing  Goal: Verbalize understanding of personal risk factors for fall in the hospital  Outcome: Progressing  Goal: Verbalize understanding of risk factor reduction measures to prevent injury from fall in the home  Outcome: Progressing  Goal: Use assistive devices by end of the shift  Outcome: Progressing  Goal: Pace activities to prevent fatigue by end of the shift  Outcome: Progressing     Problem: Pain  Goal: Takes deep breaths with improved pain control throughout the shift  Outcome: Progressing  Goal: Turns in bed with improved pain control throughout the shift  Outcome: Progressing  Goal: Walks with improved pain control throughout the shift  Outcome: Progressing  Goal: Performs ADL's with improved pain control throughout shift  Outcome: Progressing  Goal: Free from opioid side effects throughout the shift  Outcome: Progressing  Goal: Free from acute confusion related to pain meds throughout the shift  Outcome: Progressing

## 2024-12-15 NOTE — CARE PLAN
The clinical goals for the shift include Patient will have adequate pain control throughout shift.      Problem: Pain  Goal: Takes deep breaths with improved pain control throughout the shift  Outcome: Progressing  Goal: Turns in bed with improved pain control throughout the shift  Outcome: Progressing  Goal: Walks with improved pain control throughout the shift  Outcome: Progressing  Goal: Performs ADL's with improved pain control throughout shift  Outcome: Progressing  Goal: Free from opioid side effects throughout the shift  Outcome: Progressing  Goal: Free from acute confusion related to pain meds throughout the shift  Outcome: Progressing

## 2024-12-15 NOTE — CARE PLAN
Transitional Care Coordinator Note: Patient discussed with medical team, per medical team patient is not medically ready. Discharge dispo: [facility]. ADOD Next week.  Jeanette Carpio RN  Transitional Care Coordinator

## 2024-12-15 NOTE — PROGRESS NOTES
General Surgery Progress Note  Subjective    Subjective:  No acute events overnight. Patient reports feeling better since NGT was placed. Pain is well controlled on current regimen. Endorses some throat pain from NGT. Denies reflux symptoms. Has not had gas from his ostomy bag or output.       Objective    Objective:    Vital signs:   Temp:  [36.3 °C (97.3 °F)-36.6 °C (97.9 °F)] 36.6 °C (97.9 °F)  Heart Rate:  [67-73] 67  Resp:  [16-18] 16  BP: (121-142)/(78-86) 125/85    Physical Exam:  General: no acute distress, non-toxic appearing  Cardiovascular: warm and well perfused  Respiratory: non-labored breathing on room air   GI: soft, non-distended, non-tender. Wound vac in place to 25mmHg suction with brown output in cannister. Colostomy with bag - pink and well perfused, no to minimal output in the bag.   Skin: warm and dry  Neuro: alert and conversant    I/O last 2 completed shifts:  In: 0 (0 mL/kg)   Out: 1400 (19.8 mL/kg) [Urine:300 (0.2 mL/kg/hr); Emesis/NG output:1100]  Weight: 70.7 kg      Labs Past 18 Hours:  No results found for this or any previous visit (from the past 18 hours).     Meds:    Current Facility-Administered Medications:     acetaminophen (Ofirmev) injection 1,000 mg, 1,000 mg, intravenous, q6h CIERRA, Geeta Gómez MD, Stopped at 12/15/24 0547    calcium carbonate (Tums) chewable tablet 500 mg, 500 mg, oral, Daily, Gold Moreno DO, 500 mg at 12/14/24 0859    diphenoxylate-atropine (Lomotil) 2.5-0.025 mg per tablet 1 tablet, 1 tablet, oral, 4x daily PRN, Katheryn Mart MD    HYDROmorphone (Dilaudid) injection 0.4 mg, 0.4 mg, intravenous, q4h PRN, Jesus Martinez MD, 0.4 mg at 12/15/24 0434    HYDROmorphone PF (Dilaudid) injection 0.2 mg, 0.2 mg, intravenous, q4h PRN, Jesus Martinez MD    lidocaine 2 % mucosal jelly (Uro-Jet) 1 Application, 1 Application, urethral, Once, Jesus Martinez MD    loperamide (Imodium A-D) liquid 4 mg, 4 mg, oral, 4x daily PRN, Katheryn Mart MD     melatonin tablet 5 mg, 5 mg, oral, Nightly PRN, Gold Moreno DO    methocarbamol (Robaxin) tablet 500 mg, 500 mg, oral, q6h PRN, Charisse Castaneda, APRN-CNP, 500 mg at 12/14/24 0859    naloxone (Narcan) injection 0.2 mg, 0.2 mg, intravenous, q5 min PRN, Geeta Gómez MD    [DISCONTINUED] ondansetron ODT (Zofran-ODT) disintegrating tablet 4 mg, 4 mg, oral, q8h PRN **OR** ondansetron (Zofran) injection 4 mg, 4 mg, intravenous, q4h PRN, Katheryn Mart MD    pantoprazole (ProtoNix) injection 40 mg, 40 mg, intravenous, BID, Katheryn Mart MD, 40 mg at 12/14/24 2022    phenoL (Chloraseptic) 1.4 % mouth/throat spray 1 spray, 1 spray, Mouth/Throat, q2h PRN, Jesus Martinez MD, 1 spray at 12/15/24 0014    psyllium (Metamucil) 3.4 gram packet 4 packet, 4 packet, oral, Daily, Katheryn Mart MD, 4 packet at 12/12/24 1703    sodium chloride 0.9% infusion, 100 mL/hr, intravenous, Continuous, Katheryn Mart MD, Last Rate: 100 mL/hr at 12/14/24 0906, 100 mL/hr at 12/14/24 0906    traZODone (Desyrel) tablet 100 mg, 100 mg, oral, Nightly PRN, Katheryn Mart MD, 100 mg at 12/13/24 2243     Imaging:  XR abdomen 1 view    Result Date: 12/14/2024  STUDY: XR ABDOMEN 1 VIEW;  12/14/2024 3:51 pm   INDICATION: Signs/Symptoms:Ngt.     COMPARISON: Abdominal radiograph 06/11/2024.   ACCESSION NUMBER(S): [id number]   ORDERING CLINICIAN: ROBERT ALEJO   FINDINGS: Enteric tube overlies the esophagus and courses under the left hemidiaphragm with the tip projecting over the gastric body. Stent overlying the lower abdomen/pelvis. Contrast visualized within the urinary bladder.   Multiple loops of air-filled prominent small and large bowel.   Visualized lungs are clear.   Osseous structures demonstrate no acute bony changes.       1.  Enteric tube overlies the esophagus and courses under the left hemidiaphragm with the tip projecting over the gastric body.   I personally reviewed the images/study and resident's  interpretation and I agree with the findings as stated by Aleja Albert MD (resident radiologist). This study was analyzed and interpreted at [facility], [place], [place].   MACRO: None     Dictation workstation:   [id number]    CT abdomen pelvis w IV contrast    Result Date: 12/14/2024  Interpreted By:  Ken Hughes and Hofer Lindsay STUDY: CT ABDOMEN PELVIS W IV CONTRAST;  12/14/2024 11:27 am   INDICATION: Signs/Symptoms:s/p enteroscopy and stent placement for EAF, concern for obstruction.   COMPARISON: CT abdomen and pelvis 08/26/2024.   ACCESSION NUMBER(S): [id number]   ORDERING CLINICIAN: ROBERT ALEJO   TECHNIQUE: Contiguous axial images of the abdomen and pelvis were obtained after the intravenous administration of iodinated contrast. Coronal and sagittal reformatted images were reconstructed from the axial data. 90 mL of IV Omnipaque 350 were administered. 500 mL oral contrast were administered.   FINDINGS: LOWER CHEST: No focal consolidation, pleural effusion, or pneumothorax. Dependent bibasilar atelectasis. Calcified granuloma in the inferior segment of the left upper lung. The heart is normal in size. The visualized distal esophagus is dilated and fluid-filled.     ABDOMEN/PELVIS:   ABDOMINAL WALL: Left lower quadrant end ostomy. Similar appearance of open ventral abdominal wall surgical defect measuring up to 4.5 cm (series 201, image 76).   LIVER: 1.1 cm hypodensity within the left hepatic lobe (series 201, image 33), most likely to represent hepatic cysts versus hemangioma. Multiple other hypodensities throughout the liver, too small to characterize, but statistically likely to represent cysts or hemangiomas. Periportal edema is noted..   BILE DUCTS: No significant intrahepatic or extrahepatic dilatation.   GALLBLADDER: No significant abnormality.   PANCREAS: No significant abnormality.   SPLEEN: Calcifications throughout the spleen, likely sequelae of prior  granulomatous disease.   ADRENALS: No significant abnormality.   KIDNEYS, URETERS, BLADDER: No significant abnormality.   REPRODUCTIVE ORGANS: No significant abnormality.   VESSELS: No significant abnormality.   RETROPERITONEUM/LYMPH NODES: Unchanged slightly prominent retroperitoneal lymph nodes, likely reactive.. No acute retroperitoneal abnormality.   BOWEL/MESENTERY/PERITONEUM: The distal esophagus is dilated and fluid-filled. The stomach is significantly dilated and fluid-filled. There are multiple loops of dilated fluid-filled small bowel 3.9 cm continuing to the level of the intra-abdominal stent (series 203, image 33). The bowel is decompressed distal to the level of the intra-abdominal stent. There is herniation of loops of bowel and mesentery into the distal end of the intra-abdominal stent (series 201, image 85).   Small amount of mesenteric ascites and fat stranding, likely reactive. No loculated fluid collections. No free intra-abdominal air.     MUSCULOSKELETAL: No acute osseous abnormality. No suspicious osseous lesion.       1. Diffuse dilation of the esophagus, stomach and small bowel with abrupt transition at the level of the debris-filled stent presumably across the patient's enteroenteric fistula with possible, with compression of the distal small and large bowel loops. Additionally there is a suggestion of herniation/intussusception of a short segment small bowel loop and mesenteric fat into the distal end of the stent. Findings compatible with high-grade small-bowel obstruction at the level of the intra-abdominal stent. No evidence of abnormal bowel enhancement, bowel wall thickening or pneumatosis. 2. Postsurgical changes of sigmoidectomy, Margarita's procedure, and an end ileostomy with open ventral wall surgical defect. 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 3. Additional chronic findings as detailed above.     I personally  reviewed the images/study and resident's interpretation and I agree with the findings as stated by Aleja Albert MD (resident radiologist). This study was analyzed and interpreted at [facility], [place], [place].   MACRO: Critical Finding:  See findings. Notification was initiated on 12/14/2024 at 2:58 pm by  Ken Hughes.  (**-YCF-**) Instructions:   Signed by: Ken Hughes 12/14/2024 3:20 PM Dictation workstation:   [id number]     I have reviewed the imaging above as it pertains to the patient's surgical concerns and agree with the radiologist's interpretation.    Medications reviewed.  Vital signs reviewed.  Labs reviewed.         Assessment/Plan    Assessment and Plan:  Bereket Em is a 59M with PMH perforated diverticulitis s/p Margarita's c/b EAF. Patient now s/p enteroscopy with covered stent placement across fistula and wound vac placement on 12/12 with Dr Cage. On 12/14, he had feculent output from VAC, no stoma output and worsening nausea, reflux, and hiccupping. CT AP showed diffuse dilation with compression of the distal small and large bowel loops, high grade small bowel obstruction. The midline vac was taken down and the R stent was pulled back and inserted into the left and the vac was replaced, and an NGT was placed as well.     PLAN:   Neurology: pain   - Scheduled tylenol   - Oxycodone 5/10 PRN moderate-severe pain   - Robaxin PRN   - Continue home trazodone, melatonin PRN       Cardiovascular:  -Vital signs every 8 hours     Pulmonology:  -IS x10 every hour   -Maintain SpO2 >92 % RA      GI: enterocutaneous fistula s/p stent/wound vac   - NPO with ice chips  - mIVFs @ 100  - 1L NS bolus  - NGT to LIWS  - IV zofran PRN nausea   - Wound care consulted for wound vac/ostomy supplies/support   - IV PPI BID, tums     :  - Strict I&O   - Trend RFP and Magnesium, replace electrolytes as needed to maintain K >4, Mag >2     ID: afebrile   -Trend daily temperatures  and WBC     Heme:  -Monitor for signs of acute blood loss  -Trend CBC     DVT Prophylaxis:  -SCDs, ambulation/OOB      Dispo:  - RNF  - Referral placed to resume nursing home care, home wound VAC at bedside    Patient discussed with Dr. Niles Gómez MD  PGY-1   [facility]  Service Pager: [phone]

## 2024-12-16 LAB
ALBUMIN SERPL BCP-MCNC: 3.2 G/DL (ref 3.4–5)
ANION GAP SERPL CALC-SCNC: 20 MMOL/L (ref 10–20)
BUN SERPL-MCNC: 17 MG/DL (ref 6–23)
CALCIUM SERPL-MCNC: 8.7 MG/DL (ref 8.6–10.6)
CHLORIDE SERPL-SCNC: 99 MMOL/L (ref 98–107)
CO2 SERPL-SCNC: 23 MMOL/L (ref 21–32)
CREAT SERPL-MCNC: 0.66 MG/DL (ref 0.5–1.3)
EGFRCR SERPLBLD CKD-EPI 2021: >90 ML/MIN/1.73M*2
GLUCOSE SERPL-MCNC: 69 MG/DL (ref 74–99)
MAGNESIUM SERPL-MCNC: 1.82 MG/DL (ref 1.6–2.4)
PHOSPHATE SERPL-MCNC: 2.5 MG/DL (ref 2.5–4.9)
POTASSIUM SERPL-SCNC: 3.4 MMOL/L (ref 3.5–5.3)
SODIUM SERPL-SCNC: 139 MMOL/L (ref 136–145)

## 2024-12-16 PROCEDURE — 1100000001 HC PRIVATE ROOM DAILY

## 2024-12-16 PROCEDURE — 2500000001 HC RX 250 WO HCPCS SELF ADMINISTERED DRUGS (ALT 637 FOR MEDICARE OP): Performed by: STUDENT IN AN ORGANIZED HEALTH CARE EDUCATION/TRAINING PROGRAM

## 2024-12-16 PROCEDURE — 2500000004 HC RX 250 GENERAL PHARMACY W/ HCPCS (ALT 636 FOR OP/ED): Performed by: STUDENT IN AN ORGANIZED HEALTH CARE EDUCATION/TRAINING PROGRAM

## 2024-12-16 PROCEDURE — 2500000001 HC RX 250 WO HCPCS SELF ADMINISTERED DRUGS (ALT 637 FOR MEDICARE OP)

## 2024-12-16 PROCEDURE — 2500000004 HC RX 250 GENERAL PHARMACY W/ HCPCS (ALT 636 FOR OP/ED)

## 2024-12-16 PROCEDURE — 99232 SBSQ HOSP IP/OBS MODERATE 35: CPT | Performed by: NURSE PRACTITIONER

## 2024-12-16 PROCEDURE — 83735 ASSAY OF MAGNESIUM: CPT | Performed by: NURSE PRACTITIONER

## 2024-12-16 PROCEDURE — 80069 RENAL FUNCTION PANEL: CPT | Performed by: NURSE PRACTITIONER

## 2024-12-16 PROCEDURE — 2500000004 HC RX 250 GENERAL PHARMACY W/ HCPCS (ALT 636 FOR OP/ED): Performed by: NURSE PRACTITIONER

## 2024-12-16 RX ORDER — METHOCARBAMOL 100 MG/ML
1000 INJECTION, SOLUTION INTRAMUSCULAR; INTRAVENOUS EVERY 8 HOURS PRN
Status: DISCONTINUED | OUTPATIENT
Start: 2024-12-16 | End: 2024-12-17 | Stop reason: HOSPADM

## 2024-12-16 RX ORDER — WATER
500 LIQUID (ML) MISCELLANEOUS
Status: DISCONTINUED | OUTPATIENT
Start: 2024-12-17 | End: 2024-12-17 | Stop reason: HOSPADM

## 2024-12-16 RX ORDER — POTASSIUM CHLORIDE 14.9 MG/ML
20 INJECTION INTRAVENOUS
Status: COMPLETED | OUTPATIENT
Start: 2024-12-16 | End: 2024-12-16

## 2024-12-16 RX ORDER — OXYCODONE HYDROCHLORIDE 5 MG/1
5 TABLET ORAL EVERY 6 HOURS PRN
Status: DISCONTINUED | OUTPATIENT
Start: 2024-12-16 | End: 2024-12-17 | Stop reason: HOSPADM

## 2024-12-16 RX ORDER — ACETAMINOPHEN 10 MG/ML
1000 INJECTION, SOLUTION INTRAVENOUS EVERY 6 HOURS SCHEDULED
Status: DISCONTINUED | OUTPATIENT
Start: 2024-12-16 | End: 2024-12-16

## 2024-12-16 RX ORDER — MAGNESIUM SULFATE HEPTAHYDRATE 40 MG/ML
2 INJECTION, SOLUTION INTRAVENOUS ONCE
Status: COMPLETED | OUTPATIENT
Start: 2024-12-16 | End: 2024-12-16

## 2024-12-16 RX ORDER — SODIUM CHLORIDE AND POTASSIUM CHLORIDE 150; 450 MG/100ML; MG/100ML
100 INJECTION, SOLUTION INTRAVENOUS CONTINUOUS
Status: DISCONTINUED | OUTPATIENT
Start: 2024-12-16 | End: 2024-12-17

## 2024-12-16 RX ORDER — ACETAMINOPHEN 325 MG/1
650 TABLET ORAL EVERY 4 HOURS PRN
Status: DISCONTINUED | OUTPATIENT
Start: 2024-12-16 | End: 2024-12-17 | Stop reason: HOSPADM

## 2024-12-16 ASSESSMENT — COGNITIVE AND FUNCTIONAL STATUS - GENERAL
MOBILITY SCORE: 24
DAILY ACTIVITIY SCORE: 24
MOBILITY SCORE: 24
MOBILITY SCORE: 24
DAILY ACTIVITIY SCORE: 24
MOBILITY SCORE: 24
DAILY ACTIVITIY SCORE: 24
DAILY ACTIVITIY SCORE: 24
MOBILITY SCORE: 24
MOBILITY SCORE: 24
DAILY ACTIVITIY SCORE: 24
MOBILITY SCORE: 24
DAILY ACTIVITIY SCORE: 24
MOBILITY SCORE: 24
DAILY ACTIVITIY SCORE: 24
MOBILITY SCORE: 24
DAILY ACTIVITIY SCORE: 24
DAILY ACTIVITIY SCORE: 24

## 2024-12-16 ASSESSMENT — PAIN SCALES - GENERAL
PAINLEVEL_OUTOF10: 7
PAINLEVEL_OUTOF10: 6
PAINLEVEL_OUTOF10: 6
PAINLEVEL_OUTOF10: 7
PAINLEVEL_OUTOF10: 3
PAINLEVEL_OUTOF10: 6
PAINLEVEL_OUTOF10: 4
PAINLEVEL_OUTOF10: 7

## 2024-12-16 ASSESSMENT — PAIN - FUNCTIONAL ASSESSMENT
PAIN_FUNCTIONAL_ASSESSMENT: 0-10

## 2024-12-16 ASSESSMENT — PAIN DESCRIPTION - DESCRIPTORS
DESCRIPTORS: ACHING;DISCOMFORT
DESCRIPTORS: ACHING;DISCOMFORT

## 2024-12-16 ASSESSMENT — PAIN DESCRIPTION - ORIENTATION: ORIENTATION: MID

## 2024-12-16 ASSESSMENT — PAIN DESCRIPTION - LOCATION: LOCATION: ABDOMEN

## 2024-12-16 NOTE — CARE PLAN
The clinical goals for the shift include Patient will have adequately controlled pain throughout shift.      Problem: Pain  Goal: Takes deep breaths with improved pain control throughout the shift  Outcome: Progressing  Goal: Turns in bed with improved pain control throughout the shift  Outcome: Progressing  Goal: Walks with improved pain control throughout the shift  Outcome: Progressing  Goal: Performs ADL's with improved pain control throughout shift  Outcome: Progressing  Goal: Free from opioid side effects throughout the shift  Outcome: Progressing  Goal: Free from acute confusion related to pain meds throughout the shift  Outcome: Progressing

## 2024-12-16 NOTE — CARE PLAN
The patient's goals for the shift include  feel better    The clinical goals for the shift include pt will remain safe and free from falls throughout shift      Problem: Fall/Injury  Goal: Not fall by end of shift  Outcome: Progressing  Goal: Be free from injury by end of the shift  Outcome: Progressing  Goal: Verbalize understanding of personal risk factors for fall in the hospital  Outcome: Progressing  Goal: Verbalize understanding of risk factor reduction measures to prevent injury from fall in the home  Outcome: Progressing  Goal: Use assistive devices by end of the shift  Outcome: Progressing  Goal: Pace activities to prevent fatigue by end of the shift  Outcome: Progressing     Problem: Pain  Goal: Takes deep breaths with improved pain control throughout the shift  Outcome: Progressing  Goal: Turns in bed with improved pain control throughout the shift  Outcome: Progressing  Goal: Walks with improved pain control throughout the shift  Outcome: Progressing  Goal: Performs ADL's with improved pain control throughout shift  Outcome: Progressing  Goal: Free from opioid side effects throughout the shift  Outcome: Progressing  Goal: Free from acute confusion related to pain meds throughout the shift  Outcome: Progressing

## 2024-12-16 NOTE — HOSPITAL COURSE
59 year old male with PMH perforated diverticultis s/p Margarita's procedure complicated by formation of enterocutaneous fistula. He presented for enteroscopy with Dr. Cage on 12/12, during which 2 covered stents were placed to cover the fistula site followed by wound vac placement over his midline. On 12/14 he began having feculent output into his vac, no stool/gas via ostomy, worsening nausea, reflex, and hiccuping. CT AP was completed which demonstrated small bowel dilation with compression of distal small and large bowel loops, concerning for a small bowel obstruction. The wound vac was taken down and the right stent was pulled back with wound vac replacement and NGT placement. He continued to be clinically obstructed with high NGT output and no output via ostomy. The stent and wound vac were removed on 12/16 relieving the obstruction and a wound manager was replaced over fistula site. NGT was removed after successful gravity trial and he tolerated a soft diet well. He was discharged home with resumed home health care with plan for future outpatient surgical follow up with Dr Perry.

## 2024-12-16 NOTE — PROGRESS NOTES
General Surgery Progress Note  Subjective    Subjective:  Reports pain is controlled with pain medication. Feels better with NGT in place. Denies nausea. Voiding although urine is darker in color.     Objective    Objective:    Vital signs:   Temp:  [36.5 °C (97.7 °F)-36.6 °C (97.9 °F)] 36.6 °C (97.9 °F)  Heart Rate:  [55-57] 56  Resp:  [17-18] 17  BP: (122-133)/(71-74) 133/72    Physical Exam:  General: no acute distress, non-toxic appearing  Cardiovascular: warm and well perfused  HEENT: NGT in place to LIWS with dark, brown output in cannister   Respiratory: non-labored breathing on room air   GI: soft, non-distended, non-tender. Wound vac in place to suction with brown output in cannister. Colostomy with bag - pink and well perfused, no stool/gas in bag   Skin: warm and dry  Neuro: alert and conversant    I/O last 2 completed shifts:  In: 1541.7 (21.8 mL/kg) [I.V.:1000 (14.2 mL/kg); IV Piggyback:541.7]  Out: 3900 (55.2 mL/kg) [Urine:700 (0.4 mL/kg/hr); Emesis/NG output:1700; Drains:1500]  Weight: 70.7 kg      Labs Past 18 Hours:  No results found for this or any previous visit (from the past 18 hours).     Meds:    Current Facility-Administered Medications:     acetaminophen (Ofirmev) injection 1,000 mg, 1,000 mg, intravenous, q6h CIERRA, Santosh Duran MD, Stopped at 12/16/24 0604    diphenoxylate-atropine (Lomotil) 2.5-0.025 mg per tablet 1 tablet, 1 tablet, oral, 4x daily PRN, Katheryn Mart MD    HYDROmorphone (Dilaudid) injection 0.4 mg, 0.4 mg, intravenous, q4h PRN, Jesus Martinez MD, 0.4 mg at 12/16/24 0928    HYDROmorphone PF (Dilaudid) injection 0.2 mg, 0.2 mg, intravenous, q4h PRN, Jesus Martinez MD    lactated Ringer's bolus 1,000 mL, 1,000 mL, intravenous, Once, Charisse Castaneda, APRN-CNP, Last Rate: 500 mL/hr at 12/16/24 0807, 1,000 mL at 12/16/24 0807    loperamide (Imodium A-D) liquid 4 mg, 4 mg, oral, 4x daily PRN, Katheryn Mart MD    melatonin tablet 5 mg, 5 mg, oral, Nightly PRN, Gold  DO Josh    methocarbamol (Robaxin) injection 1,000 mg, 1,000 mg, intravenous, q8h PRN, Charisse Castaneda, APRN-CNP, 1,000 mg at 12/16/24 0845    naloxone (Narcan) injection 0.2 mg, 0.2 mg, intravenous, q5 min PRN, Geeta Gómez MD    [DISCONTINUED] ondansetron ODT (Zofran-ODT) disintegrating tablet 4 mg, 4 mg, oral, q8h PRN **OR** ondansetron (Zofran) injection 4 mg, 4 mg, intravenous, q4h PRN, Katheryn Mart MD    pantoprazole (ProtoNix) injection 40 mg, 40 mg, intravenous, BID, Katheryn Mart MD, 40 mg at 12/16/24 0807    phenoL (Chloraseptic) 1.4 % mouth/throat spray 1 spray, 1 spray, Mouth/Throat, q2h PRN, Jesus Martinez MD, 1 spray at 12/15/24 0014    sodium chloride 0.45 % with KCl 20 mEq/L infusion, 100 mL/hr, intravenous, Continuous, Kayla Galarza MD, Last Rate: 100 mL/hr at 12/16/24 0625, 100 mL/hr at 12/16/24 0625    traZODone (Desyrel) tablet 100 mg, 100 mg, oral, Nightly PRN, Katheryn Mart MD, 100 mg at 12/13/24 2243     Imaging:  XR abdomen 1 view    Result Date: 12/15/2024  Interpreted By:  Nando Mack,  and Roosevelt Masterson STUDY: XR ABDOMEN 1 VIEW;  12/14/2024 3:51 pm   INDICATION: Signs/Symptoms:Ngt.     COMPARISON: Abdominal radiograph 06/11/2024.   ACCESSION NUMBER(S): [id number]   ORDERING CLINICIAN: ROBERT ALEJO   FINDINGS: Enteric tube overlies the esophagus and courses under the left hemidiaphragm with the tip projecting over the gastric body. Stent overlying the lower abdomen/pelvis. Contrast visualized within the urinary bladder.   Multiple loops of air-filled prominent small and large bowel.   Visualized lungs are clear.   Osseous structures demonstrate no acute bony changes.       1.  Enteric tube overlies the esophagus and courses under the left hemidiaphragm with the tip projecting over the gastric body.   I personally reviewed the images/study and resident's interpretation and I agree with the findings as stated by Aleja Albert MD (resident radiologist). This  study was analyzed and interpreted at [place], [place].   MACRO: None   Signed by: Nando Mack 12/15/2024 7:45 AM Dictation workstation:   [id number]    CT abdomen pelvis w IV contrast    Result Date: 12/14/2024  Interpreted By:  Ken Hughes and Hofer Lindsay STUDY: CT ABDOMEN PELVIS W IV CONTRAST;  12/14/2024 11:27 am   INDICATION: Signs/Symptoms:s/p enteroscopy and stent placement for EAF, concern for obstruction.   COMPARISON: CT abdomen and pelvis 08/26/2024.   ACCESSION NUMBER(S): [id number]   ORDERING CLINICIAN: ROBERT ALEJO   TECHNIQUE: Contiguous axial images of the abdomen and pelvis were obtained after the intravenous administration of iodinated contrast. Coronal and sagittal reformatted images were reconstructed from the axial data. 90 mL of IV Omnipaque 350 were administered. 500 mL oral contrast were administered.   FINDINGS: LOWER CHEST: No focal consolidation, pleural effusion, or pneumothorax. Dependent bibasilar atelectasis. Calcified granuloma in the inferior segment of the left upper lung. The heart is normal in size. The visualized distal esophagus is dilated and fluid-filled.     ABDOMEN/PELVIS:   ABDOMINAL WALL: Left lower quadrant end ostomy. Similar appearance of open ventral abdominal wall surgical defect measuring up to 4.5 cm (series 201, image 76).   LIVER: 1.1 cm hypodensity within the left hepatic lobe (series 201, image 33), most likely to represent hepatic cysts versus hemangioma. Multiple other hypodensities throughout the liver, too small to characterize, but statistically likely to represent cysts or hemangiomas. Periportal edema is noted..   BILE DUCTS: No significant intrahepatic or extrahepatic dilatation.   GALLBLADDER: No significant abnormality.   PANCREAS: No significant abnormality.   SPLEEN: Calcifications throughout the spleen, likely sequelae of prior granulomatous disease.   ADRENALS: No significant abnormality.    KIDNEYS, URETERS, BLADDER: No significant abnormality.   REPRODUCTIVE ORGANS: No significant abnormality.   VESSELS: No significant abnormality.   RETROPERITONEUM/LYMPH NODES: Unchanged slightly prominent retroperitoneal lymph nodes, likely reactive.. No acute retroperitoneal abnormality.   BOWEL/MESENTERY/PERITONEUM: The distal esophagus is dilated and fluid-filled. The stomach is significantly dilated and fluid-filled. There are multiple loops of dilated fluid-filled small bowel 3.9 cm continuing to the level of the intra-abdominal stent (series 203, image 33). The bowel is decompressed distal to the level of the intra-abdominal stent. There is herniation of loops of bowel and mesentery into the distal end of the intra-abdominal stent (series 201, image 85).   Small amount of mesenteric ascites and fat stranding, likely reactive. No loculated fluid collections. No free intra-abdominal air.     MUSCULOSKELETAL: No acute osseous abnormality. No suspicious osseous lesion.       1. Diffuse dilation of the esophagus, stomach and small bowel with abrupt transition at the level of the debris-filled stent presumably across the patient's enteroenteric fistula with possible, with compression of the distal small and large bowel loops. Additionally there is a suggestion of herniation/intussusception of a short segment small bowel loop and mesenteric fat into the distal end of the stent. Findings compatible with high-grade small-bowel obstruction at the level of the intra-abdominal stent. No evidence of abnormal bowel enhancement, bowel wall thickening or pneumatosis. 2. Postsurgical changes of sigmoidectomy, Margarita's procedure, and an end ileostomy with open ventral wall surgical defect. 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 3. Additional chronic findings as detailed above.     I personally reviewed the images/study and resident's interpretation and I  agree with the findings as stated by Aleja Albert MD (resident radiologist). This study was analyzed and interpreted at [facility], [place], [place].   MACRO: Critical Finding:  See findings. Notification was initiated on 12/14/2024 at 2:58 pm by  Ken Hughes.  (**-YCF-**) Instructions:   Signed by: Ken Hughes 12/14/2024 3:20 PM Dictation workstation:   [id number]     I have reviewed the imaging above as it pertains to the patient's surgical concerns and agree with the radiologist's interpretation.    Medications reviewed.  Vital signs reviewed.  Labs reviewed.         Assessment/Plan    Assessment and Plan:  Bereket Em is a 59M with PMH perforated diverticulitis s/p Margarita's c/b EAF. He is s/p enteroscopy with covered stent placement across fistula and wound vac placement on 12/12 with Dr Cage. On 12/14, he had feculent output from VAC, no stoma output and worsening nausea, reflux, and hiccupping. CT AP showed diffuse dilation with compression of the distal small and large bowel loops, high grade small bowel obstruction. The midline vac was taken down and the R stent was pulled back and inserted into the left and the vac was replaced, and an NGT was placed as well.  12/16: NGT with 1700mL/24 hours without stool/gas via ostomy. Wound vac and stent removed at bedside. Wound manager applied to fistula site.      PLAN:   Neurology: pain   - Scheduled tylenol   - IV Dilaudid 0.2/0.4mg PRN moderate-severe pain   - IV robaxin every 8 hours PRN   - Continue home trazodone, melatonin PRN       Cardiovascular:  -Vital signs every 8 hours     Pulmonology:  -IS x10 every hour   -Maintain SpO2 >92 % RA      GI: enterocutaneous fistula   - NPO with ice chips  - Wound vac/stent removed 12/16  - 4 hour NGT gravity trial   - IV zofran PRN nausea   - Wound care consulted for wound manager/ostomy supplies, dressing, and support   - IV PPI BID     :  - Strict I&O   - Follow up AM RFP,  Mag replace electrolytes as needed to maintain K >4, Mag >2.   - 1/2 NS with 20K+ @ 100mL/hr + 1 L LR bolus today      ID: afebrile   -Trend daily temperatures      Heme:  -Monitor for signs of acute blood loss  -CBC as needed      DVT Prophylaxis:  -SCDs, ambulation/OOB      Dispo:  - RNF  -Plan for resumed nursing home care for wound care/ostomy care once medically ready     Patient discussed with Dr. Niles PALMA, CNP  [facility]  [id number]

## 2024-12-16 NOTE — PROGRESS NOTES
Bereket Em is a 59 y.o. male on day 4 of admission presenting with Enterocutaneous fistula.      Transitional Care Coordination Progress Note:  Patient discussed during interdisciplinary rounds.      Plan per Medical/Surgical team:   Pt previously used [facility]. PT will be going home with wound, picc, iv atb.    Home Care choice for home going needs, [facility].  Pt Needs: RN/PT/OT.   Wound Vac did not work for pt's wound. Returned to [facility].      Payer: Alycia     Status: INP     Discharge disposition: [facility], [facility] 1     Potential Barriers: None     ADOD:  12/17     This TCC will continue to follow for home going needs and safe DC plan.      RUSH ANDERSON

## 2024-12-16 NOTE — CONSULTS
Wound Care Consult     Visit Date: 12/16/2024      Patient Name: Bereket Em         MRN: [id number]           YOB: 1965     Reason for Consult: wound manager pouch         Wound History:  59M with PMH perforated diverticulitis s/p Margarita's c/b EAF. Patient now s/p enteroscopy with covered stent placement across fistula and wound vac placement on 12/12 with Dr Cage      Pertinent Labs:   Albumin   Date Value Ref Range Status   12/15/2024 3.4 3.4 - 5.0 g/dL Final     ALBUMIN (MG/L) IN URINE   Date Value Ref Range Status   05/06/2021 <7.0 Not Established mg/L Final       Wound Assessment:  Wound 06/07/24 Incision Abdomen Medial;Upper (Active)       Wound 08/01/24 Abdomen Left;Lower;Medial (Active)   Wound Image   12/16/24 0927   Site Assessment Red;Clean 12/16/24 0927   Usha-Wound Assessment Clean;Dry 12/16/24 0927   Shape oval 12/16/24 0927   State of Healing Non-healing 12/16/24 0935   Margins Well-defined edges 12/16/24 0935   Drainage Description Effulent/Bilious;Brown 12/16/24 0935   Drainage Amount Large 12/16/24 0935   Dressing Changed New 12/16/24 0935   Dressing Status Dry;Clean 12/16/24 0935       Wound Team Summary Assessment: The wound care team came to bedside to assess the patient atmospheric enterocutaneous fistula. The patient wound vac and stent were removed by the surgical team.  The fistula and periwound skin was cleansed with vashe wound cleanser and gently pat dry. The periwound skin skin was prepped with 3M cavilon advanced. A 4 inch barrier ring was applied to the periwound skin and 2 rui barrier rings were applied as well to strengthen the seal. A rui wound manager pouch was cut to size and applied over the fistula. Hot packs were applied to the wound manager to help mold the pouch to the skin. Finally, the drainage collection bag was attached to the wound manager. The patient also has an ostomy that is currently covered with an ostomy pouch cover.      Wound Team  Plan: As needed for wound manager pouch changes      Luis A Gonzalez RN CWON  12/16/2024  9:49 AM

## 2024-12-17 ENCOUNTER — HOME INFUSION (OUTPATIENT)
Dept: INFUSION THERAPY | Age: 59
End: 2024-12-17
Payer: COMMERCIAL

## 2024-12-17 ENCOUNTER — DOCUMENTATION (OUTPATIENT)
Dept: HOME HEALTH SERVICES | Facility: HOME HEALTH | Age: 59
End: 2024-12-17
Payer: COMMERCIAL

## 2024-12-17 VITALS
DIASTOLIC BLOOD PRESSURE: 75 MMHG | OXYGEN SATURATION: 96 % | BODY MASS INDEX: 23.61 KG/M2 | TEMPERATURE: 98.4 F | HEART RATE: 60 BPM | WEIGHT: 155.8 LBS | RESPIRATION RATE: 18 BRPM | SYSTOLIC BLOOD PRESSURE: 122 MMHG | HEIGHT: 68 IN

## 2024-12-17 LAB
ALBUMIN SERPL BCP-MCNC: 3.4 G/DL (ref 3.4–5)
ANION GAP SERPL CALC-SCNC: 16 MMOL/L (ref 10–20)
BUN SERPL-MCNC: 10 MG/DL (ref 6–23)
CALCIUM SERPL-MCNC: 8.9 MG/DL (ref 8.6–10.6)
CHLORIDE SERPL-SCNC: 100 MMOL/L (ref 98–107)
CO2 SERPL-SCNC: 23 MMOL/L (ref 21–32)
CREAT SERPL-MCNC: 0.65 MG/DL (ref 0.5–1.3)
EGFRCR SERPLBLD CKD-EPI 2021: >90 ML/MIN/1.73M*2
GLUCOSE SERPL-MCNC: 91 MG/DL (ref 74–99)
MAGNESIUM SERPL-MCNC: 2.03 MG/DL (ref 1.6–2.4)
PHOSPHATE SERPL-MCNC: 1.8 MG/DL (ref 2.5–4.9)
POTASSIUM SERPL-SCNC: 3.9 MMOL/L (ref 3.5–5.3)
SODIUM SERPL-SCNC: 135 MMOL/L (ref 136–145)

## 2024-12-17 PROCEDURE — 2500000004 HC RX 250 GENERAL PHARMACY W/ HCPCS (ALT 636 FOR OP/ED)

## 2024-12-17 PROCEDURE — 2500000004 HC RX 250 GENERAL PHARMACY W/ HCPCS (ALT 636 FOR OP/ED): Performed by: STUDENT IN AN ORGANIZED HEALTH CARE EDUCATION/TRAINING PROGRAM

## 2024-12-17 PROCEDURE — 83735 ASSAY OF MAGNESIUM: CPT | Performed by: NURSE PRACTITIONER

## 2024-12-17 PROCEDURE — 80069 RENAL FUNCTION PANEL: CPT | Performed by: NURSE PRACTITIONER

## 2024-12-17 PROCEDURE — 2500000001 HC RX 250 WO HCPCS SELF ADMINISTERED DRUGS (ALT 637 FOR MEDICARE OP): Performed by: NURSE PRACTITIONER

## 2024-12-17 PROCEDURE — 99239 HOSP IP/OBS DSCHRG MGMT >30: CPT | Performed by: NURSE PRACTITIONER

## 2024-12-17 RX ORDER — SODIUM,POTASSIUM PHOSPHATES 280-250MG
1 POWDER IN PACKET (EA) ORAL 4 TIMES DAILY
Status: COMPLETED | OUTPATIENT
Start: 2024-12-17 | End: 2024-12-17

## 2024-12-17 RX ORDER — PANTOPRAZOLE SODIUM 40 MG/1
40 TABLET, DELAYED RELEASE ORAL EVERY 12 HOURS
Qty: 60 TABLET | Refills: 0 | Status: SHIPPED | OUTPATIENT
Start: 2024-12-17 | End: 2025-01-16

## 2024-12-17 RX ORDER — DOCUSATE SODIUM 100 MG
500 CAPSULE ORAL 2 TIMES DAILY
Qty: 1000 ML | Refills: 30 | Status: SHIPPED | OUTPATIENT
Start: 2024-12-17

## 2024-12-17 ASSESSMENT — COGNITIVE AND FUNCTIONAL STATUS - GENERAL
DAILY ACTIVITIY SCORE: 24
MOBILITY SCORE: 24
DAILY ACTIVITIY SCORE: 24
MOBILITY SCORE: 24
DAILY ACTIVITIY SCORE: 24
MOBILITY SCORE: 24
MOBILITY SCORE: 24
DAILY ACTIVITIY SCORE: 24
DAILY ACTIVITIY SCORE: 24
MOBILITY SCORE: 24

## 2024-12-17 ASSESSMENT — PAIN SCALES - GENERAL: PAINLEVEL_OUTOF10: 0 - NO PAIN

## 2024-12-17 ASSESSMENT — PAIN - FUNCTIONAL ASSESSMENT: PAIN_FUNCTIONAL_ASSESSMENT: 0-10

## 2024-12-17 NOTE — HH CARE COORDINATION
Home Care received a Referral to Resume Care for Infusion, Nursing, Physical Therapy, and Occupational Therapy. We have processed the referral for a Resumption of Care on 12/18.     If you have any questions or concerns, please feel free to contact us at [phone]. Follow the prompts, enter your five digit zip code, and you will be directed to your care team on [facility].

## 2024-12-17 NOTE — CARE PLAN
Problem: Fall/Injury  Goal: Not fall by end of shift  Outcome: Progressing  Goal: Be free from injury by end of the shift  Outcome: Progressing  Goal: Verbalize understanding of personal risk factors for fall in the hospital  Outcome: Progressing  Goal: Verbalize understanding of risk factor reduction measures to prevent injury from fall in the home  Outcome: Progressing  Goal: Use assistive devices by end of the shift  Outcome: Progressing  Goal: Pace activities to prevent fatigue by end of the shift  Outcome: Progressing   The patient's goals for the shift include      The clinical goals for the shift include remain without injury

## 2024-12-17 NOTE — DISCHARGE SUMMARY
Discharge Diagnosis  Enterocutaneous fistula    Issues Requiring Follow-Up  - Resumed home health/wound care  - Outpatient follow up with Dr Perry       Hospital Course  59 year old male with PMH perforated diverticultis s/p Margarita's procedure complicated by formation of enterocutaneous fistula. He presented for enteroscopy with Dr. Cage on 12/12, during which 2 covered stents were placed to cover the fistula site followed by wound vac placement over his midline. On 12/14 he began having feculent output into his vac, no stool/gas via ostomy, worsening nausea, reflex, and hiccuping. CT AP was completed which demonstrated small bowel dilation with compression of distal small and large bowel loops, concerning for a small bowel obstruction. The wound vac was taken down and the right stent was pulled back with wound vac replacement and NGT placement. He continued to be clinically obstructed with high NGT output and no output via ostomy. The stent and wound vac were removed on 12/16 relieving the obstruction and a wound manager was replaced over fistula site. NGT was removed after successful gravity trial and he tolerated a soft diet well. He was discharged home with resumed home health care with plan for future outpatient surgical follow up with Dr Perry.      Pertinent Physical Exam At Time of Discharge  Physical Exam  Constitutional:       General: He is not in acute distress.  Cardiovascular:      Rate and Rhythm: Normal rate and regular rhythm.   Pulmonary:      Effort: Pulmonary effort is normal. No respiratory distress.      Comments: RA  Abdominal:      General: There is no distension.      Palpations: Abdomen is soft.      Tenderness: There is no abdominal tenderness.      Comments: Midline abdominal fistula with wound manager intact with enteric content/flatus in bag. Ostomy with stoma cover in place.    Skin:     General: Skin is warm and dry.   Neurological:      Mental Status: He is alert and  oriented to person, place, and time.   Psychiatric:         Behavior: Behavior normal.       Home Medications     Medication List      START taking these medications     oral electrolytes replacement (Pedialyte) solution solution; Take 500 mL   by mouth 2 times a day.   pantoprazole 40 mg EC tablet; Commonly known as: ProtoNix; Take 1 tablet   (40 mg) by mouth every 12 hours. Do not crush, chew, or split.     CHANGE how you take these medications     lactated Ringer's infusion; Infuse 1,000 mL into a venous catheter once   daily at bedtime. Administer 1L of IVFs nightly  Continue same weekly labs   Follow up with Dr Mcelroy for further refill orders; What changed:   additional instructions     CONTINUE taking these medications     acetaminophen 325 mg tablet; Commonly known as: Tylenol; Take 2 tablets   (650 mg) by mouth every 6 hours if needed for mild pain (1 - 3).   diphenoxylate-atropine 2.5-0.025 mg tablet; Commonly known as: LomotiL;   Take 1 tablet by mouth 4 times a day as needed for diarrhea.   heparin LockFlush(Porcine)(PF) 10 unit/mL injection; Generic drug:   heparin flush   loperamide 1 mg/7.5 mL liquid; Commonly known as: Imodium A-D   psyllium 3.4 gram packet; Commonly known as: Metamucil   sodium chloride (PF) 0.9% injection   traZODone 100 mg tablet; Commonly known as: Desyrel; Take 1 tablet (100   mg) by mouth as needed at bedtime for sleep.       Outpatient Follow-Up  No future appointments.    Time spent with discharge was >30 minutes and included discharge instructions, prescriptions, creating discharge summary, and coordinating resumption of home care for the patient     MINERVA Yin-CNP

## 2024-12-17 NOTE — NURSING NOTE
4 Eyes Skin Assessment    Reason for assessment? Weekly skin assessment  Does the patient have a wound? no  Wound RN consult placed? N/A  Preventative foam dressing applied? No      Four eyes skin check performed with BLANCA Mckenzie

## 2024-12-17 NOTE — PROGRESS NOTES
Bereket Em is a 59 y.o. male on day 5 of admission presenting with Enterocutaneous fistula.      Transitional Care Coordination Progress Note:  Patient discussed during interdisciplinary rounds.      Plan per Medical/Surgical team:   Pt previously used [facility]. PT will be going home with wound, picc, iv atb.    Home Care choice for home going needs, [facility].  Pt Needs: RN/PT/OT.   Wound Vac did not work for pt's wound. Returned to [facility].   [facility] aware. SOC 24-48 hours post dc.       Payer: Alycia     Status: INP     Discharge disposition: [facility], [facility] 1     Potential Barriers: None     ADOD:  12/17     This TCC will continue to follow for home going needs and safe DC plan.      RUSH ANDERSON

## 2024-12-17 NOTE — PROGRESS NOTES
I saw and examined Mr. Em.  Late entry for 12/16/24.    The stents and wound vac were removed as he had a bowel obstruction.  His NGT has been removed. He is happy that the stent and wound vac were attempted.   On exam, NAD. A wound manager is in place with a mix of enteric content output. Overtime, the size of the wound and exposed bowel in the EAF has decreased but the scar remains thick.  LLQ colostomy is pink and viable without stool or gas in the bag.   We discussed his short and long term plans again.  When he is discharged, he will continue with 6 small meals spaced out during the day, Lomotil 2 tabs 4 times a day, Imodium 2 tabs 4 times a day, PPI BID, 1L of supplemental fluids per day, weekly labs, wound care and low/no carbohydrate electrolyte solution per day.  Once the scar has softened and bowel can  from it, will plan for laparotomy with bowel resection and possible ostomy reversal and hernia repair.  He may need TPN for a few weeks/months prior to surgery to ensure adequate nutrition.  Will coordinate a visit with Dr. Olson to assess for hernia options as the last visit was missed due to snow and additional colon/colostomy assessment with Dr. Morfin.  Will follow up in clinic in a few weeks.

## 2024-12-17 NOTE — PROGRESS NOTES
Patient admitted to [facility] on 12/12 for stent placement - to be discharged today, 12/17 and will resume daily 1L LR IV infusions    Telephone call with patient - has 7 day supply in the home - agreed to followup call on 12/23 to review supplies and schedule OVN delivery - patient will call infusion pharmacy on 12/8 or 12/19 if delivery needed before 12/24    NF 12/23 OVN

## 2024-12-17 NOTE — CARE PLAN
The patient's goals for the shift include  pain management    The clinical goals for the shift include pt will have improved pain control throughout shift        Problem: Fall/Injury  Goal: Not fall by end of shift  Outcome: Progressing  Goal: Be free from injury by end of the shift  Outcome: Progressing  Goal: Verbalize understanding of personal risk factors for fall in the hospital  Outcome: Progressing  Goal: Verbalize understanding of risk factor reduction measures to prevent injury from fall in the home  Outcome: Progressing  Goal: Use assistive devices by end of the shift  Outcome: Progressing  Goal: Pace activities to prevent fatigue by end of the shift  Outcome: Progressing     Problem: Pain  Goal: Takes deep breaths with improved pain control throughout the shift  Outcome: Progressing  Goal: Turns in bed with improved pain control throughout the shift  Outcome: Progressing  Goal: Walks with improved pain control throughout the shift  Outcome: Progressing  Goal: Performs ADL's with improved pain control throughout shift  Outcome: Progressing  Goal: Free from opioid side effects throughout the shift  Outcome: Progressing  Goal: Free from acute confusion related to pain meds throughout the shift  Outcome: Progressing     Problem: Pain - Adult  Goal: Verbalizes/displays adequate comfort level or baseline comfort level  Outcome: Progressing     Problem: Safety - Adult  Goal: Free from fall injury  Outcome: Progressing     Problem: Discharge Planning  Goal: Discharge to home or other facility with appropriate resources  Outcome: Progressing     Problem: Chronic Conditions and Co-morbidities  Goal: Patient's chronic conditions and co-morbidity symptoms are monitored and maintained or improved  Outcome: Progressing

## 2024-12-18 ENCOUNTER — PATIENT MESSAGE (OUTPATIENT)
Dept: SURGERY | Facility: CLINIC | Age: 59
End: 2024-12-18
Payer: COMMERCIAL

## 2024-12-18 ENCOUNTER — HOME CARE VISIT (OUTPATIENT)
Dept: HOME HEALTH SERVICES | Facility: HOME HEALTH | Age: 59
End: 2024-12-18
Payer: COMMERCIAL

## 2024-12-18 VITALS
SYSTOLIC BLOOD PRESSURE: 95 MMHG | HEART RATE: 95 BPM | OXYGEN SATURATION: 99 % | TEMPERATURE: 97.7 F | DIASTOLIC BLOOD PRESSURE: 60 MMHG | RESPIRATION RATE: 18 BRPM

## 2024-12-18 DIAGNOSIS — E86.0 DEHYDRATION: ICD-10-CM

## 2024-12-18 DIAGNOSIS — K63.2 ENTEROCUTANEOUS FISTULA: Primary | ICD-10-CM

## 2024-12-18 PROCEDURE — G0299 HHS/HOSPICE OF RN EA 15 MIN: HCPCS

## 2024-12-18 ASSESSMENT — ENCOUNTER SYMPTOMS
DENIES PAIN: 1
APPETITE LEVEL: GOOD

## 2024-12-18 ASSESSMENT — ACTIVITIES OF DAILY LIVING (ADL)
AMBULATION ASSISTANCE: STAND BY ASSIST
CURRENT_FUNCTION: STAND BY ASSIST
OASIS_M1830: 03
ENTERING_EXITING_HOME: STAND BY ASSIST

## 2024-12-20 DIAGNOSIS — R19.8 HIGH OUTPUT ILEOSTOMY (MULTI): ICD-10-CM

## 2024-12-20 DIAGNOSIS — Z93.2 HIGH OUTPUT ILEOSTOMY (MULTI): ICD-10-CM

## 2024-12-20 RX ORDER — DIPHENOXYLATE HYDROCHLORIDE AND ATROPINE SULFATE 2.5; .025 MG/1; MG/1
1 TABLET ORAL 4 TIMES DAILY PRN
Qty: 30 TABLET | Refills: 0 | Status: SHIPPED | OUTPATIENT
Start: 2024-12-20

## 2024-12-20 RX ORDER — LOPERAMIDE HCL 1MG/7.5ML
4 LIQUID (ML) ORAL 4 TIMES DAILY PRN
Qty: 150 ML | Refills: 3 | Status: SHIPPED | OUTPATIENT
Start: 2024-12-20 | End: 2024-12-30

## 2024-12-22 ENCOUNTER — HOME CARE VISIT (OUTPATIENT)
Dept: HOME HEALTH SERVICES | Facility: HOME HEALTH | Age: 59
End: 2024-12-22
Payer: COMMERCIAL

## 2024-12-22 ENCOUNTER — LAB REQUISITION (OUTPATIENT)
Dept: LAB | Facility: HOSPITAL | Age: 59
End: 2024-12-22
Payer: COMMERCIAL

## 2024-12-22 VITALS
DIASTOLIC BLOOD PRESSURE: 64 MMHG | SYSTOLIC BLOOD PRESSURE: 102 MMHG | HEART RATE: 61 BPM | RESPIRATION RATE: 16 BRPM | TEMPERATURE: 98 F

## 2024-12-22 DIAGNOSIS — B95.62 METHICILLIN RESISTANT STAPHYLOCOCCUS AUREUS INFECTION AS THE CAUSE OF DISEASES CLASSIFIED ELSEWHERE: ICD-10-CM

## 2024-12-22 DIAGNOSIS — K63.2 FISTULA OF INTESTINE: ICD-10-CM

## 2024-12-22 DIAGNOSIS — T81.31XA DISRUPTION OF EXTERNAL OPERATION (SURGICAL) WOUND, NOT ELSEWHERE CLASSIFIED, INITIAL ENCOUNTER: ICD-10-CM

## 2024-12-22 DIAGNOSIS — T84.410A: ICD-10-CM

## 2024-12-22 LAB
ALBUMIN SERPL BCP-MCNC: 4.1 G/DL (ref 3.4–5)
ALP SERPL-CCNC: 132 U/L (ref 33–136)
ALT SERPL W P-5'-P-CCNC: 39 U/L (ref 10–52)
ANION GAP SERPL CALCULATED.3IONS-SCNC: 14 MMOL/L (ref 10–20)
AST SERPL W P-5'-P-CCNC: 23 U/L (ref 9–39)
BASOPHILS # BLD AUTO: 0.06 X10*3/UL (ref 0–0.1)
BASOPHILS NFR BLD AUTO: 0.4 %
BILIRUB SERPL-MCNC: 0.3 MG/DL (ref 0–1.2)
BUN SERPL-MCNC: 21 MG/DL (ref 6–23)
CALCIUM SERPL-MCNC: 9.5 MG/DL (ref 8.6–10.3)
CHLORIDE SERPL-SCNC: 99 MMOL/L (ref 98–107)
CO2 SERPL-SCNC: 30 MMOL/L (ref 21–32)
CREAT SERPL-MCNC: 0.76 MG/DL (ref 0.5–1.3)
EGFRCR SERPLBLD CKD-EPI 2021: >90 ML/MIN/1.73M*2
EOSINOPHIL # BLD AUTO: 0.19 X10*3/UL (ref 0–0.7)
EOSINOPHIL NFR BLD AUTO: 1.2 %
ERYTHROCYTE [DISTWIDTH] IN BLOOD BY AUTOMATED COUNT: 17.2 % (ref 11.5–14.5)
GLUCOSE SERPL-MCNC: 99 MG/DL (ref 74–99)
HCT VFR BLD AUTO: 38.6 % (ref 41–52)
HGB BLD-MCNC: 12.3 G/DL (ref 13.5–17.5)
IMM GRANULOCYTES # BLD AUTO: 0.07 X10*3/UL (ref 0–0.7)
IMM GRANULOCYTES NFR BLD AUTO: 0.4 % (ref 0–0.9)
LYMPHOCYTES # BLD AUTO: 2.94 X10*3/UL (ref 1.2–4.8)
LYMPHOCYTES NFR BLD AUTO: 18.8 %
MAGNESIUM SERPL-MCNC: 1.31 MG/DL (ref 1.6–2.4)
MCH RBC QN AUTO: 25.5 PG (ref 26–34)
MCHC RBC AUTO-ENTMCNC: 31.9 G/DL (ref 32–36)
MCV RBC AUTO: 80 FL (ref 80–100)
MONOCYTES # BLD AUTO: 0.99 X10*3/UL (ref 0.1–1)
MONOCYTES NFR BLD AUTO: 6.3 %
NEUTROPHILS # BLD AUTO: 11.42 X10*3/UL (ref 1.2–7.7)
NEUTROPHILS NFR BLD AUTO: 72.9 %
NRBC BLD-RTO: 0 /100 WBCS (ref 0–0)
PHOSPHATE SERPL-MCNC: 3.9 MG/DL (ref 2.5–4.9)
PLATELET # BLD AUTO: 423 X10*3/UL (ref 150–450)
POTASSIUM SERPL-SCNC: 4.2 MMOL/L (ref 3.5–5.3)
PREALB SERPL-MCNC: 37.8 MG/DL (ref 18–40)
PROT SERPL-MCNC: 6.8 G/DL (ref 6.4–8.2)
RBC # BLD AUTO: 4.83 X10*6/UL (ref 4.5–5.9)
SODIUM SERPL-SCNC: 139 MMOL/L (ref 136–145)
TRIGL SERPL-MCNC: 271 MG/DL (ref 0–149)
WBC # BLD AUTO: 15.7 X10*3/UL (ref 4.4–11.3)

## 2024-12-22 PROCEDURE — 83735 ASSAY OF MAGNESIUM: CPT

## 2024-12-22 PROCEDURE — 84134 ASSAY OF PREALBUMIN: CPT

## 2024-12-22 PROCEDURE — G0299 HHS/HOSPICE OF RN EA 15 MIN: HCPCS

## 2024-12-22 PROCEDURE — 84100 ASSAY OF PHOSPHORUS: CPT

## 2024-12-22 PROCEDURE — 84478 ASSAY OF TRIGLYCERIDES: CPT

## 2024-12-22 PROCEDURE — 80053 COMPREHEN METABOLIC PANEL: CPT

## 2024-12-22 PROCEDURE — 85025 COMPLETE CBC W/AUTO DIFF WBC: CPT

## 2024-12-22 ASSESSMENT — ENCOUNTER SYMPTOMS
PERSON REPORTING PAIN: PATIENT
DENIES PAIN: 1

## 2024-12-23 ENCOUNTER — HOME INFUSION (OUTPATIENT)
Dept: INFUSION THERAPY | Age: 59
End: 2024-12-23
Payer: COMMERCIAL

## 2024-12-23 NOTE — PROGRESS NOTES
Bereket SAIMA Manav is receiving Lactated Ringer's for daily infusion.     Followed by Dr Perry.    12/22 labs reviewed - mag 1.31, WBC 15.7     RX contacted pt, He is agreeable to delivery 12/24 for 2 weeks of hydration and supplies to match. Pt is aware national supply of LR is low so pharmacy will provide going forward as able with current allocations.     Dispensing 12/23 and deliver 12/24 with supplies to match:  14x LR gravity  DOS 12/25-1/7     Follow up 1/6 check needs with pt, deliver as appropriate.

## 2024-12-24 ASSESSMENT — ENCOUNTER SYMPTOMS: APPETITE LEVEL: GOOD

## 2024-12-26 ENCOUNTER — TELEPHONE (OUTPATIENT)
Dept: SURGERY | Facility: HOSPITAL | Age: 59
End: 2024-12-26
Payer: COMMERCIAL

## 2024-12-26 DIAGNOSIS — R19.8 HIGH OUTPUT ILEOSTOMY (MULTI): Primary | ICD-10-CM

## 2024-12-26 DIAGNOSIS — Z93.2 HIGH OUTPUT ILEOSTOMY (MULTI): Primary | ICD-10-CM

## 2024-12-26 DIAGNOSIS — R19.8 HIGH OUTPUT ILEOSTOMY (MULTI): ICD-10-CM

## 2024-12-26 DIAGNOSIS — Z93.2 HIGH OUTPUT ILEOSTOMY (MULTI): ICD-10-CM

## 2024-12-26 RX ORDER — DIPHENOXYLATE HYDROCHLORIDE AND ATROPINE SULFATE 2.5; .025 MG/1; MG/1
2 TABLET ORAL 4 TIMES DAILY
Qty: 56 TABLET | Refills: 3 | Status: SHIPPED | OUTPATIENT
Start: 2024-12-26 | End: 2025-01-23

## 2024-12-26 NOTE — TELEPHONE ENCOUNTER
I spoke to Mr. Em via telephone.   He reports better hydration and thicker fistula output with electrolyte drink and Metamucil.   He reports weight up to almost 160 pounds now.   He is having about 6 small meals a day.   He continues to pouch the fistula and his home health nurse should be by tomorrow.   He reports a head and neck cold for the past few days.  No fevers.  He sounds congested.   He continues to infuse 1L fluid per day.   His labs were reviewed and prealbumin 37, albumin 4.1, Mg 1.3. WBCs 15  He is repleting his magnesium orally. His leukocytosis may be due to his cold.   His lomotil order has been refilled via [employer]. Difficulty with ordering and trialed attempts at different providers but limited due to controlled substance.  Dr. Crawford was able to place the order under my supervision.   Follow up next week with telephone call.

## 2024-12-27 ENCOUNTER — HOME CARE VISIT (OUTPATIENT)
Dept: HOME HEALTH SERVICES | Facility: HOME HEALTH | Age: 59
End: 2024-12-27
Payer: COMMERCIAL

## 2024-12-28 NOTE — CASE COMMUNICATION
Missed home care visit.     set up visit  wed night for thursday, . patient and nurse both congested. called again thursday evening to se up . but reproting a little better.   recieved call this am advised he was more congested and not feeling well .   visit missed.

## 2024-12-30 ENCOUNTER — HOME CARE VISIT (OUTPATIENT)
Dept: HOME HEALTH SERVICES | Facility: HOME HEALTH | Age: 59
End: 2024-12-30
Payer: COMMERCIAL

## 2024-12-30 ENCOUNTER — LAB REQUISITION (OUTPATIENT)
Dept: LAB | Facility: LAB | Age: 59
End: 2024-12-30
Payer: COMMERCIAL

## 2024-12-30 VITALS
RESPIRATION RATE: 16 BRPM | SYSTOLIC BLOOD PRESSURE: 110 MMHG | BODY MASS INDEX: 23.72 KG/M2 | TEMPERATURE: 97.7 F | OXYGEN SATURATION: 98 % | HEART RATE: 80 BPM | WEIGHT: 156 LBS | DIASTOLIC BLOOD PRESSURE: 68 MMHG

## 2024-12-30 DIAGNOSIS — B95.62 METHICILLIN RESISTANT STAPHYLOCOCCUS AUREUS INFECTION AS THE CAUSE OF DISEASES CLASSIFIED ELSEWHERE: ICD-10-CM

## 2024-12-30 DIAGNOSIS — T84.410A: ICD-10-CM

## 2024-12-30 DIAGNOSIS — T81.31XA DISRUPTION OF EXTERNAL OPERATION (SURGICAL) WOUND, NOT ELSEWHERE CLASSIFIED, INITIAL ENCOUNTER: ICD-10-CM

## 2024-12-30 LAB
ALBUMIN SERPL BCP-MCNC: 4.3 G/DL (ref 3.4–5)
ALP SERPL-CCNC: 161 U/L (ref 33–136)
ALT SERPL W P-5'-P-CCNC: 60 U/L (ref 10–52)
ANION GAP SERPL CALCULATED.3IONS-SCNC: 14 MMOL/L (ref 10–20)
AST SERPL W P-5'-P-CCNC: 38 U/L (ref 9–39)
BASOPHILS # BLD AUTO: 0.08 X10*3/UL (ref 0–0.1)
BASOPHILS NFR BLD AUTO: 0.9 %
BILIRUB SERPL-MCNC: 0.7 MG/DL (ref 0–1.2)
BUN SERPL-MCNC: 22 MG/DL (ref 6–23)
CALCIUM SERPL-MCNC: 8.9 MG/DL (ref 8.6–10.3)
CHLORIDE SERPL-SCNC: 94 MMOL/L (ref 98–107)
CO2 SERPL-SCNC: 34 MMOL/L (ref 21–32)
CREAT SERPL-MCNC: 0.87 MG/DL (ref 0.5–1.3)
EGFRCR SERPLBLD CKD-EPI 2021: >90 ML/MIN/1.73M*2
EOSINOPHIL # BLD AUTO: 0.35 X10*3/UL (ref 0–0.7)
EOSINOPHIL NFR BLD AUTO: 3.9 %
ERYTHROCYTE [DISTWIDTH] IN BLOOD BY AUTOMATED COUNT: 18.4 % (ref 11.5–14.5)
GLUCOSE SERPL-MCNC: 89 MG/DL (ref 74–99)
HCT VFR BLD AUTO: 40 % (ref 41–52)
HGB BLD-MCNC: 12.8 G/DL (ref 13.5–17.5)
IMM GRANULOCYTES # BLD AUTO: 0.02 X10*3/UL (ref 0–0.7)
IMM GRANULOCYTES NFR BLD AUTO: 0.2 % (ref 0–0.9)
LYMPHOCYTES # BLD AUTO: 2.29 X10*3/UL (ref 1.2–4.8)
LYMPHOCYTES NFR BLD AUTO: 25.2 %
MAGNESIUM SERPL-MCNC: 1.03 MG/DL (ref 1.6–2.4)
MCH RBC QN AUTO: 25.7 PG (ref 26–34)
MCHC RBC AUTO-ENTMCNC: 32 G/DL (ref 32–36)
MCV RBC AUTO: 80 FL (ref 80–100)
MONOCYTES # BLD AUTO: 0.77 X10*3/UL (ref 0.1–1)
MONOCYTES NFR BLD AUTO: 8.5 %
NEUTROPHILS # BLD AUTO: 5.57 X10*3/UL (ref 1.2–7.7)
NEUTROPHILS NFR BLD AUTO: 61.3 %
NRBC BLD-RTO: 0 /100 WBCS (ref 0–0)
PHOSPHATE SERPL-MCNC: 3.9 MG/DL (ref 2.5–4.9)
PLATELET # BLD AUTO: 291 X10*3/UL (ref 150–450)
POTASSIUM SERPL-SCNC: 3.8 MMOL/L (ref 3.5–5.3)
PROT SERPL-MCNC: 7.1 G/DL (ref 6.4–8.2)
RBC # BLD AUTO: 4.99 X10*6/UL (ref 4.5–5.9)
SODIUM SERPL-SCNC: 138 MMOL/L (ref 136–145)
TRIGL SERPL-MCNC: 214 MG/DL (ref 0–149)
WBC # BLD AUTO: 9.1 X10*3/UL (ref 4.4–11.3)

## 2024-12-30 PROCEDURE — 84478 ASSAY OF TRIGLYCERIDES: CPT

## 2024-12-30 PROCEDURE — 85025 COMPLETE CBC W/AUTO DIFF WBC: CPT

## 2024-12-30 PROCEDURE — 83735 ASSAY OF MAGNESIUM: CPT

## 2024-12-30 PROCEDURE — 80053 COMPREHEN METABOLIC PANEL: CPT

## 2024-12-30 PROCEDURE — G0299 HHS/HOSPICE OF RN EA 15 MIN: HCPCS

## 2024-12-30 PROCEDURE — 84100 ASSAY OF PHOSPHORUS: CPT

## 2025-01-02 ENCOUNTER — TELEPHONE (OUTPATIENT)
Dept: SURGERY | Facility: HOSPITAL | Age: 60
End: 2025-01-02
Payer: COMMERCIAL

## 2025-01-02 ENCOUNTER — HOME CARE VISIT (OUTPATIENT)
Dept: HOME HEALTH SERVICES | Facility: HOME HEALTH | Age: 60
End: 2025-01-02
Payer: COMMERCIAL

## 2025-01-02 VITALS
SYSTOLIC BLOOD PRESSURE: 140 MMHG | HEART RATE: 64 BPM | TEMPERATURE: 97.6 F | DIASTOLIC BLOOD PRESSURE: 72 MMHG | RESPIRATION RATE: 16 BRPM

## 2025-01-02 PROCEDURE — G0299 HHS/HOSPICE OF RN EA 15 MIN: HCPCS

## 2025-01-02 ASSESSMENT — ENCOUNTER SYMPTOMS
PAIN: 1
SKIN LESIONS: 1
APPETITE LEVEL: GOOD
PAIN LOCATION: ABDOMEN
PERSON REPORTING PAIN: PATIENT

## 2025-01-02 NOTE — TELEPHONE ENCOUNTER
I spoke to Mr. Em on the telephone.     He reports that the wound has been more difficult to manage in the past week and that the skin edges are more raw and painful.  There aren't any new photos in the chart.   We reviewed his labs and his magnesium was lower. He has already doubled his oral magnesium. If this does not improve, he may need an infusion.   Otherwise, his labs are grossly stable with a decrease in Cl and increase in bicarb.   His WBCs returned to WNLs following his recent head and neck infection.   He reports that the fistula has prolapsed more.  I asked that he come to clinic for an assessment of both the fistula and the wound and will coordinate that.

## 2025-01-02 NOTE — HOME HEALTH
[facility] home nursing visit   known to sn   stoma type: colostomy and mid abd fistula   size: 1 3/8 x 1 1/8   accessories used: remover   left 10   4in rings.   picc rt arm wihtout concern.    pouched the fistula. patient reports changed 3 times yesterday and was without 4 in rings.   today we did domboro soak.   after using remover to take off pouches.   perifistular skin red and very painful.   powder and skin barrier film.   fold 4in ring in half and used on the bottom and used a piece ring by the outlet and umbilicus.     they are independet in care. he ordered supplies today from [facility].

## 2025-01-02 NOTE — CASE COMMUNICATION
Dr. Perry,   I was out to see Mr. Em today, I have not seen him for several weeks.   unfortunatly we are back to the same issue with leakage. perifistular denuded very painful skin and one of the fistula arms are discolored more of a purple hue to it.   he reports it has been discolored for about a week now. the wound around the fistula site has been healed for sometime.     I understand you are seeing him again next week.   th ey are doing the best they can using domboro soaks and changing about everyohter day. but still some issues.    Quinten Navarro, BSN, RN, CWOCN   [phone]

## 2025-01-03 ENCOUNTER — HOME INFUSION (OUTPATIENT)
Dept: INFUSION THERAPY | Age: 60
End: 2025-01-03
Payer: COMMERCIAL

## 2025-01-03 ENCOUNTER — TELEPHONE (OUTPATIENT)
Dept: SURGERY | Facility: HOSPITAL | Age: 60
End: 2025-01-03
Payer: COMMERCIAL

## 2025-01-03 NOTE — PROGRESS NOTES
Bereket Em is receiving Lactated Ringer's for daily infusion.     Followed by Dr Perry.     12/30 labs reviewed - mag down again to 1.03, wbc normal at 9.1     Dispensing 1/6 and deliver 1/7 with supplies to match:  14x LR gravity (ICU medical)  DOS 1/8-1/21     Follow up 1/20 check needs with pt, deliver as appropriate.

## 2025-01-04 NOTE — TELEPHONE ENCOUNTER
I spoke to Mr. Em via telephone earlier today and viewed his wound in the Media tab.   Plan still to see him in clinic on Wednesday to assess his wound and he will also see Dr. Olson for future abdominal wall closure considerations.   He requested oxycodone for his abdominal wall pain.  Under my supervision, Dr. Melissa was able to place this order as I am still unable to place one in the EMR.  I will reassess his pain control on Wednesday in clinic. OARRS was reviewed.

## 2025-01-05 ASSESSMENT — ENCOUNTER SYMPTOMS
PERSON REPORTING PAIN: PATIENT
PAIN: 1
FATIGUES EASILY: 1
MUSCLE WEAKNESS: 1
PAIN LOCATION: ABDOMEN
CHANGE IN APPETITE: UNCHANGED
ABDOMINAL PAIN: 1
APPETITE LEVEL: FAIR
PAIN LOCATION - PAIN SEVERITY: 5/10

## 2025-01-06 ENCOUNTER — HOME CARE VISIT (OUTPATIENT)
Dept: HOME HEALTH SERVICES | Facility: HOME HEALTH | Age: 60
End: 2025-01-06
Payer: COMMERCIAL

## 2025-01-06 VITALS
SYSTOLIC BLOOD PRESSURE: 112 MMHG | RESPIRATION RATE: 16 BRPM | OXYGEN SATURATION: 99 % | TEMPERATURE: 97.7 F | DIASTOLIC BLOOD PRESSURE: 60 MMHG | HEART RATE: 58 BPM

## 2025-01-06 PROCEDURE — 80053 COMPREHEN METABOLIC PANEL: CPT

## 2025-01-06 PROCEDURE — 84478 ASSAY OF TRIGLYCERIDES: CPT

## 2025-01-06 PROCEDURE — 84100 ASSAY OF PHOSPHORUS: CPT

## 2025-01-06 PROCEDURE — 85025 COMPLETE CBC W/AUTO DIFF WBC: CPT

## 2025-01-06 PROCEDURE — G0299 HHS/HOSPICE OF RN EA 15 MIN: HCPCS

## 2025-01-06 PROCEDURE — 84134 ASSAY OF PREALBUMIN: CPT

## 2025-01-06 PROCEDURE — 83735 ASSAY OF MAGNESIUM: CPT

## 2025-01-07 ASSESSMENT — ENCOUNTER SYMPTOMS
DIAPHORESIS: 0
FEVER: 0
APPETITE CHANGE: 0
COUGH: 0
NAUSEA: 0
DIZZINESS: 0
CHEST TIGHTNESS: 0
PALPITATIONS: 0
CHILLS: 0
ABDOMINAL PAIN: 0
DYSURIA: 0
WEAKNESS: 0
FATIGUE: 0
BLOOD IN STOOL: 0
UNEXPECTED WEIGHT CHANGE: 0
CONSTIPATION: 0
DIFFICULTY URINATING: 0
HEMATURIA: 0
VOMITING: 0
DIARRHEA: 0
LIGHT-HEADEDNESS: 0
SHORTNESS OF BREATH: 0
RECTAL PAIN: 0
ACTIVITY CHANGE: 0
ANAL BLEEDING: 0

## 2025-01-07 NOTE — PROGRESS NOTES
"Bereket Em  [id number]   01/07/25  10:55 AM    HPI/Subjective:  Bereket Em is a 59 y.o. male with history of *** who is referred to clinic by Bereket Perry MD*** for evaluation of a/an *** hernia(s).    They note a bulge*** which {DOES/ DOES NOT:79291} seem to be increasing in size over time - first noticed *** and now ***.    Symptomatically, this patient experiences pain {frequency:76356}, which is {COURSE IMPROVING/WORSENING SX HPI:86641}. ***    They have had {SEVERAL/MANY/NO:01468::\"no\"} episode(s) of incarceration***.    They have had {SEVERAL/MANY/NO:30487::\"no\"} episode(s) of obstructive symptoms attributed to the hernia***.    They have had {sjzpriorrepairs:42917}.    Past surgical history is otherwise notable for ***.    From a pain history standpoint,  Behavioral health history - {sjzbehavhealth:79437::\"None\"}  Opioid substance use - {sjzopioidhx:43086::\"None\"}    From a functional/activity standpoint,  Ability to perform ADLs in 30 days prior to surgery - {sjzfunctionalstatus:67485::\"Independent\"}  Employment - {sjzemployment:38032}  Sporting Activity - {sjzsportingactivity:44373}  BMI - There is no height or weight on file to calculate BMI.    Past medical history is significant for:    Hepatic insufficiency or liver failure - {yes,no:21211::\"No\"}  Ascites - {yes,no:21211::\"No\"}  Hypertension - {yes,no:21211::\"No\"}  Diabetes mellitus - {yes,no:21211::\"No\"}   Dialysis (acute or chronic renal failure requiring dialysis within 2 weeks prior to surgery) - {dialysis types:10669::\"None\"}  COPD - {yes,no:21211::\"No\"}  Dyspnea - {yes,no:21211::\"No\"}  Antiplatelet medications excluding aspirin - {YES wildcard/NO:60::\"No\"}  Anticoagulation medications - {YES wildcard/NO:60::\"No\"}  Aspirin - {yes,no:21211::\"No\"}  Immunosuppression - {YES wildcard/NO:60::\"No\"}  Nicotine use in relation to OR date - {yes,no:21211::\"No\"}  History of AAA - {yes,no:21211::\"No\"}  Collagen vascular disorder - {YES " "wildcard/NO:60::\"No\"}  Congestive heart failure - {yes,no:21211::\"No\"}  Active pregnancy - {yes,no:21211::\"No\"}    Review of Systems     Objective:  There is no height or weight on file to calculate BMI.  Physical Exam    Imaging consisting of *** was reviewed personally and was notable for ***.    Assessment/Plan:  Bereket Em is a 59 y.o. male with history of *** who presents with a/an {Desc; symptomatic/asymptomatic:23872} *** hernia(s), having undergone *** prior repairs.    We will plan to {znextsteps:78092}.    The patient {DID/DID NOT:19348::\"did\"} provide consent for surgery and participation in the [facility] at this clinic visit.    I will see the patient back in the office in {sjzreturnperiod:86430} for *** or sooner if needed.    Portions of medical record reviewed for pertinent issues including active problem list, medication list, allergies, social history, health maintenance, notes from previous encounters, lab results, and imaging.     Jerel Olson MD  Assistant Professor of Surgery  Division of General Surgery  Department of Surgery  "

## 2025-01-07 NOTE — PROGRESS NOTES
Bereket Em  [id number]   01/07/25  1:14 PM    HPI/Subjective:  Bereket Em is a 59 y.o. male with history of perforated diverticulitis s/p Margarita's procedure complicated by enterocutaneous fistula who is referred to clinic by Bereket Perry MD for evaluation of a/an open ventral abdominal wall surgical defect.      He underwent an exploratory laparotomy with sigmoidectomy and end colostomy on 6/7/2024 for perforated diverticulitis.  Wound vac was applied on 6/11/24 and TPN was initiated.  The wound vac was taken down on 6/14 which showed necrotic fascia at the midline incision and he was taken to the OR on 6/15/24 for an exploration laparotomy, abdominal washout, fascial debridement, enmas closure.  Midline incision was closed with retention sutures.  He developed some foul smelling purulence drainage and he was taken back to the OR on 6/18/2024 for re-exploration laparotomy with mesh placement. Postoperative complications included development of 2 fistulas.    Most recently, he had an enteroscopy with Dr. Cage (12/12/24) where two covered stents were placed to cover the fistula followed by midline wound vac placement.  He developed a small bowel obstruction and the wound vac and stents were removed on 12/16/24.  A wound manager was placed over the fistula site, NGT was removed and he was able to tolerate a soft diet.  He was discharged home with planned outpatient surgical followup with Dr. Perry, who has been managing his care.    Past surgical history is otherwise notable for:  Re Exploration laparotomy, mesh placement (6/18/2024)   Exploration laparotomy, abdominal washout, fascial debridement, enmas closure (6/15/2024)  Exploratory Laparotomy, sigmoidectomy, end colostomy (6/7/2024)    Past medical history is significant for:  HTN, alcohol abuse in remission, right shoulder impingement syndrome, nicotine dependence, diverticulitis, anxiety, depression, BPH, cervical  radiculopathy    Hepatic insufficiency or liver failure - No  Ascites - No  Hypertension - Yes  Diabetes mellitus - No   Dialysis (acute or chronic renal failure requiring dialysis within 2 weeks prior to surgery) - None  COPD - No  Dyspnea - No  Antiplatelet medications excluding aspirin - No  Anticoagulation medications - No  Aspirin - No  Immunosuppression - No  Nicotine use in relation to OR date - Yes  History of AAA - No  Collagen vascular disorder - No  Congestive heart failure - No  Active pregnancy - No    Review of Systems   Constitutional:  Negative for activity change, appetite change, chills, diaphoresis, fatigue, fever and unexpected weight change.   Respiratory:  Negative for cough, chest tightness and shortness of breath.    Cardiovascular:  Negative for palpitations and leg swelling.   Gastrointestinal:  Negative for abdominal pain, anal bleeding, blood in stool, constipation, diarrhea, nausea, rectal pain and vomiting.   Genitourinary:  Negative for difficulty urinating, dysuria and hematuria.   Neurological:  Negative for dizziness, weakness and light-headedness.          Current Outpatient Medications   Medication Instructions    0.9 % sodium chloride (sodium chloride, PF, 0.9%) injection 10 mL, intravenous, Daily, 10 ml NS once daily SASH and as needed 20 ml after blood work       acetaminophen (TYLENOL) 650 mg, oral, Every 6 hours PRN    diphenoxylate-atropine (Lomotil) 2.5-0.025 mg tablet 2 tablets, oral, 4 times daily    heparin flush (heparin LockFlush,Porcine,,PF,) 10 unit/mL injection 5 mL, Daily    oral electrolytes replacement, Pedialyte, solution solution 500 mL, oral, 2 times daily    oxyCODONE (ROXICODONE) 5 mg, oral, Every 6 hours PRN    pantoprazole (PROTONIX) 40 mg, oral, Every 12 hours, Do not crush, chew, or split.    psyllium (Metamucil) 3.4 gram packet 4 packets, oral, Daily PRN    Ringer's solution,lactated (lactated Ringer's) infusion 1,000 mL, intravenous, Nightly,  Administer 1L of IVFs nightly   Continue same weekly labs  Follow up with Dr Mcelroy for further refill orders    traZODone (DESYREL) 100 mg, oral, Nightly PRN       No Known Allergies    Objective:  There were no vitals filed for this visit.  There is no height or weight on file to calculate BMI.  Physical Exam  Vitals reviewed. Exam conducted with a chaperone present.   Constitutional:       General: He is not in acute distress.     Appearance: Normal appearance. He is not ill-appearing.   HENT:      Head: Normocephalic.      Mouth/Throat:      Mouth: Mucous membranes are moist.   Eyes:      Extraocular Movements: Extraocular movements intact.      Pupils: Pupils are equal, round, and reactive to light.   Cardiovascular:      Rate and Rhythm: Normal rate and regular rhythm.      Pulses: Normal pulses.      Heart sounds: Normal heart sounds. No murmur heard.  Pulmonary:      Effort: Pulmonary effort is normal. No respiratory distress.      Breath sounds: Normal breath sounds. No wheezing, rhonchi or rales.   Chest:      Chest wall: No tenderness.   Abdominal:      General: There is no distension.      Palpations: There is no mass.      Tenderness: There is no abdominal tenderness. There is no guarding or rebound.      Hernia: No hernia is present.      Comments: Midline abdominal fistula with wound manager intact with enteric content/flatus in bag. Ostomy with stoma cover in place.     Musculoskeletal:         General: No swelling or deformity.      Cervical back: Neck supple. No rigidity.      Right lower leg: No edema.      Left lower leg: No edema.   Skin:     General: Skin is warm.      Coloration: Skin is not jaundiced or pale.   Neurological:      General: No focal deficit present.      Mental Status: He is alert and oriented to person, place, and time. Mental status is at baseline.   Psychiatric:         Mood and Affect: Mood normal.         Behavior: Behavior normal.         Thought Content: Thought  "content normal.         Judgment: Judgment normal.       Imaging consisting of CT Abdomen / Pelvis (12/14/2024) was reviewed personally and was notable for Left lower quadrant end ostomy. Similar appearance of open ventral abdominal wall surgical defect measuring up to 4.5 cm    Assessment/Plan:  Bereket Em is a 59 y.o. male with history of perforated diverticulitis s/p Margarita's procedure complicated by enterocutaneous fistula.    This is likely to be a 3 stage operation where the initial stage will be to takedown his end colostomy and his ECF and potentially a proximal jejunostomy.  Subsequently a second stage operation would ideally be to perform a local takedown of diverting loop jejunostomy.  Finally he would be able to undergo an open complex reconstruction in a clean wound class. The patient expresses understanding.     We'll see him again in the office closer to the time of surgery for surgical and [facility] consent.    I will see the patient back in the office in 6 months or sooner if needed.    Portions of medical record reviewed for pertinent issues including active problem list, medication list, allergies, social history, health maintenance, notes from previous encounters, lab results, and imaging.     Jerel Olson MD  Assistant Professor of Surgery  Division of General Surgery  Department of Surgery      Re Exploration laparotomy, mesh placement (6/18/2024)     Surgical Mesh Sling Implant PATCH, MESH, VICRYL, 12 X 12 IN - [id number] Implanted                 Findings: Around 1cm of necrotic fascia bilaterally with some infected subcutaneous fat as well. No signs of infection in peritoneal cavity or involvement with the stoma.    Indications: Bereket Em is an 58 y.o. male who is having surgery for infection of his recent midline incision.   Procedure Details: Appropriate consent was obtained.  The patient was taken into the operating room and a \"timeout\" was done verifying the patient's " "name, MRN, date of birth, procedure, position, allergies, antibiotics, need for special equipment, amongst other information.  After we did this the anesthesia team administered general anesthesia to the patient and intubated him without issue.  The patient was prepped and draped in the standard sterile fashion.  Before beginning the procedure another \"timeout\" was done and verified as correct.     We began the procedure by taking down the patient's retention sutures and evaluating the fascia.  We discovered that the fascia inferiorly around 8 cm had dehisced and there was necrotic fascia around 1 cm bilaterally on either side as well as some necrotic and infected subcutaneous fat.  We entered into the abdominal cavity and found a little bit of murky peritoneal fluid but then suctioned and washed this all out.  The small bowel was completely matted and adherent and could not be divided.  We looked around the abdominal cavity however and found no retained abscesses or infections and irrigated until clear.  Laterally the small bowel was adherent to the peritoneal wall and so the colon cannot be well-visualized.  We were able to trace in the subcutaneous space the colostomy and there was no communication between the colostomy and any of the midline infection.  We began to debride the midline fascia and subcutaneous tissue using curettage, electrocautery, and sharp dissection.  After this was accomplished we surveilled everything again and found no other pathology and hemostasis.     There is determined that the fascia could not come together without undue tension.  We measured the defect and was approximately 24 cm vertically and 12 cm horizontally.  The first count was correct.  A Vicryl mesh was cut to size and using PDS suture was sewn to the fascia.  The final count was correct.  The patient tolerated procedure well without complication.  I was present and scrubbed for the entirety of the Procedure.  EBL was around " 50 cc.  Patient was taken back to the PACU after extubation in stable condition.    Exploration Laparotomy, abdominal washout, fascial debridement, enmas closure (6/15/2024)  Procedure Details: Patient was properly identified preop and taken to the operating room and placed on operating table in the supine position.  After adequate general endotracheal anesthesia and placement of bilateral lower extremity pneumatic compression devices the abdomen was then prepped and draped in the standard surgical fashion.  The remaining staples of the skin incision were then removed followed by removal of the fascial sutures in their entirety.  Again noted was purulence along the fascial edges of the wound with gross evidence of fascial necrosis.  The grossly infected and necrotic fascia was sharply debrided with a combination of electro Bovie cautery and Rdz scissors back to the level of healthy fascia.  At the completion of excisional debridement the wound was then irrigated with 3 L of warm normal saline via the suction  device.  Hemostasis was achieved via electro Bovie cautery.  Due to the fascial debridement back to the level of the abdominal wall musculature it was decided that the wound would be managed by placement of retention sutures.  Retention sutures consisting of #2 nylon, totaling 8, were placed equidistant along the midline abdominal wound in the standard fashion.  After completion of approximation of the edges of the midline wound the skin of the abdomen was cleaned and dried followed by placement of sterile 4 x 4 gauze dressings and tape.  Next the previously removed left lower quadrant colostomy appliance was then replaced.  All needle sponge and instrument counts were correct at the end of the procedure.  Patient tolerated the above procedure well and was transported to the recovery room in stable condition and extubated.       Exploratory Laparotomy, sigmoidectomy, end colostomy  (6/7/2024)  Preoperative diagnosis: Perforated diverticulitis  Postoperative diagnosis: Perforated sigmoid diverticulitis  Surgeon: Jose Guadalupe Gomes MD  Procedure Details:   The patient was brought to the operating room and placed supine. After induction of general anesthesia, the patient was prepped and draped in the usual fashion.  Timeout was performed to confirm correct surgical site and patient.       Midline laparotomy was performed anterior fascia was entered with electrocautery.  Finger sweep was performed and the incision was opened in its entirety.  Bookwalter retractor was set up.  Small intestine was run from the ligament of Treitz to the ileocecal valve.  There were 2 loops of small intestine which were densely adhered to the sigmoid colon had an area of perforation.  After freeing these loops we developed feculent peritonitis of the peritoneal cavity in the pelvis.  This was cleared mechanically and with irrigation.  The cecum, appendix, ascending, transverse colon, descending colon were grossly normal.  Sigmoid colon had diffuse diverticular disease with a segment of diverticulitis with perforation and stool contamination.     We mobilized the descending colon off of the retroperitoneum.  We mobilized the sigmoid colon from the pelvic sidewall.  We dissected the sigmoid and rectum from the pelvis.  We identified the left ureter and the retroperitoneum.  We mobilized the splenic flexure and transverse colon.  We identified a site of transection of the descending colon where diverticular disease ended.  We transected the descending colon with a single 75 PEDRO blue load.  Mesocolon was transected with LigaSure device.  We skeletonized the rectal mesentery and transected the rectum with a single 60 mm TA blue load.  Specimen was passed off the field as sigmoid colon.  Hemostasis was achieved.  The staple line was tagged with 2-0 Prolene sutures at the corners of the staple line.  The abdomen was copiously  irrigated.  Hemostasis was achieved.     Due to feculent peritonitis, alcohol use, concern for cirrhosis we elected to perform an end colostomy.  We further mobilized the descending colon splenic flexure transverse colon to facilitate an end colostomy in the left upper quadrant.  We identified a site to matured our ostomy grasping the skin and dermis with an Allis and transecting a 1 inch Napaimute.  Subcutaneous fat was cored to the anterior abdominal fascia.  Fascia was incised in a cruciate fashion.  Rectus was split along its fibers.  Posterior fascia was incised with a cruciate incision.  This was dilated to 4 fingers.  The end colostomy was delivered at this location with little tension on the mesentery.     We closed the abdomen with #1 looped PDS x 2 skin was left intentionally open and packed with Kerlix due to the contaminated nature of the case.  The ostomy was matured with 3-0 Vicryl sutures in a Milagros fashion.

## 2025-01-08 ENCOUNTER — APPOINTMENT (OUTPATIENT)
Dept: SURGERY | Facility: CLINIC | Age: 60
End: 2025-01-08
Payer: COMMERCIAL

## 2025-01-08 VITALS
BODY MASS INDEX: 23.79 KG/M2 | HEART RATE: 60 BPM | HEIGHT: 68 IN | DIASTOLIC BLOOD PRESSURE: 64 MMHG | RESPIRATION RATE: 18 BRPM | WEIGHT: 157 LBS | SYSTOLIC BLOOD PRESSURE: 105 MMHG

## 2025-01-08 DIAGNOSIS — M95.8 ABDOMINAL WALL DEFECT, ACQUIRED: Primary | ICD-10-CM

## 2025-01-08 DIAGNOSIS — K63.2 ENTEROCUTANEOUS FISTULA: ICD-10-CM

## 2025-01-08 PROCEDURE — 3008F BODY MASS INDEX DOCD: CPT | Performed by: SURGERY

## 2025-01-08 PROCEDURE — 99024 POSTOP FOLLOW-UP VISIT: CPT | Performed by: SURGERY

## 2025-01-08 PROCEDURE — 99215 OFFICE O/P EST HI 40 MIN: CPT | Performed by: SURGERY

## 2025-01-08 ASSESSMENT — PAIN SCALES - GENERAL: PAINLEVEL_OUTOF10: 0-NO PAIN

## 2025-01-09 ENCOUNTER — HOME CARE VISIT (OUTPATIENT)
Dept: HOME HEALTH SERVICES | Facility: HOME HEALTH | Age: 60
End: 2025-01-09
Payer: COMMERCIAL

## 2025-01-09 RX ORDER — OXYCODONE HYDROCHLORIDE 5 MG/1
5 TABLET ORAL EVERY 6 HOURS PRN
Qty: 10 TABLET | Refills: 0 | Status: SHIPPED | OUTPATIENT
Start: 2025-01-09 | End: 2025-01-10 | Stop reason: SDUPTHER

## 2025-01-09 RX ORDER — OXYCODONE HYDROCHLORIDE 5 MG/1
5 TABLET ORAL EVERY 6 HOURS PRN
Qty: 15 TABLET | Refills: 0 | Status: CANCELLED | OUTPATIENT
Start: 2025-01-09

## 2025-01-09 RX ORDER — OXYCODONE HYDROCHLORIDE 5 MG/1
5 TABLET ORAL EVERY 6 HOURS PRN
Qty: 15 TABLET | Refills: 0 | Status: CANCELLED | OUTPATIENT
Start: 2025-01-09 | End: 2025-01-16

## 2025-01-09 NOTE — PROGRESS NOTES
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   I discussed his care at the bedside with his family with his permission.   He reports that he is eating well.   He has difficulty with pouching and with skin irritation from wound manager leakage. He changes the bag every 2-3 days.  He has found that Lomotil and metamucil help to thicken his output.  Oxycodone has also helped to slow and thicken the output as well as treat the pain from skin irritation.  He does not feel that Imodium has worked.   His urine is a bit more concentrated than before. He continues to take 1L of LR at home. He does not have orthostatic changes.   He reports that the distal/nonfunctional/right side of double barrel of EAF prolapses as times but has been easily reducible.   He is working a few hours a week from home.   He weighed 157 pounds which is improved but still decreased from his pre-surgical time.   Nutrition labs include prealbumin 42 and alb 4.2. Slight elevation in transaminases 42/75.  Bicarb elevated at 35. K 3.3. Mg 1.12.  On exam, NAD. Mucous membranes are not dry.  The LLQ colostsomy is pink and viable with an empty bag. The abdominal wound manager was removed, and I helped to replace it.  The functional end of the double barrel style fistula is on the left of the wound.  The distal end prolapse reduced easily. There is surrounding skin irritation along mostly the lower left and inferior aspects of the wound.  See photo in media tab.   Plan for:  -Fistula management: Continue with current outpatient wound care.  With the wound closing more, it has become more difficult to pouch as it sits in a crevice.  Wound care here and at home have tried to address this as much as possible.  There still is some wound healing that should take place around the fistula which may  help with pouching in the future.    Plan to continue with Lomotil and metamucil with small frequent meals and the addition of oxycodone to help decrease fistula output. Will reorder oxycodone with help of another prescriber if not yet possible in my EMR.   We discussed returning to NPO and TPN to help with skin healing and irritation for a while, but he does not want to go back on TPN unless in preparation for surgery.   Dr. Olson was present for a portion of this visit.  We discussed future surgical options again which will include laparotomy with lysis of adhesions, small bowel resection with anastomosis and ostomy reversal with possible diverting ostomy with later surgeries for ostomy reversal and definitive hernia repair. His abdominal wound was still firm and not ready for surgery.  Anticipate surgery at some point ~ 1 year from initial surgeries.  Will continue to coordinate with Leyda Morfin and Whitney.   -Nutrition/hydration/electrolytes: His weight has decreased overall but is improving.  His nutrition labs are WNLs but this may be confounded by his hydration status.  Based on his weight and labs, he may need to return to TPN for a few weeks prior to surgery to ensure adequate nutrition.  Will continue with 1L LR daily for hydration.  He has increased his magnesium intake to 2 pills daily which has improved his magnesium level a bit.   -Work: He may be interested in working more soon and will discuss further based on his continued pouching management.   -Distal fistula prolapse: We discussed signs and symptoms of bowel ischemia and need to return to the ED should these develop.   -Follow up: Telephone visit next week for an update on pouching, labs, hydration.

## 2025-01-10 DIAGNOSIS — K63.2 ENTEROCUTANEOUS FISTULA: Primary | ICD-10-CM

## 2025-01-10 RX ORDER — OXYCODONE HYDROCHLORIDE 5 MG/1
5 TABLET ORAL EVERY 6 HOURS PRN
Qty: 15 TABLET | Refills: 0 | Status: SHIPPED | OUTPATIENT
Start: 2025-01-10 | End: 2025-01-10 | Stop reason: SDUPTHER

## 2025-01-10 RX ORDER — OXYCODONE HYDROCHLORIDE 5 MG/1
5 TABLET ORAL EVERY 6 HOURS PRN
Qty: 20 TABLET | Refills: 0 | Status: SHIPPED | OUTPATIENT
Start: 2025-01-10

## 2025-01-11 ASSESSMENT — ENCOUNTER SYMPTOMS
PAIN LOCATION - PAIN SEVERITY: 4/10
MUSCLE WEAKNESS: 1
PERSON REPORTING PAIN: PATIENT
CHANGE IN APPETITE: UNCHANGED
PAIN LOCATION: ABDOMEN
PAIN: 1
APPETITE LEVEL: FAIR

## 2025-01-14 ENCOUNTER — LAB REQUISITION (OUTPATIENT)
Dept: LAB | Facility: HOSPITAL | Age: 60
End: 2025-01-14
Payer: COMMERCIAL

## 2025-01-14 ENCOUNTER — HOME CARE VISIT (OUTPATIENT)
Dept: HOME HEALTH SERVICES | Facility: HOME HEALTH | Age: 60
End: 2025-01-14
Payer: COMMERCIAL

## 2025-01-14 DIAGNOSIS — T84.410A: ICD-10-CM

## 2025-01-14 DIAGNOSIS — B95.62 METHICILLIN RESISTANT STAPHYLOCOCCUS AUREUS INFECTION AS THE CAUSE OF DISEASES CLASSIFIED ELSEWHERE: ICD-10-CM

## 2025-01-14 DIAGNOSIS — T81.31XA DISRUPTION OF EXTERNAL OPERATION (SURGICAL) WOUND, NOT ELSEWHERE CLASSIFIED, INITIAL ENCOUNTER: ICD-10-CM

## 2025-01-14 LAB
ALBUMIN SERPL BCP-MCNC: 3.8 G/DL (ref 3.4–5)
ALP SERPL-CCNC: 137 U/L (ref 33–136)
ALT SERPL W P-5'-P-CCNC: 57 U/L (ref 10–52)
ANION GAP SERPL CALCULATED.3IONS-SCNC: 10 MMOL/L (ref 10–20)
AST SERPL W P-5'-P-CCNC: 36 U/L (ref 9–39)
BASOPHILS # BLD AUTO: 0.04 X10*3/UL (ref 0–0.1)
BASOPHILS NFR BLD AUTO: 0.5 %
BILIRUB SERPL-MCNC: 0.5 MG/DL (ref 0–1.2)
BUN SERPL-MCNC: 16 MG/DL (ref 6–23)
CALCIUM SERPL-MCNC: 8.2 MG/DL (ref 8.6–10.3)
CHLORIDE SERPL-SCNC: 101 MMOL/L (ref 98–107)
CO2 SERPL-SCNC: 33 MMOL/L (ref 21–32)
CREAT SERPL-MCNC: 0.91 MG/DL (ref 0.5–1.3)
EGFRCR SERPLBLD CKD-EPI 2021: >90 ML/MIN/1.73M*2
EOSINOPHIL # BLD AUTO: 0.24 X10*3/UL (ref 0–0.7)
EOSINOPHIL NFR BLD AUTO: 3.2 %
ERYTHROCYTE [DISTWIDTH] IN BLOOD BY AUTOMATED COUNT: 17.7 % (ref 11.5–14.5)
GLUCOSE SERPL-MCNC: 99 MG/DL (ref 74–99)
HCT VFR BLD AUTO: 36.5 % (ref 41–52)
HGB BLD-MCNC: 11.5 G/DL (ref 13.5–17.5)
IMM GRANULOCYTES # BLD AUTO: 0.01 X10*3/UL (ref 0–0.7)
IMM GRANULOCYTES NFR BLD AUTO: 0.1 % (ref 0–0.9)
LYMPHOCYTES # BLD AUTO: 2.65 X10*3/UL (ref 1.2–4.8)
LYMPHOCYTES NFR BLD AUTO: 35 %
MAGNESIUM SERPL-MCNC: 1.02 MG/DL (ref 1.6–2.4)
MCH RBC QN AUTO: 25.7 PG (ref 26–34)
MCHC RBC AUTO-ENTMCNC: 31.5 G/DL (ref 32–36)
MCV RBC AUTO: 82 FL (ref 80–100)
MONOCYTES # BLD AUTO: 0.63 X10*3/UL (ref 0.1–1)
MONOCYTES NFR BLD AUTO: 8.3 %
NEUTROPHILS # BLD AUTO: 4 X10*3/UL (ref 1.2–7.7)
NEUTROPHILS NFR BLD AUTO: 52.9 %
NRBC BLD-RTO: 0 /100 WBCS (ref 0–0)
PHOSPHATE SERPL-MCNC: 3.6 MG/DL (ref 2.5–4.9)
PLATELET # BLD AUTO: 254 X10*3/UL (ref 150–450)
POTASSIUM SERPL-SCNC: 3.9 MMOL/L (ref 3.5–5.3)
PROT SERPL-MCNC: 5.9 G/DL (ref 6.4–8.2)
RBC # BLD AUTO: 4.48 X10*6/UL (ref 4.5–5.9)
SODIUM SERPL-SCNC: 140 MMOL/L (ref 136–145)
TRIGL SERPL-MCNC: 134 MG/DL (ref 0–149)
WBC # BLD AUTO: 7.6 X10*3/UL (ref 4.4–11.3)

## 2025-01-14 PROCEDURE — 83735 ASSAY OF MAGNESIUM: CPT

## 2025-01-14 PROCEDURE — 84478 ASSAY OF TRIGLYCERIDES: CPT

## 2025-01-14 PROCEDURE — 84134 ASSAY OF PREALBUMIN: CPT

## 2025-01-14 PROCEDURE — G0299 HHS/HOSPICE OF RN EA 15 MIN: HCPCS

## 2025-01-14 PROCEDURE — 85025 COMPLETE CBC W/AUTO DIFF WBC: CPT

## 2025-01-14 PROCEDURE — 80053 COMPREHEN METABOLIC PANEL: CPT

## 2025-01-14 PROCEDURE — 84100 ASSAY OF PHOSPHORUS: CPT

## 2025-01-14 NOTE — CASE COMMUNICATION
Missed home care nurse visit  week of 1/6. cristo had appointment with surgeon and felt no need for nurse home visit

## 2025-01-15 LAB — PREALB SERPL-MCNC: 35.1 MG/DL (ref 18–40)

## 2025-01-16 ENCOUNTER — TELEPHONE (OUTPATIENT)
Dept: SURGERY | Facility: HOSPITAL | Age: 60
End: 2025-01-16
Payer: COMMERCIAL

## 2025-01-16 ENCOUNTER — HOME CARE VISIT (OUTPATIENT)
Dept: HOME HEALTH SERVICES | Facility: HOME HEALTH | Age: 60
End: 2025-01-16
Payer: COMMERCIAL

## 2025-01-16 NOTE — CASE COMMUNICATION
MIssed home care nurse visit. cristo and patsy independent in fistula care. felt no need for my visit. has adequate supplies in home. will follow up next week.

## 2025-01-16 NOTE — TELEPHONE ENCOUNTER
I spoke to Mr. Em via telephone today.   I viewed a photo his wound.   I received a message from his home care nurse that he declined a visit this week as the wound is stable.     Mr. Em does not report many changes.  He is tolerating PO.  He has similar fistula output.  He has the same issues with pouching and local skin irritation.   We reviewed his labs. Bicarb is improved. Cr is slightly increased at 0.91.  Mg remains low.   He continues to take 250 mg of magnesium BID. He is unsure of the formulation.   In terms of his hydration, will continue with 1L of fluid per day.   In terms of magnesium, I will speak to pharmacy to see if there is a PO form that is better or if occasional IV can be given at home.   In terms of his wound, I offered to have his come in for admission next week for a few days or a visit in clinic with the wound team to work on pouching but he declines at this time especially given bad weather that will start soon.

## 2025-01-19 ASSESSMENT — ENCOUNTER SYMPTOMS
PAIN: 1
PAIN LOCATION - PAIN SEVERITY: 4/10
CHANGE IN APPETITE: UNCHANGED
PERSON REPORTING PAIN: PATIENT
MUSCLE WEAKNESS: 1
APPETITE LEVEL: FAIR
FATIGUES EASILY: 1
PAIN LOCATION: ABDOMEN

## 2025-01-20 ENCOUNTER — HOME CARE VISIT (OUTPATIENT)
Dept: HOME HEALTH SERVICES | Facility: HOME HEALTH | Age: 60
End: 2025-01-20
Payer: COMMERCIAL

## 2025-01-20 ENCOUNTER — HOME INFUSION (OUTPATIENT)
Dept: INFUSION THERAPY | Age: 60
End: 2025-01-20

## 2025-01-20 ENCOUNTER — TELEPHONE (OUTPATIENT)
Dept: SURGERY | Facility: HOSPITAL | Age: 60
End: 2025-01-20

## 2025-01-20 VITALS
RESPIRATION RATE: 16 BRPM | TEMPERATURE: 97.9 F | OXYGEN SATURATION: 100 % | DIASTOLIC BLOOD PRESSURE: 80 MMHG | SYSTOLIC BLOOD PRESSURE: 130 MMHG | WEIGHT: 155 LBS | HEART RATE: 70 BPM | BODY MASS INDEX: 23.57 KG/M2

## 2025-01-20 DIAGNOSIS — K63.2 ENTEROCUTANEOUS FISTULA: ICD-10-CM

## 2025-01-20 DIAGNOSIS — E83.42 HYPOMAGNESEMIA: Primary | ICD-10-CM

## 2025-01-20 PROCEDURE — 84100 ASSAY OF PHOSPHORUS: CPT

## 2025-01-20 PROCEDURE — 84478 ASSAY OF TRIGLYCERIDES: CPT

## 2025-01-20 PROCEDURE — 85025 COMPLETE CBC W/AUTO DIFF WBC: CPT

## 2025-01-20 PROCEDURE — 84134 ASSAY OF PREALBUMIN: CPT

## 2025-01-20 PROCEDURE — G0299 HHS/HOSPICE OF RN EA 15 MIN: HCPCS

## 2025-01-20 PROCEDURE — 83735 ASSAY OF MAGNESIUM: CPT

## 2025-01-20 PROCEDURE — 80053 COMPREHEN METABOLIC PANEL: CPT

## 2025-01-20 RX ORDER — OXYCODONE HYDROCHLORIDE 5 MG/1
5 TABLET ORAL EVERY 6 HOURS PRN
Qty: 20 TABLET | Refills: 0 | Status: SHIPPED | OUTPATIENT
Start: 2025-01-20

## 2025-01-20 RX ORDER — MAGNESIUM SULFATE 4 G/50ML
4 INJECTION INTRAVENOUS
Qty: 200 ML | Refills: 11 | Status: SHIPPED
Start: 2025-01-20 | End: 2026-01-20

## 2025-01-20 NOTE — TELEPHONE ENCOUNTER
Mr. Em's magnesium level today is 0.95.  He continues to take his PO magnesium.   I called him and asked him to go to the ED for magnesium repletion IV.    I have messaged with pharmacy to see if there is a way to get home magnesium IV for him.    I will renew his oxycodone prescription to decrease his fistula output and treat his abdominal wall pain.   The remainder of his labs are improved with bicarb of 30 from 33 and creatinine of 0.87.

## 2025-01-20 NOTE — PROGRESS NOTES
Bereket Em is receiving Lactated Ringer's for daily infusion.     Followed by Dr Perry. MD contatcted Pharmacy due to critically low magnesium.  MD was hoping that patient would got to ED for bolus, but patient preferred home dose. Orders given for magnesium 4Gm IV q7 days with nursing to give teach. Pharmacy will continue as long as magnesium is low/normal. Orders placed in EPIC and routed to Team.     RX contacted pt, He is agreeable to delivery 1/20 for another week of hydration plus one magnesium bag and supplies to match. Pt is aware national supply of LR is low so pharmacy will continue to provide as able with current allocations. Understands that Nursing will call with schedule for mag infusion.     Dispensing 1/20 and deliver with supplies to match:  7x LR gravity  DOS 1/22 - 1/28     1x mag bag for dispense 1/200 and deliver with CADD pump/bag and supplies to match  DOS 1/21-01/27     Follow up 1/27 check labs and needs with pt, deliver LR and Mag as appropriate.

## 2025-01-21 ASSESSMENT — ENCOUNTER SYMPTOMS
CHANGE IN APPETITE: UNCHANGED
PERSON REPORTING PAIN: PATIENT

## 2025-01-22 ENCOUNTER — HOME CARE VISIT (OUTPATIENT)
Dept: HOME HEALTH SERVICES | Facility: HOME HEALTH | Age: 60
End: 2025-01-22
Payer: COMMERCIAL

## 2025-01-22 ASSESSMENT — ENCOUNTER SYMPTOMS
PAIN LOCATION: ABDOMEN
APPETITE LEVEL: FAIR
PAIN LOCATION - PAIN SEVERITY: 4/10
FATIGUES EASILY: 1
PAIN: 1
MUSCLE WEAKNESS: 1

## 2025-01-24 ENCOUNTER — TELEPHONE (OUTPATIENT)
Dept: OPERATING ROOM | Facility: HOSPITAL | Age: 60
End: 2025-01-24
Payer: COMMERCIAL

## 2025-01-26 ENCOUNTER — TELEPHONE (OUTPATIENT)
Dept: SURGERY | Facility: HOSPITAL | Age: 60
End: 2025-01-26
Payer: COMMERCIAL

## 2025-01-27 ENCOUNTER — HOME CARE VISIT (OUTPATIENT)
Dept: HOME HEALTH SERVICES | Facility: HOME HEALTH | Age: 60
End: 2025-01-27
Payer: COMMERCIAL

## 2025-01-27 ENCOUNTER — LAB (OUTPATIENT)
Dept: LAB | Facility: HOSPITAL | Age: 60
End: 2025-01-27
Payer: COMMERCIAL

## 2025-01-27 ENCOUNTER — HOME INFUSION (OUTPATIENT)
Dept: INFUSION THERAPY | Age: 60
End: 2025-01-27
Payer: COMMERCIAL

## 2025-01-27 VITALS
TEMPERATURE: 97.1 F | DIASTOLIC BLOOD PRESSURE: 54 MMHG | SYSTOLIC BLOOD PRESSURE: 100 MMHG | OXYGEN SATURATION: 99 % | HEART RATE: 64 BPM | RESPIRATION RATE: 16 BRPM

## 2025-01-27 DIAGNOSIS — K57.30 DIVERTICULOSIS OF LARGE INTESTINE WITHOUT HEMORRHAGE: ICD-10-CM

## 2025-01-27 LAB
ALBUMIN SERPL BCP-MCNC: 3.8 G/DL (ref 3.4–5)
ALP SERPL-CCNC: 127 U/L (ref 33–136)
ALT SERPL W P-5'-P-CCNC: 40 U/L (ref 10–52)
ANION GAP SERPL CALC-SCNC: 13 MMOL/L (ref 10–20)
AST SERPL W P-5'-P-CCNC: 30 U/L (ref 9–39)
BASOPHILS # BLD AUTO: 0.05 X10*3/UL (ref 0–0.1)
BASOPHILS NFR BLD AUTO: 0.6 %
BILIRUB SERPL-MCNC: 0.5 MG/DL (ref 0–1.2)
BUN SERPL-MCNC: 18 MG/DL (ref 6–23)
CALCIUM SERPL-MCNC: 9.4 MG/DL (ref 8.6–10.6)
CHLORIDE SERPL-SCNC: 98 MMOL/L (ref 98–107)
CO2 SERPL-SCNC: 32 MMOL/L (ref 21–32)
CREAT SERPL-MCNC: 0.87 MG/DL (ref 0.5–1.3)
EGFRCR SERPLBLD CKD-EPI 2021: >90 ML/MIN/1.73M*2
EOSINOPHIL # BLD AUTO: 0.25 X10*3/UL (ref 0–0.7)
EOSINOPHIL NFR BLD AUTO: 2.8 %
ERYTHROCYTE [DISTWIDTH] IN BLOOD BY AUTOMATED COUNT: 16.9 % (ref 11.5–14.5)
GLUCOSE SERPL-MCNC: 128 MG/DL (ref 74–99)
HCT VFR BLD AUTO: 39.9 % (ref 41–52)
HGB BLD-MCNC: 12.6 G/DL (ref 13.5–17.5)
IMM GRANULOCYTES # BLD AUTO: 0.03 X10*3/UL (ref 0–0.7)
IMM GRANULOCYTES NFR BLD AUTO: 0.3 % (ref 0–0.9)
LYMPHOCYTES # BLD AUTO: 2.01 X10*3/UL (ref 1.2–4.8)
LYMPHOCYTES NFR BLD AUTO: 22.4 %
MAGNESIUM SERPL-MCNC: 1.58 MG/DL (ref 1.6–2.4)
MCH RBC QN AUTO: 26 PG (ref 26–34)
MCHC RBC AUTO-ENTMCNC: 31.6 G/DL (ref 32–36)
MCV RBC AUTO: 82 FL (ref 80–100)
MONOCYTES # BLD AUTO: 0.61 X10*3/UL (ref 0.1–1)
MONOCYTES NFR BLD AUTO: 6.8 %
NEUTROPHILS # BLD AUTO: 6.03 X10*3/UL (ref 1.2–7.7)
NEUTROPHILS NFR BLD AUTO: 67.1 %
NRBC BLD-RTO: 0 /100 WBCS (ref 0–0)
PHOSPHATE SERPL-MCNC: 2.8 MG/DL (ref 2.5–4.9)
PLATELET # BLD AUTO: 157 X10*3/UL (ref 150–450)
POTASSIUM SERPL-SCNC: 3.8 MMOL/L (ref 3.5–5.3)
PROT SERPL-MCNC: 6.2 G/DL (ref 6.4–8.2)
RBC # BLD AUTO: 4.85 X10*6/UL (ref 4.5–5.9)
SODIUM SERPL-SCNC: 139 MMOL/L (ref 136–145)
WBC # BLD AUTO: 9 X10*3/UL (ref 4.4–11.3)

## 2025-01-27 PROCEDURE — 85025 COMPLETE CBC W/AUTO DIFF WBC: CPT

## 2025-01-27 PROCEDURE — 84100 ASSAY OF PHOSPHORUS: CPT

## 2025-01-27 PROCEDURE — 83735 ASSAY OF MAGNESIUM: CPT

## 2025-01-27 PROCEDURE — G0299 HHS/HOSPICE OF RN EA 15 MIN: HCPCS

## 2025-01-27 PROCEDURE — 80053 COMPREHEN METABOLIC PANEL: CPT

## 2025-01-27 NOTE — TELEPHONE ENCOUNTER
I spoke to Mr. Em today. He is doing well. Tolerating PO. Stable ostomy output. Only pain is with the irritation from some leakage/seal. Await labs tomorrow to see about hydration and magnesium.

## 2025-01-27 NOTE — PROGRESS NOTES
Bereket Em is receiving Lactated Ringer's for daily infusion.     Followed by Dr Perry. Last week, MD contacted Pharmacy due to critically low magnesium.  MD was hoping that patient would got to ED for bolus, but patient preferred home dose. Orders given for magnesium 4Gm IV q7 days with nursing to give teach. Pharmacy will continue as long as magnesium is low/normal.     Labs from 1/27 reviewed, mag level 1.58 (borderline). Otherwise labs generally stable. Secure chat to Dr. Perry, ok to deliver another dose and check back next week.    Tel call to patient, infusions are going fine, no issues. Agreeable to delivery any time tomorrow of LR, magnesium, and supplies to match.  to call on the way.     Dispensing 1/28 and deliver with supplies to match:  7x LR gravity  DOS 1/29 - 2/4     1x mag bag for dispense 1/28 and deliver with supplies to match  DOS 1/29 - 2/4     Follow up 2/3 check labs and needs with pt, deliver LR and Mag OVN as appropriate.

## 2025-01-28 ASSESSMENT — ENCOUNTER SYMPTOMS
APPETITE LEVEL: GOOD
LAST BOWEL MOVEMENT: 67232
CHANGE IN APPETITE: UNCHANGED

## 2025-01-29 ENCOUNTER — TELEPHONE (OUTPATIENT)
Dept: SURGERY | Facility: HOSPITAL | Age: 60
End: 2025-01-29
Payer: COMMERCIAL

## 2025-01-29 DIAGNOSIS — K63.2 ENTEROCUTANEOUS FISTULA: ICD-10-CM

## 2025-01-29 RX ORDER — OXYCODONE HYDROCHLORIDE 5 MG/1
5 TABLET ORAL EVERY 6 HOURS PRN
Qty: 20 TABLET | Refills: 0 | Status: SHIPPED | OUTPATIENT
Start: 2025-01-29

## 2025-01-30 ENCOUNTER — HOME CARE VISIT (OUTPATIENT)
Dept: HOME HEALTH SERVICES | Facility: HOME HEALTH | Age: 60
End: 2025-01-30
Payer: COMMERCIAL

## 2025-01-30 NOTE — TELEPHONE ENCOUNTER
I spoke with Mr. Em via telephone.   He received his magnesium infusion.   He continues to have high output from his fistula but his creatinine is stable at 0.87 and bicarb is WNLs at 32.  Albumin is 3.2.   I reviewed the most recent photo of his fistula and wound in the chart. He reports it continues to shrink but there still skin irritation.   To help slow his fistula output and treat skin pain, oxycodone reordered (5 mg #20).   Will coordinate follow up in the clinic for wound reassessment and help from wound care nurses in the next 1-2 weeks.

## 2025-02-02 ASSESSMENT — ENCOUNTER SYMPTOMS
PAIN LOCATION: ABDOMEN
PERSON REPORTING PAIN: PATIENT
PAIN: 1
APPETITE LEVEL: FAIR
FATIGUES EASILY: 1
PAIN LOCATION - PAIN SEVERITY: 4/10
MUSCLE WEAKNESS: 1
CHANGE IN APPETITE: UNCHANGED

## 2025-02-02 ASSESSMENT — ACTIVITIES OF DAILY LIVING (ADL)
ENTERING_EXITING_HOME: ONE PERSON
OASIS_M1830: 05

## 2025-02-03 ENCOUNTER — HOME INFUSION (OUTPATIENT)
Dept: INFUSION THERAPY | Age: 60
End: 2025-02-03
Payer: COMMERCIAL

## 2025-02-03 NOTE — PROGRESS NOTES
Bereket Em is receiving Lactated Ringer's for daily infusion and magnesium 4 gm once weekly.     Followed by Dr Perry.     No new labs since 1/27 - next RN visit moved to 2/4  Pharmacy will continue with weekly delivery and check labs when available from [employer].     Dispensing 2/3 and deliver 2/4 with supplies to match:  7x LR gravity   1x magnesium 4 gm pump bag  DOS 2/5 - 2/11     Follow up 2/10 check needs with pt, check labs, deliver as appropriate.

## 2025-02-04 ENCOUNTER — HOME CARE VISIT (OUTPATIENT)
Dept: HOME HEALTH SERVICES | Facility: HOME HEALTH | Age: 60
End: 2025-02-04
Payer: COMMERCIAL

## 2025-02-04 ENCOUNTER — LAB REQUISITION (OUTPATIENT)
Dept: LAB | Facility: HOSPITAL | Age: 60
End: 2025-02-04
Payer: COMMERCIAL

## 2025-02-04 VITALS
SYSTOLIC BLOOD PRESSURE: 102 MMHG | RESPIRATION RATE: 16 BRPM | DIASTOLIC BLOOD PRESSURE: 56 MMHG | HEART RATE: 52 BPM | TEMPERATURE: 98.2 F | OXYGEN SATURATION: 97 %

## 2025-02-04 DIAGNOSIS — T81.31XA DISRUPTION OF EXTERNAL OPERATION (SURGICAL) WOUND, NOT ELSEWHERE CLASSIFIED, INITIAL ENCOUNTER: ICD-10-CM

## 2025-02-04 DIAGNOSIS — T84.410A: ICD-10-CM

## 2025-02-04 LAB
ALBUMIN SERPL BCP-MCNC: 3.8 G/DL (ref 3.4–5)
ALP SERPL-CCNC: 113 U/L (ref 33–136)
ALT SERPL W P-5'-P-CCNC: 40 U/L (ref 10–52)
ANION GAP SERPL CALCULATED.3IONS-SCNC: 11 MMOL/L (ref 10–20)
AST SERPL W P-5'-P-CCNC: 22 U/L (ref 9–39)
BASOPHILS # BLD AUTO: 0.07 X10*3/UL (ref 0–0.1)
BASOPHILS NFR BLD AUTO: 0.8 %
BILIRUB SERPL-MCNC: 0.6 MG/DL (ref 0–1.2)
BUN SERPL-MCNC: 15 MG/DL (ref 6–23)
CALCIUM SERPL-MCNC: 9.1 MG/DL (ref 8.6–10.3)
CHLORIDE SERPL-SCNC: 100 MMOL/L (ref 98–107)
CO2 SERPL-SCNC: 31 MMOL/L (ref 21–32)
CREAT SERPL-MCNC: 0.88 MG/DL (ref 0.5–1.3)
EGFRCR SERPLBLD CKD-EPI 2021: >90 ML/MIN/1.73M*2
EOSINOPHIL # BLD AUTO: 0.36 X10*3/UL (ref 0–0.7)
EOSINOPHIL NFR BLD AUTO: 4.1 %
ERYTHROCYTE [DISTWIDTH] IN BLOOD BY AUTOMATED COUNT: 16.7 % (ref 11.5–14.5)
GLUCOSE SERPL-MCNC: 90 MG/DL (ref 74–99)
HCT VFR BLD AUTO: 40.7 % (ref 41–52)
HGB BLD-MCNC: 13.1 G/DL (ref 13.5–17.5)
IMM GRANULOCYTES # BLD AUTO: 0.02 X10*3/UL (ref 0–0.7)
IMM GRANULOCYTES NFR BLD AUTO: 0.2 % (ref 0–0.9)
LYMPHOCYTES # BLD AUTO: 2.37 X10*3/UL (ref 1.2–4.8)
LYMPHOCYTES NFR BLD AUTO: 27 %
MAGNESIUM SERPL-MCNC: 1.52 MG/DL (ref 1.6–2.4)
MCH RBC QN AUTO: 27.1 PG (ref 26–34)
MCHC RBC AUTO-ENTMCNC: 32.2 G/DL (ref 32–36)
MCV RBC AUTO: 84 FL (ref 80–100)
MONOCYTES # BLD AUTO: 0.73 X10*3/UL (ref 0.1–1)
MONOCYTES NFR BLD AUTO: 8.3 %
NEUTROPHILS # BLD AUTO: 5.22 X10*3/UL (ref 1.2–7.7)
NEUTROPHILS NFR BLD AUTO: 59.6 %
NRBC BLD-RTO: 0 /100 WBCS (ref 0–0)
PHOSPHATE SERPL-MCNC: 3.2 MG/DL (ref 2.5–4.9)
PLATELET # BLD AUTO: 266 X10*3/UL (ref 150–450)
POTASSIUM SERPL-SCNC: 4.4 MMOL/L (ref 3.5–5.3)
PROT SERPL-MCNC: 6.3 G/DL (ref 6.4–8.2)
RBC # BLD AUTO: 4.83 X10*6/UL (ref 4.5–5.9)
RBC MORPH BLD: NORMAL
SODIUM SERPL-SCNC: 138 MMOL/L (ref 136–145)
TRIGL SERPL-MCNC: 97 MG/DL (ref 0–149)
WBC # BLD AUTO: 8.8 X10*3/UL (ref 4.4–11.3)

## 2025-02-04 PROCEDURE — 84478 ASSAY OF TRIGLYCERIDES: CPT

## 2025-02-04 PROCEDURE — 84478 ASSAY OF TRIGLYCERIDES: CPT | Mod: OUT | Performed by: SURGERY

## 2025-02-04 PROCEDURE — 84100 ASSAY OF PHOSPHORUS: CPT

## 2025-02-04 PROCEDURE — 80053 COMPREHEN METABOLIC PANEL: CPT

## 2025-02-04 PROCEDURE — 85025 COMPLETE CBC W/AUTO DIFF WBC: CPT

## 2025-02-04 PROCEDURE — 83735 ASSAY OF MAGNESIUM: CPT

## 2025-02-04 PROCEDURE — 83735 ASSAY OF MAGNESIUM: CPT | Mod: OUT | Performed by: SURGERY

## 2025-02-04 PROCEDURE — 85025 COMPLETE CBC W/AUTO DIFF WBC: CPT | Mod: OUT | Performed by: SURGERY

## 2025-02-04 PROCEDURE — 80053 COMPREHEN METABOLIC PANEL: CPT | Mod: OUT | Performed by: SURGERY

## 2025-02-04 PROCEDURE — G0299 HHS/HOSPICE OF RN EA 15 MIN: HCPCS

## 2025-02-04 PROCEDURE — 400014 HH F/U

## 2025-02-04 PROCEDURE — 84100 ASSAY OF PHOSPHORUS: CPT | Mod: OUT | Performed by: SURGERY

## 2025-02-04 PROCEDURE — 84134 ASSAY OF PREALBUMIN: CPT | Mod: OUT,WESLAB | Performed by: SURGERY

## 2025-02-04 PROCEDURE — 84134 ASSAY OF PREALBUMIN: CPT

## 2025-02-05 LAB — PREALB SERPL-MCNC: 28.5 MG/DL (ref 18–40)

## 2025-02-06 ENCOUNTER — CLINICAL SUPPORT (OUTPATIENT)
Dept: SURGERY | Facility: CLINIC | Age: 60
End: 2025-02-06
Payer: COMMERCIAL

## 2025-02-06 VITALS
HEIGHT: 68 IN | WEIGHT: 151 LBS | DIASTOLIC BLOOD PRESSURE: 80 MMHG | RESPIRATION RATE: 16 BRPM | HEART RATE: 71 BPM | BODY MASS INDEX: 22.88 KG/M2 | SYSTOLIC BLOOD PRESSURE: 123 MMHG

## 2025-02-06 RX ORDER — NAPROXEN 250 MG/1
250 TABLET ORAL
COMMUNITY

## 2025-02-06 ASSESSMENT — PAIN SCALES - GENERAL: PAINLEVEL_OUTOF10: 0-NO PAIN

## 2025-02-06 NOTE — PROGRESS NOTES
Patient seen by wound care team member Virgie Sharp in clinic.    Skin  Remove appliance  Clean skin  Soak the skin with Dombero  Stoma powder  Cavalon  Then apply wound pouch    Wound Pouching  Four inch ring   Convex oval ring # [id number]  Adilene iron shaped wound pouch   Barrier strips and paste

## 2025-02-07 NOTE — CASE COMMUNICATION
missed home care nurse visit week of 1/27/2025 cristo and oscar hooks doing pouching.   did sample ostoform ring to see if that will help guide outpout into pouch. can return when it arrives if needed.

## 2025-02-09 ASSESSMENT — ENCOUNTER SYMPTOMS
PAIN LOCATION - PAIN SEVERITY: 4/10
MUSCLE WEAKNESS: 1
PERSON REPORTING PAIN: PATIENT
APPETITE LEVEL: FAIR
CHANGE IN APPETITE: UNCHANGED
PAIN: 1
PAIN LOCATION: ABDOMEN
FATIGUES EASILY: 1

## 2025-02-10 ENCOUNTER — HOME INFUSION (OUTPATIENT)
Dept: INFUSION THERAPY | Age: 60
End: 2025-02-10
Payer: COMMERCIAL

## 2025-02-10 ENCOUNTER — HOME CARE VISIT (OUTPATIENT)
Dept: HOME HEALTH SERVICES | Facility: HOME HEALTH | Age: 60
End: 2025-02-10
Payer: COMMERCIAL

## 2025-02-10 ENCOUNTER — LAB REQUISITION (OUTPATIENT)
Dept: LAB | Facility: HOSPITAL | Age: 60
End: 2025-02-10
Payer: COMMERCIAL

## 2025-02-10 ENCOUNTER — TELEPHONE (OUTPATIENT)
Dept: SURGERY | Facility: HOSPITAL | Age: 60
End: 2025-02-10
Payer: COMMERCIAL

## 2025-02-10 VITALS
HEART RATE: 60 BPM | RESPIRATION RATE: 16 BRPM | TEMPERATURE: 98.1 F | BODY MASS INDEX: 23.42 KG/M2 | SYSTOLIC BLOOD PRESSURE: 100 MMHG | WEIGHT: 154 LBS | OXYGEN SATURATION: 100 % | DIASTOLIC BLOOD PRESSURE: 58 MMHG

## 2025-02-10 DIAGNOSIS — T84.410A: ICD-10-CM

## 2025-02-10 DIAGNOSIS — T81.31XA DISRUPTION OF EXTERNAL OPERATION (SURGICAL) WOUND, NOT ELSEWHERE CLASSIFIED, INITIAL ENCOUNTER: ICD-10-CM

## 2025-02-10 DIAGNOSIS — L98.8 FISTULA: Primary | ICD-10-CM

## 2025-02-10 LAB
ALBUMIN SERPL BCP-MCNC: 3.7 G/DL (ref 3.4–5)
ALP SERPL-CCNC: 116 U/L (ref 33–136)
ALT SERPL W P-5'-P-CCNC: 34 U/L (ref 10–52)
ANION GAP SERPL CALCULATED.3IONS-SCNC: 12 MMOL/L (ref 10–20)
AST SERPL W P-5'-P-CCNC: 26 U/L (ref 9–39)
BASOPHILS # BLD AUTO: 0.05 X10*3/UL (ref 0–0.1)
BASOPHILS NFR BLD AUTO: 0.7 %
BILIRUB SERPL-MCNC: 0.5 MG/DL (ref 0–1.2)
BUN SERPL-MCNC: 14 MG/DL (ref 6–23)
CALCIUM SERPL-MCNC: 9.2 MG/DL (ref 8.6–10.3)
CHLORIDE SERPL-SCNC: 103 MMOL/L (ref 98–107)
CO2 SERPL-SCNC: 28 MMOL/L (ref 21–32)
CREAT SERPL-MCNC: 0.88 MG/DL (ref 0.5–1.3)
EGFRCR SERPLBLD CKD-EPI 2021: >90 ML/MIN/1.73M*2
EOSINOPHIL # BLD AUTO: 0.29 X10*3/UL (ref 0–0.7)
EOSINOPHIL NFR BLD AUTO: 4 %
ERYTHROCYTE [DISTWIDTH] IN BLOOD BY AUTOMATED COUNT: 15.9 % (ref 11.5–14.5)
GLUCOSE SERPL-MCNC: 98 MG/DL (ref 74–99)
HCT VFR BLD AUTO: 39.9 % (ref 41–52)
HGB BLD-MCNC: 12.8 G/DL (ref 13.5–17.5)
IMM GRANULOCYTES # BLD AUTO: 0.02 X10*3/UL (ref 0–0.7)
IMM GRANULOCYTES NFR BLD AUTO: 0.3 % (ref 0–0.9)
LYMPHOCYTES # BLD AUTO: 1.8 X10*3/UL (ref 1.2–4.8)
LYMPHOCYTES NFR BLD AUTO: 25.1 %
MAGNESIUM SERPL-MCNC: 1.59 MG/DL (ref 1.6–2.4)
MCH RBC QN AUTO: 27.2 PG (ref 26–34)
MCHC RBC AUTO-ENTMCNC: 32.1 G/DL (ref 32–36)
MCV RBC AUTO: 85 FL (ref 80–100)
MONOCYTES # BLD AUTO: 0.52 X10*3/UL (ref 0.1–1)
MONOCYTES NFR BLD AUTO: 7.2 %
NEUTROPHILS # BLD AUTO: 4.5 X10*3/UL (ref 1.2–7.7)
NEUTROPHILS NFR BLD AUTO: 62.7 %
NRBC BLD-RTO: 0 /100 WBCS (ref 0–0)
PHOSPHATE SERPL-MCNC: 3 MG/DL (ref 2.5–4.9)
PLATELET # BLD AUTO: 231 X10*3/UL (ref 150–450)
POTASSIUM SERPL-SCNC: 3.8 MMOL/L (ref 3.5–5.3)
PREALB SERPL-MCNC: 29.3 MG/DL (ref 18–40)
PROT SERPL-MCNC: 6 G/DL (ref 6.4–8.2)
RBC # BLD AUTO: 4.7 X10*6/UL (ref 4.5–5.9)
SODIUM SERPL-SCNC: 139 MMOL/L (ref 136–145)
TRIGL SERPL-MCNC: 124 MG/DL (ref 0–149)
WBC # BLD AUTO: 7.2 X10*3/UL (ref 4.4–11.3)

## 2025-02-10 PROCEDURE — G0299 HHS/HOSPICE OF RN EA 15 MIN: HCPCS

## 2025-02-10 PROCEDURE — 84100 ASSAY OF PHOSPHORUS: CPT

## 2025-02-10 PROCEDURE — 84134 ASSAY OF PREALBUMIN: CPT

## 2025-02-10 PROCEDURE — 80053 COMPREHEN METABOLIC PANEL: CPT

## 2025-02-10 PROCEDURE — 84478 ASSAY OF TRIGLYCERIDES: CPT

## 2025-02-10 PROCEDURE — 83735 ASSAY OF MAGNESIUM: CPT

## 2025-02-10 PROCEDURE — 85025 COMPLETE CBC W/AUTO DIFF WBC: CPT

## 2025-02-10 RX ORDER — TRAMADOL HYDROCHLORIDE 50 MG/1
50 TABLET ORAL EVERY 6 HOURS PRN
Qty: 15 TABLET | Refills: 0 | Status: SHIPPED | OUTPATIENT
Start: 2025-02-10 | End: 2025-02-17

## 2025-02-10 RX ORDER — DIPHENOXYLATE HYDROCHLORIDE AND ATROPINE SULFATE 2.5; .025 MG/1; MG/1
2 TABLET ORAL
Qty: 180 TABLET | Refills: 1 | Status: SHIPPED | OUTPATIENT
Start: 2025-02-10 | End: 2025-04-11

## 2025-02-10 NOTE — PROGRESS NOTES
Bereket Em is receiving Lactated Ringer's for daily infusion and magnesium 4 gm once weekly.     Followed by Dr Perry.     2/4 labs reviewed - mag 1.52, otherwise unremarkable. Pharmacy will continue with weekly magnesium delivery.    [facility] spoke with pt, confirmed doses on hand thru Tues 2/11 and is agreeable to delivery that day for another week of meds and supplies/flushes to match. No questions for [facility] at this time.     Mixing 2/10 and delivering 2/11 with supplies to match:  7x LR gravity   1x magnesium 4 gm pump bag  DOS 2/12-2/18     Follow up 2/17 check needs with pt, check labs, deliver as appropriate.

## 2025-02-11 NOTE — TELEPHONE ENCOUNTER
I spoke with Mr. Em via telephone in the evening of 02/10/25.  He is tolerating PO and maintaining his weight at ~ 155.   He continues to report leakage around his appliance and associated pain. I coordinated him coming to clinic to see wound care last week for further input and assistance, but I was not available to see him at that time. See that note.   He would like to try another opiate for both pain control and decreased fistula output and because of insurance coverage. After conversation about options, will trial tramadol.   His labs remain stable. Cr 0.88, bicarb 28.  Albumin is 3.7. Mg 1.5.   To help slow his fistula output and treat skin pain, tramadol ordered. OARRS reviewed and prior oxycodone discontinued.   Lomotil reordered.   Will continue with 1L IVF daily and weekly 4 g magnesium IV.   Will continue with ~ weekly telephone conversations to discuss progress and options.

## 2025-02-12 ENCOUNTER — HOME CARE VISIT (OUTPATIENT)
Dept: HOME HEALTH SERVICES | Facility: HOME HEALTH | Age: 60
End: 2025-02-12
Payer: COMMERCIAL

## 2025-02-12 VITALS
HEART RATE: 78 BPM | TEMPERATURE: 98 F | DIASTOLIC BLOOD PRESSURE: 70 MMHG | SYSTOLIC BLOOD PRESSURE: 130 MMHG | RESPIRATION RATE: 16 BRPM

## 2025-02-12 PROCEDURE — G0299 HHS/HOSPICE OF RN EA 15 MIN: HCPCS

## 2025-02-12 ASSESSMENT — ENCOUNTER SYMPTOMS
PAIN: 1
PERSON REPORTING PAIN: PATIENT
PAIN LOCATION: ABDOMEN
APPETITE LEVEL: GOOD

## 2025-02-13 NOTE — HOME HEALTH
PRN visit made to instruct on ostoform ring.    [facility] home nursing visit   known to sn   stoma type: colostomy and mid abd fistula   size: 1 3/8 x 1 1/8   accessories used: remover   picc rt arm without concern.   pouched the fistula.  today we did domboro soak.   after using remover to take off pouches.   perifistular skin red and very painful.   powder and skin barrier film.  paste.   half of 4in ring and used on the bottom and used a piece ring by the outlet and umbilicus.   added ostoform  at first by outet , then it did not stick well so moved alec 6 oclock. hoping it will make a path for the effluent.   fistula measures approx 3 x 2.5 inches. message to [facility] to see if  they have a convex pouch  that will fit.

## 2025-02-13 NOTE — CASE COMMUNICATION
went out to see patient today, we are trying ostoform for his fistula.   not optomoistic but worth a try.     i am going to try to find a convex barrier that will fit .   but area is about 3inchs wide.   may be pretty difficult.     i can return if needed.   An

## 2025-02-16 ASSESSMENT — ENCOUNTER SYMPTOMS
APPETITE LEVEL: FAIR
MUSCLE WEAKNESS: 1
PAIN LOCATION - PAIN SEVERITY: 4/10
FATIGUES EASILY: 1
CHANGE IN APPETITE: UNCHANGED
PAIN: 1
PERSON REPORTING PAIN: PATIENT
PAIN LOCATION: ABDOMEN

## 2025-02-17 ENCOUNTER — LAB REQUISITION (OUTPATIENT)
Dept: LAB | Facility: HOSPITAL | Age: 60
End: 2025-02-17
Payer: COMMERCIAL

## 2025-02-17 ENCOUNTER — HOME CARE VISIT (OUTPATIENT)
Dept: HOME HEALTH SERVICES | Facility: HOME HEALTH | Age: 60
End: 2025-02-17
Payer: COMMERCIAL

## 2025-02-17 ENCOUNTER — HOME INFUSION (OUTPATIENT)
Dept: INFUSION THERAPY | Age: 60
End: 2025-02-17
Payer: COMMERCIAL

## 2025-02-17 VITALS
SYSTOLIC BLOOD PRESSURE: 106 MMHG | OXYGEN SATURATION: 100 % | DIASTOLIC BLOOD PRESSURE: 68 MMHG | TEMPERATURE: 97.8 F | HEART RATE: 82 BPM | RESPIRATION RATE: 16 BRPM

## 2025-02-17 DIAGNOSIS — T81.31XA DISRUPTION OF EXTERNAL OPERATION (SURGICAL) WOUND, NOT ELSEWHERE CLASSIFIED, INITIAL ENCOUNTER: ICD-10-CM

## 2025-02-17 DIAGNOSIS — T84.410A: ICD-10-CM

## 2025-02-17 LAB
ALBUMIN SERPL BCP-MCNC: 3.9 G/DL (ref 3.4–5)
ALP SERPL-CCNC: 107 U/L (ref 33–136)
ALT SERPL W P-5'-P-CCNC: 25 U/L (ref 10–52)
ANION GAP SERPL CALCULATED.3IONS-SCNC: 15 MMOL/L (ref 10–20)
AST SERPL W P-5'-P-CCNC: 24 U/L (ref 9–39)
BASOPHILS # BLD AUTO: 0.07 X10*3/UL (ref 0–0.1)
BASOPHILS NFR BLD AUTO: 0.8 %
BILIRUB SERPL-MCNC: 0.7 MG/DL (ref 0–1.2)
BUN SERPL-MCNC: 16 MG/DL (ref 6–23)
CALCIUM SERPL-MCNC: 9.4 MG/DL (ref 8.6–10.3)
CHLORIDE SERPL-SCNC: 97 MMOL/L (ref 98–107)
CO2 SERPL-SCNC: 27 MMOL/L (ref 21–32)
CREAT SERPL-MCNC: 0.87 MG/DL (ref 0.5–1.3)
EGFRCR SERPLBLD CKD-EPI 2021: >90 ML/MIN/1.73M*2
EOSINOPHIL # BLD AUTO: 0.27 X10*3/UL (ref 0–0.7)
EOSINOPHIL NFR BLD AUTO: 3.1 %
ERYTHROCYTE [DISTWIDTH] IN BLOOD BY AUTOMATED COUNT: 15.2 % (ref 11.5–14.5)
GLUCOSE SERPL-MCNC: 97 MG/DL (ref 74–99)
HCT VFR BLD AUTO: 42.6 % (ref 41–52)
HGB BLD-MCNC: 13.7 G/DL (ref 13.5–17.5)
IMM GRANULOCYTES # BLD AUTO: 0.03 X10*3/UL (ref 0–0.7)
IMM GRANULOCYTES NFR BLD AUTO: 0.3 % (ref 0–0.9)
LYMPHOCYTES # BLD AUTO: 1.81 X10*3/UL (ref 1.2–4.8)
LYMPHOCYTES NFR BLD AUTO: 20.5 %
MAGNESIUM SERPL-MCNC: 1.68 MG/DL (ref 1.6–2.4)
MCH RBC QN AUTO: 26.7 PG (ref 26–34)
MCHC RBC AUTO-ENTMCNC: 32.2 G/DL (ref 32–36)
MCV RBC AUTO: 83 FL (ref 80–100)
MONOCYTES # BLD AUTO: 0.68 X10*3/UL (ref 0.1–1)
MONOCYTES NFR BLD AUTO: 7.7 %
NEUTROPHILS # BLD AUTO: 5.95 X10*3/UL (ref 1.2–7.7)
NEUTROPHILS NFR BLD AUTO: 67.6 %
NRBC BLD-RTO: 0 /100 WBCS (ref 0–0)
PHOSPHATE SERPL-MCNC: 3.2 MG/DL (ref 2.5–4.9)
PLATELET # BLD AUTO: 220 X10*3/UL (ref 150–450)
POTASSIUM SERPL-SCNC: 3.9 MMOL/L (ref 3.5–5.3)
PREALB SERPL-MCNC: 32.6 MG/DL (ref 18–40)
PROT SERPL-MCNC: 6.5 G/DL (ref 6.4–8.2)
RBC # BLD AUTO: 5.14 X10*6/UL (ref 4.5–5.9)
SODIUM SERPL-SCNC: 135 MMOL/L (ref 136–145)
TRIGL SERPL-MCNC: 124 MG/DL (ref 0–149)
WBC # BLD AUTO: 8.8 X10*3/UL (ref 4.4–11.3)

## 2025-02-17 PROCEDURE — G0299 HHS/HOSPICE OF RN EA 15 MIN: HCPCS

## 2025-02-17 PROCEDURE — 83735 ASSAY OF MAGNESIUM: CPT

## 2025-02-17 PROCEDURE — 84134 ASSAY OF PREALBUMIN: CPT

## 2025-02-17 PROCEDURE — 84100 ASSAY OF PHOSPHORUS: CPT

## 2025-02-17 PROCEDURE — 84478 ASSAY OF TRIGLYCERIDES: CPT

## 2025-02-17 PROCEDURE — 85025 COMPLETE CBC W/AUTO DIFF WBC: CPT

## 2025-02-17 PROCEDURE — 80053 COMPREHEN METABOLIC PANEL: CPT

## 2025-02-17 NOTE — PROGRESS NOTES
Bereket Em is receiving Lactated Ringer's for daily infusion and magnesium 4 gm once weekly.     Followed by Dr Perry.     2/10 labs reviewed - mag 1.59, otherwise unremarkable. Pharmacy will continue with weekly magnesium delivery.     [facility] left msg for pt checking progress and scheduling delivery of LR and magnesium tomorrow 2/18. Left pharmacy number for pt to call back with any concerns or needs.     Mixing 2/17 and delivering 2/18 with supplies to match:  7x LR gravity   1x magnesium 4 gm pump bag  DOS 2/19-2/25     Follow up 2/24 check needs with pt, check labs, deliver as appropriate.

## 2025-02-18 ENCOUNTER — TELEPHONE (OUTPATIENT)
Dept: SURGERY | Facility: HOSPITAL | Age: 60
End: 2025-02-18
Payer: COMMERCIAL

## 2025-02-18 DIAGNOSIS — L98.8 FISTULA: Primary | ICD-10-CM

## 2025-02-18 RX ORDER — TRAMADOL HYDROCHLORIDE 50 MG/1
50 TABLET ORAL EVERY 6 HOURS PRN
Qty: 15 TABLET | Refills: 2 | Status: SHIPPED | OUTPATIENT
Start: 2025-02-18 | End: 2025-03-01

## 2025-02-18 NOTE — TELEPHONE ENCOUNTER
I spoke with Mr. Em via telephone.  He is tolerating PO.  He continues to report leakage around his appliance and associated pain. He continues to trial different ways to prevent leakage.   He reports good pain control with Tramadol.   His labs remain stable. Cr 0.87, bicarb 27.  Albumin is 3.9. Mg 1.6. Prealbumin 32.   To help slow his fistula output and treat skin pain, tramadol reordered.   Will continue with 1L IVF daily and weekly 4 g magnesium IV.   Will continue with ~ weekly telephone conversations to discuss progress and options.

## 2025-02-21 ENCOUNTER — HOME INFUSION (OUTPATIENT)
Dept: INFUSION THERAPY | Age: 60
End: 2025-02-21
Payer: COMMERCIAL

## 2025-02-21 NOTE — PROGRESS NOTES
Bereket Em is receiving Lactated Ringer's for daily infusion and magnesium 4 gm once weekly. Followed by Dr Perry.     2/17 labs reviewed - mag 1.68, otherwise unremarkable    Per tel call with MD 2/18, continue 1L LR daily and 4g magnesium weekly. Weekly telephone conversations.    [facility] spoke to patient - has extra IV fluids and supplies for LR (including flushes), not needed for this week. Needs magnesium, one set of tubing, and dressing/line supplies. Ok to deliver any time Tuesday. Ok to [facility]. No questions for pharmacist.     Mixing 2/24 and delivering 2/25 with supplies to match:  1x magnesium 4 gm pump bag  DOS 2/26-3/4     Follow up 3/3 check needs with pt, check labs, deliver as appropriate.

## 2025-02-23 ASSESSMENT — ENCOUNTER SYMPTOMS
MUSCLE WEAKNESS: 1
PAIN LOCATION - PAIN SEVERITY: 3/10
APPETITE LEVEL: FAIR
PAIN LOCATION: ABDOMEN
PAIN: 1
CHANGE IN APPETITE: UNCHANGED
FATIGUES EASILY: 1
PERSON REPORTING PAIN: PATIENT

## 2025-02-24 DIAGNOSIS — F51.01 PRIMARY INSOMNIA: ICD-10-CM

## 2025-02-24 RX ORDER — TRAZODONE HYDROCHLORIDE 100 MG/1
100 TABLET ORAL NIGHTLY
COMMUNITY

## 2025-02-24 RX ORDER — TRAZODONE HYDROCHLORIDE 100 MG/1
100 TABLET ORAL NIGHTLY PRN
Qty: 90 TABLET | Refills: 1 | Status: SHIPPED | OUTPATIENT
Start: 2025-02-24 | End: 2026-02-24

## 2025-02-25 ENCOUNTER — HOME CARE VISIT (OUTPATIENT)
Dept: HOME HEALTH SERVICES | Facility: HOME HEALTH | Age: 60
End: 2025-02-25
Payer: COMMERCIAL

## 2025-02-25 PROCEDURE — G0299 HHS/HOSPICE OF RN EA 15 MIN: HCPCS

## 2025-02-27 LAB
ALBUMIN SERPL-MCNC: 4.1 G/DL (ref 3.6–5.1)
ALP SERPL-CCNC: 106 U/L (ref 35–144)
ALT SERPL-CCNC: 24 U/L (ref 9–46)
ANION GAP SERPL CALCULATED.4IONS-SCNC: 12 MMOL/L (CALC) (ref 7–17)
AST SERPL-CCNC: 23 U/L (ref 10–35)
BASOPHILS # BLD AUTO: 83 CELLS/UL (ref 0–200)
BASOPHILS NFR BLD AUTO: 1.1 %
BILIRUB SERPL-MCNC: 0.6 MG/DL (ref 0.2–1.2)
BUN SERPL-MCNC: 14 MG/DL (ref 7–25)
CALCIUM SERPL-MCNC: 9.3 MG/DL (ref 8.6–10.3)
CHLORIDE SERPL-SCNC: 102 MMOL/L (ref 98–110)
CO2 SERPL-SCNC: 25 MMOL/L (ref 20–32)
CREAT SERPL-MCNC: 0.9 MG/DL (ref 0.7–1.35)
EGFRCR SERPLBLD CKD-EPI 2021: 98 ML/MIN/1.73M2
EOSINOPHIL # BLD AUTO: 383 CELLS/UL (ref 15–500)
EOSINOPHIL NFR BLD AUTO: 5.1 %
ERYTHROCYTE [DISTWIDTH] IN BLOOD BY AUTOMATED COUNT: 13.6 % (ref 11–15)
GLUCOSE SERPL-MCNC: 100 MG/DL (ref 65–99)
HCT VFR BLD AUTO: 43.4 % (ref 38.5–50)
HGB BLD-MCNC: 13.6 G/DL (ref 13.2–17.1)
LYMPHOCYTES # BLD AUTO: 1890 CELLS/UL (ref 850–3900)
LYMPHOCYTES NFR BLD AUTO: 25.2 %
MAGNESIUM SERPL-MCNC: 1.9 MG/DL (ref 1.5–2.5)
MCH RBC QN AUTO: 26.9 PG (ref 27–33)
MCHC RBC AUTO-ENTMCNC: 31.3 G/DL (ref 32–36)
MCV RBC AUTO: 85.9 FL (ref 80–100)
MONOCYTES # BLD AUTO: 593 CELLS/UL (ref 200–950)
MONOCYTES NFR BLD AUTO: 7.9 %
NEUTROPHILS # BLD AUTO: 4553 CELLS/UL (ref 1500–7800)
NEUTROPHILS NFR BLD AUTO: 60.7 %
PHOSPHATE SERPL-MCNC: 3.4 MG/DL (ref 2.5–4.5)
PLATELET # BLD AUTO: 136 THOUSAND/UL (ref 140–400)
PMV BLD REES-ECKER: 10.7 FL (ref 7.5–12.5)
POTASSIUM SERPL-SCNC: 4.3 MMOL/L (ref 3.5–5.3)
PREALB SERPL-MCNC: 29 MG/DL (ref 21–43)
PROT SERPL-MCNC: 6.8 G/DL (ref 6.1–8.1)
RBC # BLD AUTO: 5.05 MILLION/UL (ref 4.2–5.8)
SODIUM SERPL-SCNC: 139 MMOL/L (ref 135–146)
TRIGL SERPL-MCNC: 122 MG/DL
WBC # BLD AUTO: 7.5 THOUSAND/UL (ref 3.8–10.8)

## 2025-02-28 ENCOUNTER — HOME INFUSION (OUTPATIENT)
Dept: INFUSION THERAPY | Age: 60
End: 2025-02-28
Payer: COMMERCIAL

## 2025-02-28 DIAGNOSIS — L98.8 FISTULA: ICD-10-CM

## 2025-02-28 RX ORDER — TRAMADOL HYDROCHLORIDE 50 MG/1
50 TABLET ORAL EVERY 6 HOURS PRN
Qty: 15 TABLET | Refills: 2 | Status: SHIPPED | OUTPATIENT
Start: 2025-02-28 | End: 2025-03-11

## 2025-02-28 NOTE — PROGRESS NOTES
"Bereket mE is receiving Lactated Ringer's for daily infusion and magnesium 4 gm once weekly. Followed by Dr Perry.     2/25 labs reviewed - mag 1.9, generally unremarkable     Per tel call with MD 2/18, continue 1L LR daily and 4g magnesium weekly. Weekly telephone conversations.    Message out to Dr. Perry to verify duration of order. Per previous messages, pt to continue mag while level is \"low or normal\".     [facility] spoke to patient - has extra IV fluids and supplies for LR (including flushes), not needed for this week again. Needs magnesium, one set of tubing, and dressing/line supplies. Ok to deliver any time Tuesday. Ok to [facility]. No questions for pharmacist.     Mixing 3/3 and delivering 3/4 with supplies to match:  1x magnesium 4 gm pump bag  DOS 3/5-3/11     Follow up 3/10 check needs with pt, check labs, deliver as appropriate.  "

## 2025-03-03 ENCOUNTER — HOME INFUSION (OUTPATIENT)
Dept: INFUSION THERAPY | Age: 60
End: 2025-03-03
Payer: COMMERCIAL

## 2025-03-03 ENCOUNTER — LAB REQUISITION (OUTPATIENT)
Dept: LAB | Facility: HOSPITAL | Age: 60
End: 2025-03-03
Payer: COMMERCIAL

## 2025-03-03 ENCOUNTER — HOME CARE VISIT (OUTPATIENT)
Dept: HOME HEALTH SERVICES | Facility: HOME HEALTH | Age: 60
End: 2025-03-03
Payer: COMMERCIAL

## 2025-03-03 VITALS
DIASTOLIC BLOOD PRESSURE: 70 MMHG | HEART RATE: 80 BPM | SYSTOLIC BLOOD PRESSURE: 111 MMHG | TEMPERATURE: 97.7 F | RESPIRATION RATE: 16 BRPM | OXYGEN SATURATION: 100 %

## 2025-03-03 DIAGNOSIS — K57.30 DIVERTICULOSIS OF LARGE INTESTINE WITHOUT PERFORATION OR ABSCESS WITHOUT BLEEDING: ICD-10-CM

## 2025-03-03 DIAGNOSIS — E83.42 HYPOMAGNESEMIA: ICD-10-CM

## 2025-03-03 LAB
ALBUMIN SERPL BCP-MCNC: 4 G/DL (ref 3.4–5)
ALP SERPL-CCNC: 102 U/L (ref 33–136)
ALT SERPL W P-5'-P-CCNC: 26 U/L (ref 10–52)
ANION GAP SERPL CALCULATED.3IONS-SCNC: 10 MMOL/L (ref 10–20)
AST SERPL W P-5'-P-CCNC: 20 U/L (ref 9–39)
BILIRUB SERPL-MCNC: 0.6 MG/DL (ref 0–1.2)
BUN SERPL-MCNC: 19 MG/DL (ref 6–23)
CALCIUM SERPL-MCNC: 9.6 MG/DL (ref 8.6–10.3)
CHLORIDE SERPL-SCNC: 101 MMOL/L (ref 98–107)
CO2 SERPL-SCNC: 30 MMOL/L (ref 21–32)
CREAT SERPL-MCNC: 1.01 MG/DL (ref 0.5–1.3)
EGFRCR SERPLBLD CKD-EPI 2021: 85 ML/MIN/1.73M*2
ERYTHROCYTE [DISTWIDTH] IN BLOOD BY AUTOMATED COUNT: 14.6 % (ref 11.5–14.5)
GLUCOSE SERPL-MCNC: 94 MG/DL (ref 74–99)
HCT VFR BLD AUTO: 42.5 % (ref 41–52)
HGB BLD-MCNC: 13.6 G/DL (ref 13.5–17.5)
MAGNESIUM SERPL-MCNC: 1.65 MG/DL (ref 1.6–2.4)
MCH RBC QN AUTO: 27.4 PG (ref 26–34)
MCHC RBC AUTO-ENTMCNC: 32 G/DL (ref 32–36)
MCV RBC AUTO: 86 FL (ref 80–100)
NRBC BLD-RTO: 0 /100 WBCS (ref 0–0)
PHOSPHATE SERPL-MCNC: 3.8 MG/DL (ref 2.5–4.9)
PLATELET # BLD AUTO: 209 X10*3/UL (ref 150–450)
POTASSIUM SERPL-SCNC: 4.4 MMOL/L (ref 3.5–5.3)
PREALB SERPL-MCNC: 32.3 MG/DL (ref 18–40)
PROT SERPL-MCNC: 6.2 G/DL (ref 6.4–8.2)
RBC # BLD AUTO: 4.97 X10*6/UL (ref 4.5–5.9)
SODIUM SERPL-SCNC: 137 MMOL/L (ref 136–145)
TRIGL SERPL-MCNC: 105 MG/DL (ref 0–149)
WBC # BLD AUTO: 7.5 X10*3/UL (ref 4.4–11.3)

## 2025-03-03 PROCEDURE — 84134 ASSAY OF PREALBUMIN: CPT

## 2025-03-03 PROCEDURE — 85027 COMPLETE CBC AUTOMATED: CPT

## 2025-03-03 PROCEDURE — 83735 ASSAY OF MAGNESIUM: CPT

## 2025-03-03 PROCEDURE — 84478 ASSAY OF TRIGLYCERIDES: CPT

## 2025-03-03 PROCEDURE — 84100 ASSAY OF PHOSPHORUS: CPT

## 2025-03-03 PROCEDURE — 80053 COMPREHEN METABOLIC PANEL: CPT

## 2025-03-03 PROCEDURE — G0299 HHS/HOSPICE OF RN EA 15 MIN: HCPCS

## 2025-03-03 RX ORDER — MAGNESIUM SULFATE 4 G/50ML
2 INJECTION INTRAVENOUS
Qty: 2550 ML | Refills: 0 | Status: SHIPPED
Start: 2025-03-03 | End: 2026-03-03

## 2025-03-03 ASSESSMENT — ENCOUNTER SYMPTOMS
APPETITE LEVEL: FAIR
FATIGUES EASILY: 1
PERSON REPORTING PAIN: PATIENT
PAIN: 1
PAIN LOCATION: ABDOMEN
ABDOMINAL PAIN: 1
PAIN LOCATION - PAIN SEVERITY: 4/10
MUSCLE WEAKNESS: 1
CHANGE IN APPETITE: UNCHANGED

## 2025-03-03 NOTE — PROGRESS NOTES
Received verbal order from Dr. Perry, decrease magnesium to 2g weekly. Orders updated in Epic, white copy to intake. Orders to continue indefinitely as long as magnesium level remains low or normal.    Pt to continue 1L LR daily as previously ordered.    Tel call to patient, notified of change in dose. Requesting a call or at least a doorbell ring before delivery - no [employer]. Pt will need a new pump with this delivery - ok to do pump exchange with delivery Tuesday. Otherwise same supplies as previous call.    Mixing 3/3 and delivering 3/4 with supplies to match:  1x magnesium 2gm pump bag  NEW PUMP needed - same rate, different volume  DOS 3/5-3/11    Followup 3/10 check needs with pt, check labs, deliver as appropriate

## 2025-03-04 ENCOUNTER — TELEPHONE (OUTPATIENT)
Dept: SURGERY | Facility: HOSPITAL | Age: 60
End: 2025-03-04
Payer: COMMERCIAL

## 2025-03-06 NOTE — TELEPHONE ENCOUNTER
I spoke with Mr. Em via telephone.  Late entry for 03/04/25  He is tolerating PO.  He continues to report leakage around his appliance and associated pain. He continues to trial different ways to prevent leakage.  Will plan for clinic visit next week to reassess his wound.   He reports good pain control with Tramadol.  He is maintaining his weight at 154 lbs.   His labs remain stable. Cr 1.0, bicarb 30.  Albumin is 4.0. Mg 1.6. Prealbumin 32.   To help slow his fistula output and treat skin pain, tramadol reordered.   I completed FMLA paper work for him which recommended 3 days of work per week at home for 4 hours a day for a total of 12 hours per week.   Will continue with 1L IVF daily and weekly 2 g magnesium IV which is a change.   Will continue with ~ weekly telephone conversations to discuss progress and options.

## 2025-03-09 DIAGNOSIS — L98.8 FISTULA: ICD-10-CM

## 2025-03-09 RX ORDER — TRAMADOL HYDROCHLORIDE 50 MG/1
50 TABLET ORAL EVERY 6 HOURS PRN
Qty: 30 TABLET | Refills: 2 | Status: SHIPPED | OUTPATIENT
Start: 2025-03-09 | End: 2025-04-08

## 2025-03-09 ASSESSMENT — ENCOUNTER SYMPTOMS
MUSCLE WEAKNESS: 1
PAIN LOCATION: ABDOMEN
APPETITE LEVEL: FAIR
FATIGUES EASILY: 1
PAIN LOCATION - PAIN SEVERITY: 5/10
PERSON REPORTING PAIN: PATIENT
CHANGE IN APPETITE: UNCHANGED
PAIN: 1

## 2025-03-10 ENCOUNTER — LAB (OUTPATIENT)
Dept: LAB | Facility: HOSPITAL | Age: 60
End: 2025-03-10
Payer: COMMERCIAL

## 2025-03-10 ENCOUNTER — HOME INFUSION (OUTPATIENT)
Dept: INFUSION THERAPY | Age: 60
End: 2025-03-10
Payer: COMMERCIAL

## 2025-03-10 ENCOUNTER — HOME CARE VISIT (OUTPATIENT)
Dept: HOME HEALTH SERVICES | Facility: HOME HEALTH | Age: 60
End: 2025-03-10
Payer: COMMERCIAL

## 2025-03-10 VITALS
HEART RATE: 82 BPM | TEMPERATURE: 98.4 F | RESPIRATION RATE: 16 BRPM | OXYGEN SATURATION: 99 % | DIASTOLIC BLOOD PRESSURE: 70 MMHG | SYSTOLIC BLOOD PRESSURE: 116 MMHG

## 2025-03-10 DIAGNOSIS — K57.30 DIVERTICULOSIS OF LARGE INTESTINE WITHOUT HEMORRHAGE: ICD-10-CM

## 2025-03-10 DIAGNOSIS — K63.2 ENTEROCUTANEOUS FISTULA: ICD-10-CM

## 2025-03-10 LAB
ALBUMIN SERPL BCP-MCNC: 4.3 G/DL (ref 3.4–5)
ALP SERPL-CCNC: 127 U/L (ref 33–136)
ALT SERPL W P-5'-P-CCNC: 19 U/L (ref 10–52)
ANION GAP SERPL CALCULATED.3IONS-SCNC: 17 MMOL/L (ref 10–20)
AST SERPL W P-5'-P-CCNC: 22 U/L (ref 9–39)
BASOPHILS # BLD AUTO: 0.08 X10*3/UL (ref 0–0.1)
BASOPHILS NFR BLD AUTO: 0.8 %
BILIRUB SERPL-MCNC: 0.7 MG/DL (ref 0–1.2)
BUN SERPL-MCNC: 18 MG/DL (ref 6–23)
CALCIUM SERPL-MCNC: 10 MG/DL (ref 8.6–10.3)
CHLORIDE SERPL-SCNC: 94 MMOL/L (ref 98–107)
CO2 SERPL-SCNC: 27 MMOL/L (ref 21–32)
CREAT SERPL-MCNC: 1.05 MG/DL (ref 0.5–1.3)
EGFRCR SERPLBLD CKD-EPI 2021: 81 ML/MIN/1.73M*2
EOSINOPHIL # BLD AUTO: 0.49 X10*3/UL (ref 0–0.7)
EOSINOPHIL NFR BLD AUTO: 4.9 %
ERYTHROCYTE [DISTWIDTH] IN BLOOD BY AUTOMATED COUNT: 13.9 % (ref 11.5–14.5)
GLUCOSE SERPL-MCNC: 82 MG/DL (ref 74–99)
HCT VFR BLD AUTO: 45.8 % (ref 41–52)
HGB BLD-MCNC: 14.7 G/DL (ref 13.5–17.5)
IMM GRANULOCYTES # BLD AUTO: 0.02 X10*3/UL (ref 0–0.7)
IMM GRANULOCYTES NFR BLD AUTO: 0.2 % (ref 0–0.9)
LYMPHOCYTES # BLD AUTO: 2.21 X10*3/UL (ref 1.2–4.8)
LYMPHOCYTES NFR BLD AUTO: 22.3 %
MAGNESIUM SERPL-MCNC: 1.66 MG/DL (ref 1.6–2.4)
MCH RBC QN AUTO: 27.2 PG (ref 26–34)
MCHC RBC AUTO-ENTMCNC: 32.1 G/DL (ref 32–36)
MCV RBC AUTO: 85 FL (ref 80–100)
MONOCYTES # BLD AUTO: 0.72 X10*3/UL (ref 0.1–1)
MONOCYTES NFR BLD AUTO: 7.3 %
NEUTROPHILS # BLD AUTO: 6.39 X10*3/UL (ref 1.2–7.7)
NEUTROPHILS NFR BLD AUTO: 64.5 %
NRBC BLD-RTO: 0 /100 WBCS (ref 0–0)
PHOSPHATE SERPL-MCNC: 3.3 MG/DL (ref 2.5–4.9)
PLATELET # BLD AUTO: 212 X10*3/UL (ref 150–450)
POTASSIUM SERPL-SCNC: 4 MMOL/L (ref 3.5–5.3)
PROT SERPL-MCNC: 7 G/DL (ref 6.4–8.2)
RBC # BLD AUTO: 5.41 X10*6/UL (ref 4.5–5.9)
SODIUM SERPL-SCNC: 134 MMOL/L (ref 136–145)
TRIGL SERPL-MCNC: 110 MG/DL (ref 0–149)
WBC # BLD AUTO: 9.9 X10*3/UL (ref 4.4–11.3)

## 2025-03-10 PROCEDURE — 83735 ASSAY OF MAGNESIUM: CPT

## 2025-03-10 PROCEDURE — 84478 ASSAY OF TRIGLYCERIDES: CPT

## 2025-03-10 PROCEDURE — G0299 HHS/HOSPICE OF RN EA 15 MIN: HCPCS

## 2025-03-10 PROCEDURE — 85025 COMPLETE CBC W/AUTO DIFF WBC: CPT

## 2025-03-10 PROCEDURE — 80053 COMPREHEN METABOLIC PANEL: CPT

## 2025-03-10 PROCEDURE — 84100 ASSAY OF PHOSPHORUS: CPT

## 2025-03-10 PROCEDURE — 84134 ASSAY OF PREALBUMIN: CPT

## 2025-03-10 NOTE — PROGRESS NOTES
Reviewed chart and ordered magnesium 2g weekly. Orders to continue indefinitely as long as magnesium level remains low or normal.   Pt also ordered 1L LR daily.    Labs reviewed and magnesium wnl at 1.65. Lab draws on Mondays.     Tel call to patient, Requesting a call or at least a doorbell ring before delivery - no [employer]. OK with delivery on Tuesday with the full load of flushes and supplies.     Mixing 3/10 and delivering 3/11:  1x magnesium 2gm pump bag  7x LR 1000ml  DOS 3/12-3/18     Followup 3/18 check needs with pt, check labs, deliver straight

## 2025-03-11 LAB — PREALB SERPL-MCNC: 32.1 MG/DL (ref 18–40)

## 2025-03-11 ASSESSMENT — ENCOUNTER SYMPTOMS
PAIN LOCATION: ABDOMEN
PAIN LOCATION - PAIN SEVERITY: 4/10
PAIN: 1
ABDOMINAL PAIN: 1
MUSCLE WEAKNESS: 1
CHANGE IN APPETITE: UNCHANGED
FATIGUES EASILY: 1
PERSON REPORTING PAIN: PATIENT
APPETITE LEVEL: FAIR

## 2025-03-12 ENCOUNTER — APPOINTMENT (OUTPATIENT)
Dept: SURGERY | Facility: CLINIC | Age: 60
End: 2025-03-12
Payer: COMMERCIAL

## 2025-03-12 VITALS
HEART RATE: 74 BPM | RESPIRATION RATE: 18 BRPM | WEIGHT: 146 LBS | BODY MASS INDEX: 22.2 KG/M2 | DIASTOLIC BLOOD PRESSURE: 76 MMHG | SYSTOLIC BLOOD PRESSURE: 129 MMHG

## 2025-03-12 DIAGNOSIS — L98.8 FISTULA: Primary | ICD-10-CM

## 2025-03-12 PROCEDURE — 99213 OFFICE O/P EST LOW 20 MIN: CPT | Performed by: SURGERY

## 2025-03-12 RX ORDER — PANTOPRAZOLE SODIUM 40 MG/1
40 TABLET, DELAYED RELEASE ORAL
Status: SHIPPED | OUTPATIENT
Start: 2025-03-12

## 2025-03-12 ASSESSMENT — PAIN SCALES - GENERAL: PAINLEVEL_OUTOF10: 1

## 2025-03-13 NOTE — PROGRESS NOTES
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   I discussed his care at the bedside with his family with his permission.   He reports that he is eating well but protein seems to make the fistula output worse. He modifies his diet to limit fistula output.   He has difficulty with pouching and with skin irritation from wound manager leakage which continues. He continues to take Lomotil.  He does not take Imodium as it upset his stomach.  He is no longer taking a PPI.    Tramadol helps with pain control, and it seems to help with fistula output.   He is working from home.    The distal/nonfunctional/right side of double barrel of EAF prolapses but remains easily reducible.   He weighs 146 pounds today which is decreased from mid 150s.     Nutrition labs include prealbumin 32 and alb 4.3. Slight elevation in transaminases 42/75.  Bicarb normal at 27, Cr 1.05, Mg 1.6  On exam, NAD. Thinner than before with overall decreased muscle mass. Mucous membranes are not dry.  The LLQ colostsomy is pink and viable without output. The abdominal wound manager was removed and the wound care team was present to help with management and ideas for decreasing leakage from the wound manager.  They discussed options with Mr. Em and provided support and education.  The wound overall is smaller.  There is ~ 3cm of still dense scar along the superior aspect of the wound. The most cranial portion of the wound is about 5 cm from the xiphoid, The nonfunctional limb was easily reducible. The functional end is along the 3 o'clock position and sunken into a crevice of the wound.  See photo in media tab.   Plan for:  -Fistula management: Continue with current outpatient wound care and appreciate inpatient wound care team's help with education and support today.    Plan to continue with Lomotil and tramadol  with diet modifications as he sees fit.  Although his nutrition labs are normal, his weight loss and loss of muscle mass suggest the need for additional nutrition beyond what he is taking by mouth and to prepare him for future surgery.  Will discuss restarting TPN with pharmacy.   We discussed returning to NPO and TPN to help with skin healing and irritation for a while, but he does not want to go back on TPN unless in preparation for surgery.  Restarting PPI BID to help with fistula output and acidity.  Will continue to coordinate with Leyda Morfin and Whitney as patient would like a timeline to help with work expectations and look forward to the future.  We discussed the surgical timeline today including the expected need for multiple surgeries and ostomy creation.   -Nutrition/hydration/electrolytes: Continue with 1L of fluid daily and magnesium weekly as this has improved. As above, will discuss restart of TPN with pharmacy.   -Work: Continue current schedule.  [facility] paperwork completed recently.   -Distal fistula prolapse: Stable. Reviewed how to reduce and signs of ischemia.   -Follow up: Monthly outpatient follow ups.

## 2025-03-14 ENCOUNTER — TELEPHONE (OUTPATIENT)
Dept: SURGERY | Facility: HOSPITAL | Age: 60
End: 2025-03-14
Payer: COMMERCIAL

## 2025-03-14 NOTE — TELEPHONE ENCOUNTER
I called Mr. Em today several times at [phone] and the call went directly to [id number]. I left a brief message to call back.     I discussed reinitiating TPN with pharmacy and they would like this to be done as an inpatient which will require admission for ~2 days.  Will discuss further and coordinate with Mr. Em via telephone.

## 2025-03-17 ENCOUNTER — HOME INFUSION (OUTPATIENT)
Dept: INFUSION THERAPY | Age: 60
End: 2025-03-17

## 2025-03-17 ENCOUNTER — TELEPHONE (OUTPATIENT)
Dept: SURGERY | Facility: CLINIC | Age: 60
End: 2025-03-17

## 2025-03-17 ENCOUNTER — TELEPHONE VISIT (OUTPATIENT)
Dept: SURGERY | Facility: HOSPITAL | Age: 60
End: 2025-03-17
Payer: COMMERCIAL

## 2025-03-17 DIAGNOSIS — L98.8 FISTULA: Primary | ICD-10-CM

## 2025-03-17 PROCEDURE — 99024 POSTOP FOLLOW-UP VISIT: CPT | Performed by: SURGERY

## 2025-03-17 RX ORDER — PANTOPRAZOLE SODIUM 40 MG/1
40 TABLET, DELAYED RELEASE ORAL
Status: SHIPPED | OUTPATIENT
Start: 2025-03-17

## 2025-03-17 NOTE — TELEPHONE ENCOUNTER
This RN has reached out today 03/17/2025 four separate times to discuss plans moving forward with TPN. Phone does not ring and goes directly to voicemail. This RN left a message with contact information for patient to call back. This is a bit of an issue though as all numbers from [employer] phones are being sent directly to voicemail, this means when the patient returns our call we are still unable to reach him when we call back. Dr. Perry aware.    Aviva Amezcua, RN, MSN

## 2025-03-17 NOTE — PROGRESS NOTES
Reviewed chart and ordered magnesium 2g weekly. Orders to continue indefinitely as long as magnesium level remains low or normal.   Pt also ordered 1L LR daily.     Labs from 3/10 reviewed, magnesium wnl at 1.66. Lab draws on Mondays.    Tel call to patient, requesting delivery tomorrow for same meds and supplies as usual. Requests a call or at least a doorbell ring before delivery - no [facility].    Dr. Perry planning to restart patient on TPN - per patient, plans to go in late next week.  Home Care to follow.      Mixing 3/17 and delivering 3/18:  1x magnesium 2gm pump bag  7x LR 1000ml  DOS 3/19-3/25     Followup 3/25, check needs, check labs, deliver straight vs check TPN plans

## 2025-03-17 NOTE — PROGRESS NOTES
I spoke to Mr. Em today.     He is much better.  His abdomen feels much better after the change to his wound management from recent new wound care recommendations and has less leakage.  He is tolerating more PO.  He reports that the pantoprazole restart has helped with the burning of the output as well; a refill was sent again today.  He would prefer to be readmitted for TPN trial and restart next week as he is busy this week.    He was in an area without phone reception while fishing on 03/14.  He will call back with timing for his admission.

## 2025-03-18 ENCOUNTER — LAB REQUISITION (OUTPATIENT)
Dept: LAB | Facility: HOSPITAL | Age: 60
End: 2025-03-18
Payer: COMMERCIAL

## 2025-03-18 ENCOUNTER — HOME CARE VISIT (OUTPATIENT)
Dept: HOME HEALTH SERVICES | Facility: HOME HEALTH | Age: 60
End: 2025-03-18
Payer: COMMERCIAL

## 2025-03-18 DIAGNOSIS — K57.30 DIVERTICULOSIS OF LARGE INTESTINE WITHOUT PERFORATION OR ABSCESS WITHOUT BLEEDING: ICD-10-CM

## 2025-03-18 LAB
ALBUMIN SERPL BCP-MCNC: 3.8 G/DL (ref 3.4–5)
ALP SERPL-CCNC: 113 U/L (ref 33–136)
ALT SERPL W P-5'-P-CCNC: 21 U/L (ref 10–52)
ANION GAP SERPL CALCULATED.3IONS-SCNC: 13 MMOL/L (ref 10–20)
AST SERPL W P-5'-P-CCNC: 19 U/L (ref 9–39)
BASOPHILS # BLD AUTO: 0.05 X10*3/UL (ref 0–0.1)
BASOPHILS NFR BLD AUTO: 0.8 %
BILIRUB SERPL-MCNC: 0.4 MG/DL (ref 0–1.2)
BUN SERPL-MCNC: 14 MG/DL (ref 6–23)
CALCIUM SERPL-MCNC: 9.1 MG/DL (ref 8.6–10.3)
CHLORIDE SERPL-SCNC: 101 MMOL/L (ref 98–107)
CO2 SERPL-SCNC: 28 MMOL/L (ref 21–32)
CREAT SERPL-MCNC: 0.88 MG/DL (ref 0.5–1.3)
EGFRCR SERPLBLD CKD-EPI 2021: >90 ML/MIN/1.73M*2
EOSINOPHIL # BLD AUTO: 0.17 X10*3/UL (ref 0–0.7)
EOSINOPHIL NFR BLD AUTO: 2.9 %
ERYTHROCYTE [DISTWIDTH] IN BLOOD BY AUTOMATED COUNT: 13.7 % (ref 11.5–14.5)
GLUCOSE SERPL-MCNC: 103 MG/DL (ref 74–99)
HCT VFR BLD AUTO: 41.9 % (ref 41–52)
HGB BLD-MCNC: 13.4 G/DL (ref 13.5–17.5)
IMM GRANULOCYTES # BLD AUTO: 0.01 X10*3/UL (ref 0–0.7)
IMM GRANULOCYTES NFR BLD AUTO: 0.2 % (ref 0–0.9)
LYMPHOCYTES # BLD AUTO: 1.65 X10*3/UL (ref 1.2–4.8)
LYMPHOCYTES NFR BLD AUTO: 27.9 %
MAGNESIUM SERPL-MCNC: 1.56 MG/DL (ref 1.6–2.4)
MCH RBC QN AUTO: 27.4 PG (ref 26–34)
MCHC RBC AUTO-ENTMCNC: 32 G/DL (ref 32–36)
MCV RBC AUTO: 86 FL (ref 80–100)
MONOCYTES # BLD AUTO: 0.47 X10*3/UL (ref 0.1–1)
MONOCYTES NFR BLD AUTO: 8 %
NEUTROPHILS # BLD AUTO: 3.56 X10*3/UL (ref 1.2–7.7)
NEUTROPHILS NFR BLD AUTO: 60.2 %
NRBC BLD-RTO: 0 /100 WBCS (ref 0–0)
PHOSPHATE SERPL-MCNC: 3 MG/DL (ref 2.5–4.9)
PLATELET # BLD AUTO: 192 X10*3/UL (ref 150–450)
POTASSIUM SERPL-SCNC: 3.9 MMOL/L (ref 3.5–5.3)
PREALB SERPL-MCNC: 32.4 MG/DL (ref 18–40)
PROT SERPL-MCNC: 6.2 G/DL (ref 6.4–8.2)
RBC # BLD AUTO: 4.89 X10*6/UL (ref 4.5–5.9)
SODIUM SERPL-SCNC: 138 MMOL/L (ref 136–145)
TRIGL SERPL-MCNC: 115 MG/DL (ref 0–149)
WBC # BLD AUTO: 5.9 X10*3/UL (ref 4.4–11.3)

## 2025-03-18 PROCEDURE — 84478 ASSAY OF TRIGLYCERIDES: CPT

## 2025-03-18 PROCEDURE — 85025 COMPLETE CBC W/AUTO DIFF WBC: CPT

## 2025-03-18 PROCEDURE — G0299 HHS/HOSPICE OF RN EA 15 MIN: HCPCS

## 2025-03-18 PROCEDURE — 84100 ASSAY OF PHOSPHORUS: CPT

## 2025-03-18 PROCEDURE — 84134 ASSAY OF PREALBUMIN: CPT

## 2025-03-18 PROCEDURE — 80053 COMPREHEN METABOLIC PANEL: CPT

## 2025-03-18 PROCEDURE — 83735 ASSAY OF MAGNESIUM: CPT

## 2025-03-23 ASSESSMENT — ENCOUNTER SYMPTOMS
PERSON REPORTING PAIN: PATIENT
CHANGE IN APPETITE: UNCHANGED
PAIN: 1
ABDOMINAL PAIN: 1
APPETITE LEVEL: FAIR
PAIN LOCATION - PAIN SEVERITY: 3/10
PAIN LOCATION: ABDOMEN
FATIGUES EASILY: 1
MUSCLE WEAKNESS: 1

## 2025-03-24 ENCOUNTER — LAB REQUISITION (OUTPATIENT)
Dept: LAB | Facility: HOSPITAL | Age: 60
End: 2025-03-24
Payer: COMMERCIAL

## 2025-03-24 ENCOUNTER — HOME CARE VISIT (OUTPATIENT)
Dept: HOME HEALTH SERVICES | Facility: HOME HEALTH | Age: 60
End: 2025-03-24
Payer: COMMERCIAL

## 2025-03-24 VITALS
OXYGEN SATURATION: 99 % | WEIGHT: 145 LBS | DIASTOLIC BLOOD PRESSURE: 76 MMHG | SYSTOLIC BLOOD PRESSURE: 118 MMHG | HEART RATE: 54 BPM | BODY MASS INDEX: 22.05 KG/M2 | RESPIRATION RATE: 16 BRPM

## 2025-03-24 DIAGNOSIS — K57.30 DIVERTICULOSIS OF LARGE INTESTINE WITHOUT PERFORATION OR ABSCESS WITHOUT BLEEDING: ICD-10-CM

## 2025-03-24 PROBLEM — E43 SEVERE MALNUTRITION (MULTI): Status: ACTIVE | Noted: 2025-03-24

## 2025-03-24 LAB
ALBUMIN SERPL BCP-MCNC: 3.6 G/DL (ref 3.4–5)
ALP SERPL-CCNC: 111 U/L (ref 33–136)
ALT SERPL W P-5'-P-CCNC: 26 U/L (ref 10–52)
ANION GAP SERPL CALCULATED.3IONS-SCNC: 10 MMOL/L (ref 10–20)
AST SERPL W P-5'-P-CCNC: 23 U/L (ref 9–39)
BASOPHILS # BLD AUTO: 0.05 X10*3/UL (ref 0–0.1)
BASOPHILS NFR BLD AUTO: 0.7 %
BILIRUB SERPL-MCNC: 0.7 MG/DL (ref 0–1.2)
BUN SERPL-MCNC: 13 MG/DL (ref 6–23)
CALCIUM SERPL-MCNC: 9.1 MG/DL (ref 8.6–10.3)
CHLORIDE SERPL-SCNC: 102 MMOL/L (ref 98–107)
CO2 SERPL-SCNC: 30 MMOL/L (ref 21–32)
CREAT SERPL-MCNC: 0.91 MG/DL (ref 0.5–1.3)
EGFRCR SERPLBLD CKD-EPI 2021: >90 ML/MIN/1.73M*2
EOSINOPHIL # BLD AUTO: 0.2 X10*3/UL (ref 0–0.7)
EOSINOPHIL NFR BLD AUTO: 2.8 %
ERYTHROCYTE [DISTWIDTH] IN BLOOD BY AUTOMATED COUNT: 13.9 % (ref 11.5–14.5)
GLUCOSE SERPL-MCNC: 84 MG/DL (ref 74–99)
HCT VFR BLD AUTO: 41.4 % (ref 41–52)
HGB BLD-MCNC: 13.2 G/DL (ref 13.5–17.5)
IMM GRANULOCYTES # BLD AUTO: 0.01 X10*3/UL (ref 0–0.7)
IMM GRANULOCYTES NFR BLD AUTO: 0.1 % (ref 0–0.9)
LYMPHOCYTES # BLD AUTO: 1.73 X10*3/UL (ref 1.2–4.8)
LYMPHOCYTES NFR BLD AUTO: 24.2 %
MAGNESIUM SERPL-MCNC: 1.62 MG/DL (ref 1.6–2.4)
MCH RBC QN AUTO: 27.1 PG (ref 26–34)
MCHC RBC AUTO-ENTMCNC: 31.9 G/DL (ref 32–36)
MCV RBC AUTO: 85 FL (ref 80–100)
MONOCYTES # BLD AUTO: 0.51 X10*3/UL (ref 0.1–1)
MONOCYTES NFR BLD AUTO: 7.1 %
NEUTROPHILS # BLD AUTO: 4.66 X10*3/UL (ref 1.2–7.7)
NEUTROPHILS NFR BLD AUTO: 65.1 %
NRBC BLD-RTO: 0 /100 WBCS (ref 0–0)
PHOSPHATE SERPL-MCNC: 3.4 MG/DL (ref 2.5–4.9)
PLATELET # BLD AUTO: 223 X10*3/UL (ref 150–450)
POTASSIUM SERPL-SCNC: 4.1 MMOL/L (ref 3.5–5.3)
PREALB SERPL-MCNC: 28.7 MG/DL (ref 18–40)
PROT SERPL-MCNC: 6.3 G/DL (ref 6.4–8.2)
RBC # BLD AUTO: 4.87 X10*6/UL (ref 4.5–5.9)
SODIUM SERPL-SCNC: 138 MMOL/L (ref 136–145)
TRIGL SERPL-MCNC: 99 MG/DL (ref 0–149)
WBC # BLD AUTO: 7.2 X10*3/UL (ref 4.4–11.3)

## 2025-03-24 PROCEDURE — 84478 ASSAY OF TRIGLYCERIDES: CPT

## 2025-03-24 PROCEDURE — 84134 ASSAY OF PREALBUMIN: CPT

## 2025-03-24 PROCEDURE — 80053 COMPREHEN METABOLIC PANEL: CPT

## 2025-03-24 PROCEDURE — 84100 ASSAY OF PHOSPHORUS: CPT

## 2025-03-24 PROCEDURE — 85025 COMPLETE CBC W/AUTO DIFF WBC: CPT

## 2025-03-24 PROCEDURE — 83735 ASSAY OF MAGNESIUM: CPT

## 2025-03-24 PROCEDURE — G0299 HHS/HOSPICE OF RN EA 15 MIN: HCPCS

## 2025-03-24 NOTE — H&P
[facility]  ACUTE CARE SURGERY - History and Physical    Patient Name: Bereket Em  MRN: [id number]  Admit Date:   : 1965  AGE: 59 y.o.   GENDER: male  ==============================================================================  TODAY'S ASSESSMENT AND PLAN OF CARE:  59 year old male with PMH perforated diverticultis s/p Margarita's procedure complicated by formation of enterocutaneous fistula, who presents to [facility] for TPN initiation.    Plan:    ==============================================================================  CHIEF COMPLAINT / EVENTS LAST 24HRS / HPI:  59 year old male with PMH perforated diverticultis s/p Margarita's procedure complicated by formation of enterocutaneous fistula. He presented for enteroscopy with Dr. Cage on , during which 2 covered stents were placed to cover the fistula site followed by wound vac placement over his midline. On  he began having feculent output into his vac, no stool/gas via ostomy, worsening nausea, reflex, and hiccuping. CT AP was completed which demonstrated small bowel dilation with compression of distal small and large bowel loops, concerning for a small bowel obstruction. The wound vac was taken down and the right stent was pulled back with wound vac replacement and NGT placement. He continued to be clinically obstructed with high NGT output and no output via ostomy. The stent and wound vac were removed on  relieving the obstruction and a wound manager was replaced over fistula site. NGT was removed after successful gravity trial and he tolerated a soft diet well. He was discharged home with resumed home health care. He presents today for TPN initiation in the setting of malnutrition.     He denies any chest pain, SOB, nausea, or vomiting. He states his abdominal pain is *** . He denies any recent illnesses. He was most recently seen in clinic by Dr. Perry and states he has continued with  lomotil, tramadol, and has been *** able to take in around 1L of fluid per day.       MEDICAL HISTORY / ROS:   Admission history and ROS reviewed. Pertinent changes as follows:  None    PHYSICAL EXAM:  Heart Rate:  [54]   Resp:  [16]   BP: (118)/(76)   Weight:  [65.8 kg (145 lb)]   SpO2:  [99 %]   Physical Exam  *** image of wound  IMAGING SUMMARY:  (summary of new imaging findings, not a copy of dictation)  No recent imaging to review    LABS:  Results from last 7 days   Lab Units 03/24/25  1057 03/18/25  1230   WBC AUTO x10*3/uL 7.2 5.9   HEMOGLOBIN g/dL 13.2* 13.4*   HEMATOCRIT % 41.4 41.9   PLATELETS AUTO x10*3/uL 223 192   NEUTROS PCT AUTO % 65.1 60.2   LYMPHS PCT AUTO % 24.2 27.9   MONOS PCT AUTO % 7.1 8.0   EOS PCT AUTO % 2.8 2.9         Results from last 7 days   Lab Units 03/24/25  1057 03/18/25  1230   SODIUM mmol/L 138 138   POTASSIUM mmol/L 4.1 3.9   CHLORIDE mmol/L 102 101   CO2 mmol/L 30 28   BUN mg/dL 13 14   CREATININE mg/dL 0.91 0.88   CALCIUM mg/dL 9.1 9.1   PROTEIN TOTAL g/dL 6.3* 6.2*   BILIRUBIN TOTAL mg/dL 0.7 0.4   ALK PHOS U/L 111 113   ALT U/L 26 21   AST U/L 23 19   GLUCOSE mg/dL 84 103*     Results from last 7 days   Lab Units 03/24/25  1057 03/18/25  1230   BILIRUBIN TOTAL mg/dL 0.7 0.4           I have reviewed all medications, laboratory results, and imaging pertinent for today's encounter.

## 2025-03-25 ENCOUNTER — HOME CARE VISIT (OUTPATIENT)
Dept: HOME HEALTH SERVICES | Facility: HOME HEALTH | Age: 60
End: 2025-03-25
Payer: COMMERCIAL

## 2025-03-25 ENCOUNTER — HOSPITAL ENCOUNTER (INPATIENT)
Facility: HOSPITAL | Age: 60
Discharge: HOME | End: 2025-03-25
Attending: SURGERY | Admitting: SURGERY
Payer: COMMERCIAL

## 2025-03-25 ENCOUNTER — HOME INFUSION (OUTPATIENT)
Dept: INFUSION THERAPY | Age: 60
End: 2025-03-25
Payer: COMMERCIAL

## 2025-03-25 DIAGNOSIS — E43 SEVERE MALNUTRITION (MULTI): Primary | ICD-10-CM

## 2025-03-25 LAB
ANION GAP SERPL CALC-SCNC: 15 MMOL/L (ref 10–20)
APTT PPP: 26 SECONDS (ref 26–36)
BUN SERPL-MCNC: 15 MG/DL (ref 6–23)
CALCIUM SERPL-MCNC: 9.3 MG/DL (ref 8.6–10.6)
CHLORIDE SERPL-SCNC: 101 MMOL/L (ref 98–107)
CO2 SERPL-SCNC: 28 MMOL/L (ref 21–32)
CREAT SERPL-MCNC: 0.95 MG/DL (ref 0.5–1.3)
EGFRCR SERPLBLD CKD-EPI 2021: >90 ML/MIN/1.73M*2
ERYTHROCYTE [DISTWIDTH] IN BLOOD BY AUTOMATED COUNT: 13.7 % (ref 11.5–14.5)
GLUCOSE SERPL-MCNC: 95 MG/DL (ref 74–99)
HCT VFR BLD AUTO: 39 % (ref 41–52)
HGB BLD-MCNC: 12.7 G/DL (ref 13.5–17.5)
INR PPP: 0.9 (ref 0.9–1.1)
MAGNESIUM SERPL-MCNC: 1.53 MG/DL (ref 1.6–2.4)
MCH RBC QN AUTO: 27.3 PG (ref 26–34)
MCHC RBC AUTO-ENTMCNC: 32.6 G/DL (ref 32–36)
MCV RBC AUTO: 84 FL (ref 80–100)
NRBC BLD-RTO: 0 /100 WBCS (ref 0–0)
PLATELET # BLD AUTO: 226 X10*3/UL (ref 150–450)
POTASSIUM SERPL-SCNC: 3.6 MMOL/L (ref 3.5–5.3)
PROTHROMBIN TIME: 10.3 SECONDS (ref 9.8–12.4)
RBC # BLD AUTO: 4.65 X10*6/UL (ref 4.5–5.9)
SODIUM SERPL-SCNC: 140 MMOL/L (ref 136–145)
WBC # BLD AUTO: 7.3 X10*3/UL (ref 4.4–11.3)

## 2025-03-25 PROCEDURE — 82374 ASSAY BLOOD CARBON DIOXIDE: CPT | Performed by: NURSE PRACTITIONER

## 2025-03-25 PROCEDURE — 85730 THROMBOPLASTIN TIME PARTIAL: CPT | Performed by: NURSE PRACTITIONER

## 2025-03-25 PROCEDURE — 85027 COMPLETE CBC AUTOMATED: CPT | Performed by: NURSE PRACTITIONER

## 2025-03-25 PROCEDURE — 83735 ASSAY OF MAGNESIUM: CPT | Performed by: NURSE PRACTITIONER

## 2025-03-25 PROCEDURE — 2500000004 HC RX 250 GENERAL PHARMACY W/ HCPCS (ALT 636 FOR OP/ED)

## 2025-03-25 PROCEDURE — 2500000002 HC RX 250 W HCPCS SELF ADMINISTERED DRUGS (ALT 637 FOR MEDICARE OP, ALT 636 FOR OP/ED): Performed by: NURSE PRACTITIONER

## 2025-03-25 PROCEDURE — 2500000001 HC RX 250 WO HCPCS SELF ADMINISTERED DRUGS (ALT 637 FOR MEDICARE OP): Performed by: NURSE PRACTITIONER

## 2025-03-25 PROCEDURE — 1100000001 HC PRIVATE ROOM DAILY

## 2025-03-25 PROCEDURE — 3E0436Z INTRODUCTION OF NUTRITIONAL SUBSTANCE INTO CENTRAL VEIN, PERCUTANEOUS APPROACH: ICD-10-PCS | Performed by: SURGERY

## 2025-03-25 PROCEDURE — 85610 PROTHROMBIN TIME: CPT | Performed by: NURSE PRACTITIONER

## 2025-03-25 PROCEDURE — 36416 COLLJ CAPILLARY BLOOD SPEC: CPT | Performed by: NURSE PRACTITIONER

## 2025-03-25 RX ORDER — PANTOPRAZOLE SODIUM 40 MG/1
40 TABLET, DELAYED RELEASE ORAL
Status: DISPENSED | OUTPATIENT
Start: 2025-03-26

## 2025-03-25 RX ORDER — ACETAMINOPHEN 650 MG/1
650 SUPPOSITORY RECTAL EVERY 4 HOURS PRN
Status: DISCONTINUED | OUTPATIENT
Start: 2025-03-25 | End: 2025-03-25

## 2025-03-25 RX ORDER — DIPHENOXYLATE HYDROCHLORIDE AND ATROPINE SULFATE 2.5; .025 MG/1; MG/1
2 TABLET ORAL
Status: DISPENSED | OUTPATIENT
Start: 2025-03-25

## 2025-03-25 RX ORDER — ACETAMINOPHEN 325 MG/1
650 TABLET ORAL EVERY 4 HOURS PRN
Status: DISCONTINUED | OUTPATIENT
Start: 2025-03-25 | End: 2025-03-25

## 2025-03-25 RX ORDER — ACETAMINOPHEN 160 MG/5ML
650 SOLUTION ORAL EVERY 4 HOURS PRN
Status: DISCONTINUED | OUTPATIENT
Start: 2025-03-25 | End: 2025-03-25

## 2025-03-25 RX ORDER — SODIUM CHLORIDE, SODIUM LACTATE, POTASSIUM CHLORIDE, CALCIUM CHLORIDE 600; 310; 30; 20 MG/100ML; MG/100ML; MG/100ML; MG/100ML
42 INJECTION, SOLUTION INTRAVENOUS CONTINUOUS
Status: DISCONTINUED | OUTPATIENT
Start: 2025-03-25 | End: 2025-03-25

## 2025-03-25 RX ORDER — ACETAMINOPHEN 325 MG/1
650 TABLET ORAL EVERY 4 HOURS PRN
Status: DISPENSED | OUTPATIENT
Start: 2025-03-25

## 2025-03-25 RX ORDER — TRAZODONE HYDROCHLORIDE 100 MG/1
100 TABLET ORAL NIGHTLY PRN
Status: DISPENSED | OUTPATIENT
Start: 2025-03-25

## 2025-03-25 RX ORDER — LIDOCAINE HYDROCHLORIDE 10 MG/ML
5 INJECTION, SOLUTION INFILTRATION; PERINEURAL ONCE
Status: ACTIVE | OUTPATIENT
Start: 2025-03-25

## 2025-03-25 RX ADMIN — ACETAMINOPHEN 650 MG: 325 TABLET, FILM COATED ORAL at 19:25

## 2025-03-25 RX ADMIN — TRAZODONE HYDROCHLORIDE 100 MG: 100 TABLET ORAL at 21:07

## 2025-03-25 RX ADMIN — SODIUM CHLORIDE, SODIUM LACTATE, POTASSIUM CHLORIDE, AND CALCIUM CHLORIDE 1000 ML: .6; .31; .03; .02 INJECTION, SOLUTION INTRAVENOUS at 15:29

## 2025-03-25 RX ADMIN — DIPHENOXYLATE HYDROCHLORIDE AND ATROPINE SULFATE 2 TABLET: .025; 2.5 TABLET ORAL at 15:29

## 2025-03-25 SDOH — ECONOMIC STABILITY: GENERAL

## 2025-03-25 SDOH — ECONOMIC STABILITY: HOUSING INSECURITY: IN THE LAST 12 MONTHS, WAS THERE A TIME WHEN YOU WERE NOT ABLE TO PAY THE MORTGAGE OR RENT ON TIME?: NO

## 2025-03-25 SDOH — ECONOMIC STABILITY: FOOD INSECURITY: WITHIN THE PAST 12 MONTHS, THE FOOD YOU BOUGHT JUST DIDN'T LAST AND YOU DIDN'T HAVE MONEY TO GET MORE.: PATIENT DECLINED

## 2025-03-25 SDOH — ECONOMIC STABILITY: FOOD INSECURITY: WITHIN THE PAST 12 MONTHS, YOU WORRIED THAT YOUR FOOD WOULD RUN OUT BEFORE YOU GOT MONEY TO BUY MORE.: NEVER TRUE

## 2025-03-25 SDOH — ECONOMIC STABILITY: FOOD INSECURITY: WITHIN THE PAST 12 MONTHS, YOU WORRIED THAT YOUR FOOD WOULD RUN OUT BEFORE YOU GOT THE MONEY TO BUY MORE.: NEVER TRUE

## 2025-03-25 SDOH — ECONOMIC STABILITY: FOOD INSECURITY: WITHIN THE PAST 12 MONTHS, THE FOOD YOU BOUGHT JUST DIDN'T LAST AND YOU DIDN'T HAVE MONEY TO GET MORE.: NEVER TRUE

## 2025-03-25 SDOH — SOCIAL STABILITY: SOCIAL INSECURITY: WERE YOU ABLE TO COMPLETE ALL THE BEHAVIORAL HEALTH SCREENINGS?: YES

## 2025-03-25 SDOH — SOCIAL STABILITY: SOCIAL INSECURITY: DO YOU FEEL ANYONE HAS EXPLOITED OR TAKEN ADVANTAGE OF YOU FINANCIALLY OR OF YOUR PERSONAL PROPERTY?: NO

## 2025-03-25 SDOH — SOCIAL STABILITY: SOCIAL INSECURITY: DOES ANYONE TRY TO KEEP YOU FROM HAVING/CONTACTING OTHER FRIENDS OR DOING THINGS OUTSIDE YOUR HOME?: NO

## 2025-03-25 SDOH — SOCIAL STABILITY: SOCIAL INSECURITY: HAS ANYONE EVER THREATENED TO HURT YOUR FAMILY OR YOUR PETS?: NO

## 2025-03-25 SDOH — ECONOMIC STABILITY: HOUSING INSECURITY

## 2025-03-25 SDOH — SOCIAL STABILITY: SOCIAL INSECURITY
WITHIN THE LAST YEAR, HAVE YOU BEEN HUMILIATED OR EMOTIONALLY ABUSED IN OTHER WAYS BY YOUR PARTNER OR EX-PARTNER?: PATIENT DECLINED

## 2025-03-25 SDOH — ECONOMIC STABILITY: HOUSING INSECURITY: IN THE LAST 12 MONTHS, HOW MANY PLACES HAVE YOU LIVED?: 1

## 2025-03-25 SDOH — SOCIAL STABILITY: SOCIAL INSECURITY: ARE THERE ANY APPARENT SIGNS OF INJURIES/BEHAVIORS THAT COULD BE RELATED TO ABUSE/NEGLECT?: NO

## 2025-03-25 SDOH — ECONOMIC STABILITY: TRANSPORTATION INSECURITY: IN THE PAST 12 MONTHS, HAS LACK OF TRANSPORTATION KEPT YOU FROM MEDICAL APPOINTMENTS OR FROM GETTING MEDICATIONS?: NO

## 2025-03-25 SDOH — ECONOMIC STABILITY: INCOME INSECURITY
IN THE PAST 12 MONTHS HAS THE ELECTRIC, GAS, OIL, OR WATER COMPANY THREATENED TO SHUT OFF SERVICES IN YOUR HOME?: PATIENT DECLINED

## 2025-03-25 SDOH — ECONOMIC STABILITY: TRANSPORTATION INSECURITY

## 2025-03-25 SDOH — SOCIAL STABILITY: SOCIAL INSECURITY: DO YOU FEEL UNSAFE GOING BACK TO THE PLACE WHERE YOU ARE LIVING?: NO

## 2025-03-25 SDOH — SOCIAL STABILITY: SOCIAL INSECURITY: ARE YOU OR HAVE YOU BEEN THREATENED OR ABUSED PHYSICALLY, EMOTIONALLY, OR SEXUALLY BY ANYONE?: NO

## 2025-03-25 SDOH — SOCIAL STABILITY: SOCIAL INSECURITY
WITHIN THE LAST YEAR, HAVE YOU BEEN KICKED, HIT, SLAPPED, OR OTHERWISE PHYSICALLY HURT BY YOUR PARTNER OR EX-PARTNER?: PATIENT DECLINED

## 2025-03-25 SDOH — SOCIAL STABILITY: SOCIAL INSECURITY: WITHIN THE LAST YEAR, HAVE YOU BEEN AFRAID OF YOUR PARTNER OR EX-PARTNER?: PATIENT DECLINED

## 2025-03-25 SDOH — SOCIAL STABILITY: SOCIAL INSECURITY: ABUSE: ADULT

## 2025-03-25 SDOH — ECONOMIC STABILITY: INCOME INSECURITY: IN THE LAST 12 MONTHS, WAS THERE A TIME WHEN YOU WERE NOT ABLE TO PAY THE MORTGAGE OR RENT ON TIME?: NO

## 2025-03-25 SDOH — ECONOMIC STABILITY: HOUSING INSECURITY: IN THE PAST 12 MONTHS HAS THE ELECTRIC, GAS, OIL, OR WATER COMPANY THREATENED TO SHUT OFF SERVICES IN YOUR HOME?: NO

## 2025-03-25 SDOH — ECONOMIC STABILITY: FOOD INSECURITY
WITHIN THE PAST 12 MONTHS, YOU WORRIED THAT YOUR FOOD WOULD RUN OUT BEFORE YOU GOT THE MONEY TO BUY MORE.: PATIENT DECLINED

## 2025-03-25 SDOH — SOCIAL STABILITY: SOCIAL INSECURITY
WITHIN THE LAST YEAR, HAVE YOU BEEN RAPED OR FORCED TO HAVE ANY KIND OF SEXUAL ACTIVITY BY YOUR PARTNER OR EX-PARTNER?: PATIENT DECLINED

## 2025-03-25 SDOH — ECONOMIC STABILITY: FOOD INSECURITY

## 2025-03-25 SDOH — SOCIAL STABILITY: SOCIAL INSECURITY: HAVE YOU HAD THOUGHTS OF HARMING ANYONE ELSE?: NO

## 2025-03-25 ASSESSMENT — LIFESTYLE VARIABLES
HOW OFTEN DO YOU HAVE A DRINK CONTAINING ALCOHOL: NEVER
HOW OFTEN DO YOU HAVE 6 OR MORE DRINKS ON ONE OCCASION: NEVER
AUDIT-C TOTAL SCORE: 0
AUDIT-C TOTAL SCORE: 0
SKIP TO QUESTIONS 9-10: 1
HOW MANY STANDARD DRINKS CONTAINING ALCOHOL DO YOU HAVE ON A TYPICAL DAY: PATIENT DOES NOT DRINK

## 2025-03-25 ASSESSMENT — COGNITIVE AND FUNCTIONAL STATUS - GENERAL
PATIENT BASELINE BEDBOUND: NO
DAILY ACTIVITIY SCORE: 24
MOBILITY SCORE: 24

## 2025-03-25 ASSESSMENT — ACTIVITIES OF DAILY LIVING (ADL)
LACK_OF_TRANSPORTATION: PATIENT DECLINED
BATHING: INDEPENDENT
GROOMING: INDEPENDENT
WALKS IN HOME: INDEPENDENT
ADEQUATE_TO_COMPLETE_ADL: YES
LACK_OF_TRANSPORTATION: NO
DRESSING YOURSELF: INDEPENDENT
JUDGMENT_ADEQUATE_SAFELY_COMPLETE_DAILY_ACTIVITIES: YES
FEEDING YOURSELF: INDEPENDENT
TOILETING: INDEPENDENT
HEARING - RIGHT EAR: FUNCTIONAL
HEARING - LEFT EAR: FUNCTIONAL
PATIENT'S MEMORY ADEQUATE TO SAFELY COMPLETE DAILY ACTIVITIES?: YES

## 2025-03-25 ASSESSMENT — ENCOUNTER SYMPTOMS
APPETITE LEVEL: POOR
PAIN LOCATION - PAIN SEVERITY: 4/10
PAIN LOCATION: ABDOMEN
PAIN: 1
CHANGE IN APPETITE: DECREASED
PERSON REPORTING PAIN: PATIENT
MUSCLE WEAKNESS: 1
FATIGUES EASILY: 1

## 2025-03-25 ASSESSMENT — PATIENT HEALTH QUESTIONNAIRE - PHQ9
SUM OF ALL RESPONSES TO PHQ9 QUESTIONS 1 & 2: 0
2. FEELING DOWN, DEPRESSED OR HOPELESS: NOT AT ALL
1. LITTLE INTEREST OR PLEASURE IN DOING THINGS: NOT AT ALL

## 2025-03-25 ASSESSMENT — PAIN SCALES - GENERAL
PAINLEVEL_OUTOF10: 6
PAINLEVEL_OUTOF10: 0 - NO PAIN

## 2025-03-25 ASSESSMENT — SOCIAL DETERMINANTS OF HEALTH (SDOH): IN THE PAST 12 MONTHS, HAS THE ELECTRIC, GAS, OIL, OR WATER COMPANY THREATENED TO SHUT OFF SERVICE IN YOUR HOME?: NO

## 2025-03-25 NOTE — H&P
[facility]  ACUTE CARE SURGERY - History and Physical    Patient Name: Bereket Em  MRN: [id number]  Admit Date:   : 1965  AGE: 59 y.o.   GENDER: male  ==============================================================================  TODAY'S ASSESSMENT AND PLAN OF CARE:  59 year old male with PMH perforated diverticultis s/p Margarita's procedure complicated by formation of enterocutaneous fistula, who presents to [facility] for TPN initiation.    Plan:  - Admit to Acute Care Surgery  - Nutrition recs appreciated    - Start TPN tomorrow following nutrition recs   - Daily 1L NS bolus to maintain daily fluid intake until TPN is started and at goal   - Continue regular diet  - Continue daily home meds: PPI, lomotil 2 x 3, prn trazadone   - Daily labs     Discussed with attending, Dr. Eliseo Garcia MD   PGY-1, Acute Care Surgery    ==============================================================================  CHIEF COMPLAINT / EVENTS LAST 24HRS / HPI:  59 year old male with PMH perforated diverticultis s/p Margarita's procedure complicated by formation of enterocutaneous fistula. He presented for enteroscopy with Dr. Cage on , during which 2 covered stents were placed to cover the fistula site followed by wound vac placement over his midline. On  he began having feculent output into his vac, no stool/gas via ostomy, worsening nausea, reflex, and hiccuping. CT AP was completed which demonstrated small bowel dilation with compression of distal small and large bowel loops, concerning for a small bowel obstruction. The wound vac was taken down and the right stent was pulled back with wound vac replacement and NGT placement. He continued to be clinically obstructed with high NGT output and no output via ostomy. The stent and wound vac were removed on  relieving the obstruction and a wound manager was replaced over fistula site. NGT was removed after successful  "gravity trial and he tolerated a soft diet well. He was discharged home with resumed home health care. He presents today for TPN initiation in the setting of malnutrition.     He denies any chest pain, SOB, nausea, or vomiting. He has no abdominal pain. He denies any recent illnesses. He was most recently seen in clinic by Dr. Perry and states he has continued with lomotil, tramadol, and has been able to take in around 1L of fluid per day.       MEDICAL HISTORY / ROS:   Admission history and ROS reviewed. Pertinent changes as follows:  None    PHYSICAL EXAM:  Heart Rate:  [73]   Temp:  [36 °C (96.8 °F)]   Resp:  [18]   BP: (147)/(81)   Height:  [172.7 cm (5' 7.99\")]   Weight:  [66.9 kg (147 lb 7.8 oz)]   SpO2:  [96 %]   General: NAD, A&Ox4  CV: RRR  Pulm: Non-labored, on RA  Abdomen: soft, ND, NT. Stoma well-perfused, no output. ECF pouched with enteric contents in bag  IMAGING SUMMARY:  (summary of new imaging findings, not a copy of dictation)  No recent imaging to review    LABS:  Results from last 7 days   Lab Units 03/24/25  1057   WBC AUTO x10*3/uL 7.2   HEMOGLOBIN g/dL 13.2*   HEMATOCRIT % 41.4   PLATELETS AUTO x10*3/uL 223   NEUTROS PCT AUTO % 65.1   LYMPHS PCT AUTO % 24.2   MONOS PCT AUTO % 7.1   EOS PCT AUTO % 2.8         Results from last 7 days   Lab Units 03/24/25  1057   SODIUM mmol/L 138   POTASSIUM mmol/L 4.1   CHLORIDE mmol/L 102   CO2 mmol/L 30   BUN mg/dL 13   CREATININE mg/dL 0.91   CALCIUM mg/dL 9.1   PROTEIN TOTAL g/dL 6.3*   BILIRUBIN TOTAL mg/dL 0.7   ALK PHOS U/L 111   ALT U/L 26   AST U/L 23   GLUCOSE mg/dL 84     Results from last 7 days   Lab Units 03/24/25  1057   BILIRUBIN TOTAL mg/dL 0.7           I have reviewed all medications, laboratory results, and imaging pertinent for today's encounter.   "

## 2025-03-25 NOTE — PROGRESS NOTES
Pharmacy Medication History Review    Bereket Em is a 59 y.o. male admitted for Severe malnutrition (Multi). Pharmacy reviewed the patient's ionwq-ec-bncavrccy medications for accuracy.    Medications ADDED:  None  Medications CHANGED:  None  Medications REMOVED:   Tylenol, metamucil, naproxen      The list below reflects the updated PTA list.   Prior to Admission Medications   Prescriptions Last Dose Informant   0.9 % sodium chloride (sodium chloride, PF, 0.9%) injection  Self   Sig: Infuse 10 mL into a venous catheter once daily. 10 ml NS once daily SASH and as needed 20 ml after blood work   Ringer's solution,lactated (lactated Ringer's) infusion  Self   Sig: Infuse 1,000 mL into a venous catheter once daily at bedtime. Administer 1L of IVFs nightly   Continue same weekly labs  Follow up with Dr Mcelroy for further refill orders   diphenoxylate-atropine (Lomotil) 2.5-0.025 mg tablet  Self   Sig: Take 2 tablets by mouth 3 times a day after meals.   heparin flush (heparin LockFlush,Porcine,,PF,) 10 unit/mL injection  Self   Sig: Infuse 5 mL (50 Units) into a venous catheter once daily.   magnesium sulfate 4 GM/50ML IV  Self   Sig: Infuse 25 mL (2 g) at 12.5 mL/hr over 2 hours into a venous catheter every 7 days. Volume for home infusion will be 500ml NS and administered via CADD pump over 2 hours at 250ml/hr   oral electrolytes replacement, Pedialyte, solution solution  Self   Sig: Take 500 mL by mouth 2 times a day.   pantoprazole (ProtoNix) 40 mg EC tablet  Self   Sig: Take 1 tablet (40 mg) by mouth every 12 hours. Do not crush, chew, or split.   traMADol (Ultram) 50 mg tablet  Self   Sig: Take 1 tablet (50 mg) by mouth every 6 hours if needed for severe pain (7 - 10).   traZODone (Desyrel) 100 mg tablet  Self   Sig: Take 1 tablet (100 mg) by mouth as needed at bedtime for sleep.      Facility-Administered Medications: None     Patient declines M2B at discharge.     Sources:   [facility]  Pharmacy dispense  "history  Patient Interview Good historian  Chart Review  Care Everywhere    Additional Comments:  None      Aidan Patel, PharmD  Transitions of Care Pharmacist  03/25/25     Secure Chat preferred   If no response call [phone] or [email or web address] \"Med Rec\"    "

## 2025-03-25 NOTE — PROGRESS NOTES
Reviewed chart and ordered magnesium 2g weekly. Orders to continue indefinitely as long as magnesium level remains low or normal.   Pt also ordered 1L LR daily.     Labs from 3/24 reviewed, magnesium wnl at 1.62. Lab draws on Mondays.     Tel call to patient, requesting no delivery at this time due to impending admission to hospital to start TPN. Has at least 2 bags of LR and is hoping to be admitted today. [facility].     Followup 3/27, check if admitted to hospital and check needs, give to Intake as appropriate or send refills.    Note: For TPN, patient is [facility] [employer]    Update: patient admitted to [facility] on 03/25 for initiation of TPN. Email sent to Intake Nurses and chart given to Intake [facility]

## 2025-03-26 LAB
ALBUMIN SERPL BCP-MCNC: 3.2 G/DL (ref 3.4–5)
ALBUMIN SERPL BCP-MCNC: 3.2 G/DL (ref 3.4–5)
ANION GAP SERPL CALC-SCNC: 11 MMOL/L (ref 10–20)
ANION GAP SERPL CALC-SCNC: 9 MMOL/L (ref 10–20)
BUN SERPL-MCNC: 11 MG/DL (ref 6–23)
BUN SERPL-MCNC: 13 MG/DL (ref 6–23)
CALCIUM SERPL-MCNC: 8.4 MG/DL (ref 8.6–10.6)
CALCIUM SERPL-MCNC: 9 MG/DL (ref 8.6–10.6)
CHLORIDE SERPL-SCNC: 103 MMOL/L (ref 98–107)
CHLORIDE SERPL-SCNC: 104 MMOL/L (ref 98–107)
CO2 SERPL-SCNC: 30 MMOL/L (ref 21–32)
CO2 SERPL-SCNC: 31 MMOL/L (ref 21–32)
CREAT SERPL-MCNC: 0.97 MG/DL (ref 0.5–1.3)
CREAT SERPL-MCNC: 1.04 MG/DL (ref 0.5–1.3)
EGFRCR SERPLBLD CKD-EPI 2021: 82 ML/MIN/1.73M*2
EGFRCR SERPLBLD CKD-EPI 2021: 89 ML/MIN/1.73M*2
ERYTHROCYTE [DISTWIDTH] IN BLOOD BY AUTOMATED COUNT: 13.6 % (ref 11.5–14.5)
GLUCOSE SERPL-MCNC: 114 MG/DL (ref 74–99)
GLUCOSE SERPL-MCNC: 91 MG/DL (ref 74–99)
HCT VFR BLD AUTO: 37.8 % (ref 41–52)
HGB BLD-MCNC: 12.1 G/DL (ref 13.5–17.5)
MAGNESIUM SERPL-MCNC: 1.61 MG/DL (ref 1.6–2.4)
MAGNESIUM SERPL-MCNC: 2.53 MG/DL (ref 1.6–2.4)
MCH RBC QN AUTO: 27.3 PG (ref 26–34)
MCHC RBC AUTO-ENTMCNC: 32 G/DL (ref 32–36)
MCV RBC AUTO: 85 FL (ref 80–100)
NRBC BLD-RTO: 0 /100 WBCS (ref 0–0)
PHOSPHATE SERPL-MCNC: 3.3 MG/DL (ref 2.5–4.9)
PHOSPHATE SERPL-MCNC: 4 MG/DL (ref 2.5–4.9)
PLATELET # BLD AUTO: 221 X10*3/UL (ref 150–450)
POTASSIUM SERPL-SCNC: 3.9 MMOL/L (ref 3.5–5.3)
POTASSIUM SERPL-SCNC: 4.1 MMOL/L (ref 3.5–5.3)
RBC # BLD AUTO: 4.44 X10*6/UL (ref 4.5–5.9)
SODIUM SERPL-SCNC: 138 MMOL/L (ref 136–145)
SODIUM SERPL-SCNC: 142 MMOL/L (ref 136–145)
WBC # BLD AUTO: 5 X10*3/UL (ref 4.4–11.3)

## 2025-03-26 PROCEDURE — 99232 SBSQ HOSP IP/OBS MODERATE 35: CPT | Performed by: NURSE PRACTITIONER

## 2025-03-26 PROCEDURE — 2500000005 HC RX 250 GENERAL PHARMACY W/O HCPCS: Performed by: NURSE PRACTITIONER

## 2025-03-26 PROCEDURE — 85027 COMPLETE CBC AUTOMATED: CPT | Performed by: NURSE PRACTITIONER

## 2025-03-26 PROCEDURE — 1100000001 HC PRIVATE ROOM DAILY

## 2025-03-26 PROCEDURE — 80069 RENAL FUNCTION PANEL: CPT | Performed by: NURSE PRACTITIONER

## 2025-03-26 PROCEDURE — 84100 ASSAY OF PHOSPHORUS: CPT | Performed by: NURSE PRACTITIONER

## 2025-03-26 PROCEDURE — 83735 ASSAY OF MAGNESIUM: CPT | Performed by: NURSE PRACTITIONER

## 2025-03-26 PROCEDURE — 2500000001 HC RX 250 WO HCPCS SELF ADMINISTERED DRUGS (ALT 637 FOR MEDICARE OP): Performed by: NURSE PRACTITIONER

## 2025-03-26 PROCEDURE — 2500000004 HC RX 250 GENERAL PHARMACY W/ HCPCS (ALT 636 FOR OP/ED): Performed by: NURSE PRACTITIONER

## 2025-03-26 PROCEDURE — 2500000002 HC RX 250 W HCPCS SELF ADMINISTERED DRUGS (ALT 637 FOR MEDICARE OP, ALT 636 FOR OP/ED): Performed by: NURSE PRACTITIONER

## 2025-03-26 RX ORDER — LANOLIN ALCOHOL/MO/W.PET/CERES
100 CREAM (GRAM) TOPICAL DAILY
Status: DISPENSED | OUTPATIENT
Start: 2025-03-26 | End: 2025-04-02

## 2025-03-26 RX ORDER — ENOXAPARIN SODIUM 100 MG/ML
40 INJECTION SUBCUTANEOUS DAILY
Status: DISPENSED | OUTPATIENT
Start: 2025-03-26

## 2025-03-26 RX ORDER — MAGNESIUM SULFATE HEPTAHYDRATE 40 MG/ML
4 INJECTION, SOLUTION INTRAVENOUS ONCE
Status: COMPLETED | OUTPATIENT
Start: 2025-03-26 | End: 2025-03-26

## 2025-03-26 RX ADMIN — ACETAMINOPHEN 650 MG: 325 TABLET, FILM COATED ORAL at 15:01

## 2025-03-26 RX ADMIN — THIAMINE HCL TAB 100 MG 100 MG: 100 TAB at 11:00

## 2025-03-26 RX ADMIN — DIPHENOXYLATE HYDROCHLORIDE AND ATROPINE SULFATE 2 TABLET: .025; 2.5 TABLET ORAL at 15:00

## 2025-03-26 RX ADMIN — MAGNESIUM SULFATE HEPTAHYDRATE 4 G: 40 INJECTION, SOLUTION INTRAVENOUS at 10:39

## 2025-03-26 RX ADMIN — DIPHENOXYLATE HYDROCHLORIDE AND ATROPINE SULFATE 2 TABLET: .025; 2.5 TABLET ORAL at 10:30

## 2025-03-26 RX ADMIN — DIPHENOXYLATE HYDROCHLORIDE AND ATROPINE SULFATE 2 TABLET: .025; 2.5 TABLET ORAL at 06:52

## 2025-03-26 RX ADMIN — ENOXAPARIN SODIUM 40 MG: 100 INJECTION SUBCUTANEOUS at 15:04

## 2025-03-26 RX ADMIN — I.V. FAT EMULSION 50 G: 20 EMULSION INTRAVENOUS at 20:35

## 2025-03-26 RX ADMIN — ACETAMINOPHEN 650 MG: 325 TABLET, FILM COATED ORAL at 20:37

## 2025-03-26 RX ADMIN — SODIUM CHLORIDE, SODIUM LACTATE, POTASSIUM CHLORIDE, AND CALCIUM CHLORIDE 1000 ML: .6; .31; .03; .02 INJECTION, SOLUTION INTRAVENOUS at 09:30

## 2025-03-26 RX ADMIN — ACETAMINOPHEN 650 MG: 325 TABLET, FILM COATED ORAL at 09:34

## 2025-03-26 RX ADMIN — ASCORBIC ACID, VITAMIN A PALMITATE, CHOLECALCIFEROL, THIAMINE HYDROCHLORIDE, RIBOFLAVIN-5 PHOSPHATE SODIUM, PYRIDOXINE HYDROCHLORIDE, NIACINAMIDE, DEXPANTHENOL, ALPHA-TOCOPHEROL ACETATE, VITAMIN K1, FOLIC ACID, BIOTIN, CYANOCOBALAMIN: 200; 3300; 200; 6; 3.6; 6; 40; 15; 10; 150; 600; 60; 5 INJECTION, SOLUTION INTRAVENOUS at 20:35

## 2025-03-26 RX ADMIN — PANTOPRAZOLE SODIUM 40 MG: 40 TABLET, DELAYED RELEASE ORAL at 06:52

## 2025-03-26 ASSESSMENT — COGNITIVE AND FUNCTIONAL STATUS - GENERAL
MOBILITY SCORE: 24
DAILY ACTIVITIY SCORE: 24

## 2025-03-26 ASSESSMENT — PAIN SCALES - GENERAL
PAINLEVEL_OUTOF10: 2
PAINLEVEL_OUTOF10: 5 - MODERATE PAIN
PAINLEVEL_OUTOF10: 4

## 2025-03-26 NOTE — PROGRESS NOTES
03/26/25 1056   Discharge Planning   Living Arrangements Spouse/significant other   Support Systems Spouse/significant other   Assistance Needed None   Type of Residence Private residence   Home or Post Acute Services In home services   Type of Home Care Services Home nursing visits;Home OT;Home PT   Expected Discharge Disposition Home H   Does the patient need discharge transport arranged? Yes   RoundTrip coordination needed? Yes   Has discharge transport been arranged? No   Financial Resource Strain   How hard is it for you to pay for the very basics like food, housing, medical care, and heating? Not very   Housing Stability   In the last 12 months, was there a time when you were not able to pay the mortgage or rent on time? N   Transportation Needs   In the past 12 months, has lack of transportation kept you from medical appointments or from getting medications? no       DC Planning:  Went in and met with the pt, confirmed demographics.     Transitional Care Coordination Progress Note:  Patient discussed during interdisciplinary rounds.     Plan per Medical/Surgical team:    Home Care choice for home going needs, [place] [facility]  Pt Needs: TBD.       Discharge disposition: TBD    Potential Barriers: None    ADOD:  3/28    This TCC will continue to follow for home going needs and safe DC plan.      Jeny Will. ISABELLE. TCC.

## 2025-03-26 NOTE — CARE PLAN
The patient's goals for the shift include      The clinical goals for the shift include PT WILL BE HDS FOR THE SHIFT    Over the shift, the patient did not make progress toward the following goals. Barriers to progression include open wound. Recommendations to address these barriers include standard precautions in place.

## 2025-03-26 NOTE — PROGRESS NOTES
[facility]  ACUTE CARE SURGERY - PROGRESS NOTE    Patient Name: Bereket Em  MRN: [id number]  Admit Date: 325  : 1965  AGE: 59 y.o.   GENDER: male  ==============================================================================  TODAY'S ASSESSMENT AND PLAN OF CARE:  59 year old male with PMH perforated diverticultis s/p Margarita's procedure complicated by formation of enterocutaneous fistula, who presents to [facility] for TPN initiation.     Plan:     Neuro:  - Tylenol as needed for pain   - Trazodone PRN for sleep     Card: no active issues  - vital signs qshift    Pulm: no active issues  - Encourage IS  - OOB, ambulate as tolerated    GI: high output ECF  - Continue regular diet -small meals  - Lomotil 2 tabs TID with meals  - Continue home daily PPI  - Wound Care consulted to assist with fistula pouching    Severe malnutrition r/t chronic disease/ECF  - Initiate TPN today at 40ml/hr, SMOF at 21ml/hr   - If electrolytes remain stable- increase to goal rate of 83ml/hr on 3/27  - Monitor Electrolytes BID  - Started on Thiamine for a total of 7 days  - Will plan to transition to cyclic TPN once tolerating goal rate and no electrolyte abnormalities    :  - Strict I&Os  - Continue home 1L of fluids a day  - Replete lytes as indicated    Heme: no active issues    ID: no active issues  - PICC line in place    DVT Proph:   - SCDs, Lovenox     Dispo: continue RNF,  plan for home with home care once medically ready    Patient seen and discussed with Attending Dr. Eliseo [facility]  Acute Care Surgery  Pager [phone]    Total time spent on assessment and plan of care approximately thirty minutes   ==============================================================================  CHIEF COMPLAINT / EVENTS LAST 24HRS / HPI:  No acute events overnight. Patient denies nausea or pain. States fistula output has been about >1L a day on average at home.     MEDICAL HISTORY / ROS:  "  Admission history and ROS reviewed. Pertinent changes as follows:  None     PHYSICAL EXAM:  Heart Rate:  [45-73]   Temp:  [36 °C (96.8 °F)-36.9 °C (98.4 °F)]   Resp:  [16-18]   BP: (126-147)/(69-81)   Height:  [172.7 cm (5' 7.99\")]   Weight:  [66.9 kg (147 lb 7.8 oz)]   SpO2:  [95 %-98 %]   Physical Exam  Constitutional:       Appearance: Normal appearance.   HENT:      Mouth/Throat:      Mouth: Mucous membranes are dry.   Eyes:      Extraocular Movements: Extraocular movements intact.   Cardiovascular:      Rate and Rhythm: Normal rate.   Pulmonary:      Effort: Pulmonary effort is normal.   Abdominal:      Tenderness: There is no abdominal tenderness.      Comments: Soft, non distended. ECF fistula (prolapsed appearing viable intestine) with gastric/enteric liquid contents in bag. Left sided ostomy site with opaque pouch in place.    Musculoskeletal:         General: Normal range of motion.   Skin:     General: Skin is warm and dry.   Neurological:      Mental Status: He is alert and oriented to person, place, and time.   Psychiatric:         Behavior: Behavior normal.       LABS:  Results for orders placed or performed during the hospital encounter of 03/25/25 (from the past 24 hours)   CBC   Result Value Ref Range    WBC 7.3 4.4 - 11.3 x10*3/uL    nRBC 0.0 0.0 - 0.0 /100 WBCs    RBC 4.65 4.50 - 5.90 x10*6/uL    Hemoglobin 12.7 (L) 13.5 - 17.5 g/dL    Hematocrit 39.0 (L) 41.0 - 52.0 %    MCV 84 80 - 100 fL    MCH 27.3 26.0 - 34.0 pg    MCHC 32.6 32.0 - 36.0 g/dL    RDW 13.7 11.5 - 14.5 %    Platelets 226 150 - 450 x10*3/uL   Coagulation Screen   Result Value Ref Range    Protime 10.3 9.8 - 12.4 seconds    INR 0.9 0.9 - 1.1    aPTT 26 26 - 36 seconds   Basic Metabolic Panel   Result Value Ref Range    Glucose 95 74 - 99 mg/dL    Sodium 140 136 - 145 mmol/L    Potassium 3.6 3.5 - 5.3 mmol/L    Chloride 101 98 - 107 mmol/L    Bicarbonate 28 21 - 32 mmol/L    Anion Gap 15 10 - 20 mmol/L    Urea Nitrogen 15 6 - 23 " mg/dL    Creatinine 0.95 0.50 - 1.30 mg/dL    eGFR >90 >60 mL/min/1.73m*2    Calcium 9.3 8.6 - 10.6 mg/dL   Magnesium   Result Value Ref Range    Magnesium 1.53 (L) 1.60 - 2.40 mg/dL   CBC   Result Value Ref Range    WBC 5.0 4.4 - 11.3 x10*3/uL    nRBC 0.0 0.0 - 0.0 /100 WBCs    RBC 4.44 (L) 4.50 - 5.90 x10*6/uL    Hemoglobin 12.1 (L) 13.5 - 17.5 g/dL    Hematocrit 37.8 (L) 41.0 - 52.0 %    MCV 85 80 - 100 fL    MCH 27.3 26.0 - 34.0 pg    MCHC 32.0 32.0 - 36.0 g/dL    RDW 13.6 11.5 - 14.5 %    Platelets 221 150 - 450 x10*3/uL   Renal Function Panel   Result Value Ref Range    Glucose 91 74 - 99 mg/dL    Sodium 142 136 - 145 mmol/L    Potassium 4.1 3.5 - 5.3 mmol/L    Chloride 104 98 - 107 mmol/L    Bicarbonate 31 21 - 32 mmol/L    Anion Gap 11 10 - 20 mmol/L    Urea Nitrogen 13 6 - 23 mg/dL    Creatinine 1.04 0.50 - 1.30 mg/dL    eGFR 82 >60 mL/min/1.73m*2    Calcium 9.0 8.6 - 10.6 mg/dL    Phosphorus 4.0 2.5 - 4.9 mg/dL    Albumin 3.2 (L) 3.4 - 5.0 g/dL   Magnesium   Result Value Ref Range    Magnesium 1.61 1.60 - 2.40 mg/dL     *Note: Due to a large number of results and/or encounters for the requested time period, some results have not been displayed. A complete set of results can be found in Results Review.     MEDICATIONS:  Current Facility-Administered Medications   Medication Dose Route Frequency Provider Last Rate Last Admin    acetaminophen (Tylenol) tablet 650 mg  650 mg oral q4h PRN ASHLY Hawk   650 mg at 03/26/25 0934    Adult Clinimix Parenteral Nutrition Continuous  40 mL/hr intravenous Daily PN ASHLY Hawk        alteplase (Cathflo Activase) injection 2 mg  2 mg intra-catheter PRN ASHLY Hawk        diphenoxylate-atropine (Lomotil) 2.5-0.025 mg per tablet 2 tablet  2 tablet oral TID AC ASHLY Hawk   2 tablet at 03/26/25 0652    fat emulsion-plant based (Intralipid) 20 % infusion 50 g  250 mL intravenous Daily Lipids ASHLY Hawk         lactated Ringer's bolus 1,000 mL  1,000 mL intravenous Once ASHLY Hawk 333.3 mL/hr at 03/26/25 0930 1,000 mL at 03/26/25 0930    lidocaine (Xylocaine) 10 mg/mL (1 %) injection 5 mL  5 mL infiltration Once ASHLY Hawk        magnesium sulfate 4 g in sterile water for injection 100 mL  4 g intravenous Once ASHLY Hawk 25 mL/hr at 03/26/25 1039 4 g at 03/26/25 1039    pantoprazole (ProtoNix) EC tablet 40 mg  40 mg oral Daily before breakfast ASHLY Hawk   40 mg at 03/26/25 0652    thiamine (Vitamin B-1) tablet 100 mg  100 mg oral Daily ASHLY Hawk        traZODone (Desyrel) tablet 100 mg  100 mg oral Nightly PRN ASHLY Hawk   100 mg at 03/25/25 8965       IMAGING SUMMARY:  (summary of new imaging findings, not a copy of dictation)

## 2025-03-26 NOTE — CARE PLAN
The patient's goals for the shift include  REMAIN SAFE AND GET SOME SLEEP    The clinical goals for the shift include PT WILL BE HDS FOR THE SHIFT    Over the shift, the patient did make progress toward the following goals.

## 2025-03-26 NOTE — CONSULTS
Wound Care Consult     Visit Date: 3/26/2025      Patient Name: Bereket Em         MRN: [id number]           YOB: 1965     Reason for Consult: ECF and Colostomy care     Wound History: 59 year old male with PMH perforated diverticultis s/p Margarita's procedure complicated by formation of enterocutaneous fistula      Wound Team Summary Assessment: Patient pouches are Clean, dry, and intact.  The patient is knowledgeable in ostomy care, but needs assistance while inpatient.      Wound Team Plan: Ostomy/wound team will follow while inpatient for ECF and colostomy care.       Kenya Mcneal RN, CWON  3/26/2025  5:37 PM

## 2025-03-26 NOTE — CONSULTS
"Nutrition Initial Assessment:   Nutrition Assessment    Reason for Assessment: Admission nursing screening    Patient is a 59 y.o. male presenting with for TPN initiation. Pt with hx of perforated diverticultis s/p Margarita's procedure complicated by formation of enterocutaneous fistula. Pt receives IVF at home.     Nutrition History:  Food and Nutrient History: Met with pt this morning. He says that he has been eating and drinking but no matter what he eats he continues to have high output and signfinant weight loss. The more he eats the more output he has. Has been taking lomotil and PPI at home. He does home infusions if IVF. He has been on TPN in the past and also received it at home, so he is aware of how it works. Unclear when he last was using TPN, however do know that he has only been eating PO for the last 4 months.       Anthropometrics:  Height: 172.7 cm (5' 7.99\")   Weight: 66.9 kg (147 lb 7.8 oz)   BMI (Calculated): 22.43  IBW/kg (Dietitian Calculated): 70 kg  Percent of IBW: 96 %       Weight History:   Wt Readings from Last 10 Encounters:   03/24/25 65.8 kg (145 lb)   03/12/25 66.2 kg (146 lb)   02/10/25 69.9 kg (154 lb)   02/06/25 68.5 kg (151 lb)   01/20/25 70.3 kg (155 lb)   01/08/25 71.2 kg (157 lb)   12/30/24 70.8 kg (156 lb)   12/12/24 70.7 kg (155 lb 12.8 oz)   12/09/24 71.4 kg (157 lb 7 oz)       Weight Change %:  Weight History / % Weight Change: Pt reports significant weight loss. Says he started being steady at 155lbs ~ 4 months ago but, weight has just continued to decline. Per weight history, he has lost 5.6kg (7.8%) in the last 3 months  Significant Weight Loss: Yes  Interpretation of Weight Loss: >7.5% in 3 months    Nutrition Focused Physical Exam Findings:  Subcutaneous Fat Loss:   Orbital Fat Pads: Mild-Moderate (slight dark circles and slight hollowing)  Buccal Fat Pads: Mild-Moderate (flat cheeks, minimal bounce)  Triceps: Defer  Muscle Wasting:  Temporalis: Mild-Moderate (slight " depression)  Pectoralis (Clavicular Region): Mild-Moderate (some protrusion of clavicle)  Deltoid/Trapezius: Mild-Moderate (slight protrusion of acromion process)  Edema:  Edema: none  Physical Findings:  Skin: Negative    Nutrition Significant Labs:  CBC Trend:   Results from last 7 days   Lab Units 03/26/25  0557 03/25/25  1548 03/24/25  1057   WBC AUTO x10*3/uL 5.0 7.3 7.2   RBC AUTO x10*6/uL 4.44* 4.65 4.87   HEMOGLOBIN g/dL 12.1* 12.7* 13.2*   HEMATOCRIT % 37.8* 39.0* 41.4   MCV fL 85 84 85   PLATELETS AUTO x10*3/uL 221 226 223    , BMP Trend:   Results from last 7 days   Lab Units 03/26/25  0557 03/25/25  1548 03/24/25  1057   GLUCOSE mg/dL 91 95 84   CALCIUM mg/dL 9.0 9.3 9.1   SODIUM mmol/L 142 140 138   POTASSIUM mmol/L 4.1 3.6 4.1   CO2 mmol/L 31 28 30   CHLORIDE mmol/L 104 101 102   BUN mg/dL 13 15 13   CREATININE mg/dL 1.04 0.95 0.91    , TPN/PPN Labs:   Results from last 7 days   Lab Units 03/26/25  0557 03/25/25  1548 03/24/25  1057   GLUCOSE mg/dL 91 95 84   POTASSIUM mmol/L 4.1 3.6 4.1   PHOSPHORUS mg/dL 4.0  --  3.4   MAGNESIUM mg/dL 1.61 1.53* 1.62   SODIUM mmol/L 142 140 138   CHLORIDE mmol/L 104 101 102   ALT U/L  --   --  26   AST U/L  --   --  23   ALK PHOS U/L  --   --  111   BILIRUBIN TOTAL mg/dL  --   --  0.7   TRIGLYCERIDES mg/dL  --   --  99    , Vit D:   Lab Results   Component Value Date    VITD25 47 01/26/2024    , Vit B12:   Lab Results   Component Value Date    YCCXYLYV39 871 01/26/2024        Nutrition Specific Medications:  Scheduled medications  diphenoxylate-atropine, 2 tablet, oral, TID AC  lactated Ringer's, 1,000 mL, intravenous, Once  lidocaine, 5 mL, infiltration, Once  magnesium sulfate, 4 g, intravenous, Once  pantoprazole, 40 mg, oral, Daily before breakfast      Continuous medications     PRN medications  PRN medications: acetaminophen, alteplase, traZODone      I/O:   Last BM Date: 03/25/25;  .I/O last 3 completed shifts:  In: 480 (7.2 mL/kg) [P.O.:480]  Out: 600 (9  mL/kg) [Urine:300 (0.1 mL/kg/hr); Stool:300]  Weight: 66.9 kg   No intake/output data recorded.        Dietary Orders (From admission, onward)       Start     Ordered    03/25/25 1416  May Participate in Room Service  ( ROOM SERVICE MAY PARTICIPATE)  Once        Question:  .  Answer:  Yes    03/25/25 1415    03/25/25 1409  Adult diet Regular  Diet effective now        Question:  Diet type  Answer:  Regular    03/25/25 1408                     Estimated Needs:   Total Energy Estimated Needs in 24 hours (kCal):  (3558-8364)  Method for Estimating Needs: 25-30kcal/kg CBW  Total Protein Estimated Needs in 24 Hours (g): 99 g  Method for Estimating 24 Hour Protein Needs: 1.5g/kg CBW  Total Fluid Estimated Needs in 24 Hours (mL):  (1000ml + output)           Nutrition Diagnosis   Malnutrition Diagnosis  Patient has Malnutrition Diagnosis: Yes  Diagnosis Status: New  Malnutrition Diagnosis: Severe malnutrition related to chronic disease or condition  Related to: high ECF output  As Evidenced by: > 7.5% weight loss in 3 months, < 75% estimated energy needs met d/t losses from ECF, moderate fat + muscle losses    Nutrition Diagnosis  Patient has Nutrition Diagnosis: Yes  Diagnosis Status (1): New  Nutrition Diagnosis 1: Altered GI function  Related to (1): surgical history  As Evidenced by (1): ECF       Nutrition Interventions/Recommendations   Nutrition prescription for parenteral nutrition    Nutrition Recommendations:  Individualized Nutrition Prescription Provided for : 2000ml Clinimix E 5/15 + 250ml SMOF lipids daily (1920kcal, 100gm protein, 300g dextrose)    Refeeding Precautions d/t severe malnutrition   Start 100mg thiamine daily x 7 days    Monitor RFP+Mg Q12H   Replete electrolytes PRN + ensure lytes are replenished prior to starting nutrition support    Do not start nutrition support if serum potassium </= 3 mEq/L, phosphorus </= 2mg/dL, and/or magnesium </= 1.2mEq/L   If significant drop in electrolytes  happens during nutrition support advancement, do not further advance     TPN:   Clinimix E 5/15 @ 83ml/hr + 250ml SMOF lipids @ 21ml/hr x 12 hours   Day 1: Clinimix E 5/15 @ 40ml/hr continuous + 250ml SMOF lipids @ 21ml/hr x 12 hours  Day 2: Clinimix E 5/15 @ 83ml/hr continuous + 250ml SMOF lipids @ 21ml/hr x 12 hours      Once tolerating TPN @ goal without metabolic complications can transition to cycle  Clinimix E 5/15 @ 90ml/hr x 1 hour, 182ml/hr x 10 hours, and 90ml/hr x 1 hour + 250ml SMOF lipids @ 21ml/hr x 12 hours overnight       Nutrition Interventions/Goals:   Interventions: Parenteral nutrition, Vitamin and mineral supplement therapy  Parenteral Nutrition: Management of composition of parenteral nutrition  Vitamin and Mineral Supplement Therapy: Thiamin supplement therapy      Education Documentation  No documentation found.            Nutrition Monitoring and Evaluation   Food/Nutrient Related History Monitoring  Monitoring and Evaluation Plan: Enteral and parenteral nutrition intake determination  Enteral and Parenteral Nutrition Intake Determination: Parenteral nutrition intake - Tolerate TPN at goal rate, Parenteral nutrition intake - Titrate to goal without metabolic complications    Anthropometric Measurements  Monitoring and Evaluation Plan: Body weight  Body Weight: Body weight - Maintain stable weight    Biochemical Data, Medical Tests and Procedures  Monitoring and Evaluation Plan: Electrolyte/renal panel, Glucose/endocrine profile  Electrolyte and Renal Panel: Electrolytes within normal limits  Glucose/Endocrine Profile: Glucose within normal limits ( mg/dL)         Goal Status: New goal(s) identified    Time Spent (min): 60 minutes

## 2025-03-27 ENCOUNTER — PHARMACY VISIT (OUTPATIENT)
Dept: PHARMACY | Facility: CLINIC | Age: 60
End: 2025-03-27
Payer: MEDICARE

## 2025-03-27 LAB
ALBUMIN SERPL BCP-MCNC: 3.1 G/DL (ref 3.4–5)
ALBUMIN SERPL BCP-MCNC: 3.2 G/DL (ref 3.4–5)
ANION GAP SERPL CALC-SCNC: 10 MMOL/L (ref 10–20)
ANION GAP SERPL CALC-SCNC: 10 MMOL/L (ref 10–20)
BUN SERPL-MCNC: 11 MG/DL (ref 6–23)
BUN SERPL-MCNC: 16 MG/DL (ref 6–23)
CALCIUM SERPL-MCNC: 8.5 MG/DL (ref 8.6–10.6)
CALCIUM SERPL-MCNC: 8.6 MG/DL (ref 8.6–10.6)
CHLORIDE SERPL-SCNC: 102 MMOL/L (ref 98–107)
CHLORIDE SERPL-SCNC: 104 MMOL/L (ref 98–107)
CO2 SERPL-SCNC: 29 MMOL/L (ref 21–32)
CO2 SERPL-SCNC: 30 MMOL/L (ref 21–32)
CREAT SERPL-MCNC: 0.83 MG/DL (ref 0.5–1.3)
CREAT SERPL-MCNC: 0.85 MG/DL (ref 0.5–1.3)
EGFRCR SERPLBLD CKD-EPI 2021: >90 ML/MIN/1.73M*2
EGFRCR SERPLBLD CKD-EPI 2021: >90 ML/MIN/1.73M*2
ERYTHROCYTE [DISTWIDTH] IN BLOOD BY AUTOMATED COUNT: 13.6 % (ref 11.5–14.5)
GLUCOSE BLD MANUAL STRIP-MCNC: 107 MG/DL (ref 74–99)
GLUCOSE SERPL-MCNC: 102 MG/DL (ref 74–99)
GLUCOSE SERPL-MCNC: 99 MG/DL (ref 74–99)
HCT VFR BLD AUTO: 35.1 % (ref 41–52)
HGB BLD-MCNC: 11.5 G/DL (ref 13.5–17.5)
MAGNESIUM SERPL-MCNC: 2.27 MG/DL (ref 1.6–2.4)
MAGNESIUM SERPL-MCNC: 2.52 MG/DL (ref 1.6–2.4)
MCH RBC QN AUTO: 27.1 PG (ref 26–34)
MCHC RBC AUTO-ENTMCNC: 32.8 G/DL (ref 32–36)
MCV RBC AUTO: 83 FL (ref 80–100)
NRBC BLD-RTO: 0 /100 WBCS (ref 0–0)
PHOSPHATE SERPL-MCNC: 4.1 MG/DL (ref 2.5–4.9)
PHOSPHATE SERPL-MCNC: 4.3 MG/DL (ref 2.5–4.9)
PLATELET # BLD AUTO: 180 X10*3/UL (ref 150–450)
POTASSIUM SERPL-SCNC: 4 MMOL/L (ref 3.5–5.3)
POTASSIUM SERPL-SCNC: 4.5 MMOL/L (ref 3.5–5.3)
RBC # BLD AUTO: 4.25 X10*6/UL (ref 4.5–5.9)
SODIUM SERPL-SCNC: 137 MMOL/L (ref 136–145)
SODIUM SERPL-SCNC: 139 MMOL/L (ref 136–145)
WBC # BLD AUTO: 5.2 X10*3/UL (ref 4.4–11.3)

## 2025-03-27 PROCEDURE — RXMED WILLOW AMBULATORY MEDICATION CHARGE

## 2025-03-27 PROCEDURE — 83735 ASSAY OF MAGNESIUM: CPT | Performed by: NURSE PRACTITIONER

## 2025-03-27 PROCEDURE — 2500000005 HC RX 250 GENERAL PHARMACY W/O HCPCS: Performed by: NURSE PRACTITIONER

## 2025-03-27 PROCEDURE — 1100000001 HC PRIVATE ROOM DAILY

## 2025-03-27 PROCEDURE — 2500000002 HC RX 250 W HCPCS SELF ADMINISTERED DRUGS (ALT 637 FOR MEDICARE OP, ALT 636 FOR OP/ED): Performed by: NURSE PRACTITIONER

## 2025-03-27 PROCEDURE — 2500000001 HC RX 250 WO HCPCS SELF ADMINISTERED DRUGS (ALT 637 FOR MEDICARE OP): Performed by: NURSE PRACTITIONER

## 2025-03-27 PROCEDURE — 80069 RENAL FUNCTION PANEL: CPT | Performed by: NURSE PRACTITIONER

## 2025-03-27 PROCEDURE — 2500000004 HC RX 250 GENERAL PHARMACY W/ HCPCS (ALT 636 FOR OP/ED): Performed by: NURSE PRACTITIONER

## 2025-03-27 PROCEDURE — 2500000005 HC RX 250 GENERAL PHARMACY W/O HCPCS

## 2025-03-27 PROCEDURE — 85027 COMPLETE CBC AUTOMATED: CPT | Performed by: NURSE PRACTITIONER

## 2025-03-27 PROCEDURE — 82947 ASSAY GLUCOSE BLOOD QUANT: CPT

## 2025-03-27 RX ORDER — LANOLIN ALCOHOL/MO/W.PET/CERES
100 CREAM (GRAM) TOPICAL DAILY
Qty: 14 TABLET | Refills: 0 | Status: SHIPPED | OUTPATIENT
Start: 2025-03-28 | End: 2025-04-11

## 2025-03-27 RX ORDER — PANTOPRAZOLE SODIUM 40 MG/1
40 TABLET, DELAYED RELEASE ORAL
Qty: 21 TABLET | Refills: 0 | Status: SHIPPED | OUTPATIENT
Start: 2025-03-28 | End: 2025-04-18

## 2025-03-27 RX ORDER — DIPHENOXYLATE HYDROCHLORIDE AND ATROPINE SULFATE 2.5; .025 MG/1; MG/1
2 TABLET ORAL
Qty: 84 TABLET | Refills: 0 | Status: SHIPPED | OUTPATIENT
Start: 2025-03-27 | End: 2025-04-10

## 2025-03-27 RX ADMIN — DIPHENOXYLATE HYDROCHLORIDE AND ATROPINE SULFATE 2 TABLET: .025; 2.5 TABLET ORAL at 16:24

## 2025-03-27 RX ADMIN — TRAZODONE HYDROCHLORIDE 100 MG: 100 TABLET ORAL at 00:26

## 2025-03-27 RX ADMIN — DIPHENOXYLATE HYDROCHLORIDE AND ATROPINE SULFATE 2 TABLET: .025; 2.5 TABLET ORAL at 11:24

## 2025-03-27 RX ADMIN — PANTOPRAZOLE SODIUM 40 MG: 40 TABLET, DELAYED RELEASE ORAL at 06:34

## 2025-03-27 RX ADMIN — THIAMINE HCL TAB 100 MG 100 MG: 100 TAB at 08:35

## 2025-03-27 RX ADMIN — ACETAMINOPHEN 650 MG: 325 TABLET, FILM COATED ORAL at 08:36

## 2025-03-27 RX ADMIN — ACETAMINOPHEN 650 MG: 325 TABLET, FILM COATED ORAL at 18:37

## 2025-03-27 RX ADMIN — DIPHENOXYLATE HYDROCHLORIDE AND ATROPINE SULFATE 2 TABLET: .025; 2.5 TABLET ORAL at 06:34

## 2025-03-27 RX ADMIN — I.V. FAT EMULSION 50 G: 20 EMULSION INTRAVENOUS at 20:51

## 2025-03-27 RX ADMIN — ENOXAPARIN SODIUM 40 MG: 100 INJECTION SUBCUTANEOUS at 08:36

## 2025-03-27 RX ADMIN — ASCORBIC ACID, VITAMIN A PALMITATE, CHOLECALCIFEROL, THIAMINE HYDROCHLORIDE, RIBOFLAVIN-5 PHOSPHATE SODIUM, PYRIDOXINE HYDROCHLORIDE, NIACINAMIDE, DEXPANTHENOL, ALPHA-TOCOPHEROL ACETATE, VITAMIN K1, FOLIC ACID, BIOTIN, CYANOCOBALAMIN: 200; 3300; 200; 6; 3.6; 6; 40; 15; 10; 150; 600; 60; 5 INJECTION, SOLUTION INTRAVENOUS at 20:51

## 2025-03-27 ASSESSMENT — PAIN - FUNCTIONAL ASSESSMENT: PAIN_FUNCTIONAL_ASSESSMENT: 0-10

## 2025-03-27 ASSESSMENT — PAIN SCALES - GENERAL
PAINLEVEL_OUTOF10: 3
PAINLEVEL_OUTOF10: 0 - NO PAIN
PAINLEVEL_OUTOF10: 4

## 2025-03-27 NOTE — PROGRESS NOTES
[facility]  ACUTE CARE SURGERY - PROGRESS NOTE    Patient Name: Bereket Em  MRN: [id number]  Admit Date: 325  : 1965  AGE: 59 y.o.   GENDER: male  ==============================================================================  TODAY'S ASSESSMENT AND PLAN OF CARE:  59 year old male with PMH perforated diverticultis s/p Margarita's procedure complicated by formation of enterocutaneous fistula, who presents to [facility] for TPN initiation.     He was started on TPN at half-rate yesterday with goal of progressing to full rate today.     Plan:     Neuro:  - Tylenol as needed for pain   - Trazodone PRN for sleep     Card: no active issues  - vital signs qshift    Pulm: no active issues  - Encourage IS  - OOB, ambulate as tolerated    GI: high output ECF  - Continue regular diet -small meals  - Lomotil 2 tabs TID with meals  - Continue home daily PPI  - Wound Care consulted to assist with fistula pouching    Severe malnutrition r/t chronic disease/ECF  - Increase TPN today to 83 ml/hr + SMOF lipids   - Discharge with instruction on TPN cycling   - Monitor Electrolytes BID  - Started on Thiamine for a total of 7 days    :  - Strict I&Os  - Continue home 1L of fluids a day  - Replete lytes as indicated    Heme: no active issues    ID: no active issues  - PICC line in place    DVT Proph:   - SCDs, Lovenox     Dispo: continue RNF,  plan for home with home care once medically ready    Patient discussed with Attending Dr. Eliseo Garcia MD   PGY-1, Acute Care Surgery  ==============================================================================  CHIEF COMPLAINT / EVENTS LAST 24HRS / HPI:  NAEON. VSS overnight.   Doing well this morning. Minimal pain. No n/v. No stoma output. No new issues or concerns.     MEDICAL HISTORY / ROS:   Admission history and ROS reviewed. Pertinent changes as follows:  None     PHYSICAL EXAM:  Heart Rate:  [40-54]   Temp:  [35.5 °C (95.9 °F)-36.6 °C  (97.9 °F)]   Resp:  [16-18]   BP: (107-130)/(61-74)   SpO2:  [95 %-100 %]   Physical Exam  Constitutional:       Appearance: Normal appearance.   HENT:      Mouth/Throat:      Mouth: Mucous membranes are dry.   Eyes:      Extraocular Movements: Extraocular movements intact.   Cardiovascular:      Rate and Rhythm: Normal rate.   Pulmonary:      Effort: Pulmonary effort is normal.   Abdominal:      Tenderness: There is no abdominal tenderness.      Comments: Soft, non distended. ECF fistula (prolapsed appearing viable intestine) with gastric/enteric liquid contents in bag. Left sided ostomy site with opaque pouch in place.    Musculoskeletal:         General: Normal range of motion.   Skin:     General: Skin is warm and dry.   Neurological:      Mental Status: He is alert and oriented to person, place, and time.   Psychiatric:         Behavior: Behavior normal.       LABS:  Results for orders placed or performed during the hospital encounter of 03/25/25 (from the past 24 hours)   Renal Function Panel   Result Value Ref Range    Glucose 114 (H) 74 - 99 mg/dL    Sodium 138 136 - 145 mmol/L    Potassium 3.9 3.5 - 5.3 mmol/L    Chloride 103 98 - 107 mmol/L    Bicarbonate 30 21 - 32 mmol/L    Anion Gap 9 (L) 10 - 20 mmol/L    Urea Nitrogen 11 6 - 23 mg/dL    Creatinine 0.97 0.50 - 1.30 mg/dL    eGFR 89 >60 mL/min/1.73m*2    Calcium 8.4 (L) 8.6 - 10.6 mg/dL    Phosphorus 3.3 2.5 - 4.9 mg/dL    Albumin 3.2 (L) 3.4 - 5.0 g/dL   Magnesium   Result Value Ref Range    Magnesium 2.53 (H) 1.60 - 2.40 mg/dL   CBC   Result Value Ref Range    WBC 5.2 4.4 - 11.3 x10*3/uL    nRBC 0.0 0.0 - 0.0 /100 WBCs    RBC 4.25 (L) 4.50 - 5.90 x10*6/uL    Hemoglobin 11.5 (L) 13.5 - 17.5 g/dL    Hematocrit 35.1 (L) 41.0 - 52.0 %    MCV 83 80 - 100 fL    MCH 27.1 26.0 - 34.0 pg    MCHC 32.8 32.0 - 36.0 g/dL    RDW 13.6 11.5 - 14.5 %    Platelets 180 150 - 450 x10*3/uL   Renal Function Panel   Result Value Ref Range    Glucose 99 74 - 99 mg/dL     Sodium 139 136 - 145 mmol/L    Potassium 4.0 3.5 - 5.3 mmol/L    Chloride 104 98 - 107 mmol/L    Bicarbonate 29 21 - 32 mmol/L    Anion Gap 10 10 - 20 mmol/L    Urea Nitrogen 11 6 - 23 mg/dL    Creatinine 0.85 0.50 - 1.30 mg/dL    eGFR >90 >60 mL/min/1.73m*2    Calcium 8.5 (L) 8.6 - 10.6 mg/dL    Phosphorus 4.3 2.5 - 4.9 mg/dL    Albumin 3.2 (L) 3.4 - 5.0 g/dL   Magnesium   Result Value Ref Range    Magnesium 2.52 (H) 1.60 - 2.40 mg/dL   POCT GLUCOSE   Result Value Ref Range    POCT Glucose 107 (H) 74 - 99 mg/dL     *Note: Due to a large number of results and/or encounters for the requested time period, some results have not been displayed. A complete set of results can be found in Results Review.     MEDICATIONS:  Current Facility-Administered Medications   Medication Dose Route Frequency Provider Last Rate Last Admin    acetaminophen (Tylenol) tablet 650 mg  650 mg oral q4h PRN MINERVA Hawk-CNP   650 mg at 03/27/25 0836    Adult Clinimix Parenteral Nutrition Continuous  83 mL/hr intravenous Daily PN Jesus Martinez MD 83 mL/hr at 03/27/25 0836 Rate Change at 03/27/25 0836    alteplase (Cathflo Activase) injection 2 mg  2 mg intra-catheter PRN MINERVA Hawk-CNP        diphenoxylate-atropine (Lomotil) 2.5-0.025 mg per tablet 2 tablet  2 tablet oral TID AC Michael Lubin APRN-CNP   2 tablet at 03/27/25 1124    enoxaparin (Lovenox) syringe 40 mg  40 mg subcutaneous Daily MINERVA Hawk-CNP   40 mg at 03/27/25 0836    fat emulsion-plant based (Intralipid) 20 % infusion 50 g  250 mL intravenous Daily Lipids MINERVA Hawk-CNP   Stopped at 03/27/25 0828    lidocaine (Xylocaine) 10 mg/mL (1 %) injection 5 mL  5 mL infiltration Once MINERVA Hawk-HARVINDER        pantoprazole (ProtoNix) EC tablet 40 mg  40 mg oral Daily before breakfast ASHLY Hawk   40 mg at 03/27/25 0634    thiamine (Vitamin B-1) tablet 100 mg  100 mg oral Daily ASHLY Hawk   100 mg at 03/27/25  0835    traZODone (Desyrel) tablet 100 mg  100 mg oral Nightly ERIN Michael Lubin, APRN-CNP   100 mg at 03/27/25 0026       IMAGING SUMMARY:  (summary of new imaging findings, not a copy of dictation)

## 2025-03-27 NOTE — CARE PLAN
Problem: Discharge Planning  Goal: Discharge to home or other facility with appropriate resources  Outcome: Progressing     Problem: Chronic Conditions and Co-morbidities  Goal: Patient's chronic conditions and co-morbidity symptoms are monitored and maintained or improved  Outcome: Progressing     Problem: Nutrition  Goal: Nutrient intake appropriate for maintaining nutritional needs  Outcome: Progressing     Problem: Fall/Injury  Goal: Verbalize understanding of personal risk factors for fall in the hospital  Outcome: Progressing  Goal: Verbalize understanding of risk factor reduction measures to prevent injury from fall in the home  Outcome: Progressing  Goal: Use assistive devices by end of the shift  Outcome: Progressing  Goal: Pace activities to prevent fatigue by end of the shift  Outcome: Progressing

## 2025-03-27 NOTE — PROGRESS NOTES
Bereket Em is a 59 y.o. male on day 2 of admission presenting with Severe malnutrition (Multi).    This TCC contacted [facility] to open referral for PT/OT, skilled nursing/TPN for this pt and  to verify if they can accept and SOC. Awaiting a response.  This TCC requested for the MD to put in TPN orders and send it to  Pharmacy.      1:50pm  Per  pharmacy TPN needs to be cycled prior to discharge -  MD aware . Per MD ADOD weekend/Monday.     TCC will continue to follow for discharge planning.    Gabriela RAGLAND, [facility]  [phone]  [email or web address]

## 2025-03-27 NOTE — CARE PLAN
The clinical goals for the shift include Pt will remain HDS this shift    Problem: Discharge Planning  Goal: Discharge to home or other facility with appropriate resources  Outcome: Progressing     Problem: Chronic Conditions and Co-morbidities  Goal: Patient's chronic conditions and co-morbidity symptoms are monitored and maintained or improved  Outcome: Progressing     Problem: Nutrition  Goal: Nutrient intake appropriate for maintaining nutritional needs  Outcome: Progressing     Problem: Fall/Injury  Goal: Verbalize understanding of personal risk factors for fall in the hospital  Outcome: Progressing  Goal: Verbalize understanding of risk factor reduction measures to prevent injury from fall in the home  Outcome: Progressing  Goal: Use assistive devices by end of the shift  Outcome: Progressing  Goal: Pace activities to prevent fatigue by end of the shift  Outcome: Progressing

## 2025-03-28 LAB
ALBUMIN SERPL BCP-MCNC: 3.7 G/DL (ref 3.4–5)
ANION GAP SERPL CALC-SCNC: 16 MMOL/L (ref 10–20)
BUN SERPL-MCNC: 20 MG/DL (ref 6–23)
CALCIUM SERPL-MCNC: 9.1 MG/DL (ref 8.6–10.6)
CHLORIDE SERPL-SCNC: 101 MMOL/L (ref 98–107)
CO2 SERPL-SCNC: 26 MMOL/L (ref 21–32)
CREAT SERPL-MCNC: 0.79 MG/DL (ref 0.5–1.3)
EGFRCR SERPLBLD CKD-EPI 2021: >90 ML/MIN/1.73M*2
ERYTHROCYTE [DISTWIDTH] IN BLOOD BY AUTOMATED COUNT: 13.5 % (ref 11.5–14.5)
GLUCOSE SERPL-MCNC: 86 MG/DL (ref 74–99)
HCT VFR BLD AUTO: 39 % (ref 41–52)
HGB BLD-MCNC: 11.9 G/DL (ref 13.5–17.5)
MAGNESIUM SERPL-MCNC: 2.14 MG/DL (ref 1.6–2.4)
MCH RBC QN AUTO: 26.5 PG (ref 26–34)
MCHC RBC AUTO-ENTMCNC: 30.5 G/DL (ref 32–36)
MCV RBC AUTO: 87 FL (ref 80–100)
NRBC BLD-RTO: 0 /100 WBCS (ref 0–0)
PHOSPHATE SERPL-MCNC: 3.3 MG/DL (ref 2.5–4.9)
PLATELET # BLD AUTO: 227 X10*3/UL (ref 150–450)
POTASSIUM SERPL-SCNC: 4.9 MMOL/L (ref 3.5–5.3)
RBC # BLD AUTO: 4.49 X10*6/UL (ref 4.5–5.9)
SODIUM SERPL-SCNC: 138 MMOL/L (ref 136–145)
WBC # BLD AUTO: 6.5 X10*3/UL (ref 4.4–11.3)

## 2025-03-28 PROCEDURE — 2500000002 HC RX 250 W HCPCS SELF ADMINISTERED DRUGS (ALT 637 FOR MEDICARE OP, ALT 636 FOR OP/ED): Performed by: NURSE PRACTITIONER

## 2025-03-28 PROCEDURE — 2500000004 HC RX 250 GENERAL PHARMACY W/ HCPCS (ALT 636 FOR OP/ED): Performed by: NURSE PRACTITIONER

## 2025-03-28 PROCEDURE — 85027 COMPLETE CBC AUTOMATED: CPT | Performed by: NURSE PRACTITIONER

## 2025-03-28 PROCEDURE — 84100 ASSAY OF PHOSPHORUS: CPT | Performed by: NURSE PRACTITIONER

## 2025-03-28 PROCEDURE — 2500000005 HC RX 250 GENERAL PHARMACY W/O HCPCS

## 2025-03-28 PROCEDURE — 83735 ASSAY OF MAGNESIUM: CPT | Performed by: NURSE PRACTITIONER

## 2025-03-28 PROCEDURE — 2580000001 HC RX 258 IV SOLUTIONS

## 2025-03-28 PROCEDURE — 1100000001 HC PRIVATE ROOM DAILY

## 2025-03-28 PROCEDURE — 2500000001 HC RX 250 WO HCPCS SELF ADMINISTERED DRUGS (ALT 637 FOR MEDICARE OP): Performed by: NURSE PRACTITIONER

## 2025-03-28 RX ADMIN — ACETAMINOPHEN 650 MG: 325 TABLET, FILM COATED ORAL at 01:05

## 2025-03-28 RX ADMIN — DIPHENOXYLATE HYDROCHLORIDE AND ATROPINE SULFATE 2 TABLET: .025; 2.5 TABLET ORAL at 15:26

## 2025-03-28 RX ADMIN — TRAZODONE HYDROCHLORIDE 100 MG: 100 TABLET ORAL at 21:01

## 2025-03-28 RX ADMIN — THIAMINE HCL TAB 100 MG 100 MG: 100 TAB at 09:00

## 2025-03-28 RX ADMIN — SMOFLIPID 50 G: 6; 6; 5; 3 INJECTION, EMULSION INTRAVENOUS at 20:42

## 2025-03-28 RX ADMIN — PANTOPRAZOLE SODIUM 40 MG: 40 TABLET, DELAYED RELEASE ORAL at 06:11

## 2025-03-28 RX ADMIN — DIPHENOXYLATE HYDROCHLORIDE AND ATROPINE SULFATE 2 TABLET: .025; 2.5 TABLET ORAL at 10:21

## 2025-03-28 RX ADMIN — ENOXAPARIN SODIUM 40 MG: 100 INJECTION SUBCUTANEOUS at 09:00

## 2025-03-28 RX ADMIN — ACETAMINOPHEN 650 MG: 325 TABLET, FILM COATED ORAL at 21:00

## 2025-03-28 RX ADMIN — ASCORBIC ACID, VITAMIN A PALMITATE, CHOLECALCIFEROL, THIAMINE HYDROCHLORIDE, RIBOFLAVIN-5 PHOSPHATE SODIUM, PYRIDOXINE HYDROCHLORIDE, NIACINAMIDE, DEXPANTHENOL, ALPHA-TOCOPHEROL ACETATE, VITAMIN K1, FOLIC ACID, BIOTIN, CYANOCOBALAMIN: 200; 3300; 200; 6; 3.6; 6; 40; 15; 10; 150; 600; 60; 5 INJECTION, SOLUTION INTRAVENOUS at 20:41

## 2025-03-28 RX ADMIN — ACETAMINOPHEN 650 MG: 325 TABLET, FILM COATED ORAL at 10:21

## 2025-03-28 RX ADMIN — TRAZODONE HYDROCHLORIDE 100 MG: 100 TABLET ORAL at 01:05

## 2025-03-28 RX ADMIN — DIPHENOXYLATE HYDROCHLORIDE AND ATROPINE SULFATE 2 TABLET: .025; 2.5 TABLET ORAL at 06:11

## 2025-03-28 ASSESSMENT — PAIN SCALES - GENERAL
PAINLEVEL_OUTOF10: 0 - NO PAIN
PAINLEVEL_OUTOF10: 0 - NO PAIN
PAINLEVEL_OUTOF10: 4
PAINLEVEL_OUTOF10: 0 - NO PAIN
PAINLEVEL_OUTOF10: 3
PAINLEVEL_OUTOF10: 4
PAINLEVEL_OUTOF10: 0 - NO PAIN

## 2025-03-28 ASSESSMENT — COGNITIVE AND FUNCTIONAL STATUS - GENERAL
MOBILITY SCORE: 24
DAILY ACTIVITIY SCORE: 24

## 2025-03-28 ASSESSMENT — PAIN - FUNCTIONAL ASSESSMENT
PAIN_FUNCTIONAL_ASSESSMENT: 0-10

## 2025-03-28 ASSESSMENT — PAIN DESCRIPTION - ORIENTATION: ORIENTATION: MID

## 2025-03-28 ASSESSMENT — PAIN SCALES - WONG BAKER: WONGBAKER_NUMERICALRESPONSE: NO HURT

## 2025-03-28 ASSESSMENT — PAIN DESCRIPTION - DESCRIPTORS: DESCRIPTORS: DISCOMFORT;SORE

## 2025-03-28 ASSESSMENT — PAIN DESCRIPTION - LOCATION: LOCATION: ABDOMEN

## 2025-03-28 ASSESSMENT — PAIN SCALES - PAIN ASSESSMENT IN ADVANCED DEMENTIA (PAINAD): BREATHING: NORMAL

## 2025-03-28 NOTE — CARE PLAN
The patient's goals for the shift include      The clinical goals for the shift include remain hds      Problem: Discharge Planning  Goal: Discharge to home or other facility with appropriate resources  Outcome: Progressing     Problem: Chronic Conditions and Co-morbidities  Goal: Patient's chronic conditions and co-morbidity symptoms are monitored and maintained or improved  Outcome: Progressing     Problem: Nutrition  Goal: Nutrient intake appropriate for maintaining nutritional needs  Outcome: Progressing     Problem: Fall/Injury  Goal: Verbalize understanding of personal risk factors for fall in the hospital  Outcome: Progressing  Goal: Verbalize understanding of risk factor reduction measures to prevent injury from fall in the home  Outcome: Progressing  Goal: Use assistive devices by end of the shift  Outcome: Progressing  Goal: Pace activities to prevent fatigue by end of the shift  Outcome: Progressing

## 2025-03-28 NOTE — CARE PLAN
Problem: Discharge Planning  Goal: Discharge to home or other facility with appropriate resources  Outcome: Progressing     Problem: Fall/Injury  Goal: Verbalize understanding of personal risk factors for fall in the hospital  Outcome: Progressing  Goal: Verbalize understanding of risk factor reduction measures to prevent injury from fall in the home  Outcome: Progressing  Goal: Use assistive devices by end of the shift  Outcome: Progressing  Goal: Pace activities to prevent fatigue by end of the shift  Outcome: Progressing   The patient's goals for the shift include rest      The clinical goals for the shift include Patient will remain hds through out the shift

## 2025-03-28 NOTE — PROGRESS NOTES
Bereket Em is a 59 y.o. male on day 3 of admission presenting with Severe malnutrition (Multi).      Transitional Care Coordination Progress Note:  Patient discussed during interdisciplinary rounds.      Plan per Medical/Surgical team:    Home Care choice for home going needs, [facility].  Pt Needs: TBD.   3/28: Pt going home on TPN. [facility] made aware.         Discharge disposition: [facility], [facility]     Potential Barriers: None     ADOD:  3/31     This TCC will continue to follow for home going needs and safe DC plan.      RUSH ANDERSON

## 2025-03-28 NOTE — PROGRESS NOTES
[place]  ACUTE CARE SURGERY - PROGRESS NOTE    Patient Name: Bereket Em  MRN: [id number]  Admit Date: 325  : 1965  AGE: 59 y.o.   GENDER: male  ==============================================================================  TODAY'S ASSESSMENT AND PLAN OF CARE:  59 year old male with PMH perforated diverticultis s/p Margarita's procedure complicated by formation of enterocutaneous fistula, who presents to [facility] for TPN initiation.     He was advanced to full-rate TPN yesterday, with plan to cycle his TPN today.     Plan:     Neuro:  - Tylenol as needed for pain   - Trazodone PRN for sleep     Card: no active issues  - vital signs qshift    Pulm: no active issues  - Encourage IS  - OOB, ambulate as tolerated    GI: high output ECF  - Continue regular diet -small meals  - Lomotil 2 tabs TID with meals  - Continue home daily PPI  - Wound Care consulted to assist with fistula pouching, assistance    Severe malnutrition r/t chronic disease/ECF  - Increase TPN today to 83 ml/hr + SMOF lipids   - Discharge with instruction on TPN cycling   - Monitor Electrolytes BID  - Started on Thiamine for a total of 7 days    :  - Strict I&Os  - Continue home 1L of fluids a day  - Replete lytes as indicated    Heme: no active issues    ID: no active issues  - PICC line in place    DVT Proph:   - SCDs, Lovenox     Dispo: continue RNF,  plan for home with home care once medically ready    Patient discussed with Attending Dr. Eliseo Garcia MD   PGY-1, Acute Care Surgery  ==============================================================================  CHIEF COMPLAINT / EVENTS LAST 24HRS / HPI:  NAEON. VSS overnight.   Doing well this morning. Minimal pain. No n/v. No stoma output. No new issues or concerns.     MEDICAL HISTORY / ROS:   Admission history and ROS reviewed. Pertinent changes as follows:  None     PHYSICAL EXAM:  Heart Rate:  [40-58]   Temp:  [36 °C (96.8 °F)-36.7 °C  (98.1 °F)]   Resp:  [16-18]   BP: (116-139)/(45-80)   SpO2:  [95 %-98 %]   Physical Exam  Constitutional:       Appearance: Normal appearance.   HENT:      Mouth/Throat:      Mouth: Mucous membranes are dry.   Eyes:      Extraocular Movements: Extraocular movements intact.   Cardiovascular:      Rate and Rhythm: Normal rate.   Pulmonary:      Effort: Pulmonary effort is normal.   Abdominal:      Tenderness: There is no abdominal tenderness.      Comments: Soft, non distended. ECF fistula (prolapsed appearing viable intestine) with gastric/enteric liquid contents in bag. Left sided ostomy site with opaque pouch in place.    Musculoskeletal:         General: Normal range of motion.   Skin:     General: Skin is warm and dry.   Neurological:      Mental Status: He is alert and oriented to person, place, and time.   Psychiatric:         Behavior: Behavior normal.       LABS:  Results for orders placed or performed during the hospital encounter of 03/25/25 (from the past 24 hours)   Renal Function Panel   Result Value Ref Range    Glucose 102 (H) 74 - 99 mg/dL    Sodium 137 136 - 145 mmol/L    Potassium 4.5 3.5 - 5.3 mmol/L    Chloride 102 98 - 107 mmol/L    Bicarbonate 30 21 - 32 mmol/L    Anion Gap 10 10 - 20 mmol/L    Urea Nitrogen 16 6 - 23 mg/dL    Creatinine 0.83 0.50 - 1.30 mg/dL    eGFR >90 >60 mL/min/1.73m*2    Calcium 8.6 8.6 - 10.6 mg/dL    Phosphorus 4.1 2.5 - 4.9 mg/dL    Albumin 3.1 (L) 3.4 - 5.0 g/dL   Magnesium   Result Value Ref Range    Magnesium 2.27 1.60 - 2.40 mg/dL   CBC   Result Value Ref Range    WBC 6.5 4.4 - 11.3 x10*3/uL    nRBC 0.0 0.0 - 0.0 /100 WBCs    RBC 4.49 (L) 4.50 - 5.90 x10*6/uL    Hemoglobin 11.9 (L) 13.5 - 17.5 g/dL    Hematocrit 39.0 (L) 41.0 - 52.0 %    MCV 87 80 - 100 fL    MCH 26.5 26.0 - 34.0 pg    MCHC 30.5 (L) 32.0 - 36.0 g/dL    RDW 13.5 11.5 - 14.5 %    Platelets 227 150 - 450 x10*3/uL     *Note: Due to a large number of results and/or encounters for the requested time  period, some results have not been displayed. A complete set of results can be found in Results Review.     MEDICATIONS:  Current Facility-Administered Medications   Medication Dose Route Frequency Provider Last Rate Last Admin    acetaminophen (Tylenol) tablet 650 mg  650 mg oral q4h PRN MINERVA Hawk-CNP   650 mg at 03/28/25 1021    Adult Clinimix TPN Cyclic   intravenous Cyclic PN Jesus Martinez MD        alteplase (Cathflo Activase) injection 2 mg  2 mg intra-catheter PRN MINERVA Hawk-CNP        diphenoxylate-atropine (Lomotil) 2.5-0.025 mg per tablet 2 tablet  2 tablet oral TID AC MINERVA Hawk-CNP   2 tablet at 03/28/25 1021    enoxaparin (Lovenox) syringe 40 mg  40 mg subcutaneous Daily Michael Lubin APRN-CNP   40 mg at 03/28/25 0900    fat emulsion fish oil/plant based (SMOFlipid) 20 % IV infusion 50 g  250 mL intravenous Daily Lipids Jesus Martinez MD        lidocaine (Xylocaine) 10 mg/mL (1 %) injection 5 mL  5 mL infiltration Once Michael Lubin APRN-CNP        pantoprazole (ProtoNix) EC tablet 40 mg  40 mg oral Daily before breakfast Michael Lubin APRN-CNP   40 mg at 03/28/25 0611    thiamine (Vitamin B-1) tablet 100 mg  100 mg oral Daily Michael Lubin APRN-CNP   100 mg at 03/28/25 0900    traZODone (Desyrel) tablet 100 mg  100 mg oral Nightly PRN MINERVA Hawk-CNP   100 mg at 03/28/25 0105       IMAGING SUMMARY:  (summary of new imaging findings, not a copy of dictation)

## 2025-03-28 NOTE — PROGRESS NOTES
[facility]  ACUTE CARE SURGERY - PROGRESS NOTE    Patient Name: Bereket Em  MRN: [id number]  Admit Date: 325  : 1965  AGE: 59 y.o.   GENDER: male  ==============================================================================  TODAY'S ASSESSMENT AND PLAN OF CARE:  59 year old male with PMH perforated diverticultis s/p Margarita's procedure complicated by formation of enterocutaneous fistula, who presents to [facility] for TPN initiation.     Plan:     Neuro:  - Tylenol as needed for pain   - Trazodone PRN for sleep     Card: no active issues  - vital signs qshift    Pulm: no active issues  - Encourage IS  - OOB, ambulate as tolerated    GI: high output ECF  - Continue regular diet -small meals  - Lomotil 2 tabs TID with meals  - Continue home daily PPI  - Wound Care consulted to assist with fistula pouching    Severe malnutrition r/t chronic disease/ECF  - Adult diet Regular  ADULT CLINIMIX PARENTERAL NUTRITION  Adult Clinimix TPN Cyclic  - Monitor Electrolytes BID  - Thiamine for a total of 7 days    :  - Strict I&Os  - Continue home 1L of fluids a day  - Replete lytes as indicated    Heme: no active issues    ID: no active issues  - PICC line in place    DVT Proph:   - SCDs, Lovenox     Dispo: continue RNF,  plan for home with home care once medically ready    Patient discussed with Attending Dr. Eliseo Martinez MD   PGY-1, Acute Care Surgery  ==============================================================================  CHIEF COMPLAINT / EVENTS LAST 24HRS / HPI:  NAEON. VSS overnight.   Tolerating tpn ramp up    MEDICAL HISTORY / ROS:   Admission history and ROS reviewed. Pertinent changes as follows:  None     PHYSICAL EXAM:  Heart Rate:  [40-58]   Temp:  [36 °C (96.8 °F)-36.7 °C (98.1 °F)]   Resp:  [16-18]   BP: (116-139)/(45-80)   SpO2:  [95 %-98 %]   Physical Exam  Constitutional:       Appearance: Normal appearance.   HENT:      Mouth/Throat:      Mouth:  Mucous membranes are dry.   Eyes:      Extraocular Movements: Extraocular movements intact.   Cardiovascular:      Rate and Rhythm: Normal rate.   Pulmonary:      Effort: Pulmonary effort is normal.   Abdominal:      Tenderness: There is no abdominal tenderness.      Comments: Soft, non distended. ECF fistula (prolapsed appearing viable intestine) with gastric/enteric liquid contents in bag. Left sided ostomy site with opaque pouch in place.    Musculoskeletal:         General: Normal range of motion.   Skin:     General: Skin is warm and dry.   Neurological:      Mental Status: He is alert and oriented to person, place, and time.   Psychiatric:         Behavior: Behavior normal.       LABS:  Results for orders placed or performed during the hospital encounter of 03/25/25 (from the past 24 hours)   Renal Function Panel   Result Value Ref Range    Glucose 102 (H) 74 - 99 mg/dL    Sodium 137 136 - 145 mmol/L    Potassium 4.5 3.5 - 5.3 mmol/L    Chloride 102 98 - 107 mmol/L    Bicarbonate 30 21 - 32 mmol/L    Anion Gap 10 10 - 20 mmol/L    Urea Nitrogen 16 6 - 23 mg/dL    Creatinine 0.83 0.50 - 1.30 mg/dL    eGFR >90 >60 mL/min/1.73m*2    Calcium 8.6 8.6 - 10.6 mg/dL    Phosphorus 4.1 2.5 - 4.9 mg/dL    Albumin 3.1 (L) 3.4 - 5.0 g/dL   Magnesium   Result Value Ref Range    Magnesium 2.27 1.60 - 2.40 mg/dL   CBC   Result Value Ref Range    WBC 6.5 4.4 - 11.3 x10*3/uL    nRBC 0.0 0.0 - 0.0 /100 WBCs    RBC 4.49 (L) 4.50 - 5.90 x10*6/uL    Hemoglobin 11.9 (L) 13.5 - 17.5 g/dL    Hematocrit 39.0 (L) 41.0 - 52.0 %    MCV 87 80 - 100 fL    MCH 26.5 26.0 - 34.0 pg    MCHC 30.5 (L) 32.0 - 36.0 g/dL    RDW 13.5 11.5 - 14.5 %    Platelets 227 150 - 450 x10*3/uL     *Note: Due to a large number of results and/or encounters for the requested time period, some results have not been displayed. A complete set of results can be found in Results Review.     MEDICATIONS:  Current Facility-Administered Medications   Medication Dose Route  Frequency Provider Last Rate Last Admin    acetaminophen (Tylenol) tablet 650 mg  650 mg oral q4h PRN Michael Lubin APRN-CNP   650 mg at 03/28/25 1021    Adult Clinimix TPN Cyclic   intravenous Cyclic PN Jesus Martinez MD        alteplase (Cathflo Activase) injection 2 mg  2 mg intra-catheter PRN Michael Lubin APRN-CNP        diphenoxylate-atropine (Lomotil) 2.5-0.025 mg per tablet 2 tablet  2 tablet oral TID AC Michael Lubin APRN-CNP   2 tablet at 03/28/25 1021    enoxaparin (Lovenox) syringe 40 mg  40 mg subcutaneous Daily Michael Lubin APRN-CNP   40 mg at 03/28/25 0900    fat emulsion fish oil/plant based (SMOFlipid) 20 % IV infusion 50 g  250 mL intravenous Daily Lipids Jesus Martinez MD        lidocaine (Xylocaine) 10 mg/mL (1 %) injection 5 mL  5 mL infiltration Once Michael Lubin APRN-HARVINDER        pantoprazole (ProtoNix) EC tablet 40 mg  40 mg oral Daily before breakfast Michael Lubin APRN-CNP   40 mg at 03/28/25 0611    thiamine (Vitamin B-1) tablet 100 mg  100 mg oral Daily Michael Lubin APRN-CNP   100 mg at 03/28/25 0900    traZODone (Desyrel) tablet 100 mg  100 mg oral Nightly PRN Michael Lubin APRN-CNP   100 mg at 03/28/25 0105       IMAGING SUMMARY:  (summary of new imaging findings, not a copy of dictation)

## 2025-03-29 LAB
ALBUMIN SERPL BCP-MCNC: 4.2 G/DL (ref 3.4–5)
ANION GAP SERPL CALC-SCNC: 15 MMOL/L (ref 10–20)
BUN SERPL-MCNC: 36 MG/DL (ref 6–23)
CALCIUM SERPL-MCNC: 9.9 MG/DL (ref 8.6–10.6)
CHLORIDE SERPL-SCNC: 98 MMOL/L (ref 98–107)
CO2 SERPL-SCNC: 25 MMOL/L (ref 21–32)
CREAT SERPL-MCNC: 0.75 MG/DL (ref 0.5–1.3)
EGFRCR SERPLBLD CKD-EPI 2021: >90 ML/MIN/1.73M*2
ERYTHROCYTE [DISTWIDTH] IN BLOOD BY AUTOMATED COUNT: 13.4 % (ref 11.5–14.5)
GLUCOSE BLD MANUAL STRIP-MCNC: 125 MG/DL (ref 74–99)
GLUCOSE SERPL-MCNC: 90 MG/DL (ref 74–99)
HCT VFR BLD AUTO: 40.5 % (ref 41–52)
HGB BLD-MCNC: 13 G/DL (ref 13.5–17.5)
MCH RBC QN AUTO: 27.1 PG (ref 26–34)
MCHC RBC AUTO-ENTMCNC: 32.1 G/DL (ref 32–36)
MCV RBC AUTO: 84 FL (ref 80–100)
NRBC BLD-RTO: 0 /100 WBCS (ref 0–0)
PHOSPHATE SERPL-MCNC: 4.1 MG/DL (ref 2.5–4.9)
PLATELET # BLD AUTO: 263 X10*3/UL (ref 150–450)
POTASSIUM SERPL-SCNC: 5.1 MMOL/L (ref 3.5–5.3)
RBC # BLD AUTO: 4.8 X10*6/UL (ref 4.5–5.9)
SODIUM SERPL-SCNC: 133 MMOL/L (ref 136–145)
WBC # BLD AUTO: 7.5 X10*3/UL (ref 4.4–11.3)

## 2025-03-29 PROCEDURE — 2500000002 HC RX 250 W HCPCS SELF ADMINISTERED DRUGS (ALT 637 FOR MEDICARE OP, ALT 636 FOR OP/ED): Performed by: NURSE PRACTITIONER

## 2025-03-29 PROCEDURE — 2580000001 HC RX 258 IV SOLUTIONS

## 2025-03-29 PROCEDURE — 1100000001 HC PRIVATE ROOM DAILY

## 2025-03-29 PROCEDURE — 2500000004 HC RX 250 GENERAL PHARMACY W/ HCPCS (ALT 636 FOR OP/ED): Performed by: NURSE PRACTITIONER

## 2025-03-29 PROCEDURE — 2500000001 HC RX 250 WO HCPCS SELF ADMINISTERED DRUGS (ALT 637 FOR MEDICARE OP)

## 2025-03-29 PROCEDURE — 2500000005 HC RX 250 GENERAL PHARMACY W/O HCPCS

## 2025-03-29 PROCEDURE — 2500000001 HC RX 250 WO HCPCS SELF ADMINISTERED DRUGS (ALT 637 FOR MEDICARE OP): Performed by: NURSE PRACTITIONER

## 2025-03-29 PROCEDURE — 82947 ASSAY GLUCOSE BLOOD QUANT: CPT

## 2025-03-29 PROCEDURE — 85027 COMPLETE CBC AUTOMATED: CPT | Performed by: NURSE PRACTITIONER

## 2025-03-29 PROCEDURE — 80069 RENAL FUNCTION PANEL: CPT

## 2025-03-29 RX ORDER — TRAMADOL HYDROCHLORIDE 50 MG/1
50 TABLET ORAL EVERY 6 HOURS PRN
Status: DISPENSED | OUTPATIENT
Start: 2025-03-29

## 2025-03-29 RX ADMIN — DIPHENOXYLATE HYDROCHLORIDE AND ATROPINE SULFATE 2 TABLET: .025; 2.5 TABLET ORAL at 06:25

## 2025-03-29 RX ADMIN — TRAZODONE HYDROCHLORIDE 100 MG: 100 TABLET ORAL at 21:28

## 2025-03-29 RX ADMIN — ACETAMINOPHEN 650 MG: 325 TABLET, FILM COATED ORAL at 20:04

## 2025-03-29 RX ADMIN — ENOXAPARIN SODIUM 40 MG: 100 INJECTION SUBCUTANEOUS at 08:00

## 2025-03-29 RX ADMIN — TRAMADOL HYDROCHLORIDE 50 MG: 50 TABLET, COATED ORAL at 11:15

## 2025-03-29 RX ADMIN — ASCORBIC ACID, VITAMIN A PALMITATE, CHOLECALCIFEROL, THIAMINE HYDROCHLORIDE, RIBOFLAVIN-5 PHOSPHATE SODIUM, PYRIDOXINE HYDROCHLORIDE, NIACINAMIDE, DEXPANTHENOL, ALPHA-TOCOPHEROL ACETATE, VITAMIN K1, FOLIC ACID, BIOTIN, CYANOCOBALAMIN: 200; 3300; 200; 6; 3.6; 6; 40; 15; 10; 150; 600; 60; 5 INJECTION, SOLUTION INTRAVENOUS at 19:52

## 2025-03-29 RX ADMIN — THIAMINE HCL TAB 100 MG 100 MG: 100 TAB at 08:00

## 2025-03-29 RX ADMIN — TRAMADOL HYDROCHLORIDE 50 MG: 50 TABLET, COATED ORAL at 20:04

## 2025-03-29 RX ADMIN — PANTOPRAZOLE SODIUM 40 MG: 40 TABLET, DELAYED RELEASE ORAL at 06:25

## 2025-03-29 RX ADMIN — SMOFLIPID 50 G: 6; 6; 5; 3 INJECTION, EMULSION INTRAVENOUS at 19:51

## 2025-03-29 RX ADMIN — DIPHENOXYLATE HYDROCHLORIDE AND ATROPINE SULFATE 2 TABLET: .025; 2.5 TABLET ORAL at 10:00

## 2025-03-29 RX ADMIN — DIPHENOXYLATE HYDROCHLORIDE AND ATROPINE SULFATE 2 TABLET: .025; 2.5 TABLET ORAL at 16:08

## 2025-03-29 RX ADMIN — ACETAMINOPHEN 650 MG: 325 TABLET, FILM COATED ORAL at 09:51

## 2025-03-29 ASSESSMENT — COGNITIVE AND FUNCTIONAL STATUS - GENERAL
MOBILITY SCORE: 24
DAILY ACTIVITIY SCORE: 24

## 2025-03-29 ASSESSMENT — PAIN DESCRIPTION - LOCATION
LOCATION: ABDOMEN
LOCATION: ABDOMEN

## 2025-03-29 ASSESSMENT — PAIN - FUNCTIONAL ASSESSMENT
PAIN_FUNCTIONAL_ASSESSMENT: 0-10

## 2025-03-29 ASSESSMENT — PAIN DESCRIPTION - ORIENTATION: ORIENTATION: MID

## 2025-03-29 ASSESSMENT — PAIN SCALES - GENERAL
PAINLEVEL_OUTOF10: 0 - NO PAIN
PAINLEVEL_OUTOF10: 4
PAINLEVEL_OUTOF10: 6
PAINLEVEL_OUTOF10: 6
PAINLEVEL_OUTOF10: 2
PAINLEVEL_OUTOF10: 0 - NO PAIN

## 2025-03-29 NOTE — PROGRESS NOTES
[facility]  ACUTE CARE SURGERY - PROGRESS NOTE    Patient Name: Bereket Em  MRN: [id number]  Admit Date: 325  : 1965  AGE: 59 y.o.   GENDER: male  ==============================================================================  TODAY'S ASSESSMENT AND PLAN OF CARE:  59 year old male with PMH perforated diverticultis s/p Margarita's procedure complicated by formation of enterocutaneous fistula, who presents to [facility] for TPN initiation.     Plan: no changes, ctm TPN cycling     Neuro:  - Tylenol as needed for pain   - Trazodone PRN for sleep     Card: no active issues  - vital signs qshift    Pulm: no active issues  - Encourage IS  - OOB, ambulate as tolerated    GI: high output ECF  - Continue regular diet -small meals  - Lomotil 2 tabs TID with meals  - Continue home daily PPI  - Wound Care consulted to assist with fistula pouching    Severe malnutrition r/t chronic disease/ECF  - Adult diet Regular  ADULT CLINIMIX PARENTERAL NUTRITION  Adult Clinimix TPN Cyclic  - Monitor Electrolytes BID  - Thiamine for a total of 7 days    :  - Strict I&Os  - Continue home 1L of fluids a day  - Replete lytes as indicated    Heme: no active issues    ID: no active issues  - PICC line in place    DVT Proph:   - SCDs, Lovenox     Dispo: continue RNF,  plan for home with home care once medically ready    Patient discussed with Attending Dr. Eliseo Martinez MD   PGY-1, Acute Care Surgery  ==============================================================================  CHIEF COMPLAINT / EVENTS LAST 24HRS / HPI:  NAEON. VSS overnight.   Tolerating tpn cycling    MEDICAL HISTORY / ROS:   Admission history and ROS reviewed. Pertinent changes as follows:  None     PHYSICAL EXAM:  Heart Rate:  [45-69]   Temp:  [36.1 °C (97 °F)-36.9 °C (98.4 °F)]   Resp:  [18]   BP: (107-138)/(45-73)   SpO2:  [95 %-98 %]   Physical Exam  Constitutional:       Appearance: Normal appearance.   HENT:       Mouth/Throat:      Mouth: Mucous membranes are dry.   Eyes:      Extraocular Movements: Extraocular movements intact.   Cardiovascular:      Rate and Rhythm: Normal rate.   Pulmonary:      Effort: Pulmonary effort is normal.   Abdominal:      Tenderness: There is no abdominal tenderness.      Comments: Soft, non distended. ECF fistula (prolapsed appearing viable intestine) with gastric/enteric liquid contents in bag. Left sided ostomy site with opaque pouch in place.    Musculoskeletal:         General: Normal range of motion.   Skin:     General: Skin is warm and dry.   Neurological:      Mental Status: He is alert and oriented to person, place, and time.   Psychiatric:         Behavior: Behavior normal.       LABS:  Results for orders placed or performed during the hospital encounter of 03/25/25 (from the past 24 hours)   Renal Function Panel   Result Value Ref Range    Glucose 86 74 - 99 mg/dL    Sodium 138 136 - 145 mmol/L    Potassium 4.9 3.5 - 5.3 mmol/L    Chloride 101 98 - 107 mmol/L    Bicarbonate 26 21 - 32 mmol/L    Anion Gap 16 10 - 20 mmol/L    Urea Nitrogen 20 6 - 23 mg/dL    Creatinine 0.79 0.50 - 1.30 mg/dL    eGFR >90 >60 mL/min/1.73m*2    Calcium 9.1 8.6 - 10.6 mg/dL    Phosphorus 3.3 2.5 - 4.9 mg/dL    Albumin 3.7 3.4 - 5.0 g/dL   Magnesium   Result Value Ref Range    Magnesium 2.14 1.60 - 2.40 mg/dL   CBC   Result Value Ref Range    WBC 7.5 4.4 - 11.3 x10*3/uL    nRBC 0.0 0.0 - 0.0 /100 WBCs    RBC 4.80 4.50 - 5.90 x10*6/uL    Hemoglobin 13.0 (L) 13.5 - 17.5 g/dL    Hematocrit 40.5 (L) 41.0 - 52.0 %    MCV 84 80 - 100 fL    MCH 27.1 26.0 - 34.0 pg    MCHC 32.1 32.0 - 36.0 g/dL    RDW 13.4 11.5 - 14.5 %    Platelets 263 150 - 450 x10*3/uL   POCT GLUCOSE   Result Value Ref Range    POCT Glucose 125 (H) 74 - 99 mg/dL     MEDICATIONS:  Current Facility-Administered Medications   Medication Dose Route Frequency Provider Last Rate Last Admin    acetaminophen (Tylenol) tablet 650 mg  650 mg oral q4h PRN  Michael Lubin APRN-CNP   650 mg at 03/28/25 2100    Adult Clinimix TPN Cyclic   intravenous Cyclic PN Jesus Martinez  mL/hr at 03/28/25 2139 Rate Change at 03/28/25 2139    alteplase (Cathflo Activase) injection 2 mg  2 mg intra-catheter PRN Michael Lubin APRN-CNP        diphenoxylate-atropine (Lomotil) 2.5-0.025 mg per tablet 2 tablet  2 tablet oral TID AC Michael Lubin APRN-CNP   2 tablet at 03/29/25 0625    enoxaparin (Lovenox) syringe 40 mg  40 mg subcutaneous Daily Michael Lubin APRN-CNP   40 mg at 03/29/25 0800    fat emulsion fish oil/plant based (SMOFlipid) 20 % IV infusion 50 g  250 mL intravenous Daily Lipids Jesus Martinez MD   Stopped at 03/29/25 0842    lidocaine (Xylocaine) 10 mg/mL (1 %) injection 5 mL  5 mL infiltration Once Michael Lubin APRN-CNP        pantoprazole (ProtoNix) EC tablet 40 mg  40 mg oral Daily before breakfast Michael Lubin APRN-CNP   40 mg at 03/29/25 0625    thiamine (Vitamin B-1) tablet 100 mg  100 mg oral Daily Michael Lubin APRN-CNP   100 mg at 03/29/25 0800    traZODone (Desyrel) tablet 100 mg  100 mg oral Nightly PRN Michael Lubin APRN-CNP   100 mg at 03/28/25 2101       IMAGING SUMMARY:  (summary of new imaging findings, not a copy of dictation)

## 2025-03-29 NOTE — CARE PLAN
The patient's goals for the shift include      The clinical goals for the shift include remain hds    Problem: Discharge Planning  Goal: Discharge to home or other facility with appropriate resources  Outcome: Progressing     Problem: Chronic Conditions and Co-morbidities  Goal: Patient's chronic conditions and co-morbidity symptoms are monitored and maintained or improved  Outcome: Progressing     Problem: Nutrition  Goal: Nutrient intake appropriate for maintaining nutritional needs  Outcome: Progressing     Problem: Fall/Injury  Goal: Pace activities to prevent fatigue by end of the shift  Outcome: Progressing

## 2025-03-30 VITALS
DIASTOLIC BLOOD PRESSURE: 66 MMHG | HEIGHT: 68 IN | TEMPERATURE: 97.9 F | WEIGHT: 147.49 LBS | SYSTOLIC BLOOD PRESSURE: 110 MMHG | BODY MASS INDEX: 22.35 KG/M2 | RESPIRATION RATE: 16 BRPM | OXYGEN SATURATION: 97 % | HEART RATE: 56 BPM

## 2025-03-30 LAB
ALBUMIN SERPL BCP-MCNC: 3.6 G/DL (ref 3.4–5)
ANION GAP SERPL CALC-SCNC: 13 MMOL/L (ref 10–20)
BUN SERPL-MCNC: 33 MG/DL (ref 6–23)
CALCIUM SERPL-MCNC: 9.2 MG/DL (ref 8.6–10.6)
CHLORIDE SERPL-SCNC: 100 MMOL/L (ref 98–107)
CO2 SERPL-SCNC: 28 MMOL/L (ref 21–32)
CREAT SERPL-MCNC: 0.74 MG/DL (ref 0.5–1.3)
EGFRCR SERPLBLD CKD-EPI 2021: >90 ML/MIN/1.73M*2
ERYTHROCYTE [DISTWIDTH] IN BLOOD BY AUTOMATED COUNT: 13.6 % (ref 11.5–14.5)
GLUCOSE SERPL-MCNC: 83 MG/DL (ref 74–99)
HCT VFR BLD AUTO: 40 % (ref 41–52)
HGB BLD-MCNC: 12.6 G/DL (ref 13.5–17.5)
MAGNESIUM SERPL-MCNC: 2.09 MG/DL (ref 1.6–2.4)
MCH RBC QN AUTO: 26.8 PG (ref 26–34)
MCHC RBC AUTO-ENTMCNC: 31.5 G/DL (ref 32–36)
MCV RBC AUTO: 85 FL (ref 80–100)
NRBC BLD-RTO: 0 /100 WBCS (ref 0–0)
PHOSPHATE SERPL-MCNC: 4.7 MG/DL (ref 2.5–4.9)
PLATELET # BLD AUTO: 276 X10*3/UL (ref 150–450)
POTASSIUM SERPL-SCNC: 4.5 MMOL/L (ref 3.5–5.3)
RBC # BLD AUTO: 4.7 X10*6/UL (ref 4.5–5.9)
SODIUM SERPL-SCNC: 136 MMOL/L (ref 136–145)
WBC # BLD AUTO: 7.2 X10*3/UL (ref 4.4–11.3)

## 2025-03-30 PROCEDURE — 2500000001 HC RX 250 WO HCPCS SELF ADMINISTERED DRUGS (ALT 637 FOR MEDICARE OP)

## 2025-03-30 PROCEDURE — 2500000001 HC RX 250 WO HCPCS SELF ADMINISTERED DRUGS (ALT 637 FOR MEDICARE OP): Performed by: NURSE PRACTITIONER

## 2025-03-30 PROCEDURE — 2500000005 HC RX 250 GENERAL PHARMACY W/O HCPCS

## 2025-03-30 PROCEDURE — 2580000001 HC RX 258 IV SOLUTIONS

## 2025-03-30 PROCEDURE — 83735 ASSAY OF MAGNESIUM: CPT

## 2025-03-30 PROCEDURE — 85027 COMPLETE CBC AUTOMATED: CPT

## 2025-03-30 PROCEDURE — 80069 RENAL FUNCTION PANEL: CPT

## 2025-03-30 PROCEDURE — 2500000002 HC RX 250 W HCPCS SELF ADMINISTERED DRUGS (ALT 637 FOR MEDICARE OP, ALT 636 FOR OP/ED): Performed by: NURSE PRACTITIONER

## 2025-03-30 PROCEDURE — 2500000004 HC RX 250 GENERAL PHARMACY W/ HCPCS (ALT 636 FOR OP/ED): Performed by: NURSE PRACTITIONER

## 2025-03-30 PROCEDURE — 1100000001 HC PRIVATE ROOM DAILY

## 2025-03-30 RX ADMIN — TRAMADOL HYDROCHLORIDE 50 MG: 50 TABLET, COATED ORAL at 08:42

## 2025-03-30 RX ADMIN — ACETAMINOPHEN 650 MG: 325 TABLET, FILM COATED ORAL at 21:08

## 2025-03-30 RX ADMIN — DIPHENOXYLATE HYDROCHLORIDE AND ATROPINE SULFATE 2 TABLET: .025; 2.5 TABLET ORAL at 06:46

## 2025-03-30 RX ADMIN — ENOXAPARIN SODIUM 40 MG: 100 INJECTION SUBCUTANEOUS at 08:43

## 2025-03-30 RX ADMIN — THIAMINE HCL TAB 100 MG 100 MG: 100 TAB at 08:43

## 2025-03-30 RX ADMIN — DIPHENOXYLATE HYDROCHLORIDE AND ATROPINE SULFATE 2 TABLET: .025; 2.5 TABLET ORAL at 11:50

## 2025-03-30 RX ADMIN — ACETAMINOPHEN 650 MG: 325 TABLET, FILM COATED ORAL at 05:09

## 2025-03-30 RX ADMIN — ASCORBIC ACID, VITAMIN A PALMITATE, CHOLECALCIFEROL, THIAMINE HYDROCHLORIDE, RIBOFLAVIN-5 PHOSPHATE SODIUM, PYRIDOXINE HYDROCHLORIDE, NIACINAMIDE, DEXPANTHENOL, ALPHA-TOCOPHEROL ACETATE, VITAMIN K1, FOLIC ACID, BIOTIN, CYANOCOBALAMIN: 200; 3300; 200; 6; 3.6; 6; 40; 15; 10; 150; 600; 60; 5 INJECTION, SOLUTION INTRAVENOUS at 19:57

## 2025-03-30 RX ADMIN — ACETAMINOPHEN 650 MG: 325 TABLET, FILM COATED ORAL at 16:41

## 2025-03-30 RX ADMIN — TRAZODONE HYDROCHLORIDE 100 MG: 100 TABLET ORAL at 22:28

## 2025-03-30 RX ADMIN — SMOFLIPID 50 G: 6; 6; 5; 3 INJECTION, EMULSION INTRAVENOUS at 19:57

## 2025-03-30 RX ADMIN — PANTOPRAZOLE SODIUM 40 MG: 40 TABLET, DELAYED RELEASE ORAL at 06:46

## 2025-03-30 RX ADMIN — TRAMADOL HYDROCHLORIDE 50 MG: 50 TABLET, COATED ORAL at 19:57

## 2025-03-30 RX ADMIN — DIPHENOXYLATE HYDROCHLORIDE AND ATROPINE SULFATE 2 TABLET: .025; 2.5 TABLET ORAL at 15:33

## 2025-03-30 RX ADMIN — ACETAMINOPHEN 650 MG: 325 TABLET, FILM COATED ORAL at 11:50

## 2025-03-30 ASSESSMENT — PAIN SCALES - GENERAL
PAINLEVEL_OUTOF10: 3
PAINLEVEL_OUTOF10: 6
PAINLEVEL_OUTOF10: 3
PAINLEVEL_OUTOF10: 2
PAINLEVEL_OUTOF10: 8
PAINLEVEL_OUTOF10: 3
PAINLEVEL_OUTOF10: 6
PAINLEVEL_OUTOF10: 4

## 2025-03-30 ASSESSMENT — PAIN DESCRIPTION - LOCATION
LOCATION: ABDOMEN
LOCATION: ABDOMEN

## 2025-03-30 ASSESSMENT — COGNITIVE AND FUNCTIONAL STATUS - GENERAL
MOBILITY SCORE: 24
DAILY ACTIVITIY SCORE: 24

## 2025-03-30 ASSESSMENT — PAIN - FUNCTIONAL ASSESSMENT
PAIN_FUNCTIONAL_ASSESSMENT: 0-10

## 2025-03-30 ASSESSMENT — PAIN SCALES - PAIN ASSESSMENT IN ADVANCED DEMENTIA (PAINAD)
TOTALSCORE: MEDICATION (SEE MAR)
TOTALSCORE: MEDICATION (SEE MAR)

## 2025-03-30 NOTE — PROGRESS NOTES
[facility]  ACUTE CARE SURGERY - PROGRESS NOTE    Patient Name: Bereket Em  MRN: [id number]  Admit Date: 325  : 1965  AGE: 59 y.o.   GENDER: male  ==============================================================================  TODAY'S ASSESSMENT AND PLAN OF CARE:  59 year old male with PMH perforated diverticultis s/p Margarita's procedure complicated by formation of enterocutaneous fistula, who presents to [facility] for TPN initiation.     Plan: no changes, ctm TPN cycling     Neuro:  - Tylenol as needed for pain   - Trazodone PRN for sleep     Card: no active issues  - vital signs qshift    Pulm: no active issues  - Encourage IS  - OOB, ambulate as tolerated    GI: high output ECF  - Continue regular diet -small meals  - Lomotil 2 tabs TID with meals  - Continue home daily PPI  - Wound Care consulted to assist with fistula pouching    Severe malnutrition r/t chronic disease/ECF  - Adult diet Regular  ADULT CLINIMIX PARENTERAL NUTRITION  Adult Clinimix TPN Cyclic  - Monitor Electrolytes BID  - Thiamine for a total of 7 days    :  - Strict I&Os  - Continue home 1L of fluids a day  - Replete lytes as indicated    Heme: no active issues    ID: no active issues  - PICC line in place    DVT Proph:   - SCDs, Lovenox     Dispo: continue RNF,  anticipated DC 3/31    Patient discussed with Attending Dr. Eliseo Martinez MD   PGY-1, Acute Care Surgery  ==============================================================================  CHIEF COMPLAINT / EVENTS LAST 24HRS / HPI:  NAEON. VSS overnight.   Tolerating tpn cycling    MEDICAL HISTORY / ROS:   Admission history and ROS reviewed. Pertinent changes as follows:  None     PHYSICAL EXAM:  Heart Rate:  [48-87]   Temp:  [36.2 °C (97.2 °F)-37 °C (98.6 °F)]   Resp:  [16-18]   BP: ()/(60-74)   SpO2:  [93 %-99 %]   Physical Exam  Constitutional:       Appearance: Normal appearance.   HENT:      Mouth/Throat:      Mouth:  Mucous membranes are dry.   Eyes:      Extraocular Movements: Extraocular movements intact.   Cardiovascular:      Rate and Rhythm: Normal rate.   Pulmonary:      Effort: Pulmonary effort is normal.   Abdominal:      Tenderness: There is no abdominal tenderness.      Comments: Soft, non distended. ECF fistula (prolapsed appearing viable intestine) with gastric/enteric liquid contents in bag. Left sided ostomy site with opaque pouch in place.    Musculoskeletal:         General: Normal range of motion.   Skin:     General: Skin is warm and dry.   Neurological:      Mental Status: He is alert and oriented to person, place, and time.   Psychiatric:         Behavior: Behavior normal.       LABS:  Results for orders placed or performed during the hospital encounter of 03/25/25 (from the past 24 hours)   Renal function panel   Result Value Ref Range    Glucose 90 74 - 99 mg/dL    Sodium 133 (L) 136 - 145 mmol/L    Potassium 5.1 3.5 - 5.3 mmol/L    Chloride 98 98 - 107 mmol/L    Bicarbonate 25 21 - 32 mmol/L    Anion Gap 15 10 - 20 mmol/L    Urea Nitrogen 36 (H) 6 - 23 mg/dL    Creatinine 0.75 0.50 - 1.30 mg/dL    eGFR >90 >60 mL/min/1.73m*2    Calcium 9.9 8.6 - 10.6 mg/dL    Phosphorus 4.1 2.5 - 4.9 mg/dL    Albumin 4.2 3.4 - 5.0 g/dL   Magnesium   Result Value Ref Range    Magnesium 2.09 1.60 - 2.40 mg/dL   Renal Function Panel   Result Value Ref Range    Glucose 83 74 - 99 mg/dL    Sodium 136 136 - 145 mmol/L    Potassium 4.5 3.5 - 5.3 mmol/L    Chloride 100 98 - 107 mmol/L    Bicarbonate 28 21 - 32 mmol/L    Anion Gap 13 10 - 20 mmol/L    Urea Nitrogen 33 (H) 6 - 23 mg/dL    Creatinine 0.74 0.50 - 1.30 mg/dL    eGFR >90 >60 mL/min/1.73m*2    Calcium 9.2 8.6 - 10.6 mg/dL    Phosphorus 4.7 2.5 - 4.9 mg/dL    Albumin 3.6 3.4 - 5.0 g/dL   CBC   Result Value Ref Range    WBC 7.2 4.4 - 11.3 x10*3/uL    nRBC 0.0 0.0 - 0.0 /100 WBCs    RBC 4.70 4.50 - 5.90 x10*6/uL    Hemoglobin 12.6 (L) 13.5 - 17.5 g/dL    Hematocrit 40.0 (L)  41.0 - 52.0 %    MCV 85 80 - 100 fL    MCH 26.8 26.0 - 34.0 pg    MCHC 31.5 (L) 32.0 - 36.0 g/dL    RDW 13.6 11.5 - 14.5 %    Platelets 276 150 - 450 x10*3/uL     *Note: Due to a large number of results and/or encounters for the requested time period, some results have not been displayed. A complete set of results can be found in Results Review.     MEDICATIONS:  Current Facility-Administered Medications   Medication Dose Route Frequency Provider Last Rate Last Admin    acetaminophen (Tylenol) tablet 650 mg  650 mg oral q4h PRN BLANK HawkCNP   650 mg at 03/30/25 0509    Adult Clinimix TPN Cyclic   intravenous Cyclic PN Jesus Martinez MD   Stopped at 03/30/25 0835    alteplase (Cathflo Activase) injection 2 mg  2 mg intra-catheter PRN ASHLY Hawk        diphenoxylate-atropine (Lomotil) 2.5-0.025 mg per tablet 2 tablet  2 tablet oral TID AC BLANK HawkCNP   2 tablet at 03/30/25 0646    enoxaparin (Lovenox) syringe 40 mg  40 mg subcutaneous Daily MINERVA Hawk-CNP   40 mg at 03/30/25 0843    fat emulsion fish oil/plant based (SMOFlipid) 20 % IV infusion 50 g  250 mL intravenous Daily Lipids Jesus Martinez MD   Stopped at 03/30/25 0835    lidocaine (Xylocaine) 10 mg/mL (1 %) injection 5 mL  5 mL infiltration Once ASHLY Hawk        pantoprazole (ProtoNix) EC tablet 40 mg  40 mg oral Daily before breakfast BLANK HawkCNP   40 mg at 03/30/25 0646    thiamine (Vitamin B-1) tablet 100 mg  100 mg oral Daily BLANK HawkCNP   100 mg at 03/30/25 0843    traMADol (Ultram) tablet 50 mg  50 mg oral q6h PRN Jesus Martinez MD   50 mg at 03/30/25 0842    traZODone (Desyrel) tablet 100 mg  100 mg oral Nightly PRN BLANK HawkCNP   100 mg at 03/29/25 2128       IMAGING SUMMARY:  (summary of new imaging findings, not a copy of dictation)

## 2025-03-30 NOTE — CARE PLAN
The patient's goals for the shift include  pain control, rest, and comfort    The clinical goals for the shift include pt will state adequate pain control this shift

## 2025-03-30 NOTE — CARE PLAN
The patient's goals for the shift include pt pain will be managed throughout shift     The clinical goals for the shift include pt will remain HDS throughout shift       Problem: Discharge Planning  Goal: Discharge to home or other facility with appropriate resources  Outcome: Progressing     Problem: Chronic Conditions and Co-morbidities  Goal: Patient's chronic conditions and co-morbidity symptoms are monitored and maintained or improved  Outcome: Progressing     Problem: Nutrition  Goal: Nutrient intake appropriate for maintaining nutritional needs  Outcome: Progressing     Problem: Fall/Injury  Goal: Verbalize understanding of personal risk factors for fall in the hospital  Outcome: Progressing

## 2025-03-31 ENCOUNTER — DOCUMENTATION (OUTPATIENT)
Dept: INFUSION THERAPY | Age: 60
End: 2025-03-31
Payer: COMMERCIAL

## 2025-03-31 ENCOUNTER — DOCUMENTATION (OUTPATIENT)
Dept: HOME HEALTH SERVICES | Facility: HOME HEALTH | Age: 60
End: 2025-03-31
Payer: COMMERCIAL

## 2025-03-31 ENCOUNTER — HOME CARE VISIT (OUTPATIENT)
Dept: HOME HEALTH SERVICES | Facility: HOME HEALTH | Age: 60
End: 2025-03-31
Payer: COMMERCIAL

## 2025-03-31 ENCOUNTER — HOME INFUSION (OUTPATIENT)
Dept: INFUSION THERAPY | Age: 60
End: 2025-03-31
Payer: COMMERCIAL

## 2025-03-31 VITALS
HEART RATE: 95 BPM | HEIGHT: 68 IN | SYSTOLIC BLOOD PRESSURE: 102 MMHG | TEMPERATURE: 96.8 F | WEIGHT: 146 LBS | DIASTOLIC BLOOD PRESSURE: 70 MMHG | BODY MASS INDEX: 22.13 KG/M2 | RESPIRATION RATE: 12 BRPM | OXYGEN SATURATION: 99 %

## 2025-03-31 VITALS
DIASTOLIC BLOOD PRESSURE: 68 MMHG | OXYGEN SATURATION: 97 % | HEART RATE: 60 BPM | HEIGHT: 68 IN | TEMPERATURE: 98.6 F | BODY MASS INDEX: 22.35 KG/M2 | SYSTOLIC BLOOD PRESSURE: 111 MMHG | WEIGHT: 147.49 LBS | RESPIRATION RATE: 16 BRPM

## 2025-03-31 DIAGNOSIS — E43 SEVERE MALNUTRITION (MULTI): ICD-10-CM

## 2025-03-31 DIAGNOSIS — E43 SEVERE MALNUTRITION (MULTI): Primary | ICD-10-CM

## 2025-03-31 LAB
ALBUMIN SERPL BCP-MCNC: 4 G/DL (ref 3.4–5)
ANION GAP SERPL CALC-SCNC: 12 MMOL/L (ref 10–20)
BUN SERPL-MCNC: 31 MG/DL (ref 6–23)
CALCIUM SERPL-MCNC: 9.6 MG/DL (ref 8.6–10.6)
CHLORIDE SERPL-SCNC: 99 MMOL/L (ref 98–107)
CO2 SERPL-SCNC: 29 MMOL/L (ref 21–32)
CREAT SERPL-MCNC: 0.67 MG/DL (ref 0.5–1.3)
EGFRCR SERPLBLD CKD-EPI 2021: >90 ML/MIN/1.73M*2
ERYTHROCYTE [DISTWIDTH] IN BLOOD BY AUTOMATED COUNT: 13.3 % (ref 11.5–14.5)
GLUCOSE BLD MANUAL STRIP-MCNC: 89 MG/DL (ref 74–99)
GLUCOSE SERPL-MCNC: 67 MG/DL (ref 74–99)
HCT VFR BLD AUTO: 38.5 % (ref 41–52)
HGB BLD-MCNC: 12.6 G/DL (ref 13.5–17.5)
MAGNESIUM SERPL-MCNC: 2.17 MG/DL (ref 1.6–2.4)
MCH RBC QN AUTO: 26.6 PG (ref 26–34)
MCHC RBC AUTO-ENTMCNC: 32.7 G/DL (ref 32–36)
MCV RBC AUTO: 81 FL (ref 80–100)
NRBC BLD-RTO: 0 /100 WBCS (ref 0–0)
PHOSPHATE SERPL-MCNC: 4 MG/DL (ref 2.5–4.9)
PLATELET # BLD AUTO: 268 X10*3/UL (ref 150–450)
POTASSIUM SERPL-SCNC: 4.7 MMOL/L (ref 3.5–5.3)
RBC # BLD AUTO: 4.74 X10*6/UL (ref 4.5–5.9)
SODIUM SERPL-SCNC: 135 MMOL/L (ref 136–145)
WBC # BLD AUTO: 7.8 X10*3/UL (ref 4.4–11.3)

## 2025-03-31 PROCEDURE — 82947 ASSAY GLUCOSE BLOOD QUANT: CPT

## 2025-03-31 PROCEDURE — 85027 COMPLETE CBC AUTOMATED: CPT

## 2025-03-31 PROCEDURE — 80069 RENAL FUNCTION PANEL: CPT

## 2025-03-31 PROCEDURE — 2500000001 HC RX 250 WO HCPCS SELF ADMINISTERED DRUGS (ALT 637 FOR MEDICARE OP): Performed by: NURSE PRACTITIONER

## 2025-03-31 PROCEDURE — 83735 ASSAY OF MAGNESIUM: CPT

## 2025-03-31 PROCEDURE — 99239 HOSP IP/OBS DSCHRG MGMT >30: CPT | Performed by: NURSE PRACTITIONER

## 2025-03-31 PROCEDURE — 2500000002 HC RX 250 W HCPCS SELF ADMINISTERED DRUGS (ALT 637 FOR MEDICARE OP, ALT 636 FOR OP/ED): Performed by: NURSE PRACTITIONER

## 2025-03-31 PROCEDURE — 2500000001 HC RX 250 WO HCPCS SELF ADMINISTERED DRUGS (ALT 637 FOR MEDICARE OP)

## 2025-03-31 PROCEDURE — G0299 HHS/HOSPICE OF RN EA 15 MIN: HCPCS

## 2025-03-31 RX ADMIN — ACETAMINOPHEN 650 MG: 325 TABLET, FILM COATED ORAL at 05:52

## 2025-03-31 RX ADMIN — TRAMADOL HYDROCHLORIDE 50 MG: 50 TABLET, COATED ORAL at 08:36

## 2025-03-31 RX ADMIN — THIAMINE HCL TAB 100 MG 100 MG: 100 TAB at 08:36

## 2025-03-31 RX ADMIN — DIPHENOXYLATE HYDROCHLORIDE AND ATROPINE SULFATE 2 TABLET: .025; 2.5 TABLET ORAL at 12:17

## 2025-03-31 RX ADMIN — PANTOPRAZOLE SODIUM 40 MG: 40 TABLET, DELAYED RELEASE ORAL at 08:36

## 2025-03-31 RX ADMIN — DIPHENOXYLATE HYDROCHLORIDE AND ATROPINE SULFATE 2 TABLET: .025; 2.5 TABLET ORAL at 08:36

## 2025-03-31 SDOH — ECONOMIC STABILITY: FOOD INSECURITY: MEALS PER DAY: 3

## 2025-03-31 ASSESSMENT — PAIN SCALES - PAIN ASSESSMENT IN ADVANCED DEMENTIA (PAINAD)
CONSOLABILITY: 0 - NO NEED TO CONSOLE.
NEGVOCALIZATION: 0
NEGVOCALIZATION: 0 - NONE.
FACIALEXPRESSION: 0
CONSOLABILITY: 0
BODYLANGUAGE: 0 - RELAXED.
BODYLANGUAGE: 0
BREATHING: 0
TOTALSCORE: 0
FACIALEXPRESSION: 0 - SMILING OR INEXPRESSIVE.

## 2025-03-31 ASSESSMENT — PAIN DESCRIPTION - LOCATION: LOCATION: ABDOMEN

## 2025-03-31 ASSESSMENT — PAIN SCALES - GENERAL
PAINLEVEL_OUTOF10: 5 - MODERATE PAIN
PAINLEVEL_OUTOF10: 3
PAINLEVEL_OUTOF10: 6
PAINLEVEL_OUTOF10: 0 - NO PAIN

## 2025-03-31 ASSESSMENT — ACTIVITIES OF DAILY LIVING (ADL)
OASIS_M1830: 01
ENTERING_EXITING_HOME: NEEDS ASSISTANCE

## 2025-03-31 ASSESSMENT — ENCOUNTER SYMPTOMS
LOWEST PAIN SEVERITY IN PAST 24 HOURS: 4/10
CHANGE IN APPETITE: UNCHANGED
FATIGUES EASILY: 1
PAIN LOCATION - PAIN SEVERITY: 6/10
OCCASIONAL FEELINGS OF UNSTEADINESS: 0
PAIN LOCATION: ABDOMEN
APPETITE LEVEL: GOOD
DEPRESSION: 0
HIGHEST PAIN SEVERITY IN PAST 24 HOURS: 6/10
STOOL FREQUENCY: DAILY
PERSON REPORTING PAIN: PATIENT
PAIN LOCATION - PAIN FREQUENCY: INTERMITTENT
LOSS OF SENSATION IN FEET: 0
PAIN LOCATION - PAIN DURATION: DAILY
PAIN SEVERITY GOAL: 0/10
PAIN: 1
SUBJECTIVE PAIN PROGRESSION: WAXING AND WANING

## 2025-03-31 ASSESSMENT — LIFESTYLE VARIABLES: SMOKING_STATUS: 0

## 2025-03-31 ASSESSMENT — PAIN SCALES - WONG BAKER: WONGBAKER_NUMERICALRESPONSE: HURTS LITTLE BIT

## 2025-03-31 ASSESSMENT — PAIN - FUNCTIONAL ASSESSMENT
PAIN_FUNCTIONAL_ASSESSMENT: 0-10
PAIN_FUNCTIONAL_ASSESSMENT: 0-10

## 2025-03-31 NOTE — PROGRESS NOTES
DIAGNOSIS perforated diverticulitis, malnutrition  No Known Allergies   REVIEW OF LABS AT DISCHARGE  LINE INFO: PT HAS A SL PICC TO BE MANAGED PER [facility] PROTOCOL  PT ORDERED TPN indefinitely  LABS ORDERED INCLUDE TPN panel  SYSTEM CADD pump  CARE PLAN DONE    Bereket Em IS A 59 y.o. male ORDERED TPN  FOLLOWED BY Dr Mcelroy    SOC confirmed for 3/31 (on-call)    [facility] spoke with pt, informed him of medication name, delivery, dosing, etc. He verbalized understanding of instruction regarding medication and receipt/storage of package. Verified delivery address as [address] [address].  to call on way, delivery by 5 pm. Pt states he has pump from previous mag infusions that can be picked up - pt care rep to arrange for Wed pickup. Pt ok with receiving supplies today to last thru Wed 4/2 and resume weekly Wed deliveries at that time.    RX DISPENSED THE FOLLOWING WITH SUPPLIES TO MATCH WITH DELIVERY BY 5 PM:  3x TPN (one off ice)  3x MVI  DOS 3/31-4/2    New pump needed for TPN    FOLLOW UP 4/2 PROGRESS, DELIVERY STRAIGHT x7

## 2025-03-31 NOTE — CARE PLAN
The patient's goals for the shift include  pain control, rest, and comfort    The clinical goals for the shift include pt will remain HDS this shift

## 2025-03-31 NOTE — CARE PLAN
The patient's goals for the shift include pt pain will be managed throughout shift     The clinical goals for the shift include pt will remain HDS this shift      Problem: Discharge Planning  Goal: Discharge to home or other facility with appropriate resources  Outcome: Progressing     Problem: Chronic Conditions and Co-morbidities  Goal: Patient's chronic conditions and co-morbidity symptoms are monitored and maintained or improved  Outcome: Progressing     Problem: Nutrition  Goal: Nutrient intake appropriate for maintaining nutritional needs  Outcome: Progressing     Problem: Fall/Injury  Goal: Verbalize understanding of personal risk factors for fall in the hospital  Outcome: Progressing

## 2025-03-31 NOTE — PROGRESS NOTES
PT TO START TPN... PER [facility] PT AGREED TO THREE DAY SUPPLY FOR DLEIVERY BY 5PM WITH ONE TPN BAG OFF ICE... OK TO SEND ALL STND SUPPLIES..   PLEASE ARRANGE  PUMP PT USED FOR HYDRATION WITH WEDNESDAY DELIVERY...

## 2025-03-31 NOTE — PROGRESS NOTES
Bereket Em is a 59 y.o. male on day 6 of admission presenting with Severe malnutrition (Multi).      Transitional Care Coordination Progress Note:  Patient discussed during interdisciplinary rounds.      Plan per Medical/Surgical team:    Home Care choice for home going needs, [facility].  Pt Needs: TBD.   3/28: Pt going home on TPN. [facility] made aware.   3/31: [facility] aware. SOC 24-48 hours post dc.          Discharge disposition: [facility], [facility]     Potential Barriers: None     ADOD:  3/31     This TCC will continue to follow for home going needs and safe DC plan.      RUSH ANDERSON

## 2025-03-31 NOTE — HH CARE COORDINATION
Home Care received a Referral to Resume Care for Infusion, Nursing, Physical Therapy, and Occupational Therapy. We have processed the referral for a Resumption of Care on 3/31/2025.     If you have any questions or concerns, please feel free to contact us at [phone]. Follow the prompts, enter your five digit zip code, and you will be directed to your care team on [facility].

## 2025-03-31 NOTE — HOSPITAL COURSE
Bereket Em is a 59 year old male with PMH perforated diverticultis s/p Margarita's procedure complicated by formation of enterocutaneous fistula. He presented for enteroscopy with Dr. Cage on 12/12, during which 2 covered stents were placed to cover the fistula site followed by wound vac placement over his midline. On 12/14 he began having feculent output into his vac, no stool/gas via ostomy, worsening nausea, reflex, and hiccuping. CT AP was completed which demonstrated small bowel dilation with compression of distal small and large bowel loops, concerning for a small bowel obstruction. The wound vac was taken down and the right stent was pulled back with wound vac replacement and NGT placement. He continued to be clinically obstructed with high NGT output and no output via ostomy. The stent and wound vac were removed on 12/16 relieving the obstruction and a wound manager was replaced over fistula site. NGT was removed after successful gravity trial and he tolerated a soft diet well. He was discharged home with resumed home health care. He presented this hospitalization for TPN initiation in the setting of severe protein malnutrition. Patient with already PICC line in place for daily 1L IVFs.   He was started on TPN and was slowly titrated to goal rate with blood glucose levels and electrolytes remaining stable. His TPN was then cycled with continued normal glucose levels and labs.  With administration of TPN, IVFs no longer indicated.   Patient is being discharged to home with resumed home health care for TPN administration. His labs will be drawn weekly and be monitored by Dr. Perry.

## 2025-03-31 NOTE — DISCHARGE SUMMARY
Discharge Diagnosis  Severe malnutrition (Multi)    Issues Requiring Follow-Up  ACS clinic follow up with Dr. Mcelroy     Test Results Pending At Discharge  Pending Labs       No current pending labs.            Hospital Course  Bereket Em is a 59 year old male with PMH perforated diverticultis s/p Margarita's procedure complicated by formation of enterocutaneous fistula. He presented for enteroscopy with Dr. Cage on 12/12, during which 2 covered stents were placed to cover the fistula site followed by wound vac placement over his midline. On 12/14 he began having feculent output into his vac, no stool/gas via ostomy, worsening nausea, reflex, and hiccuping. CT AP was completed which demonstrated small bowel dilation with compression of distal small and large bowel loops, concerning for a small bowel obstruction. The wound vac was taken down and the right stent was pulled back with wound vac replacement and NGT placement. He continued to be clinically obstructed with high NGT output and no output via ostomy. The stent and wound vac were removed on 12/16 relieving the obstruction and a wound manager was replaced over fistula site. NGT was removed after successful gravity trial and he tolerated a soft diet well. He was discharged home with resumed home health care. He presented this hospitalization for TPN initiation in the setting of severe protein malnutrition. Patient with already PICC line in place for daily 1L IVFs.   He was started on TPN and was slowly titrated to goal rate with blood glucose levels and electrolytes remaining stable. His TPN was then cycled with continued normal glucose levels and labs.  With administration of TPN, IVFs no longer indicated.   Patient is being discharged to home with resumed home health care for TPN administration. His labs will be drawn weekly and be monitored by Dr. Perry.   Total time spent on discharge planning and education approximately forty minutes.      Pertinent Physical Exam At Time of Discharge  Physical Exam  Constitutional:       Appearance: Normal appearance.   Eyes:      Extraocular Movements: Extraocular movements intact.   Cardiovascular:      Comments: Non cyanotic   Pulmonary:      Effort: Pulmonary effort is normal.   Abdominal:      Comments: Soft, non tender, enterocutaneous fistula pink and functioning watery brown effluent. Pouch in place. Small opaque pouch in place over colostomy.    Musculoskeletal:         General: Normal range of motion.   Skin:     General: Skin is warm and dry.   Neurological:      Mental Status: He is alert and oriented to person, place, and time.   Psychiatric:         Behavior: Behavior normal.         Home Medications     Medication List      START taking these medications     Adult Clinimix Parenteral Nutrition Cyclic; Adult Clinimix TPN Cyclic    Infuse 2,000 mL over 12 hours into a venous catheter (central line)   continuously intravenous, at  mL/hr, Administer over 12 Hours,   Cyclic PN, First dose on Fri 3/28/25 at 2000 Indication: Post-op GI   surgery  Instructions:   Ok to use either 0.2 or 1.2 -micron filter Start   rate at 91 mL/hr for 1 hours. Increase rate to 182 mL/hr for 10 hours.   Decrease rate to 91 mL/hr for 1 hours, then stop. Use a 1.2 micron filter.    Start rate at 91 mL/hr for 1 hours. Increase rate to 182 mL/hr for 10   hours. Decrease rate to 91 mL/hr for 1 hours, then stop.   alteplase 2 mg injection; Commonly known as: Cathflo Activase; 2 mL (2   mg) by intra-catheter route if needed (catheter) for up to 14 days.   fat emulsion fish oil/plant based 20 % emulsion; Commonly known as:   SMOFlipid; Infuse 250 mL (50 g) at 20.8 mL/hr over 12 hours into a venous   catheter Daily Lipids.   fat emulsion-plant based 20 % infusion; Commonly known as: Intralipid;   Infuse 250 mL (50 g) at 20.8 mL/hr over 12 hours into a venous catheter   Daily Lipids.   thiamine 100 mg tablet; Commonly known as:  Vitamin B-1; Take 1 tablet   (100 mg) by mouth once daily for 14 doses.     CHANGE how you take these medications     diphenoxylate-atropine 2.5-0.025 mg tablet; Commonly known as: Lomotil;   Take 2 tablets by mouth 3 times a day before meals for 14 days.; What   changed: when to take this   pantoprazole 40 mg EC tablet; Commonly known as: ProtoNix; Take 1 tablet   (40 mg) by mouth once daily in the morning. Take before meals for 21 days.   Do not crush, chew, or split.; What changed: when to take this     CONTINUE taking these medications     heparin LockFlush(Porcine)(PF) 10 unit/mL injection; Generic drug:   heparin flush   magnesium sulfate 4 GM/50ML IV; Infuse 25 mL (2 g) at 12.5 mL/hr over 2   hours into a venous catheter every 7 days. Volume for home infusion will   be 500ml NS and administered via CADD pump over 2 hours at 250ml/hr   oral electrolytes replacement (Pedialyte) solution solution; Take 500 mL   by mouth 2 times a day.   sodium chloride (PF) 0.9% injection   traMADol 50 mg tablet; Commonly known as: Ultram; Take 1 tablet (50 mg)   by mouth every 6 hours if needed for severe pain (7 - 10).   traZODone 100 mg tablet; Commonly known as: Desyrel; Take 1 tablet (100   mg) by mouth as needed at bedtime for sleep.     STOP taking these medications     lactated Ringer's infusion       Outpatient Follow-Up  No future appointments.    Michael Lubin, APRN-CNP

## 2025-04-01 ENCOUNTER — PATIENT OUTREACH (OUTPATIENT)
Dept: PRIMARY CARE | Facility: CLINIC | Age: 60
End: 2025-04-01
Payer: COMMERCIAL

## 2025-04-01 DIAGNOSIS — E43 SEVERE MALNUTRITION (MULTI): ICD-10-CM

## 2025-04-01 NOTE — PROGRESS NOTES
Discharge Facility:  [facility]     Discharge Diagnosis:    Severe malnutrition (Multi)     Issues Requiring Follow-Up  ACS clinic follow up with Dr. Mcelroy       Admission Date: 3/25/2025   Discharge Date:  3/31/2025     PCP Appointment Date: 4/8/2025     Specialist Appointment Date:     ACS clinic follow up with Dr. Mcelroy / Pt aware     [facility] active in home     CBC and Auto Differential Daily (including weekends) monitored by Dr. Perry.   Comprehensive metabolic panel Daily (including weekends) monitored by Dr. Perry.     Magnesium Daily (including weekends)  Phosphorus Daily (including weekends)  Prealbumin Daily (including weekends)  Triglycerides Daily (including weekends)    Hospital Encounter and Summary Linked: Yes    Admission (Discharged) with Jose Guadalupe Gomes MD (03/25/2025)     See discharge assessment below for further details     Wrap Up  Wrap Up Additional Comments: Patient states feeling better, has support in community, aware of DC summary instructions along with ACS clinic follow up with Dr. Mcelroy. [facility] active in home. Patient stephan PCP F/U 4/8 patient aware. Patient encouraged  to call providers for any questions concerns or change  in condition. (4/1/2025 11:02 AM)    Engagement  Call Start Time: 1102 (4/1/2025 11:02 AM)    Medications  Medications reviewed with patient/caregiver?: Yes (4/1/2025 11:02 AM)  Is the patient having any side effects they believe may be caused by any medication additions or changes?: No (4/1/2025 11:02 AM)  Does the patient have all medications ordered at discharge?: Yes (4/1/2025 11:02 AM)  Care Management Interventions: No intervention needed (4/1/2025 11:02 AM)  Prescription Comments: START taking:  Adult Clinimix Parenteral Nutrition Cyclic  alteplase (Cathflo Activase)   fat emulsion fish oil/plant based (SMOFlipid)   fat emulsion-plant based (Intralipid)   thiamine (Vitamin B-1)    CHANGE how you take:  diphenoxylate-atropine (Lomotil)   pantoprazole  (ProtoNix) (4/1/2025 11:02 AM)  Is the patient taking all medications as directed (includes completed medication regime)?: -- (Pt  states has all meds) (4/1/2025 11:02 AM)  Care Management Interventions: Provided patient education (4/1/2025 11:02 AM)  Medication Comments: STOP taking: lactated Ringer's infusion (4/1/2025 11:02 AM)    Appointments  Does the patient have a primary care provider?: Yes (4/1/2025 11:02 AM)  Care Management Interventions: Verified appointment date/time/provider (4/1/2025 11:02 AM)  Care Management Interventions: Advised to schedule with specialist (4/1/2025 11:02 AM)    Self Management  What is the home health agency?: [facility] active (4/1/2025 11:02 AM)  Has home health visited the patient within 72 hours of discharge?: Yes (4/1/2025 11:02 AM)  What Durable Medical Equipment (DME) was ordered?: NA (4/1/2025 11:02 AM)    Patient Teaching  Does the patient have access to their discharge instructions?: Yes (4/1/2025 11:02 AM)  Care Management Interventions: Reviewed instructions with patient (4/1/2025 11:02 AM)  What is the patient's perception of their health status since discharge?: Improving (4/1/2025 11:02 AM)  Is the patient/caregiver able to teach back the hierarchy of who to call/visit for symptoms/problems? PCP, Specialist, Home Health nurse, Urgent Care, ED, 911: Yes (4/1/2025 11:02 AM)

## 2025-04-02 ENCOUNTER — HOME INFUSION (OUTPATIENT)
Dept: INFUSION THERAPY | Age: 60
End: 2025-04-02
Payer: COMMERCIAL

## 2025-04-02 DIAGNOSIS — E43 SEVERE MALNUTRITION (MULTI): ICD-10-CM

## 2025-04-02 PROCEDURE — 0023 HH ROC

## 2025-04-02 NOTE — PROGRESS NOTES
Chart review - Bereket Em is a 59 y.o. patient on home care for nightly TPN infusions.  Dr. Mcelroy following    No new labs to review  RN visits reviewed- no issues noted with infusions or line    Rph call to pt, tolerating infusions well, confirmed doses in home thru 4/2. Agreeable to delivery of another week of TPN and supplies.    Pt is using his own batteries for pump, was having some issues with battery pack. Pt will trial the battery pack over the next few days and call back with any further issues.    Rph offered to  - pt stated no questions at this time.    Pharmacy to mix 4/2 and deliver straight with supplies to match:  7x TPN  7x MVI  DOS: 4/3 - 4/9    Follow up 4/8 - check labs, get orders, mix Wed

## 2025-04-03 DIAGNOSIS — E43 SEVERE MALNUTRITION (MULTI): ICD-10-CM

## 2025-04-04 DIAGNOSIS — E43 SEVERE MALNUTRITION (MULTI): ICD-10-CM

## 2025-04-05 DIAGNOSIS — E43 SEVERE MALNUTRITION (MULTI): ICD-10-CM

## 2025-04-06 DIAGNOSIS — E43 SEVERE MALNUTRITION (MULTI): ICD-10-CM

## 2025-04-07 DIAGNOSIS — E43 SEVERE MALNUTRITION (MULTI): ICD-10-CM

## 2025-04-08 ENCOUNTER — HOME CARE VISIT (OUTPATIENT)
Dept: HOME HEALTH SERVICES | Facility: HOME HEALTH | Age: 60
End: 2025-04-08
Payer: COMMERCIAL

## 2025-04-08 ENCOUNTER — OFFICE VISIT (OUTPATIENT)
Dept: PRIMARY CARE | Facility: CLINIC | Age: 60
End: 2025-04-08
Payer: COMMERCIAL

## 2025-04-08 ENCOUNTER — LAB REQUISITION (OUTPATIENT)
Dept: LAB | Facility: HOSPITAL | Age: 60
End: 2025-04-08

## 2025-04-08 VITALS
RESPIRATION RATE: 16 BRPM | SYSTOLIC BLOOD PRESSURE: 110 MMHG | TEMPERATURE: 98 F | OXYGEN SATURATION: 97 % | HEART RATE: 72 BPM | DIASTOLIC BLOOD PRESSURE: 70 MMHG

## 2025-04-08 VITALS
HEART RATE: 88 BPM | HEIGHT: 68 IN | WEIGHT: 149 LBS | DIASTOLIC BLOOD PRESSURE: 72 MMHG | OXYGEN SATURATION: 98 % | BODY MASS INDEX: 22.58 KG/M2 | SYSTOLIC BLOOD PRESSURE: 126 MMHG

## 2025-04-08 DIAGNOSIS — E43 SEVERE MALNUTRITION (MULTI): ICD-10-CM

## 2025-04-08 DIAGNOSIS — F51.01 PRIMARY INSOMNIA: ICD-10-CM

## 2025-04-08 DIAGNOSIS — K63.2 ENTEROCUTANEOUS FISTULA: ICD-10-CM

## 2025-04-08 DIAGNOSIS — T81.31XA DISRUPTION OF EXTERNAL OPERATION (SURGICAL) WOUND, NOT ELSEWHERE CLASSIFIED, INITIAL ENCOUNTER: ICD-10-CM

## 2025-04-08 DIAGNOSIS — E43 SEVERE MALNUTRITION (MULTI): Primary | ICD-10-CM

## 2025-04-08 DIAGNOSIS — K57.30 DIVERTICULOSIS OF LARGE INTESTINE WITHOUT PERFORATION OR ABSCESS WITHOUT BLEEDING: ICD-10-CM

## 2025-04-08 LAB
ALBUMIN SERPL BCP-MCNC: 4.2 G/DL (ref 3.4–5)
ALP SERPL-CCNC: 112 U/L (ref 33–136)
ALT SERPL W P-5'-P-CCNC: 33 U/L (ref 10–52)
ANION GAP SERPL CALCULATED.3IONS-SCNC: 12 MMOL/L (ref 10–20)
AST SERPL W P-5'-P-CCNC: 19 U/L (ref 9–39)
BASOPHILS # BLD AUTO: 0.06 X10*3/UL (ref 0–0.1)
BASOPHILS NFR BLD AUTO: 0.6 %
BILIRUB SERPL-MCNC: 0.4 MG/DL (ref 0–1.2)
BUN SERPL-MCNC: 27 MG/DL (ref 6–23)
CALCIUM SERPL-MCNC: 9.7 MG/DL (ref 8.6–10.3)
CHLORIDE SERPL-SCNC: 102 MMOL/L (ref 98–107)
CO2 SERPL-SCNC: 27 MMOL/L (ref 21–32)
CREAT SERPL-MCNC: 0.72 MG/DL (ref 0.5–1.3)
EGFRCR SERPLBLD CKD-EPI 2021: >90 ML/MIN/1.73M*2
EOSINOPHIL # BLD AUTO: 0.28 X10*3/UL (ref 0–0.7)
EOSINOPHIL NFR BLD AUTO: 3 %
ERYTHROCYTE [DISTWIDTH] IN BLOOD BY AUTOMATED COUNT: 13.6 % (ref 11.5–14.5)
GLUCOSE SERPL-MCNC: 81 MG/DL (ref 74–99)
HCT VFR BLD AUTO: 38.8 % (ref 41–52)
HGB BLD-MCNC: 12.5 G/DL (ref 13.5–17.5)
IMM GRANULOCYTES # BLD AUTO: 0.01 X10*3/UL (ref 0–0.7)
IMM GRANULOCYTES NFR BLD AUTO: 0.1 % (ref 0–0.9)
LYMPHOCYTES # BLD AUTO: 2.47 X10*3/UL (ref 1.2–4.8)
LYMPHOCYTES NFR BLD AUTO: 26.1 %
MAGNESIUM SERPL-MCNC: 2.1 MG/DL (ref 1.6–2.4)
MCH RBC QN AUTO: 26.7 PG (ref 26–34)
MCHC RBC AUTO-ENTMCNC: 32.2 G/DL (ref 32–36)
MCV RBC AUTO: 83 FL (ref 80–100)
MONOCYTES # BLD AUTO: 0.67 X10*3/UL (ref 0.1–1)
MONOCYTES NFR BLD AUTO: 7.1 %
NEUTROPHILS # BLD AUTO: 5.98 X10*3/UL (ref 1.2–7.7)
NEUTROPHILS NFR BLD AUTO: 63.1 %
NRBC BLD-RTO: ABNORMAL /100{WBCS}
PHOSPHATE SERPL-MCNC: 3.9 MG/DL (ref 2.5–4.9)
PLATELET # BLD AUTO: 269 X10*3/UL (ref 150–450)
POTASSIUM SERPL-SCNC: 4.2 MMOL/L (ref 3.5–5.3)
PROT SERPL-MCNC: 6.9 G/DL (ref 6.4–8.2)
RBC # BLD AUTO: 4.68 X10*6/UL (ref 4.5–5.9)
SODIUM SERPL-SCNC: 137 MMOL/L (ref 136–145)
TRIGL SERPL-MCNC: 121 MG/DL (ref 0–149)
WBC # BLD AUTO: 9.5 X10*3/UL (ref 4.4–11.3)

## 2025-04-08 PROCEDURE — 80053 COMPREHEN METABOLIC PANEL: CPT

## 2025-04-08 PROCEDURE — 84100 ASSAY OF PHOSPHORUS: CPT

## 2025-04-08 PROCEDURE — 83735 ASSAY OF MAGNESIUM: CPT

## 2025-04-08 PROCEDURE — 3008F BODY MASS INDEX DOCD: CPT

## 2025-04-08 PROCEDURE — 84134 ASSAY OF PREALBUMIN: CPT

## 2025-04-08 PROCEDURE — 1036F TOBACCO NON-USER: CPT

## 2025-04-08 PROCEDURE — 84478 ASSAY OF TRIGLYCERIDES: CPT

## 2025-04-08 PROCEDURE — 85025 COMPLETE CBC W/AUTO DIFF WBC: CPT

## 2025-04-08 PROCEDURE — 99495 TRANSJ CARE MGMT MOD F2F 14D: CPT

## 2025-04-08 PROCEDURE — G0299 HHS/HOSPICE OF RN EA 15 MIN: HCPCS

## 2025-04-08 ASSESSMENT — PAIN SCALES - GENERAL: PAINLEVEL_OUTOF10: 2

## 2025-04-08 ASSESSMENT — ENCOUNTER SYMPTOMS
CHILLS: 0
SHORTNESS OF BREATH: 0
FEVER: 0
UNEXPECTED WEIGHT CHANGE: 1

## 2025-04-08 NOTE — PROGRESS NOTES
Subjective   Patient ID: Bereket Em is a 59 y.o. male who presents for hospital followup.    Hx of tobacco use, hx of other substance abuse in remission, elevated glucose (last A1c 5.5 1/2024)    History of perforated diverticulitis s/p ex lap and sigmoidectomy c/b necrotic fascia with takeback to OR to repeat ex lap and placement of bridging mesh. Developed EC fistula x 2 (6/5/24 - 7/11/254). Working with Dr. Perry (General Surgeon) and Aviva Costa, ISABELLE in charge of Critical Care and Acute Care surgery. Prior initial hospitalization (6/5/24) was smoking 1PPD and drinking 3-6 drinks a day which correlates with pour wound healing, since hospital patient has not had single drink or cigarette. Patient wound healing well, and only fistula in central abdomen is left.     Since last visit with PCP 6 months ago, Patient had 2-3 attempts of stent placement for abdominal fistula through endoscopic procedure with Dr. Cage to decrease fluid loss and improve wound healing, patient was having significant issues of skin breakdown around wound vac. Stents were unsuccefully. However patient saw new wound specialist who changed the way wound banageges were placed which resolved skin irrtations. Patient was recently in ED again 3/25/2025 - 3/31/2025 due to malnutrition, and TPN was re-started, patient feels energy levels improving since back on. Patient has weekly meetings with main gernal surgery, Dr. Perry, and plan is still to attempt reversal June 2025, and has put together team of surgeons for procedure including Dr. Pena.     *Patient needs new [facility] paperwork updated    Insomnia: Takes 50 mg Trazadone for sleep which works well for patient and helps him fall and stay asleep. Has tried tried Hydroxyzine 25 and 50 mg which does not help with sleep. Also taking melatonin which helps some.            Review of Systems   Constitutional:  Positive for unexpected weight change. Negative for chills and fever.  "  Respiratory:  Negative for shortness of breath.    Cardiovascular:  Negative for chest pain.       Objective   /72   Pulse 88   Ht 1.727 m (5' 8\")   Wt 67.6 kg (149 lb)   SpO2 98%   BMI 22.66 kg/m²     Physical Exam  Constitutional:       General: He is not in acute distress.     Appearance: Normal appearance.   Cardiovascular:      Rate and Rhythm: Normal rate and regular rhythm.      Heart sounds: No murmur heard.  Pulmonary:      Effort: Pulmonary effort is normal.      Breath sounds: Normal breath sounds. No wheezing, rhonchi or rales.   Skin:     General: Skin is warm and dry.      Findings: No rash.   Neurological:      Mental Status: He is alert.   Psychiatric:         Mood and Affect: Mood and affect normal.         Assessment/Plan   Diagnoses and all orders for this visit:  Severe malnutrition (Multi)  Enterocutaneous fistula  Primary insomnia  Will fill out FMLA for patient if sends. Follow-up prn or 2-3 months after reversal.        "

## 2025-04-09 ENCOUNTER — HOME INFUSION (OUTPATIENT)
Dept: INFUSION THERAPY | Age: 60
End: 2025-04-09
Payer: COMMERCIAL

## 2025-04-09 ENCOUNTER — PATIENT OUTREACH (OUTPATIENT)
Facility: CLINIC | Age: 60
End: 2025-04-09
Payer: COMMERCIAL

## 2025-04-09 ENCOUNTER — DOCUMENTATION (OUTPATIENT)
Dept: INFUSION THERAPY | Age: 60
End: 2025-04-09
Payer: COMMERCIAL

## 2025-04-09 DIAGNOSIS — E43 SEVERE MALNUTRITION (MULTI): Primary | ICD-10-CM

## 2025-04-09 DIAGNOSIS — K63.2 ENTEROCUTANEOUS FISTULA: ICD-10-CM

## 2025-04-09 DIAGNOSIS — E43 SEVERE MALNUTRITION (MULTI): ICD-10-CM

## 2025-04-09 LAB — PREALB SERPL-MCNC: 41.5 MG/DL (ref 18–40)

## 2025-04-09 ASSESSMENT — ENCOUNTER SYMPTOMS
PAIN LOCATION - PAIN SEVERITY: 4/10
CHANGE IN APPETITE: UNCHANGED
FATIGUES EASILY: 1
APPETITE LEVEL: FAIR
PAIN: 1
PAIN LOCATION: ABDOMEN
PERSON REPORTING PAIN: PATIENT
ABDOMINAL PAIN: 1
MUSCLE WEAKNESS: 1

## 2025-04-09 NOTE — PROGRESS NOTES
Call regarding appt. with PCP on 4/8/2025  after hospitalization.    At time of outreach call the patient feels as if their condition has improved  since last visit.    Reviewed the PCP appointment with the pt and addressed any questions or concerns, no questions or concerns at this time.      Encouraged patient to call providers for any questions concerns or change in condition

## 2025-04-09 NOTE — PROGRESS NOTES
Chart review - Bereket Em is a 59 y.o. patient on home care for nightly TPN infusions.  Dr. Mcelroy following     Labs from 4/8 reviewed - generally unremarkable    RN visits reviewed- no issues noted with infusions or line     Pharmacy to mix 4/9 and deliver straight with supplies to match:  7x TPN  7x MVI  DOS: 4/10 - 4/16     Follow up 4/15 - check labs, get orders, mix Wed

## 2025-04-09 NOTE — PROGRESS NOTES
Spoke w/ patient - delivery of TPN bags and all supplies (including flushes, CADD-tubing sets, 10cc syringes and all dressing) is scheduled for tonight. Also,  w/ this delivery patient requested  extra  AA batteries  and a  new AC cord.   to call on the way.

## 2025-04-10 DIAGNOSIS — E43 SEVERE MALNUTRITION (MULTI): ICD-10-CM

## 2025-04-11 DIAGNOSIS — E43 SEVERE MALNUTRITION (MULTI): ICD-10-CM

## 2025-04-12 DIAGNOSIS — E43 SEVERE MALNUTRITION (MULTI): ICD-10-CM

## 2025-04-13 DIAGNOSIS — E43 SEVERE MALNUTRITION (MULTI): ICD-10-CM

## 2025-04-14 ENCOUNTER — LAB REQUISITION (OUTPATIENT)
Dept: LAB | Facility: HOSPITAL | Age: 60
End: 2025-04-14
Payer: COMMERCIAL

## 2025-04-14 ENCOUNTER — HOME CARE VISIT (OUTPATIENT)
Dept: HOME HEALTH SERVICES | Facility: HOME HEALTH | Age: 60
End: 2025-04-14
Payer: COMMERCIAL

## 2025-04-14 VITALS
RESPIRATION RATE: 16 BRPM | TEMPERATURE: 97 F | OXYGEN SATURATION: 99 % | DIASTOLIC BLOOD PRESSURE: 82 MMHG | SYSTOLIC BLOOD PRESSURE: 128 MMHG | HEART RATE: 80 BPM

## 2025-04-14 DIAGNOSIS — E43 SEVERE MALNUTRITION (MULTI): ICD-10-CM

## 2025-04-14 DIAGNOSIS — T81.31XA DISRUPTION OF EXTERNAL OPERATION (SURGICAL) WOUND, NOT ELSEWHERE CLASSIFIED, INITIAL ENCOUNTER: ICD-10-CM

## 2025-04-14 LAB
ALBUMIN SERPL BCP-MCNC: 4.2 G/DL (ref 3.4–5)
ALP SERPL-CCNC: 106 U/L (ref 33–136)
ALT SERPL W P-5'-P-CCNC: 19 U/L (ref 10–52)
ANION GAP SERPL CALCULATED.3IONS-SCNC: 19 MMOL/L (ref 10–20)
AST SERPL W P-5'-P-CCNC: 17 U/L (ref 9–39)
BASOPHILS # BLD AUTO: 0.07 X10*3/UL (ref 0–0.1)
BASOPHILS NFR BLD AUTO: 0.6 %
BILIRUB SERPL-MCNC: 0.5 MG/DL (ref 0–1.2)
BUN SERPL-MCNC: 27 MG/DL (ref 6–23)
CALCIUM SERPL-MCNC: 9.6 MG/DL (ref 8.6–10.3)
CHLORIDE SERPL-SCNC: 100 MMOL/L (ref 98–107)
CO2 SERPL-SCNC: 22 MMOL/L (ref 21–32)
CREAT SERPL-MCNC: 0.83 MG/DL (ref 0.5–1.3)
EGFRCR SERPLBLD CKD-EPI 2021: >90 ML/MIN/1.73M*2
EOSINOPHIL # BLD AUTO: 0.2 X10*3/UL (ref 0–0.7)
EOSINOPHIL NFR BLD AUTO: 1.8 %
ERYTHROCYTE [DISTWIDTH] IN BLOOD BY AUTOMATED COUNT: 14.3 % (ref 11.5–14.5)
GLUCOSE SERPL-MCNC: 100 MG/DL (ref 74–99)
HCT VFR BLD AUTO: 39.9 % (ref 41–52)
HGB BLD-MCNC: 12.7 G/DL (ref 13.5–17.5)
IMM GRANULOCYTES # BLD AUTO: 0.03 X10*3/UL (ref 0–0.7)
IMM GRANULOCYTES NFR BLD AUTO: 0.3 % (ref 0–0.9)
LYMPHOCYTES # BLD AUTO: 2.58 X10*3/UL (ref 1.2–4.8)
LYMPHOCYTES NFR BLD AUTO: 23 %
MAGNESIUM SERPL-MCNC: 2.04 MG/DL (ref 1.6–2.4)
MCH RBC QN AUTO: 26.2 PG (ref 26–34)
MCHC RBC AUTO-ENTMCNC: 31.8 G/DL (ref 32–36)
MCV RBC AUTO: 82 FL (ref 80–100)
MONOCYTES # BLD AUTO: 0.78 X10*3/UL (ref 0.1–1)
MONOCYTES NFR BLD AUTO: 7 %
NEUTROPHILS # BLD AUTO: 7.54 X10*3/UL (ref 1.2–7.7)
NEUTROPHILS NFR BLD AUTO: 67.3 %
NRBC BLD-RTO: 0 /100 WBCS (ref 0–0)
PHOSPHATE SERPL-MCNC: 4.2 MG/DL (ref 2.5–4.9)
PLATELET # BLD AUTO: 211 X10*3/UL (ref 150–450)
POTASSIUM SERPL-SCNC: 4.3 MMOL/L (ref 3.5–5.3)
PROT SERPL-MCNC: 7.3 G/DL (ref 6.4–8.2)
RBC # BLD AUTO: 4.85 X10*6/UL (ref 4.5–5.9)
SODIUM SERPL-SCNC: 137 MMOL/L (ref 136–145)
TRIGL SERPL-MCNC: 107 MG/DL (ref 0–149)
WBC # BLD AUTO: 11.2 X10*3/UL (ref 4.4–11.3)

## 2025-04-14 PROCEDURE — G0299 HHS/HOSPICE OF RN EA 15 MIN: HCPCS

## 2025-04-14 PROCEDURE — 84134 ASSAY OF PREALBUMIN: CPT

## 2025-04-14 PROCEDURE — 80053 COMPREHEN METABOLIC PANEL: CPT

## 2025-04-14 PROCEDURE — 84478 ASSAY OF TRIGLYCERIDES: CPT

## 2025-04-14 PROCEDURE — 85025 COMPLETE CBC W/AUTO DIFF WBC: CPT

## 2025-04-14 PROCEDURE — 83735 ASSAY OF MAGNESIUM: CPT

## 2025-04-14 PROCEDURE — 84100 ASSAY OF PHOSPHORUS: CPT

## 2025-04-14 RX ADMIN — Medication 5 ML: at 11:21

## 2025-04-14 RX ADMIN — SODIUM CHLORIDE 10 ML: 9 INJECTION, SOLUTION INTRAMUSCULAR; INTRAVENOUS; SUBCUTANEOUS at 11:15

## 2025-04-14 RX ADMIN — SODIUM CHLORIDE 20 ML: 9 INJECTION, SOLUTION INTRAMUSCULAR; INTRAVENOUS; SUBCUTANEOUS at 11:20

## 2025-04-15 ENCOUNTER — HOME INFUSION (OUTPATIENT)
Dept: INFUSION THERAPY | Age: 60
End: 2025-04-15
Payer: COMMERCIAL

## 2025-04-15 ENCOUNTER — DOCUMENTATION (OUTPATIENT)
Dept: INFUSION THERAPY | Age: 60
End: 2025-04-15
Payer: COMMERCIAL

## 2025-04-15 ENCOUNTER — TELEPHONE (OUTPATIENT)
Dept: SURGERY | Facility: HOSPITAL | Age: 60
End: 2025-04-15
Payer: COMMERCIAL

## 2025-04-15 DIAGNOSIS — E43 SEVERE MALNUTRITION (MULTI): ICD-10-CM

## 2025-04-15 DIAGNOSIS — L98.8 FISTULA: Primary | ICD-10-CM

## 2025-04-15 DIAGNOSIS — K63.2 ENTEROCUTANEOUS FISTULA: ICD-10-CM

## 2025-04-15 LAB — PREALB SERPL-MCNC: 42.1 MG/DL (ref 18–40)

## 2025-04-15 RX ORDER — OXYCODONE HYDROCHLORIDE 5 MG/1
5 TABLET ORAL EVERY 6 HOURS PRN
Qty: 15 TABLET | Refills: 0 | Status: SHIPPED | OUTPATIENT
Start: 2025-04-15 | End: 2025-04-22

## 2025-04-15 NOTE — TELEPHONE ENCOUNTER
I spoke to Mr. Em today.      He is doing well since his admission for TPN reinitiation.  He reports his weight is up to 151 pounds.  His labs were reviewed and seem appropriate.    He feels his pouching overall remains improved but still has some pain with leakage.  He does not feel that the Tramadol is working well. I viewed his photo attachment of his wound.   Will try to coordinate next visit at same time as colorectal given the distance he needs to travel.   Will change Tramadol to oxycodone as he is needing much less pain control. He will stop taking the Tramadol.

## 2025-04-15 NOTE — PROGRESS NOTES
Delivery of TPN  bags and all supplies (including  CADD-tubing sets, 10cc syringes and all dressing) is scheduled for tomorrow evening, 4/16/25.   Per patient - no heparin or NS flushes w/ this delivery.

## 2025-04-15 NOTE — PROGRESS NOTES
Chart review - Bereket Em is a 59 y.o. patient on home care for nightly TPN infusions.  Dr. Perry following     Labs from 4/14 reviewed - generally unremarkable     RN visits reviewed- no issues noted with infusions or line    Orders rec'd frpm Brittaney Cintron / Dr Perry to continue TPN x1 week. Orders updated in Epic, gold copy to intake.    [facility] spoke with pt, confirmed doses going well and new adapter has fixed issue with battery pack. He is agreeable to delivery of another week of TPN and supplies Wed 4/16. He states he has plenty of flushes and does not need any this week.     Pharmacy to mix 4/16 and deliver straight with supplies to match:  7x TPN  7x MVI  DOS 4/17-4/23     Follow up 4/22 - check labs, get orders, mix Wed

## 2025-04-16 DIAGNOSIS — E43 SEVERE MALNUTRITION (MULTI): ICD-10-CM

## 2025-04-17 DIAGNOSIS — E43 SEVERE MALNUTRITION (MULTI): ICD-10-CM

## 2025-04-18 DIAGNOSIS — E43 SEVERE MALNUTRITION (MULTI): ICD-10-CM

## 2025-04-19 DIAGNOSIS — E43 SEVERE MALNUTRITION (MULTI): ICD-10-CM

## 2025-04-20 DIAGNOSIS — E43 SEVERE MALNUTRITION (MULTI): ICD-10-CM

## 2025-04-21 ENCOUNTER — HOME CARE VISIT (OUTPATIENT)
Dept: HOME HEALTH SERVICES | Facility: HOME HEALTH | Age: 60
End: 2025-04-21
Payer: COMMERCIAL

## 2025-04-21 VITALS
OXYGEN SATURATION: 99 % | BODY MASS INDEX: 22.81 KG/M2 | WEIGHT: 150 LBS | TEMPERATURE: 98.1 F | HEART RATE: 56 BPM | DIASTOLIC BLOOD PRESSURE: 60 MMHG | SYSTOLIC BLOOD PRESSURE: 110 MMHG | RESPIRATION RATE: 16 BRPM

## 2025-04-21 DIAGNOSIS — E43 SEVERE MALNUTRITION (MULTI): ICD-10-CM

## 2025-04-21 PROCEDURE — G0299 HHS/HOSPICE OF RN EA 15 MIN: HCPCS

## 2025-04-21 RX ADMIN — Medication 5 ML: at 10:14

## 2025-04-21 RX ADMIN — SODIUM CHLORIDE 10 ML: 9 INJECTION, SOLUTION INTRAMUSCULAR; INTRAVENOUS; SUBCUTANEOUS at 10:10

## 2025-04-21 RX ADMIN — SODIUM CHLORIDE 20 ML: 9 INJECTION, SOLUTION INTRAMUSCULAR; INTRAVENOUS; SUBCUTANEOUS at 10:12

## 2025-04-22 ENCOUNTER — PATIENT OUTREACH (OUTPATIENT)
Facility: CLINIC | Age: 60
End: 2025-04-22
Payer: COMMERCIAL

## 2025-04-22 DIAGNOSIS — E43 SEVERE MALNUTRITION (MULTI): ICD-10-CM

## 2025-04-22 NOTE — PROGRESS NOTES
Successful outreach to patient regarding hospitalization as patient continues TCM program.   At time of outreach call the patient feels as if their condition has improved  since initial visit with PCP or specialist.    Questions or concerns addressed at this time with patient.     Provided contact information to patient if any further non-emergent needs arise.      Encouraged patient to call providers for any questions concerns or change in condition.

## 2025-04-23 ENCOUNTER — HOME INFUSION (OUTPATIENT)
Dept: INFUSION THERAPY | Age: 60
End: 2025-04-23
Payer: COMMERCIAL

## 2025-04-23 ENCOUNTER — DOCUMENTATION (OUTPATIENT)
Dept: INFUSION THERAPY | Age: 60
End: 2025-04-23
Payer: COMMERCIAL

## 2025-04-23 DIAGNOSIS — E43 SEVERE MALNUTRITION (MULTI): ICD-10-CM

## 2025-04-23 DIAGNOSIS — K63.2 ENTEROCUTANEOUS FISTULA: ICD-10-CM

## 2025-04-23 NOTE — PROGRESS NOTES
Chart reviewed - Bereket Em is a 59 y.o. patient on home care for nightly TPN infusions.  Dr. Perry following     Labs from 4/21 reviewed - generally unremarkable     RN visits reviewed- no issues noted with infusions or line  Issues regarding payment by insurance company, peer-2-peer scheduled, per pharmacy manager will continue to send TPN.     Orders rec'd frpm Brittaney Cintron / Dr Perry to continue TPN x1 week. Orders updated in Epic, gold copy to intake.     Pharmacy to mix 4/23 and deliver straight with supplies to match:  7x TPN  7x MVI  DOS 4/24-4/30     Follow up 4/29 - check labs, get orders, mix Wed

## 2025-04-23 NOTE — PROGRESS NOTES
SPOKE WITH PT AND WITH ANOTHER WEEK OF TPN OK TO SEND ALL SUPPLIES EXCEPT ALCOHOL WIPES.. OK FOR HEPARIN AND NS FLUSHES THIS SHIPMENT.. DELIVERY ANYTIME TODAY IS FINE.... HAD NO QUESTIONS FOR RP AT THIS TIME...

## 2025-04-24 DIAGNOSIS — E43 SEVERE MALNUTRITION (MULTI): ICD-10-CM

## 2025-04-24 ASSESSMENT — ENCOUNTER SYMPTOMS
MUSCLE WEAKNESS: 1
CHANGE IN APPETITE: UNCHANGED
PERSON REPORTING PAIN: PATIENT
FATIGUES EASILY: 1
APPETITE LEVEL: FAIR
PAIN LOCATION - PAIN SEVERITY: 4/10
PAIN: 1
ABDOMINAL PAIN: 1
PAIN LOCATION: ABDOMEN

## 2025-04-25 ENCOUNTER — TELEPHONE (OUTPATIENT)
Dept: SURGERY | Facility: HOSPITAL | Age: 60
End: 2025-04-25
Payer: COMMERCIAL

## 2025-04-25 DIAGNOSIS — L98.8 FISTULA: Primary | ICD-10-CM

## 2025-04-25 DIAGNOSIS — E43 SEVERE MALNUTRITION (MULTI): ICD-10-CM

## 2025-04-25 RX ORDER — OXYCODONE HYDROCHLORIDE 5 MG/1
5 TABLET ORAL EVERY 6 HOURS PRN
Qty: 15 TABLET | Refills: 0 | Status: SHIPPED | OUTPATIENT
Start: 2025-04-25 | End: 2025-04-27 | Stop reason: SDUPTHER

## 2025-04-26 DIAGNOSIS — E43 SEVERE MALNUTRITION (MULTI): ICD-10-CM

## 2025-04-27 DIAGNOSIS — E43 SEVERE MALNUTRITION (MULTI): ICD-10-CM

## 2025-04-27 RX ORDER — DIPHENOXYLATE HYDROCHLORIDE AND ATROPINE SULFATE 2.5; .025 MG/1; MG/1
2 TABLET ORAL 4 TIMES DAILY
Qty: 56 TABLET | Refills: 3 | Status: SHIPPED | OUTPATIENT
Start: 2025-04-27 | End: 2025-05-25

## 2025-04-27 RX ORDER — OXYCODONE HYDROCHLORIDE 5 MG/1
5 TABLET ORAL EVERY 6 HOURS PRN
Qty: 15 TABLET | Refills: 0 | Status: SHIPPED | OUTPATIENT
Start: 2025-04-27 | End: 2025-05-04

## 2025-04-27 NOTE — TELEPHONE ENCOUNTER
Late entry for 04/25/25.    I spoke to Mr. Em via telephone.  He is doing well.  His weight has improved to 152.9 pounds with the TPN.  I discussed that I will coordinate a peer to peer with his insurance company soon about his TPN.  He reports stable pain and irritation from the wound.  Lomotil and oxycodone were reordered.  I discussed the options for colorectal surgery/Dr. Morfin and will discuss further with the colorectal team here.  Will see him in clinic during the week of May 12th.

## 2025-04-28 DIAGNOSIS — E43 SEVERE MALNUTRITION (MULTI): ICD-10-CM

## 2025-04-29 ENCOUNTER — LAB REQUISITION (OUTPATIENT)
Dept: LAB | Facility: HOSPITAL | Age: 60
End: 2025-04-29
Payer: COMMERCIAL

## 2025-04-29 ENCOUNTER — HOME CARE VISIT (OUTPATIENT)
Dept: HOME HEALTH SERVICES | Facility: HOME HEALTH | Age: 60
End: 2025-04-29
Payer: COMMERCIAL

## 2025-04-29 VITALS
OXYGEN SATURATION: 98 % | HEART RATE: 68 BPM | TEMPERATURE: 98.1 F | RESPIRATION RATE: 16 BRPM | SYSTOLIC BLOOD PRESSURE: 110 MMHG | DIASTOLIC BLOOD PRESSURE: 60 MMHG

## 2025-04-29 DIAGNOSIS — K57.30 DIVERTICULOSIS OF LARGE INTESTINE WITHOUT PERFORATION OR ABSCESS WITHOUT BLEEDING: ICD-10-CM

## 2025-04-29 DIAGNOSIS — E43 SEVERE MALNUTRITION (MULTI): ICD-10-CM

## 2025-04-29 LAB
ALBUMIN SERPL BCP-MCNC: 4.1 G/DL (ref 3.4–5)
ALP SERPL-CCNC: 91 U/L (ref 33–136)
ALT SERPL W P-5'-P-CCNC: 17 U/L (ref 10–52)
ANION GAP SERPL CALCULATED.3IONS-SCNC: 15 MMOL/L (ref 10–20)
AST SERPL W P-5'-P-CCNC: 18 U/L (ref 9–39)
BASOPHILS # BLD AUTO: 0.06 X10*3/UL (ref 0–0.1)
BASOPHILS NFR BLD AUTO: 0.7 %
BILIRUB SERPL-MCNC: 0.6 MG/DL (ref 0–1.2)
BUN SERPL-MCNC: 30 MG/DL (ref 6–23)
CALCIUM SERPL-MCNC: 9.5 MG/DL (ref 8.6–10.3)
CHLORIDE SERPL-SCNC: 95 MMOL/L (ref 98–107)
CO2 SERPL-SCNC: 29 MMOL/L (ref 21–32)
CREAT SERPL-MCNC: 0.78 MG/DL (ref 0.5–1.3)
EGFRCR SERPLBLD CKD-EPI 2021: >90 ML/MIN/1.73M*2
EOSINOPHIL # BLD AUTO: 0.26 X10*3/UL (ref 0–0.7)
EOSINOPHIL NFR BLD AUTO: 3 %
ERYTHROCYTE [DISTWIDTH] IN BLOOD BY AUTOMATED COUNT: 14.8 % (ref 11.5–14.5)
GLUCOSE SERPL-MCNC: 89 MG/DL (ref 74–99)
HCT VFR BLD AUTO: 39.2 % (ref 41–52)
HGB BLD-MCNC: 12.9 G/DL (ref 13.5–17.5)
IMM GRANULOCYTES # BLD AUTO: 0.02 X10*3/UL (ref 0–0.7)
IMM GRANULOCYTES NFR BLD AUTO: 0.2 % (ref 0–0.9)
LYMPHOCYTES # BLD AUTO: 2.03 X10*3/UL (ref 1.2–4.8)
LYMPHOCYTES NFR BLD AUTO: 23.8 %
MAGNESIUM SERPL-MCNC: 1.99 MG/DL (ref 1.6–2.4)
MCH RBC QN AUTO: 27 PG (ref 26–34)
MCHC RBC AUTO-ENTMCNC: 32.9 G/DL (ref 32–36)
MCV RBC AUTO: 82 FL (ref 80–100)
MONOCYTES # BLD AUTO: 0.75 X10*3/UL (ref 0.1–1)
MONOCYTES NFR BLD AUTO: 8.8 %
NEUTROPHILS # BLD AUTO: 5.42 X10*3/UL (ref 1.2–7.7)
NEUTROPHILS NFR BLD AUTO: 63.5 %
NRBC BLD-RTO: 0 /100 WBCS (ref 0–0)
PHOSPHATE SERPL-MCNC: 4 MG/DL (ref 2.5–4.9)
PLATELET # BLD AUTO: 180 X10*3/UL (ref 150–450)
POTASSIUM SERPL-SCNC: 4.1 MMOL/L (ref 3.5–5.3)
PREALB SERPL-MCNC: 42.6 MG/DL (ref 18–40)
PROT SERPL-MCNC: 7.2 G/DL (ref 6.4–8.2)
RBC # BLD AUTO: 4.78 X10*6/UL (ref 4.5–5.9)
SODIUM SERPL-SCNC: 135 MMOL/L (ref 136–145)
TRIGL SERPL-MCNC: 86 MG/DL (ref 0–149)
WBC # BLD AUTO: 8.5 X10*3/UL (ref 4.4–11.3)

## 2025-04-29 PROCEDURE — G0299 HHS/HOSPICE OF RN EA 15 MIN: HCPCS

## 2025-04-29 PROCEDURE — 80053 COMPREHEN METABOLIC PANEL: CPT

## 2025-04-29 PROCEDURE — 84478 ASSAY OF TRIGLYCERIDES: CPT

## 2025-04-29 PROCEDURE — 84134 ASSAY OF PREALBUMIN: CPT

## 2025-04-29 PROCEDURE — 83735 ASSAY OF MAGNESIUM: CPT

## 2025-04-29 PROCEDURE — 84100 ASSAY OF PHOSPHORUS: CPT

## 2025-04-29 PROCEDURE — 85025 COMPLETE CBC W/AUTO DIFF WBC: CPT

## 2025-04-29 RX ADMIN — SODIUM CHLORIDE 20 ML: 9 INJECTION, SOLUTION INTRAMUSCULAR; INTRAVENOUS; SUBCUTANEOUS at 10:15

## 2025-04-29 RX ADMIN — Medication 5 ML: at 10:16

## 2025-04-29 RX ADMIN — SODIUM CHLORIDE 10 ML: 9 INJECTION, SOLUTION INTRAMUSCULAR; INTRAVENOUS; SUBCUTANEOUS at 10:10

## 2025-04-30 ENCOUNTER — DOCUMENTATION (OUTPATIENT)
Dept: INFUSION THERAPY | Age: 60
End: 2025-04-30
Payer: COMMERCIAL

## 2025-04-30 ENCOUNTER — HOME INFUSION (OUTPATIENT)
Dept: INFUSION THERAPY | Age: 60
End: 2025-04-30
Payer: COMMERCIAL

## 2025-04-30 DIAGNOSIS — E43 SEVERE MALNUTRITION (MULTI): ICD-10-CM

## 2025-04-30 DIAGNOSIS — K63.2 ENTEROCUTANEOUS FISTULA: ICD-10-CM

## 2025-04-30 ASSESSMENT — ENCOUNTER SYMPTOMS
FATIGUES EASILY: 1
PAIN LOCATION - PAIN SEVERITY: 4/10
APPETITE LEVEL: GOOD
PAIN LOCATION: ABDOMEN
ABDOMINAL PAIN: 1
PERSON REPORTING PAIN: PATIENT
CHANGE IN APPETITE: UNCHANGED
PAIN: 1
MUSCLE WEAKNESS: 1

## 2025-04-30 NOTE — PROGRESS NOTES
Chart reviewed - Bereket Em is a 59 y.o. patient on home care for nightly TPN infusions.  Dr. Perry following     Labs from this week reviewed - generally unremarkable     RN visits reviewed- no issues noted with infusions or line  Issues regarding payment by insurance company, peer-2-peer scheduled, per pharmacy manager will continue to send TPN. Patient has agreed to one more week of TPN at this time.     Orders rec'd frpm Dr Perry to continue TPN x1 week. Orders updated in Epic, gold copy to intake. Placed on May Priority FOTM due to coverage issues.     Pharmacy to mix 4/30 and deliver straight with supplies to match:  7x TPN  7x MVI  DOS 5/01-5/07     Follow up 5/06 - check if TPN covered (do not send without patient OK if not covered), check with Pharmacy Manager, check labs, get orders, mix Wed

## 2025-04-30 NOTE — PROGRESS NOTES
Spoke w/ patient - delivery TPNs and all supplies, including standard amount of NS flushes, extra tubing sets, 10cc syringes, and  all dressing, is scheduled for today by 8 pm.   Per patient - no heparin flushes w/ this delivery.    Also, per patient's request sending a new CADD-pump and old pump will be scheduled for  next week.

## 2025-05-01 ENCOUNTER — DOCUMENTATION (OUTPATIENT)
Dept: SURGERY | Facility: HOSPITAL | Age: 60
End: 2025-05-01
Payer: COMMERCIAL

## 2025-05-01 NOTE — PROGRESS NOTES
I called for a peer to peer to discuss TPN use for Mr. Em on 04/28 at [phone] with reference number [id number] but this reference number would not go through.   I called again today.  The reference number went through, but the automated message stated that they could not take my call today.   Will continue to try to connect for a peer to peer discussion.

## 2025-05-05 ENCOUNTER — HOME CARE VISIT (OUTPATIENT)
Dept: HOME HEALTH SERVICES | Facility: HOME HEALTH | Age: 60
End: 2025-05-05
Payer: COMMERCIAL

## 2025-05-05 VITALS
WEIGHT: 151 LBS | BODY MASS INDEX: 22.96 KG/M2 | RESPIRATION RATE: 16 BRPM | SYSTOLIC BLOOD PRESSURE: 116 MMHG | HEART RATE: 72 BPM | DIASTOLIC BLOOD PRESSURE: 68 MMHG | TEMPERATURE: 98.1 F | OXYGEN SATURATION: 97 %

## 2025-05-05 PROCEDURE — G0299 HHS/HOSPICE OF RN EA 15 MIN: HCPCS

## 2025-05-05 RX ADMIN — SODIUM CHLORIDE 20 ML: 9 INJECTION, SOLUTION INTRAMUSCULAR; INTRAVENOUS; SUBCUTANEOUS at 10:20

## 2025-05-05 RX ADMIN — SODIUM CHLORIDE 10 ML: 9 INJECTION, SOLUTION INTRAMUSCULAR; INTRAVENOUS; SUBCUTANEOUS at 10:15

## 2025-05-06 RX ADMIN — Medication 5 ML: at 10:21

## 2025-05-06 ASSESSMENT — ENCOUNTER SYMPTOMS
PAIN LOCATION - PAIN SEVERITY: 3/10
PAIN LOCATION: ABDOMEN
PAIN: 1
FATIGUES EASILY: 1
ABDOMINAL PAIN: 1
APPETITE LEVEL: FAIR
CHANGE IN APPETITE: UNCHANGED
MUSCLE WEAKNESS: 1
PERSON REPORTING PAIN: PATIENT

## 2025-05-07 ENCOUNTER — DOCUMENTATION (OUTPATIENT)
Dept: INFUSION THERAPY | Age: 60
End: 2025-05-07
Payer: COMMERCIAL

## 2025-05-07 ENCOUNTER — HOME INFUSION (OUTPATIENT)
Dept: INFUSION THERAPY | Age: 60
End: 2025-05-07
Payer: COMMERCIAL

## 2025-05-07 DIAGNOSIS — K63.2 ENTEROCUTANEOUS FISTULA: ICD-10-CM

## 2025-05-07 DIAGNOSIS — E43 SEVERE MALNUTRITION (MULTI): ICD-10-CM

## 2025-05-07 NOTE — PROGRESS NOTES
Spoke w/ patient - delivery of TPNs and all supplies, including standard amount of flushes,CADD-tubing sets, 10cc syringes, and  all dressing, is scheduled for today by 8 pm.   Also  scheduled old CADD-pump .

## 2025-05-07 NOTE — PROGRESS NOTES
Chart reviewed - Bereket Em is a 59 y.o. patient on home care for nightly TPN infusions.  Dr. Perry following     Labs from this week reviewed - BUN slightly elevated at 36. Chloride 90. WBC 11.9     RN visits reviewed- no issues noted with infusions or line    Per  EM, TPN approved with [employer] through 6/20/25.     Orders rec'd from Dr Perry to continue TPN x1 week. Orders updated in Epic, gold copy to intake.     Pharmacy to mix 5/7 and deliver straight with supplies to match:  7x TPN  7x MVI  DOS 5/8-5/14     Follow up 5/13 - check labs, get orders, mix Wed

## 2025-05-09 ENCOUNTER — TELEPHONE (OUTPATIENT)
Dept: SURGERY | Facility: HOSPITAL | Age: 60
End: 2025-05-09
Payer: COMMERCIAL

## 2025-05-09 DIAGNOSIS — L98.8 FISTULA: Primary | ICD-10-CM

## 2025-05-09 RX ORDER — OXYCODONE HYDROCHLORIDE 5 MG/1
5 TABLET ORAL EVERY 6 HOURS PRN
Qty: 15 TABLET | Refills: 0 | Status: SHIPPED | OUTPATIENT
Start: 2025-05-09 | End: 2025-05-19

## 2025-05-09 NOTE — TELEPHONE ENCOUNTER
I spoke to Mr. Em via telephone.      We reviewed his labs.  His BUN was increased to 36, WBCs were elevated to 11 and platelets decreased to 136.  He denies fevers or other signs of infection.  Will monitor this with the next set of labs.   He reports worsening fistula prolapse.  He reports that that fistula is not dark or black to suggest ischemia.  He is comfortable with waiting until 05/14 for assessment.   He reports continued high output from the fistula.  He does not feel dehydrated.  He continues to take his PPI and Lomotil. He is unclear why the output recently increased.   He continues on TPN and this was approved.   He continues to have skin pain from the appliance leakage.  I will reorder his oxycodone.   He reports reading online about fistula reversal/hernia surgery and wants to discuss this further on Wednesday.

## 2025-05-12 ENCOUNTER — HOME CARE VISIT (OUTPATIENT)
Dept: HOME HEALTH SERVICES | Facility: HOME HEALTH | Age: 60
End: 2025-05-12
Payer: COMMERCIAL

## 2025-05-12 ENCOUNTER — LAB REQUISITION (OUTPATIENT)
Dept: LAB | Facility: HOSPITAL | Age: 60
End: 2025-05-12
Payer: COMMERCIAL

## 2025-05-12 VITALS
RESPIRATION RATE: 16 BRPM | HEART RATE: 60 BPM | OXYGEN SATURATION: 98 % | TEMPERATURE: 98.2 F | DIASTOLIC BLOOD PRESSURE: 64 MMHG | SYSTOLIC BLOOD PRESSURE: 118 MMHG

## 2025-05-12 DIAGNOSIS — K57.30 DIVERTICULOSIS OF LARGE INTESTINE WITHOUT PERFORATION OR ABSCESS WITHOUT BLEEDING: ICD-10-CM

## 2025-05-12 LAB
ALBUMIN SERPL BCP-MCNC: 4.4 G/DL (ref 3.4–5)
ALP SERPL-CCNC: 101 U/L (ref 33–136)
ALT SERPL W P-5'-P-CCNC: 27 U/L (ref 10–52)
ANION GAP SERPL CALCULATED.3IONS-SCNC: 16 MMOL/L (ref 10–20)
AST SERPL W P-5'-P-CCNC: 24 U/L (ref 9–39)
BASOPHILS # BLD AUTO: 0.06 X10*3/UL (ref 0–0.1)
BASOPHILS NFR BLD AUTO: 0.5 %
BILIRUB SERPL-MCNC: 0.8 MG/DL (ref 0–1.2)
BUN SERPL-MCNC: 35 MG/DL (ref 6–23)
CALCIUM SERPL-MCNC: 10 MG/DL (ref 8.6–10.3)
CHLORIDE SERPL-SCNC: 88 MMOL/L (ref 98–107)
CO2 SERPL-SCNC: 34 MMOL/L (ref 21–32)
CREAT SERPL-MCNC: 0.9 MG/DL (ref 0.5–1.3)
EGFRCR SERPLBLD CKD-EPI 2021: >90 ML/MIN/1.73M*2
EOSINOPHIL # BLD AUTO: 0.2 X10*3/UL (ref 0–0.7)
EOSINOPHIL NFR BLD AUTO: 1.7 %
ERYTHROCYTE [DISTWIDTH] IN BLOOD BY AUTOMATED COUNT: 14.4 % (ref 11.5–14.5)
GLUCOSE SERPL-MCNC: 103 MG/DL (ref 74–99)
HCT VFR BLD AUTO: 42.9 % (ref 41–52)
HGB BLD-MCNC: 13.9 G/DL (ref 13.5–17.5)
IMM GRANULOCYTES # BLD AUTO: 0.04 X10*3/UL (ref 0–0.7)
IMM GRANULOCYTES NFR BLD AUTO: 0.3 % (ref 0–0.9)
LYMPHOCYTES # BLD AUTO: 2.34 X10*3/UL (ref 1.2–4.8)
LYMPHOCYTES NFR BLD AUTO: 20 %
MAGNESIUM SERPL-MCNC: 2.19 MG/DL (ref 1.6–2.4)
MCH RBC QN AUTO: 26.2 PG (ref 26–34)
MCHC RBC AUTO-ENTMCNC: 32.4 G/DL (ref 32–36)
MCV RBC AUTO: 81 FL (ref 80–100)
MONOCYTES # BLD AUTO: 0.91 X10*3/UL (ref 0.1–1)
MONOCYTES NFR BLD AUTO: 7.8 %
NEUTROPHILS # BLD AUTO: 8.15 X10*3/UL (ref 1.2–7.7)
NEUTROPHILS NFR BLD AUTO: 69.7 %
NRBC BLD-RTO: 0 /100 WBCS (ref 0–0)
PHOSPHATE SERPL-MCNC: 4.4 MG/DL (ref 2.5–4.9)
PLATELET # BLD AUTO: 281 X10*3/UL (ref 150–450)
POTASSIUM SERPL-SCNC: 3.6 MMOL/L (ref 3.5–5.3)
PREALB SERPL-MCNC: 39.1 MG/DL (ref 18–40)
PROT SERPL-MCNC: 7.6 G/DL (ref 6.4–8.2)
RBC # BLD AUTO: 5.31 X10*6/UL (ref 4.5–5.9)
RBC MORPH BLD: NORMAL
SODIUM SERPL-SCNC: 134 MMOL/L (ref 136–145)
TRIGL SERPL-MCNC: 98 MG/DL (ref 0–149)
WBC # BLD AUTO: 11.7 X10*3/UL (ref 4.4–11.3)

## 2025-05-12 PROCEDURE — 83735 ASSAY OF MAGNESIUM: CPT

## 2025-05-12 PROCEDURE — 84478 ASSAY OF TRIGLYCERIDES: CPT

## 2025-05-12 PROCEDURE — 84100 ASSAY OF PHOSPHORUS: CPT

## 2025-05-12 PROCEDURE — 85025 COMPLETE CBC W/AUTO DIFF WBC: CPT

## 2025-05-12 PROCEDURE — 80053 COMPREHEN METABOLIC PANEL: CPT

## 2025-05-12 PROCEDURE — 84134 ASSAY OF PREALBUMIN: CPT

## 2025-05-12 PROCEDURE — G0299 HHS/HOSPICE OF RN EA 15 MIN: HCPCS

## 2025-05-12 RX ADMIN — SODIUM CHLORIDE 20 ML: 9 INJECTION, SOLUTION INTRAMUSCULAR; INTRAVENOUS; SUBCUTANEOUS at 10:05

## 2025-05-12 RX ADMIN — SODIUM CHLORIDE 10 ML: 9 INJECTION, SOLUTION INTRAMUSCULAR; INTRAVENOUS; SUBCUTANEOUS at 10:00

## 2025-05-12 RX ADMIN — Medication 5 ML: at 10:06

## 2025-05-13 ENCOUNTER — DOCUMENTATION (OUTPATIENT)
Dept: INFUSION THERAPY | Age: 60
End: 2025-05-13
Payer: COMMERCIAL

## 2025-05-13 ENCOUNTER — HOME INFUSION (OUTPATIENT)
Dept: INFUSION THERAPY | Age: 60
End: 2025-05-13
Payer: COMMERCIAL

## 2025-05-13 DIAGNOSIS — E86.0 DEHYDRATION, MILD: Primary | ICD-10-CM

## 2025-05-13 DIAGNOSIS — K63.2 ENTEROCUTANEOUS FISTULA: ICD-10-CM

## 2025-05-13 DIAGNOSIS — E43 SEVERE MALNUTRITION (MULTI): ICD-10-CM

## 2025-05-13 NOTE — PROGRESS NOTES
Spoke w/ patient - delivery of TPNs, NS 1L Hydr. bags and all supplies, including standard amount of flushes, gravity and cadd-tubing sets, 10cc syringes, and  all dressing, is scheduled for tomorrow by 8 pm. Confirmed - patient still has an  IV pole and does not need a new one.   No questions to [facility] at this time.

## 2025-05-13 NOTE — PROGRESS NOTES
Chart reviewed - Bereket Em is a 59 y.o. patient on home care for nightly TPN infusions.  Dr. Perry following     Labs from this week reviewed - BUN slightly elevated again at 35. Chloride 88. Sodium 134, WBC 11.7, with H&H up and now wnl.     RN visits reviewed- no issues noted with infusions or line     Per  EM, TPN approved with [employer] through 6/20/25.     Orders rec'd from Dr Perry to continue TPN x1 week. MD is also concerned regarding dehydration again so accepted recommendation of 1 L NS IV daily x 7 days. MD will see patient on 05/14 to rule out infection. Orders updated in Epic, gold copy to intake.    [facility] left message with patient checking on progress and relaying orders for hydration x 1 week. Will deliver both therapies on Wednesday unless patient returns call for an earlier delivery of the saline hydration. No callback at this time.     Pharmacy to mix 5/14 and deliver straight with supplies to match:  7x TPN  7x MVI  DOS 5/15-5/21    Processed fill for 7 bags NS 1L for 05/13 dispense and delivery on 05/14/25. Fill is a 7 day supply and will cover 05/15 through 05/21/25. Unknown if patient still has IV pole.     Follow up 5/20 - check labs, get TPN orders x 2 weeks if possible, confirm that no further NS is needed, mix Wed

## 2025-05-14 ENCOUNTER — APPOINTMENT (OUTPATIENT)
Dept: SURGERY | Facility: CLINIC | Age: 60
End: 2025-05-14
Payer: COMMERCIAL

## 2025-05-14 VITALS
SYSTOLIC BLOOD PRESSURE: 126 MMHG | RESPIRATION RATE: 16 BRPM | WEIGHT: 151 LBS | DIASTOLIC BLOOD PRESSURE: 80 MMHG | BODY MASS INDEX: 22.88 KG/M2 | HEIGHT: 68 IN | HEART RATE: 76 BPM

## 2025-05-14 DIAGNOSIS — L98.8 FISTULA: ICD-10-CM

## 2025-05-14 PROCEDURE — 3008F BODY MASS INDEX DOCD: CPT | Performed by: SURGERY

## 2025-05-14 PROCEDURE — 99215 OFFICE O/P EST HI 40 MIN: CPT | Performed by: SURGERY

## 2025-05-14 ASSESSMENT — PAIN SCALES - GENERAL: PAINLEVEL_OUTOF10: 4

## 2025-05-14 ASSESSMENT — ENCOUNTER SYMPTOMS
PAIN: 1
PERSON REPORTING PAIN: PATIENT
PAIN LOCATION - PAIN SEVERITY: 5/10
ABDOMINAL PAIN: 1
PAIN LOCATION: ABDOMEN
CHANGE IN APPETITE: UNCHANGED
APPETITE LEVEL: FAIR
MUSCLE WEAKNESS: 1

## 2025-05-15 RX ORDER — DIPHENOXYLATE HYDROCHLORIDE AND ATROPINE SULFATE 2.5; .025 MG/1; MG/1
1 TABLET ORAL 4 TIMES DAILY
Qty: 120 TABLET | Refills: 0 | Status: SHIPPED | OUTPATIENT
Start: 2025-05-15 | End: 2025-06-14

## 2025-05-15 NOTE — PROGRESS NOTES
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   I discussed his care at the bedside with his family with his permission.   He reports that he is eating well but that his fistula output has been higher than usual and more liquid.   The pouching is not changed overall with skin irritation along the left side of his abdominal wall requiring narcotics for control. He continues to take Lomotil and PPI.   He does not take Imodium due to intolerance.   He is working from home.  Reports depressed mood at times that he plans to discuss with his PCP.   The distal/nonfunctional/right side of double barrel of EAF prolapses but remains easily reducible.   He weighs 151 pounds today which is increased from 146 prior to TPN restarting.    Nutrition labs include prealbumin 39 and alb 4.4. Transaminases WNLs.  Bicarb elevated at 34. Cr 0.9. Cl 88.  On exam, NAD. More healthy appearing.  No longer with temporal wasting. Hand musculature improved. Mucous membranes are not dry.  The LLQ colostsomy is pink and viable without output. The abdominal wound manager was removed and the wound care team (Virgie and Nancy) was present to help with management and ideas for helping with skin irritation.  They discussed options with Mr. Em and provided support and education.  The wound overall is unchanged The scar along the superior aspect of the wound is softer and more pliable. The most cranial portion of the wound is about 5 cm from the xiphoid. The nonfunctional limb was easily reducible. The functional end is along the 3 o'clock position and sunken into a crevice of the wound.   Plan for:  -Fistula management: Continue with current outpatient wound care and appreciate inpatient wound care team's help with education and support today.  Plan to continue with Lomotil and  oxycodone with diet modifications as he sees fit.  Continue TPN given prior weight loss and muscle mass loss.  TPN now approved. Continue BID PPI.  Add Tums to decrease output acidity as PPI is helping. Will refill Lomotil prescription.   -Surgical planning: We had a long discussion about surgical options, preparation, hospital stay, expectations, the procedure itself (laparotomy, lysis of adhesions, bowel resection(s) of current fistula and possibly original fistula, colostomy reversal, possible diverting proximal ostomy with need for TPN again post op with later study of anastomosis and reversal several months later if healed, hernia repair), risks including anastomotic leak, need for future surgery for abdominal wall reconstruction and possible diverting ostomy formation.  I discussed again timing of surgery no early than 1 year from last surgery given need for softening/remodeling of scar. I discussed how this is a less frequent surgery, my experience and incorporation of surgeons with other expertise as has been done. Will coordinate contrast enema and colonoscopy.   -Nutrition/hydration/electrolytes: Labs and patient reports consistent with dehydration.  I spoke with pharmacy prior to this appointment and 1L of IVFs daily started again.   -Work: Continue current schedule.  [facility] paperwork completed today with his assistance.   -Distal fistula prolapse: Stable.   -Depression/mood stability: He will meet with his PCP.   -Fitness for surgery: He is exercising very little and will start with 10,000 steps per day now in anticipation of surgery.   -Follow up: Based on discussions with other surgeons.

## 2025-05-19 ENCOUNTER — TELEPHONE (OUTPATIENT)
Dept: SURGERY | Facility: HOSPITAL | Age: 60
End: 2025-05-19
Payer: COMMERCIAL

## 2025-05-19 DIAGNOSIS — L98.8 FISTULA: ICD-10-CM

## 2025-05-19 RX ORDER — OXYCODONE HYDROCHLORIDE 5 MG/1
5 TABLET ORAL EVERY 6 HOURS PRN
Qty: 15 TABLET | Refills: 0 | Status: SHIPPED | OUTPATIENT
Start: 2025-05-19 | End: 2025-05-29

## 2025-05-19 NOTE — TELEPHONE ENCOUNTER
I called Mr. Em to discuss his FMLA, pain and additional OR planning.     I also briefly spoke to him on 05/15 about his FMLA, received his paperwork for correction and sent that back to him.     In terms of his wound pain, it is unchanged.  He takes 1 oxycodone at night and sometimes one during the day.  He uses non-narcotics with Tylenol daily.  I have not recommended NSAIDs recently due to his relative dehydration.  In addition, I would not want to start another new medication like gabapentin.  There is no muscle type pain so muscle relaxants have not been used.  Therefore, OARRS reviewed and will continue with oxycodone. I refill was written.     He reports his wait at home is 157 pounds which is improved.     He was asked to call [facility] for his contrast enema.     Working to coordinate colonoscopy.     Working on coordinating OR date.

## 2025-05-20 ENCOUNTER — TELEMEDICINE (OUTPATIENT)
Dept: PRIMARY CARE | Facility: CLINIC | Age: 60
End: 2025-05-20
Payer: COMMERCIAL

## 2025-05-20 ENCOUNTER — LAB REQUISITION (OUTPATIENT)
Dept: LAB | Facility: HOSPITAL | Age: 60
End: 2025-05-20

## 2025-05-20 ENCOUNTER — DOCUMENTATION (OUTPATIENT)
Dept: INFUSION THERAPY | Age: 60
End: 2025-05-20

## 2025-05-20 ENCOUNTER — HOME CARE VISIT (OUTPATIENT)
Dept: HOME HEALTH SERVICES | Facility: HOME HEALTH | Age: 60
End: 2025-05-20
Payer: COMMERCIAL

## 2025-05-20 ENCOUNTER — HOME INFUSION (OUTPATIENT)
Dept: INFUSION THERAPY | Age: 60
End: 2025-05-20

## 2025-05-20 DIAGNOSIS — E43 SEVERE MALNUTRITION (MULTI): ICD-10-CM

## 2025-05-20 DIAGNOSIS — F51.01 PRIMARY INSOMNIA: ICD-10-CM

## 2025-05-20 DIAGNOSIS — F41.9 ANXIETY AND DEPRESSION: Primary | ICD-10-CM

## 2025-05-20 DIAGNOSIS — K63.2 ENTEROCUTANEOUS FISTULA: ICD-10-CM

## 2025-05-20 DIAGNOSIS — F32.A ANXIETY AND DEPRESSION: Primary | ICD-10-CM

## 2025-05-20 DIAGNOSIS — K57.30 DIVERTICULOSIS OF LARGE INTESTINE WITHOUT PERFORATION OR ABSCESS WITHOUT BLEEDING: ICD-10-CM

## 2025-05-20 DIAGNOSIS — T81.31XA DISRUPTION OF EXTERNAL OPERATION (SURGICAL) WOUND, NOT ELSEWHERE CLASSIFIED, INITIAL ENCOUNTER: ICD-10-CM

## 2025-05-20 LAB
ALBUMIN SERPL BCP-MCNC: 3.6 G/DL (ref 3.4–5)
ALP SERPL-CCNC: 71 U/L (ref 33–136)
ALT SERPL W P-5'-P-CCNC: 21 U/L (ref 10–52)
ANION GAP SERPL CALCULATED.3IONS-SCNC: 12 MMOL/L (ref 10–20)
AST SERPL W P-5'-P-CCNC: 18 U/L (ref 9–39)
BASOPHILS # BLD AUTO: 0.05 X10*3/UL (ref 0–0.1)
BASOPHILS NFR BLD AUTO: 0.5 %
BILIRUB SERPL-MCNC: 0.5 MG/DL (ref 0–1.2)
BUN SERPL-MCNC: 28 MG/DL (ref 6–23)
CALCIUM SERPL-MCNC: 8.9 MG/DL (ref 8.6–10.3)
CHLORIDE SERPL-SCNC: 104 MMOL/L (ref 98–107)
CO2 SERPL-SCNC: 27 MMOL/L (ref 21–32)
CREAT SERPL-MCNC: 0.73 MG/DL (ref 0.5–1.3)
EGFRCR SERPLBLD CKD-EPI 2021: >90 ML/MIN/1.73M*2
EOSINOPHIL # BLD AUTO: 0.2 X10*3/UL (ref 0–0.7)
EOSINOPHIL NFR BLD AUTO: 2.1 %
ERYTHROCYTE [DISTWIDTH] IN BLOOD BY AUTOMATED COUNT: 14.7 % (ref 11.5–14.5)
GLUCOSE SERPL-MCNC: 97 MG/DL (ref 74–99)
HCT VFR BLD AUTO: 36.3 % (ref 41–52)
HGB BLD-MCNC: 11.7 G/DL (ref 13.5–17.5)
IMM GRANULOCYTES # BLD AUTO: 0.02 X10*3/UL (ref 0–0.7)
IMM GRANULOCYTES NFR BLD AUTO: 0.2 % (ref 0–0.9)
LYMPHOCYTES # BLD AUTO: 1.79 X10*3/UL (ref 1.2–4.8)
LYMPHOCYTES NFR BLD AUTO: 19.1 %
MAGNESIUM SERPL-MCNC: 2.1 MG/DL (ref 1.6–2.4)
MCH RBC QN AUTO: 26.7 PG (ref 26–34)
MCHC RBC AUTO-ENTMCNC: 32.2 G/DL (ref 32–36)
MCV RBC AUTO: 83 FL (ref 80–100)
MONOCYTES # BLD AUTO: 0.58 X10*3/UL (ref 0.1–1)
MONOCYTES NFR BLD AUTO: 6.2 %
NEUTROPHILS # BLD AUTO: 6.73 X10*3/UL (ref 1.2–7.7)
NEUTROPHILS NFR BLD AUTO: 71.9 %
NRBC BLD-RTO: 0 /100 WBCS (ref 0–0)
PHOSPHATE SERPL-MCNC: 4 MG/DL (ref 2.5–4.9)
PLATELET # BLD AUTO: 229 X10*3/UL (ref 150–450)
POTASSIUM SERPL-SCNC: 4.3 MMOL/L (ref 3.5–5.3)
PREALB SERPL-MCNC: 33.9 MG/DL (ref 18–40)
PROT SERPL-MCNC: 6.3 G/DL (ref 6.4–8.2)
RBC # BLD AUTO: 4.39 X10*6/UL (ref 4.5–5.9)
SODIUM SERPL-SCNC: 139 MMOL/L (ref 136–145)
TRIGL SERPL-MCNC: 135 MG/DL (ref 0–149)
WBC # BLD AUTO: 9.4 X10*3/UL (ref 4.4–11.3)

## 2025-05-20 PROCEDURE — 84100 ASSAY OF PHOSPHORUS: CPT

## 2025-05-20 PROCEDURE — G0299 HHS/HOSPICE OF RN EA 15 MIN: HCPCS

## 2025-05-20 PROCEDURE — 1036F TOBACCO NON-USER: CPT

## 2025-05-20 PROCEDURE — 84134 ASSAY OF PREALBUMIN: CPT

## 2025-05-20 PROCEDURE — 80053 COMPREHEN METABOLIC PANEL: CPT

## 2025-05-20 PROCEDURE — 85025 COMPLETE CBC W/AUTO DIFF WBC: CPT

## 2025-05-20 PROCEDURE — 99213 OFFICE O/P EST LOW 20 MIN: CPT

## 2025-05-20 PROCEDURE — 83735 ASSAY OF MAGNESIUM: CPT

## 2025-05-20 PROCEDURE — 84478 ASSAY OF TRIGLYCERIDES: CPT

## 2025-05-20 RX ORDER — ALPRAZOLAM 0.25 MG/1
0.25 TABLET ORAL 3 TIMES DAILY PRN
Qty: 10 TABLET | Refills: 0 | Status: SHIPPED | OUTPATIENT
Start: 2025-05-20 | End: 2026-01-15

## 2025-05-20 RX ORDER — TRAZODONE HYDROCHLORIDE 100 MG/1
100 TABLET ORAL NIGHTLY PRN
Qty: 90 TABLET | Refills: 1 | Status: SHIPPED | OUTPATIENT
Start: 2025-05-20 | End: 2026-05-20

## 2025-05-20 RX ADMIN — SODIUM CHLORIDE 10 ML: 9 INJECTION, SOLUTION INTRAMUSCULAR; INTRAVENOUS; SUBCUTANEOUS at 09:20

## 2025-05-20 RX ADMIN — SODIUM CHLORIDE 20 ML: 9 INJECTION, SOLUTION INTRAMUSCULAR; INTRAVENOUS; SUBCUTANEOUS at 09:25

## 2025-05-20 RX ADMIN — Medication 5 ML: at 09:30

## 2025-05-20 ASSESSMENT — PATIENT HEALTH QUESTIONNAIRE - PHQ9
2. FEELING DOWN, DEPRESSED OR HOPELESS: SEVERAL DAYS
10. IF YOU CHECKED OFF ANY PROBLEMS, HOW DIFFICULT HAVE THESE PROBLEMS MADE IT FOR YOU TO DO YOUR WORK, TAKE CARE OF THINGS AT HOME, OR GET ALONG WITH OTHER PEOPLE: SOMEWHAT DIFFICULT
1. LITTLE INTEREST OR PLEASURE IN DOING THINGS: SEVERAL DAYS
SUM OF ALL RESPONSES TO PHQ9 QUESTIONS 1 AND 2: 2

## 2025-05-20 ASSESSMENT — ENCOUNTER SYMPTOMS
DYSPHORIC MOOD: 1
SLEEP DISTURBANCE: 1
CHILLS: 0
FEVER: 0
NERVOUS/ANXIOUS: 1

## 2025-05-20 NOTE — PROGRESS NOTES
Chart reviewed - Bereket Em is a 59 y.o. patient on home care for nightly TPN infusions.  Dr. Perry following     Labs from this week reviewed - BUN, Cr, Na, Cl all normalizing     TPN approved with [place] through 6/20/25.     Orders rec'd from Brittaney Cintron to continue all orders x2 weeks. Orders updated in Epic, gold copy to intake.     Pharmacy to mix 5/21 and deliver straight with supplies to match:  7x TPN  7x MVI  7x NS  DOS 5/22-5/28     Follow up 5/27 - check labs, have TPN/hydration orders, mix Wed

## 2025-05-20 NOTE — PROGRESS NOTES
Spoke w/ patient - delivery of TPNs, NS 1L Hydr. bags and all supplies, including standard amount of  NS flushes, gravity and cadd-tubing sets, 10cc syringes, and  all dressing, is scheduled for tomorrow by 8 pm.   Per patient -  no heparin flushes w/ this delivery (has extras).  No questions to [facility] at this time.

## 2025-05-20 NOTE — PROGRESS NOTES
Subjective   Patient ID: Bereket Em is a 59 y.o. male who presents for Depression.    This visit was completed via telephone/virtual visit. All issues as documented below were discussed and addressed but no physical exam was performed. If it was felt that the patient should be evaluated via  face-to-face office appointment(s) they were directed there. The patient verbally consented to this visit.     Video attempted and did not work    Patient would like to consider medication to help with depression and anxiety. Patient surgery was pushed off again and mood is declining and feels is worrying constantly. Was on medication over 10-15 years ago but does not recall name. Currently using Trazodone for sleep and also needs refill.              Review of Systems   Constitutional:  Negative for chills and fever.   Psychiatric/Behavioral:  Positive for dysphoric mood and sleep disturbance. The patient is nervous/anxious.        Objective   There were no vitals taken for this visit.    Physical Exam  None due to phone call    Assessment/Plan   Diagnoses and all orders for this visit:  Anxiety and depression  -     ALPRAZolam (Xanax) 0.25 mg tablet; Take 1 tablet (0.25 mg) by mouth 3 times a day as needed for anxiety.  Primary insomnia  -     ALPRAZolam (Xanax) 0.25 mg tablet; Take 1 tablet (0.25 mg) by mouth 3 times a day as needed for anxiety.  -     traZODone (Desyrel) 100 mg tablet; Take 1 tablet (100 mg) by mouth as needed at bedtime for sleep.  Discussed starting Wellbutrin vs Prozac, was going to send in Prozac, but then patient changed his mind and does not want daily medication at this time. Will send in 10 pills of Xanax for 30 days, but if patient feels is needing more Xanax than this, should re-consider daily medication. If only wants to use Xanax daily will have to see psychiatrist, but if using only sparingly can continue to prescribe through PCP. Since has so many other appointments, can just message me  in 3-4 weeks how medication is going and if changes his mind about Prozac.

## 2025-05-23 ENCOUNTER — HOME INFUSION (OUTPATIENT)
Dept: INFUSION THERAPY | Age: 60
End: 2025-05-23
Payer: COMMERCIAL

## 2025-05-23 NOTE — PROGRESS NOTES
Chart reviewed - Bereket Em is a 59 y.o. patient on home care for nightly TPN infusions as well as 1L NS hydration 7x/week.  Dr. Perry / ZAYRA Cintron following     Labs 5/20 reviewed     TPN approved with [place] through 6/20/25.     Orders valid through 6/3/25.     Pharmacy to mix 5/28 and deliver straight with supplies to match:  7x TPN  7x MVI  7x NS  DOS 5/29-6/4     Follow up 6/3 - check labs, need TPN/hydration orders, mix Wed

## 2025-05-25 ENCOUNTER — TELEPHONE (OUTPATIENT)
Dept: SURGERY | Facility: HOSPITAL | Age: 60
End: 2025-05-25
Payer: COMMERCIAL

## 2025-05-25 DIAGNOSIS — Z93.3 COLOSTOMY IN PLACE (MULTI): Primary | ICD-10-CM

## 2025-05-25 ASSESSMENT — ENCOUNTER SYMPTOMS
PAIN LOCATION: ABDOMEN
MUSCLE WEAKNESS: 1
PERSON REPORTING PAIN: PATIENT
APPETITE LEVEL: GOOD
PAIN: 1
PAIN LOCATION - PAIN SEVERITY: 5/10
CHANGE IN APPETITE: UNCHANGED
FATIGUES EASILY: 1
ABDOMINAL PAIN: 1

## 2025-05-25 NOTE — TELEPHONE ENCOUNTER
I called Mr. Em in response to his message about bowel prep for his contrast enema.     He does not have continuity between his small bowel and colon with his enteroatmospheric fistula, and his rectal stump is stapled off, so a bowel prep will not reach his colon or rectum.     Review of his CT scan from 12/14 does not show much stool burden in the colon or rectum.      He does report improved wound acidity/decreased burning with the addition of Tums.

## 2025-05-26 ENCOUNTER — LAB REQUISITION (OUTPATIENT)
Dept: LAB | Facility: HOSPITAL | Age: 60
End: 2025-05-26
Payer: COMMERCIAL

## 2025-05-26 ENCOUNTER — HOME CARE VISIT (OUTPATIENT)
Dept: HOME HEALTH SERVICES | Facility: HOME HEALTH | Age: 60
End: 2025-05-26
Payer: COMMERCIAL

## 2025-05-26 VITALS
TEMPERATURE: 98.2 F | DIASTOLIC BLOOD PRESSURE: 68 MMHG | OXYGEN SATURATION: 99 % | HEART RATE: 58 BPM | SYSTOLIC BLOOD PRESSURE: 112 MMHG | RESPIRATION RATE: 16 BRPM

## 2025-05-26 DIAGNOSIS — T81.31XA DISRUPTION OF EXTERNAL OPERATION (SURGICAL) WOUND, NOT ELSEWHERE CLASSIFIED, INITIAL ENCOUNTER: ICD-10-CM

## 2025-05-26 LAB
ALBUMIN SERPL BCP-MCNC: 3.8 G/DL (ref 3.4–5)
ALP SERPL-CCNC: 84 U/L (ref 33–136)
ALT SERPL W P-5'-P-CCNC: 18 U/L (ref 10–52)
ANION GAP SERPL CALCULATED.3IONS-SCNC: 14 MMOL/L (ref 10–20)
AST SERPL W P-5'-P-CCNC: 16 U/L (ref 9–39)
BASOPHILS # BLD AUTO: 0.04 X10*3/UL (ref 0–0.1)
BASOPHILS NFR BLD AUTO: 0.4 %
BILIRUB SERPL-MCNC: 0.5 MG/DL (ref 0–1.2)
BUN SERPL-MCNC: 25 MG/DL (ref 6–23)
CALCIUM SERPL-MCNC: 9.3 MG/DL (ref 8.6–10.3)
CHLORIDE SERPL-SCNC: 102 MMOL/L (ref 98–107)
CO2 SERPL-SCNC: 28 MMOL/L (ref 21–32)
CREAT SERPL-MCNC: 0.76 MG/DL (ref 0.5–1.3)
EGFRCR SERPLBLD CKD-EPI 2021: >90 ML/MIN/1.73M*2
EOSINOPHIL # BLD AUTO: 0.19 X10*3/UL (ref 0–0.7)
EOSINOPHIL NFR BLD AUTO: 2 %
ERYTHROCYTE [DISTWIDTH] IN BLOOD BY AUTOMATED COUNT: 14.7 % (ref 11.5–14.5)
GLUCOSE SERPL-MCNC: 93 MG/DL (ref 74–99)
HCT VFR BLD AUTO: 36.2 % (ref 41–52)
HGB BLD-MCNC: 11.6 G/DL (ref 13.5–17.5)
IMM GRANULOCYTES # BLD AUTO: 0.02 X10*3/UL (ref 0–0.7)
IMM GRANULOCYTES NFR BLD AUTO: 0.2 % (ref 0–0.9)
LYMPHOCYTES # BLD AUTO: 1.93 X10*3/UL (ref 1.2–4.8)
LYMPHOCYTES NFR BLD AUTO: 19.9 %
MAGNESIUM SERPL-MCNC: 2.01 MG/DL (ref 1.6–2.4)
MCH RBC QN AUTO: 26.4 PG (ref 26–34)
MCHC RBC AUTO-ENTMCNC: 32 G/DL (ref 32–36)
MCV RBC AUTO: 82 FL (ref 80–100)
MONOCYTES # BLD AUTO: 0.67 X10*3/UL (ref 0.1–1)
MONOCYTES NFR BLD AUTO: 6.9 %
NEUTROPHILS # BLD AUTO: 6.84 X10*3/UL (ref 1.2–7.7)
NEUTROPHILS NFR BLD AUTO: 70.6 %
NRBC BLD-RTO: 0 /100 WBCS (ref 0–0)
PHOSPHATE SERPL-MCNC: 4.3 MG/DL (ref 2.5–4.9)
PLATELET # BLD AUTO: 263 X10*3/UL (ref 150–450)
POTASSIUM SERPL-SCNC: 4.5 MMOL/L (ref 3.5–5.3)
PROT SERPL-MCNC: 6.4 G/DL (ref 6.4–8.2)
RBC # BLD AUTO: 4.4 X10*6/UL (ref 4.5–5.9)
SODIUM SERPL-SCNC: 139 MMOL/L (ref 136–145)
TRIGL SERPL-MCNC: 117 MG/DL (ref 0–149)
WBC # BLD AUTO: 9.7 X10*3/UL (ref 4.4–11.3)

## 2025-05-26 PROCEDURE — 84478 ASSAY OF TRIGLYCERIDES: CPT

## 2025-05-26 PROCEDURE — 84134 ASSAY OF PREALBUMIN: CPT

## 2025-05-26 PROCEDURE — 84100 ASSAY OF PHOSPHORUS: CPT

## 2025-05-26 PROCEDURE — 80053 COMPREHEN METABOLIC PANEL: CPT

## 2025-05-26 PROCEDURE — 85025 COMPLETE CBC W/AUTO DIFF WBC: CPT

## 2025-05-26 PROCEDURE — 83735 ASSAY OF MAGNESIUM: CPT

## 2025-05-26 PROCEDURE — G0299 HHS/HOSPICE OF RN EA 15 MIN: HCPCS

## 2025-05-26 RX ADMIN — SODIUM CHLORIDE 10 ML: 9 INJECTION, SOLUTION INTRAMUSCULAR; INTRAVENOUS; SUBCUTANEOUS at 10:10

## 2025-05-26 RX ADMIN — SODIUM CHLORIDE 20 ML: 9 INJECTION, SOLUTION INTRAMUSCULAR; INTRAVENOUS; SUBCUTANEOUS at 10:15

## 2025-05-26 RX ADMIN — Medication 5 ML: at 10:17

## 2025-05-27 LAB — PREALB SERPL-MCNC: 36.4 MG/DL (ref 18–40)

## 2025-05-28 ENCOUNTER — HOSPITAL ENCOUNTER (OUTPATIENT)
Dept: RADIOLOGY | Facility: HOSPITAL | Age: 60
Discharge: HOME | End: 2025-05-28
Payer: COMMERCIAL

## 2025-05-28 DIAGNOSIS — Z93.3 COLOSTOMY PRESENT (MULTI): ICD-10-CM

## 2025-05-28 PROCEDURE — 74018 RADEX ABDOMEN 1 VIEW: CPT | Mod: 52

## 2025-05-28 PROCEDURE — 74018 RADEX ABDOMEN 1 VIEW: CPT | Mod: REDUCED SERVICES | Performed by: RADIOLOGY

## 2025-05-28 ASSESSMENT — ENCOUNTER SYMPTOMS
APPETITE LEVEL: GOOD
MUSCLE WEAKNESS: 1
PAIN LOCATION - PAIN SEVERITY: 5/10
PAIN: 1
ABDOMINAL PAIN: 1
CHANGE IN APPETITE: UNCHANGED
PAIN LOCATION: ABDOMEN
PERSON REPORTING PAIN: PATIENT

## 2025-05-30 ENCOUNTER — HOSPITAL ENCOUNTER (OUTPATIENT)
Dept: RADIOLOGY | Facility: HOSPITAL | Age: 60
Discharge: HOME | End: 2025-05-30
Payer: COMMERCIAL

## 2025-05-30 ENCOUNTER — APPOINTMENT (OUTPATIENT)
Dept: RADIOLOGY | Facility: HOSPITAL | Age: 60
End: 2025-05-30
Payer: COMMERCIAL

## 2025-05-30 DIAGNOSIS — Z93.3 COLOSTOMY IN PLACE (MULTI): ICD-10-CM

## 2025-05-30 PROCEDURE — 2550000001 HC RX 255 CONTRASTS: Performed by: SURGERY

## 2025-05-30 PROCEDURE — 74270 X-RAY XM COLON 1CNTRST STD: CPT

## 2025-05-30 RX ADMIN — DIATRIZOATE MEGLUMINE 110 ML: 300 INJECTION, SOLUTION INTRAVENOUS at 12:50

## 2025-06-02 ENCOUNTER — HOME CARE VISIT (OUTPATIENT)
Dept: HOME HEALTH SERVICES | Facility: HOME HEALTH | Age: 60
End: 2025-06-02
Payer: COMMERCIAL

## 2025-06-02 ENCOUNTER — HOME CARE VISIT (OUTPATIENT)
Dept: HOME HEALTH SERVICES | Facility: HOME HEALTH | Age: 60
End: 2025-06-02

## 2025-06-02 ENCOUNTER — TELEPHONE (OUTPATIENT)
Dept: SURGERY | Facility: HOSPITAL | Age: 60
End: 2025-06-02
Payer: COMMERCIAL

## 2025-06-02 VITALS
RESPIRATION RATE: 16 BRPM | HEART RATE: 60 BPM | OXYGEN SATURATION: 99 % | SYSTOLIC BLOOD PRESSURE: 100 MMHG | TEMPERATURE: 97.1 F | DIASTOLIC BLOOD PRESSURE: 60 MMHG

## 2025-06-02 DIAGNOSIS — L98.8 FISTULA: Primary | ICD-10-CM

## 2025-06-02 PROCEDURE — 400014 HH F/U

## 2025-06-02 PROCEDURE — G0299 HHS/HOSPICE OF RN EA 15 MIN: HCPCS

## 2025-06-02 RX ORDER — OXYCODONE HYDROCHLORIDE 5 MG/1
5 TABLET ORAL NIGHTLY PRN
Qty: 15 TABLET | Refills: 0 | Status: SHIPPED | OUTPATIENT
Start: 2025-06-02

## 2025-06-02 RX ADMIN — Medication 5 ML: at 09:07

## 2025-06-02 RX ADMIN — SODIUM CHLORIDE 20 ML: 9 INJECTION, SOLUTION INTRAMUSCULAR; INTRAVENOUS; SUBCUTANEOUS at 09:05

## 2025-06-02 RX ADMIN — SODIUM CHLORIDE 10 ML: 9 INJECTION, SOLUTION INTRAMUSCULAR; INTRAVENOUS; SUBCUTANEOUS at 09:00

## 2025-06-02 NOTE — TELEPHONE ENCOUNTER
I called Mr. Em at [phone].    He recently had his contrast enema.  I spoke with the radiologist at [facility] last week and his contrast enema could not be performed there, so it he had it here on 05/30.   Contrast enema showed:  1.  Free flow of contrast from the anal canal to the proximal  external segment without evidence of extraluminal contrast to suggest  stump leak.  2. Attempted contrast through the ostomy site demonstrated trace  contrast into the canal with near immediate reflux of contrast,  precluding diagnostic assessment.    He is tolerating PO. He remains on TPN and fluids.   He reports his weight is 157 pounds.     He sees Dr. Olson on 06/06 and Dr. Cage for colonoscopy on 06/11.     Labs are pending.     He is only getting 2000 steps per day.  I recommended 10,000 per day prior to surgery.     The Tums are still working for the acidity/burning.     He was started on Xanax by his PCP. He has only taken 1 pill.     Oxycodone for pain and decreased fistula output prescribed.

## 2025-06-02 NOTE — PROGRESS NOTES
"Bereket Em  [id number]  06/02/25  4:46 PM    HPI/Subjective:  Bereket Em is a 59 y.o. male who presents for follow up regarding enterocutaneous fistula.  He has a history of perforated diverticulitis s/p Margarita's procedure (6/7/24) complicated by enterocutaneous fistula.    From a medical standpoint, they have history of  HTN, alcohol abuse in remission, right shoulder impingement syndrome, nicotine dependence, diverticulitis, anxiety, depression, BPH, cervical radiculopathy    At our last visit, we discussed need for a 3 stage operation where the initial stage will be to takedown his end colostomy and his ECF and potentially a proximal jejunostomy.  Subsequently a second stage operation would ideally be to perform a local takedown of diverting loop jejunostomy.  Finally he would be able to undergo an open complex reconstruction in a clean wound class.    Since his last clinic visit he has started on TPN given prior weight loss and muscle mass loss as well as 1L IVFs daily for dehydration.   Prealbumin in 30s and 40s      Objective:      4/21/2025    10:30 AM 4/29/2025    10:43 AM 5/5/2025    10:32 AM 5/12/2025    10:23 AM 5/14/2025    10:45 AM 5/26/2025    10:23 AM 6/2/2025     9:23 AM   Vitals   Systolic 110 110 116 118 126 112 100   Diastolic 60 60 68 64 80 68 60   BP Location Left arm Left arm Left arm Left arm Left arm Left arm Left arm   Heart Rate 56 68 72 60 76 58 60   Temp 36.7 °C (98.1 °F) 36.7 °C (98.1 °F) 36.7 °C (98.1 °F) 36.8 °C (98.2 °F)  36.8 °C (98.2 °F) 36.2 °C (97.1 °F)   Resp 16 16 16 16 16 16 16   Height     1.727 m (5' 8\")     Weight (lb) 150  151  151     BMI 22.81 kg/m2  22.96 kg/m2  22.96 kg/m2     BSA (m2) 1.81 m2  1.81 m2  1.81 m2     Visit Report     Report       Physical Exam  Vitals reviewed. Exam conducted with a chaperone present.   Constitutional:       General: He is not in acute distress.     Appearance: Normal appearance. He is not ill-appearing.   HENT:      Head: " Normocephalic.      Mouth/Throat:      Mouth: Mucous membranes are moist.   Eyes:      Extraocular Movements: Extraocular movements intact.      Pupils: Pupils are equal, round, and reactive to light.   Cardiovascular:      Rate and Rhythm: Normal rate and regular rhythm.      Pulses: Normal pulses.      Heart sounds: Normal heart sounds. No murmur heard.  Pulmonary:      Effort: Pulmonary effort is normal. No respiratory distress.      Breath sounds: Normal breath sounds. No wheezing, rhonchi or rales.   Chest:      Chest wall: No tenderness.   Abdominal:      General: There is no distension.      Palpations: There is no mass.      Tenderness: There is no abdominal tenderness. There is no guarding or rebound.      Hernia: No hernia is present.      Comments: Colostomy with ostomy appliance  Fistula with appliance   Musculoskeletal:         General: No swelling or deformity.      Cervical back: Neck supple. No rigidity.      Right lower leg: No edema.      Left lower leg: No edema.   Skin:     General: Skin is warm.      Coloration: Skin is not jaundiced or pale.   Neurological:      General: No focal deficit present.      Mental Status: He is alert and oriented to person, place, and time. Mental status is at baseline.   Psychiatric:         Mood and Affect: Mood normal.         Behavior: Behavior normal.         Thought Content: Thought content normal.         Judgment: Judgment normal.        Assessment/Plan:  Bereket Em is a 59 y.o. male who presents for follow up regarding open abdominal wall defect, ECF, colostomy in situ.    Will need to pick date in July. Plan for ECF takedown, possible mesh suture vs figure of eight closure, possible bridging mesh. Discussed staged approach. Will need same-day surgical consent for myself, Dr. Gross, and Dr. Perry.    Plan will be to see them again in postoperative period, or sooner as needed.     I am billing a  modifier as I am assuming management of this  complex condition and anticipate having a longitudinal relationship with this patient for postoperative visits at the 3 week, 3 month, and 1 year visit, with annual visits thereafter for recurrence monitoring.    Jerel Olson MD  Assistant Professor of Surgery  Division of General Surgery  Department of Surgery

## 2025-06-03 ASSESSMENT — ENCOUNTER SYMPTOMS
PERSON REPORTING PAIN: PATIENT
ABDOMINAL PAIN: 1
PAIN: 1
FATIGUES EASILY: 1
CHANGE IN APPETITE: UNCHANGED
APPETITE LEVEL: FAIR
MUSCLE WEAKNESS: 1
PAIN LOCATION - PAIN SEVERITY: 5/10
PAIN LOCATION: ABDOMEN

## 2025-06-03 ASSESSMENT — ACTIVITIES OF DAILY LIVING (ADL)
OASIS_M1830: 05
ENTERING_EXITING_HOME: ONE PERSON

## 2025-06-04 ENCOUNTER — HOME INFUSION (OUTPATIENT)
Dept: INFUSION THERAPY | Age: 60
End: 2025-06-04
Payer: COMMERCIAL

## 2025-06-04 ENCOUNTER — DOCUMENTATION (OUTPATIENT)
Dept: INFUSION THERAPY | Age: 60
End: 2025-06-04
Payer: COMMERCIAL

## 2025-06-04 DIAGNOSIS — K63.2 ENTEROCUTANEOUS FISTULA: Primary | ICD-10-CM

## 2025-06-04 DIAGNOSIS — E43 SEVERE MALNUTRITION (MULTI): ICD-10-CM

## 2025-06-04 RX ORDER — SODIUM CHLORIDE 9 MG/ML
1000 INJECTION, SOLUTION INTRAVENOUS EVERY 24 HOURS
Qty: 7000 ML | Refills: 52 | Status: SHIPPED
Start: 2025-06-04 | End: 2026-06-04

## 2025-06-04 NOTE — PROGRESS NOTES
Chart reviewed - Bereket Em is a 59 y.o. patient on home care for nightly TPN infusions as well as 1L NS hydration 7x/week.  Dr. Perry / ZAYRA Cintron following     Labs from 6/2 reviewed. BUN is 29 this week.     TPN approved with [place] through 6/20/25.    Orders received to continue TPN without any changes for another week. Also clarified with MD that hydration should continue daily. Both orders entered into [employer].      Pharmacy to mix 6/4 and deliver straight with supplies to match:  7x TPN  7x MVI  7x NS  DOS 6/5 thru 6/11/25     Follow up 6/10 - check labs, need TPN/hydration orders, mix Wed

## 2025-06-04 NOTE — PROGRESS NOTES
Spoke w/ patient - delivery of TPNs, NS 1L Hydr. bags and all supplies, including standard amount of flushes, gravity and cadd-tubing sets, 10cc syringes, and  all dressing, is scheduled for today by 8 pm.   Patient requests hypoallergenic dressing - sending Opsite 4x5  and Hypafix  tape.  No questions to [facility] at this time.

## 2025-06-06 ENCOUNTER — APPOINTMENT (OUTPATIENT)
Dept: SURGERY | Facility: CLINIC | Age: 60
End: 2025-06-06
Payer: COMMERCIAL

## 2025-06-06 VITALS
WEIGHT: 160.6 LBS | SYSTOLIC BLOOD PRESSURE: 123 MMHG | BODY MASS INDEX: 24.34 KG/M2 | HEART RATE: 85 BPM | HEIGHT: 68 IN | DIASTOLIC BLOOD PRESSURE: 86 MMHG

## 2025-06-06 DIAGNOSIS — M95.8 ABDOMINAL WALL DEFECT, ACQUIRED: Primary | ICD-10-CM

## 2025-06-06 DIAGNOSIS — L98.8 FISTULA: ICD-10-CM

## 2025-06-06 DIAGNOSIS — Z93.3 COLOSTOMY IN PLACE (MULTI): ICD-10-CM

## 2025-06-06 PROCEDURE — 3008F BODY MASS INDEX DOCD: CPT | Performed by: SURGERY

## 2025-06-06 PROCEDURE — 99214 OFFICE O/P EST MOD 30 MIN: CPT | Performed by: SURGERY

## 2025-06-06 PROCEDURE — 1036F TOBACCO NON-USER: CPT | Performed by: SURGERY

## 2025-06-10 ENCOUNTER — HOME CARE VISIT (OUTPATIENT)
Dept: HOME HEALTH SERVICES | Facility: HOME HEALTH | Age: 60
End: 2025-06-10
Payer: COMMERCIAL

## 2025-06-10 VITALS
OXYGEN SATURATION: 98 % | TEMPERATURE: 98 F | SYSTOLIC BLOOD PRESSURE: 106 MMHG | RESPIRATION RATE: 16 BRPM | DIASTOLIC BLOOD PRESSURE: 64 MMHG | HEART RATE: 56 BPM

## 2025-06-10 PROCEDURE — G0299 HHS/HOSPICE OF RN EA 15 MIN: HCPCS

## 2025-06-10 RX ADMIN — SODIUM CHLORIDE 20 ML: 9 INJECTION, SOLUTION INTRAMUSCULAR; INTRAVENOUS; SUBCUTANEOUS at 09:15

## 2025-06-10 RX ADMIN — SODIUM CHLORIDE 10 ML: 9 INJECTION, SOLUTION INTRAMUSCULAR; INTRAVENOUS; SUBCUTANEOUS at 09:10

## 2025-06-10 RX ADMIN — Medication 5 ML: at 09:17

## 2025-06-11 ENCOUNTER — ANESTHESIA EVENT (OUTPATIENT)
Dept: GASTROENTEROLOGY | Facility: HOSPITAL | Age: 60
End: 2025-06-11
Payer: COMMERCIAL

## 2025-06-11 ENCOUNTER — HOSPITAL ENCOUNTER (OUTPATIENT)
Dept: GASTROENTEROLOGY | Facility: HOSPITAL | Age: 60
Discharge: HOME | End: 2025-06-11
Payer: COMMERCIAL

## 2025-06-11 ENCOUNTER — ANESTHESIA (OUTPATIENT)
Dept: GASTROENTEROLOGY | Facility: HOSPITAL | Age: 60
End: 2025-06-11
Payer: COMMERCIAL

## 2025-06-11 ENCOUNTER — HOME INFUSION (OUTPATIENT)
Dept: INFUSION THERAPY | Age: 60
End: 2025-06-11

## 2025-06-11 VITALS
RESPIRATION RATE: 14 BRPM | DIASTOLIC BLOOD PRESSURE: 63 MMHG | SYSTOLIC BLOOD PRESSURE: 112 MMHG | BODY MASS INDEX: 24.48 KG/M2 | OXYGEN SATURATION: 99 % | WEIGHT: 161 LBS | TEMPERATURE: 97.2 F | HEART RATE: 60 BPM

## 2025-06-11 DIAGNOSIS — E43 SEVERE MALNUTRITION (MULTI): ICD-10-CM

## 2025-06-11 DIAGNOSIS — Z12.11 ENCOUNTER FOR SCREENING COLONOSCOPY: ICD-10-CM

## 2025-06-11 DIAGNOSIS — K63.2 ENTEROCUTANEOUS FISTULA: ICD-10-CM

## 2025-06-11 PROBLEM — I82.409 DVT (DEEP VENOUS THROMBOSIS) (MULTI): Status: ACTIVE | Noted: 2025-06-11

## 2025-06-11 PROCEDURE — 44388 COLONOSCOPY THRU STOMA SPX: CPT | Performed by: SURGERY

## 2025-06-11 PROCEDURE — 7100000009 HC PHASE TWO TIME - INITIAL BASE CHARGE

## 2025-06-11 PROCEDURE — A45378 PR COLONOSCOPY,DIAGNOSTIC

## 2025-06-11 PROCEDURE — 7100000010 HC PHASE TWO TIME - EACH INCREMENTAL 1 MINUTE

## 2025-06-11 PROCEDURE — A45378 PR COLONOSCOPY,DIAGNOSTIC: Performed by: ANESTHESIOLOGY

## 2025-06-11 PROCEDURE — 3700000001 HC GENERAL ANESTHESIA TIME - INITIAL BASE CHARGE

## 2025-06-11 PROCEDURE — 2500000004 HC RX 250 GENERAL PHARMACY W/ HCPCS (ALT 636 FOR OP/ED)

## 2025-06-11 PROCEDURE — 3700000002 HC GENERAL ANESTHESIA TIME - EACH INCREMENTAL 1 MINUTE

## 2025-06-11 RX ORDER — ALBUTEROL SULFATE 0.83 MG/ML
2.5 SOLUTION RESPIRATORY (INHALATION) ONCE AS NEEDED
OUTPATIENT
Start: 2025-06-11

## 2025-06-11 RX ORDER — METOCLOPRAMIDE HYDROCHLORIDE 5 MG/ML
10 INJECTION INTRAMUSCULAR; INTRAVENOUS ONCE AS NEEDED
OUTPATIENT
Start: 2025-06-11

## 2025-06-11 RX ORDER — FENTANYL CITRATE 50 UG/ML
50 INJECTION, SOLUTION INTRAMUSCULAR; INTRAVENOUS EVERY 5 MIN PRN
Refills: 0 | OUTPATIENT
Start: 2025-06-11

## 2025-06-11 RX ORDER — SODIUM CHLORIDE, SODIUM LACTATE, POTASSIUM CHLORIDE, CALCIUM CHLORIDE 600; 310; 30; 20 MG/100ML; MG/100ML; MG/100ML; MG/100ML
INJECTION, SOLUTION INTRAVENOUS CONTINUOUS PRN
Status: DISCONTINUED | OUTPATIENT
Start: 2025-06-11 | End: 2025-06-11

## 2025-06-11 RX ORDER — HYDRALAZINE HYDROCHLORIDE 20 MG/ML
10 INJECTION INTRAMUSCULAR; INTRAVENOUS EVERY 30 MIN PRN
OUTPATIENT
Start: 2025-06-11

## 2025-06-11 RX ORDER — OXYCODONE HYDROCHLORIDE 5 MG/1
5 TABLET ORAL EVERY 4 HOURS PRN
Refills: 0 | OUTPATIENT
Start: 2025-06-11

## 2025-06-11 RX ORDER — LIDOCAINE HCL/PF 100 MG/5ML
SYRINGE (ML) INTRAVENOUS AS NEEDED
Status: DISCONTINUED | OUTPATIENT
Start: 2025-06-11 | End: 2025-06-11

## 2025-06-11 RX ORDER — MIDAZOLAM HYDROCHLORIDE 1 MG/ML
INJECTION INTRAMUSCULAR; INTRAVENOUS AS NEEDED
Status: DISCONTINUED | OUTPATIENT
Start: 2025-06-11 | End: 2025-06-11

## 2025-06-11 RX ORDER — ONDANSETRON HYDROCHLORIDE 2 MG/ML
4 INJECTION, SOLUTION INTRAVENOUS ONCE AS NEEDED
OUTPATIENT
Start: 2025-06-11

## 2025-06-11 RX ORDER — LIDOCAINE IN NACL,ISO-OSMOT/PF 30 MG/3 ML
0.1 SYRINGE (ML) INJECTION ONCE
OUTPATIENT
Start: 2025-06-11 | End: 2025-06-11

## 2025-06-11 RX ORDER — MEPERIDINE HYDROCHLORIDE 25 MG/ML
12.5 INJECTION INTRAMUSCULAR; INTRAVENOUS; SUBCUTANEOUS EVERY 10 MIN PRN
OUTPATIENT
Start: 2025-06-11

## 2025-06-11 RX ORDER — SODIUM CHLORIDE, SODIUM LACTATE, POTASSIUM CHLORIDE, CALCIUM CHLORIDE 600; 310; 30; 20 MG/100ML; MG/100ML; MG/100ML; MG/100ML
125 INJECTION, SOLUTION INTRAVENOUS CONTINUOUS
OUTPATIENT
Start: 2025-06-11 | End: 2025-06-12

## 2025-06-11 RX ORDER — PROPOFOL 10 MG/ML
INJECTION, EMULSION INTRAVENOUS AS NEEDED
Status: DISCONTINUED | OUTPATIENT
Start: 2025-06-11 | End: 2025-06-11

## 2025-06-11 RX ORDER — HYDROMORPHONE HYDROCHLORIDE 1 MG/ML
0.5 INJECTION, SOLUTION INTRAMUSCULAR; INTRAVENOUS; SUBCUTANEOUS EVERY 5 MIN PRN
OUTPATIENT
Start: 2025-06-11

## 2025-06-11 RX ADMIN — PROPOFOL 50 MG: 10 INJECTION, EMULSION INTRAVENOUS at 11:01

## 2025-06-11 RX ADMIN — MIDAZOLAM HYDROCHLORIDE 2 MG: 2 INJECTION, SOLUTION INTRAMUSCULAR; INTRAVENOUS at 10:58

## 2025-06-11 RX ADMIN — PROPOFOL 40 MG: 10 INJECTION, EMULSION INTRAVENOUS at 11:10

## 2025-06-11 RX ADMIN — SODIUM CHLORIDE, POTASSIUM CHLORIDE, SODIUM LACTATE AND CALCIUM CHLORIDE: 600; 310; 30; 20 INJECTION, SOLUTION INTRAVENOUS at 11:01

## 2025-06-11 RX ADMIN — PROPOFOL 30 MG: 10 INJECTION, EMULSION INTRAVENOUS at 11:03

## 2025-06-11 RX ADMIN — LIDOCAINE HYDROCHLORIDE 100 MG: 20 INJECTION, SOLUTION INTRAVENOUS at 11:01

## 2025-06-11 RX ADMIN — PROPOFOL 100 MCG/KG/MIN: 10 INJECTION, EMULSION INTRAVENOUS at 11:02

## 2025-06-11 SDOH — HEALTH STABILITY: MENTAL HEALTH: CURRENT SMOKER: 1

## 2025-06-11 ASSESSMENT — PAIN SCALES - GENERAL
PAINLEVEL_OUTOF10: 0 - NO PAIN

## 2025-06-11 ASSESSMENT — ENCOUNTER SYMPTOMS
CONSTITUTIONAL NEGATIVE: 1
CARDIOVASCULAR NEGATIVE: 1
NEUROLOGICAL NEGATIVE: 1
RESPIRATORY NEGATIVE: 1
GASTROINTESTINAL NEGATIVE: 1

## 2025-06-11 ASSESSMENT — PAIN - FUNCTIONAL ASSESSMENT
PAIN_FUNCTIONAL_ASSESSMENT: 0-10

## 2025-06-11 NOTE — ANESTHESIA PREPROCEDURE EVALUATION
Patient: Bereket Em    Procedure Information       Date/Time: 25 1100    Scheduled providers: Kraig Cage MD    Procedure: COLONOSCOPY    Location: [facility]     HPI:  59 y.o. male who has an enterocutaneous fistula. He has a history of perforated diverticulitis s/p Margarita's procedure (24) complicated by enterocutaneous fistula.  He has a history of  HTN, alcohol use disorder in remission, right shoulder impingement syndrome, nicotine dependence, diverticulitis, anxiety, depression, BPH, cervical radiculopathy    [id number]  Sinus rhythm 66 with Premature atrial complexes  Otherwise normal ECG  No previous ECGs available    ECHO: 2024  Left Ventricle: The left ventricular systolic function is normal, with an estimated ejection fraction of 70%. There are no regional wall motion abnormalities. The left ventricular cavity size is normal. Left ventricular diastolic filling was not assessed.  Left Atrium: The left atrium is normal in size.  Right Ventricle: The right ventricle is normal in size. There is normal right ventricular global systolic function.  Right Atrium: The right atrium is normal in size.  Aortic Valve: The aortic valve is probably trileaflet. There are increased aortic valve velocities due to increased flow/dynamic ejection. There is no evidence of aortic valve regurgitation. The peak instantaneous gradient of the aortic valve is 18.7 mmHg. The mean gradient of the aortic valve is 7.0 mmHg.  Mitral Valve: The mitral valve is normal in structure. There is no evidence of mitral valve regurgitation.       Relevant Problems   Anesthesia   (-) Family history of malignant hyperthermia   (-) Malignant hyperthermia   (-) PONV (postoperative nausea and vomiting)      Cardiac   (-) Chest pain   (-) Dysrhythmias      Pulmonary   (-) Asthma   (-) Chronic obstructive pulmonary disease (Multi)   (-) Dyspnea on exertion   (-) INDERJIT (obstructive sleep apnea)      Neuro    (+) Anxiety and depression   (+) Cervical radiculopathy      GI  See HPI      Endocrine   (-) Diabetes mellitus, type 2 (Multi)   (-) Hyperthyroidism   (-) Hypothyroidism      Hematology   (-) Anticoagulant long-term use   (-) DVT (deep venous thrombosis) (Multi)       Clinical information reviewed:                   NPO Detail:  No data recorded     Physical Exam    Airway  Mallampati: II  TM distance: >3 FB  Neck ROM: full  Mouth opening: 3 or more finger widths  Comments: Short cropped beard     Cardiovascular   Rhythm: regular     Dental    Pulmonary Breath sounds clear to auscultation     Abdominal            Anesthesia Plan    History of general anesthesia?: yes  History of complications of general anesthesia?: no    ASA 2     MAC     The patient is a current smoker.    intravenous induction   Anesthetic plan and risks discussed with patient.    Plan discussed with CAA and CRNA.

## 2025-06-11 NOTE — PROGRESS NOTES
Chart Review - Patient on nightly TPN 2/2 colon resection for diverticulitis - has ostomy  Colonscopy today in prep for reversal    Labs from 6/10 unremarkable - Pre albumin pending - WNL at 43 last week  Request to continue TPN at current formula for another 2-4 weeks sent to Dr Perry and Brittaney Cintron RD -     TPN approved through [place] through 6/20    Received order from Dr Perry to continue current TPN formula x4 weeks, through 7/9 - weekly labs to continue    Left message for patient regarding delivery this pm      Dispense x7 1L NS, x7 TPN with MVI for cmpd and delivery on 6/11  Infusions 6/12 - 6/18    NF 6/17 - check on insurance - labs and orders if needed

## 2025-06-11 NOTE — H&P
History Of Present Illness  Bereket Em is a 59 y.o. male presenting s/p colon resection for diverticulitis with Margarita procedure and subsequent development of enterocutaneous fistula. Presently on TPN for fluid and nutritional purposes. Here for flex sig and colnosocopy via stoma prior to operative reversal.     Past Medical History  Medical History[1]    Surgical History  Surgical History[2]     Social History  He reports that he has quit smoking. His smoking use included cigarettes. He has been exposed to tobacco smoke. He has never used smokeless tobacco. He reports that he does not currently use alcohol after a past usage of about 21.0 standard drinks of alcohol per week. He reports that he does not currently use drugs after having used the following drugs: Marijuana.    Family History  Family History[3]     Allergies  Patient has no known allergies.    Review of Systems   Constitutional: Negative.    HENT: Negative.     Respiratory: Negative.     Cardiovascular: Negative.    Gastrointestinal: Negative.    Genitourinary: Negative.    Neurological: Negative.         Physical Exam  Constitutional:       Appearance: Normal appearance. He is not ill-appearing.   Eyes:      General: No scleral icterus.  Pulmonary:      Effort: Pulmonary effort is normal.   Abdominal:      Palpations: Abdomen is soft.      Tenderness: There is no abdominal tenderness. There is no guarding.   Musculoskeletal:         General: Normal range of motion.   Skin:     General: Skin is warm.      Coloration: Skin is not jaundiced.        Last Recorded Vitals  Blood pressure 134/71, pulse 67, temperature 36.1 °C (97 °F), temperature source Temporal, resp. rate 18, weight 73 kg (161 lb), SpO2 99%.    Relevant Results             Assessment & Plan  Encounter for screening colonoscopy          I spent 10 minutes in the professional and overall care of this patient.      Kraig Cage MD         [1]   Past Medical History:  Diagnosis  Date    Acute pharyngitis, unspecified     Sore throat    Acute upper respiratory infection, unspecified 02/13/2017    Acute URI    Alcohol abuse, in remission 09/12/2018    History of alcohol abuse    Benign essential HTN 02/14/2023    Elevated blood-pressure reading, without diagnosis of hypertension 02/23/2015    Elevated blood pressure reading without diagnosis of hypertension    Encounter for immunization 10/10/2014    Need for Tdap vaccination    Encounter for screening for malignant neoplasm of colon 01/04/2017    Encounter for colonoscopy due to history of adenomatous colonic polyps    Encounter for screening for malignant neoplasm of colon 11/02/2016    Encounter for screening colonoscopy    Encounter for screening for malignant neoplasm of colon 11/02/2016    Encounter for screening colonoscopy    Encounter for screening for malignant neoplasm of prostate 09/12/2018    Prostate cancer screening    Essential (primary) hypertension 03/14/2019    Elevated blood pressure reading with diagnosis of hypertension    Impingement syndrome of right shoulder 06/06/2016    Impingement syndrome of right shoulder    Incomplete rotator cuff tear or rupture of right shoulder, not specified as traumatic 09/12/2018    Partial nontraumatic tear of right rotator cuff    Noninfective gastroenteritis and colitis, unspecified 01/23/2018    Acute gastroenteritis    Other general symptoms and signs 11/02/2016    Flu-like symptoms    Other malaise 11/02/2016    Malaise and fatigue    Pain in left ankle and joints of left foot 10/10/2017    Left ankle pain    Pain in left foot 09/12/2017    Left foot pain    Pain in right shoulder 04/11/2016    Right shoulder pain    Pain in thoracic spine 09/12/2018    Thoracic back pain    Pain, unspecified 02/09/2017    Body aches    Personal history of colonic polyps 01/04/2017    History of colonic polyps    Personal history of other diseases of male genital organs 03/15/2019    History of acute  prostatitis    Personal history of other diseases of the respiratory system 04/07/2020    History of acute sinusitis    Personal history of other specified conditions 03/14/2019    History of flank pain    Personal history of other specified conditions 10/10/2014    History of paresthesia    Strain of muscle, fascia and tendon of other parts of biceps, right arm, initial encounter 03/11/2016    Rupture of biceps tendon, right, initial encounter   [2]   Past Surgical History:  Procedure Laterality Date    ABDOMINAL SURGERY      SHOULDER SURGERY  06/27/2017    Shoulder Surgery   [3]   Family History  Problem Relation Name Age of Onset    Other (CARDIAC DISORDER) Mother      Hypertension Mother      Other (CARDIAC DISORDR) Father      Hypertension Father      Hypertension Sister      Heart attack Brother      Hypertension Brother

## 2025-06-12 ASSESSMENT — PAIN SCALES - GENERAL: PAINLEVEL_OUTOF10: 0 - NO PAIN

## 2025-06-12 NOTE — ANESTHESIA POSTPROCEDURE EVALUATION
Patient: Bereket Em    Procedure Summary       Date: 06/11/25 Room / Location: [facility], [place]    Anesthesia Start: 1058 Anesthesia Stop: 1125    Procedure: COLONOSCOPY Diagnosis: Encounter for screening colonoscopy    Scheduled Providers: Kraig Cage MD Responsible Provider: Jori Vazquez MD    Anesthesia Type: MAC ASA Status: 2     LATE ENTRY: PATIENT SEEN PRIOR TO DISCHARGE DOS       Anesthesia Type: MAC    Vitals Value Taken Time   /63 06/11/25 11:58   Temp 36.2 °C (97.2 °F) 06/11/25 11:24   Pulse 60 06/11/25 11:58   Resp 14 06/11/25 11:58   SpO2 99 % 06/11/25 11:58       Anesthesia Post Evaluation    Patient location during evaluation: bedside  Patient participation: complete - patient participated  Level of consciousness: awake and alert  Pain management: adequate  Airway patency: patent  Cardiovascular status: acceptable  Respiratory status: acceptable  Hydration status: acceptable  Postoperative Nausea and Vomiting: none        There were no known notable events for this encounter.

## 2025-06-13 ENCOUNTER — TELEPHONE (OUTPATIENT)
Dept: SURGERY | Facility: HOSPITAL | Age: 60
End: 2025-06-13
Payer: COMMERCIAL

## 2025-06-13 ASSESSMENT — ENCOUNTER SYMPTOMS
PERSON REPORTING PAIN: PATIENT
APPETITE LEVEL: FAIR
CHANGE IN APPETITE: UNCHANGED
PAIN: 1
PAIN LOCATION: ABDOMEN
ABDOMINAL PAIN: 1
MUSCLE WEAKNESS: 1
PAIN LOCATION - PAIN SEVERITY: 5/10

## 2025-06-13 NOTE — TELEPHONE ENCOUNTER
I called Mr. Em for an update on his care.     He had his colonoscopy which showed:  Healthy Margarita pouch in the sigmoid colon; no bleeding was observed  The ileocecal valve, appendiceal orifice, cecum, ascending colon, hepatic flexure, transverse colon, splenic flexure, descending colon, rectosigmoid and rectum appeared normal.    He was able to see wound care on the day of his colonoscopy to help with pouching.     He is walking more but maximum of 4000 steps per day.  Increased exercise was recommended.     His weight has increased to 161.     We discussed his labs which were unremarkable except for WBCs of 11 which will be monitored as he has no other signs of infection.     He will see his PCP for a preoperative evaluation with anticipation of surgery in mid to late July based on availability/OR coordination.

## 2025-06-16 ENCOUNTER — HOME CARE VISIT (OUTPATIENT)
Dept: HOME HEALTH SERVICES | Facility: HOME HEALTH | Age: 60
End: 2025-06-16
Payer: COMMERCIAL

## 2025-06-16 VITALS
TEMPERATURE: 97.8 F | RESPIRATION RATE: 16 BRPM | OXYGEN SATURATION: 99 % | DIASTOLIC BLOOD PRESSURE: 62 MMHG | SYSTOLIC BLOOD PRESSURE: 108 MMHG | HEART RATE: 60 BPM

## 2025-06-16 PROCEDURE — G0299 HHS/HOSPICE OF RN EA 15 MIN: HCPCS

## 2025-06-16 RX ADMIN — SODIUM CHLORIDE 20 ML: 9 INJECTION, SOLUTION INTRAMUSCULAR; INTRAVENOUS; SUBCUTANEOUS at 09:14

## 2025-06-16 RX ADMIN — Medication 5 ML: at 09:16

## 2025-06-16 RX ADMIN — SODIUM CHLORIDE 10 ML: 9 INJECTION, SOLUTION INTRAMUSCULAR; INTRAVENOUS; SUBCUTANEOUS at 09:10

## 2025-06-17 ENCOUNTER — PATIENT OUTREACH (OUTPATIENT)
Dept: PRIMARY CARE | Facility: CLINIC | Age: 60
End: 2025-06-17
Payer: COMMERCIAL

## 2025-06-17 ENCOUNTER — HOME INFUSION (OUTPATIENT)
Dept: INFUSION THERAPY | Age: 60
End: 2025-06-17
Payer: COMMERCIAL

## 2025-06-17 DIAGNOSIS — L98.8 FISTULA: ICD-10-CM

## 2025-06-17 LAB
ALBUMIN SERPL-MCNC: 3.9 G/DL (ref 3.6–5.1)
ALP SERPL-CCNC: 81 U/L (ref 35–144)
ALT SERPL-CCNC: 16 U/L (ref 9–46)
ANION GAP SERPL CALCULATED.4IONS-SCNC: 11 MMOL/L (CALC) (ref 7–17)
AST SERPL-CCNC: 16 U/L (ref 10–35)
BASOPHILS # BLD AUTO: 62 CELLS/UL (ref 0–200)
BASOPHILS NFR BLD AUTO: 0.7 %
BILIRUB SERPL-MCNC: 0.5 MG/DL (ref 0.2–1.2)
BUN SERPL-MCNC: 25 MG/DL (ref 7–25)
CALCIUM SERPL-MCNC: 9.4 MG/DL (ref 8.6–10.3)
CHLORIDE SERPL-SCNC: 104 MMOL/L (ref 98–110)
CO2 SERPL-SCNC: 25 MMOL/L (ref 20–32)
CREAT SERPL-MCNC: 0.69 MG/DL (ref 0.7–1.35)
EGFRCR SERPLBLD CKD-EPI 2021: 106 ML/MIN/1.73M2
EOSINOPHIL # BLD AUTO: 187 CELLS/UL (ref 15–500)
EOSINOPHIL NFR BLD AUTO: 2.1 %
ERYTHROCYTE [DISTWIDTH] IN BLOOD BY AUTOMATED COUNT: 14.2 % (ref 11–15)
GLUCOSE SERPL-MCNC: 97 MG/DL (ref 65–99)
HCT VFR BLD AUTO: 37.4 % (ref 38.5–50)
HGB BLD-MCNC: 11.2 G/DL (ref 13.2–17.1)
LYMPHOCYTES # BLD AUTO: 2029 CELLS/UL (ref 850–3900)
LYMPHOCYTES NFR BLD AUTO: 22.8 %
MAGNESIUM SERPL-MCNC: 2.1 MG/DL (ref 1.5–2.5)
MCH RBC QN AUTO: 25.9 PG (ref 27–33)
MCHC RBC AUTO-ENTMCNC: 29.9 G/DL (ref 32–36)
MCV RBC AUTO: 86.6 FL (ref 80–100)
MONOCYTES # BLD AUTO: 739 CELLS/UL (ref 200–950)
MONOCYTES NFR BLD AUTO: 8.3 %
NEUTROPHILS # BLD AUTO: 5883 CELLS/UL (ref 1500–7800)
NEUTROPHILS NFR BLD AUTO: 66.1 %
PHOSPHATE SERPL-MCNC: 4.1 MG/DL (ref 2.5–4.5)
PLATELET # BLD AUTO: 234 THOUSAND/UL (ref 140–400)
PMV BLD REES-ECKER: 9.7 FL (ref 7.5–12.5)
POTASSIUM SERPL-SCNC: 4.4 MMOL/L (ref 3.5–5.3)
PREALB SERPL-MCNC: 36 MG/DL (ref 21–43)
PROT SERPL-MCNC: 6.8 G/DL (ref 6.1–8.1)
RBC # BLD AUTO: 4.32 MILLION/UL (ref 4.2–5.8)
SODIUM SERPL-SCNC: 140 MMOL/L (ref 135–146)
TRIGL SERPL-MCNC: 110 MG/DL
WBC # BLD AUTO: 8.9 THOUSAND/UL (ref 3.8–10.8)

## 2025-06-17 RX ORDER — OXYCODONE HYDROCHLORIDE 5 MG/1
5 TABLET ORAL NIGHTLY PRN
Qty: 15 TABLET | Refills: 0 | Status: SHIPPED | OUTPATIENT
Start: 2025-06-17

## 2025-06-17 NOTE — PROGRESS NOTES
Chart Review - Patient on nightly TPN 2/2 colon resection for diverticulitis - has ostomy that also requires daily NS hydration as well. Patient has been relatively stable on current formula.  Colonscopy today in prep for reversal     Labs from 6/16 unremarkable    Email sent to  on updates regarding auths that end on 06/20/25.     Current orders from Dr Perry are to continue current TPN formula x4 weeks, through 7/9 - weekly labs to continue  Per MD notes: Patient will be having surgery in late July. Three part surgery with takedown of end colostomy initially.    Med profile shows no change in therapy.  [facility].     Dispense x7 1L NS, x7 TPN with MVI for cmpd and delivery on 6/18  Infusions 6/19 - 6/25     NF 6/24 - check on insurance if needed- check labs and patient progress. Refill TPN and NS for 06/25 delivery

## 2025-06-17 NOTE — PROGRESS NOTES
Patient has met target of no readmission for (90) days post hospital,  discharge and is graduated from Transitional Care Management program at this time.       Encouraged to call providers for any questions concerns or change in condition

## 2025-06-18 ENCOUNTER — DOCUMENTATION (OUTPATIENT)
Dept: INFUSION THERAPY | Age: 60
End: 2025-06-18
Payer: COMMERCIAL

## 2025-06-18 NOTE — PROGRESS NOTES
Spoke w/ patient - delivery of TPNs, NS 1L Hydr. bags and all supplies, including gravity and cadd- tubing sets, 10cc syringes, and  all dressing w/ opsite 4x5, is scheduled for today by 7 pm.   Per request - no heparin or NS flushes w/ this delivery.  Also, patient requested  1x MVI replacement dose to be sent today. Relayed the information to pharmacist.   Patient has no questions to [facility] at this time.

## 2025-06-19 ASSESSMENT — ENCOUNTER SYMPTOMS
CHANGE IN APPETITE: UNCHANGED
APPETITE LEVEL: FAIR
PAIN: 1
FATIGUES EASILY: 1
PAIN LOCATION - PAIN SEVERITY: 5/10
PERSON REPORTING PAIN: PATIENT
ABDOMINAL PAIN: 1
MUSCLE WEAKNESS: 1
PAIN LOCATION: ABDOMEN

## 2025-06-20 ENCOUNTER — OFFICE VISIT (OUTPATIENT)
Dept: PRIMARY CARE | Facility: CLINIC | Age: 60
End: 2025-06-20
Payer: COMMERCIAL

## 2025-06-20 VITALS
DIASTOLIC BLOOD PRESSURE: 80 MMHG | HEIGHT: 68 IN | OXYGEN SATURATION: 98 % | HEART RATE: 77 BPM | WEIGHT: 162 LBS | SYSTOLIC BLOOD PRESSURE: 112 MMHG | BODY MASS INDEX: 24.55 KG/M2

## 2025-06-20 DIAGNOSIS — Z01.818 PREOP EXAMINATION: Primary | ICD-10-CM

## 2025-06-20 PROCEDURE — 3008F BODY MASS INDEX DOCD: CPT

## 2025-06-20 PROCEDURE — 1036F TOBACCO NON-USER: CPT

## 2025-06-20 PROCEDURE — 99214 OFFICE O/P EST MOD 30 MIN: CPT

## 2025-06-20 ASSESSMENT — ENCOUNTER SYMPTOMS
SLEEP DISTURBANCE: 1
VOMITING: 0
WEAKNESS: 0
RHINORRHEA: 0
ARTHRALGIAS: 0
COUGH: 0
WHEEZING: 0
SHORTNESS OF BREATH: 0
DIZZINESS: 0
FEVER: 0
SORE THROAT: 0
NAUSEA: 0
CHILLS: 0

## 2025-06-20 ASSESSMENT — PAIN SCALES - GENERAL: PAINLEVEL_OUTOF10: 3

## 2025-06-20 NOTE — PROGRESS NOTES
Bereket Em is a 59 y.o. male that is presenting today for surgical clearance.    Subjective   Hx of tobacco use, hx of other substance abuse in remission, elevated glucose (last A1c 5.5 1/2024)    History of perforated diverticulitis s/p ex lap and sigmoidectomy c/b necrotic fascia with takeback to OR to repeat ex lap and placement of bridging mesh. Developed EC fistula x 2 (6/5/24 - 7/11/254). Working with Dr. Perry (General Surgeon) and Aviva Costa RN in charge of Critical Care and Acute Care surgery. Prior initial hospitalization (6/5/24) was smoking 1PPD and drinking 3-6 drinks a day which correlates with pour wound healing, since hospital patient has not had single drink or cigarette. Patient wound healing well, and only fistula in central abdomen is left. In [place] 6 months, patient had 2-3 attempts of stent placement for abdominal fistula through endoscopic procedure with Dr. Cage to decrease fluid loss and improve wound healing, patient was having significant issues of skin breakdown around wound vac. Stents were unsuccefully. However patient saw new wound specialist who changed the way wound banageges were placed which resolved skin irrtations. TPN was re-started due to malnutrition, patient feels energy levels improving since back on. Patient has weekly meetings with main gernal surgery, Dr. Perry (general surgery), Dr. Gross (colon/rectal surgeon), Dr. Olson (hernia specialist).    pre-operative surgical clearance:              Surgery planned Fistula/ostomy reversal              Date of surgery: Mid to end of July              A letter requesting clearance has been recieved by Dr. Payan              The patients past medical history is absent for CAD, MI, CVA, CHF, DM, CKD.              Patients functional capacity is rated at > or = to 4 mets based on the patients reported activites of going up and down  stairs without issues.               Recent cardiac stress or  re-vascularization, no recent.              Prior complications to anesthesia or surgery none.                  Review of Systems   Constitutional:  Negative for chills and fever.   HENT:  Negative for congestion, ear pain, rhinorrhea and sore throat.    Respiratory:  Negative for cough, shortness of breath and wheezing.    Gastrointestinal:  Negative for nausea and vomiting.   Musculoskeletal:  Negative for arthralgias.   Skin:  Negative for rash.   Neurological:  Negative for dizziness, syncope and weakness.   Psychiatric/Behavioral:  Positive for sleep disturbance.        Objective   Vitals:    06/20/25 1348   BP: 112/80   Pulse: 77   SpO2: 98%      Physical Exam  Constitutional:       General: He is not in acute distress.     Appearance: Normal appearance.   HENT:      Head: Normocephalic.      Right Ear: Tympanic membrane and ear canal normal.      Left Ear: Tympanic membrane and ear canal normal.      Nose: Nose normal. No congestion or rhinorrhea.      Mouth/Throat:      Mouth: Mucous membranes are moist.      Pharynx: Oropharynx is clear. No oropharyngeal exudate or posterior oropharyngeal erythema.   Eyes:      Extraocular Movements: Extraocular movements intact.      Conjunctiva/sclera: Conjunctivae normal.      Pupils: Pupils are equal, round, and reactive to light.   Cardiovascular:      Rate and Rhythm: Normal rate and regular rhythm.      Heart sounds: No murmur heard.  Pulmonary:      Effort: Pulmonary effort is normal.      Breath sounds: Normal breath sounds. No wheezing, rhonchi or rales.   Abdominal:      Comments: Fistula present central abdomen, wound closed around fistula     Skin:     General: Skin is warm and dry.      Findings: No rash.   Neurological:      Mental Status: He is alert.   Psychiatric:         Mood and Affect: Mood and affect normal.         Assessment/Plan    Diagnoses and all orders for this visit:  Preop examination    Preoperative clearance [Z01.818]     Notes:  RISK  ASSESSMENT TOOLS 1) The ACC/AHA classification of surgical risk:             (HIGH-emergent, major vascular, prolonged surg. MODERATE-CEA, head/neck, abd/thoracic surg, ortho; or LOW risk-cataract, endoscopy,breast, superficial)             2) Bhavesh Simple Cardiac Risk Index: 1 point for each             A) High risk surgery--> 0             B) CAD --> 0             C) h/o CVA --> 0             D) CHF --> 0             E) insulin dependant DM --> 0             F) Cr > 2.0 , --> 0             Score interpretation/percent risk of complication (ie. MI,PE,Vfib,cardiac arrest,heart block):             0 points = class I (0.4%)             1 point = class II(0.9%)             2 points = class III (6.6%)             3+points = class IV (11%) _____             3) ACC/AHA algorithm:             Cardiac Risk Low- if Dong's class I and no predictors of CV diseases. >4 mets functional capacity then cleared for surgery, <4 mets then cardiac testing for high risk cleared for intermediate or low risk surgeries.             Cardiac risk intermediate- Dong's class II or mild angina, previous MI, compensated HF, DM, CKD if >=4 mets needs cardiac testing for high risk cleared for others, <4 mets needs further testing for high or intermediate risk surgery, cleared for low risk surgery.             Cardiac risk high-Dong's class III or IV or UA, decompensated HF, arrhythmias, severe valvular disease then consider delay in surgery until stable or consider angiography.     PATIENT CLEARED FOR SURGERY, IS NOTED HBG TRENDING DOWN BUT STILL ABOVE 11, SHOULD HAVE RE-CHECKED CLOSER TO SURGERY.     *Patient also informed can prescribe xanax closer to procedure to help with sleep and anxiety regarding surgery.

## 2025-06-20 NOTE — Clinical Note
PATIENT CLEARED FOR SURGERY, IS NOTED HBG TRENDING DOWN BUT STILL ABOVE 11, SHOULD HAVE RE-CHECKED CLOSER TO SURGERY.

## 2025-06-23 ENCOUNTER — HOME CARE VISIT (OUTPATIENT)
Dept: HOME HEALTH SERVICES | Facility: HOME HEALTH | Age: 60
End: 2025-06-23
Payer: COMMERCIAL

## 2025-06-23 ENCOUNTER — DOCUMENTATION (OUTPATIENT)
Dept: INFUSION THERAPY | Age: 60
End: 2025-06-23
Payer: COMMERCIAL

## 2025-06-23 ENCOUNTER — HOME INFUSION (OUTPATIENT)
Dept: INFUSION THERAPY | Age: 60
End: 2025-06-23
Payer: COMMERCIAL

## 2025-06-23 VITALS
OXYGEN SATURATION: 99 % | TEMPERATURE: 98.2 F | RESPIRATION RATE: 16 BRPM | HEART RATE: 68 BPM | DIASTOLIC BLOOD PRESSURE: 60 MMHG | SYSTOLIC BLOOD PRESSURE: 110 MMHG

## 2025-06-23 PROCEDURE — G0299 HHS/HOSPICE OF RN EA 15 MIN: HCPCS

## 2025-06-23 RX ADMIN — SODIUM CHLORIDE 10 ML: 9 INJECTION, SOLUTION INTRAMUSCULAR; INTRAVENOUS; SUBCUTANEOUS at 10:45

## 2025-06-23 RX ADMIN — Medication 5 ML: at 10:51

## 2025-06-23 RX ADMIN — SODIUM CHLORIDE 20 ML: 9 INJECTION, SOLUTION INTRAMUSCULAR; INTRAVENOUS; SUBCUTANEOUS at 10:50

## 2025-06-23 NOTE — PROGRESS NOTES
PATIENT CONTINUES NIGHTLY TPN 2/2 COLON RESECTION FOR DIVERTICULITIS.  ALSO HAS OSTOMY THAT REQUIRES DAILY NS HYD AS WELL.  WE HAVE ORDER IN CHART THROUGH 7/9.  HC RN DRAWING LABS TODAY.  PATIENT IS SUPPOSED TO HAVE SURGERY IN LATE JULY.  THREE PART SURGERY WITH TAKEDOWN OF END COLOSTOMY INITIALLY.  PROCESSED FILL FOR 7 TPN AND MVI AND 7 1L NS BAGS FOR MIX AND DELIVERY ON WEDNESDAY.    DOS 6/26 THRU 7/2.  PT REP TO CONTACT PATIENT FOR SUPPLY NEEDS  NF FOR 7/1 TO FILL AND MIX ON WED. DSL

## 2025-06-23 NOTE — PROGRESS NOTES
Spoke w/ patient - delivery of TPNs, NS 1L Hydr. bags and all supplies, including standard amount of flushes, gravity and cadd-tubing sets, 10cc syringes, and  all dressing, is scheduled for Wednesday evening, 6/25/25.   No questions to [facility] at this time.

## 2025-06-24 LAB
ALBUMIN SERPL-MCNC: 4.2 G/DL (ref 3.6–5.1)
ALP SERPL-CCNC: 85 U/L (ref 35–144)
ALT SERPL-CCNC: 19 U/L (ref 9–46)
ANION GAP SERPL CALCULATED.4IONS-SCNC: 11 MMOL/L (CALC) (ref 7–17)
AST SERPL-CCNC: 19 U/L (ref 10–35)
BASOPHILS # BLD AUTO: 58 CELLS/UL (ref 0–200)
BASOPHILS NFR BLD AUTO: 0.6 %
BILIRUB SERPL-MCNC: 0.5 MG/DL (ref 0.2–1.2)
BUN SERPL-MCNC: 25 MG/DL (ref 7–25)
CALCIUM SERPL-MCNC: 9.3 MG/DL (ref 8.6–10.3)
CHLORIDE SERPL-SCNC: 99 MMOL/L (ref 98–110)
CO2 SERPL-SCNC: 27 MMOL/L (ref 20–32)
CREAT SERPL-MCNC: 0.78 MG/DL (ref 0.7–1.35)
EGFRCR SERPLBLD CKD-EPI 2021: 102 ML/MIN/1.73M2
EOSINOPHIL # BLD AUTO: 250 CELLS/UL (ref 15–500)
EOSINOPHIL NFR BLD AUTO: 2.6 %
ERYTHROCYTE [DISTWIDTH] IN BLOOD BY AUTOMATED COUNT: 15.7 % (ref 11–15)
GLUCOSE SERPL-MCNC: 84 MG/DL (ref 65–99)
HCT VFR BLD AUTO: 40.9 % (ref 38.5–50)
HGB BLD-MCNC: 12.1 G/DL (ref 13.2–17.1)
LYMPHOCYTES # BLD AUTO: 2515 CELLS/UL (ref 850–3900)
LYMPHOCYTES NFR BLD AUTO: 26.2 %
MAGNESIUM SERPL-MCNC: 2 MG/DL (ref 1.5–2.5)
MCH RBC QN AUTO: 26.2 PG (ref 27–33)
MCHC RBC AUTO-ENTMCNC: 29.6 G/DL (ref 32–36)
MCV RBC AUTO: 88.5 FL (ref 80–100)
MONOCYTES # BLD AUTO: 749 CELLS/UL (ref 200–950)
MONOCYTES NFR BLD AUTO: 7.8 %
NEUTROPHILS # BLD AUTO: 6029 CELLS/UL (ref 1500–7800)
NEUTROPHILS NFR BLD AUTO: 62.8 %
PHOSPHATE SERPL-MCNC: 4.4 MG/DL (ref 2.5–4.5)
PLATELET # BLD AUTO: 191 THOUSAND/UL (ref 140–400)
PMV BLD REES-ECKER: 10 FL (ref 7.5–12.5)
POTASSIUM SERPL-SCNC: 4.3 MMOL/L (ref 3.5–5.3)
PREALB SERPL-MCNC: 38 MG/DL (ref 21–43)
PROT SERPL-MCNC: 7.4 G/DL (ref 6.1–8.1)
RBC # BLD AUTO: 4.62 MILLION/UL (ref 4.2–5.8)
SODIUM SERPL-SCNC: 137 MMOL/L (ref 135–146)
TRIGL SERPL-MCNC: 102 MG/DL
WBC # BLD AUTO: 9.6 THOUSAND/UL (ref 3.8–10.8)

## 2025-06-25 ENCOUNTER — PREP FOR PROCEDURE (OUTPATIENT)
Dept: SURGERY | Facility: CLINIC | Age: 60
End: 2025-06-25
Payer: COMMERCIAL

## 2025-06-25 DIAGNOSIS — K43.6 INCARCERATED VENTRAL HERNIA: ICD-10-CM

## 2025-06-25 DIAGNOSIS — Z43.3 ATTENTION TO COLOSTOMY: Primary | ICD-10-CM

## 2025-06-25 DIAGNOSIS — K63.2 ENTEROCUTANEOUS FISTULA: ICD-10-CM

## 2025-06-25 RX ORDER — ALVIMOPAN 12 MG/1
12 CAPSULE ORAL ONCE
OUTPATIENT
Start: 2025-07-22 | End: 2025-07-28

## 2025-06-25 RX ORDER — ACETAMINOPHEN 500 MG
1000 TABLET ORAL ONCE
OUTPATIENT
Start: 2025-07-22

## 2025-06-25 RX ORDER — HEPARIN SODIUM 5000 [USP'U]/ML
5000 INJECTION, SOLUTION INTRAVENOUS; SUBCUTANEOUS ONCE
OUTPATIENT
Start: 2025-07-22

## 2025-06-25 RX ORDER — METRONIDAZOLE 500 MG/100ML
500 INJECTION, SOLUTION INTRAVENOUS ONCE
OUTPATIENT
Start: 2025-07-22

## 2025-06-25 RX ORDER — CIPROFLOXACIN 2 MG/ML
400 INJECTION, SOLUTION INTRAVENOUS ONCE
OUTPATIENT
Start: 2025-07-22

## 2025-06-26 RX ORDER — GABAPENTIN 100 MG/1
CAPSULE ORAL
Qty: 3 CAPSULE | Refills: 0 | Status: SHIPPED | OUTPATIENT
Start: 2025-06-26

## 2025-06-26 RX ORDER — METRONIDAZOLE 250 MG/1
TABLET ORAL
Qty: 3 TABLET | Refills: 0 | Status: SHIPPED | OUTPATIENT
Start: 2025-06-26

## 2025-06-26 RX ORDER — NEOMYCIN SULFATE 500 MG/1
TABLET ORAL
Qty: 6 TABLET | Refills: 0 | Status: SHIPPED | OUTPATIENT
Start: 2025-06-26

## 2025-06-26 ASSESSMENT — ENCOUNTER SYMPTOMS
APPETITE LEVEL: GOOD
PAIN: 1
FATIGUES EASILY: 1
PAIN LOCATION - PAIN SEVERITY: 5/10
ABDOMINAL PAIN: 1
PERSON REPORTING PAIN: PATIENT
CHANGE IN APPETITE: UNCHANGED
MUSCLE WEAKNESS: 1
PAIN LOCATION: ABDOMEN

## 2025-06-27 ENCOUNTER — TELEPHONE (OUTPATIENT)
Dept: SURGERY | Facility: HOSPITAL | Age: 60
End: 2025-06-27
Payer: COMMERCIAL

## 2025-06-30 ENCOUNTER — HOME CARE VISIT (OUTPATIENT)
Dept: HOME HEALTH SERVICES | Facility: HOME HEALTH | Age: 60
End: 2025-06-30
Payer: COMMERCIAL

## 2025-06-30 ENCOUNTER — PATIENT MESSAGE (OUTPATIENT)
Dept: PRIMARY CARE | Facility: CLINIC | Age: 60
End: 2025-06-30
Payer: COMMERCIAL

## 2025-06-30 ENCOUNTER — HOME INFUSION (OUTPATIENT)
Dept: INFUSION THERAPY | Age: 60
End: 2025-06-30
Payer: COMMERCIAL

## 2025-06-30 ENCOUNTER — DOCUMENTATION (OUTPATIENT)
Dept: INFUSION THERAPY | Age: 60
End: 2025-06-30
Payer: COMMERCIAL

## 2025-06-30 VITALS
DIASTOLIC BLOOD PRESSURE: 64 MMHG | RESPIRATION RATE: 16 BRPM | HEART RATE: 84 BPM | TEMPERATURE: 97.8 F | SYSTOLIC BLOOD PRESSURE: 118 MMHG | OXYGEN SATURATION: 97 %

## 2025-06-30 DIAGNOSIS — F51.01 PRIMARY INSOMNIA: ICD-10-CM

## 2025-06-30 DIAGNOSIS — F41.9 ANXIETY AND DEPRESSION: ICD-10-CM

## 2025-06-30 DIAGNOSIS — F32.A ANXIETY AND DEPRESSION: ICD-10-CM

## 2025-06-30 PROCEDURE — G0299 HHS/HOSPICE OF RN EA 15 MIN: HCPCS

## 2025-06-30 RX ADMIN — SODIUM CHLORIDE 10 ML: 9 INJECTION, SOLUTION INTRAMUSCULAR; INTRAVENOUS; SUBCUTANEOUS at 09:05

## 2025-06-30 RX ADMIN — Medication 5 ML: at 09:12

## 2025-06-30 RX ADMIN — SODIUM CHLORIDE 20 ML: 9 INJECTION, SOLUTION INTRAMUSCULAR; INTRAVENOUS; SUBCUTANEOUS at 09:10

## 2025-06-30 NOTE — PROGRESS NOTES
Chart review - Bereket Em is a 59 y.o. patient on home care for nightly TPN and daily NS infusions.  Surgery scheduled 7/22/25  Dr. Mcelroy following     Current orders valid thru 7/9/25    Labs from 6/23 reviewed: unremarkable  RN visits reviewed- no issues noted with infusions or line     Pharmacy to mix 7/2 and deliver straight with supplies to match:  7x TPN  7x MVI  7x NS 1 L gravity bags  DOS: 7/3 - 7/9     Follow up 7/8 - check labs, get orders, mix Wed

## 2025-06-30 NOTE — PROGRESS NOTES
Spoke w/ patient - delivery of TPNs, NS 1L Hydr. bags and all supplies, including gravity and cadd-tubing sets, 10cc syringes, and  all dressing, is scheduled for Wednesday, 7/2/25.  Per patient -  no heparin flushes  and  no  NS  flushes  w/ this delivery (has extras).  No questions to [facility] at this time.

## 2025-06-30 NOTE — TELEPHONE ENCOUNTER
I spoke to Mr. Em via telephone.     He is doing well.  He is maintaining his weight at 160 pounds.     He has not yet increased his activity or step count.  He was encouraged to do so.     We discussed any new questions about his surgery on July 22nd.  All questions were answered.

## 2025-07-01 LAB
ALBUMIN SERPL-MCNC: 4.1 G/DL (ref 3.6–5.1)
ALP SERPL-CCNC: 89 U/L (ref 35–144)
ALT SERPL-CCNC: 17 U/L (ref 9–46)
ANION GAP SERPL CALCULATED.4IONS-SCNC: 11 MMOL/L (CALC) (ref 7–17)
AST SERPL-CCNC: 18 U/L (ref 10–35)
BASOPHILS # BLD AUTO: 67 CELLS/UL (ref 0–200)
BASOPHILS NFR BLD AUTO: 0.6 %
BILIRUB SERPL-MCNC: 0.6 MG/DL (ref 0.2–1.2)
BUN SERPL-MCNC: 28 MG/DL (ref 7–25)
CALCIUM SERPL-MCNC: 9.6 MG/DL (ref 8.6–10.3)
CHLORIDE SERPL-SCNC: 96 MMOL/L (ref 98–110)
CO2 SERPL-SCNC: 30 MMOL/L (ref 20–32)
CREAT SERPL-MCNC: 0.84 MG/DL (ref 0.7–1.35)
EGFRCR SERPLBLD CKD-EPI 2021: 100 ML/MIN/1.73M2
EOSINOPHIL # BLD AUTO: 266 CELLS/UL (ref 15–500)
EOSINOPHIL NFR BLD AUTO: 2.4 %
ERYTHROCYTE [DISTWIDTH] IN BLOOD BY AUTOMATED COUNT: 14.7 % (ref 11–15)
GLUCOSE SERPL-MCNC: 89 MG/DL (ref 65–99)
HCT VFR BLD AUTO: 40.6 % (ref 38.5–50)
HGB BLD-MCNC: 12.4 G/DL (ref 13.2–17.1)
LYMPHOCYTES # BLD AUTO: 1965 CELLS/UL (ref 850–3900)
LYMPHOCYTES NFR BLD AUTO: 17.7 %
MAGNESIUM SERPL-MCNC: 2 MG/DL (ref 1.5–2.5)
MCH RBC QN AUTO: 26.3 PG (ref 27–33)
MCHC RBC AUTO-ENTMCNC: 30.5 G/DL (ref 32–36)
MCV RBC AUTO: 86 FL (ref 80–100)
MONOCYTES # BLD AUTO: 833 CELLS/UL (ref 200–950)
MONOCYTES NFR BLD AUTO: 7.5 %
NEUTROPHILS # BLD AUTO: 7970 CELLS/UL (ref 1500–7800)
NEUTROPHILS NFR BLD AUTO: 71.8 %
PHOSPHATE SERPL-MCNC: 4.4 MG/DL (ref 2.5–4.5)
PLATELET # BLD AUTO: 260 THOUSAND/UL (ref 140–400)
PMV BLD REES-ECKER: 9.6 FL (ref 7.5–12.5)
POTASSIUM SERPL-SCNC: 4.1 MMOL/L (ref 3.5–5.3)
PREALB SERPL-MCNC: 41 MG/DL (ref 21–43)
PROT SERPL-MCNC: 7.5 G/DL (ref 6.1–8.1)
RBC # BLD AUTO: 4.72 MILLION/UL (ref 4.2–5.8)
SODIUM SERPL-SCNC: 137 MMOL/L (ref 135–146)
TRIGL SERPL-MCNC: 99 MG/DL
WBC # BLD AUTO: 11.1 THOUSAND/UL (ref 3.8–10.8)

## 2025-07-01 RX ORDER — ALPRAZOLAM 0.25 MG/1
.25-.5 TABLET ORAL 3 TIMES DAILY PRN
Qty: 20 TABLET | Refills: 0 | Status: SHIPPED | OUTPATIENT
Start: 2025-07-01 | End: 2026-02-26

## 2025-07-02 ASSESSMENT — ENCOUNTER SYMPTOMS
PAIN LOCATION: ABDOMEN
MUSCLE WEAKNESS: 1
CHANGE IN APPETITE: UNCHANGED
PERSON REPORTING PAIN: PATIENT
PAIN LOCATION - PAIN SEVERITY: 5/10
FATIGUES EASILY: 1
ABDOMINAL PAIN: 1
APPETITE LEVEL: GOOD
PAIN: 1

## 2025-07-04 ENCOUNTER — TELEPHONE (OUTPATIENT)
Dept: SURGERY | Facility: HOSPITAL | Age: 60
End: 2025-07-04
Payer: COMMERCIAL

## 2025-07-04 NOTE — TELEPHONE ENCOUNTER
I received a message from Mr. Em about his oxycodone.   [facility] reviewed and new Xanax prescription by PCP.    I spoke to Mr. Em. He reports the Xanax was needed for sleep. Also has a prescription for Trazodone. Discussed concern for too many sedating medications. Recommended not taking both and message sent to Ms. Howell to help clarify.

## 2025-07-07 ENCOUNTER — HOME CARE VISIT (OUTPATIENT)
Dept: HOME HEALTH SERVICES | Facility: HOME HEALTH | Age: 60
End: 2025-07-07
Payer: COMMERCIAL

## 2025-07-07 VITALS
RESPIRATION RATE: 16 BRPM | DIASTOLIC BLOOD PRESSURE: 70 MMHG | HEART RATE: 72 BPM | SYSTOLIC BLOOD PRESSURE: 110 MMHG | OXYGEN SATURATION: 97 % | TEMPERATURE: 97.8 F

## 2025-07-07 PROCEDURE — G0299 HHS/HOSPICE OF RN EA 15 MIN: HCPCS

## 2025-07-07 RX ADMIN — Medication 5 ML: at 09:17

## 2025-07-07 RX ADMIN — SODIUM CHLORIDE 10 ML: 9 INJECTION, SOLUTION INTRAMUSCULAR; INTRAVENOUS; SUBCUTANEOUS at 09:10

## 2025-07-07 RX ADMIN — SODIUM CHLORIDE 20 ML: 9 INJECTION, SOLUTION INTRAMUSCULAR; INTRAVENOUS; SUBCUTANEOUS at 09:15

## 2025-07-08 ENCOUNTER — ANESTHESIA EVENT (OUTPATIENT)
Dept: OPERATING ROOM | Facility: HOSPITAL | Age: 60
End: 2025-07-08
Payer: COMMERCIAL

## 2025-07-08 ENCOUNTER — PRE-ADMISSION TESTING (OUTPATIENT)
Dept: PREADMISSION TESTING | Facility: HOSPITAL | Age: 60
End: 2025-07-08
Payer: COMMERCIAL

## 2025-07-08 VITALS
TEMPERATURE: 98.4 F | WEIGHT: 161.7 LBS | BODY MASS INDEX: 24.51 KG/M2 | DIASTOLIC BLOOD PRESSURE: 81 MMHG | SYSTOLIC BLOOD PRESSURE: 118 MMHG | HEART RATE: 70 BPM | OXYGEN SATURATION: 97 % | HEIGHT: 68 IN

## 2025-07-08 DIAGNOSIS — K63.2 ENTEROCUTANEOUS FISTULA: ICD-10-CM

## 2025-07-08 DIAGNOSIS — E43 SEVERE MALNUTRITION (MULTI): ICD-10-CM

## 2025-07-08 DIAGNOSIS — Z41.9 SURGERY, ELECTIVE: Primary | ICD-10-CM

## 2025-07-08 LAB
ABO GROUP (TYPE) IN BLOOD: NORMAL
ALBUMIN SERPL-MCNC: 4.2 G/DL (ref 3.6–5.1)
ALP SERPL-CCNC: 98 U/L (ref 35–144)
ALT SERPL-CCNC: 26 U/L (ref 9–46)
ANION GAP SERPL CALCULATED.4IONS-SCNC: 13 MMOL/L (CALC) (ref 7–17)
ANTIBODY SCREEN: NORMAL
AST SERPL-CCNC: 20 U/L (ref 10–35)
BASOPHILS # BLD AUTO: 65 CELLS/UL (ref 0–200)
BASOPHILS NFR BLD AUTO: 0.5 %
BILIRUB SERPL-MCNC: 0.7 MG/DL (ref 0.2–1.2)
BUN SERPL-MCNC: 30 MG/DL (ref 7–25)
CALCIUM SERPL-MCNC: 9.5 MG/DL (ref 8.6–10.3)
CHLORIDE SERPL-SCNC: 92 MMOL/L (ref 98–110)
CO2 SERPL-SCNC: 32 MMOL/L (ref 20–32)
CREAT SERPL-MCNC: 0.78 MG/DL (ref 0.7–1.35)
EGFRCR SERPLBLD CKD-EPI 2021: 102 ML/MIN/1.73M2
EOSINOPHIL # BLD AUTO: 258 CELLS/UL (ref 15–500)
EOSINOPHIL NFR BLD AUTO: 2 %
ERYTHROCYTE [DISTWIDTH] IN BLOOD BY AUTOMATED COUNT: 13.8 % (ref 11–15)
GLUCOSE SERPL-MCNC: 94 MG/DL (ref 65–99)
HCT VFR BLD AUTO: 42.5 % (ref 38.5–50)
HGB BLD-MCNC: 12.8 G/DL (ref 13.2–17.1)
LYMPHOCYTES # BLD AUTO: 2361 CELLS/UL (ref 850–3900)
LYMPHOCYTES NFR BLD AUTO: 18.3 %
MAGNESIUM SERPL-MCNC: 2 MG/DL (ref 1.5–2.5)
MCH RBC QN AUTO: 25.7 PG (ref 27–33)
MCHC RBC AUTO-ENTMCNC: 30.1 G/DL (ref 32–36)
MCV RBC AUTO: 85.3 FL (ref 80–100)
MONOCYTES # BLD AUTO: 1058 CELLS/UL (ref 200–950)
MONOCYTES NFR BLD AUTO: 8.2 %
NEUTROPHILS # BLD AUTO: 9159 CELLS/UL (ref 1500–7800)
NEUTROPHILS NFR BLD AUTO: 71 %
PHOSPHATE SERPL-MCNC: 4 MG/DL (ref 2.5–4.5)
PLATELET # BLD AUTO: 298 THOUSAND/UL (ref 140–400)
PMV BLD REES-ECKER: 9.8 FL (ref 7.5–12.5)
POTASSIUM SERPL-SCNC: 3.7 MMOL/L (ref 3.5–5.3)
PREALB SERPL-MCNC: 38 MG/DL (ref 21–43)
PROT SERPL-MCNC: 7.5 G/DL (ref 6.1–8.1)
RBC # BLD AUTO: 4.98 MILLION/UL (ref 4.2–5.8)
RH FACTOR (ANTIGEN D): NORMAL
SODIUM SERPL-SCNC: 137 MMOL/L (ref 135–146)
TRIGL SERPL-MCNC: 92 MG/DL
WBC # BLD AUTO: 12.9 THOUSAND/UL (ref 3.8–10.8)

## 2025-07-08 PROCEDURE — 87081 CULTURE SCREEN ONLY: CPT | Performed by: NURSE PRACTITIONER

## 2025-07-08 PROCEDURE — 86901 BLOOD TYPING SEROLOGIC RH(D): CPT

## 2025-07-08 PROCEDURE — 99204 OFFICE O/P NEW MOD 45 MIN: CPT | Performed by: STUDENT IN AN ORGANIZED HEALTH CARE EDUCATION/TRAINING PROGRAM

## 2025-07-08 RX ORDER — ACETAMINOPHEN 500 MG
1000 TABLET ORAL EVERY 6 HOURS PRN
COMMUNITY

## 2025-07-08 RX ORDER — CHLORHEXIDINE GLUCONATE 40 MG/ML
1 SOLUTION TOPICAL DAILY
Qty: 25 ML | Refills: 0 | Status: SHIPPED | OUTPATIENT
Start: 2025-07-08 | End: 2025-07-13

## 2025-07-08 RX ORDER — CHLORHEXIDINE GLUCONATE ORAL RINSE 1.2 MG/ML
15 SOLUTION DENTAL DAILY
Qty: 30 ML | Refills: 0 | Status: SHIPPED | OUTPATIENT
Start: 2025-07-08 | End: 2025-07-10

## 2025-07-08 ASSESSMENT — ENCOUNTER SYMPTOMS
CARDIOVASCULAR NEGATIVE: 1
APPETITE LEVEL: GOOD
ABDOMINAL PAIN: 0
DYSPNEA AT REST: 0
PAIN LOCATION: ABDOMEN
FATIGUES EASILY: 1
WHEEZING: 0
PAIN: 1
COUGH: 0
MUSCLE WEAKNESS: 1
MUSCULOSKELETAL NEGATIVE: 1
NEUROLOGICAL NEGATIVE: 1
CONSTITUTIONAL NEGATIVE: 1
ENDOCRINE NEGATIVE: 1
PALPITATIONS: 0
PAIN LOCATION - PAIN SEVERITY: 5/10
CHANGE IN APPETITE: UNCHANGED
PERSON REPORTING PAIN: PATIENT
ABDOMINAL DISTENTION: 0

## 2025-07-08 ASSESSMENT — DUKE ACTIVITY SCORE INDEX (DASI)
CAN YOU WALK INDOORS, SUCH AS AROUND YOUR HOUSE: YES
CAN YOU DO LIGHT WORK AROUND THE HOUSE LIKE DUSTING OR WASHING DISHES: YES
CAN YOU PARTICIPATE IN MODERATE RECREATIONAL ACTIVITIES LIKE GOLF, BOWLING, DANCING, DOUBLES TENNIS OR THROWING A BASEBALL OR FOOTBALL: NO
CAN YOU RUN A SHORT DISTANCE: YES
CAN YOU DO YARD WORK LIKE RAKING LEAVES, WEEDING OR PUSHING A MOWER: NO
CAN YOU DO HEAVY WORK AROUND THE HOUSE LIKE SCRUBBING FLOORS OR LIFTING AND MOVING HEAVY FURNITURE: NO
CAN YOU CLIMB A FLIGHT OF STAIRS OR WALK UP A HILL: YES
CAN YOU HAVE SEXUAL RELATIONS: YES
CAN YOU DO MODERATE WORK AROUND THE HOUSE LIKE VACUUMING, SWEEPING FLOORS OR CARRYING GROCERIES: YES
CAN YOU WALK A BLOCK OR TWO ON LEVEL GROUND: YES
CAN YOU PARTICIPATE IN STRENOUS SPORTS LIKE SWIMMING, SINGLES TENNIS, FOOTBALL, BASKETBALL, OR SKIING: NO

## 2025-07-08 NOTE — NURSING NOTE
Patient seen in PAT. Orders reviewed with PAT provider.    Following labs obtained by documenting RN:  T/S, MRSA SWAB.     Patient will need a pulmonary referral for sleep apnea per resident that can be after surgery.  Called patient and made aware of pulmonary referral at [place] on August 6th, at 10:30 am.        Patient discharged with: Pre-procedure instructions/AVS, Incentive spirometer.        Radha HODGSONN, RN  [facility]  [facility]

## 2025-07-08 NOTE — H&P (VIEW-ONLY)
CPM/PAT Evaluation       Name: Bereket Em (Bereket Em)  /Age: 1965/59 y.o.     In-Person       Chief Complaint: ventral hiatal hernia repair and take down of fistula and colostomy closure on 25    HPI 59 y.o.  male  scheduled for ventral hiatal hernia repair and take down of fistula and colostomy closure on 25 with Dr. Olson. PMHX includes perforated diverticultis s/p Margarita's procedure complicated by formation of enterocutaneous fistula.  Presents to [facility] today for perioperative risk stratification and optimization    Medical History[1]    Surgical History[2]    Patient  reports being sexually active and has had partner(s) who are female. He reports using the following method of birth control/protection: None.    Family History[3]    Allergies[4]    Prior to Admission medications    Medication Sig Start Date End Date Taking? Authorizing Provider   0.9 % sodium chloride (sodium chloride 0.9%) solution Infuse 1,000 mL into a venous catheter once every 24 hours. Infuse 1000ml over 1-2 hours via gravity once daily. 25 Yes Bereket Perry MD   0.9 % sodium chloride (sodium chloride, PF, 0.9%) injection Infuse 10 mL into a venous catheter once daily. 10 ml NS once daily SASH and as needed 20 ml after blood work   Yes Historical Provider, MD   acetaminophen (Tylenol) 500 mg tablet Take 2 tablets (1,000 mg) by mouth every 6 hours if needed for mild pain (1 - 3).   Yes Historical Provider, MD   ALPRAZolam (Xanax) 0.25 mg tablet Take 1-2 tablets (0.25-0.5 mg) by mouth 3 times a day as needed for anxiety. 25 Yes Candy Howell PA-C   Custom Cyclic TPN Infuse 2,250 mL over 12 hours into a venous catheter (central line) continuously. Taper up for 1 Hours. Taper down for 1 Hours. 25 Yes Bereket Perry MD   heparin flush (heparin LockFlush,Porcine,,PF,) 10 unit/mL injection Infuse 5 mL (50 Units) into a venous catheter once daily.   Yes Historical  MD Ezekiel   oral electrolytes replacement, Pedialyte, solution solution Take 500 mL by mouth 2 times a day. 12/17/24  Yes ASHLY Yin   oxyCODONE (Roxicodone) 5 mg immediate release tablet Take 1 tablet (5 mg) by mouth as needed at bedtime for severe pain (7 - 10). 6/17/25  Yes Bereket Perry MD   pantoprazole (ProtoNix) 40 mg EC tablet Take 1 tablet (40 mg) by mouth once daily in the morning. Take before meals for 21 days. Do not crush, chew, or split. 3/28/25 7/8/25 Yes Ana Almaguer PA-C   traZODone (Desyrel) 100 mg tablet Take 1 tablet (100 mg) by mouth as needed at bedtime for sleep. 5/20/25 5/20/26 Yes Candy Howell PA-C   gabapentin (Neurontin) 100 mg capsule Take 1 capsule at bedtime for three nights before surgery. 6/26/25   ASHLY Guerra   metroNIDAZOLE (Flagyl) 250 mg tablet Take 1 tablet at 6pm, 7pm and 11pm the night before surgery. 6/26/25   ASHLY Guerra   neomycin (Mycifradin) 500 mg tablet Take two tablets (1000 mg) at 6pm, 7pm and 11pm the night before surgery. 6/26/25   ASHLY Guerra        PAT ROS:   Constitutional:   neg    Neuro/Psych:   neg    Eyes:   Ears:   Nose:   Mouth:   Throat:   Neck:   Cardio:   neg     no chest pain   no palpitations   no dyspnea  Respiratory:    no cough   no wheezing  Endocrine:   neg    GI:    no abdominal distention   no abdominal pain  :   neg    Musculoskeletal:   neg    Hematologic:   neg    Skin:      Physical Exam  Constitutional:       Appearance: Normal appearance.   HENT:      Head: Normocephalic and atraumatic.   Cardiovascular:      Rate and Rhythm: Normal rate and regular rhythm.      Pulses: Normal pulses.      Heart sounds: Normal heart sounds.   Pulmonary:      Effort: Pulmonary effort is normal.      Breath sounds: Normal breath sounds.   Abdominal:      General: Bowel sounds are normal. There is no distension.      Palpations: Abdomen is soft.      Tenderness: There is no abdominal  "tenderness.      Comments: Soft, non tender, ostomy pink. Pouch in place; watery brown effluent. Small opaque pouch in place over colostomy.     Musculoskeletal:         General: Normal range of motion.   Skin:     General: Skin is warm and dry.   Neurological:      General: No focal deficit present.      Mental Status: He is alert and oriented to person, place, and time. Mental status is at baseline.   Psychiatric:         Mood and Affect: Mood normal.         Behavior: Behavior normal.         Thought Content: Thought content normal.          PAT AIRWAY:   Airway:     Mallampati::  II    TM distance::  >3 FB    Neck ROM::  Full      Testing/Diagnostic:     Patient Specialist/PCP:     Visit Vitals  /81   Pulse 70   Temp 36.9 °C (98.4 °F) (Oral)   Ht 1.727 m (5' 8\")   Wt 73.3 kg (161 lb 11.2 oz)   SpO2 97%   BMI 24.59 kg/m²   Smoking Status Former   BSA 1.88 m²       DASI Risk Score      Flowsheet Row Pre-Admission Testing from 7/8/2025 in [facility] Questionnaire Series Submission from 6/25/2025 in [facility] MOS OR [facility]   Can you take care of yourself (eat, dress, bathe, or use toilet)?  -- 2.75  filed at 06/25/2025 1318   Can you walk indoors, such as around your house? 1.75 filed at 07/08/2025 0927 1.75  filed at 06/25/2025 1318   Can you walk a block or two on level ground?  2.75 filed at 07/08/2025 0927 2.75  filed at 06/25/2025 1318   Can you climb a flight of stairs or walk up a hill? 5.5 filed at 07/08/2025 0927 5.5  filed at 06/25/2025 1318   Can you run a short distance? 8 filed at 07/08/2025 0927 8  filed at 06/25/2025 1318   Can you do light work around the house like dusting or washing dishes? 2.7 filed at 07/08/2025 0927 2.7  filed at 06/25/2025 1318   Can you do moderate work around the house like vacuuming, sweeping floors or carrying groceries? 3.5 filed at 07/08/2025 0927 3.5  filed at 06/25/2025 1318   Can you do heavy work around " the house like scrubbing floors or lifting and moving heavy furniture?  0 filed at 07/08/2025 0927 0  filed at 06/25/2025 1318   Can you do yard work like raking leaves, weeding or pushing a mower? 0 filed at 07/08/2025 0927 0  filed at 06/25/2025 1318   Can you have sexual relations? 5.25 filed at 07/08/2025 0927 5.25  filed at 06/25/2025 1318   Can you participate in moderate recreational activities like golf, bowling, dancing, doubles tennis or throwing a baseball or football? 0 filed at 07/08/2025 0927 0  filed at 06/25/2025 1318   Can you participate in strenous sports like swimming, singles tennis, football, basketball, or skiing? 0 filed at 07/08/2025 0927 0  filed at 06/25/2025 1318   DASI SCORE -- 32.2  filed at 06/25/2025 1318   METS Score (Will be calculated only when all the questions are answered) -- 6.7  filed at 06/25/2025 1318          Caprini DVT Assessment      Flowsheet Row Admission (Discharged) from 3/25/2025 in [place] [address] with Jose Guadalupe Gomes MD Admission (Discharged) from Enteroscopy from 12/12/2024 in [facility] 9 with Kraig Cage MD   DVT Score (IF A SCORE IS NOT CALCULATING, MUST SELECT A BMI TO COMPLETE) -- 3 filed at 12/12/2024 1440   Medical Factors Central venous access filed at 03/24/2025 1222 --   BMI (BMI MUST BE CHOSEN) -- 30 or less filed at 12/12/2024 1440          Modified Frailty Index    No data to display       [id number] Stroke Risk Points  Current as of just now        N/A 0 to 9 Points:      Last Change: N/A          The [id number] risk score (Lip RYASA, et al. 2009. © 2010 American [employer]) quantifies the risk of stroke for a patient with atrial fibrillation. For patients without atrial fibrillation or under the age of 18 this score appears as N/A. Higher score values generally indicate higher risk of stroke.        This score is not applicable to this patient. Components are not  calculated.          Revised Cardiac Risk Index    No data to display       Apfel Simplified Score    No data to display       Risk Analysis Index Results This Encounter    No data found in the last 10 encounters.       Stop Bang Score      Flowsheet Row Pre-Admission Testing from 7/8/2025 in [facility] Questionnaire Series Submission from 6/25/2025 in [facility] OR with Generic Provider Steffanyhart   Do you snore loudly? 0 filed at 07/08/2025 0926 0  filed at 06/25/2025 1318   Do you often feel tired or fatigued after your sleep? 0 filed at 07/08/2025 0926 0  filed at 06/25/2025 1318   Has anyone ever observed you stop breathing in your sleep? 0 filed at 07/08/2025 0926 0  filed at 06/25/2025 1318   Do you have or are you being treated for high blood pressure? 0 filed at 07/08/2025 0926 0  filed at 06/25/2025 1318   Recent BMI (Calculated) 24.6 filed at 07/08/2025 0926 24.6  filed at 06/25/2025 1318   Is BMI greater than 35 kg/m2? 0=No filed at 07/08/2025 0926 0=No  filed at 06/25/2025 1318   Age older than 50 years old? 1=Yes filed at 07/08/2025 0926 1=Yes  filed at 06/25/2025 1318   Is your neck circumference greater than 17 inches (Male) or 16 inches (Female)? 0 filed at 07/08/2025 0926 --   Gender - Male 1=Yes filed at 07/08/2025 0926 1=Yes  filed at 06/25/2025 1318   STOP-BANG Total Score 2 filed at 07/08/2025 0926 --          Prodigy: High Risk  Total Score: 19              Prodigy Age Score      Prodigy Gender Score     Prodigy Previous Opioid Use Score           ARISCAT Score for Postoperative Pulmonary Complications    No data to display       Funk Perioperative Risk for Myocardial Infarction or Cardiac Arrest (CANDACE)    No data to display         Assessment and Plan     History of Present Illness     59 y.o.  male  scheduled for ventral hiatal hernia repair and take down of fistula and colostomy closure on 7/22/25 with Dr. Olson. PMHX includes perforated diverticultis  s/p Margarita's procedure complicated by formation of enterocutaneous fistula.  Presents to [facility] today for perioperative risk stratification and optimization      Neuro   No hx of seizure or strokes  Hx of anxiety on xanax, uses at bedtime to fall asleep    No neurologic diagnosis or significant findings on chart review, clinical presentation and evaluation.  No grossly apparent neurologic perioperative risk.    Patient is not at increased risk for perioperative CVA    HEENT   No HEENT diagnosis or significant findings on chart review or clinical presentation and evaluation. No further preoperative testing/intervention indicated at this time.      Cardiovascular   No CV diagnosis or significant findings on chart review or clinical presentation and evaluation. No further preoperative testing or intervention is indicated at this time.    METS: 6.7  RCRI: 0 points, 3.9%  risk for postoperative MACE   CANDACE: 0% risk for 30-day postoperative MACE  EKG - 8/26/24  - NSR, HR 66, , . No ST changes or T wave inversions, PACs  ECHO 6/5/24 (indication listed was HTN; pre-op)   1. Left ventricular systolic function is normal with a 70% estimated ejection fraction.   2. Poorly visualized anatomical structures due to suboptimal image quality.   3. There is normal right ventricular global systolic function.    4. No valvular disease     Pulmonary   Denies hx of asthma or COPD- denies use of an inhaler  Quit smoking 6/24/25- smoked 2 packs a day for 30 years    No pulmonary diagnosis, however patient is at increased risk of perioperative complications secondary to  age > 60, Tobacco abuse, major surgery, duration of surgery > 2 hours  Stop Bang score is 3 placing patient at high risk for INDERJIT  ARISCAT: 26-44 points, 13.3% risk of in-hospital postoperative pulmonary complication  PRODIGY: High risk for opioid-induced respiratory depression  Smoking cessation discussed with patient, patient also provided cessation  education/hotline handout, Pumonary toilet education discussed, patient also provided deep breathing exercises and incentive spirometry educational handout    Pulm med referral placed for sleep apnea work up.     Renal   No renal diagnosis or significant findings on chart review, clinical presentation or evaluation. No further preoperative testing/intervention is indicated at this time.    Endocrine   No endocrine diagnosis or significant findings on chart review or clinical presentation and evaluation. No further testing or intervention is indicated at this time.    Hematologic     No hematologic diagnosis or significant findings on chart review or clinical presentation and evaluation. No further testing or intervention is indicated at this time.   Caprini Score 8, patient at High risk for perioperative DVT.  Patient provided VTE education/handout.    Gastrointestinal   PMH perforated diverticultis s/p Margarita's procedure complicated by formation of enterocutaneous fistula in 2024.   Currently on TPN and electrolyte replacements    Eat-10 score 0    Infectious Disease   No infectious diagnosis or significant findings on chart review or clinical presentation and evaluation.   Prescription provided for CHG body wash and dental rinse. CHG use instructions reviewed and provided to patient.  Staph screen collected      Musculoskeletal   No diagnosis or significant findings on chart review or clinical presentation and evaluation.       Anesthesia/Airway   No anesthesia complications    Labs ordered: T&S, MRSA    Medication, NPO, and all other CPM instructions were reviewed with the patient.  All patient questions were addressed.    Patient seen and staffed with Dr. John             [1]  Past Medical History:  Diagnosis Date   • Acute pharyngitis, unspecified     Sore throat   • Acute upper respiratory infection, unspecified 02/13/2017    Acute URI   • Alcohol abuse, in remission 09/12/2018    History of alcohol abuse    • Anxiety    • Benign essential HTN 02/14/2023   • Elevated blood-pressure reading, without diagnosis of hypertension 02/23/2015    Elevated blood pressure reading without diagnosis of hypertension   • Encounter for immunization 10/10/2014    Need for Tdap vaccination   • Encounter for screening for malignant neoplasm of colon 01/04/2017    Encounter for colonoscopy due to history of adenomatous colonic polyps   • Encounter for screening for malignant neoplasm of colon 11/02/2016    Encounter for screening colonoscopy   • Encounter for screening for malignant neoplasm of colon 11/02/2016    Encounter for screening colonoscopy   • Encounter for screening for malignant neoplasm of prostate 09/12/2018    Prostate cancer screening   • Essential (primary) hypertension 03/14/2019    Elevated blood pressure reading with diagnosis of hypertension   • Hernia, internal 6/05/2024   • Impingement syndrome of right shoulder 06/06/2016    Impingement syndrome of right shoulder   • Incomplete rotator cuff tear or rupture of right shoulder, not specified as traumatic 09/12/2018    Partial nontraumatic tear of right rotator cuff   • Noninfective gastroenteritis and colitis, unspecified 01/23/2018    Acute gastroenteritis   • Other general symptoms and signs 11/02/2016    Flu-like symptoms   • Other malaise 11/02/2016    Malaise and fatigue   • Pain in left ankle and joints of left foot 10/10/2017    Left ankle pain   • Pain in left foot 09/12/2017    Left foot pain   • Pain in right shoulder 04/11/2016    Right shoulder pain   • Pain in thoracic spine 09/12/2018    Thoracic back pain   • Pain, unspecified 02/09/2017    Body aches   • Personal history of colonic polyps 01/04/2017    History of colonic polyps   • Personal history of other diseases of male genital organs 03/15/2019    History of acute prostatitis   • Personal history of other diseases of the respiratory system 04/07/2020    History of acute sinusitis   • Personal  history of other specified conditions 03/14/2019    History of flank pain   • Personal history of other specified conditions 10/10/2014    History of paresthesia   • Strain of muscle, fascia and tendon of other parts of biceps, right arm, initial encounter 03/11/2016    Rupture of biceps tendon, right, initial encounter   [2]  Past Surgical History:  Procedure Laterality Date   • ABDOMINAL SURGERY     • COLON SURGERY  6/5/24   • SHOULDER SURGERY  06/27/2017    Shoulder Surgery   • SMALL INTESTINE SURGERY  6/6/24   [3]  Family History  Problem Relation Name Age of Onset   • Other (CARDIAC DISORDER) Mother Sp    • Hypertension Mother Sp    • Arthritis Mother Sp    • Other (CARDIAC DISORDR) Father David    • Hypertension Father David    • Hypertension Sister Kush    • Heart attack Brother Jori    • Hypertension Brother Jori    [4]  No Known Allergies

## 2025-07-08 NOTE — PREPROCEDURE INSTRUCTIONS
Thank you for visiting [facility] today for your pre-procedure evaluation, you were seen by Dr. Al ([phone])    This summary includes instructions and information to aid you during your perioperative period.  Please read carefully. If you have any questions about your visit today, please call the number listed above.  If you become ill or have any changes to your health before your surgery, please contact your primary care provider and alert your surgeon.    Preparing for your Surgery       Exercises  Preoperative Deep Breathing Exercises  Why it is important to do deep breathing exercises before my surgery?  Deep breathing exercises strengthen your breathing muscles.  This helps you to recover after your surgery and decreases the chance of breathing complications.  How are the deep breathing exercises done?  Sit straight with your back supported.  Breathe in deeply and slowly through your nose. Your lower rib cage should expand and your abdomen may move forward.  Hold that breath for 3 to 5 seconds.  Breathe out through pursed lips, slowly and completely.  Rest and repeat 10 times every hour while awake.  Rest longer if you become dizzy or lightheaded.      Preoperative Brain Exercises    What are brain exercises?  A brain exercise is any activity that engages your thinking (cognitive) skills.    What types of activities are considered brain exercises?  Jigsaw puzzles, crossword puzzles, word jumble, memory games, word search, and many more.  Many can be found free online or on your phone via a mobile anderson.    Why should I do brain exercises before my surgery?  More recent research has shown brain exercise before surgery can lower the risk of postoperative delirium (confusion) which can be especially important for older adults.  Patients who did brain exercises for 5 to 10 hours the days before surgery, cut their risk of postoperative delirium in half up to 1 week after  surgery.    Sit-to-Stand Exercise    What is the sit-to-stand exercise?  The sit-to-stand exercise strengthens the muscles of your lower body and muscles in the center of your body (core muscles for stability) helping to maintain and improve your strength and mobility.  How do I do the sit-to-stand exercise?  The goal is to do this exercise without using your arms or hands.  If this is too difficult, use your arms and hands or a chair with armrests to help slowly push yourself to the standing position and lower yourself back to the sitting position. As the movement becomes easier use your arms and hands less.    Steps to the sit-to-stand exercise  Sit up tall in a sturdy chair, knees bent, feet flat on the floor shoulder-width apart.  Shift your hips/pelvis forward in the chair to correctly position yourself for the next movement.  Lean forward at your hips.  Stand up straight putting equal weight on both feet.  Check to be sure you are properly aligned with the chair, in a slow controlled movement sit back down.  Repeat this exercise 10-15 times.  If needed you can do it fewer times until your strength improves.  Rest for 1 minute.  Do another 10-15 sit-to-stand exercises.  Try to do this in the morning and evening.        Instructions    Preoperative Fasting Guidelines    Why must I stop eating and drinking near surgery time?  With sedation, food or liquid in your stomach can enter your lungs causing serious complications  Food can increase nausea and vomiting  When do I need to stop eating and drinking before my surgery?      Do not eat any food after midnight the night before your surgery/procedure. You may have up to 13.5 ounces of clear liquid until TWO hours before your instructed arrival time to the hospital.  This includes water, black tea/coffee, (no milk or cream) apple juice, and electrolyte drinks (Gatorade). You may chew gum until TWO hours before your surgery/procedure            Simple things you can  do to help prevent blood clots     Blood clots are blockages that can form in the body's veins. When a blood clot forms in your deep veins, it may be called a deep vein thrombosis, or DVT for short. Blood clots can happen in any part of the body where blood flows, but they are most common in the arms and legs. If a piece of a blood clot breaks free and travels to the lungs, it is called a pulmonary embolus (PE). A PE can be a very serious problem.         Being in the hospital or having surgery can raise your chances of getting a blood clot because you may not be well enough to move around as much as you normally do.         Ways you can help prevent blood clots in the hospital       Wearing SCDs  SCDs stands for Sequential Compression Devices.   SCDs are special sleeves that wrap around your legs. They attach to a pump that fills them with air to gently squeeze your legs every few minutes.  This helps return the blood in your legs to your heart.   SCDs should only be taken off when walking or bathing. SCDs may not be comfortable, but they can help save your life.              Pump SCD leg sleeves  Wearing compression stockings - if your doctor orders them. These special snug-fitting stockings gently squeeze your legs to help blood flow.       Walking. Walking helps move the blood in your legs.   If your doctor says it is ok, try walking the halls at least   5 times a day. Ask us to help you get up, so you don't fall.      Taking any blood-thinning medicines your doctor orders.              Ways you can help prevent blood clots at home         Wearing compression stockings - if your doctor orders them.   Walking - to help move the blood in your legs.    Taking any blood-thinning medicines your doctor orders.      Signs of a blood clot or PE    Tell your doctor or nurse right away if you have any of the problems listed below.         If you are at home, seek medical care right away. Call 911 for chest pain or  problems breathing.            Signs of a blood clot (DVT) - such as pain, swelling, redness, or warmth in your arm or legs.  Signs of a pulmonary embolism (PE) - such as chest pain or feeling short of breath      Tobacco and Alcohol;  Do not drink alcohol or smoke within 24 hours of surgery.  It is best to quit smoking for as long as possible before any surgery or procedure.    Quitting Smoking; Recognizing Dangerous Situations:  1-Alcohol use during the first month after quitting, 2-Being around smoke or someone who smokes 3-Times situation routinely smoked  4-Triggers-car, breaks, coffee, when awakening, social events  Coping Skills: 1-Learning new ways to manage stress 2-Exercising 3-Relaxation breathing   4-Change routines 5-Distraction techniques    Websites:  Smoke-Free - offers free text messages and an anderson to help you quit. Info includes eating and mood issues that may come with quitting. There is a Live Helpline to talk to an expert. Go to smokefree.gov; Become an Ex-Smoker - the free EX Plan is based on scientific research and useful advice from ex-smokers. It isn't just about quitting smoking.  It's about re-learning life without cigarettes using a 3-step program.  Go to becomeanex.org   Centers for Disease Control offer many suggestions for helping you quit. Includes a Quit Guide and real-life stories. There are sections for specific groups such as LGBT, , different ethnic groups, and pregnant women.  Go to cdc.gov/tobacco/campaign/tips    Other Resources:  [place] Tobacco Quit Line - call 1-800-QUIT-NOW or 1-109.236.2512.  [facility] 2-1-1 - to find local programs and resources. Call 211 or go to 211.org.  [facility] Tobacco Cessation Program - call 381-828-6772.  American Lung Association offers classes for quitting smoking. Some places may charge a fee. For a list of classes, go to lung.org or call 1-800LUNG-USA.     Some things to think about:; The health benefits of quitting smoking  can help most of the major parts of your body. There is no safe amount of cigarette smoke. Quitting smoking can add years to your life. When you quit, you'll also protect your loved ones from dangerous secondhand smoke. Make a plan, join a support group, and talk to your physician to assist in quitting smoking.                                                                                                              , Quitting Alcohol; Things to think about: Alcohol use disorder is a health condition that can improve with treatment. Each person is unique. A treatment that is good for one person may not be a good fit for someone else. Simply knowing the different options can be an important first step. Treatment can be inpatient, where you stay at a facility, or outpatient, where you stay at home. Your insurance plan may cover some treatment costs.   Resources:    NIAAA Alcohol Treatment Navigator - from the National [place] on Alcohol Abuse and  Alcoholism. Offers an online tool to help you find the right treatment for you - near you. Go to alcoholtreatment.niaaa.nih.gov.  [facility] National Hot[facility]  - from the Substance Abuse and Mental Health Services Administration. A free, confidential 24-hour treatment referral and information service for anyone facing mental and/or substance use disorders. Go to findtreatment.gov or call 9-657-276-HELP (4822).    Alcoholics Anonymous (AA) - offers group meetings and a 12-step program to anyone who has a drinking problem. Go to aa.org or call 251-944-6201    [facility] 2-1-1 - to find local programs and resources. Call 211 or go to 211.org    Other people you can talk to about treatment options include your primary care doctor, health insurance plan, local health department, or employee assistance program. You can also ask to talk to a hospital .          Other Instructions  Why did I have my nose, under my arms, and groin swabbed? The purpose of the swab is to  "identify Staphylococcus aureus inside your nose or on your skin.  The swab was sent to the laboratory for culture.  A positive swab/culture for Staphylococcus aureus is called colonization or carriage.   What is Staphylococcus aureus? Staphylococcus aureus, also known as \"staph\", is a germ found on the skin or in the nose of healthy people.  Sometimes Staphylococcus aureus can get into the body and cause an infection.  This can be minor (such as pimples, boils, or other skin problems).  It might also be serious (such as a blood infection, pneumonia, or a surgical site infection). What is Staphylococcus aureus colonization or carriage? Colonization or carriage means that a person has the germ but is not sick from it.  These bacteria can be spread on the hands or when breathing or sneezing. How is Staphylococcus aureus spread? It is most often spread by close contact with a person or item that carries it. What happens if my culture is positive for Staphylococcus aureus? Your doctor/medical team will use this information to guide any antibiotic treatment which may be necessary.  Regardless of the culture results, we will clean the inside of your nose with a betadine swab just before you have your surgery. Will I get an infection if I have Staphylococcus aureus in my nose or on my skin? Anyone can get an infection with Staphylococcus aureus.  However, the best way to reduce your risk of infection is to follow the instructions provided to you for the use of your CHG soap and dental rinse.  , Body Wash; What is a home preoperative antibacterial shower? This shower is a way of cleaning the skin with a germ-killing solution before surgery.  The solution contains chlorhexidine, commonly known as CHG.  CHG is a skin cleanser with germ-killing ability.  Let your doctor know if you are allergic to chlorhexidine. Why do I need to take a preoperative antibacterial shower? Skin is not sterile.  It is best to try to make your skin " as free of germs as possible before surgery.  Proper cleansing with a germ-killing soap before surgery can lower the number of germs on your skin.  This helps to reduce the risk of infection at the surgical site.  Following the instructions listed below will help you prepare your skin for surgery.   How do I use the solution? Steps:  Begin using your CHG soap 5 days before your scheduled surgery on ___________.   First, wash and rinse your hair using the CHG soap. Keep CHG soap away from ear canals and eyes.  Rinse completely, do not condition.  Hair extensions should be removed. , Oral/Dental Rinse: What is oral/dental rinse?  It is a mouthwash. It is a way of cleaning the mouth with a germ-killing solution before your surgery.  The solution contains chlorhexidine, commonly known as CHG. It is used inside the mouth to kill a bacteria known as Staphylococcus aureus.  Let your doctor know if you are allergic to Chlorhexidine. Why do I need to use CHG oral/dental rinse? The CHG oral/dental rinse helps to kill a bacteria in your mouth known as Staphylococcus aureus.  This reduces the risk of infection at the surgical site.  Using your CHG oral/dental rinse STEPS: Use your CHG oral/dental rinse after you brush your teeth the night before (at bedtime) and the morning of your surgery.  Follow all directions on your prescription label.  Use the cap on the container to measure 15 ml.  Swish (gargle if you can) the mouthwash in your mouth for at least 30 seconds, (do not swallow) and spit out.  After you use your CHG rinse, do not rinse your mouth with water, drink or eat.  Please refer to the prescription label for the appropriate time to resume oral intake What side effects might I have using the CHG oral/dental rinse? CHG rinse will stick to plaque on the teeth.  Brush and floss just before use.  Teeth brushing will help avoid staining of plaque during use.         The Week before Surgery        Seven days before  Surgery  Check your [facility] medication instructions  Do the exercises provided to you by [facility]   Arrange for a responsible, adult licensed  to take you home after surgery and stay with you for 24 hours.  You will not be permitted to drive yourself home if you have received any anesthetic/sedation  Six days before surgery  Check your [facility] medication instructions  Do the exercises provided to you by [facility]   Start using Chlorhexidene (CHG) body wash if prescribed  Five days before surgery  Check your [facility] medication instructions  Do the exercises provided to you by [facility]   Continue to use CHG body wash if prescribed  Three days before surgery  Check your [facility] medication instructions  Do the exercises provided to you by [facility]   Continue to use CHG body wash if prescribed  Two days before surgery  Check your [facility] medication instructions  Do the exercises provided to you by [facility]   Continue to use CHG body wash if prescribed    The Day before Surgery       Check your [facility] medication and all other [facility] instructions including when to stop eating and drinking  You will be called with your arrival time for surgery in the late afternoon.  If you do not receive a call please reach out to your surgeon's office.  Do not smoke or drink 24 hours before surgery  Prepare items to bring with you to the hospital  Shower with your chlorhexidine wash if prescribed  Brush your teeth and use your chlorhexidine dental rinse if prescribed    The Day of Surgery       Check your [facility] medication instructions  Ensure you follow the instructions for when to stop eating and drinking  Shower, if prescribed use CHG.  Do not apply any lotions, creams, moisturizers, perfume or deodorant  Brush your teeth and use your CHG dental rinse if prescribed  Wear loose comfortable clothing  Avoid make-up  Remove  jewelry and piercings, consider professional piercing removal with a plastic spacer if needed  Bring photo ID and Insurance card  Bring an accurate medication list  that includes medication dose, frequency and allergies  Bring a copy of your advanced directives (will, health care power of )  Bring any devices and controllers as well as medical devices you have been provided with for surgery (CPAP, slings, braces, etc.)  Dentures, eyeglasses, and contacts will be removed before surgery, please bring cases for contacts or glasses

## 2025-07-08 NOTE — CPM/PAT H&P
CPM/PAT Evaluation       Name: Bereket Em (Bereket Em)  /Age: 1965/59 y.o.     In-Person       Chief Complaint: ventral hiatal hernia repair and take down of fistula and colostomy closure on 25    HPI 59 y.o.  male  scheduled for ventral hiatal hernia repair and take down of fistula and colostomy closure on 25 with Dr. Olson. PMHX includes perforated diverticultis s/p Margarita's procedure complicated by formation of enterocutaneous fistula.  Presents to [facility] today for perioperative risk stratification and optimization    Medical History[1]    Surgical History[2]    Patient  reports being sexually active and has had partner(s) who are female. He reports using the following method of birth control/protection: None.    Family History[3]    Allergies[4]    Prior to Admission medications    Medication Sig Start Date End Date Taking? Authorizing Provider   0.9 % sodium chloride (sodium chloride 0.9%) solution Infuse 1,000 mL into a venous catheter once every 24 hours. Infuse 1000ml over 1-2 hours via gravity once daily. 25 Yes Bereket Perry MD   0.9 % sodium chloride (sodium chloride, PF, 0.9%) injection Infuse 10 mL into a venous catheter once daily. 10 ml NS once daily SASH and as needed 20 ml after blood work   Yes Historical Provider, MD   acetaminophen (Tylenol) 500 mg tablet Take 2 tablets (1,000 mg) by mouth every 6 hours if needed for mild pain (1 - 3).   Yes Historical Provider, MD   ALPRAZolam (Xanax) 0.25 mg tablet Take 1-2 tablets (0.25-0.5 mg) by mouth 3 times a day as needed for anxiety. 25 Yes Candy Howell PA-C   Custom Cyclic TPN Infuse 2,250 mL over 12 hours into a venous catheter (central line) continuously. Taper up for 1 Hours. Taper down for 1 Hours. 25 Yes Bereket Perry MD   heparin flush (heparin LockFlush,Porcine,,PF,) 10 unit/mL injection Infuse 5 mL (50 Units) into a venous catheter once daily.   Yes Historical  MD Ezekiel   oral electrolytes replacement, Pedialyte, solution solution Take 500 mL by mouth 2 times a day. 12/17/24  Yes ASHLY Yin   oxyCODONE (Roxicodone) 5 mg immediate release tablet Take 1 tablet (5 mg) by mouth as needed at bedtime for severe pain (7 - 10). 6/17/25  Yes Bereket Perry MD   pantoprazole (ProtoNix) 40 mg EC tablet Take 1 tablet (40 mg) by mouth once daily in the morning. Take before meals for 21 days. Do not crush, chew, or split. 3/28/25 7/8/25 Yes Ana Almaguer PA-C   traZODone (Desyrel) 100 mg tablet Take 1 tablet (100 mg) by mouth as needed at bedtime for sleep. 5/20/25 5/20/26 Yes Candy Howell PA-C   gabapentin (Neurontin) 100 mg capsule Take 1 capsule at bedtime for three nights before surgery. 6/26/25   ASHLY Guerra   metroNIDAZOLE (Flagyl) 250 mg tablet Take 1 tablet at 6pm, 7pm and 11pm the night before surgery. 6/26/25   ASHLY Guerra   neomycin (Mycifradin) 500 mg tablet Take two tablets (1000 mg) at 6pm, 7pm and 11pm the night before surgery. 6/26/25   ASHLY Guerra        PAT ROS:   Constitutional:   neg    Neuro/Psych:   neg    Eyes:   Ears:   Nose:   Mouth:   Throat:   Neck:   Cardio:   neg     no chest pain   no palpitations   no dyspnea  Respiratory:    no cough   no wheezing  Endocrine:   neg    GI:    no abdominal distention   no abdominal pain  :   neg    Musculoskeletal:   neg    Hematologic:   neg    Skin:      Physical Exam  Constitutional:       Appearance: Normal appearance.   HENT:      Head: Normocephalic and atraumatic.   Cardiovascular:      Rate and Rhythm: Normal rate and regular rhythm.      Pulses: Normal pulses.      Heart sounds: Normal heart sounds.   Pulmonary:      Effort: Pulmonary effort is normal.      Breath sounds: Normal breath sounds.   Abdominal:      General: Bowel sounds are normal. There is no distension.      Palpations: Abdomen is soft.      Tenderness: There is no abdominal  "tenderness.      Comments: Soft, non tender, ostomy pink. Pouch in place; watery brown effluent. Small opaque pouch in place over colostomy.     Musculoskeletal:         General: Normal range of motion.   Skin:     General: Skin is warm and dry.   Neurological:      General: No focal deficit present.      Mental Status: He is alert and oriented to person, place, and time. Mental status is at baseline.   Psychiatric:         Mood and Affect: Mood normal.         Behavior: Behavior normal.         Thought Content: Thought content normal.          PAT AIRWAY:   Airway:     Mallampati::  II    TM distance::  >3 FB    Neck ROM::  Full      Testing/Diagnostic:     Patient Specialist/PCP:     Visit Vitals  /81   Pulse 70   Temp 36.9 °C (98.4 °F) (Oral)   Ht 1.727 m (5' 8\")   Wt 73.3 kg (161 lb 11.2 oz)   SpO2 97%   BMI 24.59 kg/m²   Smoking Status Former   BSA 1.88 m²       DASI Risk Score      Flowsheet Row Pre-Admission Testing from 7/8/2025 in [facility] Questionnaire Series Submission from 6/25/2025 in [facility] MOS OR [facility]   Can you take care of yourself (eat, dress, bathe, or use toilet)?  -- 2.75  filed at 06/25/2025 1318   Can you walk indoors, such as around your house? 1.75 filed at 07/08/2025 0927 1.75  filed at 06/25/2025 1318   Can you walk a block or two on level ground?  2.75 filed at 07/08/2025 0927 2.75  filed at 06/25/2025 1318   Can you climb a flight of stairs or walk up a hill? 5.5 filed at 07/08/2025 0927 5.5  filed at 06/25/2025 1318   Can you run a short distance? 8 filed at 07/08/2025 0927 8  filed at 06/25/2025 1318   Can you do light work around the house like dusting or washing dishes? 2.7 filed at 07/08/2025 0927 2.7  filed at 06/25/2025 1318   Can you do moderate work around the house like vacuuming, sweeping floors or carrying groceries? 3.5 filed at 07/08/2025 0927 3.5  filed at 06/25/2025 1318   Can you do heavy work around " the house like scrubbing floors or lifting and moving heavy furniture?  0 filed at 07/08/2025 0927 0  filed at 06/25/2025 1318   Can you do yard work like raking leaves, weeding or pushing a mower? 0 filed at 07/08/2025 0927 0  filed at 06/25/2025 1318   Can you have sexual relations? 5.25 filed at 07/08/2025 0927 5.25  filed at 06/25/2025 1318   Can you participate in moderate recreational activities like golf, bowling, dancing, doubles tennis or throwing a baseball or football? 0 filed at 07/08/2025 0927 0  filed at 06/25/2025 1318   Can you participate in strenous sports like swimming, singles tennis, football, basketball, or skiing? 0 filed at 07/08/2025 0927 0  filed at 06/25/2025 1318   DASI SCORE -- 32.2  filed at 06/25/2025 1318   METS Score (Will be calculated only when all the questions are answered) -- 6.7  filed at 06/25/2025 1318          Caprini DVT Assessment      Flowsheet Row Admission (Discharged) from 3/25/2025 in [place] [address] with Jose Guadalupe Gomes MD Admission (Discharged) from Enteroscopy from 12/12/2024 in [facility] 9 with Kraig Cage MD   DVT Score (IF A SCORE IS NOT CALCULATING, MUST SELECT A BMI TO COMPLETE) -- 3 filed at 12/12/2024 1440   Medical Factors Central venous access filed at 03/24/2025 1222 --   BMI (BMI MUST BE CHOSEN) -- 30 or less filed at 12/12/2024 1440          Modified Frailty Index    No data to display       [id number] Stroke Risk Points  Current as of just now        N/A 0 to 9 Points:      Last Change: N/A          The [id number] risk score (Lip RAYSA, et al. 2009. © 2010 American College [employer]) quantifies the risk of stroke for a patient with atrial fibrillation. For patients without atrial fibrillation or under the age of 18 this score appears as N/A. Higher score values generally indicate higher risk of stroke.        This score is not applicable to this patient. Components are not  calculated.          Revised Cardiac Risk Index    No data to display       Apfel Simplified Score    No data to display       Risk Analysis Index Results This Encounter    No data found in the last 10 encounters.       Stop Bang Score      Flowsheet Row Pre-Admission Testing from 7/8/2025 in [facility] Questionnaire Series Submission from 6/25/2025 in [facility] MOSC OR with Generic Provider Steffanyhart   Do you snore loudly? 0 filed at 07/08/2025 0926 0  filed at 06/25/2025 1318   Do you often feel tired or fatigued after your sleep? 0 filed at 07/08/2025 0926 0  filed at 06/25/2025 1318   Has anyone ever observed you stop breathing in your sleep? 0 filed at 07/08/2025 0926 0  filed at 06/25/2025 1318   Do you have or are you being treated for high blood pressure? 0 filed at 07/08/2025 0926 0  filed at 06/25/2025 1318   Recent BMI (Calculated) 24.6 filed at 07/08/2025 0926 24.6  filed at 06/25/2025 1318   Is BMI greater than 35 kg/m2? 0=No filed at 07/08/2025 0926 0=No  filed at 06/25/2025 1318   Age older than 50 years old? 1=Yes filed at 07/08/2025 0926 1=Yes  filed at 06/25/2025 1318   Is your neck circumference greater than 17 inches (Male) or 16 inches (Female)? 0 filed at 07/08/2025 0926 --   Gender - Male 1=Yes filed at 07/08/2025 0926 1=Yes  filed at 06/25/2025 1318   STOP-BANG Total Score 2 filed at 07/08/2025 0926 --          Prodigy: High Risk  Total Score: 19              Prodigy Age Score      Prodigy Gender Score     Prodigy Previous Opioid Use Score           ARISCAT Score for Postoperative Pulmonary Complications    No data to display       Funk Perioperative Risk for Myocardial Infarction or Cardiac Arrest (CANDACE)    No data to display         Assessment and Plan     History of Present Illness     59 y.o.  male  scheduled for ventral hiatal hernia repair and take down of fistula and colostomy closure on 7/22/25 with Dr. Olson. PMHX includes perforated diverticultis  s/p Margarita's procedure complicated by formation of enterocutaneous fistula.  Presents to [facility] today for perioperative risk stratification and optimization      Neuro   No hx of seizure or strokes  Hx of anxiety on xanax, uses at bedtime to fall asleep    No neurologic diagnosis or significant findings on chart review, clinical presentation and evaluation.  No grossly apparent neurologic perioperative risk.    Patient is not at increased risk for perioperative CVA    HEENT   No HEENT diagnosis or significant findings on chart review or clinical presentation and evaluation. No further preoperative testing/intervention indicated at this time.      Cardiovascular   No CV diagnosis or significant findings on chart review or clinical presentation and evaluation. No further preoperative testing or intervention is indicated at this time.    METS: 6.7  RCRI: 0 points, 3.9%  risk for postoperative MACE   CANDACE: 0% risk for 30-day postoperative MACE  EKG - 8/26/24  - NSR, HR 66, , . No ST changes or T wave inversions, PACs  ECHO 6/5/24 (indication listed was HTN; pre-op)   1. Left ventricular systolic function is normal with a 70% estimated ejection fraction.   2. Poorly visualized anatomical structures due to suboptimal image quality.   3. There is normal right ventricular global systolic function.    4. No valvular disease     Pulmonary   Denies hx of asthma or COPD- denies use of an inhaler  Quit smoking 6/24/25- smoked 2 packs a day for 30 years    No pulmonary diagnosis, however patient is at increased risk of perioperative complications secondary to  age > 60, Tobacco abuse, major surgery, duration of surgery > 2 hours  Stop Bang score is 3 placing patient at high risk for INDERJIT  ARISCAT: 26-44 points, 13.3% risk of in-hospital postoperative pulmonary complication  PRODIGY: High risk for opioid-induced respiratory depression  Smoking cessation discussed with patient, patient also provided cessation  education/hotline handout, Pumonary toilet education discussed, patient also provided deep breathing exercises and incentive spirometry educational handout    Pulm med referral placed for sleep apnea work up.     Renal   No renal diagnosis or significant findings on chart review, clinical presentation or evaluation. No further preoperative testing/intervention is indicated at this time.    Endocrine   No endocrine diagnosis or significant findings on chart review or clinical presentation and evaluation. No further testing or intervention is indicated at this time.    Hematologic     No hematologic diagnosis or significant findings on chart review or clinical presentation and evaluation. No further testing or intervention is indicated at this time.   Caprini Score 8, patient at High risk for perioperative DVT.  Patient provided VTE education/handout.    Gastrointestinal   PMH perforated diverticultis s/p Margarita's procedure complicated by formation of enterocutaneous fistula in 2024.   Currently on TPN and electrolyte replacements    Eat-10 score 0    Infectious Disease   No infectious diagnosis or significant findings on chart review or clinical presentation and evaluation.   Prescription provided for CHG body wash and dental rinse. CHG use instructions reviewed and provided to patient.  Staph screen collected      Musculoskeletal   No diagnosis or significant findings on chart review or clinical presentation and evaluation.       Anesthesia/Airway   No anesthesia complications    Labs ordered: T&S, MRSA    Medication, NPO, and all other CPM instructions were reviewed with the patient.  All patient questions were addressed.    Patient seen and staffed with Dr. John           [1]   Past Medical History:  Diagnosis Date    Acute pharyngitis, unspecified     Sore throat    Acute upper respiratory infection, unspecified 02/13/2017    Acute URI    Alcohol abuse, in remission 09/12/2018    History of alcohol abuse     Anxiety     Benign essential HTN 02/14/2023    Elevated blood-pressure reading, without diagnosis of hypertension 02/23/2015    Elevated blood pressure reading without diagnosis of hypertension    Encounter for immunization 10/10/2014    Need for Tdap vaccination    Encounter for screening for malignant neoplasm of colon 01/04/2017    Encounter for colonoscopy due to history of adenomatous colonic polyps    Encounter for screening for malignant neoplasm of colon 11/02/2016    Encounter for screening colonoscopy    Encounter for screening for malignant neoplasm of colon 11/02/2016    Encounter for screening colonoscopy    Encounter for screening for malignant neoplasm of prostate 09/12/2018    Prostate cancer screening    Essential (primary) hypertension 03/14/2019    Elevated blood pressure reading with diagnosis of hypertension    Hernia, internal 6/05/2024    Impingement syndrome of right shoulder 06/06/2016    Impingement syndrome of right shoulder    Incomplete rotator cuff tear or rupture of right shoulder, not specified as traumatic 09/12/2018    Partial nontraumatic tear of right rotator cuff    Noninfective gastroenteritis and colitis, unspecified 01/23/2018    Acute gastroenteritis    Other general symptoms and signs 11/02/2016    Flu-like symptoms    Other malaise 11/02/2016    Malaise and fatigue    Pain in left ankle and joints of left foot 10/10/2017    Left ankle pain    Pain in left foot 09/12/2017    Left foot pain    Pain in right shoulder 04/11/2016    Right shoulder pain    Pain in thoracic spine 09/12/2018    Thoracic back pain    Pain, unspecified 02/09/2017    Body aches    Personal history of colonic polyps 01/04/2017    History of colonic polyps    Personal history of other diseases of male genital organs 03/15/2019    History of acute prostatitis    Personal history of other diseases of the respiratory system 04/07/2020    History of acute sinusitis    Personal history of other specified  conditions 03/14/2019    History of flank pain    Personal history of other specified conditions 10/10/2014    History of paresthesia    Strain of muscle, fascia and tendon of other parts of biceps, right arm, initial encounter 03/11/2016    Rupture of biceps tendon, right, initial encounter   [2]   Past Surgical History:  Procedure Laterality Date    ABDOMINAL SURGERY      COLON SURGERY  6/5/24    SHOULDER SURGERY  06/27/2017    Shoulder Surgery    SMALL INTESTINE SURGERY  6/6/24   [3]   Family History  Problem Relation Name Age of Onset    Other (CARDIAC DISORDER) Mother Sp     Hypertension Mother Sp     Arthritis Mother Sp     Other (CARDIAC DISORDR) Father Juan Manuelfrancia     Hypertension Father Juan Manuelfrancia     Hypertension Sister Kush     Heart attack Brother Jori     Hypertension Brother Jori    [4] No Known Allergies

## 2025-07-09 ENCOUNTER — HOME INFUSION (OUTPATIENT)
Dept: INFUSION THERAPY | Age: 60
End: 2025-07-09
Payer: COMMERCIAL

## 2025-07-09 DIAGNOSIS — K63.2 ENTEROCUTANEOUS FISTULA: ICD-10-CM

## 2025-07-09 DIAGNOSIS — E43 SEVERE MALNUTRITION (MULTI): ICD-10-CM

## 2025-07-09 LAB — STAPHYLOCOCCUS SPEC CULT: NORMAL

## 2025-07-09 NOTE — PROGRESS NOTES
Chart Review - Patient on nightly TPN 2/2 colon resection for diverticulitis - has ostomy that also requires daily NS hydration as well. Patient has been relatively stable on current formula.  Reversal surgery scheduled for 07/22/25.     Labs from 7/07 show patient hypochloremic for last 2 weeks with a current result of 92. Acetate high-normal at 32.    [facility] contacted MD and RD and recommended rebalancing chloride:acetate ratio to 1:1 to increase chloride in TPN formula. Recommendations accepted and orders given through 07/22 admit and surgery. Orders placeed in EPIC and routed to Insurance Team.    [facility] spoke with patient and relayed changes in RX. Has been giving NS bags as ordered and will continue to do so. Is agreeable to delivery tonight with standard supplies but needs no HEPARIN flushes with this delivery.     Dispense x7 1L NS, x7 TPN with MVI for cmpd and delivery on 7/09  Infusions 7/10 - 7/16     NF 7/15 - check labs and patient progress. Have Orders. Refill TPN and NS for 07/16 delivery

## 2025-07-10 ENCOUNTER — TELEPHONE (OUTPATIENT)
Dept: SURGERY | Facility: HOSPITAL | Age: 60
End: 2025-07-10
Payer: COMMERCIAL

## 2025-07-10 DIAGNOSIS — L98.8 FISTULA: Primary | ICD-10-CM

## 2025-07-10 RX ORDER — OXYCODONE HYDROCHLORIDE 5 MG/1
5 TABLET ORAL NIGHTLY PRN
Qty: 15 TABLET | Refills: 0 | Status: SHIPPED | OUTPATIENT
Start: 2025-07-10

## 2025-07-10 NOTE — TELEPHONE ENCOUNTER
I spoke to Mr. Em yesterday and today.   I messaged with Ms. Howell.     Will hold off on further Xanax as he does not feel this is helping and will return to oxycodone for wound pain control and to decrease fistula output. A prescription was sent.   WBCs 12.9 on most recent labs.  He denies fever or new symptoms.  Will obtain CXR, UA and blood cultures to assess prior to surgery.    Communicated with pharmacy about TPN and fluids.   Smoking cessation continues.    Offered to meet in clinic next week to further discuss surgery and plans but he feels comfortable with plan, options and multiple prior discussions.

## 2025-07-14 ENCOUNTER — HOME CARE VISIT (OUTPATIENT)
Dept: HOME HEALTH SERVICES | Facility: HOME HEALTH | Age: 60
End: 2025-07-14
Payer: COMMERCIAL

## 2025-07-14 VITALS
HEART RATE: 91 BPM | TEMPERATURE: 96.6 F | SYSTOLIC BLOOD PRESSURE: 120 MMHG | BODY MASS INDEX: 24.18 KG/M2 | WEIGHT: 159 LBS | RESPIRATION RATE: 12 BRPM | OXYGEN SATURATION: 97 % | DIASTOLIC BLOOD PRESSURE: 70 MMHG

## 2025-07-14 PROCEDURE — G0299 HHS/HOSPICE OF RN EA 15 MIN: HCPCS

## 2025-07-14 SDOH — ECONOMIC STABILITY: FOOD INSECURITY: MEALS PER DAY: 3

## 2025-07-14 ASSESSMENT — ENCOUNTER SYMPTOMS
APPETITE LEVEL: FAIR
LAST BOWEL MOVEMENT: 67393
CHANGE IN APPETITE: INCREASED
DENIES PAIN: 1
STOOL FREQUENCY: DAILY
FATIGUE: 1

## 2025-07-14 ASSESSMENT — PAIN SCALES - PAIN ASSESSMENT IN ADVANCED DEMENTIA (PAINAD)
CONSOLABILITY: 0
BODYLANGUAGE: 0
BODYLANGUAGE: 0 - RELAXED.
BREATHING: 0
CONSOLABILITY: 0 - NO NEED TO CONSOLE.
TOTALSCORE: 0
FACIALEXPRESSION: 0 - SMILING OR INEXPRESSIVE.
FACIALEXPRESSION: 0
NEGVOCALIZATION: 0 - NONE.
NEGVOCALIZATION: 0

## 2025-07-15 ENCOUNTER — HOME INFUSION (OUTPATIENT)
Dept: INFUSION THERAPY | Age: 60
End: 2025-07-15
Payer: COMMERCIAL

## 2025-07-15 ENCOUNTER — TELEPHONE (OUTPATIENT)
Dept: SURGERY | Facility: HOSPITAL | Age: 60
End: 2025-07-15
Payer: COMMERCIAL

## 2025-07-15 NOTE — PROGRESS NOTES
Chart Review - Patient on nightly TPN 2/2 colon resection for diverticulitis - has ostomy that also requires daily NS hydration as well. Patient has been relatively stable on current formula.  Reversal surgery scheduled for 07/22/25.    Followed by Dr Perry    No new labs to review.    [facility] called pt, left VM stating that pharmacy would deliver TPN, additives, hydration, and supplies/flushes to last thru 7/21. Pt will admit 7/22 for surgery. Pharmacy will follow inpatient.    Dispense 5x  TPN, MVI, and 1L NS for cmpd and delivery on 7/16  Infusions 7/17-7/21     FU 7/22 - check pt is admitted and give chart to intake [facility]

## 2025-07-16 LAB
BACTERIA BLD CULT: NORMAL
BACTERIA BLD CULT: NORMAL

## 2025-07-18 NOTE — TELEPHONE ENCOUNTER
I spoke with Mr. Em on 07/15 and again today about this labs and cultures.  We were waiting for his labs to appear in EPIC, but they still have not. He denies dysuria or respiratory symptoms.  His blood cultures have been no growth.  Plan to proceed with surgery on 07/22.

## 2025-07-21 ENCOUNTER — HOME CARE VISIT (OUTPATIENT)
Dept: HOME HEALTH SERVICES | Facility: HOME HEALTH | Age: 60
End: 2025-07-21
Payer: COMMERCIAL

## 2025-07-21 ENCOUNTER — DOCUMENTATION (OUTPATIENT)
Dept: SURGERY | Facility: HOSPITAL | Age: 60
End: 2025-07-21
Payer: COMMERCIAL

## 2025-07-21 VITALS
TEMPERATURE: 98.2 F | HEART RATE: 82 BPM | DIASTOLIC BLOOD PRESSURE: 64 MMHG | RESPIRATION RATE: 16 BRPM | OXYGEN SATURATION: 98 % | SYSTOLIC BLOOD PRESSURE: 114 MMHG

## 2025-07-21 LAB
BACTERIA BLD CULT: NORMAL
BACTERIA BLD CULT: NORMAL

## 2025-07-21 PROCEDURE — G0299 HHS/HOSPICE OF RN EA 15 MIN: HCPCS

## 2025-07-21 RX ADMIN — Medication 5 ML: at 15:22

## 2025-07-21 RX ADMIN — SODIUM CHLORIDE 10 ML: 9 INJECTION, SOLUTION INTRAMUSCULAR; INTRAVENOUS; SUBCUTANEOUS at 15:15

## 2025-07-21 RX ADMIN — SODIUM CHLORIDE 20 ML: 9 INJECTION, SOLUTION INTRAMUSCULAR; INTRAVENOUS; SUBCUTANEOUS at 15:20

## 2025-07-21 NOTE — PROGRESS NOTES
Pharmacy Medication History Review    Bereket Em is a 59 y.o. male who is planned to be admitted for Incarcerated ventral hernia. Pharmacy called the patient prior to their scheduled procedure and reviewed the patient's [id number] medications for accuracy.    Medications ADDED:  none  Medications CHANGED:  none  Medications REMOVED:   Pantoprazole 40mg    Please review updated prior to admission medication list and comments regarding how patient may be taking medications differently by going to Admission tab --> Admission Orders --> Admit Orders / Review prior to admission medications.     Preferred pharmacy, last doses of medications, and allergies to be confirmed with patient by nursing the day of procedure.     Sources used to complete the med history include:  Icera  Pharmacy dispense history  Patient Interview Good historian  Chart Review  Care Everywhere     Below are additional concerns with the patient's PTA list.  none    Lydia Smith [facility]  Please reach out via Secure Chat for questions

## 2025-07-21 NOTE — CONSULTS
"Wound Care Consult     Visit Date: 7/21/2025      Patient Name: Bereket Em         MRN: [id number]             Reason for Consult: Ileostomy marking          Assessment:  Marked right upper abdomen for possible loop ileostomy. Plans to close abdomen and close current colostomy.    Marked with black permanent marker on flat part of abd visible while standing and sitting.  Reviewed basic GI function and basic ostomy care.    Showed sample pouch.  Gave and reviewed booklet \"Preparing for Ostomy Surgery\".However, patient has had ostomy and is aware of how to manage ostomy pouch. Patient requested marking at same spot on opposite side of his current colostomy.   Patient reaction: Patient able to see marking. States he is happy with placement.   Plan: will follow if stoma placed.      Colostomy LLQ (Active)   Placement Date/Time: 06/07/24 1329   Location: [facility]   Number of days: 409      Colostomy LLQ (Active)     Wound Plan: Wound team will follow after surgery if needed.      SUZANNE GERHARDT, RN, BSN, CWOCN  7/21/2025  6:26 PM        "

## 2025-07-22 ENCOUNTER — ANESTHESIA (OUTPATIENT)
Dept: OPERATING ROOM | Facility: HOSPITAL | Age: 60
End: 2025-07-22
Payer: COMMERCIAL

## 2025-07-22 ENCOUNTER — HOSPITAL ENCOUNTER (INPATIENT)
Facility: HOSPITAL | Age: 60
End: 2025-07-22
Attending: SURGERY | Admitting: SURGERY
Payer: COMMERCIAL

## 2025-07-22 DIAGNOSIS — I48.91 ATRIAL FIBRILLATION, UNSPECIFIED TYPE (MULTI): ICD-10-CM

## 2025-07-22 DIAGNOSIS — K63.2 ENTEROCUTANEOUS FISTULA: Primary | ICD-10-CM

## 2025-07-22 DIAGNOSIS — A41.9 SEPTIC SHOCK (MULTI): ICD-10-CM

## 2025-07-22 DIAGNOSIS — Z98.890 S/P EXPLORATORY LAPAROTOMY: ICD-10-CM

## 2025-07-22 DIAGNOSIS — Z78.9 ON TOTAL PARENTERAL NUTRITION (TPN): ICD-10-CM

## 2025-07-22 DIAGNOSIS — K57.30 DIVERTICULOSIS OF LARGE INTESTINE WITHOUT HEMORRHAGE: ICD-10-CM

## 2025-07-22 DIAGNOSIS — R65.21 SEPTIC SHOCK (MULTI): ICD-10-CM

## 2025-07-22 DIAGNOSIS — K43.6 INCARCERATED VENTRAL HERNIA: ICD-10-CM

## 2025-07-22 LAB
ABO GROUP (TYPE) IN BLOOD: NORMAL
ALBUMIN SERPL-MCNC: 4.6 G/DL (ref 3.6–5.1)
ALP SERPL-CCNC: 126 U/L (ref 35–144)
ALT SERPL-CCNC: 49 U/L (ref 9–46)
ANION GAP SERPL CALCULATED.4IONS-SCNC: 14 MMOL/L (CALC) (ref 7–17)
ANTIBODY SCREEN: NORMAL
AST SERPL-CCNC: 36 U/L (ref 10–35)
BASOPHILS # BLD AUTO: 65 CELLS/UL (ref 0–200)
BASOPHILS NFR BLD AUTO: 0.5 %
BILIRUB SERPL-MCNC: 0.8 MG/DL (ref 0.2–1.2)
BUN SERPL-MCNC: 27 MG/DL (ref 7–25)
CALCIUM SERPL-MCNC: 10.1 MG/DL (ref 8.6–10.3)
CHLORIDE SERPL-SCNC: 90 MMOL/L (ref 98–110)
CO2 SERPL-SCNC: 32 MMOL/L (ref 20–32)
CREAT SERPL-MCNC: 0.91 MG/DL (ref 0.7–1.35)
EGFRCR SERPLBLD CKD-EPI 2021: 96 ML/MIN/1.73M2
EOSINOPHIL # BLD AUTO: 143 CELLS/UL (ref 15–500)
EOSINOPHIL NFR BLD AUTO: 1.1 %
ERYTHROCYTE [DISTWIDTH] IN BLOOD BY AUTOMATED COUNT: 14 % (ref 11–15)
GLUCOSE BLD MANUAL STRIP-MCNC: 153 MG/DL (ref 74–99)
GLUCOSE SERPL-MCNC: 86 MG/DL (ref 65–139)
HCT VFR BLD AUTO: 44.9 % (ref 38.5–50)
HGB BLD-MCNC: 13.6 G/DL (ref 13.2–17.1)
LYMPHOCYTES # BLD AUTO: 2990 CELLS/UL (ref 850–3900)
LYMPHOCYTES NFR BLD AUTO: 23 %
MAGNESIUM SERPL-MCNC: 2.1 MG/DL (ref 1.5–2.5)
MCH RBC QN AUTO: 25 PG (ref 27–33)
MCHC RBC AUTO-ENTMCNC: 30.3 G/DL (ref 32–36)
MCV RBC AUTO: 82.5 FL (ref 80–100)
MONOCYTES # BLD AUTO: 1014 CELLS/UL (ref 200–950)
MONOCYTES NFR BLD AUTO: 7.8 %
NEUTROPHILS # BLD AUTO: 8788 CELLS/UL (ref 1500–7800)
NEUTROPHILS NFR BLD AUTO: 67.6 %
PHOSPHATE SERPL-MCNC: 4 MG/DL (ref 2.5–4.5)
PLATELET # BLD AUTO: 360 THOUSAND/UL (ref 140–400)
PMV BLD REES-ECKER: 9.7 FL (ref 7.5–12.5)
POTASSIUM SERPL-SCNC: 3.3 MMOL/L (ref 3.5–5.3)
PREALB SERPL-MCNC: 41 MG/DL (ref 21–43)
PROT SERPL-MCNC: 8.1 G/DL (ref 6.1–8.1)
RBC # BLD AUTO: 5.44 MILLION/UL (ref 4.2–5.8)
RH FACTOR (ANTIGEN D): NORMAL
SODIUM SERPL-SCNC: 136 MMOL/L (ref 135–146)
TRIGL SERPL-MCNC: 97 MG/DL
WBC # BLD AUTO: 13 THOUSAND/UL (ref 3.8–10.8)

## 2025-07-22 PROCEDURE — 3600000003 HC OR TIME - INITIAL BASE CHARGE - PROCEDURE LEVEL THREE: Performed by: SURGERY

## 2025-07-22 PROCEDURE — 44125 REMOVAL OF SMALL INTESTINE: CPT | Performed by: SURGERY

## 2025-07-22 PROCEDURE — 3600000008 HC OR TIME - EACH INCREMENTAL 1 MINUTE - PROCEDURE LEVEL THREE: Performed by: SURGERY

## 2025-07-22 PROCEDURE — 2780000003 HC OR 278 NO HCPCS: Performed by: SURGERY

## 2025-07-22 PROCEDURE — 44626 REPAIR BOWEL OPENING: CPT | Performed by: COLON & RECTAL SURGERY

## 2025-07-22 PROCEDURE — 88307 TISSUE EXAM BY PATHOLOGIST: CPT | Mod: TC,SUR | Performed by: COLON & RECTAL SURGERY

## 2025-07-22 PROCEDURE — 2500000004 HC RX 250 GENERAL PHARMACY W/ HCPCS (ALT 636 FOR OP/ED): Performed by: STUDENT IN AN ORGANIZED HEALTH CARE EDUCATION/TRAINING PROGRAM

## 2025-07-22 PROCEDURE — 2500000004 HC RX 250 GENERAL PHARMACY W/ HCPCS (ALT 636 FOR OP/ED): Performed by: ANESTHESIOLOGY

## 2025-07-22 PROCEDURE — 3700000002 HC GENERAL ANESTHESIA TIME - EACH INCREMENTAL 1 MINUTE: Performed by: SURGERY

## 2025-07-22 PROCEDURE — 7100000002 HC RECOVERY ROOM TIME - EACH INCREMENTAL 1 MINUTE: Performed by: SURGERY

## 2025-07-22 PROCEDURE — 2500000005 HC RX 250 GENERAL PHARMACY W/O HCPCS: Performed by: SURGERY

## 2025-07-22 PROCEDURE — 2500000004 HC RX 250 GENERAL PHARMACY W/ HCPCS (ALT 636 FOR OP/ED): Performed by: ANESTHESIOLOGIST ASSISTANT

## 2025-07-22 PROCEDURE — 7100000001 HC RECOVERY ROOM TIME - INITIAL BASE CHARGE: Performed by: SURGERY

## 2025-07-22 PROCEDURE — 97605 NEG PRS WND THER DME<=50SQCM: CPT | Performed by: SURGERY

## 2025-07-22 PROCEDURE — 2500000004 HC RX 250 GENERAL PHARMACY W/ HCPCS (ALT 636 FOR OP/ED): Performed by: SURGERY

## 2025-07-22 PROCEDURE — 86923 COMPATIBILITY TEST ELECTRIC: CPT

## 2025-07-22 PROCEDURE — 1200000002 HC GENERAL ROOM WITH TELEMETRY DAILY

## 2025-07-22 PROCEDURE — 86901 BLOOD TYPING SEROLOGIC RH(D): CPT | Performed by: STUDENT IN AN ORGANIZED HEALTH CARE EDUCATION/TRAINING PROGRAM

## 2025-07-22 PROCEDURE — 96372 THER/PROPH/DIAG INJ SC/IM: CPT | Performed by: STUDENT IN AN ORGANIZED HEALTH CARE EDUCATION/TRAINING PROGRAM

## 2025-07-22 PROCEDURE — 2500000005 HC RX 250 GENERAL PHARMACY W/O HCPCS: Performed by: ANESTHESIOLOGIST ASSISTANT

## 2025-07-22 PROCEDURE — 2500000004 HC RX 250 GENERAL PHARMACY W/ HCPCS (ALT 636 FOR OP/ED): Performed by: NURSE ANESTHETIST, CERTIFIED REGISTERED

## 2025-07-22 PROCEDURE — P9045 ALBUMIN (HUMAN), 5%, 250 ML: HCPCS | Mod: JZ,TB | Performed by: NURSE ANESTHETIST, CERTIFIED REGISTERED

## 2025-07-22 PROCEDURE — 49596 RPR AA HRN 1ST > 10 NCR/STRN: CPT | Performed by: SURGERY

## 2025-07-22 PROCEDURE — 11005 DBRDMT SKIN ABDOMINAL WALL: CPT | Performed by: SURGERY

## 2025-07-22 PROCEDURE — C1781 MESH (IMPLANTABLE): HCPCS | Performed by: SURGERY

## 2025-07-22 PROCEDURE — 2500000004 HC RX 250 GENERAL PHARMACY W/ HCPCS (ALT 636 FOR OP/ED): Mod: JZ,TB | Performed by: ANESTHESIOLOGY

## 2025-07-22 PROCEDURE — 3700000001 HC GENERAL ANESTHESIA TIME - INITIAL BASE CHARGE: Performed by: SURGERY

## 2025-07-22 PROCEDURE — 82947 ASSAY GLUCOSE BLOOD QUANT: CPT

## 2025-07-22 PROCEDURE — 2720000007 HC OR 272 NO HCPCS: Performed by: SURGERY

## 2025-07-22 DEVICE — IMPLANTABLE DEVICE: Type: IMPLANTABLE DEVICE | Site: ABDOMEN | Status: FUNCTIONAL

## 2025-07-22 RX ORDER — NALOXONE HYDROCHLORIDE 0.4 MG/ML
0.2 INJECTION, SOLUTION INTRAMUSCULAR; INTRAVENOUS; SUBCUTANEOUS AS NEEDED
Status: ACTIVE | OUTPATIENT
Start: 2025-07-22

## 2025-07-22 RX ORDER — DEXMEDETOMIDINE HYDROCHLORIDE 4 UG/ML
INJECTION, SOLUTION INTRAVENOUS CONTINUOUS PRN
Status: DISCONTINUED | OUTPATIENT
Start: 2025-07-22 | End: 2025-07-22

## 2025-07-22 RX ORDER — OXYCODONE HYDROCHLORIDE 5 MG/1
5 TABLET ORAL EVERY 4 HOURS PRN
Status: DISCONTINUED | OUTPATIENT
Start: 2025-07-22 | End: 2025-07-22

## 2025-07-22 RX ORDER — SODIUM CHLORIDE, SODIUM LACTATE, POTASSIUM CHLORIDE, CALCIUM CHLORIDE 600; 310; 30; 20 MG/100ML; MG/100ML; MG/100ML; MG/100ML
INJECTION, SOLUTION INTRAVENOUS CONTINUOUS PRN
Status: DISCONTINUED | OUTPATIENT
Start: 2025-07-22 | End: 2025-07-22

## 2025-07-22 RX ORDER — METOCLOPRAMIDE HYDROCHLORIDE 5 MG/ML
10 INJECTION INTRAMUSCULAR; INTRAVENOUS ONCE AS NEEDED
Status: DISCONTINUED | OUTPATIENT
Start: 2025-07-22 | End: 2025-07-22 | Stop reason: HOSPADM

## 2025-07-22 RX ORDER — HYDROMORPHONE HYDROCHLORIDE 1 MG/ML
INJECTION, SOLUTION INTRAMUSCULAR; INTRAVENOUS; SUBCUTANEOUS AS NEEDED
Status: DISCONTINUED | OUTPATIENT
Start: 2025-07-22 | End: 2025-07-22

## 2025-07-22 RX ORDER — PHENOBARBITAL SODIUM 65 MG/ML
65 INJECTION, SOLUTION INTRAMUSCULAR; INTRAVENOUS EVERY 6 HOURS PRN
Status: DISCONTINUED | OUTPATIENT
Start: 2025-07-22 | End: 2025-07-25

## 2025-07-22 RX ORDER — ONDANSETRON HYDROCHLORIDE 2 MG/ML
4 INJECTION, SOLUTION INTRAVENOUS ONCE AS NEEDED
Status: DISCONTINUED | OUTPATIENT
Start: 2025-07-22 | End: 2025-07-22

## 2025-07-22 RX ORDER — HYDRALAZINE HYDROCHLORIDE 20 MG/ML
5 INJECTION INTRAMUSCULAR; INTRAVENOUS EVERY 30 MIN PRN
Status: DISCONTINUED | OUTPATIENT
Start: 2025-07-22 | End: 2025-07-22 | Stop reason: HOSPADM

## 2025-07-22 RX ORDER — MIDAZOLAM HYDROCHLORIDE 2 MG/2ML
INJECTION, SOLUTION INTRAMUSCULAR; INTRAVENOUS AS NEEDED
Status: DISCONTINUED | OUTPATIENT
Start: 2025-07-22 | End: 2025-07-22

## 2025-07-22 RX ORDER — OXYCODONE HYDROCHLORIDE 5 MG/1
10 TABLET ORAL EVERY 4 HOURS PRN
Status: DISCONTINUED | OUTPATIENT
Start: 2025-07-22 | End: 2025-07-22 | Stop reason: HOSPADM

## 2025-07-22 RX ORDER — SODIUM CHLORIDE 0.9 G/100ML
INJECTION, SOLUTION IRRIGATION AS NEEDED
Status: DISCONTINUED | OUTPATIENT
Start: 2025-07-22 | End: 2025-07-22 | Stop reason: HOSPADM

## 2025-07-22 RX ORDER — GABAPENTIN 300 MG/1
300 CAPSULE ORAL EVERY 8 HOURS SCHEDULED
Status: DISCONTINUED | OUTPATIENT
Start: 2025-07-22 | End: 2025-07-24

## 2025-07-22 RX ORDER — WATER 1 ML/ML
INJECTION IRRIGATION AS NEEDED
Status: DISCONTINUED | OUTPATIENT
Start: 2025-07-22 | End: 2025-07-22 | Stop reason: HOSPADM

## 2025-07-22 RX ORDER — OXYCODONE HYDROCHLORIDE 5 MG/1
5 TABLET ORAL EVERY 4 HOURS PRN
Status: DISCONTINUED | OUTPATIENT
Start: 2025-07-22 | End: 2025-07-22 | Stop reason: HOSPADM

## 2025-07-22 RX ORDER — PROPOFOL 10 MG/ML
INJECTION, EMULSION INTRAVENOUS AS NEEDED
Status: DISCONTINUED | OUTPATIENT
Start: 2025-07-22 | End: 2025-07-22

## 2025-07-22 RX ORDER — PANTOPRAZOLE SODIUM 40 MG/10ML
40 INJECTION, POWDER, LYOPHILIZED, FOR SOLUTION INTRAVENOUS DAILY
Status: DISPENSED | OUTPATIENT
Start: 2025-07-23

## 2025-07-22 RX ORDER — ACETAMINOPHEN 10 MG/ML
INJECTION, SOLUTION INTRAVENOUS AS NEEDED
Status: DISCONTINUED | OUTPATIENT
Start: 2025-07-22 | End: 2025-07-22

## 2025-07-22 RX ORDER — FENTANYL CITRATE 50 UG/ML
INJECTION, SOLUTION INTRAMUSCULAR; INTRAVENOUS AS NEEDED
Status: DISCONTINUED | OUTPATIENT
Start: 2025-07-22 | End: 2025-07-22

## 2025-07-22 RX ORDER — ACETAMINOPHEN 325 MG/1
975 TABLET ORAL ONCE
Status: DISCONTINUED | OUTPATIENT
Start: 2025-07-22 | End: 2025-07-22

## 2025-07-22 RX ORDER — LIDOCAINE HYDROCHLORIDE 10 MG/ML
0.1 INJECTION, SOLUTION EPIDURAL; INFILTRATION; INTRACAUDAL; PERINEURAL ONCE
Status: DISCONTINUED | OUTPATIENT
Start: 2025-07-22 | End: 2025-07-22 | Stop reason: HOSPADM

## 2025-07-22 RX ORDER — ACETAMINOPHEN 10 MG/ML
1000 INJECTION, SOLUTION INTRAVENOUS EVERY 6 HOURS
Status: DISCONTINUED | OUTPATIENT
Start: 2025-07-22 | End: 2025-07-24

## 2025-07-22 RX ORDER — SODIUM CHLORIDE, SODIUM LACTATE, POTASSIUM CHLORIDE, CALCIUM CHLORIDE 600; 310; 30; 20 MG/100ML; MG/100ML; MG/100ML; MG/100ML
125 INJECTION, SOLUTION INTRAVENOUS CONTINUOUS
Status: DISCONTINUED | OUTPATIENT
Start: 2025-07-22 | End: 2025-07-22

## 2025-07-22 RX ORDER — HYDROMORPHONE HCL/0.9% NACL/PF 15 MG/30ML
PATIENT CONTROLLED ANALGESIA SYRINGE INTRAVENOUS CONTINUOUS
Refills: 0 | Status: DISCONTINUED | OUTPATIENT
Start: 2025-07-22 | End: 2025-07-25

## 2025-07-22 RX ORDER — LIDOCAINE HCL/PF 100 MG/5ML
SYRINGE (ML) INTRAVENOUS AS NEEDED
Status: DISCONTINUED | OUTPATIENT
Start: 2025-07-22 | End: 2025-07-22

## 2025-07-22 RX ORDER — ROCURONIUM BROMIDE 10 MG/ML
INJECTION, SOLUTION INTRAVENOUS AS NEEDED
Status: DISCONTINUED | OUTPATIENT
Start: 2025-07-22 | End: 2025-07-22

## 2025-07-22 RX ORDER — ALBUTEROL SULFATE 0.83 MG/ML
2.5 SOLUTION RESPIRATORY (INHALATION) ONCE AS NEEDED
Status: DISCONTINUED | OUTPATIENT
Start: 2025-07-22 | End: 2025-07-22

## 2025-07-22 RX ORDER — ONDANSETRON HYDROCHLORIDE 2 MG/ML
INJECTION, SOLUTION INTRAVENOUS AS NEEDED
Status: DISCONTINUED | OUTPATIENT
Start: 2025-07-22 | End: 2025-07-22

## 2025-07-22 RX ORDER — MEPERIDINE HYDROCHLORIDE 25 MG/ML
12.5 INJECTION INTRAMUSCULAR; INTRAVENOUS; SUBCUTANEOUS EVERY 10 MIN PRN
Status: DISCONTINUED | OUTPATIENT
Start: 2025-07-22 | End: 2025-07-22

## 2025-07-22 RX ORDER — DEXTROSE, SODIUM CHLORIDE, SODIUM LACTATE, POTASSIUM CHLORIDE, AND CALCIUM CHLORIDE 5; .6; .31; .03; .02 G/100ML; G/100ML; G/100ML; G/100ML; G/100ML
125 INJECTION, SOLUTION INTRAVENOUS CONTINUOUS
Status: DISCONTINUED | OUTPATIENT
Start: 2025-07-22 | End: 2025-07-24

## 2025-07-22 RX ORDER — SODIUM CHLORIDE, SODIUM LACTATE, POTASSIUM CHLORIDE, CALCIUM CHLORIDE 600; 310; 30; 20 MG/100ML; MG/100ML; MG/100ML; MG/100ML
125 INJECTION, SOLUTION INTRAVENOUS CONTINUOUS
Status: DISCONTINUED | OUTPATIENT
Start: 2025-07-22 | End: 2025-07-22 | Stop reason: HOSPADM

## 2025-07-22 RX ORDER — METRONIDAZOLE 500 MG/100ML
500 INJECTION, SOLUTION INTRAVENOUS EVERY 8 HOURS
Status: COMPLETED | OUTPATIENT
Start: 2025-07-22 | End: 2025-07-23

## 2025-07-22 RX ORDER — CIPROFLOXACIN 2 MG/ML
INJECTION, SOLUTION INTRAVENOUS AS NEEDED
Status: DISCONTINUED | OUTPATIENT
Start: 2025-07-22 | End: 2025-07-22

## 2025-07-22 RX ORDER — CIPROFLOXACIN 2 MG/ML
400 INJECTION, SOLUTION INTRAVENOUS EVERY 8 HOURS
Status: COMPLETED | OUTPATIENT
Start: 2025-07-22 | End: 2025-07-23

## 2025-07-22 RX ORDER — HEPARIN SODIUM 5000 [USP'U]/ML
5000 INJECTION, SOLUTION INTRAVENOUS; SUBCUTANEOUS ONCE
Status: COMPLETED | OUTPATIENT
Start: 2025-07-22 | End: 2025-07-22

## 2025-07-22 RX ORDER — FENTANYL CITRATE 50 UG/ML
50 INJECTION, SOLUTION INTRAMUSCULAR; INTRAVENOUS EVERY 5 MIN PRN
Status: DISCONTINUED | OUTPATIENT
Start: 2025-07-22 | End: 2025-07-22

## 2025-07-22 RX ORDER — MEPERIDINE HYDROCHLORIDE 25 MG/ML
12.5 INJECTION INTRAMUSCULAR; INTRAVENOUS; SUBCUTANEOUS EVERY 10 MIN PRN
Status: DISCONTINUED | OUTPATIENT
Start: 2025-07-22 | End: 2025-07-22 | Stop reason: HOSPADM

## 2025-07-22 RX ORDER — PHENYLEPHRINE HCL IN 0.9% NACL 0.4MG/10ML
SYRINGE (ML) INTRAVENOUS AS NEEDED
Status: DISCONTINUED | OUTPATIENT
Start: 2025-07-22 | End: 2025-07-22

## 2025-07-22 RX ORDER — METOCLOPRAMIDE HYDROCHLORIDE 5 MG/ML
10 INJECTION INTRAMUSCULAR; INTRAVENOUS ONCE AS NEEDED
Status: DISCONTINUED | OUTPATIENT
Start: 2025-07-22 | End: 2025-07-22

## 2025-07-22 RX ORDER — HYDRALAZINE HYDROCHLORIDE 20 MG/ML
10 INJECTION INTRAMUSCULAR; INTRAVENOUS EVERY 30 MIN PRN
Status: DISCONTINUED | OUTPATIENT
Start: 2025-07-22 | End: 2025-07-22

## 2025-07-22 RX ORDER — PHENOBARBITAL SODIUM 65 MG/ML
32.5 INJECTION, SOLUTION INTRAMUSCULAR; INTRAVENOUS EVERY 8 HOURS
Status: DISCONTINUED | OUTPATIENT
Start: 2025-07-24 | End: 2025-07-25

## 2025-07-22 RX ORDER — ALBUMIN HUMAN 50 G/1000ML
SOLUTION INTRAVENOUS AS NEEDED
Status: DISCONTINUED | OUTPATIENT
Start: 2025-07-22 | End: 2025-07-22

## 2025-07-22 RX ORDER — SODIUM CHLORIDE, SODIUM LACTATE, POTASSIUM CHLORIDE, CALCIUM CHLORIDE 600; 310; 30; 20 MG/100ML; MG/100ML; MG/100ML; MG/100ML
100 INJECTION, SOLUTION INTRAVENOUS CONTINUOUS
Status: DISCONTINUED | OUTPATIENT
Start: 2025-07-22 | End: 2025-07-23

## 2025-07-22 RX ORDER — LIDOCAINE HYDROCHLORIDE 10 MG/ML
0.1 INJECTION, SOLUTION EPIDURAL; INFILTRATION; INTRACAUDAL; PERINEURAL ONCE
Status: DISCONTINUED | OUTPATIENT
Start: 2025-07-22 | End: 2025-07-22

## 2025-07-22 RX ORDER — FENTANYL CITRATE 50 UG/ML
25 INJECTION, SOLUTION INTRAMUSCULAR; INTRAVENOUS EVERY 5 MIN PRN
Status: DISCONTINUED | OUTPATIENT
Start: 2025-07-22 | End: 2025-07-22 | Stop reason: HOSPADM

## 2025-07-22 RX ORDER — HEPARIN SODIUM,PORCINE/PF 10 UNIT/ML
5 SYRINGE (ML) INTRAVENOUS DAILY
Status: DISCONTINUED | OUTPATIENT
Start: 2025-07-23 | End: 2025-07-24

## 2025-07-22 RX ORDER — PHENOBARBITAL SODIUM 65 MG/ML
65 INJECTION, SOLUTION INTRAMUSCULAR; INTRAVENOUS EVERY 8 HOURS
Status: ACTIVE | OUTPATIENT
Start: 2025-07-22 | End: 2025-07-24

## 2025-07-22 RX ORDER — LABETALOL HYDROCHLORIDE 5 MG/ML
5 INJECTION, SOLUTION INTRAVENOUS ONCE AS NEEDED
Status: DISCONTINUED | OUTPATIENT
Start: 2025-07-22 | End: 2025-07-22 | Stop reason: HOSPADM

## 2025-07-22 RX ORDER — HYDROMORPHONE HYDROCHLORIDE 0.2 MG/ML
0.2 INJECTION INTRAMUSCULAR; INTRAVENOUS; SUBCUTANEOUS EVERY 5 MIN PRN
Status: DISCONTINUED | OUTPATIENT
Start: 2025-07-22 | End: 2025-07-22

## 2025-07-22 RX ORDER — ALBUTEROL SULFATE 0.83 MG/ML
2.5 SOLUTION RESPIRATORY (INHALATION) ONCE AS NEEDED
Status: DISCONTINUED | OUTPATIENT
Start: 2025-07-22 | End: 2025-07-22 | Stop reason: HOSPADM

## 2025-07-22 RX ORDER — METRONIDAZOLE 500 MG/100ML
INJECTION, SOLUTION INTRAVENOUS AS NEEDED
Status: DISCONTINUED | OUTPATIENT
Start: 2025-07-22 | End: 2025-07-22

## 2025-07-22 RX ORDER — ONDANSETRON HYDROCHLORIDE 2 MG/ML
4 INJECTION, SOLUTION INTRAVENOUS ONCE AS NEEDED
Status: DISCONTINUED | OUTPATIENT
Start: 2025-07-22 | End: 2025-07-22 | Stop reason: HOSPADM

## 2025-07-22 RX ORDER — GLYCOPYRROLATE 0.2 MG/ML
INJECTION INTRAMUSCULAR; INTRAVENOUS AS NEEDED
Status: DISCONTINUED | OUTPATIENT
Start: 2025-07-22 | End: 2025-07-22

## 2025-07-22 RX ADMIN — ACETAMINOPHEN 1000 MG: 10 INJECTION, SOLUTION INTRAVENOUS at 10:41

## 2025-07-22 RX ADMIN — FENTANYL CITRATE 50 MCG: 50 INJECTION, SOLUTION INTRAMUSCULAR; INTRAVENOUS at 08:34

## 2025-07-22 RX ADMIN — HYDROMORPHONE HYDROCHLORIDE 0.8 MG: 1 INJECTION, SOLUTION INTRAMUSCULAR; INTRAVENOUS; SUBCUTANEOUS at 14:47

## 2025-07-22 RX ADMIN — HYDROMORPHONE HYDROCHLORIDE 0.5 MG: 1 INJECTION, SOLUTION INTRAMUSCULAR; INTRAVENOUS; SUBCUTANEOUS at 15:06

## 2025-07-22 RX ADMIN — ACETAMINOPHEN 1000 MG: 10 INJECTION INTRAVENOUS at 23:45

## 2025-07-22 RX ADMIN — Medication 80 MCG: at 12:19

## 2025-07-22 RX ADMIN — Medication 160 MCG: at 16:02

## 2025-07-22 RX ADMIN — HYDROMORPHONE HYDROCHLORIDE 0.5 MG: 0.5 INJECTION, SOLUTION INTRAMUSCULAR; INTRAVENOUS; SUBCUTANEOUS at 17:23

## 2025-07-22 RX ADMIN — HYDROMORPHONE HYDROCHLORIDE 0.5 MG: 1 INJECTION, SOLUTION INTRAMUSCULAR; INTRAVENOUS; SUBCUTANEOUS at 16:30

## 2025-07-22 RX ADMIN — ALBUMIN HUMAN 250 ML: 0.05 INJECTION, SOLUTION INTRAVENOUS at 12:57

## 2025-07-22 RX ADMIN — DEXAMETHASONE SODIUM PHOSPHATE 8 MG: 4 INJECTION INTRA-ARTICULAR; INTRALESIONAL; INTRAMUSCULAR; INTRAVENOUS; SOFT TISSUE at 08:00

## 2025-07-22 RX ADMIN — GLYCOPYRROLATE 0.2 MG: 0.2 INJECTION INTRAMUSCULAR; INTRAVENOUS at 15:22

## 2025-07-22 RX ADMIN — ROCURONIUM BROMIDE 20 MG: 10 INJECTION INTRAVENOUS at 10:31

## 2025-07-22 RX ADMIN — HYDROMORPHONE HYDROCHLORIDE 0.5 MG: 0.5 INJECTION, SOLUTION INTRAMUSCULAR; INTRAVENOUS; SUBCUTANEOUS at 16:46

## 2025-07-22 RX ADMIN — MIDAZOLAM HYDROCHLORIDE 2 MG: 2 INJECTION, SOLUTION INTRAMUSCULAR; INTRAVENOUS at 07:22

## 2025-07-22 RX ADMIN — ONDANSETRON 4 MG: 2 INJECTION INTRAMUSCULAR; INTRAVENOUS at 14:32

## 2025-07-22 RX ADMIN — ROCURONIUM BROMIDE 50 MG: 10 INJECTION INTRAVENOUS at 07:32

## 2025-07-22 RX ADMIN — GLYCOPYRROLATE 0.2 MG: 0.2 INJECTION INTRAMUSCULAR; INTRAVENOUS at 08:20

## 2025-07-22 RX ADMIN — ALBUMIN HUMAN 250 ML: 0.05 INJECTION, SOLUTION INTRAVENOUS at 15:15

## 2025-07-22 RX ADMIN — ACETAMINOPHEN 1000 MG: 10 INJECTION INTRAVENOUS at 17:29

## 2025-07-22 RX ADMIN — PROPOFOL 150 MG: 10 INJECTION, EMULSION INTRAVENOUS at 07:31

## 2025-07-22 RX ADMIN — FENTANYL CITRATE 50 MCG: 50 INJECTION, SOLUTION INTRAMUSCULAR; INTRAVENOUS at 09:00

## 2025-07-22 RX ADMIN — SODIUM CHLORIDE, SODIUM LACTATE, POTASSIUM CHLORIDE, AND CALCIUM CHLORIDE 100 ML/HR: .6; .31; .03; .02 INJECTION, SOLUTION INTRAVENOUS at 16:58

## 2025-07-22 RX ADMIN — PROPOFOL 10 MG: 10 INJECTION, EMULSION INTRAVENOUS at 16:08

## 2025-07-22 RX ADMIN — PROPOFOL 20 MG: 10 INJECTION, EMULSION INTRAVENOUS at 15:55

## 2025-07-22 RX ADMIN — HYDROMORPHONE HYDROCHLORIDE 0.5 MG: 0.5 INJECTION, SOLUTION INTRAMUSCULAR; INTRAVENOUS; SUBCUTANEOUS at 17:12

## 2025-07-22 RX ADMIN — Medication: at 17:53

## 2025-07-22 RX ADMIN — SUGAMMADEX 200 MG: 100 INJECTION, SOLUTION INTRAVENOUS at 15:56

## 2025-07-22 RX ADMIN — SODIUM CHLORIDE: 9 INJECTION, SOLUTION INTRAVENOUS at 07:46

## 2025-07-22 RX ADMIN — Medication 40 MCG: at 07:33

## 2025-07-22 RX ADMIN — PROPOFOL 30 MG: 10 INJECTION, EMULSION INTRAVENOUS at 08:27

## 2025-07-22 RX ADMIN — ROCURONIUM BROMIDE 20 MG: 10 INJECTION INTRAVENOUS at 12:28

## 2025-07-22 RX ADMIN — ROCURONIUM BROMIDE 20 MG: 10 INJECTION INTRAVENOUS at 09:57

## 2025-07-22 RX ADMIN — CIPROFLOXACIN 400 MG: 2 INJECTION, SOLUTION INTRAVENOUS at 15:50

## 2025-07-22 RX ADMIN — ROCURONIUM BROMIDE 20 MG: 10 INJECTION INTRAVENOUS at 09:10

## 2025-07-22 RX ADMIN — ROCURONIUM BROMIDE 20 MG: 10 INJECTION INTRAVENOUS at 10:57

## 2025-07-22 RX ADMIN — SODIUM CHLORIDE, POTASSIUM CHLORIDE, SODIUM LACTATE AND CALCIUM CHLORIDE: 600; 310; 30; 20 INJECTION, SOLUTION INTRAVENOUS at 06:57

## 2025-07-22 RX ADMIN — FENTANYL CITRATE 50 MCG: 50 INJECTION, SOLUTION INTRAMUSCULAR; INTRAVENOUS at 08:27

## 2025-07-22 RX ADMIN — PROPOFOL 20 MG: 10 INJECTION, EMULSION INTRAVENOUS at 16:00

## 2025-07-22 RX ADMIN — ROCURONIUM BROMIDE 20 MG: 10 INJECTION INTRAVENOUS at 08:27

## 2025-07-22 RX ADMIN — Medication 200 MCG: at 15:30

## 2025-07-22 RX ADMIN — Medication 120 MCG: at 15:26

## 2025-07-22 RX ADMIN — ROCURONIUM BROMIDE 10 MG: 10 INJECTION INTRAVENOUS at 09:27

## 2025-07-22 RX ADMIN — ALBUMIN HUMAN 500 ML: 0.05 INJECTION, SOLUTION INTRAVENOUS at 15:36

## 2025-07-22 RX ADMIN — ROCURONIUM BROMIDE 20 MG: 10 INJECTION INTRAVENOUS at 10:13

## 2025-07-22 RX ADMIN — Medication 25 MG: at 16:30

## 2025-07-22 RX ADMIN — FENTANYL CITRATE 50 MCG: 50 INJECTION, SOLUTION INTRAMUSCULAR; INTRAVENOUS at 07:31

## 2025-07-22 RX ADMIN — HYDROMORPHONE HYDROCHLORIDE 0.2 MG: 1 INJECTION, SOLUTION INTRAMUSCULAR; INTRAVENOUS; SUBCUTANEOUS at 11:48

## 2025-07-22 RX ADMIN — ROCURONIUM BROMIDE 20 MG: 10 INJECTION INTRAVENOUS at 11:25

## 2025-07-22 RX ADMIN — SODIUM CHLORIDE, SODIUM LACTATE, POTASSIUM CHLORIDE, CALCIUM CHLORIDE: 600; 310; 30; 20 INJECTION, SOLUTION INTRAVENOUS at 11:31

## 2025-07-22 RX ADMIN — Medication 25 MG: at 15:23

## 2025-07-22 RX ADMIN — HEPARIN SODIUM 5000 UNITS: 5000 INJECTION, SOLUTION INTRAVENOUS; SUBCUTANEOUS at 06:53

## 2025-07-22 RX ADMIN — METRONIDAZOLE 500 MG: 500 INJECTION, SOLUTION INTRAVENOUS at 07:39

## 2025-07-22 RX ADMIN — Medication 80 MCG: at 07:35

## 2025-07-22 RX ADMIN — METRONIDAZOLE 500 MG: 500 INJECTION, SOLUTION INTRAVENOUS at 15:38

## 2025-07-22 RX ADMIN — LIDOCAINE HYDROCHLORIDE 100 MG: 20 INJECTION INTRAVENOUS at 07:30

## 2025-07-22 RX ADMIN — ROCURONIUM BROMIDE 10 MG: 10 INJECTION INTRAVENOUS at 14:29

## 2025-07-22 RX ADMIN — Medication 120 MCG: at 15:14

## 2025-07-22 RX ADMIN — HYDROMORPHONE HYDROCHLORIDE 0.5 MG: 0.5 INJECTION, SOLUTION INTRAMUSCULAR; INTRAVENOUS; SUBCUTANEOUS at 16:57

## 2025-07-22 RX ADMIN — PHENOBARBITAL SODIUM 65 MG: 65 INJECTION INTRAMUSCULAR at 23:44

## 2025-07-22 RX ADMIN — CIPROFLOXACIN 400 MG: 2 INJECTION, SOLUTION INTRAVENOUS at 07:50

## 2025-07-22 RX ADMIN — DEXMEDETOMIDINE HYDROCHLORIDE 0.5 MCG/KG/HR: 4 INJECTION, SOLUTION INTRAVENOUS at 14:54

## 2025-07-22 RX ADMIN — PROPOFOL 20 MG: 10 INJECTION, EMULSION INTRAVENOUS at 08:29

## 2025-07-22 SDOH — HEALTH STABILITY: MENTAL HEALTH: CURRENT SMOKER: 0

## 2025-07-22 ASSESSMENT — PAIN - FUNCTIONAL ASSESSMENT
PAIN_FUNCTIONAL_ASSESSMENT: 0-10
PAIN_FUNCTIONAL_ASSESSMENT: UNABLE TO SELF-REPORT
PAIN_FUNCTIONAL_ASSESSMENT: 0-10
PAIN_FUNCTIONAL_ASSESSMENT: UNABLE TO SELF-REPORT
PAIN_FUNCTIONAL_ASSESSMENT: 0-10
PAIN_FUNCTIONAL_ASSESSMENT: 0-10
PAIN_FUNCTIONAL_ASSESSMENT: UNABLE TO SELF-REPORT
PAIN_FUNCTIONAL_ASSESSMENT: 0-10
PAIN_FUNCTIONAL_ASSESSMENT: UNABLE TO SELF-REPORT
PAIN_FUNCTIONAL_ASSESSMENT: 0-10

## 2025-07-22 ASSESSMENT — PAIN SCALES - GENERAL
PAINLEVEL_OUTOF10: 8
PAINLEVEL_OUTOF10: 8
PAINLEVEL_OUTOF10: 6
PAINLEVEL_OUTOF10: 8
PAINLEVEL_OUTOF10: 9
PAINLEVEL_OUTOF10: 10 - WORST POSSIBLE PAIN
PAINLEVEL_OUTOF10: 8
PAINLEVEL_OUTOF10: 10 - WORST POSSIBLE PAIN
PAINLEVEL_OUTOF10: 8
PAINLEVEL_OUTOF10: 8
PAINLEVEL_OUTOF10: 3
PAINLEVEL_OUTOF10: 8
PAINLEVEL_OUTOF10: 10 - WORST POSSIBLE PAIN
PAINLEVEL_OUTOF10: 8
PAINLEVEL_OUTOF10: 9
PAINLEVEL_OUTOF10: 6
PAINLEVEL_OUTOF10: 6

## 2025-07-22 ASSESSMENT — COLUMBIA-SUICIDE SEVERITY RATING SCALE - C-SSRS
2. HAVE YOU ACTUALLY HAD ANY THOUGHTS OF KILLING YOURSELF?: NO
6. HAVE YOU EVER DONE ANYTHING, STARTED TO DO ANYTHING, OR PREPARED TO DO ANYTHING TO END YOUR LIFE?: NO
1. IN THE PAST MONTH, HAVE YOU WISHED YOU WERE DEAD OR WISHED YOU COULD GO TO SLEEP AND NOT WAKE UP?: NO

## 2025-07-22 NOTE — SIGNIFICANT EVENT
General Surgery Postoperative Plan of Care    Bereket Em is a 59 y.o. male who is now immediately postop s/p EC Fistula take down, lysis of adhesions, Soliman's revarsal, abdominal wall debridement, and double barrel jejunostomy with Leyda Olson, Renato, and Vicky. Patient's procedure was uncomplicated. Intra-op findings remarkable for adhesions throughout the abdomen and remaining segment of the colon terminating at the splenic flexure requiring significant mobilization and an ileal window.  Patient tolerated procedure well and he is doing well postoperatively.     Plan:  - Dispo: PACU, with plan for RNF, stable condition.  - Pain management: PCA, IV tylenol plan to add IV ROBAXIN in AM pending RFP  - Diet: NPO   - Fluids: D5LR @ 125mL/hr with plan to restart TPN tomorrow  - L/D/A: PICC central line, colvin in place, drain left in left lower quadrant which is in the subcutaneous space Extubated in OR.  - ABX: Patient to complete 24 hr course of cipro and flagyl postoperatively.  - Postop labs/imaging: No labs needed. No imaging indicated.  - Dressings: incisional wound vac to be removed on POD 7 or day prior to discharge.  - Additional concerns: patient with drinking history to be on IM phenobarbitol taper with tele    Mackenzie A Simerlink, MD  PGY-5 General Surgery  [facility] [phone]

## 2025-07-22 NOTE — INTERVAL H&P NOTE
Patient's History and Physical was reviewed and updated.     Plan:    - The procedure was explained once again. Questions were sought and answered.   - The risks and benefit of the procedure were discussed. The patient is willing proceed.   - Consent was reviewed and signed.   - Further recommendations after the procedure.     Discussed with Dr. Zolin Mackenzie A Simerlink, MD  PGY-5 General Surgery  [facility] [phone]

## 2025-07-22 NOTE — ANESTHESIA POSTPROCEDURE EVALUATION
Patient: Bereket Em    Procedure Summary       Date: 07/22/25 Room / Location: [facility] OR 06 / Virtual [facility] OR    Anesthesia Start: 0719 Anesthesia Stop:     Procedures:       Ex Lap, Extensive Lysis of Adhesions, Take down of Enterocutaneuos fistula, Creation of Jejuostomy, Abdonimal wall debridement, Placement of Wound Vac, Repair of Ventral Hernia >10 cm, recurrent and incarcerated (Abdomen)      TAKE-DOWN, FISTULA, SMALL INTESTINE (Abdomen)      REVERSAL OF ERINN'S PROCEDURE Diagnosis:       Incarcerated ventral hernia      Enterocutaneous fistula      (Incarcerated ventral hernia [K43.6])      (Enterocutaneous fistula [K63.2])    Surgeons: Jerel Olson MD; Krish Gross MD Responsible Provider: Gabriel Gómez MD    Anesthesia Type: general ASA Status: 2            Anesthesia Type: general    Vitals Value Taken Time   /72 07/22/25 16:34   Temp 36.2 07/22/25 16:40   Pulse 73 07/22/25 16:37   Resp 12 07/22/25 16:37   SpO2 100 % 07/22/25 16:37   Vitals shown include unfiled device data.    Anesthesia Post Evaluation    Patient location during evaluation: PACU  Patient participation: complete - patient participated  Level of consciousness: sleepy but conscious  Pain management: adequate  Airway patency: patent  Cardiovascular status: acceptable  Respiratory status: acceptable  Hydration status: acceptable  Postoperative Nausea and Vomiting: none  Comments: Patient SV on 8 L/min O2 with SFM.  Patient VSS.  Report given to nurse.        No notable events documented.

## 2025-07-22 NOTE — PROGRESS NOTES
Mr. Em is planning to undergo laparotomy, lysis of adhesions, fistula take down, small bowel resection(s), colostomy reversal, abdominal wall reconstruction, possible diverting ostomy.  Mr. Em has a history of perforated sigmoid diverticulitis s/p sigmoid colectomy with end colostomy formation on 06/07/24.  Pathology from that surgery showed:  SIGMOID COLON, RESECTION:  - Segment of colon with diverticulosis and acute serositis.     He then underwent reexploration for fascial necrosis with en mass closure/retention sutures on 06/15/24.      On 06/18/24, he then developed necrosis of his abdominal wall in the midline and returned to the OR for retention suture removal, abdominal wall debridement and bridging Vicryl mesh placement.            On 06/23-24/24, he was found to have an enteroatmospheric fistula at the 3 o’clock position on the abdomen.                   He made progress with NPO/TPN/midline wound care, supportive electrolyte/fluid care etc.               He later developed another fistula in the midline of the wound that had two exposed limbs/mucosa.              After this, he was treated with a combination of TPN and some oral intake for nutrition, fluid repletion with home IVFs, local wound care and then pouching of the fistula and antimotility agents when they could be tolerated.  Dr. Cage attempted fistula stenting over the winter but the stent did not work for long.     In preparation for fistula takedown and abdominal wall reconstruction, he saw Dr. Olson who has helped to coordinate his care.  In preparation for colostomy takedown, he saw Dr. Morfin initially and was transitioned to Dr. Gross due to availability.     His most recent CT scan showed:  Postsurgical changes of sigmoidectomy, Margarita's procedure, and  an end ileostomy with open ventral wall surgical defect. Hyperdense  material noted at the abdominal wall defect, concerning for  persistent enterocutaneous fistula      He underwent contrast enema that showed:  Free flow of contrast from the anal canal to the proximal  external segment without evidence of extraluminal contrast to suggest  stump leak.  Attempted contrast through the ostomy site demonstrated trace  contrast into the canal with near immediate reflux of contrast,  precluding diagnostic assessment.     Preop colonoscopy showed:  ·                     Healthy Margarita pouch in the sigmoid colon  ·                     The ileocecal valve, appendiceal orifice, cecum, ascending colon, hepatic flexure, transverse colon, splenic flexure, descending colon, rectosigmoid and rectum appeared normal.        He was tolerating PO alone for several months but lost some muscle mass and weight so TPN was restarted 3-4 months ago in preparation for surgery.  He has maintained a weight of ~160 pounds for the past few months.  Alb 4.2  Prealbumin 38  LFTs WNLs (had been elevated previously but have been normalized for a while; abnormality most likely from TPN/hypovolemia)  MRSA swab negative  WBCs recently 12.  Blood cultures obtained which were negative. UA and CXR were ordered and encouraged, but he did not feel they were necessary.     He stopped smoking in 06/2024.  He drinks alcohol.  He does not exercise much.  Exercise was encouraged prior to surgery, but he has not increased his fitness much.  He was able to work from home during this time.     PCP Preop evaluation on 06/20/2025 showed:  PATIENT CLEARED FOR SURGERY     Patient seen by PAT on 07/08/2025.    Exam today unchanged. Fistula present with prolapse. LUQ colostomy present. No respiratory distress. Healthier appearing and well nourished.      I have seen and spoken with Mr. Em many times in the past year.  He is highly motivated for surgery and desires surgery.  He understands that we waited until now for surgery to ensure that his scar would be soft and less dense.  He understands the extent of his surgical disease  and the involvement of Dr. Olson and Dr. Gross.  We have discussed the procedure (laparotomy, lysis of adhesions, small bowel resection(s), colostomy reversal, abdominal wall reconstruction, possible diverting ostomy formation), risks (bleeding, infection, anastomotic leak, hernia formation, etc), benefits and alternatives.  He is aware that if there are any concerns for his anastomoses that he may have a proximal diverting ostomy to protect these anastomoses which will require more TPN and limitations in PO intake with reversal in several months post op. He prefers no ostomy but understands that it may be needed. All questions answered. Informed consent obtained.

## 2025-07-22 NOTE — BRIEF OP NOTE
Enterocutaneous fistula take down, double barrel jejunostomy take down, abdominal wall debridement    Date: 2025  OR Location: [facility] OR    Name: Bereket Em, : 1965, Age: 59 y.o., MRN: [id number], Sex: male    Diagnosis  Pre-op Diagnosis      * Incarcerated ventral hernia [K43.6]     * Enterocutaneous fistula [K63.2] Post-op Diagnosis     * Incarcerated ventral hernia [K43.6]     * Enterocutaneous fistula [K63.2]     Procedures  Ex Lap, Extensive Lysis of Adhesions, Take down of Enterocutaneuos fistula, Creation of Jejuostomy, Abdonimal wall debridement, Placement of Wound Vac, Repair of Ventral Hernia >10 cm, recurrent and incarcerated  69716 - [place] RPR AA HERNIA 1ST > 10 CM NCRC8/STRANGULATED    TAKE-DOWN, FISTULA, SMALL INTESTINE  21931 - [place] CLOSURE INTESTINAL CUTANEOUS FISTULA    REVERSAL OF ERINN'S PROCEDURE  49744 - [place] CLSR NTRSTM LG/SM RESCJ & COLORECTAL ANASTOMOSIS      Surgeons   Panel 1:     * Jerel Olson - Primary  Panel 2:     * Krish Gross - Primary    Resident/Fellow/Other Assistant:  Surgeons and Role:  Panel 1:     * Mackenzie A Simerlink, MD - Resident - Assisting  Panel 2:     * Shantel Qiu MD - Fellow    Staff:   Circulator: Aliya  Scrub Person: Katie  Scrub Person: Adelia  Relief Circulator: Angeline  Relief Scrub: Majo  Relief Circulator: Majo  Relief Scrub: Lance    Anesthesia Staff: Anesthesiologist: Gabriel Gómez MD; David Brandon MD; Janay Jimenez MD  CRNA: MINERVA Pang-CRNA  C-AA: CAT Wilson  SRNA: Silvia Rodríguez    Procedure Summary  Anesthesia: General  ASA: II  Estimated Blood Loss: 200mL  Intra-op Medications:   Administrations occurring from 0645 to 1245 on 25:   Medication Name Total Dose   sodium chloride 0.9 % irrigation solution 1,000 mL   sterile water irrigation solution 1,000 mL   heparin (porcine) injection 5,000 Units 5,000 Units   acetaminophen (Ofirmev) injection 1,000 mg   ciprofloxacin (Cipro)   mg in 200 mL D5W - premix 400 mg   dexAMETHasone (Decadron) 4 mg/mL IV Syringe 2 mL 8 mg   fentaNYL (Sublimaze) injection 50 mcg/mL 200 mcg   glycopyrrolate (Robinul) injection 0.2 mg   HYDROmorphone (Dilaudid) injection 1 mg/mL 0.2 mg   LR infusion Cannot be calculated   LR infusion Cannot be calculated   lidocaine (cardiac) injection 2% prefilled syringe 100 mg   metroNIDAZOLE (Flagyl)  mg in 100 mL NaCl (iso) - premix 500 mg   midazolam PF (Versed) injection 1 mg/mL 2 mg   phenylephrine 40 mcg/mL syringe 10 mL 200 mcg   propofol (Diprivan) injection 10 mg/mL 200 mg   rocuronium (ZeMuron) 50 mg/5 mL injection 220 mg   NaCl 0.9 % bolus Cannot be calculated              Anesthesia Record               Intraprocedure I/O Totals          Intake    Dexmedetomidine 0.00 mL    The total shown is the total volume documented since Anesthesia Start was filed.    NaCl 0.9 % bolus 500.00 mL    LR infusion 950.00 mL    acetaminophen (Ofirmev) injection 100.00 mL    albumin human 5 % 250.00 mL    ciprofloxacin 400 mg/200 mL 200.00 mL    metroNIDAZOLE 500 mg/100 mL 100.00 mL    Total Intake 2100 mL       Output    Urine 270 mL    Est. Blood Loss 225 mL    Total Output 495 mL       Net    Net Volume 1605 mL          Specimen:   ID Type Source Tests Collected by Time   1 : RECTAL STUMP Tissue SOFT TISSUE RESECTION SURGICAL PATHOLOGY EXAM Krish Gross MD 7/22/2025 1228   2 : COLOSTOMY Tissue COLOSTOMY (STOMA) SURGICAL PATHOLOGY EXAM Krish Gross MD 7/22/2025 1257   3 : ENTEROCUTANEOUS FISTULA Tissue FISTULA SURGICAL PATHOLOGY EXAM Jerel Olson MD 7/22/2025 1337                  Findings: EC fistula comprised of a single loop of jejuneum, which was excised and double barrel jejunostomy creating. 110 cm from ligament of treitz and 180 cm from ileocecal valve. NG tube conirmed in gastric body. Fascia closed with mesh suture. Subcutaneous drain in LLQ. Incisional vac placed including site of prior colostomy.      Complications:  None; patient tolerated the procedure well.     Disposition: PACU - hemodynamically stable.  Condition: stable  Specimens Collected:   ID Type Source Tests Collected by Time   1 : RECTAL STUMP Tissue SOFT TISSUE RESECTION SURGICAL PATHOLOGY EXAM Krish Gross MD 7/22/2025 1228   2 : COLOSTOMY Tissue COLOSTOMY (STOMA) SURGICAL PATHOLOGY EXAM Krish Gross MD 7/22/2025 1257   3 : ENTEROCUTANEOUS FISTULA Tissue FISTULA SURGICAL PATHOLOGY EXAM Jerel Olson MD 7/22/2025 9723     Attending Attestation:     Jerel Olson  Phone Number: [phone]

## 2025-07-22 NOTE — BRIEF OP NOTE
Date: 2025  OR Location: [facility] OR    Name: Bereket Em, : 1965, Age: 59 y.o., MRN: [id number], Sex: male    Diagnosis  Pre-op Diagnosis      * Incarcerated ventral hernia [K43.6]     * Enterocutaneous fistula [K63.2] Post-op Diagnosis     * Incarcerated ventral hernia [K43.6]     * Enterocutaneous fistula [K63.2]     Procedures  Ex Lap, Extensive Lysis of Adhesions, Take down of Enterocutaneuos fistula, Creation of Jejuostomy, Abdonimal wall debridement, Placement of Wound Vac, Repair of Ventral Hernia >10 cm, recurrent and incarcerated  76923 - [place] RPR AA HERNIA 1ST > 10 CM NCRC8/STRANGULATED    TAKE-DOWN, FISTULA, SMALL INTESTINE  50090 - [place] CLOSURE INTESTINAL CUTANEOUS FISTULA    REVERSAL OF ERINN'S PROCEDURE  70514 - [place] CLSR NTRSTM LG/SM RESCJ & COLORECTAL ANASTOMOSIS      Surgeons   Panel 1:     * Jerel Olson - Primary  Panel 2:     * Krsih Gross - Primary    Resident/Fellow/Other Assistant:  Surgeons and Role:  Panel 1:     * Mackenzie A Simerlink, MD - Resident - Assisting  Panel 2:     * Shantel Qiu MD - Fellow    Staff:   Circulator: Aliya  Scrub Person: Katie  Scrub Person: Adelia  Relief Circulator: Angeline  Relief Scrub: Majo  Relief Circulator: Majo  Relief Scrub: Lance    Anesthesia Staff: Anesthesiologist: Gabriel óGmez MD; David Brandon MD; Janay Jimenez MD  CRNA: MINERVA Pang-CRNA  C-AA: CAT Wilson  SRNA: Silvia Rodríguez    Procedure Summary  Anesthesia: General  ASA: II  Estimated Blood Loss: 50mL  Intra-op Medications:   Administrations occurring from 0645 to 1245 on 25:   Medication Name Total Dose   sodium chloride 0.9 % irrigation solution 1,000 mL   sterile water irrigation solution 1,000 mL   acetaminophen (Ofirmev) injection 1,000 mg   ciprofloxacin (Cipro)  mg in 200 mL D5W - premix 400 mg   dexAMETHasone (Decadron) 4 mg/mL IV Syringe 2 mL 8 mg   fentaNYL (Sublimaze) injection 50 mcg/mL 200 mcg    glycopyrrolate (Robinul) injection 0.2 mg   HYDROmorphone (Dilaudid) injection 1 mg/mL 0.2 mg   LR infusion Cannot be calculated   LR infusion Cannot be calculated   lidocaine (cardiac) injection 2% prefilled syringe 100 mg   metroNIDAZOLE (Flagyl)  mg in 100 mL NaCl (iso) - premix 500 mg   midazolam PF (Versed) injection 1 mg/mL 2 mg   phenylephrine 40 mcg/mL syringe 10 mL 200 mcg   propofol (Diprivan) injection 10 mg/mL 200 mg   rocuronium (ZeMuron) 50 mg/5 mL injection 220 mg   NaCl 0.9 % bolus Cannot be calculated   heparin (porcine) injection 5,000 Units 5,000 Units              Anesthesia Record               Intraprocedure I/O Totals          Intake    Dexmedetomidine 0.00 mL    The total shown is the total volume documented since Anesthesia Start was filed.    NaCl 0.9 % bolus 500.00 mL    LR infusion 700.00 mL    acetaminophen (Ofirmev) injection 100.00 mL    albumin human 5 % 250.00 mL    ciprofloxacin 400 mg/200 mL 200.00 mL    metroNIDAZOLE 500 mg/100 mL 100.00 mL    Total Intake 1850 mL       Output    Urine 270 mL    Est. Blood Loss 225 mL    Total Output 495 mL       Net    Net Volume 1355 mL          Specimen:   ID Type Source Tests Collected by Time   1 : RECTAL STUMP Tissue SOFT TISSUE RESECTION SURGICAL PATHOLOGY EXAM Krish Gross MD 7/22/2025 1228   2 : COLOSTOMY Tissue COLOSTOMY (STOMA) SURGICAL PATHOLOGY EXAM Krish Gross MD 7/22/2025 1257   3 : ENTEROCUTANEOUS FISTULA Tissue FISTULA SURGICAL PATHOLOGY EXAM Jerel Olson MD 7/22/2025 1337                  Findings:   Small bowel ECF and colostomy taken down upon our arrival, colostomy noted to be at the proximal descending colon, splenic flexure previously mobilized, omentum taken off transverse colon and entered lesser sac; left branch of middle colic taken for length. Colon noted to be slightly dusky with minimal bleeding from marginal artery. Decision made to transect proximally to bleeding healthy colon. High ligation of  middle colic artery for reach. Colostomy able to reach rectal stump via ileocolic window. Rectal stump mobilized and transected with TA 30 green stapler. Mid-transverse colorectal 29 EEA stapled anastomosis completed, negative leak test, 2 intact donuts, plan for proximal diversion at site of EC fistula. Case turned back over to Leyda Olson and Vicky     Complications:  None; patient tolerated the procedure well.     Disposition: case ongoing  Condition: stable  Specimens Collected:   ID Type Source Tests Collected by Time   1 : RECTAL STUMP Tissue SOFT TISSUE RESECTION SURGICAL PATHOLOGY EXAM Krish Gross MD 7/22/2025 1228   2 : COLOSTOMY Tissue COLOSTOMY (STOMA) SURGICAL PATHOLOGY EXAM Krish Gross MD 7/22/2025 1257   3 : ENTEROCUTANEOUS FISTULA Tissue FISTULA SURGICAL PATHOLOGY EXAM Jerel Olson MD 7/22/2025 1337     Attending Attestation: I was present and scrubbed for the entire procedure.    Krish Gross MD

## 2025-07-22 NOTE — ANESTHESIA PROCEDURE NOTES
Airway  Date/Time: 7/22/2025 7:35 AM  Reason: elective    Airway not difficult    Staffing  Performed: SRNA   Authorized by: David Brandon MD    Performed by: MINERVA Pang-ALEISHA  Patient location during procedure: OR    Patient Condition  Indications for airway management: anesthesia and airway protection  Patient position: sniffing  Planned trial extubation  Sedation level: deep     Final Airway Details   Preoxygenated: yes  Final airway type: endotracheal airway  Successful airway: ETT  Cuffed: yes   Successful intubation technique: direct laryngoscopy  Blade: Jenni  Blade size: #4  ETT size (mm): 7.5  Cormack-Lehane Classification: grade IIa - partial view of glottis  Placement verified by: chest auscultation and capnometry   Measured from: teeth  ETT to teeth (cm): 22  Number of attempts at approach: 1

## 2025-07-22 NOTE — ANESTHESIA PREPROCEDURE EVALUATION
Patient: Bereket Em    Procedure Information       Date/Time: 07/22/25 0645    Procedures:       REPAIR, HERNIA, VENTRAL (Abdomen)      TAKE-DOWN, FISTULA, SMALL INTESTINE (Abdomen) - with Dr. Perry      CLOSURE, COLOSTOMY    Location: [facility] OR 06 / Virtual [facility] OR    Surgeons: Jerel Olson MD; Krish Gross MD            Relevant Problems   Neuro   (+) Anxiety and depression   (+) Cervical radiculopathy       Clinical information reviewed:   Tobacco  Allergies  Meds   Med Hx  Surg Hx   Fam Hx  Soc Hx        NPO Detail:  NPO/Void Status  Carbohydrate Drink Given Prior to Surgery? : N  Date of Last Liquid: 07/21/25  Time of Last Liquid: 2000  Date of Last Solid: 07/20/25  Time of Last Solid: 1800  Last Intake Type: Clear fluids  Time of Last Void: 0600         Physical Exam    Airway  Mallampati: II     Cardiovascular - normal exam  Rhythm: regular  Rate: normal     Dental - normal exam     Pulmonary - normal examBreath sounds clear to auscultation     Abdominal - normal exam           Anesthesia Plan    History of general anesthesia?: yes  History of complications of general anesthesia?: no    ASA 2     general   (    Risks discussed to Patient's satisfaction  )  The patient is not a current smoker.    intravenous induction   Anesthetic plan and risks discussed with patient and spouse.  Use of blood products discussed with patient and spouse who.    Plan discussed with CRNA.

## 2025-07-23 ENCOUNTER — APPOINTMENT (OUTPATIENT)
Dept: RADIOLOGY | Facility: HOSPITAL | Age: 60
End: 2025-07-23
Payer: COMMERCIAL

## 2025-07-23 LAB
ALBUMIN SERPL BCP-MCNC: 3.7 G/DL (ref 3.4–5)
ANION GAP SERPL CALC-SCNC: 15 MMOL/L (ref 10–20)
BUN SERPL-MCNC: 25 MG/DL (ref 6–23)
CALCIUM SERPL-MCNC: 8.2 MG/DL (ref 8.6–10.6)
CHLORIDE SERPL-SCNC: 97 MMOL/L (ref 98–107)
CO2 SERPL-SCNC: 29 MMOL/L (ref 21–32)
CREAT SERPL-MCNC: 0.89 MG/DL (ref 0.5–1.3)
EGFRCR SERPLBLD CKD-EPI 2021: >90 ML/MIN/1.73M*2
ERYTHROCYTE [DISTWIDTH] IN BLOOD BY AUTOMATED COUNT: 13.4 % (ref 11.5–14.5)
GLUCOSE BLD MANUAL STRIP-MCNC: 123 MG/DL (ref 74–99)
GLUCOSE BLD MANUAL STRIP-MCNC: 137 MG/DL (ref 74–99)
GLUCOSE BLD MANUAL STRIP-MCNC: 139 MG/DL (ref 74–99)
GLUCOSE SERPL-MCNC: 120 MG/DL (ref 74–99)
HCT VFR BLD AUTO: 32.4 % (ref 41–52)
HGB BLD-MCNC: 10.3 G/DL (ref 13.5–17.5)
MAGNESIUM SERPL-MCNC: 1.47 MG/DL (ref 1.6–2.4)
MCH RBC QN AUTO: 25.5 PG (ref 26–34)
MCHC RBC AUTO-ENTMCNC: 31.8 G/DL (ref 32–36)
MCV RBC AUTO: 80 FL (ref 80–100)
NRBC BLD-RTO: 0 /100 WBCS (ref 0–0)
PHOSPHATE SERPL-MCNC: 3.6 MG/DL (ref 2.5–4.9)
PLATELET # BLD AUTO: 251 X10*3/UL (ref 150–450)
POTASSIUM SERPL-SCNC: 3.4 MMOL/L (ref 3.5–5.3)
RBC # BLD AUTO: 4.04 X10*6/UL (ref 4.5–5.9)
SODIUM SERPL-SCNC: 138 MMOL/L (ref 136–145)
WBC # BLD AUTO: 9.2 X10*3/UL (ref 4.4–11.3)

## 2025-07-23 PROCEDURE — 71045 X-RAY EXAM CHEST 1 VIEW: CPT | Performed by: RADIOLOGY

## 2025-07-23 PROCEDURE — 80069 RENAL FUNCTION PANEL: CPT | Performed by: STUDENT IN AN ORGANIZED HEALTH CARE EDUCATION/TRAINING PROGRAM

## 2025-07-23 PROCEDURE — 2500000005 HC RX 250 GENERAL PHARMACY W/O HCPCS: Performed by: NURSE PRACTITIONER

## 2025-07-23 PROCEDURE — 82947 ASSAY GLUCOSE BLOOD QUANT: CPT

## 2025-07-23 PROCEDURE — 85027 COMPLETE CBC AUTOMATED: CPT | Performed by: STUDENT IN AN ORGANIZED HEALTH CARE EDUCATION/TRAINING PROGRAM

## 2025-07-23 PROCEDURE — 83735 ASSAY OF MAGNESIUM: CPT | Performed by: STUDENT IN AN ORGANIZED HEALTH CARE EDUCATION/TRAINING PROGRAM

## 2025-07-23 PROCEDURE — 2500000004 HC RX 250 GENERAL PHARMACY W/ HCPCS (ALT 636 FOR OP/ED): Performed by: NURSE PRACTITIONER

## 2025-07-23 PROCEDURE — 36415 COLL VENOUS BLD VENIPUNCTURE: CPT | Performed by: STUDENT IN AN ORGANIZED HEALTH CARE EDUCATION/TRAINING PROGRAM

## 2025-07-23 PROCEDURE — 2580000001 HC RX 258 IV SOLUTIONS: Performed by: NURSE PRACTITIONER

## 2025-07-23 PROCEDURE — 99232 SBSQ HOSP IP/OBS MODERATE 35: CPT | Performed by: NURSE PRACTITIONER

## 2025-07-23 PROCEDURE — 2500000004 HC RX 250 GENERAL PHARMACY W/ HCPCS (ALT 636 FOR OP/ED): Performed by: STUDENT IN AN ORGANIZED HEALTH CARE EDUCATION/TRAINING PROGRAM

## 2025-07-23 PROCEDURE — 99232 SBSQ HOSP IP/OBS MODERATE 35: CPT

## 2025-07-23 PROCEDURE — 1200000002 HC GENERAL ROOM WITH TELEMETRY DAILY

## 2025-07-23 PROCEDURE — 71045 X-RAY EXAM CHEST 1 VIEW: CPT

## 2025-07-23 RX ORDER — ENOXAPARIN SODIUM 100 MG/ML
40 INJECTION SUBCUTANEOUS EVERY 24 HOURS
Status: DISPENSED | OUTPATIENT
Start: 2025-07-23

## 2025-07-23 RX ORDER — MAGNESIUM SULFATE HEPTAHYDRATE 40 MG/ML
4 INJECTION, SOLUTION INTRAVENOUS ONCE
Status: COMPLETED | OUTPATIENT
Start: 2025-07-23 | End: 2025-07-23

## 2025-07-23 RX ORDER — ONDANSETRON HYDROCHLORIDE 2 MG/ML
4 INJECTION, SOLUTION INTRAVENOUS EVERY 8 HOURS PRN
Status: ACTIVE | OUTPATIENT
Start: 2025-07-23

## 2025-07-23 RX ORDER — POTASSIUM CHLORIDE 29.8 MG/ML
40 INJECTION INTRAVENOUS ONCE
Status: COMPLETED | OUTPATIENT
Start: 2025-07-23 | End: 2025-07-23

## 2025-07-23 RX ORDER — METHOCARBAMOL 100 MG/ML
1000 INJECTION, SOLUTION INTRAMUSCULAR; INTRAVENOUS EVERY 8 HOURS
Status: DISCONTINUED | OUTPATIENT
Start: 2025-07-23 | End: 2025-07-25

## 2025-07-23 RX ADMIN — ACETAMINOPHEN 1000 MG: 10 INJECTION INTRAVENOUS at 23:48

## 2025-07-23 RX ADMIN — METRONIDAZOLE 500 MG: 500 INJECTION, SOLUTION INTRAVENOUS at 08:58

## 2025-07-23 RX ADMIN — HEPARIN, PORCINE (PF) 10 UNIT/ML INTRAVENOUS SYRINGE 50 UNITS: at 08:55

## 2025-07-23 RX ADMIN — METRONIDAZOLE 500 MG: 500 INJECTION, SOLUTION INTRAVENOUS at 00:38

## 2025-07-23 RX ADMIN — METHOCARBAMOL 1000 MG: 100 INJECTION INTRAMUSCULAR; INTRAVENOUS at 08:55

## 2025-07-23 RX ADMIN — METHOCARBAMOL 1000 MG: 100 INJECTION INTRAMUSCULAR; INTRAVENOUS at 23:45

## 2025-07-23 RX ADMIN — ASCORBIC ACID, VITAMIN A PALMITATE, CHOLECALCIFEROL, THIAMINE HYDROCHLORIDE, RIBOFLAVIN-5 PHOSPHATE SODIUM, PYRIDOXINE HYDROCHLORIDE, NIACINAMIDE, DEXPANTHENOL, ALPHA-TOCOPHEROL ACETATE, VITAMIN K1, FOLIC ACID, BIOTIN, CYANOCOBALAMIN: 200; 3300; 200; 6; 3.6; 6; 40; 15; 10; 150; 600; 60; 5 INJECTION, SOLUTION INTRAVENOUS at 20:27

## 2025-07-23 RX ADMIN — SODIUM CHLORIDE, SODIUM LACTATE, POTASSIUM CHLORIDE, CALCIUM CHLORIDE AND DEXTROSE MONOHYDRATE 125 ML/HR: 5; 600; 310; 30; 20 INJECTION, SOLUTION INTRAVENOUS at 10:18

## 2025-07-23 RX ADMIN — ACETAMINOPHEN 1000 MG: 10 INJECTION INTRAVENOUS at 06:37

## 2025-07-23 RX ADMIN — PHENOBARBITAL SODIUM 65 MG: 65 INJECTION INTRAMUSCULAR at 21:39

## 2025-07-23 RX ADMIN — ENOXAPARIN SODIUM 40 MG: 100 INJECTION SUBCUTANEOUS at 09:06

## 2025-07-23 RX ADMIN — PHENOBARBITAL SODIUM 65 MG: 65 INJECTION INTRAMUSCULAR at 06:37

## 2025-07-23 RX ADMIN — POTASSIUM CHLORIDE 40 MEQ: 29.8 INJECTION, SOLUTION INTRAVENOUS at 10:33

## 2025-07-23 RX ADMIN — METRONIDAZOLE 500 MG: 500 INJECTION, SOLUTION INTRAVENOUS at 16:15

## 2025-07-23 RX ADMIN — ACETAMINOPHEN 1000 MG: 10 INJECTION INTRAVENOUS at 11:59

## 2025-07-23 RX ADMIN — ACETAMINOPHEN 1000 MG: 10 INJECTION INTRAVENOUS at 17:26

## 2025-07-23 RX ADMIN — PHENOBARBITAL SODIUM 65 MG: 65 INJECTION INTRAMUSCULAR at 14:36

## 2025-07-23 RX ADMIN — CIPROFLOXACIN 400 MG: 400 INJECTION, SOLUTION INTRAVENOUS at 16:15

## 2025-07-23 RX ADMIN — CIPROFLOXACIN 400 MG: 400 INJECTION, SOLUTION INTRAVENOUS at 00:38

## 2025-07-23 RX ADMIN — SODIUM CHLORIDE, SODIUM LACTATE, POTASSIUM CHLORIDE, CALCIUM CHLORIDE AND DEXTROSE MONOHYDRATE 125 ML/HR: 5; 600; 310; 30; 20 INJECTION, SOLUTION INTRAVENOUS at 00:38

## 2025-07-23 RX ADMIN — MAGNESIUM SULFATE HEPTAHYDRATE 4 G: 40 INJECTION, SOLUTION INTRAVENOUS at 10:18

## 2025-07-23 RX ADMIN — SMOFLIPID 50 G: 6; 6; 5; 3 INJECTION, EMULSION INTRAVENOUS at 20:28

## 2025-07-23 RX ADMIN — PANTOPRAZOLE SODIUM 40 MG: 40 INJECTION, POWDER, LYOPHILIZED, FOR SOLUTION INTRAVENOUS at 08:54

## 2025-07-23 RX ADMIN — METHOCARBAMOL 1000 MG: 100 INJECTION INTRAMUSCULAR; INTRAVENOUS at 16:12

## 2025-07-23 RX ADMIN — CIPROFLOXACIN 400 MG: 400 INJECTION, SOLUTION INTRAVENOUS at 08:58

## 2025-07-23 ASSESSMENT — COGNITIVE AND FUNCTIONAL STATUS - GENERAL
DAILY ACTIVITIY SCORE: 24
MOBILITY SCORE: 24
MOBILITY SCORE: 24

## 2025-07-23 ASSESSMENT — PAIN SCALES - GENERAL
PAINLEVEL_OUTOF10: 8
PAINLEVEL_OUTOF10: 0 - NO PAIN
PAINLEVEL_OUTOF10: 8

## 2025-07-23 ASSESSMENT — PAIN - FUNCTIONAL ASSESSMENT: PAIN_FUNCTIONAL_ASSESSMENT: 0-10

## 2025-07-23 ASSESSMENT — PAIN DESCRIPTION - DESCRIPTORS: DESCRIPTORS: PRESSURE

## 2025-07-23 ASSESSMENT — PAIN DESCRIPTION - LOCATION: LOCATION: ABDOMEN

## 2025-07-23 NOTE — PROGRESS NOTES
Colorectal Surgery Consult Progress Note     Reason for consult: Intra-op consult for possible colonic diversion             HPI: Bereket Em is a 59 y.o. male with a past medical history of perforated sigmoid diverticulitis s/p Margarita's 6/7/24 c/b fascial necrosis s/p abdominal wall debridement & Vicryl mesh placement 6/18/24 c/b midline small bowel enterocutaneous fistula (on TPN), now hospital day 1 s/p ex lap, extensive lysis of adhesions, abdominal wall debridement, ventral hernia repair, excision of EC fistula, jejunostomy, & abdominal wound vac placement by Dr. Olson & Vicky. Colorectal surgery consulted intra-op & completed Margarita's reversal with transverse colon mobilization/ileal window by Dr. Gross.        Subjective  No acute events overnight  Pain well controlled on PCA  Denies nausea/vomiting  Denies urinary symptoms with colvin in place    Objective  Physical Exam:  Gen: in no physical distress, well developed and well nourished, and alert, smiling, lying in bed  HENT: Head normocephalic, atraumatic.  Mucous membranes moist.    Neuro: Alert and oriented x3.    CV: Normal rate and rhythm on chart review.  Resp: No acute respiratory distress.  Normal effort on 2L O2. Chest expansion symmetrical.   GI: Abdomen is soft and nontender, mildly distended.  Midline incision is well approximated with wound vac in place on -125mmHG, no alarms or leak.  Stoma is pink and healthy, moderately edematous, with bowel sweat & no gas in pouch. ANA MARIA drain is serosanguinous to bulb suction. NG tube in place to LIWS, brown output in tubing & canister.  Skin: Warm and dry, without rashes or lesions.  Musculoskeletal: Moves all extremities x4    I/O last 3 completed shifts:  In: 5000 (69.4 mL/kg) [I.V.:3750 (52.1 mL/kg); Other:100; IV Piggyback:1150]  Out: 1345 (18.7 mL/kg) [Urine:895 (0.3 mL/kg/hr); Drains:25; Blood:425]  Weight: 72 kg   No intake/output data recorded.    Data Review:  CBC:   Lab  "Results   Component Value Date    WBC 9.2 07/23/2025    RBC 4.04 (L) 07/23/2025     BMP: No results found for: \"GLUCOSE\", \"CO2\", \"BUN\", \"CREATININE\", \"CALCIUM\"  Coagulation: No results found for: \"INR\", \"APTT\"      Assessment: 59 y.o. 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 Olson & Vicky and Margarita's reversal with transverse colon mobilization/ileal window by Dr. Gross on 7/22/25. Patient is progressing through post-op course.    Recommendations:  -Agree with re-starting TPN  -Diet advancement per primary team given jejunostomy/output; awaiting return of bowel function  -Recommend ice packs to edematous stoma, Ostomy nursing for education & support  -Rest of care per primary team    Will remain available for questions and will schedule outpatient follow up with Dr. Gross in 3-4 weeks.    Plan of care discussed with patient, Dr. Gross, & colorectal surgery team.    Nydia PALMA-CNP  Colorectal Surgery NP   [facility] Service Pager #[phone]    "

## 2025-07-23 NOTE — PROGRESS NOTES
07/23/25 0927   Discharge Planning   Living Arrangements Spouse/significant other   Support Systems Spouse/significant other   Assistance Needed None   Type of Residence Private residence   Home or Post Acute Services None   Expected Discharge Disposition Home   Does the patient need discharge transport arranged? No   Financial Resource Strain   How hard is it for you to pay for the very basics like food, housing, medical care, and heating? Not very   Housing Stability   In the last 12 months, was there a time when you were not able to pay the mortgage or rent on time? N   Transportation Needs   In the past 12 months, has lack of transportation kept you from medical appointments or from getting medications? no     DC Planning:  Went in and met with the pt, confirmed demographics.     This TCC will continue to follow for home going needs and safe DC plan.    Jeny Will. ISABELLE. TCC.

## 2025-07-23 NOTE — CARE PLAN
Problem: Pain - Adult  Goal: Verbalizes/displays adequate comfort level or baseline comfort level  Outcome: Progressing     Problem: Safety - Adult  Goal: Free from fall injury  Outcome: Progressing     Problem: Discharge Planning  Goal: Discharge to home or other facility with appropriate resources  Outcome: Progressing     Problem: Chronic Conditions and Co-morbidities  Goal: Patient's chronic conditions and co-morbidity symptoms are monitored and maintained or improved  Outcome: Progressing     Problem: Skin  Goal: Decreased wound size/increased tissue granulation at next dressing change  7/23/2025 1052 by Rika Cage RN  Flowsheets (Taken 7/23/2025 1052)  Decreased wound size/increased tissue granulation at next dressing change: Protective dressings over bony prominences  7/23/2025 0931 by Rika Cage RN  Outcome: Progressing  Goal: Participates in plan/prevention/treatment measures  Outcome: Progressing  Goal: Prevent/manage excess moisture  7/23/2025 1052 by Rika Cage RN  Flowsheets (Taken 7/23/2025 1052)  Prevent/manage excess moisture: Monitor for/manage infection if present  7/23/2025 0931 by Rika Cage RN  Outcome: Progressing  Goal: Prevent/minimize sheer/friction injuries  Outcome: Progressing  Goal: Promote/optimize nutrition  Outcome: Progressing  Goal: Promote skin healing  Outcome: Progressing   The patient's goals for the shift include      The clinical goals for the shift include pt will have adequate pain control

## 2025-07-23 NOTE — OP NOTE
Ex Lap, Extensive Lysis of Adhesions, Take down of Enterocutaneuos fistula, Creation of Jejuostomy, Abdonimal wall debridement, Placement of Wound Vac, Repair of Ventral Hernia >10 cm, recurrent and incarcerated, TAKE-DOWN, FISTULA, SMALL INTESTINE Operative Note     Date: 2025  OR Location: [facility] OR    Name: Bereket Em, : 1965, Age: 59 y.o., MRN: [id number], Sex: male    Diagnosis  Pre-op Diagnosis      * Incarcerated ventral hernia [K43.6]     * Enterocutaneous fistula [K63.2] Post-op Diagnosis     * Incarcerated ventral hernia [K43.6]     * Enterocutaneous fistula [K63.2]     Procedures  Ex Lap, Extensive Lysis of Adhesions, Take down of Enterocutaneuos fistula, Creation of Jejuostomy, Abdonimal wall debridement, Placement of Wound Vac, Repair of Ventral Hernia >10 cm, recurrent and incarcerated  69899 - [place] RPR AA HERNIA 1ST > 10 CM NCRC8/STRANGULATED    TAKE-DOWN, FISTULA, SMALL INTESTINE  86136 - [place] CLOSURE INTESTINAL CUTANEOUS FISTULA    REVERSAL OF ERINN'S PROCEDURE  39794 - [place] CLSR NTRSTM LG/SM RESCJ & COLORECTAL ANASTOMOSIS      Surgeons   Panel 1:     * Jerel Olson - Primary  Panel 2:     * Krish Gross - Primary    Resident/Fellow/Other Assistant:  Surgeons and Role:  Panel 1:     * Mackenzie A Simerlink, MD - Resident - Assisting  Panel 2:     * Shantel Qiu MD - Fellow    Staff:   Circulator: Aliya  Scrub Person: Katie  Scrub Person: Adelia Abbott Circulator: Angeline  Relief Scrub: Majo  Relief Circulator: Majo  Relief Scrub: Lance    Anesthesia Staff: Anesthesiologist: Gabriel Gómez MD; David Brandon MD; Janay Jimenez MD  CRNA: MINERVA Pang-CRNA  C-AA: CAT Wilson  SRNA: Silvia Rodríguez    Procedure Summary  Anesthesia: General  ASA: II  Estimated Blood Loss: 200 mL  Intra-op Medications:   Administrations occurring from 0645 to 1245 on 25:   Medication Name Total Dose   sodium chloride 0.9 % irrigation solution 1,000 mL    sterile water irrigation solution 1,000 mL   heparin (porcine) injection 5,000 Units 5,000 Units   acetaminophen (Ofirmev) injection 1,000 mg   ciprofloxacin (Cipro)  mg in 200 mL D5W - premix 400 mg   dexAMETHasone (Decadron) 4 mg/mL IV Syringe 2 mL 8 mg   fentaNYL (Sublimaze) injection 50 mcg/mL 200 mcg   glycopyrrolate (Robinul) injection 0.2 mg   HYDROmorphone (Dilaudid) injection 1 mg/mL 0.2 mg   LR infusion Cannot be calculated   LR infusion Cannot be calculated   lidocaine (cardiac) injection 2% prefilled syringe 100 mg   metroNIDAZOLE (Flagyl)  mg in 100 mL NaCl (iso) - premix 500 mg   midazolam PF (Versed) injection 1 mg/mL 2 mg   phenylephrine 40 mcg/mL syringe 10 mL 200 mcg   propofol (Diprivan) injection 10 mg/mL 200 mg   rocuronium (ZeMuron) 50 mg/5 mL injection 220 mg   NaCl 0.9 % bolus Cannot be calculated              Anesthesia Record               Intraprocedure I/O Totals          Intake    Dexmedetomidine 0.00 mL    The total shown is the total volume documented since Anesthesia Start was filed.    NaCl 0.9 % bolus 500.00 mL    LR infusion 950.00 mL    acetaminophen (Ofirmev) injection 100.00 mL    albumin human 5 % 250.00 mL    ciprofloxacin 400 mg/200 mL 200.00 mL    metroNIDAZOLE 500 mg/100 mL 100.00 mL    Total Intake 2100 mL       Output    Urine 270 mL    Est. Blood Loss 225 mL    Total Output 495 mL       Net    Net Volume 1605 mL          Specimen:   ID Type Source Tests Collected by Time   1 : RECTAL STUMP Tissue SOFT TISSUE RESECTION SURGICAL PATHOLOGY EXAM Krish Gross MD 7/22/2025 1228   2 : COLOSTOMY Tissue COLOSTOMY (STOMA) SURGICAL PATHOLOGY EXAM Krish Gross MD 7/22/2025 1257   3 : ENTEROCUTANEOUS FISTULA Tissue FISTULA SURGICAL PATHOLOGY EXAM Jerel Olson MD 7/22/2025 1337                 Drains and/or Catheters:   Closed/Suction Drain Anterior;Left Abdomen Bulb (Active)   Dressing Status Clean;Dry 07/22/25 2000   Drainage Appearance Serosanguineous  07/22/25 2000   Status To bulb suction 07/22/25 2000       Open Drain Inferior Abdomen (Active)       NG/OG/Feeding Tube Nasogastric 16 Fr Right nostril (Active)   Tube Status Unclamped;Low intermittent suction 07/22/25 2000   Site Assessment Clean;Dry;Intact 07/22/25 2000   Output (mL) 0 mL 07/22/25 2000       Colostomy  RUQ (Active)   Stomal Appliance 2 piece;Clean;Dry 07/22/25 2000   Site/Stoma Assessment Clean;Intact 07/22/25 2000   Peristomal Assessment Clean;Intact 07/22/25 2000   Drainage Characteristics Bloody 07/22/25 2000       Urethral Catheter Non-latex 18 Fr. (Active)   Site Assessment Clean;Skin intact 07/22/25 2000   Collection Container Standard drainage bag 07/22/25 2000   Securement Method Securing device (Describe) 07/22/25 2000   Reason for Continuing Urinary Catheterization surgical procedures: urological/gynecological, pelvic oncology, anal, prolonged surgical procedure 07/22/25 1603   Output (mL) 40 mL 07/22/25 2000       [REMOVED] Closed/Suction Drain Right RLQ Bulb 19 Fr. (Removed)   Dressing Status Clean;Dry 07/22/25 1636   Drainage Appearance Serosanguineous 07/22/25 1636   Status To bulb suction 07/22/25 1636   Output (mL) 25 mL 07/22/25 1747       Tourniquet Times:         Implants:  Implants       Type Name Action Serial No.      Mesh DURAMESH 1, SMALL NEEDLE - [id number] Implanted      Mesh DURAMESH 1, SMALL NEEDLE - [id number] Implanted               Findings: Midline, mid jejunal enterocutaneous fistula with approximately 810 cm of healthy bowel proximal to the fistula and 180 cm of healthy bowel distal to the fistula prior to reaching the colon    Indications: Bereket Em is an 59 y.o. male who is having surgery for Incarcerated ventral hernia [K43.6]  Enterocutaneous fistula [K63.2].  He had undergone a Soliman's procedure previously which was complicated by development of an enterocutaneous fistula.  He had undergone a takeback and had a ventral hernia repaired with Vicryl  mesh.  Over time, he had developed a large midline enterocutaneous fistula and was having significant difficulty pouching this.  Additionally, he wished to have his GI continuity restored.  Therefore, we planned a combined procedure in which we would takedown his enterocutaneous fistula and also have Dr. Gross from colorectal surgery perform a takedown of his Soliman's procedure.    The patient was seen in the preoperative area. The risks, benefits, complications, treatment options, non-operative alternatives, expected recovery and outcomes were discussed with the patient. The possibilities of reaction to medication, pulmonary aspiration, injury to surrounding structures, bleeding, recurrent infection, the need for additional procedures, failure to diagnose a condition, and creating a complication requiring transfusion or operation were discussed with the patient. The patient concurred with the proposed plan, giving informed consent.  The site of surgery was properly noted/marked if necessary per policy. The patient has been actively warmed in preoperative area. Preoperative antibiotics have been ordered and given within 1 hours of incision. Venous thrombosis prophylaxis have been ordered including bilateral sequential compression devices and subcutaneous heparin.    Procedure Details:   The patient was brought to the operating room.  Anesthesia was induced following a timeout.  The colostomy and enterocutaneous fistula were oversewn with #1 Prolene sutures to minimize contamination at the beginning of the operation.  The patient was placed in the lithotomy position given plans for Soliman's reversal.  The abdomen was then prepped with Betadine.  The abdomen was then draped and an Ioban was placed over the operative field.    We began the operation by making a midline incision several centimeters inferior to the xiphoid process in an area that on CT scan appeared to be free of adhesive disease.  We were able to  enter the abdomen safely and then took down adhesions sharply from the midline under direct vision.  We worked on that right side and then the left side of the enterocutaneous fistula, eventually disconnecting the fistula from adjacent soft tissues and being able to drop this down.  We then proceeded to takedown the remainder of the patient's abdominal wall adhesions and interloop adhesions.  Overall, adhesiolysis was tedious, and was particularly difficult in the pelvis, where the patient had a loop of small bowel that was densely adherent to the rectal stump.  We identified and repaired 3-4 areas of serosal injury related to this extensive adhesiolysis.  This was done using interrupted 3-0 Vicryl sutures in a Lembert fashion.  Overall, adhesiolysis took approximately 3 hours at the beginning of the case.    We identified that we had approximately 110 cm of healthy bowel proximal to the enterocutaneous fistula and approximately 180 cm of small bowel distal to the enterocutaneous fistula.  Given that we were not sure whether the patient would require diversion, we decided to ask for Dr. Gross to join us at that point in the case so that he could perform the colorectal portion of the operation and then we could either plan to perform a small bowel anastomosis or potentially create a diverting jejunostomy rather than creating an additional anastomosis.  Dr. Gross joined us in the operating room and ultimately performed a Soliman's reversal that required mobilization of a significant portion of the transverse colon and creation of an ileal window.  Given that challenging nature of this anastomosis, the decision was made that diversion would be reasonable.    We then proceeded to perform a resection of the segment of bowel that contained the enterocutaneous fistula.  We scored the mesentery and then took this with a right angle and ties.  We then divided the bowel proximally and distally and sent off the fistula as  a specimen.  Given plans to mature this is a double barrel jejunostomy, we sewed the 2 limbs of bowel together using a 3-0 Vicryl suture.  We then created the stoma trephine at the premarked site by creating a proximately quarter sized incision in the skin and subcutaneous tissue, dissecting down to anterior fascia, incising this in a cruciate fashion, splitting the rectus muscle, incising the posterior sheath, and ultimately ensuring that at least 2 fingers were able to pass through the stoma trephine.  Then, the bowel was grasped with a Trinity and brought through the abdominal wall.  Of note, we had a divide the proximal limb of the small bowel such that the appropriate limb would be able to be brooked.    We then turned our attention to abdominal wall debridement.  We incised the hernia sac superficial to the anterior fascia and then incised the unhealthy scar tissue around the incision and removed this as a specimen to perform a full-thickness abdominal wall debridement.  This allowed for the fascia to be medialized somewhat more easily as well as to be visualized to permit closure, and also allowed for the soft tissue to be brought together.    We then performed a tap block using 0.5% Marcaine with epinephrine and injectable saline, utilizing 50 cc per side.  After confirming that counts were correct, we then closed the former stoma site using multiple figure-of-eight #1 Dura mesh sutures.  We then closed the midline incision from cranially and caudally, effecting a repair of the ventral hernia.  The size of the hernia was 9x21cm.    A 19 Chilean drain was then placed in the subcutaneous space under the midline incision and matured through the left lower quadrant.  The skin of the midline and former stoma sites was then closed using interrupted vertical mattress nylon sutures.  The jejunostomy was then matured in a Milagros fashion with the proximal limb, leaving the distal limb relatively flush with the  abdominal wall.  This is done with a series of 3-0 Vicryl sutures.  Finally, an incisional wound VAC was created using Adaptic and medium black wound VAC foam placed over the incisions and then hooked up to a wound VAC.    A stoma appliance and binder were applied to the patient and he was transported to the postoperative recovery area in stable condition    Evidence of Infection: Yes; Fecal spillage WITHOUT feculent peritonitis and presence of enterocutaneous fistula  Complications:  None; patient tolerated the procedure well.    Disposition: PACU - hemodynamically stable.  Condition: stable     Additional Details: Please note that Dr. Perry served as a co-surgeon for the entire dictated portion of the procedure due to the high level of complexity of this operation for which there was no available qualified resident as well as his longstanding, longitudinal relationship with this patient    Attending Attestation: I was present and scrubbed for the entire procedure.    Jerel Olson  Phone Number: [phone]

## 2025-07-23 NOTE — CONSULTS
Wound Care Consult     Visit Date: 7/23/2025      Patient Name: Bereket Em         MRN: [id number]             Reason for Consult: pouch change         Wound History: Bereket Em is a 59 y.o. male on day 1 of admission presenting with perforated diverticulitis s/p Margarita's (6/7/2024) c/b enterocutaneous fistula, incarcerated ventral hernia s/p exploratory laparotomy, extensive lysis of adhesions, take down of enterocutaneous fistula, creation of jejunostomy, abdominal wall debridement, placement of wound vac, repair of recurrent incarcerated ventral hernia (Dr. Olson) with Margarita's reversal (Dr. Gross) (H&P by Charisse Castaneda, APRN-CNP).     Assessment:     Negative Pressure Wound Therapy Abdomen Medial;Upper (Active)   Dressing Type Black foam 07/23/25 0807   Cycle Continuous 07/23/25 0807   Target Pressure (mmHg) 75 07/23/25 0807     Colostomy  RUQ (Active)   Placement Date/Time: 07/22/25 1502   Placed by: RENATE  Colostomy Type: (c)   Location: RUQ   Number of days: 1      Colostomy  RUQ (Active)   Stomal Appliance Changed;2 piece 07/23/25 1045   Site/Stoma Assessment Red;Pink;Edema 07/23/25 1045   Peristomal Assessment Clean;Intact 07/23/25 1045   Treatment Pouch change;Site care 07/23/25 1045   Drainage Characteristics Bloody 07/23/25 1045   Output (mL) 0 mL 07/23/25 0807     Ostomy type: ileostomy    size: 1 3/4      color: red, moist      protruding: yes, edematous   Umer: none  Functioning: bowel sweat, blood  Mucocutaneous junction: intact  Peristomal skin: intact  Pouching: Melonie RED 2-piece flat barrier with lock and roll pouch.   Ostomy Education: reinforced, patient very educated on pouch changing and supplies due to extended time managing ECF at home prior to this surgery. Pt a bit drowsy from pain medication, will explore best pouch option on next change.    Plan: assess stoma/pouching    Patient assessed for new jejunostomy s/p fistula take down. Patient well known to WOCN team, was  followed while managing his ECF inpatient and outpatient. LUQ jejunostomy with bloody output and edematous stoma which is expected since surgery was on 7/22,. Explained to patient he should monitor for output as diet is advanced to ensue everything is functioning properly. Pouch with separation near the top edge, likely due to vac drape proximity to stoma and peristomal skin. Replaced with new 2-piece ostomy pouch, flat barier, no ostomy ring due to protrusion of stoma and edema.            Wound Plan:   Pouch changes twice weekly while admitted.     Galina Doe RN  7/23/2025  4:11 PM

## 2025-07-23 NOTE — PROGRESS NOTES
07/23/25 0929   Rapid Rounds   Attendance Provider;Nurse;Care Transitions   Expected Discharge Disposition Home   Today we still await: Clinical stability   Review at Escalation Rounds No escalation needed     Bereket Em is a 59 y.o. male on day 1 of admission presenting with Incarcerated ventral hernia.    Plan per Medical/Surgical team:   Pt has wound, TPN (weaning), drains, and ostomy.   Waiting for PT/OT recs. Plan TBD.      Discharge disposition: TBD    ADOD:  7/28    This TCC will continue to follow for home going needs and safe DC plan.    RUSH ANDERSON

## 2025-07-23 NOTE — PROGRESS NOTES
"[facility] GENERAL SURGERY- PROGRESS NOTE     Bereket Em is a 59 y.o. male on day 1 of admission presenting with perforated diverticulitis s/p Margarita's (6/7/2024) c/b enterocutaneous fistula, incarcerated ventral hernia s/p exploratory laparotomy, extensive lysis of adhesions, take down of enterocutaneous fistula, creation of jejunostomy, abdominal wall debridement, placement of wound vac, repair of recurrent incarcerated ventral hernia (Dr. Olson) with Margarita's reversal (Dr. Gross).     Subjective   Reports incisional pain. Has been utilizing PCA with mild relief. Has not yet been OOB due to pain. Denies nausea/vomiting. Preference to keep colvin in place today.     Objective     Physical Exam  Vitals reviewed.   Constitutional:       General: He is not in acute distress.  HENT:      Nose:      Comments: NGT to LIWS, scant brown output in cannister    Cardiovascular:      Rate and Rhythm: Normal rate and regular rhythm.   Pulmonary:      Effort: Pulmonary effort is normal. No respiratory distress.      Comments: 3L O2 NC   Abdominal:      General: There is no distension.      Palpations: Abdomen is soft.      Comments: Appropriately tender to palpation, incision with wound vac, maintain suction. ANA MARIA drain with serosanguinous output, Ostomy in place, stoma well perfused with bowel sweat in bag. Abdominal binder.    Genitourinary:     Comments: Colvin catheter in place, clear, yellow urine     Skin:     General: Skin is warm and dry.     Neurological:      Mental Status: He is alert and oriented to person, place, and time.         Last Recorded Vitals  Blood pressure 101/65, pulse 67, temperature 36.3 °C (97.3 °F), temperature source Temporal, resp. rate 11, height 1.727 m (5' 8\"), weight 72 kg (158 lb 11.7 oz), SpO2 98%.  Intake/Output last 3 Shifts:  I/O last 3 completed shifts:  In: 5000 (69.4 mL/kg) [I.V.:3750 (52.1 mL/kg); Other:100; IV Piggyback:1150]  Out: " 1345 (18.7 mL/kg) [Urine:895 (0.3 mL/kg/hr); Drains:25; Blood:425]  Weight: 72 kg     Relevant Results  Results for orders placed or performed during the hospital encounter of 07/22/25 (from the past 24 hours)   POCT GLUCOSE   Result Value Ref Range    POCT Glucose 153 (H) 74 - 99 mg/dL   CBC   Result Value Ref Range    WBC 9.2 4.4 - 11.3 x10*3/uL    nRBC 0.0 0.0 - 0.0 /100 WBCs    RBC 4.04 (L) 4.50 - 5.90 x10*6/uL    Hemoglobin 10.3 (L) 13.5 - 17.5 g/dL    Hematocrit 32.4 (L) 41.0 - 52.0 %    MCV 80 80 - 100 fL    MCH 25.5 (L) 26.0 - 34.0 pg    MCHC 31.8 (L) 32.0 - 36.0 g/dL    RDW 13.4 11.5 - 14.5 %    Platelets 251 150 - 450 x10*3/uL   Magnesium   Result Value Ref Range    Magnesium 1.47 (L) 1.60 - 2.40 mg/dL   Renal Function Panel   Result Value Ref Range    Glucose 120 (H) 74 - 99 mg/dL    Sodium 138 136 - 145 mmol/L    Potassium 3.4 (L) 3.5 - 5.3 mmol/L    Chloride 97 (L) 98 - 107 mmol/L    Bicarbonate 29 21 - 32 mmol/L    Anion Gap 15 10 - 20 mmol/L    Urea Nitrogen 25 (H) 6 - 23 mg/dL    Creatinine 0.89 0.50 - 1.30 mg/dL    eGFR >90 >60 mL/min/1.73m*2    Calcium 8.2 (L) 8.6 - 10.6 mg/dL    Phosphorus 3.6 2.5 - 4.9 mg/dL    Albumin 3.7 3.4 - 5.0 g/dL   POCT GLUCOSE   Result Value Ref Range    POCT Glucose 139 (H) 74 - 99 mg/dL     *Note: Due to a large number of results and/or encounters for the requested time period, some results have not been displayed. A complete set of results can be found in Results Review.      XR chest 1 view  Result Date: 7/23/2025  Interpreted By:  Su Mack, STUDY: XR CHEST 1 VIEW;  7/23/2025 7:02 am   INDICATION: Signs/Symptoms:Picc placement.   COMPARISON: 08/26/2024.   ACCESSION NUMBER(S): [id number]   ORDERING CLINICIAN: TAMY MITCHELL       Right upper extremity PICC line with the tip in the proximal right atrium. Enteric tube with the tip in the stomach. Low lung volumes with bronchovascular crowding. Cardiac silhouette is mildly enlarged. Aorta is tortuous. Pulmonary  vessels are prominent centrally. Streaky opacities involving the perihilar regions, lung bases, likely atelectasis. No pleural effusion or pneumothorax seen. Bony thorax is unremarkable.   MACRO: None   Signed by: Su Mack 7/23/2025 7:49 AM Dictation workstation:   [id number]     This patient has a central line   Reason for the central line remaining today? Parenteral nutrition    Assessment & Plan  Incarcerated ventral hernia    Enterocutaneous fistula      59 year old male with h/o perforated diverticulitis s/p Margarita's (6/7/2024) c/b enterocutaneous fistula, incarcerated ventral hernia s/p exploratory laparotomy, extensive lysis of adhesions, take down of enterocutaneous fistula, creation of jejunostomy, abdominal wall debridement, placement of wound vac, repair of recurrent incarcerated ventral hernia (Dr. Olson) with Margarita's reversal (Dr. Gross) on 7/22.     PLAN:   Neurology: post operative pain  - scheduled IV tylenol, add IV robaxin   - Dilaudid PCA 0.2/10/1.2   - CIWA/phenobarbital taper for h/o daily etoh     Cardiovascular:  -Vital signs every 4 hours with telemetry     Pulmonology:  -IS x10 every hour   -Wean O2 NC to maintain SpO2 >92%     GI: s/p ECF takedown  - NPO with NG to LIWS  - await return of bowel function   - Nutrition consulted, continue TPN/lipids via PICC   - Zofran PRN nausea   - continue PPI  - maintain incisional vac, ANA MARIA drain  - ostomy nursing for supplies/support     :  - maintain colvin today for strict I/O   - Trend RFP and Magnesium, replace electrolytes as needed to maintain K >4, Mag >2. Replete K+, Mag today   -mIVF: D5 LR @ 125mL/hr until TPN resumes     ID:   - continue Cipro/Flagyl x24 hours   - trend WBC     Heme:  -Trend CBC    Endocrine:  - POCT glucose every 6 hours/PRN     DVT Prophylaxis:  - subcutaneous lovenox, SCDs, ambulation/OOB     Disposition:  - RNF  - PT/OT evaluation       Discussed with Dr Olson.     Charisse Castaneda, APRN, CNP  [facility]  [phone]

## 2025-07-23 NOTE — CONSULTS
"Nutrition Initial Assessment:   Nutrition Assessment    Reason for Assessment: Provider consult order, Parenteral assessment/recommendation (TPN/PPN)    Patient is a 59 y.o. male on day 1 of admission s/p ex lap, extensive lysis of adhesions, take down of ECF, creation of jejuostomy, abdominal wall debridement, placement of wound vac, repair of ventral hernia 7/22/25     Medical History[1]  Surgical History[2]      Nutrition History:  Food and Nutrient History: Met with pt and family at bedside and stated that at home he would start his TPN @ 7:30pm and would let it run until he reached ~2000mls. Pt was unsure of the concentration but for the past week was turning it back on since there was still some left in the bag. Pt stated that he has been getting TPN at home for the past 4 months. Pt stated that he would eat very little during the day since everything he ate would go through his wound bag. Pt stated that for about a year food was going through to his wound bag.  Pt stated that he would sometimes have ensure high protein in the morning but not every morning and would have eggs in the afternoon. Pt stated that he would also drink gatorade and was drinking ensure surgery prior to his procedure on 7/22/25. Pt stated that his UBW is ~159lb and his wt has been stable since starting TPN. Pt denied any nausea, vomiting, diarrhea, or constipation and denied any chewing or swallowing issues.  Food Allergy:  (clams)       Anthropometrics:  Height: 172.7 cm (5' 8\")   Weight: 72 kg (158 lb 11.7 oz)   BMI (Calculated): 24.14  IBW/kg (Dietitian Calculated): 70 kg  Percent of IBW: 103 %                      Weight History:   Wt Readings per review of EMR    07/22/25 72 kg (158 lb 11.7 oz)   07/14/25 72.1 kg (159 lb)   07/08/25 73.3 kg (161 lb 11.2 oz)   06/20/25 73.5 kg (162 lb)   06/11/25 73 kg (161 lb)   06/06/25 72.8 kg (160 lb 9.6 oz)   05/14/25 68.5 kg (151 lb)   05/05/25 68.5 kg (151 lb)   04/21/25 68 kg (150 lb) "   04/08/25 67.6 kg (149 lb)   03/25/25 66.9 kg (147 lb 7.8 oz)   03/31/25 66.2 kg (146 lb)   03/24/25 65.8 kg (145 lb)   03/12/25 66.2 kg (146 lb)   02/10/25 69.9 kg (154 lb)   02/06/25 68.5 kg (151 lb)   01/20/25 70.3 kg (155 lb)   01/08/25 71.2 kg (157 lb)   12/30/24 70.8 kg (156 lb)   12/12/24 70.7 kg (155 lb 12.8 oz)   12/09/24 71.4 kg (157 lb 7 oz)   11/22/24 73.4 kg (161 lb 14.4 oz)   11/20/24 74.4 kg (164 lb)   11/11/24 75.5 kg (166 lb 6 oz)   11/06/24 78 kg (172 lb)   10/28/24 78.2 kg (172 lb 6.4 oz)   10/24/24 78 kg (172 lb)   10/18/24 77.1 kg (170 lb)   09/13/24 83 kg (183 lb)   08/26/24 83.9 kg (185 lb)   08/16/24 83.9 kg (185 lb)   08/15/24 83.9 kg (185 lb)   07/25/24 78.2 kg (172 lb 6.4 oz)   07/25/24 79.2 kg (174 lb 8 oz)-->9% wt loss from this date to current (not significant)        Weight Change %:  Weight History / % Weight Change: Pt had history of insignificant wt loss in the past year but wt has remained stable the past 6 months  Significant Weight Loss: No    Nutrition Focused Physical Exam Findings:    Subcutaneous Fat Loss:   Orbital Fat Pads: Mild-Moderate (slight dark circles and slight hollowing)  Buccal Fat Pads: Mild-Moderate (flat cheeks, minimal bounce)  Triceps: Mild-Moderate (less than ample fat tissue)  Muscle Wasting:  Temporalis: Mild-Moderate (slight depression)  Pectoralis (Clavicular Region): Well nourished (clavicle not visible)  Deltoid/Trapezius: Well nourished (rounded appearance at arm, shoulder, neck)  Quadriceps: Defer  Gastrocnemius: Defer  Edema:  Edema: none  Physical Findings:  Hair: Negative  Eyes: Negative  Nails: Negative  Skin: Positive (s/p abdominal procedure)    Nutrition Significant Labs:  CBC Trend:   Results from last 7 days   Lab Units 07/23/25  0631 07/21/25  1601   WBC AUTO x10*3/uL 9.2  --    [facility] WBC AUTO Thousand/uL  --  13.0*   RBC AUTO x10*6/uL 4.04*  --    [facility] RBC AUTO Million/uL  --  5.44   HEMOGLOBIN g/dL 10.3*  --    [facility] HEMOGLOBIN g/dL  --   13.6   HEMATOCRIT % 32.4*  --    [facility] HEMATOCRIT %  --  44.9   MCV fL 80  --    [facility] MCV fL  --  82.5   PLATELETS AUTO x10*3/uL 251  --    [facility] PLATELETS AUTO Thousand/uL  --  360   BMP Trend:   Results from last 7 days   Lab Units 07/23/25  0631 07/21/25  1601   [facility] GLUCOSE mg/dL  --  86   GLUCOSE mg/dL 120*  --    [facility] CALCIUM mg/dL  --  10.1   CALCIUM mg/dL 8.2*  --    [facility] SODIUM mmol/L  --  136   SODIUM mmol/L 138  --    [facility] POTASSIUM mmol/L  --  3.3*   POTASSIUM mmol/L 3.4*  --    [facility] CO2 mmol/L  --  32   CO2 mmol/L 29  --    [facility] CHLORIDE mmol/L  --  90*   CHLORIDE mmol/L 97*  --    BUN mg/dL 25*  --    [facility] BUN mg/dL  --  27*   CREATININE mg/dL 0.89  --    [facility] CREATININE mg/dL  --  0.91   A1C:  Lab Results   Component Value Date    HGBA1C 5.5 01/26/2024   TPN/PPN Labs:   Results from last 7 days   Lab Units 07/23/25  0631 07/21/25  1601   [facility] GLUCOSE mg/dL  --  86   GLUCOSE mg/dL 120*  --    [facility] POTASSIUM mmol/L  --  3.3*   POTASSIUM mmol/L 3.4*  --    [facility] PHOSPHORUS mg/dL  --  4.0   PHOSPHORUS mg/dL 3.6  --    [facility] MAGNESIUM mg/dL  --  2.1   MAGNESIUM mg/dL 1.47*  --    [facility] SODIUM mmol/L  --  136   SODIUM mmol/L 138  --    [facility] CHLORIDE mmol/L  --  90*   CHLORIDE mmol/L 97*  --    [facility] ALT U/L  --  49*   [facility] AST U/L  --  36*   [facility] ALK PHOS U/L  --  126   [facility] BILIRUBIN TOTAL mg/dL  --  0.8   [facility] TRIGLYCERIDES mg/dL  --  97   Vit D:   Lab Results   Component Value Date    VITD25 47 01/26/2024       Nutrition Specific Medications:  Scheduled medications  fat emulsion-plant based, 250 mL, intravenous, Daily Lipids  magnesium sulfate, 4 g, intravenous, Once  methocarbamol, 1,000 mg, intravenous, q8h  metroNIDAZOLE, 500 mg, intravenous, q8h  pantoprazole, 40 mg, intravenous, Daily  potassium chloride, 40 mEq, intravenous, Once    Continuous medications  Adult Clinimix TPN Cyclic,   dextrose 5 % and lactated Ringer's, 125 mL/hr, Last Rate: 125 mL/hr (07/23/25 0038)        I/O:     ; Stool Appearance: Liquid (07/22/25 0623)    Dietary Orders (From admission, onward)       Start     Ordered    07/22/25 2142  NPO Diet; Effective now  Diet effective now         07/22/25 2141                     Estimated Needs:   Total Energy Estimated Needs in 24 hours (kCal): 2100 kCal  Method for Estimating Needs: 30kcal/kg CBW  Total Protein Estimated Needs in 24 Hours (g): 110 g  Method for Estimating 24 Hour Protein Needs: 1.5g/kg CBW  Total Fluid Estimated Needs in 24 Hours (mL): 2100 mL  Method for Estimating 24 Hour Fluid Needs: 1ml/kcal or per MD team        Nutrition Diagnosis   Malnutrition Diagnosis  Patient has Malnutrition Diagnosis: No    Nutrition Diagnosis  Patient has Nutrition Diagnosis: Yes  Diagnosis Status (1): New  Nutrition Diagnosis 1: Altered GI function  Related to (1): surgical history  As Evidenced by (1): ECF       Nutrition Interventions/Recommendations   Nutrition prescription for parenteral nutrition    Nutrition Recommendations:  Individualized Nutrition Prescription Provided for :   1. Please provide Clinimix E 5/15 @ 90ml/hrx1 hour, 182ml/hrx10 hours, 90ml/hrx1 hour then discontinue with 250ml SMOF lipids @ 20.8ml/hrx12 hours overnight.   2. Please continue to monitor lytes and replete as needed.   3. Please obtain updated Vitamin D and replete if insufficient.    Nutrition Interventions/Goals:   Additional Interventions: TPN provides 2250 total mls, 1920kcals, 100g PRO, and 300g dextrose      Education Documentation  Pt had no further questions at this time           Nutrition Monitoring and Evaluation   Enteral and Parenteral Nutrition Intake Determination: Parenteral nutrition intake - Tolerate TPN at goal rate    Body Weight: Body weight - Maintain stable weight    Electrolyte and Renal Panel: Electrolytes within normal limits  Glucose/Endocrine Profile: Glucose within normal limits ( mg/dL)         Goal Status: New goal(s) identified    Time Spent (min): 60  minutes              [1]   Past Medical History:  Diagnosis Date    Acute pharyngitis, unspecified     Sore throat    Acute upper respiratory infection, unspecified 02/13/2017    Acute URI    Alcohol abuse, in remission 09/12/2018    History of alcohol abuse    Anxiety     Benign essential HTN 02/14/2023    Diverticulosis     Elevated blood-pressure reading, without diagnosis of hypertension 02/23/2015    Elevated blood pressure reading without diagnosis of hypertension    Encounter for immunization 10/10/2014    Need for Tdap vaccination    Encounter for screening for malignant neoplasm of colon 01/04/2017    Encounter for colonoscopy due to history of adenomatous colonic polyps    Encounter for screening for malignant neoplasm of colon 11/02/2016    Encounter for screening colonoscopy    Encounter for screening for malignant neoplasm of colon 11/02/2016    Encounter for screening colonoscopy    Encounter for screening for malignant neoplasm of prostate 09/12/2018    Prostate cancer screening    Essential (primary) hypertension 03/14/2019    Elevated blood pressure reading with diagnosis of hypertension    Hernia, internal 6/05/2024    Impingement syndrome of right shoulder 06/06/2016    Impingement syndrome of right shoulder    Incomplete rotator cuff tear or rupture of right shoulder, not specified as traumatic 09/12/2018    Partial nontraumatic tear of right rotator cuff    Noninfective gastroenteritis and colitis, unspecified 01/23/2018    Acute gastroenteritis    Other general symptoms and signs 11/02/2016    Flu-like symptoms    Other malaise 11/02/2016    Malaise and fatigue    Pain in left ankle and joints of left foot 10/10/2017    Left ankle pain    Pain in left foot 09/12/2017    Left foot pain    Pain in right shoulder 04/11/2016    Right shoulder pain    Pain in thoracic spine 09/12/2018    Thoracic back pain    Pain, unspecified 02/09/2017    Body aches    Personal history of colonic polyps 01/04/2017     History of colonic polyps    Personal history of other diseases of male genital organs 03/15/2019    History of acute prostatitis    Personal history of other diseases of the respiratory system 04/07/2020    History of acute sinusitis    Personal history of other specified conditions 03/14/2019    History of flank pain    Personal history of other specified conditions 10/10/2014    History of paresthesia    Strain of muscle, fascia and tendon of other parts of biceps, right arm, initial encounter 03/11/2016    Rupture of biceps tendon, right, initial encounter   [2]   Past Surgical History:  Procedure Laterality Date    ABDOMINAL SURGERY      COLON SURGERY  6/5/24    COLONOSCOPY      ILEOSTOMY      SHOULDER SURGERY  06/27/2017    Shoulder Surgery    SMALL INTESTINE SURGERY  6/6/24    UPPER GASTROINTESTINAL ENDOSCOPY

## 2025-07-23 NOTE — CARE PLAN
Problem: Pain - Adult  Goal: Verbalizes/displays adequate comfort level or baseline comfort level  Outcome: Progressing     Problem: Safety - Adult  Goal: Free from fall injury  Outcome: Progressing     Problem: Discharge Planning  Goal: Discharge to home or other facility with appropriate resources  Outcome: Progressing     Problem: Chronic Conditions and Co-morbidities  Goal: Patient's chronic conditions and co-morbidity symptoms are monitored and maintained or improved  Outcome: Progressing     Problem: Nutrition  Goal: Nutrient intake appropriate for maintaining nutritional needs  Outcome: Progressing     Problem: Skin  Goal: Decreased wound size/increased tissue granulation at next dressing change  Outcome: Progressing  Goal: Participates in plan/prevention/treatment measures  Outcome: Progressing  Goal: Prevent/manage excess moisture  Outcome: Progressing  Goal: Prevent/minimize sheer/friction injuries  Outcome: Progressing  Goal: Promote/optimize nutrition  Outcome: Progressing  Goal: Promote skin healing  Outcome: Progressing   The patient's goals for the shift include      The clinical goals for the shift include pt will have adequate pain control

## 2025-07-24 ENCOUNTER — APPOINTMENT (OUTPATIENT)
Dept: CARDIOLOGY | Facility: HOSPITAL | Age: 60
End: 2025-07-24
Payer: COMMERCIAL

## 2025-07-24 ENCOUNTER — APPOINTMENT (OUTPATIENT)
Dept: RADIOLOGY | Facility: HOSPITAL | Age: 60
End: 2025-07-24
Payer: COMMERCIAL

## 2025-07-24 LAB
ALBUMIN SERPL BCP-MCNC: 2.9 G/DL (ref 3.4–5)
ALBUMIN SERPL BCP-MCNC: 2.9 G/DL (ref 3.4–5)
ALBUMIN SERPL BCP-MCNC: 3.2 G/DL (ref 3.4–5)
ANION GAP SERPL CALC-SCNC: 10 MMOL/L (ref 10–20)
ANION GAP SERPL CALC-SCNC: 16 MMOL/L (ref 10–20)
ANION GAP SERPL CALC-SCNC: 8 MMOL/L (ref 10–20)
ATRIAL RATE: 105 BPM
BASOPHILS # BLD AUTO: 0.01 X10*3/UL (ref 0–0.1)
BASOPHILS NFR BLD AUTO: 0.4 %
BUN SERPL-MCNC: 17 MG/DL (ref 6–23)
BUN SERPL-MCNC: 18 MG/DL (ref 6–23)
BUN SERPL-MCNC: 20 MG/DL (ref 6–23)
CALCIUM SERPL-MCNC: 8.2 MG/DL (ref 8.6–10.6)
CALCIUM SERPL-MCNC: 8.4 MG/DL (ref 8.6–10.6)
CALCIUM SERPL-MCNC: 8.6 MG/DL (ref 8.6–10.6)
CHLORIDE SERPL-SCNC: 96 MMOL/L (ref 98–107)
CHLORIDE SERPL-SCNC: 97 MMOL/L (ref 98–107)
CHLORIDE SERPL-SCNC: 98 MMOL/L (ref 98–107)
CO2 SERPL-SCNC: 26 MMOL/L (ref 21–32)
CO2 SERPL-SCNC: 30 MMOL/L (ref 21–32)
CO2 SERPL-SCNC: 30 MMOL/L (ref 21–32)
CREAT SERPL-MCNC: 0.67 MG/DL (ref 0.5–1.3)
CREAT SERPL-MCNC: 0.76 MG/DL (ref 0.5–1.3)
CREAT SERPL-MCNC: 0.79 MG/DL (ref 0.5–1.3)
EGFRCR SERPLBLD CKD-EPI 2021: >90 ML/MIN/1.73M*2
EOSINOPHIL # BLD AUTO: 0.05 X10*3/UL (ref 0–0.7)
EOSINOPHIL NFR BLD AUTO: 2.1 %
ERYTHROCYTE [DISTWIDTH] IN BLOOD BY AUTOMATED COUNT: 13.4 % (ref 11.5–14.5)
ERYTHROCYTE [DISTWIDTH] IN BLOOD BY AUTOMATED COUNT: 13.5 % (ref 11.5–14.5)
ERYTHROCYTE [DISTWIDTH] IN BLOOD BY AUTOMATED COUNT: 13.5 % (ref 11.5–14.5)
ERYTHROCYTE [DISTWIDTH] IN BLOOD BY AUTOMATED COUNT: 13.7 % (ref 11.5–14.5)
GLUCOSE BLD MANUAL STRIP-MCNC: 131 MG/DL (ref 74–99)
GLUCOSE BLD MANUAL STRIP-MCNC: 161 MG/DL (ref 74–99)
GLUCOSE BLD MANUAL STRIP-MCNC: 71 MG/DL (ref 74–99)
GLUCOSE SERPL-MCNC: 104 MG/DL (ref 74–99)
GLUCOSE SERPL-MCNC: 136 MG/DL (ref 74–99)
GLUCOSE SERPL-MCNC: 89 MG/DL (ref 74–99)
HCT VFR BLD AUTO: 26.6 % (ref 41–52)
HCT VFR BLD AUTO: 27.1 % (ref 41–52)
HCT VFR BLD AUTO: 28.1 % (ref 41–52)
HCT VFR BLD AUTO: 28.3 % (ref 41–52)
HGB BLD-MCNC: 8.3 G/DL (ref 13.5–17.5)
HGB BLD-MCNC: 8.7 G/DL (ref 13.5–17.5)
HGB BLD-MCNC: 8.9 G/DL (ref 13.5–17.5)
HGB BLD-MCNC: 9.1 G/DL (ref 13.5–17.5)
IMM GRANULOCYTES # BLD AUTO: 0.01 X10*3/UL (ref 0–0.7)
IMM GRANULOCYTES NFR BLD AUTO: 0.4 % (ref 0–0.9)
LACTATE SERPL-SCNC: 2.4 MMOL/L (ref 0.4–2)
LACTATE SERPL-SCNC: 2.6 MMOL/L (ref 0.4–2)
LACTATE SERPL-SCNC: 3.8 MMOL/L (ref 0.4–2)
LYMPHOCYTES # BLD AUTO: 0.33 X10*3/UL (ref 1.2–4.8)
LYMPHOCYTES NFR BLD AUTO: 14.2 %
MAGNESIUM SERPL-MCNC: 2.04 MG/DL (ref 1.6–2.4)
MCH RBC QN AUTO: 25.1 PG (ref 26–34)
MCH RBC QN AUTO: 25.3 PG (ref 26–34)
MCH RBC QN AUTO: 26 PG (ref 26–34)
MCH RBC QN AUTO: 26 PG (ref 26–34)
MCHC RBC AUTO-ENTMCNC: 30.6 G/DL (ref 32–36)
MCHC RBC AUTO-ENTMCNC: 31.4 G/DL (ref 32–36)
MCHC RBC AUTO-ENTMCNC: 32.4 G/DL (ref 32–36)
MCHC RBC AUTO-ENTMCNC: 32.7 G/DL (ref 32–36)
MCV RBC AUTO: 77 FL (ref 80–100)
MCV RBC AUTO: 80 FL (ref 80–100)
MCV RBC AUTO: 82 FL (ref 80–100)
MCV RBC AUTO: 83 FL (ref 80–100)
MONOCYTES # BLD AUTO: 0.15 X10*3/UL (ref 0.1–1)
MONOCYTES NFR BLD AUTO: 6.4 %
NEUTROPHILS # BLD AUTO: 1.78 X10*3/UL (ref 1.2–7.7)
NEUTROPHILS NFR BLD AUTO: 76.5 %
NRBC BLD-RTO: 0 /100 WBCS (ref 0–0)
P AXIS: 39 DEGREES
P OFFSET: 189 MS
P ONSET: 139 MS
PHOSPHATE SERPL-MCNC: 1 MG/DL (ref 2.5–4.9)
PHOSPHATE SERPL-MCNC: 1.6 MG/DL (ref 2.5–4.9)
PHOSPHATE SERPL-MCNC: 1.9 MG/DL (ref 2.5–4.9)
PLATELET # BLD AUTO: 162 X10*3/UL (ref 150–450)
PLATELET # BLD AUTO: 188 X10*3/UL (ref 150–450)
PLATELET # BLD AUTO: 196 X10*3/UL (ref 150–450)
PLATELET # BLD AUTO: 213 X10*3/UL (ref 150–450)
POTASSIUM SERPL-SCNC: 3.3 MMOL/L (ref 3.5–5.3)
POTASSIUM SERPL-SCNC: 3.7 MMOL/L (ref 3.5–5.3)
POTASSIUM SERPL-SCNC: 3.8 MMOL/L (ref 3.5–5.3)
PR INTERVAL: 142 MS
Q ONSET: 210 MS
QRS COUNT: 17 BEATS
QRS DURATION: 94 MS
QT INTERVAL: 336 MS
QTC CALCULATION(BAZETT): 444 MS
QTC FREDERICIA: 404 MS
R AXIS: -37 DEGREES
RBC # BLD AUTO: 3.31 X10*6/UL (ref 4.5–5.9)
RBC # BLD AUTO: 3.42 X10*6/UL (ref 4.5–5.9)
RBC # BLD AUTO: 3.44 X10*6/UL (ref 4.5–5.9)
RBC # BLD AUTO: 3.5 X10*6/UL (ref 4.5–5.9)
SODIUM SERPL-SCNC: 133 MMOL/L (ref 136–145)
SODIUM SERPL-SCNC: 133 MMOL/L (ref 136–145)
SODIUM SERPL-SCNC: 134 MMOL/L (ref 136–145)
T AXIS: 33 DEGREES
T OFFSET: 378 MS
VENTRICULAR RATE: 105 BPM
WBC # BLD AUTO: 2.1 X10*3/UL (ref 4.4–11.3)
WBC # BLD AUTO: 2.3 X10*3/UL (ref 4.4–11.3)
WBC # BLD AUTO: 2.6 X10*3/UL (ref 4.4–11.3)
WBC # BLD AUTO: 2.7 X10*3/UL (ref 4.4–11.3)

## 2025-07-24 PROCEDURE — 82248 BILIRUBIN DIRECT: CPT | Performed by: NURSE PRACTITIONER

## 2025-07-24 PROCEDURE — 2550000001 HC RX 255 CONTRASTS: Performed by: SURGERY

## 2025-07-24 PROCEDURE — 87154 CUL TYP ID BLD PTHGN 6+ TRGT: CPT

## 2025-07-24 PROCEDURE — 93010 ELECTROCARDIOGRAM REPORT: CPT | Performed by: INTERNAL MEDICINE

## 2025-07-24 PROCEDURE — 74177 CT ABD & PELVIS W/CONTRAST: CPT

## 2025-07-24 PROCEDURE — 85027 COMPLETE CBC AUTOMATED: CPT | Performed by: NURSE PRACTITIONER

## 2025-07-24 PROCEDURE — 83605 ASSAY OF LACTIC ACID: CPT | Performed by: NURSE PRACTITIONER

## 2025-07-24 PROCEDURE — 2500000004 HC RX 250 GENERAL PHARMACY W/ HCPCS (ALT 636 FOR OP/ED): Mod: JZ,TB

## 2025-07-24 PROCEDURE — 2500000004 HC RX 250 GENERAL PHARMACY W/ HCPCS (ALT 636 FOR OP/ED)

## 2025-07-24 PROCEDURE — 2500000005 HC RX 250 GENERAL PHARMACY W/O HCPCS

## 2025-07-24 PROCEDURE — 83735 ASSAY OF MAGNESIUM: CPT | Performed by: NURSE PRACTITIONER

## 2025-07-24 PROCEDURE — 2500000004 HC RX 250 GENERAL PHARMACY W/ HCPCS (ALT 636 FOR OP/ED): Performed by: NURSE PRACTITIONER

## 2025-07-24 PROCEDURE — 2500000005 HC RX 250 GENERAL PHARMACY W/O HCPCS: Performed by: NURSE PRACTITIONER

## 2025-07-24 PROCEDURE — 87077 CULTURE AEROBIC IDENTIFY: CPT

## 2025-07-24 PROCEDURE — 2500000004 HC RX 250 GENERAL PHARMACY W/ HCPCS (ALT 636 FOR OP/ED): Performed by: STUDENT IN AN ORGANIZED HEALTH CARE EDUCATION/TRAINING PROGRAM

## 2025-07-24 PROCEDURE — 99232 SBSQ HOSP IP/OBS MODERATE 35: CPT | Performed by: NURSE PRACTITIONER

## 2025-07-24 PROCEDURE — 82947 ASSAY GLUCOSE BLOOD QUANT: CPT

## 2025-07-24 PROCEDURE — 84100 ASSAY OF PHOSPHORUS: CPT | Performed by: NURSE PRACTITIONER

## 2025-07-24 PROCEDURE — 2580000001 HC RX 258 IV SOLUTIONS: Performed by: NURSE PRACTITIONER

## 2025-07-24 PROCEDURE — P9045 ALBUMIN (HUMAN), 5%, 250 ML: HCPCS | Mod: JZ,TB

## 2025-07-24 PROCEDURE — 36415 COLL VENOUS BLD VENIPUNCTURE: CPT

## 2025-07-24 PROCEDURE — 74018 RADEX ABDOMEN 1 VIEW: CPT | Performed by: RADIOLOGY

## 2025-07-24 PROCEDURE — 80069 RENAL FUNCTION PANEL: CPT | Mod: CCI

## 2025-07-24 PROCEDURE — 71275 CT ANGIOGRAPHY CHEST: CPT | Performed by: RADIOLOGY

## 2025-07-24 PROCEDURE — 74018 RADEX ABDOMEN 1 VIEW: CPT

## 2025-07-24 PROCEDURE — 85025 COMPLETE CBC W/AUTO DIFF WBC: CPT

## 2025-07-24 PROCEDURE — 1200000002 HC GENERAL ROOM WITH TELEMETRY DAILY

## 2025-07-24 PROCEDURE — 80069 RENAL FUNCTION PANEL: CPT | Performed by: NURSE PRACTITIONER

## 2025-07-24 RX ORDER — MAGNESIUM SULFATE HEPTAHYDRATE 40 MG/ML
2 INJECTION, SOLUTION INTRAVENOUS ONCE
Status: DISCONTINUED | OUTPATIENT
Start: 2025-07-25 | End: 2025-07-28

## 2025-07-24 RX ORDER — POTASSIUM CHLORIDE 14.9 MG/ML
20 INJECTION INTRAVENOUS ONCE
Status: COMPLETED | OUTPATIENT
Start: 2025-07-24 | End: 2025-07-24

## 2025-07-24 RX ORDER — VANCOMYCIN HYDROCHLORIDE 1 G/20ML
INJECTION, POWDER, LYOPHILIZED, FOR SOLUTION INTRAVENOUS DAILY PRN
Status: DISCONTINUED | OUTPATIENT
Start: 2025-07-24 | End: 2025-07-26

## 2025-07-24 RX ORDER — HEPARIN SODIUM,PORCINE/PF 10 UNIT/ML
5 SYRINGE (ML) INTRAVENOUS AS NEEDED
Status: DISPENSED | OUTPATIENT
Start: 2025-07-24

## 2025-07-24 RX ORDER — ACETAMINOPHEN 10 MG/ML
1000 INJECTION, SOLUTION INTRAVENOUS EVERY 6 HOURS
Status: DISCONTINUED | OUTPATIENT
Start: 2025-07-24 | End: 2025-07-31

## 2025-07-24 RX ORDER — ACETAMINOPHEN 10 MG/ML
1000 INJECTION, SOLUTION INTRAVENOUS EVERY 6 HOURS
Status: CANCELLED | OUTPATIENT
Start: 2025-07-24 | End: 2025-07-26

## 2025-07-24 RX ORDER — ALBUMIN HUMAN 50 G/1000ML
25 SOLUTION INTRAVENOUS ONCE
Status: COMPLETED | OUTPATIENT
Start: 2025-07-24 | End: 2025-07-25

## 2025-07-24 RX ORDER — METOPROLOL TARTRATE 1 MG/ML
5 INJECTION, SOLUTION INTRAVENOUS ONCE
Status: COMPLETED | OUTPATIENT
Start: 2025-07-25 | End: 2025-07-24

## 2025-07-24 RX ORDER — METOPROLOL TARTRATE 1 MG/ML
INJECTION, SOLUTION INTRAVENOUS
Status: COMPLETED
Start: 2025-07-24 | End: 2025-07-24

## 2025-07-24 RX ADMIN — SODIUM CHLORIDE, SODIUM LACTATE, POTASSIUM CHLORIDE, AND CALCIUM CHLORIDE 1000 ML: .6; .31; .03; .02 INJECTION, SOLUTION INTRAVENOUS at 15:46

## 2025-07-24 RX ADMIN — PANTOPRAZOLE SODIUM 40 MG: 40 INJECTION, POWDER, LYOPHILIZED, FOR SOLUTION INTRAVENOUS at 08:48

## 2025-07-24 RX ADMIN — SMOFLIPID 50 G: 6; 6; 5; 3 INJECTION, EMULSION INTRAVENOUS at 21:55

## 2025-07-24 RX ADMIN — Medication: at 15:48

## 2025-07-24 RX ADMIN — PHENOBARBITAL SODIUM 65 MG: 65 INJECTION INTRAMUSCULAR at 05:47

## 2025-07-24 RX ADMIN — PHENOBARBITAL SODIUM 32.5 MG: 65 INJECTION INTRAMUSCULAR; INTRAVENOUS at 20:58

## 2025-07-24 RX ADMIN — POTASSIUM PHOSPHATE, MONOBASIC AND POTASSIUM PHOSPHATE, DIBASIC 30 MMOL: 224; 236 INJECTION, SOLUTION, CONCENTRATE INTRAVENOUS at 11:01

## 2025-07-24 RX ADMIN — METHOCARBAMOL 1000 MG: 100 INJECTION INTRAMUSCULAR; INTRAVENOUS at 08:48

## 2025-07-24 RX ADMIN — METHOCARBAMOL 1000 MG: 100 INJECTION INTRAMUSCULAR; INTRAVENOUS at 16:08

## 2025-07-24 RX ADMIN — ACETAMINOPHEN 1000 MG: 10 INJECTION INTRAVENOUS at 12:47

## 2025-07-24 RX ADMIN — METOPROLOL TARTRATE 5 MG: 1 INJECTION, SOLUTION INTRAVENOUS at 23:32

## 2025-07-24 RX ADMIN — ASCORBIC ACID, VITAMIN A PALMITATE, CHOLECALCIFEROL, THIAMINE HYDROCHLORIDE, RIBOFLAVIN-5 PHOSPHATE SODIUM, PYRIDOXINE HYDROCHLORIDE, NIACINAMIDE, DEXPANTHENOL, ALPHA-TOCOPHEROL ACETATE, VITAMIN K1, FOLIC ACID, BIOTIN, CYANOCOBALAMIN: 200; 3300; 200; 6; 3.6; 6; 40; 15; 10; 150; 600; 60; 5 INJECTION, SOLUTION INTRAVENOUS at 20:58

## 2025-07-24 RX ADMIN — POTASSIUM CHLORIDE 20 MEQ: 14.9 INJECTION, SOLUTION INTRAVENOUS at 10:50

## 2025-07-24 RX ADMIN — IOHEXOL 80 ML: 350 INJECTION, SOLUTION INTRAVENOUS at 13:57

## 2025-07-24 RX ADMIN — SODIUM CHLORIDE, SODIUM LACTATE, POTASSIUM CHLORIDE, AND CALCIUM CHLORIDE 500 ML: .6; .31; .03; .02 INJECTION, SOLUTION INTRAVENOUS at 14:25

## 2025-07-24 RX ADMIN — SODIUM CHLORIDE, SODIUM LACTATE, POTASSIUM CHLORIDE, AND CALCIUM CHLORIDE 500 ML: .6; .31; .03; .02 INJECTION, SOLUTION INTRAVENOUS at 08:28

## 2025-07-24 RX ADMIN — ACETAMINOPHEN 1000 MG: 10 INJECTION INTRAVENOUS at 23:28

## 2025-07-24 RX ADMIN — ACETAMINOPHEN 1000 MG: 10 INJECTION INTRAVENOUS at 06:45

## 2025-07-24 RX ADMIN — ALBUMIN HUMAN 25 G: 0.05 INJECTION, SOLUTION INTRAVENOUS at 23:55

## 2025-07-24 RX ADMIN — SODIUM CHLORIDE, SODIUM LACTATE, POTASSIUM CHLORIDE, AND CALCIUM CHLORIDE 1000 ML: 600; 310; 30; 20 INJECTION, SOLUTION INTRAVENOUS at 23:29

## 2025-07-24 RX ADMIN — ENOXAPARIN SODIUM 40 MG: 100 INJECTION SUBCUTANEOUS at 08:48

## 2025-07-24 RX ADMIN — SODIUM CHLORIDE, SODIUM LACTATE, POTASSIUM CHLORIDE, AND CALCIUM CHLORIDE 500 ML: .6; .31; .03; .02 INJECTION, SOLUTION INTRAVENOUS at 18:43

## 2025-07-24 ASSESSMENT — ENCOUNTER SYMPTOMS
MUSCLE WEAKNESS: 1
PAIN LOCATION: ABDOMEN
PAIN LOCATION - PAIN SEVERITY: 4/10
FATIGUES EASILY: 1
PERSON REPORTING PAIN: PATIENT
PAIN: 1
CHANGE IN APPETITE: UNCHANGED
APPETITE LEVEL: GOOD

## 2025-07-24 ASSESSMENT — PAIN SCALES - GENERAL
PAINLEVEL_OUTOF10: 7
PAINLEVEL_OUTOF10: 7

## 2025-07-24 ASSESSMENT — PAIN - FUNCTIONAL ASSESSMENT
PAIN_FUNCTIONAL_ASSESSMENT: 0-10
PAIN_FUNCTIONAL_ASSESSMENT: 0-10

## 2025-07-24 NOTE — PROGRESS NOTES
"[facility] GENERAL SURGERY- PROGRESS NOTE     Bereket Em is a 59 y.o. male on day 2 of admission presenting with perforated diverticulitis s/p Margarita's (6/7/2024) c/b enterocutaneous fistula, incarcerated ventral hernia s/p exploratory laparotomy, extensive lysis of adhesions, take down of enterocutaneous fistula, creation of jejunostomy, abdominal wall debridement, placement of wound vac, repair of recurrent incarcerated ventral hernia (Dr. Olson) with Margarita's reversal (Dr. Gross).     Subjective   C/o incisional pain, has been utilizing PCA. Difficult to move due to pain, but pain better controlled than yesterday. Denies chest pain or shortness of breath. Denies stool or gas output via ostomy.     Objective     Physical Exam  Vitals reviewed.   Constitutional:       General: He is not in acute distress.  HENT:      Nose:      Comments: NGT to LIWS, brown output in cannister    Cardiovascular:      Rate and Rhythm: Regular rhythm. Tachycardia present.   Pulmonary:      Effort: Pulmonary effort is normal. No respiratory distress.   Abdominal:      General: There is no distension.      Palpations: Abdomen is soft.      Comments: Appropriately tender to palpation, incision with wound vac, maintain suction. ANA MARIA drain with serosanguinous output, Ostomy in place, stoma well perfused with bowel sweat in bag. Abdominal binder.    Genitourinary:     Comments: Basilio catheter in place, clear, yellow urine     Skin:     General: Skin is warm and dry.     Neurological:      Mental Status: He is alert and oriented to person, place, and time.         Last Recorded Vitals  Blood pressure 96/60, pulse (!) 116, temperature 36.4 °C (97.5 °F), temperature source Temporal, resp. rate 19, height 1.727 m (5' 8\"), weight 72 kg (158 lb 11.7 oz), SpO2 96%.  Intake/Output last 3 Shifts:  I/O last 3 completed shifts:  In: 3116.7 (43.3 mL/kg) [I.V.:2710.4 (37.6 mL/kg); IV " Piggyback:100]  Out: 2760 (38.3 mL/kg) [Urine:2575 (1 mL/kg/hr); Drains:110; Stool:75]  Weight: 72 kg     Relevant Results  Results for orders placed or performed during the hospital encounter of 07/22/25 (from the past 24 hours)   POCT GLUCOSE   Result Value Ref Range    POCT Glucose 137 (H) 74 - 99 mg/dL   POCT GLUCOSE   Result Value Ref Range    POCT Glucose 123 (H) 74 - 99 mg/dL   POCT GLUCOSE   Result Value Ref Range    POCT Glucose 161 (H) 74 - 99 mg/dL   CBC   Result Value Ref Range    WBC 2.6 (L) 4.4 - 11.3 x10*3/uL    nRBC 0.0 0.0 - 0.0 /100 WBCs    RBC 3.31 (L) 4.50 - 5.90 x10*6/uL    Hemoglobin 8.3 (L) 13.5 - 17.5 g/dL    Hematocrit 27.1 (L) 41.0 - 52.0 %    MCV 82 80 - 100 fL    MCH 25.1 (L) 26.0 - 34.0 pg    MCHC 30.6 (L) 32.0 - 36.0 g/dL    RDW 13.5 11.5 - 14.5 %    Platelets 196 150 - 450 x10*3/uL   Renal Function Panel   Result Value Ref Range    Glucose 136 (H) 74 - 99 mg/dL    Sodium 133 (L) 136 - 145 mmol/L    Potassium 3.3 (L) 3.5 - 5.3 mmol/L    Chloride 98 98 - 107 mmol/L    Bicarbonate 30 21 - 32 mmol/L    Anion Gap 8 (L) 10 - 20 mmol/L    Urea Nitrogen 18 6 - 23 mg/dL    Creatinine 0.67 0.50 - 1.30 mg/dL    eGFR >90 >60 mL/min/1.73m*2    Calcium 8.4 (L) 8.6 - 10.6 mg/dL    Phosphorus 1.0 (L) 2.5 - 4.9 mg/dL    Albumin 2.9 (L) 3.4 - 5.0 g/dL   Magnesium   Result Value Ref Range    Magnesium 2.04 1.60 - 2.40 mg/dL   POCT GLUCOSE   Result Value Ref Range    POCT Glucose 131 (H) 74 - 99 mg/dL   CBC   Result Value Ref Range    WBC 2.7 (L) 4.4 - 11.3 x10*3/uL    nRBC 0.0 0.0 - 0.0 /100 WBCs    RBC 3.42 (L) 4.50 - 5.90 x10*6/uL    Hemoglobin 8.9 (L) 13.5 - 17.5 g/dL    Hematocrit 28.3 (L) 41.0 - 52.0 %    MCV 83 80 - 100 fL    MCH 26.0 26.0 - 34.0 pg    MCHC 31.4 (L) 32.0 - 36.0 g/dL    RDW 13.5 11.5 - 14.5 %    Platelets 213 150 - 450 x10*3/uL   Electrocardiogram, 12-lead PRN ACS symptoms   Result Value Ref Range    Ventricular Rate 105 BPM    Atrial Rate 105 BPM    LA Interval 142 ms    QRS  Duration 94 ms    QT Interval 336 ms    QTC Calculation(Bazett) 444 ms    P Axis 39 degrees    R Axis -37 degrees    T Axis 33 degrees    QRS Count 17 beats    Q Onset 210 ms    P Onset 139 ms    P Offset 189 ms    T Offset 378 ms    QTC Fredericia 404 ms     *Note: Due to a large number of results and/or encounters for the requested time period, some results have not been displayed. A complete set of results can be found in Results Review.      XR abdomen 1 view  Result Date: 7/24/2025  Interpreted By:  Shanice Dsouza,  and Riley Cormier STUDY: XR ABDOMEN 1 VIEW;  7/24/2025 8:24 am   INDICATION: Signs/Symptoms:Confirm NG placement.     COMPARISON: None.   ACCESSION NUMBER(S): [id number]   ORDERING CLINICIAN: TAMY MITCHELL   FINDINGS: AP radiograph of the abdomen. Lower pelvis is not included.   Enteric tube courses past the diaphragm with the tip projecting over the gastric body.   Nonobstructive bowel gas pattern. Limited evaluation of pneumoperitoneum on supine imaging, however no gross evidence of free air is noted.   Right basilar atelectasis and effusion.   Osseous structures demonstrate no acute bony changes.       1.  Enteric tube courses past the diaphragm with the tip projecting over the gastric body. 2. Right basilar atelectasis and effusion.   I personally reviewed the images/study and I agree with the findings as stated by resident physician Casa Lin MD. This study was interpreted at [place], [place].   MACRO: None   Signed by: Shanice Neumann 7/24/2025 9:45 AM Dictation workstation:   [id number]    Electrocardiogram, 12-lead PRN ACS symptoms  Result Date: 7/24/2025  Sinus tachycardia with Premature atrial complexes Left axis deviation Low voltage QRS Abnormal ECG When compared with ECG of 24-JUL-2025 08:14, Premature atrial complexes are now Present Sinus rhythm is no longer with 2nd degree AV block (Mobitz I)    XR  chest 1 view  Result Date: 7/23/2025  Interpreted By:  Su Mack, STUDY: XR CHEST 1 VIEW;  7/23/2025 7:02 am   INDICATION: Signs/Symptoms:Picc placement.   COMPARISON: 08/26/2024.   ACCESSION NUMBER(S): [id number]   ORDERING CLINICIAN: TAMY MITCHELL       Right upper extremity PICC line with the tip in the proximal right atrium. Enteric tube with the tip in the stomach. Low lung volumes with bronchovascular crowding. Cardiac silhouette is mildly enlarged. Aorta is tortuous. Pulmonary vessels are prominent centrally. Streaky opacities involving the perihilar regions, lung bases, likely atelectasis. No pleural effusion or pneumothorax seen. Bony thorax is unremarkable.   MACRO: None   Signed by: Su Mack 7/23/2025 7:49 AM Dictation workstation:   [id number]     This patient has a central line   Reason for the central line remaining today? Parenteral nutrition    Assessment & Plan  Incarcerated ventral hernia    Enterocutaneous fistula      59 year old male with h/o perforated diverticulitis s/p Margarita's (6/7/2024) c/b enterocutaneous fistula, incarcerated ventral hernia s/p exploratory laparotomy, extensive lysis of adhesions, take down of enterocutaneous fistula, creation of jejunostomy, abdominal wall debridement, placement of wound vac, repair of recurrent incarcerated ventral hernia (Dr. Olson) with Margarita's reversal (Dr. Gross) on 7/22.     PLAN:   Neurology: post operative pain  - scheduled IV tylenol, IV robaxin   - Dilaudid PCA 0.2/10/1.2   - CIWA/phenobarbital taper for h/o daily etoh     Cardiovascular: tachycardia   -Vital signs every 4 hours with telemetry   - continue to monitor, if no improvement CT angio chest r/o PE, CT A/P     Pulmonology:  -IS x10 every hour   -Maintain SpO2 >92%     GI: s/p ECF takedown  - NPO with NG to TAJ HENAO to confirm NG placement   - await return of bowel function   - Nutrition consulted, continue TPN/lipids via PICC   - Zofran PRN nausea   - continue PPI  -  maintain incisional vac, ANA MARIA drain  - ostomy nursing for supplies/support     :  - Remove colvin today, await void   - Strict I/O   - Trend RFP and Magnesium, replace electrolytes as needed to maintain K >4, Mag >2. Replete K+, Phos today   -500mL LR bolus today     ID:   -perioperative abx completed     Heme: acute blood loss anemia   -Trend CBC    Endocrine:  - POCT glucose every 6 hours/PRN     DVT Prophylaxis:  - subcutaneous lovenox, SCDs, ambulation/OOB     Disposition:  - RNF  - PT/OT evaluation       Discussed with Dr Olson.     Charisse Castaneda, APRN, CNP  [facility]  [id number]

## 2025-07-24 NOTE — CONSULTS
Nutrition Note:   Nutrition Assessment         Spoke with NP via secure chat inquiring about any adjustments to TPN d/t low phos this AM. Informed NP that no adjustment could be made to TPN but if phos is not >2 after repleting that it is not recommended to restart nocturnal TPN. NP stated that labs would be re checked this afternoon.              Time Spent (min): 15 minutes

## 2025-07-24 NOTE — CARE PLAN
The patient's goals for the shift include resting.     The clinical goals for the shift include patient will remain HDS throughout this shift    Over the shift, the patient did not make progress toward the following goals. Barriers to progression include abnormal vital signs. Recommendations to address these barriers include further interventions per provider protocol.

## 2025-07-24 NOTE — SIGNIFICANT EVENT
Rapid Response Nurse Note:  [x] RADAR alert/Score 7    Pager time: [phone]  Arrival time: 1040  Event end time:   Location:  9032  [] Phone triage     Rapid response initiated by:  [] Rapid Response RN [] Family [] Nursing Supervisor [] Physician   [x] RADAR auto-page [] Sepsis auto-page [] RN [] RT   [] NP/PA [] Other:     Primary reason for call:   [] BAT [] New CPAP/BiPAP [] Bleeding [] Change in mental status   [] Chest pain [] Code blue [] FiO2 >/= 50% [] HR </= 40 bpm   [] HR >/= 130 bpm [] Hyperglycemia [] Hypoglycemia [x] RADAR    [] RR </= 8 bpm [] RR >/= 30 bpm [] SBP </= 90 mmHg [] SpO2 < 90%   [] Seizure [] Sepsis [] Staff concern:     Initial VS and/or RADAR VS:  radar vitals below in bold    Vitals:    25 0800 25 0900 25 1034 25 1051   BP:   103/65 101/59   BP Location:    Left arm   Patient Position:    Lying   Pulse: (!) 116 104 110 (!) 113   Resp: 19  18 19   Temp:   35.9 °C (96.6 °F) 37.4 °C (99.3 °F)   TempSrc:   Temporal Temporal   SpO2:   92% 95%   Weight:       Height:            Interventions:  [x] None [] ABG [] Assist w/ICU transfer [] BAT paged    [] Bag mask [] Blood [] Cardioversion [] Code Blue   [] Code blue for intubation [] Code status changed [] Chest x-ray [] EKG   [] IV fluid/bolus [] KUB x-ray [] Labs/cultures [] Medication   [] Nebulizer treatment [] NIPPV (CPAP/BiPAP) [] Oxygen [] Oral airway   [] Peripheral IV [] Palliative care consult [] CT/MRI [] Sepsis protocol    [] Suctioned [] Other:       Outcome:  [] Coded and  [] Code blue for intubation [] Coded and transferred to ICU []  on division   [x] Remained on division (no change) [] Remained on division + additional monitoring [] Remained in ED [] Transferred to ED   [] Transferred to ICU [] Transferred to inpatient status [] Transferred for interventions (procedure) [] Transferred to ICU stepdown    [] Transferred to surgery [] Transferred to telemetry [] Sepsis protocol [] STEMI  protocol   [] Stroke protocol [x] Bedside nurse instructed to page rapid response for any concerns or acute change in condition/VS     Additional Comments: Spoke to RN regarding vital signs.  Pt on PCA for pain.  Asymptomatic, in no acute distress.  Per RN, pt was given 500 ml normal saline bolus.  No concerns from RN at this time.

## 2025-07-24 NOTE — PROGRESS NOTES
I saw and examined the patient.    58 yo male s/p fistula takedown, extensive lysis of adhesions, colostomy reversal, double barrel jejunostomy formation, abdominal wall reconstruction.   Doing well. Pain is controlled. Discussed surgery.   Some ostomy output.   Midline and LUQ wound vac in place.   Basilio with yellow urine.   Seen by wound team.   WBCs 9  Hgb 10  Cr 0.8  Please call with any questions or concerns.

## 2025-07-24 NOTE — SIGNIFICANT EVENT
Rapid Response Nurse Note: Rapid Response    Pager time:   Arrival time:   Event end time:   Location: [facility]32  [] Triage by phone or secure messaging    Rapid response initiated by:  [] Rapid response RN [] Family [] Nursing Supervisor [] Physician   [] RADAR auto page [] Sepsis auto-page [x] RN [] RT   [] NP/PA [] Other:     Primary reason for call:   [] BAT [] New CPAP/BiPAP [] Bleeding [] Change in mental status   [] Chest pain [] Code blue [] FiO2 >/= 50% [] HR </= 40 bpm   [] HR >/= 130 bpm [] Hyperglycemia [] Hypoglycemia [] RADAR    [] RR </= 8 bpm [] RR >/= 30 bpm [] SBP </= 90 mmHg [] SpO2 < 90%   [] Seizure [] Sepsis [] Shortness of breath  [x] Staff concern: see comments     Initial VS and/or RADAR VS: T null °C; ; RR 14; /55; SPO2 97%.    Providers present at bedside (if applicable): MD Stevenson ([facility]); MD Iain ([place])    Name of ICU Provider contacted (if applicable): NA    Interventions:  [] None [x] ABG/VBG [] Assist w/ICU transfer [] BAT paged    [] Bag mask [] Blood [] Cardioversion [] Code Blue   [] Code blue for intubation [] Code status changed [] Chest x-ray [] EKG   [] IV fluid/bolus [] KUB x-ray [] Labs/cultures [] Medication   [] Nebulizer treatment [] NIPPV (CPAP/BiPAP) [] Oxygen [] Oral airway   [] Peripheral IV [] Palliative care consult [] CT/MRI [] Sepsis protocol    [] Suctioned [x] Other: ECG, cx and labs drawn prior to arrival     Outcome:  [] Coded and  [] Code blue for intubation [] Coded and transferred to ICU []  on division   [x] Remained on division (no change) [] Remained on division + additional monitoring [] Remained in ED [] Transferred to ED   [] Transferred to ICU [] Transferred to inpatient status [] Transferred for interventions (procedure) [] Transferred to ICU stepdown    [] Transferred to surgery [] Transferred to telemetry [] Sepsis protocol [] STEMI protocol   [] Stroke protocol [x] Bedside nurse instructed to page rapid  response for any concerns or acute change in condition/VS     Additional Comments:   Rapid Response Team at bedside for chest tightness, desaturation to 89% on 2L, and tachycardia of 120. Earlier in shift, pt received total of 1.5L IVF, labs sent hour before call. ECG completed before arrival. Upon arrival, pt awake and alert, denying chest tightness but endorsing abdomen discomfort r/t surgery. Additional blood cx and VBG sent (clotted; RN to resend). Of note, pt is on a phenobarbital taper and refused previous dose. Pt amenable to taking dose. Plan per [place] resident is to continue to give IVF and monitor. No additional interventions by rapid response team indicated at this time.  Staff to page rapid response for any concerns or acute change in condition/VS.

## 2025-07-24 NOTE — PROGRESS NOTES
Physical Therapy                 Therapy Communication Note    Patient Name: Bereket Em  MRN: [id number]  Department: [place] [address]  Room: [facility]9032  Today's Date: 7/24/2025     Discipline: Physical Therapy    Missed Visit: PT Missed Visit: Yes     Missed Visit Reason: Missed Visit Reason: Patient placed on medical hold (PT pending CT angio chest for PE rule out.  PT will re-attempt as pt is medically appropriate and schedule allows)    Missed Time: Attempt 1216

## 2025-07-24 NOTE — SIGNIFICANT EVENT
Colorectal Surgery Plan of Care    Team reengaged due to intermittent tachycardia today. EKG obtained demonstrating only sinus tachycardia.     Patient HDS and reporting that he feels about the same as he has on previous days. Denies new abdominal pain but endorses distention which is stable from prior. On exam, patient is distended with appropriate tenderness at incisions. Ostomy healthy appearing.    CT scan obtained which demonstrated post-operative changes with no obvious signs of ischemia. Small areas of free air likely postoperative in nature.    No acute surgical intervention indicated at this time. Would consider fluid resuscitation.    Patient seen and discussed with attending, Dr. Renato Quezada MD  PGY-5 General Surgery

## 2025-07-24 NOTE — CARE PLAN
The patient's goals for the shift include  Pain control    The clinical goals for the shift include Pt will remain HDS for the shift    Over the shift, the patient did make progress toward the following goals. No Barriers to progression

## 2025-07-25 ENCOUNTER — ANESTHESIA EVENT (OUTPATIENT)
Dept: OPERATING ROOM | Facility: HOSPITAL | Age: 60
End: 2025-07-25
Payer: COMMERCIAL

## 2025-07-25 ENCOUNTER — APPOINTMENT (OUTPATIENT)
Dept: CARDIOLOGY | Facility: HOSPITAL | Age: 60
End: 2025-07-25
Payer: COMMERCIAL

## 2025-07-25 ENCOUNTER — HOME CARE VISIT (OUTPATIENT)
Dept: HOME HEALTH SERVICES | Facility: HOME HEALTH | Age: 60
End: 2025-07-25
Payer: COMMERCIAL

## 2025-07-25 ENCOUNTER — ANESTHESIA (OUTPATIENT)
Dept: OPERATING ROOM | Facility: HOSPITAL | Age: 60
End: 2025-07-25
Payer: COMMERCIAL

## 2025-07-25 ENCOUNTER — APPOINTMENT (OUTPATIENT)
Dept: RADIOLOGY | Facility: HOSPITAL | Age: 60
End: 2025-07-25
Payer: COMMERCIAL

## 2025-07-25 PROBLEM — I10 PRIMARY HYPERTENSION: Status: ACTIVE | Noted: 2025-07-25

## 2025-07-25 LAB
ABO GROUP (TYPE) IN BLOOD: NORMAL
ALBUMIN SERPL BCP-MCNC: 2.3 G/DL (ref 3.4–5)
ALBUMIN SERPL BCP-MCNC: 2.3 G/DL (ref 3.4–5)
ALBUMIN SERPL BCP-MCNC: 2.7 G/DL (ref 3.4–5)
ALBUMIN SERPL BCP-MCNC: 3 G/DL (ref 3.4–5)
ALBUMIN SERPL BCP-MCNC: 3.2 G/DL (ref 3.4–5)
ALP SERPL-CCNC: 33 U/L (ref 33–136)
ALP SERPL-CCNC: 46 U/L (ref 33–136)
ALT SERPL W P-5'-P-CCNC: 21 U/L (ref 10–52)
ALT SERPL W P-5'-P-CCNC: 32 U/L (ref 10–52)
ANION GAP BLDA CALCULATED.4IONS-SCNC: 12 MMO/L (ref 10–25)
ANION GAP BLDA CALCULATED.4IONS-SCNC: 12 MMO/L (ref 10–25)
ANION GAP BLDA CALCULATED.4IONS-SCNC: 9 MMO/L (ref 10–25)
ANION GAP SERPL CALC-SCNC: 10 MMOL/L (ref 10–20)
ANION GAP SERPL CALC-SCNC: 13 MMOL/L (ref 10–20)
ANION GAP SERPL CALC-SCNC: 13 MMOL/L (ref 10–20)
ANTIBODY SCREEN: NORMAL
APTT PPP: 33 SECONDS (ref 26–36)
AST SERPL W P-5'-P-CCNC: 25 U/L (ref 9–39)
AST SERPL W P-5'-P-CCNC: 35 U/L (ref 9–39)
BASE EXCESS BLDA CALC-SCNC: -0.9 MMOL/L (ref -2–3)
BASE EXCESS BLDA CALC-SCNC: -2.9 MMOL/L (ref -2–3)
BASE EXCESS BLDA CALC-SCNC: -3.8 MMOL/L (ref -2–3)
BASE EXCESS BLDA CALC-SCNC: 0.4 MMOL/L (ref -2–3)
BASE EXCESS BLDA CALC-SCNC: 0.5 MMOL/L (ref -2–3)
BASOPHILS # BLD MANUAL: 0 X10*3/UL (ref 0–0.1)
BASOPHILS NFR BLD MANUAL: 0 %
BILIRUB DIRECT SERPL-MCNC: 1 MG/DL (ref 0–0.3)
BILIRUB DIRECT SERPL-MCNC: 1.1 MG/DL (ref 0–0.3)
BILIRUB SERPL-MCNC: 2 MG/DL (ref 0–1.2)
BILIRUB SERPL-MCNC: 2.8 MG/DL (ref 0–1.2)
BLASTS # BLD MANUAL: 0 X10*3/UL
BLASTS NFR BLD MANUAL: 0 %
BODY SURFACE AREA: 1.86 M2
BODY TEMPERATURE: 37 DEGREES CELSIUS
BUN SERPL-MCNC: 17 MG/DL (ref 6–23)
BUN SERPL-MCNC: 18 MG/DL (ref 6–23)
BUN SERPL-MCNC: 20 MG/DL (ref 6–23)
BURR CELLS BLD QL SMEAR: ABNORMAL
CA-I BLD-SCNC: 1.1 MMOL/L (ref 1.1–1.33)
CA-I BLD-SCNC: 1.12 MMOL/L (ref 1.1–1.33)
CA-I BLDA-SCNC: 1.08 MMOL/L (ref 1.1–1.33)
CA-I BLDA-SCNC: 1.12 MMOL/L (ref 1.1–1.33)
CA-I BLDA-SCNC: 1.13 MMOL/L (ref 1.1–1.33)
CA-I BLDA-SCNC: 1.16 MMOL/L (ref 1.1–1.33)
CA-I BLDA-SCNC: 1.18 MMOL/L (ref 1.1–1.33)
CALCIUM SERPL-MCNC: 7.9 MG/DL (ref 8.6–10.6)
CALCIUM SERPL-MCNC: 8 MG/DL (ref 8.6–10.6)
CALCIUM SERPL-MCNC: 8 MG/DL (ref 8.6–10.6)
CARDIAC TROPONIN I PNL SERPL HS: 9 NG/L (ref 0–53)
CHLORIDE BLDA-SCNC: 100 MMOL/L (ref 98–107)
CHLORIDE BLDA-SCNC: 100 MMOL/L (ref 98–107)
CHLORIDE BLDA-SCNC: 94 MMOL/L (ref 98–107)
CHLORIDE BLDA-SCNC: 96 MMOL/L (ref 98–107)
CHLORIDE BLDA-SCNC: 99 MMOL/L (ref 98–107)
CHLORIDE SERPL-SCNC: 97 MMOL/L (ref 98–107)
CHLORIDE SERPL-SCNC: 99 MMOL/L (ref 98–107)
CHLORIDE SERPL-SCNC: 99 MMOL/L (ref 98–107)
CO2 SERPL-SCNC: 25 MMOL/L (ref 21–32)
CO2 SERPL-SCNC: 26 MMOL/L (ref 21–32)
CO2 SERPL-SCNC: 27 MMOL/L (ref 21–32)
CREAT SERPL-MCNC: 0.82 MG/DL (ref 0.5–1.3)
CREAT SERPL-MCNC: 0.83 MG/DL (ref 0.5–1.3)
CREAT SERPL-MCNC: 0.85 MG/DL (ref 0.5–1.3)
E COLI DNA BLD POS QL NAA+PROBE: DETECTED
EGFRCR SERPLBLD CKD-EPI 2021: >90 ML/MIN/1.73M*2
EJECTION FRACTION: 63 %
EOSINOPHIL # BLD MANUAL: 0.09 X10*3/UL (ref 0–0.7)
EOSINOPHIL NFR BLD MANUAL: 3.1 %
ERYTHROCYTE [DISTWIDTH] IN BLOOD BY AUTOMATED COUNT: 13.2 % (ref 11.5–14.5)
ERYTHROCYTE [DISTWIDTH] IN BLOOD BY AUTOMATED COUNT: 13.6 % (ref 11.5–14.5)
ERYTHROCYTE [DISTWIDTH] IN BLOOD BY AUTOMATED COUNT: 13.7 % (ref 11.5–14.5)
GLUCOSE BLD MANUAL STRIP-MCNC: 116 MG/DL (ref 74–99)
GLUCOSE BLD MANUAL STRIP-MCNC: 130 MG/DL (ref 74–99)
GLUCOSE BLDA-MCNC: 106 MG/DL (ref 74–99)
GLUCOSE BLDA-MCNC: 113 MG/DL (ref 74–99)
GLUCOSE BLDA-MCNC: 116 MG/DL (ref 74–99)
GLUCOSE BLDA-MCNC: 133 MG/DL (ref 74–99)
GLUCOSE BLDA-MCNC: 161 MG/DL (ref 74–99)
GLUCOSE SERPL-MCNC: 112 MG/DL (ref 74–99)
GLUCOSE SERPL-MCNC: 125 MG/DL (ref 74–99)
GLUCOSE SERPL-MCNC: 125 MG/DL (ref 74–99)
HCO3 BLDA-SCNC: 22.1 MMOL/L (ref 22–26)
HCO3 BLDA-SCNC: 22.6 MMOL/L (ref 22–26)
HCO3 BLDA-SCNC: 23.2 MMOL/L (ref 22–26)
HCO3 BLDA-SCNC: 24 MMOL/L (ref 22–26)
HCO3 BLDA-SCNC: 24.5 MMOL/L (ref 22–26)
HCT VFR BLD AUTO: 21.7 % (ref 41–52)
HCT VFR BLD AUTO: 23.2 % (ref 41–52)
HCT VFR BLD AUTO: 25.9 % (ref 41–52)
HCT VFR BLD EST: 24 % (ref 41–52)
HCT VFR BLD EST: 26 % (ref 41–52)
HCT VFR BLD EST: 26 % (ref 41–52)
HCT VFR BLD EST: 38 % (ref 41–52)
HCT VFR BLD EST: 51 % (ref 41–52)
HGB BLD-MCNC: 7.2 G/DL (ref 13.5–17.5)
HGB BLD-MCNC: 7.7 G/DL (ref 13.5–17.5)
HGB BLD-MCNC: 8.5 G/DL (ref 13.5–17.5)
HGB BLDA-MCNC: 12.5 G/DL (ref 13.5–17.5)
HGB BLDA-MCNC: 16.9 G/DL (ref 13.5–17.5)
HGB BLDA-MCNC: 8.1 G/DL (ref 13.5–17.5)
HGB BLDA-MCNC: 8.5 G/DL (ref 13.5–17.5)
HGB BLDA-MCNC: 8.8 G/DL (ref 13.5–17.5)
IMM GRANULOCYTES # BLD AUTO: 0.17 X10*3/UL (ref 0–0.7)
IMM GRANULOCYTES NFR BLD AUTO: 6 % (ref 0–0.9)
INHALED O2 CONCENTRATION: 40 %
INHALED O2 CONCENTRATION: 50 %
INHALED O2 CONCENTRATION: 60 %
INR PPP: 1.2 (ref 0.9–1.1)
LACTATE BLDA-SCNC: 2 MMOL/L (ref 0.4–2)
LACTATE BLDA-SCNC: 2.9 MMOL/L (ref 0.4–2)
LACTATE BLDA-SCNC: 2.9 MMOL/L (ref 0.4–2)
LACTATE BLDA-SCNC: 3.1 MMOL/L (ref 0.4–2)
LACTATE BLDA-SCNC: 3.2 MMOL/L (ref 0.4–2)
LACTATE SERPL-SCNC: 4 MMOL/L (ref 0.4–2)
LEFT ATRIUM VOLUME AREA LENGTH INDEX BSA: 33.2 ML/M2
LEFT VENTRICLE INTERNAL DIMENSION DIASTOLE: 4.9 CM (ref 3.5–6)
LEFT VENTRICULAR OUTFLOW TRACT DIAMETER: 2.1 CM
LYMPHOCYTES # BLD MANUAL: 1.13 X10*3/UL (ref 1.2–4.8)
LYMPHOCYTES NFR BLD MANUAL: 40.3 %
MAGNESIUM SERPL-MCNC: 1.66 MG/DL (ref 1.6–2.4)
MAGNESIUM SERPL-MCNC: 1.68 MG/DL (ref 1.6–2.4)
MAGNESIUM SERPL-MCNC: 2.83 MG/DL (ref 1.6–2.4)
MCH RBC QN AUTO: 25.6 PG (ref 26–34)
MCH RBC QN AUTO: 25.8 PG (ref 26–34)
MCH RBC QN AUTO: 25.9 PG (ref 26–34)
MCHC RBC AUTO-ENTMCNC: 32.8 G/DL (ref 32–36)
MCHC RBC AUTO-ENTMCNC: 33.2 G/DL (ref 32–36)
MCHC RBC AUTO-ENTMCNC: 33.2 G/DL (ref 32–36)
MCV RBC AUTO: 77 FL (ref 80–100)
MCV RBC AUTO: 78 FL (ref 80–100)
MCV RBC AUTO: 79 FL (ref 80–100)
METAMYELOCYTES # BLD MANUAL: 0.26 X10*3/UL
METAMYELOCYTES NFR BLD MANUAL: 9.3 %
MITRAL VALVE E/A RATIO: 1.16
MONOCYTES # BLD MANUAL: 0.48 X10*3/UL (ref 0.1–1)
MONOCYTES NFR BLD MANUAL: 17.1 %
MRSA DNA SPEC QL NAA+PROBE: NOT DETECTED
MYELOCYTES # BLD MANUAL: 0.04 X10*3/UL
MYELOCYTES NFR BLD MANUAL: 1.5 %
NEUTROPHILS # BLD MANUAL: 0.71 X10*3/UL (ref 1.2–7.7)
NEUTS BAND # BLD MANUAL: 0.06 X10*3/UL (ref 0–0.7)
NEUTS BAND NFR BLD MANUAL: 2.3 %
NEUTS SEG # BLD MANUAL: 0.65 X10*3/UL (ref 1.2–7)
NEUTS SEG NFR BLD MANUAL: 23.3 %
NRBC BLD MANUAL-RTO: 0 % (ref 0–0)
NRBC BLD-RTO: 0 /100 WBCS (ref 0–0)
OVALOCYTES BLD QL SMEAR: ABNORMAL
OXYHGB MFR BLDA: 96.5 % (ref 94–98)
OXYHGB MFR BLDA: 97.2 % (ref 94–98)
OXYHGB MFR BLDA: 97.4 % (ref 94–98)
OXYHGB MFR BLDA: 97.5 % (ref 94–98)
OXYHGB MFR BLDA: 97.8 % (ref 94–98)
PCO2 BLDA: 33 MM HG (ref 38–42)
PCO2 BLDA: 35 MM HG (ref 38–42)
PCO2 BLDA: 36 MM HG (ref 38–42)
PCO2 BLDA: 41 MM HG (ref 38–42)
PCO2 BLDA: 42 MM HG (ref 38–42)
PH BLDA: 7.33 PH (ref 7.38–7.42)
PH BLDA: 7.35 PH (ref 7.38–7.42)
PH BLDA: 7.43 PH (ref 7.38–7.42)
PH BLDA: 7.44 PH (ref 7.38–7.42)
PH BLDA: 7.47 PH (ref 7.38–7.42)
PHOSPHATE SERPL-MCNC: 1.2 MG/DL (ref 2.5–4.9)
PHOSPHATE SERPL-MCNC: 2.1 MG/DL (ref 2.5–4.9)
PHOSPHATE SERPL-MCNC: 3 MG/DL (ref 2.5–4.9)
PLASMA CELLS # BLD MANUAL: 0 X10*3/UL
PLASMA CELLS NFR BLD MANUAL: 0 %
PLATELET # BLD AUTO: 151 X10*3/UL (ref 150–450)
PLATELET # BLD AUTO: 151 X10*3/UL (ref 150–450)
PLATELET # BLD AUTO: 190 X10*3/UL (ref 150–450)
PO2 BLDA: 101 MM HG (ref 85–95)
PO2 BLDA: 130 MM HG (ref 85–95)
PO2 BLDA: 130 MM HG (ref 85–95)
PO2 BLDA: 220 MM HG (ref 85–95)
PO2 BLDA: 96 MM HG (ref 85–95)
POTASSIUM BLDA-SCNC: 3.6 MMOL/L (ref 3.5–5.3)
POTASSIUM BLDA-SCNC: 3.6 MMOL/L (ref 3.5–5.3)
POTASSIUM BLDA-SCNC: 4.1 MMOL/L (ref 3.5–5.3)
POTASSIUM BLDA-SCNC: 4.1 MMOL/L (ref 3.5–5.3)
POTASSIUM BLDA-SCNC: 4.2 MMOL/L (ref 3.5–5.3)
POTASSIUM SERPL-SCNC: 3.2 MMOL/L (ref 3.5–5.3)
POTASSIUM SERPL-SCNC: 3.4 MMOL/L (ref 3.5–5.3)
POTASSIUM SERPL-SCNC: 4 MMOL/L (ref 3.5–5.3)
PROMYELOCYTES # BLD MANUAL: 0.06 X10*3/UL
PROMYELOCYTES NFR BLD MANUAL: 2.3 %
PROT SERPL-MCNC: 4.5 G/DL (ref 6.4–8.2)
PROT SERPL-MCNC: 5.4 G/DL (ref 6.4–8.2)
PROTHROMBIN TIME: 13.8 SECONDS (ref 9.8–12.4)
RBC # BLD AUTO: 2.81 X10*6/UL (ref 4.5–5.9)
RBC # BLD AUTO: 2.98 X10*6/UL (ref 4.5–5.9)
RBC # BLD AUTO: 3.28 X10*6/UL (ref 4.5–5.9)
RBC MORPH BLD: ABNORMAL
RH FACTOR (ANTIGEN D): NORMAL
SAO2 % BLDA: 100 % (ref 94–100)
SAO2 % BLDA: 99 % (ref 94–100)
SODIUM BLDA-SCNC: 124 MMOL/L (ref 136–145)
SODIUM BLDA-SCNC: 126 MMOL/L (ref 136–145)
SODIUM BLDA-SCNC: 128 MMOL/L (ref 136–145)
SODIUM BLDA-SCNC: 128 MMOL/L (ref 136–145)
SODIUM BLDA-SCNC: 130 MMOL/L (ref 136–145)
SODIUM SERPL-SCNC: 133 MMOL/L (ref 136–145)
TOTAL CELLS COUNTED BLD: 129
VARIANT LYMPHS # BLD MANUAL: 0.02 X10*3/UL (ref 0–0.5)
VARIANT LYMPHS NFR BLD: 0.8 %
WBC # BLD AUTO: 2.2 X10*3/UL (ref 4.4–11.3)
WBC # BLD AUTO: 2.8 X10*3/UL (ref 4.4–11.3)
WBC # BLD AUTO: 3.5 X10*3/UL (ref 4.4–11.3)
WBC OTHER # BLD MANUAL: 0 X10*3/UL
WBC OTHER NFR BLD MANUAL: 0 %

## 2025-07-25 PROCEDURE — 2500000005 HC RX 250 GENERAL PHARMACY W/O HCPCS: Performed by: STUDENT IN AN ORGANIZED HEALTH CARE EDUCATION/TRAINING PROGRAM

## 2025-07-25 PROCEDURE — 2020000001 HC ICU ROOM DAILY

## 2025-07-25 PROCEDURE — 99291 CRITICAL CARE FIRST HOUR: CPT

## 2025-07-25 PROCEDURE — 80069 RENAL FUNCTION PANEL: CPT

## 2025-07-25 PROCEDURE — 2500000004 HC RX 250 GENERAL PHARMACY W/ HCPCS (ALT 636 FOR OP/ED)

## 2025-07-25 PROCEDURE — 2500000004 HC RX 250 GENERAL PHARMACY W/ HCPCS (ALT 636 FOR OP/ED): Performed by: STUDENT IN AN ORGANIZED HEALTH CARE EDUCATION/TRAINING PROGRAM

## 2025-07-25 PROCEDURE — 85027 COMPLETE CBC AUTOMATED: CPT | Performed by: STUDENT IN AN ORGANIZED HEALTH CARE EDUCATION/TRAINING PROGRAM

## 2025-07-25 PROCEDURE — P9045 ALBUMIN (HUMAN), 5%, 250 ML: HCPCS | Mod: JZ,TB

## 2025-07-25 PROCEDURE — 36620 INSERTION CATHETER ARTERY: CPT | Mod: GC

## 2025-07-25 PROCEDURE — 80321 ALCOHOLS BIOMARKERS 1OR 2: CPT | Performed by: NURSE PRACTITIONER

## 2025-07-25 PROCEDURE — P9016 RBC LEUKOCYTES REDUCED: HCPCS

## 2025-07-25 PROCEDURE — 36430 TRANSFUSION BLD/BLD COMPNT: CPT

## 2025-07-25 PROCEDURE — 94002 VENT MGMT INPAT INIT DAY: CPT

## 2025-07-25 PROCEDURE — 3700000001 HC GENERAL ANESTHESIA TIME - INITIAL BASE CHARGE: Performed by: SURGERY

## 2025-07-25 PROCEDURE — 31500 INSERT EMERGENCY AIRWAY: CPT

## 2025-07-25 PROCEDURE — 93321 DOPPLER ECHO F-UP/LMTD STD: CPT

## 2025-07-25 PROCEDURE — 93325 DOPPLER ECHO COLOR FLOW MAPG: CPT | Performed by: STUDENT IN AN ORGANIZED HEALTH CARE EDUCATION/TRAINING PROGRAM

## 2025-07-25 PROCEDURE — 93321 DOPPLER ECHO F-UP/LMTD STD: CPT | Performed by: STUDENT IN AN ORGANIZED HEALTH CARE EDUCATION/TRAINING PROGRAM

## 2025-07-25 PROCEDURE — 84132 ASSAY OF SERUM POTASSIUM: CPT

## 2025-07-25 PROCEDURE — 2500000005 HC RX 250 GENERAL PHARMACY W/O HCPCS

## 2025-07-25 PROCEDURE — 84484 ASSAY OF TROPONIN QUANT: CPT | Performed by: STUDENT IN AN ORGANIZED HEALTH CARE EDUCATION/TRAINING PROGRAM

## 2025-07-25 PROCEDURE — 2500000004 HC RX 250 GENERAL PHARMACY W/ HCPCS (ALT 636 FOR OP/ED): Performed by: EMERGENCY MEDICINE

## 2025-07-25 PROCEDURE — 87641 MR-STAPH DNA AMP PROBE: CPT | Performed by: STUDENT IN AN ORGANIZED HEALTH CARE EDUCATION/TRAINING PROGRAM

## 2025-07-25 PROCEDURE — 86850 RBC ANTIBODY SCREEN: CPT | Performed by: STUDENT IN AN ORGANIZED HEALTH CARE EDUCATION/TRAINING PROGRAM

## 2025-07-25 PROCEDURE — 93308 TTE F-UP OR LMTD: CPT | Performed by: STUDENT IN AN ORGANIZED HEALTH CARE EDUCATION/TRAINING PROGRAM

## 2025-07-25 PROCEDURE — 99292 CRITICAL CARE ADDL 30 MIN: CPT | Performed by: ANESTHESIOLOGY

## 2025-07-25 PROCEDURE — 84132 ASSAY OF SERUM POTASSIUM: CPT | Performed by: STUDENT IN AN ORGANIZED HEALTH CARE EDUCATION/TRAINING PROGRAM

## 2025-07-25 PROCEDURE — 3600000003 HC OR TIME - INITIAL BASE CHARGE - PROCEDURE LEVEL THREE: Performed by: SURGERY

## 2025-07-25 PROCEDURE — 85610 PROTHROMBIN TIME: CPT | Performed by: STUDENT IN AN ORGANIZED HEALTH CARE EDUCATION/TRAINING PROGRAM

## 2025-07-25 PROCEDURE — 85007 BL SMEAR W/DIFF WBC COUNT: CPT | Performed by: STUDENT IN AN ORGANIZED HEALTH CARE EDUCATION/TRAINING PROGRAM

## 2025-07-25 PROCEDURE — 3600000008 HC OR TIME - EACH INCREMENTAL 1 MINUTE - PROCEDURE LEVEL THREE: Performed by: SURGERY

## 2025-07-25 PROCEDURE — 82947 ASSAY GLUCOSE BLOOD QUANT: CPT

## 2025-07-25 PROCEDURE — 83735 ASSAY OF MAGNESIUM: CPT

## 2025-07-25 PROCEDURE — 99024 POSTOP FOLLOW-UP VISIT: CPT | Performed by: SURGERY

## 2025-07-25 PROCEDURE — 83605 ASSAY OF LACTIC ACID: CPT

## 2025-07-25 PROCEDURE — 85027 COMPLETE CBC AUTOMATED: CPT

## 2025-07-25 PROCEDURE — 2500000005 HC RX 250 GENERAL PHARMACY W/O HCPCS: Performed by: SURGERY

## 2025-07-25 PROCEDURE — 3700000002 HC GENERAL ANESTHESIA TIME - EACH INCREMENTAL 1 MINUTE: Performed by: SURGERY

## 2025-07-25 PROCEDURE — 2500000005 HC RX 250 GENERAL PHARMACY W/O HCPCS: Performed by: EMERGENCY MEDICINE

## 2025-07-25 PROCEDURE — 82330 ASSAY OF CALCIUM: CPT | Performed by: STUDENT IN AN ORGANIZED HEALTH CARE EDUCATION/TRAINING PROGRAM

## 2025-07-25 PROCEDURE — 71045 X-RAY EXAM CHEST 1 VIEW: CPT | Performed by: RADIOLOGY

## 2025-07-25 PROCEDURE — 82248 BILIRUBIN DIRECT: CPT | Performed by: STUDENT IN AN ORGANIZED HEALTH CARE EDUCATION/TRAINING PROGRAM

## 2025-07-25 PROCEDURE — 37799 UNLISTED PX VASCULAR SURGERY: CPT | Performed by: STUDENT IN AN ORGANIZED HEALTH CARE EDUCATION/TRAINING PROGRAM

## 2025-07-25 PROCEDURE — 2720000007 HC OR 272 NO HCPCS: Performed by: SURGERY

## 2025-07-25 PROCEDURE — 87040 BLOOD CULTURE FOR BACTERIA: CPT | Performed by: NURSE PRACTITIONER

## 2025-07-25 PROCEDURE — 36556 INSERT NON-TUNNEL CV CATH: CPT

## 2025-07-25 PROCEDURE — 83735 ASSAY OF MAGNESIUM: CPT | Performed by: STUDENT IN AN ORGANIZED HEALTH CARE EDUCATION/TRAINING PROGRAM

## 2025-07-25 PROCEDURE — 87081 CULTURE SCREEN ONLY: CPT | Performed by: NURSE PRACTITIONER

## 2025-07-25 PROCEDURE — 36415 COLL VENOUS BLD VENIPUNCTURE: CPT | Performed by: NURSE PRACTITIONER

## 2025-07-25 RX ORDER — DEXTROSE 50 % IN WATER (D50W) INTRAVENOUS SYRINGE
12.5
Status: ACTIVE | OUTPATIENT
Start: 2025-07-25

## 2025-07-25 RX ORDER — SUCCINYLCHOLINE CHLORIDE 20 MG/ML
INJECTION INTRAMUSCULAR; INTRAVENOUS
Status: DISPENSED
Start: 2025-07-25 | End: 2025-07-25

## 2025-07-25 RX ORDER — MAGNESIUM SULFATE HEPTAHYDRATE 40 MG/ML
2 INJECTION, SOLUTION INTRAVENOUS EVERY 6 HOURS PRN
Status: DISCONTINUED | OUTPATIENT
Start: 2025-07-25 | End: 2025-07-28

## 2025-07-25 RX ORDER — POTASSIUM CHLORIDE 29.8 MG/ML
40 INJECTION INTRAVENOUS EVERY 6 HOURS PRN
Status: DISCONTINUED | OUTPATIENT
Start: 2025-07-25 | End: 2025-07-28

## 2025-07-25 RX ORDER — MIDAZOLAM HYDROCHLORIDE 2 MG/2ML
2 INJECTION, SOLUTION INTRAMUSCULAR; INTRAVENOUS ONCE
Status: COMPLETED | OUTPATIENT
Start: 2025-07-25 | End: 2025-07-25

## 2025-07-25 RX ORDER — HYDROMORPHONE HYDROCHLORIDE 0.2 MG/ML
0.2 INJECTION INTRAMUSCULAR; INTRAVENOUS; SUBCUTANEOUS EVERY 4 HOURS PRN
Status: DISCONTINUED | OUTPATIENT
Start: 2025-07-25 | End: 2025-07-28

## 2025-07-25 RX ORDER — PHENOBARBITAL SODIUM 65 MG/ML
65 INJECTION, SOLUTION INTRAMUSCULAR; INTRAVENOUS EVERY 6 HOURS PRN
Status: DISPENSED | OUTPATIENT
Start: 2025-07-25

## 2025-07-25 RX ORDER — CALCIUM GLUCONATE 20 MG/ML
1 INJECTION, SOLUTION INTRAVENOUS EVERY 6 HOURS PRN
Status: DISCONTINUED | OUTPATIENT
Start: 2025-07-25 | End: 2025-07-28

## 2025-07-25 RX ORDER — KETOROLAC TROMETHAMINE 30 MG/ML
30 INJECTION, SOLUTION INTRAMUSCULAR; INTRAVENOUS EVERY 6 HOURS PRN
Status: DISCONTINUED | OUTPATIENT
Start: 2025-07-25 | End: 2025-07-28

## 2025-07-25 RX ORDER — ROCURONIUM BROMIDE 10 MG/ML
100 INJECTION, SOLUTION INTRAVENOUS ONCE
Status: COMPLETED | OUTPATIENT
Start: 2025-07-25 | End: 2025-07-25

## 2025-07-25 RX ORDER — PROPOFOL 10 MG/ML
0-50 INJECTION, EMULSION INTRAVENOUS CONTINUOUS
Status: DISCONTINUED | OUTPATIENT
Start: 2025-07-25 | End: 2025-07-25

## 2025-07-25 RX ORDER — ETOMIDATE 2 MG/ML
INJECTION INTRAVENOUS CODE/TRAUMA/SEDATION MEDICATION
Status: COMPLETED | OUTPATIENT
Start: 2025-07-25 | End: 2025-07-25

## 2025-07-25 RX ORDER — SODIUM CHLORIDE 0.9 G/100ML
INJECTION, SOLUTION IRRIGATION AS NEEDED
Status: DISCONTINUED | OUTPATIENT
Start: 2025-07-25 | End: 2025-07-25 | Stop reason: HOSPADM

## 2025-07-25 RX ORDER — VANCOMYCIN HYDROCHLORIDE 1 G/200ML
1000 INJECTION, SOLUTION INTRAVENOUS EVERY 12 HOURS
Status: DISCONTINUED | OUTPATIENT
Start: 2025-07-25 | End: 2025-07-25

## 2025-07-25 RX ORDER — DEXTROSE 50 % IN WATER (D50W) INTRAVENOUS SYRINGE
25
Status: ACTIVE | OUTPATIENT
Start: 2025-07-25

## 2025-07-25 RX ORDER — ETOMIDATE 2 MG/ML
INJECTION INTRAVENOUS
Status: COMPLETED
Start: 2025-07-25 | End: 2025-07-25

## 2025-07-25 RX ORDER — PHENYLEPHRINE HCL IN 0.9% NACL 0.4MG/10ML
SYRINGE (ML) INTRAVENOUS
Status: DISCONTINUED
Start: 2025-07-25 | End: 2025-07-25 | Stop reason: WASHOUT

## 2025-07-25 RX ORDER — AMIODARONE HYDROCHLORIDE 150 MG/3ML
INJECTION, SOLUTION INTRAVENOUS
Status: DISPENSED
Start: 2025-07-25 | End: 2025-07-25

## 2025-07-25 RX ORDER — ETOMIDATE 2 MG/ML
10 INJECTION INTRAVENOUS ONCE
Status: COMPLETED | OUTPATIENT
Start: 2025-07-25 | End: 2025-07-25

## 2025-07-25 RX ORDER — ALBUMIN HUMAN 50 G/1000ML
SOLUTION INTRAVENOUS
Status: COMPLETED
Start: 2025-07-25 | End: 2025-07-25

## 2025-07-25 RX ORDER — PHENOBARBITAL SODIUM 65 MG/ML
32.5 INJECTION, SOLUTION INTRAMUSCULAR; INTRAVENOUS EVERY 8 HOURS
Status: DISPENSED | OUTPATIENT
Start: 2025-07-25 | End: 2025-07-26

## 2025-07-25 RX ORDER — NOREPINEPHRINE BITARTRATE/D5W 8 MG/250ML
0-.5 PLASTIC BAG, INJECTION (ML) INTRAVENOUS CONTINUOUS
Status: DISCONTINUED | OUTPATIENT
Start: 2025-07-25 | End: 2025-07-27

## 2025-07-25 RX ORDER — METHOCARBAMOL 100 MG/ML
1000 INJECTION, SOLUTION INTRAMUSCULAR; INTRAVENOUS EVERY 8 HOURS PRN
Status: DISPENSED | OUTPATIENT
Start: 2025-07-25 | End: 2025-07-28

## 2025-07-25 RX ORDER — ALBUMIN HUMAN 50 G/1000ML
25 SOLUTION INTRAVENOUS ONCE
Status: COMPLETED | OUTPATIENT
Start: 2025-07-25 | End: 2025-07-25

## 2025-07-25 RX ORDER — CALCIUM GLUCONATE 20 MG/ML
2 INJECTION, SOLUTION INTRAVENOUS EVERY 6 HOURS PRN
Status: DISCONTINUED | OUTPATIENT
Start: 2025-07-25 | End: 2025-07-28

## 2025-07-25 RX ORDER — MAGNESIUM SULFATE HEPTAHYDRATE 40 MG/ML
4 INJECTION, SOLUTION INTRAVENOUS EVERY 6 HOURS PRN
Status: DISCONTINUED | OUTPATIENT
Start: 2025-07-25 | End: 2025-07-28

## 2025-07-25 RX ORDER — MIDAZOLAM HYDROCHLORIDE 2 MG/2ML
INJECTION, SOLUTION INTRAMUSCULAR; INTRAVENOUS CODE/TRAUMA/SEDATION MEDICATION
Status: COMPLETED | OUTPATIENT
Start: 2025-07-25 | End: 2025-07-25

## 2025-07-25 RX ORDER — INSULIN LISPRO 100 [IU]/ML
0-5 INJECTION, SOLUTION INTRAVENOUS; SUBCUTANEOUS EVERY 4 HOURS
Status: DISCONTINUED | OUTPATIENT
Start: 2025-07-25 | End: 2025-07-28

## 2025-07-25 RX ORDER — MIDAZOLAM HYDROCHLORIDE 2 MG/2ML
INJECTION, SOLUTION INTRAMUSCULAR; INTRAVENOUS
Status: DISPENSED
Start: 2025-07-25 | End: 2025-07-25

## 2025-07-25 RX ORDER — POTASSIUM CHLORIDE 14.9 MG/ML
20 INJECTION INTRAVENOUS EVERY 6 HOURS PRN
Status: DISCONTINUED | OUTPATIENT
Start: 2025-07-25 | End: 2025-07-28

## 2025-07-25 RX ORDER — ROCURONIUM BROMIDE 10 MG/ML
INJECTION, SOLUTION INTRAVENOUS AS NEEDED
Status: DISCONTINUED | OUTPATIENT
Start: 2025-07-25 | End: 2025-07-25

## 2025-07-25 RX ADMIN — AMIODARONE HYDROCHLORIDE 1 MG/MIN: 1.8 INJECTION, SOLUTION INTRAVENOUS at 01:57

## 2025-07-25 RX ADMIN — ROCURONIUM BROMIDE 50 MG: 10 INJECTION, SOLUTION INTRAVENOUS at 03:31

## 2025-07-25 RX ADMIN — ETOMIDATE INJECTION 10 MG: 2 SOLUTION INTRAVENOUS at 01:56

## 2025-07-25 RX ADMIN — ALBUMIN HUMAN 25 G: 0.05 INJECTION, SOLUTION INTRAVENOUS at 11:38

## 2025-07-25 RX ADMIN — VASOPRESSIN 0.06 UNITS/MIN: 0.2 INJECTION INTRAVENOUS at 02:37

## 2025-07-25 RX ADMIN — WATER 50 MG: 1 INJECTION INTRAMUSCULAR; INTRAVENOUS; SUBCUTANEOUS at 09:04

## 2025-07-25 RX ADMIN — SODIUM CHLORIDE, SODIUM LACTATE, POTASSIUM CHLORIDE, AND CALCIUM CHLORIDE: 600; 310; 30; 20 INJECTION, SOLUTION INTRAVENOUS at 03:31

## 2025-07-25 RX ADMIN — AMIODARONE HYDROCHLORIDE 0.5 MG/MIN: 1.8 INJECTION, SOLUTION INTRAVENOUS at 10:41

## 2025-07-25 RX ADMIN — Medication: at 06:44

## 2025-07-25 RX ADMIN — Medication: at 08:20

## 2025-07-25 RX ADMIN — WATER 50 MG: 1 INJECTION INTRAMUSCULAR; INTRAVENOUS; SUBCUTANEOUS at 15:25

## 2025-07-25 RX ADMIN — ACETAMINOPHEN 1000 MG: 10 INJECTION INTRAVENOUS at 23:23

## 2025-07-25 RX ADMIN — VASOPRESSIN 0.03 UNITS/MIN: 0.2 INJECTION INTRAVENOUS at 01:38

## 2025-07-25 RX ADMIN — PIPERACILLIN SODIUM AND TAZOBACTAM SODIUM 3.38 G: 3; .375 INJECTION, SOLUTION INTRAVENOUS at 00:24

## 2025-07-25 RX ADMIN — PHENOBARBITAL SODIUM 32.5 MG: 65 INJECTION INTRAMUSCULAR at 14:02

## 2025-07-25 RX ADMIN — PIPERACILLIN SODIUM AND TAZOBACTAM SODIUM 3.38 G: 3; .375 INJECTION, SOLUTION INTRAVENOUS at 23:23

## 2025-07-25 RX ADMIN — ACETAMINOPHEN 1000 MG: 10 INJECTION INTRAVENOUS at 17:04

## 2025-07-25 RX ADMIN — ETOMIDATE 10 MG: 2 INJECTION INTRAVENOUS at 01:56

## 2025-07-25 RX ADMIN — ROCURONIUM BROMIDE 100 MG: 10 INJECTION INTRAVENOUS at 01:56

## 2025-07-25 RX ADMIN — VANCOMYCIN HYDROCHLORIDE 1000 MG: 1 INJECTION, SOLUTION INTRAVENOUS at 02:47

## 2025-07-25 RX ADMIN — HYDROMORPHONE HYDROCHLORIDE 0.2 MG: 0.2 INJECTION, SOLUTION INTRAMUSCULAR; INTRAVENOUS; SUBCUTANEOUS at 19:58

## 2025-07-25 RX ADMIN — Medication: at 12:05

## 2025-07-25 RX ADMIN — POTASSIUM CHLORIDE 40 MEQ: 29.8 INJECTION, SOLUTION INTRAVENOUS at 03:05

## 2025-07-25 RX ADMIN — ROCURONIUM BROMIDE 10 MG: 10 INJECTION, SOLUTION INTRAVENOUS at 05:14

## 2025-07-25 RX ADMIN — METHOCARBAMOL 1000 MG: 100 INJECTION INTRAMUSCULAR; INTRAVENOUS at 18:44

## 2025-07-25 RX ADMIN — PIPERACILLIN SODIUM AND TAZOBACTAM SODIUM 3.38 G: 3; .375 INJECTION, SOLUTION INTRAVENOUS at 12:05

## 2025-07-25 RX ADMIN — ACETAMINOPHEN 1000 MG: 10 INJECTION INTRAVENOUS at 07:03

## 2025-07-25 RX ADMIN — ROCURONIUM BROMIDE 10 MG: 10 INJECTION, SOLUTION INTRAVENOUS at 05:52

## 2025-07-25 RX ADMIN — ANGIOTENSIN II 15 NG/KG/MIN: 2.5 INJECTION INTRAVENOUS at 02:33

## 2025-07-25 RX ADMIN — ACETAMINOPHEN 1000 MG: 10 INJECTION INTRAVENOUS at 12:05

## 2025-07-25 RX ADMIN — ROCURONIUM BROMIDE 10 MG: 10 INJECTION, SOLUTION INTRAVENOUS at 04:17

## 2025-07-25 RX ADMIN — ROCURONIUM BROMIDE 10 MG: 10 INJECTION, SOLUTION INTRAVENOUS at 04:27

## 2025-07-25 RX ADMIN — Medication 1 DOSE: at 02:06

## 2025-07-25 RX ADMIN — WATER 50 MG: 1 INJECTION INTRAMUSCULAR; INTRAVENOUS; SUBCUTANEOUS at 21:50

## 2025-07-25 RX ADMIN — PROPOFOL 40 MCG/KG/MIN: 10 INJECTION, EMULSION INTRAVENOUS at 10:27

## 2025-07-25 RX ADMIN — PROPOFOL 30 MCG/KG/MIN: 10 INJECTION, EMULSION INTRAVENOUS at 01:55

## 2025-07-25 RX ADMIN — ROCURONIUM BROMIDE 10 MG: 10 INJECTION, SOLUTION INTRAVENOUS at 04:24

## 2025-07-25 RX ADMIN — HYDROCORTISONE SODIUM SUCCINATE 100 MG: 100 INJECTION, POWDER, FOR SOLUTION INTRAMUSCULAR; INTRAVENOUS at 02:14

## 2025-07-25 RX ADMIN — MAGNESIUM SULFATE HEPTAHYDRATE 4 G: 40 INJECTION, SOLUTION INTRAVENOUS at 10:28

## 2025-07-25 RX ADMIN — NOREPINEPHRINE BITARTRATE 0.02 MCG/KG/MIN: 8 INJECTION, SOLUTION INTRAVENOUS at 01:00

## 2025-07-25 RX ADMIN — CALCIUM GLUCONATE 2 G: 20 INJECTION, SOLUTION INTRAVENOUS at 02:23

## 2025-07-25 RX ADMIN — AMIODARONE HYDROCHLORIDE 1 MG/MIN: 1.8 INJECTION, SOLUTION INTRAVENOUS at 00:25

## 2025-07-25 RX ADMIN — ALBUMIN HUMAN 25 G: 0.05 INJECTION, SOLUTION INTRAVENOUS at 02:51

## 2025-07-25 RX ADMIN — MIDAZOLAM HYDROCHLORIDE 2 MG: 1 INJECTION, SOLUTION INTRAMUSCULAR; INTRAVENOUS at 02:10

## 2025-07-25 RX ADMIN — ALBUMIN HUMAN 25 G: 50 SOLUTION INTRAVENOUS at 02:51

## 2025-07-25 RX ADMIN — PHENOBARBITAL SODIUM 32.5 MG: 65 INJECTION INTRAMUSCULAR at 21:50

## 2025-07-25 RX ADMIN — SUGAMMADEX 200 MG: 100 INJECTION, SOLUTION INTRAVENOUS at 06:45

## 2025-07-25 RX ADMIN — MIDAZOLAM HYDROCHLORIDE 5 MG: 1 INJECTION, SOLUTION INTRAMUSCULAR; INTRAVENOUS at 01:46

## 2025-07-25 RX ADMIN — POTASSIUM PHOSPHATE 15 MMOL: 236; 224 INJECTION, SOLUTION INTRAVENOUS at 18:26

## 2025-07-25 RX ADMIN — PANTOPRAZOLE SODIUM 40 MG: 40 INJECTION, POWDER, LYOPHILIZED, FOR SOLUTION INTRAVENOUS at 09:03

## 2025-07-25 RX ADMIN — PIPERACILLIN SODIUM AND TAZOBACTAM SODIUM 3.38 G: 3; .375 INJECTION, SOLUTION INTRAVENOUS at 17:32

## 2025-07-25 RX ADMIN — Medication: at 10:25

## 2025-07-25 RX ADMIN — HUMAN ALBUMIN MICROSPHERES AND PERFLUTREN 0.5 ML: 10; .22 INJECTION, SOLUTION INTRAVENOUS at 10:27

## 2025-07-25 RX ADMIN — AMIODARONE HYDROCHLORIDE 150 MG: 1.5 INJECTION, SOLUTION INTRAVENOUS at 00:24

## 2025-07-25 RX ADMIN — PIPERACILLIN SODIUM AND TAZOBACTAM SODIUM 3.38 G: 3; .375 INJECTION, SOLUTION INTRAVENOUS at 06:09

## 2025-07-25 RX ADMIN — AMIODARONE HYDROCHLORIDE 0.5 MG/MIN: 1.8 INJECTION, SOLUTION INTRAVENOUS at 21:48

## 2025-07-25 RX ADMIN — HYDROMORPHONE HYDROCHLORIDE 0.2 MG: 0.2 INJECTION, SOLUTION INTRAMUSCULAR; INTRAVENOUS; SUBCUTANEOUS at 15:32

## 2025-07-25 RX ADMIN — ETOMIDATE INJECTION 7.2 MG: 2 SOLUTION INTRAVENOUS at 01:52

## 2025-07-25 ASSESSMENT — PAIN SCALES - GENERAL
PAINLEVEL_OUTOF10: 6
PAINLEVEL_OUTOF10: 1
PAINLEVEL_OUTOF10: 9
PAINLEVEL_OUTOF10: 8
PAINLEVEL_OUTOF10: 0 - NO PAIN

## 2025-07-25 ASSESSMENT — PAIN - FUNCTIONAL ASSESSMENT
PAIN_FUNCTIONAL_ASSESSMENT: 0-10
PAIN_FUNCTIONAL_ASSESSMENT: CPOT (CRITICAL CARE PAIN OBSERVATION TOOL)
PAIN_FUNCTIONAL_ASSESSMENT: CPOT (CRITICAL CARE PAIN OBSERVATION TOOL)

## 2025-07-25 ASSESSMENT — PAIN DESCRIPTION - LOCATION
LOCATION: ABDOMEN
LOCATION: ABDOMEN

## 2025-07-25 ASSESSMENT — PAIN DESCRIPTION - ORIENTATION: ORIENTATION: MID

## 2025-07-25 NOTE — PROGRESS NOTES
Occupational Therapy    Communication Note    Patient Name: Bereket Em  MRN: [id number]  Today's Date: 7/25/2025   Room: 12/12-A    Discipline: Occupational Therapy      Missed Visit Reason:  (0823: Pt intubated and sedated at this time. Not appropriate for OT evaluation.)      07/25/25 at 8:23 AM   Karen Bradley, OT   Rehab Office: [phone]

## 2025-07-25 NOTE — SIGNIFICANT EVENT
RAPID RESPONSE NOTE    Reason for Rapid Response:   []    BAT []  New CPAP/BiPAP []  Bleeding []  Change in mental status   []  Chest pain []  Code blue []  FiO2 >/= 50% []  HR </= 40 bpm   [x]  HR >/= 130 bpm []  Hyperglycemia []  Hypoglycemia []  SpO2 < 90%    []  RR </= 8 bpm []  RR >/= 30 bpm []  SBP </= 90 mmHg []  Seizure   []  Staff concern:         RR called for tachycardia, persistent in the 160-170 range. ECG showing afib w/ RVR. Patient asymptomatic. Denies chest pain, palpitations, shortness of breath. Does  have abdominal pain from recent surgery, says that his pain is somewhat better. No hx of afib per him.     Vital Signs on Arrival: , /65, SpO2 99% on 2L NC, T 38.3 C    Focused Physical Exam:   General: diaphoretic, not in acute distress  Cardiac: tachycardic, irregularly irregular; no obvious MRG  Pulm: CTAB  Abdomen: soft, jejunostomy RLQ w/ output noted in bag; not rigid/peritonitic  Extremities: warm, well perfused     Workup:  -CBC, RFP, lactate  -ECG    Intervention:  -1L LR, 25g 5% albumin by primary   -patient given 5mg IV metoprolol, BP soft after that in the 70s/50s, MAP in low 60s  -given leukopenia (WBC 2.2), fever (T 38.3C), HR 160s, patient meets 3/4 SIRS criteria w/ source of infection (abdominal), would treat as sepsis w/ IV abx  -would avoid any more beta blockade / rate control strategy at this time, would replete electrolytes, fluid resuscitate, treat sepsis    Conclusion:   [x] Patient failed to improve, transferred to ICU     ICU Notified:   [x] Yes  [] No      Jarvis Reyes MD

## 2025-07-25 NOTE — PROCEDURES
Flexible Sigmoidoscopy Procedure Note    Procedure: Lower endoscopy    Pre-operative Diagnosis: Postoperative decompensation    Post-operative Diagnosis: Ischemic colon    Indications: Patient had undergone major abdominal surgery 2 days prior and developed septic picture with tachycardia, afib with RVR, leukopenia, and need for multiple pressors. Had undergone technically difficult colorectal anastomosis at that time. Needed to evaluate viability of colon    Procedure Details   Informed consent was obtained for the procedure. Risks of perforation and hemorrhage were discussed. The patient was placed in the supine frog leg position, the anal region was examined, a rectal performed, then the flexible sigmoidoscope was inserted and advanced without difficulty to a distance of approximately 10 cm. The prep was fair given that the patient had previously been diverted previously . There was evidence of blood within the lumen. This was irrigated out. Once we had a view it was clear that the mucosa of the transverse colon that had been anastomosed to the rectum was ischemic and hemorrhagic.    Findings:  mucosal abnormalities - ischemic and hemorrhagic transverse colon mucosa    Specimens:   Order Name Source Comment Collection Info Order Time   BLOOD GAS ARTERIAL FULL PANEL Blood, Arterial  Collected By: Matthew Gapsar MD 7/25/2025  3:40 AM     [facility]   Immediate        BLOOD GAS ARTERIAL FULL PANEL Blood, Arterial  Collected By: Serina Mcneal RN 7/25/2025  3:48 AM     Release result to MyChart   Immediate        BLOOD GAS ARTERIAL FULL PANEL Blood, Arterial  Collected By: Serina Mcneal RN 7/25/2025  4:33 AM     Release result to MyChart   Immediate        SURGICAL PATHOLOGY EXAM SOFT TISSUE RESECTION Pre-op diagnosis:  Incarcerated ventral hernia [K43.6]  Diverticulosis of large intestine without hemorrhage [K57.30] Collected By: Jerel Olson MD 7/25/2025  5:24 AM               Complications:  None; patient tolerated the procedure well.           Disposition: ICU - intubated and critically ill.           Condition: unstable    Impression:    no biopsies taken, bowel prep was adequate, procedure well tolerated without complications    Recommendations:  Will plan to proceed to OR for laparotomy and resection of ischemic portion of colon    Attending Attestation: I was present and scrubbed for the entire procedure.

## 2025-07-25 NOTE — PROGRESS NOTES
I saw and examined the patient.  I discussed his care with Dr. Olson.      58 yo male s/p fistula takedown, extensive lysis of adhesions, colostomy reversal, double barrel jejunostomy formation, abdominal wall reconstruction.   Feels better today.  Having ostomy output. No output per rectum. Pain is better controlled.   WBCs decreased to 2.   Phos 1.0  Hgb 8  On exam, improved appearance. Seen in AM and afternoon. Midline and LUQ wound vac in place. Ostomy is pink and viable.   -110.   Given several liters of IVFs.   CT scan shows: (obtained due to leukopenia and tachycardia)  1.  Postsurgical changes of ileostomy and enterocutaneous fistula  takedown, Margarita's reversal, and jejunostomy when compared to prior  exam. There are scattered air foci and pockets of ascites with  generalized mesenteric stranding, favored to represent expected  postsurgical changes. Anastomosis appears intact. No loculated fluid  collections, no evidence of bowel obstruction or ischemia.  2. Hyperdense collection within the left anterior abdominal  subcutaneous tissue corresponding to site of prior ileostomy. No  discrete signs of infection, though abscess should be in the  differential. May also represent a seroma or hematoma.  Maintaining NPO, IVFs, wound vac, TPN to start, monitor HR and exam, Lovenox.

## 2025-07-25 NOTE — BRIEF OP NOTE
Date: 2025 - 2025  OR Location: [facility] OR    Name: Bereket Em, : 1965, Age: 59 y.o., MRN: [id number], Sex: male    Diagnosis  Pre-op Diagnosis      * Incarcerated ventral hernia [K43.6]     * Diverticulosis of large intestine without hemorrhage [K57.30] Post-op Diagnosis     * Incarcerated ventral hernia [K43.6]     * Diverticulosis of large intestine without hemorrhage [K57.30]     Procedures  LAPAROSCOPY, EXPLORATORY  16473 - [place] LAPS ABD PRTM&OMENTUM DX W/WO SPEC BR/WA SPX      Surgeons      * Jerel Olson - Primary    Resident/Fellow/Other Assistant:  Surgeons and Role:     * Bereket Perry MD - Assisting     * Tiarra Lai MD - Resident - Assisting     * Darnell Yanez MD - Resident - Assisting    Staff:   Circulator: Jeff  Circulator: Serina  Circulator: Melanie Abbott Scrub: [facility]    Anesthesia Staff: Anesthesiologist: Jose Guadalupe Gatica MD  Anesthesia Resident: Casa Sanabria MD; Kurt Jean MD    Procedure Summary  Anesthesia: Anesthesia type not filed in the log.  ASA: IV  Estimated Blood Loss: 50mL  Intra-op Medications:   Administrations occurring from 0300 to 0505 on 25:   Medication Name Total Dose   sodium chloride 0.9 % irrigation solution 3,000 mL   angiotensin II (Giapreza) 2.5 mg in sodium chloride 0.9% 250 mL (0.01 mg/mL) infusion Cannot be calculated   calcium gluconate 2 g in sodium chloride (iso)  mL Cannot be calculated   LR bolus Cannot be calculated   norepinephrine (Levophed) 8 mg in dextrose 5% 250 mL (0.032 mg/mL) infusion (premix) 0 mg   potassium chloride 40 mEq in sterile water for injection 100 mL 50 mL   rocuronium 10 mg/mL 80 mg   vancomycin (Vancocin) 1,000 mg in dextrose 5%  mL Cannot be calculated   vasopressin (Vasostrict) 0.2 unit/mL in 5% dextrose 100 mL IV 0.7 Units   albumin human 5 % infusion 25 g Cannot be calculated              Anesthesia Record               Intraprocedure I/O Totals          Intake     Norepinephrine Drip 0.00 mL    The total shown is the total volume documented since Anesthesia Start was filed.    Angiotensin II 0.00 mL    The total shown is the total volume documented since Anesthesia Start was filed.    Amiodarone Drip 0.00 mL    The total shown is the total volume documented since Anesthesia Start was filed.    Vasopressin Drip 0.00 mL    The total shown is the total volume documented since Anesthesia Start was filed.    Total Intake 0 mL       Output    Urine 185 mL    Total Output 185 mL       Net    Net Volume -185 mL          Specimen:   ID Type Source Tests Collected by Time   1 : ILEOSTOMY Tissue SOFT TISSUE RESECTION SURGICAL PATHOLOGY EXAM Jerel Olson MD 7/25/2025 0522   2 : COLORECTAL ANASTOMOSIS Tissue SOFT TISSUE RESECTION SURGICAL PATHOLOGY EXAM Jerel Olson MD 7/25/2025 0523                  Findings: ischemic end of transverse colon at anastomosis, resected 5-6cm, some murky / purulent fluid in pelvis, matured new end colostomy in RUQ and jejunostomy in LUQ, stapled off distal jejunum    Complications:  None; patient tolerated the procedure well.     Disposition: ICU - intubated and critically ill.  Condition: stable  Specimens Collected:   ID Type Source Tests Collected by Time   1 : ILEOSTOMY Tissue SOFT TISSUE RESECTION SURGICAL PATHOLOGY EXAM Jerel Olson MD 7/25/2025 0522   2 : COLORECTAL ANASTOMOSIS Tissue SOFT TISSUE RESECTION SURGICAL PATHOLOGY EXAM Jerel Olson MD 7/25/2025 0523     Attending Attestation: I was present and scrubbed for the entire procedure.    Jerel Olson  Phone Number: [phone]

## 2025-07-25 NOTE — CARE PLAN
Problem: Pain - Adult  Goal: Verbalizes/displays adequate comfort level or baseline comfort level  Outcome: Progressing     Problem: Safety - Adult  Goal: Free from fall injury  Outcome: Progressing     Problem: Discharge Planning  Goal: Discharge to home or other facility with appropriate resources  Outcome: Progressing     Problem: Chronic Conditions and Co-morbidities  Goal: Patient's chronic conditions and co-morbidity symptoms are monitored and maintained or improved  Outcome: Progressing     Problem: Nutrition  Goal: Nutrient intake appropriate for maintaining nutritional needs  Outcome: Progressing     Problem: Skin  Goal: Decreased wound size/increased tissue granulation at next dressing change  Outcome: Progressing  Flowsheets (Taken 7/25/2025 0850)  Decreased wound size/increased tissue granulation at next dressing change:   Promote sleep for wound healing   Protective dressings over bony prominences  Goal: Participates in plan/prevention/treatment measures  Outcome: Progressing  Flowsheets (Taken 7/25/2025 0850)  Participates in plan/prevention/treatment measures: Elevate heels  Goal: Prevent/manage excess moisture  Outcome: Progressing  Goal: Prevent/minimize sheer/friction injuries  Outcome: Progressing  Flowsheets (Taken 7/25/2025 0850)  Prevent/minimize sheer/friction injuries:   Use pull sheet   Turn/reposition every 2 hours/use positioning/transfer devices   HOB 30 degrees or less   Complete micro-shifts as needed if patient unable. Adjust patient position to relieve pressure points, not a full turn  Goal: Promote/optimize nutrition  Outcome: Progressing  Goal: Promote skin healing  Outcome: Progressing  Flowsheets (Taken 7/25/2025 0850)  Promote skin healing:   Turn/reposition every 2 hours/use positioning/transfer devices   Protective dressings over bony prominences   Assess skin/pad under line(s)/device(s)   Rotate device position/do not position patient on device     Problem: Pain  Goal: Takes  deep breaths with improved pain control throughout the shift  Outcome: Progressing  Goal: Turns in bed with improved pain control throughout the shift  Outcome: Progressing  Goal: Walks with improved pain control throughout the shift  Outcome: Progressing  Goal: Performs ADL's with improved pain control throughout shift  Outcome: Progressing  Goal: Participates in PT with improved pain control throughout the shift  Outcome: Progressing  Goal: Free from opioid side effects throughout the shift  Outcome: Progressing  Goal: Free from acute confusion related to pain meds throughout the shift  Outcome: Progressing     Problem: Safety - Medical Restraint  Goal: Remains free of injury from restraints (Restraint for Interference with Medical Device)  Outcome: Progressing  Flowsheets (Taken 7/25/2025 0850)  Remains free of injury from restraints (restraint for interference with medical device):   Every 2 hours: Monitor safety, psychosocial status, comfort, nutrition and hydration   Evaluate the patient's condition at the time of restraint application   Determine that other, less restrictive measures have been tried or would not be effective before applying the restraint   Inform patient/family regarding the reason for restraint  Goal: Free from restraint(s) (Restraint for Interference with Medical Device)  Outcome: Progressing  Flowsheets (Taken 7/25/2025 0850)  Free from restraint(s) (restraint for interference with medical device):   Identify and implement measures to help patient regain control   Every 24 hours: Continued use of restraint requires Licensed Independent Practitioner to perform face to face examination and written order   ONCE/SHIFT or MINIMUM Every 12 hours: Assess and document the continuing need for restraints

## 2025-07-25 NOTE — PROCEDURES
Insert arterial line    Date/Time: 7/25/2025 2:39 AM    Performed by: Constantine Meza MD  Authorized by: Shane John DO    Consent:     Consent obtained:  Verbal and emergent situation    Consent given by:  Patient    Risks, benefits, and alternatives were discussed: yes      Risks discussed:  Bleeding, infection and pain  Universal protocol:     Procedure explained and questions answered to patient or proxy's satisfaction: yes      Required blood products, implants, devices, and special equipment available: yes      Immediately prior to procedure, a time out was called: yes      Patient identity confirmed:  Hospital-assigned identification number and verbally with patient  Indications:     Indications: hemodynamic monitoring and multiple ABGs    Pre-procedure details:     Skin preparation:  Chlorhexidine    Preparation: Patient was prepped and draped in sterile fashion    Sedation:     Sedation type:  None  Anesthesia:     Anesthesia method:  Local infiltration    Local anesthetic:  Lidocaine 1% w/o epi  Procedure details:     Location:  L radial    Needle gauge:  20 G    Placement technique:  Seldinger and ultrasound guided    Number of attempts:  1    Transducer: waveform confirmed    Post-procedure details:     Post-procedure:  Sterile dressing applied and secured with tape    CMS:  Normal    Procedure completion:  Tolerated well, no immediate complications

## 2025-07-25 NOTE — PROGRESS NOTES
Post-operative course complicated by Afib with RVR and septic shock 2/2 ischemic bowel requiring initiation and escalation of pressor support. 7/25 - Patient admitted to SICU for further management.     DC PLAN  TBD - Care Transitions is following to develop a safe & supportive discharge plan in collaboration with multidisciplinary team (&) patient/family/significant others.      MYRNA PERALTA     COMPLETED  (X) Daily ongoing review of patient via chart and/or (M-F) IDT rounds    Shirin Golden (LSW, MSW)

## 2025-07-25 NOTE — PROCEDURES
Insert arterial line    Date/Time: 7/25/2025 3:24 PM    Performed by: Frederick Abel MD  Authorized by: Jerel Olson MD    Consent:     Consent obtained:  Written and verbal    Consent given by:  Patient    Risks, benefits, and alternatives were discussed: yes      Risks discussed:  Bleeding, repeat procedure, infection and pain  Universal protocol:     Procedure explained and questions answered to patient or proxy's satisfaction: yes      Immediately prior to procedure, a time out was called: yes      Patient identity confirmed:  Verbally with patient, hospital-assigned identification number and arm band  Indications:     Indications: hemodynamic monitoring    Pre-procedure details:     Skin preparation:  Alcohol and chlorhexidine    Preparation: Patient was prepped and draped in sterile fashion    Anesthesia:     Anesthesia method:  Local infiltration    Local anesthetic:  Lidocaine 1% w/o epi  Procedure details:     Location:  R radial    Needle gauge:  20 G    Placement technique:  Seldinger    Number of attempts:  1    Transducer: waveform confirmed    Post-procedure details:     Post-procedure:  Biopatch applied, secured with tape and sterile dressing applied    CMS:  Normal    Procedure completion:  Tolerated well, no immediate complications    Frederick Abel MD  PGY-1

## 2025-07-25 NOTE — PROGRESS NOTES
Bereket Em is a 59 y.o. 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 Patient underwent ex lap, extensive lysis of adhesions, abdominal wall debridement, ventral hernia repair, excision of EC fistula, jejunostomy, & abdominal wound vac placement by Dr. Olson & Vicky and Margarita's reversal with transverse colon mobilization/ileal window by Dr. Gross on 7/22/2025. 7/24/25 rapid was called and patient found to have afib with RVR and Tmax 38.3. Patient became hypotensive after IV metoprolol. He was placed on pressors (levo, vaso, and ATII), received LR and albumin, and ischemic bowel was discovered on bedside flex/sig. Patient was taken to the OR for resection and colostomy with drain placement. He arrived to SICU intubated on levo and vaso.    Subjective   Patient intubated and sedated, stable but soft pressures on levo 0.04. 500 of albumin given. PICC removed. Arterial line inaccurate and will need to be replaced. On minimal vent settings and following commands even with propofol on. Plan to extubate.       Objective     Physical Exam  Vitals reviewed.   Constitutional:       Appearance: Normal appearance.   HENT:      Head: Normocephalic and atraumatic.      Nose: Nose normal.      Comments: NG to LIWS    Eyes:      Conjunctiva/sclera: Conjunctivae normal.      Pupils: Pupils are equal, round, and reactive to light.       Cardiovascular:      Rate and Rhythm: Normal rate. Rhythm irregular.      Pulses: Normal pulses.   Pulmonary:      Breath sounds: Normal breath sounds.      Comments: Intubated on FiO2 40 and PEEP 5  Abdominal:      General: Abdomen is flat.      Palpations: Abdomen is soft.      Comments: Midline incisions dressed with drain to suction and jejunostomy and colostomy present.     Skin:     General:  "Skin is warm and dry.      Capillary Refill: Capillary refill takes 2 to 3 seconds.     Neurological:      Comments: Sedated but still following commands       Last Recorded Vitals  Blood pressure 95/56, pulse 84, temperature 37.3 °C (99.1 °F), temperature source Temporal, resp. rate (!) 27, height 1.727 m (5' 8\"), weight 89 kg (196 lb 3.4 oz), SpO2 99%.  Intake/Output last 3 Shifts:  I/O last 3 completed shifts:  In: 5966.5 (82.9 mL/kg) [I.V.:2748.1 (38.2 mL/kg); IV Piggyback:2599.4]  Out: 3455 (48 mL/kg) [Urine:2535 (1 mL/kg/hr); Emesis/NG output:765; Drains:30; Stool:75; Blood:50]  Weight: 72 kg     Relevant Results               Results for orders placed or performed during the hospital encounter of 07/22/25 (from the past 24 hours)   Lactate   Result Value Ref Range    Lactate 3.8 (H) 0.4 - 2.0 mmol/L   Lactate   Result Value Ref Range    Lactate 2.6 (H) 0.4 - 2.0 mmol/L   POCT GLUCOSE   Result Value Ref Range    POCT Glucose 71 (L) 74 - 99 mg/dL   Renal Function Panel   Result Value Ref Range    Glucose 89 74 - 99 mg/dL    Sodium 134 (L) 136 - 145 mmol/L    Potassium 3.8 3.5 - 5.3 mmol/L    Chloride 96 (L) 98 - 107 mmol/L    Bicarbonate 26 21 - 32 mmol/L    Anion Gap 16 10 - 20 mmol/L    Urea Nitrogen 17 6 - 23 mg/dL    Creatinine 0.79 0.50 - 1.30 mg/dL    eGFR >90 >60 mL/min/1.73m*2    Calcium 8.6 8.6 - 10.6 mg/dL    Phosphorus 1.9 (L) 2.5 - 4.9 mg/dL    Albumin 3.2 (L) 3.4 - 5.0 g/dL   Hepatic Function Panel   Result Value Ref Range    Albumin 3.2 (L) 3.4 - 5.0 g/dL    Bilirubin, Total 2.8 (H) 0.0 - 1.2 mg/dL    Bilirubin, Direct 1.1 (H) 0.0 - 0.3 mg/dL    Alkaline Phosphatase 46 33 - 136 U/L    ALT 32 10 - 52 U/L    AST 35 9 - 39 U/L    Total Protein 5.4 (L) 6.4 - 8.2 g/dL   CBC and Auto Differential   Result Value Ref Range    WBC 2.3 (L) 4.4 - 11.3 x10*3/uL    nRBC 0.0 0.0 - 0.0 /100 WBCs    RBC 3.50 (L) 4.50 - 5.90 x10*6/uL    Hemoglobin 9.1 (L) 13.5 - 17.5 g/dL    Hematocrit 28.1 (L) 41.0 - 52.0 %    " MCV 80 80 - 100 fL    MCH 26.0 26.0 - 34.0 pg    MCHC 32.4 32.0 - 36.0 g/dL    RDW 13.7 11.5 - 14.5 %    Platelets 188 150 - 450 x10*3/uL    Neutrophils % 76.5 40.0 - 80.0 %    Immature Granulocytes %, Automated 0.4 0.0 - 0.9 %    Lymphocytes % 14.2 13.0 - 44.0 %    Monocytes % 6.4 2.0 - 10.0 %    Eosinophils % 2.1 0.0 - 6.0 %    Basophils % 0.4 0.0 - 2.0 %    Neutrophils Absolute 1.78 1.20 - 7.70 x10*3/uL    Immature Granulocytes Absolute, Automated 0.01 0.00 - 0.70 x10*3/uL    Lymphocytes Absolute 0.33 (L) 1.20 - 4.80 x10*3/uL    Monocytes Absolute 0.15 0.10 - 1.00 x10*3/uL    Eosinophils Absolute 0.05 0.00 - 0.70 x10*3/uL    Basophils Absolute 0.01 0.00 - 0.10 x10*3/uL   Blood Culture    Specimen: Peripheral Venipuncture; Blood culture   Result Value Ref Range    Blood Culture Escherichia coli (A)     BLOOD CULTURE BOTTLE  Positive Aerobic Bottle     Gram Stain Gram negative bacilli (AA)    BCID-GN    Specimen: Peripheral Venipuncture; Blood culture   Result Value Ref Range    Escherichia coli Detected (AA) Not Detected   Blood Culture    Specimen: Peripheral Venipuncture; Blood culture   Result Value Ref Range    Blood Culture       Identification and susceptibility testing to follow    Gram Stain Gram negative bacilli (AA)    CBC   Result Value Ref Range    WBC 2.2 (L) 4.4 - 11.3 x10*3/uL    nRBC 0.0 0.0 - 0.0 /100 WBCs    RBC 2.81 (L) 4.50 - 5.90 x10*6/uL    Hemoglobin 7.2 (L) 13.5 - 17.5 g/dL    Hematocrit 21.7 (L) 41.0 - 52.0 %    MCV 77 (L) 80 - 100 fL    MCH 25.6 (L) 26.0 - 34.0 pg    MCHC 33.2 32.0 - 36.0 g/dL    RDW 13.2 11.5 - 14.5 %    Platelets 151 150 - 450 x10*3/uL   Renal function panel   Result Value Ref Range    Glucose 125 (H) 74 - 99 mg/dL    Sodium 133 (L) 136 - 145 mmol/L    Potassium 3.2 (L) 3.5 - 5.3 mmol/L    Chloride 99 98 - 107 mmol/L    Bicarbonate 27 21 - 32 mmol/L    Anion Gap 10 10 - 20 mmol/L    Urea Nitrogen 18 6 - 23 mg/dL    Creatinine 0.83 0.50 - 1.30 mg/dL    eGFR >90 >60  mL/min/1.73m*2    Calcium 7.9 (L) 8.6 - 10.6 mg/dL    Phosphorus 1.2 (L) 2.5 - 4.9 mg/dL    Albumin 2.3 (L) 3.4 - 5.0 g/dL   Magnesium   Result Value Ref Range    Magnesium 1.66 1.60 - 2.40 mg/dL   Lactate   Result Value Ref Range    Lactate 4.0 (HH) 0.4 - 2.0 mmol/L   Hepatic function panel   Result Value Ref Range    Albumin 2.3 (L) 3.4 - 5.0 g/dL    Bilirubin, Total 2.0 (H) 0.0 - 1.2 mg/dL    Bilirubin, Direct 1.0 (H) 0.0 - 0.3 mg/dL    Alkaline Phosphatase 33 33 - 136 U/L    ALT 21 10 - 52 U/L    AST 25 9 - 39 U/L    Total Protein 4.5 (L) 6.4 - 8.2 g/dL   Troponin I, High Sensitivity   Result Value Ref Range    Troponin I, High Sensitivity (CMC) 9 0 - 53 ng/L   Prepare RBC: 1 Units   Result Value Ref Range    PRODUCT CODE I9462H23     Unit Number [id number]     Unit ABO A     Unit RH POS     XM INTEP COMP     Dispense Status IS     Blood Expiration Date 7/30/2025 11:59:00 PM EDT     PRODUCT BLOOD TYPE 6200     UNIT VOLUME 350    Blood Gas Arterial Full Panel   Result Value Ref Range    POCT pH, Arterial 7.44 (H) 7.38 - 7.42 pH    POCT pCO2, Arterial 36 (L) 38 - 42 mm Hg    POCT pO2, Arterial 130 (H) 85 - 95 mm Hg    POCT SO2, Arterial 100 94 - 100 %    POCT Oxy Hemoglobin, Arterial 97.8 94.0 - 98.0 %    POCT Hematocrit Calculated, Arterial 24.0 (L) 41.0 - 52.0 %    POCT Sodium, Arterial 130 (L) 136 - 145 mmol/L    POCT Potassium, Arterial 3.6 3.5 - 5.3 mmol/L    POCT Chloride, Arterial 100 98 - 107 mmol/L    POCT Ionized Calcium, Arterial 1.12 1.10 - 1.33 mmol/L    POCT Glucose, Arterial 106 (H) 74 - 99 mg/dL    POCT Lactate, Arterial 2.0 0.4 - 2.0 mmol/L    POCT Base Excess, Arterial 0.4 -2.0 - 3.0 mmol/L    POCT HCO3 Calculated, Arterial 24.5 22.0 - 26.0 mmol/L    POCT Hemoglobin, Arterial 8.1 (L) 13.5 - 17.5 g/dL    POCT Anion Gap, Arterial 9 (L) 10 - 25 mmo/L    Patient Temperature 37.0 degrees Celsius    FiO2 50 %   Blood Gas Arterial Full Panel   Result Value Ref Range    POCT pH, Arterial 7.33 (L)  7.38 - 7.42 pH    POCT pCO2, Arterial 42 38 - 42 mm Hg    POCT pO2, Arterial 220 (H) 85 - 95 mm Hg    POCT SO2, Arterial 100 94 - 100 %    POCT Oxy Hemoglobin, Arterial 97.5 94.0 - 98.0 %    POCT Hematocrit Calculated, Arterial 51.0 41.0 - 52.0 %    POCT Sodium, Arterial 124 (L) 136 - 145 mmol/L    POCT Potassium, Arterial 4.1 3.5 - 5.3 mmol/L    POCT Chloride, Arterial 94 (L) 98 - 107 mmol/L    POCT Ionized Calcium, Arterial 1.13 1.10 - 1.33 mmol/L    POCT Glucose, Arterial 161 (H) 74 - 99 mg/dL    POCT Lactate, Arterial 3.1 (H) 0.4 - 2.0 mmol/L    POCT Base Excess, Arterial -3.8 (L) -2.0 - 3.0 mmol/L    POCT HCO3 Calculated, Arterial 22.1 22.0 - 26.0 mmol/L    POCT Hemoglobin, Arterial 16.9 13.5 - 17.5 g/dL    POCT Anion Gap, Arterial 12 10 - 25 mmo/L    Patient Temperature 37.0 degrees Celsius    FiO2 60 %   Blood Gas Arterial Full Panel   Result Value Ref Range    POCT pH, Arterial 7.35 (L) 7.38 - 7.42 pH    POCT pCO2, Arterial 41 38 - 42 mm Hg    POCT pO2, Arterial 96 (H) 85 - 95 mm Hg    POCT SO2, Arterial 99 94 - 100 %    POCT Oxy Hemoglobin, Arterial 96.5 94.0 - 98.0 %    POCT Hematocrit Calculated, Arterial 38.0 (L) 41.0 - 52.0 %    POCT Sodium, Arterial 126 (L) 136 - 145 mmol/L    POCT Potassium, Arterial 4.2 3.5 - 5.3 mmol/L    POCT Chloride, Arterial 96 (L) 98 - 107 mmol/L    POCT Ionized Calcium, Arterial 1.18 1.10 - 1.33 mmol/L    POCT Glucose, Arterial 133 (H) 74 - 99 mg/dL    POCT Lactate, Arterial 3.2 (H) 0.4 - 2.0 mmol/L    POCT Base Excess, Arterial -2.9 (L) -2.0 - 3.0 mmol/L    POCT HCO3 Calculated, Arterial 22.6 22.0 - 26.0 mmol/L    POCT Hemoglobin, Arterial 12.5 (L) 13.5 - 17.5 g/dL    POCT Anion Gap, Arterial 12 10 - 25 mmo/L    Patient Temperature 37.0 degrees Celsius    FiO2 50 %   CBC and Auto Differential   Result Value Ref Range    WBC 2.8 (L) 4.4 - 11.3 x10*3/uL    nRBC 0.0 0.0 - 0.0 /100 WBCs    RBC 3.28 (L) 4.50 - 5.90 x10*6/uL    Hemoglobin 8.5 (L) 13.5 - 17.5 g/dL    Hematocrit 25.9  (L) 41.0 - 52.0 %    MCV 79 (L) 80 - 100 fL    MCH 25.9 (L) 26.0 - 34.0 pg    MCHC 32.8 32.0 - 36.0 g/dL    RDW 13.7 11.5 - 14.5 %    Platelets 190 150 - 450 x10*3/uL    Immature Granulocytes %, Automated 6.0 (H) 0.0 - 0.9 %    Immature Granulocytes Absolute, Automated 0.17 0.00 - 0.70 x10*3/uL   Type and screen   Result Value Ref Range    ABO TYPE A     Rh TYPE POS     ANTIBODY SCREEN NEG    Calcium, Ionized   Result Value Ref Range    POCT Calcium, Ionized 1.12 1.1 - 1.33 mmol/L   Magnesium   Result Value Ref Range    Magnesium 1.68 1.60 - 2.40 mg/dL   Renal Function Panel   Result Value Ref Range    Glucose 112 (H) 74 - 99 mg/dL    Sodium 133 (L) 136 - 145 mmol/L    Potassium 4.0 3.5 - 5.3 mmol/L    Chloride 99 98 - 107 mmol/L    Bicarbonate 25 21 - 32 mmol/L    Anion Gap 13 10 - 20 mmol/L    Urea Nitrogen 20 6 - 23 mg/dL    Creatinine 0.85 0.50 - 1.30 mg/dL    eGFR >90 >60 mL/min/1.73m*2    Calcium 8.0 (L) 8.6 - 10.6 mg/dL    Phosphorus 3.0 2.5 - 4.9 mg/dL    Albumin 2.7 (L) 3.4 - 5.0 g/dL   Manual Differential   Result Value Ref Range    Neutrophils %, Manual 23.3 40.0 - 80.0 %    Bands %, Manual 2.3 0.0 - 5.0 %    Lymphocytes %, Manual 40.3 13.0 - 44.0 %    Monocytes %, Manual 17.1 2.0 - 10.0 %    Eosinophils %, Manual 3.1 0.0 - 6.0 %    Basophils %, Manual 0.0 0.0 - 2.0 %    Atypical Lymphocytes %, Manual 0.8 0.0 - 2.0 %    Metamyelocytes %, Manual 9.3 0.0 - 0.0 %    Myelocytes %, Manual 1.5 0.0 - 0.0 %    Plasma Cells %, Manual 0.0 0.00 - 0.00 %    Promyelocytes %, Manual 2.3 0.0 - 0.0 %    Blasts %, Manual 0.0 0.0 - 0.0 %    Others %, Manual 0.0 %    Seg Neutrophils Absolute, Manual 0.65 (L) 1.20 - 7.00 x10*3/uL    Bands Absolute, Manual 0.06 0.00 - 0.70 x10*3/uL    Lymphocytes Absolute, Manual 1.13 (L) 1.20 - 4.80 x10*3/uL    Monocytes Absolute, Manual 0.48 0.10 - 1.00 x10*3/uL    Eosinophils Absolute, Manual 0.09 0.00 - 0.70 x10*3/uL    Basophils Absolute, Manual 0.00 0.00 - 0.10 x10*3/uL    Atypical  Lymphs Absolute, Manual 0.02 0.00 - 0.50 x10*3/uL    Metamyelocytes Absolute, Manual 0.26 0.00 - 0.00 x10*3/uL    Myelocytes Absolute, Manual 0.04 0.00 - 0.00 x10*3/uL    Plasma Cells Absolute, Manual 0.00 0.00 - 0.00 x10*3/uL    Promyelocytes Absolute, Manual 0.06 0.00 - 0.00 x10*3/uL    Blasts Absolute, Manual 0.00 0.00 - 0.00 x10*3/uL    Others Absolute, Manual 0.00 x10*3/uL    Total Cells Counted 129     Neutrophils Absolute, Manual 0.71 (L) 1.20 - 7.70 x10*3/uL    Manual nRBC per 100 Cells 0.0 0.0 - 0.0 %    RBC Morphology See Below     Ovalocytes Few     Isak Cells Few    Blood Gas Arterial Full Panel   Result Value Ref Range    POCT pH, Arterial 7.43 (H) 7.38 - 7.42 pH    POCT pCO2, Arterial 35 (L) 38 - 42 mm Hg    POCT pO2, Arterial 130 (H) 85 - 95 mm Hg    POCT SO2, Arterial 100 94 - 100 %    POCT Oxy Hemoglobin, Arterial 97.2 94.0 - 98.0 %    POCT Hematocrit Calculated, Arterial 26.0 (L) 41.0 - 52.0 %    POCT Sodium, Arterial 128 (L) 136 - 145 mmol/L    POCT Potassium, Arterial 4.1 3.5 - 5.3 mmol/L    POCT Chloride, Arterial 100 98 - 107 mmol/L    POCT Ionized Calcium, Arterial 1.16 1.10 - 1.33 mmol/L    POCT Glucose, Arterial 113 (H) 74 - 99 mg/dL    POCT Lactate, Arterial 2.9 (H) 0.4 - 2.0 mmol/L    POCT Base Excess, Arterial -0.9 -2.0 - 3.0 mmol/L    POCT HCO3 Calculated, Arterial 23.2 22.0 - 26.0 mmol/L    POCT Hemoglobin, Arterial 8.8 (L) 13.5 - 17.5 g/dL    POCT Anion Gap, Arterial 9 (L) 10 - 25 mmo/L    Patient Temperature 37.0 degrees Celsius    FiO2 50 %   Coagulation Screen   Result Value Ref Range    Protime 13.8 (H) 9.8 - 12.4 seconds    INR 1.2 (H) 0.9 - 1.1    aPTT 33 26 - 36 seconds   POCT GLUCOSE   Result Value Ref Range    POCT Glucose 116 (H) 74 - 99 mg/dL   MRSA Surveillance for Vancomycin De-escalation, PCR    Specimen: Anterior Nares; Swab   Result Value Ref Range    MRSA PCR Not Detected Not Detected   Transthoracic Echo (TTE) Limited   Result Value Ref Range    LVOT diam 2.10 cm     MV E/A ratio 1.16     LA vol index A/L 33.2 ml/m2    LV EF 63 %    LVIDd 4.90 cm    BSA 1.86 m2   Blood Gas Arterial Full Panel   Result Value Ref Range    POCT pH, Arterial 7.47 (H) 7.38 - 7.42 pH    POCT pCO2, Arterial 33 (L) 38 - 42 mm Hg    POCT pO2, Arterial 101 (H) 85 - 95 mm Hg    POCT SO2, Arterial 100 94 - 100 %    POCT Oxy Hemoglobin, Arterial 97.4 94.0 - 98.0 %    POCT Hematocrit Calculated, Arterial 26.0 (L) 41.0 - 52.0 %    POCT Sodium, Arterial 128 (L) 136 - 145 mmol/L    POCT Potassium, Arterial 3.6 3.5 - 5.3 mmol/L    POCT Chloride, Arterial 99 98 - 107 mmol/L    POCT Ionized Calcium, Arterial 1.08 (L) 1.10 - 1.33 mmol/L    POCT Glucose, Arterial 116 (H) 74 - 99 mg/dL    POCT Lactate, Arterial 2.9 (H) 0.4 - 2.0 mmol/L    POCT Base Excess, Arterial 0.5 -2.0 - 3.0 mmol/L    POCT HCO3 Calculated, Arterial 24.0 22.0 - 26.0 mmol/L    POCT Hemoglobin, Arterial 8.5 (L) 13.5 - 17.5 g/dL    POCT Anion Gap, Arterial 9 (L) 10 - 25 mmo/L    Patient Temperature 37.0 degrees Celsius    FiO2 40 %     *Note: Due to a large number of results and/or encounters for the requested time period, some results have not been displayed. A complete set of results can be found in Results Review.       This patient is intubated   Reason for patient to remain intubated today? Intubation unnecessary, will extubate today           Assessment & Plan    Assessment:  Bereket Em is a 59 y.o. 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 Patient underwent ex lap, extensive lysis of adhesions, abdominal wall debridement, ventral hernia repair, excision of EC fistula, jejunostomy, & abdominal wound vac placement by Dr. Olson & Vicky and Margarita's reversal with transverse colon mobilization/ileal window by Dr. Gross on 7/22/2025.  Post-operative course complicated by Afib with RVR and septic shock 2/2 ischemic bowel requiring initiation and escalation of pressor support. Patient admitted to SICU for further management s/p ex lap.     Plan:  NEURO: Hx of alcohol use on phenobarb taper. Acute post-op pain. Currently, intubated and sedated.   - dc propofol  - continue phenobarb taper  - ongoing neuro and pain assessments  - PRN hydromorphone for pain control  - PT/OT consult when able  - Home meds: trazodone, oxycodone 5      CV: No previous cardiac history. Developed Afib RVR (HR 160s) on the floor s/p fluids and metoprolol x1. Patient subsequently hypotensive refractory to phenylephrine pushes and fluids. Pt started on amio bolus and infusion with conversion to NSR. On arrival to the SICU, patient hypotensive with SBP in the 60s, suspect 2/2 septic shock requiring initiation and escalation of pressor support. Lactate 2.9 down from 4.0 Troponin normal. Off levo now.  - TTE normal  - continue amio infusion   - continuous EKG/abp monitoring  - Goal map range 65-90  - Home meds: none.     PULM: No pulmonary hx.. After arrival to SICU, patient intubated and sedated to facilitate bedside flex sig procedure and need for emergent OR.   - extubate today  - Q1h incentive spirometer while awake  - additional pulm toilet prn.       GI: Hx of perforated diverticulitis s/p Margarita's (6/7/2024) c/b enterocutaneous fistula and incarcerated ventral hernia s/p exploratory laparotomy, extensive lysis of adhesions, take down of enterocutaneous fistula, creation of jejunostomy, abdominal wall debridement, placement of wound vac, repair of recurrent incarcerated ventral hernia (Dr. Olson) with Margarita's reversal (Dr. Gross). Bedside colonoscopy/flex sig 7/25 showing well-demarcated region of dark mucosa with c/f ischemic bowel. Patient taken emergently to OR for  ex lap.   - NPO on TPN   - NG to LIWS  - PPI for GI prophylaxis  - Home meds: Diphen/Atrop 2.5/.0  Tab Spec      : No history of renal disease. Baseline creatinine 0.7-0.9. Additional bolus of 500 5% albumin given  - Volume resuscitation as needed  - Maintain U/O >0.5ml/kg/hr  - Colvin for strict I/Os  - Check renal function panel post op and daily  - Replete electrolytes to goal K>4, Mg>2, Phos>2.5, ionized Ca>1.10.     HEME: Acute blood loss anemia. Received 1u PRBC. Hgb 8.5 and stable.  - Check CBC and coags post op and daily  - SCDs for DVT prophylaxis  - restarted lovenox (ok per CRS and Olivia)  - ongoing monitoring for s/s bleeding     ENDO: No history of DM or thyroid disease. Hydrocortisone 100 mg x1 dose.   - continue stress dose steroids hydrocortisone 50 mg q6h  - Q4h BG   - SSI Lispro per ICU protocol.     ID: Febrile, leukopenic. Intra-abdominal sepsis 2/2 ischemic bowel. Blood cultures obtained. Started on empiric antibiotics, vanc/zosyn. BC showed E. Coli.  - temp q4h, wbc daily  - dc vanc, continue zosyn  - ongoing monitoring for s/s infection     Lines:  - L radial arterial line (7.25) - plan to exchange  - RIJ triple lumen central line (7.25)  - L PICC - removed   - PIVs  - colvin  - NGT     Dispo: Admit to ICU. Patient seen and discussed with ICU attending Dr. Campos.    Frederick Abel MD

## 2025-07-25 NOTE — SIGNIFICANT EVENT
Called to bedside for elevated HR and hypotension. Patient asymptomatic, states he feels dehydrated. Denies nausea, vomiting, shortness of breath, vision changes, headaches, chest pain, abdominal pain, dysuria, diarrhea, constipation, numbness and tingling in the extremities. No other complaints. EKG showing atriral fibrillation w/ rapid ventricular response. Administered 1L crystalloid and 0.5L albumin with no change to HR, improvement in systolics from ~90s to ~110s. Administered 5mg metoprolol given increase in BP. HR responsive, down to 105, but patient newly hypotensive, systolics to 70s. Patient remained asymptomatic. Given instability, ICU contacted:    - Patient to ICU for close monitoring; possible flexible sigmoidoscopy; possible cardioversion; possible amio drip; ongoing resuscitaiton.   - Basilio placed for accurate I/O  - Will follow up RFP, CBC, lactate    Patient seen, discussed, and managed w/ senior resident, Dr. Yanez.     Iain Verde MD

## 2025-07-25 NOTE — CONSULTS
Vancomycin Dosing by Pharmacy- INITIAL    Bereket Em is a 59 y.o. year old male who Pharmacy has been consulted for vancomycin dosing for intra abdominal infection. Based on the patient's indication and renal status this patient will be dosed based on a goal AUC of 400-600.     Renal function is currently stable.    Visit Vitals  BP 85/54   Pulse 109   Temp 36.2 °C (97.2 °F) (Temporal)   Resp 18        Lab Results   Component Value Date    CREATININE 0.83 2025    CREATININE 0.79 2025    CREATININE 0.76 2025    CREATININE 0.67 2025    CREATININE 0.91 2025    CREATININE 0.78 2025    CREATININE 0.84 2025    CREATININE 0.78 2025        Patient weight is as follows:   Vitals:    25 0610   Weight: 72 kg (158 lb 11.7 oz)       Cultures:  No results found for the encounter in last 14 days.        I/O last 3 completed shifts:  In: 2916.7 (40.5 mL/kg) [I.V.:2410.4 (33.5 mL/kg); IV Piggyback:200]  Out: 3305 (45.9 mL/kg) [Urine:2855 (1.1 mL/kg/hr); Emesis/NG output:265; Drains:110; Stool:75]  Weight: 72 kg   I/O during current shift:  I/O this shift:  In: 100 [IV Piggyback:100]  Out: 375 [Urine:375]    Temp (24hrs), Av.7 °C (96.2 °F), Min:21.6 °C (70.9 °F), Max:37.6 °C (99.7 °F)         Assessment/Plan     Patient will start 1000 mg Q12h.    This dosing regimen is predicted by [employer] to result in the following pharmacokinetic parameters:  Regimen: 1000 mg IV every 12 hours.  Start time: 01:39 on 2025  Exposure target: AUC24 (range) 400-600 mg/L.hr   [id number]: 418 mg/L.hr  AUC24,ss: 527 mg/L.hr  Probability of AUC24 > 400: 77 %    Follow-up level tomorrow morning.  Will continue to monitor renal function daily while on vancomycin and order serum creatinine at least every 48 hours if not already ordered.  Follow for continued vancomycin needs, clinical response, and signs/symptoms of toxicity.       Darrick Hoffman, PharmD

## 2025-07-25 NOTE — PROGRESS NOTES
Plan:  - analgesia  IV tylenol/prn dilaud  - SAT/SBT  Possible WTE  - volume resuscitation as clinically indicated; giving 500cc 5%alb  - adjust vent to prevent hypoxemia and hypercarbia   - pressers/tropes for MAP>65 and/or CI>=2/clinical params; weaning as tolerated   - pheno taper  - PRN anti-emetics, Bowel regimen, and swallow eval once extubated  - Current abx: discontinue vanc; cont zosyn; BSI with ecoli awaiting sens  - Dispo: ICU     Quality/Safety/Proph:  - GI prophy: PPI  - DVT prophy: scd and held d/t post-op   - Central line: parenteral medications (I.e. chemo, vasoactive) and access  - Basilio: critically ill patient who need accurate urinary output measurements  - Restraint: needed, pulling at lines/tubes and agitation    Assessment:    Neuro/MSK: EtOH use disorder  and expected acute post op pain   A-F bundle; Sleep Hygiene measures (REST protocol): appropriate daytime stimulation/physical activity. Lights out at 2100, avoidance of night time lab draws/night baths, minimize mind altering medicaments  PT/OT and OOB as appropriate    CV: septic shock , lactic acidosis, and Afib new onset    -    levo     Pulm: acute post op pulmonary insufficiency   BPH with IS/flutter/OOB  Current vent settings: Vent Mode: Volume control/assist control  FiO2 (%):  [40 %-50 %] 40 %  S RR:  [16] 16  S VT:  [500 mL] 500 mL  PEEP/CPAP (cm H2O):  [5 cm H20-8 cm H20] 5 cm H20  MAP (cm H2O):  [8-15] 8    Renal: Hyponatremia , Hypomagnesemia , Hypokalemia , and hypophosphatemia   Trend UOP and renal indices; replete lytes PRN     GI: n/a    ID/rheum: Septic shock 2/2 abdominal source with necrotic bowel  Follow cx data:    MICRO: No results found for the last 90 days.      Endo: Stress hyperglycemia  Cont ISS  for BG goal <180  Last HbA1c: No results found for requested labs within last 365 days.  Glucose (past 72hrs):   Results from last 72 hours   Lab Units 07/25/25  0002   GLUCOSE mg/dL 125*       Heme: ABL anemia , Anemia of  chronic disease , and leukopenia   -     Trend Hb and transfuse PRN for goal Hb>7    LDA:  CVC 07/25/25 Triple lumen Right Internal jugular (Active)   Placement Date/Time: 07/25/25 0225   Hand Hygiene Performed Prior to CVC Insertion: Yes  Site Prep Agent has Completely Dried Before Insertion: Yes  Lumen Type: Triple lumen  Orientation: Right  Location: Internal jugular   Number of days: 0       Arterial Line 07/25/25 Left Radial (Active)   Placement Date/Time: 07/25/25 0119   Hand Hygiene Completed: Yes  Orientation: Left  Location: Radial   Number of days: 0       PICC - Adult 08/30/24 Single lumen Right Brachial vein (Active)   Placement Date/Time: 08/30/24 1206   Earliest Known Present: 08/30/24  Hand Hygiene Completed: Yes  Catheter Time Out Checklist Completed: Yes  Size (Fr): 4  Lumen Type: Single lumen  Catheter to Vein Ratio Less Than 45%: Yes  Total Length (cm): 42 cm...   Number of days: 328       ETT  7.5 mm (Active)   Placement Date/Time: 07/25/25 0647   Technique: Video laryngoscopy  ETT Type: ETT - single  Single Lumen Tube Size: 7.5 mm  Cuffed: Yes  Location: Oral   Number of days: 0       Urethral Catheter (Active)   Placement Date/Time: 07/25/25 0000   Hand Hygiene Completed: Yes   Number of days: 0       Closed/Suction Drain RLQ 10 Fr. (Active)   Placement Date/Time: 07/25/25 0505   Placed by: Dr. Gross  Location: RLQ  Size (Fr.): 10 Fr.  Drain Reservoir Size (mL): 100 mL   Number of days: 0       NG/OG/Feeding Tube Nasogastric 16 Fr Right nostril (Active)   Placement Date/Time: 07/22/25 0745   Hand Hygiene Completed: Yes  Type of Tube: NG/OG Tube  Tube Type: Nasogastric  NG/OG Tube Size: 16 Fr  Tube Location: (c) Right nostril  Difficulty with Placement: No   Number of days: 2       Colostomy LUQ (Active)   Placement Date/Time: 07/25/25 0500   Placed by: Dr. Gross  Location: LUQ   Number of days: 0       Colostomy RUQ (Active)   Placement Date/Time: 07/25/25 0639   Location: RUQ   Number of  days: 0       Exam:    A&O x 0; intubated and sedated but follows commands with sedation hold x4ext  NSR  Adequate air-exchange  Abd soft, NT  Extremities warm     REASON FOR ADMISSION    59 y.o. 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 Patient underwent ex lap, extensive lysis of adhesions, abdominal wall debridement, ventral hernia repair, excision of EC fistula, jejunostomy, & abdominal wound vac placement by Dr. Olson & Vicky and Margarita's reversal with transverse colon mobilization/ileal window by Dr. Gross on 7/22/2025.      Earlier today, patient tachycardic with new leukopenia (WBC decreased to 2 from 10). CT scan performed in the afternoon showing scattered air foci and pockets of ascites and generalized mesenteric stranding as well as hyperdense collection within the left anterior abdominal subcutaneous tissue corresponding to the site of prior ileostomy. Overnight, rapid response called for tachycardia. Patient asymptomatic. Denied chest pain, palpitations, shortness of breath. No prior hx of Afib. Reports abdominal pain from recent surgery. ECG showing afib RVR with rates in the 160s. Received 1 LR and 500 5% albumin as well as 5 mg IV metoprolol. Subsequently, patient hypotensive BPs 70s/50s. Also newly febrile with Tmax 38.3 in the setting of leukopenia and tachycardia with concern for intra-abdominal sepsis. Blood cultures drawn and patient started on empiric antibiotics with vanc/zosyn. Patient given amio bolus x1, followed by infusion at 1. Patient transferred to SICU for further management.      Upon arrival to the SICU, patient in Afib RVR with significant hypotension refractory to additional fluid boluses and multiple phenylephrine pushes. Arterial line placed for BP monitoring. Patient intubated  for bedside flex sig procedure and possible OR. Post-intubation, R IJ triple lumen CVC placed for initiation and escalation of pressor support including levo, vaso, ATII. Bedside ECHO performed without obvious LV or RV dysfunction. Bedside flex sig performed by surgical team showing area of well-demarcated dark mucosa concerning for ischemic bowel. Decision made to take patient emergently to OR for ex lap.     TODAY'S EVENTS    7/25: admitted following OR for bowel resection 2/2 ischemic colon    This critically ill patient continues to be at-risk for clinically significant deterioration / failure due to the above mentioned dysfunctional, unstable organ systems.  I have personally identified and managed all complex critical care issues to prevent aforementioned clinical deterioration.  Critical care time is spent at bedside and/or the immediate area and has included, but is not limited to, the review of diagnostic tests, labs, radiographs, serial assessments of hemodynamics, respiratory status, ventilatory management, and family updates.  Time spent in procedures and teaching are reported separately. CF CRITICAL CARE TIME: 55 minutes             LABS:  CMP:  Results from last 7 days   Lab Units 07/25/25  0002 07/24/25  1750 07/24/25  1201 07/24/25  0551 07/23/25  0631 07/21/25  1601   [facility] SODIUM mmol/L  --   --   --   --   --  136   SODIUM mmol/L 133* 134* 133* 133* 138  --    [facility] POTASSIUM mmol/L  --   --   --   --   --  3.3*   POTASSIUM mmol/L 3.2* 3.8 3.7 3.3* 3.4*  --    [facility] CHLORIDE mmol/L  --   --   --   --   --  90*   CHLORIDE mmol/L 99 96* 97* 98 97*  --    [facility] CO2 mmol/L  --   --   --   --   --  32   CO2 mmol/L 27 26 30 30 29  --    [facility] ANION GAP mmol/L (calc)  --   --   --   --   --  14   ANION GAP mmol/L 10 16 10 8* 15  --    BUN mg/dL 18 17 20 18 25*  --    [facility] BUN mg/dL  --   --   --   --   --  27*   CREATININE mg/dL 0.83 0.79 0.76 0.67 0.89  --    [facility] CREATININE mg/dL  --   --   --   --    "--  0.91   [facility] EGFR mL/min/1.73m2  --   --   --   --   --  96   EGFR mL/min/1.73m*2 >90 >90 >90 >90 >90  --    [facility] MAGNESIUM mg/dL  --   --   --   --   --  2.1   MAGNESIUM mg/dL 1.66  --   --  2.04 1.47*  --    [facility] ALBUMIN g/dL  --   --   --   --   --  4.6   ALBUMIN g/dL 2.3*  2.3* 3.2* 2.9* 2.9* 3.7  --    [facility] ALK PHOS U/L  --   --   --   --   --  126   ALK PHOS U/L 33  --   --   --   --   --    [facility] ALT U/L  --   --   --   --   --  49*   ALT U/L 21  --   --   --   --   --    [facility] AST U/L  --   --   --   --   --  36*   AST U/L 25  --   --   --   --   --    BILIRUBIN TOTAL mg/dL 2.0*  --   --   --   --   --    [facility] BILIRUBIN TOTAL mg/dL  --   --   --   --   --  0.8     CBC:  Results from last 7 days   Lab Units 07/25/25  0002 07/24/25  1754 07/24/25  1201 07/24/25  0826 07/24/25  0551   WBC AUTO x10*3/uL 2.2* 2.3* 2.1* 2.7* 2.6*   HEMOGLOBIN g/dL 7.2* 9.1* 8.7* 8.9* 8.3*   HEMATOCRIT % 21.7* 28.1* 26.6* 28.3* 27.1*   PLATELETS AUTO x10*3/uL 151 188 162 213 196     COAG:     ABO: No results found for: \"ABO\"  HEME/ENDO:     CARDIAC:   Results from last 7 days   Lab Units 07/25/25  0002   [facility] ng/L 9       "

## 2025-07-25 NOTE — ANESTHESIA PREPROCEDURE EVALUATION
Patient: Bereket Em    Procedure Information       Date/Time: 07/25/25 0300    Procedure: LAPAROSCOPY, EXPLORATORY    Location: [facility] / Virtual [facility]    Surgeons: Jerel Olson MD            Relevant Problems   Cardiac   (+) Primary hypertension      Neuro   (+) Anxiety and depression   (+) Cervical radiculopathy      GI  Diverticulitis s/p gonsalves's pouch and recent takedown with ventral hernia repair       Clinical information reviewed:   Tobacco  Allergies  Meds   Med Hx  Surg Hx   Fam Hx  Soc Hx        NPO Detail:  No data recorded     PHYSICAL EXAM    Anesthesia Plan    History of general anesthesia?: yes  History of complications of general anesthesia?: no    ASA 4 - emergent     general     intravenous induction   Postoperative pain plan includes opioids.    Plan discussed with resident.

## 2025-07-25 NOTE — PROGRESS NOTES
Vancomycin Dosing by Pharmacy- Cessation of Therapy    Consult to pharmacy for vancomycin dosing has been discontinued by the prescriber, pharmacy will sign off at this time.    Please call pharmacy if there are further questions or re-enter a consult if vancomycin is resumed.     Dallas Barnes, LynnD

## 2025-07-25 NOTE — SIGNIFICANT EVENT
RAPID RESPONSE NOTE    Reason for Rapid Response:   []    BAT []  New CPAP/BiPAP []  Bleeding []  Change in mental status   []  Chest pain []  Code blue []  FiO2 >/= 50% []  HR </= 40 bpm   [x]  HR >/= 130 bpm []  Hyperglycemia []  Hypoglycemia []  SpO2 < 90%    []  RR </= 8 bpm []  RR >/= 30 bpm []  SBP </= 90 mmHg []  Seizure   []  Staff concern:         Vital Signs on Arrival:   Afebrile, -140, RR 14-15, -110/50-60, 97% on 4L NC however NC not properly on patient    Focused Physical Exam:   General: In no acute distress  Cardiac: Regular rate and rhythm  Pulm: Clear to auscultation  Abdomen: Dressing in place from recent surgery  Extremities: No pitting edema appreciated  Neuro: Moving extremities spontaneously    Workup:  - EKG obtained, appears afib vs frequent PAC's   - Labs previously ordered per primary team: Blood cultures x2, CBC, RFP, lactate  - VBG obtained    Intervention:  Primary team planning to give further IV fluids this evening, follow up labs    Working Diagnosis/Impression:   Tachycardia likely in response to other underlying etiology, potentially volume down vs. Infectious process    Conclusion:   [x] Patient improved (pertinent vitals - HR tachycardic but stable at this time), will continue to monitor  [] Patient failed to improve (pertinent vitals ), will notify ICU fellow    ICU Notified:   [] Yes  [x] No    Disposition: RNF - primary team planning to give additional fluids and follow up labs    If staying on floor: Plan for follow up: primary team    Rajendra Gamino MD  PGY-3 Internal Medicine

## 2025-07-25 NOTE — H&P
SICU History & Physical    Subjective   HPI:  Bereket Em is a 59 y.o. 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 Patient underwent ex lap, extensive lysis of adhesions, abdominal wall debridement, ventral hernia repair, excision of EC fistula, jejunostomy, & abdominal wound vac placement by Dr. Olson & Vicky and Margarita's reversal with transverse colon mobilization/ileal window by Dr. Gross on 7/22/2025.     Earlier today, patient tachycardic with new leukopenia (WBC decreased to 2 from 10). CT scan performed in the afternoon showing scattered air foci and pockets of ascites and generalized mesenteric stranding as well as hyperdense collection within the left anterior abdominal subcutaneous tissue corresponding to the site of prior ileostomy. Overnight, rapid response called for tachycardia. Patient asymptomatic. Denied chest pain, palpitations, shortness of breath. No prior hx of Afib. Reports abdominal pain from recent surgery. ECG showing afib RVR with rates in the 160s. Received 1 LR and 500 5% albumin as well as 5 mg IV metoprolol. Subsequently, patient hypotensive BPs 70s/50s. Also newly febrile with Tmax 38.3 in the setting of leukopenia and tachycardia with concern for intra-abdominal sepsis. Blood cultures drawn and patient started on empiric antibiotics with vanc/zosyn. Patient given amio bolus x1, followed by infusion at 1. Patient transferred to SICU for further management.     Upon arrival to the SICU, patient in Afib RVR with significant hypotension refractory to additional fluid boluses and multiple phenylephrine pushes. Arterial line placed for BP monitoring. Patient intubated for bedside flex sig procedure and possible OR. Post-intubation, R IJ triple lumen CVC placed for initiation and escalation  of pressor support including levo, vaso, ATII. Bedside ECHO performed without obvious LV or RV dysfunction. Bedside flex sig performed by surgical team showing area of well-demarcated dark mucosa concerning for ischemic bowel. Decision made to take patient emergently to OR for ex lap.        Medical History[1]  Surgical History[2]  Prescriptions Prior to Admission[3]  Patient has no known allergies.  Social History[4]  Family History[5]    Review of Systems:  Negative except noted in HPI.     Scheduled Medications:   Scheduled Medications[6]     Continuous Medications:   Continuous Medications[7]     PRN Medications:   PRN Medications[8]    Objective   Vitals:  Most Recent:  Vitals:    07/25/25 0244   BP: 113/71   Pulse: (!) 118   Resp: 22   Temp: 36.4 °C (97.5 °F)   SpO2: 100%       24hr Min/Max:  Temp  Min: 21.6 °C (70.9 °F)  Max: 37.6 °C (99.7 °F)  Pulse  Min: 98  Max: 153  BP  Min: 68/50  Max: 132/108  Resp  Min: 0  Max: 26  SpO2  Min: 92 %  Max: 100 %    I/O:  I/O last 2 completed shifts:  In: 2916.7 (40.5 mL/kg) [I.V.:2410.4 (33.5 mL/kg); IV Piggyback:200]  Out: 2275 (31.6 mL/kg) [Urine:1905 (1.1 mL/kg/hr); Emesis/NG output:265; Drains:30; Stool:75]  Weight: 72 kg     Hemodynamic parameters for last 24 hours:         Vent settings:  Vent Mode: Volume control/assist control  FiO2 (%):  [50 %] 50 %  S RR:  [16] 16  S VT:  [500 mL] 500 mL  PEEP/CPAP (cm H2O):  [8 cm H20] 8 cm H20  MAP (cm H2O):  [12] 12    LDA:  CVC 07/25/25 Triple lumen Right Internal jugular (Active)   Placement Date/Time: 07/25/25 0225   Hand Hygiene Performed Prior to CVC Insertion: Yes  Site Prep Agent has Completely Dried Before Insertion: Yes  Lumen Type: Triple lumen  Orientation: Right  Location: Internal jugular   Number of days: 0       Arterial Line 07/25/25 Left Radial (Active)   Placement Date/Time: 07/25/25 0119   Hand Hygiene Completed: Yes  Orientation: Left  Location: Radial   Number of days: 0       PICC - Adult 08/30/24 Single  lumen Right Brachial vein (Active)   Placement Date/Time: 08/30/24 1206   Earliest Known Present: 08/30/24  Hand Hygiene Completed: Yes  Catheter Time Out Checklist Completed: Yes  Size (Fr): 4  Lumen Type: Single lumen  Catheter to Vein Ratio Less Than 45%: Yes  Total Length (cm): 42 cm...   Number of days: 328       Urethral Catheter (Active)   Placement Date/Time: 07/25/25 0000   Hand Hygiene Completed: Yes   Number of days: 0       Closed/Suction Drain Anterior;Left Abdomen Bulb (Active)   Placement Date/Time: 07/22/25 (c) 1636   Orientation: Anterior;Left  Location: Abdomen  Drain Tube Type: Bulb   Number of days: 2       Open Drain Inferior Abdomen (Active)   Placement Date/Time: 07/10/24 1400   Orientation: Inferior  Location: Abdomen   Number of days: 379       NG/OG/Feeding Tube Nasogastric 16 Fr Right nostril (Active)   Placement Date/Time: 07/22/25 0745   Hand Hygiene Completed: Yes  Type of Tube: NG/OG Tube  Tube Type: Nasogastric  NG/OG Tube Size: 16 Fr  Tube Location: (c) Right nostril  Difficulty with Placement: No   Number of days: 2       Colostomy  RUQ (Active)   Placement Date/Time: 07/22/25 1502   Placed by: ZOLIN  Colostomy Type: (c)   Location: RUQ   Number of days: 2         Physical Exam:   Physical Exam  Constitutional:       General: He is in acute distress.      Appearance: He is toxic-appearing and diaphoretic.   HENT:      Head: Normocephalic and atraumatic.      Mouth/Throat:      Mouth: Mucous membranes are dry.     Eyes:      Extraocular Movements: Extraocular movements intact.      Pupils: Pupils are equal, round, and reactive to light.       Cardiovascular:      Rate and Rhythm: Tachycardia present. Rhythm irregular.   Pulmonary:      Effort: Pulmonary effort is normal.      Breath sounds: Normal breath sounds.   Abdominal:      General: There is distension.      Tenderness: There is abdominal tenderness.      Comments: Midline abdominal incision with overlying staples intact.  Ostomy site pink.    Genitourinary:     Comments: Basilio in place draining dark yellow urine.     Musculoskeletal:      Cervical back: Normal range of motion.     Skin:     General: Skin is warm.     Neurological:      Mental Status: He is oriented to person, place, and time. Mental status is at baseline.         Lab/Radiology/Diagnostic Review:  Results for orders placed or performed during the hospital encounter of 07/22/25 (from the past 24 hours)   CBC   Result Value Ref Range    WBC 2.6 (L) 4.4 - 11.3 x10*3/uL    nRBC 0.0 0.0 - 0.0 /100 WBCs    RBC 3.31 (L) 4.50 - 5.90 x10*6/uL    Hemoglobin 8.3 (L) 13.5 - 17.5 g/dL    Hematocrit 27.1 (L) 41.0 - 52.0 %    MCV 82 80 - 100 fL    MCH 25.1 (L) 26.0 - 34.0 pg    MCHC 30.6 (L) 32.0 - 36.0 g/dL    RDW 13.5 11.5 - 14.5 %    Platelets 196 150 - 450 x10*3/uL   Renal Function Panel   Result Value Ref Range    Glucose 136 (H) 74 - 99 mg/dL    Sodium 133 (L) 136 - 145 mmol/L    Potassium 3.3 (L) 3.5 - 5.3 mmol/L    Chloride 98 98 - 107 mmol/L    Bicarbonate 30 21 - 32 mmol/L    Anion Gap 8 (L) 10 - 20 mmol/L    Urea Nitrogen 18 6 - 23 mg/dL    Creatinine 0.67 0.50 - 1.30 mg/dL    eGFR >90 >60 mL/min/1.73m*2    Calcium 8.4 (L) 8.6 - 10.6 mg/dL    Phosphorus 1.0 (L) 2.5 - 4.9 mg/dL    Albumin 2.9 (L) 3.4 - 5.0 g/dL   Magnesium   Result Value Ref Range    Magnesium 2.04 1.60 - 2.40 mg/dL   POCT GLUCOSE   Result Value Ref Range    POCT Glucose 131 (H) 74 - 99 mg/dL   CBC   Result Value Ref Range    WBC 2.7 (L) 4.4 - 11.3 x10*3/uL    nRBC 0.0 0.0 - 0.0 /100 WBCs    RBC 3.42 (L) 4.50 - 5.90 x10*6/uL    Hemoglobin 8.9 (L) 13.5 - 17.5 g/dL    Hematocrit 28.3 (L) 41.0 - 52.0 %    MCV 83 80 - 100 fL    MCH 26.0 26.0 - 34.0 pg    MCHC 31.4 (L) 32.0 - 36.0 g/dL    RDW 13.5 11.5 - 14.5 %    Platelets 213 150 - 450 x10*3/uL   Electrocardiogram, 12-lead PRN ACS symptoms   Result Value Ref Range    Ventricular Rate 105 BPM    Atrial Rate 105 BPM    NY Interval 142 ms    QRS Duration 94 ms     QT Interval 336 ms    QTC Calculation(Bazett) 444 ms    P Axis 39 degrees    R Axis -37 degrees    T Axis 33 degrees    QRS Count 17 beats    Q Onset 210 ms    P Onset 139 ms    P Offset 189 ms    T Offset 378 ms    QTC Fredericia 404 ms   Renal Function Panel   Result Value Ref Range    Glucose 104 (H) 74 - 99 mg/dL    Sodium 133 (L) 136 - 145 mmol/L    Potassium 3.7 3.5 - 5.3 mmol/L    Chloride 97 (L) 98 - 107 mmol/L    Bicarbonate 30 21 - 32 mmol/L    Anion Gap 10 10 - 20 mmol/L    Urea Nitrogen 20 6 - 23 mg/dL    Creatinine 0.76 0.50 - 1.30 mg/dL    eGFR >90 >60 mL/min/1.73m*2    Calcium 8.2 (L) 8.6 - 10.6 mg/dL    Phosphorus 1.6 (L) 2.5 - 4.9 mg/dL    Albumin 2.9 (L) 3.4 - 5.0 g/dL   CBC   Result Value Ref Range    WBC 2.1 (L) 4.4 - 11.3 x10*3/uL    nRBC 0.0 0.0 - 0.0 /100 WBCs    RBC 3.44 (L) 4.50 - 5.90 x10*6/uL    Hemoglobin 8.7 (L) 13.5 - 17.5 g/dL    Hematocrit 26.6 (L) 41.0 - 52.0 %    MCV 77 (L) 80 - 100 fL    MCH 25.3 (L) 26.0 - 34.0 pg    MCHC 32.7 32.0 - 36.0 g/dL    RDW 13.4 11.5 - 14.5 %    Platelets 162 150 - 450 x10*3/uL   Lactate   Result Value Ref Range    Lactate 2.4 (H) 0.4 - 2.0 mmol/L   Lactate   Result Value Ref Range    Lactate 3.8 (H) 0.4 - 2.0 mmol/L   Lactate   Result Value Ref Range    Lactate 2.6 (H) 0.4 - 2.0 mmol/L   POCT GLUCOSE   Result Value Ref Range    POCT Glucose 71 (L) 74 - 99 mg/dL   Renal Function Panel   Result Value Ref Range    Glucose 89 74 - 99 mg/dL    Sodium 134 (L) 136 - 145 mmol/L    Potassium 3.8 3.5 - 5.3 mmol/L    Chloride 96 (L) 98 - 107 mmol/L    Bicarbonate 26 21 - 32 mmol/L    Anion Gap 16 10 - 20 mmol/L    Urea Nitrogen 17 6 - 23 mg/dL    Creatinine 0.79 0.50 - 1.30 mg/dL    eGFR >90 >60 mL/min/1.73m*2    Calcium 8.6 8.6 - 10.6 mg/dL    Phosphorus 1.9 (L) 2.5 - 4.9 mg/dL    Albumin 3.2 (L) 3.4 - 5.0 g/dL   CBC and Auto Differential   Result Value Ref Range    WBC 2.3 (L) 4.4 - 11.3 x10*3/uL    nRBC 0.0 0.0 - 0.0 /100 WBCs    RBC 3.50 (L) 4.50 - 5.90  x10*6/uL    Hemoglobin 9.1 (L) 13.5 - 17.5 g/dL    Hematocrit 28.1 (L) 41.0 - 52.0 %    MCV 80 80 - 100 fL    MCH 26.0 26.0 - 34.0 pg    MCHC 32.4 32.0 - 36.0 g/dL    RDW 13.7 11.5 - 14.5 %    Platelets 188 150 - 450 x10*3/uL    Neutrophils % 76.5 40.0 - 80.0 %    Immature Granulocytes %, Automated 0.4 0.0 - 0.9 %    Lymphocytes % 14.2 13.0 - 44.0 %    Monocytes % 6.4 2.0 - 10.0 %    Eosinophils % 2.1 0.0 - 6.0 %    Basophils % 0.4 0.0 - 2.0 %    Neutrophils Absolute 1.78 1.20 - 7.70 x10*3/uL    Immature Granulocytes Absolute, Automated 0.01 0.00 - 0.70 x10*3/uL    Lymphocytes Absolute 0.33 (L) 1.20 - 4.80 x10*3/uL    Monocytes Absolute 0.15 0.10 - 1.00 x10*3/uL    Eosinophils Absolute 0.05 0.00 - 0.70 x10*3/uL    Basophils Absolute 0.01 0.00 - 0.10 x10*3/uL   Blood Culture    Specimen: Peripheral Venipuncture; Blood culture   Result Value Ref Range    Blood Culture Loaded on Instrument - Culture in progress    Blood Culture    Specimen: Peripheral Venipuncture; Blood culture   Result Value Ref Range    Blood Culture Loaded on Instrument - Culture in progress    CBC   Result Value Ref Range    WBC 2.2 (L) 4.4 - 11.3 x10*3/uL    nRBC 0.0 0.0 - 0.0 /100 WBCs    RBC 2.81 (L) 4.50 - 5.90 x10*6/uL    Hemoglobin 7.2 (L) 13.5 - 17.5 g/dL    Hematocrit 21.7 (L) 41.0 - 52.0 %    MCV 77 (L) 80 - 100 fL    MCH 25.6 (L) 26.0 - 34.0 pg    MCHC 33.2 32.0 - 36.0 g/dL    RDW 13.2 11.5 - 14.5 %    Platelets 151 150 - 450 x10*3/uL   Renal function panel   Result Value Ref Range    Glucose 125 (H) 74 - 99 mg/dL    Sodium 133 (L) 136 - 145 mmol/L    Potassium 3.2 (L) 3.5 - 5.3 mmol/L    Chloride 99 98 - 107 mmol/L    Bicarbonate 27 21 - 32 mmol/L    Anion Gap 10 10 - 20 mmol/L    Urea Nitrogen 18 6 - 23 mg/dL    Creatinine 0.83 0.50 - 1.30 mg/dL    eGFR >90 >60 mL/min/1.73m*2    Calcium 7.9 (L) 8.6 - 10.6 mg/dL    Phosphorus 1.2 (L) 2.5 - 4.9 mg/dL    Albumin 2.3 (L) 3.4 - 5.0 g/dL   Magnesium   Result Value Ref Range    Magnesium 1.66  1.60 - 2.40 mg/dL   Lactate   Result Value Ref Range    Lactate 4.0 (HH) 0.4 - 2.0 mmol/L   Hepatic function panel   Result Value Ref Range    Albumin 2.3 (L) 3.4 - 5.0 g/dL    Bilirubin, Total 2.0 (H) 0.0 - 1.2 mg/dL    Bilirubin, Direct 1.0 (H) 0.0 - 0.3 mg/dL    Alkaline Phosphatase 33 33 - 136 U/L    ALT 21 10 - 52 U/L    AST 25 9 - 39 U/L    Total Protein 4.5 (L) 6.4 - 8.2 g/dL   Prepare RBC: 1 Units   Result Value Ref Range    PRODUCT CODE G2038Z14     Unit Number [id number]     Unit ABO A     Unit RH POS     XM INTEP COMP     Dispense Status IS     Blood Expiration Date 7/30/2025 11:59:00 PM EDT     PRODUCT BLOOD TYPE 6200     UNIT VOLUME 350    Blood Gas Arterial Full Panel   Result Value Ref Range    POCT pH, Arterial 7.44 (H) 7.38 - 7.42 pH    POCT pCO2, Arterial 36 (L) 38 - 42 mm Hg    POCT pO2, Arterial 130 (H) 85 - 95 mm Hg    POCT SO2, Arterial 100 94 - 100 %    POCT Oxy Hemoglobin, Arterial 97.8 94.0 - 98.0 %    POCT Hematocrit Calculated, Arterial 24.0 (L) 41.0 - 52.0 %    POCT Sodium, Arterial 130 (L) 136 - 145 mmol/L    POCT Potassium, Arterial 3.6 3.5 - 5.3 mmol/L    POCT Chloride, Arterial 100 98 - 107 mmol/L    POCT Ionized Calcium, Arterial 1.12 1.10 - 1.33 mmol/L    POCT Glucose, Arterial 106 (H) 74 - 99 mg/dL    POCT Lactate, Arterial 2.0 0.4 - 2.0 mmol/L    POCT Base Excess, Arterial 0.4 -2.0 - 3.0 mmol/L    POCT HCO3 Calculated, Arterial 24.5 22.0 - 26.0 mmol/L    POCT Hemoglobin, Arterial 8.1 (L) 13.5 - 17.5 g/dL    POCT Anion Gap, Arterial 9 (L) 10 - 25 mmo/L    Patient Temperature 37.0 degrees Celsius    FiO2 50 %     *Note: Due to a large number of results and/or encounters for the requested time period, some results have not been displayed. A complete set of results can be found in Results Review.     Imaging  CT abdomen pelvis w IV contrast  Result Date: 7/24/2025  1.  Postsurgical changes of ileostomy and enterocutaneous fistula takedown, Margarita's reversal, and jejunostomy  when compared to prior exam. There are scattered air foci and pockets of ascites with generalized mesenteric stranding, favored to represent expected postsurgical changes. Anastomosis appears intact. No loculated fluid collections, no evidence of bowel obstruction or ischemia. 2. Hyperdense collection within the left anterior abdominal subcutaneous tissue corresponding to site of prior ileostomy. No discrete signs of infection, though abscess should be in the differential. May also represent a seroma or hematoma. 3. Additional chronic findings as detailed above   I personally reviewed the images/study and I agree with Selene Billingsley MD's (radiology resident) findings as stated. This study was interpreted at [place], [place].   MACRO: None     Dictation workstation:   [id number]    CT angio chest for pulmonary embolism  Result Date: 7/24/2025  1. No evidence of acute pulmonary embolism. 2. Small bilateral pleural effusion. Airspace opacities involving both lower lobes, atelectasis versus infiltrates. Follow-up with radiographs recommended. Old granulomatous disease. Central venous catheter with the tip in the proximal right atrium. Enteric tube with the tip in the stomach. 3. Fatty     Signed by: Su Mack 7/24/2025 2:13 PM Dictation workstation:   [id number]    XR abdomen 1 view  Result Date: 7/24/2025  1.  Enteric tube courses past the diaphragm with the tip projecting over the gastric body. 2. Right basilar atelectasis and effusion.   I personally reviewed the images/study and I agree with the findings as stated by resident physician Casa Lin MD. This study was interpreted at [place], [place].   MACRO: None   Signed by: Shanice Neumann 7/24/2025 9:45 AM Dictation workstation:   [id number]    XR chest 1 view  Result Date: 7/23/2025  Right upper extremity PICC line with the tip in the proximal right  atrium. Enteric tube with the tip in the stomach. Low lung volumes with bronchovascular crowding. Cardiac silhouette is mildly enlarged. Aorta is tortuous. Pulmonary vessels are prominent centrally. Streaky opacities involving the perihilar regions, lung bases, likely atelectasis. No pleural effusion or pneumothorax seen. Bony thorax is unremarkable.   MACRO: None   Signed by: Su Mack 7/23/2025 7:49 AM Dictation workstation:   [id number]      Cardiology, Vascular, and Other Imaging  Electrocardiogram, 12-lead PRN ACS symptoms  Result Date: 7/24/2025  Sinus tachycardia with Premature atrial complexes Left axis deviation Low voltage QRS Abnormal ECG When compared with ECG of 24-JUL-2025 08:14, Premature atrial complexes are now Present Sinus rhythm is no longer with 2nd degree AV block (Mobitz I)      Assessment/Plan    Assessment:  Bereket Em is a 59 y.o. 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 Patient underwent ex lap, extensive lysis of adhesions, abdominal wall debridement, ventral hernia repair, excision of EC fistula, jejunostomy, & abdominal wound vac placement by Dr. Olson & Vicky and Margarita's reversal with transverse colon mobilization/ileal window by Dr. Gross on 7/22/2025. Post-operative course complicated by Afib with RVR and septic shock 2/2 ischemic bowel requiring initiation and escalation of pressor support. Patient admitted to SICU for further management.     Plan:  NEURO: Hx of alcohol use on phenobarb taper. Acute post-op pain. Currently, intubated and sedated.   - Propofol infusion for sedation. Wean as tolerated. Daily SATs  - continue phenobarb taper  - ongoing neuro and pain assessments  - PRN hydromorphone for pain control  - PT/OT consult when able  - Home meds: trazodone,  oxycodone 5     CV: No previous cardiac history. Developed Afib RVR (HR 160s) on the floor s/p fluids and metoprolol x1. Patient subsequently hypotensive refractory to phenylephrine pushes and fluids. Pt started on amio bolus and infusion with conversion to NSR. On arrival to the SICU, patient hypotensive with SBP in the 60s, suspect 2/2 septic shock requiring initiation and escalation of pressor support. Lactate 4.0. On levo 0.18, vaso 0.04, and AT II 15. Bedside ECHO performed without obvious LV or RV dysfunction.  - formal ECHO ordered  - continue amio infusion   - continuous EKG/abp monitoring  - Goal map range 65-90  - Home meds: none.    PULM: No pulmonary hx.. After arrival to SICU, patient intubated and sedated to facilitate bedside flex sig procedure and need for emergent OR.   - Wean FiO2 as tolerated.    - Daily SBT  - Q1h incentive spirometer while awake  - additional pulm toilet prn.      GI: Hx of perforated diverticulitis s/p Margarita's (6/7/2024) c/b enterocutaneous fistula and incarcerated ventral hernia s/p exploratory laparotomy, extensive lysis of adhesions, take down of enterocutaneous fistula, creation of jejunostomy, abdominal wall debridement, placement of wound vac, repair of recurrent incarcerated ventral hernia (Dr. Olson) with Margarita's reversal (Dr. Gross). Bedside colonoscopy/flex sig 7/25 showing well-demarcated region of dark mucosa with c/f ischemic bowel. Patient taken emergently to OR for  ex lap.   - NPO on TPN   - NG to LIWS  - PPI for GI prophylaxis  - Home meds: Diphen/Atrop 2.5/.0 Tab Spec     : No history of renal disease. Baseline creatinine 0.7-0.9.  - Volume resuscitation as needed  - Maintain U/O >0.5ml/kg/hr  - Basilio for strict I/Os  - Check renal function panel post op and daily  - Replete electrolytes to goal K>4, Mg>2, Phos>2.5, ionized Ca>1.10.    HEME: Acute blood loss anemia.  - Check CBC and coags post op and daily  - SCDs for DVT prophylaxis  - holding SC  heparin for now  - ongoing monitoring for s/s bleeding    ENDO: No history of DM or thyroid disease. Hydrocortisone 100 mg x1 dose.   - stress dose steroids hydrocortisone 50 mg q6h  - Q4h BG   - SSI Lispro per ICU protocol.    ID: Febrile, leukopenic. Intra-abdominal sepsis 2/2 ischemic bowel. Blood cultures obtained. Started on empiric antibiotics, vanc/zosyn.   - temp q4h, wbc daily  - f/u blood cultures   - Continue vanc/zosyn  - ongoing monitoring for s/s infection    Lines:  - L radial arterial line (7.25)  - RIJ triple lumen central line (7.25)  - PIVs  - colvin  - NGT    Dispo: Admit to ICU. Patient seen and discussed with ICU attending Dr. John.    Constantine Meza   Anesthesiology, PGY3/CA2    SICU phone [phone]          [1]   Past Medical History:  Diagnosis Date    Acute pharyngitis, unspecified     Sore throat    Acute upper respiratory infection, unspecified 02/13/2017    Acute URI    Alcohol abuse, in remission 09/12/2018    History of alcohol abuse    Anxiety     Benign essential HTN 02/14/2023    Diverticulosis     Elevated blood-pressure reading, without diagnosis of hypertension 02/23/2015    Elevated blood pressure reading without diagnosis of hypertension    Encounter for immunization 10/10/2014    Need for Tdap vaccination    Encounter for screening for malignant neoplasm of colon 01/04/2017    Encounter for colonoscopy due to history of adenomatous colonic polyps    Encounter for screening for malignant neoplasm of colon 11/02/2016    Encounter for screening colonoscopy    Encounter for screening for malignant neoplasm of colon 11/02/2016    Encounter for screening colonoscopy    Encounter for screening for malignant neoplasm of prostate 09/12/2018    Prostate cancer screening    Essential (primary) hypertension 03/14/2019    Elevated blood pressure reading with diagnosis of hypertension    Hernia, internal 6/05/2024    Impingement syndrome of right shoulder 06/06/2016    Impingement syndrome of  right shoulder    Incomplete rotator cuff tear or rupture of right shoulder, not specified as traumatic 09/12/2018    Partial nontraumatic tear of right rotator cuff    Noninfective gastroenteritis and colitis, unspecified 01/23/2018    Acute gastroenteritis    Other general symptoms and signs 11/02/2016    Flu-like symptoms    Other malaise 11/02/2016    Malaise and fatigue    Pain in left ankle and joints of left foot 10/10/2017    Left ankle pain    Pain in left foot 09/12/2017    Left foot pain    Pain in right shoulder 04/11/2016    Right shoulder pain    Pain in thoracic spine 09/12/2018    Thoracic back pain    Pain, unspecified 02/09/2017    Body aches    Personal history of colonic polyps 01/04/2017    History of colonic polyps    Personal history of other diseases of male genital organs 03/15/2019    History of acute prostatitis    Personal history of other diseases of the respiratory system 04/07/2020    History of acute sinusitis    Personal history of other specified conditions 03/14/2019    History of flank pain    Personal history of other specified conditions 10/10/2014    History of paresthesia    Strain of muscle, fascia and tendon of other parts of biceps, right arm, initial encounter 03/11/2016    Rupture of biceps tendon, right, initial encounter   [2]   Past Surgical History:  Procedure Laterality Date    ABDOMINAL SURGERY      COLON SURGERY  6/5/24    COLONOSCOPY      ILEOSTOMY      SHOULDER SURGERY  06/27/2017    Shoulder Surgery    SMALL INTESTINE SURGERY  6/6/24    UPPER GASTROINTESTINAL ENDOSCOPY     [3]   Medications Prior to Admission   Medication Sig Dispense Refill Last Dose/Taking    0.9 % sodium chloride (sodium chloride 0.9%) solution Infuse 1,000 mL into a venous catheter once every 24 hours. Infuse 1000ml over 1-2 hours via gravity once daily. 7000 mL 52 7/21/2025    0.9 % sodium chloride (sodium chloride, PF, 0.9%) injection Infuse 10 mL into a venous catheter once daily. 10 ml  NS once daily SASH and as needed 20 ml after blood work   2025    acetaminophen (Tylenol) 500 mg tablet Take 2 tablets (1,000 mg) by mouth every 6 hours if needed for mild pain (1 - 3).   2025 Morning    calcium carbonate (Tums Extra Strength) 300 mg elemental (750 mg) chewable tablet Chew and swallow 300 mg once daily.   2025 at  5:00 AM    [] Custom Cyclic TPN Infuse 2,250 mL over 12 hours into a venous catheter (central line) continuously for 13 days. Taper up for 1 Hours. Taper down for 1 Hours. Pharmacist to rebalance Chloride:Acetate ratio to 1:1. Continue through admit to surgery 25. Labs: Same. 7 Bag 53 2025 at  4:00 AM    gabapentin (Neurontin) 100 mg capsule Take 1 capsule at bedtime for three nights before surgery. 3 capsule 0 2025    heparin flush (heparin LockFlush,Porcine,,PF,) 10 unit/mL injection Infuse 5 mL (50 Units) into a venous catheter once daily.   2025 Morning    metroNIDAZOLE (Flagyl) 250 mg tablet Take 1 tablet at 6pm, 7pm and 11pm the night before surgery. 3 tablet 0 2025    neomycin (Mycifradin) 500 mg tablet Take two tablets (1000 mg) at 6pm, 7pm and 11pm the night before surgery. 6 tablet 0 2025    oral electrolytes replacement, Pedialyte, solution solution Take 500 mL by mouth 2 times a day. 1000 mL 30 2025    oxyCODONE (Roxicodone) 5 mg immediate release tablet Take 1 tablet (5 mg) by mouth as needed at bedtime for severe pain (7 - 10). 15 tablet 0 2025 at  9:00 PM    traZODone (Desyrel) 100 mg tablet Take 1 tablet (100 mg) by mouth as needed at bedtime for sleep. 90 tablet 1 2025    pantoprazole (ProtoNix) 40 mg EC tablet Take 1 tablet (40 mg) by mouth once daily in the morning. Take before meals for 21 days. Do not crush, chew, or split. (Patient not taking: Reported on 2025) 21 tablet 0 Not Taking   [4]   Social History  Tobacco Use    Smoking status: Former     Current packs/day: 0.00     Average packs/day:  1.5 packs/day for 25.0 years (37.5 ttl pk-yrs)     Types: Cigarettes     Quit date: 2024     Years since quittin.1     Passive exposure: Past    Smokeless tobacco: Never   Vaping Use    Vaping status: Never Used   Substance Use Topics    Alcohol use: Yes     Alcohol/week: 15.0 standard drinks of alcohol     Types: 15 Cans of beer per week     Comment: not since     Drug use: Not Currently     Types: Marijuana     Comment: months ago   [5]   Family History  Problem Relation Name Age of Onset    Other (CARDIAC DISORDER) Mother Sp     Hypertension Mother Sp     Arthritis Mother Sp     Other (CARDIAC DISORDR) Father David     Hypertension Father David     Hypertension Sister Kush     Heart attack Brother Jori     Hypertension Brother Jori    [6] acetaminophen, 1,000 mg, intravenous, q6h  albumin human, 25 g, intravenous, Once  albumin human, , ,   amiodarone, , ,   amiodarone, , ,   [Held by provider] enoxaparin, 40 mg, subcutaneous, q24h  fat emulsion fish oil/plant based, 250 mL, intravenous, Daily Lipids  insulin lispro, 0-5 Units, subcutaneous, q4h  magnesium sulfate, 2 g, intravenous, Once  methocarbamol, 1,000 mg, intravenous, q8h  midazolam, , ,   pantoprazole, 40 mg, intravenous, Daily  PHENobarbital, 32.5 mg, intravenous, q8h  phenylephrine in NS, , ,   piperacillin-tazobactam, 3.375 g, intravenous, q6h  piperacillin-tazobactam, , ,   piperacillin-tazobactam, , ,   succinylcholine, , ,   vancomycin, 1,000 mg, intravenous, q12h  [7] Adult Clinimix TPN Cyclic, , Last Rate: 182 mL/hr at 25  amiodarone, 1 mg/min, Last Rate: 1 mg/min (25 0157)   Followed by  amiodarone, 0.5 mg/min  angiotensin II (Giapreza) 2.5 mg in sodium chloride 0.9% 250 mL (0.01 mg/mL) infusion, 1.25-40 ng/kg/min, Last Rate: 15 ng/kg/min (25 0233)  HYDROmorphone,   norepinephrine, 0-0.5 mcg/kg/min, Last Rate: 0.2 mcg/kg/min (25 0208)  propofol, 0-50 mcg/kg/min, Last Rate: 30  mcg/kg/min (07/25/25 0155)  vasopressin, 0-0.03 Units/min, Last Rate: 0.06 Units/min (07/25/25 5507)  [8] PRN medications: albumin human, amiodarone, amiodarone, calcium gluconate, calcium gluconate, [Transfer Hold] dextrose, dextrose, etomidate, glucagon, glucagon, heparin flush, magnesium sulfate, magnesium sulfate, midazolam, midazolam, naloxone, ondansetron, oxygen, PHENobarbital, phenylephrine in NS, piperacillin-tazobactam, piperacillin-tazobactam, potassium chloride, potassium chloride, succinylcholine, vancomycin

## 2025-07-25 NOTE — H&P
I have reviewed the patient's History and Physical Examination from 07/24 Progress Note. I have personally seen and evaluated the patient, repeating key portions. Significant events since documentation:    Patient with intermittent tachycardia today, with EKG showing sinus tachycardia. Patient overall well-appearing with appropriate exam. CT scan obtained showing postoperative changes with no obvious signs of ischemia. Team continued to follow patient overnight.   Around ~11:30 PM, team called to bedside for rapid response for tachycardia and hypotension. Patient asymptomatic at time of evaluation, feeling dehydrated but no other complaints. EKG showing Afib with RVR. Given 1L crystalloid and 0.5L albumin without improvement in tachycardia, but improvement of SBP to 110s. Given IV metoprolol 5mg with response in HR to 105, but patient became hypotensive to SBP in 70s. Patient was subsequently transferred to the ICU and started on amio drip and pressors. Patient was intubated. Flexible sigmoidoscopy was performed at bedside to evaluate the anastomosis, which showed dead mucosa proximal to the anastomosis. Decision was made to proceed to OR. Dr. Olson discussed procedure with patient's family.     Surgery is indicated. Yes    Consent reviewed and signed by patient/family: Yes    Seen and discussed with chief Dr. Yanez, and attendings Dr. Olson, Dr. Gross, and Dr. Perry.    Tiarra Lai PGY3

## 2025-07-25 NOTE — PROGRESS NOTES
General Surgery Progress Note    07/25/25    Bereket Em    Summary:  Bereket Em is a 59M with PMH of perforated diverticulitis s/p Margarita's procedure c/b formation of ECF. Underwent EC fistula takedown, GILLES, Margarita's reversal, abdominal wall debridement, and double barrel jejunostomy with Leyda Olson, Renato, and Vicky.  Intra-op findings remarkable for adhesions throughout the abdomen and remaining segment of the colon terminating at the splenic flexure requiring significant mobilization and an ileal window.      Subjective    Subjective:  Overnight, patient with a fib with RVR and hypotension requiring transfer to ICU. A bedside flex sig was performed by colorectal surgery which showed a well-demarcated area of of dark mucosa proximal to the staple line consistent with ischemia. Patient was take to the OR for re-exploration, which was positive for distal colonic ischemia and the area was resected. The patient lef the OR intubated and sedated off of pressors, but on an amiodarone drip. He has a RUQ end colostomy and a LUQ end jejunostomy  . Patient was transported back to ICU by the day team.     Review of Systems:    A 12-point review of systems was performed, and was negative except as above.      Objective    Objective:      Vital signs:   Temp:  [21.6 °C (70.9 °F)-37.6 °C (99.7 °F)] 23.1 °C (73.6 °F)  Heart Rate:  [] 74  Resp:  [0-26] 22  BP: ()/() 113/71  FiO2 (%):  [50 %] 50 %    Physical Exam:  GEN:intubated and sedated  HEENT: Sclera anicteric. Moist mucous membranes.  RESP: Breathing non-labored, equal chest rise.   Vent Mode: Volume control/assist control  FiO2 (%):  [50 %] 50 %  S RR:  [16] 16  S VT:  [500 mL] 500 mL  PEEP/CPAP (cm H2O):  [8 cm H20] 8 cm H20  MAP (cm H2O):  [12-15] 15    CV: Regular rate, hypotensive to sbp 92 but with MAP 65  GI: Abdomen soft,   : Basilio in place with dark yellow urine.  SKIN: laparotomy closed loosely with stapled and packed  with betadine soaked cristóbal. LUQ end jejunostomy, RUQ end colostomy, drain in RLQ termiantes int he pelvis around the rectal stump     I/O last 2 completed shifts:  In: 3149.8 (43.7 mL/kg) [I.V.:337.7 (4.7 mL/kg); IV Piggyback:2499.4]  Out: 2225 (30.9 mL/kg) [Urine:1410 (0.8 mL/kg/hr); Emesis/NG output:765; Blood:50]  Weight: 72 kg      Labs Past 18 Hours:  Recent Results (from the past 18 hours)   Lactate    Collection Time: 07/24/25  2:30 PM   Result Value Ref Range    Lactate 3.8 (H) 0.4 - 2.0 mmol/L   Lactate    Collection Time: 07/24/25  3:55 PM   Result Value Ref Range    Lactate 2.6 (H) 0.4 - 2.0 mmol/L   POCT GLUCOSE    Collection Time: 07/24/25  5:28 PM   Result Value Ref Range    POCT Glucose 71 (L) 74 - 99 mg/dL   Renal Function Panel    Collection Time: 07/24/25  5:50 PM   Result Value Ref Range    Glucose 89 74 - 99 mg/dL    Sodium 134 (L) 136 - 145 mmol/L    Potassium 3.8 3.5 - 5.3 mmol/L    Chloride 96 (L) 98 - 107 mmol/L    Bicarbonate 26 21 - 32 mmol/L    Anion Gap 16 10 - 20 mmol/L    Urea Nitrogen 17 6 - 23 mg/dL    Creatinine 0.79 0.50 - 1.30 mg/dL    eGFR >90 >60 mL/min/1.73m*2    Calcium 8.6 8.6 - 10.6 mg/dL    Phosphorus 1.9 (L) 2.5 - 4.9 mg/dL    Albumin 3.2 (L) 3.4 - 5.0 g/dL   CBC and Auto Differential    Collection Time: 07/24/25  5:54 PM   Result Value Ref Range    WBC 2.3 (L) 4.4 - 11.3 x10*3/uL    nRBC 0.0 0.0 - 0.0 /100 WBCs    RBC 3.50 (L) 4.50 - 5.90 x10*6/uL    Hemoglobin 9.1 (L) 13.5 - 17.5 g/dL    Hematocrit 28.1 (L) 41.0 - 52.0 %    MCV 80 80 - 100 fL    MCH 26.0 26.0 - 34.0 pg    MCHC 32.4 32.0 - 36.0 g/dL    RDW 13.7 11.5 - 14.5 %    Platelets 188 150 - 450 x10*3/uL    Neutrophils % 76.5 40.0 - 80.0 %    Immature Granulocytes %, Automated 0.4 0.0 - 0.9 %    Lymphocytes % 14.2 13.0 - 44.0 %    Monocytes % 6.4 2.0 - 10.0 %    Eosinophils % 2.1 0.0 - 6.0 %    Basophils % 0.4 0.0 - 2.0 %    Neutrophils Absolute 1.78 1.20 - 7.70 x10*3/uL    Immature Granulocytes Absolute, Automated  0.01 0.00 - 0.70 x10*3/uL    Lymphocytes Absolute 0.33 (L) 1.20 - 4.80 x10*3/uL    Monocytes Absolute 0.15 0.10 - 1.00 x10*3/uL    Eosinophils Absolute 0.05 0.00 - 0.70 x10*3/uL    Basophils Absolute 0.01 0.00 - 0.10 x10*3/uL   Blood Culture    Collection Time: 07/24/25  5:56 PM    Specimen: Peripheral Venipuncture; Blood culture   Result Value Ref Range    Blood Culture       Identification and susceptibility testing to follow    Gram Stain Gram negative bacilli (AA)    Blood Culture    Collection Time: 07/24/25  6:46 PM    Specimen: Peripheral Venipuncture; Blood culture   Result Value Ref Range    Blood Culture Loaded on Instrument - Culture in progress    CBC    Collection Time: 07/25/25 12:02 AM   Result Value Ref Range    WBC 2.2 (L) 4.4 - 11.3 x10*3/uL    nRBC 0.0 0.0 - 0.0 /100 WBCs    RBC 2.81 (L) 4.50 - 5.90 x10*6/uL    Hemoglobin 7.2 (L) 13.5 - 17.5 g/dL    Hematocrit 21.7 (L) 41.0 - 52.0 %    MCV 77 (L) 80 - 100 fL    MCH 25.6 (L) 26.0 - 34.0 pg    MCHC 33.2 32.0 - 36.0 g/dL    RDW 13.2 11.5 - 14.5 %    Platelets 151 150 - 450 x10*3/uL   Renal function panel    Collection Time: 07/25/25 12:02 AM   Result Value Ref Range    Glucose 125 (H) 74 - 99 mg/dL    Sodium 133 (L) 136 - 145 mmol/L    Potassium 3.2 (L) 3.5 - 5.3 mmol/L    Chloride 99 98 - 107 mmol/L    Bicarbonate 27 21 - 32 mmol/L    Anion Gap 10 10 - 20 mmol/L    Urea Nitrogen 18 6 - 23 mg/dL    Creatinine 0.83 0.50 - 1.30 mg/dL    eGFR >90 >60 mL/min/1.73m*2    Calcium 7.9 (L) 8.6 - 10.6 mg/dL    Phosphorus 1.2 (L) 2.5 - 4.9 mg/dL    Albumin 2.3 (L) 3.4 - 5.0 g/dL   Magnesium    Collection Time: 07/25/25 12:02 AM   Result Value Ref Range    Magnesium 1.66 1.60 - 2.40 mg/dL   Lactate    Collection Time: 07/25/25 12:02 AM   Result Value Ref Range    Lactate 4.0 (HH) 0.4 - 2.0 mmol/L   Hepatic function panel    Collection Time: 07/25/25 12:02 AM   Result Value Ref Range    Albumin 2.3 (L) 3.4 - 5.0 g/dL    Bilirubin, Total 2.0 (H) 0.0 - 1.2 mg/dL     Bilirubin, Direct 1.0 (H) 0.0 - 0.3 mg/dL    Alkaline Phosphatase 33 33 - 136 U/L    ALT 21 10 - 52 U/L    AST 25 9 - 39 U/L    Total Protein 4.5 (L) 6.4 - 8.2 g/dL   Troponin I, High Sensitivity    Collection Time: 07/25/25 12:02 AM   Result Value Ref Range    Troponin I, High Sensitivity (CMC) 9 0 - 53 ng/L   Prepare RBC: 1 Units    Collection Time: 07/25/25  1:37 AM   Result Value Ref Range    PRODUCT CODE G9440F30     Unit Number [id number]     Unit ABO A     Unit RH POS     XM INTEP COMP     Dispense Status IS     Blood Expiration Date 7/30/2025 11:59:00 PM EDT     PRODUCT BLOOD TYPE 6200     UNIT VOLUME 350    Blood Gas Arterial Full Panel    Collection Time: 07/25/25  2:04 AM   Result Value Ref Range    POCT pH, Arterial 7.44 (H) 7.38 - 7.42 pH    POCT pCO2, Arterial 36 (L) 38 - 42 mm Hg    POCT pO2, Arterial 130 (H) 85 - 95 mm Hg    POCT SO2, Arterial 100 94 - 100 %    POCT Oxy Hemoglobin, Arterial 97.8 94.0 - 98.0 %    POCT Hematocrit Calculated, Arterial 24.0 (L) 41.0 - 52.0 %    POCT Sodium, Arterial 130 (L) 136 - 145 mmol/L    POCT Potassium, Arterial 3.6 3.5 - 5.3 mmol/L    POCT Chloride, Arterial 100 98 - 107 mmol/L    POCT Ionized Calcium, Arterial 1.12 1.10 - 1.33 mmol/L    POCT Glucose, Arterial 106 (H) 74 - 99 mg/dL    POCT Lactate, Arterial 2.0 0.4 - 2.0 mmol/L    POCT Base Excess, Arterial 0.4 -2.0 - 3.0 mmol/L    POCT HCO3 Calculated, Arterial 24.5 22.0 - 26.0 mmol/L    POCT Hemoglobin, Arterial 8.1 (L) 13.5 - 17.5 g/dL    POCT Anion Gap, Arterial 9 (L) 10 - 25 mmo/L    Patient Temperature 37.0 degrees Celsius    FiO2 50 %   Blood Gas Arterial Full Panel    Collection Time: 07/25/25  3:50 AM   Result Value Ref Range    POCT pH, Arterial 7.33 (L) 7.38 - 7.42 pH    POCT pCO2, Arterial 42 38 - 42 mm Hg    POCT pO2, Arterial 220 (H) 85 - 95 mm Hg    POCT SO2, Arterial 100 94 - 100 %    POCT Oxy Hemoglobin, Arterial 97.5 94.0 - 98.0 %    POCT Hematocrit Calculated, Arterial 51.0 41.0 - 52.0 %     POCT Sodium, Arterial 124 (L) 136 - 145 mmol/L    POCT Potassium, Arterial 4.1 3.5 - 5.3 mmol/L    POCT Chloride, Arterial 94 (L) 98 - 107 mmol/L    POCT Ionized Calcium, Arterial 1.13 1.10 - 1.33 mmol/L    POCT Glucose, Arterial 161 (H) 74 - 99 mg/dL    POCT Lactate, Arterial 3.1 (H) 0.4 - 2.0 mmol/L    POCT Base Excess, Arterial -3.8 (L) -2.0 - 3.0 mmol/L    POCT HCO3 Calculated, Arterial 22.1 22.0 - 26.0 mmol/L    POCT Hemoglobin, Arterial 16.9 13.5 - 17.5 g/dL    POCT Anion Gap, Arterial 12 10 - 25 mmo/L    Patient Temperature 37.0 degrees Celsius    FiO2 60 %   Blood Gas Arterial Full Panel    Collection Time: 07/25/25  4:35 AM   Result Value Ref Range    POCT pH, Arterial 7.35 (L) 7.38 - 7.42 pH    POCT pCO2, Arterial 41 38 - 42 mm Hg    POCT pO2, Arterial 96 (H) 85 - 95 mm Hg    POCT SO2, Arterial 99 94 - 100 %    POCT Oxy Hemoglobin, Arterial 96.5 94.0 - 98.0 %    POCT Hematocrit Calculated, Arterial 38.0 (L) 41.0 - 52.0 %    POCT Sodium, Arterial 126 (L) 136 - 145 mmol/L    POCT Potassium, Arterial 4.2 3.5 - 5.3 mmol/L    POCT Chloride, Arterial 96 (L) 98 - 107 mmol/L    POCT Ionized Calcium, Arterial 1.18 1.10 - 1.33 mmol/L    POCT Glucose, Arterial 133 (H) 74 - 99 mg/dL    POCT Lactate, Arterial 3.2 (H) 0.4 - 2.0 mmol/L    POCT Base Excess, Arterial -2.9 (L) -2.0 - 3.0 mmol/L    POCT HCO3 Calculated, Arterial 22.6 22.0 - 26.0 mmol/L    POCT Hemoglobin, Arterial 12.5 (L) 13.5 - 17.5 g/dL    POCT Anion Gap, Arterial 12 10 - 25 mmo/L    Patient Temperature 37.0 degrees Celsius    FiO2 50 %        Meds:  Current Medications[1]     Imaging:  Imaging  XR chest 1 view  Result Date: 7/25/2025  1. Endotracheal tube with the tip terminating at the expected position of the proximal left mainstem bronchus. Retraction is recommended. Additional medical devices as above. 2. Bilateral consolidations with small right and trace left pleural effusions. A component of these findings is likely due to atelectatic change  however infectious process within the differential.   I personally reviewed the images/study and I agree with the findings as stated by resident Dallas Martinez. This study was interpreted at [facility], [place], [place].   MACRO: Dallas Martienz discussed the significance and urgency of this critical finding by EPIC secure chat with  JOHANNA OROZCO on 7/25/2025 at 3:28 am.  (**-RCF-**) Findings:  See findings.     Dictation workstation:   [id number]    CT abdomen pelvis w IV contrast  Result Date: 7/24/2025  1.  Postsurgical changes of ileostomy and enterocutaneous fistula takedown, Margarita's reversal, and jejunostomy when compared to prior exam. There are scattered air foci and pockets of ascites with generalized mesenteric stranding, favored to represent expected postsurgical changes. Anastomosis appears intact. No loculated fluid collections, no evidence of bowel obstruction or ischemia. 2. Hyperdense collection within the left anterior abdominal subcutaneous tissue corresponding to site of prior ileostomy. No discrete signs of infection, though abscess should be in the differential. May also represent a seroma or hematoma. 3. Additional chronic findings as detailed above   I personally reviewed the images/study and I agree with Selene Billingsley MD's (radiology resident) findings as stated. This study was interpreted at [facility], [place], [place].   MACRO: None     Dictation workstation:   [id number]    CT angio chest for pulmonary embolism  Result Date: 7/24/2025  1. No evidence of acute pulmonary embolism. 2. Small bilateral pleural effusion. Airspace opacities involving both lower lobes, atelectasis versus infiltrates. Follow-up with radiographs recommended. Old granulomatous disease. Central venous catheter with the tip in the proximal right atrium. Enteric tube with the tip in the stomach. 3. Fatty     Signed by: Su Mack 7/24/2025  2:13 PM Dictation workstation:   [id number]    XR abdomen 1 view  Result Date: 7/24/2025  1.  Enteric tube courses past the diaphragm with the tip projecting over the gastric body. 2. Right basilar atelectasis and effusion.   I personally reviewed the images/study and I agree with the findings as stated by resident physician Casa Lin MD. This study was interpreted at [facility], [place], [place].   MACRO: None   Signed by: Shanice Pope Thien 7/24/2025 9:45 AM Dictation workstation:   [id number]      Cardiology, Vascular, and Other Imaging  Electrocardiogram, 12-lead PRN ACS symptoms  Result Date: 7/24/2025  Sinus tachycardia with Premature atrial complexes Left axis deviation Low voltage QRS Abnormal ECG When compared with ECG of 24-JUL-2025 08:14, Premature atrial complexes are now Present Sinus rhythm is no longer with 2nd degree AV block (Mobitz I)      Medications reviewed.  Vital signs reviewed.  Labs reviewed.         Assessment/Plan    Assessment and Plan:  Bereket Em is a Primary - Zolin   59M with PMH of perforated diverticulitis s/p Margarita's procedure c/b formation of ECF. Underwent EC fistula takedown, GILLES, Margarita's reversal, abdominal wall debridement, and double barrel jejunostomy with Leyda Olson, Renato, and Vicky.  Intra-op findings remarkable for adhesions throughout the abdomen and remaining segment of the colon terminating at the splenic flexure requiring significant mobilization and an ileal window.  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 Patient is in guarded conditiondeclining from yesterday's day team assessment however improved from examination prior to re-exploration.      Plan Today:     - Pain management: fentanyl drip while intubated and sedated on propofol  - Diet: NPO   - Fluids: TPN can add additional fluids as needed for rescusitation   - L/D/A: ACE  central line, colvin in place, drain left in RLQ which terminates near the rectal stump Intubated.  - ABX: Patient to complete 5 day course of zosyn postoperatively.  - Postop labs/imaging: Basic labs. No imaging indicated.  - Dressings: midline os closed loosely with staples and packed with betadine cristóbal. The incision is covered with an island dressing. to be managed by our team.    Hospital Day: 4     Dispo: Continue current level of care.     Patient's exam, labs, and findings discussed and seen with Dr. Olson, who agrees with the plan as described above.     Mackenzie A Simerlink, MD  PGY-5 General Surgery  [facility] [id number]         [1]   Current Facility-Administered Medications:     acetaminophen (Ofirmev) injection 1,000 mg, 1,000 mg, intravenous, q6h, Shane John DO, Last Rate: 400 mL/hr at 25 0703, 1,000 mg at 25 0703    Adult Clinimix TPN Cyclic, , intravenous, Cyclic PN, Shane John DO, Last Rate: 182 mL/hr at 254, Rate Change at 25    [COMPLETED] amiodarone (Nexterone) 150 mg in dextrose (iso)  mL, 150 mg, intravenous, Once, Stopped at 25 0139 **FOLLOWED BY** [] amiodarone (Nexterone) 360 mg in dextrose,iso-osm 200 mL (1.8 mg/mL) infusion (premix), 1 mg/min, intravenous, Continuous, Stopped at 25 0652 **FOLLOWED BY** amiodarone (Nexterone) 360 mg in dextrose,iso-osm 200 mL (1.8 mg/mL) infusion (premix), 0.5 mg/min, intravenous, Continuous, Shane John DO    amiodarone (Nexterone) infusion 1.8 mg/mL  - Omnicell Override Pull, , , ,     amiodarone (Nexterone) injection 50 mg/mL  - Omnicell Override Pull, , , ,     calcium gluconate 1 g in sodium chloride (iso) IV 50 mL, 1 g, intravenous, q6h PRN, Shane John DO    calcium gluconate 2 g in sodium chloride (iso)  mL, 2 g, intravenous, q6h PRN, Shane John DO, Stopped at 25    [Transfer Hold] dextrose 50 % injection 12.5 g, 12.5 g, intravenous, q15 min PRN,  Constantine Meza MD    dextrose 50 % injection 25 g, 25 g, intravenous, q15 min PRN, Shane John DO    [Held by provider] enoxaparin (Lovenox) syringe 40 mg, 40 mg, subcutaneous, q24h, Shane John DO, 40 mg at 07/24/25 0848    etomidate (Amidate) injection, , intravenous, Code/trauma/sedation med, Jerel Sims MD, 7.2 mg at 07/25/25 0152    fat emulsion fish oil/plant based (SMOFlipid) 20 % IV infusion 50 g, 250 mL, intravenous, Daily Lipids, Shane John DO, Last Rate: 20.8 mL/hr at 07/25/25 0300, Rate Verify at 07/25/25 0300    glucagon (Glucagen) injection 1 mg, 1 mg, intramuscular, q15 min PRN, Shane John DO    glucagon (Glucagen) injection 1 mg, 1 mg, intramuscular, q15 min PRN, Shane John DO    heparin flush 10 unit/mL syringe 50 Units, 5 mL, intravenous, PRN, Shane John DO    hydrocortisone sodium succinate (PF) (Solu-CORTEF) 50 mg in sodium chloride 0.9% IV 50 mL, 50 mg, intravenous, q6h, Constantine Meza MD    insulin lispro injection 0-5 Units, 0-5 Units, subcutaneous, q4h, Shane John DO    magnesium sulfate 2 g in sterile water for injection 50 mL, 2 g, intravenous, Once, Shane John DO    magnesium sulfate 2 g in sterile water for injection 50 mL, 2 g, intravenous, q6h PRN, Shane John DO    magnesium sulfate 4 g in sterile water for injection 100 mL, 4 g, intravenous, q6h PRN, Shane John DO    midazolam (Versed) injection 1 mg/mL  - Omnicell Override Pull, , , ,     midazolam (Versed) injection, , intravenous, Code/trauma/sedation med, Jerel Sims MD, 5 mg at 07/25/25 0146    naloxone (Narcan) injection 0.2 mg, 0.2 mg, intravenous, PRN, Shane John DO    norepinephrine (Levophed) 8 mg in dextrose 5% 250 mL (0.032 mg/mL) infusion (premix), 0-0.5 mcg/kg/min, intravenous, Continuous, Shane John DO, Last Rate: 2.7 mL/hr at 07/25/25 0640, 0.02 mcg/kg/min at 07/25/25 0640    ondansetron (Zofran) injection 4 mg, 4 mg, intravenous, q8h PRN,  Shane John DO    oxygen (O2) therapy, 1 Dose, inhalation, Continuous - O2/gases, Constantine Meza MD, Rate Verify Medical Gas at 25 0644    pantoprazole (Protonix) injection 40 mg, 40 mg, intravenous, Daily, Shane John DO, 40 mg at 25 0848    [] PHENobarbital (Luminal) injection 65 mg, 65 mg, intramuscular, q8h, 65 mg at 25 0547 **FOLLOWED BY** PHENobarbital (Luminal) injection 32.5 mg, 32.5 mg, intravenous, q8h, Shane John DO    PHENobarbital (Luminal) injection 65 mg, 65 mg, intravenous, q6h PRN, Shane John DO    piperacillin-tazobactam (Zosyn) 3.375 g in dextrose (iso) IV 50 mL, 3.375 g, intravenous, q6h, Shane John DO, Last Rate: 0 mL/hr at 25 0111, 3.375 g at 25 0609    piperacillin-tazobactam (Zosyn) in dextrose (iso) IV 2.25 gram/50 mL  - Omnicell Override Pull, , , ,     piperacillin-tazobactam (Zosyn) in dextrose (iso) IV 3.375 gram/50 mL  - Omnicell Override Pull, , , ,     potassium chloride 20 mEq in sterile water for injection 100 mL, 20 mEq, intravenous, q6h PRN, Shane John DO    potassium chloride 40 mEq in sterile water for injection 100 mL, 40 mEq, intravenous, q6h PRN, Shane John DO, Stopped at 25 0652    propofol (Diprivan) infusion, 0-50 mcg/kg/min, intravenous, Continuous, Jerel Sims MD, Last Rate: 12.96 mL/hr at 25 0300, 30 mcg/kg/min at 25 0300    succinylcholine (Anectine) injection 20 mg/mL  - Omnicell Override Pull, , , ,     [Transfer Hold] vancomycin (Vancocin) 1,000 mg in dextrose 5%  mL, 1,000 mg, intravenous, q12h, Iain Verde MD, Last Rate: 200 mL/hr at 25, 1,000 mg at 25    vancomycin (Vancocin) pharmacy to dose - pharmacy monitoring, , miscellaneous, Daily PRN, Shane John DO

## 2025-07-25 NOTE — SIGNIFICANT EVENT
Rapid Response Nurse Note: Rapid Response    Pager time:   Arrival time:   Event end time: 47  Location: T932  [] Triage by phone or secure messaging    Rapid response initiated by:  [] Rapid response RN [] Family [] Nursing Supervisor [] Physician   [] RADAR auto page [] Sepsis auto-page [x] RN [] RT   [] NP/PA [] Other:     Primary reason for call:   [] BAT [] New CPAP/BiPAP [] Bleeding [] Change in mental status   [] Chest pain [] Code blue [] FiO2 >/= 50% [] HR </= 40 bpm   [x] HR >/= 130 bpm [] Hyperglycemia [] Hypoglycemia [] RADAR    [] RR </= 8 bpm [] RR >/= 30 bpm [] SBP </= 90 mmHg [] SpO2 < 90%   [] Seizure [] Sepsis [] Shortness of breath  [] Staff concern: see comments     Initial VS and/or RADAR VS: T 38.3 °C; ; RR 24; /68(80); SPO2 98% on 4 lpm    Providers present at bedside (if applicable): MD Eric (NACR), MD Renata    Name of ICU Provider contacted (if applicable): MD Levi, Derrick    Interventions:  [] None [] ABG/VBG [x] Assist w/ICU transfer [] BAT paged    [] Bag mask [] Blood [] Cardioversion [] Code Blue   [] Code blue for intubation [] Code status changed [] Chest x-ray [x] EKG   [x] IV fluid/bolus [] KUB x-ray [x] Labs/cultures [x] Medication   [] Nebulizer treatment [] NIPPV (CPAP/BiPAP) [] Oxygen [] Oral airway   [x] Peripheral IV [] Palliative care consult [] CT/MRI [] Sepsis protocol    [] Suctioned [x] Other: Basilio insertion by MD.     Outcome:  [] Coded and  [] Code blue for intubation [] Coded and transferred to ICU []  on division   [] Remained on division (no change) [] Remained on division + additional monitoring [] Remained in ED [] Transferred to ED   [x] Transferred to ICU [] Transferred to inpatient status [] Transferred for interventions (procedure) [] Transferred to ICU stepdown    [] Transferred to surgery [] Transferred to telemetry [] Sepsis protocol [] STEMI protocol   [] Stroke protocol [x] Bedside nurse instructed to page rapid  response for any concerns or acute change in condition/VS     Additional Comments: Rapid response paged by the bedside RN for A-fib RVR with rates reaching 180 bpm.  On arrival the patient is a&ox4 with no complaints and has the above vital signs.  Team is at bedside.  IV tylenol was given for the fever and a 1 liter LR bolus was initiated.  Sepsis workup already completed during previous rapid response during the day. 5 mg IVP metoprolol was given by bedside RN per MD orders with HR improvement.  BP did drop into the 70's systolic after the metoprolol so Albumin 5% 25 grams was initiated.  Vancomycin and Zosyn and were ordered.  20 gauge IV placed into right FA by this RN.  Amiodarone given as ordered with bolus and drip being initiated.  Patient was transported to CTICU bed 12 for further care.

## 2025-07-25 NOTE — ANESTHESIA POSTPROCEDURE EVALUATION
Patient: Bereket Em    Procedure Summary       Date: 07/25/25 Room / Location: [facility] OR 11 / [facility] OR    Anesthesia Start: 0324 Anesthesia Stop: 0649    Procedures:       LAPAROSCOPY, EXPLORATORY      RESECTION OF COLORECTAL ANASTOMOSIS, REVISION OF ILEOSTOMY Diagnosis:       Incarcerated ventral hernia      Diverticulosis of large intestine without hemorrhage      (Incarcerated ventral hernia [K43.6])      (Diverticulosis of large intestine without hemorrhage [K57.30])    Surgeons: Jerel Olson MD; Krish Gross MD Responsible Provider: Jose Guadalupe Gatica MD    Anesthesia Type: general ASA Status: 4 - Emergent            Anesthesia Type: general    Vitals Value Taken Time   /65 07/25/25 06:49   Temp 36.2 07/25/25 06:49   Pulse 82 07/25/25 06:49   Resp 22 07/25/25 06:44   SpO2 98 % 07/25/25 06:44       Anesthesia Post Evaluation    Patient location during evaluation: ICU  Patient participation: complete - patient cannot participate (intubated and sedated)  Level of consciousness: sedated  Pain management: adequate  Airway patency: patent (intubated)  Cardiovascular status: acceptable and hemodynamically stable  Respiratory status: intubated  Hydration status: acceptable  Postoperative Nausea and Vomiting: none        No notable events documented.

## 2025-07-25 NOTE — SIGNIFICANT EVENT
"Postop Check    Bereket Em  is a 59 y.o.  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   Patient underwent ex lap, extensive lysis of adhesions, abdominal wall debridement, ventral hernia repair, excision of EC fistula, jejunostomy, & abdominal wound vac placement by Dr. Whitney Perry and Margarita's reversal with transverse colon mobilization/ileal window by Dr. Gross on 7/22/2025.   Taken back to the OR 7/25 Ex lap resection of ischemic colon, creation colostomy.     /54   Pulse 85   Temp 37.3 °C (99.1 °F) (Temporal)   Resp 25   Ht 1.727 m (5' 8\")   Wt 89 kg (196 lb 3.4 oz)   SpO2 99%   BMI 29.83 kg/m²               8.5     2.8>-----<190              25.9     133  99  20                  ----------------<112     4.0  25  0.85          Ca 8.0 Phos 3.0 Mg 1.68            General: intubated but awake and responding to simple questions with head nod  Neuro: awake and responsive, no focal deficits  CV: regular rate, continued on central monitor  Resp: intubated, chest expansion symmetrical   Abd: NGT in place to LIWS  abdomen, soft, nondistended, appropriately tender. Surgical dressing intact with minimal strike through, ANA MARIA drain with serosnageous drainage on right abdomen, jejunostomy well pouched, pink and well perfused, colostomy also well pouched and perfused,   Extremities: moving extremities, no rashes or lesions    A/P:  Bereket Em is a 59 y.o. s/p ex lap, extensive lysis of adhesions, abdominal wall debridement, ventral hernia repair, excision of EC fistula, jejunostomy, & abdominal wound vac placement by Dr. Whitney Perry and Margarita's reversal with transverse colon mobilization/ileal window  POD#0 s/p Ex lap resection of ischemic colon, creation colostomy. Initially brought to the SICU " intubated on pressors.      Plan:  - Appreciate care per SICU, continue ICU level care   - extubation planned per ICU  - Continue NGT/NPO, TPN  - Continue ATB: zosyn  - [facility] for ostomy support and care  - Colorectal surgery will continue to follow alone    DANIELE PALMA-CNP  Colorectal Surgery NP #[id number]  You # [phone]

## 2025-07-25 NOTE — PROCEDURES
Central Line    Date/Time: 7/25/2025 3:11 AM    Performed by: Constantine Meza MD  Authorized by: Shane John DO    Consent:     Consent obtained:  Verbal and emergent situation    Consent given by:  Patient    Risks, benefits, and alternatives were discussed: yes      Risks discussed:  Bleeding, infection, pneumothorax, arterial puncture, nerve damage and incorrect placement  Universal protocol:     Procedure explained and questions answered to patient or proxy's satisfaction: yes      Imaging studies available: yes      Required blood products, implants, devices, and special equipment available: yes      Immediately prior to procedure, a time out was called: yes      Patient identity confirmed:  Arm band and hospital-assigned identification number  Pre-procedure details:     Indication(s): central venous access      Hand hygiene: Hand hygiene performed prior to insertion      Sterile barrier technique: All elements of maximal sterile technique followed      Skin preparation:  Chlorhexidine    Skin preparation agent: Skin preparation agent completely dried prior to procedure    Sedation:     Sedation type:  Deep  Procedure details:     Location:  R internal jugular    Patient position:  Trendelenburg    Procedural supplies:  Triple lumen    Catheter size:  7 Fr    Catheter length:  16    Landmarks identified: yes      Ultrasound guidance: yes      Ultrasound guidance timing: real time      Sterile ultrasound techniques: Sterile gel and sterile probe covers were used      Number of attempts:  1    Successful placement: yes    Post-procedure details:     Post-procedure:  Line sutured and dressing applied    Assessment:  Blood return through all ports, no pneumothorax on x-ray, placement verified by x-ray and free fluid flow    Procedure completion:  Tolerated well, no immediate complications

## 2025-07-25 NOTE — PROCEDURES
Intubation    Date/Time: 7/25/2025 2:55 AM    Performed by: Constantine Meza MD  Authorized by: Shane John DO    Consent:     Consent obtained:  Emergent situation and verbal    Consent given by:  Patient    Risks, benefits, and alternatives were discussed: yes    Universal protocol:     Patient identity confirmed:  Verbally with patient, hospital-assigned identification number and arm band  Pre-procedure details:     Indications comment:  Septic shock, bedside colonoscopy/flex sig    Patient status:  Awake    Look externally: facial hair      Obstruction: none      Pharmacologic strategy: RSI      Induction agents:  Etomidate    Paralytics:  Succinylcholine  Procedure details:     Preoxygenation:  Bag valve mask    Number of attempts:  1  Successful intubation attempt details:     Intubation method:  Oral    Intubation technique: video assisted      Intubation technique comment:  Glidescope    Laryngoscope size: S4 hyperangulated.    Tube size (mm):  7.5    Tube type:  Cuffed  Placement assessment:     ETT at teeth/gumline (cm):  24 at the lips    Tube secured with:  ETT dubois    Breath sounds:  Equal    Placement verification: chest rise, colorimetric ETCO2 and CXR verification    Comments:      Received 2 mg of versed, 15mg of etomidate, and 100 of succinylcholine for induction. Also received multiple pushes of norepinephrine for blood pressure support.

## 2025-07-26 ENCOUNTER — APPOINTMENT (OUTPATIENT)
Dept: RADIOLOGY | Facility: HOSPITAL | Age: 60
End: 2025-07-26
Payer: COMMERCIAL

## 2025-07-26 LAB
ALBUMIN SERPL BCP-MCNC: 2.7 G/DL (ref 3.4–5)
ALBUMIN SERPL BCP-MCNC: 2.8 G/DL (ref 3.4–5)
ANION GAP BLDA CALCULATED.4IONS-SCNC: 7 MMO/L (ref 10–25)
ANION GAP SERPL CALC-SCNC: 13 MMOL/L (ref 10–20)
ANION GAP SERPL CALC-SCNC: 16 MMOL/L (ref 10–20)
APTT PPP: 30 SECONDS (ref 26–36)
BACTERIA BLD AEROBE CULT: ABNORMAL
BACTERIA BLD AEROBE CULT: ABNORMAL
BACTERIA BLD CULT: ABNORMAL
BACTERIA BLD CULT: ABNORMAL
BASE EXCESS BLDA CALC-SCNC: 2.6 MMOL/L (ref -2–3)
BLOOD EXPIRATION DATE: NORMAL
BODY TEMPERATURE: 37 DEGREES CELSIUS
BUN SERPL-MCNC: 14 MG/DL (ref 6–23)
BUN SERPL-MCNC: 20 MG/DL (ref 6–23)
CA-I BLD-SCNC: 1.04 MMOL/L (ref 1.1–1.33)
CA-I BLD-SCNC: 1.05 MMOL/L (ref 1.1–1.33)
CA-I BLDA-SCNC: 1.06 MMOL/L (ref 1.1–1.33)
CALCIUM SERPL-MCNC: 7.8 MG/DL (ref 8.6–10.6)
CALCIUM SERPL-MCNC: 7.9 MG/DL (ref 8.6–10.6)
CHLORIDE BLDA-SCNC: 101 MMOL/L (ref 98–107)
CHLORIDE SERPL-SCNC: 98 MMOL/L (ref 98–107)
CHLORIDE SERPL-SCNC: 98 MMOL/L (ref 98–107)
CO2 SERPL-SCNC: 26 MMOL/L (ref 21–32)
CO2 SERPL-SCNC: 27 MMOL/L (ref 21–32)
CREAT SERPL-MCNC: 0.75 MG/DL (ref 0.5–1.3)
CREAT SERPL-MCNC: 0.94 MG/DL (ref 0.5–1.3)
DISPENSE STATUS: NORMAL
EGFRCR SERPLBLD CKD-EPI 2021: >90 ML/MIN/1.73M*2
EGFRCR SERPLBLD CKD-EPI 2021: >90 ML/MIN/1.73M*2
ERYTHROCYTE [DISTWIDTH] IN BLOOD BY AUTOMATED COUNT: 14 % (ref 11.5–14.5)
ERYTHROCYTE [DISTWIDTH] IN BLOOD BY AUTOMATED COUNT: 14.1 % (ref 11.5–14.5)
GLUCOSE BLD MANUAL STRIP-MCNC: 119 MG/DL (ref 74–99)
GLUCOSE BLD MANUAL STRIP-MCNC: 122 MG/DL (ref 74–99)
GLUCOSE BLD MANUAL STRIP-MCNC: 95 MG/DL (ref 74–99)
GLUCOSE BLDA-MCNC: 121 MG/DL (ref 74–99)
GLUCOSE SERPL-MCNC: 122 MG/DL (ref 74–99)
GLUCOSE SERPL-MCNC: 85 MG/DL (ref 74–99)
GRAM STN SPEC: ABNORMAL
GRAM STN SPEC: ABNORMAL
HCO3 BLDA-SCNC: 25.9 MMOL/L (ref 22–26)
HCT VFR BLD AUTO: 25.7 % (ref 41–52)
HCT VFR BLD AUTO: 27.1 % (ref 41–52)
HCT VFR BLD EST: 27 % (ref 41–52)
HGB BLD-MCNC: 8.5 G/DL (ref 13.5–17.5)
HGB BLD-MCNC: 9.2 G/DL (ref 13.5–17.5)
HGB BLDA-MCNC: 9 G/DL (ref 13.5–17.5)
INHALED O2 CONCENTRATION: 30 %
INR PPP: 1.3 (ref 0.9–1.1)
LACTATE BLDA-SCNC: 1.6 MMOL/L (ref 0.4–2)
MAGNESIUM SERPL-MCNC: 2.5 MG/DL (ref 1.6–2.4)
MAGNESIUM SERPL-MCNC: 2.54 MG/DL (ref 1.6–2.4)
MCH RBC QN AUTO: 25.8 PG (ref 26–34)
MCH RBC QN AUTO: 26.4 PG (ref 26–34)
MCHC RBC AUTO-ENTMCNC: 33.1 G/DL (ref 32–36)
MCHC RBC AUTO-ENTMCNC: 33.9 G/DL (ref 32–36)
MCV RBC AUTO: 78 FL (ref 80–100)
MCV RBC AUTO: 78 FL (ref 80–100)
NRBC BLD-RTO: 0 /100 WBCS (ref 0–0)
NRBC BLD-RTO: 0.3 /100 WBCS (ref 0–0)
OXYHGB MFR BLDA: 96.7 % (ref 94–98)
PCO2 BLDA: 34 MM HG (ref 38–42)
PH BLDA: 7.49 PH (ref 7.38–7.42)
PHOSPHATE SERPL-MCNC: 2 MG/DL (ref 2.5–4.9)
PHOSPHATE SERPL-MCNC: 2.2 MG/DL (ref 2.5–4.9)
PLATELET # BLD AUTO: 139 X10*3/UL (ref 150–450)
PLATELET # BLD AUTO: 147 X10*3/UL (ref 150–450)
PO2 BLDA: 105 MM HG (ref 85–95)
POTASSIUM BLDA-SCNC: 3.1 MMOL/L (ref 3.5–5.3)
POTASSIUM SERPL-SCNC: 3.3 MMOL/L (ref 3.5–5.3)
POTASSIUM SERPL-SCNC: 3.3 MMOL/L (ref 3.5–5.3)
PRODUCT BLOOD TYPE: 6200
PRODUCT CODE: NORMAL
PROTHROMBIN TIME: 13.9 SECONDS (ref 9.8–12.4)
RBC # BLD AUTO: 3.29 X10*6/UL (ref 4.5–5.9)
RBC # BLD AUTO: 3.48 X10*6/UL (ref 4.5–5.9)
SAO2 % BLDA: 99 % (ref 94–100)
SODIUM BLDA-SCNC: 131 MMOL/L (ref 136–145)
SODIUM SERPL-SCNC: 135 MMOL/L (ref 136–145)
SODIUM SERPL-SCNC: 137 MMOL/L (ref 136–145)
STAPHYLOCOCCUS SPEC CULT: NORMAL
UNIT ABO: NORMAL
UNIT NUMBER: NORMAL
UNIT RH: NORMAL
UNIT VOLUME: 350
WBC # BLD AUTO: 12.8 X10*3/UL (ref 4.4–11.3)
WBC # BLD AUTO: 6.7 X10*3/UL (ref 4.4–11.3)
XM INTEP: NORMAL

## 2025-07-26 PROCEDURE — 2500000004 HC RX 250 GENERAL PHARMACY W/ HCPCS (ALT 636 FOR OP/ED)

## 2025-07-26 PROCEDURE — 85610 PROTHROMBIN TIME: CPT | Performed by: STUDENT IN AN ORGANIZED HEALTH CARE EDUCATION/TRAINING PROGRAM

## 2025-07-26 PROCEDURE — 84132 ASSAY OF SERUM POTASSIUM: CPT | Performed by: STUDENT IN AN ORGANIZED HEALTH CARE EDUCATION/TRAINING PROGRAM

## 2025-07-26 PROCEDURE — 2500000005 HC RX 250 GENERAL PHARMACY W/O HCPCS

## 2025-07-26 PROCEDURE — 2500000004 HC RX 250 GENERAL PHARMACY W/ HCPCS (ALT 636 FOR OP/ED): Performed by: STUDENT IN AN ORGANIZED HEALTH CARE EDUCATION/TRAINING PROGRAM

## 2025-07-26 PROCEDURE — 2500000004 HC RX 250 GENERAL PHARMACY W/ HCPCS (ALT 636 FOR OP/ED): Mod: JZ,TB

## 2025-07-26 PROCEDURE — 2500000005 HC RX 250 GENERAL PHARMACY W/O HCPCS: Performed by: PHYSICIAN ASSISTANT

## 2025-07-26 PROCEDURE — 99223 1ST HOSP IP/OBS HIGH 75: CPT | Performed by: INTERNAL MEDICINE

## 2025-07-26 PROCEDURE — 71045 X-RAY EXAM CHEST 1 VIEW: CPT | Performed by: RADIOLOGY

## 2025-07-26 PROCEDURE — 83735 ASSAY OF MAGNESIUM: CPT | Performed by: STUDENT IN AN ORGANIZED HEALTH CARE EDUCATION/TRAINING PROGRAM

## 2025-07-26 PROCEDURE — 85027 COMPLETE CBC AUTOMATED: CPT | Performed by: STUDENT IN AN ORGANIZED HEALTH CARE EDUCATION/TRAINING PROGRAM

## 2025-07-26 PROCEDURE — 82947 ASSAY GLUCOSE BLOOD QUANT: CPT

## 2025-07-26 PROCEDURE — 82330 ASSAY OF CALCIUM: CPT | Performed by: STUDENT IN AN ORGANIZED HEALTH CARE EDUCATION/TRAINING PROGRAM

## 2025-07-26 PROCEDURE — 37799 UNLISTED PX VASCULAR SURGERY: CPT | Performed by: STUDENT IN AN ORGANIZED HEALTH CARE EDUCATION/TRAINING PROGRAM

## 2025-07-26 PROCEDURE — 2580000001 HC RX 258 IV SOLUTIONS: Performed by: PHYSICIAN ASSISTANT

## 2025-07-26 PROCEDURE — 99291 CRITICAL CARE FIRST HOUR: CPT | Performed by: ANESTHESIOLOGY

## 2025-07-26 PROCEDURE — 2020000001 HC ICU ROOM DAILY

## 2025-07-26 PROCEDURE — 71045 X-RAY EXAM CHEST 1 VIEW: CPT

## 2025-07-26 RX ADMIN — WATER 50 MG: 1 INJECTION INTRAMUSCULAR; INTRAVENOUS; SUBCUTANEOUS at 14:39

## 2025-07-26 RX ADMIN — ENOXAPARIN SODIUM 40 MG: 100 INJECTION SUBCUTANEOUS at 08:44

## 2025-07-26 RX ADMIN — AMIODARONE HYDROCHLORIDE 0.5 MG/MIN: 1.8 INJECTION, SOLUTION INTRAVENOUS at 12:12

## 2025-07-26 RX ADMIN — POTASSIUM CHLORIDE 40 MEQ: 29.8 INJECTION, SOLUTION INTRAVENOUS at 02:10

## 2025-07-26 RX ADMIN — KETOROLAC TROMETHAMINE 30 MG: 30 INJECTION, SOLUTION INTRAMUSCULAR; INTRAVENOUS at 13:01

## 2025-07-26 RX ADMIN — KETOROLAC TROMETHAMINE 30 MG: 30 INJECTION, SOLUTION INTRAMUSCULAR; INTRAVENOUS at 20:01

## 2025-07-26 RX ADMIN — ACETAMINOPHEN 1000 MG: 10 INJECTION INTRAVENOUS at 23:48

## 2025-07-26 RX ADMIN — AMIODARONE HYDROCHLORIDE 0.5 MG/MIN: 1.8 INJECTION, SOLUTION INTRAVENOUS at 21:20

## 2025-07-26 RX ADMIN — PHENOBARBITAL SODIUM 32.5 MG: 65 INJECTION INTRAMUSCULAR at 05:11

## 2025-07-26 RX ADMIN — WATER 50 MG: 1 INJECTION INTRAMUSCULAR; INTRAVENOUS; SUBCUTANEOUS at 03:56

## 2025-07-26 RX ADMIN — PANTOPRAZOLE SODIUM 40 MG: 40 INJECTION, POWDER, LYOPHILIZED, FOR SOLUTION INTRAVENOUS at 08:44

## 2025-07-26 RX ADMIN — ACETAMINOPHEN 1000 MG: 10 INJECTION INTRAVENOUS at 05:11

## 2025-07-26 RX ADMIN — ASCORBIC ACID, VITAMIN A PALMITATE, CHOLECALCIFEROL, THIAMINE HYDROCHLORIDE, RIBOFLAVIN-5 PHOSPHATE SODIUM, PYRIDOXINE HYDROCHLORIDE, NIACINAMIDE, DEXPANTHENOL, ALPHA-TOCOPHEROL ACETATE, VITAMIN K1, FOLIC ACID, BIOTIN, CYANOCOBALAMIN: 200; 3300; 200; 6; 3.6; 6; 40; 15; 10; 150; 600; 60; 5 INJECTION, SOLUTION INTRAVENOUS at 20:14

## 2025-07-26 RX ADMIN — POTASSIUM PHOSPHATE, MONOBASIC POTASSIUM PHOSPHATE, DIBASIC 15 MMOL: 224; 236 INJECTION, SOLUTION, CONCENTRATE INTRAVENOUS at 22:03

## 2025-07-26 RX ADMIN — PIPERACILLIN SODIUM AND TAZOBACTAM SODIUM 3.38 G: 3; .375 INJECTION, SOLUTION INTRAVENOUS at 18:47

## 2025-07-26 RX ADMIN — HYDROMORPHONE HYDROCHLORIDE 0.2 MG: 0.2 INJECTION, SOLUTION INTRAMUSCULAR; INTRAVENOUS; SUBCUTANEOUS at 05:30

## 2025-07-26 RX ADMIN — HYDROMORPHONE HYDROCHLORIDE 0.2 MG: 0.2 INJECTION, SOLUTION INTRAMUSCULAR; INTRAVENOUS; SUBCUTANEOUS at 22:06

## 2025-07-26 RX ADMIN — METHOCARBAMOL 1000 MG: 100 INJECTION INTRAMUSCULAR; INTRAVENOUS at 09:06

## 2025-07-26 RX ADMIN — POTASSIUM PHOSPHATE 15 MMOL: 236; 224 INJECTION, SOLUTION INTRAVENOUS at 06:33

## 2025-07-26 RX ADMIN — PHENOBARBITAL SODIUM 65 MG: 65 INJECTION INTRAMUSCULAR; INTRAVENOUS at 20:54

## 2025-07-26 RX ADMIN — PHENOBARBITAL SODIUM 32.5 MG: 65 INJECTION INTRAMUSCULAR at 14:39

## 2025-07-26 RX ADMIN — WATER 50 MG: 1 INJECTION INTRAMUSCULAR; INTRAVENOUS; SUBCUTANEOUS at 08:44

## 2025-07-26 RX ADMIN — HYDROMORPHONE HYDROCHLORIDE 0.2 MG: 0.2 INJECTION, SOLUTION INTRAMUSCULAR; INTRAVENOUS; SUBCUTANEOUS at 02:09

## 2025-07-26 RX ADMIN — HYDROMORPHONE HYDROCHLORIDE 0.2 MG: 0.2 INJECTION, SOLUTION INTRAMUSCULAR; INTRAVENOUS; SUBCUTANEOUS at 12:13

## 2025-07-26 RX ADMIN — METHOCARBAMOL 1000 MG: 100 INJECTION INTRAMUSCULAR; INTRAVENOUS at 20:00

## 2025-07-26 RX ADMIN — WATER 50 MG: 1 INJECTION INTRAMUSCULAR; INTRAVENOUS; SUBCUTANEOUS at 20:01

## 2025-07-26 RX ADMIN — SMOFLIPID 50 G: 6; 6; 5; 3 INJECTION, EMULSION INTRAVENOUS at 20:02

## 2025-07-26 RX ADMIN — PIPERACILLIN SODIUM AND TAZOBACTAM SODIUM 3.38 G: 3; .375 INJECTION, SOLUTION INTRAVENOUS at 11:26

## 2025-07-26 RX ADMIN — PIPERACILLIN SODIUM AND TAZOBACTAM SODIUM 3.38 G: 3; .375 INJECTION, SOLUTION INTRAVENOUS at 05:30

## 2025-07-26 ASSESSMENT — PAIN - FUNCTIONAL ASSESSMENT

## 2025-07-26 ASSESSMENT — PAIN DESCRIPTION - DESCRIPTORS
DESCRIPTORS: ACHING

## 2025-07-26 ASSESSMENT — PAIN SCALES - GENERAL
PAINLEVEL_OUTOF10: 0 - NO PAIN
PAINLEVEL_OUTOF10: 8
PAINLEVEL_OUTOF10: 8
PAINLEVEL_OUTOF10: 9
PAINLEVEL_OUTOF10: 6
PAINLEVEL_OUTOF10: 6
PAINLEVEL_OUTOF10: 10 - WORST POSSIBLE PAIN
PAINLEVEL_OUTOF10: 4
PAINLEVEL_OUTOF10: 4
PAINLEVEL_OUTOF10: 8
PAINLEVEL_OUTOF10: 9
PAINLEVEL_OUTOF10: 4
PAINLEVEL_OUTOF10: 0 - NO PAIN
PAINLEVEL_OUTOF10: 10 - WORST POSSIBLE PAIN
PAINLEVEL_OUTOF10: 10 - WORST POSSIBLE PAIN

## 2025-07-26 ASSESSMENT — PAIN DESCRIPTION - LOCATION
LOCATION: ABDOMEN

## 2025-07-26 NOTE — CONSULTS
Inpatient consult to Infectious Diseases  Consult performed by: Tera Mendoza MD  Consult ordered by: Jerel Olson MD        Referred by Dr.Zolin Mcpherson MD: Candy Howell PA-C    Reason For Consult: Sepsis    History Of Present Illness  Bereket Em is a 59 y.o. male with history of perforated sigmoid diverticulitis status post Soliman's pouch (6/7/2024) complicated by fascial necrosis status post abdominal wall debridement and Vicryl mesh placement (6/18/2024) and midline small bowel enterocutaneous fistula and on TPN for the past several months via right upper arm PICC line admitted to the hospital on July 22 for elective surgery for reanastomosis and fistula excision.    Patient underwent surgery on July 22.  2 days later patient started having fever with significant drop in blood pressure.  Patient was emergently taken to surgery and noted to have a small area of ischemia.  Patient had 2 sets of blood cultures performed and empirically started on Zosyn.  Blood culture grew E. coli.  Postoperatively patient was on multiple vasopressors which have been weaned off this morning.  Patient is also extubated.    This afternoon patient is sitting in the chair and feels tired.  Family members at bedside.     Past Medical History  He has a past medical history of Acute pharyngitis, unspecified, Acute upper respiratory infection, unspecified (02/13/2017), Alcohol abuse, in remission (09/12/2018), Anxiety, Benign essential HTN (02/14/2023), Diverticulosis, Elevated blood-pressure reading, without diagnosis of hypertension (02/23/2015), Encounter for immunization (10/10/2014), Encounter for screening for malignant neoplasm of colon (01/04/2017), Encounter for screening for malignant neoplasm of colon (11/02/2016), Encounter for screening for malignant neoplasm of colon (11/02/2016), Encounter for screening for malignant neoplasm of prostate (09/12/2018), Essential (primary) hypertension (03/14/2019),  Hernia, internal (2024), Impingement syndrome of right shoulder (2016), Incomplete rotator cuff tear or rupture of right shoulder, not specified as traumatic (2018), Noninfective gastroenteritis and colitis, unspecified (2018), Other general symptoms and signs (2016), Other malaise (2016), Pain in left ankle and joints of left foot (10/10/2017), Pain in left foot (2017), Pain in right shoulder (2016), Pain in thoracic spine (2018), Pain, unspecified (2017), Personal history of colonic polyps (2017), Personal history of other diseases of male genital organs (03/15/2019), Personal history of other diseases of the respiratory system (2020), Personal history of other specified conditions (2019), Personal history of other specified conditions (10/10/2014), and Strain of muscle, fascia and tendon of other parts of biceps, right arm, initial encounter (2016).    Surgical History  He has a past surgical history that includes Shoulder surgery (2017); Abdominal surgery; Colon surgery (24); Small intestine surgery (24); Colonoscopy; Upper gastrointestinal endoscopy; and Ileostomy.     Social History     Occupational History    Not on file   Tobacco Use    Smoking status: Former     Current packs/day: 0.00     Average packs/day: 1.5 packs/day for 25.0 years (37.5 ttl pk-yrs)     Types: Cigarettes     Quit date: 2024     Years since quittin.1     Passive exposure: Past    Smokeless tobacco: Never   Vaping Use    Vaping status: Never Used   Substance and Sexual Activity    Alcohol use: Yes     Alcohol/week: 15.0 standard drinks of alcohol     Types: 15 Cans of beer per week     Comment: not since     Drug use: Not Currently     Types: Marijuana     Comment: months ago    Sexual activity: Yes     Partners: Female     Birth control/protection: None     Travel History   Travel since 24    No documented travel since  06/26/25          Family History  Family History[1]  Allergies  Patient has no known allergies.     Immunization History   Administered Date(s) Administered    Flu vaccine (IIV4), preservative free *Check age/dose* 10/10/2014, 10/10/2017, 10/14/2023    Flu vaccine, quadrivalent, no egg protein, age 6 month or greater (FLUCELVAX) 10/18/2022    Influenza, injectable, quadrivalent 10/10/2014, 10/10/2017    Moderna SARS-CoV-2 Vaccination 03/19/2021, 04/13/2021    Pfizer Purple Cap SARS-CoV-2 02/25/2021, 03/19/2021, 12/11/2021    Pneumococcal conjugate vaccine, 13-valent (PREVNAR 13) 01/25/2016    Pneumococcal polysaccharide vaccine, 23-valent, age 2 years and older (PNEUMOVAX 23) 10/10/2014    Tdap vaccine, age 7 year and older (BOOSTRIX, ADACEL) 10/10/2014, 01/03/2024     Medications  Home medications:  Prescriptions Prior to Admission[2]  Current medications:  Scheduled medications  Scheduled Medications[3]  Continuous medications  Continuous Medications[4]  PRN medications  PRN Medications[5]    Review of Systems negative except above     Objective  Range of Vitals (last 24 hours)  Heart Rate:  [61-85]   Temp:  [36.1 °C (97 °F)-37.3 °C (99.1 °F)]   Resp:  [17-26]   BP: ()/(49-65)   Weight:  [88.7 kg (195 lb 8.8 oz)]   SpO2:  [95 %-100 %]   Daily Weight  07/26/25 : 88.7 kg (195 lb 8.8 oz)    Body mass index is 29.73 kg/m².     Physical Exam  GENERAL APPEARANCE: Patient is sitting in the chair.  Appears tired.  HEENT: Atraumatic and normocephalic  CARDIAC: Regular   LUNGS: Clear  ABDOMEN: 2 ostomy bags noted.  Did not palpate the abdomen  EXTREMITIES: No edema  SKIN: Right IJ triple-lumen catheter noted  Right upper arm PICC line already removed and no signs of cellulitis at previous PICC line site.       Labs  Results from last 72 hours   Lab Units 07/26/25  0154 07/25/25  1530 07/25/25  0730 07/25/25  0002 07/24/25  1754   WBC AUTO x10*3/uL 6.7 3.5* 2.8*   < > 2.3*   HEMOGLOBIN g/dL 8.5* 7.7* 8.5*   < > 9.1*  "  HEMATOCRIT % 25.7* 23.2* 25.9*   < > 28.1*   PLATELETS AUTO x10*3/uL 139* 151 190   < > 188   NEUTROS PCT AUTO %  --   --   --   --  76.5   LYMPHO PCT MAN %  --   --  40.3  --   --    LYMPHS PCT AUTO %  --   --   --   --  14.2   MONO PCT MAN %  --   --  17.1  --   --    MONOS PCT AUTO %  --   --   --   --  6.4   EOSINO PCT MAN %  --   --  3.1  --   --    EOS PCT AUTO %  --   --   --   --  2.1    < > = values in this interval not displayed.     Results from last 72 hours   Lab Units 07/26/25 0154 07/25/25  1530 07/25/25  0730   SODIUM mmol/L 135* 133* 133*   POTASSIUM mmol/L 3.3* 3.4* 4.0   CHLORIDE mmol/L 98 97* 99   CO2 mmol/L 27 26 25   BUN mg/dL 14 17 20   CREATININE mg/dL 0.75 0.82 0.85   GLUCOSE mg/dL 122* 125* 112*   CALCIUM mg/dL 7.8* 8.0* 8.0*   ANION GAP mmol/L 13 13 13   EGFR mL/min/1.73m*2 >90 >90 >90   PHOSPHORUS mg/dL 2.2* 2.1* 3.0     Results from last 72 hours   Lab Units 07/26/25  0154 07/25/25  1530 07/25/25  0730 07/25/25  0002 07/24/25  1750   ALK PHOS U/L  --   --   --  33 46   BILIRUBIN TOTAL mg/dL  --   --   --  2.0* 2.8*   BILIRUBIN DIRECT mg/dL  --   --   --  1.0* 1.1*   PROTEIN TOTAL g/dL  --   --   --  4.5* 5.4*   ALT U/L  --   --   --  21 32   AST U/L  --   --   --  25 35   ALBUMIN g/dL 2.7* 3.0* 2.7* 2.3*  2.3* 3.2*  3.2*     Estimated Creatinine Clearance: 113.3 mL/min (by C-G formula based on SCr of 0.75 mg/dL).  No results found for: \"CRP\", \"SEDRATE\"  No results found for: \"HIV1X2\", \"HIVCONF\", \"DRUUBC4YG\"  Hepatitis C AB   Date Value Ref Range Status   01/26/2024 Nonreactive Nonreactive Final     Comment:     Results from patients taking biotin supplements or receiving high-dose biotin therapy should be interpreted with caution due to possible interference with this test. Providers may contact their local laboratory for further information.     Microbiology  Susceptibility data from last 90 days.  Collected Specimen Info Organism Amoxicillin/Clavulanate Ampicillin " Ampicillin/Sulbactam Cefazolin Ceftriaxone Ciprofloxacin Gentamicin Levofloxacin Piperacillin/Tazobactam Trimethoprim/Sulfamethoxazole   07/24/25 Blood culture from Peripheral Venipuncture Escherichia coli             07/24/25 Blood culture from Peripheral Venipuncture Escherichia coli  I  R  R  I  S  R  S  R  S  R       Imaging         Assessment/Plan     Bereket Em is a 59 y.o. male with history of perforated sigmoid diverticulitis status post Soliman's pouch (6/7/2024) complicated by fascial necrosis status post abdominal wall debridement and Vicryl mesh placement (6/18/2024) and midline small bowel enterocutaneous fistula and on TPN for the past several months via right upper arm PICC line admitted to the hospital on July 22 for elective surgery for reanastomosis and fistula excision.    Problems  E. coli bacteremia likely from intra-abdominal surgical manipulation or ischemic bowel        Patient underwent emergent surgery with resection of small segment of ischemic bowel.  As per operative note there was no purulence or fecal contamination of the peritoneum or anastomotic dehiscence.    2.  Septic shock    Plan  Patient's hemodynamic status has improved significantly over the past 24 hours and is off of vasopressors currently.  Patient is on appropriate antibiotic therapy with Zosyn 3.375 g IV every 6 hours.  As patient is improving, I do not see a need to broaden antibiotic therapy to cover resistant gram-positive organisms or yeast.   Will follow.      Tera Mendoza MD       [1]   Family History  Problem Relation Name Age of Onset    Other (CARDIAC DISORDER) Mother Sp     Hypertension Mother Sp     Arthritis Mother Sp     Other (CARDIAC DISORDR) Father David     Hypertension Father Edfrancia     Hypertension Sister Kush     Heart attack Brother Jori     Hypertension Brother Jori    [2]   Medications Prior to Admission   Medication Sig Dispense Refill Last Dose/Taking    0.9 %  sodium chloride (sodium chloride 0.9%) solution Infuse 1,000 mL into a venous catheter once every 24 hours. Infuse 1000ml over 1-2 hours via gravity once daily. 7000 mL 52 2025    0.9 % sodium chloride (sodium chloride, PF, 0.9%) injection Infuse 10 mL into a venous catheter once daily. 10 ml NS once daily SASH and as needed 20 ml after blood work   2025    acetaminophen (Tylenol) 500 mg tablet Take 2 tablets (1,000 mg) by mouth every 6 hours if needed for mild pain (1 - 3).   2025 Morning    calcium carbonate (Tums Extra Strength) 300 mg elemental (750 mg) chewable tablet Chew and swallow 300 mg once daily.   2025 at  5:00 AM    [] Custom Cyclic TPN Infuse 2,250 mL over 12 hours into a venous catheter (central line) continuously for 13 days. Taper up for 1 Hours. Taper down for 1 Hours. Pharmacist to rebalance Chloride:Acetate ratio to 1:1. Continue through admit to surgery 25. Labs: Same. 7 Bag 53 2025 at  4:00 AM    gabapentin (Neurontin) 100 mg capsule Take 1 capsule at bedtime for three nights before surgery. 3 capsule 0 2025    heparin flush (heparin LockFlush,Porcine,,PF,) 10 unit/mL injection Infuse 5 mL (50 Units) into a venous catheter once daily.   2025 Morning    metroNIDAZOLE (Flagyl) 250 mg tablet Take 1 tablet at 6pm, 7pm and 11pm the night before surgery. 3 tablet 0 2025    neomycin (Mycifradin) 500 mg tablet Take two tablets (1000 mg) at 6pm, 7pm and 11pm the night before surgery. 6 tablet 0 2025    oral electrolytes replacement, Pedialyte, solution solution Take 500 mL by mouth 2 times a day. 1000 mL 30 2025    oxyCODONE (Roxicodone) 5 mg immediate release tablet Take 1 tablet (5 mg) by mouth as needed at bedtime for severe pain (7 - 10). 15 tablet 0 2025 at  9:00 PM    traZODone (Desyrel) 100 mg tablet Take 1 tablet (100 mg) by mouth as needed at bedtime for sleep. 90 tablet 1 2025    pantoprazole (ProtoNix) 40 mg EC tablet  Take 1 tablet (40 mg) by mouth once daily in the morning. Take before meals for 21 days. Do not crush, chew, or split. (Patient not taking: Reported on 7/21/2025) 21 tablet 0 Not Taking   [3] acetaminophen, 1,000 mg, intravenous, q6h  enoxaparin, 40 mg, subcutaneous, q24h  fat emulsion fish oil/plant based, 250 mL, intravenous, Daily Lipids  hydrocortisone sodium succinate, 50 mg, intravenous, q6h  insulin lispro, 0-5 Units, subcutaneous, q4h  magnesium sulfate, 2 g, intravenous, Once  pantoprazole, 40 mg, intravenous, Daily  PHENobarbital, 32.5 mg, intravenous, q8h  piperacillin-tazobactam, 3.375 g, intravenous, q6h    [4] Adult Clinimix TPN Cyclic, , Last Rate: Stopped (07/25/25 0727)  amiodarone, 0.5 mg/min, Last Rate: 0.5 mg/min (07/26/25 1300)  norepinephrine, 0-0.5 mcg/kg/min, Last Rate: Stopped (07/26/25 1209)    [5] PRN medications: calcium gluconate, calcium gluconate, dextrose, dextrose, etomidate, glucagon, glucagon, heparin flush, HYDROmorphone, ketorolac, magnesium sulfate, magnesium sulfate, methocarbamol, midazolam, naloxone, ondansetron, PHENobarbital, potassium chloride, potassium chloride

## 2025-07-26 NOTE — OP NOTE
LAPAROSCOPY, EXPLORATORY Operative Note     Date: 2025  OR Location: [facility] OR    Name: Bereket Em, : 1965, Age: 59 y.o., MRN: [id number], Sex: male    Diagnosis  Pre-op Diagnosis      * Incarcerated ventral hernia [K43.6]     * Diverticulosis of large intestine without hemorrhage [K57.30] Post-op Diagnosis     * Incarcerated ventral hernia [K43.6]     * Diverticulosis of large intestine without hemorrhage [K57.30]     Procedures  LAPAROSCOPY, EXPLORATORY  17587 - NV LAPS ABD PRTM&OMENTUM DX W/WO SPEC BR/WA SPX    RESECTION OF COLORECTAL ANASTOMOSIS, REVISION OF ILEOSTOMY      Surgeons   Panel 1:     * Jerel Olson - Primary  Panel 2:     * Krish Gross - Primary    Resident/Fellow/Other Assistant:  Surgeons and Role:  Panel 1:     * Bereket Perry MD - Assisting     * Tirara Lai MD - Resident - Assisting     * Darnell Yanez MD - Resident - Assisting     * Mackenzie A Simerlink, MD - Resident - Assisting    Staff:   Circulator: Jeff  Circulator: Serina  Circulator: Melanie Abbott Scrub: [facility]    Anesthesia Staff: Anesthesiologist: Jose Guadalupe Gatica MD  Anesthesia Resident: Casa Sanabria MD; Kurt Jean MD    Procedure Summary  Anesthesia: General  ASA: IV  Estimated Blood Loss: 5mL  Intra-op Medications:   Administrations occurring from 0300 to 0505 on 25:   Medication Name Total Dose   sodium chloride 0.9 % irrigation solution 3,000 mL   calcium gluconate 2 g in sodium chloride (iso)  mL Cannot be calculated   norepinephrine (Levophed) 8 mg in dextrose 5% 250 mL (0.032 mg/mL) infusion (premix) 0 mg   potassium chloride 40 mEq in sterile water for injection 100 mL 50 mL   albumin human 5 % infusion 25 g Cannot be calculated   angiotensin II (Giapreza) 2.5 mg in sodium chloride 0.9% 250 mL (0.01 mg/mL) infusion Cannot be calculated   LR bolus Cannot be calculated   rocuronium 10 mg/mL 80 mg   vancomycin (Vancocin) 1,000 mg in dextrose 5%  mL  Cannot be calculated   vasopressin (Vasostrict) 0.2 unit/mL in 5% dextrose 100 mL IV 0.7 Units              Anesthesia Record               Intraprocedure I/O Totals          Intake    LR bolus 1000.00 mL    Norepinephrine Drip 5.78 mL    The total shown is the total volume documented since Anesthesia Start was filed.    Angiotensin II 8.32 mL    The total shown is the total volume documented since Anesthesia Start was filed.    Amiodarone Drip 113.33 mL    The total shown is the total volume documented since Anesthesia Start was filed.    Vasopressin Drip 7.30 mL    The total shown is the total volume documented since Anesthesia Start was filed.    piperacillin-tazobactam (Zosyn) 3.375 g in dextrose (iso) IV 50 mL 100.00 mL    Total Intake 1234.73 mL       Output    Urine 200 mL    Est. Blood Loss 50 mL    Total Output 250 mL       Net    Net Volume 984.73 mL          Specimen:   ID Type Source Tests Collected by Time   1 : ILEOSTOMY Tissue SOFT TISSUE RESECTION SURGICAL PATHOLOGY EXAM Jerel Olson MD 7/25/2025 0522   2 : COLORECTAL ANASTOMOSIS Tissue SOFT TISSUE RESECTION SURGICAL PATHOLOGY EXAM Jerel Olson MD 7/25/2025 0523                 Drains and/or Catheters:   Closed/Suction Drain RLQ 10 Fr. (Active)   Site Description Unable to view 07/25/25 2000   Dressing Status Clean;Occlusive;Dry 07/25/25 2000   Drainage Appearance Serosanguineous 07/25/25 2000   Status To bulb suction 07/25/25 2000   Output (mL) 20 mL 07/26/25 0400       NG/OG/Feeding Tube Nasogastric 16 Fr Right nostril (Active)   Tube Status Low intermittent suction 07/25/25 2000   Placement Verification Measurements 07/25/25 2000   Distal Tube Measurement (cm) 60 cm 07/25/25 2000   Site Assessment Clean;Dry;Intact 07/26/25 0105   Drainage Appearance Tan 07/25/25 1200   Response To Intervention No resistance met 07/25/25 0300   Tube Securement Taped to nostril center 07/25/25 0300   Output (mL) 300 mL 07/26/25 0540       Colostomy LUQ  (Active)   Present on Admission to Healthcare Facility N 07/25/25 2000   Site/Stoma Assessment Clean;Intact 07/26/25 0638   Peristomal Assessment Clean;Intact 07/26/25 0638       Colostomy RUQ (Active)   Site/Stoma Assessment Clean;Intact 07/26/25 0640   Peristomal Assessment Clean;Intact 07/26/25 0640   Drainage Characteristics Brown;Yellow 07/26/25 0400   Output (mL) 20 mL 07/26/25 0400       Urethral Catheter (Active)   Site Assessment Clean;Skin intact 07/26/25 0214   Collection Container Standard drainage bag 07/25/25 2000   Securement Method Securing device (Describe) 07/25/25 2000   Reason for Continuing Urinary Catheterization accurate hourly measurement of urine volume in a critically ill patient that cannot be assessed by other volumes and urine collection strategies 07/25/25 2000   Output (mL) 60 mL 07/26/25 0638       Tourniquet Times:         Implants:     Findings: Ischemic 5-6 cm of colon proximal to colorectal anastomosis, some murky fluid in left abdomen, no other evidence of enterotomy. Colorectal anastomosis taken down and matured as end colostomy in RUQ, Double barrel jejunostomy taken down and converted to end jejunostomy in LUQ. Drain left adjacent to rectal stump in pelvis.     Indications: Bereket Em is an 59 y.o. male who is having surgery for Incarcerated ventral hernia [K43.6]  Diverticulosis of large intestine without hemorrhage [K57.30]. He had undergone a takedown of an ECF on 7/22 with creation of a double barrel jejunostomy and an anastomosis of transverse colon to rectum through an ileal window. On POD 1 he was doing well. On POD 2 his labs were notable for leukopenia and he developed a low-grade tachycardia. He underwent CT scan which did not demonstrate any unexpected findings given the magnitude of surgery that he had undergone. He was seen by all staff surgeons throughout the course of the day and reported feeling ok, just somewhat tight with regard to his abdomen. In the  afternoon he developed afib, and around 11pm developed afib with RVR. I was notified by the resident team that he was being transferred to the ICU for this. I discussed his care with Dr. Gross, who recommended a bedside flex sig to evaluate the integrity of the colorectal anastomosis. I came in and performed this and we found that the transverse colon that comprised the proximal aspect of the anastomosis appeared ischemic. Broad spectrum antibiotics had been initiated at this point. The patient had fairly rapidly decompensated and was on norepinephrine, angiotensin, and vasopressin. We booked the patient for reexploration (for which he had actually consented prior to intubation just in case) and I notified Dr. Gross and Dr. Perry so that they would be able to join us for the case. I also notified the patient's family of the plan for reexploration.    Procedure Details:   The patient was taken from the CTICU to the OR. He was positioned supine, with arms out. His incisional wound vac and stoma appliance were removed. His abdomen was prepped with betadine and was draped. A surgical timeout confirming patient and procedure was performed. He was receiving antibiotics and DVT prophylaxis.    We began by removing his existing skin sutures and his subcutaneous drain. We then removed the mesh suture that had previously been used to close his fascia. The fascia was opened. At this point Dr. Perry joined us and we performed an exploration of the abdomen. We ran the small bowel from ligament of Treitz to the cecum. There was no evidence of small bowel ischemia or perforation. There was some murky fluid in the left abdomen but no discrete abscess or enteric fluid. We then examined the colon. The cecum and ascending colon and proximal transverse colon appeared healthy, however the portion of the colon below the ileal window going into the rectum appeared ischemic. Again, there was no evidence of perforation. At this  point Dr. Gross joined us. He performed a circumferential dissection of the proximal rectum and divided this with a TA stapler. The transverse colon was then brought up into the operative field and it was clear that the distalmost 5-6cm of this was ischemic and non-viable. The ischemic portion was resected, and the colon was ultimately resected back to healthier bleeding tissue. The rectal stump was oversewn and marked with a 2-0 PDS suture, leaving long tails.    We discussed options at this point. Performing an anastomosis at this operation was not an option given that this patient was on vasopressor support, although throughout the course of the procedure his hemodynamics had improved substantially and he was able to be weaned off several of these medications. We discussed leaving his abdomen open and taking him back in several days to create a small bowel anastomosis, but this also seemed high risk given that he had already experienced this complication and would be going into a third operation with diminished reserve, as well as the need to leave his abdomen open for several days which carries its own risks and downsides. Ultimately we decided that the safest thing was to mature his colon as an end colostomy and to mature an end jejunostomy in his left abdomen at the site of his prior colostomy. The double barrel jejunostomy was taken down from his abdominal wall and the distal limb was stapled off so as to allow maturation of just the proximal aspect more effectively, also taking into account the fact that he would require an open operation in the future to re-attempt colostomy reversal. The fascia at his prior colostomy site was re-opened and all mesh suture removed.    I performed a figure-of-eight closure of his abdominal wall using #1 PDS suture. The complexity of this was increased due to his acutely reoperative fascia, history of prior hernia, dilated bowel, and presence of contamination in the setting  of ischemic tissue. We decided against using mesh suture at this operation given that the patient will require another midline laparotomy for restoration of intestinal continuity in the future.  The skin was closed with staples.    Following this, the colostomy and jejunostomy were matured using chromic sutures. Please see Dr. Gross' documentation for this aspect of the procedure.    Evidence of Infection: Yes - ischemic tissue  Complications:  None; patient tolerated the procedure well.    Disposition: ICU - intubated and hemodynamically stable.  Condition: stable     This procedure was not performed to treat colon cancer through resection    Additional Details: Postoperatively Dr. Perry, Dr. Gross, and myself spoke with the patient's family to inform them of the operative findings and anticipated outcomes. Dr. Perry and Dr. rGoss acted as co-surgeons for this complex, reoperative operation in a critically ill patient as there were no adequately qualified residents available.    Attending Attestation: I was present and scrubbed for the entire procedure.    Jerel Olson  Phone Number: [phone]

## 2025-07-26 NOTE — PROGRESS NOTES
I received a call from Dr. Olson that Mr. Em had worsening tachycardia, hypotension, was transferred to the ICU and flexible sigmoidoscopy showed an ischemic colon proximal to the anastomosis.  Prior to this, I saw him last at ~1730 (my note was written later that night) and he was well appearing without peritonitis or instability with recognized new leukopenia but unremarkable CT scan.  He was taken to the OR, and I met the team in the OR.

## 2025-07-26 NOTE — PROGRESS NOTES
"     COLORECTAL SURGERY PROGRESS NOTE  Bereket Em is a 59 y.o. male on day 4 of admission presenting with perforated diverticulitis POD4 of abdominal debridements, enterocutaneous fistula takedown, Soliman's reversal with end-to-end colonic anastomosis, double barrel jejunostomy creation, and wound VAC placement C/B bowel ischemia now POD 1 for ex lap with colonic anastomosis takedown with bowel resection and end colostomy.    Subjective   NAEON, resting comfortably and answering questions.  Seems to be doing better than yesterday.  Currently extubated.  Pain is tolerated better.       Objective     Physical Exam  Vitals and nursing note reviewed.   Constitutional:       Appearance: He is not toxic-appearing.     Eyes:      Conjunctiva/sclera: Conjunctivae normal.      Pupils: Pupils are equal, round, and reactive to light.       Cardiovascular:      Comments: Rate controlled with amiodarone drip, decreased to 0.2 mg/mL for pressor  Pulmonary:      Effort: Pulmonary effort is normal. No respiratory distress.      Breath sounds: No stridor. No wheezing.      Comments: On room air, extubated  Abdominal:      Comments: Mild distention, soft, mildly tender as appropriate, jejunostomy pouch on left abdomen in place with mild bowel sweat present, mucosa pink and moist.  End colostomy pouch on right abdomen with no output present, mucosa pink and moist.     Skin:     General: Skin is warm and dry.      Coloration: Skin is not jaundiced.     Neurological:      General: No focal deficit present.     Psychiatric:         Mood and Affect: Mood normal.        This patient has a central line   Reason for the central line remaining today? Hemodynamic monitoring    Last Recorded Vitals  Blood pressure 100/65, pulse 67, temperature 37 °C (98.6 °F), temperature source Temporal, resp. rate 22, height 1.727 m (5' 8\"), weight 88.7 kg (195 lb 8.8 oz), SpO2 100%.  Intake/Output last 3 Shifts:  I/O last 3 completed shifts:  In: " 9067.9 (102.2 mL/kg) [I.V.:980.1 (11 mL/kg); Blood:850; IV Piggyback:3049.4]  Out: 4015 (45.3 mL/kg) [Urine:2615 (0.8 mL/kg/hr); Emesis/NG output:1150; Drains:180; Stool:20; Blood:50]  Weight: 88.7 kg     Relevant Results  Scheduled medications  Scheduled Medications[1]  Continuous medications  Continuous Medications[2]  PRN medications  PRN Medications[3]    Results for orders placed or performed during the hospital encounter of 07/22/25 (from the past 24 hours)   Transthoracic Echo (TTE) Limited   Result Value Ref Range    LVOT diam 2.10 cm    MV E/A ratio 1.16     LA vol index A/L 33.2 ml/m2    LV EF 63 %    LVIDd 4.90 cm    BSA 1.86 m2   Blood Culture    Specimen: Peripheral Venipuncture; Blood culture   Result Value Ref Range    Blood Culture       Identification and susceptibility testing to follow    Gram Stain Gram negative bacilli (AA)    Blood Culture    Specimen: Peripheral Venipuncture; Blood culture   Result Value Ref Range    Blood Culture Loaded on Instrument - Culture in progress    Blood Gas Arterial Full Panel   Result Value Ref Range    POCT pH, Arterial 7.47 (H) 7.38 - 7.42 pH    POCT pCO2, Arterial 33 (L) 38 - 42 mm Hg    POCT pO2, Arterial 101 (H) 85 - 95 mm Hg    POCT SO2, Arterial 100 94 - 100 %    POCT Oxy Hemoglobin, Arterial 97.4 94.0 - 98.0 %    POCT Hematocrit Calculated, Arterial 26.0 (L) 41.0 - 52.0 %    POCT Sodium, Arterial 128 (L) 136 - 145 mmol/L    POCT Potassium, Arterial 3.6 3.5 - 5.3 mmol/L    POCT Chloride, Arterial 99 98 - 107 mmol/L    POCT Ionized Calcium, Arterial 1.08 (L) 1.10 - 1.33 mmol/L    POCT Glucose, Arterial 116 (H) 74 - 99 mg/dL    POCT Lactate, Arterial 2.9 (H) 0.4 - 2.0 mmol/L    POCT Base Excess, Arterial 0.5 -2.0 - 3.0 mmol/L    POCT HCO3 Calculated, Arterial 24.0 22.0 - 26.0 mmol/L    POCT Hemoglobin, Arterial 8.5 (L) 13.5 - 17.5 g/dL    POCT Anion Gap, Arterial 9 (L) 10 - 25 mmo/L    Patient Temperature 37.0 degrees Celsius    FiO2 40 %   Calcium, Ionized    Result Value Ref Range    POCT Calcium, Ionized 1.10 1.1 - 1.33 mmol/L   CBC   Result Value Ref Range    WBC 3.5 (L) 4.4 - 11.3 x10*3/uL    nRBC 0.0 0.0 - 0.0 /100 WBCs    RBC 2.98 (L) 4.50 - 5.90 x10*6/uL    Hemoglobin 7.7 (L) 13.5 - 17.5 g/dL    Hematocrit 23.2 (L) 41.0 - 52.0 %    MCV 78 (L) 80 - 100 fL    MCH 25.8 (L) 26.0 - 34.0 pg    MCHC 33.2 32.0 - 36.0 g/dL    RDW 13.6 11.5 - 14.5 %    Platelets 151 150 - 450 x10*3/uL   Magnesium   Result Value Ref Range    Magnesium 2.83 (H) 1.60 - 2.40 mg/dL   Renal Function Panel   Result Value Ref Range    Glucose 125 (H) 74 - 99 mg/dL    Sodium 133 (L) 136 - 145 mmol/L    Potassium 3.4 (L) 3.5 - 5.3 mmol/L    Chloride 97 (L) 98 - 107 mmol/L    Bicarbonate 26 21 - 32 mmol/L    Anion Gap 13 10 - 20 mmol/L    Urea Nitrogen 17 6 - 23 mg/dL    Creatinine 0.82 0.50 - 1.30 mg/dL    eGFR >90 >60 mL/min/1.73m*2    Calcium 8.0 (L) 8.6 - 10.6 mg/dL    Phosphorus 2.1 (L) 2.5 - 4.9 mg/dL    Albumin 3.0 (L) 3.4 - 5.0 g/dL   Prepare RBC: 1 Units   Result Value Ref Range    PRODUCT CODE R8715P82     Unit Number [id number]     Unit ABO A     Unit RH POS     XM INTEP COMP     Dispense Status RE     Blood Expiration Date 8/9/2025 11:59:00 PM EDT     PRODUCT BLOOD TYPE 6200     UNIT VOLUME 350    POCT GLUCOSE   Result Value Ref Range    POCT Glucose 130 (H) 74 - 99 mg/dL   Calcium, Ionized   Result Value Ref Range    POCT Calcium, Ionized 1.05 (L) 1.1 - 1.33 mmol/L   CBC   Result Value Ref Range    WBC 6.7 4.4 - 11.3 x10*3/uL    nRBC 0.0 0.0 - 0.0 /100 WBCs    RBC 3.29 (L) 4.50 - 5.90 x10*6/uL    Hemoglobin 8.5 (L) 13.5 - 17.5 g/dL    Hematocrit 25.7 (L) 41.0 - 52.0 %    MCV 78 (L) 80 - 100 fL    MCH 25.8 (L) 26.0 - 34.0 pg    MCHC 33.1 32.0 - 36.0 g/dL    RDW 14.1 11.5 - 14.5 %    Platelets 139 (L) 150 - 450 x10*3/uL   Coagulation Screen   Result Value Ref Range    Protime 13.9 (H) 9.8 - 12.4 seconds    INR 1.3 (H) 0.9 - 1.1    aPTT 30 26 - 36 seconds   Magnesium   Result Value  Ref Range    Magnesium 2.54 (H) 1.60 - 2.40 mg/dL   Renal Function Panel   Result Value Ref Range    Glucose 122 (H) 74 - 99 mg/dL    Sodium 135 (L) 136 - 145 mmol/L    Potassium 3.3 (L) 3.5 - 5.3 mmol/L    Chloride 98 98 - 107 mmol/L    Bicarbonate 27 21 - 32 mmol/L    Anion Gap 13 10 - 20 mmol/L    Urea Nitrogen 14 6 - 23 mg/dL    Creatinine 0.75 0.50 - 1.30 mg/dL    eGFR >90 >60 mL/min/1.73m*2    Calcium 7.8 (L) 8.6 - 10.6 mg/dL    Phosphorus 2.2 (L) 2.5 - 4.9 mg/dL    Albumin 2.7 (L) 3.4 - 5.0 g/dL   Blood Gas Arterial Full Panel   Result Value Ref Range    POCT pH, Arterial 7.49 (H) 7.38 - 7.42 pH    POCT pCO2, Arterial 34 (L) 38 - 42 mm Hg    POCT pO2, Arterial 105 (H) 85 - 95 mm Hg    POCT SO2, Arterial 99 94 - 100 %    POCT Oxy Hemoglobin, Arterial 96.7 94.0 - 98.0 %    POCT Hematocrit Calculated, Arterial 27.0 (L) 41.0 - 52.0 %    POCT Sodium, Arterial 131 (L) 136 - 145 mmol/L    POCT Potassium, Arterial 3.1 (L) 3.5 - 5.3 mmol/L    POCT Chloride, Arterial 101 98 - 107 mmol/L    POCT Ionized Calcium, Arterial 1.06 (L) 1.10 - 1.33 mmol/L    POCT Glucose, Arterial 121 (H) 74 - 99 mg/dL    POCT Lactate, Arterial 1.6 0.4 - 2.0 mmol/L    POCT Base Excess, Arterial 2.6 -2.0 - 3.0 mmol/L    POCT HCO3 Calculated, Arterial 25.9 22.0 - 26.0 mmol/L    POCT Hemoglobin, Arterial 9.0 (L) 13.5 - 17.5 g/dL    POCT Anion Gap, Arterial 7 (L) 10 - 25 mmo/L    Patient Temperature 37.0 degrees Celsius    FiO2 30 %   POCT GLUCOSE   Result Value Ref Range    POCT Glucose 122 (H) 74 - 99 mg/dL     *Note: Due to a large number of results and/or encounters for the requested time period, some results have not been displayed. A complete set of results can be found in Results Review.     IMAGING  Transthoracic Echo (TTE) Limited  Result Date: 7/25/2025   [facility], [address] [place] [address]                Tel [phone] and Fax [phone] TRANSTHORACIC ECHOCARDIOGRAM REPORT  Patient Name:       NATHEN  SAIMA Gallegos Physician:    [id number] Chuck Astorga MD Study Date:         7/25/2025           Ordering Provider:    [id number] JOHANNA OROZCO MRN/PID:            [id number]            Fellow: Accession#:         [id number]        Nurse: Date of Birth/Age:  1965 / 59 years Sonographer:          Kenyetta Gallagher RDCS Gender assigned at  M                   Additional Staff: Birth: Height:             172.72 cm           Admit Date:           7/22/2025 Weight:             71.67 kg            Admission Status:     Inpatient - STAT BSA / BMI:          1.85 m2 / 24.02     Encounter#:           [id number]                     kg/m2 Blood Pressure:     87/48 mmHg          Department Location:  [facility] Study Type:    TRANSTHORACIC ECHO (TTE) LIMITED Diagnosis/ICD: Unspecified atrial fibrillation-I48.91 Indication:    new onset afib RVR, shock CPT Code:      Echo Limited-64199; Doppler Limited-19667; Color Doppler-14293 Patient History: Pertinent History: HTN. Study Detail: The following Echo studies were performed: 2D, M-Mode, Doppler and               color flow. Technically challenging study due to patient lying in               supine position and prominent lung artifact. The patient is               intubated. Optison used as a contrast agent for endocardial border               definition. Total contrast used for this procedure was 2.0 mL via               IV push.  PHYSICIAN INTERPRETATION: Left Ventricle: Left ventricular ejection fraction is normal by visual estimate at 60-65%. The patient is in atrial fibrillation/flutter which may influence the estimate of left ventricular function and transvalvular flows. There are no regional left ventricular wall motion abnormalities. The left ventricular cavity size is normal.  There is normal septal and normal posterior left ventricular wall thickness. Left ventricular diastolic filling was not assessed due to atrial fibrillation/flutter. Left Atrium: The left atrium is mildly dilated. Right Ventricle: The right ventricle is normal in size. There is normal right ventricular global systolic function. Right Atrium: The right atrial size was not well visualized. Aortic Valve: The aortic valve was not assessed. There is no evidence of aortic valve regurgitation. Mitral Valve: The mitral valve is normal in structure. There is mild mitral annular calcification. There is trace mitral valve regurgitation. The E Vmax is 0.70 m/s. Tricuspid Valve: The tricuspid valve is structurally normal. There is trace tricuspid regurgitation. Pulmonic Valve: The pulmonic valve was not assessed. Pulmonic valve regurgitation was not assessed. Pericardium: Pericardial effusion was not assessed. Aorta: The aortic root was not assessed. Systemic Veins: The inferior vena cava appears normal in size, IVC inspiratory collapse not assessed due to mechanical ventilation. In comparison to the previous echocardiogram(s): Compared with study dated 6/6/2024, no significant change.  CONCLUSIONS:  1. Left ventricular ejection fraction is normal by visual estimate at 60-65%.  2. The patient is in atrial fibrillation/flutter which may influence the estimate of left ventricular function and transvalvular flows.  3. There is normal right ventricular global systolic function.  4. The left atrium is mildly dilated.  5. No evidence of significant valvular pathology.  6. No pericardial effusion. QUANTITATIVE DATA SUMMARY:  2D MEASUREMENTS:         Normal Ranges: LAs:             4.50 cm (2.7-4.0cm) IVSd:            0.90 cm (0.6-1.1cm) LVPWd:           0.90 cm (0.6-1.1cm) LVIDd:           4.90 cm (3.9-5.9cm) LVIDs:           4.10 cm LV Mass Index:   83 g/m2 LV % FS          16.3 %  LEFT ATRIUM:                  Normal Ranges: LA Vol  A4C:        45.5 ml    (22+/-6mL/m2) LA Vol A2C:        74.4 ml LA Vol BP:         61.3 ml LA Vol Index A4C:  24.6ml/m2 LA Vol Index A2C:  40.2 ml/m2 LA Vol Index BP:   33.2 ml/m2 LA Area A4C:       17.9 cm2 LA Area A2C:       24.1 cm2 LA Major Axis A4C: 6.0 cm LA Major Axis A2C: 6.6 cm LA Volume Index:   33.2 ml/m2  LV SYSTOLIC FUNCTION:                      Normal Ranges: EF-Visual:      63 % LV EF Reported: 63 %  LV DIASTOLIC FUNCTION:             Normal Ranges: MV Peak E:             0.70 m/s    (0.7-1.2 m/s) MV Peak A:             0.61 m/s    (0.42-0.7 m/s) E/A Ratio:             1.16        (1.0-2.2) MV A Dur:              108.00 msec MV DT:                 217 msec    (150-240 msec)  MITRAL VALVE:          Normal Ranges: MV DT:        217 msec (150-240msec)  AORTIC VALVE:          Normal Ranges: LVOT Diameter: 2.10 cm (1.8-2.4cm)  TRICUSPID VALVE/RVSP:         Normal Ranges: Est. RA Pressure:     3 IVC Diam:             1.70 cm  [id number] Chuck Astorga MD Electronically signed on 7/25/2025 at 10:53:57 AM  ** Final **     CT abdomen pelvis w IV contrast  Result Date: 7/25/2025  Interpreted By:  Nando Mack and Hall Bailey STUDY: CT ABDOMEN PELVIS W IV CONTRAST;  7/24/2025 2:06 pm   INDICATION: Signs/Symptoms:S/p ECF takedown with gilles's reversal 7/22, tachycardia.     COMPARISON: CT abdomen pelvis with IV contrast 12/14/2024.   ACCESSION NUMBER(S): [id number]   ORDERING CLINICIAN: TAMY MITCHELL   TECHNIQUE: CT of the abdomen and pelvis was performed.  Standard contiguous axial images were obtained at 3 mm slice thickness through the abdomen and pelvis. Coronal and sagittal reconstructions at 3 mm slice thickness were performed.  80 ML of Omnipaque 350 was administered intravenously without immediate complication.   FINDINGS: LOWER CHEST: The visualized lung base demonstrates trace bibasilar pleural effusions. There is dependent bibasilar atelectasis. There is a 1.4 cm x 0.9 cm calcified nodule within  the left upper lobe, stable from prior exam.  The heart is normal in size without pericardial effusion. Visualized distal esophagus appears normal with an enteric tube traveling throughout its lumen. Please refer to same-day CTA chest for complete evaluation.   ABDOMEN:   LIVER: The liver is normal in size and demonstrates diffusely decreased attenuation suggesting steatosis. There is a 1.2 cm x 1.1 cm hypodensity within segment II (series 501, image 39 of 166), stable from prior exam and statistically favored to represent a benign hepatic cyst. There are multiple subcentimeter hypodensities throughout the liver parenchyma similar to prior exam that are too small to further characterize, but statistically likely to represent cysts or hemangiomas.   BILE DUCTS: The intrahepatic and extrahepatic ducts are not dilated.   GALLBLADDER: The gallbladder is nondistended and without evidence of radiopaque stones.   PANCREAS: The pancreas appears unremarkable without evidence of ductal dilatation or masses.   SPLEEN: The spleen is normal in size and demonstrates multiple calcific foci.   ADRENAL GLANDS: Bilateral adrenal glands are diffusely enlarged without focal nodules or masses.   KIDNEYS AND URETERS: The kidneys are normal in size and enhance symmetrically.  No hydroureteronephrosis or nephroureterolithiasis is identified.   PELVIS:   BLADDER: The urinary bladder appears normal without abnormal wall thickening. There is air within the bladder, correlate with recent urinary catheterization.   REPRODUCTIVE ORGANS: The prostate is not enlarged.   BOWEL: There is an enteric tube entering the stomach from the esophagus, terminates in the gastric antrum. Stomach is decompressed, appears unremarkable. Postsurgical changes consistent with prior left hemicolectomy and interval jejunostomy creation with Margarita's reversal and double-barrel jejunostomy formation; the small bowel is otherwise unremarkable and demonstrates normal  caliber without abnormal wall thickening. The anastomosis between the transverse colon and rectum is visualized and appears intact without evidence of obstruction.     The appendix is not definitely visualized. There is however no discrete pericecal stranding or fluid.   VESSELS: There is no aneurysmal dilatation of the abdominal aorta. The IVC appears normal.   PERITONEUM/RETROPERITONEUM/LYMPH NODES: Gerota's fascia is pronounced. There is a small amount of intraperitoneal ascites with associated mesenteric stranding, favored to represent anticipated postsurgical changes. Free fluid is also seen in the presacral space with air foci. There are multiple air foci anterior to the anterior peritoneal fascia, as well as within the peritoneum.   BONES AND ABDOMINAL WALL: There is a 5.5 x 2.9 x 2.2 cm hyperdensity within the left anterior abdominal wall with an adjacent air focus (series 503, image 85 of 163; series 501, image 75 of 166), corresponding to the location of prior ileostomy; there is no rim enhancement. There are air foci within the anterior abdominal subcutaneous tissue adjacent to recent surgical incisions. A surgical drain is seen entering the left anterior abdominal wall, terminates within the anterior subcutaneous tissue.  No acute or concerning osseous findings.       1.  Postsurgical changes of ileostomy and enterocutaneous fistula takedown, Margarita's reversal, and jejunostomy when compared to prior exam. There are scattered air foci and pockets of ascites with generalized mesenteric stranding, favored to represent expected postsurgical changes. Anastomosis appears intact. No loculated fluid collections, no evidence of bowel obstruction or ischemia. 2. Hyperdense collection within the left anterior abdominal subcutaneous tissue corresponding to site of prior ileostomy. No discrete signs of infection, though abscess should be in the differential. May also represent a seroma or hematoma. 3. Additional  chronic or incidental findings as detailed above   I personally reviewed the images/study and I agree with Selene Billingsley MD's (radiology resident) findings as stated. This study was interpreted at [facility], [place], [place].   MACRO: None   Signed by: Nando Mack 7/25/2025 10:02 AM Dictation workstation:   [id number]              Assessment & Plan  Enterocutaneous fistula    Diverticulosis of large intestine without hemorrhage    Primary hypertension    Incarcerated ventral hernia    ASSESSMENT/PLAN  Bereket Em is a 59 y.o. male on day 4 of admission presenting with perforated diverticulitis POD4 of abdominal debridements, enterocutaneous fistula takedown, Soliman's reversal with end-to-end colonic anastomosis, double barrel jejunostomy creation, and wound VAC placement C/B bowel ischemia now POD 1 for ex lap with colonic anastomosis takedown with bowel resection and end colostomy.    Patient is currently in the ICU, extubated, on mild pressors resting comfortably and progressing well from recent surgery.  Currently awaiting return of bowel function and stoma output and following course of treatment by primary.    Plan:  - Appreciate care per SICU, continue ICU level care  -Awaiting return of bowel function  - Continue NGT/NPO, TPN  - Continue Abx: zosyn  - WOC for ostomy support and care  - Colorectal surgery will continue to follow    Discussed with Dr. Bray and colorectal service team  Blanche Love MD  [id number], General Surgery Resident  You Service: [id number]         [1] acetaminophen, 1,000 mg, intravenous, q6h  enoxaparin, 40 mg, subcutaneous, q24h  [Held by provider] fat emulsion fish oil/plant based, 250 mL, intravenous, Daily Lipids  hydrocortisone sodium succinate, 50 mg, intravenous, q6h  insulin lispro, 0-5 Units, subcutaneous, q4h  magnesium sulfate, 2 g, intravenous, Once  pantoprazole, 40 mg, intravenous, Daily  PHENobarbital, 32.5 mg, intravenous,  q8h  piperacillin-tazobactam, 3.375 g, intravenous, q6h  potassium phosphate, 15 mmol, intravenous, Once  [2] [Held by provider] Adult Clinimix TPN Cyclic, , Last Rate: Stopped (07/25/25 0727)  amiodarone, 0.5 mg/min, Last Rate: 0.5 mg/min (07/26/25 0900)  norepinephrine, 0-0.5 mcg/kg/min, Last Rate: 0.02 mcg/kg/min (07/26/25 0900)  [3] PRN medications: calcium gluconate, calcium gluconate, dextrose, dextrose, etomidate, glucagon, glucagon, heparin flush, HYDROmorphone, ketorolac, magnesium sulfate, magnesium sulfate, methocarbamol, midazolam, naloxone, ondansetron, PHENobarbital, potassium chloride, potassium chloride

## 2025-07-26 NOTE — PROGRESS NOTES
I saw and examined the patient.      58 yo male s/p fistula takedown, extensive lysis of adhesions, colostomy reversal, double barrel jejunostomy formation, abdominal wall reconstruction with return to the OR for washout, resection of distal colon, new end colostomy and repositioning of jejnostomy.     Discussed his surgery and possible future options again. Seen in AM and PM.  Pain control is improving.   WBCs 6  Need for vasopressors is resolving.   On exam, improved appearance throughout the day.  Midline dressing in place. Ostomies are pink and viable with sweat in the bag.   ID consult/ABX given E. Coli bacteremia.  TPN for nutrition.  Local wound care and ostomy care. Lovenox. PT. Remainder of care per ICU.

## 2025-07-26 NOTE — OP NOTE
Date of Surgery: 07/25/25    Pre-op diagnosis: Ischemic colon and septic shock following extensive lysis of adhesions, takedown of enterocutaneous fistula, creation of double barrel jejunostomy, abdominal wall debridement, repair of ventral incisional hernia, reversal of end colostomy and wound vac placement    Post-op diagnosis: Ischemic colon and septic shock following extensive lysis of adhesions, takedown of enterocutaneous fistula, creation of double barrel jejunostomy, abdominal wall debridement, repair of ventral incisional hernia, reversal of end colostomy and wound vac placement    Procedure: Reopening of recent laparotomy, abdominal washout.  Attending: Bereket Perry MD (Please see operative reports of Dr. Olson and Dr. Gross for their portions of the case.)    Anesthesia: General    EBL: 5 mL    Specimens: None for this portion    Complications: Septic shock    Drains: None for this portion.     Disposition: ICU     Indication: I received a call from Dr. Olson that Mr. Em had worsening tachycardia, hypotension, was transferred to the ICU and flexible sigmoidoscopy showed an ischemic colon proximal to the anastomosis. Prior to this, I saw him last at ~1730 (my note was written later that night) and he was well appearing without peritonitis or instability with recognized new leukopenia but unremarkable CT scan. He was taken to the OR, and I met the team in the OR.     Procedure: Upon my arrival to the OR, the patient was under general anesthesia.  I reviewed his care with Dr. Olson. The patient was on 3 vasopressors in septic shock. His abdomen had just been opened. I scrubbed into the case.     The abdomen was open with bowel exposed.  His jejunostomy was pink and viable. The small bowel was healthy and unremarkable.  The small bowel was run from the proximal ostomy back to the ligament of Trietz and was unremarkable. The small bowel from the distal ostomy was run to the cecum and was  unremarkable.  The liver and stomach were grossly unremarkable.  The right colon was healthy and unremarkable.  The distal transverse colon in the pelvis down to the anastomosis had a 5-6 cm distal segment that was ischemic and matched Dr. Olson's colonoscopy findings.  There was no succus or stool within the abdomen.  The colorectal anastomosis appeared intact.  The abdomen was washed out with saline, and there was some murky fluid but no gross pus.      Dr. Gross was present and performed the colonic anastomosis takedown, colon resection, new right sided end colostomy formation and new left sided jejunostomy formation.  Please see his note.     We discussed the surgical options for Mr. Em.  We discussed repeat colorectal anastomosis with continued diversion, end colostomy formation with current jejunostomy and end colostomy formation with possible small bowel anastomosis in a few days with an open abdomen for reassessment.  Given his shock and 3 vasopressor use, an anastomosis was not performed at this time. In addition, his degree of shock and decompensation does not match the small area of colon ischemia present especially without gross perforation or intraabdominal stool and succus and in a diverted colon.  His current state may reflect his diminished physiologic reserve after 3 surgeries last year, prolonged inflamed state from his fistula/leakage and fluid shifts and TPN dependence. Therefore, the safest course was felt to be end colostomy formation on the right given the location of the colon and jejunostomy placement from right to the left side.  While this is not his desired outcome, his quality of life should be improved with a pouchable jejunostomy and resolution of his ventral incisional hernia.     Dr. Olson closed the fascia and abdominal wall. Please see his note.     All sponge and needle counts were correct. I was scrubbed and present for the dictated portion of the operation.  We all spoke  to his brother in the pre-op area after surgery.      I saw Mr. Em in the ICU in the morning, and he was still intubated and sedated.  I returned in the evening, and he was extubated.  I discussed the colonoscopy findings, his septic shock, the intraoperative findings and his current GI and abdominal wall anatomy.  I answered his questions.

## 2025-07-26 NOTE — PROGRESS NOTES
"                                                                            Surgical Intensive Care Unit Progress Note     Subjective   Patient extubated yesterday to nasal cannula. Overnight, remained in NSR, still on levo 0.02. Hgb improved from 7.7 to 8.5 after 1 pRBCs.     Objective     Physical Exam  Vitals reviewed.   Constitutional:       Appearance: Normal appearance.   HENT:      Head: Normocephalic and atraumatic.      Nose: Nose normal.      Comments: NG to LIWS. Nasal cannula in place.     Eyes:      Conjunctiva/sclera: Conjunctivae normal.      Pupils: Pupils are equal, round, and reactive to light.       Cardiovascular:      Rate and Rhythm: Normal rate and regular rhythm.      Pulses: Normal pulses.   Pulmonary:      Effort: Pulmonary effort is normal.      Breath sounds: Normal breath sounds.   Abdominal:      General: Abdomen is flat.      Palpations: Abdomen is soft.      Comments: Midline incisions dressed with drain to suction and jejunostomy and colostomy present.     Skin:     General: Skin is warm and dry.     Neurological:      Mental Status: He is oriented to person, place, and time.         Last Recorded Vitals  Blood pressure 100/65, pulse 67, temperature 37 °C (98.6 °F), temperature source Temporal, resp. rate 22, height 1.727 m (5' 8\"), weight 88.7 kg (195 lb 8.8 oz), SpO2 100%.  Intake/Output last 3 Shifts:  I/O last 3 completed shifts:  In: 9067.9 (102.2 mL/kg) [I.V.:980.1 (11 mL/kg); Blood:850; IV Piggyback:3049.4]  Out: 4015 (45.3 mL/kg) [Urine:2615 (0.8 mL/kg/hr); Emesis/NG output:1150; Drains:180; Stool:20; Blood:50]  Weight: 88.7 kg     Relevant Results    Scheduled medications  Scheduled Medications[1]  Continuous medications  Continuous Medications[2]  PRN medications  PRN Medications[3]     Results for orders placed or performed during the hospital encounter of 07/22/25 (from the past 24 hours)   CBC and Auto Differential   Result Value Ref Range    WBC 2.8 (L) 4.4 - 11.3 " x10*3/uL    nRBC 0.0 0.0 - 0.0 /100 WBCs    RBC 3.28 (L) 4.50 - 5.90 x10*6/uL    Hemoglobin 8.5 (L) 13.5 - 17.5 g/dL    Hematocrit 25.9 (L) 41.0 - 52.0 %    MCV 79 (L) 80 - 100 fL    MCH 25.9 (L) 26.0 - 34.0 pg    MCHC 32.8 32.0 - 36.0 g/dL    RDW 13.7 11.5 - 14.5 %    Platelets 190 150 - 450 x10*3/uL    Immature Granulocytes %, Automated 6.0 (H) 0.0 - 0.9 %    Immature Granulocytes Absolute, Automated 0.17 0.00 - 0.70 x10*3/uL   Type and screen   Result Value Ref Range    ABO TYPE A     Rh TYPE POS     ANTIBODY SCREEN NEG    Calcium, Ionized   Result Value Ref Range    POCT Calcium, Ionized 1.12 1.1 - 1.33 mmol/L   Magnesium   Result Value Ref Range    Magnesium 1.68 1.60 - 2.40 mg/dL   Renal Function Panel   Result Value Ref Range    Glucose 112 (H) 74 - 99 mg/dL    Sodium 133 (L) 136 - 145 mmol/L    Potassium 4.0 3.5 - 5.3 mmol/L    Chloride 99 98 - 107 mmol/L    Bicarbonate 25 21 - 32 mmol/L    Anion Gap 13 10 - 20 mmol/L    Urea Nitrogen 20 6 - 23 mg/dL    Creatinine 0.85 0.50 - 1.30 mg/dL    eGFR >90 >60 mL/min/1.73m*2    Calcium 8.0 (L) 8.6 - 10.6 mg/dL    Phosphorus 3.0 2.5 - 4.9 mg/dL    Albumin 2.7 (L) 3.4 - 5.0 g/dL   Manual Differential   Result Value Ref Range    Neutrophils %, Manual 23.3 40.0 - 80.0 %    Bands %, Manual 2.3 0.0 - 5.0 %    Lymphocytes %, Manual 40.3 13.0 - 44.0 %    Monocytes %, Manual 17.1 2.0 - 10.0 %    Eosinophils %, Manual 3.1 0.0 - 6.0 %    Basophils %, Manual 0.0 0.0 - 2.0 %    Atypical Lymphocytes %, Manual 0.8 0.0 - 2.0 %    Metamyelocytes %, Manual 9.3 0.0 - 0.0 %    Myelocytes %, Manual 1.5 0.0 - 0.0 %    Plasma Cells %, Manual 0.0 0.00 - 0.00 %    Promyelocytes %, Manual 2.3 0.0 - 0.0 %    Blasts %, Manual 0.0 0.0 - 0.0 %    Others %, Manual 0.0 %    Seg Neutrophils Absolute, Manual 0.65 (L) 1.20 - 7.00 x10*3/uL    Bands Absolute, Manual 0.06 0.00 - 0.70 x10*3/uL    Lymphocytes Absolute, Manual 1.13 (L) 1.20 - 4.80 x10*3/uL    Monocytes Absolute, Manual 0.48 0.10 - 1.00  x10*3/uL    Eosinophils Absolute, Manual 0.09 0.00 - 0.70 x10*3/uL    Basophils Absolute, Manual 0.00 0.00 - 0.10 x10*3/uL    Atypical Lymphs Absolute, Manual 0.02 0.00 - 0.50 x10*3/uL    Metamyelocytes Absolute, Manual 0.26 0.00 - 0.00 x10*3/uL    Myelocytes Absolute, Manual 0.04 0.00 - 0.00 x10*3/uL    Plasma Cells Absolute, Manual 0.00 0.00 - 0.00 x10*3/uL    Promyelocytes Absolute, Manual 0.06 0.00 - 0.00 x10*3/uL    Blasts Absolute, Manual 0.00 0.00 - 0.00 x10*3/uL    Others Absolute, Manual 0.00 x10*3/uL    Total Cells Counted 129     Neutrophils Absolute, Manual 0.71 (L) 1.20 - 7.70 x10*3/uL    Manual nRBC per 100 Cells 0.0 0.0 - 0.0 %    RBC Morphology See Below     Ovalocytes Few     Isak Cells Few    Blood Gas Arterial Full Panel   Result Value Ref Range    POCT pH, Arterial 7.43 (H) 7.38 - 7.42 pH    POCT pCO2, Arterial 35 (L) 38 - 42 mm Hg    POCT pO2, Arterial 130 (H) 85 - 95 mm Hg    POCT SO2, Arterial 100 94 - 100 %    POCT Oxy Hemoglobin, Arterial 97.2 94.0 - 98.0 %    POCT Hematocrit Calculated, Arterial 26.0 (L) 41.0 - 52.0 %    POCT Sodium, Arterial 128 (L) 136 - 145 mmol/L    POCT Potassium, Arterial 4.1 3.5 - 5.3 mmol/L    POCT Chloride, Arterial 100 98 - 107 mmol/L    POCT Ionized Calcium, Arterial 1.16 1.10 - 1.33 mmol/L    POCT Glucose, Arterial 113 (H) 74 - 99 mg/dL    POCT Lactate, Arterial 2.9 (H) 0.4 - 2.0 mmol/L    POCT Base Excess, Arterial -0.9 -2.0 - 3.0 mmol/L    POCT HCO3 Calculated, Arterial 23.2 22.0 - 26.0 mmol/L    POCT Hemoglobin, Arterial 8.8 (L) 13.5 - 17.5 g/dL    POCT Anion Gap, Arterial 9 (L) 10 - 25 mmo/L    Patient Temperature 37.0 degrees Celsius    FiO2 50 %   Coagulation Screen   Result Value Ref Range    Protime 13.8 (H) 9.8 - 12.4 seconds    INR 1.2 (H) 0.9 - 1.1    aPTT 33 26 - 36 seconds   POCT GLUCOSE   Result Value Ref Range    POCT Glucose 116 (H) 74 - 99 mg/dL   MRSA Surveillance for Vancomycin De-escalation, PCR    Specimen: Anterior Nares; Swab   Result  Value Ref Range    MRSA PCR Not Detected Not Detected   Transthoracic Echo (TTE) Limited   Result Value Ref Range    LVOT diam 2.10 cm    MV E/A ratio 1.16     LA vol index A/L 33.2 ml/m2    LV EF 63 %    LVIDd 4.90 cm    BSA 1.86 m2   Blood Culture    Specimen: Peripheral Venipuncture; Blood culture   Result Value Ref Range    Blood Culture Loaded on Instrument - Culture in progress    Blood Culture    Specimen: Peripheral Venipuncture; Blood culture   Result Value Ref Range    Blood Culture Loaded on Instrument - Culture in progress    Blood Gas Arterial Full Panel   Result Value Ref Range    POCT pH, Arterial 7.47 (H) 7.38 - 7.42 pH    POCT pCO2, Arterial 33 (L) 38 - 42 mm Hg    POCT pO2, Arterial 101 (H) 85 - 95 mm Hg    POCT SO2, Arterial 100 94 - 100 %    POCT Oxy Hemoglobin, Arterial 97.4 94.0 - 98.0 %    POCT Hematocrit Calculated, Arterial 26.0 (L) 41.0 - 52.0 %    POCT Sodium, Arterial 128 (L) 136 - 145 mmol/L    POCT Potassium, Arterial 3.6 3.5 - 5.3 mmol/L    POCT Chloride, Arterial 99 98 - 107 mmol/L    POCT Ionized Calcium, Arterial 1.08 (L) 1.10 - 1.33 mmol/L    POCT Glucose, Arterial 116 (H) 74 - 99 mg/dL    POCT Lactate, Arterial 2.9 (H) 0.4 - 2.0 mmol/L    POCT Base Excess, Arterial 0.5 -2.0 - 3.0 mmol/L    POCT HCO3 Calculated, Arterial 24.0 22.0 - 26.0 mmol/L    POCT Hemoglobin, Arterial 8.5 (L) 13.5 - 17.5 g/dL    POCT Anion Gap, Arterial 9 (L) 10 - 25 mmo/L    Patient Temperature 37.0 degrees Celsius    FiO2 40 %   Calcium, Ionized   Result Value Ref Range    POCT Calcium, Ionized 1.10 1.1 - 1.33 mmol/L   CBC   Result Value Ref Range    WBC 3.5 (L) 4.4 - 11.3 x10*3/uL    nRBC 0.0 0.0 - 0.0 /100 WBCs    RBC 2.98 (L) 4.50 - 5.90 x10*6/uL    Hemoglobin 7.7 (L) 13.5 - 17.5 g/dL    Hematocrit 23.2 (L) 41.0 - 52.0 %    MCV 78 (L) 80 - 100 fL    MCH 25.8 (L) 26.0 - 34.0 pg    MCHC 33.2 32.0 - 36.0 g/dL    RDW 13.6 11.5 - 14.5 %    Platelets 151 150 - 450 x10*3/uL   Magnesium   Result Value Ref Range     Magnesium 2.83 (H) 1.60 - 2.40 mg/dL   Renal Function Panel   Result Value Ref Range    Glucose 125 (H) 74 - 99 mg/dL    Sodium 133 (L) 136 - 145 mmol/L    Potassium 3.4 (L) 3.5 - 5.3 mmol/L    Chloride 97 (L) 98 - 107 mmol/L    Bicarbonate 26 21 - 32 mmol/L    Anion Gap 13 10 - 20 mmol/L    Urea Nitrogen 17 6 - 23 mg/dL    Creatinine 0.82 0.50 - 1.30 mg/dL    eGFR >90 >60 mL/min/1.73m*2    Calcium 8.0 (L) 8.6 - 10.6 mg/dL    Phosphorus 2.1 (L) 2.5 - 4.9 mg/dL    Albumin 3.0 (L) 3.4 - 5.0 g/dL   Prepare RBC: 1 Units   Result Value Ref Range    PRODUCT CODE C3060U24     Unit Number [id number]     Unit ABO A     Unit RH POS     XM INTEP COMP     Dispense Status RE     Blood Expiration Date 8/9/2025 11:59:00 PM EDT     PRODUCT BLOOD TYPE 6200     UNIT VOLUME 350    POCT GLUCOSE   Result Value Ref Range    POCT Glucose 130 (H) 74 - 99 mg/dL   Calcium, Ionized   Result Value Ref Range    POCT Calcium, Ionized 1.05 (L) 1.1 - 1.33 mmol/L   CBC   Result Value Ref Range    WBC 6.7 4.4 - 11.3 x10*3/uL    nRBC 0.0 0.0 - 0.0 /100 WBCs    RBC 3.29 (L) 4.50 - 5.90 x10*6/uL    Hemoglobin 8.5 (L) 13.5 - 17.5 g/dL    Hematocrit 25.7 (L) 41.0 - 52.0 %    MCV 78 (L) 80 - 100 fL    MCH 25.8 (L) 26.0 - 34.0 pg    MCHC 33.1 32.0 - 36.0 g/dL    RDW 14.1 11.5 - 14.5 %    Platelets 139 (L) 150 - 450 x10*3/uL   Coagulation Screen   Result Value Ref Range    Protime 13.9 (H) 9.8 - 12.4 seconds    INR 1.3 (H) 0.9 - 1.1    aPTT 30 26 - 36 seconds   Magnesium   Result Value Ref Range    Magnesium 2.54 (H) 1.60 - 2.40 mg/dL   Renal Function Panel   Result Value Ref Range    Glucose 122 (H) 74 - 99 mg/dL    Sodium 135 (L) 136 - 145 mmol/L    Potassium 3.3 (L) 3.5 - 5.3 mmol/L    Chloride 98 98 - 107 mmol/L    Bicarbonate 27 21 - 32 mmol/L    Anion Gap 13 10 - 20 mmol/L    Urea Nitrogen 14 6 - 23 mg/dL    Creatinine 0.75 0.50 - 1.30 mg/dL    eGFR >90 >60 mL/min/1.73m*2    Calcium 7.8 (L) 8.6 - 10.6 mg/dL    Phosphorus 2.2 (L) 2.5 - 4.9  mg/dL    Albumin 2.7 (L) 3.4 - 5.0 g/dL   Blood Gas Arterial Full Panel   Result Value Ref Range    POCT pH, Arterial 7.49 (H) 7.38 - 7.42 pH    POCT pCO2, Arterial 34 (L) 38 - 42 mm Hg    POCT pO2, Arterial 105 (H) 85 - 95 mm Hg    POCT SO2, Arterial 99 94 - 100 %    POCT Oxy Hemoglobin, Arterial 96.7 94.0 - 98.0 %    POCT Hematocrit Calculated, Arterial 27.0 (L) 41.0 - 52.0 %    POCT Sodium, Arterial 131 (L) 136 - 145 mmol/L    POCT Potassium, Arterial 3.1 (L) 3.5 - 5.3 mmol/L    POCT Chloride, Arterial 101 98 - 107 mmol/L    POCT Ionized Calcium, Arterial 1.06 (L) 1.10 - 1.33 mmol/L    POCT Glucose, Arterial 121 (H) 74 - 99 mg/dL    POCT Lactate, Arterial 1.6 0.4 - 2.0 mmol/L    POCT Base Excess, Arterial 2.6 -2.0 - 3.0 mmol/L    POCT HCO3 Calculated, Arterial 25.9 22.0 - 26.0 mmol/L    POCT Hemoglobin, Arterial 9.0 (L) 13.5 - 17.5 g/dL    POCT Anion Gap, Arterial 7 (L) 10 - 25 mmo/L    Patient Temperature 37.0 degrees Celsius    FiO2 30 %     Assessment & Plan  Enterocutaneous fistula    Diverticulosis of large intestine without hemorrhage    Primary hypertension    Incarcerated ventral hernia    Assessment:  Bereket Em is a 59 y.o. 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 Patient underwent ex lap, extensive lysis of adhesions, abdominal wall debridement, ventral hernia repair, excision of EC fistula, jejunostomy, & abdominal wound vac placement by Dr. Olson & Vicky and Margarita's reversal with transverse colon mobilization/ileal window by Dr. Gross on 7/22/2025. Post-operative course complicated by Afib with RVR and septic shock 2/2 ischemic bowel requiring initiation and escalation of pressor support. Patient admitted to SICU for further management s/p ex lap with colonic anastomosis takedown, colon  section, new R sided colostomy and L sided jejunostomy on 7/25.      Plan:  NEURO: Hx of alcohol use on phenobarb taper. Acute post-op pain.   - attempt to mobilize patient OOB to chair today  - continue phenobarb taper  - ongoing neuro and pain assessments  - PRN hydromorphone for pain control  - PT/OT consult  - Home meds: trazodone, oxycodone 5      CV: No previous cardiac history. Developed Afib RVR (HR 160s) on the floor s/p fluids and metoprolol x1. Patient subsequently hypotensive refractory to phenylephrine pushes and fluids. Pt started on amio bolus and infusion with conversion to NSR. On arrival to the SICU, patient hypotensive with SBP in the 60s, suspect 2/2 septic shock requiring initiation and escalation of pressor support. Lactate 2.9 down from 4.0 Troponin normal. TTE normal. Remains on low dose levo 0.02 this morning.  - continue amio infusion   - continuous EKG/abp monitoring  - Goal map range 65-90  - Home meds: none.     PULM: No pulmonary hx.. After arrival to SICU, patient intubated and sedated to facilitate bedside flex sig procedure and need for emergent OR. Extubated yesterday post-OR to NC.   - Q1h incentive spirometer while awake  - additional pulm toilet prn.       GI: Hx of perforated diverticulitis s/p Margarita's (6/7/2024) c/b enterocutaneous fistula and incarcerated ventral hernia s/p exploratory laparotomy, extensive lysis of adhesions, take down of enterocutaneous fistula, creation of jejunostomy, abdominal wall debridement, placement of wound vac, repair of recurrent incarcerated ventral hernia (Dr. Olson) with Margarita's reversal (Dr. Gross). Bedside colonoscopy/flex sig 7/25 showing ischemic colon proximal to anastomosis s/p ex lap with colonic anastomosis takedown, colon section, new R sided colostomy and L sided jejunostomy on 7/25.   - NPO on TPN. Held yesterday, restart today.   - NG to LIWS  - PPI for GI prophylaxis  - Home meds: Diphen/Atrop 2.5/.0 Tab Spec      : No  history of renal disease. Baseline creatinine 0.7-0.9.   - Volume resuscitation as needed  - Maintain U/O >0.5ml/kg/hr  - Colvin for strict I/Os  - Check renal function panel post op and daily  - Replete electrolytes to goal K>4, Mg>2, Phos>2.5, ionized Ca>1.10.     HEME: Acute blood loss anemia. Received 1u PRBC. Hgb 8.5 and stable.  - Check CBC and coags post op and daily  - SCDs and lovenox for DVT prophylaxis  - ongoing monitoring for s/s bleeding     ENDO: No history of DM or thyroid disease. Hydrocortisone 100 mg x1 dose.   - continue stress dose steroids hydrocortisone 50 mg q6h   - Q4h BG   - SSI Lispro per ICU protocol.     ID: Afebrile, leukopenia resolved. Intra-abdominal sepsis 2/2 ischemic bowel and E coli bacteremia. Follow up cultures   - temp q4h, wbc daily  - continue zosyn. ID consult for antibiotic management/possible escalation given positive r/p cultures and continued pressor requirement.   - ongoing monitoring for s/s infection     Lines:  - R radial arterial line (7.25)   - RIJ triple lumen central line (7.25)  - PIVs  - colvin  - NGT     Dispo: Continue SICU care. Patient seen and discussed with ICU attending Dr. Campos.    Constantine Meza MD  Anesthesiology, PGY3/CA2  SICU phone [phone]         [1] acetaminophen, 1,000 mg, intravenous, q6h  enoxaparin, 40 mg, subcutaneous, q24h  [Held by provider] fat emulsion fish oil/plant based, 250 mL, intravenous, Daily Lipids  hydrocortisone sodium succinate, 50 mg, intravenous, q6h  insulin lispro, 0-5 Units, subcutaneous, q4h  magnesium sulfate, 2 g, intravenous, Once  pantoprazole, 40 mg, intravenous, Daily  PHENobarbital, 32.5 mg, intravenous, q8h  piperacillin-tazobactam, 3.375 g, intravenous, q6h  potassium phosphate, 15 mmol, intravenous, Once  [2] [Held by provider] Adult Clinimix TPN Cyclic, , Last Rate: Stopped (07/25/25 0727)  amiodarone, 0.5 mg/min, Last Rate: 0.5 mg/min (07/26/25 0600)  norepinephrine, 0-0.5 mcg/kg/min, Last Rate: 0.02 mcg/kg/min  (07/26/25 0600)  [3] PRN medications: calcium gluconate, calcium gluconate, dextrose, dextrose, etomidate, glucagon, glucagon, heparin flush, HYDROmorphone, ketorolac, magnesium sulfate, magnesium sulfate, methocarbamol, midazolam, naloxone, ondansetron, PHENobarbital, potassium chloride, potassium chloride, vancomycin

## 2025-07-26 NOTE — PROGRESS NOTES
General Surgery Progress Note    07/26/25    Bereket Em    Summary:  Bereket Em is a 59 year old male with a history of perforated diverticulitis s/p Margairta's procedure c/b formation of ECF. Underwent EC fistula takedown, GILLES, Margarita's reversal, abdominal wall debridement, and double barrel jejunostomy with Leyda Olson, Renato, and Vicky on 7/22.  Intra-op findings remarkable for adhesions throughout the abdomen and remaining segment of the colon terminating at the splenic flexure requiring significant mobilization and an ileal window.  Postoperative course complicated by colon ischemia requiring OR take back for partial colectomy, end colostomy creation (RUQ), and conversion of double barrel jejunostomy to end jejunostomy in LUQ on 7/25.       Subjective    Subjective:  No acute events overnight. Remains in ICU in fair conditions. Weaning down on vasopressor requirements - currently on 0.02 levophed. Extubated on POD 0, currently satting well on nasal cannula.    Patient states that he feels lousy and discouraged from this entire process.    Review of Systems:    A 12-point review of systems was performed, and was negative except as above.      Objective    Objective:      Vital signs:   Temp:  [36.2 °C (97.2 °F)-37.3 °C (99.1 °F)] 37 °C (98.6 °F)  Heart Rate:  [61-89] 61  Resp:  [16-27] 20  BP: ()/(49-71) 100/65  Arterial Line BP 1: ()/(41-55) 102/49  FiO2 (%):  [40 %] 40 %    Physical Exam:  GEN: resting comfortably, non-toxic appearance  NEURO: awake, alert, oriented, conversational  HEENT: Sclera anicteric. Moist mucous membranes.   RESP: non-labored breathing on 4L NC   CV: Regular rate and rhythm on amiodarone gtt 0.5, normotensive on 0.02 levophed  GI: Abdomen soft, appropriately tender to palpation around surgical incision, non-peritonitic. RUQ end colostomy stoma pink and viable, bowel sweat in bag, no gas. LUQ end jejunostomy stoma pink, viable with bowel sweat in bag, no  gas. RLQ ANA MARIA drain serosanguinous output (terminates in pelvis near rectal stump). NG tube in place with very small amount of thin dark gastric fluid.     (Clinical photo from 7/26 after packing removal)  : Basilio in place with dark yellow urine.  SKIN: laparotomy closed loosely with staples and packed with betadine soaked cristóbal, which were removed at bedside today (see photo below). No evidence of necrosis, purulent drainage, erythema or significant swelling of midline incision after packing removed.   EXT: no peripheral edema     I/O last 2 completed shifts:  In: 6423.1 (72.4 mL/kg) [I.V.:642.4 (7.2 mL/kg); Blood:850; IV Piggyback:1055]  Out: 2835 (32 mL/kg) [Urine:1985 (0.9 mL/kg/hr); Emesis/NG output:650; Drains:180; Stool:20]  Weight: 88.7 kg      Labs Past 18 Hours:  Recent Results (from the past 18 hours)   Calcium, Ionized    Collection Time: 07/25/25  3:30 PM   Result Value Ref Range    POCT Calcium, Ionized 1.10 1.1 - 1.33 mmol/L   CBC    Collection Time: 07/25/25  3:30 PM   Result Value Ref Range    WBC 3.5 (L) 4.4 - 11.3 x10*3/uL    nRBC 0.0 0.0 - 0.0 /100 WBCs    RBC 2.98 (L) 4.50 - 5.90 x10*6/uL    Hemoglobin 7.7 (L) 13.5 - 17.5 g/dL    Hematocrit 23.2 (L) 41.0 - 52.0 %    MCV 78 (L) 80 - 100 fL    MCH 25.8 (L) 26.0 - 34.0 pg    MCHC 33.2 32.0 - 36.0 g/dL    RDW 13.6 11.5 - 14.5 %    Platelets 151 150 - 450 x10*3/uL   Magnesium    Collection Time: 07/25/25  3:30 PM   Result Value Ref Range    Magnesium 2.83 (H) 1.60 - 2.40 mg/dL   Renal Function Panel    Collection Time: 07/25/25  3:30 PM   Result Value Ref Range    Glucose 125 (H) 74 - 99 mg/dL    Sodium 133 (L) 136 - 145 mmol/L    Potassium 3.4 (L) 3.5 - 5.3 mmol/L    Chloride 97 (L) 98 - 107 mmol/L    Bicarbonate 26 21 - 32 mmol/L    Anion Gap 13 10 - 20 mmol/L    Urea Nitrogen 17 6 - 23 mg/dL    Creatinine 0.82 0.50 - 1.30 mg/dL    eGFR >90 >60 mL/min/1.73m*2    Calcium 8.0 (L) 8.6 - 10.6 mg/dL    Phosphorus 2.1 (L) 2.5 - 4.9 mg/dL    Albumin 3.0  (L) 3.4 - 5.0 g/dL   Prepare RBC: 1 Units    Collection Time: 07/25/25  5:30 PM   Result Value Ref Range    PRODUCT CODE J7911I29     Unit Number [id number]     Unit ABO A     Unit RH POS     XM INTEP COMP     Dispense Status RE     Blood Expiration Date 8/9/2025 11:59:00 PM EDT     PRODUCT BLOOD TYPE 6200     UNIT VOLUME 350    POCT GLUCOSE    Collection Time: 07/25/25  8:09 PM   Result Value Ref Range    POCT Glucose 130 (H) 74 - 99 mg/dL   Calcium, Ionized    Collection Time: 07/26/25  1:54 AM   Result Value Ref Range    POCT Calcium, Ionized 1.05 (L) 1.1 - 1.33 mmol/L   CBC    Collection Time: 07/26/25  1:54 AM   Result Value Ref Range    WBC 6.7 4.4 - 11.3 x10*3/uL    nRBC 0.0 0.0 - 0.0 /100 WBCs    RBC 3.29 (L) 4.50 - 5.90 x10*6/uL    Hemoglobin 8.5 (L) 13.5 - 17.5 g/dL    Hematocrit 25.7 (L) 41.0 - 52.0 %    MCV 78 (L) 80 - 100 fL    MCH 25.8 (L) 26.0 - 34.0 pg    MCHC 33.1 32.0 - 36.0 g/dL    RDW 14.1 11.5 - 14.5 %    Platelets 139 (L) 150 - 450 x10*3/uL   Coagulation Screen    Collection Time: 07/26/25  1:54 AM   Result Value Ref Range    Protime 13.9 (H) 9.8 - 12.4 seconds    INR 1.3 (H) 0.9 - 1.1    aPTT 30 26 - 36 seconds   Magnesium    Collection Time: 07/26/25  1:54 AM   Result Value Ref Range    Magnesium 2.54 (H) 1.60 - 2.40 mg/dL   Renal Function Panel    Collection Time: 07/26/25  1:54 AM   Result Value Ref Range    Glucose 122 (H) 74 - 99 mg/dL    Sodium 135 (L) 136 - 145 mmol/L    Potassium 3.3 (L) 3.5 - 5.3 mmol/L    Chloride 98 98 - 107 mmol/L    Bicarbonate 27 21 - 32 mmol/L    Anion Gap 13 10 - 20 mmol/L    Urea Nitrogen 14 6 - 23 mg/dL    Creatinine 0.75 0.50 - 1.30 mg/dL    eGFR >90 >60 mL/min/1.73m*2    Calcium 7.8 (L) 8.6 - 10.6 mg/dL    Phosphorus 2.2 (L) 2.5 - 4.9 mg/dL    Albumin 2.7 (L) 3.4 - 5.0 g/dL   Blood Gas Arterial Full Panel    Collection Time: 07/26/25  2:05 AM   Result Value Ref Range    POCT pH, Arterial 7.49 (H) 7.38 - 7.42 pH    POCT pCO2, Arterial 34 (L) 38 - 42 mm  Hg    POCT pO2, Arterial 105 (H) 85 - 95 mm Hg    POCT SO2, Arterial 99 94 - 100 %    POCT Oxy Hemoglobin, Arterial 96.7 94.0 - 98.0 %    POCT Hematocrit Calculated, Arterial 27.0 (L) 41.0 - 52.0 %    POCT Sodium, Arterial 131 (L) 136 - 145 mmol/L    POCT Potassium, Arterial 3.1 (L) 3.5 - 5.3 mmol/L    POCT Chloride, Arterial 101 98 - 107 mmol/L    POCT Ionized Calcium, Arterial 1.06 (L) 1.10 - 1.33 mmol/L    POCT Glucose, Arterial 121 (H) 74 - 99 mg/dL    POCT Lactate, Arterial 1.6 0.4 - 2.0 mmol/L    POCT Base Excess, Arterial 2.6 -2.0 - 3.0 mmol/L    POCT HCO3 Calculated, Arterial 25.9 22.0 - 26.0 mmol/L    POCT Hemoglobin, Arterial 9.0 (L) 13.5 - 17.5 g/dL    POCT Anion Gap, Arterial 7 (L) 10 - 25 mmo/L    Patient Temperature 37.0 degrees Celsius    FiO2 30 %   POCT GLUCOSE    Collection Time: 07/26/25  7:49 AM   Result Value Ref Range    POCT Glucose 122 (H) 74 - 99 mg/dL        Meds:  Current Medications[1]     Imaging:  Imaging  XR chest 1 view  Result Date: 7/26/2025  1. Persistent small bibasilar pleural effusion and atelectasis, unchanged compared to prior study. 2. Interval extubation.       Signed by: Shanice Neumann 7/26/2025 7:05 AM Dictation workstation:   [id number]    CT abdomen pelvis w IV contrast  Result Date: 7/25/2025  1.  Postsurgical changes of ileostomy and enterocutaneous fistula takedown, Margarita's reversal, and jejunostomy when compared to prior exam. There are scattered air foci and pockets of ascites with generalized mesenteric stranding, favored to represent expected postsurgical changes. Anastomosis appears intact. No loculated fluid collections, no evidence of bowel obstruction or ischemia. 2. Hyperdense collection within the left anterior abdominal subcutaneous tissue corresponding to site of prior ileostomy. No discrete signs of infection, though abscess should be in the differential. May also represent a seroma or hematoma. 3. Additional chronic or incidental findings as  detailed above   I personally reviewed the images/study and I agree with Selene Billingsley MD's (radiology resident) findings as stated. This study was interpreted at [place], [place].   MACRO: None   Signed by: Nando Mack 7/25/2025 10:02 AM Dictation workstation:   [id number]    XR chest 1 view  Result Date: 7/25/2025  1. Endotracheal tube with the tip terminating at the expected position of the proximal left mainstem bronchus. Retraction is recommended. Additional medical devices as above. 2. Bilateral consolidations with small right and trace left pleural effusions. A component of these findings is likely due to atelectatic change however infectious process within the differential.   I personally reviewed the images/study and I agree with the findings as stated by resident Dallas Martinez. This study was interpreted at [place], [place].   MACRO: Dallas Martinez discussed the significance and urgency of this critical finding by EPIC secure chat with  JOHANNA OROZCO on 7/25/2025 at 3:28 am.  (**-RCF-**) Findings:  See findings.   Signed by: Su Mack 7/25/2025 7:55 AM Dictation workstation:   [id number]    CT angio chest for pulmonary embolism  Result Date: 7/24/2025  1. No evidence of acute pulmonary embolism. 2. Small bilateral pleural effusion. Airspace opacities involving both lower lobes, atelectasis versus infiltrates. Follow-up with radiographs recommended. Old granulomatous disease. Central venous catheter with the tip in the proximal right atrium. Enteric tube with the tip in the stomach. 3. Fatty     Signed by: Su Mack 7/24/2025 2:13 PM Dictation workstation:   [id number]      Cardiology, Vascular, and Other Imaging  Transthoracic Echo (TTE) Limited  Result Date: 7/25/2025   [facility], [address] [place] [address]                Tel [phone] and Fax [phone]  TRANSTHORACIC ECHOCARDIOGRAM REPORT  Patient Name:       NATHEN EVANGELISTA    Reading Physician:    [id number] Chuck Astorga MD Study Date:         7/25/2025           Ordering Provider:    [id number] JOHANNA OROZCO MRN/PID:            [id number]            Fellow: Accession#:         [id number]        Nurse: Date of Birth/Age:  1965 / 59 years Sonographer:          Kenyetta Gallagher RDCS Gender assigned at  M                   Additional Staff: Birth: Height:             172.72 cm           Admit Date:           7/22/2025 Weight:             71.67 kg            Admission Status:     Inpatient - STAT BSA / BMI:          1.85 m2 / 24.02     Encounter#:           [id number]                     kg/m2 Blood Pressure:     87/48 mmHg          Department Location:  [facility] Study Type:    TRANSTHORACIC ECHO (TTE) LIMITED Diagnosis/ICD: Unspecified atrial fibrillation-I48.91 Indication:    new onset afib RVR, shock CPT Code:      Echo Limited-83203; Doppler Limited-14653; Color Doppler-57963 Patient History: Pertinent History: HTN. Study Detail: The following Echo studies were performed: 2D, M-Mode, Doppler and               color flow. Technically challenging study due to patient lying in               supine position and prominent lung artifact. The patient is               intubated. Optison used as a contrast agent for endocardial border               definition. Total contrast used for this procedure was 2.0 mL via               IV push.  PHYSICIAN INTERPRETATION: Left Ventricle: Left ventricular ejection fraction is normal by visual estimate at 60-65%. The patient is in atrial fibrillation/flutter which may influence the estimate of left ventricular function and transvalvular flows. There are no regional left ventricular wall motion  abnormalities. The left ventricular cavity size is normal. There is normal septal and normal posterior left ventricular wall thickness. Left ventricular diastolic filling was not assessed due to atrial fibrillation/flutter. Left Atrium: The left atrium is mildly dilated. Right Ventricle: The right ventricle is normal in size. There is normal right ventricular global systolic function. Right Atrium: The right atrial size was not well visualized. Aortic Valve: The aortic valve was not assessed. There is no evidence of aortic valve regurgitation. Mitral Valve: The mitral valve is normal in structure. There is mild mitral annular calcification. There is trace mitral valve regurgitation. The E Vmax is 0.70 m/s. Tricuspid Valve: The tricuspid valve is structurally normal. There is trace tricuspid regurgitation. Pulmonic Valve: The pulmonic valve was not assessed. Pulmonic valve regurgitation was not assessed. Pericardium: Pericardial effusion was not assessed. Aorta: The aortic root was not assessed. Systemic Veins: The inferior vena cava appears normal in size, IVC inspiratory collapse not assessed due to mechanical ventilation. In comparison to the previous echocardiogram(s): Compared with study dated 6/6/2024, no significant change.  CONCLUSIONS:  1. Left ventricular ejection fraction is normal by visual estimate at 60-65%.  2. The patient is in atrial fibrillation/flutter which may influence the estimate of left ventricular function and transvalvular flows.  3. There is normal right ventricular global systolic function.  4. The left atrium is mildly dilated.  5. No evidence of significant valvular pathology.  6. No pericardial effusion. QUANTITATIVE DATA SUMMARY:  2D MEASUREMENTS:         Normal Ranges: LAs:             4.50 cm (2.7-4.0cm) IVSd:            0.90 cm (0.6-1.1cm) LVPWd:           0.90 cm (0.6-1.1cm) LVIDd:           4.90 cm (3.9-5.9cm) LVIDs:           4.10 cm LV Mass Index:   83 g/m2 LV % FS           16.3 %  LEFT ATRIUM:                  Normal Ranges: LA Vol A4C:        45.5 ml    (22+/-6mL/m2) LA Vol A2C:        74.4 ml LA Vol BP:         61.3 ml LA Vol Index A4C:  24.6ml/m2 LA Vol Index A2C:  40.2 ml/m2 LA Vol Index BP:   33.2 ml/m2 LA Area A4C:       17.9 cm2 LA Area A2C:       24.1 cm2 LA Major Axis A4C: 6.0 cm LA Major Axis A2C: 6.6 cm LA Volume Index:   33.2 ml/m2  LV SYSTOLIC FUNCTION:                      Normal Ranges: EF-Visual:      63 % LV EF Reported: 63 %  LV DIASTOLIC FUNCTION:             Normal Ranges: MV Peak E:             0.70 m/s    (0.7-1.2 m/s) MV Peak A:             0.61 m/s    (0.42-0.7 m/s) E/A Ratio:             1.16        (1.0-2.2) MV A Dur:              108.00 msec MV DT:                 217 msec    (150-240 msec)  MITRAL VALVE:          Normal Ranges: MV DT:        217 msec (150-240msec)  AORTIC VALVE:          Normal Ranges: LVOT Diameter: 2.10 cm (1.8-2.4cm)  TRICUSPID VALVE/RVSP:         Normal Ranges: Est. RA Pressure:     3 IVC Diam:             1.70 cm  [id number] Chuck Astorga MD Electronically signed on 7/25/2025 at 10:53:57 AM  ** Final **     Electrocardiogram, 12-lead PRN ACS symptoms  Result Date: 7/24/2025  Sinus tachycardia with Premature atrial complexes Left axis deviation Low voltage QRS Abnormal ECG When compared with ECG of 24-JUL-2025 08:14, Premature atrial complexes are now Present Sinus rhythm is no longer with 2nd degree AV block (Mobitz I)      Medications reviewed.  Vital signs reviewed.  Labs reviewed.         Assessment/Plan    Assessment and Plan:  Bereket Em is a Primary - [place]   59M with PMH of perforated diverticulitis s/p Margarita's procedure c/b formation of ECF. Underwent EC fistula takedown, GILLES, Margarita's reversal, abdominal wall debridement, and double barrel jejunostomy with Leyda Olson, Renato, and Vicky on 7/22.  Intra-op findings remarkable for adhesions throughout the abdomen and remaining segment of the colon terminating at  the splenic flexure requiring significant mobilization and an ileal window.  Postoperative course complicated by colon ischemia requiring exploratory laparotomy, partial colectomy, end colostomy creation (RUQ), and conversion of double barrel jejunostomy to end jejunostomy in LUQ on 7/25. Patient remains in guarded conditions, recovering slowly in ICU - extubated. But remains on 4L NC and still requiring vasopressor and stress dose steroids to maintain MAP. A fib with RVR currrently resolved on amiodarone gtt.     7/22: ECF takedown, extensive GILLES, Margarita's reversal, abdominal wall debridement, and double barrel jejunostomy with Leyda Olson, Renato, and Vicky.    7/25:  partial colectomy with end colostomy creation, conversion of double barrel jejunostomy to end jejunostomy    Plan/Recommendations:  - Pain management: per ICU  - Wean vasopressors as able to maintain MAP goal >65  - Continue amiodarone gtt  - Wean supplemental O2 as able  - Diet: NPO, TPN via RIJ  - Fluids: bolus and mIVF management per ICU  - L/D/A: RIJ central line, colvin in place, drain left in RLQ which terminates near the rectal stump Intubated.  - ABX: continue Zosyn (7/25-) for E coli bacteremia seen on blood cultures 7/24; will follow up repeat blood cultures 7/25; will follow up ID recommendations  - Dressings: Midline incision nugauze packing removed at bedside today, left open to air with gauze/ABD on top.  -Appreciate ICU care    Hospital Day: 5     Dispo: continue ICU care    Patient's exam, labs, and findings discussed and seen with Dr. Olson, who agrees with the plan as described above.     Annabel Patel MD  General Surgery PGY-3  [facility] (MIS/Advanced Endoscopic/Bariatric Surgery)  [id number]         [1]   Current Facility-Administered Medications:     acetaminophen (Ofirmev) injection 1,000 mg, 1,000 mg, intravenous, q6h, Shane John DO, Stopped at 07/26/25 0540    [Held by provider] Adult Clinimix TPN Cyclic, ,  intravenous, Cyclic PN, Shane John DO, Stopped at 25 0727    [COMPLETED] amiodarone (Nexterone) 150 mg in dextrose (iso)  mL, 150 mg, intravenous, Once, Stopped at 25 0139 **FOLLOWED BY** [] amiodarone (Nexterone) 360 mg in dextrose,iso-osm 200 mL (1.8 mg/mL) infusion (premix), 1 mg/min, intravenous, Continuous, Stopped at 25 0652 **FOLLOWED BY** amiodarone (Nexterone) 360 mg in dextrose,iso-osm 200 mL (1.8 mg/mL) infusion (premix), 0.5 mg/min, intravenous, Continuous, Shane John DO, Last Rate: 16.67 mL/hr at 25 0700, 0.5 mg/min at 25 0700    calcium gluconate 1 g in sodium chloride (iso) IV 50 mL, 1 g, intravenous, q6h PRN, Shane John DO    calcium gluconate 2 g in sodium chloride (iso)  mL, 2 g, intravenous, q6h PRN, Shane John DO, Stopped at 25 0338    dextrose 50 % injection 12.5 g, 12.5 g, intravenous, q15 min PRN, Hilda Paula, APRN-CNP    dextrose 50 % injection 25 g, 25 g, intravenous, q15 min PRN, Shane John DO    enoxaparin (Lovenox) syringe 40 mg, 40 mg, subcutaneous, q24h, Frederick Abel MD, 40 mg at 25 0848    etomidate (Amidate) injection, , intravenous, Code/trauma/sedation med, Jerel Sims MD, 7.2 mg at 25 0152    [Held by provider] fat emulsion fish oil/plant based (SMOFlipid) 20 % IV infusion 50 g, 250 mL, intravenous, Daily Lipids, Shane John DO, Stopped at 25 0944    glucagon (Glucagen) injection 1 mg, 1 mg, intramuscular, q15 min PRN, Shane John DO    glucagon (Glucagen) injection 1 mg, 1 mg, intramuscular, q15 min PRN, Shane John,     heparin flush 10 unit/mL syringe 50 Units, 5 mL, intravenous, PRN, Shane John,     hydrocortisone sodium succinate (PF) (Solu-CORTEF) 50 mg in sodium chloride 0.9% IV 50 mL, 50 mg, intravenous, q6h, Constantine Meza MD, 50 mg at 25 0356    HYDROmorphone PF (Dilaudid) injection 0.2 mg, 0.2 mg, intravenous, q4h PRN, Frederick  MD Colten, 0.2 mg at 25 0530    insulin lispro injection 0-5 Units, 0-5 Units, subcutaneous, q4h, Shane John DO    ketorolac (Toradol) injection 30 mg, 30 mg, intravenous, q6h PRN, Frederick Abel MD    magnesium sulfate 2 g in sterile water for injection 50 mL, 2 g, intravenous, Once, Shane John DO    magnesium sulfate 2 g in sterile water for injection 50 mL, 2 g, intravenous, q6h PRN, Shane John DO    magnesium sulfate 4 g in sterile water for injection 100 mL, 4 g, intravenous, q6h PRN, Shane John DO, Stopped at 25 1452    methocarbamol (Robaxin) injection 1,000 mg, 1,000 mg, intravenous, q8h PRN, Frederick Abel MD, 1,000 mg at 25 1844    midazolam (Versed) injection, , intravenous, Code/trauma/sedation med, Jerel Sims MD, 5 mg at 25 0146    naloxone (Narcan) injection 0.2 mg, 0.2 mg, intravenous, PRN, Shane John DO    norepinephrine (Levophed) 8 mg in dextrose 5% 250 mL (0.032 mg/mL) infusion (premix), 0-0.5 mcg/kg/min, intravenous, Continuous, Shane John DO, Last Rate: 2.7 mL/hr at 25 0700, 0.02 mcg/kg/min at 25 0700    ondansetron (Zofran) injection 4 mg, 4 mg, intravenous, q8h PRN, Shane John DO    pantoprazole (Protonix) injection 40 mg, 40 mg, intravenous, Daily, Shane John DO, 40 mg at 25 0903    [] PHENobarbital (Luminal) injection 65 mg, 65 mg, intramuscular, q8h, 65 mg at 25 0547 **FOLLOWED BY** PHENobarbital (Luminal) injection 32.5 mg, 32.5 mg, intravenous, q8h, Shane John DO, 32.5 mg at 25 0511    PHENobarbital (Luminal) injection 65 mg, 65 mg, intravenous, q6h PRN, Shane John DO    piperacillin-tazobactam (Zosyn) 3.375 g in dextrose (iso) IV 50 mL, 3.375 g, intravenous, q6h, Shane John DO, Stopped at 25 0642    potassium chloride 20 mEq in sterile water for injection 100 mL, 20 mEq, intravenous, q6h PRN, Shane John DO    potassium chloride 40 mEq in sterile water  for injection 100 mL, 40 mEq, intravenous, q6h PRN, Shane John DO, Stopped at 07/26/25 0638    potassium phosphates 15 mmol in dextrose 5% 250 mL IV, 15 mmol, intravenous, Once, Chip Lewis MD, Last Rate: 63.8 mL/hr at 07/26/25 0633, 15 mmol at 07/26/25 0633    vancomycin (Vancocin) pharmacy to dose - pharmacy monitoring, , miscellaneous, Daily PRN, Shane John DO

## 2025-07-26 NOTE — PROGRESS NOTES
Plan:  - analgesia  IV tylenol/prn dilaud  - OOB and PT/OT  - volume resuscitation as clinically indicated  - adjust vent to prevent hypoxemia and hypercarbia   - pressers/tropes for MAP>65 and/or CI>=2/clinical params; weaning as tolerated   - cont stress steroids  - pheno taper  - consult ID  - PRN anti-emetics, Bowel regimen, NPO for now, and TPN  - Current abx: cont zosyn; BSI with ecoli awaiting sens  - Dispo: ICU     Quality/Safety/Proph:  - GI prophy: PPI  - DVT prophy: scd and Lovenox  - Central line: parenteral medications (I.e. chemo, vasoactive) and access  - Basilio: critically ill patient who need accurate urinary output measurements  - Restraint: needed, pulling at lines/tubes and agitation    Assessment:    Neuro/MSK: EtOH use disorder  and expected acute post op pain   A-F bundle; Sleep Hygiene measures (REST protocol): appropriate daytime stimulation/physical activity. Lights out at 2100, avoidance of night time lab draws/night baths, minimize mind altering medicaments  PT/OT and OOB as appropriate    CV: septic shock , lactic acidosis, and Afib new onset    -    levo     Pulm: acute post op pulmonary insufficiency   BPH with IS/flutter/OOB  Current vent settings: Vent Mode: Pressure support  FiO2 (%):  [40 %] 40 %  S RR:  [16] 16  S VT:  [500 mL] 500 mL  PEEP/CPAP (cm H2O):  [0 cm H20-5 cm H20] 5 cm H20  WI SUP:  [0 cm H20-5 cm H20] 5 cm H20  MAP (cm H2O):  [6-9] 6    Renal: Hyponatremia , Hypomagnesemia , Hypokalemia , and hypophosphatemia   Trend UOP and renal indices; replete lytes PRN     GI: n/a    ID/rheum: Septic shock 2/2 abdominal source with necrotic bowel  Follow cx data:    MICRO: Susceptibility data from last 90 days.  Collected Specimen Info Organism   07/24/25 Blood culture from Peripheral Venipuncture Escherichia coli       Endo: Stress hyperglycemia  Cont ISS  for BG goal <180  Last HbA1c: No results found for requested labs within last 365 days.  Glucose (past 72hrs):   Results from  last 72 hours   Lab Units 07/26/25  0154   GLUCOSE mg/dL 122*       Heme: ABL anemia , Anemia of chronic disease , and leukopenia   -     Trend Hb and transfuse PRN for goal Hb>7    LDA:  CVC 07/25/25 Triple lumen Right Internal jugular (Active)   Placement Date/Time: 07/25/25 0225   Hand Hygiene Performed Prior to CVC Insertion: Yes  Site Prep Agent has Completely Dried Before Insertion: Yes  Lumen Type: Triple lumen  Orientation: Right  Location: Internal jugular   Number of days: 1       Arterial Line 07/25/25 Right Radial (Active)   Placement Date/Time: 07/25/25 1523   Orientation: Right  Location: Radial  Technique: Ultrasound guidance  Insertion attempts: 1  Patient Tolerance: Tolerated well   Number of days: 0       Arterial Line 07/25/25 Right Radial (Active)   Placement Date/Time: 07/25/25 (c) 1524   Size: 20 G  Orientation: Right  Location: Radial  Site Prep: Alcohol;Chlorhexidine   Local Anesthetic: Injectable  Insertion attempts: 1  Securement Method: Taped;Transparent dressing  Patient Tolerance: Tolera...   Number of days: 0       Urethral Catheter (Active)   Placement Date/Time: 07/25/25 0000   Hand Hygiene Completed: Yes   Number of days: 1       Closed/Suction Drain RLQ 10 Fr. (Active)   Placement Date/Time: 07/25/25 0505   Placed by: Dr. Gross  Location: RLQ  Size (Fr.): 10 Fr.  Drain Reservoir Size (mL): 100 mL   Number of days: 1       NG/OG/Feeding Tube Nasogastric 16 Fr Right nostril (Active)   Placement Date/Time: 07/22/25 0745   Hand Hygiene Completed: Yes  Type of Tube: NG/OG Tube  Tube Type: Nasogastric  NG/OG Tube Size: 16 Fr  Tube Location: (c) Right nostril  Difficulty with Placement: No   Number of days: 3       Colostomy LUQ (Active)   Placement Date/Time: 07/25/25 0500   Placed by: Dr. Gross  Location: LUQ   Number of days: 1       Colostomy RUQ (Active)   Placement Date/Time: 07/25/25 0639   Location: RUQ   Number of days: 1     Exam:    A&O x 4  NSR  Adequate air-exchange  Abd  soft, NT; right sided colostomy/left jejunostomy  Extremities warm     REASON FOR ADMISSION    59 y.o. 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 Patient underwent ex lap, extensive lysis of adhesions, abdominal wall debridement, ventral hernia repair, excision of EC fistula, jejunostomy, & abdominal wound vac placement by Dr. Olson & Vicky and Margarita's reversal with transverse colon mobilization/ileal window by Dr. Gross on 7/22/2025.      Earlier today, patient tachycardic with new leukopenia (WBC decreased to 2 from 10). CT scan performed in the afternoon showing scattered air foci and pockets of ascites and generalized mesenteric stranding as well as hyperdense collection within the left anterior abdominal subcutaneous tissue corresponding to the site of prior ileostomy. Overnight, rapid response called for tachycardia. Patient asymptomatic. Denied chest pain, palpitations, shortness of breath. No prior hx of Afib. Reports abdominal pain from recent surgery. ECG showing afib RVR with rates in the 160s. Received 1 LR and 500 5% albumin as well as 5 mg IV metoprolol. Subsequently, patient hypotensive BPs 70s/50s. Also newly febrile with Tmax 38.3 in the setting of leukopenia and tachycardia with concern for intra-abdominal sepsis. Blood cultures drawn and patient started on empiric antibiotics with vanc/zosyn. Patient given amio bolus x1, followed by infusion at 1. Patient transferred to SICU for further management.      Upon arrival to the SICU, patient in Afib RVR with significant hypotension refractory to additional fluid boluses and multiple phenylephrine pushes. Arterial line placed for BP monitoring. Patient intubated for bedside flex sig procedure and possible OR. Post-intubation, R IJ triple lumen CVC placed  for initiation and escalation of pressor support including levo, vaso, ATII. Bedside ECHO performed without obvious LV or RV dysfunction. Bedside flex sig performed by surgical team showing area of well-demarcated dark mucosa concerning for ischemic bowel. Decision made to take patient emergently to OR for ex lap.     TODAY'S EVENTS    7/25: admitted following OR for bowel resection 2/2 ischemic colon  7/26: persistent presser requirements; seems depressed this AM    This critically ill patient continues to be at-risk for clinically significant deterioration / failure due to the above mentioned dysfunctional, unstable organ systems.  I have personally identified and managed all complex critical care issues to prevent aforementioned clinical deterioration.  Critical care time is spent at bedside and/or the immediate area and has included, but is not limited to, the review of diagnostic tests, labs, radiographs, serial assessments of hemodynamics, respiratory status, ventilatory management, and family updates.  Time spent in procedures and teaching are reported separately. CF CRITICAL CARE TIME: 48 minutes       LABS:  CMP:  Results from last 7 days   Lab Units 07/26/25  0154 07/25/25  1530 07/25/25  0730 07/25/25  0002 07/24/25  1750 07/24/25  1201 07/24/25  0551 07/23/25  0631 07/21/25  1601   [facility] SODIUM mmol/L  --   --   --   --   --   --   --   --  136   SODIUM mmol/L 135* 133* 133* 133* 134*   < > 133*   < >  --    [facility] POTASSIUM mmol/L  --   --   --   --   --   --   --   --  3.3*   POTASSIUM mmol/L 3.3* 3.4* 4.0 3.2* 3.8   < > 3.3*   < >  --    [facility] CHLORIDE mmol/L  --   --   --   --   --   --   --   --  90*   CHLORIDE mmol/L 98 97* 99 99 96*   < > 98   < >  --    [facility] CO2 mmol/L  --   --   --   --   --   --   --   --  32   CO2 mmol/L 27 26 25 27 26   < > 30   < >  --    [facility] ANION GAP mmol/L (calc)  --   --   --   --   --   --   --   --  14   ANION GAP mmol/L 13 13 13 10 16   < > 8*   < >  --    BUN mg/dL  14 17 20 18 17   < > 18   < >  --    [facility] BUN mg/dL  --   --   --   --   --   --   --   --  27*   CREATININE mg/dL 0.75 0.82 0.85 0.83 0.79   < > 0.67   < >  --    [facility] CREATININE mg/dL  --   --   --   --   --   --   --   --  0.91   [facility] EGFR mL/min/1.73m2  --   --   --   --   --   --   --   --  96   EGFR mL/min/1.73m*2 >90 >90 >90 >90 >90   < > >90   < >  --    [facility] MAGNESIUM mg/dL  --   --   --   --   --   --   --   --  2.1   MAGNESIUM mg/dL 2.54* 2.83* 1.68 1.66  --   --  2.04   < >  --    [facility] ALBUMIN g/dL  --   --   --   --   --   --   --   --  4.6   ALBUMIN g/dL 2.7* 3.0* 2.7* 2.3*  2.3* 3.2*  3.2*   < > 2.9*   < >  --    [facility] ALK PHOS U/L  --   --   --   --   --   --   --   --  126   ALK PHOS U/L  --   --   --  33 46  --   --   --   --    [facility] ALT U/L  --   --   --   --   --   --   --   --  49*   ALT U/L  --   --   --  21 32  --   --   --   --    [facility] AST U/L  --   --   --   --   --   --   --   --  36*   AST U/L  --   --   --  25 35  --   --   --   --    BILIRUBIN TOTAL mg/dL  --   --   --  2.0* 2.8*  --   --   --   --    [facility] BILIRUBIN TOTAL mg/dL  --   --   --   --   --   --   --   --  0.8    < > = values in this interval not displayed.     CBC:  Results from last 7 days   Lab Units 07/26/25  0154 07/25/25  1530 07/25/25  0730 07/25/25  0002 07/24/25  1754   WBC AUTO x10*3/uL 6.7 3.5* 2.8* 2.2* 2.3*   HEMOGLOBIN g/dL 8.5* 7.7* 8.5* 7.2* 9.1*   HEMATOCRIT % 25.7* 23.2* 25.9* 21.7* 28.1*   PLATELETS AUTO x10*3/uL 139* 151 190 151 188     COAG:   Results from last 7 days   Lab Units 07/26/25  0154 07/25/25  0824   INR  1.3* 1.2*     ABO:   ABO TYPE   Date Value Ref Range Status   07/25/2025 A  Final     HEME/ENDO:     CARDIAC:   Results from last 7 days   Lab Units 07/25/25  0002   [facility] ng/L 9

## 2025-07-26 NOTE — OP NOTE
Date of Surgery: 07/22/25    Pre-op diagnosis: Enteroatmospheric fistula, end colostomy, TPN and fluid supplement dependence, ventral hernia following sigmoid colectomy and end colostomy formation for diverticulitis    Post-op diagnosis: Enteroatmospheric fistula, end colostomy, TPN and fluid supplement dependence, ventral hernia following sigmoid colectomy and end colostomy formation for diverticulitis    Procedure: Extensive lysis of adhesions, takedown of enterocutaneous fistula, creation of double barrel jejunostomy, abdominal wall debridement, repair of ventral incisional hernia, reversal of end colostomy and wound vac placement    Attending: Bereket Perry MD (Please see operative reports of Dr. Olson (co-surgeon) and Dr. Gross for their portions of the case.)    Anesthesia: General    EBL: 200 mL    Specimens: Small bowel fistula    Complications: None    Findings: 110-120 small bowel from ligament of Treitz to jejnostomy. 180 cm from jejnostomy to cecum    Disposition: Regular nursing floor    Indication: 60 yo male with enteroatmospheric fistula, end colostomy, TPN and fluid supplement dependence, ventral hernia presents for fistula takedown, colostomy reversal and ventral hernia repair.  See preop H+P for full details.     Procedure: The patient was brought to the operating room and placed supine operating table. A timeout was performed per protocol including patient, procedure, DVT prophylaxis (SCDs and heparin sq) and ABX.  The patient was intubated, and general anesthesia was induced by the anesthesia team.  The patient was placed in lithotomy position.  Dr. Olson oversewed the fistula and end colostomy. The abdomen was sterilely prepped and draped. An additional timeout was performed.    The abdomen was entered cranial to the hernia/fistula near the xiphoid.  Sharp dissection with a knife and scissors were mainly used with occasional Bovie electrocautery to enter the abdomen and divide the  adhesions. There were extensive adhesions that were soft for the most part except for along the abdominal wall at the fistula and to the rectal stump. After extensive dissection, the entire small bowel was freed and the fistula was taken down.  The fistula remained closed and was oversewn again to prevent spillage of succus. No enterotomies were created. Once the small bowel was completely free, Dr. Gross scrubbed in to perform the colostomy take down.  Please see his operative report.      After the colostomy was performed, I scrubbed back in.  We discussed options for the GI tract following the anastomosis including small bowel anastomosis alone, small bowel anastomosis with distal diverting ostomy formation and small bowel ostomy formation at the fistula takedown site.  After discussion with Leyda Gross and Whitney, double barrel formation at the fistula takedown site was performed to protect the anastomosis and also minimize the number of anastomoses he would have.     The small bowel involved in the fistula was resected proximally and distally, ends occluded with a bowel clamp and mesentery divided.  A RUQ ostomy site was chosen where he was marked preoperatively.  The trephine was created with electrocautery and was performed through the rectus muscle. The proximal and distal ends of the fistula takedown were easily brought out to through the formed ostomy site.  Dr. Olson then closed the prior colostomy site fascia, closed the midline fascia and placed a drain.      The double barrel jejunostomy was then created by placing 2, 3-0 vicryl interrupted sutures between the antimesenteric aspects of the small bowel. The proximal end was formed in Milagros fashion with multiple 3-0 vicryl suture. The distal end was sutured directly to the skin level with multiple 3-0 vicryl sutures.  A wound vac was placed to the midline and LUQ former colostomy site.  An ostomy appliance was placed.     All sponge and needle counts  were correct. I was scrubbed and present for the dictated portion of the operation.  I spoke to his 2 brothers and girlfriend after the case.

## 2025-07-27 LAB
ALBUMIN SERPL BCP-MCNC: 2.5 G/DL (ref 3.4–5)
ANION GAP SERPL CALC-SCNC: 13 MMOL/L (ref 10–20)
APTT PPP: 24 SECONDS (ref 26–36)
BACTERIA BLD AEROBE CULT: ABNORMAL
BACTERIA BLD CULT: ABNORMAL
BACTERIA BLD CULT: NORMAL
BUN SERPL-MCNC: 40 MG/DL (ref 6–23)
CA-I BLD-SCNC: 1.01 MMOL/L (ref 1.1–1.33)
CALCIUM SERPL-MCNC: 7.5 MG/DL (ref 8.6–10.6)
CHLORIDE SERPL-SCNC: 98 MMOL/L (ref 98–107)
CO2 SERPL-SCNC: 25 MMOL/L (ref 21–32)
CREAT SERPL-MCNC: 1.08 MG/DL (ref 0.5–1.3)
EGFRCR SERPLBLD CKD-EPI 2021: 79 ML/MIN/1.73M*2
ERYTHROCYTE [DISTWIDTH] IN BLOOD BY AUTOMATED COUNT: 14.6 % (ref 11.5–14.5)
GLUCOSE BLD MANUAL STRIP-MCNC: 103 MG/DL (ref 74–99)
GLUCOSE BLD MANUAL STRIP-MCNC: 114 MG/DL (ref 74–99)
GLUCOSE BLD MANUAL STRIP-MCNC: 145 MG/DL (ref 74–99)
GLUCOSE BLD MANUAL STRIP-MCNC: 179 MG/DL (ref 74–99)
GLUCOSE BLD MANUAL STRIP-MCNC: 181 MG/DL (ref 74–99)
GLUCOSE BLD MANUAL STRIP-MCNC: 94 MG/DL (ref 74–99)
GLUCOSE SERPL-MCNC: 149 MG/DL (ref 74–99)
GRAM STN SPEC: ABNORMAL
HCT VFR BLD AUTO: 23.4 % (ref 41–52)
HGB BLD-MCNC: 8.1 G/DL (ref 13.5–17.5)
INR PPP: 1 (ref 0.9–1.1)
LABORATORY COMMENT REPORT: NORMAL
LABORATORY REPORT: NORMAL
MAGNESIUM SERPL-MCNC: 2.64 MG/DL (ref 1.6–2.4)
MCH RBC QN AUTO: 26.8 PG (ref 26–34)
MCHC RBC AUTO-ENTMCNC: 34.6 G/DL (ref 32–36)
MCV RBC AUTO: 78 FL (ref 80–100)
NRBC BLD-RTO: 0.2 /100 WBCS (ref 0–0)
PHOSPHATE SERPL-MCNC: 2.6 MG/DL (ref 2.5–4.9)
PLATELET # BLD AUTO: 126 X10*3/UL (ref 150–450)
PLPETH BLD-MCNC: 36 NG/ML
POPETH BLD-MCNC: 100 NG/ML
POTASSIUM SERPL-SCNC: 3 MMOL/L (ref 3.5–5.3)
PROTHROMBIN TIME: 11.4 SECONDS (ref 9.8–12.4)
RBC # BLD AUTO: 3.02 X10*6/UL (ref 4.5–5.9)
SODIUM SERPL-SCNC: 133 MMOL/L (ref 136–145)
WBC # BLD AUTO: 12.9 X10*3/UL (ref 4.4–11.3)

## 2025-07-27 PROCEDURE — 2500000004 HC RX 250 GENERAL PHARMACY W/ HCPCS (ALT 636 FOR OP/ED)

## 2025-07-27 PROCEDURE — 99291 CRITICAL CARE FIRST HOUR: CPT | Performed by: ANESTHESIOLOGY

## 2025-07-27 PROCEDURE — 36415 COLL VENOUS BLD VENIPUNCTURE: CPT

## 2025-07-27 PROCEDURE — 99233 SBSQ HOSP IP/OBS HIGH 50: CPT | Performed by: INTERNAL MEDICINE

## 2025-07-27 PROCEDURE — 2500000005 HC RX 250 GENERAL PHARMACY W/O HCPCS: Performed by: PHYSICIAN ASSISTANT

## 2025-07-27 PROCEDURE — 83735 ASSAY OF MAGNESIUM: CPT | Performed by: STUDENT IN AN ORGANIZED HEALTH CARE EDUCATION/TRAINING PROGRAM

## 2025-07-27 PROCEDURE — 37799 UNLISTED PX VASCULAR SURGERY: CPT | Performed by: STUDENT IN AN ORGANIZED HEALTH CARE EDUCATION/TRAINING PROGRAM

## 2025-07-27 PROCEDURE — 85027 COMPLETE CBC AUTOMATED: CPT | Performed by: STUDENT IN AN ORGANIZED HEALTH CARE EDUCATION/TRAINING PROGRAM

## 2025-07-27 PROCEDURE — 82330 ASSAY OF CALCIUM: CPT | Performed by: STUDENT IN AN ORGANIZED HEALTH CARE EDUCATION/TRAINING PROGRAM

## 2025-07-27 PROCEDURE — 2500000004 HC RX 250 GENERAL PHARMACY W/ HCPCS (ALT 636 FOR OP/ED): Performed by: STUDENT IN AN ORGANIZED HEALTH CARE EDUCATION/TRAINING PROGRAM

## 2025-07-27 PROCEDURE — 2500000004 HC RX 250 GENERAL PHARMACY W/ HCPCS (ALT 636 FOR OP/ED): Mod: JZ,TB

## 2025-07-27 PROCEDURE — 2020000001 HC ICU ROOM DAILY

## 2025-07-27 PROCEDURE — 80069 RENAL FUNCTION PANEL: CPT | Performed by: STUDENT IN AN ORGANIZED HEALTH CARE EDUCATION/TRAINING PROGRAM

## 2025-07-27 PROCEDURE — 2580000001 HC RX 258 IV SOLUTIONS: Performed by: PHYSICIAN ASSISTANT

## 2025-07-27 PROCEDURE — 87075 CULTR BACTERIA EXCEPT BLOOD: CPT

## 2025-07-27 PROCEDURE — 85730 THROMBOPLASTIN TIME PARTIAL: CPT | Performed by: STUDENT IN AN ORGANIZED HEALTH CARE EDUCATION/TRAINING PROGRAM

## 2025-07-27 PROCEDURE — 2500000005 HC RX 250 GENERAL PHARMACY W/O HCPCS

## 2025-07-27 PROCEDURE — 82947 ASSAY GLUCOSE BLOOD QUANT: CPT

## 2025-07-27 PROCEDURE — 2500000004 HC RX 250 GENERAL PHARMACY W/ HCPCS (ALT 636 FOR OP/ED): Performed by: PHYSICIAN ASSISTANT

## 2025-07-27 PROCEDURE — 2500000002 HC RX 250 W HCPCS SELF ADMINISTERED DRUGS (ALT 637 FOR MEDICARE OP, ALT 636 FOR OP/ED): Performed by: STUDENT IN AN ORGANIZED HEALTH CARE EDUCATION/TRAINING PROGRAM

## 2025-07-27 RX ADMIN — PIPERACILLIN SODIUM AND TAZOBACTAM SODIUM 3.38 G: 3; .375 INJECTION, SOLUTION INTRAVENOUS at 17:20

## 2025-07-27 RX ADMIN — WATER 50 MG: 1 INJECTION INTRAMUSCULAR; INTRAVENOUS; SUBCUTANEOUS at 03:04

## 2025-07-27 RX ADMIN — INSULIN LISPRO 1 UNITS: 100 INJECTION, SOLUTION INTRAVENOUS; SUBCUTANEOUS at 00:34

## 2025-07-27 RX ADMIN — KETOROLAC TROMETHAMINE 30 MG: 30 INJECTION, SOLUTION INTRAMUSCULAR; INTRAVENOUS at 03:04

## 2025-07-27 RX ADMIN — ACETAMINOPHEN 1000 MG: 10 INJECTION INTRAVENOUS at 17:19

## 2025-07-27 RX ADMIN — HYDROMORPHONE HYDROCHLORIDE 0.2 MG: 0.2 INJECTION, SOLUTION INTRAMUSCULAR; INTRAVENOUS; SUBCUTANEOUS at 17:55

## 2025-07-27 RX ADMIN — ENOXAPARIN SODIUM 40 MG: 100 INJECTION SUBCUTANEOUS at 09:27

## 2025-07-27 RX ADMIN — PIPERACILLIN SODIUM AND TAZOBACTAM SODIUM 3.38 G: 3; .375 INJECTION, SOLUTION INTRAVENOUS at 12:31

## 2025-07-27 RX ADMIN — WATER 50 MG: 1 INJECTION INTRAMUSCULAR; INTRAVENOUS; SUBCUTANEOUS at 17:20

## 2025-07-27 RX ADMIN — PIPERACILLIN SODIUM AND TAZOBACTAM SODIUM 3.38 G: 3; .375 INJECTION, SOLUTION INTRAVENOUS at 00:29

## 2025-07-27 RX ADMIN — ASCORBIC ACID, VITAMIN A PALMITATE, CHOLECALCIFEROL, THIAMINE HYDROCHLORIDE, RIBOFLAVIN-5 PHOSPHATE SODIUM, PYRIDOXINE HYDROCHLORIDE, NIACINAMIDE, DEXPANTHENOL, ALPHA-TOCOPHEROL ACETATE, VITAMIN K1, FOLIC ACID, BIOTIN, CYANOCOBALAMIN: 200; 3300; 200; 6; 3.6; 6; 40; 15; 10; 150; 600; 60; 5 INJECTION, SOLUTION INTRAVENOUS at 20:10

## 2025-07-27 RX ADMIN — HYDROMORPHONE HYDROCHLORIDE 0.2 MG: 0.2 INJECTION, SOLUTION INTRAMUSCULAR; INTRAVENOUS; SUBCUTANEOUS at 22:18

## 2025-07-27 RX ADMIN — HYDROMORPHONE HYDROCHLORIDE 0.2 MG: 0.2 INJECTION, SOLUTION INTRAMUSCULAR; INTRAVENOUS; SUBCUTANEOUS at 06:26

## 2025-07-27 RX ADMIN — PIPERACILLIN SODIUM AND TAZOBACTAM SODIUM 3.38 G: 3; .375 INJECTION, SOLUTION INTRAVENOUS at 06:08

## 2025-07-27 RX ADMIN — ACETAMINOPHEN 1000 MG: 10 INJECTION INTRAVENOUS at 12:01

## 2025-07-27 RX ADMIN — ACETAMINOPHEN 1000 MG: 10 INJECTION INTRAVENOUS at 05:51

## 2025-07-27 RX ADMIN — PANTOPRAZOLE SODIUM 40 MG: 40 INJECTION, POWDER, LYOPHILIZED, FOR SOLUTION INTRAVENOUS at 09:27

## 2025-07-27 RX ADMIN — METHOCARBAMOL 1000 MG: 100 INJECTION INTRAMUSCULAR; INTRAVENOUS at 23:13

## 2025-07-27 RX ADMIN — WATER 50 MG: 1 INJECTION INTRAMUSCULAR; INTRAVENOUS; SUBCUTANEOUS at 09:27

## 2025-07-27 RX ADMIN — HYDROMORPHONE HYDROCHLORIDE 0.2 MG: 0.2 INJECTION, SOLUTION INTRAMUSCULAR; INTRAVENOUS; SUBCUTANEOUS at 02:04

## 2025-07-27 RX ADMIN — KETOROLAC TROMETHAMINE 30 MG: 30 INJECTION, SOLUTION INTRAMUSCULAR; INTRAVENOUS at 20:20

## 2025-07-27 RX ADMIN — SMOFLIPID 50 G: 6; 6; 5; 3 INJECTION, EMULSION INTRAVENOUS at 20:11

## 2025-07-27 RX ADMIN — POTASSIUM CHLORIDE 40 MEQ: 29.8 INJECTION, SOLUTION INTRAVENOUS at 06:08

## 2025-07-27 RX ADMIN — ACETAMINOPHEN 1000 MG: 10 INJECTION INTRAVENOUS at 23:13

## 2025-07-27 ASSESSMENT — PAIN - FUNCTIONAL ASSESSMENT

## 2025-07-27 ASSESSMENT — PAIN SCALES - GENERAL
PAINLEVEL_OUTOF10: 0 - NO PAIN
PAINLEVEL_OUTOF10: 5 - MODERATE PAIN
PAINLEVEL_OUTOF10: 4
PAINLEVEL_OUTOF10: 4
PAINLEVEL_OUTOF10: 7
PAINLEVEL_OUTOF10: 0 - NO PAIN
PAINLEVEL_OUTOF10: 4
PAINLEVEL_OUTOF10: 4
PAINLEVEL_OUTOF10: 6
PAINLEVEL_OUTOF10: 5 - MODERATE PAIN
PAINLEVEL_OUTOF10: 8
PAINLEVEL_OUTOF10: 8
PAINLEVEL_OUTOF10: 5 - MODERATE PAIN
PAINLEVEL_OUTOF10: 6
PAINLEVEL_OUTOF10: 6
PAINLEVEL_OUTOF10: 8
PAINLEVEL_OUTOF10: 0 - NO PAIN

## 2025-07-27 ASSESSMENT — PAIN DESCRIPTION - LOCATION
LOCATION: ABDOMEN

## 2025-07-27 ASSESSMENT — PAIN DESCRIPTION - DESCRIPTORS: DESCRIPTORS: ACHING

## 2025-07-27 NOTE — PROGRESS NOTES
"                                                                            Surgical Intensive Care Unit Progress Note     Subjective   Titrated off levo yesterday afternoon. Continued off levo this morning. R/p blood cultures sent. Seen sitting in chair this AM. Abdominal pain improved since yesterday.     Objective     Physical Exam  Vitals reviewed.   Constitutional:       Appearance: Normal appearance.   HENT:      Head: Normocephalic and atraumatic.      Nose: Nose normal.      Comments: NG to LIWS. Nasal cannula in place.     Eyes:      Conjunctiva/sclera: Conjunctivae normal.      Pupils: Pupils are equal, round, and reactive to light.       Cardiovascular:      Rate and Rhythm: Normal rate and regular rhythm.      Pulses: Normal pulses.   Pulmonary:      Effort: Pulmonary effort is normal.      Breath sounds: Normal breath sounds.   Abdominal:      General: Abdomen is flat.      Palpations: Abdomen is soft.      Comments: Midline incisions dressed with drain to suction and jejunostomy and colostomy present.     Skin:     General: Skin is warm and dry.     Neurological:      Mental Status: He is oriented to person, place, and time.         Last Recorded Vitals  Blood pressure 95/61, pulse 60, temperature 36.7 °C (98.1 °F), temperature source Temporal, resp. rate 23, height 1.727 m (5' 8\"), weight 88.7 kg (195 lb 8.8 oz), SpO2 100%.  Intake/Output last 3 Shifts:  I/O last 3 completed shifts:  In: 6739.8 (76 mL/kg) [I.V.:859 (9.7 mL/kg); Blood:850; IV Piggyback:1155]  Out: 3895 (43.9 mL/kg) [Urine:2700 (0.8 mL/kg/hr); Emesis/NG output:800; Drains:200; Stool:195]  Weight: 88.7 kg     Relevant Results    Scheduled medications  Scheduled Medications[1]  Continuous medications  Continuous Medications[2]  PRN medications  PRN Medications[3]     Results for orders placed or performed during the hospital encounter of 07/22/25 (from the past 24 hours)   POCT GLUCOSE   Result Value Ref Range    POCT Glucose 122 (H) 74 - " 99 mg/dL   POCT GLUCOSE   Result Value Ref Range    POCT Glucose 119 (H) 74 - 99 mg/dL   POCT GLUCOSE   Result Value Ref Range    POCT Glucose 95 74 - 99 mg/dL   Calcium, Ionized   Result Value Ref Range    POCT Calcium, Ionized 1.04 (L) 1.1 - 1.33 mmol/L   CBC   Result Value Ref Range    WBC 12.8 (H) 4.4 - 11.3 x10*3/uL    nRBC 0.3 (H) 0.0 - 0.0 /100 WBCs    RBC 3.48 (L) 4.50 - 5.90 x10*6/uL    Hemoglobin 9.2 (L) 13.5 - 17.5 g/dL    Hematocrit 27.1 (L) 41.0 - 52.0 %    MCV 78 (L) 80 - 100 fL    MCH 26.4 26.0 - 34.0 pg    MCHC 33.9 32.0 - 36.0 g/dL    RDW 14.0 11.5 - 14.5 %    Platelets 147 (L) 150 - 450 x10*3/uL   Magnesium   Result Value Ref Range    Magnesium 2.50 (H) 1.60 - 2.40 mg/dL   Renal Function Panel   Result Value Ref Range    Glucose 85 74 - 99 mg/dL    Sodium 137 136 - 145 mmol/L    Potassium 3.3 (L) 3.5 - 5.3 mmol/L    Chloride 98 98 - 107 mmol/L    Bicarbonate 26 21 - 32 mmol/L    Anion Gap 16 10 - 20 mmol/L    Urea Nitrogen 20 6 - 23 mg/dL    Creatinine 0.94 0.50 - 1.30 mg/dL    eGFR >90 >60 mL/min/1.73m*2    Calcium 7.9 (L) 8.6 - 10.6 mg/dL    Phosphorus 2.0 (L) 2.5 - 4.9 mg/dL    Albumin 2.8 (L) 3.4 - 5.0 g/dL   POCT GLUCOSE   Result Value Ref Range    POCT Glucose 179 (H) 74 - 99 mg/dL   Calcium, Ionized   Result Value Ref Range    POCT Calcium, Ionized 1.01 (L) 1.1 - 1.33 mmol/L   Magnesium   Result Value Ref Range    Magnesium 2.64 (H) 1.60 - 2.40 mg/dL   Renal Function Panel   Result Value Ref Range    Glucose 149 (H) 74 - 99 mg/dL    Sodium 133 (L) 136 - 145 mmol/L    Potassium 3.0 (L) 3.5 - 5.3 mmol/L    Chloride 98 98 - 107 mmol/L    Bicarbonate 25 21 - 32 mmol/L    Anion Gap 13 10 - 20 mmol/L    Urea Nitrogen 40 (H) 6 - 23 mg/dL    Creatinine 1.08 0.50 - 1.30 mg/dL    eGFR 79 >60 mL/min/1.73m*2    Calcium 7.5 (L) 8.6 - 10.6 mg/dL    Phosphorus 2.6 2.5 - 4.9 mg/dL    Albumin 2.5 (L) 3.4 - 5.0 g/dL   POCT GLUCOSE   Result Value Ref Range    POCT Glucose 145 (H) 74 - 99 mg/dL     *Note: Due  to a large number of results and/or encounters for the requested time period, some results have not been displayed. A complete set of results can be found in Results Review.     Assessment & Plan  Enterocutaneous fistula    Diverticulosis of large intestine without hemorrhage    Primary hypertension    Incarcerated ventral hernia    Assessment:  Bereket Em is a 59 y.o. 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 Patient underwent ex lap, extensive lysis of adhesions, abdominal wall debridement, ventral hernia repair, excision of EC fistula, jejunostomy, & abdominal wound vac placement by Dr. Olson & Vicky and Margarita's reversal with transverse colon mobilization/ileal window by Dr. Gross on 7/22/2025. Post-operative course complicated by Afib with RVR and septic shock 2/2 ischemic bowel requiring initiation and escalation of pressor support. Patient admitted to SICU for further management s/p ex lap with colonic anastomosis takedown, colon section, new R sided colostomy and L sided jejunostomy on 7/25.      Plan:  NEURO: Hx of alcohol use on phenobarb taper. Acute post-op pain.   - OOB to chair yesterday  - completed phenobarb taper  - ongoing neuro and pain assessments  - PRN hydromorphone for pain control  - PT/OT consult  - Home meds: trazodone, oxycodone 5      CV: No previous cardiac history. Developed Afib RVR (HR 160s) on the floor s/p fluids and metoprolol x1. Patient subsequently hypotensive refractory to phenylephrine pushes and fluids. Pt started on amio bolus and infusion with conversion to NSR. On arrival to the SICU, patient hypotensive with SBP in the 60s, suspect 2/2 septic shock requiring initiation and escalation of pressor support. Lactate 2.9 down from 4.0 Troponin normal. TTE normal. Off levo  since yesterday afternoon, remained off this morning.   - d/c amio infusion. Patient sinus angelo with rates in the 50s.   - continuous EKG/abp monitoring  - Goal map range 65-90  - Home meds: none.     PULM: No pulmonary hx.. After arrival to SICU, patient intubated and sedated to facilitate bedside flex sig procedure and need for emergent OR. Extubated 7/25 post-OR to NC.   - Q1h incentive spirometer while awake  - additional pulm toilet prn.       GI: Hx of perforated diverticulitis s/p Margarita's (6/7/2024) c/b enterocutaneous fistula and incarcerated ventral hernia s/p exploratory laparotomy, extensive lysis of adhesions, take down of enterocutaneous fistula, creation of jejunostomy, abdominal wall debridement, placement of wound vac, repair of recurrent incarcerated ventral hernia (Dr. Olson) with Margarita's reversal (Dr. Gross). Bedside colonoscopy/flex sig 7/25 showing ischemic colon proximal to anastomosis s/p ex lap with colonic anastomosis takedown, colon section, new R sided colostomy and L sided jejunostomy on 7/25.   - NPO on TPN.   - NG --> clamp trial   - PPI for GI prophylaxis  - Home meds: Diphen/Atrop 2.5/.0 Tab Spec      : No history of renal disease. Baseline creatinine 0.7-0.9.   - Volume resuscitation as needed  - Maintain U/O >0.5ml/kg/hr  - d/c colvin today --> trial of void  - Check renal function panel post op and daily  - Replete electrolytes to goal K>4, Mg>2, Phos>2.5, ionized Ca>1.10.     HEME: Acute blood loss anemia. Hgb 8.5 and stable.  - CBC and coags daily  - SCDs and lovenox for DVT prophylaxis  - ongoing monitoring for s/s bleeding     ENDO: No history of DM or thyroid disease. Hydrocortisone 100 mg x1 dose.   - wean stress dose steroids hydrocortisone 50 mg q6h to q8h.   - Q4h BG   - SSI Lispro per ICU protocol.     ID: Afebrile, leukopenia resolved. Intra-abdominal sepsis 2/2 ischemic bowel and E coli bacteremia.   - temp q4h, wbc daily  - continue zosyn. ID consulted. E.  coli sensitive to Zosyn, no escalation of antibiotics at this time. Blood cultures repeated this morning.   - ongoing monitoring for s/s infection     Lines:  - R radial arterial line (7.25)   - RIJ triple lumen central line (7.25)  - PIVs  - colvin --> discontinue today  - NGT --> to gravity      Dispo: Continue SICU care. Possible transfer tomorrow. Patient seen and discussed with ICU attending Dr. Campos.    Constantine Meza MD  Anesthesiology, PGY3/CA2  SICU phone [phone]             [1] acetaminophen, 1,000 mg, intravenous, q6h  enoxaparin, 40 mg, subcutaneous, q24h  fat emulsion fish oil/plant based, 250 mL, intravenous, Daily Lipids  hydrocortisone sodium succinate, 50 mg, intravenous, q6h  insulin lispro, 0-5 Units, subcutaneous, q4h  magnesium sulfate, 2 g, intravenous, Once  pantoprazole, 40 mg, intravenous, Daily  piperacillin-tazobactam, 3.375 g, intravenous, q6h     [2] Adult Clinimix TPN Cyclic, , Last Rate: 182 mL/hr at 07/26/25 2115  amiodarone, 0.5 mg/min, Last Rate: 0.5 mg/min (07/27/25 0531)  norepinephrine, 0-0.5 mcg/kg/min, Last Rate: Stopped (07/26/25 1209)     [3] PRN medications: calcium gluconate, calcium gluconate, dextrose, dextrose, etomidate, glucagon, glucagon, heparin flush, HYDROmorphone, ketorolac, magnesium sulfate, magnesium sulfate, methocarbamol, midazolam, naloxone, ondansetron, PHENobarbital, potassium chloride, potassium chloride

## 2025-07-27 NOTE — PROGRESS NOTES
General Surgery Progress Note    07/27/25    Bereket Em    Summary:  Bereket Em is a 59 year old male with a history of perforated diverticulitis s/p Margarita's procedure c/b formation of ECF. Underwent EC fistula takedown, GILLES, Margarita's reversal, abdominal wall debridement, and double barrel jejunostomy with Leyda Olson, Renato, and Vicky on 7/22.  Intra-op findings remarkable for adhesions throughout the abdomen and remaining segment of the colon terminating at the splenic flexure requiring significant mobilization and an ileal window.  Postoperative course complicated by colon ischemia requiring OR take back for partial colectomy, end colostomy creation (RUQ), and conversion of double barrel jejunostomy to end jejunostomy in LUQ on 7/25.       Subjective    Subjective:  No acute events overnight. Off vasopressors. On 4L NC satting 100%. Still Amiodarone gtt. Vital signs significant for sinus bradycardia this morning, but patient asymptomatic. Reports feeling better compared to yesterday. Denies nausea or emesis. Would like to get out of bed.     Review of Systems:    A 12-point review of systems was performed, and was negative except as above.      Objective    Objective:      Vital signs:   Temp:  [36.1 °C (97 °F)-36.7 °C (98.1 °F)] 36.5 °C (97.7 °F)  Heart Rate:  [52-74] 52  Resp:  [17-24] 21  BP: ()/(54-61) 98/60  Arterial Line BP 1: ()/() 112/73    Physical Exam:  GEN: resting comfortably, non-toxic appearance  NEURO: awake, alert, oriented, conversational  HEENT: Sclera anicteric. Moist mucous membranes.   RESP: non-labored breathing on 4L NC   CV: sinus bradycardia, normotensive MAP>65  GI: Abdomen soft, appropriately tender to palpation around surgical incision, non-peritonitic. RUQ end colostomy stoma pink and viable, abundant thin dark brown liquid output and gas in bag.  LUQ end jejunostomy stoma pink, viable with bowel sweat in bag. RLQ ANA MARIA drain serosanguinous  output (terminates in pelvis near rectal stump). NG tube in place with very small amount of thin gastric fluid.     (Clinical photo from 7/27)  : Basilio in place with dark yellow urine.  SKIN: laparotomy closed loosely with staples and packed with betadine soaked cristóbal, which were removed at bedside today (see photo below). No evidence of necrosis, purulent drainage, erythema or significant swelling of midline incision after packing removed.   EXT: no peripheral edema     I/O last 2 completed shifts:  In: 705.9 (8 mL/kg) [I.V.:408.6 (4.6 mL/kg); IV Piggyback:100]  Out: 2310 (26 mL/kg) [Urine:1225 (0.6 mL/kg/hr); Emesis/NG output:300; Drains:45; Stool:740]  Weight: 88.7 kg      Labs Past 18 Hours:  Recent Results (from the past 18 hours)   Calcium, Ionized    Collection Time: 07/26/25  4:25 PM   Result Value Ref Range    POCT Calcium, Ionized 1.04 (L) 1.1 - 1.33 mmol/L   CBC    Collection Time: 07/26/25  4:25 PM   Result Value Ref Range    WBC 12.8 (H) 4.4 - 11.3 x10*3/uL    nRBC 0.3 (H) 0.0 - 0.0 /100 WBCs    RBC 3.48 (L) 4.50 - 5.90 x10*6/uL    Hemoglobin 9.2 (L) 13.5 - 17.5 g/dL    Hematocrit 27.1 (L) 41.0 - 52.0 %    MCV 78 (L) 80 - 100 fL    MCH 26.4 26.0 - 34.0 pg    MCHC 33.9 32.0 - 36.0 g/dL    RDW 14.0 11.5 - 14.5 %    Platelets 147 (L) 150 - 450 x10*3/uL   Magnesium    Collection Time: 07/26/25  4:25 PM   Result Value Ref Range    Magnesium 2.50 (H) 1.60 - 2.40 mg/dL   Renal Function Panel    Collection Time: 07/26/25  4:25 PM   Result Value Ref Range    Glucose 85 74 - 99 mg/dL    Sodium 137 136 - 145 mmol/L    Potassium 3.3 (L) 3.5 - 5.3 mmol/L    Chloride 98 98 - 107 mmol/L    Bicarbonate 26 21 - 32 mmol/L    Anion Gap 16 10 - 20 mmol/L    Urea Nitrogen 20 6 - 23 mg/dL    Creatinine 0.94 0.50 - 1.30 mg/dL    eGFR >90 >60 mL/min/1.73m*2    Calcium 7.9 (L) 8.6 - 10.6 mg/dL    Phosphorus 2.0 (L) 2.5 - 4.9 mg/dL    Albumin 2.8 (L) 3.4 - 5.0 g/dL   POCT GLUCOSE    Collection Time: 07/27/25 12:32 AM   Result  Value Ref Range    POCT Glucose 179 (H) 74 - 99 mg/dL   Blood Culture    Collection Time: 07/27/25  4:31 AM    Specimen: Peripheral Venipuncture; Blood culture   Result Value Ref Range    Blood Culture Loaded on Instrument - Culture in progress    Blood Culture    Collection Time: 07/27/25  4:31 AM    Specimen: Peripheral Venipuncture; Blood culture   Result Value Ref Range    Blood Culture Loaded on Instrument - Culture in progress    Calcium, Ionized    Collection Time: 07/27/25  4:31 AM   Result Value Ref Range    POCT Calcium, Ionized 1.01 (L) 1.1 - 1.33 mmol/L   Magnesium    Collection Time: 07/27/25  4:31 AM   Result Value Ref Range    Magnesium 2.64 (H) 1.60 - 2.40 mg/dL   Renal Function Panel    Collection Time: 07/27/25  4:31 AM   Result Value Ref Range    Glucose 149 (H) 74 - 99 mg/dL    Sodium 133 (L) 136 - 145 mmol/L    Potassium 3.0 (L) 3.5 - 5.3 mmol/L    Chloride 98 98 - 107 mmol/L    Bicarbonate 25 21 - 32 mmol/L    Anion Gap 13 10 - 20 mmol/L    Urea Nitrogen 40 (H) 6 - 23 mg/dL    Creatinine 1.08 0.50 - 1.30 mg/dL    eGFR 79 >60 mL/min/1.73m*2    Calcium 7.5 (L) 8.6 - 10.6 mg/dL    Phosphorus 2.6 2.5 - 4.9 mg/dL    Albumin 2.5 (L) 3.4 - 5.0 g/dL   CBC    Collection Time: 07/27/25  5:51 AM   Result Value Ref Range    WBC 12.9 (H) 4.4 - 11.3 x10*3/uL    nRBC 0.2 (H) 0.0 - 0.0 /100 WBCs    RBC 3.02 (L) 4.50 - 5.90 x10*6/uL    Hemoglobin 8.1 (L) 13.5 - 17.5 g/dL    Hematocrit 23.4 (L) 41.0 - 52.0 %    MCV 78 (L) 80 - 100 fL    MCH 26.8 26.0 - 34.0 pg    MCHC 34.6 32.0 - 36.0 g/dL    RDW 14.6 (H) 11.5 - 14.5 %    Platelets 126 (L) 150 - 450 x10*3/uL   Coagulation Screen    Collection Time: 07/27/25  5:51 AM   Result Value Ref Range    Protime 11.4 9.8 - 12.4 seconds    INR 1.0 0.9 - 1.1    aPTT 24 (L) 26 - 36 seconds   POCT GLUCOSE    Collection Time: 07/27/25  5:59 AM   Result Value Ref Range    POCT Glucose 145 (H) 74 - 99 mg/dL   POCT GLUCOSE    Collection Time: 07/27/25  7:49 AM   Result Value Ref  Range    POCT Glucose 181 (H) 74 - 99 mg/dL        Meds:  Current Medications[1]     Imaging  XR chest 1 view  Result Date: 7/26/2025  1. Persistent small bibasilar pleural effusion and atelectasis, unchanged compared to prior study. 2. Interval extubation.       Signed by: Shanice Neumann 7/26/2025 7:05 AM Dictation workstation:   [id number]      Cardiology, Vascular, and Other Imaging  Transthoracic Echo (TTE) Limited  Result Date: 7/25/2025   [facility] [address] [place] [address]                Tel [phone] and Fax [phone] TRANSTHORACIC ECHOCARDIOGRAM REPORT  Patient Name:       NATHEN EVANGELISTA    Reading Physician:    [id number] Chuck Astorga MD Study Date:         7/25/2025           Ordering Provider:    [id number] JOHANNA OROZCO MRN/PID:            [id number]            Fellow: Accession#:         [id number]        Nurse: Date of Birth/Age:  1965 / 59 years Sonographer:          Kenyetta Gallagher                                                               [facility] Gender assigned at  M                   Additional Staff: Birth: Height:             172.72 cm           Admit Date:           7/22/2025 Weight:             71.67 kg            Admission Status:     Inpatient - STAT BSA / BMI:          1.85 m2 / 24.02     Encounter#:           [id number]                     kg/m2 Blood Pressure:     87/48 mmHg          Department Location:  [facility] Study Type:    TRANSTHORACIC ECHO (TTE) LIMITED Diagnosis/ICD: Unspecified atrial fibrillation-I48.91 Indication:    new onset afib RVR, shock CPT Code:      Echo Limited-38100; Doppler Limited-86262; Color Doppler-76956 Patient History: Pertinent History: HTN. Study Detail: The following Echo studies were performed: 2D, M-Mode, Doppler and               color flow. Technically challenging study due to  patient lying in               supine position and prominent lung artifact. The patient is               intubated. Optison used as a contrast agent for endocardial border               definition. Total contrast used for this procedure was 2.0 mL via               IV push.  PHYSICIAN INTERPRETATION: Left Ventricle: Left ventricular ejection fraction is normal by visual estimate at 60-65%. The patient is in atrial fibrillation/flutter which may influence the estimate of left ventricular function and transvalvular flows. There are no regional left ventricular wall motion abnormalities. The left ventricular cavity size is normal. There is normal septal and normal posterior left ventricular wall thickness. Left ventricular diastolic filling was not assessed due to atrial fibrillation/flutter. Left Atrium: The left atrium is mildly dilated. Right Ventricle: The right ventricle is normal in size. There is normal right ventricular global systolic function. Right Atrium: The right atrial size was not well visualized. Aortic Valve: The aortic valve was not assessed. There is no evidence of aortic valve regurgitation. Mitral Valve: The mitral valve is normal in structure. There is mild mitral annular calcification. There is trace mitral valve regurgitation. The E Vmax is 0.70 m/s. Tricuspid Valve: The tricuspid valve is structurally normal. There is trace tricuspid regurgitation. Pulmonic Valve: The pulmonic valve was not assessed. Pulmonic valve regurgitation was not assessed. Pericardium: Pericardial effusion was not assessed. Aorta: The aortic root was not assessed. Systemic Veins: The inferior vena cava appears normal in size, IVC inspiratory collapse not assessed due to mechanical ventilation. In comparison to the previous echocardiogram(s): Compared with study dated 6/6/2024, no significant change.  CONCLUSIONS:  1. Left ventricular ejection fraction is normal by visual estimate at 60-65%.  2. The patient is in atrial  fibrillation/flutter which may influence the estimate of left ventricular function and transvalvular flows.  3. There is normal right ventricular global systolic function.  4. The left atrium is mildly dilated.  5. No evidence of significant valvular pathology.  6. No pericardial effusion. QUANTITATIVE DATA SUMMARY:  2D MEASUREMENTS:         Normal Ranges: LAs:             4.50 cm (2.7-4.0cm) IVSd:            0.90 cm (0.6-1.1cm) LVPWd:           0.90 cm (0.6-1.1cm) LVIDd:           4.90 cm (3.9-5.9cm) LVIDs:           4.10 cm LV Mass Index:   83 g/m2 LV % FS          16.3 %  LEFT ATRIUM:                  Normal Ranges: LA Vol A4C:        45.5 ml    (22+/-6mL/m2) LA Vol A2C:        74.4 ml LA Vol BP:         61.3 ml LA Vol Index A4C:  24.6ml/m2 LA Vol Index A2C:  40.2 ml/m2 LA Vol Index BP:   33.2 ml/m2 LA Area A4C:       17.9 cm2 LA Area A2C:       24.1 cm2 LA Major Axis A4C: 6.0 cm LA Major Axis A2C: 6.6 cm LA Volume Index:   33.2 ml/m2  LV SYSTOLIC FUNCTION:                      Normal Ranges: EF-Visual:      63 % LV EF Reported: 63 %  LV DIASTOLIC FUNCTION:             Normal Ranges: MV Peak E:             0.70 m/s    (0.7-1.2 m/s) MV Peak A:             0.61 m/s    (0.42-0.7 m/s) E/A Ratio:             1.16        (1.0-2.2) MV A Dur:              108.00 msec MV DT:                 217 msec    (150-240 msec)  MITRAL VALVE:          Normal Ranges: MV DT:        217 msec (150-240msec)  AORTIC VALVE:          Normal Ranges: LVOT Diameter: 2.10 cm (1.8-2.4cm)  TRICUSPID VALVE/RVSP:         Normal Ranges: Est. RA Pressure:     3 IVC Diam:             1.70 cm  [id number] Chuck Astorga MD Electronically signed on 7/25/2025 at 10:53:57 AM  ** Final **     Medications reviewed.  Vital signs reviewed.  Labs reviewed.         Assessment/Plan    Assessment and Plan:  Bereket Em is a Primary - [place]   59M with PMH of perforated diverticulitis s/p Margarita's procedure c/b formation of ECF. Underwent EC fistula takedown,  GILLES, Margarita's reversal, abdominal wall debridement, and double barrel jejunostomy with Renato Rodriguez, and Vicky on 7/22.  Intra-op findings remarkable for adhesions throughout the abdomen and remaining segment of the colon terminating at the splenic flexure requiring significant mobilization and an ileal window.  Postoperative course complicated by colon ischemia requiring exploratory laparotomy, partial colectomy, end colostomy creation (RUQ), and conversion of double barrel jejunostomy to end jejunostomy in LUQ on 7/25. Patient remains in fair conditions, recovering slowly in ICU - extubated, off pressors. Currently sinus bradycardic on amiodarone gtt.     7/22: ECF takedown, extensive GILLES, Margarita's reversal, abdominal wall debridement, and double barrel jejunostomy with Renato Rodriguez, and Vicky.    7/25:  partial colectomy with end colostomy creation, conversion of double barrel jejunostomy to end jejunostomy    Plan/Recommendations:  - Pain management: per ICU  - Consider discontinuing amiodarone drip given resolution of A fib with RVR and sinus bradycardia  - Wean supplemental O2 as able to maintain spO2 >92%  -NPO, TPN via RIJ  -NG tube gravity trial, with goal of removal later today  - Continue fluid resuscitation with goal of net I/O -1 to- 1L   - Remove colvin, void trial; place external condom catheter for accurate I/Os  - Strict I/Os  - Maintain RLQ ANA MARIA drain, monitor output  - Continue Zosyn (7/25-) for E Coli bacteremia per ID; follow up blood cultures 7/27   - Increase insulin sliding scale for goal glucose <180  - Dressings: appreciate WOCN for ostomy management; continue 4x4 gauze and ABD for midline incision  -Appreciate ICU care    Hospital Day: 6     Dispo: continue ICU care    Patient's exam, labs, and findings discussed and seen with Dr. Olson, who agrees with the plan as described above.     Annabel Patel MD  General Surgery PGY-3  [facility] (MIS/Advanced  Endoscopic/Bariatric Surgery)  [id number]           [1]   Current Facility-Administered Medications:     acetaminophen (Ofirmev) injection 1,000 mg, 1,000 mg, intravenous, q6h, Shane John DO, Stopped at 07/27/25 0606    Adult Clinimix TPN Cyclic, , intravenous, Cyclic PN, Nicole Sosa PA-C, Stopped at 07/27/25 0858    calcium gluconate 1 g in sodium chloride (iso) IV 50 mL, 1 g, intravenous, q6h PRN, Shane John DO    calcium gluconate 2 g in sodium chloride (iso)  mL, 2 g, intravenous, q6h PRN, Shane John DO, Stopped at 07/25/25 0338    dextrose 50 % injection 12.5 g, 12.5 g, intravenous, q15 min PRN, Hilda Paula, APRN-CNP    dextrose 50 % injection 25 g, 25 g, intravenous, q15 min PRN, Shane John DO    enoxaparin (Lovenox) syringe 40 mg, 40 mg, subcutaneous, q24h, Frederick Abel MD, 40 mg at 07/27/25 0927    etomidate (Amidate) injection, , intravenous, Code/trauma/sedation med, Jerel Sims MD, 7.2 mg at 07/25/25 0152    fat emulsion fish oil/plant based (SMOFlipid) 20 % IV infusion 50 g, 250 mL, intravenous, Daily Lipids, Nicole Sosa PA-C, Stopped at 07/27/25 0824    glucagon (Glucagen) injection 1 mg, 1 mg, intramuscular, q15 min PRN, Shane John DO    glucagon (Glucagen) injection 1 mg, 1 mg, intramuscular, q15 min PRN, Shane John DO    heparin flush 10 unit/mL syringe 50 Units, 5 mL, intravenous, PRN, Shane John DO    hydrocortisone sodium succinate (PF) (Solu-CORTEF) 50 mg in sodium chloride 0.9% IV 50 mL, 50 mg, intravenous, q6h, Constantine Meza MD, 50 mg at 07/27/25 0927    HYDROmorphone PF (Dilaudid) injection 0.2 mg, 0.2 mg, intravenous, q4h PRN, Frederick Abel MD, 0.2 mg at 07/27/25 0626    insulin lispro injection 0-5 Units, 0-5 Units, subcutaneous, q4h, Shane John DO, 1 Units at 07/27/25 0034    ketorolac (Toradol) injection 30 mg, 30 mg, intravenous, q6h PRN, Frederick Abel MD, 30 mg at 07/27/25 0304    magnesium sulfate 2 g in sterile water for  injection 50 mL, 2 g, intravenous, Once, Shane John DO    magnesium sulfate 2 g in sterile water for injection 50 mL, 2 g, intravenous, q6h PRN, Shane John DO    magnesium sulfate 4 g in sterile water for injection 100 mL, 4 g, intravenous, q6h PRN, Shane John DO, Stopped at 07/25/25 1452    methocarbamol (Robaxin) injection 1,000 mg, 1,000 mg, intravenous, q8h PRN, Frederick Abel MD, 1,000 mg at 07/26/25 2000    midazolam (Versed) injection, , intravenous, Code/trauma/sedation med, Jerel Sims MD, 5 mg at 07/25/25 0146    naloxone (Narcan) injection 0.2 mg, 0.2 mg, intravenous, PRN, Shane John DO    ondansetron (Zofran) injection 4 mg, 4 mg, intravenous, q8h PRN, Shane John DO    pantoprazole (Protonix) injection 40 mg, 40 mg, intravenous, Daily, Shane John DO, 40 mg at 07/27/25 0927    PHENobarbital (Luminal) injection 65 mg, 65 mg, intravenous, q6h PRN, Shane John DO, 65 mg at 07/26/25 2054    piperacillin-tazobactam (Zosyn) 3.375 g in dextrose (iso) IV 50 mL, 3.375 g, intravenous, q6h, Shane John DO, Stopped at 07/27/25 0638    potassium chloride 20 mEq in sterile water for injection 100 mL, 20 mEq, intravenous, q6h PRN, Shane John DO    potassium chloride 40 mEq in sterile water for injection 100 mL, 40 mEq, intravenous, q6h PRN, Shane John DO, Stopped at 07/27/25 0928

## 2025-07-27 NOTE — CARE PLAN
Problem: Pain - Adult  Goal: Verbalizes/displays adequate comfort level or baseline comfort level  Outcome: Progressing     Problem: Safety - Adult  Goal: Free from fall injury  Outcome: Progressing     Problem: Discharge Planning  Goal: Discharge to home or other facility with appropriate resources  Outcome: Progressing     Problem: Chronic Conditions and Co-morbidities  Goal: Patient's chronic conditions and co-morbidity symptoms are monitored and maintained or improved  Outcome: Progressing     Problem: Nutrition  Goal: Nutrient intake appropriate for maintaining nutritional needs  Outcome: Progressing     Problem: Skin  Goal: Decreased wound size/increased tissue granulation at next dressing change  Outcome: Progressing  Goal: Participates in plan/prevention/treatment measures  Outcome: Progressing  Goal: Prevent/manage excess moisture  Outcome: Progressing  Goal: Prevent/minimize sheer/friction injuries  Outcome: Progressing  Goal: Promote/optimize nutrition  Outcome: Progressing  Goal: Promote skin healing  Outcome: Progressing     Problem: Pain  Goal: Takes deep breaths with improved pain control throughout the shift  Outcome: Progressing  Goal: Turns in bed with improved pain control throughout the shift  Outcome: Progressing  Goal: Walks with improved pain control throughout the shift  Outcome: Progressing  Goal: Performs ADL's with improved pain control throughout shift  Outcome: Progressing  Goal: Participates in PT with improved pain control throughout the shift  Outcome: Progressing  Goal: Free from opioid side effects throughout the shift  Outcome: Progressing  Goal: Free from acute confusion related to pain meds throughout the shift  Outcome: Progressing

## 2025-07-27 NOTE — CARE PLAN
Problem: Pain - Adult  Goal: Verbalizes/displays adequate comfort level or baseline comfort level  Outcome: Progressing     Problem: Safety - Adult  Goal: Free from fall injury  Outcome: Progressing     Problem: Discharge Planning  Goal: Discharge to home or other facility with appropriate resources  Outcome: Progressing     Problem: Chronic Conditions and Co-morbidities  Goal: Patient's chronic conditions and co-morbidity symptoms are monitored and maintained or improved  Outcome: Progressing     Problem: Nutrition  Goal: Nutrient intake appropriate for maintaining nutritional needs  Outcome: Progressing     Problem: Skin  Goal: Decreased wound size/increased tissue granulation at next dressing change  Outcome: Progressing  Goal: Participates in plan/prevention/treatment measures  Outcome: Progressing  Goal: Prevent/manage excess moisture  Outcome: Progressing  Goal: Prevent/minimize sheer/friction injuries  Outcome: Progressing  Goal: Promote/optimize nutrition  Outcome: Progressing  Goal: Promote skin healing  Outcome: Progressing     Problem: Pain  Goal: Takes deep breaths with improved pain control throughout the shift  Outcome: Progressing  Goal: Turns in bed with improved pain control throughout the shift  Outcome: Progressing  Goal: Walks with improved pain control throughout the shift  Outcome: Progressing  Goal: Performs ADL's with improved pain control throughout shift  Outcome: Progressing  Goal: Participates in PT with improved pain control throughout the shift  Outcome: Progressing  Goal: Free from opioid side effects throughout the shift  Outcome: Progressing  Goal: Free from acute confusion related to pain meds throughout the shift  Outcome: Progressing     Problem: Fall/Injury  Goal: Not fall by end of shift  Outcome: Progressing  Goal: Be free from injury by end of the shift  Outcome: Progressing  Goal: Verbalize understanding of personal risk factors for fall in the hospital  Outcome:  Progressing  Goal: Verbalize understanding of risk factor reduction measures to prevent injury from fall in the home  Outcome: Progressing  Goal: Use assistive devices by end of the shift  Outcome: Progressing  Goal: Pace activities to prevent fatigue by end of the shift  Outcome: Progressing

## 2025-07-27 NOTE — PROGRESS NOTES
Bereket Em is a 59 y.o. male on day 5 of admission presenting with Enterocutaneous fistula.    Subjective   Interval History: Patient feels tired but afebrile and hemodynamically stable.      Objective   Range of Vitals (last 24 hours)  Heart Rate:  [52-74]   Temp:  [36.1 °C (97 °F)-36.7 °C (98.1 °F)]   Resp:  [17-24]   BP: ()/(54-61)   SpO2:  [90 %-100 %]   Daily Weight  07/26/25 : 88.7 kg (195 lb 8.8 oz)    Body mass index is 29.73 kg/m².    Physical Exam  GENERAL APPEARANCE: Patient is comfortably lying in the bed and on nasal cannula oxygen  HEENT: Atraumatic and normocephalic  CARDIAC: Regular   LUNGS: Clear  ABDOMEN: 2 ostomy bags and midline laparotomy incision noted.  Did not palpate the abdomen  EXTREMITIES: No edema  SKIN: Right IJ triple-lumen catheter noted     Antibiotics  metroNIDAZOLE - 250 mg  neomycin - 500 mg  piperacillin-tazobactam - 3.375 gram/50 mL    Relevant Results  Labs  Results from last 72 hours   Lab Units 07/27/25  0551 07/26/25  1625 07/26/25  0154 07/25/25  1530 07/25/25  0730 07/25/25  0002 07/24/25  1754   WBC AUTO x10*3/uL 12.9* 12.8* 6.7   < > 2.8*   < > 2.3*   HEMOGLOBIN g/dL 8.1* 9.2* 8.5*   < > 8.5*   < > 9.1*   HEMATOCRIT % 23.4* 27.1* 25.7*   < > 25.9*   < > 28.1*   PLATELETS AUTO x10*3/uL 126* 147* 139*   < > 190   < > 188   NEUTROS PCT AUTO %  --   --   --   --   --   --  76.5   LYMPHO PCT MAN %  --   --   --   --  40.3  --   --    LYMPHS PCT AUTO %  --   --   --   --   --   --  14.2   MONO PCT MAN %  --   --   --   --  17.1  --   --    MONOS PCT AUTO %  --   --   --   --   --   --  6.4   EOSINO PCT MAN %  --   --   --   --  3.1  --   --    EOS PCT AUTO %  --   --   --   --   --   --  2.1    < > = values in this interval not displayed.     Results from last 72 hours   Lab Units 07/27/25  0431 07/26/25  1625 07/26/25  0154   SODIUM mmol/L 133* 137 135*   POTASSIUM mmol/L 3.0* 3.3* 3.3*   CHLORIDE mmol/L 98 98 98   CO2 mmol/L 25 26 27   BUN mg/dL 40* 20 14  "  CREATININE mg/dL 1.08 0.94 0.75   GLUCOSE mg/dL 149* 85 122*   CALCIUM mg/dL 7.5* 7.9* 7.8*   ANION GAP mmol/L 13 16 13   EGFR mL/min/1.73m*2 79 >90 >90   PHOSPHORUS mg/dL 2.6 2.0* 2.2*     Results from last 72 hours   Lab Units 07/27/25  0431 07/26/25  1625 07/26/25  0154 07/25/25  0730 07/25/25  0002 07/24/25  1750   ALK PHOS U/L  --   --   --   --  33 46   BILIRUBIN TOTAL mg/dL  --   --   --   --  2.0* 2.8*   BILIRUBIN DIRECT mg/dL  --   --   --   --  1.0* 1.1*   PROTEIN TOTAL g/dL  --   --   --   --  4.5* 5.4*   ALT U/L  --   --   --   --  21 32   AST U/L  --   --   --   --  25 35   ALBUMIN g/dL 2.5* 2.8* 2.7*   < > 2.3*  2.3* 3.2*  3.2*    < > = values in this interval not displayed.     Estimated Creatinine Clearance: 78.7 mL/min (by C-G formula based on SCr of 1.08 mg/dL).  No results found for: \"CRP\"  Microbiology  Susceptibility data from last 14 days.  Collected Specimen Info Organism Amoxicillin/Clavulanate Ampicillin Ampicillin/Sulbactam Cefazolin Ceftriaxone Ciprofloxacin Gentamicin Levofloxacin Piperacillin/Tazobactam Trimethoprim/Sulfamethoxazole   07/25/25 Blood culture from Peripheral Venipuncture Escherichia coli             07/24/25 Blood culture from Peripheral Venipuncture Escherichia coli             07/24/25 Blood culture from Peripheral Venipuncture Escherichia coli  I  R  R  I  S  R  S  R  S  R       Imaging              Assessment/Plan   Bereket Em is a 59 y.o. male with history of perforated sigmoid diverticulitis status post Soliman's pouch (6/7/2024) complicated by fascial necrosis status post abdominal wall debridement and Vicryl mesh placement (6/18/2024) and midline small bowel enterocutaneous fistula and on TPN for the past several months via right upper arm PICC line admitted to the hospital on July 22 for elective surgery for reanastomosis and fistula excision.     Problems  E. coli bacteremia likely from intra-abdominal surgical manipulation or ischemic bowel        " Patient underwent emergent surgery with resection of small segment of ischemic bowel.  As per operative note there was no purulence or fecal contamination of the peritoneum or anastomotic dehiscence.     2.  Septic shock     7/26/25  Patient's hemodynamic status has improved significantly over the past 24 hours and is off of vasopressors currently.  Patient is on appropriate antibiotic therapy with Zosyn 3.375 g IV every 6 hours.  As patient is improving, I do not see a need to broaden antibiotic therapy to cover resistant gram-positive organisms or yeast.     Plan  Patient is improving.  E. coli is resistant to Unasyn and Cipro, intermediate to Augmentin and sensitive to ceftriaxone and Zosyn).  Continue Zosyn 3.375 g IV every 6 hours for 7 days from last negative blood culture (until August 2) provided patient continues to have clinical improvement.  ID will sign off but feel free to call with any questions or concerns.       Tera Mendoza MD

## 2025-07-27 NOTE — PROGRESS NOTES
Plan:  - analgesia  IV tylenol/prn dilaud  - OOB and PT/OT  - volume resuscitation as clinically indicated  - BPH with IS/flutter/OOB as able   - pressers/tropes for MAP>65 and/or CI>=2/clinical params; weaning as tolerated   - cont stress steroids  Wean to q8  - pheno taper  - PRN anti-emetics, Bowel regimen, NPO for now, and TPN  - Current abx: cont zosyn; BSI with ecoli awaiting sens  - Dispo: ICU     Quality/Safety/Proph:  - GI prophy: PPI  - DVT prophy: scd and Lovenox  - Central line: parenteral medications (I.e. chemo, vasoactive), parenteral nutrition, and access  - Basilio: Urine catheter unnecessary, will be removed today  - Restraint: n/a    Assessment:    Neuro/MSK: EtOH use disorder  and expected acute post op pain   A-F bundle; Sleep Hygiene measures (REST protocol): appropriate daytime stimulation/physical activity. Lights out at 2100, avoidance of night time lab draws/night baths, minimize mind altering medicaments  PT/OT and OOB as appropriate    CV: septic shock , lactic acidosis, and Afib new onset    -    off vasoactive infusions    Pulm: acute post op pulmonary insufficiency - resolving  BPH with IS/flutter/OOB  Not vented     Renal: Hyponatremia , Hypomagnesemia , Hypokalemia , and hypophosphatemia   Trend UOP and renal indices; replete lytes PRN     GI: n/a    ID/rheum: Septic shock 2/2 abdominal source with necrotic bowel  Follow cx data:    MICRO: Susceptibility data from last 90 days.  Collected Specimen Info Organism Amoxicillin/Clavulanate Ampicillin Ampicillin/Sulbactam Cefazolin Ceftriaxone Ciprofloxacin Gentamicin Levofloxacin Piperacillin/Tazobactam Trimethoprim/Sulfamethoxazole   07/25/25 Blood culture from Peripheral Venipuncture Escherichia coli             07/24/25 Blood culture from Peripheral Venipuncture Escherichia coli             07/24/25 Blood culture from Peripheral Venipuncture Escherichia coli  I  R  R  I  S  R  S  R  S  R       Endo: Stress hyperglycemia  Cont ISS  for BG  goal <180  Last HbA1c: No results found for requested labs within last 365 days.  Glucose (past 72hrs):   Results from last 72 hours   Lab Units 07/27/25  0431   GLUCOSE mg/dL 149*       Heme: ABL anemia , Anemia of chronic disease , and leukopenia   -     Trend Hb and transfuse PRN for goal Hb>7    LDA:  CVC 07/25/25 Triple lumen Right Internal jugular (Active)   Placement Date/Time: 07/25/25 0225   Hand Hygiene Performed Prior to CVC Insertion: Yes  Site Prep Agent has Completely Dried Before Insertion: Yes  Lumen Type: Triple lumen  Orientation: Right  Location: Internal jugular   Number of days: 2       Arterial Line 07/25/25 Right Radial (Active)   Placement Date/Time: 07/25/25 1523   Orientation: Right  Location: Radial  Technique: Ultrasound guidance  Insertion attempts: 1  Patient Tolerance: Tolerated well   Number of days: 1       Urethral Catheter (Active)   Placement Date/Time: 07/25/25 0000   Hand Hygiene Completed: Yes   Number of days: 2       Closed/Suction Drain RLQ 10 Fr. (Active)   Placement Date/Time: 07/25/25 0505   Placed by: Dr. Gross  Location: RLQ  Size (Fr.): 10 Fr.  Drain Reservoir Size (mL): 100 mL   Number of days: 2       NG/OG/Feeding Tube Nasogastric 16 Fr Right nostril (Active)   Placement Date/Time: 07/22/25 0745   Hand Hygiene Completed: Yes  Type of Tube: NG/OG Tube  Tube Type: Nasogastric  NG/OG Tube Size: 16 Fr  Tube Location: (c) Right nostril  Difficulty with Placement: No   Number of days: 4       Colostomy LUQ (Active)   Placement Date/Time: 07/25/25 0500   Placed by: Dr. Gross  Location: LUQ   Number of days: 2       Colostomy RUQ (Active)   Placement Date/Time: 07/25/25 0639   Location: RUQ   Number of days: 2     Exam:    A&O x 4  NSR  Adequate air-exchange  Abd soft, NT; right sided colostomy/left jejunostomy  Extremities warm     REASON FOR ADMISSION    59 y.o. male with  past medical history of perforated sigmoid diverticulitis s/p Margarita's 6/7/24 c/b fascial  necrosis s/p abdominal wall debridement & Vicryl mesh placement (6/18/24) c/b midline small bowel enterocutaneous fistula (on TPN). Patient underwent ex lap, extensive lysis of adhesions, abdominal wall debridement, ventral hernia repair, excision of EC fistula, jejunostomy, & abdominal wound vac placement by Dr. Olson & Vicky and Margarita's reversal with transverse colon mobilization/ileal window by Dr. Gross on 7/22/2025.      Earlier today, patient tachycardic with new leukopenia (WBC decreased to 2 from 10). CT scan performed in the afternoon showing scattered air foci and pockets of ascites and generalized mesenteric stranding as well as hyperdense collection within the left anterior abdominal subcutaneous tissue corresponding to the site of prior ileostomy. Overnight, rapid response called for tachycardia. Patient asymptomatic. Denied chest pain, palpitations, shortness of breath. No prior hx of Afib. Reports abdominal pain from recent surgery. ECG showing afib RVR with rates in the 160s. Received 1 LR and 500 5% albumin as well as 5 mg IV metoprolol. Subsequently, patient hypotensive BPs 70s/50s. Also newly febrile with Tmax 38.3 in the setting of leukopenia and tachycardia with concern for intra-abdominal sepsis. Blood cultures drawn and patient started on empiric antibiotics with vanc/zosyn. Patient given amio bolus x1, followed by infusion at 1. Patient transferred to SICU for further management.      Upon arrival to the SICU, patient in Afib RVR with significant hypotension refractory to additional fluid boluses and multiple phenylephrine pushes. Arterial line placed for BP monitoring. Patient intubated for bedside flex sig procedure and possible OR. Post-intubation, R IJ triple lumen CVC placed for initiation and escalation of pressor support including levo, vaso, ATII. Bedside ECHO performed without obvious LV or RV dysfunction. Bedside flex sig performed by surgical team showing area of  well-demarcated dark mucosa concerning for ischemic bowel. Decision made to take patient emergently to OR for ex lap.     TODAY'S EVENTS    7/25: admitted following OR for bowel resection 2/2 ischemic colon  7/26: persistent presser requirements; seems depressed this AM  7/27: uneventful overnight, OOB to chair this AM and doing well    This critically ill patient continues to be at-risk for clinically significant deterioration / failure due to the above mentioned dysfunctional, unstable organ systems.  I have personally identified and managed all complex critical care issues to prevent aforementioned clinical deterioration.  Critical care time is spent at bedside and/or the immediate area and has included, but is not limited to, the review of diagnostic tests, labs, radiographs, serial assessments of hemodynamics, respiratory status, ventilatory management, and family updates.  Time spent in procedures and teaching are reported separately. CF CRITICAL CARE TIME: 39 minutes       LABS:  CMP:  Results from last 7 days   Lab Units 07/27/25  0431 07/26/25  1625 07/26/25  0154 07/25/25  1530 07/25/25  0730 07/25/25  0002 07/24/25  1750 07/23/25  0631 07/21/25  1601   [facility] SODIUM mmol/L  --   --   --   --   --   --   --   --  136   SODIUM mmol/L 133* 137 135* 133* 133* 133* 134*   < >  --    [facility] POTASSIUM mmol/L  --   --   --   --   --   --   --   --  3.3*   POTASSIUM mmol/L 3.0* 3.3* 3.3* 3.4* 4.0 3.2* 3.8   < >  --    [facility] CHLORIDE mmol/L  --   --   --   --   --   --   --   --  90*   CHLORIDE mmol/L 98 98 98 97* 99 99 96*   < >  --    [facility] CO2 mmol/L  --   --   --   --   --   --   --   --  32   CO2 mmol/L 25 26 27 26 25 27 26   < >  --    [facility] ANION GAP mmol/L (calc)  --   --   --   --   --   --   --   --  14   ANION GAP mmol/L 13 16 13 13 13 10 16   < >  --    BUN mg/dL 40* 20 14 17 20 18 17   < >  --    [facility] BUN mg/dL  --   --   --   --   --   --   --   --  27*   CREATININE mg/dL 1.08 0.94 0.75 0.82 0.85 0.83  0.79   < >  --    [facility] CREATININE mg/dL  --   --   --   --   --   --   --   --  0.91   [facility] EGFR mL/min/1.73m2  --   --   --   --   --   --   --   --  96   EGFR mL/min/1.73m*2 79 >90 >90 >90 >90 >90 >90   < >  --    [facility] MAGNESIUM mg/dL  --   --   --   --   --   --   --   --  2.1   MAGNESIUM mg/dL 2.64* 2.50* 2.54* 2.83* 1.68 1.66  --    < >  --    [facility] ALBUMIN g/dL  --   --   --   --   --   --   --   --  4.6   ALBUMIN g/dL 2.5* 2.8* 2.7* 3.0* 2.7* 2.3*  2.3* 3.2*  3.2*   < >  --    [facility] ALK PHOS U/L  --   --   --   --   --   --   --   --  126   ALK PHOS U/L  --   --   --   --   --  33 46  --   --    [facility] ALT U/L  --   --   --   --   --   --   --   --  49*   ALT U/L  --   --   --   --   --  21 32  --   --    [facility] AST U/L  --   --   --   --   --   --   --   --  36*   AST U/L  --   --   --   --   --  25 35  --   --    BILIRUBIN TOTAL mg/dL  --   --   --   --   --  2.0* 2.8*  --   --    [facility] BILIRUBIN TOTAL mg/dL  --   --   --   --   --   --   --   --  0.8    < > = values in this interval not displayed.     CBC:  Results from last 7 days   Lab Units 07/26/25  1625 07/26/25  0154 07/25/25  1530 07/25/25  0730 07/25/25  0002   WBC AUTO x10*3/uL 12.8* 6.7 3.5* 2.8* 2.2*   HEMOGLOBIN g/dL 9.2* 8.5* 7.7* 8.5* 7.2*   HEMATOCRIT % 27.1* 25.7* 23.2* 25.9* 21.7*   PLATELETS AUTO x10*3/uL 147* 139* 151 190 151     COAG:   Results from last 7 days   Lab Units 07/26/25  0154 07/25/25  0824   INR  1.3* 1.2*     ABO:   ABO TYPE   Date Value Ref Range Status   07/25/2025 A  Final     HEME/ENDO:     CARDIAC:   Results from last 7 days   Lab Units 07/25/25  0002   [facility] ng/L 9

## 2025-07-27 NOTE — PROGRESS NOTES
"     COLORECTAL SURGERY PROGRESS NOTE  Bereket Em is a 59 y.o. male on day 5 of admission presenting with perforated diverticulitis POD4 of abdominal debridements, enterocutaneous fistula takedown, Soliman's reversal with end-to-end colonic anastomosis, double barrel jejunostomy creation, and wound VAC placement C/B bowel ischemia now POD 1 for ex lap with colonic anastomosis takedown with bowel resection and end colostomy.    Subjective   Off pressors, otherwise no other acute overnight events, resting comfortably and answering questions.  Seems to be doing well today. Currently extubated on nasal cannula.  Pain is tolerated better. Starting to have output from Jejunostomy pouch.       Objective     Physical Exam  Vitals and nursing note reviewed.   Constitutional:       Appearance: He is not toxic-appearing.   HENT:      Mouth/Throat:      Comments: NG tube in place    Eyes:      Conjunctiva/sclera: Conjunctivae normal.      Pupils: Pupils are equal, round, and reactive to light.       Cardiovascular:      Comments: Rate controlled with amiodarone drip, off pressors  Pulmonary:      Effort: No respiratory distress.      Breath sounds: No stridor. No wheezing.      Comments: On NC, extubated  Abdominal:      Comments: Mild distention, soft, mildly tender as appropriate, jejunostomy pouch on left abdomen in place with liquid stool output, mucosa pink and moist.  End colostomy pouch on right abdomen with bowel sweat,  mucosa pink and moist.     Skin:     General: Skin is warm and dry.      Coloration: Skin is not jaundiced.     Neurological:      General: No focal deficit present.     Psychiatric:         Mood and Affect: Mood normal.        This patient has a central line   Reason for the central line remaining today? Hemodynamic monitoring    Last Recorded Vitals  Blood pressure 98/60, pulse 52, temperature 36 °C (96.8 °F), temperature source Temporal, resp. rate 20, height 1.727 m (5' 8\"), weight 88.7 kg " (195 lb 8.8 oz), SpO2 97%.  Intake/Output last 3 Shifts:  I/O last 3 completed shifts:  In: 6002.5 (67.7 mL/kg) [I.V.:764.6 (8.6 mL/kg); Blood:350; IV Piggyback:955]  Out: 3935 (44.4 mL/kg) [Urine:2480 (0.8 mL/kg/hr); Emesis/NG output:600; Drains:95; Stool:760]  Weight: 88.7 kg     Relevant Results  Scheduled medications  Scheduled Medications[1]  Continuous medications  Continuous Medications[2]  PRN medications  PRN Medications[3]    Results for orders placed or performed during the hospital encounter of 07/22/25 (from the past 24 hours)   Calcium, Ionized   Result Value Ref Range    POCT Calcium, Ionized 1.04 (L) 1.1 - 1.33 mmol/L   CBC   Result Value Ref Range    WBC 12.8 (H) 4.4 - 11.3 x10*3/uL    nRBC 0.3 (H) 0.0 - 0.0 /100 WBCs    RBC 3.48 (L) 4.50 - 5.90 x10*6/uL    Hemoglobin 9.2 (L) 13.5 - 17.5 g/dL    Hematocrit 27.1 (L) 41.0 - 52.0 %    MCV 78 (L) 80 - 100 fL    MCH 26.4 26.0 - 34.0 pg    MCHC 33.9 32.0 - 36.0 g/dL    RDW 14.0 11.5 - 14.5 %    Platelets 147 (L) 150 - 450 x10*3/uL   Magnesium   Result Value Ref Range    Magnesium 2.50 (H) 1.60 - 2.40 mg/dL   Renal Function Panel   Result Value Ref Range    Glucose 85 74 - 99 mg/dL    Sodium 137 136 - 145 mmol/L    Potassium 3.3 (L) 3.5 - 5.3 mmol/L    Chloride 98 98 - 107 mmol/L    Bicarbonate 26 21 - 32 mmol/L    Anion Gap 16 10 - 20 mmol/L    Urea Nitrogen 20 6 - 23 mg/dL    Creatinine 0.94 0.50 - 1.30 mg/dL    eGFR >90 >60 mL/min/1.73m*2    Calcium 7.9 (L) 8.6 - 10.6 mg/dL    Phosphorus 2.0 (L) 2.5 - 4.9 mg/dL    Albumin 2.8 (L) 3.4 - 5.0 g/dL   POCT GLUCOSE   Result Value Ref Range    POCT Glucose 179 (H) 74 - 99 mg/dL   Blood Culture    Specimen: Peripheral Venipuncture; Blood culture   Result Value Ref Range    Blood Culture Loaded on Instrument - Culture in progress    Blood Culture    Specimen: Peripheral Venipuncture; Blood culture   Result Value Ref Range    Blood Culture Loaded on Instrument - Culture in progress    Calcium, Ionized   Result  Value Ref Range    POCT Calcium, Ionized 1.01 (L) 1.1 - 1.33 mmol/L   Magnesium   Result Value Ref Range    Magnesium 2.64 (H) 1.60 - 2.40 mg/dL   Renal Function Panel   Result Value Ref Range    Glucose 149 (H) 74 - 99 mg/dL    Sodium 133 (L) 136 - 145 mmol/L    Potassium 3.0 (L) 3.5 - 5.3 mmol/L    Chloride 98 98 - 107 mmol/L    Bicarbonate 25 21 - 32 mmol/L    Anion Gap 13 10 - 20 mmol/L    Urea Nitrogen 40 (H) 6 - 23 mg/dL    Creatinine 1.08 0.50 - 1.30 mg/dL    eGFR 79 >60 mL/min/1.73m*2    Calcium 7.5 (L) 8.6 - 10.6 mg/dL    Phosphorus 2.6 2.5 - 4.9 mg/dL    Albumin 2.5 (L) 3.4 - 5.0 g/dL   CBC   Result Value Ref Range    WBC 12.9 (H) 4.4 - 11.3 x10*3/uL    nRBC 0.2 (H) 0.0 - 0.0 /100 WBCs    RBC 3.02 (L) 4.50 - 5.90 x10*6/uL    Hemoglobin 8.1 (L) 13.5 - 17.5 g/dL    Hematocrit 23.4 (L) 41.0 - 52.0 %    MCV 78 (L) 80 - 100 fL    MCH 26.8 26.0 - 34.0 pg    MCHC 34.6 32.0 - 36.0 g/dL    RDW 14.6 (H) 11.5 - 14.5 %    Platelets 126 (L) 150 - 450 x10*3/uL   Coagulation Screen   Result Value Ref Range    Protime 11.4 9.8 - 12.4 seconds    INR 1.0 0.9 - 1.1    aPTT 24 (L) 26 - 36 seconds   POCT GLUCOSE   Result Value Ref Range    POCT Glucose 145 (H) 74 - 99 mg/dL   POCT GLUCOSE   Result Value Ref Range    POCT Glucose 181 (H) 74 - 99 mg/dL   POCT GLUCOSE   Result Value Ref Range    POCT Glucose 94 74 - 99 mg/dL   POCT GLUCOSE   Result Value Ref Range    POCT Glucose 114 (H) 74 - 99 mg/dL     *Note: Due to a large number of results and/or encounters for the requested time period, some results have not been displayed. A complete set of results can be found in Results Review.         Assessment & Plan  Enterocutaneous fistula    Diverticulosis of large intestine without hemorrhage    Primary hypertension    Incarcerated ventral hernia    ASSESSMENT/PLAN  Bereket Em is a 59 y.o. male on day 4 of admission presenting with perforated diverticulitis POD4 of abdominal debridements, enterocutaneous fistula takedown,  Soliman's reversal with end-to-end colonic anastomosis, double barrel jejunostomy creation, and wound VAC placement C/B bowel ischemia now POD 1 for ex lap with colonic anastomosis takedown with bowel resection and end colostomy.        Progressing as appropriate. He has had a return of bowel function, hemodynamically stable and weaned off pressors with NG tube in place.     Plan:  - Appreciate care per SICU, continue ICU level care  - WOC for ostomy support and care    Discussed with Dr. Bray and colorectal service team    Blanche Love MD  [id number], General Surgery Resident  You Service: [id number]           [1] acetaminophen, 1,000 mg, intravenous, q6h  enoxaparin, 40 mg, subcutaneous, q24h  fat emulsion fish oil/plant based, 250 mL, intravenous, Daily Lipids  hydrocortisone sodium succinate, 50 mg, intravenous, q8h  insulin lispro, 0-5 Units, subcutaneous, q4h  magnesium sulfate, 2 g, intravenous, Once  pantoprazole, 40 mg, intravenous, Daily  piperacillin-tazobactam, 3.375 g, intravenous, q6h     [2] Adult [employer] TPN Cyclic, , Last Rate: Stopped (07/27/25 0858)     [3] PRN medications: calcium gluconate, calcium gluconate, dextrose, dextrose, etomidate, glucagon, glucagon, heparin flush, HYDROmorphone, ketorolac, magnesium sulfate, magnesium sulfate, methocarbamol, midazolam, naloxone, ondansetron, PHENobarbital, potassium chloride, potassium chloride

## 2025-07-28 ENCOUNTER — APPOINTMENT (OUTPATIENT)
Dept: RADIOLOGY | Facility: HOSPITAL | Age: 60
End: 2025-07-28
Payer: COMMERCIAL

## 2025-07-28 ENCOUNTER — APPOINTMENT (OUTPATIENT)
Dept: CARDIOLOGY | Facility: HOSPITAL | Age: 60
End: 2025-07-28
Payer: COMMERCIAL

## 2025-07-28 VITALS
OXYGEN SATURATION: 94 % | HEART RATE: 61 BPM | SYSTOLIC BLOOD PRESSURE: 95 MMHG | DIASTOLIC BLOOD PRESSURE: 56 MMHG | HEIGHT: 68 IN | BODY MASS INDEX: 29.64 KG/M2 | TEMPERATURE: 97.2 F | RESPIRATION RATE: 18 BRPM | WEIGHT: 195.55 LBS

## 2025-07-28 LAB
ALBUMIN SERPL BCP-MCNC: 2.2 G/DL (ref 3.4–5)
ALBUMIN SERPL BCP-MCNC: 2.4 G/DL (ref 3.4–5)
ANION GAP SERPL CALC-SCNC: 13 MMOL/L (ref 10–20)
ANION GAP SERPL CALC-SCNC: 31 MMOL/L (ref 10–20)
ATRIAL RATE: 102 BPM
ATRIAL RATE: 105 BPM
ATRIAL RATE: 120 BPM
BUN SERPL-MCNC: 57 MG/DL (ref 6–23)
BUN SERPL-MCNC: 57 MG/DL (ref 6–23)
CA-I BLD-SCNC: 1.12 MMOL/L (ref 1.1–1.33)
CALCIUM SERPL-MCNC: 6.8 MG/DL (ref 8.6–10.6)
CALCIUM SERPL-MCNC: 7.7 MG/DL (ref 8.6–10.6)
CHLORIDE SERPL-SCNC: 86 MMOL/L (ref 98–107)
CHLORIDE SERPL-SCNC: 95 MMOL/L (ref 98–107)
CO2 SERPL-SCNC: 24 MMOL/L (ref 21–32)
CO2 SERPL-SCNC: 28 MMOL/L (ref 21–32)
CREAT SERPL-MCNC: 0.91 MG/DL (ref 0.5–1.3)
CREAT SERPL-MCNC: 1.23 MG/DL (ref 0.5–1.3)
EGFRCR SERPLBLD CKD-EPI 2021: 67 ML/MIN/1.73M*2
EGFRCR SERPLBLD CKD-EPI 2021: >90 ML/MIN/1.73M*2
ERYTHROCYTE [DISTWIDTH] IN BLOOD BY AUTOMATED COUNT: 14.8 % (ref 11.5–14.5)
GLUCOSE BLD MANUAL STRIP-MCNC: 102 MG/DL (ref 74–99)
GLUCOSE BLD MANUAL STRIP-MCNC: 113 MG/DL (ref 74–99)
GLUCOSE BLD MANUAL STRIP-MCNC: 127 MG/DL (ref 74–99)
GLUCOSE BLD MANUAL STRIP-MCNC: 190 MG/DL (ref 74–99)
GLUCOSE BLD MANUAL STRIP-MCNC: 212 MG/DL (ref 74–99)
GLUCOSE BLD MANUAL STRIP-MCNC: 93 MG/DL (ref 74–99)
GLUCOSE SERPL-MCNC: 148 MG/DL (ref 74–99)
GLUCOSE SERPL-MCNC: 173 MG/DL (ref 74–99)
HCT VFR BLD AUTO: 23.5 % (ref 41–52)
HGB BLD-MCNC: 7.9 G/DL (ref 13.5–17.5)
LABORATORY COMMENT REPORT: NORMAL
MAGNESIUM SERPL-MCNC: 2.71 MG/DL (ref 1.6–2.4)
MCH RBC QN AUTO: 26.1 PG (ref 26–34)
MCHC RBC AUTO-ENTMCNC: 33.6 G/DL (ref 32–36)
MCV RBC AUTO: 78 FL (ref 80–100)
NRBC BLD-RTO: 0.2 /100 WBCS (ref 0–0)
P AXIS: 13 DEGREES
P AXIS: 39 DEGREES
P OFFSET: 180 MS
P OFFSET: 189 MS
P ONSET: 139 MS
P ONSET: 141 MS
PATH REPORT.FINAL DX SPEC: NORMAL
PATH REPORT.GROSS SPEC: NORMAL
PATH REPORT.RELEVANT HX SPEC: NORMAL
PATH REPORT.TOTAL CANCER: NORMAL
PHOSPHATE SERPL-MCNC: 2.8 MG/DL (ref 2.5–4.9)
PHOSPHATE SERPL-MCNC: 3.3 MG/DL (ref 2.5–4.9)
PLATELET # BLD AUTO: 149 X10*3/UL (ref 150–450)
POTASSIUM SERPL-SCNC: 2.9 MMOL/L (ref 3.5–5.3)
POTASSIUM SERPL-SCNC: 3.2 MMOL/L (ref 3.5–5.3)
PR INTERVAL: 138 MS
PR INTERVAL: 142 MS
Q ONSET: 209 MS
Q ONSET: 210 MS
Q ONSET: 210 MS
QRS COUNT: 17 BEATS
QRS COUNT: 19 BEATS
QRS COUNT: 20 BEATS
QRS DURATION: 138 MS
QRS DURATION: 90 MS
QRS DURATION: 94 MS
QT INTERVAL: 316 MS
QT INTERVAL: 336 MS
QT INTERVAL: 336 MS
QTC CALCULATION(BAZETT): 444 MS
QTC CALCULATION(BAZETT): 446 MS
QTC CALCULATION(BAZETT): 456 MS
QTC FREDERICIA: 398 MS
QTC FREDERICIA: 404 MS
QTC FREDERICIA: 412 MS
R AXIS: -19 DEGREES
R AXIS: -37 DEGREES
R AXIS: -43 DEGREES
RBC # BLD AUTO: 3.03 X10*6/UL (ref 4.5–5.9)
SODIUM SERPL-SCNC: 133 MMOL/L (ref 136–145)
SODIUM SERPL-SCNC: 138 MMOL/L (ref 136–145)
T AXIS: 18 DEGREES
T AXIS: 33 DEGREES
T AXIS: 55 DEGREES
T OFFSET: 368 MS
T OFFSET: 377 MS
T OFFSET: 378 MS
VENTRICULAR RATE: 105 BPM
VENTRICULAR RATE: 111 BPM
VENTRICULAR RATE: 120 BPM
WBC # BLD AUTO: 18.2 X10*3/UL (ref 4.4–11.3)

## 2025-07-28 PROCEDURE — 99024 POSTOP FOLLOW-UP VISIT: CPT | Performed by: SURGERY

## 2025-07-28 PROCEDURE — 2500000005 HC RX 250 GENERAL PHARMACY W/O HCPCS: Performed by: NURSE PRACTITIONER

## 2025-07-28 PROCEDURE — 2580000001 HC RX 258 IV SOLUTIONS: Performed by: NURSE PRACTITIONER

## 2025-07-28 PROCEDURE — 2500000004 HC RX 250 GENERAL PHARMACY W/ HCPCS (ALT 636 FOR OP/ED): Performed by: NURSE PRACTITIONER

## 2025-07-28 PROCEDURE — 85027 COMPLETE CBC AUTOMATED: CPT | Performed by: STUDENT IN AN ORGANIZED HEALTH CARE EDUCATION/TRAINING PROGRAM

## 2025-07-28 PROCEDURE — 99232 SBSQ HOSP IP/OBS MODERATE 35: CPT

## 2025-07-28 PROCEDURE — 93970 EXTREMITY STUDY: CPT | Performed by: RADIOLOGY

## 2025-07-28 PROCEDURE — 2500000004 HC RX 250 GENERAL PHARMACY W/ HCPCS (ALT 636 FOR OP/ED)

## 2025-07-28 PROCEDURE — 2500000004 HC RX 250 GENERAL PHARMACY W/ HCPCS (ALT 636 FOR OP/ED): Performed by: STUDENT IN AN ORGANIZED HEALTH CARE EDUCATION/TRAINING PROGRAM

## 2025-07-28 PROCEDURE — 93005 ELECTROCARDIOGRAM TRACING: CPT

## 2025-07-28 PROCEDURE — 80069 RENAL FUNCTION PANEL: CPT | Performed by: NURSE PRACTITIONER

## 2025-07-28 PROCEDURE — 97161 PT EVAL LOW COMPLEX 20 MIN: CPT | Mod: GP

## 2025-07-28 PROCEDURE — 99291 CRITICAL CARE FIRST HOUR: CPT

## 2025-07-28 PROCEDURE — 2500000004 HC RX 250 GENERAL PHARMACY W/ HCPCS (ALT 636 FOR OP/ED): Performed by: PHYSICIAN ASSISTANT

## 2025-07-28 PROCEDURE — 2500000005 HC RX 250 GENERAL PHARMACY W/O HCPCS: Performed by: PHYSICIAN ASSISTANT

## 2025-07-28 PROCEDURE — 82330 ASSAY OF CALCIUM: CPT | Performed by: STUDENT IN AN ORGANIZED HEALTH CARE EDUCATION/TRAINING PROGRAM

## 2025-07-28 PROCEDURE — 2500000004 HC RX 250 GENERAL PHARMACY W/ HCPCS (ALT 636 FOR OP/ED): Mod: TB | Performed by: NURSE PRACTITIONER

## 2025-07-28 PROCEDURE — 37799 UNLISTED PX VASCULAR SURGERY: CPT | Performed by: STUDENT IN AN ORGANIZED HEALTH CARE EDUCATION/TRAINING PROGRAM

## 2025-07-28 PROCEDURE — 1100000001 HC PRIVATE ROOM DAILY

## 2025-07-28 PROCEDURE — 80069 RENAL FUNCTION PANEL: CPT | Performed by: STUDENT IN AN ORGANIZED HEALTH CARE EDUCATION/TRAINING PROGRAM

## 2025-07-28 PROCEDURE — 82947 ASSAY GLUCOSE BLOOD QUANT: CPT

## 2025-07-28 PROCEDURE — 83735 ASSAY OF MAGNESIUM: CPT | Performed by: STUDENT IN AN ORGANIZED HEALTH CARE EDUCATION/TRAINING PROGRAM

## 2025-07-28 PROCEDURE — 2500000002 HC RX 250 W HCPCS SELF ADMINISTERED DRUGS (ALT 637 FOR MEDICARE OP, ALT 636 FOR OP/ED): Performed by: STUDENT IN AN ORGANIZED HEALTH CARE EDUCATION/TRAINING PROGRAM

## 2025-07-28 RX ORDER — POTASSIUM CHLORIDE 14.9 MG/ML
20 INJECTION INTRAVENOUS ONCE
Status: COMPLETED | OUTPATIENT
Start: 2025-07-28 | End: 2025-07-28

## 2025-07-28 RX ORDER — INSULIN LISPRO 100 [IU]/ML
0-5 INJECTION, SOLUTION INTRAVENOUS; SUBCUTANEOUS EVERY 6 HOURS
Status: DISPENSED | OUTPATIENT
Start: 2025-07-28

## 2025-07-28 RX ORDER — HYDROXYZINE HYDROCHLORIDE 25 MG/1
25 TABLET, FILM COATED ORAL ONCE
Status: DISCONTINUED | OUTPATIENT
Start: 2025-07-28 | End: 2025-07-28

## 2025-07-28 RX ORDER — POTASSIUM CHLORIDE 29.8 MG/ML
40 INJECTION INTRAVENOUS ONCE
Status: DISCONTINUED | OUTPATIENT
Start: 2025-07-28 | End: 2025-07-29

## 2025-07-28 RX ADMIN — PIPERACILLIN SODIUM AND TAZOBACTAM SODIUM 3.38 G: 3; .375 INJECTION, SOLUTION INTRAVENOUS at 12:29

## 2025-07-28 RX ADMIN — POTASSIUM CHLORIDE 40 MEQ: 29.8 INJECTION, SOLUTION INTRAVENOUS at 06:02

## 2025-07-28 RX ADMIN — WATER 50 MG: 1 INJECTION INTRAMUSCULAR; INTRAVENOUS; SUBCUTANEOUS at 20:15

## 2025-07-28 RX ADMIN — PIPERACILLIN SODIUM AND TAZOBACTAM SODIUM 3.38 G: 3; .375 INJECTION, SOLUTION INTRAVENOUS at 23:39

## 2025-07-28 RX ADMIN — INSULIN LISPRO 2 UNITS: 100 INJECTION, SOLUTION INTRAVENOUS; SUBCUTANEOUS at 08:47

## 2025-07-28 RX ADMIN — POTASSIUM CHLORIDE 20 MEQ: 14.9 INJECTION, SOLUTION INTRAVENOUS at 09:51

## 2025-07-28 RX ADMIN — PIPERACILLIN SODIUM AND TAZOBACTAM SODIUM 3.38 G: 3; .375 INJECTION, SOLUTION INTRAVENOUS at 18:29

## 2025-07-28 RX ADMIN — HYDROMORPHONE HYDROCHLORIDE 0.2 MG: 0.2 INJECTION, SOLUTION INTRAMUSCULAR; INTRAVENOUS; SUBCUTANEOUS at 03:21

## 2025-07-28 RX ADMIN — PIPERACILLIN SODIUM AND TAZOBACTAM SODIUM 3.38 G: 3; .375 INJECTION, SOLUTION INTRAVENOUS at 00:21

## 2025-07-28 RX ADMIN — ACETAMINOPHEN 1000 MG: 10 INJECTION INTRAVENOUS at 12:00

## 2025-07-28 RX ADMIN — SMOFLIPID 50 G: 6; 6; 5; 3 INJECTION, EMULSION INTRAVENOUS at 20:15

## 2025-07-28 RX ADMIN — PANTOPRAZOLE SODIUM 40 MG: 40 INJECTION, POWDER, LYOPHILIZED, FOR SOLUTION INTRAVENOUS at 08:47

## 2025-07-28 RX ADMIN — KETOROLAC TROMETHAMINE 30 MG: 30 INJECTION, SOLUTION INTRAMUSCULAR; INTRAVENOUS at 05:03

## 2025-07-28 RX ADMIN — ACETAMINOPHEN 1000 MG: 10 INJECTION INTRAVENOUS at 18:29

## 2025-07-28 RX ADMIN — INSULIN LISPRO 1 UNITS: 100 INJECTION, SOLUTION INTRAVENOUS; SUBCUTANEOUS at 00:25

## 2025-07-28 RX ADMIN — ACETAMINOPHEN 1000 MG: 10 INJECTION INTRAVENOUS at 05:03

## 2025-07-28 RX ADMIN — HYDROMORPHONE HYDROCHLORIDE 0.2 MG: 1 INJECTION, SOLUTION INTRAMUSCULAR; INTRAVENOUS; SUBCUTANEOUS at 14:22

## 2025-07-28 RX ADMIN — WATER 50 MG: 1 INJECTION INTRAMUSCULAR; INTRAVENOUS; SUBCUTANEOUS at 08:47

## 2025-07-28 RX ADMIN — PIPERACILLIN SODIUM AND TAZOBACTAM SODIUM 3.38 G: 3; .375 INJECTION, SOLUTION INTRAVENOUS at 06:02

## 2025-07-28 RX ADMIN — ACETAMINOPHEN 1000 MG: 10 INJECTION INTRAVENOUS at 23:39

## 2025-07-28 RX ADMIN — ENOXAPARIN SODIUM 40 MG: 100 INJECTION SUBCUTANEOUS at 08:47

## 2025-07-28 RX ADMIN — WATER 50 MG: 1 INJECTION INTRAMUSCULAR; INTRAVENOUS; SUBCUTANEOUS at 01:40

## 2025-07-28 RX ADMIN — METHOCARBAMOL 1000 MG: 100 INJECTION INTRAMUSCULAR; INTRAVENOUS at 08:47

## 2025-07-28 RX ADMIN — ASCORBIC ACID, VITAMIN A PALMITATE, CHOLECALCIFEROL, THIAMINE HYDROCHLORIDE, RIBOFLAVIN-5 PHOSPHATE SODIUM, PYRIDOXINE HYDROCHLORIDE, NIACINAMIDE, DEXPANTHENOL, ALPHA-TOCOPHEROL ACETATE, VITAMIN K1, FOLIC ACID, BIOTIN, CYANOCOBALAMIN: 200; 3300; 200; 6; 3.6; 6; 40; 15; 10; 150; 600; 60; 5 INJECTION, SOLUTION INTRAVENOUS at 20:15

## 2025-07-28 RX ADMIN — HYDROMORPHONE HYDROCHLORIDE 0.2 MG: 1 INJECTION, SOLUTION INTRAMUSCULAR; INTRAVENOUS; SUBCUTANEOUS at 20:38

## 2025-07-28 RX ADMIN — HYDROMORPHONE HYDROCHLORIDE 0.2 MG: 0.2 INJECTION, SOLUTION INTRAMUSCULAR; INTRAVENOUS; SUBCUTANEOUS at 09:38

## 2025-07-28 SDOH — SOCIAL STABILITY: SOCIAL INSECURITY: HAS ANYONE EVER THREATENED TO HURT YOUR FAMILY OR YOUR PETS?: NO

## 2025-07-28 SDOH — SOCIAL STABILITY: SOCIAL INSECURITY: DO YOU FEEL UNSAFE GOING BACK TO THE PLACE WHERE YOU ARE LIVING?: NO

## 2025-07-28 SDOH — SOCIAL STABILITY: SOCIAL INSECURITY: HAVE YOU HAD THOUGHTS OF HARMING ANYONE ELSE?: NO

## 2025-07-28 SDOH — SOCIAL STABILITY: SOCIAL INSECURITY: WERE YOU ABLE TO COMPLETE ALL THE BEHAVIORAL HEALTH SCREENINGS?: YES

## 2025-07-28 SDOH — ECONOMIC STABILITY: INCOME INSECURITY: IN THE PAST 12 MONTHS HAS THE ELECTRIC, GAS, OIL, OR WATER COMPANY THREATENED TO SHUT OFF SERVICES IN YOUR HOME?: NO

## 2025-07-28 SDOH — SOCIAL STABILITY: SOCIAL INSECURITY: WITHIN THE LAST YEAR, HAVE YOU BEEN AFRAID OF YOUR PARTNER OR EX-PARTNER?: NO

## 2025-07-28 SDOH — SOCIAL STABILITY: SOCIAL INSECURITY: ABUSE: ADULT

## 2025-07-28 SDOH — SOCIAL STABILITY: SOCIAL INSECURITY: WITHIN THE LAST YEAR, HAVE YOU BEEN HUMILIATED OR EMOTIONALLY ABUSED IN OTHER WAYS BY YOUR PARTNER OR EX-PARTNER?: NO

## 2025-07-28 SDOH — ECONOMIC STABILITY: FOOD INSECURITY: WITHIN THE PAST 12 MONTHS, THE FOOD YOU BOUGHT JUST DIDN'T LAST AND YOU DIDN'T HAVE MONEY TO GET MORE.: NEVER TRUE

## 2025-07-28 SDOH — SOCIAL STABILITY: SOCIAL INSECURITY: DOES ANYONE TRY TO KEEP YOU FROM HAVING/CONTACTING OTHER FRIENDS OR DOING THINGS OUTSIDE YOUR HOME?: NO

## 2025-07-28 SDOH — ECONOMIC STABILITY: FOOD INSECURITY: WITHIN THE PAST 12 MONTHS, YOU WORRIED THAT YOUR FOOD WOULD RUN OUT BEFORE YOU GOT THE MONEY TO BUY MORE.: NEVER TRUE

## 2025-07-28 SDOH — ECONOMIC STABILITY: HOUSING INSECURITY

## 2025-07-28 SDOH — SOCIAL STABILITY: SOCIAL INSECURITY: ARE YOU OR HAVE YOU BEEN THREATENED OR ABUSED PHYSICALLY, EMOTIONALLY, OR SEXUALLY BY ANYONE?: NO

## 2025-07-28 SDOH — SOCIAL STABILITY: SOCIAL INSECURITY: ARE THERE ANY APPARENT SIGNS OF INJURIES/BEHAVIORS THAT COULD BE RELATED TO ABUSE/NEGLECT?: NO

## 2025-07-28 SDOH — ECONOMIC STABILITY: HOUSING INSECURITY: DO YOU FEEL UNSAFE GOING BACK TO THE PLACE WHERE YOU LIVE?: NO

## 2025-07-28 SDOH — SOCIAL STABILITY: SOCIAL INSECURITY: HAVE YOU HAD ANY THOUGHTS OF HARMING ANYONE ELSE?: NO

## 2025-07-28 SDOH — ECONOMIC STABILITY: FOOD INSECURITY: HOW HARD IS IT FOR YOU TO PAY FOR THE VERY BASICS LIKE FOOD, HOUSING, MEDICAL CARE, AND HEATING?: NOT HARD AT ALL

## 2025-07-28 SDOH — SOCIAL STABILITY: SOCIAL INSECURITY: DO YOU FEEL ANYONE HAS EXPLOITED OR TAKEN ADVANTAGE OF YOU FINANCIALLY OR OF YOUR PERSONAL PROPERTY?: NO

## 2025-07-28 ASSESSMENT — PAIN SCALES - GENERAL
PAINLEVEL_OUTOF10: 8
PAINLEVEL_OUTOF10: 2
PAINLEVEL_OUTOF10: 7
PAINLEVEL_OUTOF10: 2
PAINLEVEL_OUTOF10: 7
PAINLEVEL_OUTOF10: 4
PAINLEVEL_OUTOF10: 6
PAINLEVEL_OUTOF10: 5 - MODERATE PAIN
PAINLEVEL_OUTOF10: 7
PAINLEVEL_OUTOF10: 7

## 2025-07-28 ASSESSMENT — COGNITIVE AND FUNCTIONAL STATUS - GENERAL
WALKING IN HOSPITAL ROOM: A LITTLE
DAILY ACTIVITIY SCORE: 22
MOBILITY SCORE: 17
DRESSING REGULAR LOWER BODY CLOTHING: A LITTLE
MOVING FROM LYING ON BACK TO SITTING ON SIDE OF FLAT BED WITH BEDRAILS: A LITTLE
WALKING IN HOSPITAL ROOM: A LITTLE
TURNING FROM BACK TO SIDE WHILE IN FLAT BAD: A LITTLE
DRESSING REGULAR LOWER BODY CLOTHING: A LITTLE
MOVING FROM LYING ON BACK TO SITTING ON SIDE OF FLAT BED WITH BEDRAILS: A LITTLE
TURNING FROM BACK TO SIDE WHILE IN FLAT BAD: A LITTLE
DRESSING REGULAR UPPER BODY CLOTHING: A LITTLE
DRESSING REGULAR UPPER BODY CLOTHING: A LITTLE
MOVING TO AND FROM BED TO CHAIR: A LITTLE
MOVING TO AND FROM BED TO CHAIR: A LITTLE
CLIMB 3 TO 5 STEPS WITH RAILING: A LOT
MOBILITY SCORE: 17
MOBILITY SCORE: 19
WALKING IN HOSPITAL ROOM: A LITTLE
STANDING UP FROM CHAIR USING ARMS: A LITTLE
TURNING FROM BACK TO SIDE WHILE IN FLAT BAD: A LITTLE
CLIMB 3 TO 5 STEPS WITH RAILING: A LITTLE
CLIMB 3 TO 5 STEPS WITH RAILING: A LOT
STANDING UP FROM CHAIR USING ARMS: A LITTLE
MOVING TO AND FROM BED TO CHAIR: A LITTLE
HELP NEEDED FOR BATHING: A LOT
STANDING UP FROM CHAIR USING ARMS: A LITTLE
TOILETING: A LOT
PATIENT BASELINE BEDBOUND: NO

## 2025-07-28 ASSESSMENT — ACTIVITIES OF DAILY LIVING (ADL)
ADL_ASSISTANCE: INDEPENDENT
FEEDING YOURSELF: INDEPENDENT
HEARING - RIGHT EAR: FUNCTIONAL
BATHING: INDEPENDENT
TOILETING: INDEPENDENT
HEARING - LEFT EAR: FUNCTIONAL
PATIENT'S MEMORY ADEQUATE TO SAFELY COMPLETE DAILY ACTIVITIES?: YES
WALKS IN HOME: INDEPENDENT
GROOMING: INDEPENDENT
JUDGMENT_ADEQUATE_SAFELY_COMPLETE_DAILY_ACTIVITIES: YES
ADLS_ADDRESSED: NO
DRESSING YOURSELF: INDEPENDENT
ADEQUATE_TO_COMPLETE_ADL: YES
LACK_OF_TRANSPORTATION: NO

## 2025-07-28 ASSESSMENT — PAIN DESCRIPTION - DESCRIPTORS: DESCRIPTORS: DISCOMFORT

## 2025-07-28 ASSESSMENT — PAIN DESCRIPTION - LOCATION
LOCATION: ABDOMEN

## 2025-07-28 ASSESSMENT — PAIN DESCRIPTION - ORIENTATION
ORIENTATION: MID

## 2025-07-28 ASSESSMENT — LIFESTYLE VARIABLES
AUDIT-C TOTAL SCORE: 0
AUDIT-C TOTAL SCORE: 0
HOW MANY STANDARD DRINKS CONTAINING ALCOHOL DO YOU HAVE ON A TYPICAL DAY: PATIENT DOES NOT DRINK
HOW OFTEN DO YOU HAVE 6 OR MORE DRINKS ON ONE OCCASION: NEVER
HOW OFTEN DO YOU HAVE A DRINK CONTAINING ALCOHOL: NEVER
PRESCIPTION_ABUSE_PAST_12_MONTHS: NO
SKIP TO QUESTIONS 9-10: 1
SUBSTANCE_ABUSE_PAST_12_MONTHS: NO

## 2025-07-28 NOTE — CARE PLAN
Problem: Pain - Adult  Goal: Verbalizes/displays adequate comfort level or baseline comfort level  Outcome: Progressing     Problem: Safety - Adult  Goal: Free from fall injury  Outcome: Progressing     Problem: Discharge Planning  Goal: Discharge to home or other facility with appropriate resources  Outcome: Progressing     Problem: Chronic Conditions and Co-morbidities  Goal: Patient's chronic conditions and co-morbidity symptoms are monitored and maintained or improved  Outcome: Progressing     Problem: Nutrition  Goal: Nutrient intake appropriate for maintaining nutritional needs  Outcome: Progressing     Problem: Skin  Goal: Decreased wound size/increased tissue granulation at next dressing change  Outcome: Progressing  Goal: Participates in plan/prevention/treatment measures  Outcome: Progressing  Goal: Prevent/manage excess moisture  Outcome: Progressing  Goal: Prevent/minimize sheer/friction injuries  Outcome: Progressing  Goal: Promote/optimize nutrition  Outcome: Progressing  Goal: Promote skin healing  Outcome: Progressing   The patient's goals for the shift include      The clinical goals for the shift include patient will remain HDS this shift

## 2025-07-28 NOTE — PROGRESS NOTES
Colorectal Surgery Consult Progress Note     Reason for consult: Intra-op consult for possible colonic diversion             HPI: Bereket Em is a 59 y.o. 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 Olson & Vicky with Margarita's reversal/ transverse colon mobilization/ileal window by Dr. Gross on 7/22/25. Post-op course c/b bowel ischemia with afib RVR requiring ICU transfer & amio drip, s/p exlap, bowel resection at anastomosis, end colostomy creation (RUQ) & conversion of double barrel jejunostomy to end jejunostomy (LUQ) on 7/25/25.        Subjective  No acute events overnight, fells well this morning. NG tube removed yesterday evening.  Pain well controlled   Endorses leaking jejunostomy, requiring multiple pouch changes.  Denies nausea/vomiting on sips of clears, not much of appetite yet.  Denies urinary symptoms with colvin in place  Stated her sat up in chair & ambulated yesterday    Objective  Physical Exam:  Gen: in no physical distress, well developed and well nourished, and alert, smiling, lying in bed  HENT: Head normocephalic, atraumatic.  Mucous membranes moist.    Neuro: Alert and oriented x3.    CV: Bradycardic, mildly hypotensive but per baseline on chart review.  Resp: No acute respiratory distress.  Normal effort on room air. Chest expansion symmetrical.   GI: Abdomen is soft and nontender, mildly distended.  Midline incision is well approximated with staples, without erythema or drainage.  RUQ end colostomy is pink and healthy, moderately edematous, with dark brown liquid in pouch.  LUQ jejunostomy with  liquid brown output in pouch. ANA MARIA drain is serosanguinous to bulb suction.   Skin: Warm and dry, without rashes or lesions.  Musculoskeletal: Moves all extremities x4    I/O last 3 completed shifts:  In: 3351.5 (37.8 mL/kg) [I.V.:270.3 (3 mL/kg); IV Piggyback:600]  Out: 3510 (39.6 mL/kg) [Urine:1910 (0.6 mL/kg/hr); Emesis/NG output:250; Drains:35; Stool:1315]  Weight: 88.7 kg   No intake/output data recorded.    Data Review:  CBC:   Lab Results   Component Value Date    WBC 18.2 (H) 07/28/2025    RBC 3.03 (L) 07/28/2025     BMP:   Lab Results   Component Value Date    GLUCOSE 148 (H) 07/28/2025    CO2 28 07/28/2025    BUN 57 (H) 07/28/2025    CREATININE 1.23 07/28/2025    CALCIUM 7.7 (L) 07/28/2025     Coagulation:   Lab Results   Component Value Date    INR 1.0 07/27/2025    APTT 24 (L) 07/27/2025         Assessment: 59 y.o. 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 Olson & Vicky and Margarita's reversal with transverse colon mobilization/ileal window by Dr. Gross on 7/22/25 c/b by bowel ischemia s/p exlap, bowel resection at anastomosis, end colostomy creation (RUQ) & conversion of double barrel jejunostomy to end jejunostomy (LUQ) on 7/25/25.  Remains in ICU on amio gtt for afib RVR & TPN, NG tube discontinued yesterday. ID following for E. Coli bacteremia, patient on IV Zosyn. Has been off vasopressors >24 hours.    Recommendations:  -Would consider bedside swallow prior to advancing diet  -Diet advancement per primary team given jejunostomy/output; awaiting return of bowel function  -Recommend ice packs to edematous stoma, Ostomy nursing for education & support  -Can consider transfer out of ICU as criteria is met  -Rest of care per primary team    Will continue to follow.    Plan  of care discussed with patient, Dr. Gross, & colorectal surgery team.    Nydia PALMA-CNP  Colorectal Surgery NP   [facility] Service Pager #[phone]

## 2025-07-28 NOTE — PROGRESS NOTES
General Surgery Progress Note    07/28/25    Bereket Em    Summary:  Bereket Em is a 59 year old male with a history of perforated diverticulitis s/p Margarita's procedure c/b formation of ECF. Underwent EC fistula takedown, GILLES, Margarita's reversal, abdominal wall debridement, and double barrel jejunostomy with Leyda Olson, Renato, and Vicky on 7/22.  Intra-op findings remarkable for adhesions throughout the abdomen and remaining segment of the colon terminating at the splenic flexure requiring significant mobilization and an ileal window.  Postoperative course complicated by colon ischemia requiring OR take back for partial colectomy, end colostomy creation (RUQ), and conversion of double barrel jejunostomy to end jejunostomy in LUQ on 7/25 (POD3).     Subjective    Subjective:  No acute events overnight. Off vasopressors, amiodarone drip and O2. Basilio removed and patient voided spontaneously. NG tube removed after successful gravity trial. Vital signs significant for sinus bradycardia this morning, but patient asymptomatic. Reports feeling better compared to yesterday. Denies nausea or emesis. Would like to get out of bed.     Review of Systems:    A 12-point review of systems was performed, and was negative except as above.      Objective    Objective:      Vital signs:   Temp:  [36 °C (96.8 °F)-36.5 °C (97.7 °F)] 36.4 °C (97.5 °F)  Heart Rate:  [43-69] 44  Resp:  [14-24] 23  BP: ()/(56-64) 95/64  Arterial Line BP 1: ()/(54-87) 82/60    Physical Exam:  GEN: resting comfortably, non-toxic appearance  NEURO: awake, alert, oriented, conversational  HEENT: Sclera anicteric. Moist mucous membranes.   RESP: non-labored breathing on room air  CV: sinus bradycardia, normotensive MAP>65  GI: Abdomen soft, appropriately tender to palpation around surgical incision, non-peritonitic. RUQ end colostomy stoma pink and viable, abundant thin dark brown liquid output and gas in bag.  LUQ end  jejunostomy stoma pink, viable with bowel sweat in bag. RLQ ANA MARIA drain serosanguinous output (terminates in pelvis near rectal stump).   SKIN: laparotomy closed loosely with staples and packed with betadine soaked cristóbal, which were removed at bedside. No evidence of necrosis, purulent drainage, erythema or significant swelling of midline incision after packing removed.   EXT: no peripheral edema      I/O last 2 completed shifts:  In: 2762.3 (31.1 mL/kg) [I.V.:78.4 (0.9 mL/kg); IV Piggyback:400]  Out: 2260 (25.5 mL/kg) [Urine:1400 (0.7 mL/kg/hr); Emesis/NG output:100; Drains:10; Stool:750]  Weight: 88.7 kg      Labs Past 18 Hours:  Recent Results (from the past 18 hours)   POCT GLUCOSE    Collection Time: 07/27/25  3:33 PM   Result Value Ref Range    POCT Glucose 114 (H) 74 - 99 mg/dL   POCT GLUCOSE    Collection Time: 07/27/25  8:22 PM   Result Value Ref Range    POCT Glucose 103 (H) 74 - 99 mg/dL   POCT GLUCOSE    Collection Time: 07/28/25 12:23 AM   Result Value Ref Range    POCT Glucose 190 (H) 74 - 99 mg/dL   Calcium, Ionized    Collection Time: 07/28/25  3:25 AM   Result Value Ref Range    POCT Calcium, Ionized 1.12 1.1 - 1.33 mmol/L   CBC    Collection Time: 07/28/25  3:25 AM   Result Value Ref Range    WBC 18.2 (H) 4.4 - 11.3 x10*3/uL    nRBC 0.2 (H) 0.0 - 0.0 /100 WBCs    RBC 3.03 (L) 4.50 - 5.90 x10*6/uL    Hemoglobin 7.9 (L) 13.5 - 17.5 g/dL    Hematocrit 23.5 (L) 41.0 - 52.0 %    MCV 78 (L) 80 - 100 fL    MCH 26.1 26.0 - 34.0 pg    MCHC 33.6 32.0 - 36.0 g/dL    RDW 14.8 (H) 11.5 - 14.5 %    Platelets 149 (L) 150 - 450 x10*3/uL   Magnesium    Collection Time: 07/28/25  3:25 AM   Result Value Ref Range    Magnesium 2.71 (H) 1.60 - 2.40 mg/dL   Renal Function Panel    Collection Time: 07/28/25  3:25 AM   Result Value Ref Range    Glucose 148 (H) 74 - 99 mg/dL    Sodium 133 (L) 136 - 145 mmol/L    Potassium 2.9 (LL) 3.5 - 5.3 mmol/L    Chloride 95 (L) 98 - 107 mmol/L    Bicarbonate 28 21 - 32 mmol/L    Anion  Gap 13 10 - 20 mmol/L    Urea Nitrogen 57 (H) 6 - 23 mg/dL    Creatinine 1.23 0.50 - 1.30 mg/dL    eGFR 67 >60 mL/min/1.73m*2    Calcium 7.7 (L) 8.6 - 10.6 mg/dL    Phosphorus 3.3 2.5 - 4.9 mg/dL    Albumin 2.4 (L) 3.4 - 5.0 g/dL        Meds:  Current Medications[1]       Assessment/Plan    Assessment and Plan:  Bereket Em is a Primary - [facility]   59M with PMH of perforated diverticulitis s/p Margarita's procedure c/b formation of ECF. Underwent EC fistula takedown, GILLES, Margarita's reversal, abdominal wall debridement, and double barrel jejunostomy with Renato Rodriguez, and Vicky on 7/22.  Intra-op findings remarkable for adhesions throughout the abdomen and remaining segment of the colon terminating at the splenic flexure requiring significant mobilization and an ileal window.  Postoperative course complicated by colon ischemia requiring exploratory laparotomy, partial colectomy, end colostomy creation (RUQ), and conversion of double barrel jejunostomy to end jejunostomy in LUQ on 7/25. Patient remains in stable conditions, recovering slowly in ICU - extubated, off pressors. Leukocytosis worsened 18.2K (12.9K). Also, has a new FRITZ likely due to prerenal azotemia, currently under fluid resuscitation.      7/22: ECF takedown, extensive GILLES, Margarita's reversal, abdominal wall debridement, and double barrel jejunostomy with Renato Rodriguez, and Vicky.    7/25:  partial colectomy with end colostomy creation, conversion of double barrel jejunostomy to end jejunostomy     Plan/Recommendations:  - NG tube removed after successful gravity trial  - Basilio removed and patient voided spontaneously  - Patient stable off pressors and amiodarone drip; transfer to floor  - Advance diet to clear liquid  - Continue fluid resuscitation with goal of net I/O -1 to- 1L   - Strict I/Os  - Maintain RLQ ANA MARIA drain, monitor output  - Continue Zosyn (7/25-) for E Coli bacteremia per ID; follow up blood cultures 7/27   -  Continue insulin sliding scale for goal glucose <180  - Dressings: appreciate WOCN for ostomy management; continue 4x4 gauze and ABD for midline incision  -Appreciate ICU care     Hospital Day: 7     Dispo: Transfer patient to floor     Boris Ledezma MD  PGY-1 General Surgery  [facility] [id number]         [1]   Current Facility-Administered Medications:     acetaminophen (Ofirmev) injection 1,000 mg, 1,000 mg, intravenous, q6h, Shane John DO, Stopped at 07/28/25 0518    Adult Clinimix TPN Cyclic, , intravenous, Cyclic PN, Nicole Sosa PA-C, Last Rate: 90 mL/hr at 07/27/25 2010, New Bag at 07/27/25 2010    calcium gluconate 1 g in sodium chloride (iso) IV 50 mL, 1 g, intravenous, q6h PRN, Shane Jonh DO    calcium gluconate 2 g in sodium chloride (iso)  mL, 2 g, intravenous, q6h PRN, Shane John DO, Stopped at 07/25/25 0338    dextrose 50 % injection 12.5 g, 12.5 g, intravenous, q15 min PRN, Hilda Paula, APRN-CNP    dextrose 50 % injection 25 g, 25 g, intravenous, q15 min PRN, Shane John DO    enoxaparin (Lovenox) syringe 40 mg, 40 mg, subcutaneous, q24h, Frederick Abel MD, 40 mg at 07/27/25 0927    etomidate (Amidate) injection, , intravenous, Code/trauma/sedation med, Jerel Sims MD, 7.2 mg at 07/25/25 0152    fat emulsion fish oil/plant based (SMOFlipid) 20 % IV infusion 50 g, 250 mL, intravenous, Daily Lipids, Nicole Sosa PA-C, Last Rate: 20.8 mL/hr at 07/27/25 2011, 50 g at 07/27/25 2011    glucagon (Glucagen) injection 1 mg, 1 mg, intramuscular, q15 min PRN, Shane John DO    glucagon (Glucagen) injection 1 mg, 1 mg, intramuscular, q15 min PRN, Shane John DO    heparin flush 10 unit/mL syringe 50 Units, 5 mL, intravenous, PRN, Shane John,     hydrocortisone sodium succinate (PF) (Solu-CORTEF) 50 mg in sodium chloride 0.9% IV 50 mL, 50 mg, intravenous, q8h, Nicole Sosa PA-C, 50 mg at 07/28/25 0140    HYDROmorphone PF  (Dilaudid) injection 0.2 mg, 0.2 mg, intravenous, q4h PRN, Frederick Abel MD, 0.2 mg at 07/28/25 0321    insulin lispro injection 0-5 Units, 0-5 Units, subcutaneous, q4h, Shane John DO, 1 Units at 07/28/25 0025    ketorolac (Toradol) injection 30 mg, 30 mg, intravenous, q6h PRN, Frederick Abel MD, 30 mg at 07/28/25 0503    magnesium sulfate 2 g in sterile water for injection 50 mL, 2 g, intravenous, Once, Shane John DO    magnesium sulfate 2 g in sterile water for injection 50 mL, 2 g, intravenous, q6h PRN, Shane John DO    magnesium sulfate 4 g in sterile water for injection 100 mL, 4 g, intravenous, q6h PRN, Shane John DO, Stopped at 07/25/25 1452    methocarbamol (Robaxin) injection 1,000 mg, 1,000 mg, intravenous, q8h PRN, Frederick Abel MD, 1,000 mg at 07/27/25 2313    midazolam (Versed) injection, , intravenous, Code/trauma/sedation med, Jerel Sims MD, 5 mg at 07/25/25 0146    naloxone (Narcan) injection 0.2 mg, 0.2 mg, intravenous, PRN, Shane John DO    ondansetron (Zofran) injection 4 mg, 4 mg, intravenous, q8h PRN, Shane John DO    pantoprazole (Protonix) injection 40 mg, 40 mg, intravenous, Daily, Shane John DO, 40 mg at 07/27/25 0927    PHENobarbital (Luminal) injection 65 mg, 65 mg, intravenous, q6h PRN, Shane John DO, 65 mg at 07/26/25 2054    piperacillin-tazobactam (Zosyn) 3.375 g in dextrose (iso) IV 50 mL, 3.375 g, intravenous, q6h, Shane John DO, Stopped at 07/28/25 0632    potassium chloride 20 mEq in sterile water for injection 100 mL, 20 mEq, intravenous, q6h PRN, Shane John,     potassium chloride 40 mEq in sterile water for injection 100 mL, 40 mEq, intravenous, q6h PRN, Shane John DO, Last Rate: 25 mL/hr at 07/28/25 0602, 40 mEq at 07/28/25 0602

## 2025-07-28 NOTE — PROGRESS NOTES
Physical Therapy    Physical Therapy Evaluation    Patient Name: Bereket Em  MRN: [id number]  Department: [facility]  Room: 12  Today's Date: 7/28/2025   Time Calculation  Start Time: 0851  Stop Time: 0916  Time Calculation (min): 25 min    Assessment/Plan   PT Assessment  PT Assessment Results: Decreased strength, Decreased endurance, Impaired balance, Decreased mobility, Pain  Rehab Prognosis: Good  Barriers to Discharge Home: No anticipated barriers  Evaluation/Treatment Tolerance: Patient tolerated treatment well  Medical Staff Made Aware: Yes  End of Session Communication: Bedside nurse  Assessment Comment: Pt demonstrated ability to complete bed mobiltiy, sit<>stand and ambulation ~300ft FWW.  CGA provided throughout session.  Vitals stable, no instability.  End of Session Patient Position: Up in chair, Alarm off, not on at start of session  IP OR SWING BED PT PLAN  Inpatient or Swing Bed: Inpatient  PT Plan  Treatment/Interventions: Bed mobility, Transfer training, Gait training, Stair training, Balance training, Strengthening, Endurance training, Therapeutic exercise, Therapeutic activity  PT Plan: Ongoing PT  PT Frequency: 3 times per week  PT Discharge Recommendations: Low intensity level of continued care  PT Recommended Transfer Status: Assistive device, Contact guard  PT - OK to Discharge: Yes    Subjective     PT Visit Info:  PT Received On: 07/28/25  General Visit Information:  General  Reason for Referral: 7/22: ECF takedown, extensive GILLES, Margarita's reversal, abdominal wall debridement, and double barrel jejunostomy with Renato Rodriguez, and Vicky.    7/25: partial colectomy with end colostomy creation, conversion of double barrel jejunostomy to end jejunostomy  Past Medical History Relevant to Rehab: Perforated diverticulitis s/p Margarita's procedure  Family/Caregiver Present: No  Prior to Session Communication: Bedside nurse  Patient Position Received: Bed, 3 rail up, Alarm off, not  on at start of session  Preferred Learning Style: auditory, verbal  General Comment: Pt awake, alert and willing to participate in PT session  Home Living:  Home Living  Type of Home: House  Lives With: Significant other  Home Adaptive Equipment: Walker rolling or standard  Home Layout:  (2 MARCO A with railing, bed/bath - 1st floor, tub/shower)  Prior Level of Function:  Prior Function Per Pt/Caregiver Report  Level of [place]: Independent with ADLs and functional transfers, Independent with homemaking with ambulation  ADL Assistance: Independent  Homemaking Assistance: Independent  Ambulatory Assistance: Independent  Vocational: Part time employment  Prior Function Comments: (-) drives, (-) falls  Precautions:  Precautions  Hearing/Visual Limitations: WFL  Medical Precautions: Abdominal precautions, Fall precautions  Post-Surgical Precautions: Abdominal surgery precautions      25 0851 25 0916   Vital Signs   Vitals Session Pre PT Post PT   Heart Rate 59 51   Resp 22 21   SpO2 96 % 97 %   BP 99/58 121/72   MAP (mmHg) 72 87   BP Method Automatic Automatic   Patient Position Lying Sitting   Vital Signs Comment  --  Standin/61 (80);  Vitals stable with mobility     Objective   Pain:  Pain Assessment  Pain Assessment: 0-10  0-10 (Numeric) Pain Score:  (6/10 start of session; 7/10 end of session)  Pain Type: Surgical pain  Pain Location: Abdomen  Effect of Pain on Daily Activities: Pain medsprovided at start of session.  Pt requesting pain meds at end of session (RN notified)  Cognition:  Cognition  Overall Cognitive Status: Within Functional Limits  Orientation Level: Oriented X4    General Assessments:  General Observation  General Observation: Tele, IV, ANA MARIA     Activity Tolerance  Endurance: Tolerates less than 10 min exercise, no significant change in vital signs  Early Mobility/Exercise Safety Screen: Proceed with mobilization - No exclusion criteria met    Sensation  Light Touch: No apparent  deficits (Post-op)    Strength  Strength Comments: BLEs at least 3/5 shown thorugh functional mobility  Coordination  Movements are Fluid and Coordinated: Yes    Postural Control  Postural Control: Within Functional Limits    Static Sitting Balance  Static Sitting-Balance Support: Bilateral upper extremity supported, Feet supported  Static Sitting-Level of Assistance: Close supervision  Static Sitting-Comment/Number of Minutes: ~5 min    Static Standing Balance  Static Standing-Balance Support: Bilateral upper extremity supported  Static Standing-Level of Assistance: Contact guard  Static Standing-Comment/Number of Minutes: FWW for upright support; 2 min  Functional Assessments:  ADL  ADL's Addressed: No    Bed Mobility  Bed Mobility: Yes  Bed Mobility 1  Bed Mobility 1: Supine to sitting  Level of Assistance 1: Contact guard  Bed Mobility Comments 1: HOB elevated, cues for sequencing (log roll)    Transfers  Transfer: Yes  Transfer 1  Transfer From 1: Sit to, Stand to  Transfer to 1: Sit, Stand  Technique 1: Sit to stand, Stand to sit  Transfer Device 1: Walker  Transfer Level of Assistance 1: Contact guard  Trials/Comments 1: Cues for hand placement and proper use of AD    Ambulation/Gait Training  Ambulation/Gait Training Performed: Yes  Ambulation/Gait Training 1  Surface 1: Level tile  Device 1: Rolling walker  Assistance 1: Contact guard  Comments/Distance (ft) 1: ~300ft    Stairs  Stairs: No  Extremity/Trunk Assessments:  RLE   RLE : Within Functional Limits  LLE   LLE : Within Functional Limits  Outcome Measures:  [facility] Basic Mobility  Turning from your back to your side while in a flat bed without using bedrails: A little  Moving from lying on your back to sitting on the side of a flat bed without using bedrails: A little  Moving to and from bed to chair (including a wheelchair): A little  Standing up from a chair using your arms (e.g. wheelchair or bedside chair): A little  To walk in hospital room: A  little  Climbing 3-5 steps with railing: A lot  Basic Mobility - Total Score: 17    FSS-ICU  Ambulation: Walks >/ or equal to 150 feet with supervision  Rolling: Supervision or set-up only  Sitting: Supervision or set-up only  Transfer Sit-to-Stand: Supervision or set-up only  Transfer Supine-to-Sit: Supervision or set-up only  Total Score: 25    Early Mobility/Exercise Safety Screen: Proceed with mobilization - No exclusion criteria met  ICU Mobility Scale: Walking with assistance of 1 person [8]    Encounter Problems       Encounter Problems (Active)       Balance       Pt will demonstrated ability to score at least 24/28 on the Tinetti balance assessment tool to ensure safety upon D/C.  (Progressing)       Start:  07/28/25    Expected End:  08/11/25               Mobility       Pt will demonstrated ability to ambulate >/=600ft with proper form and no balance deficits for safe home going.   (Progressing)       Start:  07/28/25    Expected End:  08/11/25            Pt will demonstrate ability to ascend/descend at least 2 stairs with unilateral rail and no balance deficits for safe home going.  (Progressing)       Start:  07/28/25    Expected End:  08/11/25               PT Transfers       Pt will demonstrate ability to complete 5X STS in < 12 sec with good from and consistency between transfers for safe D/C.  (Progressing)       Start:  07/28/25    Expected End:  08/11/25                   Education Documentation  Precautions, taught by Yue Murillo PT at 7/28/2025  2:40 PM.  Learner: Patient  Readiness: Acceptance  Method: Explanation, Demonstration  Response: Verbalizes Understanding, Demonstrated Understanding  Comment: Abd pre    Body Mechanics, taught by Yue Murillo PT at 7/28/2025  2:40 PM.  Learner: Patient  Readiness: Acceptance  Method: Explanation, Demonstration  Response: Verbalizes Understanding, Demonstrated Understanding  Comment: Abd pre    Mobility Training, taught by Yue Murillo PT at  7/28/2025  2:40 PM.  Learner: Patient  Readiness: Acceptance  Method: Explanation, Demonstration  Response: Verbalizes Understanding, Demonstrated Understanding  Comment: Abd pre    Education Comments  No comments found.

## 2025-07-28 NOTE — PROGRESS NOTES
I saw and examined the patient.       58 yo male s/p fistula takedown, extensive lysis of adhesions, colostomy reversal, double barrel jejunostomy formation, abdominal wall reconstruction with return to the OR for washout, resection of distal colon, new end colostomy and repositioning of jejnostomy.   Seen initially OOB to chair and later in bed.  Improved spirits. NGT already removed.   WBCs 12  On exam, NAD.  Staples in place. No erythema or drainage. Ostomies are pink and viable with sweat in the bag. Some leakage from jejunostomy appliance which I then changed.   Appreciate ID consult/ABX given E. Coli bacteremia.  TPN for nutrition.  Local wound care and ostomy care. Lovenox. PT. Remainder of care per ICU.

## 2025-07-28 NOTE — PROGRESS NOTES
"                                                                            Surgical Intensive Care Unit Progress Note     Subjective    No acute events overnight. Patient endorsed some  trouble sleeping overnight.    Objective     Physical Exam  Vitals reviewed.   Constitutional:       Appearance: Normal appearance.   HENT:      Head: Normocephalic and atraumatic.      Nose: Nose normal.      Comments: NG to LIWS.     Eyes:      Conjunctiva/sclera: Conjunctivae normal.      Pupils: Pupils are equal, round, and reactive to light.       Cardiovascular:      Rate and Rhythm: Normal rate and regular rhythm.      Pulses: Normal pulses.   Pulmonary:      Effort: Pulmonary effort is normal.      Breath sounds: Normal breath sounds.   Abdominal:      General: Abdomen is flat.      Palpations: Abdomen is soft.      Comments: Midline incisions dressed with drain to suction and jejunostomy and colostomy present.     Skin:     General: Skin is warm and dry.     Neurological:      Mental Status: He is oriented to person, place, and time.     Psychiatric:         Mood and Affect: Mood normal.         Behavior: Behavior normal.         Last Recorded Vitals  Blood pressure 95/64, pulse 53, temperature 36.4 °C (97.5 °F), temperature source Temporal, resp. rate 18, height 1.727 m (5' 8\"), weight 88.7 kg (195 lb 8.8 oz), SpO2 94%.  Intake/Output last 3 Shifts:  I/O last 3 completed shifts:  In: 3668.1 (41.4 mL/kg) [I.V.:487 (5.5 mL/kg); IV Piggyback:700]  Out: 2960 (33.4 mL/kg) [Urine:1525 (0.5 mL/kg/hr); Emesis/NG output:400; Drains:45; Stool:990]  Weight: 88.7 kg     Relevant Results    Scheduled medications  Scheduled Medications[1]  Continuous medications  Continuous Medications[2]  PRN medications  PRN Medications[3]     Results for orders placed or performed during the hospital encounter of 07/22/25 (from the past 24 hours)   POCT GLUCOSE   Result Value Ref Range    POCT Glucose 181 (H) 74 - 99 mg/dL   POCT GLUCOSE   Result " Value Ref Range    POCT Glucose 94 74 - 99 mg/dL   POCT GLUCOSE   Result Value Ref Range    POCT Glucose 114 (H) 74 - 99 mg/dL   POCT GLUCOSE   Result Value Ref Range    POCT Glucose 103 (H) 74 - 99 mg/dL   POCT GLUCOSE   Result Value Ref Range    POCT Glucose 190 (H) 74 - 99 mg/dL   Calcium, Ionized   Result Value Ref Range    POCT Calcium, Ionized 1.12 1.1 - 1.33 mmol/L   CBC   Result Value Ref Range    WBC 18.2 (H) 4.4 - 11.3 x10*3/uL    nRBC 0.2 (H) 0.0 - 0.0 /100 WBCs    RBC 3.03 (L) 4.50 - 5.90 x10*6/uL    Hemoglobin 7.9 (L) 13.5 - 17.5 g/dL    Hematocrit 23.5 (L) 41.0 - 52.0 %    MCV 78 (L) 80 - 100 fL    MCH 26.1 26.0 - 34.0 pg    MCHC 33.6 32.0 - 36.0 g/dL    RDW 14.8 (H) 11.5 - 14.5 %    Platelets 149 (L) 150 - 450 x10*3/uL   Magnesium   Result Value Ref Range    Magnesium 2.71 (H) 1.60 - 2.40 mg/dL   Renal Function Panel   Result Value Ref Range    Glucose 148 (H) 74 - 99 mg/dL    Sodium 133 (L) 136 - 145 mmol/L    Potassium 2.9 (LL) 3.5 - 5.3 mmol/L    Chloride 95 (L) 98 - 107 mmol/L    Bicarbonate 28 21 - 32 mmol/L    Anion Gap 13 10 - 20 mmol/L    Urea Nitrogen 57 (H) 6 - 23 mg/dL    Creatinine 1.23 0.50 - 1.30 mg/dL    eGFR 67 >60 mL/min/1.73m*2    Calcium 7.7 (L) 8.6 - 10.6 mg/dL    Phosphorus 3.3 2.5 - 4.9 mg/dL    Albumin 2.4 (L) 3.4 - 5.0 g/dL     *Note: Due to a large number of results and/or encounters for the requested time period, some results have not been displayed. A complete set of results can be found in Results Review.     Assessment & Plan  Enterocutaneous fistula    Diverticulosis of large intestine without hemorrhage    Primary hypertension    Incarcerated ventral hernia    Assessment:  Bereket Em is a 59 y.o. 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 Patient underwent ex lap, extensive  lysis of adhesions, abdominal wall debridement, ventral hernia repair, excision of EC fistula, jejunostomy, & abdominal wound vac placement by Dr. Olson & Vicky and Margarita's reversal with transverse colon mobilization/ileal window by Dr. Gross on 7/22/2025. Post-operative course complicated by Afib with RVR and septic shock 2/2 ischemic bowel requiring initiation and escalation of pressor support. Patient admitted to SICU for further management s/p ex lap with colonic anastomosis takedown, colon section, new R sided colostomy and L sided jejunostomy on 7/25.      Plan:  NEURO: Hx of alcohol use with completed phenobarb taper on this admission. Acute post-op pain managed with current regimen.   - OOB to chair yesterday  - ongoing neuro and pain assessments  - PRN hydromorphone for pain control  - PT/OT consult  - Home meds: trazodone, oxycodone 5      CV: No previous cardiac history. Developed Afib RVR (HR 160s) on the floor s/p fluids and metoprolol x1. Patient subsequently hypotensive refractory to phenylephrine pushes and fluids. Pt started on amio bolus and infusion with conversion to NSR.  On arrival to the SICU, patient hypotensive with SBP in the 60s, suspect 2/2 septic shock requiring initiation and escalation of pressor support. Lactate now resolved. TTE normal. Amidorane subsequently stopped due to continued bradycardia. Patient has a junctional bradycardia but is asymptomatic.    - continuous EKG  - Goal map range 65-90  - Remove A-line  - Home meds: none.     PULM: No pulmonary hx.. After arrival to SICU, patient intubated and sedated to facilitate bedside flex sig procedure and need for emergent OR. Extubated 7/25 post-OR to NC. Now on room air.    - Q1h incentive spirometer while awake  - additional pulm toilet prn.       GI: Hx of perforated diverticulitis s/p Margarita's (6/7/2024) c/b enterocutaneous fistula and incarcerated ventral hernia s/p exploratory laparotomy, extensive lysis of  adhesions, take down of enterocutaneous fistula, creation of jejunostomy, abdominal wall debridement, placement of wound vac, repair of recurrent incarcerated ventral hernia (Dr. Olson) with Margarita's reversal (Dr. Gross). Bedside colonoscopy/flex sig 7/25 showing ischemic colon proximal to anastomosis s/p ex lap with colonic anastomosis takedown, colon section, new R sided colostomy and L sided jejunostomy on 7/25.   - NPO on TPN. Via RIJ triple lumen   - NG --> clamp trial per surgery team  - PPI for GI prophylaxis  - Home meds: Diphen/Atrop 2.5/.0 Tab Spec      : No history of renal disease. Baseline creatinine 0.7-0.9.   - Volume resuscitation as needed  - Maintain U/O >0.5ml/kg/hr  - Check renal function panel post op and daily  - Replete electrolytes to goal K>4, Mg>2, Phos>2.5, ionized Ca>1.10.     HEME: Acute blood loss anemia. Hgb stable.  - CBC and coags daily  - SCDs and lovenox for DVT prophylaxis  - ongoing monitoring for s/s bleeding     ENDO: No history of DM or thyroid disease. Hydrocortisone 100 mg x1 dose.   - weaned stress dose steroids hydrocortisone 50 mg q6h to q8h.   - Q4h BG   - SSI Lispro per ICU protocol.     ID: Afebrile, leukopenia resolved. Intra-abdominal sepsis 2/2 ischemic bowel and E coli bacteremia.   - temp q4h, wbc daily  - Per ID Continue Zosyn 3.375 g IV every 6 hours for 7 days from last negative blood culture (until August 2)   - ongoing monitoring for s/s infection     Lines:  - R radial arterial line (7.25)  plan to remove today  - RIJ triple lumen central line (7.25)  - PIVs  - NGT --> to gravity   -RLQ drain to bulb suction     Dispo: Transfer to the floor. Patient seen and discussed with ICU attending Dr. Teresa Cervantes MD  Anesthesiology, PGY3/CA2  SICU phone [phone]               [1] acetaminophen, 1,000 mg, intravenous, q6h  enoxaparin, 40 mg, subcutaneous, q24h  fat emulsion fish oil/plant based, 250 mL, intravenous, Daily Lipids  hydrocortisone  sodium succinate, 50 mg, intravenous, q8h  insulin lispro, 0-5 Units, subcutaneous, q4h  magnesium sulfate, 2 g, intravenous, Once  pantoprazole, 40 mg, intravenous, Daily  piperacillin-tazobactam, 3.375 g, intravenous, q6h     [2] Adult Clinimix TPN Cyclic, , Last Rate: 90 mL/hr at 07/27/25 2010     [3] PRN medications: calcium gluconate, calcium gluconate, dextrose, dextrose, etomidate, glucagon, glucagon, heparin flush, HYDROmorphone, ketorolac, magnesium sulfate, magnesium sulfate, methocarbamol, midazolam, naloxone, ondansetron, PHENobarbital, potassium chloride, potassium chloride

## 2025-07-29 ENCOUNTER — APPOINTMENT (OUTPATIENT)
Dept: RADIOLOGY | Facility: HOSPITAL | Age: 60
End: 2025-07-29
Payer: COMMERCIAL

## 2025-07-29 LAB
ALBUMIN SERPL BCP-MCNC: 2.6 G/DL (ref 3.4–5)
ANION GAP SERPL CALC-SCNC: 11 MMOL/L (ref 10–20)
BUN SERPL-MCNC: 46 MG/DL (ref 6–23)
CALCIUM SERPL-MCNC: 8.1 MG/DL (ref 8.6–10.6)
CHLORIDE SERPL-SCNC: 99 MMOL/L (ref 98–107)
CO2 SERPL-SCNC: 28 MMOL/L (ref 21–32)
CREAT SERPL-MCNC: 0.66 MG/DL (ref 0.5–1.3)
EGFRCR SERPLBLD CKD-EPI 2021: >90 ML/MIN/1.73M*2
ERYTHROCYTE [DISTWIDTH] IN BLOOD BY AUTOMATED COUNT: 15.4 % (ref 11.5–14.5)
GLUCOSE BLD MANUAL STRIP-MCNC: 101 MG/DL (ref 74–99)
GLUCOSE BLD MANUAL STRIP-MCNC: 104 MG/DL (ref 74–99)
GLUCOSE BLD MANUAL STRIP-MCNC: 117 MG/DL (ref 74–99)
GLUCOSE BLD MANUAL STRIP-MCNC: 153 MG/DL (ref 74–99)
GLUCOSE BLD MANUAL STRIP-MCNC: 180 MG/DL (ref 74–99)
GLUCOSE SERPL-MCNC: 165 MG/DL (ref 74–99)
HCT VFR BLD AUTO: 24 % (ref 41–52)
HGB BLD-MCNC: 8 G/DL (ref 13.5–17.5)
MAGNESIUM SERPL-MCNC: 2.51 MG/DL (ref 1.6–2.4)
MCH RBC QN AUTO: 26 PG (ref 26–34)
MCHC RBC AUTO-ENTMCNC: 33.3 G/DL (ref 32–36)
MCV RBC AUTO: 78 FL (ref 80–100)
NRBC BLD-RTO: 0.2 /100 WBCS (ref 0–0)
PHOSPHATE SERPL-MCNC: 3.4 MG/DL (ref 2.5–4.9)
PLATELET # BLD AUTO: 173 X10*3/UL (ref 150–450)
POTASSIUM SERPL-SCNC: 3.2 MMOL/L (ref 3.5–5.3)
RBC # BLD AUTO: 3.08 X10*6/UL (ref 4.5–5.9)
SODIUM SERPL-SCNC: 135 MMOL/L (ref 136–145)
WBC # BLD AUTO: 16.6 X10*3/UL (ref 4.4–11.3)

## 2025-07-29 PROCEDURE — 92610 EVALUATE SWALLOWING FUNCTION: CPT | Mod: GN

## 2025-07-29 PROCEDURE — 82947 ASSAY GLUCOSE BLOOD QUANT: CPT

## 2025-07-29 PROCEDURE — 2580000001 HC RX 258 IV SOLUTIONS: Performed by: NURSE PRACTITIONER

## 2025-07-29 PROCEDURE — 2500000004 HC RX 250 GENERAL PHARMACY W/ HCPCS (ALT 636 FOR OP/ED): Performed by: NURSE PRACTITIONER

## 2025-07-29 PROCEDURE — 2500000002 HC RX 250 W HCPCS SELF ADMINISTERED DRUGS (ALT 637 FOR MEDICARE OP, ALT 636 FOR OP/ED)

## 2025-07-29 PROCEDURE — 2500000001 HC RX 250 WO HCPCS SELF ADMINISTERED DRUGS (ALT 637 FOR MEDICARE OP)

## 2025-07-29 PROCEDURE — 2500000004 HC RX 250 GENERAL PHARMACY W/ HCPCS (ALT 636 FOR OP/ED)

## 2025-07-29 PROCEDURE — 83735 ASSAY OF MAGNESIUM: CPT | Performed by: NURSE PRACTITIONER

## 2025-07-29 PROCEDURE — 99024 POSTOP FOLLOW-UP VISIT: CPT | Performed by: SURGERY

## 2025-07-29 PROCEDURE — 2500000004 HC RX 250 GENERAL PHARMACY W/ HCPCS (ALT 636 FOR OP/ED): Performed by: STUDENT IN AN ORGANIZED HEALTH CARE EDUCATION/TRAINING PROGRAM

## 2025-07-29 PROCEDURE — 80069 RENAL FUNCTION PANEL: CPT | Performed by: NURSE PRACTITIONER

## 2025-07-29 PROCEDURE — 2500000005 HC RX 250 GENERAL PHARMACY W/O HCPCS: Performed by: NURSE PRACTITIONER

## 2025-07-29 PROCEDURE — 71045 X-RAY EXAM CHEST 1 VIEW: CPT | Performed by: RADIOLOGY

## 2025-07-29 PROCEDURE — 85027 COMPLETE CBC AUTOMATED: CPT | Performed by: NURSE PRACTITIONER

## 2025-07-29 PROCEDURE — 1100000001 HC PRIVATE ROOM DAILY

## 2025-07-29 PROCEDURE — 2500000005 HC RX 250 GENERAL PHARMACY W/O HCPCS

## 2025-07-29 PROCEDURE — 92526 ORAL FUNCTION THERAPY: CPT | Mod: GN

## 2025-07-29 RX ORDER — LIDOCAINE 560 MG/1
2 PATCH PERCUTANEOUS; TOPICAL; TRANSDERMAL DAILY
Status: DISPENSED | OUTPATIENT
Start: 2025-07-29

## 2025-07-29 RX ORDER — TRAZODONE HYDROCHLORIDE 100 MG/1
100 TABLET ORAL ONCE
Status: DISCONTINUED | OUTPATIENT
Start: 2025-07-29 | End: 2025-07-29

## 2025-07-29 RX ORDER — OXYCODONE HCL 5 MG/5 ML
5 SOLUTION, ORAL ORAL EVERY 4 HOURS PRN
Status: DISPENSED | OUTPATIENT
Start: 2025-07-29

## 2025-07-29 RX ORDER — POTASSIUM CHLORIDE 29.8 MG/ML
40 INJECTION INTRAVENOUS ONCE
Status: COMPLETED | OUTPATIENT
Start: 2025-07-29 | End: 2025-07-29

## 2025-07-29 RX ORDER — POTASSIUM CHLORIDE 14.9 MG/ML
20 INJECTION INTRAVENOUS ONCE
Status: COMPLETED | OUTPATIENT
Start: 2025-07-29 | End: 2025-07-29

## 2025-07-29 RX ORDER — DIPHENOXYLATE HYDROCHLORIDE AND ATROPINE SULFATE 2.5; .025 MG/1; MG/1
1 TABLET ORAL 4 TIMES DAILY
Status: DISPENSED | OUTPATIENT
Start: 2025-07-29

## 2025-07-29 RX ORDER — OXYCODONE HCL 5 MG/5 ML
10 SOLUTION, ORAL ORAL EVERY 4 HOURS PRN
Status: DISPENSED | OUTPATIENT
Start: 2025-07-29

## 2025-07-29 RX ORDER — FUROSEMIDE 10 MG/ML
20 INJECTION INTRAMUSCULAR; INTRAVENOUS ONCE
Status: COMPLETED | OUTPATIENT
Start: 2025-07-29 | End: 2025-07-29

## 2025-07-29 RX ORDER — TRAZODONE HYDROCHLORIDE 100 MG/1
100 TABLET ORAL NIGHTLY
Status: DISPENSED | OUTPATIENT
Start: 2025-07-29

## 2025-07-29 RX ADMIN — ASCORBIC ACID, VITAMIN A PALMITATE, CHOLECALCIFEROL, THIAMINE HYDROCHLORIDE, RIBOFLAVIN-5 PHOSPHATE SODIUM, PYRIDOXINE HYDROCHLORIDE, NIACINAMIDE, DEXPANTHENOL, ALPHA-TOCOPHEROL ACETATE, VITAMIN K1, FOLIC ACID, BIOTIN, CYANOCOBALAMIN: 200; 3300; 200; 6; 3.6; 6; 40; 15; 10; 150; 600; 60; 5 INJECTION, SOLUTION INTRAVENOUS at 20:14

## 2025-07-29 RX ADMIN — PIPERACILLIN SODIUM AND TAZOBACTAM SODIUM 3.38 G: 3; .375 INJECTION, SOLUTION INTRAVENOUS at 12:48

## 2025-07-29 RX ADMIN — SMOFLIPID 50 G: 6; 6; 5; 3 INJECTION, EMULSION INTRAVENOUS at 20:14

## 2025-07-29 RX ADMIN — HYDROCORTISONE SODIUM SUCCINATE 25 MG: 100 INJECTION, POWDER, FOR SOLUTION INTRAMUSCULAR; INTRAVENOUS at 12:09

## 2025-07-29 RX ADMIN — ACETAMINOPHEN 1000 MG: 10 INJECTION INTRAVENOUS at 05:45

## 2025-07-29 RX ADMIN — TRAZODONE HYDROCHLORIDE 100 MG: 100 TABLET ORAL at 20:15

## 2025-07-29 RX ADMIN — DIPHENOXYLATE HYDROCHLORIDE AND ATROPINE SULFATE 1 TABLET: .025; 2.5 TABLET ORAL at 16:13

## 2025-07-29 RX ADMIN — PIPERACILLIN SODIUM AND TAZOBACTAM SODIUM 3.38 G: 3; .375 INJECTION, SOLUTION INTRAVENOUS at 06:08

## 2025-07-29 RX ADMIN — PIPERACILLIN SODIUM AND TAZOBACTAM SODIUM 3.38 G: 3; .375 INJECTION, SOLUTION INTRAVENOUS at 18:17

## 2025-07-29 RX ADMIN — ENOXAPARIN SODIUM 40 MG: 100 INJECTION SUBCUTANEOUS at 09:22

## 2025-07-29 RX ADMIN — PIPERACILLIN SODIUM AND TAZOBACTAM SODIUM 3.38 G: 3; .375 INJECTION, SOLUTION INTRAVENOUS at 23:35

## 2025-07-29 RX ADMIN — OXYCODONE HYDROCHLORIDE 5 MG: 5 SOLUTION ORAL at 16:08

## 2025-07-29 RX ADMIN — POTASSIUM CHLORIDE 20 MEQ: 14.9 INJECTION, SOLUTION INTRAVENOUS at 15:42

## 2025-07-29 RX ADMIN — ACETAMINOPHEN 1000 MG: 10 INJECTION INTRAVENOUS at 12:10

## 2025-07-29 RX ADMIN — HYDROCORTISONE SODIUM SUCCINATE 25 MG: 100 INJECTION, POWDER, FOR SOLUTION INTRAMUSCULAR; INTRAVENOUS at 20:15

## 2025-07-29 RX ADMIN — WATER 50 MG: 1 INJECTION INTRAMUSCULAR; INTRAVENOUS; SUBCUTANEOUS at 03:48

## 2025-07-29 RX ADMIN — HYDROMORPHONE HYDROCHLORIDE 0.2 MG: 1 INJECTION, SOLUTION INTRAMUSCULAR; INTRAVENOUS; SUBCUTANEOUS at 00:41

## 2025-07-29 RX ADMIN — OXYCODONE HYDROCHLORIDE 5 MG: 5 SOLUTION ORAL at 23:45

## 2025-07-29 RX ADMIN — LIDOCAINE 4% 2 PATCH: 40 PATCH TOPICAL at 09:22

## 2025-07-29 RX ADMIN — FUROSEMIDE 20 MG: 10 INJECTION, SOLUTION INTRAMUSCULAR; INTRAVENOUS at 09:23

## 2025-07-29 RX ADMIN — DIPHENOXYLATE HYDROCHLORIDE AND ATROPINE SULFATE 1 TABLET: .025; 2.5 TABLET ORAL at 20:15

## 2025-07-29 RX ADMIN — PANTOPRAZOLE SODIUM 40 MG: 40 INJECTION, POWDER, LYOPHILIZED, FOR SOLUTION INTRAVENOUS at 09:23

## 2025-07-29 RX ADMIN — POTASSIUM CHLORIDE 40 MEQ: 29.8 INJECTION, SOLUTION INTRAVENOUS at 18:13

## 2025-07-29 RX ADMIN — POTASSIUM CHLORIDE 40 MEQ: 29.8 INJECTION, SOLUTION INTRAVENOUS at 09:23

## 2025-07-29 RX ADMIN — OXYCODONE HYDROCHLORIDE 10 MG: 5 SOLUTION ORAL at 06:48

## 2025-07-29 RX ADMIN — ACETAMINOPHEN 1000 MG: 10 INJECTION INTRAVENOUS at 18:52

## 2025-07-29 ASSESSMENT — PAIN SCALES - WONG BAKER
WONGBAKER_NUMERICALRESPONSE: HURTS LITTLE MORE
WONGBAKER_NUMERICALRESPONSE: HURTS LITTLE MORE

## 2025-07-29 ASSESSMENT — COGNITIVE AND FUNCTIONAL STATUS - GENERAL
MOBILITY SCORE: 24
DAILY ACTIVITIY SCORE: 24

## 2025-07-29 ASSESSMENT — PAIN - FUNCTIONAL ASSESSMENT
PAIN_FUNCTIONAL_ASSESSMENT: 0-10
PAIN_FUNCTIONAL_ASSESSMENT: WONG-BAKER FACES
PAIN_FUNCTIONAL_ASSESSMENT: WONG-BAKER FACES

## 2025-07-29 ASSESSMENT — PAIN DESCRIPTION - LOCATION: LOCATION: ABDOMEN

## 2025-07-29 ASSESSMENT — PAIN SCALES - GENERAL
PAINLEVEL_OUTOF10: 5 - MODERATE PAIN
PAINLEVEL_OUTOF10: 6
PAINLEVEL_OUTOF10: 7

## 2025-07-29 ASSESSMENT — PAIN DESCRIPTION - DESCRIPTORS: DESCRIPTORS: ACHING

## 2025-07-29 NOTE — CARE PLAN
The patient's goals for the shift include      The clinical goals for the shift include pt will remain hds throughout shift      Problem: Pain - Adult  Goal: Verbalizes/displays adequate comfort level or baseline comfort level  Outcome: Progressing     Problem: Safety - Adult  Goal: Free from fall injury  Outcome: Progressing     Problem: Discharge Planning  Goal: Discharge to home or other facility with appropriate resources  Outcome: Progressing     Problem: Chronic Conditions and Co-morbidities  Goal: Patient's chronic conditions and co-morbidity symptoms are monitored and maintained or improved  Outcome: Progressing     Problem: Nutrition  Goal: Nutrient intake appropriate for maintaining nutritional needs  Outcome: Progressing     Problem: Skin  Goal: Decreased wound size/increased tissue granulation at next dressing change  Outcome: Progressing  Goal: Participates in plan/prevention/treatment measures  Outcome: Progressing  Goal: Prevent/manage excess moisture  Outcome: Progressing  Goal: Prevent/minimize sheer/friction injuries  Outcome: Progressing  Goal: Promote/optimize nutrition  Outcome: Progressing  Goal: Promote skin healing  Outcome: Progressing     Problem: Pain  Goal: Takes deep breaths with improved pain control throughout the shift  Outcome: Progressing  Goal: Turns in bed with improved pain control throughout the shift  Outcome: Progressing  Goal: Walks with improved pain control throughout the shift  Outcome: Progressing  Goal: Performs ADL's with improved pain control throughout shift  Outcome: Progressing  Goal: Participates in PT with improved pain control throughout the shift  Outcome: Progressing  Goal: Free from opioid side effects throughout the shift  Outcome: Progressing  Goal: Free from acute confusion related to pain meds throughout the shift  Outcome: Progressing     Problem: Fall/Injury  Goal: Not fall by end of shift  Outcome: Progressing  Goal: Be free from injury by end of the  shift  Outcome: Progressing  Goal: Verbalize understanding of personal risk factors for fall in the hospital  Outcome: Progressing  Goal: Verbalize understanding of risk factor reduction measures to prevent injury from fall in the home  Outcome: Progressing  Goal: Use assistive devices by end of the shift  Outcome: Progressing  Goal: Pace activities to prevent fatigue by end of the shift  Outcome: Progressing

## 2025-07-29 NOTE — PROGRESS NOTES
07/29/25 0806   Rapid Rounds   Attendance Provider;Nurse;Care Transitions   Expected Discharge Disposition Home Health   Today we still await: Clinical stability   Review at Escalation Rounds No escalation needed     Bereket Em is a 59 y.o. male on day 1 of admission presenting with Incarcerated ventral hernia.     Plan per Medical/Surgical team:   Pt has wound, TPN (weaning), drains, and ostomy.   Waiting for PT/OT recs. Plan TBD.    7/29: Pt back to floor from ICU.   Pt on IV ATB and TPN.      Discharge disposition: TBD     ADOD:  8/1     This TCC will continue to follow for home going needs and safe DC plan.     RUSH ANDERSON

## 2025-07-29 NOTE — PROGRESS NOTES
I saw and examined the patient.       60 yo male s/p fistula takedown, extensive lysis of adhesions, colostomy reversal, double barrel jejunostomy formation, abdominal wall reconstruction with return to the OR for washout, resection of distal colon, new end colostomy and repositioning of jejnostomy.   Tolerated broth.   BLE duplex without DVT.   Breathing is improved. Given Lasix.   Pain is controlled. Ambulated today.   WBCs 18 to 16.   On exam, NAD.  Staples in place. No erythema or drainage. Ostomies are pink and viable; bilious output in jejunostomy bag.   ABX given E. Coli bacteremia per ID.  TPN for nutrition.  Local wound care and ostomy care. Lovenox. PT. Antimotility agents for output < 1L.

## 2025-07-29 NOTE — PROGRESS NOTES
Colorectal Surgery Progress Note      07/29/25    Summary:   Bereket Em is a 59 y.o. 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 Olson & Vicky with Margarita's reversal/ transverse colon mobilization/ileal window by Dr. Gross on 7/22/25. Post-op course c/b bowel ischemia with afib RVR requiring ICU transfer & amio drip, s/p exlap, bowel resection at anastomosis, end colostomy creation (RUQ) & conversion of double barrel jejunostomy to end jejunostomy (LUQ) on 7/25/25.     Subjective    Subjective:  No acute events overnight. Patient seen and evaluated this AM during team rounds.  Patient denies nausea and vomiting Patient ambulated yesterday. Complaining of increased work of breathing today. He says that he has a lot of increased water weight compared to prior to admission.    Review of Systems:    A 12-point review of systems was performed, and was negative except as above.       Objective    Objective:  Vital signs:   Temp:  [36.2 °C (97.2 °F)-36.8 °C (98.2 °F)] 36.3 °C (97.3 °F)  Heart Rate:  [50-65] 54  Resp:  [17-19] 19  BP: (101-126)/(56-73) 114/63    Physical Exam:  GEN: mild distress. Alert, awake and conversant.  HEENT: Sclera anicteric. Moist mucous membranes.  RESP: labored breathing, on room air, good oxygenation  CV: slightly angelo cardic, but at baseline  GI: Abdomen soft, mildly distended, nontender.   MSK: No gross deformities. Moves all extremities spontaneously. No pitting edema  NEURO: Alert and oriented x3. No focal deficits.  PSYCH: Appropriate mood and affect.      I/O last 2 completed shifts:  In: 4359.6  (29.7 mL/kg) [P.O.:480; IV Piggyback:600]  Out: 2900 (34.5 mL/kg) [Urine:1880 (0.9 mL/kg/hr); Drains:130; Stool:890]  Weight: 84 kg      Labs Past 18 Hours:  Recent Results (from the past 18 hours)   POCT GLUCOSE    Collection Time: 07/29/25 12:13 AM   Result Value Ref Range    POCT Glucose 180 (H) 74 - 99 mg/dL   CBC    Collection Time: 07/29/25  4:18 AM   Result Value Ref Range    WBC 16.6 (H) 4.4 - 11.3 x10*3/uL    nRBC 0.2 (H) 0.0 - 0.0 /100 WBCs    RBC 3.08 (L) 4.50 - 5.90 x10*6/uL    Hemoglobin 8.0 (L) 13.5 - 17.5 g/dL    Hematocrit 24.0 (L) 41.0 - 52.0 %    MCV 78 (L) 80 - 100 fL    MCH 26.0 26.0 - 34.0 pg    MCHC 33.3 32.0 - 36.0 g/dL    RDW 15.4 (H) 11.5 - 14.5 %    Platelets 173 150 - 450 x10*3/uL   Renal Function Panel    Collection Time: 07/29/25  4:18 AM   Result Value Ref Range    Glucose 165 (H) 74 - 99 mg/dL    Sodium 135 (L) 136 - 145 mmol/L    Potassium 3.2 (L) 3.5 - 5.3 mmol/L    Chloride 99 98 - 107 mmol/L    Bicarbonate 28 21 - 32 mmol/L    Anion Gap 11 10 - 20 mmol/L    Urea Nitrogen 46 (H) 6 - 23 mg/dL    Creatinine 0.66 0.50 - 1.30 mg/dL    eGFR >90 >60 mL/min/1.73m*2    Calcium 8.1 (L) 8.6 - 10.6 mg/dL    Phosphorus 3.4 2.5 - 4.9 mg/dL    Albumin 2.6 (L) 3.4 - 5.0 g/dL   Magnesium    Collection Time: 07/29/25  4:18 AM   Result Value Ref Range    Magnesium 2.51 (H) 1.60 - 2.40 mg/dL   POCT GLUCOSE    Collection Time: 07/29/25  8:49 AM   Result Value Ref Range    POCT Glucose 153 (H) 74 - 99 mg/dL   POCT GLUCOSE    Collection Time: 07/29/25 12:03 PM   Result Value Ref Range    POCT Glucose 117 (H) 74 - 99 mg/dL      Meds:  Current Medications[1]   Imaging:  Imaging  XR chest 1 view  Result Date: 7/29/2025  1. Increased bilateral interstitial lung markings. Correlate with atypical infection versus pulmonary edema. 2. Stable left basilar atelectasis and trace pleural effusion. 3. Stable right basilar atelectasis, with possible associated small pleural effusion.   I personally reviewed the  images/study and I agree with the findings as stated by Clemente Funk MD.   MACRO: None   Signed by: Lance Owen 7/29/2025 10:59 AM Dictation workstation:   [id number]    Lower extremity venous duplex bilateral  Result Date: 7/28/2025  1. No evidence of deep venous thrombosis in the bilateral lower extremities. 2. Bilateral simple appearing Baker's cysts.   MACRO: None   Signed by: Jonathon Malcolm 7/28/2025 6:40 PM Dictation workstation:   [id number]                Medications reviewed.  Vital signs reviewed.  Labs reviewed.         Assessment/Plan    Assessment and Plan:  Bereket Em is a Consult(Corwin Owens)-59M with a history of perforated diverticulitis s/p Soliman's procedure C/B formation of enterocutaneous fistula now s/p enterocutaneous fistula takedown, Soliman's reversal with end jejunostomy creation, ventral wall hernia repair(>10 cm), and abdominal wall debridement with wound VAC placements on 7/22 c/b bowel ischemia and afib with RVR s/p exlap with ischemic bowel resection, colonic anast takedown and end colostomy formation on amio drip.      Plan Today:   -Recommend advancing diet as tolerated  -SPL eval today  -Patient should be diuresed with lasix, replete K+ as needed    Hospital Day: 8     Dispo: Continue current level of care.     Discussed with Dr. Gross.         Erica Reyes MD  PGY-1 General Surgery  Colorectal Surgery [id number]         [1]   Current Facility-Administered Medications:     acetaminophen (Ofirmev) injection 1,000 mg, 1,000 mg, intravenous, q6h, Charisse Castaneda, APRN-CNP, Stopped at 07/29/25 1242    Adult Clinimix TPN Cyclic, , intravenous, Cyclic PN, Charisse Weinbergbena, APRN-CNP, Stopped at 07/29/25 0933    dextrose 50 % injection 12.5 g, 12.5 g, intravenous, q15 min PRN, Charisse Weinbergbena, APRN-CNP    dextrose 50 % injection 25 g, 25 g, intravenous, q15 min PRN, Charisse Weinbergbena, APRN-CNP    diphenoxylate-atropine (Lomotil) 2.5-0.025 mg per tablet 1 tablet, 1 tablet,  oral, 4x daily, Boris Ledezma MD    enoxaparin (Lovenox) syringe 40 mg, 40 mg, subcutaneous, q24h, MINERVA Yin-CNP, 40 mg at 07/29/25 0922    fat emulsion fish oil/plant based (SMOFlipid) 20 % IV infusion 50 g, 250 mL, intravenous, Daily Lipids, MINERVA Yin-CNP, Stopped at 07/29/25 0933    glucagon (Glucagen) injection 1 mg, 1 mg, intramuscular, q15 min PRN, MINERVA Yin-CNP    glucagon (Glucagen) injection 1 mg, 1 mg, intramuscular, q15 min PRN, MINERVA Yin-CNP    heparin flush 10 unit/mL syringe 50 Units, 5 mL, intravenous, PRN, MINERVA Yin-CNP    hydrocortisone sodium succinate (PF) (Solu-CORTEF) injection 25 mg, 25 mg, intravenous, q8h, MINERVA Yin-CNP, 25 mg at 07/29/25 1209    HYDROmorphone (Dilaudid) injection 0.2 mg, 0.2 mg, intravenous, q3h PRN, Annabel Patel MD    insulin lispro injection 0-5 Units, 0-5 Units, subcutaneous, q6h, MINERVA Yin-CNP    lidocaine 4 % patch 2 patch, 2 patch, transdermal, Daily, Annabel Patel MD, 2 patch at 07/29/25 0922    naloxone (Narcan) injection 0.2 mg, 0.2 mg, intravenous, PRN, MINERVA Yin-CNP    ondansetron (Zofran) injection 4 mg, 4 mg, intravenous, q8h PRN, MINERVA Yin-CNP    oxyCODONE (Roxicodone) solution 10 mg, 10 mg, oral, q4h PRN, Annabel Patel MD, 10 mg at 07/29/25 0648    oxyCODONE (Roxicodone) solution 5 mg, 5 mg, oral, q4h PRN, Annabel Patel MD    pantoprazole (Protonix) injection 40 mg, 40 mg, intravenous, Daily, ASHLY Yin, 40 mg at 07/29/25 0923    PHENobarbital (Luminal) injection 65 mg, 65 mg, intravenous, q6h PRN, ASHLY Yin, 65 mg at 07/26/25 2054    piperacillin-tazobactam (Zosyn) 3.375 g in dextrose (iso) IV 50 mL, 3.375 g, intravenous, q6h, ASHLY Yin, Stopped at 07/29/25 1318    potassium chloride 20 mEq in sterile water for injection 100 mL, 20 mEq, intravenous, Once, ASHLY Yin     traZODone (Desyrel) tablet 100 mg, 100 mg, oral, Nightly, Frederick Abel MD

## 2025-07-29 NOTE — PROGRESS NOTES
Speech-Language Pathology  Adult Inpatient Clinical Bedside Swallow Evaluation    Patient Name: Bereket Em  MRN: [id number]  Today's Date: 7/29/2025   Start Time: 1010  Stop Time: 1020  Time Calculation (min): 10    History of Present Illness:   Bereket Em is a 59 y.o. 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 Patient underwent ex lap, extensive lysis of adhesions, abdominal wall debridement, ventral hernia repair, excision of EC fistula, jejunostomy, & abdominal wound vac placement by Dr. Olson & Vicky and Margarita's reversal with transverse colon mobilization/ileal window by Dr. Gross on 7/22/2025.      Earlier today, patient tachycardic with new leukopenia (WBC decreased to 2 from 10). CT scan performed in the afternoon showing scattered air foci and pockets of ascites and generalized mesenteric stranding as well as hyperdense collection within the left anterior abdominal subcutaneous tissue corresponding to the site of prior ileostomy. Overnight, rapid response called for tachycardia. Patient asymptomatic. Denied chest pain, palpitations, shortness of breath. No prior hx of Afib. Reports abdominal pain from recent surgery. ECG showing afib RVR with rates in the 160s. Received 1 LR and 500 5% albumin as well as 5 mg IV metoprolol. Subsequently, patient hypotensive BPs 70s/50s. Also newly febrile with Tmax 38.3 in the setting of leukopenia and tachycardia with concern for intra-abdominal sepsis. Blood cultures drawn and patient started on empiric antibiotics with vanc/zosyn. Patient given amio bolus x1, followed by infusion at 1. Patient transferred to SICU for further management.      Upon arrival to the SICU, patient in Afib RVR with significant hypotension refractory to additional fluid boluses and  multiple phenylephrine pushes. Arterial line placed for BP monitoring. Patient intubated for bedside flex sig procedure and possible OR. Post-intubation, R IJ triple lumen CVC placed for initiation and escalation of pressor support including levo, vaso, ATII. Bedside ECHO performed without obvious LV or RV dysfunction. Bedside flex sig performed by surgical team showing area of well-demarcated dark mucosa concerning for ischemic bowel. Decision made to take patient emergently to OR for ex lap.     Assessment:   Clinical bedside swallow evaluation completed. Pt cleared for evaluation by RN. Received awake/alert and upright in bed for session. A&Ox4. Oral mechanism examination WNL. Consistently cooperative/able to follow commands and responded appropriately to conversation/questions by SLP.  Denies any difficulty swallowing r/t oropharyngeal dysphagia. Of note, medical team requested only liquids administered for clinical swallow evaluation. Strong volitional cough prior to administration of PO trials    Trials of 3 oz protocol via cup were given. Normal oral management of all trials. No overt clinical s/s aspiration w/ successful completion of 3 oz protocol via straw; no coughing or changes in respiratory status/vocal quality.     Recommending Thin Liquid Diet. Ensure safe swallowing guidelines (listed below) are followed during all oral intake. No further SLP services indicated; will sign off/complete order. If pt presents with any changes to mental, medical, or respiratory status, please make NPO and alert SLP. RN/MD aware.      Extensive education/treatment provided to pt following completion of session re: results/recommendations and dysphagia POC. Pt verbalized understanding.       Recommendations:  Thin Liquid Diet  Upright for all PO intake  Remain upright for 20-30 min after eating  Small bites/sips  Straws ok  Slow rate of consumption  Pills per pt preference  SLP to sign off  If pt presents with any changes  to mental, medical, or respiratory status, please make NPO and alert SLP    Goal:   Pt/caregiver/family will demonstrate adequate return of knowledge re: dysphagia POC/diet recommendations/safe swallowing guidelines w/ 100% acc to effectively assist the patient in immediate safety with oral intake and swallowing.  Start Date: 7/29/2025  End Date: 7/29/2025  Status: Goal Met    Pt will tolerate least restrictive diet with no overt clinical s/s aspiration 100% of the time.   Start Date: 7/29/2025  End Date: 7/29/2025  Status: Goal Met       Plan:  SLP Services Indicated: No  Discussed POC with patient  SLP - OK to Discharge    Pain:   0-10  0 = No pain.     Inpatient Education:  Extensive education provided to patient regarding current swallow function, recommendations/results, and POC.      Consultations/Referrals/Coordination of Services:   N/A

## 2025-07-29 NOTE — CARE PLAN
The patient's goals for the shift include  pain control    The clinical goals for the shift include Patient will remain HDS throughout shift      Problem: Pain - Adult  Goal: Verbalizes/displays adequate comfort level or baseline comfort level  Outcome: Progressing     Problem: Safety - Adult  Goal: Free from fall injury  Outcome: Progressing     Problem: Chronic Conditions and Co-morbidities  Goal: Patient's chronic conditions and co-morbidity symptoms are monitored and maintained or improved  Outcome: Progressing     Problem: Nutrition  Goal: Nutrient intake appropriate for maintaining nutritional needs  Outcome: Progressing     Problem: Skin  Goal: Decreased wound size/increased tissue granulation at next dressing change  Outcome: Progressing  Goal: Participates in plan/prevention/treatment measures  Outcome: Progressing  Goal: Prevent/manage excess moisture  Outcome: Progressing  Goal: Prevent/minimize sheer/friction injuries  Outcome: Progressing  Goal: Promote/optimize nutrition  Outcome: Progressing  Goal: Promote skin healing  Outcome: Progressing     Problem: Pain  Goal: Takes deep breaths with improved pain control throughout the shift  Outcome: Progressing  Goal: Turns in bed with improved pain control throughout the shift  Outcome: Progressing  Goal: Walks with improved pain control throughout the shift  Outcome: Progressing  Goal: Performs ADL's with improved pain control throughout shift  Outcome: Progressing  Goal: Participates in PT with improved pain control throughout the shift  Outcome: Progressing     Problem: Fall/Injury  Goal: Not fall by end of shift  Outcome: Progressing  Goal: Be free from injury by end of the shift  Outcome: Progressing  Goal: Verbalize understanding of personal risk factors for fall in the hospital  Outcome: Progressing  Goal: Verbalize understanding of risk factor reduction measures to prevent injury from fall in the home  Outcome: Progressing  Goal: Use assistive devices  by end of the shift  Outcome: Progressing  Goal: Pace activities to prevent fatigue by end of the shift  Outcome: Progressing

## 2025-07-29 NOTE — PROGRESS NOTES
General Surgery Progress Note    07/29/25    Bereket Em    Summary:  Bereket Em is a 59 year old male with a history of perforated diverticulitis s/p Margarita's procedure c/b formation of ECF. Underwent EC fistula takedown, GILLES, Margarita's reversal, abdominal wall debridement, and double barrel jejunostomy with Leyda Olson, Renato, and Vicky on 7/22.  Intra-op findings remarkable for adhesions throughout the abdomen and remaining segment of the colon terminating at the splenic flexure requiring significant mobilization and an ileal window.  Postoperative course complicated by colon ischemia requiring OR take back for partial colectomy, end colostomy creation (RUQ), and conversion of double barrel jejunostomy to end jejunostomy in LUQ on 7/25 (POD4).     Subjective    Subjective:  Overnight reported feeling tired and restless. Complains he gained weight during admission. Vital signs within normal range. Denies nausea or emesis.    Review of Systems:    A 12-point review of systems was performed, and was negative except as above.      Objective    Objective:      Vital signs:   Temp:  [36 °C (96.8 °F)-36.8 °C (98.2 °F)] 36.5 °C (97.7 °F)  Heart Rate:  [50-65] 60  Resp:  [17-22] 19  BP: ()/(56-72) 108/63  Arterial Line BP 1: ()/(67-70) 102/67    Physical Exam:  GEN: resting comfortably, non-toxic appearance  NEURO: awake, alert, oriented, conversational  HEENT: Sclera anicteric. Moist mucous membranes.   RESP: non-labored breathing on room air  CV: sinus bradycardia, normotensive MAP>65  GI: Abdomen soft, appropriately tender to palpation around surgical incision, non-peritonitic. RUQ end colostomy stoma pink and viable, abundant thin dark brown liquid output and gas in bag.  LUQ end jejunostomy stoma pink, viable with bowel sweat in bag. RLQ ANA MARIA drain serosanguinous output (terminates in pelvis near rectal stump).   SKIN: laparotomy closed loosely with staples and packed with betadine  soaked cristóbal, which were removed at bedside. No evidence of necrosis, purulent drainage, erythema or significant swelling of midline incision.   EXT: no peripheral edema     I/O last 2 completed shifts:  In: 2009.6 (23.9 mL/kg) [P.O.:480; IV Piggyback:200]  Out: 2900 (34.5 mL/kg) [Urine:1880 (0.9 mL/kg/hr); Drains:130; Stool:890]  Weight: 84 kg      Labs Past 18 Hours:  Recent Results (from the past 18 hours)   Renal Function Panel    Collection Time: 07/28/25  2:04 PM   Result Value Ref Range    Glucose 173 (H) 74 - 99 mg/dL    Sodium 138 136 - 145 mmol/L    Potassium 3.2 (L) 3.5 - 5.3 mmol/L    Chloride 86 (L) 98 - 107 mmol/L    Bicarbonate 24 21 - 32 mmol/L    Anion Gap 31 (H) 10 - 20 mmol/L    Urea Nitrogen 57 (H) 6 - 23 mg/dL    Creatinine 0.91 0.50 - 1.30 mg/dL    eGFR >90 >60 mL/min/1.73m*2    Calcium 6.8 (L) 8.6 - 10.6 mg/dL    Phosphorus 2.8 2.5 - 4.9 mg/dL    Albumin 2.2 (L) 3.4 - 5.0 g/dL   POCT GLUCOSE    Collection Time: 07/28/25  4:24 PM   Result Value Ref Range    POCT Glucose 127 (H) 74 - 99 mg/dL   POCT GLUCOSE    Collection Time: 07/28/25  7:48 PM   Result Value Ref Range    POCT Glucose 93 74 - 99 mg/dL   POCT GLUCOSE    Collection Time: 07/29/25 12:13 AM   Result Value Ref Range    POCT Glucose 180 (H) 74 - 99 mg/dL   CBC    Collection Time: 07/29/25  4:18 AM   Result Value Ref Range    WBC 16.6 (H) 4.4 - 11.3 x10*3/uL    nRBC 0.2 (H) 0.0 - 0.0 /100 WBCs    RBC 3.08 (L) 4.50 - 5.90 x10*6/uL    Hemoglobin 8.0 (L) 13.5 - 17.5 g/dL    Hematocrit 24.0 (L) 41.0 - 52.0 %    MCV 78 (L) 80 - 100 fL    MCH 26.0 26.0 - 34.0 pg    MCHC 33.3 32.0 - 36.0 g/dL    RDW 15.4 (H) 11.5 - 14.5 %    Platelets 173 150 - 450 x10*3/uL   Renal Function Panel    Collection Time: 07/29/25  4:18 AM   Result Value Ref Range    Glucose 165 (H) 74 - 99 mg/dL    Sodium 135 (L) 136 - 145 mmol/L    Potassium 3.2 (L) 3.5 - 5.3 mmol/L    Chloride 99 98 - 107 mmol/L    Bicarbonate 28 21 - 32 mmol/L    Anion Gap 11 10 - 20 mmol/L     Urea Nitrogen 46 (H) 6 - 23 mg/dL    Creatinine 0.66 0.50 - 1.30 mg/dL    eGFR >90 >60 mL/min/1.73m*2    Calcium 8.1 (L) 8.6 - 10.6 mg/dL    Phosphorus 3.4 2.5 - 4.9 mg/dL    Albumin 2.6 (L) 3.4 - 5.0 g/dL   Magnesium    Collection Time: 07/29/25  4:18 AM   Result Value Ref Range    Magnesium 2.51 (H) 1.60 - 2.40 mg/dL        Meds:  Current Medications[1]     Imaging:  Imaging  XR chest 1 view  Result Date: 7/29/2025  1. Increased bilateral interstitial lung markings. 2. Stable left basilar atelectasis and trace pleural effusion. 3. Stable right basilar atelectasis, with possible associated small pleural effusion.   I personally reviewed the images/study and I agree with the findings as stated by Clemente Funk MD.   MACRO: None     Dictation workstation:   [id number]    Lower extremity venous duplex bilateral  Result Date: 7/28/2025  1. No evidence of deep venous thrombosis in the bilateral lower extremities. 2. Bilateral simple appearing Baker's cysts.   MACRO: None   Signed by: Jonathon Malcolm 7/28/2025 6:40 PM Dictation workstation:   [id number]      Assessment/Plan    Assessment and Plan:  [facility]   59[facility] with PMH of perforated diverticulitis s/p Margarita's procedure c/b formation of ECF. Underwent EC fistula takedown, GILLES, Margarita's reversal, abdominal wall debridement, and double barrel jejunostomy with Leyda Olson, Renato, and Vicky on 7/22.  Intra-op findings remarkable for adhesions throughout the abdomen and remaining segment of the colon terminating at the splenic flexure requiring significant mobilization and an ileal window.  Postoperative course complicated by colon ischemia requiring exploratory laparotomy, partial colectomy, end colostomy creation (RUQ), and conversion of double barrel jejunostomy to end jejunostomy in LUQ on 7/25. Patient remains in stable conditions, on floor. Leukocytosis showed mild improved 16.6K (18.2K). Also, FRITZ responded well to fluids  (1.2, 0.9, 0.6).     7/22: ECF takedown, extensive GILLES, Margarita's reversal, abdominal wall debridement, and double barrel jejunostomy with Renato Rodriguez, and Vicky.    7/25:  partial colectomy with end colostomy creation, conversion of double barrel jejunostomy to end jejunostomy     Plan/Recommendations:  - Patient feeling tired and restless, with weight gain, normal vital signs and physical examination. CXR with increased interstitial lung markings. Opted to start IV lasix and restart home trazodone  - Optimizing pain medication regimen with scheduled IV tylenol and liquid oxycodone PRN.  - Tapering down hydrocortisone  - Speech evaluation   - Strict I/Os  - Maintain RLQ ANA MARIA drain, monitor output  - Continue Zosyn (7/25-) for E Coli bacteremia per ID; follow up blood cultures 7/27   - Continue insulin sliding scale for goal glucose <180  - Dressings: appreciate WOCN for ostomy management; continue 4x4 gauze and ABD for midline incision     Hospital Day: 8     Dispo: Continue current level of care.     Discussed with Dr. Olson.     Boris Ledezma MD  PGY-1 General Surgery  [facility] [phone]         [1]   Current Facility-Administered Medications:     acetaminophen (Ofirmev) injection 1,000 mg, 1,000 mg, intravenous, q6h, Charisse Weinbergbena, APRN-CNP, Stopped at 07/29/25 0610    Adult Clinimix TPN Cyclic, , intravenous, Cyclic PN, Charisse L Kubena, APRN-CNP, Last Rate: 92 mL/hr at 07/29/25 0720, Rate Change at 07/29/25 0720    dextrose 50 % injection 12.5 g, 12.5 g, intravenous, q15 min PRN, Charisse L Kubena, APRN-CNP    dextrose 50 % injection 25 g, 25 g, intravenous, q15 min PRN, Charisse L Kubena, APRN-CNP    enoxaparin (Lovenox) syringe 40 mg, 40 mg, subcutaneous, q24h, Charisse L Kubena, APRN-CNP, 40 mg at 07/28/25 0847    fat emulsion fish oil/plant based (SMOFlipid) 20 % IV infusion 50 g, 250 mL, intravenous, Daily Lipids, Charisse Castaneda, APRN-CNP, Last Rate: 20.8 mL/hr at 07/28/25  2015, 50 g at 07/28/25 2015    furosemide (Lasix) injection 20 mg, 20 mg, intravenous, Once, ASHLY Yin    glucagon (Glucagen) injection 1 mg, 1 mg, intramuscular, q15 min PRN, ASHLY Yin    glucagon (Glucagen) injection 1 mg, 1 mg, intramuscular, q15 min PRN, ASHLY Yin    heparin flush 10 unit/mL syringe 50 Units, 5 mL, intravenous, PRN, ASHLY Yin    hydrocortisone sodium succinate (PF) (Solu-CORTEF) injection 25 mg, 25 mg, intravenous, q8h, ASHLY Yin    HYDROmorphone (Dilaudid) injection 0.2 mg, 0.2 mg, intravenous, q3h PRN, Annabel Patel MD    insulin lispro injection 0-5 Units, 0-5 Units, subcutaneous, q6h, ASHLY Yin    lidocaine 4 % patch 2 patch, 2 patch, transdermal, Daily, Annabel Patel MD    naloxone (Narcan) injection 0.2 mg, 0.2 mg, intravenous, PRN, ASHLY Yin    ondansetron (Zofran) injection 4 mg, 4 mg, intravenous, q8h PRN, ASHLY Yin    oxyCODONE (Roxicodone) solution 10 mg, 10 mg, oral, q4h PRN, Annabel Patel MD, 10 mg at 07/29/25 0648    oxyCODONE (Roxicodone) solution 5 mg, 5 mg, oral, q4h PRN, Annabel Patel MD    pantoprazole (Protonix) injection 40 mg, 40 mg, intravenous, Daily, ASHLY Yin, 40 mg at 07/28/25 0847    PHENobarbital (Luminal) injection 65 mg, 65 mg, intravenous, q6h PRN, ASHLY Yin, 65 mg at 07/26/25 2054    piperacillin-tazobactam (Zosyn) 3.375 g in dextrose (iso) IV 50 mL, 3.375 g, intravenous, q6h, Charisse Castaneda, MINERVA-CNP, Stopped at 07/29/25 0644    potassium chloride 40 mEq in sterile water for injection 100 mL, 40 mEq, intravenous, Once, Boris Ledezma MD    traZODone (Desyrel) tablet 100 mg, 100 mg, oral, Nightly, Frederick Abel MD    traZODone (Desyrel) tablet 100 mg, 100 mg, oral, Once, Frederick Abel MD

## 2025-07-29 NOTE — PROGRESS NOTES
I saw and examined the patient.       60 yo male s/p fistula takedown, extensive lysis of adhesions, colostomy reversal, double barrel jejunostomy formation, abdominal wall reconstruction with return to the OR for washout, resection of distal colon, new end colostomy and repositioning of jejnostomy.   On regular nursing floor. Tolerating popsicles. Thin ostomy output.   WBCs 18  On exam, NAD.  Staples in place. No erythema or drainage. Ostomies are pink and viable; bilious output in jejunostomy bag.   ABX given E. Coli bacteremia per ID.  TPN for nutrition.  Local wound care and ostomy care. Lovenox. PT. Consider slowly adding antimotility agents to decrease ostomy output if output increases which it may given his history.

## 2025-07-30 ENCOUNTER — APPOINTMENT (OUTPATIENT)
Dept: RADIOLOGY | Facility: HOSPITAL | Age: 60
End: 2025-07-30
Payer: COMMERCIAL

## 2025-07-30 DIAGNOSIS — Z78.9 ON TOTAL PARENTERAL NUTRITION (TPN): ICD-10-CM

## 2025-07-30 LAB
ALBUMIN SERPL BCP-MCNC: 2.6 G/DL (ref 3.4–5)
ANION GAP SERPL CALC-SCNC: 11 MMOL/L (ref 10–20)
BACTERIA BLD AEROBE CULT: ABNORMAL
BACTERIA BLD CULT: ABNORMAL
BACTERIA BLD CULT: NORMAL
BUN SERPL-MCNC: 25 MG/DL (ref 6–23)
CALCIUM SERPL-MCNC: 8 MG/DL (ref 8.6–10.6)
CHLORIDE SERPL-SCNC: 100 MMOL/L (ref 98–107)
CO2 SERPL-SCNC: 30 MMOL/L (ref 21–32)
CREAT SERPL-MCNC: 0.48 MG/DL (ref 0.5–1.3)
EGFRCR SERPLBLD CKD-EPI 2021: >90 ML/MIN/1.73M*2
ERYTHROCYTE [DISTWIDTH] IN BLOOD BY AUTOMATED COUNT: 15.8 % (ref 11.5–14.5)
GLUCOSE BLD MANUAL STRIP-MCNC: 124 MG/DL (ref 74–99)
GLUCOSE BLD MANUAL STRIP-MCNC: 141 MG/DL (ref 74–99)
GLUCOSE BLD MANUAL STRIP-MCNC: 156 MG/DL (ref 74–99)
GLUCOSE BLD MANUAL STRIP-MCNC: 190 MG/DL (ref 74–99)
GLUCOSE BLD MANUAL STRIP-MCNC: 90 MG/DL (ref 74–99)
GLUCOSE BLD MANUAL STRIP-MCNC: 95 MG/DL (ref 74–99)
GLUCOSE SERPL-MCNC: 125 MG/DL (ref 74–99)
GRAM STN SPEC: ABNORMAL
HCT VFR BLD AUTO: 23.5 % (ref 41–52)
HGB BLD-MCNC: 7.8 G/DL (ref 13.5–17.5)
MAGNESIUM SERPL-MCNC: 2.11 MG/DL (ref 1.6–2.4)
MCH RBC QN AUTO: 26.1 PG (ref 26–34)
MCHC RBC AUTO-ENTMCNC: 33.2 G/DL (ref 32–36)
MCV RBC AUTO: 79 FL (ref 80–100)
NRBC BLD-RTO: 0.2 /100 WBCS (ref 0–0)
PHOSPHATE SERPL-MCNC: 2.6 MG/DL (ref 2.5–4.9)
PLATELET # BLD AUTO: 205 X10*3/UL (ref 150–450)
POTASSIUM SERPL-SCNC: 3.5 MMOL/L (ref 3.5–5.3)
RBC # BLD AUTO: 2.99 X10*6/UL (ref 4.5–5.9)
SODIUM SERPL-SCNC: 137 MMOL/L (ref 136–145)
WBC # BLD AUTO: 15.3 X10*3/UL (ref 4.4–11.3)

## 2025-07-30 PROCEDURE — 36573 INSJ PICC RS&I 5 YR+: CPT

## 2025-07-30 PROCEDURE — 99233 SBSQ HOSP IP/OBS HIGH 50: CPT | Performed by: NURSE PRACTITIONER

## 2025-07-30 PROCEDURE — 99232 SBSQ HOSP IP/OBS MODERATE 35: CPT | Performed by: NURSE PRACTITIONER

## 2025-07-30 PROCEDURE — 2500000004 HC RX 250 GENERAL PHARMACY W/ HCPCS (ALT 636 FOR OP/ED): Performed by: NURSE PRACTITIONER

## 2025-07-30 PROCEDURE — 2500000001 HC RX 250 WO HCPCS SELF ADMINISTERED DRUGS (ALT 637 FOR MEDICARE OP)

## 2025-07-30 PROCEDURE — 2780000003 HC OR 278 NO HCPCS

## 2025-07-30 PROCEDURE — 2500000005 HC RX 250 GENERAL PHARMACY W/O HCPCS

## 2025-07-30 PROCEDURE — 85027 COMPLETE CBC AUTOMATED: CPT | Performed by: NURSE PRACTITIONER

## 2025-07-30 PROCEDURE — 97165 OT EVAL LOW COMPLEX 30 MIN: CPT | Mod: GO

## 2025-07-30 PROCEDURE — 2500000002 HC RX 250 W HCPCS SELF ADMINISTERED DRUGS (ALT 637 FOR MEDICARE OP, ALT 636 FOR OP/ED)

## 2025-07-30 PROCEDURE — 80069 RENAL FUNCTION PANEL: CPT | Performed by: NURSE PRACTITIONER

## 2025-07-30 PROCEDURE — 2500000005 HC RX 250 GENERAL PHARMACY W/O HCPCS: Performed by: NURSE PRACTITIONER

## 2025-07-30 PROCEDURE — 2580000001 HC RX 258 IV SOLUTIONS: Performed by: NURSE PRACTITIONER

## 2025-07-30 PROCEDURE — 82947 ASSAY GLUCOSE BLOOD QUANT: CPT

## 2025-07-30 PROCEDURE — 83735 ASSAY OF MAGNESIUM: CPT | Performed by: NURSE PRACTITIONER

## 2025-07-30 PROCEDURE — 71045 X-RAY EXAM CHEST 1 VIEW: CPT | Performed by: RADIOLOGY

## 2025-07-30 PROCEDURE — 1100000001 HC PRIVATE ROOM DAILY

## 2025-07-30 PROCEDURE — C1751 CATH, INF, PER/CENT/MIDLINE: HCPCS

## 2025-07-30 RX ORDER — POTASSIUM CHLORIDE 29.8 MG/ML
40 INJECTION INTRAVENOUS ONCE
Status: COMPLETED | OUTPATIENT
Start: 2025-07-30 | End: 2025-07-30

## 2025-07-30 RX ORDER — LIDOCAINE HYDROCHLORIDE 10 MG/ML
5 INJECTION, SOLUTION INFILTRATION; PERINEURAL ONCE
Status: DISCONTINUED | OUTPATIENT
Start: 2025-07-30 | End: 2025-07-30

## 2025-07-30 RX ORDER — FUROSEMIDE 10 MG/ML
20 INJECTION INTRAMUSCULAR; INTRAVENOUS ONCE
Status: COMPLETED | OUTPATIENT
Start: 2025-07-30 | End: 2025-07-30

## 2025-07-30 RX ADMIN — OXYCODONE HYDROCHLORIDE 5 MG: 5 SOLUTION ORAL at 21:10

## 2025-07-30 RX ADMIN — ENOXAPARIN SODIUM 40 MG: 100 INJECTION SUBCUTANEOUS at 09:00

## 2025-07-30 RX ADMIN — PIPERACILLIN SODIUM AND TAZOBACTAM SODIUM 3.38 G: 3; .375 INJECTION, SOLUTION INTRAVENOUS at 06:00

## 2025-07-30 RX ADMIN — DIPHENOXYLATE HYDROCHLORIDE AND ATROPINE SULFATE 1 TABLET: .025; 2.5 TABLET ORAL at 17:18

## 2025-07-30 RX ADMIN — ACETAMINOPHEN 1000 MG: 10 INJECTION INTRAVENOUS at 11:20

## 2025-07-30 RX ADMIN — PANTOPRAZOLE SODIUM 40 MG: 40 INJECTION, POWDER, LYOPHILIZED, FOR SOLUTION INTRAVENOUS at 09:01

## 2025-07-30 RX ADMIN — TRAZODONE HYDROCHLORIDE 100 MG: 100 TABLET ORAL at 20:35

## 2025-07-30 RX ADMIN — OXYCODONE HYDROCHLORIDE 5 MG: 5 SOLUTION ORAL at 14:25

## 2025-07-30 RX ADMIN — DIPHENOXYLATE HYDROCHLORIDE AND ATROPINE SULFATE 1 TABLET: .025; 2.5 TABLET ORAL at 12:40

## 2025-07-30 RX ADMIN — HYDROCORTISONE SODIUM SUCCINATE 25 MG: 100 INJECTION, POWDER, FOR SOLUTION INTRAMUSCULAR; INTRAVENOUS at 04:26

## 2025-07-30 RX ADMIN — ACETAMINOPHEN 1000 MG: 10 INJECTION INTRAVENOUS at 18:24

## 2025-07-30 RX ADMIN — ASCORBIC ACID, VITAMIN A PALMITATE, CHOLECALCIFEROL, THIAMINE HYDROCHLORIDE, RIBOFLAVIN-5 PHOSPHATE SODIUM, PYRIDOXINE HYDROCHLORIDE, NIACINAMIDE, DEXPANTHENOL, ALPHA-TOCOPHEROL ACETATE, VITAMIN K1, FOLIC ACID, BIOTIN, CYANOCOBALAMIN: 200; 3300; 200; 6; 3.6; 6; 40; 15; 10; 150; 600; 60; 5 INJECTION, SOLUTION INTRAVENOUS at 20:35

## 2025-07-30 RX ADMIN — DIPHENOXYLATE HYDROCHLORIDE AND ATROPINE SULFATE 1 TABLET: .025; 2.5 TABLET ORAL at 06:07

## 2025-07-30 RX ADMIN — ACETAMINOPHEN 1000 MG: 10 INJECTION INTRAVENOUS at 04:45

## 2025-07-30 RX ADMIN — FUROSEMIDE 20 MG: 10 INJECTION, SOLUTION INTRAMUSCULAR; INTRAVENOUS at 09:01

## 2025-07-30 RX ADMIN — DIPHENOXYLATE HYDROCHLORIDE AND ATROPINE SULFATE 1 TABLET: .025; 2.5 TABLET ORAL at 20:35

## 2025-07-30 RX ADMIN — LIDOCAINE 4% 2 PATCH: 40 PATCH TOPICAL at 09:01

## 2025-07-30 RX ADMIN — POTASSIUM CHLORIDE 40 MEQ: 29.8 INJECTION, SOLUTION INTRAVENOUS at 11:21

## 2025-07-30 RX ADMIN — PIPERACILLIN SODIUM AND TAZOBACTAM SODIUM 3.38 G: 3; .375 INJECTION, SOLUTION INTRAVENOUS at 18:55

## 2025-07-30 RX ADMIN — OXYCODONE HYDROCHLORIDE 5 MG: 5 SOLUTION ORAL at 04:26

## 2025-07-30 RX ADMIN — SMOFLIPID 50 G: 6; 6; 5; 3 INJECTION, EMULSION INTRAVENOUS at 20:35

## 2025-07-30 RX ADMIN — PIPERACILLIN SODIUM AND TAZOBACTAM SODIUM 3.38 G: 3; .375 INJECTION, SOLUTION INTRAVENOUS at 11:21

## 2025-07-30 ASSESSMENT — PAIN - FUNCTIONAL ASSESSMENT
PAIN_FUNCTIONAL_ASSESSMENT: 0-10
PAIN_FUNCTIONAL_ASSESSMENT: WONG-BAKER FACES
PAIN_FUNCTIONAL_ASSESSMENT: 0-10

## 2025-07-30 ASSESSMENT — PAIN SCALES - GENERAL
PAINLEVEL_OUTOF10: 8
PAINLEVEL_OUTOF10: 5 - MODERATE PAIN
PAINLEVEL_OUTOF10: 0 - NO PAIN
PAINLEVEL_OUTOF10: 0 - NO PAIN
PAINLEVEL_OUTOF10: 7
PAINLEVEL_OUTOF10: 4
PAINLEVEL_OUTOF10: 5 - MODERATE PAIN
PAINLEVEL_OUTOF10: 7

## 2025-07-30 ASSESSMENT — COGNITIVE AND FUNCTIONAL STATUS - GENERAL
HELP NEEDED FOR BATHING: A LITTLE
DRESSING REGULAR LOWER BODY CLOTHING: A LITTLE
TOILETING: A LITTLE
STANDING UP FROM CHAIR USING ARMS: A LITTLE
MOBILITY SCORE: 23
DRESSING REGULAR UPPER BODY CLOTHING: A LITTLE
MOBILITY SCORE: 20
MOVING FROM LYING ON BACK TO SITTING ON SIDE OF FLAT BED WITH BEDRAILS: A LITTLE
DRESSING REGULAR LOWER BODY CLOTHING: A LITTLE
DAILY ACTIVITIY SCORE: 21
WALKING IN HOSPITAL ROOM: A LITTLE
CLIMB 3 TO 5 STEPS WITH RAILING: A LITTLE
DRESSING REGULAR LOWER BODY CLOTHING: A LITTLE
DRESSING REGULAR UPPER BODY CLOTHING: A LITTLE
DAILY ACTIVITIY SCORE: 20
TOILETING: A LITTLE
HELP NEEDED FOR BATHING: A LITTLE
DRESSING REGULAR UPPER BODY CLOTHING: A LITTLE
DAILY ACTIVITIY SCORE: 20
HELP NEEDED FOR BATHING: A LITTLE
CLIMB 3 TO 5 STEPS WITH RAILING: A LITTLE

## 2025-07-30 ASSESSMENT — PAIN DESCRIPTION - DESCRIPTORS
DESCRIPTORS: ACHING;SORE
DESCRIPTORS: ACHING
DESCRIPTORS: SHARP

## 2025-07-30 ASSESSMENT — PAIN SCALES - WONG BAKER: WONGBAKER_NUMERICALRESPONSE: HURTS LITTLE MORE

## 2025-07-30 ASSESSMENT — PAIN DESCRIPTION - LOCATION
LOCATION: ABDOMEN

## 2025-07-30 ASSESSMENT — ACTIVITIES OF DAILY LIVING (ADL)
BATHING_ASSISTANCE: STAND BY
ADL_ASSISTANCE: INDEPENDENT

## 2025-07-30 ASSESSMENT — PAIN DESCRIPTION - ORIENTATION
ORIENTATION: MID;LOWER;LEFT
ORIENTATION: ANTERIOR;MID
ORIENTATION: ANTERIOR;MID

## 2025-07-30 NOTE — CONSULTS
"Nutrition Follow Up Assessment:   Nutrition Assessment    Reason for Assessment: Provider consult order, Dietitian discretion    Patient is a 59 y.o. male on day 8 of admission presenting with ECF     [facility]   7/25- Postoperative course complicated by colon ischemia requiring OR take back for partial colectomy, end colostomy creation (RUQ), and conversion of double barrel jejunostomy to end jejunostomy in LUQ   7/26- NG to LIWS   7/27- NG removed   7/28- transferred back to floor   7/29- SLP eval; cleared for regular diet/ easy to chew   7/30- plan for PICC replacement     Nutrition History:  Energy Intake: Fair 50-75 %, Good > 75 %  Food and Nutrient History: Initial assessment completed on 7/23. Recommended continuing home nocturnal TPN of Clinimix E 5/15 @ 90ml/hrx1 hour, 182ml/hrx10 hours, 90ml/hrx1 hour with 250ml SMOF lipids @ 20.8ml/hrx12 hours overnight. This provides  2250 total mls, 1920kcals, 100g PRO, and 300g dextrose.  Per chart review, TPN was held 7/25 d/t procedure and was restarted on 7/26. Pt was NPO 7/22-7/27 and was advanced to clear liquid diet 7/28 and advanced to regular diet 7/30. Met with pt at bedside and stated that he doesn't have much of an appetite today. Pt stated that he just ordered a banana and pancake but was having liquids prior to that.       Anthropometrics:  Height: 172.7 cm (5' 8\")   Weight: (!) 168 kg (369 lb 14.4 oz)   BMI (Calculated): 56.26  IBW/kg (Dietitian Calculated): 70 kg  Percent of IBW: 103 %                      Weight History:   Date/Time Weight   07/30/25 0632 168 kg (369 lb 14.4 oz) Abnormal-->? This wt.    07/29/25 0619 84.1 kg (185 lb 4.8 oz)   07/28/25 1000 88.3 kg (194 lb 10.7 oz)   07/26/25 0600 88.7 kg (195 lb 8.8 oz)   07/25/25 1104 89 kg (196 lb 3.4 oz)   07/22/25 0610 72 kg (158 lb 11.7 oz)     Weight Change %:  Weight History / % Weight Change: Pt has had significant wt gain since admission. Likely d/t fluid  Significant " Weight Gain: Fluid related    Nutrition Focused Physical Exam Findings:    Subcutaneous Fat Loss:   Defer Subcutaneous Fat Loss Assessment: Defer all  Defer All Reason: completed on 7/23  Muscle Wasting:  Defer Muscle Wasting Assessment: Defer all  Edema:  Edema: +1 trace  Edema Location: generalized  Physical Findings:  Hair: Negative  Eyes: Negative  Nails: Negative  Skin: Positive (s/p procedures)    Nutrition Significant Labs:  TPN/PPN Labs:   Results from last 7 days   Lab Units 07/30/25  0437 07/29/25  0418 07/28/25  1404 07/28/25  0325 07/25/25  0730 07/25/25  0002   GLUCOSE mg/dL 125* 165* 173* 148*   < > 125*   POTASSIUM mmol/L 3.5 3.2* 3.2* 2.9*   < > 3.2*   PHOSPHORUS mg/dL 2.6 3.4 2.8 3.3   < > 1.2*   MAGNESIUM mg/dL 2.11 2.51*  --  2.71*   < > 1.66   SODIUM mmol/L 137 135* 138 133*   < > 133*   CHLORIDE mmol/L 100 99 86* 95*   < > 99   ALT U/L  --   --   --   --   --  21   AST U/L  --   --   --   --   --  25   ALK PHOS U/L  --   --   --   --   --  33   BILIRUBIN TOTAL mg/dL  --   --   --   --   --  2.0*    < > = values in this interval not displayed.       Nutrition Specific Medications:  Scheduled medications  diphenoxylate-atropine, 1 tablet, oral, 4x daily  fat emulsion fish oil/plant based, 250 mL, intravenous, Daily Lipids  insulin lispro, 0-5 Units, subcutaneous, q6h  piperacillin-tazobactam, 3.375 g, intravenous, q6h  potassium chloride, 40 mEq, intravenous, Once      Continuous medications  Adult Clinimix TPN Cyclic, , Last Rate: Stopped (07/29/25 2149)        I/O:    ; Stool Appearance: Liquid (07/28/25 0800)    Dietary Orders (From admission, onward)       Start     Ordered    07/30/25 0821  Adult diet Regular; Easy to chew  Diet effective now        Question Answer Comment   Diet type Regular    Texture Easy to chew        07/30/25 0820    07/28/25 1123  May Participate in Room Service  ( ROOM SERVICE MAY PARTICIPATE)  Once        Question:  .  Answer:  Yes    07/28/25 1120                      Estimated Needs:   Total Energy Estimated Needs in 24 hours (kCal): 2100 kCal  Method for Estimating Needs: 30kcal/kg CBW  Total Protein Estimated Needs in 24 Hours (g): 110 g  Method for Estimating 24 Hour Protein Needs: 1.5g/kg CBW  Total Fluid Estimated Needs in 24 Hours (mL): 2100 mL  Method for Estimating 24 Hour Fluid Needs: 1ml/kcal or per MD team        Nutrition Diagnosis   Malnutrition Diagnosis  Patient has Malnutrition Diagnosis: No    Nutrition Diagnosis  Patient has Nutrition Diagnosis: Yes  Diagnosis Status (1): Active  Nutrition Diagnosis 1: Altered GI function  Related to (1): surgical history  As Evidenced by (1): ECF       Nutrition Interventions/Recommendations   Nutrition prescription for oral nutrition, Nutrition prescription for parenteral nutrition    Nutrition Recommendations:  Individualized Nutrition Prescription Provided for :   1. Continue regular diet as tolerated   2. Continue Clinimix E 5/15  @ 90ml/hrx1 hour, 182ml/hrx10 hours, 90ml/hrx1 hour with 250ml SMOF lipids @ 20.8ml/hrx12 hours overnight.  3. Continue to monitor lytes and replete as needed    Nutrition Interventions/Goals:   Meals and Snacks: General healthful diet  Parenteral Nutrition: Management of composition of parenteral nutrition  Goal: TPN provides 2250 total mls, 1920kcals, 100g PRO, and 300g dextrose      Education Documentation  Nutrition Care Manual, taught by Paul Valencia RDN, LD at 7/30/2025  3:13 PM.  Learner: Patient  Readiness: Acceptance  Method: Handout  Response: Verbalizes Understanding  Comment: Provided pt with education s/p colostomy. Handout provides foods that can help thicken stool, foods to avoid that can cause blockages, and foods to avoid that may increase output. Also informed pt to monitor hydration and to use oral rehydration solutions when output is high. Pt stated that he would read through handout.               Nutrition Monitoring and Evaluation   Intake / Amount of food:  Consumes at least 50% or more of meals/snacks/supplements  Enteral and Parenteral Nutrition Intake Determination: Parenteral nutrition intake - Tolerate TPN at goal rate    Body Weight: Body weight - Weight reduction from fluids, as needed    Electrolyte and Renal Panel: Electrolytes within normal limits  Glucose/Endocrine Profile: Glucose within normal limits ( mg/dL)         Goal Status: Some progress toward goal(s)    Time Spent (min): 60 minutes

## 2025-07-30 NOTE — PROGRESS NOTES
Colorectal Surgery Progress Note      Bereket Em is a 59 y.o. 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 Olson & Vicky with Margarita's reversal/ transverse colon mobilization/ileal window by Dr. Gross on 7/22/25. Post-op course c/b bowel ischemia with afib RVR requiring ICU transfer & amio drip, s/p exlap, bowel resection at anastomosis, end colostomy creation (RUQ) & conversion of double barrel jejunostomy to end jejunostomy (LUQ) on 7/25/25.     Subjective    No acute events overnight, afebrile with stable vital signs. Awake this morning, conversive on rounds this mornings, pain controlled on current pain regime. Tolerated clear liquids without N/V and endorsing appetite this morning.  Continues with increased work of breathing, however, slight improvement from yesterday.  Ostomy output from ileostomy 1475 ml in last 24 hours.    Objective    Vital signs:   Temp:  [36.2 °C (97.2 °F)-36.4 °C (97.5 °F)] 36.4 °C (97.5 °F)  Heart Rate:  [56-63] 59  Resp:  [18] 18  BP: (113-124)/(59-76) 116/59    Physical Exam:  GEN:  Alert, awake and conversant, no acute distress  HEENT: Sclera anicteric. Moist mucous membranes.  RESP: Chest expansion symmetrical, slight increased work of breathing, good oxygenation via pulse oximetry  CV: bradycardic with rates in 50-60, normotensive per chart review  GI: Abdomen soft, mildly distended, appropriately tender, staples along midline incision, well approximated, lower half incision covered with dry dressing, ileostomy well pouched on LUQ with watery stool and some gas in pouch, de- functioned  colostomy pouched on Right with minimal amount bowel sweat in pouch  MSK: No gross deformities. Moves all extremities spontaneously. No pitting edema  NEURO: Alert and conversive, appropriate conversation, no focal deficits  PSYCH: Appropriate mood and affect.      I/O last 2 completed shifts:  In: 2866.2 (17.1 mL/kg) [IV Piggyback:450]  Out: 4515 (26.9 mL/kg) [Urine:3020 (0.7 mL/kg/hr); Drains:20; Stool:1475]  Weight: 167.8 kg      LABS:            7.8     15.3>-----<205              23.5     137  100  25                  ----------------<125     3.5  30  0.48          Ca 8.0 Phos 2.6 Mg 2.11            Meds:  Current Medications[1]     Medications reviewed.  Vital signs reviewed.  Labs reviewed.         Assessment/Plan    Bereket Em is a Consult(Olivia Roman)-59M with a history of perforated diverticulitis s/p Soliman's procedure C/B formation of enterocutaneous fistula now s/p enterocutaneous fistula takedown, Soliman's reversal with end jejunostomy creation, ventral wall hernia repair(>10 cm), and abdominal wall debridement with wound VAC placements on 7/22 c/b bowel ischemia and afib with RVR s/p exlap with ischemic bowel resection, colonic anast takedown and end colostomy formation on amio drip.      Plan Today:   - appreciate care per primary team  - agree with advancing diet as tolerated to a regular diet,  TPN cycled   - agree with Lasix dosing for diuresis today  - Continue to monitor ostomy output, have patient record output, keep running total  - Dehydration education for patient  - Recommend repletion of lytes to goal K=4.0, Mag+2.0  - recommend use of imodium as first line in managing output per stoma, recommend dosing 30 minutes before meals and bedtime and titration up to two tablets QID, then adding Lomotil QID per his home routine.  - Colorectal surgery will continue to follow along    Hospital Day: 9     Dispo: Continue current level of care.     Seen and discussed with surgical team,  patient and Dr Renato PALMA-CNP  Colorectal Surgery NP #[id number]  You # [id number]                    [1]   Current Facility-Administered Medications:     acetaminophen (Ofirmev) injection 1,000 mg, 1,000 mg, intravenous, q6h, MINERVA Yin-CNP, Stopped at 07/30/25 1135    Adult Clinimix TPN Cyclic, , intravenous, Cyclic PN, MINERVA Yin-CNP, Stopped at 07/29/25 2149    alteplase (Cathflo Activase) injection 2 mg, 2 mg, intra-catheter, PRN, Charisse Castaneda APRN-CNP    dextrose 50 % injection 12.5 g, 12.5 g, intravenous, q15 min PRN, Charisse Castaneda APRN-CNP    dextrose 50 % injection 25 g, 25 g, intravenous, q15 min PRN, Charisse Castaneda, APRN-CNP    diphenoxylate-atropine (Lomotil) 2.5-0.025 mg per tablet 1 tablet, 1 tablet, oral, 4x daily, Boris Ledezma MD, 1 tablet at 07/30/25 0607    enoxaparin (Lovenox) syringe 40 mg, 40 mg, subcutaneous, q24h, MINERVA Yin-CNP, 40 mg at 07/30/25 0900    fat emulsion fish oil/plant based (SMOFlipid) 20 % IV infusion 50 g, 250 mL, intravenous, Daily Lipids, MINERVA Yin-CNP, Stopped at 07/30/25 0854    glucagon (Glucagen) injection 1 mg, 1 mg, intramuscular, q15 min PRN, Charisse Castaneda APRN-CNP    glucagon (Glucagen) injection 1 mg, 1 mg, intramuscular, q15 min PRN, MINERVA Yin-CNP    heparin flush 10 unit/mL syringe 50 Units, 5 mL, intravenous, PRN, Charisse Castaneda, APRN-CNP    HYDROmorphone (Dilaudid) injection 0.2 mg, 0.2 mg, intravenous, q3h PRN, Annabel Ptael MD    insulin lispro injection 0-5 Units, 0-5 Units, subcutaneous, q6h, ASHLY Yin    lidocaine 4 % patch 2 patch, 2 patch, transdermal, Daily, Annabel Patel MD, 2 patch at 07/30/25 0901    naloxone (Narcan) injection 0.2 mg, 0.2 mg, intravenous, PRN, ASHLY Yin    ondansetron (Zofran) injection 4 mg, 4 mg, intravenous, q8h PRN, ASHLY Yin    oxyCODONE (Roxicodone) solution 10 mg, 10 mg, oral, q4h PRN,  Annabel Patel MD, 10 mg at 07/29/25 0648    oxyCODONE (Roxicodone) solution 5 mg, 5 mg, oral, q4h PRN, Annabel Patel MD, 5 mg at 07/30/25 0426    pantoprazole (Protonix) injection 40 mg, 40 mg, intravenous, Daily, ASHLY Yin, 40 mg at 07/30/25 0901    PHENobarbital (Luminal) injection 65 mg, 65 mg, intravenous, q6h PRN, ASHLY Yin, 65 mg at 07/26/25 2054    piperacillin-tazobactam (Zosyn) 3.375 g in dextrose (iso) IV 50 mL, 3.375 g, intravenous, q6h, ASHLY Yin, Stopped at 07/30/25 1151    potassium chloride 40 mEq in sterile water for injection 100 mL, 40 mEq, intravenous, Once, ASHLY Yin, Last Rate: 25 mL/hr at 07/30/25 1121, 40 mEq at 07/30/25 1121    traZODone (Desyrel) tablet 100 mg, 100 mg, oral, Nightly, Frederick Abel MD, 100 mg at 07/29/25 2015

## 2025-07-30 NOTE — PROGRESS NOTES
General Surgery Progress Note    07/30/25    Bereket Em    Summary:  Bereket Em is a 59 year old male with a history of perforated diverticulitis s/p Margarita's procedure c/b formation of ECF. Underwent EC fistula takedown, GILLES, Margarita's reversal, abdominal wall debridement, and double barrel jejunostomy with Leyda Olson, Renato, and Vicky on 7/22.  Intra-op findings remarkable for adhesions throughout the abdomen and remaining segment of the colon terminating at the splenic flexure requiring significant mobilization and an ileal window.  Postoperative course complicated by colon ischemia requiring OR take back for partial colectomy, end colostomy creation (RUQ), and conversion of double barrel jejunostomy to end jejunostomy in LUQ on 7/25.     Subjective    Subjective:  Breathing feels slightly better than yesterday, but still feeling SOB. Tolerating clear liquid diet without nausea/vomiting. Was OOB/ambulating within room yesterday. Voiding without difficulty.         Objective    Objective:      Vital signs:   Temp:  [36.2 °C (97.2 °F)-36.4 °C (97.5 °F)] 36.4 °C (97.5 °F)  Heart Rate:  [50-63] 57  Resp:  [18] 18  BP: (113-126)/(63-76) 113/67    Physical Exam:  GEN: resting comfortably, no acute distress   NEURO: awake, alert, oriented x4   HEENT: Sclera anicteric. Moist mucous membranes.   RESP: non-labored breathing on room air  CV: sinus bradycardia on telemetry   GI: Abdomen soft, appropriately tender to palpation. Midline incision with staples, ostomies pink/perfused; dark, liquid stool output from LUQ ostomy. RUQ ostomy with scant output. ANA MARIA drain with serosanguinous output.   SKIN: laparotomy closed loosely with staples.  EXT: no peripheral edema     I/O last 2 completed shifts:  In: 2866.2 (17.1 mL/kg) [IV Piggyback:450]  Out: 4515 (26.9 mL/kg) [Urine:3020 (0.7 mL/kg/hr); Drains:20; Stool:1475]  Weight: 167.8 kg      Labs Past 18 Hours:  Recent Results (from the past 18 hours)   POCT  GLUCOSE    Collection Time: 07/29/25  5:54 PM   Result Value Ref Range    POCT Glucose 101 (H) 74 - 99 mg/dL   POCT GLUCOSE    Collection Time: 07/29/25  7:13 PM   Result Value Ref Range    POCT Glucose 104 (H) 74 - 99 mg/dL   POCT GLUCOSE    Collection Time: 07/29/25 11:53 PM   Result Value Ref Range    POCT Glucose 156 (H) 74 - 99 mg/dL   POCT GLUCOSE    Collection Time: 07/30/25  4:18 AM   Result Value Ref Range    POCT Glucose 141 (H) 74 - 99 mg/dL   CBC    Collection Time: 07/30/25  4:37 AM   Result Value Ref Range    WBC 15.3 (H) 4.4 - 11.3 x10*3/uL    nRBC 0.2 (H) 0.0 - 0.0 /100 WBCs    RBC 2.99 (L) 4.50 - 5.90 x10*6/uL    Hemoglobin 7.8 (L) 13.5 - 17.5 g/dL    Hematocrit 23.5 (L) 41.0 - 52.0 %    MCV 79 (L) 80 - 100 fL    MCH 26.1 26.0 - 34.0 pg    MCHC 33.2 32.0 - 36.0 g/dL    RDW 15.8 (H) 11.5 - 14.5 %    Platelets 205 150 - 450 x10*3/uL   Renal Function Panel    Collection Time: 07/30/25  4:37 AM   Result Value Ref Range    Glucose 125 (H) 74 - 99 mg/dL    Sodium 137 136 - 145 mmol/L    Potassium 3.5 3.5 - 5.3 mmol/L    Chloride 100 98 - 107 mmol/L    Bicarbonate 30 21 - 32 mmol/L    Anion Gap 11 10 - 20 mmol/L    Urea Nitrogen 25 (H) 6 - 23 mg/dL    Creatinine 0.48 (L) 0.50 - 1.30 mg/dL    eGFR >90 >60 mL/min/1.73m*2    Calcium 8.0 (L) 8.6 - 10.6 mg/dL    Phosphorus 2.6 2.5 - 4.9 mg/dL    Albumin 2.6 (L) 3.4 - 5.0 g/dL   Magnesium    Collection Time: 07/30/25  4:37 AM   Result Value Ref Range    Magnesium 2.11 1.60 - 2.40 mg/dL   POCT GLUCOSE    Collection Time: 07/30/25  7:30 AM   Result Value Ref Range    POCT Glucose 190 (H) 74 - 99 mg/dL        Meds:  Current Medications[1]     Imaging:  Imaging  XR chest 1 view  Result Date: 7/29/2025  1. Increased bilateral interstitial lung markings. Correlate with atypical infection versus pulmonary edema. 2. Stable left basilar atelectasis and trace pleural effusion. 3. Stable right basilar atelectasis, with possible associated small pleural effusion.   I  personally reviewed the images/study and I agree with the findings as stated by Clemente Funk MD.   MACRO: None   Signed by: Lance Owen 7/29/2025 10:59 AM Dictation workstation:   [id number]    Lower extremity venous duplex bilateral  Result Date: 7/28/2025  1. No evidence of deep venous thrombosis in the bilateral lower extremities. 2. Bilateral simple appearing Baker's cysts.   MACRO: None   Signed by: Jonathon Malcolm 7/28/2025 6:40 PM Dictation workstation:   [id number]      Assessment/Plan    Assessment and Plan:  Bereket Em is a Primary - [facility]olin   59M with PMH of perforated diverticulitis s/p Margarita's procedure c/b formation of ECF. Underwent EC fistula takedown, GILLES, Margarita's reversal, abdominal wall debridement, and double barrel jejunostomy with Renato Rodriguez, and Vicky on 7/22.  Intra-op findings remarkable for adhesions throughout the abdomen and remaining segment of the colon terminating at the splenic flexure requiring significant mobilization and an ileal window.  Postoperative course complicated by colon ischemia requiring exploratory laparotomy, partial colectomy, end colostomy creation (RUQ), and conversion of double barrel jejunostomy to end jejunostomy in LUQ on 7/25. Transferred from ICU to floor on 7/28 and progressing appropriately.      7/22: ECF takedown, extensive GILLES, Margarita's reversal, abdominal wall debridement, and double barrel jejunostomy with Renato Rodriguez, and Vicky.    7/25:  partial colectomy with end colostomy creation, conversion of double barrel jejunostomy to end jejunostomy     PLAN:   Neurology: post operative pain  - scheduled IV tylenol, lidocaine patch    - Liquid oxycodone 5/10mg with 0.2 IV Dilaudid PRN breakthrough   - continue home trazodone     Cardiovascular:   -Vital signs every 4 hours    Pulmonology: pulmonary edema   -IS x10 every hour   -Maintain SpO2 >92 % RA   - 20 IV Lasix today     GI: s/p ECF takedown, Margarita's  reversal; end colostomy, end jejunostomy   - advance to soft diet   - nutrition consulted, continue TPN; PICC replacement today   - Zofran PRN nausea   - continue PPI, Lomotil   - wound/ostomy nursing consulted, appreciate assistance     :  - Strict I&O   - Trend RFP and Magnesium, replace electrolytes as needed to maintain K >4, Mag >2. Replete K+ today     ID: h/o E.coli bacteremia   - ID consulted/signed off. Continue 3.375g IV Zosyn until 7 days from last negative blood culture (8/2)   - follow up 7/27 blood culture results- no growth to date     Heme:  -Monitor for signs of acute blood loss  -Trend CBC as needed     Endocrine:   - POCT glucose every 6 hours with TPN     DVT Prophylaxis:  -Continue subcutaneous lovenox, SCDs, ambulation/OOB     Disposition:  - RNF  - Continue PT/OT, recommendation for low-intensity therapy.     Discussed with Dr Olson.     MINERVA Connors CNP  [facility]  [phone]         [1]   Current Facility-Administered Medications:     acetaminophen (Ofirmev) injection 1,000 mg, 1,000 mg, intravenous, q6h, MINERVA Yin-CNP, Stopped at 07/30/25 0450    Adult Clinimix TPN Cyclic, , intravenous, Cyclic PN, MINERVA Yin-CNP, Stopped at 07/29/25 2149    alteplase (Cathflo Activase) injection 2 mg, 2 mg, intra-catheter, PRN, Charisse Castaneda APRN-CNP    dextrose 50 % injection 12.5 g, 12.5 g, intravenous, q15 min PRN, Charisse Castaneda APRN-CNP    dextrose 50 % injection 25 g, 25 g, intravenous, q15 min PRN, Charisse Castaneda, APRN-CNP    diphenoxylate-atropine (Lomotil) 2.5-0.025 mg per tablet 1 tablet, 1 tablet, oral, 4x daily, Boris Ledezma MD, 1 tablet at 07/30/25 0607    enoxaparin (Lovenox) syringe 40 mg, 40 mg, subcutaneous, q24h, MINEVRA Yin-CNP, 40 mg at 07/30/25 0900    fat emulsion fish oil/plant based (SMOFlipid) 20 % IV infusion 50 g, 250 mL, intravenous, Daily Lipids, MINERVA Yin-CNP, Stopped at 07/30/25 0854     glucagon (Glucagen) injection 1 mg, 1 mg, intramuscular, q15 min PRN, MINERVA Yin-CNP    glucagon (Glucagen) injection 1 mg, 1 mg, intramuscular, q15 min PRN, ASHLY Yin    heparin flush 10 unit/mL syringe 50 Units, 5 mL, intravenous, PRN, MINERVA Yin-CNP    hydrocortisone sodium succinate (PF) (Solu-CORTEF) injection 25 mg, 25 mg, intravenous, q8h, MINERVA Yin-CNP, 25 mg at 07/30/25 0426    HYDROmorphone (Dilaudid) injection 0.2 mg, 0.2 mg, intravenous, q3h PRN, Annabel Patel MD    insulin lispro injection 0-5 Units, 0-5 Units, subcutaneous, q6h, ASHLY Yin    lidocaine (Xylocaine) 10 mg/mL (1 %) injection 5 mL, 5 mL, infiltration, Once, ASHLY Yin    lidocaine 4 % patch 2 patch, 2 patch, transdermal, Daily, Annabel Patel MD, 2 patch at 07/30/25 0901    naloxone (Narcan) injection 0.2 mg, 0.2 mg, intravenous, PRN, ASHLY Yin    ondansetron (Zofran) injection 4 mg, 4 mg, intravenous, q8h PRN, MINERVA Yin-CNP    oxyCODONE (Roxicodone) solution 10 mg, 10 mg, oral, q4h PRN, Annabel Patel MD, 10 mg at 07/29/25 0648    oxyCODONE (Roxicodone) solution 5 mg, 5 mg, oral, q4h PRN, Annabel Patel MD, 5 mg at 07/30/25 0426    pantoprazole (Protonix) injection 40 mg, 40 mg, intravenous, Daily, MINERVA Yin-CNP, 40 mg at 07/30/25 0901    PHENobarbital (Luminal) injection 65 mg, 65 mg, intravenous, q6h PRN, MINERVA Yin-CNP, 65 mg at 07/26/25 2054    piperacillin-tazobactam (Zosyn) 3.375 g in dextrose (iso) IV 50 mL, 3.375 g, intravenous, q6h, ASHLY Yin, Stopped at 07/30/25 0635    potassium chloride 40 mEq in sterile water for injection 100 mL, 40 mEq, intravenous, Once, ASHLY Yin    traZODone (Desyrel) tablet 100 mg, 100 mg, oral, Nightly, Frederick Abel MD, 100 mg at 07/29/25 2015

## 2025-07-30 NOTE — PROGRESS NOTES
Occupational Therapy    Evaluation    Patient Name: Bereket Em  MRN: [id number]  Today's Date: 7/30/2025  Room: [address] [place] [address]  Time Calculation  Start Time: 1531  Stop Time: 1548  Time Calculation (min): 17 min    Assessment  IP OT Assessment  OT Assessment: Pt continues to demo deficits in activity tolerance, strength, and mobility resulting in the continued need for skilled OT services while IP to allow for a safe and functional d/c.  Prognosis: Good  Barriers to Discharge Home: No anticipated barriers  Evaluation/Treatment Tolerance: Patient tolerated treatment well  Medical Staff Made Aware: Yes  End of Session Communication: Bedside nurse  End of Session Patient Position: Bed, 3 rail up, Alarm off, not on at start of session  Plan:  Inpatient Plan  Treatment Interventions: ADL retraining, Functional transfer training, UE strengthening/ROM, Endurance training, Patient/family training, Equipment evaluation/education, Compensatory technique education  OT Frequency: 1 time per week  OT Discharge Recommendations: No OT needed after discharge  Equipment Recommended upon Discharge:  (shower chair)  OT Recommended Transfer Status: Stand by assist  OT - OK to Discharge: Yes  OT Assessment  OT Assessment Results: Decreased ADL status, Decreased endurance, Decreased functional mobility, Decreased IADLs  Prognosis: Good  Barriers to Discharge: None  Evaluation/Treatment Tolerance: Patient tolerated treatment well  Medical Staff Made Aware: Yes  Strengths: Ability to acquire knowledge, Attitude of self, Coping skills, Housing layout, Premorbid level of function, Rehab experience, Support of Caregivers  Barriers to Participation: Comorbidities    Subjective   Current Problem:  1. Enterocutaneous fistula  Surgical Pathology Exam    Surgical Pathology Exam    Insert arterial line    Insert arterial line    Stoma care    Line Care    Drain/Tube Care    Referral to Home Health    CANCELED: Surgical Pathology Exam    CANCELED:  Surgical Pathology Exam      2. Incarcerated ventral hernia  Surgical Pathology Exam    Surgical Pathology Exam    Case Request Operating Room: LAPAROSCOPY, EXPLORATORY    Case Request Operating Room: LAPAROSCOPY, EXPLORATORY    Surgical Pathology Exam    Surgical Pathology Exam    CANCELED: Surgical Pathology Exam    CANCELED: Surgical Pathology Exam      3. Atrial fibrillation, unspecified type (Multi)  Transthoracic Echo (TTE) Limited    Transthoracic Echo (TTE) Limited    Insert arterial line    Insert arterial line    CANCELED: Transthoracic Echo (TTE) Limited    CANCELED: Transthoracic Echo (TTE) Limited      4. Diverticulosis of large intestine without hemorrhage  Case Request Operating Room: LAPAROSCOPY, EXPLORATORY    Case Request Operating Room: LAPAROSCOPY, EXPLORATORY    Surgical Pathology Exam    Surgical Pathology Exam      5. Septic shock (Multi)  Intubation    Intubation    Central Line    Central Line    Lower extremity venous duplex bilateral    Lower extremity venous duplex bilateral      6. S/P exploratory laparotomy  Stoma care    Line Care    Drain/Tube Care    Referral to Home Health      7. On total parenteral nutrition (TPN)  Venous Access, Home Infusion    Basic Metabolic Panel    CBC    Hepatic Function Panel    Magnesium    Phosphorus    Triglycerides        General:  Reason for Referral: This 60 y/o M presents s/p scheduled ventral hiatal hernia repair, EC Fistula take down, lysis of adhesions, Soliman's revarsal, abdominal wall debridement, and double barrel jejunostom 7/22. Post op course c/b colon ischemia requiring RTOR for partial colectomy, end colostomy creation (RUQ), and conversion of double barrel jejunostomy to end jejunostomy (LUQ) on 7/25.  Past Medical History Relevant to Rehab: Perforated diverticulitis s/p Margarita's procedure c/b formation of enterocutaneous fistula, anxiety  Prior to Session Communication: Bedside nurse  Patient Position Received: Bed, 3 rail up, Alarm  off, not on at start of session  Family/Caregiver Present: No  General Comment: Pt sup in bed on approach. Pleasant, agreeable, and motiviated to participate in OT assessment.   Precautions:  Medical Precautions: Abdominal precautions, Fall precautions  Post-Surgical Precautions: Abdominal surgery precautions  Precautions Comment: Pt adore fall3rbalizes understaning of abd precautions  Vital Signs:   Date/Time Vitals Session Patient Position Pulse Resp SpO2 BP MAP (mmHg)    07/30/25 1542 --  --  58  --  97 %  116/70  85      Pain:  Pain Assessment  Pain Assessment: 0-10  0-10 (Numeric) Pain Score: 5 - Moderate pain  Pain Type: Acute pain  Pain Location: Abdomen  Lines/Tubes/Drains:  CVC 07/25/25 Triple lumen Right Internal jugular (Active)   Number of days: 5       PICC - Adult 07/30/25 Double lumen Left Cephalic vein (Active)   Number of days: 0       Closed/Suction Drain RLQ 10 Fr. (Active)   Number of days: 5       Colostomy RUQ (Active)   Number of days: 5       Ileostomy Other (Comment) LUQ (Active)   Number of days: 5         Objective   Cognition:  Overall Cognitive Status: Within Functional Limits  Orientation Level: Oriented X4  Home Living:  Type of Home: House  Lives With: Significant other  Home Adaptive Equipment: Walker rolling or standard  Home Layout: One level  Home Access: Stairs to enter with rails  Entrance Stairs-Number of Steps: 1  Bathroom Shower/Tub: Tub/shower unit  Bathroom Equipment: None   Prior Function:  Level of [place]: Independent with ADLs and functional transfers, Independent with homemaking with ambulation  ADL Assistance: Independent  Homemaking Assistance: Independent  Ambulatory Assistance: Independent  Vocational: Part time employment (Works from home)  Leisure: His dog  Hand Dominance: Right  Prior Function Comments: (-) Drives; (+) Falls  ADL:  Eating Assistance: Independent  Grooming Assistance: Stand by  Bathing Assistance: Stand by  UE Dressing Assistance: Stand  by  LE Dressing Assistance: Stand by  Toileting Assistance with Device: Modified independent (Pt reports using urnal w/o assist; encouraged pt to use toilet for endurance and mobility)  Activity Tolerance:  Endurance: Tolerates 10 - 20 min exercise with multiple rests  Bed Mobility/Transfers: Bed Mobility  Bed Mobility: Yes  Bed Mobility 1  Bed Mobility 1: Supine to sitting, Sitting to supine  Level of Assistance 1: Distant supervision  Bed Mobility Comments 1: HOB elevated  Functional Mobility  Functional Mobility Performed: Yes  Functional Mobility 1  Surface 1: Level tile  Device 1: Rolling walker  Assistance 1: Distant supervision  Comments 1: Min household distance   and Transfer 1  Technique 1: Sit to stand  Transfer Device 1: Walker  Transfer Level of Assistance 1: Close supervision  Trials/Comments 1: cues for hand placement  Vision: Vision - Basic Assessment  Current Vision: Wears glasses only for reading  Sensation:  Light Touch: No apparent deficits  Coordination:  Movements are Fluid and Coordinated: Yes   Hand Function:  Hand Function  Gross Grasp: Functional  Coordination: Functional  Extremities: RUE   RUE : Within Functional Limits, LUE   LUE: Within Functional Limits    Outcome Measures: [facility] Daily Activity  Putting on and taking off regular lower body clothing: A little  Bathing (including washing, rinsing, drying): A little  Putting on and taking off regular upper body clothing: A little  Toileting, which includes using toilet, bedpan or urinal: A little  Taking care of personal grooming such as brushing teeth: None  Eating Meals: None  Daily Activity - Total Score: 20         ,     OT Adult Other Outcome Measures  4AT: Negative    Education Documentation  Body Mechanics, taught by Guera Garnett OT at 7/30/2025  5:06 PM.  Learner: Patient  Readiness: Acceptance  Method: Explanation  Response: Verbalizes Understanding    Precautions, taught by Guera Garnett OT at 7/30/2025  5:06 PM.  Learner:  Patient  Readiness: Acceptance  Method: Explanation  Response: Verbalizes Understanding    ADL Training, taught by Guera Johnson OT at 7/30/2025  5:06 PM.  Learner: Patient  Readiness: Acceptance  Method: Explanation  Response: Verbalizes Understanding    Education Comments  No comments found.        Goals:     Encounter Problems       Encounter Problems (Active)       ADLs       Patient will perform UB and LB bathing with modified independent level of assistance and shower chair.       Start:  07/30/25    Expected End:  08/13/25            Patient with complete lower body dressing with modified independent level of assistance donning and doffing all LE clothes  with PRN adaptive equipment while edge of bed        Start:  07/30/25    Expected End:  08/13/25            Patient will complete toileting including hygiene clothing management/hygiene with modified independent level of assistance and raised toilet seat.       Start:  07/30/25    Expected End:  08/13/25               BALANCE       Patient will tolerate standing for 20 minutes to modified independent level of assistance with least restrictive device in order to improve functional activity tolerance for ADL tasks.       Start:  07/30/25    Expected End:  08/13/25               MOBILITY       Patient will perform Functional mobility max Household distances/Community Distances with modified independent level of assistance and least restrictive device in order to improve safety and functional mobility.       Start:  07/30/25    Expected End:  08/13/25 07/30/25 at 5:06 PM   GUERA JOHNSON OT   Rehab Office: [phone]

## 2025-07-30 NOTE — POST-PROCEDURE NOTE
Pre-Procedure Checklist:  Emergent Line Insertion: No  Type of Line to be Placed: PICC  Consent Obtained: Yes  Emergency Medication Necessary: No  Patient Identified with 2 Independent Identifiers: Yes  Review of Allergies, Anticoagulation, Relevant Labs, ECG/Telemetry: Yes  Risks/Benefits/Alternatives Discussed with Patient/POA/Legal Representative: Yes  Stop Sign on Door: Yes  Time Out Performed: Yes  Catheter Exchange: No    Positioning Checklist:  All People, Including Patient, in the Room with Cap and Mask: Yes  Fluoroscopy Used to Identify Vessel and Guide Insertion: No   Sterile Cover Used: Yes  Full Barrier Precautions Followed (Mask, Cap, Gown, Gloves): Yes  Hands Washed: Yes  Monitors Attached with Sound Alarms On: No  Full Body Sterile Drape (Head-to-Toe) Used to Cover Patient: Yes  Trendelenburg Position (For IJ and Subclavian): No  CHG Skin Prep Used and Allowed to Air Dry to Skin Procedure: Yes    Procedure Checklist:  Blood Aspirated From All Lumens, All Ports Subsequently Flushed: Yes  Catheter Caps Placed on All Lumens; Lumens Clamped: Yes  Maintain Guidewire Control Throughout, Ensuring Guidewire Removal: Yes  Maintain Sterile Field Throughout Insertion: Yes  Catheter Secured: Yes  Confirmatory Test of Venous Placement: Non-Pulsatile Blood    Post Procedure Checklist:  Date and Time Written on Dressing: Yes  Sharp and Wire Count and Safe Disposal of all Sharps/Wires: Yes  Sterile Dressing Applied Per Protocol: Yes  X-ray Ordered or ECG Image: Yes    PICC Insertion Details:  Size (Fr): 4  Lumen Type:double  Catheter to Vein Ratio Less Than 50%: Yes  Total Length (cm): 42  External Length (cm): 0  Orientation: left  Location: cephalic  Site Prep: Chlorohexidine; Usual sterile procedure followed  Local Anesthetic: Injectable/Subcutaneous  Indication:  Insertion Team Members in the Room: Nurse, LPN  Initial Extremity Circumference (cm): 31  Insertion Attempts: 1  Patient Tolerance: Tolerated Well, Age  Appropriate  Comfort Measures: Subcutaneous anesthetic; Verbal  Procedure Location: Bedside  Safety Measures: Patient specific safety measures addressed with RN  Estimated Blood Loss (mL):   Vessel Fully Compressible Proximally and Distally to Insertion Site: Yes  Brisk Blood Return Obtained and Line Draws Easily: Yes  Tip Location:SVC  Line Confirmation: ECG  Lot #:[id number]  : Bard  PICC Line Exp Date:09/30/2025  Securement: Stat Lock  Post Procedure Checklist: Handoff with RN; Obtain all new IV tubing prior to use; Bed at lowest level and wheels locked; Line discharge information at bedside.  Additional Details: Line was inserted using Modified Seldinger's Technique.   Placed by: Corry Govea RN-[facility]

## 2025-07-30 NOTE — CARE PLAN
Problem: Pain - Adult  Goal: Verbalizes/displays adequate comfort level or baseline comfort level  Outcome: Progressing     Problem: Safety - Adult  Goal: Free from fall injury  Outcome: Progressing     Problem: Discharge Planning  Goal: Discharge to home or other facility with appropriate resources  Outcome: Progressing     Problem: Chronic Conditions and Co-morbidities  Goal: Patient's chronic conditions and co-morbidity symptoms are monitored and maintained or improved  Outcome: Progressing     Problem: Nutrition  Goal: Nutrient intake appropriate for maintaining nutritional needs  Outcome: Progressing     Problem: Skin  Goal: Decreased wound size/increased tissue granulation at next dressing change  Outcome: Progressing  Flowsheets (Taken 7/30/2025 1850)  Decreased wound size/increased tissue granulation at next dressing change:   Promote sleep for wound healing   Protective dressings over bony prominences  Goal: Participates in plan/prevention/treatment measures  Outcome: Progressing  Flowsheets (Taken 7/30/2025 1850)  Participates in plan/prevention/treatment measures:   Elevate heels   Increase activity/out of bed for meals  Goal: Prevent/manage excess moisture  Outcome: Progressing  Flowsheets (Taken 7/30/2025 1850)  Prevent/manage excess moisture:   Moisturize dry skin   Cleanse incontinence/protect with barrier cream  Goal: Prevent/minimize sheer/friction injuries  Outcome: Progressing  Flowsheets (Taken 7/30/2025 1850)  Prevent/minimize sheer/friction injuries:   Turn/reposition every 2 hours/use positioning/transfer devices   Increase activity/out of bed for meals   HOB 30 degrees or less  Goal: Promote/optimize nutrition  Outcome: Progressing  Flowsheets (Taken 7/30/2025 1850)  Promote/optimize nutrition:   Consume > 50% meals/supplements   Monitor/record intake including meals  Goal: Promote skin healing  Outcome: Progressing  Flowsheets (Taken 7/30/2025 1850)  Promote skin healing:   Turn/reposition  every 2 hours/use positioning/transfer devices   Protective dressings over bony prominences     Problem: Pain  Goal: Takes deep breaths with improved pain control throughout the shift  Outcome: Progressing  Goal: Turns in bed with improved pain control throughout the shift  Outcome: Progressing  Goal: Walks with improved pain control throughout the shift  Outcome: Progressing  Goal: Performs ADL's with improved pain control throughout shift  Outcome: Progressing  Goal: Participates in PT with improved pain control throughout the shift  Outcome: Progressing  Goal: Free from opioid side effects throughout the shift  Outcome: Progressing  Goal: Free from acute confusion related to pain meds throughout the shift  Outcome: Progressing     Problem: Fall/Injury  Goal: Not fall by end of shift  Outcome: Progressing  Goal: Be free from injury by end of the shift  Outcome: Progressing  Goal: Verbalize understanding of personal risk factors for fall in the hospital  Outcome: Progressing  Goal: Verbalize understanding of risk factor reduction measures to prevent injury from fall in the home  Outcome: Progressing  Goal: Use assistive devices by end of the shift  Outcome: Progressing  Goal: Pace activities to prevent fatigue by end of the shift  Outcome: Progressing   The patient's goals for the shift include  rest    The clinical goals for the shift include pt will remain HDS throughout shift

## 2025-07-30 NOTE — CONSULTS
Wound/Ostomy Care Progress Note     Visit Date: 7/30/2025      Patient Name: Bereket Em         MRN: [id number]             Reason for Visit: ostomy care and teaching     Ostomy History: 59 y.o. 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 Olson & Vicky with Margarita's reversal/ transverse colon mobilization/ileal window by Dr. Gross on 7/22/25. Post-op course c/b bowel ischemia with afib RVR requiring ICU transfer & amio drip, s/p exlap, bowel resection at anastomosis, end colostomy creation (RUQ) & conversion of double barrel jejunostomy to end jejunostomy (LUQ) on 7/25/25.      Assessment:      Colostomy RUQ (Active)   Placement Date/Time: 07/25/25 0639   Location: RUQ   Number of days: 5      Colostomy RUQ (Active)   Present on Admission to Healthcare Facility N 07/28/25 1415   Stomal Appliance 1 piece;Changed 07/28/25 1415   Site/Stoma Assessment Clean;Intact;Pink 07/30/25 1426   Peristomal Assessment Clean;Intact 07/30/25 1426   Treatment Pouch change;Site care 07/28/25 1415   Drainage Characteristics Brown 07/28/25 1415   Output (mL) 0 mL 07/29/25 1542     Ileostomy Other (Comment) LUQ (Active)   Placement Date/Time: 07/25/25 0500   Hand Hygiene Completed: Yes  Ileostomy Type: (c) Other (Comment)  Location: LUQ   Number of days: 5      Ileostomy Other (Comment) LUQ (Active)   Present on Admission to Healthcare Facility N 07/28/25 1415   Stomal Appliance 2 piece;Changed 07/30/25 1500   Site/Stoma Assessment Pink;Intact 07/30/25 1500   Peristomal Assessment Wound 07/30/25 1500   Treatment Pouch change;Site care 07/30/25 1500    Output (mL) 100 mL 07/30/25 1500   Output Description Liquid;Brown 07/30/25 1500     Assessment/Intervention: Pt seen resting in bed, alert and oriented. No visitors present. R-sided mucous fistula pink with intact 1-piece flat appliance in place. Bowel sweat/cloudy SS drainage only in appliance. L-sided end jejunostomy (7/25) appliance lifting at medial edge and connected to leg bag (???), so this was changed. Stoma is pink and nicely-budded, measuring ~28mm. 2-3cm wide wound noted extending laterally from stoma at 3:00. Skin cleaned, and wound lightly packed with Promogran Yulisa. Paste ring placed snuggly around stoma, then 57mm (red) flat wafer (cut and offset to avoid overlap with midline). Connected to 57mm high output pouch. Gravity bag left at bedside for pt to connect at this discretion. Midline loosely approximated with staples. Scant SS drainage, mainly from inferior-most area. This was cleaned with Vashe wound cleanser and covered with dry gauze and tape per pt preference.         Wound/Ostomy Plan: Ostomy Team will continue to follow 2-3/wk for ostomy support as needed. Please reach out to [facility]ound [facility] with questions/concerns.    Dalia Leos RN, CWON  Wound/Ostomy Nurse  7/30/2025  3:01 PM

## 2025-07-30 NOTE — CARE PLAN
The patient's goals for the shift include  rest and no pain    The clinical goals for the shift include Patient will remain HDS throughout shift

## 2025-07-31 ENCOUNTER — APPOINTMENT (OUTPATIENT)
Dept: RADIOLOGY | Facility: HOSPITAL | Age: 60
End: 2025-07-31
Payer: COMMERCIAL

## 2025-07-31 LAB
ALBUMIN SERPL BCP-MCNC: 2.5 G/DL (ref 3.4–5)
ANION GAP SERPL CALC-SCNC: 9 MMOL/L (ref 10–20)
BACTERIA BLD CULT: NORMAL
BACTERIA BLD CULT: NORMAL
BUN SERPL-MCNC: 22 MG/DL (ref 6–23)
CALCIUM SERPL-MCNC: 8 MG/DL (ref 8.6–10.6)
CHLORIDE SERPL-SCNC: 99 MMOL/L (ref 98–107)
CO2 SERPL-SCNC: 30 MMOL/L (ref 21–32)
CREAT SERPL-MCNC: 0.58 MG/DL (ref 0.5–1.3)
EGFRCR SERPLBLD CKD-EPI 2021: >90 ML/MIN/1.73M*2
ERYTHROCYTE [DISTWIDTH] IN BLOOD BY AUTOMATED COUNT: 15.3 % (ref 11.5–14.5)
GLUCOSE BLD MANUAL STRIP-MCNC: 120 MG/DL (ref 74–99)
GLUCOSE BLD MANUAL STRIP-MCNC: 125 MG/DL (ref 74–99)
GLUCOSE BLD MANUAL STRIP-MCNC: 127 MG/DL (ref 74–99)
GLUCOSE BLD MANUAL STRIP-MCNC: 131 MG/DL (ref 74–99)
GLUCOSE BLD MANUAL STRIP-MCNC: 133 MG/DL (ref 74–99)
GLUCOSE BLD MANUAL STRIP-MCNC: 133 MG/DL (ref 74–99)
GLUCOSE BLD MANUAL STRIP-MCNC: 163 MG/DL (ref 74–99)
GLUCOSE SERPL-MCNC: 104 MG/DL (ref 74–99)
HCT VFR BLD AUTO: 24.1 % (ref 41–52)
HGB BLD-MCNC: 8.2 G/DL (ref 13.5–17.5)
MAGNESIUM SERPL-MCNC: 1.98 MG/DL (ref 1.6–2.4)
MCH RBC QN AUTO: 26.4 PG (ref 26–34)
MCHC RBC AUTO-ENTMCNC: 34 G/DL (ref 32–36)
MCV RBC AUTO: 78 FL (ref 80–100)
NRBC BLD-RTO: 0.1 /100 WBCS (ref 0–0)
PHOSPHATE SERPL-MCNC: 3.3 MG/DL (ref 2.5–4.9)
PLATELET # BLD AUTO: 281 X10*3/UL (ref 150–450)
POTASSIUM SERPL-SCNC: 4.2 MMOL/L (ref 3.5–5.3)
RBC # BLD AUTO: 3.11 X10*6/UL (ref 4.5–5.9)
SODIUM SERPL-SCNC: 134 MMOL/L (ref 136–145)
WBC # BLD AUTO: 21.3 X10*3/UL (ref 4.4–11.3)

## 2025-07-31 PROCEDURE — 2500000004 HC RX 250 GENERAL PHARMACY W/ HCPCS (ALT 636 FOR OP/ED): Performed by: NURSE PRACTITIONER

## 2025-07-31 PROCEDURE — 85027 COMPLETE CBC AUTOMATED: CPT | Performed by: NURSE PRACTITIONER

## 2025-07-31 PROCEDURE — 97110 THERAPEUTIC EXERCISES: CPT | Mod: GP,CQ

## 2025-07-31 PROCEDURE — 80069 RENAL FUNCTION PANEL: CPT | Performed by: NURSE PRACTITIONER

## 2025-07-31 PROCEDURE — 71045 X-RAY EXAM CHEST 1 VIEW: CPT | Performed by: RADIOLOGY

## 2025-07-31 PROCEDURE — 2500000004 HC RX 250 GENERAL PHARMACY W/ HCPCS (ALT 636 FOR OP/ED): Mod: JW,TB

## 2025-07-31 PROCEDURE — 2580000001 HC RX 258 IV SOLUTIONS: Performed by: NURSE PRACTITIONER

## 2025-07-31 PROCEDURE — 2500000002 HC RX 250 W HCPCS SELF ADMINISTERED DRUGS (ALT 637 FOR MEDICARE OP, ALT 636 FOR OP/ED)

## 2025-07-31 PROCEDURE — 83735 ASSAY OF MAGNESIUM: CPT | Performed by: NURSE PRACTITIONER

## 2025-07-31 PROCEDURE — 82947 ASSAY GLUCOSE BLOOD QUANT: CPT

## 2025-07-31 PROCEDURE — 71045 X-RAY EXAM CHEST 1 VIEW: CPT

## 2025-07-31 PROCEDURE — 2550000001 HC RX 255 CONTRASTS: Performed by: SURGERY

## 2025-07-31 PROCEDURE — 97116 GAIT TRAINING THERAPY: CPT | Mod: GP,CQ

## 2025-07-31 PROCEDURE — 2500000005 HC RX 250 GENERAL PHARMACY W/O HCPCS: Performed by: NURSE PRACTITIONER

## 2025-07-31 PROCEDURE — 2500000005 HC RX 250 GENERAL PHARMACY W/O HCPCS

## 2025-07-31 PROCEDURE — 99233 SBSQ HOSP IP/OBS HIGH 50: CPT | Performed by: NURSE PRACTITIONER

## 2025-07-31 PROCEDURE — 2500000001 HC RX 250 WO HCPCS SELF ADMINISTERED DRUGS (ALT 637 FOR MEDICARE OP)

## 2025-07-31 PROCEDURE — 1100000001 HC PRIVATE ROOM DAILY

## 2025-07-31 PROCEDURE — 2500000001 HC RX 250 WO HCPCS SELF ADMINISTERED DRUGS (ALT 637 FOR MEDICARE OP): Performed by: NURSE PRACTITIONER

## 2025-07-31 RX ORDER — FUROSEMIDE 10 MG/ML
20 INJECTION INTRAMUSCULAR; INTRAVENOUS ONCE
Status: COMPLETED | OUTPATIENT
Start: 2025-07-31 | End: 2025-07-31

## 2025-07-31 RX ORDER — LOPERAMIDE HYDROCHLORIDE 2 MG/1
2 CAPSULE ORAL
Status: DISCONTINUED | OUTPATIENT
Start: 2025-07-31 | End: 2025-07-31

## 2025-07-31 RX ORDER — LOPERAMIDE HYDROCHLORIDE 2 MG/1
2 CAPSULE ORAL
Status: DISCONTINUED | OUTPATIENT
Start: 2025-07-31 | End: 2025-08-01

## 2025-07-31 RX ORDER — ACETAMINOPHEN 160 MG/5ML
650 SOLUTION ORAL EVERY 6 HOURS
Status: DISPENSED | OUTPATIENT
Start: 2025-07-31

## 2025-07-31 RX ADMIN — HYDROMORPHONE HYDROCHLORIDE 0.2 MG: 1 INJECTION, SOLUTION INTRAMUSCULAR; INTRAVENOUS; SUBCUTANEOUS at 01:34

## 2025-07-31 RX ADMIN — DIPHENOXYLATE HYDROCHLORIDE AND ATROPINE SULFATE 1 TABLET: .025; 2.5 TABLET ORAL at 06:18

## 2025-07-31 RX ADMIN — PIPERACILLIN SODIUM AND TAZOBACTAM SODIUM 3.38 G: 3; .375 INJECTION, SOLUTION INTRAVENOUS at 23:49

## 2025-07-31 RX ADMIN — FUROSEMIDE 20 MG: 10 INJECTION, SOLUTION INTRAMUSCULAR; INTRAVENOUS at 17:33

## 2025-07-31 RX ADMIN — ACETAMINOPHEN 650 MG: 160 SOLUTION ORAL at 19:14

## 2025-07-31 RX ADMIN — SMOFLIPID 50 G: 6; 6; 5; 3 INJECTION, EMULSION INTRAVENOUS at 20:21

## 2025-07-31 RX ADMIN — FUROSEMIDE 20 MG: 10 INJECTION, SOLUTION INTRAMUSCULAR; INTRAVENOUS at 10:27

## 2025-07-31 RX ADMIN — PIPERACILLIN SODIUM AND TAZOBACTAM SODIUM 3.38 G: 3; .375 INJECTION, SOLUTION INTRAVENOUS at 19:14

## 2025-07-31 RX ADMIN — ACETAMINOPHEN 1000 MG: 10 INJECTION INTRAVENOUS at 00:27

## 2025-07-31 RX ADMIN — OXYCODONE HYDROCHLORIDE 5 MG: 5 SOLUTION ORAL at 20:12

## 2025-07-31 RX ADMIN — HYDROMORPHONE HYDROCHLORIDE 0.2 MG: 1 INJECTION, SOLUTION INTRAMUSCULAR; INTRAVENOUS; SUBCUTANEOUS at 06:18

## 2025-07-31 RX ADMIN — ACETAMINOPHEN 650 MG: 160 SOLUTION ORAL at 14:40

## 2025-07-31 RX ADMIN — LOPERAMIDE HYDROCHLORIDE 2 MG: 2 CAPSULE ORAL at 17:33

## 2025-07-31 RX ADMIN — LOPERAMIDE HYDROCHLORIDE 2 MG: 2 CAPSULE ORAL at 10:26

## 2025-07-31 RX ADMIN — PIPERACILLIN SODIUM AND TAZOBACTAM SODIUM 3.38 G: 3; .375 INJECTION, SOLUTION INTRAVENOUS at 14:40

## 2025-07-31 RX ADMIN — PIPERACILLIN SODIUM AND TAZOBACTAM SODIUM 3.38 G: 3; .375 INJECTION, SOLUTION INTRAVENOUS at 06:18

## 2025-07-31 RX ADMIN — OXYCODONE HYDROCHLORIDE 5 MG: 5 SOLUTION ORAL at 04:16

## 2025-07-31 RX ADMIN — ACETAMINOPHEN 1000 MG: 10 INJECTION INTRAVENOUS at 06:33

## 2025-07-31 RX ADMIN — ENOXAPARIN SODIUM 40 MG: 100 INJECTION SUBCUTANEOUS at 10:27

## 2025-07-31 RX ADMIN — ACETAMINOPHEN 650 MG: 160 SOLUTION ORAL at 23:49

## 2025-07-31 RX ADMIN — DIPHENOXYLATE HYDROCHLORIDE AND ATROPINE SULFATE 1 TABLET: .025; 2.5 TABLET ORAL at 19:14

## 2025-07-31 RX ADMIN — DIPHENOXYLATE HYDROCHLORIDE AND ATROPINE SULFATE 1 TABLET: .025; 2.5 TABLET ORAL at 14:40

## 2025-07-31 RX ADMIN — LIDOCAINE 4% 2 PATCH: 40 PATCH TOPICAL at 10:25

## 2025-07-31 RX ADMIN — IOHEXOL 75 ML: 350 INJECTION, SOLUTION INTRAVENOUS at 12:23

## 2025-07-31 RX ADMIN — PANTOPRAZOLE SODIUM 40 MG: 40 INJECTION, POWDER, LYOPHILIZED, FOR SOLUTION INTRAVENOUS at 10:26

## 2025-07-31 RX ADMIN — PIPERACILLIN SODIUM AND TAZOBACTAM SODIUM 3.38 G: 3; .375 INJECTION, SOLUTION INTRAVENOUS at 00:26

## 2025-07-31 RX ADMIN — ASCORBIC ACID, VITAMIN A PALMITATE, CHOLECALCIFEROL, THIAMINE HYDROCHLORIDE, RIBOFLAVIN-5 PHOSPHATE SODIUM, PYRIDOXINE HYDROCHLORIDE, NIACINAMIDE, DEXPANTHENOL, ALPHA-TOCOPHEROL ACETATE, VITAMIN K1, FOLIC ACID, BIOTIN, CYANOCOBALAMIN: 200; 3300; 200; 6; 3.6; 6; 40; 15; 10; 150; 600; 60; 5 INJECTION, SOLUTION INTRAVENOUS at 20:20

## 2025-07-31 ASSESSMENT — COGNITIVE AND FUNCTIONAL STATUS - GENERAL
STANDING UP FROM CHAIR USING ARMS: A LITTLE
MOVING FROM LYING ON BACK TO SITTING ON SIDE OF FLAT BED WITH BEDRAILS: A LITTLE
WALKING IN HOSPITAL ROOM: A LITTLE
MOBILITY SCORE: 17
CLIMB 3 TO 5 STEPS WITH RAILING: A LOT
TURNING FROM BACK TO SIDE WHILE IN FLAT BAD: A LITTLE
MOVING TO AND FROM BED TO CHAIR: A LITTLE

## 2025-07-31 ASSESSMENT — PAIN DESCRIPTION - LOCATION
LOCATION: ABDOMEN
LOCATION: ABDOMEN

## 2025-07-31 ASSESSMENT — PAIN SCALES - GENERAL
PAINLEVEL_OUTOF10: 3
PAINLEVEL_OUTOF10: 0 - NO PAIN
PAINLEVEL_OUTOF10: 7
PAINLEVEL_OUTOF10: 7
PAINLEVEL_OUTOF10: 6
PAINLEVEL_OUTOF10: 4
PAINLEVEL_OUTOF10: 7

## 2025-07-31 ASSESSMENT — PAIN - FUNCTIONAL ASSESSMENT
PAIN_FUNCTIONAL_ASSESSMENT: 0-10

## 2025-07-31 ASSESSMENT — PAIN DESCRIPTION - ORIENTATION: ORIENTATION: MID;LOWER;LEFT

## 2025-07-31 ASSESSMENT — PAIN DESCRIPTION - DESCRIPTORS: DESCRIPTORS: SHARP

## 2025-07-31 NOTE — PROGRESS NOTES
Colorectal Surgery Progress Note      Bereket Em is a 59 y.o. 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 Olson & Vicky with Margarita's reversal/ transverse colon mobilization/ileal window by Dr. Gross on 7/22/25. Post-op course c/b bowel ischemia with afib RVR requiring ICU transfer & amio drip, s/p exlap, bowel resection at anastomosis, end colostomy creation (RUQ) & conversion of double barrel jejunostomy to end jejunostomy (LUQ) on 7/25/25.     Subjective    No acute events overnight, afebrile with stable vital signs. Awake this morning, has some increased discomfort in his abdomen. Tolerating diet,no n/v.  Continues with increased work of breathing, feels like he is getting enough oxygen, but its hard to take breaths.     Objective    Vital signs:   Temp:  [36.3 °C (97.3 °F)-36.8 °C (98.2 °F)] 36.3 °C (97.3 °F)  Heart Rate:  [58-76] 76  Resp:  [18] 18  BP: (104-116)/(54-70) 107/65    Physical Exam:  GEN:  Alert, awake and conversant, no acute distress  HEENT: Sclera anicteric. Moist mucous membranes.  RESP: Chest expansion symmetrical, increased work of breathing, good oxygenation via pulse oximetry  CV: bradycardic with rates in 50-60, normotensive per chart review  GI: Abdomen soft, mildly distended, appropriately tender, staples along midline incision, well approximated, lower half incision covered with dry dressing, ileostomy well pouched on LUQ with watery stool and some gas in pouch, de- functioned colostomy pouched on right  MSK: No gross deformities. Moves all extremities spontaneously. No pitting edema  NEURO: Alert  and conversive, no focal deficits  PSYCH: Appropriate mood and affect.      I/O last 2 completed shifts:  In: 2828 (34.3 mL/kg) [P.O.:460; IV Piggyback:250]  Out: 4130 (50.1 mL/kg) [Urine:1935 (1 mL/kg/hr); Drains:95; Stool:2100]  Weight: 82.5 kg      LABS:            8.2     21.3>-----<281              24.1     134  99  22                  ----------------<104     4.2  30  0.58          Ca 8.0 Phos 3.3 Mg 1.98            Meds:  Current Medications[1]     Medications reviewed.  Vital signs reviewed.  Labs reviewed.         Assessment/Plan    Bereket Em is a Consult(Corwin Owens)-59M with a history of perforated diverticulitis s/p Soliman's procedure C/B formation of enterocutaneous fistula now s/p enterocutaneous fistula takedown, Soliman's reversal with end jejunostomy creation, ventral wall hernia repair(>10 cm), and abdominal wall debridement with wound VAC placements on 7/22 c/b bowel ischemia and afib with RVR s/p exlap with ischemic bowel resection, colonic anast takedown and end colostomy formation on amio drip.      Plan Today:   - appreciate care per primary team  - Given increased leukocytosis and abdominal distention, we feel its reasonable to get a CT A/P to evaluate for a possible fluid collection  - Cont. Lasix dosing for diuresis today  - Continue to monitor ostomy output, have patient record output, keep running total  - Dehydration education for patient  - Recommend repletion of lytes to goal K=4.0, Mag+2.0  - recommend use of imodium as first line in managing output per stoma, recommend dosing 30 minutes before meals and bedtime and titration up to two tablets QID, then adding Lomotil QID per his home routine.  - Colorectal surgery will continue to follow along    Hospital Day: 10     Dispo: Continue current level of care.     Seen and discussed with surgical team, patient and Dr Renato Reyes, PGY-1  General Surgery Resident  [facility] Pager #[phone]                       [1]   Current Facility-Administered Medications:     acetaminophen (Ofirmev) injection 1,000 mg, 1,000 mg, intravenous, q6h, Charisse Castaneda, APRN-CNP, Stopped at 07/31/25 0646    Adult Clinimix TPN Cyclic, , intravenous, Cyclic PN, Charisse Weinbergbena, APRN-CNP, Stopped at 07/30/25 2135    alteplase (Cathflo Activase) injection 2 mg, 2 mg, intra-catheter, PRN, Charisse Weinbergbena, APRN-CNP    dextrose 50 % injection 12.5 g, 12.5 g, intravenous, q15 min PRN, Charisse Weinbergbena, APRN-CNP    dextrose 50 % injection 25 g, 25 g, intravenous, q15 min PRN, Charisse Weinbergbena, APRN-CNP    diphenoxylate-atropine (Lomotil) 2.5-0.025 mg per tablet 1 tablet, 1 tablet, oral, 4x daily, Boris Ledezma MD, 1 tablet at 07/31/25 0618    enoxaparin (Lovenox) syringe 40 mg, 40 mg, subcutaneous, q24h, Charisse Weinbergbemarkell, APRN-CNP, 40 mg at 07/30/25 0900    fat emulsion fish oil/plant based (SMOFlipid) 20 % IV infusion 50 g, 250 mL, intravenous, Daily Lipids, Charisse Weinbergbemarkell, APRN-CNP, Last Rate: 20.8 mL/hr at 07/30/25 2035, 50 g at 07/30/25 2035    furosemide (Lasix) injection 20 mg, 20 mg, intravenous, Once, Charisse Weinbergbena, APRN-CNP    glucagon (Glucagen) injection 1 mg, 1 mg, intramuscular, q15 min PRN, Charisse Weinbergbena, APRN-CNP    glucagon (Glucagen) injection 1 mg, 1 mg, intramuscular, q15 min PRN, Charisse Weinbergbena, APRN-CNP    heparin flush 10 unit/mL syringe 50 Units, 5 mL, intravenous, PRN, Charisse Weinbergbena, APRN-CNP    HYDROmorphone (Dilaudid) injection 0.2 mg, 0.2 mg, intravenous, q3h PRN, Annabel Patel MD, 0.2 mg at 07/31/25 0618    insulin lispro injection 0-5 Units, 0-5 Units, subcutaneous, q6h, ASHLY Yin    lidocaine 4 % patch 2 patch, 2 patch, transdermal, Daily, Annabel Patel MD, 2 patch at 07/30/25 0901    loperamide (Imodium) capsule 2 mg, 2 mg, oral, TID AC, ASHLY Yin    naloxone (Narcan) injection 0.2 mg, 0.2 mg, intravenous, PRN, ASHLY Yin    ondansetron (Zofran)  injection 4 mg, 4 mg, intravenous, q8h PRN, ASHLY Yin    oxyCODONE (Roxicodone) solution 10 mg, 10 mg, oral, q4h PRN, Annabel Patel MD, 10 mg at 07/29/25 0648    oxyCODONE (Roxicodone) solution 5 mg, 5 mg, oral, q4h PRN, Annabel Patel MD, 5 mg at 07/31/25 0416    pantoprazole (Protonix) injection 40 mg, 40 mg, intravenous, Daily, ASHLY Yin, 40 mg at 07/30/25 0901    PHENobarbital (Luminal) injection 65 mg, 65 mg, intravenous, q6h PRN, ASHLY Yin, 65 mg at 07/26/25 2054    piperacillin-tazobactam (Zosyn) 3.375 g in dextrose (iso) IV 50 mL, 3.375 g, intravenous, q6h, ASHLY Yin, Stopped at 07/31/25 0647    traZODone (Desyrel) tablet 100 mg, 100 mg, oral, Nightly, Frederick Abel MD, 100 mg at 07/30/25 2035

## 2025-07-31 NOTE — CARE PLAN
Problem: Pain - Adult  Goal: Verbalizes/displays adequate comfort level or baseline comfort level  Outcome: Progressing  Flowsheets (Taken 7/31/2025 0605)  Verbalizes/displays adequate comfort level or baseline comfort level:   Encourage patient to monitor pain and request assistance   Assess pain using appropriate pain scale   Administer analgesics based on type and severity of pain and evaluate response   Implement non-pharmacological measures as appropriate and evaluate response     Problem: Skin  Goal: Decreased wound size/increased tissue granulation at next dressing change  Outcome: Progressing  Flowsheets (Taken 7/31/2025 0605)  Decreased wound size/increased tissue granulation at next dressing change: Promote sleep for wound healing  Goal: Participates in plan/prevention/treatment measures  Outcome: Progressing  Flowsheets (Taken 7/30/2025 1850 by Marguerite Burrows RN)  Participates in plan/prevention/treatment measures:   Elevate heels   Increase activity/out of bed for meals  Goal: Promote/optimize nutrition  Outcome: Progressing  Flowsheets (Taken 7/31/2025 0605)  Promote/optimize nutrition: Monitor/record intake including meals  Goal: Promote skin healing  Outcome: Progressing  Flowsheets (Taken 7/31/2025 0605)  Promote skin healing:   Rotate device position/do not position patient on device   Assess skin/pad under line(s)/device(s)     Problem: Safety - Adult  Goal: Free from fall injury  Outcome: Met  Flowsheets (Taken 7/31/2025 0605)  Free from fall injury: Instruct family/caregiver on patient safety   The patient's goals for the shift include      The clinical goals for the shift include will have adequate pain control with acceptable level of 3/10 or less within 1 hour pain interventions.    PRN Oxycodone and Dilaudid administered overnight for moderate-severe incisional pain. No other patient or nursing concerns.

## 2025-07-31 NOTE — PROGRESS NOTES
General Surgery Progress Note    07/31/25    Bereket Em    Summary:  Bereket Em is a 59 year old male with a history of perforated diverticulitis s/p Margarita's procedure c/b formation of ECF. Underwent EC fistula takedown, GILLES, Margarita's reversal, abdominal wall debridement, and double barrel jejunostomy with Leyda Olson, Renato, and Vicky on 7/22.  Intra-op findings remarkable for adhesions throughout the abdomen and remaining segment of the colon terminating at the splenic flexure requiring significant mobilization and an ileal window.  Postoperative course complicated by colon ischemia requiring OR take back for partial colectomy, end colostomy creation (RUQ), and conversion of double barrel jejunostomy to end jejunostomy in LUQ on 7/25.     Subjective    Subjective:  Reports continued feeling of shortness of breath, improved yesterday after IV lasix but returned overnight. Pain is controlled, was OOB/ambulating yesterday. Reports minimal PO intake, denies n/v.         Objective    Objective:      Vital signs:   Temp:  [36.3 °C (97.3 °F)-36.8 °C (98.2 °F)] 36.3 °C (97.3 °F)  Heart Rate:  [58-76] 76  Resp:  [18] 18  BP: (104-116)/(54-70) 107/65    Physical Exam:  GEN: resting comfortably, no acute distress   NEURO: awake, alert, oriented x4   HEENT: Sclera anicteric. Moist mucous membranes.   RESP: slight increased work of breathing, on RA, on cont pulse ox   CV: sinus bradycardia on telemetry   GI: Abdomen soft, appropriately tender to palpation. Midline incision with dressing D/I, ostomies pink/perfused; dark, liquid stool output from LUQ ostomy. RUQ ostomy with scant output. ANA MARIA drain with serosanguinous output.   SKIN: laparotomy closed loosely with staples.  EXT: no peripheral edema     I/O last 2 completed shifts:  In: 2828 (34.3 mL/kg) [P.O.:460; IV Piggyback:250]  Out: 4130 (50.1 mL/kg) [Urine:1935 (1 mL/kg/hr); Drains:95; Stool:2100]  Weight: 82.5 kg      Labs Past 18 Hours:  Recent  Results (from the past 18 hours)   POCT GLUCOSE    Collection Time: 07/30/25  4:13 PM   Result Value Ref Range    POCT Glucose 95 74 - 99 mg/dL   POCT GLUCOSE    Collection Time: 07/30/25  5:54 PM   Result Value Ref Range    POCT Glucose 90 74 - 99 mg/dL   POCT GLUCOSE    Collection Time: 07/31/25 12:21 AM   Result Value Ref Range    POCT Glucose 133 (H) 74 - 99 mg/dL   CBC    Collection Time: 07/31/25  5:26 AM   Result Value Ref Range    WBC 21.3 (H) 4.4 - 11.3 x10*3/uL    nRBC 0.1 (H) 0.0 - 0.0 /100 WBCs    RBC 3.11 (L) 4.50 - 5.90 x10*6/uL    Hemoglobin 8.2 (L) 13.5 - 17.5 g/dL    Hematocrit 24.1 (L) 41.0 - 52.0 %    MCV 78 (L) 80 - 100 fL    MCH 26.4 26.0 - 34.0 pg    MCHC 34.0 32.0 - 36.0 g/dL    RDW 15.3 (H) 11.5 - 14.5 %    Platelets 281 150 - 450 x10*3/uL   Renal Function Panel    Collection Time: 07/31/25  5:26 AM   Result Value Ref Range    Glucose 104 (H) 74 - 99 mg/dL    Sodium 134 (L) 136 - 145 mmol/L    Potassium 4.2 3.5 - 5.3 mmol/L    Chloride 99 98 - 107 mmol/L    Bicarbonate 30 21 - 32 mmol/L    Anion Gap 9 (L) 10 - 20 mmol/L    Urea Nitrogen 22 6 - 23 mg/dL    Creatinine 0.58 0.50 - 1.30 mg/dL    eGFR >90 >60 mL/min/1.73m*2    Calcium 8.0 (L) 8.6 - 10.6 mg/dL    Phosphorus 3.3 2.5 - 4.9 mg/dL    Albumin 2.5 (L) 3.4 - 5.0 g/dL   Magnesium    Collection Time: 07/31/25  5:26 AM   Result Value Ref Range    Magnesium 1.98 1.60 - 2.40 mg/dL   POCT GLUCOSE    Collection Time: 07/31/25  6:20 AM   Result Value Ref Range    POCT Glucose 131 (H) 74 - 99 mg/dL   POCT GLUCOSE    Collection Time: 07/31/25  7:37 AM   Result Value Ref Range    POCT Glucose 163 (H) 74 - 99 mg/dL        Meds:  Current Medications[1]     Imaging:  Imaging  XR chest 1 view  Result Date: 7/31/2025  Unchanged right-greater-than-left pleural effusions with bibasilar atelectasis.   I personally reviewed the images/study and I agree with the findings as stated by Clemente Funk MD.   MACRO: None     Dictation workstation:    [id number]    XR chest 1 view  Result Date: 7/30/2025  1. Stable small right and trace left pleural effusion with associated atelectasis/consolidation.   I personally reviewed the images/study and I agree with the findings as stated. This study was interpreted by radiology resident Iggy Hendrickson D.O. at [place], [place].   Signed by: Luis Cervantes 7/30/2025 3:23 PM Dictation workstation:   [id number]      Assessment/Plan    Assessment and Plan:  Bereket Em is a Primary - [place]   59M with PMH of perforated diverticulitis s/p Margarita's procedure c/b formation of ECF. Underwent EC fistula takedown, GILLES, Margarita's reversal, abdominal wall debridement, and double barrel jejunostomy with Renato Rodriguez, and Vicky on 7/22.  Intra-op findings remarkable for adhesions throughout the abdomen and remaining segment of the colon terminating at the splenic flexure requiring significant mobilization and an ileal window.  Postoperative course complicated by colon ischemia requiring exploratory laparotomy, partial colectomy, end colostomy creation (RUQ), and conversion of double barrel jejunostomy to end jejunostomy in LUQ on 7/25. Transferred from ICU to floor on 7/28 and progressing appropriately.      7/22: ECF takedown, extensive GILLES, Margarita's reversal, abdominal wall debridement, and double barrel jejunostomy with Renato Rodriguez, and Vicky.    7/25:  partial colectomy with end colostomy creation, conversion of double barrel jejunostomy to end jejunostomy     PLAN:   Neurology: post operative pain  - scheduled tylenol, lidocaine patch    - Liquid oxycodone 5/10mg with 0.2 IV Dilaudid PRN breakthrough   - continue home trazodone     Cardiovascular:   -Vital signs every 4 hours    Pulmonology: pulmonary edema, atelectasis   -IS x10 every hour   -Maintain SpO2 >92 % RA   - additional 20 IV Lasix today   - RT    GI: s/p ECF takedown, Margarita's reversal; end  colostomy, end jejunostomy   - continue soft diet   - nutrition consulted, continue TPN/lipids; PICC replaced 7/30   - Zofran PRN nausea   - continue PPI, Lomotil, add imodium before meals/nightly   - wound/ostomy nursing consulted, appreciate assistance     :  - Strict I&O   - Trend RFP and Magnesium, replace electrolytes as needed to maintain K >4, Mag >2.     ID: h/o E.coli bacteremia, resolved; increasing leukocytosis    - ID consulted/signed off. Continue 3.375g IV Zosyn until 7 days from last negative blood culture (8/2)   - 7/27 blood culture results- no growth   - trend WBC   - CT A/P with IV contrast today     Heme:  -Monitor for signs of acute blood loss  -Trend CBC    Endocrine:   - POCT glucose every 6 hours with TPN   - insulin lispro sliding scale     DVT Prophylaxis:  -Continue subcutaneous lovenox, SCDs, ambulation/OOB     Disposition:  - RNF  - Continue PT/OT, recommendation for low-intensity therapy.     Discussed with Dr Olson.     MINERVA Connors CNP  [facility]  [phone]           [1]   Current Facility-Administered Medications:     acetaminophen (Ofirmev) injection 1,000 mg, 1,000 mg, intravenous, q6h, ASHLY Yin, Stopped at 07/31/25 0646    Adult Clinimix TPN Cyclic, , intravenous, Cyclic PN, MINERVA Yin-CNP, Stopped at 07/30/25 2135    alteplase (Cathflo Activase) injection 2 mg, 2 mg, intra-catheter, PRN, ASHLY Yin    dextrose 50 % injection 12.5 g, 12.5 g, intravenous, q15 min PRN, MINERVA Yin-CNP    dextrose 50 % injection 25 g, 25 g, intravenous, q15 min PRN, MINERVA Yin-HARVINDER    diphenoxylate-atropine (Lomotil) 2.5-0.025 mg per tablet 1 tablet, 1 tablet, oral, 4x daily, Boris Ledezma MD, 1 tablet at 07/31/25 0618    enoxaparin (Lovenox) syringe 40 mg, 40 mg, subcutaneous, q24h, Charisse Castaneda, APRN-CNP, 40 mg at 07/30/25 0900    fat emulsion fish oil/plant based (SMOFlipid) 20 % IV infusion 50 g,  250 mL, intravenous, Daily Lipids, ASHLY Yin, Last Rate: 20.8 mL/hr at 07/30/25 2035, 50 g at 07/30/25 2035    furosemide (Lasix) injection 20 mg, 20 mg, intravenous, Once, ASHLY Yin    glucagon (Glucagen) injection 1 mg, 1 mg, intramuscular, q15 min PRN, MINERVA Yin-CNP    glucagon (Glucagen) injection 1 mg, 1 mg, intramuscular, q15 min PRN, MINERVA Yin-CNP    heparin flush 10 unit/mL syringe 50 Units, 5 mL, intravenous, PRN, ASHLY Yin    HYDROmorphone (Dilaudid) injection 0.2 mg, 0.2 mg, intravenous, q3h PRN, Annabel Patel MD, 0.2 mg at 07/31/25 0618    insulin lispro injection 0-5 Units, 0-5 Units, subcutaneous, q6h, ASHLY Yin    lidocaine 4 % patch 2 patch, 2 patch, transdermal, Daily, Annabel Patel MD, 2 patch at 07/30/25 0901    loperamide (Imodium) capsule 2 mg, 2 mg, oral, Before meals & nightly, ASHLY Yin    naloxone (Narcan) injection 0.2 mg, 0.2 mg, intravenous, PRN, MINERVA Yin-CNP    ondansetron (Zofran) injection 4 mg, 4 mg, intravenous, q8h PRN, ASHLY Yin    oxyCODONE (Roxicodone) solution 10 mg, 10 mg, oral, q4h PRN, Annabel Patel MD, 10 mg at 07/29/25 0648    oxyCODONE (Roxicodone) solution 5 mg, 5 mg, oral, q4h PRN, Annabel Patel MD, 5 mg at 07/31/25 0416    pantoprazole (Protonix) injection 40 mg, 40 mg, intravenous, Daily, ASHLY Yin, 40 mg at 07/30/25 0901    PHENobarbital (Luminal) injection 65 mg, 65 mg, intravenous, q6h PRN, ASHLY Yin, 65 mg at 07/26/25 2054    piperacillin-tazobactam (Zosyn) 3.375 g in dextrose (iso) IV 50 mL, 3.375 g, intravenous, q6h, ASHLY Yin, Stopped at 07/31/25 0647    traZODone (Desyrel) tablet 100 mg, 100 mg, oral, Nightly, Frederick Abel MD, 100 mg at 07/30/25 2035

## 2025-07-31 NOTE — PROGRESS NOTES
I saw and examined the patient.       60 yo male s/p fistula takedown, extensive lysis of adhesions, colostomy reversal, double barrel jejunostomy formation, abdominal wall reconstruction with return to the OR for washout, resection of distal colon, new end colostomy and repositioning of jejnostomy.   Tolerating more PO. Increased ostomy output but reports thicker than before.   Feels improvement in breathing with Lasix.   Pain is controlled. Working with PT.   WBCs 16 to 15.   On exam, NAD.  Midline dressing inplace.  No erythema or drainage. Ostomies are pink and viable; bilious output in jejunostomy bag.   ABX given E. Coli bacteremia per ID.  TPN for nutrition.  Diet as tolerated with consideration of 120 cm of bowel from LOT to ostomy.  Local wound care and ostomy care. Lovenox. PT. Antimotility agents for output < 1L.

## 2025-07-31 NOTE — PROGRESS NOTES
Physical Therapy    Physical Therapy Treatment    Patient Name: Bereket Em  MRN: [id number]  Department: [place] [address]  Room: [facility]  Today's Date: 7/31/2025  Time Calculation  Start Time: 1449  Stop Time: 1523  Time Calculation (min): 34 min         Assessment/Plan   PT Assessment  PT Assessment Results: Decreased strength, Decreased endurance, Impaired balance, Decreased mobility, Pain  Rehab Prognosis: Good  Barriers to Discharge Home: No anticipated barriers  Evaluation/Treatment Tolerance: Patient tolerated treatment well  Medical Staff Made Aware: Yes  End of Session Communication: Bedside nurse  Assessment Comment: Despite reservation, pt able to complete therapy session. Due to bloating after eating pt. wanting to return to bed. Pt. has been recommended for continued therapy post acute at a low  intensive level of therapy.  End of Session Patient Position: Bed, 3 rail up, Alarm off, not on at start of session  PT Plan  Inpatient/Swing Bed or Outpatient: Inpatient  PT Plan  Treatment/Interventions: Bed mobility, Transfer training, Gait training, Therapeutic activity, Therapeutic exercise  PT Plan: Ongoing PT  PT Frequency: 3 times per week  PT Discharge Recommendations: Low intensity level of continued care  PT Recommended Transfer Status: Assistive device, Assist x1  PT - OK to Discharge: Yes    PT Visit Info:  PT Received On: 07/31/25     General Visit Information:   General  Reason for Referral: This 58 y/o M presents s/p scheduled ventral hiatal hernia repair, EC Fistula take down, lysis of adhesions, Soliman's revarsal, abdominal wall debridement, and double barrel jejunostom 7/22. Post op course c/b colon ischemia requiring RTOR for partial colectomy, end colostomy creation (RUQ), and conversion of double barrel jejunostomy to end jejunostomy (LUQ) on 7/25.  Past Medical History Relevant to Rehab: Perforated diverticulitis s/p Margarita's procedure c/b formation of enterocutaneous fistula,  "anxiety  Family/Caregiver Present: No  Prior to Session Communication: Bedside nurse  Patient Position Received: Bed, 3 rail up, Alarm off, not on at start of session  General Comment: Pt. supine in bed upon arrival.  RN reports that pt. has been up and walking (earlier today) and doing well. Pt. initially appeared willing however after RN left, pt. voicing frustration that he just ate and  that he just had surgery stating \"i dont know why you are  here\" Discussed Physical therapy role. Pt. willing to try-    Subjective   Precautions:  Precautions  Hearing/Visual Limitations: WFL  Medical Precautions: Abdominal precautions, Fall precautions  Post-Surgical Precautions: Abdominal surgery precautions     Date/Time Vitals Session Patient Position Pulse Resp SpO2 BP MAP (mmHg)    07/31/25 1449 --  --  78  --  96 %  --  --      Vital Signs Comment: Sp02 and HR WNL thru out session.     Objective   Pain:  Pain Assessment  Pain Assessment: 0-10  0-10 (Numeric) Pain Score: 7 (7 prior to session and pt states that it remains the same when asked at end of session.)  Cognition:  Cognition  Overall Cognitive Status: Within Functional Limits  Orientation Level: Oriented X4  Coordination:     Postural Control:  Postural Control  Postural Control: Within Functional Limits  Static Sitting Balance  Static Sitting-Balance Support: Feet supported  Static Sitting-Level of Assistance: Close supervision  Static Sitting-Comment/Number of Minutes: approx. 5 min  Static Standing Balance  Static Standing-Balance Support: Bilateral upper extremity supported (ww)  Static Standing-Level of Assistance: Close supervision  Extremity/Trunk Assessments:                      Activity Tolerance:  Activity Tolerance  Endurance: Tolerates 10 - 20 min exercise with multiple rests  Treatments:  Therapeutic Exercise  Therapeutic Exercise Performed: Yes  Therapeutic Exercise Activity 1: Supine bilat le ex AP'S, QS, SAQ'S, HS, AND ABD X 15 REPS.         Bed " Mobility  Bed Mobility: Yes  Bed Mobility 1  Bed Mobility 1:  (Due to pt.'s frustration and discomfort did not push for flat bed to try log roll however pt. performed modified log roll with HOB raised and increased reliance on rails)    Ambulation/Gait Training  Ambulation/Gait Training Performed: Yes  Ambulation/Gait Training 1  Surface 1:  (Pt. amb. 185'  using a wh. walker and close sba- Pt. requires reminders to relax shoulders when possible.)  Transfers  Transfer: Yes  Transfer 1  Transfer From 1: Sit to, Stand to  Transfer to 1: Sit, Stand  Transfer Level of Assistance 1: Close supervision  Trials/Comments 1: Reminders on hand placement    Outcome Measures:  [facility] Basic Mobility  Turning from your back to your side while in a flat bed without using bedrails: A little  Moving from lying on your back to sitting on the side of a flat bed without using bedrails: A little  Moving to and from bed to chair (including a wheelchair): A little  Standing up from a chair using your arms (e.g. wheelchair or bedside chair): A little  To walk in hospital room: A little  Climbing 3-5 steps with railing: A lot  Basic Mobility - Total Score: 17    Education Documentation  Mobility Training, taught by Vickie Grimes PTA at 7/31/2025  3:40 PM.  Learner: Patient  Readiness: Acceptance  Method: Explanation, Demonstration  Response: Needs Reinforcement  Comment: Cues for safety while using a wh. walker during amb.    Education Comments  No comments found.        OP EDUCATION:       Encounter Problems       Encounter Problems (Active)       Balance       Pt will demonstrated ability to score at least 24/28 on the Tinetti balance assessment tool to ensure safety upon D/C.  (Progressing)       Start:  07/28/25    Expected End:  08/11/25               Mobility       Pt will demonstrated ability to ambulate >/=600ft with proper form and no balance deficits for safe home going.   (Progressing)       Start:  07/28/25    Expected End:   08/11/25            Pt will demonstrate ability to ascend/descend at least 2 stairs with unilateral rail and no balance deficits for safe home going.  (Progressing)       Start:  07/28/25    Expected End:  08/11/25               PT Transfers       Pt will demonstrate ability to complete 5X STS in < 12 sec with good from and consistency between transfers for safe D/C.  (Progressing)       Start:  07/28/25    Expected End:  08/11/25

## 2025-08-01 DIAGNOSIS — Z78.9 ON TOTAL PARENTERAL NUTRITION (TPN): ICD-10-CM

## 2025-08-01 LAB
ALBUMIN SERPL BCP-MCNC: 2.5 G/DL (ref 3.4–5)
ANION GAP SERPL CALC-SCNC: 12 MMOL/L (ref 10–20)
BUN SERPL-MCNC: 22 MG/DL (ref 6–23)
CALCIUM SERPL-MCNC: 7.9 MG/DL (ref 8.6–10.6)
CHLORIDE SERPL-SCNC: 94 MMOL/L (ref 98–107)
CO2 SERPL-SCNC: 31 MMOL/L (ref 21–32)
CREAT SERPL-MCNC: 0.54 MG/DL (ref 0.5–1.3)
EGFRCR SERPLBLD CKD-EPI 2021: >90 ML/MIN/1.73M*2
ERYTHROCYTE [DISTWIDTH] IN BLOOD BY AUTOMATED COUNT: 17.2 % (ref 11.5–14.5)
GLUCOSE BLD MANUAL STRIP-MCNC: 110 MG/DL (ref 74–99)
GLUCOSE BLD MANUAL STRIP-MCNC: 110 MG/DL (ref 74–99)
GLUCOSE BLD MANUAL STRIP-MCNC: 127 MG/DL (ref 74–99)
GLUCOSE BLD MANUAL STRIP-MCNC: 156 MG/DL (ref 74–99)
GLUCOSE SERPL-MCNC: 146 MG/DL (ref 74–99)
HCT VFR BLD AUTO: 22 % (ref 41–52)
HGB BLD-MCNC: 7.1 G/DL (ref 13.5–17.5)
MAGNESIUM SERPL-MCNC: 2.03 MG/DL (ref 1.6–2.4)
MCH RBC QN AUTO: 28.3 PG (ref 26–34)
MCHC RBC AUTO-ENTMCNC: 32.3 G/DL (ref 32–36)
MCV RBC AUTO: 88 FL (ref 80–100)
NRBC BLD-RTO: 0 /100 WBCS (ref 0–0)
PHOSPHATE SERPL-MCNC: 2.9 MG/DL (ref 2.5–4.9)
PLATELET # BLD AUTO: 281 X10*3/UL (ref 150–450)
POTASSIUM SERPL-SCNC: 4.3 MMOL/L (ref 3.5–5.3)
RBC # BLD AUTO: 2.51 X10*6/UL (ref 4.5–5.9)
SODIUM SERPL-SCNC: 133 MMOL/L (ref 136–145)
WBC # BLD AUTO: 19.4 X10*3/UL (ref 4.4–11.3)

## 2025-08-01 PROCEDURE — 2580000001 HC RX 258 IV SOLUTIONS: Performed by: NURSE PRACTITIONER

## 2025-08-01 PROCEDURE — 80069 RENAL FUNCTION PANEL: CPT | Performed by: NURSE PRACTITIONER

## 2025-08-01 PROCEDURE — 2500000001 HC RX 250 WO HCPCS SELF ADMINISTERED DRUGS (ALT 637 FOR MEDICARE OP)

## 2025-08-01 PROCEDURE — 2500000001 HC RX 250 WO HCPCS SELF ADMINISTERED DRUGS (ALT 637 FOR MEDICARE OP): Performed by: NURSE PRACTITIONER

## 2025-08-01 PROCEDURE — 2500000004 HC RX 250 GENERAL PHARMACY W/ HCPCS (ALT 636 FOR OP/ED): Performed by: NURSE PRACTITIONER

## 2025-08-01 PROCEDURE — 1100000001 HC PRIVATE ROOM DAILY

## 2025-08-01 PROCEDURE — 2500000005 HC RX 250 GENERAL PHARMACY W/O HCPCS

## 2025-08-01 PROCEDURE — 85027 COMPLETE CBC AUTOMATED: CPT | Performed by: NURSE PRACTITIONER

## 2025-08-01 PROCEDURE — 2500000005 HC RX 250 GENERAL PHARMACY W/O HCPCS: Performed by: NURSE PRACTITIONER

## 2025-08-01 PROCEDURE — 83735 ASSAY OF MAGNESIUM: CPT | Performed by: NURSE PRACTITIONER

## 2025-08-01 PROCEDURE — 82947 ASSAY GLUCOSE BLOOD QUANT: CPT

## 2025-08-01 PROCEDURE — 2500000002 HC RX 250 W HCPCS SELF ADMINISTERED DRUGS (ALT 637 FOR MEDICARE OP, ALT 636 FOR OP/ED)

## 2025-08-01 RX ORDER — SODIUM CHLORIDE, SODIUM LACTATE, POTASSIUM CHLORIDE, CALCIUM CHLORIDE 600; 310; 30; 20 MG/100ML; MG/100ML; MG/100ML; MG/100ML
40 INJECTION, SOLUTION INTRAVENOUS CONTINUOUS
Status: DISCONTINUED | OUTPATIENT
Start: 2025-08-01 | End: 2025-08-01

## 2025-08-01 RX ORDER — LOPERAMIDE HYDROCHLORIDE 2 MG/1
4 CAPSULE ORAL
Status: DISCONTINUED | OUTPATIENT
Start: 2025-08-01 | End: 2025-08-02

## 2025-08-01 RX ADMIN — ACETAMINOPHEN 650 MG: 160 SOLUTION ORAL at 05:37

## 2025-08-01 RX ADMIN — ACETAMINOPHEN 650 MG: 160 SOLUTION ORAL at 12:08

## 2025-08-01 RX ADMIN — OXYCODONE HYDROCHLORIDE 5 MG: 5 SOLUTION ORAL at 00:20

## 2025-08-01 RX ADMIN — PIPERACILLIN SODIUM AND TAZOBACTAM SODIUM 3.38 G: 3; .375 INJECTION, SOLUTION INTRAVENOUS at 17:56

## 2025-08-01 RX ADMIN — PANTOPRAZOLE SODIUM 40 MG: 40 INJECTION, POWDER, LYOPHILIZED, FOR SOLUTION INTRAVENOUS at 08:16

## 2025-08-01 RX ADMIN — ACETAMINOPHEN 650 MG: 160 SOLUTION ORAL at 17:56

## 2025-08-01 RX ADMIN — DIPHENOXYLATE HYDROCHLORIDE AND ATROPINE SULFATE 1 TABLET: .025; 2.5 TABLET ORAL at 17:56

## 2025-08-01 RX ADMIN — LOPERAMIDE HYDROCHLORIDE 4 MG: 2 CAPSULE ORAL at 20:49

## 2025-08-01 RX ADMIN — LIDOCAINE 4% 2 PATCH: 40 PATCH TOPICAL at 08:16

## 2025-08-01 RX ADMIN — SMOFLIPID 50 G: 6; 6; 5; 3 INJECTION, EMULSION INTRAVENOUS at 20:42

## 2025-08-01 RX ADMIN — OXYCODONE HYDROCHLORIDE 5 MG: 5 SOLUTION ORAL at 16:45

## 2025-08-01 RX ADMIN — DIPHENOXYLATE HYDROCHLORIDE AND ATROPINE SULFATE 1 TABLET: .025; 2.5 TABLET ORAL at 20:49

## 2025-08-01 RX ADMIN — DIPHENOXYLATE HYDROCHLORIDE AND ATROPINE SULFATE 1 TABLET: .025; 2.5 TABLET ORAL at 05:37

## 2025-08-01 RX ADMIN — PIPERACILLIN SODIUM AND TAZOBACTAM SODIUM 3.38 G: 3; .375 INJECTION, SOLUTION INTRAVENOUS at 05:37

## 2025-08-01 RX ADMIN — LOPERAMIDE HYDROCHLORIDE 2 MG: 2 CAPSULE ORAL at 05:37

## 2025-08-01 RX ADMIN — LOPERAMIDE HYDROCHLORIDE 4 MG: 2 CAPSULE ORAL at 12:08

## 2025-08-01 RX ADMIN — OXYCODONE HYDROCHLORIDE 5 MG: 5 SOLUTION ORAL at 20:49

## 2025-08-01 RX ADMIN — TRAZODONE HYDROCHLORIDE 100 MG: 100 TABLET ORAL at 20:49

## 2025-08-01 RX ADMIN — DIPHENOXYLATE HYDROCHLORIDE AND ATROPINE SULFATE 1 TABLET: .025; 2.5 TABLET ORAL at 13:09

## 2025-08-01 RX ADMIN — OXYCODONE HYDROCHLORIDE 5 MG: 5 SOLUTION ORAL at 04:45

## 2025-08-01 RX ADMIN — LOPERAMIDE HYDROCHLORIDE 4 MG: 2 CAPSULE ORAL at 16:45

## 2025-08-01 RX ADMIN — PIPERACILLIN SODIUM AND TAZOBACTAM SODIUM 3.38 G: 3; .375 INJECTION, SOLUTION INTRAVENOUS at 12:19

## 2025-08-01 RX ADMIN — ASCORBIC ACID, VITAMIN A PALMITATE, CHOLECALCIFEROL, THIAMINE HYDROCHLORIDE, RIBOFLAVIN-5 PHOSPHATE SODIUM, PYRIDOXINE HYDROCHLORIDE, NIACINAMIDE, DEXPANTHENOL, ALPHA-TOCOPHEROL ACETATE, VITAMIN K1, FOLIC ACID, BIOTIN, CYANOCOBALAMIN: 200; 3300; 200; 6; 3.6; 6; 40; 15; 10; 150; 600; 60; 5 INJECTION, SOLUTION INTRAVENOUS at 20:42

## 2025-08-01 ASSESSMENT — COGNITIVE AND FUNCTIONAL STATUS - GENERAL
WALKING IN HOSPITAL ROOM: A LITTLE
DRESSING REGULAR UPPER BODY CLOTHING: A LITTLE
DRESSING REGULAR LOWER BODY CLOTHING: A LITTLE
TOILETING: A LITTLE
MOVING FROM LYING ON BACK TO SITTING ON SIDE OF FLAT BED WITH BEDRAILS: A LITTLE
DAILY ACTIVITIY SCORE: 20
STANDING UP FROM CHAIR USING ARMS: A LITTLE
HELP NEEDED FOR BATHING: A LITTLE
CLIMB 3 TO 5 STEPS WITH RAILING: A LITTLE
MOBILITY SCORE: 20

## 2025-08-01 ASSESSMENT — PAIN SCALES - GENERAL
PAINLEVEL_OUTOF10: 5 - MODERATE PAIN
PAINLEVEL_OUTOF10: 4
PAINLEVEL_OUTOF10: 6
PAINLEVEL_OUTOF10: 2
PAINLEVEL_OUTOF10: 5 - MODERATE PAIN
PAINLEVEL_OUTOF10: 7
PAINLEVEL_OUTOF10: 6
PAINLEVEL_OUTOF10: 5 - MODERATE PAIN
PAINLEVEL_OUTOF10: 6

## 2025-08-01 ASSESSMENT — PAIN - FUNCTIONAL ASSESSMENT
PAIN_FUNCTIONAL_ASSESSMENT: 0-10

## 2025-08-01 ASSESSMENT — PAIN DESCRIPTION - DESCRIPTORS
DESCRIPTORS: ACHING
DESCRIPTORS: ACHING

## 2025-08-01 ASSESSMENT — PAIN DESCRIPTION - LOCATION: LOCATION: ABDOMEN

## 2025-08-01 NOTE — PROGRESS NOTES
I saw and examined the patient.      Late entry for 07/31/25     60 yo male s/p fistula takedown, extensive lysis of adhesions, colostomy reversal, double barrel jejunostomy formation, abdominal wall reconstruction with return to the OR for washout, resection of distal colon, new end colostomy and repositioning of jejnostomy.   Tolerating PO. Higher ostomy output.   Feels as though Lasix helps breathing.  Pain is controlled. Working with PT.   WBCs 15 to 21.   On exam, NAD.  Midline dressing inplace.  Staples in place. No erythema or drainage. Ostomies are pink and viable; bilious output in jejunostomy bag.   ABX given E. Coli bacteremia per ID.  TPN for nutrition.  Diet as tolerated with consideration of 120 cm of bowel from LOT to ostomy.  Local wound care and ostomy care. Lovenox. PT. Antimotility agents for output < 1L. For CT given increase in WBCs. Care discussed with Dr. Olson.

## 2025-08-01 NOTE — PROGRESS NOTES
General Surgery Progress Note    08/01/25    Bereket Em    Summary:  Bereket Em is a 59 year old male with a history of perforated diverticulitis s/p Margarita's procedure c/b formation of ECF. Underwent EC fistula takedown, GILLES, Margarita's reversal, abdominal wall debridement, and double barrel jejunostomy with Leyda Olson, Renato, and Vicky on 7/22. Intra-op findings remarkable for adhesions throughout the abdomen and remaining segment of the colon terminating at the splenic flexure requiring significant mobilization and an ileal window. Postoperative course complicated by colon ischemia requiring OR take back for partial colectomy, end colostomy creation (RUQ), and conversion of double barrel jejunostomy to end jejunostomy in LUQ on 7/25.     Subjective    Subjective:  Reports continued feeling of shortness of breath, improved yesterday after IV lasix but returned overnight. Pain is controlled, was OOB/ambulating yesterday. Reports minimal PO intake, denies n/v.       Objective    Objective:      Vital signs:   Temp:  [36 °C (96.8 °F)-36.6 °C (97.9 °F)] 36.5 °C (97.7 °F)  Heart Rate:  [67-84] 68  Resp:  [16-18] 17  BP: ()/(55-69) 102/62    Physical Exam:  GEN: resting comfortably, no acute distress   NEURO: awake, alert, oriented x4   HEENT: Sclera anicteric. Moist mucous membranes.   RESP: slight increased work of breathing, on RA, on cont pulse ox   CV: sinus bradycardia on telemetry   GI: Abdomen soft, appropriately tender to palpation. Midline incision with dressing D/I, ostomies pink/perfused; dark, liquid stool output from LUQ ostomy. RUQ ostomy with scant output. ANA MARIA drain with serosanguinous output.   SKIN: laparotomy closed loosely with staples.  EXT: no peripheral edema     I/O last 2 completed shifts:  In: 480 (5.9 mL/kg) [P.O.:480]  Out: 4180 (51.8 mL/kg) [Urine:2525 (1.3 mL/kg/hr); Drains:55; Stool:1600]  Weight: 80.7 kg      Labs Past 18 Hours:  Recent Results (from the past 18  hours)   POCT GLUCOSE    Collection Time: 07/31/25  4:11 PM   Result Value Ref Range    POCT Glucose 125 (H) 74 - 99 mg/dL   POCT GLUCOSE    Collection Time: 07/31/25  7:40 PM   Result Value Ref Range    POCT Glucose 120 (H) 74 - 99 mg/dL   POCT GLUCOSE    Collection Time: 07/31/25 11:39 PM   Result Value Ref Range    POCT Glucose 133 (H) 74 - 99 mg/dL   CBC    Collection Time: 08/01/25  4:42 AM   Result Value Ref Range    WBC 19.4 (H) 4.4 - 11.3 x10*3/uL    nRBC 0.0 0.0 - 0.0 /100 WBCs    RBC 2.51 (L) 4.50 - 5.90 x10*6/uL    Hemoglobin 7.1 (L) 13.5 - 17.5 g/dL    Hematocrit 22.0 (L) 41.0 - 52.0 %    MCV 88 80 - 100 fL    MCH 28.3 26.0 - 34.0 pg    MCHC 32.3 32.0 - 36.0 g/dL    RDW 17.2 (H) 11.5 - 14.5 %    Platelets 281 150 - 450 x10*3/uL   Magnesium    Collection Time: 08/01/25  6:17 AM   Result Value Ref Range    Magnesium 2.03 1.60 - 2.40 mg/dL   Renal Function Panel    Collection Time: 08/01/25  6:17 AM   Result Value Ref Range    Glucose 146 (H) 74 - 99 mg/dL    Sodium 133 (L) 136 - 145 mmol/L    Potassium 4.3 3.5 - 5.3 mmol/L    Chloride 94 (L) 98 - 107 mmol/L    Bicarbonate 31 21 - 32 mmol/L    Anion Gap 12 10 - 20 mmol/L    Urea Nitrogen 22 6 - 23 mg/dL    Creatinine 0.54 0.50 - 1.30 mg/dL    eGFR >90 >60 mL/min/1.73m*2    Calcium 7.9 (L) 8.6 - 10.6 mg/dL    Phosphorus 2.9 2.5 - 4.9 mg/dL    Albumin 2.5 (L) 3.4 - 5.0 g/dL   POCT GLUCOSE    Collection Time: 08/01/25  9:01 AM   Result Value Ref Range    POCT Glucose 110 (H) 74 - 99 mg/dL        Meds:  Current Medications[1]     Imaging:  Imaging  CT abdomen pelvis w IV contrast  Result Date: 7/31/2025  1. Postoperative changes consistent with patient history. There are areas of free fluid or partially loculated appearing fluid, without a discrete discernible wall to suggest abscess, the largest of which is in the left lower quadrant measuring up to 5.2 cm. A focus of free air in the right upper quadrant is likely postsurgical. 2. Increasing patchy areas of  ground-glass in the anterior upper lobes of the included lung bases is suspicious for an infectious or inflammatory etiology. There also small pleural effusions with adjacent atelectasis.   MACRO: I personally reviewed the images/study and I agree with the findings as stated by Levi Lara DO. This study was interpreted at [facility], [place], [place].   Signed by: Jonathon Malcolm 7/31/2025 4:30 PM Dictation workstation:   [id number]    XR chest 1 view  Result Date: 7/31/2025  Unchanged right-greater-than-left pleural effusions with bibasilar atelectasis.   I personally reviewed the images/study and I agree with the findings as stated by Clemente Funk MD.   MACRO: None   Signed by: Luis Cervantes 7/31/2025 1:07 PM Dictation workstation:   [id number]    XR chest 1 view  Result Date: 7/30/2025  1. Stable small right and trace left pleural effusion with associated atelectasis/consolidation.   I personally reviewed the images/study and I agree with the findings as stated. This study was interpreted by radiology resident Iggy Hendrickson D.O. at [facility], [place], [place].   Signed by: Luis Cervantes 7/30/2025 3:23 PM Dictation workstation:   [id number]        Assessment/Plan    Assessment and Plan:  Bereket Em is a Primary - [place]   59M with PMH of perforated diverticulitis s/p Margarita's procedure c/b formation of ECF. Underwent EC fistula takedown, GILLES, Margarita's reversal, abdominal wall debridement, and double barrel jejunostomy with Leyda Olson, Renato, and Vicky on 7/22.  Intra-op findings remarkable for adhesions throughout the abdomen and remaining segment of the colon terminating at the splenic flexure requiring significant mobilization and an ileal window.  Postoperative course complicated by colon ischemia requiring exploratory laparotomy, partial colectomy, end colostomy creation (RUQ), and conversion of double barrel jejunostomy to end  jejunostomy in LUQ on 7/25. Transferred from ICU to floor on 7/28 and progressing appropriately.      7/22: ECF takedown, extensive GILLES, Margarita's reversal, abdominal wall debridement, and double barrel jejunostomy with Leyda Olson, Renato, and Vicky.    7/25:  partial colectomy with end colostomy creation, conversion of double barrel jejunostomy to end jejunostomy    Patient stable, with improvement of anasarca after a 3 day course of lasix. Net -3700 today, with 2.5L of UOP. Lower output of the ileostomy compared to yesterday. BP within the lower normal limit. CT A/P with IV contrast suggestive of LLQ 5 cm collection.       PLAN:   Neurology: post operative pain  - scheduled tylenol, lidocaine patch    - Liquid oxycodone 5/10mg with 0.2 IV Dilaudid PRN breakthrough   - continue home trazodone      Cardiovascular:   -Vital signs every 4 hours     Pulmonology: pulmonary edema, atelectasis   -IS x10 every hour   -Maintain SpO2 >92 % RA   - Hold IV Lasix today due to BP and UOP  - RT     GI: s/p ECF takedown, Margarita's reversal; end colostomy, end jejunostomy   - Consulted IR for aspiration of fluid collection today  - NPO before IR procedure. After that, continue soft diet   - nutrition consulted, continue TPN/lipids; PICC replaced 7/30   - Zofran PRN nausea   - continue PPI, Lomotil, add imodium before meals/nightly   - wound/ostomy nursing consulted, appreciate assistance      :  - Strict I&O   - Trend RFP and Magnesium, replace electrolytes as needed to maintain K >4, Mag >2.      ID: h/o E.coli bacteremia, resolved; increasing leukocytosis    - ID consulted/signed off. Continue 3.375g IV Zosyn until 7 days from last negative blood culture (8/2)   - 7/27 blood culture results- no growth   - trend WBC      Heme:  -Monitor for signs of acute blood loss  -Trend CBC     Endocrine:   - POCT glucose every 6 hours with TPN   - insulin lispro sliding scale      DVT Prophylaxis:  -Continue subcutaneous lovenox,  SCDs, ambulation/OOB     Hospital Day: 11     Dispo: Continue current level of care.     Discussed with Dr. Olson.     Boris Ledezma MD  PGY-1 General Surgery  [facility] [id number]         [1]   Current Facility-Administered Medications:     acetaminophen (Tylenol) oral liquid 650 mg, 650 mg, oral, q6h, Charisse Weinbergbena, APRN-CNP, 650 mg at 08/01/25 0537    Adult Clinimix TPN Cyclic, , intravenous, Cyclic PN, Charisse Weinbergbena, APRN-CNP, Stopped at 08/01/25 0824    alteplase (Cathflo Activase) injection 2 mg, 2 mg, intra-catheter, PRN, Charisse Weinbergbena, APRN-CNP    dextrose 50 % injection 12.5 g, 12.5 g, intravenous, q15 min PRN, Charisse Weinbergbena, APRN-CNP    dextrose 50 % injection 25 g, 25 g, intravenous, q15 min PRN, Charisse Weinbergbena, APRN-CNP    diphenoxylate-atropine (Lomotil) 2.5-0.025 mg per tablet 1 tablet, 1 tablet, oral, 4x daily, Boris Ledezma MD, 1 tablet at 08/01/25 0537    [Held by provider] enoxaparin (Lovenox) syringe 40 mg, 40 mg, subcutaneous, q24h, Charisse Weinbergbemarkell, APRN-CNP, 40 mg at 07/31/25 1027    fat emulsion fish oil/plant based (SMOFlipid) 20 % IV infusion 50 g, 250 mL, intravenous, Daily Lipids, Charisse Weinbergbena, APRN-CNP, Stopped at 08/01/25 0809    glucagon (Glucagen) injection 1 mg, 1 mg, intramuscular, q15 min PRN, Charisse Weinbergbena, APRN-CNP    glucagon (Glucagen) injection 1 mg, 1 mg, intramuscular, q15 min PRN, Charisse RESTREPO Kubena, APRN-CNP    heparin flush 10 unit/mL syringe 50 Units, 5 mL, intravenous, PRN, Charisse Weinbergbena, APRN-CNP    HYDROmorphone (Dilaudid) injection 0.2 mg, 0.2 mg, intravenous, q3h PRN, Annabel Patel MD, 0.2 mg at 07/31/25 0618    insulin lispro injection 0-5 Units, 0-5 Units, subcutaneous, q6h, ASHLY Yin    lactated Ringer's infusion, 40 mL/hr, intravenous, Continuous, ASHLY Yin    lidocaine 4 % patch 2 patch, 2 patch, transdermal, Daily, Annabel Patel MD, 2 patch at 08/01/25 0816     loperamide (Imodium) capsule 4 mg, 4 mg, oral, Before meals & nightly, ASHLY Yin    naloxone (Narcan) injection 0.2 mg, 0.2 mg, intravenous, PRN, MINERVA Yin-CNP    ondansetron (Zofran) injection 4 mg, 4 mg, intravenous, q8h PRN, ASHLY Yin    oxyCODONE (Roxicodone) solution 10 mg, 10 mg, oral, q4h PRN, Annabel Patel MD, 10 mg at 07/29/25 0648    oxyCODONE (Roxicodone) solution 5 mg, 5 mg, oral, q4h PRN, Annabel Patel MD, 5 mg at 08/01/25 0445    pantoprazole (Protonix) injection 40 mg, 40 mg, intravenous, Daily, ASHLY Yin, 40 mg at 08/01/25 0816    PHENobarbital (Luminal) injection 65 mg, 65 mg, intravenous, q6h PRN, MINERVA Yin-CNP, 65 mg at 07/26/25 2054    piperacillin-tazobactam (Zosyn) 3.375 g in dextrose (iso) IV 50 mL, 3.375 g, intravenous, q6h, ASHLY Yin, Stopped at 08/01/25 0607    traZODone (Desyrel) tablet 100 mg, 100 mg, oral, Nightly, Frederick Abel MD, 100 mg at 07/30/25 2035

## 2025-08-01 NOTE — PROGRESS NOTES
Colorectal Surgery Progress Note      08/01/25    Summary:  Bereket Em is a 59 y.o. 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 Olson & Vicky with Margarita's reversal/ transverse colon mobilization/ileal window by Dr. Gross on 7/22/25. Post-op course c/b bowel ischemia with afib RVR requiring ICU transfer & amio drip, s/p exlap, bowel resection at anastomosis, end colostomy creation (RUQ) & conversion of double barrel jejunostomy to end jejunostomy (LUQ) on 7/25/25.   Subjective    Subjective:  No acute events overnight. Patient seen and evaluated this AM during team rounds.  Patient denies nausea or vomiting. Patient endorses passing gas and stool via ostomy.   ambulating on his own. States pain is moderately controlled. He still has some abdominal discomfort. Still has some increased work of breathing, but feels it has improved.    Review of Systems:    A 12-point review of systems was performed, and was negative except as above.       Objective    Objective:  Vital signs:   Temp:  [36 °C (96.8 °F)-36.6 °C (97.9 °F)] 36.6 °C (97.9 °F)  Heart Rate:  [67-84] 84  Resp:  [16-18] 17  BP: ()/(55-69) 106/63    Physical Exam:  GEN:  Alert, awake and conversant, no acute distress  HEENT: Sclera anicteric. Moist mucous membranes.  RESP: Chest expansion symmetrical, some increased work of breathing, good oxygenation via pulse oximetry  CV: normal rate, normotensive per chart review  GI: Abdomen soft, appropriately tender,  midline incision with staples, well approximated, ileostomy well pouched on LUQ with watery stool and some gas  in pouch, de- functioned colostomy pouched on right. ANA MARIA drain serosanguinous  MSK: No gross deformities. Moves all extremities spontaneously. No pitting edema  NEURO: Alert and conversive, no focal deficits  PSYCH: Appropriate mood and affect.    I/O last 2 completed shifts:  In: 480 (5.9 mL/kg) [P.O.:480]  Out: 4180 (51.8 mL/kg) [Urine:2525 (1.3 mL/kg/hr); Drains:55; Stool:1600]  Weight: 80.7 kg      Labs Past 18 Hours:  Recent Results (from the past 18 hours)   POCT GLUCOSE    Collection Time: 07/31/25  4:11 PM   Result Value Ref Range    POCT Glucose 125 (H) 74 - 99 mg/dL   POCT GLUCOSE    Collection Time: 07/31/25  7:40 PM   Result Value Ref Range    POCT Glucose 120 (H) 74 - 99 mg/dL   POCT GLUCOSE    Collection Time: 07/31/25 11:39 PM   Result Value Ref Range    POCT Glucose 133 (H) 74 - 99 mg/dL   CBC    Collection Time: 08/01/25  4:42 AM   Result Value Ref Range    WBC 19.4 (H) 4.4 - 11.3 x10*3/uL    nRBC 0.0 0.0 - 0.0 /100 WBCs    RBC 2.51 (L) 4.50 - 5.90 x10*6/uL    Hemoglobin 7.1 (L) 13.5 - 17.5 g/dL    Hematocrit 22.0 (L) 41.0 - 52.0 %    MCV 88 80 - 100 fL    MCH 28.3 26.0 - 34.0 pg    MCHC 32.3 32.0 - 36.0 g/dL    RDW 17.2 (H) 11.5 - 14.5 %    Platelets 281 150 - 450 x10*3/uL   Magnesium    Collection Time: 08/01/25  6:17 AM   Result Value Ref Range    Magnesium 2.03 1.60 - 2.40 mg/dL   Renal Function Panel    Collection Time: 08/01/25  6:17 AM   Result Value Ref Range    Glucose 146 (H) 74 - 99 mg/dL    Sodium 133 (L) 136 - 145 mmol/L    Potassium 4.3 3.5 - 5.3 mmol/L    Chloride 94 (L) 98 - 107 mmol/L    Bicarbonate 31 21 - 32 mmol/L    Anion Gap 12 10 - 20 mmol/L    Urea Nitrogen 22 6 - 23 mg/dL    Creatinine 0.54 0.50 - 1.30 mg/dL    eGFR >90 >60 mL/min/1.73m*2    Calcium 7.9 (L) 8.6 - 10.6 mg/dL    Phosphorus 2.9 2.5 - 4.9 mg/dL    Albumin 2.5 (L) 3.4 - 5.0 g/dL      Meds:  Current Medications[1]   Imaging:  Imaging  CT abdomen pelvis w IV contrast  Result Date: 7/31/2025  1. Postoperative changes  consistent with patient history. There are areas of free fluid or partially loculated appearing fluid, without a discrete discernible wall to suggest abscess, the largest of which is in the left lower quadrant measuring up to 5.2 cm. A focus of free air in the right upper quadrant is likely postsurgical. 2. Increasing patchy areas of ground-glass in the anterior upper lobes of the included lung bases is suspicious for an infectious or inflammatory etiology. There also small pleural effusions with adjacent atelectasis.   MACRO: I personally reviewed the images/study and I agree with the findings as stated by Levi Lara DO. This study was interpreted at [facility], [place], [place].   Signed by: Jonathon Malcolm 7/31/2025 4:30 PM Dictation workstation:   [id number]    XR chest 1 view  Result Date: 7/31/2025  Unchanged right-greater-than-left pleural effusions with bibasilar atelectasis.   I personally reviewed the images/study and I agree with the findings as stated by Clemente Fnuk MD.   MACRO: None   Signed by: Luis Cervantes 7/31/2025 1:07 PM Dictation workstation:   [id number]    XR chest 1 view  Result Date: 7/30/2025  1. Stable small right and trace left pleural effusion with associated atelectasis/consolidation.   I personally reviewed the images/study and I agree with the findings as stated. This study was interpreted by radiology resident Iggy Hendrickson D.O. at [facility], [place], [place].   Signed by: Luis Cervantes 7/30/2025 3:23 PM Dictation workstation:   [id number]      Cardiology, Vascular, and Other Imaging  Bedside PICC Imaging  Result Date: 7/30/2025  These images are not reportable by radiology and will not be interpreted by  Radiologists.          Medications reviewed.  Vital signs reviewed.  Labs reviewed.         Assessment/Plan    Assessment and Plan:  Bereket Em is a Consult (Corwin Owens)-59M with a history of perforated  diverticulitis s/p Soliman's procedure C/B formation of enterocutaneous fistula now s/p enterocutaneous fistula takedown, Soliman's reversal with end jejunostomy creation, ventral wall hernia repair(>10 cm), and abdominal wall debridement with wound VAC placements on 7/22 c/b bowel ischemia and afib with RVR s/p exlap with ischemic bowel resection, colonic anast takedown and end colostomy formation on amio drip.    Plan Today:   - appreciate care per primary team  - IR today for possible drain in his fluid collection  - Continue to monitor ostomy output, have patient record output, keep running total  - Dehydration education for patient  - Recommend repletion of lytes to goal K=4.0, Mag+2.0  - If patient continues to have high output out of his ostomy, would recommend titrating his imodium up to 4mg, QID    Hospital Day: 11     Dispo: Colorectal surgery will continue to follow.     Discussed with Dr. Gross.         Erica Reyes MD  PGY-1 General Surgery  Colorectal Surgery [id number]           [1]   Current Facility-Administered Medications:     acetaminophen (Tylenol) oral liquid 650 mg, 650 mg, oral, q6h, Charisse Weinbergbena, APRN-CNP, 650 mg at 08/01/25 0537    Adult Clinimix TPN Cyclic, , intravenous, Cyclic PN, Charisse KAYE Kubena, APRN-CNP, Stopped at 08/01/25 0824    alteplase (Cathflo Activase) injection 2 mg, 2 mg, intra-catheter, PRN, Charisse L Kubena, APRN-CNP    dextrose 50 % injection 12.5 g, 12.5 g, intravenous, q15 min PRN, Charisse L Kubena, APRN-CNP    dextrose 50 % injection 25 g, 25 g, intravenous, q15 min PRN, Charisse L Kubena, APRN-CNP    diphenoxylate-atropine (Lomotil) 2.5-0.025 mg per tablet 1 tablet, 1 tablet, oral, 4x daily, Boris Ledezma MD, 1 tablet at 08/01/25 0537    [Held by provider] enoxaparin (Lovenox) syringe 40 mg, 40 mg, subcutaneous, q24h, Charisse Weinbergbemarkell, APRN-CNP, 40 mg at 07/31/25 1027    fat emulsion fish oil/plant based (SMOFlipid) 20 % IV infusion 50 g, 250  mL, intravenous, Daily Lipids, ASHLY Yin, Stopped at 08/01/25 0809    glucagon (Glucagen) injection 1 mg, 1 mg, intramuscular, q15 min PRN, MINERVA Yin-CNP    glucagon (Glucagen) injection 1 mg, 1 mg, intramuscular, q15 min PRN, MINERVA Yin-CNP    heparin flush 10 unit/mL syringe 50 Units, 5 mL, intravenous, PRN, MINERVA Yin-CNP    HYDROmorphone (Dilaudid) injection 0.2 mg, 0.2 mg, intravenous, q3h PRN, Annabel Patel MD, 0.2 mg at 07/31/25 0618    insulin lispro injection 0-5 Units, 0-5 Units, subcutaneous, q6h, ASHLY Yin    lactated Ringer's infusion, 40 mL/hr, intravenous, Continuous, ASHLY Yin    lidocaine 4 % patch 2 patch, 2 patch, transdermal, Daily, Annabel Patel MD, 2 patch at 08/01/25 0816    loperamide (Imodium) capsule 2 mg, 2 mg, oral, Before meals & nightly, ASHLY Yin, 2 mg at 08/01/25 0537    naloxone (Narcan) injection 0.2 mg, 0.2 mg, intravenous, PRN, ASHLY Yin    ondansetron (Zofran) injection 4 mg, 4 mg, intravenous, q8h PRN, ASHLY Yin    oxyCODONE (Roxicodone) solution 10 mg, 10 mg, oral, q4h PRN, Annabel Patel MD, 10 mg at 07/29/25 0648    oxyCODONE (Roxicodone) solution 5 mg, 5 mg, oral, q4h PRN, Annabel Patel MD, 5 mg at 08/01/25 0445    pantoprazole (Protonix) injection 40 mg, 40 mg, intravenous, Daily, ASHLY Yin, 40 mg at 08/01/25 0816    PHENobarbital (Luminal) injection 65 mg, 65 mg, intravenous, q6h PRN, ASHLY Yin, 65 mg at 07/26/25 2054    piperacillin-tazobactam (Zosyn) 3.375 g in dextrose (iso) IV 50 mL, 3.375 g, intravenous, q6h, ASHLY Yin, Stopped at 08/01/25 0607    traZODone (Desyrel) tablet 100 mg, 100 mg, oral, Nightly, Frederick Abel MD, 100 mg at 07/30/25 2035

## 2025-08-01 NOTE — PROGRESS NOTES
08/01/25 0802   Rapid Rounds   Attendance Provider;Nurse;Care Transitions   Expected Discharge Disposition Home H   Today we still await: Clinical stability   Review at Escalation Rounds No escalation needed     Plan per Medical/Surgical team:   Pt has wound, TPN (weaning), drains, and ostomy.   Waiting for PT/OT recs. Plan TBD.    7/29: Pt back to floor from ICU.   Pt on IV ATB and TPN.   8/1: Pt previously used [facility] for HCA and would like to continue at DC.   Pt's choice for [facility] is [facility].  Pt Needs: PT/RN for TPN.        Discharge disposition: [facility], E[facility]     ADOD:  8/5     This TCC will continue to follow for home going needs and safe DC plan.     RUSH ANDERSON

## 2025-08-02 ENCOUNTER — APPOINTMENT (OUTPATIENT)
Dept: RADIOLOGY | Facility: HOSPITAL | Age: 60
End: 2025-08-02
Payer: COMMERCIAL

## 2025-08-02 LAB
ALBUMIN SERPL BCP-MCNC: 2.5 G/DL (ref 3.4–5)
ALBUMIN SERPL BCP-MCNC: 2.5 G/DL (ref 3.4–5)
ALP SERPL-CCNC: 168 U/L (ref 33–136)
ALT SERPL W P-5'-P-CCNC: 27 U/L (ref 10–52)
ANION GAP SERPL CALC-SCNC: 14 MMOL/L (ref 10–20)
AST SERPL W P-5'-P-CCNC: 16 U/L (ref 9–39)
BILIRUB DIRECT SERPL-MCNC: 1.2 MG/DL (ref 0–0.3)
BILIRUB SERPL-MCNC: 2.1 MG/DL (ref 0–1.2)
BUN SERPL-MCNC: 22 MG/DL (ref 6–23)
CALCIUM SERPL-MCNC: 7.9 MG/DL (ref 8.6–10.6)
CHLORIDE SERPL-SCNC: 95 MMOL/L (ref 98–107)
CO2 SERPL-SCNC: 28 MMOL/L (ref 21–32)
CREAT SERPL-MCNC: 0.54 MG/DL (ref 0.5–1.3)
EGFRCR SERPLBLD CKD-EPI 2021: >90 ML/MIN/1.73M*2
ERYTHROCYTE [DISTWIDTH] IN BLOOD BY AUTOMATED COUNT: 16.4 % (ref 11.5–14.5)
GLUCOSE BLD MANUAL STRIP-MCNC: 118 MG/DL (ref 74–99)
GLUCOSE BLD MANUAL STRIP-MCNC: 122 MG/DL (ref 74–99)
GLUCOSE BLD MANUAL STRIP-MCNC: 149 MG/DL (ref 74–99)
GLUCOSE BLD MANUAL STRIP-MCNC: 150 MG/DL (ref 74–99)
GLUCOSE BLD MANUAL STRIP-MCNC: 163 MG/DL (ref 74–99)
GLUCOSE SERPL-MCNC: 112 MG/DL (ref 74–99)
HCT VFR BLD AUTO: 22.6 % (ref 41–52)
HGB BLD-MCNC: 7.3 G/DL (ref 13.5–17.5)
MAGNESIUM SERPL-MCNC: 2.07 MG/DL (ref 1.6–2.4)
MCH RBC QN AUTO: 26 PG (ref 26–34)
MCHC RBC AUTO-ENTMCNC: 32.3 G/DL (ref 32–36)
MCV RBC AUTO: 80 FL (ref 80–100)
NRBC BLD-RTO: 0 /100 WBCS (ref 0–0)
PHOSPHATE SERPL-MCNC: 3.4 MG/DL (ref 2.5–4.9)
PLATELET # BLD AUTO: 385 X10*3/UL (ref 150–450)
POTASSIUM SERPL-SCNC: 4.6 MMOL/L (ref 3.5–5.3)
PROT SERPL-MCNC: 5 G/DL (ref 6.4–8.2)
RBC # BLD AUTO: 2.81 X10*6/UL (ref 4.5–5.9)
SODIUM SERPL-SCNC: 132 MMOL/L (ref 136–145)
WBC # BLD AUTO: 21.3 X10*3/UL (ref 4.4–11.3)

## 2025-08-02 PROCEDURE — 2500000004 HC RX 250 GENERAL PHARMACY W/ HCPCS (ALT 636 FOR OP/ED): Performed by: NURSE PRACTITIONER

## 2025-08-02 PROCEDURE — 82947 ASSAY GLUCOSE BLOOD QUANT: CPT

## 2025-08-02 PROCEDURE — 71045 X-RAY EXAM CHEST 1 VIEW: CPT

## 2025-08-02 PROCEDURE — P9045 ALBUMIN (HUMAN), 5%, 250 ML: HCPCS | Mod: JZ,TB

## 2025-08-02 PROCEDURE — 82248 BILIRUBIN DIRECT: CPT

## 2025-08-02 PROCEDURE — 2500000005 HC RX 250 GENERAL PHARMACY W/O HCPCS: Performed by: NURSE PRACTITIONER

## 2025-08-02 PROCEDURE — 2500000001 HC RX 250 WO HCPCS SELF ADMINISTERED DRUGS (ALT 637 FOR MEDICARE OP): Performed by: NURSE PRACTITIONER

## 2025-08-02 PROCEDURE — 85027 COMPLETE CBC AUTOMATED: CPT | Performed by: NURSE PRACTITIONER

## 2025-08-02 PROCEDURE — 83735 ASSAY OF MAGNESIUM: CPT | Performed by: NURSE PRACTITIONER

## 2025-08-02 PROCEDURE — 80053 COMPREHEN METABOLIC PANEL: CPT | Performed by: NURSE PRACTITIONER

## 2025-08-02 PROCEDURE — 2500000002 HC RX 250 W HCPCS SELF ADMINISTERED DRUGS (ALT 637 FOR MEDICARE OP, ALT 636 FOR OP/ED)

## 2025-08-02 PROCEDURE — 2580000001 HC RX 258 IV SOLUTIONS: Performed by: NURSE PRACTITIONER

## 2025-08-02 PROCEDURE — 2500000004 HC RX 250 GENERAL PHARMACY W/ HCPCS (ALT 636 FOR OP/ED): Mod: TB

## 2025-08-02 PROCEDURE — 1100000001 HC PRIVATE ROOM DAILY

## 2025-08-02 PROCEDURE — 2500000004 HC RX 250 GENERAL PHARMACY W/ HCPCS (ALT 636 FOR OP/ED)

## 2025-08-02 PROCEDURE — 2500000005 HC RX 250 GENERAL PHARMACY W/O HCPCS

## 2025-08-02 PROCEDURE — 2500000001 HC RX 250 WO HCPCS SELF ADMINISTERED DRUGS (ALT 637 FOR MEDICARE OP)

## 2025-08-02 RX ORDER — FUROSEMIDE 10 MG/ML
20 INJECTION INTRAMUSCULAR; INTRAVENOUS ONCE
Status: COMPLETED | OUTPATIENT
Start: 2025-08-02 | End: 2025-08-02

## 2025-08-02 RX ORDER — ALBUMIN HUMAN 50 G/1000ML
25 SOLUTION INTRAVENOUS ONCE
Status: COMPLETED | OUTPATIENT
Start: 2025-08-02 | End: 2025-08-02

## 2025-08-02 RX ORDER — LOPERAMIDE HYDROCHLORIDE 2 MG/1
4 CAPSULE ORAL 4 TIMES DAILY
Status: DISPENSED | OUTPATIENT
Start: 2025-08-02

## 2025-08-02 RX ADMIN — LOPERAMIDE HYDROCHLORIDE 4 MG: 2 CAPSULE ORAL at 18:20

## 2025-08-02 RX ADMIN — ACETAMINOPHEN 650 MG: 160 SOLUTION ORAL at 06:45

## 2025-08-02 RX ADMIN — ACETAMINOPHEN 650 MG: 160 SOLUTION ORAL at 12:13

## 2025-08-02 RX ADMIN — ACETAMINOPHEN 650 MG: 160 SOLUTION ORAL at 01:38

## 2025-08-02 RX ADMIN — ACETAMINOPHEN 650 MG: 160 SOLUTION ORAL at 18:20

## 2025-08-02 RX ADMIN — ENOXAPARIN SODIUM 40 MG: 100 INJECTION SUBCUTANEOUS at 08:29

## 2025-08-02 RX ADMIN — DIPHENOXYLATE HYDROCHLORIDE AND ATROPINE SULFATE 1 TABLET: .025; 2.5 TABLET ORAL at 20:49

## 2025-08-02 RX ADMIN — PIPERACILLIN SODIUM AND TAZOBACTAM SODIUM 3.38 G: 3; .375 INJECTION, SOLUTION INTRAVENOUS at 06:46

## 2025-08-02 RX ADMIN — FUROSEMIDE 20 MG: 10 INJECTION, SOLUTION INTRAMUSCULAR; INTRAVENOUS at 16:30

## 2025-08-02 RX ADMIN — FUROSEMIDE 20 MG: 10 INJECTION, SOLUTION INTRAMUSCULAR; INTRAVENOUS at 12:13

## 2025-08-02 RX ADMIN — ASCORBIC ACID, VITAMIN A PALMITATE, CHOLECALCIFEROL, THIAMINE HYDROCHLORIDE, RIBOFLAVIN-5 PHOSPHATE SODIUM, PYRIDOXINE HYDROCHLORIDE, NIACINAMIDE, DEXPANTHENOL, ALPHA-TOCOPHEROL ACETATE, VITAMIN K1, FOLIC ACID, BIOTIN, CYANOCOBALAMIN: 200; 3300; 200; 6; 3.6; 6; 40; 15; 10; 150; 600; 60; 5 INJECTION, SOLUTION INTRAVENOUS at 20:34

## 2025-08-02 RX ADMIN — DIPHENOXYLATE HYDROCHLORIDE AND ATROPINE SULFATE 1 TABLET: .025; 2.5 TABLET ORAL at 18:20

## 2025-08-02 RX ADMIN — LOPERAMIDE HYDROCHLORIDE 4 MG: 2 CAPSULE ORAL at 12:13

## 2025-08-02 RX ADMIN — HYDROMORPHONE HYDROCHLORIDE 0.2 MG: 1 INJECTION, SOLUTION INTRAMUSCULAR; INTRAVENOUS; SUBCUTANEOUS at 03:18

## 2025-08-02 RX ADMIN — PANTOPRAZOLE SODIUM 40 MG: 40 INJECTION, POWDER, LYOPHILIZED, FOR SOLUTION INTRAVENOUS at 08:29

## 2025-08-02 RX ADMIN — LOPERAMIDE HYDROCHLORIDE 4 MG: 2 CAPSULE ORAL at 05:40

## 2025-08-02 RX ADMIN — LOPERAMIDE HYDROCHLORIDE 4 MG: 2 CAPSULE ORAL at 22:00

## 2025-08-02 RX ADMIN — ALBUMIN HUMAN 25 G: 0.05 INJECTION, SOLUTION INTRAVENOUS at 11:23

## 2025-08-02 RX ADMIN — PIPERACILLIN SODIUM AND TAZOBACTAM SODIUM 3.38 G: 3; .375 INJECTION, SOLUTION INTRAVENOUS at 18:20

## 2025-08-02 RX ADMIN — PIPERACILLIN SODIUM AND TAZOBACTAM SODIUM 3.38 G: 3; .375 INJECTION, SOLUTION INTRAVENOUS at 01:38

## 2025-08-02 RX ADMIN — OXYCODONE HYDROCHLORIDE 5 MG: 5 SOLUTION ORAL at 01:38

## 2025-08-02 RX ADMIN — SMOFLIPID 50 G: 6; 6; 5; 3 INJECTION, EMULSION INTRAVENOUS at 20:35

## 2025-08-02 RX ADMIN — OXYCODONE HYDROCHLORIDE 5 MG: 5 SOLUTION ORAL at 11:32

## 2025-08-02 RX ADMIN — DIPHENOXYLATE HYDROCHLORIDE AND ATROPINE SULFATE 1 TABLET: .025; 2.5 TABLET ORAL at 14:17

## 2025-08-02 RX ADMIN — OXYCODONE HYDROCHLORIDE 5 MG: 5 SOLUTION ORAL at 05:40

## 2025-08-02 RX ADMIN — PIPERACILLIN SODIUM AND TAZOBACTAM SODIUM 3.38 G: 3; .375 INJECTION, SOLUTION INTRAVENOUS at 12:13

## 2025-08-02 RX ADMIN — OXYCODONE HYDROCHLORIDE 5 MG: 5 SOLUTION ORAL at 20:49

## 2025-08-02 RX ADMIN — LIDOCAINE 4% 2 PATCH: 40 PATCH TOPICAL at 08:29

## 2025-08-02 RX ADMIN — DIPHENOXYLATE HYDROCHLORIDE AND ATROPINE SULFATE 1 TABLET: .025; 2.5 TABLET ORAL at 05:40

## 2025-08-02 RX ADMIN — HYDROMORPHONE HYDROCHLORIDE 0.2 MG: 1 INJECTION, SOLUTION INTRAMUSCULAR; INTRAVENOUS; SUBCUTANEOUS at 14:21

## 2025-08-02 ASSESSMENT — PAIN SCALES - GENERAL
PAINLEVEL_OUTOF10: 4
PAINLEVEL_OUTOF10: 7
PAINLEVEL_OUTOF10: 7
PAINLEVEL_OUTOF10: 3
PAINLEVEL_OUTOF10: 7
PAINLEVEL_OUTOF10: 6
PAINLEVEL_OUTOF10: 5 - MODERATE PAIN
PAINLEVEL_OUTOF10: 4
PAINLEVEL_OUTOF10: 7
PAINLEVEL_OUTOF10: 4
PAINLEVEL_OUTOF10: 7

## 2025-08-02 ASSESSMENT — PAIN DESCRIPTION - DESCRIPTORS
DESCRIPTORS: ACHING

## 2025-08-02 ASSESSMENT — COGNITIVE AND FUNCTIONAL STATUS - GENERAL
DRESSING REGULAR UPPER BODY CLOTHING: A LITTLE
CLIMB 3 TO 5 STEPS WITH RAILING: A LITTLE
MOBILITY SCORE: 23
HELP NEEDED FOR BATHING: A LITTLE
DRESSING REGULAR LOWER BODY CLOTHING: A LITTLE

## 2025-08-02 ASSESSMENT — PAIN - FUNCTIONAL ASSESSMENT
PAIN_FUNCTIONAL_ASSESSMENT: 0-10

## 2025-08-02 ASSESSMENT — PAIN DESCRIPTION - LOCATION
LOCATION: ABDOMEN

## 2025-08-02 ASSESSMENT — PAIN SCALES - WONG BAKER: WONGBAKER_NUMERICALRESPONSE: NO HURT

## 2025-08-02 ASSESSMENT — PAIN DESCRIPTION - ORIENTATION: ORIENTATION: MID

## 2025-08-02 NOTE — CONSULTS
Ostomy Care Progress Note     Visit Date: 8/2/2025      Patient Name: Bereket Em         MRN: [id number]             Reason for Visit: ostomy care       Ostomy History: s/p jejunostomy and MF creation     Assessment:  Wound 07/25/25 Surgical Abdomen Medial;Upper (Active)   Date First Assessed/Time First Assessed: 07/25/25 0443   Present on Original Admission: No  Hand Hygiene Completed: Yes  Primary Wound Type: Surgical  Secondary Wound Type - Surgical: Open Surgical Incision  Location: Abdomen  Wound Location Orientati...      Assessments 8/2/2025  2:57 PM   Present on Admission to Healthcare Facility Yes   Margins Attached edges;Well-defined edges   Closure Staples   Treatments Cleansed;Site care   Drainage Description Serosanguineous   Drainage Amount Scant   Dressing Dry dressing   Dressing Changed Changed       No associated orders.        Colostomy RUQ (Active)   Placement Date/Time: 07/25/25 0639   Location: RUQ   Number of days: 8      Colostomy RUQ (Active)   Present on Admission to Healthcare Facility Y 08/02/25 1456   Stomal Appliance 1 piece;Changed 08/02/25 1456   Site/Stoma Assessment Pink;Red 08/02/25 1456   Peristomal Assessment Clean;Intact 08/02/25 1456   Treatment Pouch change;Site care 08/02/25 1456   Drainage Characteristics Yellow 08/02/25 1456   Output (mL) 0 mL 08/01/25 1433     Ileostomy Other (Comment) LUQ (Active)   Placement Date/Time: 07/25/25 0500   Hand Hygiene Completed: Yes  Ileostomy Type: (c) Other (Comment)  Location: LUQ   Number of days: 8      Ileostomy Other (Comment) LUQ (Active)   Present on Admission to Healthcare Facility N 07/31/25 0900   Stomal Appliance 2 piece;Changed 08/02/25 1456   Site/Stoma Assessment Pink;Intact 08/02/25 1456   Peristomal Assessment Wound 08/02/25 1456   Treatment Pouch change;Site care;Packings 08/02/25 1456   Output (mL) 50 mL 08/02/25 1456   Output Description Yellow 08/02/25 1456     Assessment/Intervention: Pt seen for routine wound and  ostomy care. Resting in bed, A&O w/ SO at bedside. Midline wound cleaned with Vashe soak and redressed with dry gauze. ANA MARIA drain site cleaned and redressed. New, pediatric appliance placed to R-sided MF. New 57mm 2-piece flat appliance with high output pouch placed to L-sided jejunostomy. Small wound extending away from stoma at 3:00 persists. This was cleaned and packed with Promogran Yulisa and covered with paste ring under ostomy wafer. Pt attentive and appreciate.     Wound Plan: Will continue to follow 2-3x/wk for ostomy support as needed.    Provider, please review.     Dalia Leos RN, CWON  Wound/Ostomy Nurse  8/2/2025  2:57 PM

## 2025-08-02 NOTE — PROGRESS NOTES
"  General Surgery Progress Note    08/02/25    Bereket Em    Summary:  Bereket Em is a 59 year old male with a history of perforated diverticulitis s/p Margarita's procedure c/b formation of ECF. Underwent EC fistula takedown, GILLES, Margarita's reversal, abdominal wall debridement, and double barrel jejunostomy with Leyda Olson, Renato, and Vicky on 7/22. Intra-op findings remarkable for adhesions throughout the abdomen and remaining segment of the colon terminating at the splenic flexure requiring significant mobilization and an ileal window. Postoperative course complicated by colon ischemia requiring OR take back for partial colectomy, end colostomy creation (RUQ), and conversion of double barrel jejunostomy to end jejunostomy in LUQ on 7/25. Course c/b high output jejunostomy on maximum dose of imodium and lomotil.     Subjective    Subjective:  No acute events overnight. Vital signs stable. Reports lungs feeling \"full\" this morning, with increased efforts with breathing. However, overall feels less fluid overloaded. Abdominal pain controlled. Patient is getting up out of bed and walking around room. Tolerating PO intake. Denies fevers, chills, chest pain, coughs, dysuria, nausea, or emesis. He is using his incentive spirometer frequently.      Objective    Objective:      Vital signs:   Temp:  [36.1 °C (97 °F)-36.6 °C (97.9 °F)] 36.1 °C (97 °F)  Heart Rate:  [64-82] 65  Resp:  [17-18] 18  BP: ()/(53-66) 104/57    Physical Exam:  GEN: resting comfortably, no acute distress   NEURO: awake, alert, oriented x4   HEENT: Sclera anicteric. Moist mucous membranes.   RESP: very mildly labored breathing on room air, satting >92%, no coughs, pulling >1.5L on incentive spirometer  CV: regular rate and rhythm on telemetry  GI: Abdomen soft, appropriately tender to palpation along midline incision. Ostomies x2 pink and healthy appearing. LUQ Jejunostomy output thin, watery, bilious. RUQ colostomy with " bowel sweat. Right ANA MARIA drain with serous output.   SKIN: laparotomy closed loosely with staples  EXT: no peripheral edema     I/O last 2 completed shifts:  In: 220 (2.6 mL/kg) [P.O.:220]  Out: 4275 (49.7 mL/kg) [Urine:1925 (0.9 mL/kg/hr); Stool:2350]  Weight: 86 kg      Labs Past 18 Hours:  Recent Results (from the past 18 hours)   POCT GLUCOSE    Collection Time: 08/01/25  3:53 PM   Result Value Ref Range    POCT Glucose 127 (H) 74 - 99 mg/dL   POCT GLUCOSE    Collection Time: 08/01/25  9:20 PM   Result Value Ref Range    POCT Glucose 156 (H) 74 - 99 mg/dL   POCT GLUCOSE    Collection Time: 08/02/25  1:43 AM   Result Value Ref Range    POCT Glucose 149 (H) 74 - 99 mg/dL   CBC    Collection Time: 08/02/25  5:29 AM   Result Value Ref Range    WBC 21.3 (H) 4.4 - 11.3 x10*3/uL    nRBC 0.0 0.0 - 0.0 /100 WBCs    RBC 2.81 (L) 4.50 - 5.90 x10*6/uL    Hemoglobin 7.3 (L) 13.5 - 17.5 g/dL    Hematocrit 22.6 (L) 41.0 - 52.0 %    MCV 80 80 - 100 fL    MCH 26.0 26.0 - 34.0 pg    MCHC 32.3 32.0 - 36.0 g/dL    RDW 16.4 (H) 11.5 - 14.5 %    Platelets 385 150 - 450 x10*3/uL   Magnesium    Collection Time: 08/02/25  5:29 AM   Result Value Ref Range    Magnesium 2.07 1.60 - 2.40 mg/dL   Renal Function Panel    Collection Time: 08/02/25  5:29 AM   Result Value Ref Range    Glucose 112 (H) 74 - 99 mg/dL    Sodium 132 (L) 136 - 145 mmol/L    Potassium 4.6 3.5 - 5.3 mmol/L    Chloride 95 (L) 98 - 107 mmol/L    Bicarbonate 28 21 - 32 mmol/L    Anion Gap 14 10 - 20 mmol/L    Urea Nitrogen 22 6 - 23 mg/dL    Creatinine 0.54 0.50 - 1.30 mg/dL    eGFR >90 >60 mL/min/1.73m*2    Calcium 7.9 (L) 8.6 - 10.6 mg/dL    Phosphorus 3.4 2.5 - 4.9 mg/dL    Albumin 2.5 (L) 3.4 - 5.0 g/dL   Hepatic function panel    Collection Time: 08/02/25  5:29 AM   Result Value Ref Range    Albumin 2.5 (L) 3.4 - 5.0 g/dL    Bilirubin, Total 2.1 (H) 0.0 - 1.2 mg/dL    Bilirubin, Direct 1.2 (H) 0.0 - 0.3 mg/dL    Alkaline Phosphatase 168 (H) 33 - 136 U/L    ALT 27 10 -  52 U/L    AST 16 9 - 39 U/L    Total Protein 5.0 (L) 6.4 - 8.2 g/dL   POCT GLUCOSE    Collection Time: 08/02/25  6:25 AM   Result Value Ref Range    POCT Glucose 150 (H) 74 - 99 mg/dL   POCT GLUCOSE    Collection Time: 08/02/25  8:08 AM   Result Value Ref Range    POCT Glucose 163 (H) 74 - 99 mg/dL        Meds:  Current Medications[1]     Imaging:  Imaging  XR chest 1 view  Result Date: 8/2/2025  Similar bilateral trace pleural effusions with bibasilar atelectasis with slightly improved atelectasis on the right.   I personally reviewed the images/study and I agree with the findings as stated by Matthew Larson DO, PGY-4. This study was interpreted at [place], [place].   MACRO: None     Dictation workstation:   [id number]    CT abdomen pelvis w IV contrast  Result Date: 7/31/2025  1. Postoperative changes consistent with patient history. There are areas of free fluid or partially loculated appearing fluid, without a discrete discernible wall to suggest abscess, the largest of which is in the left lower quadrant measuring up to 5.2 cm. A focus of free air in the right upper quadrant is likely postsurgical. 2. Increasing patchy areas of ground-glass in the anterior upper lobes of the included lung bases is suspicious for an infectious or inflammatory etiology. There also small pleural effusions with adjacent atelectasis.   MACRO: I personally reviewed the images/study and I agree with the findings as stated by Levi Lara DO. This study was interpreted at [facility], [place], [place].   Signed by: Jonathon Malcolm 7/31/2025 4:30 PM Dictation workstation:   [id number]        Assessment/Plan    Assessment and Plan:  Bereket Em is a Primary - [employer]   59M with PMH of perforated diverticulitis s/p Margarita's procedure c/b formation of ECF. Underwent EC fistula takedown, GILLES, Margarita's reversal, abdominal wall debridement, and double  barrel jejunostomy with Renato Rodriguez, and Vicky on 7/22.  Intra-op findings remarkable for adhesions throughout the abdomen and remaining segment of the colon terminating at the splenic flexure requiring significant mobilization and an ileal window.  Postoperative course complicated by colon ischemia requiring exploratory laparotomy, partial colectomy, end colostomy creation (RUQ), and conversion of double barrel jejunostomy to end jejunostomy in LUQ on 7/25. Transferred from ICU to floor on 7/28 and progressing appropriately.     Course c/b anasarca and third spacing of fluid 2/2 sepsis, improving after 3 days of diuresis with lasix. Still has high jejunostomy output >2L day, on max doses lomotil and imodium. Has persistent leukocytosis without fevers or other signs/sx of ongoing infection, Zosyn to end today. CT 7/31 showed small 5cm intraabdominal fluid collection, too small for drainage per IR.      Procedures:  7/22: ECF takedown, extensive GILLES, Margarita's reversal, abdominal wall debridement, and double barrel jejunostomy with Renato Rodriguez, and Vicky.    7/25:  partial colectomy with end colostomy creation, conversion of double barrel jejunostomy to end jejunostomy      PLAN:   Neurology: post operative pain  - scheduled tylenol, lidocaine patch    - Liquid oxycodone 5/10mg with 0.2 IV Dilaudid PRN breakthrough   - continue home trazodone      Cardiovascular:   -Vital signs every 4 hours     Pulmonology: pulmonary edema, atelectasis   - CXR 8/2 shows improved bilateral pleural effusions and improved atelectasis  - IV Lasix 20mg today, AFTER albumin bolus is given  - continue RT  - IS x10 every hour      GI: s/p ECF takedown, Margarita's reversal; end colostomy, end jejunostomy; high jejunostomy output  - CT AP 7/31 showed small left abdominal fluid collection - not amendable to drainage per IR  - continue PPI, Lomotil, increase Imodium to 4mg QID  - soft diet, TPN/lipids,  PICC replaced  7/30   - Zofran PRN nausea   - wound/ostomy nursing consulted, appreciate assistance      :   - Net negative 4L today, give 500 ml albumin bolus given no intravascular response to crystalloids  - Diurese with IV lasix 20mg after albumin bolus  - Strict I&Os  - Trend RFP and Magnesium, replace electrolytes as needed to maintain K >4, Mag >2.      ID: h/o E. coli bacteremia, resolved; persistent leukocytosis  - Zosyn x7 days (last dose tonight), per ID  - 7/27 blood culture results- no growth   - trend WBC   - monitor for signs/sx of infection given persistent leukocytosis, will consider repeat CT in a few days or sooner if patient does not show clinical improvement     Heme:  -Monitor for signs of acute blood loss  -Trend CBC     Endocrine:   - POCT glucose every 6 hours with TPN   - insulin lispro sliding scale      DVT Prophylaxis:  -Continue subcutaneous lovenox, SCDs, ambulation/OOB     Hospital Day: 12     Dispo: RNF    Discussed with Dr. Olson.     Annabel Patel MD  General Surgery PGY-3  [facility] (Minimally Invasive/Bariatric Surgery/Surgical Endoscopy)  [id number]         [1]   Current Facility-Administered Medications:     acetaminophen (Tylenol) oral liquid 650 mg, 650 mg, oral, q6h, Charisse Castaneda, APRN-CNP, 650 mg at 08/02/25 0645    Adult Clinimix TPN Cyclic, , intravenous, Cyclic PN, Charisse Weinbergbena, APRN-CNP, Stopped at 08/02/25 0837    albumin human 5 % infusion 25 g, 25 g, intravenous, Once, Annabel Patel MD    alteplase (Cathflo Activase) injection 2 mg, 2 mg, intra-catheter, PRN, Charisse Castaneda, APRN-CNP    dextrose 50 % injection 12.5 g, 12.5 g, intravenous, q15 min PRN, Charisse Weinbergbena, APRN-CNP    dextrose 50 % injection 25 g, 25 g, intravenous, q15 min PRN, Charisse Weinbergbena, APRN-CNP    diphenoxylate-atropine (Lomotil) 2.5-0.025 mg per tablet 1 tablet, 1 tablet, oral, 4x daily, Boris Ledezma MD, 1 tablet at 08/02/25 0540    enoxaparin (Lovenox) syringe 40 mg, 40  mg, subcutaneous, q24h, ASHLY Yin, 40 mg at 08/02/25 0829    fat emulsion fish oil/plant based (SMOFlipid) 20 % IV infusion 50 g, 250 mL, intravenous, Daily Lipids, ASHLY Yin, Stopped at 08/02/25 0837    furosemide (Lasix) injection 20 mg, 20 mg, intravenous, Once, Annabel Patel MD    glucagon (Glucagen) injection 1 mg, 1 mg, intramuscular, q15 min PRN, ASHLY Yin    glucagon (Glucagen) injection 1 mg, 1 mg, intramuscular, q15 min PRN, ASHLY Yin    heparin flush 10 unit/mL syringe 50 Units, 5 mL, intravenous, PRN, ASHLY Yin    HYDROmorphone (Dilaudid) injection 0.2 mg, 0.2 mg, intravenous, q3h PRN, Annabel Patel MD, 0.2 mg at 08/02/25 0318    insulin lispro injection 0-5 Units, 0-5 Units, subcutaneous, q6h, ASHLY Yin    lidocaine 4 % patch 2 patch, 2 patch, transdermal, Daily, Annabel Patel MD, 2 patch at 08/02/25 0829    loperamide (Imodium) capsule 4 mg, 4 mg, oral, 4x daily, Annabel Patel MD    naloxone (Narcan) injection 0.2 mg, 0.2 mg, intravenous, PRN, ASHLY Yin    ondansetron (Zofran) injection 4 mg, 4 mg, intravenous, q8h PRN, ASHLY Yin    oxyCODONE (Roxicodone) solution 10 mg, 10 mg, oral, q4h PRN, Annabel Patel MD, 10 mg at 07/29/25 0648    oxyCODONE (Roxicodone) solution 5 mg, 5 mg, oral, q4h PRN, Annabel Patel MD, 5 mg at 08/02/25 0540    pantoprazole (Protonix) injection 40 mg, 40 mg, intravenous, Daily, ASHLY Yin, 40 mg at 08/02/25 0829    PHENobarbital (Luminal) injection 65 mg, 65 mg, intravenous, q6h PRN, ASHLY Yin, 65 mg at 07/26/25 2054    piperacillin-tazobactam (Zosyn) 3.375 g in dextrose (iso) IV 50 mL, 3.375 g, intravenous, q6h, ASHLY Yin, Stopped at 08/02/25 0713    traZODone (Desyrel) tablet 100 mg, 100 mg, oral, Nightly, Frederick Abel MD, 100 mg at 08/01/25 2049

## 2025-08-02 NOTE — CARE PLAN
The patient's goals for the shift include    Problem: Pain - Adult  Goal: Verbalizes/displays adequate comfort level or baseline comfort level  Outcome: Progressing  Flowsheets (Taken 7/31/2025 0605 by Adriana Sales RN)  Verbalizes/displays adequate comfort level or baseline comfort level:   Encourage patient to monitor pain and request assistance   Assess pain using appropriate pain scale   Administer analgesics based on type and severity of pain and evaluate response   Implement non-pharmacological measures as appropriate and evaluate response     Problem: Discharge Planning  Goal: Discharge to home or other facility with appropriate resources  Outcome: Progressing  Flowsheets (Taken 8/1/2025 7036)  Discharge to home or other facility with appropriate resources:   Identify barriers to discharge with patient and caregiver   Arrange for needed discharge resources and transportation as appropriate       The clinical goals for the shift include pt will maintain safety throughout shift.

## 2025-08-02 NOTE — PROGRESS NOTES
I saw and examined the patient.       60 yo male s/p fistula takedown, extensive lysis of adhesions, colostomy reversal, double barrel jejunostomy formation, abdominal wall reconstruction with return to the OR for washout, resection of distal colon, new end colostomy and repositioning of jejnostomy.   Tolerating PO. Somewhat thicker output.   Pain is controlled.   WBCs 21 to 19.   On exam, NAD.  Midline dressing inplace.  Staples in place. No erythema or drainage. Ostomies are pink and viable; bilious output in jejunostomy bag.   ABX given E. Coli bacteremia per ID.  TPN for nutrition.  Diet as tolerated with consideration of 120 cm of bowel from LOT to ostomy.  Local wound care and ostomy care. Lovenox. PT. Antimotility agents for output < 1L. IR consideration of fluid aspiration.

## 2025-08-02 NOTE — PROGRESS NOTES
Colorectal Surgery Progress Note      08/02/25    Summary:  Bereket Em is a 59 y.o. 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 Olson & Vicky with Margarita's reversal/ transverse colon mobilization/ileal window by Dr. Gross on 7/22/25. Post-op course c/b bowel ischemia with afib RVR requiring ICU transfer & amio drip, s/p exlap, bowel resection at anastomosis, end colostomy creation (RUQ) & conversion of double barrel jejunostomy to end jejunostomy (LUQ) on 7/25/25.   Subjective    Subjective:  NAOE AF VSS on  PO 1925 uop, ANA MARIA ss reported 0 from 55 stoma 2350 from 1600, wbc fluctuating 21 from 19 from 21, H/H stable, RFP wnl, abdominal pain / bloating     Review of Systems:    A 12-point review of systems was performed, and was negative except as above.       Objective    Objective:  Vital signs:   Temp:  [36.3 °C (97.3 °F)-36.6 °C (97.9 °F)] 36.3 °C (97.3 °F)  Heart Rate:  [64-82] 82  Resp:  [17-18] 18  BP: ()/(53-66) 101/53    Physical Exam:  GEN:  Alert, awake and conversant, no acute distress  HEENT: Sclera anicteric. Moist mucous membranes.  RESP: Chest expansion symmetrical, some increased work of breathing, good oxygenation via pulse oximetry  CV: normal rate, normotensive per chart review  GI: Abdomen soft, appropriately tender,  midline incision with staples, well approximated, ileostomy well pouched on LUQ with watery stool and some gas in pouch, de- functioned colostomy pouched on right. ANA MARIA drain serosanguinous  MSK: No gross deformities. Moves all extremities spontaneously. No pitting edema  NEURO: Alert  and conversive, no focal deficits  PSYCH: Appropriate mood and affect.    I/O last 2 completed shifts:  In: 220 (2.6 mL/kg) [P.O.:220]  Out: 4275 (49.7 mL/kg) [Urine:1925 (0.9 mL/kg/hr); Stool:2350]  Weight: 86 kg      Labs Past 18 Hours:  Recent Results (from the past 18 hours)   POCT GLUCOSE    Collection Time: 08/01/25  3:53 PM   Result Value Ref Range    POCT Glucose 127 (H) 74 - 99 mg/dL   POCT GLUCOSE    Collection Time: 08/01/25  9:20 PM   Result Value Ref Range    POCT Glucose 156 (H) 74 - 99 mg/dL   POCT GLUCOSE    Collection Time: 08/02/25  1:43 AM   Result Value Ref Range    POCT Glucose 149 (H) 74 - 99 mg/dL   CBC    Collection Time: 08/02/25  5:29 AM   Result Value Ref Range    WBC 21.3 (H) 4.4 - 11.3 x10*3/uL    nRBC 0.0 0.0 - 0.0 /100 WBCs    RBC 2.81 (L) 4.50 - 5.90 x10*6/uL    Hemoglobin 7.3 (L) 13.5 - 17.5 g/dL    Hematocrit 22.6 (L) 41.0 - 52.0 %    MCV 80 80 - 100 fL    MCH 26.0 26.0 - 34.0 pg    MCHC 32.3 32.0 - 36.0 g/dL    RDW 16.4 (H) 11.5 - 14.5 %    Platelets 385 150 - 450 x10*3/uL   Magnesium    Collection Time: 08/02/25  5:29 AM   Result Value Ref Range    Magnesium 2.07 1.60 - 2.40 mg/dL   Renal Function Panel    Collection Time: 08/02/25  5:29 AM   Result Value Ref Range    Glucose 112 (H) 74 - 99 mg/dL    Sodium 132 (L) 136 - 145 mmol/L    Potassium 4.6 3.5 - 5.3 mmol/L    Chloride 95 (L) 98 - 107 mmol/L    Bicarbonate 28 21 - 32 mmol/L    Anion Gap 14 10 - 20 mmol/L    Urea Nitrogen 22 6 - 23 mg/dL    Creatinine 0.54 0.50 - 1.30 mg/dL    eGFR >90 >60 mL/min/1.73m*2    Calcium 7.9 (L) 8.6 - 10.6 mg/dL    Phosphorus 3.4 2.5 - 4.9 mg/dL    Albumin 2.5 (L) 3.4 - 5.0 g/dL   POCT GLUCOSE    Collection Time: 08/02/25  6:25 AM   Result Value Ref Range    POCT Glucose 150 (H) 74 - 99 mg/dL      Meds:  Current Medications[1]   Imaging:  Imaging  CT abdomen pelvis w IV contrast  Result Date: 7/31/2025  1. Postoperative changes consistent with patient history. There are areas of free fluid or  partially loculated appearing fluid, without a discrete discernible wall to suggest abscess, the largest of which is in the left lower quadrant measuring up to 5.2 cm. A focus of free air in the right upper quadrant is likely postsurgical. 2. Increasing patchy areas of ground-glass in the anterior upper lobes of the included lung bases is suspicious for an infectious or inflammatory etiology. There also small pleural effusions with adjacent atelectasis.   MACRO: I personally reviewed the images/study and I agree with the findings as stated by Levi Lara DO. This study was interpreted at [facility], [place], [place].   Signed by: Jonathon Malcolm 7/31/2025 4:30 PM Dictation workstation:   [id number]      Cardiology, Vascular, and Other Imaging  No other imaging results found for the past 2 days          Medications reviewed.  Vital signs reviewed.  Labs reviewed.         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    Plan Today:   - appreciate care per primary team  - will follow up IR consult rec's, reportedly no plans for drain placement  - Continue to monitor ostomy output, have patient record output, keep running total  - Dehydration education for patient  - Recommend repletion of lytes to goal K=4.0, Mag+2.0  - If patient continues to have high output out of his ostomy, would recommend titrating his imodium up to 4mg, QID    Hospital Day: 12     Dispo: Colorectal surgery will continue to  follow.     Discussed with Dr. Marie Montilla MD  General Surgery  Colorectal Surgery [id number]             [1]   Current Facility-Administered Medications:     acetaminophen (Tylenol) oral liquid 650 mg, 650 mg, oral, q6h, Charisse L Kubena, APRN-CNP, 650 mg at 08/02/25 0645    Adult Clinimix TPN Cyclic, , intravenous, Cyclic PN, Charisse L Kubena, APRN-CNP, Last Rate: 180 mL/hr at 08/01/25 2152, Rate Change at 08/01/25 2152    alteplase (Cathflo Activase) injection 2 mg, 2 mg, intra-catheter, PRN, Charisse RESTREPO Kubena, APRN-CNP    dextrose 50 % injection 12.5 g, 12.5 g, intravenous, q15 min PRN, Charisse L Kubena, APRN-CNP    dextrose 50 % injection 25 g, 25 g, intravenous, q15 min PRN, Charisse L Kubena, APRN-CNP    diphenoxylate-atropine (Lomotil) 2.5-0.025 mg per tablet 1 tablet, 1 tablet, oral, 4x daily, Boris Ledezma MD, 1 tablet at 08/02/25 0540    enoxaparin (Lovenox) syringe 40 mg, 40 mg, subcutaneous, q24h, Charisse Weinbergbemarkell, APRN-CNP, 40 mg at 07/31/25 1027    fat emulsion fish oil/plant based (SMOFlipid) 20 % IV infusion 50 g, 250 mL, intravenous, Daily Lipids, Charisse RESTREPO Kubena, APRN-CNP, Last Rate: 20.8 mL/hr at 08/01/25 2042, 50 g at 08/01/25 2042    glucagon (Glucagen) injection 1 mg, 1 mg, intramuscular, q15 min PRN, Charisse L Kubena, APRN-CNP    glucagon (Glucagen) injection 1 mg, 1 mg, intramuscular, q15 min PRN, Charisse L Kubena, APRN-CNP    heparin flush 10 unit/mL syringe 50 Units, 5 mL, intravenous, PRN, Charisse L Kubena, APRN-CNP    HYDROmorphone (Dilaudid) injection 0.2 mg, 0.2 mg, intravenous, q3h PRN, Annabel Patel MD, 0.2 mg at 08/02/25 0318    insulin lispro injection 0-5 Units, 0-5 Units, subcutaneous, q6h, Charisse Castaneda, APRN-CNP    lidocaine 4 % patch 2 patch, 2 patch, transdermal, Daily, Annabel Patel MD, 2 patch at 08/01/25 0816    loperamide (Imodium) capsule 4 mg, 4 mg, oral, 4x daily, Annabel Patel MD    naloxone (Narcan) injection 0.2 mg, 0.2 mg, intravenous, PRN,  ASHLY Yin    ondansetron (Zofran) injection 4 mg, 4 mg, intravenous, q8h PRN, ASHLY Yin    oxyCODONE (Roxicodone) solution 10 mg, 10 mg, oral, q4h PRN, Annabel Patel MD, 10 mg at 07/29/25 0648    oxyCODONE (Roxicodone) solution 5 mg, 5 mg, oral, q4h PRN, Annabel Patel MD, 5 mg at 08/02/25 0540    pantoprazole (Protonix) injection 40 mg, 40 mg, intravenous, Daily, ASHLY Yin, 40 mg at 08/01/25 0816    PHENobarbital (Luminal) injection 65 mg, 65 mg, intravenous, q6h PRN, ASHLY Yin, 65 mg at 07/26/25 2054    piperacillin-tazobactam (Zosyn) 3.375 g in dextrose (iso) IV 50 mL, 3.375 g, intravenous, q6h, ASHLY Yin, Stopped at 08/02/25 0713    traZODone (Desyrel) tablet 100 mg, 100 mg, oral, Nightly, Frederick Abel MD, 100 mg at 08/01/25 2049

## 2025-08-02 NOTE — CARE PLAN
The patient's goals for the shift include      The clinical goals for the shift include Patient will remain HDS throughout shift    Over the shift, the patient did make progress toward the following goals.   Problem: Pain - Adult  Goal: Verbalizes/displays adequate comfort level or baseline comfort level  Outcome: Progressing  Flowsheets (Taken 8/2/2025 1723)  Verbalizes/displays adequate comfort level or baseline comfort level:   Encourage patient to monitor pain and request assistance   Assess pain using appropriate pain scale     Problem: Discharge Planning  Goal: Discharge to home or other facility with appropriate resources  Outcome: Progressing  Flowsheets (Taken 8/2/2025 1723)  Discharge to home or other facility with appropriate resources:   Identify barriers to discharge with patient and caregiver   Arrange for needed discharge resources and transportation as appropriate     Problem: Chronic Conditions and Co-morbidities  Goal: Patient's chronic conditions and co-morbidity symptoms are monitored and maintained or improved  Outcome: Progressing  Flowsheets (Taken 8/2/2025 1723)  Care Plan - Patient's Chronic Conditions and Co-Morbidity Symptoms are Monitored and Maintained or Improved:   Monitor and assess patient's chronic conditions and comorbid symptoms for stability, deterioration, or improvement   Collaborate with multidisciplinary team to address chronic and comorbid conditions and prevent exacerbation or deterioration     Problem: Nutrition  Goal: Nutrient intake appropriate for maintaining nutritional needs  Outcome: Progressing     Problem: Skin  Goal: Decreased wound size/increased tissue granulation at next dressing change  Outcome: Progressing  Flowsheets (Taken 7/31/2025 0605 by Adriana Sales RN)  Decreased wound size/increased tissue granulation at next dressing change: Promote sleep for wound healing  Goal: Participates in plan/prevention/treatment measures  Outcome: Progressing  Flowsheets  (Taken 7/30/2025 1850 by Marguerite Burrows, RN)  Participates in plan/prevention/treatment measures:   Elevate heels   Increase activity/out of bed for meals  Goal: Prevent/manage excess moisture  Outcome: Progressing  Flowsheets (Taken 7/30/2025 1850 by Marguerite Burrows, RN)  Prevent/manage excess moisture:   Moisturize dry skin   Cleanse incontinence/protect with barrier cream  Goal: Prevent/minimize sheer/friction injuries  Outcome: Progressing  Goal: Promote/optimize nutrition  Outcome: Progressing  Goal: Promote skin healing  Outcome: Progressing     Problem: Pain  Goal: Takes deep breaths with improved pain control throughout the shift  Outcome: Progressing  Goal: Turns in bed with improved pain control throughout the shift  Outcome: Progressing  Goal: Walks with improved pain control throughout the shift  Outcome: Progressing  Goal: Performs ADL's with improved pain control throughout shift  Outcome: Progressing  Goal: Participates in PT with improved pain control throughout the shift  Outcome: Progressing  Goal: Free from opioid side effects throughout the shift  Outcome: Progressing  Goal: Free from acute confusion related to pain meds throughout the shift  Outcome: Progressing     Problem: Fall/Injury  Goal: Not fall by end of shift  Outcome: Progressing  Goal: Be free from injury by end of the shift  Outcome: Progressing  Goal: Verbalize understanding of personal risk factors for fall in the hospital  Outcome: Progressing  Goal: Verbalize understanding of risk factor reduction measures to prevent injury from fall in the home  Outcome: Progressing  Goal: Use assistive devices by end of the shift  Outcome: Progressing  Goal: Pace activities to prevent fatigue by end of the shift  Outcome: Progressing

## 2025-08-03 VITALS
TEMPERATURE: 100 F | OXYGEN SATURATION: 97 % | RESPIRATION RATE: 17 BRPM | SYSTOLIC BLOOD PRESSURE: 106 MMHG | BODY MASS INDEX: 25.69 KG/M2 | HEIGHT: 68 IN | HEART RATE: 73 BPM | DIASTOLIC BLOOD PRESSURE: 63 MMHG | WEIGHT: 169.5 LBS

## 2025-08-03 LAB
ALBUMIN SERPL BCP-MCNC: 2.9 G/DL (ref 3.4–5)
ANION GAP SERPL CALC-SCNC: 15 MMOL/L (ref 10–20)
BUN SERPL-MCNC: 21 MG/DL (ref 6–23)
CALCIUM SERPL-MCNC: 8.2 MG/DL (ref 8.6–10.6)
CHLORIDE SERPL-SCNC: 93 MMOL/L (ref 98–107)
CO2 SERPL-SCNC: 29 MMOL/L (ref 21–32)
CREAT SERPL-MCNC: 0.51 MG/DL (ref 0.5–1.3)
EGFRCR SERPLBLD CKD-EPI 2021: >90 ML/MIN/1.73M*2
ERYTHROCYTE [DISTWIDTH] IN BLOOD BY AUTOMATED COUNT: 16.8 % (ref 11.5–14.5)
GLUCOSE BLD MANUAL STRIP-MCNC: 119 MG/DL (ref 74–99)
GLUCOSE BLD MANUAL STRIP-MCNC: 123 MG/DL (ref 74–99)
GLUCOSE BLD MANUAL STRIP-MCNC: 127 MG/DL (ref 74–99)
GLUCOSE BLD MANUAL STRIP-MCNC: 132 MG/DL (ref 74–99)
GLUCOSE BLD MANUAL STRIP-MCNC: 154 MG/DL (ref 74–99)
GLUCOSE SERPL-MCNC: 108 MG/DL (ref 74–99)
HCT VFR BLD AUTO: 21.2 % (ref 41–52)
HGB BLD-MCNC: 7 G/DL (ref 13.5–17.5)
MAGNESIUM SERPL-MCNC: 2.26 MG/DL (ref 1.6–2.4)
MCH RBC QN AUTO: 26.1 PG (ref 26–34)
MCHC RBC AUTO-ENTMCNC: 33 G/DL (ref 32–36)
MCV RBC AUTO: 79 FL (ref 80–100)
NRBC BLD-RTO: 0 /100 WBCS (ref 0–0)
PHOSPHATE SERPL-MCNC: 3.5 MG/DL (ref 2.5–4.9)
PLATELET # BLD AUTO: 462 X10*3/UL (ref 150–450)
POTASSIUM SERPL-SCNC: 4.8 MMOL/L (ref 3.5–5.3)
RBC # BLD AUTO: 2.68 X10*6/UL (ref 4.5–5.9)
SODIUM SERPL-SCNC: 132 MMOL/L (ref 136–145)
WBC # BLD AUTO: 19.3 X10*3/UL (ref 4.4–11.3)

## 2025-08-03 PROCEDURE — 2500000005 HC RX 250 GENERAL PHARMACY W/O HCPCS: Performed by: NURSE PRACTITIONER

## 2025-08-03 PROCEDURE — 2500000002 HC RX 250 W HCPCS SELF ADMINISTERED DRUGS (ALT 637 FOR MEDICARE OP, ALT 636 FOR OP/ED)

## 2025-08-03 PROCEDURE — 2500000004 HC RX 250 GENERAL PHARMACY W/ HCPCS (ALT 636 FOR OP/ED): Performed by: NURSE PRACTITIONER

## 2025-08-03 PROCEDURE — 1100000001 HC PRIVATE ROOM DAILY

## 2025-08-03 PROCEDURE — 2580000001 HC RX 258 IV SOLUTIONS: Performed by: NURSE PRACTITIONER

## 2025-08-03 PROCEDURE — 80069 RENAL FUNCTION PANEL: CPT | Performed by: NURSE PRACTITIONER

## 2025-08-03 PROCEDURE — 85027 COMPLETE CBC AUTOMATED: CPT | Performed by: NURSE PRACTITIONER

## 2025-08-03 PROCEDURE — 2500000005 HC RX 250 GENERAL PHARMACY W/O HCPCS

## 2025-08-03 PROCEDURE — 82947 ASSAY GLUCOSE BLOOD QUANT: CPT

## 2025-08-03 PROCEDURE — 2500000001 HC RX 250 WO HCPCS SELF ADMINISTERED DRUGS (ALT 637 FOR MEDICARE OP)

## 2025-08-03 PROCEDURE — 83735 ASSAY OF MAGNESIUM: CPT | Performed by: NURSE PRACTITIONER

## 2025-08-03 PROCEDURE — 2500000004 HC RX 250 GENERAL PHARMACY W/ HCPCS (ALT 636 FOR OP/ED): Mod: TB

## 2025-08-03 PROCEDURE — 2500000001 HC RX 250 WO HCPCS SELF ADMINISTERED DRUGS (ALT 637 FOR MEDICARE OP): Performed by: NURSE PRACTITIONER

## 2025-08-03 RX ADMIN — OXYCODONE HYDROCHLORIDE 5 MG: 5 SOLUTION ORAL at 01:20

## 2025-08-03 RX ADMIN — ENOXAPARIN SODIUM 40 MG: 100 INJECTION SUBCUTANEOUS at 08:48

## 2025-08-03 RX ADMIN — ACETAMINOPHEN 650 MG: 160 SOLUTION ORAL at 12:19

## 2025-08-03 RX ADMIN — SMOFLIPID 50 G: 6; 6; 5; 3 INJECTION, EMULSION INTRAVENOUS at 20:31

## 2025-08-03 RX ADMIN — LOPERAMIDE HYDROCHLORIDE 4 MG: 2 CAPSULE ORAL at 06:12

## 2025-08-03 RX ADMIN — PANTOPRAZOLE SODIUM 40 MG: 40 INJECTION, POWDER, LYOPHILIZED, FOR SOLUTION INTRAVENOUS at 08:47

## 2025-08-03 RX ADMIN — ACETAMINOPHEN 650 MG: 160 SOLUTION ORAL at 06:12

## 2025-08-03 RX ADMIN — OXYCODONE HYDROCHLORIDE 5 MG: 5 SOLUTION ORAL at 08:52

## 2025-08-03 RX ADMIN — LIDOCAINE 4% 2 PATCH: 40 PATCH TOPICAL at 08:47

## 2025-08-03 RX ADMIN — OXYCODONE HYDROCHLORIDE 5 MG: 5 SOLUTION ORAL at 21:30

## 2025-08-03 RX ADMIN — DIPHENOXYLATE HYDROCHLORIDE AND ATROPINE SULFATE 1 TABLET: .025; 2.5 TABLET ORAL at 12:20

## 2025-08-03 RX ADMIN — ACETAMINOPHEN 650 MG: 160 SOLUTION ORAL at 20:32

## 2025-08-03 RX ADMIN — DIPHENOXYLATE HYDROCHLORIDE AND ATROPINE SULFATE 1 TABLET: .025; 2.5 TABLET ORAL at 06:12

## 2025-08-03 RX ADMIN — LOPERAMIDE HYDROCHLORIDE 4 MG: 2 CAPSULE ORAL at 17:12

## 2025-08-03 RX ADMIN — ASCORBIC ACID, VITAMIN A PALMITATE, CHOLECALCIFEROL, THIAMINE HYDROCHLORIDE, RIBOFLAVIN-5 PHOSPHATE SODIUM, PYRIDOXINE HYDROCHLORIDE, NIACINAMIDE, DEXPANTHENOL, ALPHA-TOCOPHEROL ACETATE, VITAMIN K1, FOLIC ACID, BIOTIN, CYANOCOBALAMIN: 200; 3300; 200; 6; 3.6; 6; 40; 15; 10; 150; 600; 60; 5 INJECTION, SOLUTION INTRAVENOUS at 20:31

## 2025-08-03 RX ADMIN — DIPHENOXYLATE HYDROCHLORIDE AND ATROPINE SULFATE 1 TABLET: .025; 2.5 TABLET ORAL at 17:12

## 2025-08-03 RX ADMIN — HYDROMORPHONE HYDROCHLORIDE 0.2 MG: 1 INJECTION, SOLUTION INTRAMUSCULAR; INTRAVENOUS; SUBCUTANEOUS at 03:04

## 2025-08-03 RX ADMIN — ACETAMINOPHEN 650 MG: 160 SOLUTION ORAL at 00:49

## 2025-08-03 RX ADMIN — OXYCODONE HYDROCHLORIDE 5 MG: 5 SOLUTION ORAL at 14:05

## 2025-08-03 RX ADMIN — LOPERAMIDE HYDROCHLORIDE 4 MG: 2 CAPSULE ORAL at 20:31

## 2025-08-03 RX ADMIN — HYDROMORPHONE HYDROCHLORIDE 0.2 MG: 1 INJECTION, SOLUTION INTRAMUSCULAR; INTRAVENOUS; SUBCUTANEOUS at 23:14

## 2025-08-03 RX ADMIN — LOPERAMIDE HYDROCHLORIDE 4 MG: 2 CAPSULE ORAL at 12:19

## 2025-08-03 ASSESSMENT — COGNITIVE AND FUNCTIONAL STATUS - GENERAL
DRESSING REGULAR LOWER BODY CLOTHING: A LITTLE
DRESSING REGULAR UPPER BODY CLOTHING: A LITTLE
CLIMB 3 TO 5 STEPS WITH RAILING: A LITTLE
DRESSING REGULAR LOWER BODY CLOTHING: A LITTLE
MOBILITY SCORE: 23
DAILY ACTIVITIY SCORE: 22
CLIMB 3 TO 5 STEPS WITH RAILING: A LITTLE
DRESSING REGULAR UPPER BODY CLOTHING: A LITTLE
DAILY ACTIVITIY SCORE: 22
MOBILITY SCORE: 23

## 2025-08-03 ASSESSMENT — PAIN DESCRIPTION - ORIENTATION: ORIENTATION: LEFT

## 2025-08-03 ASSESSMENT — PAIN DESCRIPTION - LOCATION
LOCATION: ABDOMEN

## 2025-08-03 ASSESSMENT — PAIN - FUNCTIONAL ASSESSMENT
PAIN_FUNCTIONAL_ASSESSMENT: 0-10

## 2025-08-03 ASSESSMENT — PAIN SCALES - GENERAL
PAINLEVEL_OUTOF10: 7
PAINLEVEL_OUTOF10: 7
PAINLEVEL_OUTOF10: 6
PAINLEVEL_OUTOF10: 7
PAINLEVEL_OUTOF10: 4
PAINLEVEL_OUTOF10: 7
PAINLEVEL_OUTOF10: 7
PAINLEVEL_OUTOF10: 3
PAINLEVEL_OUTOF10: 6
PAINLEVEL_OUTOF10: 4
PAINLEVEL_OUTOF10: 7

## 2025-08-03 ASSESSMENT — PAIN SCALES - WONG BAKER
WONGBAKER_NUMERICALRESPONSE: HURTS LITTLE BIT
WONGBAKER_NUMERICALRESPONSE: HURTS LITTLE MORE

## 2025-08-03 ASSESSMENT — PAIN DESCRIPTION - DESCRIPTORS
DESCRIPTORS: ACHING
DESCRIPTORS: ACHING;THROBBING
DESCRIPTORS: ACHING

## 2025-08-03 NOTE — CARE PLAN
The patient's goals for the shift include      The clinical goals for the shift include Patient will remain safe throughout shift    Over the shift, the patient did  Problem: Pain - Adult  Goal: Verbalizes/displays adequate comfort level or baseline comfort level  Outcome: Progressing  Flowsheets (Taken 8/3/2025 1808)  Verbalizes/displays adequate comfort level or baseline comfort level:   Encourage patient to monitor pain and request assistance   Assess pain using appropriate pain scale     Problem: Discharge Planning  Goal: Discharge to home or other facility with appropriate resources  Outcome: Progressing  Flowsheets (Taken 8/3/2025 1808)  Discharge to home or other facility with appropriate resources:   Identify barriers to discharge with patient and caregiver   Arrange for needed discharge resources and transportation as appropriate     Problem: Chronic Conditions and Co-morbidities  Goal: Patient's chronic conditions and co-morbidity symptoms are monitored and maintained or improved  Outcome: Progressing  Flowsheets (Taken 8/3/2025 1808)  Care Plan - Patient's Chronic Conditions and Co-Morbidity Symptoms are Monitored and Maintained or Improved:   Monitor and assess patient's chronic conditions and comorbid symptoms for stability, deterioration, or improvement   Collaborate with multidisciplinary team to address chronic and comorbid conditions and prevent exacerbation or deterioration     Problem: Nutrition  Goal: Nutrient intake appropriate for maintaining nutritional needs  Outcome: Progressing     Problem: Skin  Goal: Decreased wound size/increased tissue granulation at next dressing change  Outcome: Progressing  Flowsheets (Taken 7/31/2025 0605 by Adriana Sales RN)  Decreased wound size/increased tissue granulation at next dressing change: Promote sleep for wound healing  Goal: Participates in plan/prevention/treatment measures  Outcome: Progressing  Flowsheets (Taken 7/30/2025 1850 by Marguerite Burrows  RN)  Participates in plan/prevention/treatment measures:   Elevate heels   Increase activity/out of bed for meals  Goal: Prevent/manage excess moisture  Outcome: Progressing  Flowsheets (Taken 7/30/2025 1850 by Marguerite Burrows RN)  Prevent/manage excess moisture:   Moisturize dry skin   Cleanse incontinence/protect with barrier cream  Goal: Prevent/minimize sheer/friction injuries  Outcome: Progressing  Goal: Promote/optimize nutrition  Outcome: Progressing  Flowsheets (Taken 7/31/2025 0605 by Adriana Sales RN)  Promote/optimize nutrition: Monitor/record intake including meals  Goal: Promote skin healing  Outcome: Progressing     Problem: Pain  Goal: Takes deep breaths with improved pain control throughout the shift  Outcome: Progressing  Goal: Turns in bed with improved pain control throughout the shift  Outcome: Progressing  Goal: Walks with improved pain control throughout the shift  Outcome: Progressing  Goal: Performs ADL's with improved pain control throughout shift  Outcome: Progressing  Goal: Participates in PT with improved pain control throughout the shift  Outcome: Progressing  Goal: Free from opioid side effects throughout the shift  Outcome: Progressing  Goal: Free from acute confusion related to pain meds throughout the shift  Outcome: Progressing     Problem: Fall/Injury  Goal: Not fall by end of shift  Outcome: Progressing  Goal: Be free from injury by end of the shift  Outcome: Progressing  Goal: Verbalize understanding of personal risk factors for fall in the hospital  Outcome: Progressing  Goal: Verbalize understanding of risk factor reduction measures to prevent injury from fall in the home  Outcome: Progressing  Goal: Use assistive devices by end of the shift  Outcome: Progressing  Goal: Pace activities to prevent fatigue by end of the shift  Outcome: Progressing    make progress toward the following goals.

## 2025-08-03 NOTE — PROGRESS NOTES
Colorectal Surgery Progress Note      08/03/25    Summary:  Bereket Em is a 59 y.o. 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 Olson & Vicky with Margarita's reversal/ transverse colon mobilization/ileal window by Dr. Gross on 7/22/25. Post-op course c/b bowel ischemia with afib RVR requiring ICU transfer & amio drip, s/p exlap, bowel resection at anastomosis, end colostomy creation (RUQ) & conversion of double barrel jejunostomy to end jejunostomy (LUQ) on 7/25/25.   Subjective    Subjective:  AF VSS on RA completed abx, tolerating regular diet, lasix 20 x 2 with 3.2L uop (1.9L). Jejunostomy with 1.9 L of liquid OP from 2.4, drain SS 15 from 0, T bili 2.1 D bili 1.2, alk phos 168     Review of Systems:    A 12-point review of systems was performed, and was negative except as above.       Objective    Objective:  Vital signs:   Temp:  [36.1 °C (97 °F)-36.7 °C (98.1 °F)] 36.3 °C (97.3 °F)  Heart Rate:  [65-73] 68  Resp:  [16-18] 18  BP: (100-111)/(57-62) 108/62    Physical Exam:  GEN:  Alert, awake and conversant, no acute distress  HEENT: Sclera anicteric. Moist mucous membranes.  RESP: Chest expansion symmetrical, some increased work of breathing, good oxygenation via pulse oximetry  CV: normal rate, normotensive per chart review  GI: Abdomen soft, appropriately tender,  midline incision with staples, well approximated, ileostomy well pouched on LUQ with watery stool and some gas in pouch, de- functioned colostomy pouched on right. ANA MARIA drain serosanguinous  MSK: No gross deformities. Moves all extremities spontaneously. No  pitting edema  NEURO: Alert and conversive, no focal deficits  PSYCH: Appropriate mood and affect.    I/O last 2 completed shifts:  In: 4667.5 (54.3 mL/kg)   Out: 5190 (60.3 mL/kg) [Urine:3285 (1.6 mL/kg/hr); Drains:15; Stool:1890]  Weight: 86 kg      Labs Past 18 Hours:  Recent Results (from the past 18 hours)   POCT GLUCOSE    Collection Time: 08/02/25  4:06 PM   Result Value Ref Range    POCT Glucose 118 (H) 74 - 99 mg/dL   POCT GLUCOSE    Collection Time: 08/03/25  1:02 AM   Result Value Ref Range    POCT Glucose 123 (H) 74 - 99 mg/dL      Meds:  Current Medications[1]   Imaging:  Imaging  XR chest 1 view  Result Date: 8/2/2025  1. Improved but residual mild bibasilar atelectasis with trace/small bilateral pleural effusions. 2. Medical devices as above. No pneumothorax.     I personally reviewed the images/study and I agree with the findings as stated by Matthew Larson DO, PGY-4. This study was interpreted at [facility], [place], [place].   MACRO: None   Signed by: Zia Funk 8/2/2025 11:14 AM Dictation workstation:   [id number]      Cardiology, Vascular, and Other Imaging  No other imaging results found for the past 2 days          Medications reviewed.  Vital signs reviewed.  Labs reviewed.         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    Plan Today:   - appreciate care per primary team  - will follow up IR consult rec's, reportedly no plans for drain placement  - Continue to  monitor ostomy output, have patient record output, keep running total  - Dehydration education for patient  - Daily weights to guide diuresis  - Recommend repletion of lytes to goal K=4.0, Mag+2.0  - If patient continues to have high output out of his ostomy, would recommend titrating his imodium up to 4mg, QID    Hospital Day: 13     Dispo: Colorectal surgery will continue to follow.     Discussed with Dr. Marie Montilla MD  General Surgery  Colorectal Surgery [id number]               [1]   Current Facility-Administered Medications:     acetaminophen (Tylenol) oral liquid 650 mg, 650 mg, oral, q6h, Charisse Weinbergbena, APRN-CNP, 650 mg at 08/03/25 0612    Adult Clinimix TPN Cyclic, , intravenous, Cyclic PN, Charisse KAYE Kubena, APRN-CNP, Last Rate: 182 mL/hr at 08/02/25 2141, Rate Change at 08/02/25 2141    alteplase (Cathflo Activase) injection 2 mg, 2 mg, intra-catheter, PRN, Charisse Weinbergbena, APRN-CNP    dextrose 50 % injection 12.5 g, 12.5 g, intravenous, q15 min PRN, Charisse L Kubena, APRN-CNP    dextrose 50 % injection 25 g, 25 g, intravenous, q15 min PRN, Charisse L Kubena, APRN-CNP    diphenoxylate-atropine (Lomotil) 2.5-0.025 mg per tablet 1 tablet, 1 tablet, oral, 4x daily, Boris Ledezma MD, 1 tablet at 08/03/25 0612    enoxaparin (Lovenox) syringe 40 mg, 40 mg, subcutaneous, q24h, Charisse Weinbergbemarkell, APRN-CNP, 40 mg at 08/02/25 0829    fat emulsion fish oil/plant based (SMOFlipid) 20 % IV infusion 50 g, 250 mL, intravenous, Daily Lipids, Charisse RESTREPO Kubena, APRN-CNP, Last Rate: 20.8 mL/hr at 08/03/25 0614, Rate Verify at 08/03/25 0614    glucagon (Glucagen) injection 1 mg, 1 mg, intramuscular, q15 min PRN, Charisse L Kubena, APRN-CNP    glucagon (Glucagen) injection 1 mg, 1 mg, intramuscular, q15 min PRCharisse JIM APRN-CNP    heparin flush 10 unit/mL syringe 50 Units, 5 mL, intravenous, PRCharisse JIM APRN-CNP    HYDROmorphone (Dilaudid) injection 0.2 mg, 0.2 mg, intravenous, q3h  PRN, Annabel Patel MD, 0.2 mg at 08/03/25 0304    insulin lispro injection 0-5 Units, 0-5 Units, subcutaneous, q6h, ASHLY Yin    lidocaine 4 % patch 2 patch, 2 patch, transdermal, Daily, Annabel Patel MD, 2 patch at 08/02/25 0829    loperamide (Imodium) capsule 4 mg, 4 mg, oral, 4x daily, Annabel Patel MD, 4 mg at 08/03/25 0612    naloxone (Narcan) injection 0.2 mg, 0.2 mg, intravenous, PRN, ASHLY Yin    ondansetron (Zofran) injection 4 mg, 4 mg, intravenous, q8h PRN, ASHLY Yin    oxyCODONE (Roxicodone) solution 10 mg, 10 mg, oral, q4h PRN, Annabel Patel MD, 10 mg at 07/29/25 0648    oxyCODONE (Roxicodone) solution 5 mg, 5 mg, oral, q4h PRN, Annabel Patel MD, 5 mg at 08/03/25 0120    pantoprazole (Protonix) injection 40 mg, 40 mg, intravenous, Daily, ASHLY Yin, 40 mg at 08/02/25 0829    PHENobarbital (Luminal) injection 65 mg, 65 mg, intravenous, q6h PRN, ASHLY Yin, 65 mg at 07/26/25 2054    traZODone (Desyrel) tablet 100 mg, 100 mg, oral, Nightly, Frederick Abel MD, 100 mg at 08/01/25 2049

## 2025-08-03 NOTE — PROGRESS NOTES
General Surgery Progress Note    08/03/25    Bereket Em    Summary:  Bereket Em is a 59 year old male with a history of perforated diverticulitis s/p Margarita's procedure c/b formation of ECF. Underwent EC fistula takedown, GILLES, Margarita's reversal, abdominal wall debridement, and double barrel jejunostomy with Leyda Olson, Renato, and Vicky on 7/22. Intra-op findings remarkable for adhesions throughout the abdomen and remaining segment of the colon terminating at the splenic flexure requiring significant mobilization and an ileal window. Postoperative course complicated by colon ischemia requiring OR take back for partial colectomy, end colostomy creation (RUQ), and conversion of double barrel jejunostomy to end jejunostomy in LUQ on 7/25. Course c/b high output jejunostomy on maximum dose of imodium and lomotil.     Subjective    Subjective:  No acute events overnight. Vital signs stable. Abdominal pain controlled. Patient is getting up out of bed and walking around room. Tolerating PO intake. Denies fevers, chills, chest pain, coughs, dysuria, nausea, or emesis. He is using his incentive spirometer frequently.       Objective    Objective:      Vital signs:   Temp:  [36.1 °C (97 °F)-36.7 °C (98.1 °F)] 36.3 °C (97.3 °F)  Heart Rate:  [65-73] 68  Resp:  [16-18] 18  BP: (100-111)/(57-62) 108/62    Physical Exam:  GEN: resting comfortably, no acute distress   NEURO: awake, alert, oriented x4   HEENT: Sclera anicteric. Moist mucous membranes.   RESP: very mildly labored breathing on room air, satting >92%, no coughs, pulling >1.5L on incentive spirometer  CV: regular rate and rhythm on telemetry  GI: Abdomen soft, appropriately tender to palpation along midline incision, with some strike-through midline incision dressing. Ostomies x2 pink and healthy appearing. LUQ Jejunostomy output thin, watery, bilious. RUQ colostomy with bowel sweat. Right ANA MARIA drain with serous output.   SKIN: laparotomy closed  loosely with staples  EXT: no peripheral edema     I/O last 2 completed shifts:  In: 4667.5 (54.3 mL/kg)   Out: 5390 (62.7 mL/kg) [Urine:2775 (1.3 mL/kg/hr); Drains:15; Stool:2600]  Weight: 86 kg      Labs Past 18 Hours:  Recent Results (from the past 18 hours)   POCT GLUCOSE    Collection Time: 08/02/25  4:06 PM   Result Value Ref Range    POCT Glucose 118 (H) 74 - 99 mg/dL   POCT GLUCOSE    Collection Time: 08/03/25  1:02 AM   Result Value Ref Range    POCT Glucose 123 (H) 74 - 99 mg/dL        Meds:  Current Medications[1]     Imaging:  Imaging  XR chest 1 view  Result Date: 8/2/2025  1. Improved but residual mild bibasilar atelectasis with trace/small bilateral pleural effusions. 2. Medical devices as above. No pneumothorax.     I personally reviewed the images/study and I agree with the findings as stated by Matthew Larson DO, PGY-4. This study was interpreted at [place], [place].   MACRO: None   Signed by: Zia Funk 8/2/2025 11:14 AM Dictation workstation:   [id number]        Assessment/Plan    Assessment and Plan:  Bereket Em is a Primary - [employer]   59M with PMH of perforated diverticulitis s/p Margarita's procedure c/b formation of ECF. Underwent EC fistula takedown, GILLES, Margarita's reversal, abdominal wall debridement, and double barrel jejunostomy with Leyda Olson, Renato, and Vicky on 7/22.  Intra-op findings remarkable for adhesions throughout the abdomen and remaining segment of the colon terminating at the splenic flexure requiring significant mobilization and an ileal window.  Postoperative course complicated by colon ischemia requiring exploratory laparotomy, partial colectomy, end colostomy creation (RUQ), and conversion of double barrel jejunostomy to end jejunostomy in LUQ on 7/25. Transferred from ICU to floor on 7/28 and progressing appropriately.      Course c/b anasarca and third spacing of fluid 2/2 sepsis, improving after 3 days of  diuresis with lasix. Still has high jejunostomy output >2L day, on max doses lomotil and imodium. Has persistent leukocytosis without fevers or other signs/sx of ongoing infection, Zosyn to end today. CT 7/31 showed small 5cm intraabdominal fluid collection, too small for drainage per IR.      Procedures:  7/22: ECF takedown, extensive GILLES, Margarita's reversal, abdominal wall debridement, and double barrel jejunostomy with Leyda Olson, Renato, and Vicky.    7/25:  partial colectomy with end colostomy creation, conversion of double barrel jejunostomy to end jejunostomy     PLAN:   Neurology: post operative pain  - scheduled tylenol, lidocaine patch    - Liquid oxycodone 5/10mg with 0.2 IV Dilaudid PRN breakthrough   - continue home trazodone      Cardiovascular:   -Vital signs every 4 hours     Pulmonology: pulmonary edema, atelectasis   - CXR 8/2 shows improved bilateral pleural effusions and improved atelectasis  - continue RT  - IS x10 every hour      GI: s/p ECF takedown, Margarita's reversal; end colostomy, end jejunostomy; high jejunostomy output  - CT AP 7/31 showed small left abdominal fluid collection - not amendable to drainage per IR  - continue PPI, Lomotil, increase Imodium to 4mg QID  - soft diet, TPN/lipids,  PICC replaced 7/30   - Zofran PRN nausea   - wound/ostomy nursing consulted, appreciate assistance      :   - Hold Lasix. If short of breath 500cc of albumin + 20mg of IV lasix can be given  - Strict I&Os  - Replace electrolytes as needed to maintain K >4, Mag >2.      ID: h/o E. coli bacteremia, resolved; persistent leukocytosis  - Zosyn x7 days (last dose yesterday), per ID  - 7/27 blood culture results- no growth   - trend WBC   - monitor for signs/sx of infection given persistent leukocytosis, will consider repeat CT in a few days or sooner if patient does not show clinical improvement     Heme:  -Monitor for signs of acute blood loss  -Trend CBC     Endocrine:   - POCT glucose every 6  hours with TPN   - insulin lispro sliding scale      DVT Prophylaxis:  -Continue subcutaneous lovenox, SCDs, ambulation/OOB     Hospital Day: 13     Dispo: Continue current level of care.     Discussed with Dr. Olson.       Boris Ledezma MD  PGY-1 General Surgery  [facility] [phone]         [1]   Current Facility-Administered Medications:     acetaminophen (Tylenol) oral liquid 650 mg, 650 mg, oral, q6h, Charisse Weinbergbena, APRN-CNP, 650 mg at 08/03/25 0049    Adult Clinimix TPN Cyclic, , intravenous, Cyclic PN, Charisse Weinbergbena, APRN-CNP, Last Rate: 182 mL/hr at 08/02/25 2141, Rate Change at 08/02/25 2141    alteplase (Cathflo Activase) injection 2 mg, 2 mg, intra-catheter, PRN, Charisse Weinbergbena, APRN-CNP    dextrose 50 % injection 12.5 g, 12.5 g, intravenous, q15 min PRN, Charisse Weinbergbena, APRN-CNP    dextrose 50 % injection 25 g, 25 g, intravenous, q15 min PRN, Charisse RESTREPO Kubena, APRN-CNP    diphenoxylate-atropine (Lomotil) 2.5-0.025 mg per tablet 1 tablet, 1 tablet, oral, 4x daily, Boris Ledezma MD, 1 tablet at 08/02/25 2049    enoxaparin (Lovenox) syringe 40 mg, 40 mg, subcutaneous, q24h, Charisse Castaneda, APRN-CNP, 40 mg at 08/02/25 0829    fat emulsion fish oil/plant based (SMOFlipid) 20 % IV infusion 50 g, 250 mL, intravenous, Daily Lipids, Charisse Castaneda, APRN-CNP, Last Rate: 20.8 mL/hr at 08/03/25 0309, Rate Verify at 08/03/25 0309    glucagon (Glucagen) injection 1 mg, 1 mg, intramuscular, q15 min PRN, Charisse RESTREPO Kubena, APRN-CNP    glucagon (Glucagen) injection 1 mg, 1 mg, intramuscular, q15 min PRN, Charisse L Kubena, APRN-CNP    heparin flush 10 unit/mL syringe 50 Units, 5 mL, intravenous, PRN, ASHLY Yin    HYDROmorphone (Dilaudid) injection 0.2 mg, 0.2 mg, intravenous, q3h PRN, Annabel Patel MD, 0.2 mg at 08/03/25 0304    insulin lispro injection 0-5 Units, 0-5 Units, subcutaneous, q6h, ASHLY Yin    lidocaine 4 % patch 2 patch, 2  patch, transdermal, Daily, Annabel Patel MD, 2 patch at 08/02/25 0829    loperamide (Imodium) capsule 4 mg, 4 mg, oral, 4x daily, Annabel Patel MD, 4 mg at 08/02/25 2200    naloxone (Narcan) injection 0.2 mg, 0.2 mg, intravenous, PRN, MINERVA Yin-CNP    ondansetron (Zofran) injection 4 mg, 4 mg, intravenous, q8h PRN, MINERVA Yin-CNP    oxyCODONE (Roxicodone) solution 10 mg, 10 mg, oral, q4h PRN, Annabel Patel MD, 10 mg at 07/29/25 0648    oxyCODONE (Roxicodone) solution 5 mg, 5 mg, oral, q4h PRN, Annabel Patel MD, 5 mg at 08/03/25 0120    pantoprazole (Protonix) injection 40 mg, 40 mg, intravenous, Daily, ASHLY Yin, 40 mg at 08/02/25 0829    PHENobarbital (Luminal) injection 65 mg, 65 mg, intravenous, q6h PRN, ASHLY Yin, 65 mg at 07/26/25 2054    traZODone (Desyrel) tablet 100 mg, 100 mg, oral, Nightly, Frederick Abel MD, 100 mg at 08/01/25 2049

## 2025-08-04 ENCOUNTER — APPOINTMENT (OUTPATIENT)
Dept: RADIOLOGY | Facility: HOSPITAL | Age: 60
End: 2025-08-04
Payer: COMMERCIAL

## 2025-08-04 LAB
ALBUMIN SERPL BCP-MCNC: 2.9 G/DL (ref 3.4–5)
ANION GAP SERPL CALC-SCNC: 14 MMOL/L (ref 10–20)
BUN SERPL-MCNC: 22 MG/DL (ref 6–23)
CALCIUM SERPL-MCNC: 8.2 MG/DL (ref 8.6–10.6)
CHLORIDE SERPL-SCNC: 94 MMOL/L (ref 98–107)
CO2 SERPL-SCNC: 27 MMOL/L (ref 21–32)
CREAT SERPL-MCNC: 0.43 MG/DL (ref 0.5–1.3)
EGFRCR SERPLBLD CKD-EPI 2021: >90 ML/MIN/1.73M*2
ERYTHROCYTE [DISTWIDTH] IN BLOOD BY AUTOMATED COUNT: 15.6 % (ref 11.5–14.5)
GLUCOSE BLD MANUAL STRIP-MCNC: 105 MG/DL (ref 74–99)
GLUCOSE BLD MANUAL STRIP-MCNC: 113 MG/DL (ref 74–99)
GLUCOSE BLD MANUAL STRIP-MCNC: 115 MG/DL (ref 74–99)
GLUCOSE BLD MANUAL STRIP-MCNC: 121 MG/DL (ref 74–99)
GLUCOSE BLD MANUAL STRIP-MCNC: 130 MG/DL (ref 74–99)
GLUCOSE BLD MANUAL STRIP-MCNC: 143 MG/DL (ref 74–99)
GLUCOSE SERPL-MCNC: 133 MG/DL (ref 74–99)
HCT VFR BLD AUTO: 22.1 % (ref 41–52)
HGB BLD-MCNC: 7.3 G/DL (ref 13.5–17.5)
MAGNESIUM SERPL-MCNC: 2.09 MG/DL (ref 1.6–2.4)
MCH RBC QN AUTO: 24.9 PG (ref 26–34)
MCHC RBC AUTO-ENTMCNC: 33 G/DL (ref 32–36)
MCV RBC AUTO: 75 FL (ref 80–100)
NRBC BLD-RTO: 0 /100 WBCS (ref 0–0)
PHOSPHATE SERPL-MCNC: 3.1 MG/DL (ref 2.5–4.9)
PLATELET # BLD AUTO: 581 X10*3/UL (ref 150–450)
POTASSIUM SERPL-SCNC: 4.9 MMOL/L (ref 3.5–5.3)
RBC # BLD AUTO: 2.93 X10*6/UL (ref 4.5–5.9)
SODIUM SERPL-SCNC: 130 MMOL/L (ref 136–145)
WBC # BLD AUTO: 18.9 X10*3/UL (ref 4.4–11.3)

## 2025-08-04 PROCEDURE — 71045 X-RAY EXAM CHEST 1 VIEW: CPT

## 2025-08-04 NOTE — CARE PLAN
Problem: Pain - Adult  Goal: Verbalizes/displays adequate comfort level or baseline comfort level  Outcome: Progressing     Problem: Discharge Planning  Goal: Discharge to home or other facility with appropriate resources  Outcome: Progressing     Problem: Chronic Conditions and Co-morbidities  Goal: Patient's chronic conditions and co-morbidity symptoms are monitored and maintained or improved  Outcome: Progressing     Problem: Nutrition  Goal: Nutrient intake appropriate for maintaining nutritional needs  Outcome: Progressing     Problem: Skin  Goal: Decreased wound size/increased tissue granulation at next dressing change  Outcome: Progressing  Goal: Participates in plan/prevention/treatment measures  Outcome: Progressing  Goal: Prevent/manage excess moisture  Outcome: Progressing  Goal: Prevent/minimize sheer/friction injuries  Outcome: Progressing  Goal: Promote/optimize nutrition  Outcome: Progressing  Goal: Promote skin healing  Outcome: Progressing

## 2025-08-04 NOTE — PROGRESS NOTES
I saw and examined the patient.       60 yo male s/p fistula takedown, extensive lysis of adhesions, colostomy reversal, double barrel jejunostomy formation, abdominal wall reconstruction with return to the OR for washout, resection of distal colon, new end colostomy and repositioning of jejnostomy.   Tolerating PO. Somewhat thicker output again.  Had questions about ABX and fluid status/Lasix.   Pain is controlled.   WBCs 19.   On exam, NAD.  Midline dressing inplace; removed and replaced.  Staples in place. No erythema or drainage. Ostomies are pink and viable.  TPN for nutrition.  Diet as tolerated with consideration of 120 cm of bowel from LOT to ostomy.  Local wound care and ostomy care. Lovenox. PT. Antimotility agents for output < 1L.  Discussed that total output has been negative recently and that fatigue may be from deconditioning.  Discussed that completed ABX course for bacteremia and monitoring temperature and WBCs to determine if ABX need to restart or more imaging/cultures needed as sometimes leukocytosis is reactive rather than associated with infection.  ACS team will follow patient for the next few days while I am unavailable.

## 2025-08-04 NOTE — PROGRESS NOTES
08/04/25 0757   Rapid Rounds   Attendance Provider;Nurse;Care Transitions   Expected Discharge Disposition Home Health   Today we still await: Clinical stability   Review at Escalation Rounds No escalation needed     Plan per Medical/Surgical team:   Pt has wound, TPN (weaning), drains, and ostomy.   Waiting for PT/OT recs. Plan TBD.    7/29: Pt back to floor from ICU.   Pt on IV ATB and TPN.   8/1: Pt previously used [facility] for HCA and would like to continue at DC.   Pt's choice for HCA is [facility].  Pt Needs: PT/RN for TPN.     8/4: Plan for pt to DC on TPN. [facility] made aware.      Discharge disposition: [facility], [facility]     ADOD:  8/6     This TCC will continue to follow for home going needs and safe DC plan.     RUSH ANDERSON

## 2025-08-04 NOTE — PROGRESS NOTES
I saw and examined the patient.       60 yo male s/p fistula takedown, extensive lysis of adhesions, colostomy reversal, double barrel jejunostomy formation, abdominal wall reconstruction with return to the OR for washout, resection of distal colon, new end colostomy and repositioning of jejnostomy.   Tolerating PO. Unchanged output consistency.   Reports desiring discharge.   Pain is controlled.   WBCs 18.   On exam, NAD.  Midline dressing inplace; removed and replaced.  Staples in place. No erythema or drainage. Ostomies are pink and viable.  TPN for nutrition.  Diet as tolerated with consideration of 120 cm of bowel from LOT to ostomy.  Local wound care and ostomy care. Lovenox. PT. Antimotility agents for output < 1L.  In terms of desire for discharge, discussed reasons for monitoring WBCs and for fever off of ABX and optimizing meds for ostomy output.  He feels like ostomy output will be unchanged given prior output with fistula and anticipates needing IVFs in addition to TPN at home. ACS team/Dr. Mcneal will follow patient for the next few days while I am unavailable.

## 2025-08-04 NOTE — CARE PLAN
Problem: Pain - Adult  Goal: Verbalizes/displays adequate comfort level or baseline comfort level  Outcome: Progressing     Problem: Discharge Planning  Goal: Discharge to home or other facility with appropriate resources  Outcome: Progressing     Problem: Chronic Conditions and Co-morbidities  Goal: Patient's chronic conditions and co-morbidity symptoms are monitored and maintained or improved  Outcome: Progressing     Problem: Nutrition  Goal: Nutrient intake appropriate for maintaining nutritional needs  Outcome: Progressing     Problem: Skin  Goal: Decreased wound size/increased tissue granulation at next dressing change  Outcome: Progressing  Flowsheets (Taken 8/4/2025 0914)  Decreased wound size/increased tissue granulation at next dressing change: Promote sleep for wound healing  Goal: Participates in plan/prevention/treatment measures  Outcome: Progressing  Goal: Prevent/manage excess moisture  Outcome: Progressing  Goal: Prevent/minimize sheer/friction injuries  Outcome: Progressing  Goal: Promote/optimize nutrition  Outcome: Progressing  Goal: Promote skin healing  Outcome: Progressing     Problem: Pain  Goal: Takes deep breaths with improved pain control throughout the shift  Outcome: Progressing  Goal: Turns in bed with improved pain control throughout the shift  Outcome: Progressing  Goal: Walks with improved pain control throughout the shift  Outcome: Progressing  Goal: Performs ADL's with improved pain control throughout shift  Outcome: Progressing  Goal: Participates in PT with improved pain control throughout the shift  Outcome: Progressing  Goal: Free from opioid side effects throughout the shift  Outcome: Progressing  Goal: Free from acute confusion related to pain meds throughout the shift  Outcome: Progressing   The patient's goals for the shift include      The clinical goals for the shift include pt will have adequate pain control

## 2025-08-04 NOTE — PROGRESS NOTES
"    Colorectal Surgery Progress Note      08/04/25    Summary:  Bereket Em is a 59 y.o. 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 Olson & Vicky with Margarita's reversal/transverse colon mobilization/ileal window by Dr. Gross on 7/22/25. Post-op course c/b bowel ischemia with afib RVR requiring ICU transfer & amio drip, s/p exlap, bowel resection at anastomosis, end colostomy creation (RUQ) & conversion of double barrel jejunostomy to end jejunostomy (LUQ) on 7/25/25.   Subjective    Subjective:  This morning, reports feeling stir crazy & tired of being \"tied up\" by IV pole. Denies pain, nausea/vomiting on regular diet. Feels a little short of breath this morning. Is ambulating as able in room. Notices that jejunostomy output has slowed down, although not any thicker.     Review of Systems:    A 12-point review of systems was performed, and was negative except as above.       Objective    Objective:  Vital signs:   Temp:  [36.3 °C (97.3 °F)-37.8 °C (100 °F)] 36.6 °C (97.9 °F)  Heart Rate:  [62-73] 64  Resp:  [17-18] 17  BP: ()/(51-68) 115/68    Physical Exam:  GEN:  Alert, awake and conversant, no acute distress, depressed/frustrated appearing  HEENT: Sclera anicteric. Moist mucous membranes.  RESP: Chest expansion symmetrical, mild increased work of breathing  CV: normal rate, normotensive per chart review. Mild +1 pitting BLE edema.  GI: Abdomen soft, non tender to palpation, midline incision well approximated with staples without erythema or drainage. LUQ Jejunostomy pink & perfused, well pouched, with liquid green " stool/some gas in pouch, RUQ de-functioned colostomy pink & perfused, well pouched, with bowel sweat in pouch. ANA MARIA drain serosanguinous with scant output  MSK: No gross deformities. Moves all extremities spontaneously x4.   NEURO: A&O x3, no focal deficits  PSYCH: Appropriate mood and affect.    I/O last 2 completed shifts:  In: 1080 (14.2 mL/kg) [P.O.:1080]  Out: 3098 (40.7 mL/kg) [Urine:1520 (0.8 mL/kg/hr); Drains:3; Stool:1575]  Weight: 76.2 kg      Labs Past 18 Hours:  Recent Results (from the past 18 hours)   POCT GLUCOSE    Collection Time: 08/03/25  4:05 PM   Result Value Ref Range    POCT Glucose 127 (H) 74 - 99 mg/dL   POCT GLUCOSE    Collection Time: 08/03/25  8:53 PM   Result Value Ref Range    POCT Glucose 132 (H) 74 - 99 mg/dL   POCT GLUCOSE    Collection Time: 08/04/25 12:23 AM   Result Value Ref Range    POCT Glucose 130 (H) 74 - 99 mg/dL   CBC    Collection Time: 08/04/25  6:04 AM   Result Value Ref Range    WBC 18.9 (H) 4.4 - 11.3 x10*3/uL    nRBC 0.0 0.0 - 0.0 /100 WBCs    RBC 2.93 (L) 4.50 - 5.90 x10*6/uL    Hemoglobin 7.3 (L) 13.5 - 17.5 g/dL    Hematocrit 22.1 (L) 41.0 - 52.0 %    MCV 75 (L) 80 - 100 fL    MCH 24.9 (L) 26.0 - 34.0 pg    MCHC 33.0 32.0 - 36.0 g/dL    RDW 15.6 (H) 11.5 - 14.5 %    Platelets 581 (H) 150 - 450 x10*3/uL   POCT GLUCOSE    Collection Time: 08/04/25  6:29 AM   Result Value Ref Range    POCT Glucose 143 (H) 74 - 99 mg/dL      Meds:  Current Medications[1]   Imaging:  Imaging  XR chest 1 view  Result Date: 8/2/2025  1. Improved but residual mild bibasilar atelectasis with trace/small bilateral pleural effusions. 2. Medical devices as above. No pneumothorax.     I personally reviewed the images/study and I agree with the findings as stated by Matthew Larson DO, PGY-4. This study was interpreted at [facility], [place], [place].   MACRO: None   Signed by: Zia Fukn 8/2/2025 11:14 AM Dictation workstation:   [id number]      Cardiology,  Vascular, and Other Imaging  No other imaging results found for the past 2 days          Medications reviewed.  Vital signs reviewed.  Labs reviewed.         Assessment/Plan    Assessment and Plan:  Bereket Em is a Consult (Corwin Owens)-59M with a history of perforated diverticulitis s/p Soliman's procedure C/B ECF now s/p enterocutaneous fistula takedown, Soliman's reversal with end jejunostomy creation, ventral wall hernia repair (>10 cm), and abdominal wall debridement with wound VAC placement on 7/22 c/b bowel ischemia and afib with RVR s/p exlap with ischemic bowel resection, colonic anastomosis takedown and end colostomy formation on amio drip. Jejunostomy output has slowed to safe & manageable amount on Imodium/Lomotil. Still appears fluid overloaded as evidenced by BLE edema & subjective/objective mild shortness of breath. Patient appears depressed with prolonged hospital stay; declined pet/art/music therapy.     Plan Today:   - appreciate care per primary team  - will follow up IR consult recs regarding LLQ abdominal fluid collection; no planned intervention at this time  - Recommend continuing to monitor jejunostomy output. Have patient record output, keep running total & continue dehydration education  -Would consider discontinuing IV dilaudid in setting of almost 2 weeks post-op & attempting to normalize patient for dispo  - Would consider saline locking IV during day as able so patient can move around more freely  - Would consider brief off floor privileges as respiratory status/clinical status allows for change of scenery & to improve morale  - Daily weights/clinical exam to guide diuresis. Would manually calculate I&Os, including TPN volume, for more accurate fluid status, as chart has high probability of being erroneous or not calculated in real time  - Recommend repletion of lytes to goal of Mag >2, Phos >3, K >4  - Recommend maxing Lomotil to 2 tabs, QID    Hospital Day: 14     Dispo:  Colorectal surgery will continue to follow.   Plan of care discussed with patient, Dr. Gross, & colorectal surgery team.    Nydia LIMON  Colorectal Surgery NP   [facility] Service Pager #[phone]               [1]   Current Facility-Administered Medications:     acetaminophen (Tylenol) oral liquid 650 mg, 650 mg, oral, q6h, Charisse Weinbergbena, APRN-CNP, 650 mg at 08/04/25 0557    Adult Clinimix TPN Cyclic, , intravenous, Cyclic PN, Charisse Weinbergbena, APRN-CNP, Last Rate: 90 mL/hr at 08/03/25 2031, New Bag at 08/03/25 2031    alteplase (Cathflo Activase) injection 2 mg, 2 mg, intra-catheter, PRN, Charisse Weinbergbemarkell, APRN-CNP    dextrose 50 % injection 12.5 g, 12.5 g, intravenous, q15 min PRN, Charisse Weinbergbena, APRN-CNP    dextrose 50 % injection 25 g, 25 g, intravenous, q15 min PRN, Charisse Weinbergbena, APRN-CNP    diphenoxylate-atropine (Lomotil) 2.5-0.025 mg per tablet 1 tablet, 1 tablet, oral, 4x daily, Boris Ledezma MD, 1 tablet at 08/04/25 0557    enoxaparin (Lovenox) syringe 40 mg, 40 mg, subcutaneous, q24h, Charisse Weinbergbemarkell, APRN-CNP, 40 mg at 08/03/25 0848    fat emulsion fish oil/plant based (SMOFlipid) 20 % IV infusion 50 g, 250 mL, intravenous, Daily Lipids, Charisse Weinbergbemarkell, APRN-CNP, Last Rate: 20.8 mL/hr at 08/03/25 2031, 50 g at 08/03/25 2031    glucagon (Glucagen) injection 1 mg, 1 mg, intramuscular, q15 min PRN, Charisse Weinbergbena, APRN-CNP    glucagon (Glucagen) injection 1 mg, 1 mg, intramuscular, q15 min PRN, Charisse Weinbergbena, APRN-CNP    heparin flush 10 unit/mL syringe 50 Units, 5 mL, intravenous, PRN, Charisse Weinbergbena, APRN-CNP    HYDROmorphone (Dilaudid) injection 0.2 mg, 0.2 mg, intravenous, q3h PRN, Annabel Patel MD, 0.2 mg at 08/03/25 2314    insulin lispro injection 0-5 Units, 0-5 Units, subcutaneous, q6h, ASHLY Yin    lidocaine 4 % patch 2 patch, 2 patch, transdermal, Daily, Annabel Patel MD, 2 patch at 08/03/25 0847    loperamide (Imodium) capsule 4 mg, 4 mg, oral,  4x daily, Annabel Patel MD, 4 mg at 08/04/25 0557    naloxone (Narcan) injection 0.2 mg, 0.2 mg, intravenous, PRN, ASHLY Yin    ondansetron (Zofran) injection 4 mg, 4 mg, intravenous, q8h PRN, ASHLY Yin    oxyCODONE (Roxicodone) solution 10 mg, 10 mg, oral, q4h PRN, Annabel Patel MD, 10 mg at 07/29/25 0648    oxyCODONE (Roxicodone) solution 5 mg, 5 mg, oral, q4h PRN, Annabel Patel MD, 5 mg at 08/03/25 2130    pantoprazole (Protonix) injection 40 mg, 40 mg, intravenous, Daily, ASHLY Yin, 40 mg at 08/03/25 0847    PHENobarbital (Luminal) injection 65 mg, 65 mg, intravenous, q6h PRN, ASHLY Yin, 65 mg at 07/26/25 2054    traZODone (Desyrel) tablet 100 mg, 100 mg, oral, Nightly, Frederick Abel MD, 100 mg at 08/01/25 2049

## 2025-08-04 NOTE — PROGRESS NOTES
General Surgery Progress Note    08/04/25    Bereket Em    Summary:  Bereket Em is a 59 year old male with a history of perforated diverticulitis s/p Margarita's procedure c/b formation of ECF. Underwent EC fistula takedown, GILLES, Margarita's reversal, abdominal wall debridement, and double barrel jejunostomy with Leyda Olson, Renato, and Vicky on 7/22. Intra-op findings remarkable for adhesions throughout the abdomen and remaining segment of the colon terminating at the splenic flexure requiring significant mobilization and an ileal window. Postoperative course complicated by colon ischemia requiring OR take back for partial colectomy, end colostomy creation (RUQ), and conversion of double barrel jejunostomy to end jejunostomy in LUQ on 7/25. Course c/b high output jejunostomy on maximum dose of imodium and lomotil.     Subjective    Subjective:  No acute events overnight. Vital signs stable. Abdominal pain controlled. Patient is getting up out of bed and walking around room. Tolerating PO intake. Denies fevers, chills, chest pain, coughs, dysuria, nausea, or emesis. He is using his incentive spirometer frequently.       Objective    Objective:      Vital signs:   Temp:  [36.4 °C (97.5 °F)-37.8 °C (100 °F)] 36.6 °C (97.9 °F)  Heart Rate:  [64-73] 64  Resp:  [17-18] 17  BP: (100-115)/(61-68) 115/68    Physical Exam:  GEN: resting comfortably, no acute distress   NEURO: awake, alert, oriented x4   HEENT: Sclera anicteric. Moist mucous membranes.   RESP: very mildly labored breathing on room air, satting >92%, no coughs, pulling >1.5L on incentive spirometer  CV: regular rate and rhythm on telemetry  GI: Abdomen soft, appropriately tender to palpation along midline incision, with dry midline incision dressing. Ostomies x2 pink and healthy appearing. LUQ Jejunostomy output thin, watery, bilious. RUQ colostomy with bowel sweat. Right ANA MARIA drain with serous output.   SKIN: laparotomy closed loosely with  staples  EXT: no peripheral edema     I/O last 2 completed shifts:  In: 1080 (14.2 mL/kg) [P.O.:1080]  Out: 3098 (40.7 mL/kg) [Urine:1520 (0.8 mL/kg/hr); Drains:3; Stool:1575]  Weight: 76.2 kg      Labs Past 18 Hours:  Recent Results (from the past 18 hours)   POCT GLUCOSE    Collection Time: 08/03/25  4:05 PM   Result Value Ref Range    POCT Glucose 127 (H) 74 - 99 mg/dL   POCT GLUCOSE    Collection Time: 08/03/25  8:53 PM   Result Value Ref Range    POCT Glucose 132 (H) 74 - 99 mg/dL   POCT GLUCOSE    Collection Time: 08/04/25 12:23 AM   Result Value Ref Range    POCT Glucose 130 (H) 74 - 99 mg/dL   CBC    Collection Time: 08/04/25  6:04 AM   Result Value Ref Range    WBC 18.9 (H) 4.4 - 11.3 x10*3/uL    nRBC 0.0 0.0 - 0.0 /100 WBCs    RBC 2.93 (L) 4.50 - 5.90 x10*6/uL    Hemoglobin 7.3 (L) 13.5 - 17.5 g/dL    Hematocrit 22.1 (L) 41.0 - 52.0 %    MCV 75 (L) 80 - 100 fL    MCH 24.9 (L) 26.0 - 34.0 pg    MCHC 33.0 32.0 - 36.0 g/dL    RDW 15.6 (H) 11.5 - 14.5 %    Platelets 581 (H) 150 - 450 x10*3/uL   POCT GLUCOSE    Collection Time: 08/04/25  6:29 AM   Result Value Ref Range    POCT Glucose 143 (H) 74 - 99 mg/dL        Meds:  Current Medications[1]     Imaging:  Imaging  XR chest 1 view  Result Date: 8/2/2025  1. Improved but residual mild bibasilar atelectasis with trace/small bilateral pleural effusions. 2. Medical devices as above. No pneumothorax.     I personally reviewed the images/study and I agree with the findings as stated by Matthew Larson DO, PGY-4. This study was interpreted at [place], [place].   MACRO: None   Signed by: Zia Funk 8/2/2025 11:14 AM Dictation workstation:   [id number]        Assessment/Plan    Assessment and Plan:  Bereket Em is a Primary - [employer]   59M with PMH of perforated diverticulitis s/p Margarita's procedure c/b formation of ECF. Underwent EC fistula takedown, GILLES, Margarita's reversal, abdominal wall debridement, and double  barrel jejunostomy with Renato Rodriguez, and Vicky on 7/22.  Intra-op findings remarkable for adhesions throughout the abdomen and remaining segment of the colon terminating at the splenic flexure requiring significant mobilization and an ileal window.  Postoperative course complicated by colon ischemia requiring exploratory laparotomy, partial colectomy, end colostomy creation (RUQ), and conversion of double barrel jejunostomy to end jejunostomy in LUQ on 7/25. Transferred from ICU to floor on 7/28.     Course c/b anasarca and third spacing of fluid 2/2 sepsis, improving after 3 days of diuresis with lasix. Still has high jejunostomy output >2L day, on max doses lomotil and imodium. Has persistent leukocytosis without fevers or other signs/sx of ongoing infection, Zosyn to end today. CT 7/31 showed small 5cm intraabdominal fluid collection, too small for drainage per IR.      Procedures:  7/22: ECF takedown, extensive GILLES, Margarita's reversal, abdominal wall debridement, and double barrel jejunostomy with Renato Rodriguez, and Vicky.    7/25:  partial colectomy with end colostomy creation, conversion of double barrel jejunostomy to end jejunostomy.     Leukocytosis did not significantly decrease. WBC: 18.9 (19.3).       PLAN:   Neurology: post operative pain  - scheduled tylenol, lidocaine patch    - Liquid oxycodone 5/10mg with 0.2 IV Dilaudid PRN breakthrough   - continue home trazodone      Cardiovascular:   -Vital signs every 4 hours     Pulmonology: pulmonary edema, atelectasis   - IS x10 every hour      GI: s/p ECF takedown, Margarita's reversal; end colostomy, end jejunostomy; high jejunostomy output  - CT AP 7/31 showed small left abdominal fluid collection - not amendable to drainage per IR  - continue PPI, Lomotil and Imodium   - soft diet, TPN/lipids, PICC replaced 7/30   - Zofran PRN nausea   - wound/ostomy nursing consulted, appreciate assistance      :   - Hold Lasix. If short of  breath 500cc of albumin + 20mg of IV lasix can be given  - Strict I&Os  - Replace electrolytes as needed to maintain K >4, Mag >2.      ID: h/o E. coli bacteremia, resolved; persistent leukocytosis  - Zosyn x7 days (last dose on saturday), per ID  - 7/27 blood culture results- no growth   - trend WBC   - monitor for signs/sx of infection given persistent leukocytosis. will re-consult IR for need of repeat CT and possible aspiration vs drain placement     Heme:  -Monitor for signs of acute blood loss  -Trend CBC     Endocrine:   - POCT glucose every 6 hours with TPN   - insulin lispro sliding scale      DVT Prophylaxis:  -Continue subcutaneous lovenox, SCDs, ambulation/OOB     Hospital Day: 14     Dispo: Continue current level of care.     Discussed with Dr. Olson.     Boris Ledezma MD  PGY-1 General Surgery  [facility] [id number]         [1]   Current Facility-Administered Medications:     acetaminophen (Tylenol) oral liquid 650 mg, 650 mg, oral, q6h, Charisse Weinbergbena, APRN-CNP, 650 mg at 08/04/25 0557    Adult Clinimix TPN Cyclic, , intravenous, Cyclic PN, Charisse L Kubena, APRN-CNP, Last Rate: 90 mL/hr at 08/03/25 2031, New Bag at 08/03/25 2031    alteplase (Cathflo Activase) injection 2 mg, 2 mg, intra-catheter, PRN, Charisse L Kubena, APRN-CNP    dextrose 50 % injection 12.5 g, 12.5 g, intravenous, q15 min PRN, Charisse L Kubena, APRN-CNP    dextrose 50 % injection 25 g, 25 g, intravenous, q15 min PRN, Charisse L Kubena, APRN-CNP    diphenoxylate-atropine (Lomotil) 2.5-0.025 mg per tablet 1 tablet, 1 tablet, oral, 4x daily, Boris Ledezma MD, 1 tablet at 08/04/25 0557    enoxaparin (Lovenox) syringe 40 mg, 40 mg, subcutaneous, q24h, Charisse Castaneda APRN-CNP, 40 mg at 08/03/25 0848    fat emulsion fish oil/plant based (SMOFlipid) 20 % IV infusion 50 g, 250 mL, intravenous, Daily Lipids, Charisse Castaneda, MINERVA-CNP, Last Rate: 20.8 mL/hr at 08/03/25 2031, 50 g at 08/03/25  2031    glucagon (Glucagen) injection 1 mg, 1 mg, intramuscular, q15 min PRN, MINERVA Yin-CNP    glucagon (Glucagen) injection 1 mg, 1 mg, intramuscular, q15 min PRN, ASHLY Yin    heparin flush 10 unit/mL syringe 50 Units, 5 mL, intravenous, PRN, MINERVA Yin-CNP    HYDROmorphone (Dilaudid) injection 0.2 mg, 0.2 mg, intravenous, q3h PRN, Annabel Patel MD, 0.2 mg at 08/03/25 2314    insulin lispro injection 0-5 Units, 0-5 Units, subcutaneous, q6h, ASHLY Yin    lidocaine 4 % patch 2 patch, 2 patch, transdermal, Daily, Annabel Patel MD, 2 patch at 08/03/25 0847    loperamide (Imodium) capsule 4 mg, 4 mg, oral, 4x daily, Annabel Patel MD, 4 mg at 08/04/25 0557    naloxone (Narcan) injection 0.2 mg, 0.2 mg, intravenous, PRN, ASHLY Yin    ondansetron (Zofran) injection 4 mg, 4 mg, intravenous, q8h PRN, ASHLY Yin    oxyCODONE (Roxicodone) solution 10 mg, 10 mg, oral, q4h PRN, Annabel Patel MD, 10 mg at 07/29/25 0648    oxyCODONE (Roxicodone) solution 5 mg, 5 mg, oral, q4h PRN, Annabel Patel MD, 5 mg at 08/03/25 2130    pantoprazole (Protonix) injection 40 mg, 40 mg, intravenous, Daily, ASHLY Yin, 40 mg at 08/03/25 0847    PHENobarbital (Luminal) injection 65 mg, 65 mg, intravenous, q6h PRN, ASHLY Yin, 65 mg at 07/26/25 2054    traZODone (Desyrel) tablet 100 mg, 100 mg, oral, Nightly, Frederick Abel MD, 100 mg at 08/01/25 1640

## 2025-08-04 NOTE — CONSULTS
"Nutrition Follow Up Assessment:   Nutrition Assessment    Reason for Assessment: Provider consult order    Patient is a 59 y.o. male on day 13 of admission presenting with ECF     Received consult for  \"Concentrate TPN so that patient overall gets less fluid volume administered. He is third spacing a lot of fluids, requiring diuretics.\"    7/30 PICC replaced     Nutrition History:  Food and Nutrient History: Previous follow up completed on 7/30. Pt has remained on Clinimix E 5/15 @ 90ml/hrx1 hour, 182ml/hrx10 hours, 90ml/hrx1 hour with 250ml SMOF lipids @ 20.8ml/hrx12 hours overnight and has remained on easy to chew regular diet. TPN provides 2250 total mls, 1920kcals, 100g PRO, and 300g dextrose. Per MD note on 8/4, pt has been tolerating PO diet with no nausea or emesis.       Anthropometrics:  Height: 172.7 cm (5' 8\")   Weight: 76.2 kg (167 lb 15.9 oz)   BMI (Calculated): 25.55  IBW/kg (Dietitian Calculated): 70 kg  Percent of IBW: 103 %                      Weight History:   Date/Time Weight   08/04/25 0624 76.2 kg (167 lb 15.9 oz)   08/03/25 0600 76.9 kg (169 lb 8 oz)   08/02/25 0531 86 kg (189 lb 9.5 oz)   08/01/25 0545 80.7 kg (177 lb 14.6 oz)   07/31/25 0622 82.5 kg (181 lb 14.1 oz)   07/30/25 1609 82.2 kg (181 lb 3.2 oz)     Weight Change %:  Weight History / % Weight Change: Wt has decreased since previous follow up suspect this is d/t fluid shifts since it is noted that pt is fluid overloaded    Nutrition Focused Physical Exam Findings:    Subcutaneous Fat Loss:   Defer Subcutaneous Fat Loss Assessment: Defer all  Defer All Reason: Completed on 7/23  Muscle Wasting:  Defer Muscle Wasting Assessment: Defer all  Edema:  Edema: +1 trace  Edema Location: generalized  Physical Findings:  Hair: Negative  Eyes: Negative  Nails: Negative  Skin: Positive (s/p abdominal procedures)    Nutrition Significant Labs:  TPN/PPN Labs:   Results from last 7 days   Lab Units 08/04/25  0604 08/03/25  0555 08/02/25  0529 " 08/01/25  0617   GLUCOSE mg/dL 133* 108* 112* 146*   POTASSIUM mmol/L 4.9 4.8 4.6 4.3   PHOSPHORUS mg/dL 3.1 3.5 3.4 2.9   MAGNESIUM mg/dL 2.09 2.26 2.07 2.03   SODIUM mmol/L 130* 132* 132* 133*   CHLORIDE mmol/L 94* 93* 95* 94*   ALT U/L  --   --  27  --    AST U/L  --   --  16  --    ALK PHOS U/L  --   --  168*  --    BILIRUBIN TOTAL mg/dL  --   --  2.1*  --         Nutrition Specific Medications:  Scheduled medications  diphenoxylate-atropine, 1 tablet, oral, 4x daily  fat emulsion fish oil/plant based, 250 mL, intravenous, Daily Lipids  insulin lispro, 0-5 Units, subcutaneous, q6h  lidocaine, 2 patch, transdermal, Daily  loperamide, 4 mg, oral, 4x daily  pantoprazole, 40 mg, intravenous, Daily    Continuous medications  Adult Clinimix TPN Cyclic, , Last Rate: 90 mL/hr at 08/03/25 2031      I/O:   Last BM Date: 08/03/25; Stool Appearance: Loose, Liquid (08/03/25 0850)    Dietary Orders (From admission, onward)       Start     Ordered    08/01/25 1225  Adult diet Regular; Easy to chew  Diet effective now        Question Answer Comment   Diet type Regular    Texture Easy to chew        08/01/25 1224    07/31/25 1233  Oral nutritional supplements  Until discontinued        Question Answer Comment   Deliver with All meals    Select supplement: Ensure Max        07/31/25 1233    07/28/25 1123  May Participate in Room Service  ( ROOM SERVICE MAY PARTICIPATE)  Once        Question:  .  Answer:  Yes    07/28/25 1122                     Estimated Needs:   Total Energy Estimated Needs in 24 hours (kCal): 2100 kCal  Method for Estimating Needs: 30kcal/kg CBW  Total Protein Estimated Needs in 24 Hours (g): 110 g  Method for Estimating 24 Hour Protein Needs: 1.5g/kg CBW  Total Fluid Estimated Needs in 24 Hours (mL): 2100 mL  Method for Estimating 24 Hour Fluid Needs: 1ml/kcal or per MD team        Nutrition Diagnosis   Malnutrition Diagnosis  Patient has Malnutrition Diagnosis: No    Nutrition Diagnosis  Patient has  Nutrition Diagnosis: Yes  Diagnosis Status (1): Active  Nutrition Diagnosis 1: Altered GI function  Related to (1): surgical history  As Evidenced by (1): ECF       Nutrition Interventions/Recommendations   Nutrition prescription for oral nutrition, Nutrition prescription for parenteral nutrition    Nutrition Recommendations:  Individualized Nutrition Prescription Provided for :   1. Continue easy to chew regular diet as tolerated   2. Please provide Clinimix E 5/20 @ 90ml/hrx1 hour, 162ml/hrx10 hours, 90ml/hrx1 hour with 250ml SMOF lipids @20.8ml/hrx12 hours overnight.    Nutrition Interventions/Goals:   Meals and Snacks: General healthful diet  Parenteral Nutrition: Management of composition of parenteral nutrition  Goal: TPN provides 2050 total mls, 2084kcals, 90g PRO, and 360g dextrose              Nutrition Monitoring and Evaluation   Intake / Amount of food: Consumes at least 50% or more of meals/snacks/supplements  Enteral and Parenteral Nutrition Intake Determination: Parenteral nutrition intake - Tolerate TPN at goal rate    Body Weight: Body weight - Maintain stable weight    Electrolyte and Renal Panel: Electrolytes within normal limits  Glucose/Endocrine Profile: Glucose within normal limits ( mg/dL)         Goal Status: Some progress toward goal(s)    Time Spent (min): 60 minutes

## 2025-08-05 LAB
ABO GROUP (TYPE) IN BLOOD: NORMAL
ALBUMIN SERPL BCP-MCNC: 2.8 G/DL (ref 3.4–5)
ANION GAP SERPL CALC-SCNC: 13 MMOL/L (ref 10–20)
ANTIBODY SCREEN: NORMAL
BUN SERPL-MCNC: 22 MG/DL (ref 6–23)
CALCIUM SERPL-MCNC: 8.3 MG/DL (ref 8.6–10.6)
CHLORIDE SERPL-SCNC: 96 MMOL/L (ref 98–107)
CO2 SERPL-SCNC: 28 MMOL/L (ref 21–32)
CREAT SERPL-MCNC: 0.39 MG/DL (ref 0.5–1.3)
EGFRCR SERPLBLD CKD-EPI 2021: >90 ML/MIN/1.73M*2
ERYTHROCYTE [DISTWIDTH] IN BLOOD BY AUTOMATED COUNT: 15.7 % (ref 11.5–14.5)
ERYTHROCYTE [DISTWIDTH] IN BLOOD BY AUTOMATED COUNT: 15.8 % (ref 11.5–14.5)
GLUCOSE BLD MANUAL STRIP-MCNC: 108 MG/DL (ref 74–99)
GLUCOSE BLD MANUAL STRIP-MCNC: 110 MG/DL (ref 74–99)
GLUCOSE BLD MANUAL STRIP-MCNC: 118 MG/DL (ref 74–99)
GLUCOSE BLD MANUAL STRIP-MCNC: 151 MG/DL (ref 74–99)
GLUCOSE BLD MANUAL STRIP-MCNC: 153 MG/DL (ref 74–99)
GLUCOSE SERPL-MCNC: 121 MG/DL (ref 74–99)
HCT VFR BLD AUTO: 20.8 % (ref 41–52)
HCT VFR BLD AUTO: 21 % (ref 41–52)
HGB BLD-MCNC: 6.9 G/DL (ref 13.5–17.5)
HGB BLD-MCNC: 7 G/DL (ref 13.5–17.5)
MAGNESIUM SERPL-MCNC: 2.15 MG/DL (ref 1.6–2.4)
MCH RBC QN AUTO: 25.3 PG (ref 26–34)
MCH RBC QN AUTO: 25.5 PG (ref 26–34)
MCHC RBC AUTO-ENTMCNC: 33.2 G/DL (ref 32–36)
MCHC RBC AUTO-ENTMCNC: 33.3 G/DL (ref 32–36)
MCV RBC AUTO: 76 FL (ref 80–100)
MCV RBC AUTO: 76 FL (ref 80–100)
NRBC BLD-RTO: 0 /100 WBCS (ref 0–0)
NRBC BLD-RTO: 0 /100 WBCS (ref 0–0)
PHOSPHATE SERPL-MCNC: 3.9 MG/DL (ref 2.5–4.9)
PLATELET # BLD AUTO: 677 X10*3/UL (ref 150–450)
PLATELET # BLD AUTO: 678 X10*3/UL (ref 150–450)
POTASSIUM SERPL-SCNC: 4.9 MMOL/L (ref 3.5–5.3)
RBC # BLD AUTO: 2.73 X10*6/UL (ref 4.5–5.9)
RBC # BLD AUTO: 2.75 X10*6/UL (ref 4.5–5.9)
RH FACTOR (ANTIGEN D): NORMAL
SODIUM SERPL-SCNC: 132 MMOL/L (ref 136–145)
WBC # BLD AUTO: 17.4 X10*3/UL (ref 4.4–11.3)
WBC # BLD AUTO: 18.6 X10*3/UL (ref 4.4–11.3)

## 2025-08-05 NOTE — PROGRESS NOTES
"    Colorectal Surgery Progress Note      08/05/25    Summary:  Berkeet Em is a 59 y.o. 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 Olson & Vicky with Margarita's reversal/transverse colon mobilization/ileal window by Dr. Gross on 7/22/25. Post-op course c/b bowel ischemia with afib RVR requiring ICU transfer & amio drip, s/p exlap, bowel resection at anastomosis, end colostomy creation (RUQ) & conversion of double barrel jejunostomy to end jejunostomy (LUQ) on 7/25/25.   Subjective    Subjective:  No acute events overnight. Patient is still having high output, but is thickening up a little. Reports frustration with all the \"back and forth\" between his care teams without a clear plan and also being made NPO.     Review of Systems:    A 12-point review of systems was performed, and was negative except as above.       Objective    Objective:  Vital signs:   Temp:  [36.4 °C (97.5 °F)-36.6 °C (97.9 °F)] 36.4 °C (97.5 °F)  Heart Rate:  [61-67] 61  Resp:  [16-17] 16  BP: (104-119)/(63-75) 109/63    Physical Exam:  GEN: No acute distress. Alert, awake and conversant.  HEENT: Sclera anicteric. Moist mucous membranes.  RESP: work of breathing much improved today, almost at baseline, on RA  CV: Regular rate, normotensive,   GI:  Abdomen soft, non tender to palpation, midline incision well approximated with staples without erythema or drainage, covered with ABDs. LUQ Jejunostomy pink & perfused, well pouched, with liquid green mushy stool + gas in pouch, RUQ de-functioned colostomy pink & perfused, well pouched, with bowel sweat in pouch. ANA MARIA " drain dark serosanguinous with scant output   MSK: No gross deformities. Moves all extremities spontaneously. No LE pitting edema  NEURO: Alert and oriented x3. No focal deficits.  PSYCH: Appropriate affect, mood seems depressed    I/O last 2 completed shifts:  In: 600 (8 mL/kg) [P.O.:600]  Out: 4188 (55.5 mL/kg) [Urine:1985 (1.1 mL/kg/hr); Drains:3; Stool:2200]  Weight: 75.4 kg      Labs Past 18 Hours:  Recent Results (from the past 18 hours)   POCT GLUCOSE    Collection Time: 08/04/25  5:18 PM   Result Value Ref Range    POCT Glucose 113 (H) 74 - 99 mg/dL   POCT GLUCOSE    Collection Time: 08/04/25  7:52 PM   Result Value Ref Range    POCT Glucose 115 (H) 74 - 99 mg/dL   CBC    Collection Time: 08/05/25  5:29 AM   Result Value Ref Range    WBC 17.4 (H) 4.4 - 11.3 x10*3/uL    nRBC 0.0 0.0 - 0.0 /100 WBCs    RBC 2.73 (L) 4.50 - 5.90 x10*6/uL    Hemoglobin 6.9 (L) 13.5 - 17.5 g/dL    Hematocrit 20.8 (L) 41.0 - 52.0 %    MCV 76 (L) 80 - 100 fL    MCH 25.3 (L) 26.0 - 34.0 pg    MCHC 33.2 32.0 - 36.0 g/dL    RDW 15.8 (H) 11.5 - 14.5 %    Platelets 677 (H) 150 - 450 x10*3/uL   Renal Function Panel    Collection Time: 08/05/25  5:29 AM   Result Value Ref Range    Glucose 121 (H) 74 - 99 mg/dL    Sodium 132 (L) 136 - 145 mmol/L    Potassium 4.9 3.5 - 5.3 mmol/L    Chloride 96 (L) 98 - 107 mmol/L    Bicarbonate 28 21 - 32 mmol/L    Anion Gap 13 10 - 20 mmol/L    Urea Nitrogen 22 6 - 23 mg/dL    Creatinine 0.39 (L) 0.50 - 1.30 mg/dL    eGFR >90 >60 mL/min/1.73m*2    Calcium 8.3 (L) 8.6 - 10.6 mg/dL    Phosphorus 3.9 2.5 - 4.9 mg/dL    Albumin 2.8 (L) 3.4 - 5.0 g/dL   Magnesium    Collection Time: 08/05/25  5:29 AM   Result Value Ref Range    Magnesium 2.15 1.60 - 2.40 mg/dL   POCT GLUCOSE    Collection Time: 08/05/25  6:40 AM   Result Value Ref Range    POCT Glucose 153 (H) 74 - 99 mg/dL      Meds:  Current Medications[1]   Imaging:  Imaging  CT abdomen pelvis w IV contrast  Result Date: 8/4/2025  1.  Similar appearance of a  fluid collection in the left lower quadrant which measures 6.1 x 3.2 cm and, when accounting for differences in technique, shows no significant interval change. 2. There is a fluid collection seen along the inferior edge of the right liver which measures 3.6 x 1.7 cm. This appears more organized with a peripheral enhancing wall when compared to prior. Small pocket of air seen within this collection, possibly representing postsurgical changes versus gas-forming organism. 3. Similar appearance of diffuse edema and small amounts of free fluid scattered throughout the mesentery, likely inflammatory/infectious in nature. 4. Interval improvement in bilateral pleural effusions and left basilar lung ground-glass opacities, likely representing improvement in pulmonary edema.     I personally reviewed the images/study and I agree with the findings as stated by Annabelle Chen MD (resident).   MACRO: None   Signed by: Nando Mack 8/4/2025 8:57 PM Dictation workstation:   [id number]    XR chest 1 view  Result Date: 8/4/2025  1. Unchanged appearance of trace/small bilateral pleural effusions with associated mild bibasilar atelectasis. 2. Left-sided PICC line with tip terminating in the mid SVC.   I personally reviewed the images/study and I agree with the findings as stated. This study was interpreted by radiology resident Iggy Hendrickson D.O. at [facility], [place], [place].   Signed by: Luis Cervantes 8/4/2025 1:26 PM Dictation workstation:   [id number]      I have reviewed the imaging above as it pertains to the patient's surgical concerns and agree with the radiologist's interpretation.    Medications reviewed.  Vital signs reviewed.  Labs reviewed.         Assessment/Plan    Assessment and Plan:  Bereket Em is a Consult (Oliviaashley Owens)-59M with a history of perforated diverticulitis s/p Soliman's procedure C/B formation of enterocutaneous fistula now s/p enterocutaneous fistula  takedown, Soliman's reversal with end jejunostomy creation, ventral wall hernia repair(>10 cm), and abdominal wall debridement with wound VAC placements on 7/22 c/b bowel ischemia and afib with RVR s/p exlap with ischemic bowel resection, colonic anast takedown and end colostomy formation on amio drip.      Plan Today:   - Appreciate care per primary team  - Will follow up IR consult recs today regarding abdominal fluid collection drainage  - Recommend continuing to monitor jejunostomy output. Have patient record output, keep running total & continue dehydration education  - Would consider saline locking IV during day as able so patient can move around more freely  - Would consider brief off floor privileges as respiratory status/clinical status allows for change of scenery & to improve morale  - Daily weights/clinical exam to guide diuresis. Would manually calculate I&Os, including TPN volume, for more accurate fluid status, as chart has high probability of being erroneous or not calculated in real time  - Recommend maxing Lomotil to 2 tabs, QID    Hospital Day: 15     Dispo: Colorectal surgery will continue to follow.     Discussed with Dr. Gross.         Erica Reyes MD  PGY-1 General Surgery  Colorectal Surgery [id number]           [1]   Current Facility-Administered Medications:     acetaminophen (Tylenol) oral liquid 650 mg, 650 mg, oral, q6h, Charisse Weinbergbemarkell, APRN-CNP, 650 mg at 08/05/25 0532    Adult Clinimix TPN Cyclic, , intravenous, Cyclic PN, Charisse L Kubena, APRN-CNP    alteplase (Cathflo Activase) injection 2 mg, 2 mg, intra-catheter, PRN, Charisse KAYE Kubena, APRN-CNP    dextrose 50 % injection 12.5 g, 12.5 g, intravenous, q15 min PRN, Charisse L Kubena, APRN-CNP    dextrose 50 % injection 25 g, 25 g, intravenous, q15 min PRN, Charisse L Kubena, APRN-CNP    diphenoxylate-atropine (Lomotil) 2.5-0.025 mg per tablet 1 tablet, 1 tablet, oral, 4x daily, Boris Ledezma MD, 1 tablet at  08/05/25 0638    [Held by provider] enoxaparin (Lovenox) syringe 40 mg, 40 mg, subcutaneous, q24h, MINERVA Yin-CNP, 40 mg at 08/04/25 0840    fat emulsion fish oil/plant based (SMOFlipid) 20 % IV infusion 50 g, 250 mL, intravenous, Daily Lipids, ASHLY Yin, Last Rate: 20.8 mL/hr at 08/04/25 2024, 50 g at 08/04/25 2024    glucagon (Glucagen) injection 1 mg, 1 mg, intramuscular, q15 min PRN, MINERVA Yin-CNP    glucagon (Glucagen) injection 1 mg, 1 mg, intramuscular, q15 min PRN, MINERVA Yin-CNP    heparin flush 10 unit/mL syringe 50 Units, 5 mL, intravenous, PRN, MINERVA Yin-CNP    insulin lispro injection 0-5 Units, 0-5 Units, subcutaneous, q6h, ASHLY Yin    lidocaine 4 % patch 2 patch, 2 patch, transdermal, Daily, Annabel Patel MD, 2 patch at 08/04/25 0850    loperamide (Imodium) capsule 4 mg, 4 mg, oral, 4x daily, Annabel Patel MD, 4 mg at 08/05/25 0638    naloxone (Narcan) injection 0.2 mg, 0.2 mg, intravenous, PRN, ASHLY Yin    ondansetron (Zofran) injection 4 mg, 4 mg, intravenous, q8h PRN, MINERVA Yin-CNP    oxyCODONE (Roxicodone) solution 10 mg, 10 mg, oral, q4h PRN, Annabel Patel MD, 10 mg at 07/29/25 0648    oxyCODONE (Roxicodone) solution 5 mg, 5 mg, oral, q4h PRN, Annabel Patel MD, 5 mg at 08/04/25 2039    pantoprazole (Protonix) injection 40 mg, 40 mg, intravenous, Daily, ASHLY Yin, 40 mg at 08/04/25 0840    PHENobarbital (Luminal) injection 65 mg, 65 mg, intravenous, q6h PRN, MINERVA Yin-CNP, 65 mg at 07/26/25 2054    traZODone (Desyrel) tablet 100 mg, 100 mg, oral, Nightly, Frederick Abel MD, 100 mg at 08/01/25 2049

## 2025-08-05 NOTE — PROGRESS NOTES
Music Therapy Note        Therapy Session  Referral Type: New referral this admission  Visit Type: New visit  Session Start Time: 1455  Intervention Delivery: In-person  Conflict of Service: None  Number of family members present: 2  Family Present for Session: Other (Comment)     Pre-assessment  Unable to Assess Reason: Outcomes not applicable  Mood/Affect: Disinterested, Flat/blunted  Verbalized Emotional State: Acceptance         Treatment/Interventions  Music Therapy Interventions: Assessment         Narrative  Assessment Detail: Patient found lying in bed. Patient and family eceptive to education and benefits of music therapy services. Recalled that a MT has done as assessment with him in the past. Declined services for today and stated he would contact us if he changed his mind about utilizing the services. MT left patient with contact information.  Follow-up: Discontinuing order per patient request

## 2025-08-05 NOTE — PROGRESS NOTES
[facility]  ACUTE CARE SURGERY - PROGRESS NOTE    Patient Name: Bereket Em  MRN: [id number]  Admit Date: 2025  : 1965  AGE: 59 y.o.   GENDER: male  ==============================================================================  TODAY'S ASSESSMENT AND PLAN OF CARE:  60 yo male s/p fistula takedown, extensive lysis of adhesions, colostomy reversal, double barrel jejunostomy formation, abdominal wall reconstruction with return to the OR for washout, resection of distal colon, new end colostomy and repositioning of jejnostomy.     Tolerating PO. Unchanged output consistency. TPN for nutrition. Diet as tolerated w     Recs:   TPN for nutrition.    Diet as tolerated with consideration of 120 cm of bowel from LOT to ostomy.    Local wound care and ostomy care.   Lovenox. PT.   Antimotility agents for output < 1L.      Discussed and seen with Dr. Mcneal.    Иван Richter MD  PGY-1 General Surgery  Acute Care Surgery [id number]      ==============================================================================  Subjective   Interval History/Overnight Events:   No major events overnight   Colostomy output 1100  Ileostomy output 1100  PO intake 600      Medications:    Current Medications[1]    Objective    Vitals:    25 0700 25 1142 25 1243 25 1302   BP: 115/53 107/67 109/61 114/74   BP Location: Right arm Right arm  Right arm   Patient Position: Lying Lying  Lying   Pulse: 61 66 64 64   Resp:  18 16 16   Temp: 36.3 °C (97.3 °F) 36.3 °C (97.3 °F) 36.5 °C (97.7 °F) 36.8 °C (98.2 °F)   TempSrc: Temporal Temporal Temporal Temporal   SpO2: 98% (!) 66% 98% 97%   Weight:       Height:          1900 -  0659  In: 1080 [P.O.:1080]  Out: 6215 [Urine:3185; Drains:5]    Physical Exam                                                                                                              General: resting comfortably in bed  Neuro: alert and  oriented, no obvious focal deficit   ENT: moist mucous membranes  CV: hemodynamically stable  Pulm: nonlabored breathing on room air, no conversational dyspnea  GI: Abdomen soft, appropriately tender to palpation along midline incision, with dry midline incision dressing. Ostomies x2 pink and healthy appearing. LUQ Jejunostomy output thin, watery, bilious. RUQ colostomy with bowel sweat. Right ANA MARIA drain with serous output.     Labs    Lab Results   Component Value Date    WBC 18.6 (H) 08/05/2025    HGB 7.0 (L) 08/05/2025    HCT 21.0 (L) 08/05/2025    MCV 76 (L) 08/05/2025     (H) 08/05/2025      Lab Results   Component Value Date    GLUCOSE 121 (H) 08/05/2025    CALCIUM 8.3 (L) 08/05/2025     (L) 08/05/2025    K 4.9 08/05/2025    CO2 28 08/05/2025    CL 96 (L) 08/05/2025    BUN 22 08/05/2025    CREATININE 0.39 (L) 08/05/2025             [1]   Current Facility-Administered Medications   Medication Dose Route Frequency Provider Last Rate Last Admin    acetaminophen (Tylenol) oral liquid 650 mg  650 mg oral q6h Charisse aCstaneda, APRN-CNP   650 mg at 08/05/25 1248    Adult Clinimix TPN Cyclic   intravenous Cyclic PN Charisse Castaneda, APRN-CNP        alteplase (Cathflo Activase) injection 2 mg  2 mg intra-catheter PRN Charisse Castaneda, APRN-CNP        dextrose 50 % injection 12.5 g  12.5 g intravenous q15 min PRN Charisse Castaneda, APRN-CNP        dextrose 50 % injection 25 g  25 g intravenous q15 min PRN Charisse Castaneda, APRN-CNP        diphenoxylate-atropine (Lomotil) 2.5-0.025 mg per tablet 1 tablet  1 tablet oral 4x daily Boris Ledezma MD   1 tablet at 08/05/25 1248    [Held by provider] enoxaparin (Lovenox) syringe 40 mg  40 mg subcutaneous q24h Charisse Castaneda, APRN-CNP   40 mg at 08/04/25 0840    fat emulsion fish oil/plant based (SMOFlipid) 20 % IV infusion 50 g  250 mL intravenous Daily Lipids Charisse Castaneda, MINERVA-CNP   Stopped at 08/05/25 0808    glucagon (Glucagen) injection 1 mg   1 mg intramuscular q15 min PRN ASHLY Yin        glucagon (Glucagen) injection 1 mg  1 mg intramuscular q15 min PRN ASHLY Yin        heparin flush 10 unit/mL syringe 50 Units  5 mL intravenous PRN ASHLY Yin        insulin lispro injection 0-5 Units  0-5 Units subcutaneous q6h ASHLY Yin        lidocaine 4 % patch 2 patch  2 patch transdermal Daily Annabel Patel MD   2 patch at 08/05/25 0825    loperamide (Imodium) capsule 4 mg  4 mg oral 4x daily Annabel Patel MD   4 mg at 08/05/25 0638    naloxone (Narcan) injection 0.2 mg  0.2 mg intravenous PRN ASHLY Yin        ondansetron (Zofran) injection 4 mg  4 mg intravenous q8h PRN ASHLY Yin        oxyCODONE (Roxicodone) solution 10 mg  10 mg oral q4h PRN Annabel Patel MD   10 mg at 07/29/25 0648    oxyCODONE (Roxicodone) solution 5 mg  5 mg oral q4h PRN Annabel Patel MD   5 mg at 08/05/25 0835    pantoprazole (Protonix) injection 40 mg  40 mg intravenous Daily ASHLY Yin   40 mg at 08/05/25 0824    PHENobarbital (Luminal) injection 65 mg  65 mg intravenous q6h PRN ASHLY Yin   65 mg at 07/26/25 2054    traZODone (Desyrel) tablet 100 mg  100 mg oral Nightly Frederick Abel MD   100 mg at 08/01/25 2049

## 2025-08-05 NOTE — CARE PLAN
Problem: Pain - Adult  Goal: Verbalizes/displays adequate comfort level or baseline comfort level  Outcome: Progressing     Problem: Discharge Planning  Goal: Discharge to home or other facility with appropriate resources  Outcome: Progressing     Problem: Chronic Conditions and Co-morbidities  Goal: Patient's chronic conditions and co-morbidity symptoms are monitored and maintained or improved  Outcome: Progressing     Problem: Nutrition  Goal: Nutrient intake appropriate for maintaining nutritional needs  Outcome: Progressing     Problem: Skin  Goal: Decreased wound size/increased tissue granulation at next dressing change  Outcome: Progressing  Goal: Participates in plan/prevention/treatment measures  Outcome: Progressing  Goal: Prevent/manage excess moisture  Outcome: Progressing  Goal: Prevent/minimize sheer/friction injuries  Outcome: Progressing  Goal: Promote/optimize nutrition  Outcome: Progressing  Goal: Promote skin healing  Outcome: Progressing     Problem: Pain  Goal: Takes deep breaths with improved pain control throughout the shift  Outcome: Progressing  Goal: Turns in bed with improved pain control throughout the shift  Outcome: Progressing  Goal: Walks with improved pain control throughout the shift  Outcome: Progressing  Goal: Performs ADL's with improved pain control throughout shift  Outcome: Progressing  Goal: Participates in PT with improved pain control throughout the shift  Outcome: Progressing  Goal: Free from opioid side effects throughout the shift  Outcome: Progressing  Goal: Free from acute confusion related to pain meds throughout the shift  Outcome: Progressing     Problem: Fall/Injury  Goal: Not fall by end of shift  Outcome: Progressing  Goal: Be free from injury by end of the shift  Outcome: Progressing  Goal: Verbalize understanding of personal risk factors for fall in the hospital  Outcome: Progressing  Goal: Verbalize understanding of risk factor reduction measures to prevent  injury from fall in the home  Outcome: Progressing  Goal: Use assistive devices by end of the shift  Outcome: Progressing  Goal: Pace activities to prevent fatigue by end of the shift  Outcome: Progressing

## 2025-08-05 NOTE — CONSULTS
Wound Care Consult     Visit Date: 8/5/2025      Patient Name: Bereket Em         MRN: [id number]             Reason for Consult: ostomy pouch and midline abdomen wound dressing change         Wound History: 59 y.o. 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 Olson & Vicky with Margarita's reversal/transverse colon mobilization/ileal window by Dr. Gross on 7/22/25. Post-op course c/b bowel ischemia with afib RVR requiring ICU transfer & amio drip, s/p exlap, bowel resection at anastomosis, end colostomy creation (RUQ) & conversion of double barrel jejunostomy to end jejunostomy (LUQ) on 7/25/25.      Assessment:  Colostomy RUQ (Active)   Placement Date/Time: 07/25/25 0639   Location: RUQ   Number of days: 11      Colostomy RUQ (Active)   Present on Admission to Healthcare Facility N 08/05/25 1800   Stomal Appliance 1 piece;Intact 08/05/25 1800   Site/Stoma Assessment Red 08/05/25 1800   Peristomal Assessment RINA 08/05/25 1800   Treatment Placement checked 08/05/25 1800   Drainage Characteristics Brown 08/05/25 1800   Output (mL) 200 mL 08/05/25 1330     Ileostomy Other (Comment) LUQ (Active)   Placement Date/Time: 07/25/25 0500   Hand Hygiene Completed: Yes  Ileostomy Type: (c) Other (Comment)  Location: LUQ   Number of days: 11      Ileostomy Other (Comment) LUQ (Active)   Present on Admission to Healthcare Facility N 08/05/25 1800   Stomal Appliance 2 piece;Changed 08/05/25 1800   Site/Stoma Assessment Clean;Red 08/05/25 1800   Peristomal Assessment Separation;Wound 08/05/25 1800   Treatment Pouch change;Packings;Site care 08/05/25  1800   Output (mL) 100 mL 25 1800   Output Description Bilous 25 1800     Ostomy type: end jejunostomy  size: 1 1/4 round  color: red and moist  protruding: budded   Umer: none   Functioning: loose green effluent   Mucocutaneous junction: full thickness wound at 3 o'clock. Aquacel was applied to the wound and covered with a barrier ring   Peristomal skin: clean and intact   Pouchin piece Melonie RED flat wafer and high output pouch  Plan: assess stoma/pouching     The wound care team came to bedside to assess the patient's ostomy pouches and midline abdomen wound. The patient midline wound has a dehiscence at inferior portion of the midline. The wound is red and moist with serosanguinous drainage. The wound was cleansed with vashe wound cleanser and gently pat dry. The wound was cleansed with Aquacel hydrofiber and covered with a cover sponge. The patient's end jejunostomy pouch was replaced today due to leaking. The patient has a full thickness wound at 3 o'clock that was cleansed with vashe wound cleanser and packed with Aquacel. A 2 piece Melonie appliance was applied.        Wound Plan: Recommendation: 3 times a week ostomy pouches to monitor midline abdomen wound and end jejunostomy peristomal wound.     Luis A Gonzalez RN-BC, CWON  2025  6:12 PM

## 2025-08-05 NOTE — CARE PLAN
The patient's goals for the shift include      The clinical goals for the shift include Patient will remain safe throughout shift    Over the shift, the patient did make progress toward the following goals.  Problem: Pain - Adult  Goal: Verbalizes/displays adequate comfort level or baseline comfort level  Outcome: Progressing  Flowsheets (Taken 8/5/2025 0552)  Verbalizes/displays adequate comfort level or baseline comfort level:   Encourage patient to monitor pain and request assistance   Assess pain using appropriate pain scale     Problem: Discharge Planning  Goal: Discharge to home or other facility with appropriate resources  Outcome: Progressing  Flowsheets (Taken 8/3/2025 1808)  Discharge to home or other facility with appropriate resources:   Identify barriers to discharge with patient and caregiver   Arrange for needed discharge resources and transportation as appropriate     Problem: Chronic Conditions and Co-morbidities  Goal: Patient's chronic conditions and co-morbidity symptoms are monitored and maintained or improved  Outcome: Progressing  Flowsheets (Taken 8/5/2025 0552)  Care Plan - Patient's Chronic Conditions and Co-Morbidity Symptoms are Monitored and Maintained or Improved: Monitor and assess patient's chronic conditions and comorbid symptoms for stability, deterioration, or improvement     Problem: Nutrition  Goal: Nutrient intake appropriate for maintaining nutritional needs  Outcome: Progressing     Problem: Skin  Goal: Decreased wound size/increased tissue granulation at next dressing change  Outcome: Progressing  Flowsheets (Taken 8/4/2025 0914 by Rika Cage RN)  Decreased wound size/increased tissue granulation at next dressing change: Promote sleep for wound healing  Goal: Participates in plan/prevention/treatment measures  Outcome: Progressing  Flowsheets (Taken 7/30/2025 1850 by Marguerite Burrows RN)  Participates in plan/prevention/treatment measures:   Elevate heels   Increase  activity/out of bed for meals  Goal: Prevent/manage excess moisture  Outcome: Progressing  Flowsheets (Taken 7/30/2025 1850 by Marguerite Burrows RN)  Prevent/manage excess moisture:   Moisturize dry skin   Cleanse incontinence/protect with barrier cream  Goal: Prevent/minimize sheer/friction injuries  Outcome: Progressing  Flowsheets (Taken 8/5/2025 0552)  Prevent/minimize sheer/friction injuries: Increase activity/out of bed for meals  Goal: Promote/optimize nutrition  Outcome: Progressing  Flowsheets (Taken 8/5/2025 0552)  Promote/optimize nutrition:   Monitor/record intake including meals   Consume > 50% meals/supplements  Goal: Promote skin healing  Outcome: Progressing     Problem: Pain  Goal: Takes deep breaths with improved pain control throughout the shift  Outcome: Progressing  Goal: Turns in bed with improved pain control throughout the shift  Outcome: Progressing  Goal: Walks with improved pain control throughout the shift  Outcome: Progressing  Goal: Performs ADL's with improved pain control throughout shift  Outcome: Progressing  Goal: Participates in PT with improved pain control throughout the shift  Outcome: Progressing  Goal: Free from opioid side effects throughout the shift  Outcome: Progressing  Goal: Free from acute confusion related to pain meds throughout the shift  Outcome: Progressing     Problem: Fall/Injury  Goal: Not fall by end of shift  Outcome: Progressing  Goal: Be free from injury by end of the shift  Outcome: Progressing  Goal: Verbalize understanding of personal risk factors for fall in the hospital  Outcome: Progressing  Goal: Verbalize understanding of risk factor reduction measures to prevent injury from fall in the home  Outcome: Progressing  Goal: Use assistive devices by end of the shift  Outcome: Progressing  Goal: Pace activities to prevent fatigue by end of the shift  Outcome: Progressing

## 2025-08-05 NOTE — PROGRESS NOTES
General Surgery Progress Note    08/05/25    Bereket Em    Summary:  Bereket Em is a 59 year old male with a history of perforated diverticulitis s/p Margarita's procedure c/b formation of ECF. Underwent EC fistula takedown, GILLES, Margarita's reversal, abdominal wall debridement, and double barrel jejunostomy with Leyda Olson, Renato, and Vicky on 7/22. Intra-op findings remarkable for adhesions throughout the abdomen and remaining segment of the colon terminating at the splenic flexure requiring significant mobilization and an ileal window. Postoperative course complicated by colon ischemia requiring OR take back for partial colectomy, end colostomy creation (RUQ), and conversion of double barrel jejunostomy to end jejunostomy in LUQ on 7/25. Course c/b high output jejunostomy on maximum dose of imodium and lomotil.     Subjective    Subjective:  No acute events overnight. Vital signs stable. Abdominal pain controlled. Patient is getting up out of bed and walking around room. Tolerating PO intake. Denies fevers, chills, chest pain, coughs, dysuria, nausea, or emesis. He is using his incentive spirometer frequently.       Objective    Objective:      Vital signs:   Temp:  [36.4 °C (97.5 °F)-36.6 °C (97.9 °F)] 36.4 °C (97.5 °F)  Heart Rate:  [61-67] 61  Resp:  [16-17] 16  BP: (104-119)/(63-75) 109/63    Physical Exam:  GEN: resting comfortably, no acute distress   NEURO: awake, alert, oriented x4   HEENT: Sclera anicteric. Moist mucous membranes.   RESP: very mildly labored breathing on room air, satting >92%, no coughs, pulling >1.5L on incentive spirometer  CV: regular rate and rhythm on telemetry  GI: Abdomen soft, appropriately tender to palpation along midline incision, with dry midline incision dressing. Ostomies x2 pink and healthy appearing. LUQ Jejunostomy output thin, watery, bilious. RUQ colostomy with bowel sweat. Right ANA MARIA drain with serous output.   SKIN: laparotomy closed loosely with  staples  EXT: no peripheral edema     I/O last 2 completed shifts:  In: 720 (9.4 mL/kg) [P.O.:720]  Out: 4412 (57.9 mL/kg) [Urine:2235 (1.2 mL/kg/hr); Drains:2; Stool:2175]  Weight: 76.2 kg      Labs Past 18 Hours:  Recent Results (from the past 18 hours)   POCT GLUCOSE    Collection Time: 08/04/25  5:18 PM   Result Value Ref Range    POCT Glucose 113 (H) 74 - 99 mg/dL   POCT GLUCOSE    Collection Time: 08/04/25  7:52 PM   Result Value Ref Range    POCT Glucose 115 (H) 74 - 99 mg/dL   Renal Function Panel    Collection Time: 08/05/25  5:29 AM   Result Value Ref Range    Glucose 121 (H) 74 - 99 mg/dL    Sodium 132 (L) 136 - 145 mmol/L    Potassium 4.9 3.5 - 5.3 mmol/L    Chloride 96 (L) 98 - 107 mmol/L    Bicarbonate 28 21 - 32 mmol/L    Anion Gap 13 10 - 20 mmol/L    Urea Nitrogen 22 6 - 23 mg/dL    Creatinine 0.39 (L) 0.50 - 1.30 mg/dL    eGFR >90 >60 mL/min/1.73m*2    Calcium 8.3 (L) 8.6 - 10.6 mg/dL    Phosphorus 3.9 2.5 - 4.9 mg/dL    Albumin 2.8 (L) 3.4 - 5.0 g/dL   Magnesium    Collection Time: 08/05/25  5:29 AM   Result Value Ref Range    Magnesium 2.15 1.60 - 2.40 mg/dL   POCT GLUCOSE    Collection Time: 08/05/25  6:40 AM   Result Value Ref Range    POCT Glucose 153 (H) 74 - 99 mg/dL        Meds:  Current Medications[1]     Imaging:  Imaging  CT abdomen pelvis w IV contrast  Result Date: 8/4/2025  1.  Similar appearance of a fluid collection in the left lower quadrant which measures 6.1 x 3.2 cm and, when accounting for differences in technique, shows no significant interval change. 2. There is a fluid collection seen along the inferior edge of the right liver which measures 3.6 x 1.7 cm. This appears more organized with a peripheral enhancing wall when compared to prior. Small pocket of air seen within this collection, possibly representing postsurgical changes versus gas-forming organism. 3. Similar appearance of diffuse edema and small amounts of free fluid scattered throughout the mesentery, likely  inflammatory/infectious in nature. 4. Interval improvement in bilateral pleural effusions and left basilar lung ground-glass opacities, likely representing improvement in pulmonary edema.     I personally reviewed the images/study and I agree with the findings as stated by Annabelle Chen MD (resident).   MACRO: None   Signed by: Nando Mack 8/4/2025 8:57 PM Dictation workstation:   [id number]    XR chest 1 view  Result Date: 8/4/2025  1. Unchanged appearance of trace/small bilateral pleural effusions with associated mild bibasilar atelectasis. 2. Left-sided PICC line with tip terminating in the mid SVC.   I personally reviewed the images/study and I agree with the findings as stated. This study was interpreted by radiology resident Iggy Hendrickson D.O. at [place], [place].   Signed by: Luis Cervantes 8/4/2025 1:26 PM Dictation workstation:   [id number]        Assessment/Plan    Assessment and Plan:  Bereket Em is a Primary - [place]   59M with PMH of perforated diverticulitis s/p Margarita's procedure c/b formation of ECF. Underwent EC fistula takedown, GILLES, Margarita's reversal, abdominal wall debridement, and double barrel jejunostomy with Leyda Olson, Renato, and Vicky on 7/22.  Intra-op findings remarkable for adhesions throughout the abdomen and remaining segment of the colon terminating at the splenic flexure requiring significant mobilization and an ileal window.  Postoperative course complicated by colon ischemia requiring exploratory laparotomy, partial colectomy, end colostomy creation (RUQ), and conversion of double barrel jejunostomy to end jejunostomy in LUQ on 7/25. Transferred from ICU to floor on 7/28.     Course c/b anasarca and third spacing of fluid 2/2 sepsis, improving after 3 days of diuresis with lasix. Still has high jejunostomy output >2L day, on max doses lomotil and imodium. Has persistent leukocytosis without fevers or other signs/sx of  ongoing infection. Completed Zosyn course 8/2 . CT 7/31 showed 5cm left-sided intraabdominal fluid collection, too small for drainage per IR. Repeat interval CT 8/4 showed same fluid collection, without changes in size, as well a small perihepatic 3.6x1.7cm fluid collection with small pocket of air.      Procedures:  7/22: ECF takedown, extensive GILLES, Margarita's reversal, abdominal wall debridement, and double barrel jejunostomy with Leyda Olson, Renato, and Vicky.    7/25:  partial colectomy with end colostomy creation, conversion of double barrel jejunostomy to end jejunostomy.      PLAN:   Neurology: post operative pain  - scheduled tylenol, lidocaine patch    - Liquid oxycodone 5/10mg with 0.2 IV Dilaudid PRN breakthrough   - continue home trazodone      Cardiovascular:   -Vital signs every 4 hours     Pulmonology: pulmonary edema now improved after lasix, atelectasis   - IS x10 every hour      GI: s/p ECF takedown, Margarita's reversal; end colostomy, end jejunostomy; high jejunostomy output  - CT AP 08/04 similar appearance of a fluid collection in the left lower quadrant and a fluid collection along the inferior edge of the right liver with a peripheral enhancing wall and a small pocket of air seen within this collection   - Will discuss CT findings with IR in the setting of persistent leukocytosis, for possible aspiration vs drain placement   - continue PPI, Lomotil and Imodium   - soft diet, TPN/lipids, PICC replaced 7/30   - Zofran PRN nausea   - wound/ostomy nursing consulted, appreciate assistance      :   - Strict I&Os  - Replace electrolytes as needed to maintain K >4, Mag >2.      ID: h/o E. coli bacteremia, resolved; persistent leukocytosis  - CT AP 7/31 and 8/4 show left abdominal fluid collection, concerning for abscess  - IR consulted again 8/5 for drainage with fluid bacterial/fungal cultures given no resolution of fluid collection with persistent leukocytosis   > plan for IR today vs  tomorrow  - Zosyn x7 days completed 8/2  - 7/27 blood culture results- no growth   - trend WBC      Heme: acute anemia and thrombocytosis suspected to be 2/2 infectious and/or inflammatory process rather than acute bleeding   -Transfuse 1u pRBC 8/5 for Hgb 6.9 and intravascular volume repletion  -Daily CBCs to trend WBC, Hgb, Plt     Endocrine:   - POCT glucose every 6 hours with TPN   - insulin lispro sliding scale      DVT Prophylaxis:  -Holding lovenox for IR, resume post-procedure  -SCDs, ambulation/OOB     Hospital Day: 15     Dispo: RNF    Discussed with Dr. Olson.     Annabel Patel MD  General Surgery PGY-3  [facility] (Minimally Invasive/Bariatric Surgery/Surgical Endoscopy)  [id number]         [1]   Current Facility-Administered Medications:     acetaminophen (Tylenol) oral liquid 650 mg, 650 mg, oral, q6h, Charisse Weinbergbena, APRN-CNP, 650 mg at 08/05/25 0532    Adult Clinimix TPN Cyclic, , intravenous, Cyclic PN, Charisse Weinbergbena, APRN-CNP, Stopped at 08/04/25 2227    alteplase (Cathflo Activase) injection 2 mg, 2 mg, intra-catheter, PRN, Charisse Weinbergbena, APRN-CNP    dextrose 50 % injection 12.5 g, 12.5 g, intravenous, q15 min PRN, Charisse L Kubena, APRN-CNP    dextrose 50 % injection 25 g, 25 g, intravenous, q15 min PRN, Charisse RESTREPO Kubena, APRN-CNP    diphenoxylate-atropine (Lomotil) 2.5-0.025 mg per tablet 1 tablet, 1 tablet, oral, 4x daily, Boris Ledezma MD, 1 tablet at 08/05/25 0638    [Held by provider] enoxaparin (Lovenox) syringe 40 mg, 40 mg, subcutaneous, q24h, Charisse Weinbergbemarkell, APRN-CNP, 40 mg at 08/04/25 0840    fat emulsion fish oil/plant based (SMOFlipid) 20 % IV infusion 50 g, 250 mL, intravenous, Daily Lipids, Charisse Weinbergbemarkell, APRN-CNP, Last Rate: 20.8 mL/hr at 08/04/25 2024, 50 g at 08/04/25 2024    glucagon (Glucagen) injection 1 mg, 1 mg, intramuscular, q15 min PRN, MINERVA Yin-CNP    glucagon (Glucagen) injection 1 mg, 1 mg, intramuscular, q15 min PRDIANDRA, Charisse RESTREPO  Kubena, APRN-CNP    heparin flush 10 unit/mL syringe 50 Units, 5 mL, intravenous, PRN, ASHLY Yni    insulin lispro injection 0-5 Units, 0-5 Units, subcutaneous, q6h, ASHLY Yin    lidocaine 4 % patch 2 patch, 2 patch, transdermal, Daily, Annabel Patel MD, 2 patch at 08/04/25 0850    loperamide (Imodium) capsule 4 mg, 4 mg, oral, 4x daily, Annabel Patel MD, 4 mg at 08/05/25 0638    naloxone (Narcan) injection 0.2 mg, 0.2 mg, intravenous, PRN, ASHLY Yin    ondansetron (Zofran) injection 4 mg, 4 mg, intravenous, q8h PRN, ASHLY Yin    oxyCODONE (Roxicodone) solution 10 mg, 10 mg, oral, q4h PRN, Annabel Patel MD, 10 mg at 07/29/25 0648    oxyCODONE (Roxicodone) solution 5 mg, 5 mg, oral, q4h PRN, Annabel Patel MD, 5 mg at 08/04/25 2039    pantoprazole (Protonix) injection 40 mg, 40 mg, intravenous, Daily, ASHLY Yin, 40 mg at 08/04/25 0840    PHENobarbital (Luminal) injection 65 mg, 65 mg, intravenous, q6h PRN, ASHLY Yin, 65 mg at 07/26/25 2054    traZODone (Desyrel) tablet 100 mg, 100 mg, oral, Nightly, Frederick Abel MD, 100 mg at 08/01/25 2049

## 2025-08-06 ENCOUNTER — APPOINTMENT (OUTPATIENT)
Facility: CLINIC | Age: 60
End: 2025-08-06
Payer: COMMERCIAL

## 2025-08-06 ENCOUNTER — APPOINTMENT (OUTPATIENT)
Dept: RADIOLOGY | Facility: HOSPITAL | Age: 60
End: 2025-08-06
Payer: COMMERCIAL

## 2025-08-06 DIAGNOSIS — Z78.9 ON TOTAL PARENTERAL NUTRITION (TPN): ICD-10-CM

## 2025-08-06 LAB
ALBUMIN SERPL BCP-MCNC: 2.8 G/DL (ref 3.4–5)
ANION GAP SERPL CALC-SCNC: 13 MMOL/L (ref 10–20)
BLOOD EXPIRATION DATE: NORMAL
BUN SERPL-MCNC: 22 MG/DL (ref 6–23)
CALCIUM SERPL-MCNC: 8.4 MG/DL (ref 8.6–10.6)
CHLORIDE SERPL-SCNC: 98 MMOL/L (ref 98–107)
CO2 SERPL-SCNC: 28 MMOL/L (ref 21–32)
CREAT SERPL-MCNC: 0.39 MG/DL (ref 0.5–1.3)
DISPENSE STATUS: NORMAL
EGFRCR SERPLBLD CKD-EPI 2021: >90 ML/MIN/1.73M*2
ERYTHROCYTE [DISTWIDTH] IN BLOOD BY AUTOMATED COUNT: 16.2 % (ref 11.5–14.5)
GLUCOSE BLD MANUAL STRIP-MCNC: 109 MG/DL (ref 74–99)
GLUCOSE BLD MANUAL STRIP-MCNC: 114 MG/DL (ref 74–99)
GLUCOSE BLD MANUAL STRIP-MCNC: 122 MG/DL (ref 74–99)
GLUCOSE BLD MANUAL STRIP-MCNC: 168 MG/DL (ref 74–99)
GLUCOSE SERPL-MCNC: 72 MG/DL (ref 74–99)
HCT VFR BLD AUTO: 25.6 % (ref 41–52)
HGB BLD-MCNC: 8.2 G/DL (ref 13.5–17.5)
MAGNESIUM SERPL-MCNC: 2.2 MG/DL (ref 1.6–2.4)
MCH RBC QN AUTO: 25.9 PG (ref 26–34)
MCHC RBC AUTO-ENTMCNC: 32 G/DL (ref 32–36)
MCV RBC AUTO: 81 FL (ref 80–100)
NRBC BLD-RTO: 0 /100 WBCS (ref 0–0)
PHOSPHATE SERPL-MCNC: 4.1 MG/DL (ref 2.5–4.9)
PLATELET # BLD AUTO: 774 X10*3/UL (ref 150–450)
POTASSIUM SERPL-SCNC: 5 MMOL/L (ref 3.5–5.3)
PRODUCT BLOOD TYPE: 6200
PRODUCT CODE: NORMAL
RBC # BLD AUTO: 3.17 X10*6/UL (ref 4.5–5.9)
SODIUM SERPL-SCNC: 134 MMOL/L (ref 136–145)
UNIT ABO: NORMAL
UNIT NUMBER: NORMAL
UNIT RH: NORMAL
UNIT VOLUME: 350
WBC # BLD AUTO: 18.6 X10*3/UL (ref 4.4–11.3)
XM INTEP: NORMAL

## 2025-08-06 NOTE — PROGRESS NOTES
ID FU note    Interval history:  We are called back for a new CT finding showing a new fluid collection in the R liver. CT abdomen and pelvis for persistent leukocytosis.  Patient has no fever or chills. No abdominal pain. No N/V. Has watery stools in his stoma as always nothing new.   No urinary sx.   Did IR drainage today 8/6  Steroids stopped on 7/29     Visit Vitals  /71 (BP Location: Right arm, Patient Position: Lying)   Pulse 72   Temp 36 °C (96.8 °F) (Temporal)   Resp 17        Physical Exam  Constitutional:       Appearance: Normal appearance.     Cardiovascular:      Rate and Rhythm: Normal rate and regular rhythm.      Heart sounds: Normal heart sounds. No murmur heard.  Pulmonary:      Breath sounds: Normal breath sounds. No wheezing or rales.   Abdominal:      Palpations: Abdomen is soft.      Tenderness: There is no abdominal tenderness.      Comments: Stoma on the R not functional. Stoma on the left with watery stools no erythema.      Skin:     Comments: R PICC line clean no erythema or discharge. No signs of infection.      Neurological:      Mental Status: He is alert.         Labs:    Latest Reference Range & Units 08/02/25 05:29 08/03/25 05:55 08/04/25 06:04 08/05/25 05:29 08/05/25 08:13 08/06/25 05:10   WBC 4.4 - 11.3 x10*3/uL 21.3 (H) 19.3 (H) 18.9 (H) 17.4 (H) 18.6 (H) 18.6 (H)   nRBC 0.0 - 0.0 /100 WBCs 0.0 0.0 0.0 0.0 0.0 0.0   RBC 4.50 - 5.90 x10*6/uL 2.81 (L) 2.68 (L) 2.93 (L) 2.73 (L) 2.75 (L) 3.17 (L)   HEMOGLOBIN 13.5 - 17.5 g/dL 7.3 (L) 7.0 (L) 7.3 (L) 6.9 (L) 7.0 (L) 8.2 (L)   HEMATOCRIT 41.0 - 52.0 % 22.6 (L) 21.2 (L) 22.1 (L) 20.8 (L) 21.0 (L) 25.6 (L)   MCV 80 - 100 fL 80 79 (L) 75 (L) 76 (L) 76 (L) 81   MCH 26.0 - 34.0 pg 26.0 26.1 24.9 (L) 25.3 (L) 25.5 (L) 25.9 (L)   MCHC 32.0 - 36.0 g/dL 32.3 33.0 33.0 33.2 33.3 32.0   RED CELL DISTRIBUTION WIDTH 11.5 - 14.5 % 16.4 (H) 16.8 (H) 15.6 (H) 15.8 (H) 15.7 (H) 16.2 (H)   Platelets 150 - 450 x10*3/uL 385 462 (H) 581 (H) 677 (H)  678 (H) 774 (H)   (H): Data is abnormally high  (L): Data is abnormally low    No CRP or ESR  Crea 0.39    Microbiology:  Last BC (7/27): negative     Imaging:   CT abdomin 7/31 showed 5cm left-sided intraabdominal fluid collection, too small for drainage per IR.     CT abdominal 8/4:  1.  Similar appearance of a fluid collection in the left lower  quadrant which measures 6.1 x 3.2 cm and, when accounting for  differences in technique, shows no significant interval change.  2. There is a fluid collection seen along the inferior edge of the  right liver which measures 3.6 x 1.7 cm. This appears more organized  with a peripheral enhancing wall when compared to prior. Small pocket  of air seen within this collection, possibly representing  postsurgical changes versus gas-forming organism.  3. Similar appearance of diffuse edema and small amounts of free  fluid scattered throughout the mesentery, likely  inflammatory/infectious in nature.  4. Interval improvement in bilateral pleural effusions and left  basilar lung ground-glass opacities, likely representing improvement  in pulmonary edema.    Assessment and plan:  Bereket Em is a 59 y.o. male with history of perforated sigmoid diverticulitis status post Soliman's pouch (6/7/2024) complicated by fascial necrosis status post abdominal wall debridement and Vicryl mesh placement (6/18/2024) and midline small bowel enterocutaneous fistula and on TPN for the past several months via right upper arm PICC line admitted to the hospital on July 22 for elective surgery for reanastomosis and fistula excision. Was treated for septic shock due to E. coli bacteremia likely from intra-abdominal surgical manipulation or ischemic bowel. Patient underwent emergent surgery with resection of small segment of ischemic bowel.  As per operative note there was no purulence or fecal contamination of the peritoneum or anastomotic dehiscence. Was treated with 7D of zosyn after the neg BC  stopped on aug 2.    We are called back because of a repeated Ct abdomen (done only for persistent leukocytosis) showing a new fluid collection seen along the inferior edge of the R liver with a small pocket of air might suggest and abscess. IR drainage done 8/6. Patient is clinically doing well no fever or chills no signs of active infection.     Recommendations:   - Given that the patient is clinically stable, no signs of active infectious process and is immunocompetent, please keep him off AB until awaiting culture results    - ID will follow     Maya Dagher, MD   PGY-IV Infectious Diseases

## 2025-08-06 NOTE — PROGRESS NOTES
General Surgery Progress Note    08/06/25    Bereket Em    Summary:  Bereket Em is a 59 year old male with a history of perforated diverticulitis s/p Margarita's procedure c/b formation of ECF. Underwent EC fistula takedown, GILLES, Margarita's reversal, abdominal wall debridement, and double barrel jejunostomy with Leyda Olson, Renato, and Vicky on 7/22. Intra-op findings remarkable for adhesions throughout the abdomen and remaining segment of the colon terminating at the splenic flexure requiring significant mobilization and an ileal window. Postoperative course complicated by colon ischemia requiring OR take back for partial colectomy, end colostomy creation (RUQ), and conversion of double barrel jejunostomy to end jejunostomy in LUQ on 7/25.     Subjective    Subjective:  No acute events overnight. Vital signs stable. Abdominal pain controlled. Patient is getting up out of bed and walking around room. Tolerating PO intake. Denies fevers, chills, chest pain, coughs, dysuria, nausea, or emesis. He is using his incentive spirometer frequently.          Objective    Objective:      Vital signs:   Temp:  [36.1 °C (97 °F)-37.3 °C (99.1 °F)] 36.1 °C (97 °F)  Heart Rate:  [61-76] 61  Resp:  [16-21] 16  BP: (107-118)/(44-76) 111/44    Physical Exam:  GEN: resting comfortably, no acute distress   NEURO: awake, alert, oriented x4   HEENT: Sclera anicteric. Moist mucous membranes.   RESP: very mildly labored breathing on room air, satting >92%, no coughs, pulling >1.5L on incentive spirometer  CV: regular rate and rhythm on telemetry  GI: Abdomen soft, appropriately tender to palpation along midline incision, with dry midline incision dressing. Ostomies x2 pink and healthy appearing. LUQ Jejunostomy output thin, watery, bilious. RUQ colostomy with bowel sweat. Right ANA MARIA drain with scant serous output.   SKIN: laparotomy closed loosely with staples  EXT: no peripheral edema     I/O last 2 completed shifts:  In:  477 (6.4 mL/kg) [P.O.:477]  Out: 4135 (55.7 mL/kg) [Urine:1510 (0.8 mL/kg/hr); Stool:2625]  Weight: 74.3 kg      Labs Past 18 Hours:  Recent Results (from the past 18 hours)   POCT GLUCOSE    Collection Time: 08/05/25  7:00 PM   Result Value Ref Range    POCT Glucose 118 (H) 74 - 99 mg/dL   POCT GLUCOSE    Collection Time: 08/05/25  7:41 PM   Result Value Ref Range    POCT Glucose 108 (H) 74 - 99 mg/dL   CBC    Collection Time: 08/06/25  5:10 AM   Result Value Ref Range    WBC 18.6 (H) 4.4 - 11.3 x10*3/uL    nRBC 0.0 0.0 - 0.0 /100 WBCs    RBC 3.17 (L) 4.50 - 5.90 x10*6/uL    Hemoglobin 8.2 (L) 13.5 - 17.5 g/dL    Hematocrit 25.6 (L) 41.0 - 52.0 %    MCV 81 80 - 100 fL    MCH 25.9 (L) 26.0 - 34.0 pg    MCHC 32.0 32.0 - 36.0 g/dL    RDW 16.2 (H) 11.5 - 14.5 %    Platelets 774 (H) 150 - 450 x10*3/uL   Renal Function Panel    Collection Time: 08/06/25  5:10 AM   Result Value Ref Range    Glucose 72 (L) 74 - 99 mg/dL    Sodium 134 (L) 136 - 145 mmol/L    Potassium 5.0 3.5 - 5.3 mmol/L    Chloride 98 98 - 107 mmol/L    Bicarbonate 28 21 - 32 mmol/L    Anion Gap 13 10 - 20 mmol/L    Urea Nitrogen 22 6 - 23 mg/dL    Creatinine 0.39 (L) 0.50 - 1.30 mg/dL    eGFR >90 >60 mL/min/1.73m*2    Calcium 8.4 (L) 8.6 - 10.6 mg/dL    Phosphorus 4.1 2.5 - 4.9 mg/dL    Albumin 2.8 (L) 3.4 - 5.0 g/dL   Magnesium    Collection Time: 08/06/25  5:10 AM   Result Value Ref Range    Magnesium 2.20 1.60 - 2.40 mg/dL   POCT GLUCOSE    Collection Time: 08/06/25  7:33 AM   Result Value Ref Range    POCT Glucose 168 (H) 74 - 99 mg/dL        Meds:  Current Medications[1]     Imaging:  Imaging  CT abdomen pelvis w IV contrast  Result Date: 8/4/2025  1.  Similar appearance of a fluid collection in the left lower quadrant which measures 6.1 x 3.2 cm and, when accounting for differences in technique, shows no significant interval change. 2. There is a fluid collection seen along the inferior edge of the right liver which measures 3.6 x 1.7 cm. This  appears more organized with a peripheral enhancing wall when compared to prior. Small pocket of air seen within this collection, possibly representing postsurgical changes versus gas-forming organism. 3. Similar appearance of diffuse edema and small amounts of free fluid scattered throughout the mesentery, likely inflammatory/infectious in nature. 4. Interval improvement in bilateral pleural effusions and left basilar lung ground-glass opacities, likely representing improvement in pulmonary edema.     I personally reviewed the images/study and I agree with the findings as stated by Annabelle Chen MD (resident).   MACRO: None   Signed by: Nando Mack 8/4/2025 8:57 PM Dictation workstation:   [id number]    XR chest 1 view  Result Date: 8/4/2025  1. Unchanged appearance of trace/small bilateral pleural effusions with associated mild bibasilar atelectasis. 2. Left-sided PICC line with tip terminating in the mid SVC.   I personally reviewed the images/study and I agree with the findings as stated. This study was interpreted by radiology resident Iggy Hendrickson D.O. at [place], [place].   Signed by: Luis Cervantes 8/4/2025 1:26 PM Dictation workstation:   [id number]      Assessment/Plan    Assessment and Plan:  Bereket Em is a Primary - [place]   59M with PMH of perforated diverticulitis s/p Margarita's procedure c/b formation of ECF. Underwent EC fistula takedown, GILLES, Margarita's reversal, abdominal wall debridement, and double barrel jejunostomy with Leyda Olson, Renato, and Vicky on 7/22.  Intra-op findings remarkable for adhesions throughout the abdomen and remaining segment of the colon terminating at the splenic flexure requiring significant mobilization and an ileal window.  Postoperative course complicated by colon ischemia requiring exploratory laparotomy, partial colectomy, end colostomy creation (RUQ), and conversion of double barrel jejunostomy to end  jejunostomy in LUQ on 7/25. Transferred from ICU to floor on 7/28.     Course c/b anasarca and third spacing of fluid 2/2 sepsis, improving after 3 days of diuresis with lasix. Still has high jejunostomy output >2L day, on max doses lomotil and imodium. Has persistent leukocytosis without fevers or other signs/sx of ongoing infection. Completed Zosyn course 8/2 . CT 7/31 showed 5cm left-sided intraabdominal fluid collection, too small for drainage per IR. Repeat interval CT 8/4 showed same fluid collection, without changes in size, as well a small perihepatic 3.6x1.7cm fluid collection with small pocket of air.      Procedures:  7/22: ECF takedown, extensive GILLES, Margarita's reversal, abdominal wall debridement, and double barrel jejunostomy with Renato Rodriguez, and Vicky.    7/25:  partial colectomy with end colostomy creation, conversion of double barrel jejunostomy to end jejunostomy.      PLAN:   Neurology: post operative pain  - scheduled tylenol, lidocaine patch    - Liquid oxycodone 5/10mg with 0.2 IV Dilaudid PRN breakthrough   - continue home trazodone      Cardiovascular:   -Vital signs every 4 hours     Pulmonology: pulmonary edema now improved after lasix, atelectasis   - IS x10 every hour      GI: s/p ECF takedown, Margarita's reversal; end colostomy, end jejunostomy; high jejunostomy output  - CT AP 08/04 similar appearance of a fluid collection in the left lower quadrant and a fluid collection along the inferior edge of the right liver with a peripheral enhancing wall and a small pocket of air seen within this collection   - continue PPI and Imodium   - Lomotil increased to 2 tablets QID per colorectal surgery recs  - soft diet, TPN/lipids, PICC replaced 7/30   - Zofran PRN nausea   - wound/ostomy nursing consulted, appreciate assistance      :   - Strict I&Os  - Replace electrolytes as needed to maintain K >4, Mag >2.      ID: h/o E. coli bacteremia, resolved; persistent leukocytosis  - CT  AP 7/31 and 8/4 show left abdominal fluid collection, concerning for abscess  - IR aspirated fluid collection today; will follow cultures  - Zosyn x7 days completed 8/2  - 7/27 blood culture results- no growth   - trend WBC      Heme: acute anemia and thrombocytosis suspected to be 2/2 infectious and/or inflammatory process rather than acute bleeding   -pRBC transfused on 8/5 for Hgb 6.9 and intravascular volume repletion  -Daily CBCs to trend WBC, Hgb, Plt     Endocrine:   - POCT glucose every 6 hours with TPN   - insulin lispro sliding scale      DVT Prophylaxis:  -Holding lovenox for IR, resume post-procedure  -SCDs, ambulation/OOB     Hospital Day: 16     Dispo: Continue current level of care.     Discussed with Dr. Olson.       Boris Ledezma MD  PGY-1 General Surgery  [facility] [id number]         [1]   Current Facility-Administered Medications:     acetaminophen (Tylenol) oral liquid 650 mg, 650 mg, oral, q6h, Charisse Castaneda, APRN-CNP, 650 mg at 08/06/25 0630    Adult Clinimix TPN Cyclic, , intravenous, Cyclic PN, Charisse Weinbergbena, APRN-CNP, Stopped at 08/06/25 1022    alteplase (Cathflo Activase) injection 2 mg, 2 mg, intra-catheter, PRN, Charisse Weinbergbena, APRN-CNP    dextrose 50 % injection 12.5 g, 12.5 g, intravenous, q15 min PRN, Charisse Weinbergbena, APRN-CNP    dextrose 50 % injection 25 g, 25 g, intravenous, q15 min PRN, Charisse Weinbergbena, APRN-CNP    diphenoxylate-atropine (Lomotil) 2.5-0.025 mg per tablet 2 tablet, 2 tablet, oral, 4x daily, Charisse Castaneda APRN-CNP    [Held by provider] enoxaparin (Lovenox) syringe 40 mg, 40 mg, subcutaneous, q24h, Charisse Weinbergbemarkell, APRN-CNP, 40 mg at 08/04/25 0840    fat emulsion fish oil/plant based (SMOFlipid) 20 % IV infusion 50 g, 250 mL, intravenous, Daily Lipids, ASHLY Yin, Stopped at 08/06/25 1022    glucagon (Glucagen) injection 1 mg, 1 mg, intramuscular, q15 min PRN, ASHLY Yin    glucagon (Glucagen) injection 1 mg,  1 mg, intramuscular, q15 min PRN, ASHLY Yin    heparin flush 10 unit/mL syringe 50 Units, 5 mL, intravenous, PRN, ASHLY Yin    insulin lispro injection 0-5 Units, 0-5 Units, subcutaneous, q6h, ASHLY Yin, 1 Units at 08/06/25 0730    lidocaine 4 % patch 2 patch, 2 patch, transdermal, Daily, Annabel Patel MD, 2 patch at 08/06/25 0842    loperamide (Imodium) capsule 4 mg, 4 mg, oral, 4x daily, Annabel Patel MD, 4 mg at 08/06/25 0648    naloxone (Narcan) injection 0.2 mg, 0.2 mg, intravenous, PRN, ASHLY Yin    ondansetron (Zofran) injection 4 mg, 4 mg, intravenous, q8h PRN, ASHLY Yin    oxyCODONE (Roxicodone) solution 10 mg, 10 mg, oral, q4h PRN, Annabel Patel MD, 10 mg at 08/05/25 2126    oxyCODONE (Roxicodone) solution 5 mg, 5 mg, oral, q4h PRN, Annabel Patel MD, 5 mg at 08/06/25 0240    pantoprazole (Protonix) injection 40 mg, 40 mg, intravenous, Daily, ASHLY Yin, 40 mg at 08/06/25 0842    traZODone (Desyrel) tablet 100 mg, 100 mg, oral, Nightly, Frederick Abel MD, 100 mg at 08/05/25 2206

## 2025-08-06 NOTE — PROGRESS NOTES
Colorectal Surgery Progress Note        Bereket Em is a 59 y.o. 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 Olson & Vicky with Margarita's reversal/transverse colon mobilization/ileal window by Dr. Gross on 7/22/25. Post-op course c/b bowel ischemia with afib RVR requiring ICU transfer & amio drip, s/p exlap, bowel resection at anastomosis, end colostomy creation (RUQ) & conversion of double barrel jejunostomy to end jejunostomy (LUQ) on 7/25/25.     Subjective    No acute events overnight, afebrile with stable vital signs. Awake and conversive on morning rounds, pain controlled on current pain regime.  Expressing frustration waiting on IR for drainage, planned for today.  Continues with high output from jejunostomy 2625 ml over last 24 hours with max imodium/lomotil.  Remains NPO for procedure and continues on nocturnal TPN.  OOB and ambulating in halls.     Objective    Vital signs:   Temp:  [36.1 °C (97 °F)-37.3 °C (99.1 °F)] 36.1 °C (97 °F)  Heart Rate:  [61-76] 61  Resp:  [16-21] 16  BP: (107-118)/(44-76) 111/44    Physical Exam:  GEN: Sitting up in bed, alert and conversive, no acute distress.  HEENT: Sclera anicteric. Moist mucous membranes.  RESP: chest expansion symmetrical, unlabored breathing on room air, + ease of talking without SOB today.  CV: Regular rate, normotensive per chart review  GI:  Abdomen soft, non tender to palpation, midline incision well approximated with staples without erythema or drainage, covered with ABDs. LUQ Jejunostomy pink & perfused, well pouched, with liquid green liquid stool + gas in  pouch, RUQ de-functioned colostomy pink & perfused, well pouched with smaller pouch, small amount of bowel sweat in pouch. ANA MARIA drain dark serosanguinous with scant output   MSK: No gross deformities. Moves all extremities spontaneously. No LE pitting edema  NEURO: Alert and conversive,  No focal deficits.  PSYCH: Appropriate affect, depressed mood    I/O last 2 completed shifts:  In: 477 (6.4 mL/kg) [P.O.:477]  Out: 4135 (55.7 mL/kg) [Urine:1510 (0.8 mL/kg/hr); Stool:2625]  Weight: 74.3 kg                8.2     18.6>-----<774              25.6     134  98  22                  ----------------<72     5.0  28  0.39          Ca 8.4 Phos 4.1 Mg 2.20         Meds:  Current Medications[1]     Imaging:  CT abdomen pelvis w IV contrast 8/4/25  IMPRESSION:  1.  Similar appearance of a fluid collection in the left lower  quadrant which measures 6.1 x 3.2 cm and, when accounting for  differences in technique, shows no significant interval change.  2. There is a fluid collection seen along the inferior edge of the  right liver which measures 3.6 x 1.7 cm. This appears more organized  with a peripheral enhancing wall when compared to prior. Small pocket  of air seen within this collection, possibly representing  postsurgical changes versus gas-forming organism.  3. Similar appearance of diffuse edema and small amounts of free  fluid scattered throughout the mesentery, likely  inflammatory/infectious in nature.  4. Interval improvement in bilateral pleural effusions and left  basilar lung ground-glass opacities, likely representing improvement  in pulmonary edema.    Medications reviewed.  Vital signs reviewed.  Labs reviewed.         Assessment/Plan    Bereket SAIMA Em is a Consult (Corwin Owens)-59M with a history of perforated diverticulitis s/p Soliman's procedure C/B formation of enterocutaneous fistula now s/p enterocutaneous fistula takedown, Soliman's reversal with end jejunostomy creation, ventral wall hernia  repair(>10 cm), and abdominal wall debridement with wound VAC placements on 7/22 c/b bowel ischemia and afib with RVR s/p exlap with ischemic bowel resection, colonic anast takedown and end colostomy formation on amio drip.      Plan Today:   - Appreciate care per primary team  - Agree with primary team on IR drainage of abdominal fluid collection- hopefully today  - Recommend continuing to monitor jejunostomy output. Have patient record output, keep running total & continue dehydration education  - Continue Max dosing with Imodium and Lomotil for short bowel   - continue daily weights to assess fluid status,and guide diuresis  - Consider limiting infusion during day to allow better mobility, off floor privileges when possible to improve moral secondary to  prolonged stay  - Colorectal will continue to follow along.    Discussed with surgical team and Dr Gross    [facility] Day: 16     DANIELE LIMON  Colorectal Surgery NP #[id number]  You # [id number]                       [1]   Current Facility-Administered Medications:     acetaminophen (Tylenol) oral liquid 650 mg, 650 mg, oral, q6h, MINERVA Yin-CNP, 650 mg at 08/06/25 0630    Adult Clinimix TPN Cyclic, , intravenous, Cyclic PN, Charisse Castaneda APRDIANDRA-CNP, Stopped at 08/06/25 1022    alteplase (Cathflo Activase) injection 2 mg, 2 mg, intra-catheter, PRN, Charisse Castaneda APRN-CNP    dextrose 50 % injection 12.5 g, 12.5 g, intravenous, q15 min PRN, Charisse Castaneda, APRN-CNP    dextrose 50 % injection 25 g, 25 g, intravenous, q15 min PRN, Charisse Castaneda APRN-CNP    diphenoxylate-atropine (Lomotil) 2.5-0.025 mg per tablet 2 tablet, 2 tablet, oral, 4x daily, MINERVA Yin-CNP    [Held by provider] enoxaparin (Lovenox) syringe 40 mg, 40 mg, subcutaneous, q24h, Charisse Castaneda APRDIANDRA-CNP, 40 mg at 08/04/25 0840    fat emulsion fish oil/plant based (SMOFlipid) 20 % IV infusion 50 g, 250 mL, intravenous, Daily Lipids, Charisse Castaneda APRN-CNP, Stopped at 08/06/25  1022    glucagon (Glucagen) injection 1 mg, 1 mg, intramuscular, q15 min PRN, MINERVA Yin-CNP    glucagon (Glucagen) injection 1 mg, 1 mg, intramuscular, q15 min PRN, ASHLY Yin    heparin flush 10 unit/mL syringe 50 Units, 5 mL, intravenous, PRN, MINERVA Yin-CNP    insulin lispro injection 0-5 Units, 0-5 Units, subcutaneous, q6h, ASHLY Yin, 1 Units at 08/06/25 0730    lidocaine 4 % patch 2 patch, 2 patch, transdermal, Daily, Annabel Patel MD, 2 patch at 08/06/25 0842    loperamide (Imodium) capsule 4 mg, 4 mg, oral, 4x daily, Annabel Patel MD, 4 mg at 08/06/25 0648    naloxone (Narcan) injection 0.2 mg, 0.2 mg, intravenous, PRN, ASHLY Yin    ondansetron (Zofran) injection 4 mg, 4 mg, intravenous, q8h PRN, ASHLY Yin    oxyCODONE (Roxicodone) solution 10 mg, 10 mg, oral, q4h PRN, Annabel Patel MD, 10 mg at 08/05/25 2126    oxyCODONE (Roxicodone) solution 5 mg, 5 mg, oral, q4h PRN, Annabel Patel MD, 5 mg at 08/06/25 0240    pantoprazole (Protonix) injection 40 mg, 40 mg, intravenous, Daily, ASHLY Yin, 40 mg at 08/06/25 0842    traZODone (Desyrel) tablet 100 mg, 100 mg, oral, Nightly, Frederick Able MD, 100 mg at 08/05/25 2206

## 2025-08-06 NOTE — CARE PLAN
The patient's goals for the shift include  pain control    The clinical goals for the shift include patiebt will have adequate pain control throughout this shift    Over the shift, the patient did  make progress toward the following goals.   Problem: Pain - Adult  Goal: Verbalizes/displays adequate comfort level or baseline comfort level  Outcome: Progressing     Problem: Pain - Adult  Goal: Verbalizes/displays adequate comfort level or baseline comfort level  Outcome: Progressing

## 2025-08-06 NOTE — PROGRESS NOTES
[facility]  ACUTE CARE SURGERY - PROGRESS NOTE    Patient Name: Bereket Em  MRN: [id number]  Admit Date: 2025  : 1965  AGE: 59 y.o.   GENDER: male  ==============================================================================  TODAY'S ASSESSMENT AND PLAN OF CARE:  60 yo male s/p fistula takedown, extensive lysis of adhesions, colostomy reversal, double barrel jejunostomy formation, abdominal wall reconstruction with return to the OR for washout, resection of distal colon, new end colostomy and repositioning of jejnostomy.     Plan for IR drain of left paracolic gutter collection. No changes from ACS. Please work on fixing I/O table with nursing to accurately measure output of ostomies.     Recommendations  Antimotility agents for output < 1L.      Discussed and seen with Dr. Mcneal.    Иван Richter MD  PGY-1 General Surgery  Acute Care Surgery [id number]      ==============================================================================  Subjective   Interval History/Overnight Events:   No major events overnight   Ostomy output is not reliable  PO intake 477    Medications:    Current Medications[1]    Objective    Vitals:    25 1015 25 1020 25 1031 25 1116   BP: 108/58 107/63 (!) 111/44 112/71   BP Location:    Right arm   Patient Position:    Lying   Pulse: 63 65 61 72   Resp: 20 18 16 17   Temp:    36 °C (96.8 °F)   TempSrc:    Temporal   SpO2: 100% 99% 98% 96%   Weight:       Height:          1900 -  0659  In: 837 [P.O.:837]  Out: 5938 [Urine:2460; Drains:3]    Physical Exam                                                                                                              General: resting comfortably in bed  Neuro: alert and oriented, no obvious focal deficit   ENT: moist mucous membranes  CV: hemodynamically stable  Pulm: nonlabored breathing on room air, no conversational dyspnea  GI: Abdomen soft, non tender to  palpation, midline incision well approximated with staples without erythema or drainage, covered with ABDs. LUQ Jejunostomy pink & perfused, well pouched, with liquid green liquid stool + gas in pouch, RUQ de-functioned colostomy pink & perfused, well pouched with smaller pouch, small amount of bowel sweat in pouch. ANA MARIA drain dark serosanguinous with scant output     Labs    Lab Results   Component Value Date    WBC 18.6 (H) 08/06/2025    HGB 8.2 (L) 08/06/2025    HCT 25.6 (L) 08/06/2025    MCV 81 08/06/2025     (H) 08/06/2025      Lab Results   Component Value Date    GLUCOSE 72 (L) 08/06/2025    CALCIUM 8.4 (L) 08/06/2025     (L) 08/06/2025    K 5.0 08/06/2025    CO2 28 08/06/2025    CL 98 08/06/2025    BUN 22 08/06/2025    CREATININE 0.39 (L) 08/06/2025               [1]   Current Facility-Administered Medications   Medication Dose Route Frequency Provider Last Rate Last Admin    acetaminophen (Tylenol) oral liquid 650 mg  650 mg oral q6h Charisse Castaneda APRN-CNP   650 mg at 08/06/25 1157    Adult Clinimix TPN Cyclic   intravenous Cyclic PN Charisse Castaneda APRN-CNP   Stopped at 08/06/25 1022    alteplase (Cathflo Activase) injection 2 mg  2 mg intra-catheter PRN Charisse Castaneda APRN-CNP        dextrose 50 % injection 12.5 g  12.5 g intravenous q15 min PRN Charisse Castaneda APRN-CNP        dextrose 50 % injection 25 g  25 g intravenous q15 min PRN Charisse Castaneda, APRN-CNP        diphenoxylate-atropine (Lomotil) 2.5-0.025 mg per tablet 2 tablet  2 tablet oral 4x daily Charisse Castaneda APRN-CNP   2 tablet at 08/06/25 1200    enoxaparin (Lovenox) syringe 40 mg  40 mg subcutaneous q24h Charisse Castaneda APRN-CNP   40 mg at 08/04/25 0840    fat emulsion fish oil/plant based (SMOFlipid) 20 % IV infusion 50 g  250 mL intravenous Daily Lipids ASHLY Yin   Stopped at 08/06/25 1022    glucagon (Glucagen) injection 1 mg  1 mg intramuscular q15 min PRN ASHLY Yin        glucagon (Glucagen)  injection 1 mg  1 mg intramuscular q15 min PRN ASHLY Yin        heparin flush 10 unit/mL syringe 50 Units  5 mL intravenous PRN ASHLY Yin        insulin lispro injection 0-5 Units  0-5 Units subcutaneous q6h ASHLY Yin   1 Units at 08/06/25 0730    lidocaine 4 % patch 2 patch  2 patch transdermal Daily Annabel Patel MD   2 patch at 08/06/25 0842    loperamide (Imodium) capsule 4 mg  4 mg oral 4x daily Annabel Patel MD   4 mg at 08/06/25 1200    naloxone (Narcan) injection 0.2 mg  0.2 mg intravenous PRN ASHLY Yin        ondansetron (Zofran) injection 4 mg  4 mg intravenous q8h PRN ASHLY Yin        oxyCODONE (Roxicodone) solution 10 mg  10 mg oral q4h PRN Annabel Patel MD   10 mg at 08/05/25 2126    oxyCODONE (Roxicodone) solution 5 mg  5 mg oral q4h PRN Annabel Patel MD   5 mg at 08/06/25 0240    pantoprazole (Protonix) injection 40 mg  40 mg intravenous Daily ASHLY Yin   40 mg at 08/06/25 0842    traZODone (Desyrel) tablet 100 mg  100 mg oral Nightly Frederick Abel MD   100 mg at 08/05/25 2206

## 2025-08-06 NOTE — PRE-PROCEDURE NOTE
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 Diagnoses of Incarcerated ventral hernia, Atrial fibrillation, unspecified type (Multi), Diverticulosis of large intestine without hemorrhage, Septic shock (Multi), S/P exploratory laparotomy, On total parenteral nutrition (TPN), and Protein-calorie malnutrition, unspecified severity (Multi) were also pertinent to this visit.    Relevant review of systems: NA    Relevant Labs:   Lab Results   Component Value Date    CREATININE 0.39 (L) 08/06/2025    EGFR >90 08/06/2025    INR 1.0 07/27/2025    PROTIME 11.4 07/27/2025       Planned Sedation/Anesthesia: Minimal    Airway assessment: normal    Mallampati: IV (only hard palate visible)    ASA Score: ASA 2 - Patient with mild systemic disease with no functional limitations    Benefits, risks and alternatives of procedure and planned sedation have been discussed with the patient and/or their representative. All questions answered and they agree to proceed.

## 2025-08-07 ENCOUNTER — HOME INFUSION (OUTPATIENT)
Dept: INFUSION THERAPY | Age: 60
End: 2025-08-07
Payer: COMMERCIAL

## 2025-08-07 ENCOUNTER — DOCUMENTATION (OUTPATIENT)
Dept: HOME HEALTH SERVICES | Facility: HOME HEALTH | Age: 60
End: 2025-08-07
Payer: COMMERCIAL

## 2025-08-07 LAB
ALBUMIN SERPL BCP-MCNC: 3 G/DL (ref 3.4–5)
ANION GAP SERPL CALC-SCNC: 11 MMOL/L (ref 10–20)
BUN SERPL-MCNC: 21 MG/DL (ref 6–23)
CALCIUM SERPL-MCNC: 8.8 MG/DL (ref 8.6–10.6)
CHLORIDE SERPL-SCNC: 97 MMOL/L (ref 98–107)
CO2 SERPL-SCNC: 27 MMOL/L (ref 21–32)
CREAT SERPL-MCNC: 0.5 MG/DL (ref 0.5–1.3)
EGFRCR SERPLBLD CKD-EPI 2021: >90 ML/MIN/1.73M*2
ERYTHROCYTE [DISTWIDTH] IN BLOOD BY AUTOMATED COUNT: 16.3 % (ref 11.5–14.5)
FUNGUS SPEC CULT: NORMAL
FUNGUS SPEC FUNGUS STN: NORMAL
GLUCOSE BLD MANUAL STRIP-MCNC: 107 MG/DL (ref 74–99)
GLUCOSE BLD MANUAL STRIP-MCNC: 113 MG/DL (ref 74–99)
GLUCOSE BLD MANUAL STRIP-MCNC: 129 MG/DL (ref 74–99)
GLUCOSE BLD MANUAL STRIP-MCNC: 136 MG/DL (ref 74–99)
GLUCOSE BLD MANUAL STRIP-MCNC: 147 MG/DL (ref 74–99)
GLUCOSE SERPL-MCNC: 130 MG/DL (ref 74–99)
HCT VFR BLD AUTO: 26.3 % (ref 41–52)
HGB BLD-MCNC: 8.3 G/DL (ref 13.5–17.5)
MAGNESIUM SERPL-MCNC: 2.39 MG/DL (ref 1.6–2.4)
MCH RBC QN AUTO: 26.3 PG (ref 26–34)
MCHC RBC AUTO-ENTMCNC: 31.6 G/DL (ref 32–36)
MCV RBC AUTO: 83 FL (ref 80–100)
NRBC BLD-RTO: 0 /100 WBCS (ref 0–0)
PHOSPHATE SERPL-MCNC: 4.4 MG/DL (ref 2.5–4.9)
PLATELET # BLD AUTO: 788 X10*3/UL (ref 150–450)
POTASSIUM SERPL-SCNC: 4.1 MMOL/L (ref 3.5–5.3)
RBC # BLD AUTO: 3.16 X10*6/UL (ref 4.5–5.9)
SODIUM SERPL-SCNC: 131 MMOL/L (ref 136–145)
WBC # BLD AUTO: 15.5 X10*3/UL (ref 4.4–11.3)

## 2025-08-07 NOTE — PROGRESS NOTES
General Surgery Progress Note    08/07/25    Bereket Em    Summary:  Bereket Em is a 59 year old male with a history of perforated diverticulitis s/p Margarita's procedure c/b formation of ECF. Underwent EC fistula takedown, GILLES, Margarita's reversal, abdominal wall debridement, and double barrel jejunostomy with Leyda Olson, Renato, and Vicky on 7/22. Intra-op findings remarkable for adhesions throughout the abdomen and remaining segment of the colon terminating at the splenic flexure requiring significant mobilization and an ileal window. Postoperative course complicated by colon ischemia requiring OR take back for partial colectomy, end colostomy creation (RUQ), and conversion of double barrel jejunostomy to end jejunostomy in LUQ on 7/25.     Subjective    Subjective:  Reports some leakage around jejunostomy pouching overnight. Inquiring about ANA MARIA drain removal as output has been minimal. Has been OOB during the day. Pain controlled, Denies fever, chills.          Objective    Objective:      Vital signs:   Temp:  [35.8 °C (96.4 °F)-36.8 °C (98.2 °F)] 36.3 °C (97.3 °F)  Heart Rate:  [60-74] 74  Resp:  [16-21] 17  BP: (106-125)/(44-72) 114/66    Physical Exam:  GEN: resting comfortably, no acute distress   NEURO: awake, alert, oriented x4   RESP: unlabored on RA   CV: regular rate and rhythm on telemetry  GI: Abdomen soft, appropriately tender to palpation along midline incision with staples, dressing intact. Ostomies x2 pink and healthy appearing. LUQ Jejunostomy with liquid green output. RUQ colostomy with bowel sweat. Right ANA MARIA drain with scant dark serosanguinous output.   SKIN: laparotomy closed loosely with staples  EXT: no peripheral edema     I/O last 2 completed shifts:  In: 957 (13.1 mL/kg) [P.O.:957]  Out: 4105 (56.3 mL/kg) [Urine:1475 (0.8 mL/kg/hr); Drains:5; Stool:2625]  Weight: 72.9 kg      Labs Past 18 Hours:  Recent Results (from the past 18 hours)   POCT GLUCOSE    Collection Time:  08/06/25  3:45 PM   Result Value Ref Range    POCT Glucose 122 (H) 74 - 99 mg/dL   POCT GLUCOSE    Collection Time: 08/06/25  7:49 PM   Result Value Ref Range    POCT Glucose 109 (H) 74 - 99 mg/dL   CBC    Collection Time: 08/07/25  6:00 AM   Result Value Ref Range    WBC 15.5 (H) 4.4 - 11.3 x10*3/uL    nRBC 0.0 0.0 - 0.0 /100 WBCs    RBC 3.16 (L) 4.50 - 5.90 x10*6/uL    Hemoglobin 8.3 (L) 13.5 - 17.5 g/dL    Hematocrit 26.3 (L) 41.0 - 52.0 %    MCV 83 80 - 100 fL    MCH 26.3 26.0 - 34.0 pg    MCHC 31.6 (L) 32.0 - 36.0 g/dL    RDW 16.3 (H) 11.5 - 14.5 %    Platelets 788 (H) 150 - 450 x10*3/uL   Magnesium    Collection Time: 08/07/25  6:00 AM   Result Value Ref Range    Magnesium 2.39 1.60 - 2.40 mg/dL   Renal Function Panel    Collection Time: 08/07/25  6:00 AM   Result Value Ref Range    Glucose 130 (H) 74 - 99 mg/dL    Sodium 131 (L) 136 - 145 mmol/L    Potassium 4.1 3.5 - 5.3 mmol/L    Chloride 97 (L) 98 - 107 mmol/L    Bicarbonate 27 21 - 32 mmol/L    Anion Gap 11 10 - 20 mmol/L    Urea Nitrogen 21 6 - 23 mg/dL    Creatinine 0.50 0.50 - 1.30 mg/dL    eGFR >90 >60 mL/min/1.73m*2    Calcium 8.8 8.6 - 10.6 mg/dL    Phosphorus 4.4 2.5 - 4.9 mg/dL    Albumin 3.0 (L) 3.4 - 5.0 g/dL   POCT GLUCOSE    Collection Time: 08/07/25  6:04 AM   Result Value Ref Range    POCT Glucose 136 (H) 74 - 99 mg/dL   POCT GLUCOSE    Collection Time: 08/07/25  7:36 AM   Result Value Ref Range    POCT Glucose 147 (H) 74 - 99 mg/dL        Meds:  Current Medications[1]     Imaging:  Imaging  No results found.      Assessment/Plan    Assessment and Plan:  Bereket Em is a Primary - [place]   59M with PMH of perforated diverticulitis s/p Margarita's procedure c/b formation of ECF. Underwent EC fistula takedown, GILLES, Margarita's reversal, abdominal wall debridement, and double barrel jejunostomy with Leyda Olson, Renato, and Vicky on 7/22.  Intra-op findings remarkable for adhesions throughout the abdomen and remaining segment of the colon  terminating at the splenic flexure requiring significant mobilization and an ileal window.  Postoperative course complicated by colon ischemia requiring exploratory laparotomy, partial colectomy, end colostomy creation (RUQ), and conversion of double barrel jejunostomy to end jejunostomy in LUQ on 7/25. Transferred from ICU to floor on 7/28.     Course c/b anasarca and third spacing of fluid 2/2 sepsis, improved after 3 days of diuresis with lasix. Still with high jejunostomy output >2L day, on max doses lomotil and imodium. Has persistent leukocytosis without fevers or other signs/sx of ongoing infection. Completed Zosyn course 8/2 . CT 7/31 showed 5cm left-sided intraabdominal fluid collection, too small for drainage per IR. Repeat interval CT 8/4 showed same fluid collection, without changes in size, as well a small perihepatic 3.6x1.7cm fluid collection with small pocket of air.   He is s/p IR aspiration of fluid collection on 8/6, cultures pending.         PLAN:   Neurology: post operative pain  - scheduled tylenol, lidocaine patch    - Liquid oxycodone 5/10mg PRN moderate-severe pain   - continue home trazodone      Cardiovascular:   -Vital signs every 4 hours     Pulmonology: pulmonary edema- improved, atelectasis   - IS x10 every hour   - maintain SpO2 >92% RA      GI: s/p ECF takedown, Margarita's reversal; end colostomy, end jejunostomy; high jejunostomy output  - Regular diet, TPN/lipids, PICC replaced 7/30   - continue PPI   - continue 4mg imodium 4x daily/2 tab Lomotil 4x daily  - wound/ostomy care consulted, appreciate assistance   - ANA MARIA drain removed today   - Zofran PRN nausea     :   - Strict I&Os  - Replace electrolytes as needed to maintain K >4, Mag >2.      ID: h/o E. coli bacteremia, resolved; intra-abdominal fluid collection, leukocytosis- downtrend   - s/p IR fluid aspiration 8/6; will follow cultures  - Zosyn x7 days completed 8/2  - ID consulted/following, await IR cultures   - trend WBC       Heme:   - trend CBC      Endocrine:   - POCT glucose every 6 hours with TPN   - insulin lispro sliding scale      DVT Prophylaxis:  -subcutaneous lovenox, SCDs, ambulation      Dispo: Continue current level of care.   - Plan for home with home care/TPN/PT at discharge.     Discussed with Dr. Olson.       Charisse PALMA CNP  [facility]  pager: [phone]         [1]   Current Facility-Administered Medications:     acetaminophen (Tylenol) oral liquid 650 mg, 650 mg, oral, q6h, MINERVA Yin-CNP, 650 mg at 08/07/25 0604    Adult Clinimix TPN Cyclic, , intravenous, Cyclic PN, MINERVA Yin-CNP, Last Rate: 90 mL/hr at 08/06/25 2102, New Bag at 08/06/25 2102    alteplase (Cathflo Activase) injection 2 mg, 2 mg, intra-catheter, PRN, MINERVA Yin-CNP    dextrose 50 % injection 12.5 g, 12.5 g, intravenous, q15 min PRN, Charisse Castaneda, APRN-CNP    dextrose 50 % injection 25 g, 25 g, intravenous, q15 min PRN, Charisse Castaneda, APRN-CNP    diphenoxylate-atropine (Lomotil) 2.5-0.025 mg per tablet 2 tablet, 2 tablet, oral, 4x daily, MINERVA Yin-CNP, 2 tablet at 08/07/25 0604    enoxaparin (Lovenox) syringe 40 mg, 40 mg, subcutaneous, q24h, MINERVA Yin-CNP, 40 mg at 08/04/25 0840    fat emulsion fish oil/plant based (SMOFlipid) 20 % IV infusion 50 g, 250 mL, intravenous, Daily Lipids, MINERVA Yin-CNP, Last Rate: 20.8 mL/hr at 08/06/25 2101, 50 g at 08/06/25 2101    glucagon (Glucagen) injection 1 mg, 1 mg, intramuscular, q15 min PRN, Charisse Castaneda APRN-CNP    glucagon (Glucagen) injection 1 mg, 1 mg, intramuscular, q15 min PRN, ASHLY Yin    heparin flush 10 unit/mL syringe 50 Units, 5 mL, intravenous, PRN, ASHLY Yin    insulin lispro injection 0-5 Units, 0-5 Units, subcutaneous, q6h, ASHLY Yin, 1 Units at 08/06/25 0730    lidocaine 4 % patch 2 patch, 2 patch, transdermal, Daily, Annabel Patel MD, 2 patch at 08/06/25 0842     loperamide (Imodium) capsule 4 mg, 4 mg, oral, 4x daily, Annabel Patel MD, 4 mg at 08/07/25 0604    naloxone (Narcan) injection 0.2 mg, 0.2 mg, intravenous, PRN, ASHLY Yin    ondansetron (Zofran) injection 4 mg, 4 mg, intravenous, q8h PRN, ASHLY Yin    oxyCODONE (Roxicodone) solution 10 mg, 10 mg, oral, q4h PRN, Annabel Patel MD, 10 mg at 08/07/25 0113    oxyCODONE (Roxicodone) solution 5 mg, 5 mg, oral, q4h PRN, Annabel Patel MD, 5 mg at 08/06/25 0240    pantoprazole (Protonix) injection 40 mg, 40 mg, intravenous, Daily, ASHLY Yin, 40 mg at 08/06/25 0842    traZODone (Desyrel) tablet 100 mg, 100 mg, oral, Nightly, Frederick Abel MD, 100 mg at 08/06/25 9286

## 2025-08-07 NOTE — PROGRESS NOTES
PATIENT IS BEING DISCHARGED FROM HOSPITAL ON SAME TPN FORMULA AS PTA.  DR. Rodriguez IS STILL FOLLOWING PATIENT AT HOME.  PHARMACY TO DISPENSE 6 TPN AND MVI ON FRIDAY AND DELIVER FOR TPN H/U 8/8 THROUGH 8/13.  NF FOR 8/13    DEPENDING ON LAB DRAW DAY MAY OR MAY NOT NEED TO GET NEW ORDERS. DSL

## 2025-08-07 NOTE — CONSULTS
Wound Care Consult     Visit Date: 8/7/2025      Patient Name: Bereket Em         MRN: [id number]             Reason for Consult: ostomy care; abdominal wound        Assessment:  Wound 07/25/25 Surgical Abdomen Medial;Upper (Active)   Date First Assessed/Time First Assessed: 07/25/25 0443   Present on Original Admission: No  Hand Hygiene Completed: Yes  Primary Wound Type: Surgical  Secondary Wound Type - Surgical: Open Surgical Incision  Location: Abdomen  Wound Location Orientati...      Assessments 8/7/2025  1:08 PM   Wound Image     Site Assessment Fragile   Usha-Wound Assessment Moist    Treatments Cleansed;Site care   Drainage Description Serosanguineous   Drainage Amount Scant   Dressing Hydrofiber;Gauze   Dressing Changed New       No associated orders.        Colostomy Mucous Fistula RUQ (Active)   Placement Date: 07/25/25   Colostomy Type: Mucous Fistula  Location: RUQ   Number of days: 13      Colostomy Mucous Fistula RUQ (Active)   Stomal Appliance 1 piece;Changed 08/07/25 1312   Site/Stoma Assessment Red 08/07/25 1312   Peristomal Assessment Separation 08/07/25 1312   Treatment Pouch change;Packings;Irrigation;Site care 08/07/25 1312   Drainage Characteristics Brown 08/07/25 1312   Output (mL) 20 mL 08/07/25 1312     Ileostomy Other (Comment) LUQ (Active)   Placement Date/Time: 07/25/25 0500   Hand Hygiene Completed: Yes  Ileostomy Type: (c) Other (Comment)  Location: LUQ   Number of days: 13      Ileostomy Other (Comment) LUQ (Active)   Present on Admission to Healthcare Facility N 08/05/25 1800   Stomal Appliance 2 piece;Changed;Leaking 08/07/25 1310   Site/Stoma Assessment Red;Budded 08/07/25 1310   Peristomal Assessment Wound 08/07/25 1310   Treatment Pouch change;Site care;Packings;Irrigation 08/07/25 1310   Output (mL) 125 mL 08/07/25 1105   Output Description Soft;Loose;Bilous 08/07/25 1310          Wound Plan:   LUQ: jejunostomy with tunneling wound at 3 oclock. Wound cleansed with Vashe  wash then Hydrofera Blue ready foam applied. This was then covered with hollihesive. A barrier ring and 2 piece Melonie flat wafer and high output pouch then applied.  RUQ: Mucous fistula with circumferential separation. Treated with stoma powder and packed with aquacel AG. A barrier ring and pediatric pouch then applied.    Midline wound thoroughly cleansed with Vashe wash as patient stated stool contents had leaked into it overnight. Aquacel AG applied to open area and covered with gauze/paper tape. This should be changed daily.    Pouches to be changed every other day due to peristomal wound and separation.      Maar Farias RN  8/7/2025  1:19 PM

## 2025-08-07 NOTE — PROGRESS NOTES
Colorectal Surgery Progress Note        Bereket Em is a 59 y.o. male with a past medical history of perforated sigmoid diverticulitis s/p Margarita's 6/7/24 c/b fascial necrosis s/p abdominal wall debridement & Vicryl mesh placement 6/18/24 c/b midline small bowel enterocutaneous fistula (on TPN), now hospital day 16 s/p ex lap, extensive lysis of adhesions, abdominal wall debridement, ventral hernia repair, excision of EC fistula, jejunostomy, & abdominal wound vac placement by Dr. Olson & Vicky with Margarita's reversal/transverse colon mobilization/ileal window by Dr. Gross on 7/22/25. Post-op course c/b bowel ischemia with afib RVR requiring ICU transfer & amio drip, s/p exlap, bowel resection at anastomosis, end colostomy creation (RUQ) & conversion of double barrel jejunostomy to end jejunostomy (LUQ) on 7/25/25.     Subjective    No acute events overnight, denies fever/chills/shortness of breath. Denies pain & overall feels slightly better after IR drainage of left paracolic collection.  Would like ANA MARIA drain out as it hasn't had output & is bothersome. Reports repeated leakage of LUQ jejunostomy onto midline incision. Denies nausea/vomiting on regular diet with good intake. Ambulating around room & in halls. Frustrated with prolonged hospital course.     Objective    Vital signs:   Temp:  [35.8 °C (96.4 °F)-37 °C (98.6 °F)] 36.8 °C (98.2 °F)  Heart Rate:  [60-72] 60  Resp:  [16-21] 16  BP: (106-125)/(44-73) 125/69    Physical Exam:  GEN: Lying in bed, alert and conversive, no acute distress, slightly agitated.  HEENT: Sclera anicteric. Moist mucous membranes.  RESP: chest expansion symmetrical, unlabored breathing on room air  CV: Regular rate, normotensive per chart review. No BLE edema.  GI:  Abdomen soft, non tender to palpation, midline incision well approximated with staples without erythema or drainage, covered with ABDs, with lime green leakage of jejunostomy onto midline. LUQ Jejunostomy  pink & perfused, well pouched, with lime green liquid output & gas in pouch. RUQ de-functioned colostomy pink & perfused, well pouched, small amount of bowel sweat in pouch. ANA MARIA drain tubing & bulb empty, to bulb suction.  MSK: No gross deformities. Moves all extremities spontaneously x4.   NEURO: A&O x3,  no focal deficits.  PSYCH: Agitated, depressed mood    I/O last 2 completed shifts:  In: 957 (13.1 mL/kg) [P.O.:957]  Out: 4105 (56.3 mL/kg) [Urine:1475 (0.8 mL/kg/hr); Drains:5; Stool:2625]  Weight: 72.9 kg                8.3     15.5>-----<788              26.3     131  97  21                  ----------------<130     4.1  27  0.50          Ca 8.8 Phos 4.4 Mg 2.39         Meds:  Current Medications[1]     Imaging:  CT abdomen pelvis w IV contrast 8/4/25  IMPRESSION:  1.  Similar appearance of a fluid collection in the left lower  quadrant which measures 6.1 x 3.2 cm and, when accounting for  differences in technique, shows no significant interval change.  2. There is a fluid collection seen along the inferior edge of the  right liver which measures 3.6 x 1.7 cm. This appears more organized  with a peripheral enhancing wall when compared to prior. Small pocket  of air seen within this collection, possibly representing  postsurgical changes versus gas-forming organism.  3. Similar appearance of diffuse edema and small amounts of free  fluid scattered throughout the mesentery, likely  inflammatory/infectious in nature.  4. Interval improvement in bilateral pleural effusions and left  basilar lung ground-glass opacities, likely representing improvement  in pulmonary edema.    Medications reviewed.  Vital signs reviewed.  Labs reviewed.         Assessment/Plan    Bereket Em is a 59 y.o. male with a past medical history of perforated sigmoid diverticulitis s/p Margarita's 6/7/24 c/b fascial necrosis s/p abdominal wall debridement & Vicryl mesh placement 6/18/24 c/b midline small bowel enterocutaneous fistula  (on TPN), now hospital day 16 s/p ex lap, extensive lysis of adhesions, abdominal wall debridement, ventral hernia repair, excision of EC fistula, jejunostomy, & abdominal wound vac placement by Dr. Olson & Vicky with Margarita's reversal/transverse colon mobilization/ileal window by Dr. Gross on 7/22/25. Post-op course c/b bowel ischemia with afib RVR s/p exlap, bowel resection at anastomosis, end colostomy creation (RUQ) & conversion of double barrel jejunostomy to end jejunostomy (LUQ) on 7/25/25. Post-op course also complicated by high jejunostomy output (as expected) which is now at manageable amount (2-2.5L/24 hours for both ostomies combined, on maximum doses of imodium/lomotil), fluid overload/third spacing requiring disuresis (now resolved), E. Coli bacteremia s/p Zosyn x7 days (completed 8/2) and persistent leukocytosis without fevers with CT showing perihepatic fluid collection s/p IR aspiration 8/6. Leukocytosis down trending post-IR aspiration & patient feels well. He is frustrated with his prolonged hospital course & motivated to discharge.    Plan Today:   - Appreciate care per primary team  - Will follow up cultures from IR aspiration  - Recommend continuing to monitor jejunostomy output. Have patient record output, keep running total & continue dehydration education  - Recommend WOCN to troubleshoot leaking Jejunostomy pouch  - Continue max dosing with Imodium and Lomotil for short gut with plan for home TPN, labs & prn IVF   - OK to DC ANA MARIA drain  - Follow up appt with Dr. Gross scheduled for 9/2    Plan of care discussed with patient, primary team,  and Dr. Gross.    Nydia LIMON  Colorectal Surgery NP   [facility] Service Pager #[phone]         [1]   Current Facility-Administered Medications:     acetaminophen (Tylenol) oral liquid 650 mg, 650 mg, oral, q6h, ASHLY Yin, 650 mg at 08/07/25 0604    Adult Clinimix TPN Cyclic, , intravenous, Cyclic PN, Charisse Castaneda,  APRN-CNP, Last Rate: 90 mL/hr at 08/06/25 2102, New Bag at 08/06/25 2102    alteplase (Cathflo Activase) injection 2 mg, 2 mg, intra-catheter, PRN, Charisse Castaneda, APRN-CNP    dextrose 50 % injection 12.5 g, 12.5 g, intravenous, q15 min PRN, Charisse Cerratona, APRN-CNP    dextrose 50 % injection 25 g, 25 g, intravenous, q15 min PRN, Charisse Cerratona, APRN-CNP    diphenoxylate-atropine (Lomotil) 2.5-0.025 mg per tablet 2 tablet, 2 tablet, oral, 4x daily, MINERVA Yin-CNP, 2 tablet at 08/07/25 0604    enoxaparin (Lovenox) syringe 40 mg, 40 mg, subcutaneous, q24h, MINERVA Yin-CNP, 40 mg at 08/04/25 0840    fat emulsion fish oil/plant based (SMOFlipid) 20 % IV infusion 50 g, 250 mL, intravenous, Daily Lipids, Charisse Castaneda APRN-CNP, Last Rate: 20.8 mL/hr at 08/06/25 2101, 50 g at 08/06/25 2101    glucagon (Glucagen) injection 1 mg, 1 mg, intramuscular, q15 min PRN, Charisse Castaneda, APRN-CNP    glucagon (Glucagen) injection 1 mg, 1 mg, intramuscular, q15 min PRN, Charisse Castaneda, APRN-CNP    heparin flush 10 unit/mL syringe 50 Units, 5 mL, intravenous, PRN, Charisse Weinbergbena, APRN-CNP    insulin lispro injection 0-5 Units, 0-5 Units, subcutaneous, q6h, Charisse Castaneda APRN-CNP, 1 Units at 08/06/25 0730    lidocaine 4 % patch 2 patch, 2 patch, transdermal, Daily, Annabel Patel MD, 2 patch at 08/06/25 0842    loperamide (Imodium) capsule 4 mg, 4 mg, oral, 4x daily, Annabel Patel MD, 4 mg at 08/07/25 0604    naloxone (Narcan) injection 0.2 mg, 0.2 mg, intravenous, PRN, ASHLY Yin    ondansetron (Zofran) injection 4 mg, 4 mg, intravenous, q8h PRN, ASHLY Yin    oxyCODONE (Roxicodone) solution 10 mg, 10 mg, oral, q4h PRN, Annabel Patel MD, 10 mg at 08/07/25 0113    oxyCODONE (Roxicodone) solution 5 mg, 5 mg, oral, q4h PRN, Annabel Patel MD, 5 mg at 08/06/25 0240    pantoprazole (Protonix) injection 40 mg, 40 mg, intravenous, Daily, ASHLY Yin, 40 mg at 08/06/25 0842     traZODone (Desyrel) tablet 100 mg, 100 mg, oral, Nightly, Frederick Abel MD, 100 mg at 08/06/25 8818

## 2025-08-07 NOTE — PROGRESS NOTES
Art Therapy Note    Bereket Em     Therapy Session  Referral Type: New referral this admission  Visit Type: New visit  Session Start Time: 1405  Conflict of Service: Working with other staff              Treatment/Interventions            Narrative  Assessment Detail: Pt was meeting with other staff when ATR attempted session. ATR will follow up another time.    Education Documentation  No documentation found.

## 2025-08-07 NOTE — PROGRESS NOTES
08/07/25 0947   Rapid Rounds   Attendance Provider;Nurse;Care Transitions   Expected Discharge Disposition Home Health   Today we still await: Clinical stability   Review at Escalation Rounds No escalation needed     Plan per Medical/Surgical team:   Pt has wound, TPN (weaning), drains, and ostomy.   Waiting for PT/OT recs. Plan TBD.    7/29: Pt back to floor from ICU.   Pt on IV ATB and TPN.   8/1: Pt previously used [facility] for [facility] and would like to continue at DC.   Pt's choice for HCA is [place] [id number].  Pt Needs: PT/RN for TPN.     8/4: Plan for pt to DC on TPN. [facility] made aware.   8/7: 8/4: Plan for DC tomorrow on TPN. [facility] made aware.      Discharge disposition: [facility], E1     ADOD:  8/8     This TCC will continue to follow for home going needs and safe DC plan.     RUSH ANDERSON

## 2025-08-07 NOTE — PROGRESS NOTES
ID FU note    Interval history:  Patient was seen and examined today. Has abdominal pain after drain removal. No fever or chills. No N/V. Has watery stools in his stoma as always nothing new.   No urinary sx.      Visit Vitals  /70 (BP Location: Right arm, Patient Position: Lying)   Pulse 63   Temp 36.5 °C (97.7 °F) (Temporal)   Resp 17        Physical Exam  Constitutional:       Appearance: Normal appearance.     Cardiovascular:      Rate and Rhythm: Normal rate and regular rhythm.      Heart sounds: Normal heart sounds. No murmur heard.  Pulmonary:      Breath sounds: Normal breath sounds. No wheezing or rales.   Abdominal:      Palpations: Abdomen is soft.      Tenderness: There is no abdominal tenderness.      Comments: Stoma on the R not functional. Stoma on the left with watery stools no erythema.      Skin:     Comments: R PICC line clean no erythema or discharge. No signs of infection.      Neurological:      Mental Status: He is alert.         Labs:   Lab Results   Component Value Date    WBC 15.5 (H) 08/07/2025    HGB 8.3 (L) 08/07/2025    HCT 26.3 (L) 08/07/2025    MCV 83 08/07/2025     (H) 08/07/2025     Lab Results   Component Value Date    GLUCOSE 130 (H) 08/07/2025    CALCIUM 8.8 08/07/2025     (L) 08/07/2025    K 4.1 08/07/2025    CO2 27 08/07/2025    CL 97 (L) 08/07/2025    BUN 21 08/07/2025    CREATININE 0.50 08/07/2025     Microbiology:  Last BC (7/27): negative   Culture from drainage on 8/6 neg so far. Gram stain neg. KOH neg. Fungal culture pending     Imaging:   CT abdominal 8/4:  1.  Similar appearance of a fluid collection in the left lower  quadrant which measures 6.1 x 3.2 cm and, when accounting for  differences in technique, shows no significant interval change.  2. There is a fluid collection seen along the inferior edge of the  right liver which measures 3.6 x 1.7 cm. This appears more organized  with a peripheral enhancing wall when compared to prior. Small  pocket  of air seen within this collection, possibly representing  postsurgical changes versus gas-forming organism.  3. Similar appearance of diffuse edema and small amounts of free  fluid scattered throughout the mesentery, likely  inflammatory/infectious in nature.  4. Interval improvement in bilateral pleural effusions and left  basilar lung ground-glass opacities, likely representing improvement  in pulmonary edema.    Assessment and plan:  Bereket Em is a 59 y.o. male with history of perforated sigmoid diverticulitis status post Soliman's pouch (6/7/2024) complicated by fascial necrosis status post abdominal wall debridement and Vicryl mesh placement (6/18/2024) and midline small bowel enterocutaneous fistula and on TPN for the past several months via right upper arm PICC line admitted to the hospital on July 22 for elective surgery for reanastomosis and fistula excision. Was treated for septic shock due to E. coli bacteremia likely from intra-abdominal surgical manipulation or ischemic bowel. Patient underwent emergent surgery with resection of small segment of ischemic bowel.  As per operative note there was no purulence or fecal contamination of the peritoneum or anastomotic dehiscence. Was treated with 7D of zosyn after the neg BC stopped on aug 2.    We are called back because of a repeated Ct abdomen (done only for persistent leukocytosis) showing a new fluid collection seen along the inferior edge of the R liver with a small pocket of air might suggest and abscess. IR drainage done 8/6. Drain removed 8/7. Patient is clinically doing well no fever or chills no signs of active infection.   Cultures from drainage is negative so far.    Recommendations:   - Given that the patient is clinically stable, no signs of active infectious process and is immunocompetent, please keep him off AB until awaiting culture results    - ID will follow     Maya Dagher, MD   PGY-IV Infectious Diseases

## 2025-08-07 NOTE — HH CARE COORDINATION
Home Care received a Referral for Infusion, Nursing, Physical Therapy, and Registered Dietician. We have processed the referral for a Start of Care on 8/8/25.     If you have any questions or concerns, please feel free to contact us at [phone]. Follow the prompts, enter your five digit zip code, and you will be directed to your care team on E[facility] 1.

## 2025-08-08 ENCOUNTER — PHARMACY VISIT (OUTPATIENT)
Dept: PHARMACY | Facility: CLINIC | Age: 60
End: 2025-08-08
Payer: MEDICARE

## 2025-08-08 ENCOUNTER — DOCUMENTATION (OUTPATIENT)
Dept: INFUSION THERAPY | Age: 60
End: 2025-08-08
Payer: COMMERCIAL

## 2025-08-08 DIAGNOSIS — Z78.9 ON TOTAL PARENTERAL NUTRITION (TPN): ICD-10-CM

## 2025-08-08 LAB
ALBUMIN SERPL BCP-MCNC: 2.9 G/DL (ref 3.4–5)
ANION GAP SERPL CALC-SCNC: 14 MMOL/L (ref 10–20)
BUN SERPL-MCNC: 22 MG/DL (ref 6–23)
CALCIUM SERPL-MCNC: 8.6 MG/DL (ref 8.6–10.6)
CHLORIDE SERPL-SCNC: 96 MMOL/L (ref 98–107)
CO2 SERPL-SCNC: 27 MMOL/L (ref 21–32)
CREAT SERPL-MCNC: 0.57 MG/DL (ref 0.5–1.3)
EGFRCR SERPLBLD CKD-EPI 2021: >90 ML/MIN/1.73M*2
ERYTHROCYTE [DISTWIDTH] IN BLOOD BY AUTOMATED COUNT: 16.2 % (ref 11.5–14.5)
GLUCOSE BLD MANUAL STRIP-MCNC: 120 MG/DL (ref 74–99)
GLUCOSE BLD MANUAL STRIP-MCNC: 139 MG/DL (ref 74–99)
GLUCOSE BLD MANUAL STRIP-MCNC: 144 MG/DL (ref 74–99)
GLUCOSE BLD MANUAL STRIP-MCNC: 152 MG/DL (ref 74–99)
GLUCOSE BLD MANUAL STRIP-MCNC: 93 MG/DL (ref 74–99)
GLUCOSE SERPL-MCNC: 92 MG/DL (ref 74–99)
HCT VFR BLD AUTO: 25.1 % (ref 41–52)
HGB BLD-MCNC: 8 G/DL (ref 13.5–17.5)
MAGNESIUM SERPL-MCNC: 2.15 MG/DL (ref 1.6–2.4)
MCH RBC QN AUTO: 25.4 PG (ref 26–34)
MCHC RBC AUTO-ENTMCNC: 31.9 G/DL (ref 32–36)
MCV RBC AUTO: 80 FL (ref 80–100)
NRBC BLD-RTO: 0 /100 WBCS (ref 0–0)
PHOSPHATE SERPL-MCNC: 4.4 MG/DL (ref 2.5–4.9)
PLATELET # BLD AUTO: 487 X10*3/UL (ref 150–450)
POTASSIUM SERPL-SCNC: 5 MMOL/L (ref 3.5–5.3)
RBC # BLD AUTO: 3.15 X10*6/UL (ref 4.5–5.9)
SODIUM SERPL-SCNC: 132 MMOL/L (ref 136–145)
WBC # BLD AUTO: 14.8 X10*3/UL (ref 4.4–11.3)

## 2025-08-08 PROCEDURE — RXMED WILLOW AMBULATORY MEDICATION CHARGE

## 2025-08-08 NOTE — HOSPITAL COURSE
Hospital Admission: 7/22-8/9    59 year old male with a history significant for perforated diverticulitis s/p Margarita's procedure complicated by formation of midline small bowel enteroatmospheric fistula who presented for elective exploratory laparotomy, extensive lysis of adhesions, EC fistula takedown, abdominal wall debridement, double barrel jejunostomy, ventral hernia repair with Margarita's reversal with Leyda Olson, Renato, and Vicky on 7/22/2025. Intra-op findings remarkable for adhesions throughout the abdomen and remaining segment of the colon terminating at the splenic flexure requiring significant mobilization and an ileal window. Postoperative course was complicated by septic shock due to E.Coli bacteremia and colonic ischemia requiring OR take back for partial colectomy, end colostomy creation, and conversion of double barrel jejunostomy to end jejunostomy on 7/25/2025. Intra-op, colo-rectal anastomosis was found to be source of bowel ischemia. ID was consulted and previous PICC line was removed. He completed course of IV Zosyn on 8/2. He had persistent leukocytosis which CT A/P revealed small, intra-abdominal fluid collections. He underwent IR aspiration on 8/6 with rare E.coli present on culture and leukocytosis continued to downtrend. ID was re-engaged and recommended IV Ceftriaxone, with end date of  August 20th 2025. Hospital course also notable for high jejunostomy output for which he was titrated to maximum doses of  Imodium and Lomotil. Hospital course also complicated by pulmonary edema from fluid overload, which resolved with diuresis with lasix.     Patient continued to clinically improve. Patient was evaluated by PT with recommendation for home with low-intensity therapy. Upon discharge, pain was controlled, tolerating a diet with resumed TPN, ambulating, and voiding without difficulty. He was discharged home with nursing home care with outpatient surgical follow up scheduled. PO  Medications were sent to [facility] meds to beds and IV antibiotics and TPN were [facility]. He will be discharged with left upper extremity PICC line (for TPN and IV antibiotics).

## 2025-08-08 NOTE — PROGRESS NOTES
I saw and examined the patient.       60 yo male s/p fistula takedown, extensive lysis of adhesions, colostomy reversal, double barrel jejunostomy formation, abdominal wall reconstruction with return to the OR for washout, resection of distal colon, new end colostomy and repositioning of jejnostomy.   Tolerating PO. Output is thicker than before.   Pain is controlled.   WBCs 15.  ID does not recommend ABX at this time.   On exam, NAD.  Midline dressing inplace; removed and replaced.  Staples in place. No erythema or drainage. Ostomies are pink and viable; thicker output in jejunostomy bag. .  TPN for nutrition.  Diet as tolerated.  Local wound care and ostomy care. Lovenox. PT. Antimotility agents for output < 1L.  If discharged, outpatient follow up has been coordinated with teams. Discussed his progress since last year to now.  Likely does not want trial of colostomy reversal again but would like jejunostomy reversal so he can eat more and not be dependent on TPN.  Overall happy though that midline wound/hernia is improved, that the burning from EAF leakage has resolved and that he can pouch the jejunostomy more easily.

## 2025-08-08 NOTE — PROGRESS NOTES
General Surgery Progress Note    08/08/25    Bereket Em    Summary:  Bereket Em is a 59 year old male with a history of perforated diverticulitis s/p Margarita's procedure c/b formation of ECF. Underwent EC fistula takedown, GILLES, Margarita's reversal, abdominal wall debridement, and double barrel jejunostomy with Leyda Olson, Renato, and Vicky on 7/22. Intra-op findings remarkable for adhesions throughout the abdomen and remaining segment of the colon terminating at the splenic flexure requiring significant mobilization and an ileal window. Postoperative course complicated by colon ischemia requiring OR take back for partial colectomy, end colostomy creation (RUQ), and conversion of double barrel jejunostomy to end jejunostomy in LUQ on 7/25.      Subjective    Subjective:  Has been OOB during the day. Pain controlled, Denies fever, chills, nausea and vomiting.         Objective    Objective:      Vital signs:   Temp:  [36.1 °C (97 °F)-37 °C (98.6 °F)] 36.9 °C (98.4 °F)  Heart Rate:  [61-70] 61  Resp:  [16-18] 17  BP: (109-126)/(57-76) 109/68    Physical Exam:  GEN: resting comfortably, no acute distress   NEURO: awake, alert, oriented x4   RESP: unlabored on RA   CV: regular rate and rhythm on telemetry  GI: Abdomen soft, appropriately tender to palpation along midline incision with staples, dressing intact. Ostomies x2 pink and healthy appearing. LUQ Jejunostomy with liquid green output. RUQ colostomy with bowel sweat. Right ANA MARIA drain with scant serosanguinous output.   SKIN: laparotomy incision dry, closed loosely with staples  EXT: no peripheral edema     I/O last 2 completed shifts:  In: 2555 (34.9 mL/kg) [P.O.:1447]  Out: 2130 (29.1 mL/kg) [Urine:1125 (0.6 mL/kg/hr); Stool:1005]  Weight: 73.3 kg      Labs Past 18 Hours:  Recent Results (from the past 18 hours)   POCT GLUCOSE    Collection Time: 08/07/25  7:35 PM   Result Value Ref Range    POCT Glucose 107 (H) 74 - 99 mg/dL   POCT GLUCOSE     Collection Time: 08/08/25 12:17 AM   Result Value Ref Range    POCT Glucose 139 (H) 74 - 99 mg/dL   CBC    Collection Time: 08/08/25  5:10 AM   Result Value Ref Range    WBC 14.8 (H) 4.4 - 11.3 x10*3/uL    nRBC 0.0 0.0 - 0.0 /100 WBCs    RBC 3.15 (L) 4.50 - 5.90 x10*6/uL    Hemoglobin 8.0 (L) 13.5 - 17.5 g/dL    Hematocrit 25.1 (L) 41.0 - 52.0 %    MCV 80 80 - 100 fL    MCH 25.4 (L) 26.0 - 34.0 pg    MCHC 31.9 (L) 32.0 - 36.0 g/dL    RDW 16.2 (H) 11.5 - 14.5 %    Platelets 487 (H) 150 - 450 x10*3/uL   Magnesium    Collection Time: 08/08/25  5:10 AM   Result Value Ref Range    Magnesium 2.15 1.60 - 2.40 mg/dL   Renal Function Panel    Collection Time: 08/08/25  5:10 AM   Result Value Ref Range    Glucose 92 74 - 99 mg/dL    Sodium 132 (L) 136 - 145 mmol/L    Potassium 5.0 3.5 - 5.3 mmol/L    Chloride 96 (L) 98 - 107 mmol/L    Bicarbonate 27 21 - 32 mmol/L    Anion Gap 14 10 - 20 mmol/L    Urea Nitrogen 22 6 - 23 mg/dL    Creatinine 0.57 0.50 - 1.30 mg/dL    eGFR >90 >60 mL/min/1.73m*2    Calcium 8.6 8.6 - 10.6 mg/dL    Phosphorus 4.4 2.5 - 4.9 mg/dL    Albumin 2.9 (L) 3.4 - 5.0 g/dL   POCT GLUCOSE    Collection Time: 08/08/25  7:16 AM   Result Value Ref Range    POCT Glucose 152 (H) 74 - 99 mg/dL        Meds:  Current Medications[1]       Assessment/Plan    Assessment and Plan:  Bereket Em is a Primary - [facility]   59M with PMH of perforated diverticulitis s/p Margarita's procedure c/b formation of ECF. Underwent EC fistula takedown, GILLES, Margarita's reversal, abdominal wall debridement, and double barrel jejunostomy with Leyda Olson, Renato, and Vicky on 7/22.  Intra-op findings remarkable for adhesions throughout the abdomen and remaining segment of the colon terminating at the splenic flexure requiring significant mobilization and an ileal window.  Postoperative course complicated by colon ischemia requiring exploratory laparotomy, partial colectomy, end colostomy creation (RUQ), and conversion of double  barrel jejunostomy to end jejunostomy in LUQ on 7/25. Transferred from ICU to floor on 7/28.     Course c/b anasarca and third spacing of fluid 2/2 sepsis, improved after 3 days of diuresis with lasix. Still with high jejunostomy output >2L day, on max doses lomotil and imodium. Has persistent leukocytosis without fevers or other signs/sx of ongoing infection. Completed Zosyn course 8/2 . CT 7/31 showed 5cm left-sided intraabdominal fluid collection, too small for drainage per IR. Repeat interval CT 8/4 showed same fluid collection, without changes in size, as well a small perihepatic 3.6x1.7cm fluid collection with small pocket of air.   He is s/p IR aspiration of fluid collection on 8/6, with cultures growing E. coli.         PLAN:   Neurology: post operative pain  - scheduled tylenol, lidocaine patch    - Liquid oxycodone 5/10mg PRN moderate-severe pain   - continue home trazodone      Cardiovascular:   -Vital signs every 4 hours     Pulmonology: pulmonary edema- improved, atelectasis   - IS x10 every hour   - maintain SpO2 >92% RA      GI: s/p ECF takedown, Margarita's reversal; end colostomy, end jejunostomy; high jejunostomy output  - Regular diet, TPN/lipids, PICC replaced 7/30   - continue PPI   - continue 4mg imodium 4x daily/2 tab Lomotil 4x daily  - wound/ostomy care consulted, appreciate assistance   - ANA MARIA drain removed yesterday  - Zofran PRN nausea      :   - Strict I&Os  - Replace electrolytes as needed to maintain K >4, Mag >2.      ID: h/o E. coli bacteremia, resolved; intra-abdominal fluid collection, leukocytosis- downtrend   - s/p IR fluid aspiration 8/6; cultures positive for E. Coli, waiting for ID recs  - Zosyn x7 days completed 8/2  - trend WBC      Heme:   - trend CBC      Endocrine:   - POCT glucose every 6 hours with TPN   - insulin lispro sliding scale      DVT Prophylaxis:  -subcutaneous lovenox, SCDs, ambulation    Hospital Day: 18     Dispo: Continue current level of care.      Discussed with Dr. Olson.       Boris Ledezma MD  PGY-1 General Surgery  [facility] [phone]         [1]   Current Facility-Administered Medications:     acetaminophen (Tylenol) oral liquid 650 mg, 650 mg, oral, q6h, Charissekenny Weinbergbena, APRN-CNP, 650 mg at 08/08/25 0640    Adult Clinimix TPN Cyclic, , intravenous, Cyclic PN, Charisse KAYE Kubena, APRN-CNP, Stopped at 08/08/25 0935    alteplase (Cathflo Activase) injection 2 mg, 2 mg, intra-catheter, PRN, Charisse RESTREPO Kubena, APRN-CNP    dextrose 50 % injection 12.5 g, 12.5 g, intravenous, q15 min PRN, Charisse L Kubena, APRN-CNP    dextrose 50 % injection 25 g, 25 g, intravenous, q15 min PRN, Charisse L Kubena, APRN-CNP    diphenoxylate-atropine (Lomotil) 2.5-0.025 mg per tablet 2 tablet, 2 tablet, oral, 4x daily, Charisse Weinbergbena, APRN-CNP, 2 tablet at 08/08/25 0640    enoxaparin (Lovenox) syringe 40 mg, 40 mg, subcutaneous, q24h, Charisse RESTREPO Kubemarkell, APRN-CNP, 40 mg at 08/08/25 0937    fat emulsion fish oil/plant based (SMOFlipid) 20 % IV infusion 50 g, 250 mL, intravenous, Daily Lipids, Charisse RESTREPO Kubena, APRN-CNP, Stopped at 08/08/25 0935    glucagon (Glucagen) injection 1 mg, 1 mg, intramuscular, q15 min PRN, Charisse RESTREPO Kubena, APRN-CNP    glucagon (Glucagen) injection 1 mg, 1 mg, intramuscular, q15 min PRN, Charisse L Kubena, APRN-CNP    heparin flush 10 unit/mL syringe 50 Units, 5 mL, intravenous, PRN, Charisse L Kubena, APRN-CNP    insulin lispro injection 0-5 Units, 0-5 Units, subcutaneous, q6h, Charisse L Kubena, APRN-CNP, 1 Units at 08/06/25 0730    lidocaine 4 % patch 2 patch, 2 patch, transdermal, Daily, Annabel Patel MD, 2 patch at 08/08/25 0937    loperamide (Imodium) capsule 4 mg, 4 mg, oral, 4x daily, Annabel Patel MD, 4 mg at 08/08/25 0640    naloxone (Narcan) injection 0.2 mg, 0.2 mg, intravenous, PRN, Charisse Castaneda APRN-CNP    ondansetron (Zofran) injection 4 mg, 4 mg, intravenous, q8h PRN, Charisse Castaneda APRN-CNP    oxyCODONE (Roxicodone) solution  10 mg, 10 mg, oral, q4h PRN, Annabel Patel MD, 10 mg at 08/07/25 0113    oxyCODONE (Roxicodone) solution 5 mg, 5 mg, oral, q4h PRN, Annabel Patel MD, 5 mg at 08/08/25 0456    pantoprazole (Protonix) injection 40 mg, 40 mg, intravenous, Daily, MINERVA Yin-CNP, 40 mg at 08/08/25 0916    traZODone (Desyrel) tablet 100 mg, 100 mg, oral, Nightly, Frederick Abel MD, 100 mg at 08/07/25 5054

## 2025-08-08 NOTE — CARE PLAN
Transitional Care Coordinator Note: Patient discussed with medical team, per medical team patient is not medically ready. Pending new ID Reccs. IR culture grew E coli so awaiting ID abx plan.   Discharge dispo: [facility], Infusion.  ADOD 8/9  Jeanette Carpio RN  Transitional Care Coordinator

## 2025-08-08 NOTE — PROGRESS NOTES
Colorectal Surgery Progress Note        Bereket Em is a 59 y.o. male with a past medical history of perforated sigmoid diverticulitis s/p Margarita's 6/7/24 c/b fascial necrosis s/p abdominal wall debridement & Vicryl mesh placement 6/18/24 c/b midline small bowel enterocutaneous fistula (on TPN), now hospital day 16 s/p ex lap, extensive lysis of adhesions, abdominal wall debridement, ventral hernia repair, excision of EC fistula, jejunostomy, & abdominal wound vac placement by Dr. Olson & Vicky with Margarita's reversal/transverse colon mobilization/ileal window by Dr. Gross on 7/22/25. Post-op course c/b bowel ischemia with afib RVR requiring ICU transfer & amio drip, s/p exlap, bowel resection at anastomosis, end colostomy creation (RUQ) & conversion of double barrel jejunostomy to end jejunostomy (LUQ) on 7/25/25.     Subjective    NAOE VSS, ecoli from aspirate, 1400 PO, 1108 IV doc'd, jejunostomy OP 1005 doc'd from 2625, drain removed, uop 1125, labs stable       Objective    Vital signs:   Temp:  [36.1 °C (97 °F)-37 °C (98.6 °F)] 36.9 °C (98.4 °F)  Heart Rate:  [61-70] 61  Resp:  [16-18] 17  BP: (109-126)/(57-76) 109/68    Physical Exam:  GEN: Lying in bed, alert and conversive, no acute distress, slightly agitated.  HEENT: Sclera anicteric. Moist mucous membranes.  RESP: chest expansion symmetrical, unlabored breathing on room air  CV: Regular rate, normotensive per chart review. No BLE edema.  GI:  Abdomen soft, non tender to palpation, midline incision well approximated with staples without erythema or drainage, covered with ABDs, with lime green leakage of jejunostomy onto midline. LUQ Jejunostomy pink & perfused, well pouched, with lime green liquid output & gas in pouch. RUQ de-functioned colostomy pink & perfused, well pouched, small amount of bowel sweat in pouch. ANA MARIA drain tubing & bulb empty, to bulb suction.  MSK: No gross deformities. Moves all extremities spontaneously x4.   NEURO:  A&O x3,  no focal deficits.  PSYCH: Agitated, depressed mood    I/O last 2 completed shifts:  In: 2555 (34.9 mL/kg) [P.O.:1447]  Out: 2130 (29.1 mL/kg) [Urine:1125 (0.6 mL/kg/hr); Stool:1005]  Weight: 73.3 kg                8.0     14.8>-----<487              25.1     132  96  22                  ----------------<92     5.0  27  0.57          Ca 8.6 Phos 4.4 Mg 2.15         Meds:  Current Medications[1]     Imaging:  CT abdomen pelvis w IV contrast 8/4/25  IMPRESSION:  1.  Similar appearance of a fluid collection in the left lower  quadrant which measures 6.1 x 3.2 cm and, when accounting for  differences in technique, shows no significant interval change.  2. There is a fluid collection seen along the inferior edge of the  right liver which measures 3.6 x 1.7 cm. This appears more organized  with a peripheral enhancing wall when compared to prior. Small pocket  of air seen within this collection, possibly representing  postsurgical changes versus gas-forming organism.  3. Similar appearance of diffuse edema and small amounts of free  fluid scattered throughout the mesentery, likely  inflammatory/infectious in nature.  4. Interval improvement in bilateral pleural effusions and left  basilar lung ground-glass opacities, likely representing improvement  in pulmonary edema.    Medications reviewed.  Vital signs reviewed.  Labs reviewed.         Assessment/Plan    Bereket Em is a 59 y.o. male with a past medical history of perforated sigmoid diverticulitis s/p Margarita's 6/7/24 c/b fascial necrosis s/p abdominal wall debridement & Vicryl mesh placement 6/18/24 c/b midline small bowel enterocutaneous fistula (on TPN), now hospital day 16 s/p ex lap, extensive lysis of adhesions, abdominal wall debridement, ventral hernia repair, excision of EC fistula, jejunostomy, & abdominal wound vac placement by Dr. Olson & Vicky with Margarita's reversal/transverse colon mobilization/ileal window by Dr. Gross on  7/22/25. Post-op course c/b bowel ischemia with afib RVR s/p exlap, bowel resection at anastomosis, end colostomy creation (RUQ) & conversion of double barrel jejunostomy to end jejunostomy (LUQ) on 7/25/25. Post-op course also complicated by high jejunostomy output (as expected) which is now at manageable amount (2-2.5L/24 hours for both ostomies combined, on maximum doses of imodium/lomotil), fluid overload/third spacing requiring disuresis (now resolved), E. Coli bacteremia s/p Zosyn x7 days (completed 8/2) and persistent leukocytosis without fevers with CT showing perihepatic fluid collection s/p IR aspiration 8/6. Leukocytosis down trending post-IR aspiration & patient feels well. He is frustrated with his prolonged hospital course & motivated to discharge.    Plan Today:   - Appreciate care per primary team  - Will follow up cultures from IR aspiration, ecoli, sensitivities pending  - Recommend continuing to monitor jejunostomy output. Have patient record output, keep running total & continue dehydration education  - Recommend WOCN to troubleshoot leaking Jejunostomy pouch  - Continue max dosing with Imodium and Lomotil for short gut with plan for home TPN, labs & prn IVF   - Follow up appt with Dr. Gross scheduled for 9/2  - DC per primary team    Plan of care discussed with patient, primary team,  and Dr. Gross.    Poli Montilla MD  [facility] Pager #[phone]           [1]   Current Facility-Administered Medications:     acetaminophen (Tylenol) oral liquid 650 mg, 650 mg, oral, q6h, Charisse Weinbergbemarkell, APRN-CNP, 650 mg at 08/08/25 0640    Adult Clinimix TPN Cyclic, , intravenous, Cyclic PN, Charisse L Kubena, APRN-CNP, Last Rate: 90 mL/hr at 08/07/25 2128, New Bag at 08/07/25 2128    alteplase (Cathflo Activase) injection 2 mg, 2 mg, intra-catheter, PRN, Charisse Weinbergbena, APRN-CNP    dextrose 50 % injection 12.5 g, 12.5 g, intravenous, q15 min PRN, Charisse L Kubena, APRN-CNP    dextrose 50 % injection 25 g, 25 g,  intravenous, q15 min PRN, MINERVA Yin-CNP    diphenoxylate-atropine (Lomotil) 2.5-0.025 mg per tablet 2 tablet, 2 tablet, oral, 4x daily, MINERVA Yin-CNP, 2 tablet at 08/08/25 0640    enoxaparin (Lovenox) syringe 40 mg, 40 mg, subcutaneous, q24h, MINERVA Yin-CNP, 40 mg at 08/07/25 0830    fat emulsion fish oil/plant based (SMOFlipid) 20 % IV infusion 50 g, 250 mL, intravenous, Daily Lipids, MINERVA Yin-CNP, Last Rate: 20.8 mL/hr at 08/07/25 2128, 50 g at 08/07/25 2128    glucagon (Glucagen) injection 1 mg, 1 mg, intramuscular, q15 min PRN, MINERVA Yin-CNP    glucagon (Glucagen) injection 1 mg, 1 mg, intramuscular, q15 min PRN, MINERVA Yin-CNP    heparin flush 10 unit/mL syringe 50 Units, 5 mL, intravenous, PRN, MINERVA Yin-CNP    insulin lispro injection 0-5 Units, 0-5 Units, subcutaneous, q6h, MINERVA Yin-CNP, 1 Units at 08/06/25 0730    lidocaine 4 % patch 2 patch, 2 patch, transdermal, Daily, Annabel Patel MD, 2 patch at 08/07/25 0830    loperamide (Imodium) capsule 4 mg, 4 mg, oral, 4x daily, Annabel Patel MD, 4 mg at 08/08/25 0640    naloxone (Narcan) injection 0.2 mg, 0.2 mg, intravenous, PRN, MINERVA Yin-CNP    ondansetron (Zofran) injection 4 mg, 4 mg, intravenous, q8h PRN, MINERVA Yin-CNP    oxyCODONE (Roxicodone) solution 10 mg, 10 mg, oral, q4h PRN, Annabel Patel MD, 10 mg at 08/07/25 0113    oxyCODONE (Roxicodone) solution 5 mg, 5 mg, oral, q4h PRN, Annabel Patel MD, 5 mg at 08/08/25 0456    pantoprazole (Protonix) injection 40 mg, 40 mg, intravenous, Daily, MINERVA Yin-CNP, 40 mg at 08/07/25 0830    traZODone (Desyrel) tablet 100 mg, 100 mg, oral, Nightly, Frederick Abel MD, 100 mg at 08/07/25 0570

## 2025-08-08 NOTE — CARE PLAN
Problem: Pain - Adult  Goal: Verbalizes/displays adequate comfort level or baseline comfort level  8/7/2025 2011 by Beryl Maria RN  Outcome: Progressing  8/7/2025 0842 by Beryl Maria RN  Outcome: Progressing     Problem: Discharge Planning  Goal: Discharge to home or other facility with appropriate resources  8/7/2025 2011 by Beryl Maria RN  Outcome: Progressing  8/7/2025 0842 by Beryl Maria RN  Outcome: Progressing     Problem: Chronic Conditions and Co-morbidities  Goal: Patient's chronic conditions and co-morbidity symptoms are monitored and maintained or improved  8/7/2025 2011 by Beryl Maria RN  Outcome: Progressing  8/7/2025 0842 by Beryl Maria RN  Outcome: Progressing     Problem: Nutrition  Goal: Nutrient intake appropriate for maintaining nutritional needs  8/7/2025 2011 by Beryl Maria RN  Outcome: Progressing  8/7/2025 0842 by Beryl Maria RN  Outcome: Progressing     Problem: Skin  Goal: Decreased wound size/increased tissue granulation at next dressing change  8/7/2025 2011 by Beryl Maria RN  Outcome: Progressing  8/7/2025 0842 by Beryl Maria RN  Outcome: Progressing  Goal: Participates in plan/prevention/treatment measures  8/7/2025 2011 by Beryl Maria RN  Outcome: Progressing  8/7/2025 0842 by Beryl Maria RN  Outcome: Progressing  Goal: Prevent/manage excess moisture  8/7/2025 2011 by Beryl Maria RN  Outcome: Progressing  8/7/2025 0842 by Beryl Maria RN  Outcome: Progressing  Goal: Prevent/minimize sheer/friction injuries  8/7/2025 2011 by Beryl Maria RN  Outcome: Progressing  8/7/2025 0842 by Beryl Maria RN  Outcome: Progressing  Goal: Promote/optimize nutrition  8/7/2025 2011 by Beryl Maria RN  Outcome: Progressing  8/7/2025 0842 by Beryl Maria RN  Outcome: Progressing  Goal: Promote skin healing  8/7/2025 2011 by Beryl Maria RN  Outcome: Progressing  8/7/2025 0842 by Beryl Maria RN  Outcome: Progressing     Problem:  Pain  Goal: Takes deep breaths with improved pain control throughout the shift  8/7/2025 2011 by Beryl Maria RN  Outcome: Progressing  8/7/2025 0842 by Beryl Maria RN  Outcome: Progressing  Goal: Turns in bed with improved pain control throughout the shift  8/7/2025 2011 by Beryl Maria RN  Outcome: Progressing  8/7/2025 0842 by Beryl Maria RN  Outcome: Progressing  Goal: Walks with improved pain control throughout the shift  8/7/2025 2011 by Beryl Maria RN  Outcome: Progressing  8/7/2025 0842 by Beryl Maria RN  Outcome: Progressing  Goal: Performs ADL's with improved pain control throughout shift  8/7/2025 2011 by Beryl Maria RN  Outcome: Progressing  8/7/2025 0842 by Beryl Maria RN  Outcome: Progressing  Goal: Participates in PT with improved pain control throughout the shift  8/7/2025 2011 by Beryl Maria RN  Outcome: Progressing  8/7/2025 0842 by Beryl Maria RN  Outcome: Progressing  Goal: Free from opioid side effects throughout the shift  8/7/2025 2011 by Beryl Maria RN  Outcome: Progressing  8/7/2025 0842 by Beryl Maria RN  Outcome: Progressing  Goal: Free from acute confusion related to pain meds throughout the shift  8/7/2025 2011 by Beryl Maria RN  Outcome: Progressing  8/7/2025 0842 by Beryl Maria RN  Outcome: Progressing     Problem: Fall/Injury  Goal: Not fall by end of shift  8/7/2025 2011 by Beryl Maria RN  Outcome: Progressing  8/7/2025 0842 by Beryl Maria RN  Outcome: Progressing  Goal: Be free from injury by end of the shift  8/7/2025 2011 by Beryl Maria RN  Outcome: Progressing  8/7/2025 0842 by Beryl Maria RN  Outcome: Progressing  Goal: Verbalize understanding of personal risk factors for fall in the hospital  8/7/2025 2011 by Beryl Maria RN  Outcome: Progressing  8/7/2025 0842 by Beryl Maria RN  Outcome: Progressing  Goal: Verbalize understanding of risk factor reduction measures to prevent injury from fall in the  home  8/7/2025 2011 by Beryl Maria RN  Outcome: Progressing  8/7/2025 0842 by Beryl Maria RN  Outcome: Progressing  Goal: Use assistive devices by end of the shift  8/7/2025 2011 by Beryl Maria RN  Outcome: Progressing  8/7/2025 0842 by Beryl Maria RN  Outcome: Progressing  Goal: Pace activities to prevent fatigue by end of the shift  8/7/2025 2011 by Beryl Maria RN  Outcome: Progressing  8/7/2025 0842 by Beryl Maria RN  Outcome: Progressing

## 2025-08-08 NOTE — PROGRESS NOTES
Pt discharged from hospital with order to continue the same tpn as pre admitting... spoke with pt and he was not sure what supplies left at home... ok to send all usual... pt confirmed, still has an infusion pump... per pt he h/u infusion at 7pm... ok to delivery by 6pm with one bag off ice... if not home ok to leave in front porch..

## 2025-08-08 NOTE — PROGRESS NOTES
Art Therapy Note    Bereket Em was referred by ASHLY Yin     Therapy Session  Referral Type: New referral this admission  Visit Type: New visit  Session Start Time: 1428  Session End Time: 1430  Intervention Delivery: In-person  Conflict of Service: None  Family Present for Session: None    Pre-assessment  Unable to Assess Reason: Outcomes not applicable         Treatment/Interventions  Art Therapy Interventions: Assessment, Empathic listening/validating emotions    Post-assessment  Total Session Time (min): 2 minutes    Narrative  Assessment Detail: ATR visited pt to introduce AT services and benefits. Pt was receptive to information but recommended to stop another time. ATR will follow up per pt recommendation, another day.    Education Documentation  No documentation found.

## 2025-08-08 NOTE — CARE PLAN
The patient's goals for the shift include pt will remain comfortable throughout shift   The clinical goals for the shift include patient will remain hds throughout this shift      Problem: Pain - Adult  Goal: Verbalizes/displays adequate comfort level or baseline comfort level  Outcome: Progressing     Problem: Chronic Conditions and Co-morbidities  Goal: Patient's chronic conditions and co-morbidity symptoms are monitored and maintained or improved  Outcome: Progressing     Problem: Skin  Goal: Participates in plan/prevention/treatment measures  Outcome: Progressing     Problem: Fall/Injury  Goal: Be free from injury by end of the shift  Outcome: Progressing

## 2025-08-09 ENCOUNTER — HOME INFUSION (OUTPATIENT)
Dept: INFUSION THERAPY | Age: 60
End: 2025-08-09
Payer: COMMERCIAL

## 2025-08-09 LAB
ALBUMIN SERPL BCP-MCNC: 2.9 G/DL (ref 3.4–5)
ANION GAP SERPL CALC-SCNC: 14 MMOL/L (ref 10–20)
BACTERIA FLD CULT: ABNORMAL
BUN SERPL-MCNC: 21 MG/DL (ref 6–23)
CALCIUM SERPL-MCNC: 8.7 MG/DL (ref 8.6–10.6)
CHLORIDE SERPL-SCNC: 96 MMOL/L (ref 98–107)
CO2 SERPL-SCNC: 27 MMOL/L (ref 21–32)
CREAT SERPL-MCNC: 0.55 MG/DL (ref 0.5–1.3)
EGFRCR SERPLBLD CKD-EPI 2021: >90 ML/MIN/1.73M*2
ERYTHROCYTE [DISTWIDTH] IN BLOOD BY AUTOMATED COUNT: 16.2 % (ref 11.5–14.5)
GLUCOSE BLD MANUAL STRIP-MCNC: 118 MG/DL (ref 74–99)
GLUCOSE BLD MANUAL STRIP-MCNC: 141 MG/DL (ref 74–99)
GLUCOSE SERPL-MCNC: 84 MG/DL (ref 74–99)
GRAM STN SPEC: ABNORMAL
GRAM STN SPEC: ABNORMAL
HCT VFR BLD AUTO: 26.2 % (ref 41–52)
HGB BLD-MCNC: 8.1 G/DL (ref 13.5–17.5)
MAGNESIUM SERPL-MCNC: 2.11 MG/DL (ref 1.6–2.4)
MCH RBC QN AUTO: 25.6 PG (ref 26–34)
MCHC RBC AUTO-ENTMCNC: 30.9 G/DL (ref 32–36)
MCV RBC AUTO: 83 FL (ref 80–100)
NRBC BLD-RTO: 0 /100 WBCS (ref 0–0)
PHOSPHATE SERPL-MCNC: 4.2 MG/DL (ref 2.5–4.9)
PLATELET # BLD AUTO: 551 X10*3/UL (ref 150–450)
POTASSIUM SERPL-SCNC: 5.1 MMOL/L (ref 3.5–5.3)
RBC # BLD AUTO: 3.17 X10*6/UL (ref 4.5–5.9)
SODIUM SERPL-SCNC: 132 MMOL/L (ref 136–145)
WBC # BLD AUTO: 12.9 X10*3/UL (ref 4.4–11.3)

## 2025-08-09 NOTE — CARE PLAN
The patient's goals for the shift include pt will remain comfortable throughout shift     The clinical goals for the shift include pt will remain HDS throughout shift      Problem: Pain - Adult  Goal: Verbalizes/displays adequate comfort level or baseline comfort level  8/9/2025 0735 by Karmen Stearns RN  Outcome: Progressing  8/8/2025 1859 by Karmen Stearns RN  Outcome: Progressing     Problem: Discharge Planning  Goal: Discharge to home or other facility with appropriate resources  Outcome: Progressing     Problem: Chronic Conditions and Co-morbidities  Goal: Patient's chronic conditions and co-morbidity symptoms are monitored and maintained or improved  8/9/2025 0735 by Karmen Stearns RN  Outcome: Progressing  8/8/2025 1859 by Karmen Stearns RN  Outcome: Progressing     Problem: Nutrition  Goal: Nutrient intake appropriate for maintaining nutritional needs  Outcome: Progressing     Problem: Skin  Goal: Participates in plan/prevention/treatment measures  8/9/2025 0735 by Karmen Stearns RN  Outcome: Progressing  8/8/2025 1859 by Karmen Stearns RN  Outcome: Progressing     Problem: Fall/Injury  Goal: Not fall by end of shift  Outcome: Progressing  Goal: Be free from injury by end of the shift  Outcome: Progressing

## 2025-08-09 NOTE — PROGRESS NOTES
RECEIVED WORD TODAY THAT PATIENT WILL BE DISCHARGED AND THEY ARE ADDING IV CEFTRIAXONE ONCE DAILY (NO STOP DATE GIVEN) FOR INTRA ADOMINAL INFECTION.  HOSPITAL WILL DOSE HIM PRIOR TO LEAVING TODAY SO SOC FOR CTX WILL BE SUNDAY.  SPOKE WITH PATIENT AND HE SAID HE DOES NOT NEED A POLE.  DELIVERY AGREED UPON FOR TONIGHT.   PROCESSED FILL FOR 5 CTX FOR DELIVERY TODAY FOR DOS 8/10 THRU 8/14.  F/U ON 8/13 WITH TPN AS WELL.  SINCE DC WAS HELD HIS TPN SERVICE DATES WILL BE 8/9 -8/14.

## 2025-08-09 NOTE — DISCHARGE SUMMARY
Discharge Diagnosis  -Hx of Margarita's Procedure  -Hx of enterocutaneous fistula  -s/p fistula takedown, extensive lysis of adhesions, colostomy reversal, double barrel jejunostomy formation, abdominal wall reconstruction with return to the OR for washout, resection of distal colon, new end colostomy and repositioning of jejnostomy.     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 Intra-op findings remarkable for adhesions throughout the abdomen and remaining segment of the colon terminating at the splenic flexure requiring significant mobilization and an ileal window. Postoperative course was complicated by septic shock due to E.Coli bacteremia and colonic ischemia requiring OR take back for partial colectomy, end colostomy creation, and conversion of double barrel jejunostomy to end jejunostomy on 7/25/2025. Intra-op, colo-rectal anastomosis was found to be source of bowel ischemia. ID was consulted and previous PICC line was removed. He completed course of IV Zosyn on 8/2. He had persistent leukocytosis which CT A/P revealed small, intra-abdominal fluid collections. He underwent IR  aspiration on 8/6 with rare E.coli present on culture and leukocytosis continued to downtrend. ID was re-engaged and recommended IV Ceftriaxone, with end date of  August 20th 2025. Hospital course also notable for high jejunostomy output for which he was titrated to maximum doses of  Imodium and Lomotil. Hospital course also complicated by pulmonary edema from fluid overload, which resolved with diuresis with lasix.     Patient continued to clinically improve. Patient was evaluated by PT with recommendation for home with low-intensity therapy. Upon discharge, pain was controlled, tolerating a diet with resumed TPN, ambulating, and voiding without difficulty. He was discharged home with nursing home care with outpatient surgical follow up scheduled. PO Medications were sent to [facility] meds to beds and IV antibiotics and TPN [facility]. He will be discharged with left upper extremity PICC line (for TPN and IV antibiotics).    Visit Vitals  /66 (BP Location: Right arm, Patient Position: Lying)   Pulse 65   Temp 36.5 °C (97.7 °F) (Temporal)   Resp 18     Vitals:    08/08/25 0613   Weight: 73.3 kg (161 lb 9.6 oz)       Immunization History   Administered Date(s) Administered    Flu vaccine (IIV4), preservative free *Check age/dose* 10/10/2014, 10/10/2017, 10/14/2023    Flu vaccine, quadrivalent, no egg protein, age 6 month or greater (FLUCELVAX) 10/18/2022    Influenza, injectable, quadrivalent 10/10/2014, 10/10/2017    Moderna SARS-CoV-2 Vaccination 03/19/2021, 04/13/2021    Pfizer Purple Cap SARS-CoV-2 02/25/2021, 03/19/2021, 12/11/2021    Pneumococcal conjugate vaccine, 13-valent (PREVNAR 13) 01/25/2016    Pneumococcal polysaccharide vaccine, 23-valent, age 2 years and older (PNEUMOVAX 23) 10/10/2014    Tdap vaccine, age 7 year and older (BOOSTRIX, ADACEL) 10/10/2014, 01/03/2024       Results      Pertinent Physical Exam At Time of Discharge  Physical Exam  Physical  Exam  General: awake, alert, no acute distress, resting comfortably  CV: RR  Pulm: non-labored breathing on room air  GI: abdomen soft, non-distended, non-tender to palpation. ANA MARIA drain removed. Prior ANA MARIA drain site healing well. Midline incision with staples removed, no evidence of wound dehiscence.   Skin: warm, dry  Extremities: no peripheral edema      Home Medications     Medication List      START taking these medications     acetaminophen 160 mg/5 mL liquid; Commonly known as: Tylenol; Take 20.3   mL  by mouth every 6 hours.; Replaces: acetaminophen 500 mg tablet   Adult Clinimix Parenteral Nutrition Cyclic; Infuse 2,000mL over 12 hours   into central line once daily. Infuse at 90mL x 1 hour, 162mL x10 hours,   90mL x 1 hour and discontinue  Use a 1.2 micron filter.   cefTRIAXone 2 gram/50 mL IV; Commonly known as: Rocephin; Infuse 50 mL   (2 g) at 100 mL/hr over 30 minutes into a venous catheter once every 24   hours. Administer through PICC line.   diphenoxylate-atropine 2.5-0.025 mg tablet; Commonly known as: Lomotil;   Take 2 tablets by mouth 4 times a day.   fat emulsion fish oil/plant based 20 % emulsion; Commonly known as:   SMOFlipid; Infuse 250 mL (50 g) at 20.8 mL/hr over 12 hours into a venous   catheter Daily Lipids.   loperamide 2 mg capsule; Commonly known as: Imodium; Take 2 capsules (4   mg) by mouth 4 times a day.   oxyCODONE 5 mg/5 mL solution; Commonly known as: Roxicodone; Take 5 mL   (5 mg) by mouth every 6 hours if needed for severe pain (7 - 10).;   Replaces: oxyCODONE 5 mg immediate release tablet     CONTINUE taking these medications     calcium carbonate 300 mg elemental (750 mg) chewable tablet; Commonly   known as: Tums Extra Strength   gabapentin 100 mg capsule; Commonly known as: Neurontin; Take 1 capsule   at bedtime for three nights before surgery.   heparin LockFlush(Porcine)(PF) 10 unit/mL injection; Generic drug:   heparin flush   oral electrolytes replacement (Pedialyte)  solution solution; Take 500 mL   by mouth 2 times a day.   * sodium chloride (PF) 0.9% injection   * sodium chloride 0.9% solution; Infuse 1,000 mL into a venous catheter   once every 24 hours. Infuse 1000ml over 1-2 hours via gravity once daily.   traZODone 100 mg tablet; Commonly known as: Desyrel; Take 1 tablet (100   mg) by mouth as needed at bedtime for sleep.  * This list has 2 medication(s) that are the same as other medications   prescribed for you. Read the directions carefully, and ask your doctor or   other care provider to review them with you.     STOP taking these medications     acetaminophen 500 mg tablet; Commonly known as: Tylenol; Replaced by:   acetaminophen 160 mg/5 mL liquid   Custom Cyclic TPN   metroNIDAZOLE 250 mg tablet; Commonly known as: Flagyl   neomycin 500 mg tablet; Commonly known as: Mycifradin   oxyCODONE 5 mg immediate release tablet; Commonly known as: Roxicodone;   Replaced by: oxyCODONE 5 mg/5 mL solution   pantoprazole 40 mg EC tablet; Commonly known as: ProtoNix       Outpatient Follow-Up  Future Appointments   Date Time Provider Department Center   8/10/2025 To Be Determined Hosea Izquierdo PT [facility]   8/10/2025 To Be Determined Mallorie Pennington RN [facility]   8/13/2025 To Be Determined Benjamín Pena OT [facility]   8/15/2025 11:45 AM Jerel Olson MD [id number] [facility]   8/18/2025 To Be Determined Brittaney Cintron, ZAYRA [facility]   8/20/2025 10:40 AM Melissa Walker, APRN-CNP [id number] [place]   9/2/2025  1:00 PM Krish Gross MD [id number] [place]       Discussed with Dr. Whitney Patel MD

## 2025-08-09 NOTE — PROGRESS NOTES
ID FU note    Interval history:  Patient was seen and examined today. Persistent abdominal pain asince yesterday after drain removal. No fever or chills. No N/V. Has watery stools in his stoma as always nothing new.   No urinary sx.      Visit Vitals  /70 (BP Location: Right arm, Patient Position: Lying)   Pulse 60   Temp 36.9 °C (98.4 °F) (Temporal)   Resp 17        Physical Exam  Constitutional:       Appearance: Normal appearance.     Cardiovascular:      Rate and Rhythm: Normal rate and regular rhythm.      Heart sounds: Normal heart sounds. No murmur heard.  Pulmonary:      Breath sounds: Normal breath sounds. No wheezing or rales.   Abdominal:      Palpations: Abdomen is soft.      Tenderness: There is no abdominal tenderness.      Comments: Stoma on the R not functional. Stoma on the left with watery stools no erythema.      Skin:     Comments: R PICC line clean no erythema or discharge. No signs of infection.      Neurological:      Mental Status: He is alert.         Labs:   Lab Results   Component Value Date    WBC 14.8 (H) 08/08/2025    HGB 8.0 (L) 08/08/2025    HCT 25.1 (L) 08/08/2025    MCV 80 08/08/2025     (H) 08/08/2025     Lab Results   Component Value Date    GLUCOSE 92 08/08/2025    CALCIUM 8.6 08/08/2025     (L) 08/08/2025    K 5.0 08/08/2025    CO2 27 08/08/2025    CL 96 (L) 08/08/2025    BUN 22 08/08/2025    CREATININE 0.57 08/08/2025     Microbiology:  Last BC (7/27): negative   Culture from drainage on 8/6 neg so far. Gram stain neg. KOH neg. Fungal culture pending     Imaging:   CT abdominal 8/4:  1.  Similar appearance of a fluid collection in the left lower  quadrant which measures 6.1 x 3.2 cm and, when accounting for  differences in technique, shows no significant interval change.  2. There is a fluid collection seen along the inferior edge of the  right liver which measures 3.6 x 1.7 cm. This appears more organized  with a peripheral enhancing wall when compared to  prior. Small pocket  of air seen within this collection, possibly representing  postsurgical changes versus gas-forming organism.  3. Similar appearance of diffuse edema and small amounts of free  fluid scattered throughout the mesentery, likely  inflammatory/infectious in nature.  4. Interval improvement in bilateral pleural effusions and left  basilar lung ground-glass opacities, likely representing improvement  in pulmonary edema.    Assessment and plan:  Bereket Em is a 59 y.o. male with history of perforated sigmoid diverticulitis status post Soliman's pouch (6/7/2024) complicated by fascial necrosis status post abdominal wall debridement and Vicryl mesh placement (6/18/2024) and midline small bowel enterocutaneous fistula and on TPN for the past several months via right upper arm PICC line admitted to the hospital on July 22 for elective surgery for reanastomosis and fistula excision. Was treated for septic shock due to E. coli bacteremia likely from intra-abdominal surgical manipulation or ischemic bowel. Patient underwent emergent surgery with resection of small segment of ischemic bowel.  As per operative note there was no purulence or fecal contamination of the peritoneum or anastomotic dehiscence. Was treated with 7D of zosyn after the neg BC stopped on aug 2.    We are called back because of a repeated Ct abdomen (done only for persistent leukocytosis) showing a new fluid collection seen along the inferior edge of the R liver with a small pocket of air might suggest and abscess. IR drainage done 8/6. Drain removed 8/7. Patient is clinically doing well no fever or chills no signs of active infection.   Cultures from drainage grew E. Coli. No oral options are available.     Recommendations:   - Please start Ceftriaxone 2g q24h for a total of 2 weeks counting from the IR drainage (done on 8/6). Last day will be August 20.   - No need for ID follow-up in clinic or labs as outpatient  - ID will sign  off. Will remain available if further questions arise.      Maya Dagher, MD   PGY-IV Infectious Diseases

## 2025-08-09 NOTE — CONSULTS
Wound Care Consult     Visit Date: 8/9/2025      Patient Name: Bereket Em         MRN: [id number]             Reason for Consult: Ostomy pouch change and midline abdomen wound assessment         Wound History: 59 y.o. 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 Olson & Vicky with Margarita's reversal/transverse colon mobilization/ileal window by Dr. Gross on 7/22/25. Post-op course c/b bowel ischemia with afib RVR requiring ICU transfer & amio drip, s/p exlap, bowel resection at anastomosis, end colostomy creation (RUQ) & conversion of double barrel jejunostomy to end jejunostomy (LUQ) on 7/25/25.      Assessment:    Colostomy Mucous Fistula RUQ (Active)   Placement Date: 07/25/25   Colostomy Type: Mucous Fistula  Location: RUQ   Number of days: 15      Colostomy Mucous Fistula RUQ (Active)   Present on Admission to Healthcare Facility N 08/09/25 0930   Stomal Appliance 1 piece;Intact 08/09/25 0930   Site/Stoma Assessment Clean;Red 08/09/25 0930   Peristomal Assessment Clean;Intact 08/09/25 0725   Treatment Placement checked 08/09/25 0930   Drainage Characteristics Brown 08/07/25 1312   Output (mL) 0 mL 08/09/25 0500     Ileostomy Other (Comment) LUQ (Active)   Placement Date/Time: 07/25/25 0500   Hand Hygiene Completed: Yes  Ileostomy Type: (c) Other (Comment)  Location: LUQ   Number of days: 15      Ileostomy Other (Comment) LUQ (Active)   Present on Admission to Healthcare Facility N 08/09/25 0930   Stomal Appliance 2 piece;Changed 08/09/25 0930   Site/Stoma Assessment Clean;Red 08/09/25 0930   Stoma Size (cm) 2.5 cm 08/09/25 0930    Peristomal Assessment Wound 25   Treatment Pouch change;Packings;Site care 25   Output (mL) 100 mL 25   Output Description Liquid;Brown 25     Ostomy type: end jejunostomy  size: 1 inch  round  color: red and moist  protruding: budded   Umer: none   Functioning: loose green effluent   Mucocutaneous junction: full thickness wound at 3 o'clock. Aquacel was applied to the wound and covered with a barrier ring and hollihesive sheet   Peristomal skin: clean and intact   Pouchin piece [place] RED flat wafer and high output pouch  Plan: assess stoma/pouching     The wound care team came to bedside to assess the patient's ostomy pouches and midline abdomen wound. The patient midline wound has a dehiscence at inferior portion of the midline. The wound is red and moist with serosanguinous drainage. The wound was cleansed with vashe wound cleanser and gently pat dry. The wound was cleansed with Aquacel Ag hydrofiber and covered with a cover sponge. The patient's end jejunostomy pouch was replaced today due to leaking. The patient has a full thickness wound at 3 o'clock that was cleansed with vashe wound cleanser and packed with Aquacel Ag. A 2 piece [place] RED flat wafer and high output appliance was applied.        Wound Plan: Ostomy and wound care Supplies were order to bedside and given to the patient prior to discharge. Wound care will sign out at this time      BOAZ Ramos, CWON  2025  4:39 PM

## 2025-08-10 ENCOUNTER — HOME CARE VISIT (OUTPATIENT)
Dept: HOME HEALTH SERVICES | Facility: HOME HEALTH | Age: 60
End: 2025-08-10
Payer: COMMERCIAL

## 2025-08-10 VITALS
SYSTOLIC BLOOD PRESSURE: 110 MMHG | RESPIRATION RATE: 18 BRPM | OXYGEN SATURATION: 95 % | TEMPERATURE: 98.3 F | HEART RATE: 62 BPM | DIASTOLIC BLOOD PRESSURE: 58 MMHG

## 2025-08-10 PROCEDURE — G0299 HHS/HOSPICE OF RN EA 15 MIN: HCPCS

## 2025-08-10 ASSESSMENT — ENCOUNTER SYMPTOMS
SUBJECTIVE PAIN PROGRESSION: WAXING AND WANING
APPETITE LEVEL: FAIR
MUSCLE WEAKNESS: 1
PAIN LOCATION - EXACERBATING FACTORS: MOVEMENT
SLEEP QUALITY: ADEQUATE
PAIN: 1
CHANGE IN APPETITE: UNCHANGED
PAIN SEVERITY GOAL: 2/10
COUGH CHARACTERISTICS: NON-PRODUCTIVE
PERSON REPORTING PAIN: PATIENT
COUGH: 1
PAIN LOCATION - RELIEVING FACTORS: PAIN MEDICATION
PAIN LOCATION - PAIN QUALITY: PRESSURE
PAIN LOCATION - PAIN FREQUENCY: CONSTANT
PAIN LOCATION - PAIN SEVERITY: 6/10
ANGER WITHIN DEFINED LIMITS: 1
AGGRESSION WITHIN DEFINED LIMITS: 1
HIGHEST PAIN SEVERITY IN PAST 24 HOURS: 7/10
PAIN LOCATION: ABDOMEN
LOWEST PAIN SEVERITY IN PAST 24 HOURS: 6/10

## 2025-08-10 ASSESSMENT — ACTIVITIES OF DAILY LIVING (ADL)
AMBULATION ASSISTANCE: STAND BY ASSIST
OASIS_M1830: 05
ENTERING_EXITING_HOME: NEEDS ASSISTANCE
AMBULATION ASSISTANCE: 1

## 2025-08-10 ASSESSMENT — LIFESTYLE VARIABLES: SMOKING_STATUS: 0

## 2025-08-11 ENCOUNTER — HOME CARE VISIT (OUTPATIENT)
Dept: HOME HEALTH SERVICES | Facility: HOME HEALTH | Age: 60
End: 2025-08-11
Payer: COMMERCIAL

## 2025-08-11 ENCOUNTER — TELEPHONE (OUTPATIENT)
Dept: PRIMARY CARE | Facility: CLINIC | Age: 60
End: 2025-08-11
Payer: COMMERCIAL

## 2025-08-11 ENCOUNTER — PATIENT OUTREACH (OUTPATIENT)
Dept: PRIMARY CARE | Facility: CLINIC | Age: 60
End: 2025-08-11
Payer: COMMERCIAL

## 2025-08-11 VITALS
RESPIRATION RATE: 18 BRPM | TEMPERATURE: 100 F | SYSTOLIC BLOOD PRESSURE: 100 MMHG | OXYGEN SATURATION: 97 % | HEART RATE: 64 BPM | DIASTOLIC BLOOD PRESSURE: 54 MMHG

## 2025-08-11 LAB
FUNGUS SPEC CULT: NORMAL
FUNGUS SPEC FUNGUS STN: NORMAL
LABORATORY COMMENT REPORT: NORMAL
PATH REPORT.FINAL DX SPEC: NORMAL
PATH REPORT.GROSS SPEC: NORMAL
PATH REPORT.RELEVANT HX SPEC: NORMAL
PATH REPORT.TOTAL CANCER: NORMAL

## 2025-08-11 PROCEDURE — G0299 HHS/HOSPICE OF RN EA 15 MIN: HCPCS

## 2025-08-11 RX ADMIN — SODIUM CHLORIDE 10 ML: 9 INJECTION, SOLUTION INTRAMUSCULAR; INTRAVENOUS; SUBCUTANEOUS at 13:20

## 2025-08-11 RX ADMIN — SODIUM CHLORIDE 10 ML: 9 INJECTION, SOLUTION INTRAMUSCULAR; INTRAVENOUS; SUBCUTANEOUS at 13:28

## 2025-08-11 RX ADMIN — Medication 5 ML: at 13:26

## 2025-08-11 RX ADMIN — Medication 5 ML: at 13:29

## 2025-08-11 RX ADMIN — SODIUM CHLORIDE 20 ML: 9 INJECTION, SOLUTION INTRAMUSCULAR; INTRAVENOUS; SUBCUTANEOUS at 01:25

## 2025-08-12 ENCOUNTER — HOME INFUSION (OUTPATIENT)
Dept: INFUSION THERAPY | Age: 60
End: 2025-08-12
Payer: COMMERCIAL

## 2025-08-12 LAB
ALBUMIN SERPL-MCNC: 2.8 G/DL (ref 3.6–5.1)
ALBUMIN/GLOB SERPL: 0.7 (CALC) (ref 1–2.5)
ALP SERPL-CCNC: 287 U/L (ref 35–144)
ALT SERPL-CCNC: 24 U/L (ref 9–46)
ANION GAP SERPL CALCULATED.4IONS-SCNC: 13 MMOL/L (CALC) (ref 7–17)
AST SERPL-CCNC: 13 U/L (ref 10–35)
BILIRUB DIRECT SERPL-MCNC: 0.4 MG/DL
BILIRUB INDIRECT SERPL-MCNC: 0.4 MG/DL (CALC) (ref 0.2–1.2)
BILIRUB SERPL-MCNC: 0.8 MG/DL (ref 0.2–1.2)
BUN SERPL-MCNC: 25 MG/DL (ref 7–25)
BUN/CREAT SERPL: 51 (CALC) (ref 6–22)
CALCIUM SERPL-MCNC: 8.3 MG/DL (ref 8.6–10.3)
CHLORIDE SERPL-SCNC: 100 MMOL/L (ref 98–110)
CO2 SERPL-SCNC: 22 MMOL/L (ref 20–32)
CREAT SERPL-MCNC: 0.49 MG/DL (ref 0.7–1.35)
EGFRCR SERPLBLD CKD-EPI 2021: 117 ML/MIN/1.73M2
ERYTHROCYTE [DISTWIDTH] IN BLOOD BY AUTOMATED COUNT: 14.8 % (ref 11–15)
GLOBULIN SER CALC-MCNC: 3.8 G/DL (CALC) (ref 1.9–3.7)
GLUCOSE SERPL-MCNC: 103 MG/DL (ref 65–99)
HCT VFR BLD AUTO: 26.1 % (ref 38.5–50)
HGB BLD-MCNC: 7.8 G/DL (ref 13.2–17.1)
MAGNESIUM SERPL-MCNC: 1.9 MG/DL (ref 1.5–2.5)
MCH RBC QN AUTO: 25.6 PG (ref 27–33)
MCHC RBC AUTO-ENTMCNC: 29.9 G/DL (ref 32–36)
MCV RBC AUTO: 85.6 FL (ref 80–100)
PHOSPHATE SERPL-MCNC: 4 MG/DL (ref 2.5–4.5)
PLATELET # BLD AUTO: 536 THOUSAND/UL (ref 140–400)
PMV BLD REES-ECKER: 10 FL (ref 7.5–12.5)
POTASSIUM SERPL-SCNC: 4.5 MMOL/L (ref 3.5–5.3)
PROT SERPL-MCNC: 6.6 G/DL (ref 6.1–8.1)
RBC # BLD AUTO: 3.05 MILLION/UL (ref 4.2–5.8)
SODIUM SERPL-SCNC: 135 MMOL/L (ref 135–146)
TRIGL SERPL-MCNC: 50 MG/DL
WBC # BLD AUTO: 13.8 THOUSAND/UL (ref 3.8–10.8)

## 2025-08-13 ENCOUNTER — HOME CARE VISIT (OUTPATIENT)
Dept: HOME HEALTH SERVICES | Facility: HOME HEALTH | Age: 60
End: 2025-08-13
Payer: COMMERCIAL

## 2025-08-13 ENCOUNTER — HOSPITAL ENCOUNTER (EMERGENCY)
Facility: HOSPITAL | Age: 60
Discharge: LEFT BEFORE DISCHARGE | End: 2025-08-13
Payer: COMMERCIAL

## 2025-08-13 VITALS
BODY MASS INDEX: 23.49 KG/M2 | HEIGHT: 68 IN | SYSTOLIC BLOOD PRESSURE: 134 MMHG | OXYGEN SATURATION: 99 % | WEIGHT: 155 LBS | DIASTOLIC BLOOD PRESSURE: 75 MMHG | RESPIRATION RATE: 17 BRPM | TEMPERATURE: 98.4 F | HEART RATE: 82 BPM

## 2025-08-13 LAB
ALBUMIN SERPL BCP-MCNC: 3 G/DL (ref 3.4–5)
ALP SERPL-CCNC: 317 U/L (ref 33–136)
ALT SERPL W P-5'-P-CCNC: 22 U/L (ref 10–52)
ANION GAP SERPL CALC-SCNC: 15 MMOL/L (ref 10–20)
AST SERPL W P-5'-P-CCNC: 19 U/L (ref 9–39)
BASOPHILS # BLD AUTO: 0.08 X10*3/UL (ref 0–0.1)
BASOPHILS NFR BLD AUTO: 0.6 %
BILIRUB SERPL-MCNC: 0.8 MG/DL (ref 0–1.2)
BUN SERPL-MCNC: 20 MG/DL (ref 6–23)
CALCIUM SERPL-MCNC: 8.9 MG/DL (ref 8.6–10.6)
CHLORIDE SERPL-SCNC: 101 MMOL/L (ref 98–107)
CO2 SERPL-SCNC: 22 MMOL/L (ref 21–32)
CREAT SERPL-MCNC: 0.49 MG/DL (ref 0.5–1.3)
EGFRCR SERPLBLD CKD-EPI 2021: >90 ML/MIN/1.73M*2
EOSINOPHIL # BLD AUTO: 0.13 X10*3/UL (ref 0–0.7)
EOSINOPHIL NFR BLD AUTO: 0.9 %
ERYTHROCYTE [DISTWIDTH] IN BLOOD BY AUTOMATED COUNT: 16 % (ref 11.5–14.5)
GLUCOSE SERPL-MCNC: 91 MG/DL (ref 74–99)
HCT VFR BLD AUTO: 24.6 % (ref 41–52)
HGB BLD-MCNC: 8.1 G/DL (ref 13.5–17.5)
IMM GRANULOCYTES # BLD AUTO: 0.07 X10*3/UL (ref 0–0.7)
IMM GRANULOCYTES NFR BLD AUTO: 0.5 % (ref 0–0.9)
LACTATE SERPL-SCNC: 1.4 MMOL/L (ref 0.4–2)
LYMPHOCYTES # BLD AUTO: 1.7 X10*3/UL (ref 1.2–4.8)
LYMPHOCYTES NFR BLD AUTO: 11.9 %
MCH RBC QN AUTO: 25 PG (ref 26–34)
MCHC RBC AUTO-ENTMCNC: 32.9 G/DL (ref 32–36)
MCV RBC AUTO: 76 FL (ref 80–100)
MONOCYTES # BLD AUTO: 1.29 X10*3/UL (ref 0.1–1)
MONOCYTES NFR BLD AUTO: 9.1 %
NEUTROPHILS # BLD AUTO: 10.98 X10*3/UL (ref 1.2–7.7)
NEUTROPHILS NFR BLD AUTO: 77 %
NRBC BLD-RTO: 0 /100 WBCS (ref 0–0)
PLATELET # BLD AUTO: 492 X10*3/UL (ref 150–450)
POTASSIUM SERPL-SCNC: 3.7 MMOL/L (ref 3.5–5.3)
PROT SERPL-MCNC: 7.8 G/DL (ref 6.4–8.2)
RBC # BLD AUTO: 3.24 X10*6/UL (ref 4.5–5.9)
SODIUM SERPL-SCNC: 134 MMOL/L (ref 136–145)
WBC # BLD AUTO: 14.3 X10*3/UL (ref 4.4–11.3)

## 2025-08-13 PROCEDURE — 99283 EMERGENCY DEPT VISIT LOW MDM: CPT

## 2025-08-13 PROCEDURE — 83605 ASSAY OF LACTIC ACID: CPT | Performed by: EMERGENCY MEDICINE

## 2025-08-13 PROCEDURE — 99281 EMR DPT VST MAYX REQ PHY/QHP: CPT

## 2025-08-13 PROCEDURE — 80053 COMPREHEN METABOLIC PANEL: CPT | Performed by: EMERGENCY MEDICINE

## 2025-08-13 PROCEDURE — G0299 HHS/HOSPICE OF RN EA 15 MIN: HCPCS

## 2025-08-13 PROCEDURE — 85025 COMPLETE CBC W/AUTO DIFF WBC: CPT | Performed by: EMERGENCY MEDICINE

## 2025-08-13 ASSESSMENT — PAIN - FUNCTIONAL ASSESSMENT: PAIN_FUNCTIONAL_ASSESSMENT: 0-10

## 2025-08-13 ASSESSMENT — PAIN DESCRIPTION - PAIN TYPE: TYPE: ACUTE PAIN

## 2025-08-13 ASSESSMENT — PAIN DESCRIPTION - LOCATION: LOCATION: ABDOMEN

## 2025-08-13 ASSESSMENT — PAIN SCALES - GENERAL: PAINLEVEL_OUTOF10: 6

## 2025-08-14 ENCOUNTER — HOME CARE VISIT (OUTPATIENT)
Dept: HOME HEALTH SERVICES | Facility: HOME HEALTH | Age: 60
End: 2025-08-14
Payer: COMMERCIAL

## 2025-08-14 VITALS
DIASTOLIC BLOOD PRESSURE: 70 MMHG | HEART RATE: 86 BPM | TEMPERATURE: 98.5 F | SYSTOLIC BLOOD PRESSURE: 130 MMHG | RESPIRATION RATE: 20 BRPM

## 2025-08-14 ASSESSMENT — ENCOUNTER SYMPTOMS
PAIN LOCATION - PAIN QUALITY: PRESSURE
PAIN SEVERITY GOAL: 2/10
HIGHEST PAIN SEVERITY IN PAST 24 HOURS: 7/10
CHANGE IN APPETITE: UNCHANGED
PAIN LOCATION - RELIEVING FACTORS: PAIN MEDICATION
PAIN: 1
PAIN LOCATION - PAIN SEVERITY: 7/10
PAIN LOCATION - EXACERBATING FACTORS: MOVEMENT
PAIN LOCATION: ABDOMEN
PAIN LOCATION - PAIN FREQUENCY: CONSTANT
LOWEST PAIN SEVERITY IN PAST 24 HOURS: 6/10
SUBJECTIVE PAIN PROGRESSION: WAXING AND WANING
PERSON REPORTING PAIN: PATIENT
MUSCLE WEAKNESS: 1
APPETITE LEVEL: FAIR

## 2025-08-14 ASSESSMENT — ACTIVITIES OF DAILY LIVING (ADL): AMBULATION ASSISTANCE: STAND BY ASSIST

## 2025-08-15 ENCOUNTER — APPOINTMENT (OUTPATIENT)
Dept: SURGERY | Facility: CLINIC | Age: 60
End: 2025-08-15
Payer: COMMERCIAL

## 2025-08-15 VITALS
SYSTOLIC BLOOD PRESSURE: 116 MMHG | TEMPERATURE: 96.6 F | HEIGHT: 68 IN | WEIGHT: 155 LBS | DIASTOLIC BLOOD PRESSURE: 67 MMHG | BODY MASS INDEX: 23.49 KG/M2 | HEART RATE: 66 BPM

## 2025-08-15 DIAGNOSIS — Z93.4 JEJUNOSTOMY PRESENT (MULTI): ICD-10-CM

## 2025-08-15 DIAGNOSIS — Z78.9 ON TOTAL PARENTERAL NUTRITION (TPN): ICD-10-CM

## 2025-08-15 DIAGNOSIS — M95.8 ABDOMINAL WALL DEFECT, ACQUIRED: Primary | ICD-10-CM

## 2025-08-17 ASSESSMENT — ENCOUNTER SYMPTOMS
SUBJECTIVE PAIN PROGRESSION: WAXING AND WANING
FATIGUES EASILY: 1
HIGHEST PAIN SEVERITY IN PAST 24 HOURS: 9/10
APPETITE LEVEL: POOR
PAIN LOCATION - PAIN QUALITY: BURN
PAIN LOCATION - RELIEVING FACTORS: REST
MUSCLE WEAKNESS: 1
PAIN LOCATION - EXACERBATING FACTORS: MOVEMENT
LOWEST PAIN SEVERITY IN PAST 24 HOURS: 3/10
CHANGE IN APPETITE: UNCHANGED
PAIN LOCATION - PAIN DURATION: DAILY
PAIN LOCATION - PAIN SEVERITY: 7/10
PAIN LOCATION - PAIN FREQUENCY: CONSTANT
PAIN LOCATION: ABDOMEN
PAIN: 1
PERSON REPORTING PAIN: PATIENT
PAIN SEVERITY GOAL: 0/10

## 2025-08-18 ENCOUNTER — HOME CARE VISIT (OUTPATIENT)
Dept: HOME HEALTH SERVICES | Facility: HOME HEALTH | Age: 60
End: 2025-08-18
Payer: COMMERCIAL

## 2025-08-18 VITALS
DIASTOLIC BLOOD PRESSURE: 56 MMHG | HEART RATE: 58 BPM | TEMPERATURE: 98.5 F | SYSTOLIC BLOOD PRESSURE: 98 MMHG | OXYGEN SATURATION: 97 % | RESPIRATION RATE: 16 BRPM

## 2025-08-18 LAB
FUNGUS SPEC CULT: NORMAL
FUNGUS SPEC FUNGUS STN: NORMAL

## 2025-08-18 PROCEDURE — G0299 HHS/HOSPICE OF RN EA 15 MIN: HCPCS

## 2025-08-18 RX ADMIN — Medication 5 ML: at 10:37

## 2025-08-18 RX ADMIN — SODIUM CHLORIDE 20 ML: 9 INJECTION, SOLUTION INTRAMUSCULAR; INTRAVENOUS; SUBCUTANEOUS at 10:35

## 2025-08-18 RX ADMIN — SODIUM CHLORIDE 10 ML: 9 INJECTION, SOLUTION INTRAMUSCULAR; INTRAVENOUS; SUBCUTANEOUS at 10:39

## 2025-08-18 RX ADMIN — SODIUM CHLORIDE 10 ML: 9 INJECTION, SOLUTION INTRAMUSCULAR; INTRAVENOUS; SUBCUTANEOUS at 10:30

## 2025-08-18 SDOH — HEALTH STABILITY: MENTAL HEALTH: NUTRITION HISTORY: PT ON TPN FEEDING, SOME ADDITIONAL PO INTAKE OF FOOD AND FLUIDS

## 2025-08-19 LAB
ALBUMIN SERPL-MCNC: 2.6 G/DL (ref 3.6–5.1)
ALBUMIN/GLOB SERPL: 0.7 (CALC) (ref 1–2.5)
ALP SERPL-CCNC: 222 U/L (ref 35–144)
ALT SERPL-CCNC: 13 U/L (ref 9–46)
ANION GAP SERPL CALCULATED.4IONS-SCNC: 14 MMOL/L (CALC) (ref 7–17)
AST SERPL-CCNC: 11 U/L (ref 10–35)
BILIRUB DIRECT SERPL-MCNC: 0.2 MG/DL
BILIRUB INDIRECT SERPL-MCNC: 0.3 MG/DL (CALC) (ref 0.2–1.2)
BILIRUB SERPL-MCNC: 0.5 MG/DL (ref 0.2–1.2)
BUN SERPL-MCNC: 22 MG/DL (ref 7–25)
BUN/CREAT SERPL: 46 (CALC) (ref 6–22)
CALCIUM SERPL-MCNC: 8.3 MG/DL (ref 8.6–10.3)
CHLORIDE SERPL-SCNC: 103 MMOL/L (ref 98–110)
CO2 SERPL-SCNC: 20 MMOL/L (ref 20–32)
CREAT SERPL-MCNC: 0.48 MG/DL (ref 0.7–1.35)
EGFRCR SERPLBLD CKD-EPI 2021: 118 ML/MIN/1.73M2
ERYTHROCYTE [DISTWIDTH] IN BLOOD BY AUTOMATED COUNT: 15 % (ref 11–15)
GLOBULIN SER CALC-MCNC: 3.7 G/DL (CALC) (ref 1.9–3.7)
GLUCOSE SERPL-MCNC: 85 MG/DL (ref 65–99)
HCT VFR BLD AUTO: 24.8 % (ref 38.5–50)
HGB BLD-MCNC: 7.2 G/DL (ref 13.2–17.1)
MAGNESIUM SERPL-MCNC: 2 MG/DL (ref 1.5–2.5)
MCH RBC QN AUTO: 24.7 PG (ref 27–33)
MCHC RBC AUTO-ENTMCNC: 29 G/DL (ref 32–36)
MCV RBC AUTO: 84.9 FL (ref 80–100)
PHOSPHATE SERPL-MCNC: 4.5 MG/DL (ref 2.5–4.5)
PLATELET # BLD AUTO: 426 THOUSAND/UL (ref 140–400)
PMV BLD REES-ECKER: 10.5 FL (ref 7.5–12.5)
POTASSIUM SERPL-SCNC: 4.7 MMOL/L (ref 3.5–5.3)
PROT SERPL-MCNC: 6.3 G/DL (ref 6.1–8.1)
RBC # BLD AUTO: 2.92 MILLION/UL (ref 4.2–5.8)
SODIUM SERPL-SCNC: 137 MMOL/L (ref 135–146)
TRIGL SERPL-MCNC: 62 MG/DL
WBC # BLD AUTO: 12 THOUSAND/UL (ref 3.8–10.8)

## 2025-08-20 ENCOUNTER — APPOINTMENT (OUTPATIENT)
Facility: CLINIC | Age: 60
End: 2025-08-20
Payer: COMMERCIAL

## 2025-08-20 ENCOUNTER — HOME INFUSION (OUTPATIENT)
Dept: INFUSION THERAPY | Age: 60
End: 2025-08-20

## 2025-08-20 ENCOUNTER — DOCUMENTATION (OUTPATIENT)
Dept: INFUSION THERAPY | Age: 60
End: 2025-08-20

## 2025-08-20 DIAGNOSIS — E43 SEVERE MALNUTRITION (MULTI): Primary | ICD-10-CM

## 2025-08-20 RX ADMIN — Medication 5 ML: at 10:40

## 2025-08-20 ASSESSMENT — ENCOUNTER SYMPTOMS
HIGHEST PAIN SEVERITY IN PAST 24 HOURS: 9/10
FATIGUES EASILY: 1
PAIN LOCATION - RELIEVING FACTORS: REST
PAIN: 1
APPETITE LEVEL: FAIR
SUBJECTIVE PAIN PROGRESSION: WAXING AND WANING
PAIN LOCATION - EXACERBATING FACTORS: MOVEMENT
LOWEST PAIN SEVERITY IN PAST 24 HOURS: 2/10
PAIN LOCATION - PAIN FREQUENCY: CONSTANT
PAIN LOCATION - PAIN QUALITY: ACHE
MUSCLE WEAKNESS: 1
PAIN LOCATION - PAIN SEVERITY: 6/10
PAIN LOCATION: ABDOMEN
PERSON REPORTING PAIN: PATIENT
CHANGE IN APPETITE: INCREASED
PAIN LOCATION - PAIN DURATION: DAILY
PAIN SEVERITY GOAL: 0/10

## 2025-08-22 ENCOUNTER — HOME CARE VISIT (OUTPATIENT)
Dept: HOME HEALTH SERVICES | Facility: HOME HEALTH | Age: 60
End: 2025-08-22
Payer: COMMERCIAL

## 2025-08-22 ENCOUNTER — PATIENT OUTREACH (OUTPATIENT)
Dept: PRIMARY CARE | Facility: CLINIC | Age: 60
End: 2025-08-22
Payer: COMMERCIAL

## 2025-08-22 VITALS
RESPIRATION RATE: 16 BRPM | SYSTOLIC BLOOD PRESSURE: 110 MMHG | TEMPERATURE: 98.2 F | WEIGHT: 164.31 LBS | BODY MASS INDEX: 24.98 KG/M2 | HEART RATE: 68 BPM | DIASTOLIC BLOOD PRESSURE: 60 MMHG

## 2025-08-22 DIAGNOSIS — Z78.9 ON TOTAL PARENTERAL NUTRITION (TPN): ICD-10-CM

## 2025-08-22 PROCEDURE — G0299 HHS/HOSPICE OF RN EA 15 MIN: HCPCS

## 2025-08-22 ASSESSMENT — ENCOUNTER SYMPTOMS
LOWEST PAIN SEVERITY IN PAST 24 HOURS: 2/10
PAIN: 1
CHANGE IN APPETITE: VARYING
PAIN LOCATION - PAIN FREQUENCY: CONSTANT
STOOL FREQUENCY: MORE THAN TWICE DAILY
PAIN LOCATION - PAIN SEVERITY: 6/10
PAIN LOCATION: ABDOMEN
PAIN LOCATION - EXACERBATING FACTORS: MOVEMENT
MUSCLE WEAKNESS: 1
FATIGUES EASILY: 1
PAIN SEVERITY GOAL: 0/10
PAIN LOCATION - PAIN QUALITY: ACHE
SUBJECTIVE PAIN PROGRESSION: WAXING AND WANING
PAIN LOCATION - PAIN DURATION: DAILY
PERSON REPORTING PAIN: PATIENT
APPETITE LEVEL: FAIR
PAIN LOCATION - RELIEVING FACTORS: REST
HIGHEST PAIN SEVERITY IN PAST 24 HOURS: 9/10

## 2025-08-22 NOTE — OP NOTE
LAPAROSCOPY, EXPLORATORY Operative Note     Date: 2025  OR Location: [facility] OR    Name: Bereket Em, : 1965, Age: 59 y.o., MRN: [id number], Sex: male    Diagnosis  Pre-op Diagnosis      * Incarcerated ventral hernia [K43.6]     * Diverticulosis of large intestine without hemorrhage [K57.30] Post-op Diagnosis     * Incarcerated ventral hernia [K43.6]     * Diverticulosis of large intestine without hemorrhage [K57.30]     Procedures  LAPAROSCOPY, EXPLORATORY  75180 - SD LAPS ABD PRTM&OMENTUM DX W/WO SPEC BR/WA SPX    RESECTION OF COLORECTAL ANASTOMOSIS, REVISION OF ILEOSTOMY      Surgeons   Panel 1:     * Jerel Olson - Primary  Panel 2:     * Krish Gross - Primary    Resident/Fellow/Other Assistant:  Surgeons and Role:  Panel 1:     * Bereket Perry MD - Assisting     * Tiarra Lai MD - Resident - Assisting     * Darnell Yanez MD - Resident - Assisting     * Mackenzie A Simerlink, MD - Resident - Assisting    Staff:   Circulator: Jeff  Circulator: Serina  Circulator: Melanie Abbott Scrub: [facility]    Anesthesia Staff: Anesthesiologist: Jose Guadalupe Gatica MD  Anesthesia Resident: Casa Sanabria MD; Kurt Jean MD    Procedure Summary  Anesthesia: General  ASA: IV  Estimated Blood Loss: 50mL  Intra-op Medications:   Administrations occurring from 0300 to 0505 on 25:   Medication Name Total Dose   sodium chloride 0.9 % irrigation solution 3,000 mL   albumin human 5 % infusion 25 g Cannot be calculated   angiotensin II (Giapreza) 2.5 mg in sodium chloride 0.9% 250 mL (0.01 mg/mL) infusion Cannot be calculated   calcium gluconate 2 g in sodium chloride (iso)  mL Cannot be calculated   LR bolus Cannot be calculated   norepinephrine (Levophed) 8 mg in dextrose 5% 250 mL (0.032 mg/mL) infusion (premix) 0 mg   potassium chloride 40 mEq in sterile water for injection 100 mL 50 mL   rocuronium 10 mg/mL 80 mg   vancomycin (Vancocin) 1,000 mg in dextrose 5%  mL  Cannot be calculated   vasopressin (Vasostrict) 0.2 unit/mL in 5% dextrose 100 mL IV 0.7 Units         Intraprocedure I/O Totals       None           Specimen:   ID Type Source Tests Collected by Time   1 : ILEOSTOMY Tissue SOFT TISSUE RESECTION SURGICAL PATHOLOGY EXAM Jerel Olson MD 7/25/2025 0522   2 : COLORECTAL ANASTOMOSIS Tissue SMALL BOWEL /INTESTINE SEGMENTAL RESECTION SURGICAL PATHOLOGY EXAM Jerel Olson MD 7/25/2025 0523                 Drains and/or Catheters:   Colostomy Mucous Fistula RUQ (Active)   Present on Admission to Healthcare Facility N 08/09/25 0930   Stomal Appliance 1 piece;Intact 08/09/25 0930   Site/Stoma Assessment Clean;Red 08/09/25 0930   Peristomal Assessment Clean;Intact 08/09/25 0725   Treatment Placement checked 08/09/25 0930   Drainage Characteristics Brown 08/07/25 1312   Output (mL) 0 mL 08/09/25 0500       Ileostomy Other (Comment) LUQ (Active)   Present on Admission to Healthcare Facility N 08/09/25 0930   Stomal Appliance 2 piece;Changed 08/09/25 0930   Site/Stoma Assessment Clean;Red 08/09/25 0930   Stoma Size (cm) 2.5 cm 08/09/25 0930   Peristomal Assessment Wound 08/09/25 0930   Treatment Pouch change;Packings;Site care 08/09/25 0930   Output (mL) 100 mL 08/09/25 0930   Output Description Liquid;Brown 08/09/25 0930       [REMOVED] Closed/Suction Drain Right RLQ Bulb 19 Fr. (Removed)   Dressing Status Clean;Dry 07/22/25 1636   Drainage Appearance Serosanguineous 07/22/25 1636   Status To bulb suction 07/22/25 1636   Output (mL) 25 mL 07/22/25 1747       [REMOVED] Closed/Suction Drain Anterior;Left Abdomen Bulb (Removed)   Site Description Unable to view 07/24/25 2100   Dressing Status Clean;Dry 07/25/25 0300   Drainage Appearance None 07/25/25 0300   Status To bulb suction 07/25/25 0300   Output (mL) 0 mL 07/25/25 0300       [REMOVED] Closed/Suction Drain RLQ 10 Fr. (Removed)   Site Description Healing 08/06/25 2100   Dressing Status Clean;Dry 08/06/25 2100   Drainage  Appearance Serosanguineous 08/04/25 1446   Status To bulb suction 08/04/25 1446   Output (mL) 0 mL 08/07/25 1614       [REMOVED] Open Drain Inferior Abdomen (Removed)   Site Description Unable to view 07/24/25 2100   Dressing Status Clean;Dry 07/24/25 2100       [REMOVED] NG/OG/Feeding Tube Nasogastric 16 Fr Right nostril (Removed)   Tube Status Open to gravity drainage 07/27/25 1400   Placement Verification Measurements 07/27/25 1400   Distal Tube Measurement (cm) 60 cm 07/27/25 1400   Site Assessment Clean;Dry;Intact 07/27/25 1400   Drainage Appearance Ruiz;Brown 07/27/25 1400   NG/OG Interventions Irrigated 07/27/25 0800   Irrigant Tap water 07/27/25 0800   Response To Intervention No resistance met 07/27/25 0800   Tube Securement Taped to nostril center 07/27/25 1400   Output (mL) 100 mL 07/27/25 1400       [REMOVED] Colostomy LLQ (Removed)   Stomal Appliance 2 piece;Leaking 07/22/25 0626   Site/Stoma Assessment Clean;Intact 07/22/25 0626   Peristomal Assessment Clean;Intact 07/22/25 0626   Intake (ml) 100 ml 07/22/25 0626       [REMOVED] Colostomy  RUQ (Removed)   Stomal Appliance Changed;2 piece 07/23/25 1045   Site/Stoma Assessment Clean;Intact 07/24/25 1600   Peristomal Assessment Clean;Intact 07/24/25 1600   Treatment Pouch change;Site care 07/23/25 1045   Drainage Characteristics Bloody 07/23/25 1045   Output (mL) 75 mL 07/24/25 0547       [REMOVED] Colostomy LUQ (Removed)   Present on Admission to Healthcare Facility N 07/25/25 2000   Stomal Appliance 1 piece 07/28/25 1225   Site/Stoma Assessment Clean;Intact;Dry 07/28/25 1225   Peristomal Assessment Clean;Intact 07/28/25 0800   Drainage Characteristics Black (tarry) 07/28/25 1225   Output (mL) 100 mL 07/28/25 1225       [REMOVED] Colostomy RUQ (Removed)   Present on Admission to Healthcare Facility N 08/05/25 1800   Stomal Appliance 1 piece;Intact 08/05/25 1800   Site/Stoma Assessment Intact 08/07/25 0547   Peristomal Assessment RINA 08/07/25 0547    Treatment Placement checked 08/05/25 1800   Drainage Characteristics Brown 08/05/25 1800   Output (mL) 0 mL 08/07/25 0736       [REMOVED] Urethral Catheter Non-latex 18 Fr. (Removed)   Site Assessment Clean;Skin intact 07/24/25 0759   Collection Container Standard drainage bag 07/24/25 0759   Securement Method Securing device (Describe) 07/24/25 0759   Reason for Continuing Urinary Catheterization accurate hourly measurement of urine volume in a critically ill patient that cannot be assessed by other volumes and urine collection strategies 07/24/25 0759   Output (mL) 300 mL 07/24/25 0759       [REMOVED] Urethral Catheter (Removed)   Site Assessment Skin intact;Clean 07/27/25 0800   Collection Container Urometer 07/27/25 0800   Securement Method Securing device (Describe) 07/27/25 0800   Reason for Continuing Urinary Catheterization accurate hourly measurement of urine volume in a critically ill patient that cannot be assessed by other volumes and urine collection strategies 07/27/25 0800   Output (mL) 30 mL 07/27/25 1200         Findings: Ischemic distal 3cm of colorectal anastomosis. No leak or spillage of stool    Indications: Bereket Em is an 59 y.o. male who is having surgery for Incarcerated ventral hernia [K43.6]  Diverticulosis of large intestine without hemorrhage [K57.30].     The patient was seen in the preoperative area. The risks, benefits, complications, treatment options, non-operative alternatives, expected recovery and outcomes were discussed with the patient. The possibilities of reaction to medication, pulmonary aspiration, injury to surrounding structures, bleeding, recurrent infection, the need for additional procedures, failure to diagnose a condition, and creating a complication requiring transfusion or operation were discussed with the patient. The patient concurred with the proposed plan, giving informed consent.  The site of surgery was properly noted/marked if necessary per policy.    Procedure Details: 2 days previous to this the patient underwent a reversal of her Soliman's procedure and unfortunately over the last several hours has decompensated with findings concerning for ischemic bowel versus leak from his colorectal anastomosis.  My colleagues Dr. Olson already started the operation once I got into the operating room.  Upon arrival the patient's abdomen had already been open and his colorectal anastomosis was visualized.  The distal 2 to 3 cm of the colon appeared to be ischemic as it was dark and dusky.  The anastomosis appeared to be still intact.  There was no spillage of stool within the abdomen.  After doing a quick assessment I decided to resect his colorectal anastomosis.  The rectum had been previously mobilized during his first operation and as such I used Bovie electrocautery to thin the mesentery out about a centimeter below his existing anastomosis.  A TA 45 green load stapler was then applied across the rectum and transected using a 10 blade.  The colon along with the colorectal anastomosis was then pulled through the mesentery of the small bowel and closely examined.  I then transected the proximal end of the anastomosis about 2 to 3 cm above the area that appeared to be dusky.  I transected it sharply to see if there would be more bleeding and this still was not.  As such I had to transect the further 2 to 3 cm until there was some degree of bleeding.  This area while bleeding was not found to be brisk however there was enough blood to convince us that there was adequate blood supply.  Despite having a previous right upper quadrant ileostomy it was very difficult to get the colon to reach the abdominal wall on the left side etc. made a decision to takedown his right upper quadrant ileostomy and placed the end colostomy there.  An aperture was then reopened on the left side of the patient's abdomen at the previous colostomy site and the ileostomy was rematured at that site.   At that point Dr. Olson close the patient's abdomen and I matured all of the stomas using 3-0 chromic sutures.  Evidence of Infection: Yes; Purulent fluid Below the level of the fascia (organ/space)  Complications:  None; patient tolerated the procedure well.    Disposition: ICU - intubated and critically ill.  Condition: stable     This procedure was not performed to treat colon cancer through resection              Additional Details: n/a    Attending Attestation: I was present and scrubbed for the entire procedure.    Jerel Olson  Phone Number: [phone]

## 2025-08-25 ENCOUNTER — LAB (OUTPATIENT)
Dept: LAB | Facility: HOSPITAL | Age: 60
End: 2025-08-25
Payer: COMMERCIAL

## 2025-08-25 ENCOUNTER — LAB REQUISITION (OUTPATIENT)
Dept: LAB | Facility: HOSPITAL | Age: 60
End: 2025-08-25

## 2025-08-25 ENCOUNTER — HOME CARE VISIT (OUTPATIENT)
Dept: HOME HEALTH SERVICES | Facility: HOME HEALTH | Age: 60
End: 2025-08-25
Payer: COMMERCIAL

## 2025-08-25 VITALS
OXYGEN SATURATION: 100 % | RESPIRATION RATE: 16 BRPM | DIASTOLIC BLOOD PRESSURE: 68 MMHG | SYSTOLIC BLOOD PRESSURE: 116 MMHG | TEMPERATURE: 97.4 F | HEART RATE: 62 BPM

## 2025-08-25 DIAGNOSIS — D50.9 IRON DEFICIENCY ANEMIA, UNSPECIFIED IRON DEFICIENCY ANEMIA TYPE: Primary | ICD-10-CM

## 2025-08-25 DIAGNOSIS — K63.2 FISTULA OF INTESTINE: ICD-10-CM

## 2025-08-25 LAB
ALBUMIN SERPL BCP-MCNC: 3.1 G/DL (ref 3.4–5)
ALP SERPL-CCNC: 306 U/L (ref 33–136)
ALT SERPL W P-5'-P-CCNC: 31 U/L (ref 10–52)
ANION GAP SERPL CALCULATED.3IONS-SCNC: 15 MMOL/L (ref 10–20)
AST SERPL W P-5'-P-CCNC: 26 U/L (ref 9–39)
BILIRUB DIRECT SERPL-MCNC: 0.2 MG/DL (ref 0–0.3)
BILIRUB SERPL-MCNC: 0.4 MG/DL (ref 0–1.2)
BUN SERPL-MCNC: 26 MG/DL (ref 6–23)
CALCIUM SERPL-MCNC: 8.5 MG/DL (ref 8.6–10.3)
CHLORIDE SERPL-SCNC: 101 MMOL/L (ref 98–107)
CO2 SERPL-SCNC: 24 MMOL/L (ref 21–32)
CREAT SERPL-MCNC: 0.52 MG/DL (ref 0.5–1.3)
EGFRCR SERPLBLD CKD-EPI 2021: >90 ML/MIN/1.73M*2
ERYTHROCYTE [DISTWIDTH] IN BLOOD BY AUTOMATED COUNT: 16.7 % (ref 11.5–14.5)
FUNGUS SPEC CULT: NORMAL
FUNGUS SPEC FUNGUS STN: NORMAL
GLUCOSE SERPL-MCNC: 81 MG/DL (ref 74–99)
HCT VFR BLD AUTO: 25 % (ref 41–52)
HGB BLD-MCNC: 8.1 G/DL (ref 13.5–17.5)
HOLD SPECIMEN: NORMAL
MAGNESIUM SERPL-MCNC: 1.88 MG/DL (ref 1.6–2.4)
MCH RBC QN AUTO: 26.2 PG (ref 26–34)
MCHC RBC AUTO-ENTMCNC: 32.4 G/DL (ref 32–36)
MCV RBC AUTO: 81 FL (ref 80–100)
NRBC BLD-RTO: 0 /100 WBCS (ref 0–0)
PHOSPHATE SERPL-MCNC: 4.5 MG/DL (ref 2.5–4.9)
PLATELET # BLD AUTO: 476 X10*3/UL (ref 150–450)
POTASSIUM SERPL-SCNC: 4.8 MMOL/L (ref 3.5–5.3)
PROT SERPL-MCNC: 7 G/DL (ref 6.4–8.2)
RBC # BLD AUTO: 3.09 X10*6/UL (ref 4.5–5.9)
SODIUM SERPL-SCNC: 135 MMOL/L (ref 136–145)
TRIGL SERPL-MCNC: 101 MG/DL (ref 0–149)
WBC # BLD AUTO: 13.4 X10*3/UL (ref 4.4–11.3)

## 2025-08-25 PROCEDURE — 84100 ASSAY OF PHOSPHORUS: CPT

## 2025-08-25 PROCEDURE — 82248 BILIRUBIN DIRECT: CPT

## 2025-08-25 PROCEDURE — 85027 COMPLETE CBC AUTOMATED: CPT

## 2025-08-25 PROCEDURE — 84478 ASSAY OF TRIGLYCERIDES: CPT

## 2025-08-25 PROCEDURE — 83735 ASSAY OF MAGNESIUM: CPT

## 2025-08-25 PROCEDURE — 80053 COMPREHEN METABOLIC PANEL: CPT

## 2025-08-25 PROCEDURE — G0299 HHS/HOSPICE OF RN EA 15 MIN: HCPCS

## 2025-08-25 RX ORDER — FERROUS SULFATE 325(65) MG
325 TABLET ORAL
Qty: 30 TABLET | Refills: 2 | Status: SHIPPED | OUTPATIENT
Start: 2025-08-25 | End: 2025-11-23

## 2025-08-25 RX ADMIN — SODIUM CHLORIDE 20 ML: 9 INJECTION, SOLUTION INTRAMUSCULAR; INTRAVENOUS; SUBCUTANEOUS at 10:35

## 2025-08-25 RX ADMIN — Medication 5 ML: at 10:29

## 2025-08-25 RX ADMIN — SODIUM CHLORIDE 10 ML: 9 INJECTION, SOLUTION INTRAMUSCULAR; INTRAVENOUS; SUBCUTANEOUS at 10:30

## 2025-08-25 RX ADMIN — Medication 5 ML: at 10:36

## 2025-08-25 RX ADMIN — SODIUM CHLORIDE 10 ML: 9 INJECTION, SOLUTION INTRAMUSCULAR; INTRAVENOUS; SUBCUTANEOUS at 10:28

## 2025-08-26 ENCOUNTER — DOCUMENTATION (OUTPATIENT)
Dept: INFUSION THERAPY | Age: 60
End: 2025-08-26
Payer: COMMERCIAL

## 2025-08-26 ENCOUNTER — HOME INFUSION (OUTPATIENT)
Dept: INFUSION THERAPY | Age: 60
End: 2025-08-26
Payer: COMMERCIAL

## 2025-08-26 DIAGNOSIS — K63.2 ENTEROCUTANEOUS FISTULA: ICD-10-CM

## 2025-08-26 DIAGNOSIS — E43 SEVERE MALNUTRITION (MULTI): ICD-10-CM

## 2025-08-26 RX ORDER — SODIUM CHLORIDE 9 MG/ML
1000 INJECTION, SOLUTION INTRAVENOUS 2 TIMES DAILY
Qty: 7000 ML | Refills: 52 | Status: SHIPPED
Start: 2025-08-26 | End: 2025-08-26 | Stop reason: ALTCHOICE

## 2025-08-26 RX ORDER — SODIUM CHLORIDE 9 MG/ML
1000 INJECTION, SOLUTION INTRAVENOUS 2 TIMES DAILY
Qty: 7000 ML | Refills: 52 | Status: SHIPPED
Start: 2025-08-26 | End: 2026-08-26

## 2025-08-27 ENCOUNTER — APPOINTMENT (OUTPATIENT)
Dept: PRIMARY CARE | Facility: CLINIC | Age: 60
End: 2025-08-27
Payer: COMMERCIAL

## 2025-08-27 ASSESSMENT — ENCOUNTER SYMPTOMS
CHANGE IN APPETITE: DECREASED
PAIN LOCATION - EXACERBATING FACTORS: MOVEMENT
PAIN LOCATION: ABDOMEN
HIGHEST PAIN SEVERITY IN PAST 24 HOURS: 6/10
PAIN LOCATION - PAIN DURATION: DAILY
LOWEST PAIN SEVERITY IN PAST 24 HOURS: 3/10
FATIGUES EASILY: 1
PERSON REPORTING PAIN: PATIENT
PAIN SEVERITY GOAL: 0/10
MUSCLE WEAKNESS: 1
ABDOMINAL PAIN: 1
PAIN LOCATION - RELIEVING FACTORS: REST
PAIN LOCATION - PAIN QUALITY: ACHE
APPETITE LEVEL: FAIR
PAIN LOCATION - PAIN FREQUENCY: CONSTANT
DIZZINESS: 1
SUBJECTIVE PAIN PROGRESSION: GRADUALLY IMPROVING
PAIN LOCATION - PAIN SEVERITY: 5/10
PAIN: 1

## 2025-08-29 DIAGNOSIS — Z78.9 ON TOTAL PARENTERAL NUTRITION (TPN): ICD-10-CM

## 2025-08-30 NOTE — DOCUMENTATION CLARIFICATION NOTE
"    PATIENT:               NATHEN EVANGELISTA  ACCT #:                  [id number]  MRN:                       [id number]  :                       1965  ADMIT DATE:       2025 5:24 AM  DISCH DATE:        2025 11:45 AM  RESPONDING PROVIDER #:        [id number]          PROVIDER RESPONSE TEXT:    Excisional debridement down to and including subcutaneous tissue and/or fascia    CDI QUERY TEXT:    Clarification        Instruction:    Based on your assessment of the patient and the clinical information, please provide the requested documentation by clicking on the appropriate radio button and enter any additional information if prompted.    Question: Please further clarify the debridement procedure as    When answering this query, please exercise your independent professional judgment. The fact that a question is being asked, does not imply that any particular answer is desired or expected.    The patient's clinical indicators include:  Clinical Information: 59 year old male admitted for repair of postop EC fistula and ventral hernia. This query refers to the procedure performed on 2025 and documented as \"abdominal wall debridement\"    OP NOTE : \"We then turned our attention to abdominal wall debridement.  We incised the hernia sac superficial to the anterior fascia and then incised the unhealthy scar tissue around the incision and removed this as a specimen to perform a full-thickness abdominal wall debridement.  This allowed for the fascia to be medialized somewhat more easily as well as to be visualized to permit closure, and also allowed for the soft tissue to be brought together.\"  Options provided:  -- Excisional debridement down to and including subcutaneous tissue and/or fascia  -- Excisional debridement down to and including muscle  -- Non-excisional debridement down to and including subcutaneous tissue and/or fascia  -- Non-excisional debridement down to and including muscle  -- Other - I will " add my own diagnosis  -- Refer to Clinical Documentation Reviewer    Query created by: Herminia Bernard on 8/29/2025 3:36 PM      Electronically signed by:  SANIYA DEWITT MD 8/30/2025 7:17 AM

## 2025-09-01 ENCOUNTER — HOME CARE VISIT (OUTPATIENT)
Dept: HOME HEALTH SERVICES | Facility: HOME HEALTH | Age: 60
End: 2025-09-01
Payer: COMMERCIAL

## 2025-09-01 ENCOUNTER — LAB REQUISITION (OUTPATIENT)
Dept: LAB | Facility: HOSPITAL | Age: 60
End: 2025-09-01
Payer: COMMERCIAL

## 2025-09-01 VITALS
RESPIRATION RATE: 16 BRPM | DIASTOLIC BLOOD PRESSURE: 74 MMHG | TEMPERATURE: 98 F | SYSTOLIC BLOOD PRESSURE: 118 MMHG | HEART RATE: 66 BPM | OXYGEN SATURATION: 98 %

## 2025-09-01 DIAGNOSIS — K63.2 FISTULA OF INTESTINE: ICD-10-CM

## 2025-09-01 LAB
ALBUMIN SERPL BCP-MCNC: 3.5 G/DL (ref 3.4–5)
ALP SERPL-CCNC: 369 U/L (ref 33–136)
ALT SERPL W P-5'-P-CCNC: 39 U/L (ref 10–52)
ANION GAP SERPL CALCULATED.3IONS-SCNC: 11 MMOL/L (ref 10–20)
AST SERPL W P-5'-P-CCNC: 25 U/L (ref 9–39)
BILIRUB DIRECT SERPL-MCNC: 0.2 MG/DL (ref 0–0.3)
BILIRUB SERPL-MCNC: 0.5 MG/DL (ref 0–1.2)
BUN SERPL-MCNC: 22 MG/DL (ref 6–23)
CALCIUM SERPL-MCNC: 9.3 MG/DL (ref 8.6–10.3)
CHLORIDE SERPL-SCNC: 106 MMOL/L (ref 98–107)
CO2 SERPL-SCNC: 23 MMOL/L (ref 21–32)
CREAT SERPL-MCNC: 0.55 MG/DL (ref 0.5–1.3)
EGFRCR SERPLBLD CKD-EPI 2021: >90 ML/MIN/1.73M*2
ERYTHROCYTE [DISTWIDTH] IN BLOOD BY AUTOMATED COUNT: 16.8 % (ref 11.5–14.5)
GLUCOSE SERPL-MCNC: 104 MG/DL (ref 74–99)
HCT VFR BLD AUTO: 29.6 % (ref 41–52)
HGB BLD-MCNC: 8.9 G/DL (ref 13.5–17.5)
MAGNESIUM SERPL-MCNC: 1.83 MG/DL (ref 1.6–2.4)
MCH RBC QN AUTO: 23.8 PG (ref 26–34)
MCHC RBC AUTO-ENTMCNC: 30.1 G/DL (ref 32–36)
MCV RBC AUTO: 79 FL (ref 80–100)
NRBC BLD-RTO: 0 /100 WBCS (ref 0–0)
PHOSPHATE SERPL-MCNC: 4.1 MG/DL (ref 2.5–4.9)
PLATELET # BLD AUTO: 443 X10*3/UL (ref 150–450)
POTASSIUM SERPL-SCNC: 4.4 MMOL/L (ref 3.5–5.3)
PROT SERPL-MCNC: 7.4 G/DL (ref 6.4–8.2)
RBC # BLD AUTO: 3.74 X10*6/UL (ref 4.5–5.9)
SODIUM SERPL-SCNC: 136 MMOL/L (ref 136–145)
TRIGL SERPL-MCNC: 124 MG/DL (ref 0–149)
WBC # BLD AUTO: 8.4 X10*3/UL (ref 4.4–11.3)

## 2025-09-01 PROCEDURE — 85027 COMPLETE CBC AUTOMATED: CPT

## 2025-09-01 PROCEDURE — 80053 COMPREHEN METABOLIC PANEL: CPT

## 2025-09-01 PROCEDURE — 82248 BILIRUBIN DIRECT: CPT

## 2025-09-01 PROCEDURE — 84478 ASSAY OF TRIGLYCERIDES: CPT

## 2025-09-01 PROCEDURE — G0299 HHS/HOSPICE OF RN EA 15 MIN: HCPCS

## 2025-09-01 PROCEDURE — 83735 ASSAY OF MAGNESIUM: CPT

## 2025-09-01 PROCEDURE — 84100 ASSAY OF PHOSPHORUS: CPT

## 2025-09-01 RX ADMIN — SODIUM CHLORIDE 20 ML: 9 INJECTION, SOLUTION INTRAMUSCULAR; INTRAVENOUS; SUBCUTANEOUS at 10:05

## 2025-09-01 RX ADMIN — SODIUM CHLORIDE 10 ML: 9 INJECTION, SOLUTION INTRAMUSCULAR; INTRAVENOUS; SUBCUTANEOUS at 10:08

## 2025-09-01 RX ADMIN — SODIUM CHLORIDE 10 ML: 9 INJECTION, SOLUTION INTRAMUSCULAR; INTRAVENOUS; SUBCUTANEOUS at 10:07

## 2025-09-01 RX ADMIN — SODIUM CHLORIDE 10 ML: 9 INJECTION, SOLUTION INTRAMUSCULAR; INTRAVENOUS; SUBCUTANEOUS at 10:00

## 2025-09-01 RX ADMIN — Medication 5 ML: at 10:06

## 2025-09-03 ENCOUNTER — HOME INFUSION (OUTPATIENT)
Dept: INFUSION THERAPY | Age: 60
End: 2025-09-03
Payer: COMMERCIAL

## 2025-09-03 ENCOUNTER — PATIENT OUTREACH (OUTPATIENT)
Dept: PRIMARY CARE | Facility: CLINIC | Age: 60
End: 2025-09-03
Payer: COMMERCIAL

## 2025-09-03 ENCOUNTER — DOCUMENTATION (OUTPATIENT)
Dept: INFUSION THERAPY | Age: 60
End: 2025-09-03
Payer: COMMERCIAL

## 2025-09-03 DIAGNOSIS — G89.18 ACUTE POST-OPERATIVE PAIN: ICD-10-CM

## 2025-09-03 DIAGNOSIS — E46 PROTEIN-CALORIE MALNUTRITION, UNSPECIFIED SEVERITY (MULTI): ICD-10-CM

## 2025-09-03 RX ORDER — OXYCODONE HCL 5 MG/5 ML
5 SOLUTION, ORAL ORAL EVERY 6 HOURS PRN
Qty: 100 ML | Refills: 0 | Status: SHIPPED | OUTPATIENT
Start: 2025-09-03

## 2025-09-05 ASSESSMENT — ENCOUNTER SYMPTOMS
PAIN: 1
FATIGUES EASILY: 1
PERSON REPORTING PAIN: PATIENT
HIGHEST PAIN SEVERITY IN PAST 24 HOURS: 6/10
PAIN SEVERITY GOAL: 0/10
ABDOMINAL PAIN: 1
CHANGE IN APPETITE: UNCHANGED
PAIN LOCATION - EXACERBATING FACTORS: MOVEMENT
MUSCLE WEAKNESS: 1
PAIN LOCATION - PAIN FREQUENCY: CONSTANT
PAIN LOCATION - PAIN DURATION: DAILY
PAIN LOCATION - PAIN SEVERITY: 4/10
PAIN LOCATION - PAIN QUALITY: ACHE
PAIN LOCATION - RELIEVING FACTORS: REST
LOWEST PAIN SEVERITY IN PAST 24 HOURS: 3/10
APPETITE LEVEL: FAIR
PAIN LOCATION: ABDOMEN

## 2025-09-12 ENCOUNTER — APPOINTMENT (OUTPATIENT)
Dept: SURGERY | Facility: CLINIC | Age: 60
End: 2025-09-12
Payer: COMMERCIAL

## (undated) DEVICE — STAPLER, LINEAR, 3.5 60MM, RELOADABLE, BLUE

## (undated) DEVICE — BINDER, ABDOMINAL, 4 PANEL, 12 X 46-62 IN

## (undated) DEVICE — CATHETER TRAY, SURESTEP, 16FR, URINE METER W/STATLOCK

## (undated) DEVICE — PRE-CLEAN KIT, BEDSIDE, FIRST STEP

## (undated) DEVICE — DRESSING, ADAPTIC, NON-ADHERENT, 3 X 8 IN

## (undated) DEVICE — TOWEL, SURGICAL, NEURO, O/R, 16 X 26, BLUE, STERILE

## (undated) DEVICE — SUTURE, VICRYL, 0, 27 IN, UR-6, VIOLET

## (undated) DEVICE — Device

## (undated) DEVICE — EVACUATOR, WOUND, SUCTION, CLOSED, JACKSON-PRATT, 100 CC, SILICONE

## (undated) DEVICE — STAPLER,  ECHELON CIRCULAR POWERED, 29MM, DISP

## (undated) DEVICE — DRAPE, INCISE, ANTIMICROBIAL, IOBAN 2, LARGE, 17 X 23 IN, DISPOSABLE, STERILE

## (undated) DEVICE — SUTURE, VICRYL, 3-0,18 IN, SH, UNDYED

## (undated) DEVICE — CATHETER TRAY, FOLEY, 18FR, 10ML, W/ DRAINBAG

## (undated) DEVICE — TROCAR, KII OPTICAL BLADELESS 5MM Z THREAD 100MM LNGTH

## (undated) DEVICE — COVER, LIGHT HANDLE, SURGICAL, FLEXIBLE, DISPOSABLE, STERILE

## (undated) DEVICE — REST, HEAD, BAGEL, 9 IN

## (undated) DEVICE — DRAIN, WOUND, FLAT, HUBLESS, FULL LENGTH PERFORATION, 10 MM X 20 CM, SILICONE

## (undated) DEVICE — SYRINGE, 50 CC, LUER LOCK

## (undated) DEVICE — SUTURE, GUT, CHROMIC, 1, 54 IN, LIGAPAK, BROWN

## (undated) DEVICE — DRAIN, CHANNEL, HUBLESS, 1/4 ROUND FULL FLUTE, 19 FR/W 1/4" TROCAR"

## (undated) DEVICE — SCOPE WARMER, LAPAROSCOPE, BAG ONLY, LF

## (undated) DEVICE — CUTTER, PROX LINEAR, 75MM, REG TISSUE, W/ SAFETY LOCK OUT

## (undated) DEVICE — SUTURE, VICRYL, 2-0, 18 IN, UNDYED

## (undated) DEVICE — RETRIEVAL SYSTEM, MONARCH, 10MM DISP ENDOSCOPIC

## (undated) DEVICE — SUTURE, VICRYL, 2-0, 27 IN, BR/SH 27, VIOLET

## (undated) DEVICE — PAD, GROUNDING, ELECTROSURGICAL, W/9 FT CABLE, POLYHESIVE II, ADULT, LF

## (undated) DEVICE — CLEANER, ELECTROSURGICAL, TIP, 5 X 5 CM, LF

## (undated) DEVICE — SUTURE, GUT, 1, 36 IN, CT-1, BROWN

## (undated) DEVICE — VALVE, DEFENDO, 5 PCS BUTTON SET, SINGLE USE

## (undated) DEVICE — DRAPE, SHEET, UTILITY, NON ABSORBENT, 18 X 26 IN, LF

## (undated) DEVICE — STAPLER, RELOADABLE LINEAR, 30MM 4.8,  GREEN

## (undated) DEVICE — DRAPE, FLUID WARMER

## (undated) DEVICE — DRESSING KIT, VACUUM ASSISTED CLOSURE, W/DRAPE/TUBING, MEDIUM, FOAM, BLACK

## (undated) DEVICE — CAUTERY, PENCIL, PUSH BUTTON, SMOKE EVAC, 70MM

## (undated) DEVICE — SPONGE, DISSECTOR, PEANUT, 3/8, STERILE 5 FOAM HOLDER"

## (undated) DEVICE — SUTURE, CHROMIC GUT, 3-0, SH 27"

## (undated) DEVICE — TUBE SET, PNEUMOCLEAR, SMOKE EVACU, HIGH-FLOW

## (undated) DEVICE — WOUND VAC KIT, W/CANISTER, 500ML

## (undated) DEVICE — LIGASURE IMPACT, 18CM

## (undated) DEVICE — MANIFOLD, 4 PORT NEPTUNE STANDARD

## (undated) DEVICE — SUTURE, PROLENE, 0, 30 IN, SH

## (undated) DEVICE — SUTURE, ETHILON, 2-0, 18 IN, FS, BLACK, BX/12

## (undated) DEVICE — ADHESIVE, SKIN, DERMABOND ADVANCED, 15CM, PEN-STYLE

## (undated) DEVICE — SUTURE, MONOCRYL, 4-0, 18 IN, PS2, UNDYED

## (undated) DEVICE — SUTURE, PDS II, 4-0, 27 IN, RB-1 VIL MONO, LF

## (undated) DEVICE — TIP, SUCTION, YANKAUER, FLEXIBLE

## (undated) DEVICE — CUTTER, PROXIMATE LINEAR RELOAD, 75MM, BLUE

## (undated) DEVICE — TOWEL, OR XRAY, RFID DETECT, WHITE, 17X26, STERILE

## (undated) DEVICE — COVER, CART, 45 X 27 X 48 IN, CLEAR

## (undated) DEVICE — TUBING, SUCTION, CONNECTING, STERILE 0.25 X 120 IN., LF

## (undated) DEVICE — GOWN, SURGICAL, SMARTGOWN, XLARGE, STERILE

## (undated) DEVICE — NEEDLE, SPINAL, QUINCKE, 18 G X 3.5 IN, PINK HUB

## (undated) DEVICE — SUTURE PDS II 1 18IN, CT-1 VIOLET CR

## (undated) DEVICE — DRESSING, GAUZE, 16 PLY, 4 X 4 IN, STERILE

## (undated) DEVICE — SPONGE, LAP, XRAY DECT, 18IN X 18IN, W/LOOP, STERILE

## (undated) DEVICE — SUTURE, PDS II, 0, 27 IN, CT1, VIOLET